# Patient Record
Sex: MALE | Race: WHITE | NOT HISPANIC OR LATINO | URBAN - METROPOLITAN AREA
[De-identification: names, ages, dates, MRNs, and addresses within clinical notes are randomized per-mention and may not be internally consistent; named-entity substitution may affect disease eponyms.]

---

## 2017-04-04 ENCOUNTER — OUTPATIENT (OUTPATIENT)
Dept: OUTPATIENT SERVICES | Facility: HOSPITAL | Age: 77
LOS: 1 days | Discharge: HOME | End: 2017-04-04

## 2017-06-27 DIAGNOSIS — J43.9 EMPHYSEMA, UNSPECIFIED: ICD-10-CM

## 2017-06-27 DIAGNOSIS — D64.9 ANEMIA, UNSPECIFIED: ICD-10-CM

## 2017-06-27 DIAGNOSIS — G47.33 OBSTRUCTIVE SLEEP APNEA (ADULT) (PEDIATRIC): ICD-10-CM

## 2017-06-27 DIAGNOSIS — E11.22 TYPE 2 DIABETES MELLITUS WITH DIABETIC CHRONIC KIDNEY DISEASE: ICD-10-CM

## 2017-07-05 ENCOUNTER — OUTPATIENT (OUTPATIENT)
Dept: OUTPATIENT SERVICES | Facility: HOSPITAL | Age: 77
LOS: 1 days | Discharge: HOME | End: 2017-07-05

## 2017-07-05 DIAGNOSIS — I10 ESSENTIAL (PRIMARY) HYPERTENSION: ICD-10-CM

## 2017-07-05 DIAGNOSIS — J43.9 EMPHYSEMA, UNSPECIFIED: ICD-10-CM

## 2017-07-05 DIAGNOSIS — G47.33 OBSTRUCTIVE SLEEP APNEA (ADULT) (PEDIATRIC): ICD-10-CM

## 2017-07-05 DIAGNOSIS — E11.22 TYPE 2 DIABETES MELLITUS WITH DIABETIC CHRONIC KIDNEY DISEASE: ICD-10-CM

## 2017-07-05 DIAGNOSIS — E11.9 TYPE 2 DIABETES MELLITUS WITHOUT COMPLICATIONS: ICD-10-CM

## 2017-07-05 DIAGNOSIS — D64.9 ANEMIA, UNSPECIFIED: ICD-10-CM

## 2017-07-05 DIAGNOSIS — J44.9 CHRONIC OBSTRUCTIVE PULMONARY DISEASE, UNSPECIFIED: ICD-10-CM

## 2017-07-05 DIAGNOSIS — E87.5 HYPERKALEMIA: ICD-10-CM

## 2017-07-05 DIAGNOSIS — E78.5 HYPERLIPIDEMIA, UNSPECIFIED: ICD-10-CM

## 2017-07-05 DIAGNOSIS — N40.0 BENIGN PROSTATIC HYPERPLASIA WITHOUT LOWER URINARY TRACT SYMPTOMS: ICD-10-CM

## 2017-07-05 DIAGNOSIS — A41.9 SEPSIS, UNSPECIFIED ORGANISM: ICD-10-CM

## 2017-08-08 ENCOUNTER — APPOINTMENT (OUTPATIENT)
Dept: UROLOGY | Facility: CLINIC | Age: 77
End: 2017-08-08

## 2017-09-25 ENCOUNTER — OUTPATIENT (OUTPATIENT)
Dept: OUTPATIENT SERVICES | Facility: HOSPITAL | Age: 77
LOS: 1 days | Discharge: HOME | End: 2017-09-25

## 2017-09-25 DIAGNOSIS — N40.0 BENIGN PROSTATIC HYPERPLASIA WITHOUT LOWER URINARY TRACT SYMPTOMS: ICD-10-CM

## 2017-09-25 DIAGNOSIS — E78.5 HYPERLIPIDEMIA, UNSPECIFIED: ICD-10-CM

## 2017-09-25 DIAGNOSIS — R91.8 OTHER NONSPECIFIC ABNORMAL FINDING OF LUNG FIELD: ICD-10-CM

## 2017-09-25 DIAGNOSIS — I10 ESSENTIAL (PRIMARY) HYPERTENSION: ICD-10-CM

## 2017-09-25 DIAGNOSIS — E11.9 TYPE 2 DIABETES MELLITUS WITHOUT COMPLICATIONS: ICD-10-CM

## 2017-09-25 DIAGNOSIS — J44.9 CHRONIC OBSTRUCTIVE PULMONARY DISEASE, UNSPECIFIED: ICD-10-CM

## 2017-09-25 DIAGNOSIS — E87.5 HYPERKALEMIA: ICD-10-CM

## 2017-09-25 DIAGNOSIS — A41.9 SEPSIS, UNSPECIFIED ORGANISM: ICD-10-CM

## 2017-10-03 ENCOUNTER — MESSAGE (OUTPATIENT)
Age: 77
End: 2017-10-03

## 2017-10-04 ENCOUNTER — MESSAGE (OUTPATIENT)
Age: 77
End: 2017-10-04

## 2017-10-04 DIAGNOSIS — R97.20 ELEVATED PROSTATE, SPECIFIC ANTIGEN [PSA]: ICD-10-CM

## 2017-11-09 ENCOUNTER — INPATIENT (INPATIENT)
Facility: HOSPITAL | Age: 77
LOS: 7 days | Discharge: HOME HEALTH | End: 2017-11-17
Attending: HOSPITALIST | Admitting: INTERNAL MEDICINE

## 2017-11-09 ENCOUNTER — OUTPATIENT (OUTPATIENT)
Dept: OUTPATIENT SERVICES | Facility: HOSPITAL | Age: 77
LOS: 1 days | Discharge: HOME | End: 2017-11-09

## 2017-11-09 DIAGNOSIS — J44.9 CHRONIC OBSTRUCTIVE PULMONARY DISEASE, UNSPECIFIED: ICD-10-CM

## 2017-11-09 DIAGNOSIS — N40.0 BENIGN PROSTATIC HYPERPLASIA WITHOUT LOWER URINARY TRACT SYMPTOMS: ICD-10-CM

## 2017-11-09 DIAGNOSIS — Z99.81 DEPENDENCE ON SUPPLEMENTAL OXYGEN: ICD-10-CM

## 2017-11-09 DIAGNOSIS — A41.9 SEPSIS, UNSPECIFIED ORGANISM: ICD-10-CM

## 2017-11-09 DIAGNOSIS — E78.5 HYPERLIPIDEMIA, UNSPECIFIED: ICD-10-CM

## 2017-11-09 DIAGNOSIS — E11.9 TYPE 2 DIABETES MELLITUS WITHOUT COMPLICATIONS: ICD-10-CM

## 2017-11-09 DIAGNOSIS — E87.5 HYPERKALEMIA: ICD-10-CM

## 2017-11-09 DIAGNOSIS — G47.33 OBSTRUCTIVE SLEEP APNEA (ADULT) (PEDIATRIC): ICD-10-CM

## 2017-11-09 DIAGNOSIS — J43.9 EMPHYSEMA, UNSPECIFIED: ICD-10-CM

## 2017-11-09 DIAGNOSIS — I10 ESSENTIAL (PRIMARY) HYPERTENSION: ICD-10-CM

## 2017-11-09 DIAGNOSIS — J44.1 CHRONIC OBSTRUCTIVE PULMONARY DISEASE WITH (ACUTE) EXACERBATION: ICD-10-CM

## 2017-11-09 DIAGNOSIS — E11.22 TYPE 2 DIABETES MELLITUS WITH DIABETIC CHRONIC KIDNEY DISEASE: ICD-10-CM

## 2017-11-09 DIAGNOSIS — D64.9 ANEMIA, UNSPECIFIED: ICD-10-CM

## 2017-11-10 DIAGNOSIS — Z02.9 ENCOUNTER FOR ADMINISTRATIVE EXAMINATIONS, UNSPECIFIED: ICD-10-CM

## 2017-11-22 ENCOUNTER — OUTPATIENT (OUTPATIENT)
Dept: OUTPATIENT SERVICES | Facility: HOSPITAL | Age: 77
LOS: 1 days | Discharge: HOME | End: 2017-11-22

## 2017-11-22 DIAGNOSIS — N40.0 BENIGN PROSTATIC HYPERPLASIA WITHOUT LOWER URINARY TRACT SYMPTOMS: ICD-10-CM

## 2017-11-22 DIAGNOSIS — Z79.4 LONG TERM (CURRENT) USE OF INSULIN: ICD-10-CM

## 2017-11-22 DIAGNOSIS — A41.9 SEPSIS, UNSPECIFIED ORGANISM: ICD-10-CM

## 2017-11-22 DIAGNOSIS — K59.00 CONSTIPATION, UNSPECIFIED: ICD-10-CM

## 2017-11-22 DIAGNOSIS — D72.829 ELEVATED WHITE BLOOD CELL COUNT, UNSPECIFIED: ICD-10-CM

## 2017-11-22 DIAGNOSIS — N28.1 CYST OF KIDNEY, ACQUIRED: ICD-10-CM

## 2017-11-22 DIAGNOSIS — E11.22 TYPE 2 DIABETES MELLITUS WITH DIABETIC CHRONIC KIDNEY DISEASE: ICD-10-CM

## 2017-11-22 DIAGNOSIS — R54 AGE-RELATED PHYSICAL DEBILITY: ICD-10-CM

## 2017-11-22 DIAGNOSIS — Z87.891 PERSONAL HISTORY OF NICOTINE DEPENDENCE: ICD-10-CM

## 2017-11-22 DIAGNOSIS — E11.9 TYPE 2 DIABETES MELLITUS WITHOUT COMPLICATIONS: ICD-10-CM

## 2017-11-22 DIAGNOSIS — J96.11 CHRONIC RESPIRATORY FAILURE WITH HYPOXIA: ICD-10-CM

## 2017-11-22 DIAGNOSIS — G47.30 SLEEP APNEA, UNSPECIFIED: ICD-10-CM

## 2017-11-22 DIAGNOSIS — K57.90 DIVERTICULOSIS OF INTESTINE, PART UNSPECIFIED, WITHOUT PERFORATION OR ABSCESS WITHOUT BLEEDING: ICD-10-CM

## 2017-11-22 DIAGNOSIS — K27.9 PEPTIC ULCER, SITE UNSPECIFIED, UNSPECIFIED AS ACUTE OR CHRONIC, WITHOUT HEMORRHAGE OR PERFORATION: ICD-10-CM

## 2017-11-22 DIAGNOSIS — I10 ESSENTIAL (PRIMARY) HYPERTENSION: ICD-10-CM

## 2017-11-22 DIAGNOSIS — I27.20 PULMONARY HYPERTENSION, UNSPECIFIED: ICD-10-CM

## 2017-11-22 DIAGNOSIS — N17.9 ACUTE KIDNEY FAILURE, UNSPECIFIED: ICD-10-CM

## 2017-11-22 DIAGNOSIS — E78.5 HYPERLIPIDEMIA, UNSPECIFIED: ICD-10-CM

## 2017-11-22 DIAGNOSIS — M25.511 PAIN IN RIGHT SHOULDER: ICD-10-CM

## 2017-11-22 DIAGNOSIS — E87.5 HYPERKALEMIA: ICD-10-CM

## 2017-11-22 DIAGNOSIS — J44.9 CHRONIC OBSTRUCTIVE PULMONARY DISEASE, UNSPECIFIED: ICD-10-CM

## 2017-11-22 DIAGNOSIS — J44.1 CHRONIC OBSTRUCTIVE PULMONARY DISEASE WITH (ACUTE) EXACERBATION: ICD-10-CM

## 2017-11-22 DIAGNOSIS — E11.65 TYPE 2 DIABETES MELLITUS WITH HYPERGLYCEMIA: ICD-10-CM

## 2017-11-22 DIAGNOSIS — I12.9 HYPERTENSIVE CHRONIC KIDNEY DISEASE WITH STAGE 1 THROUGH STAGE 4 CHRONIC KIDNEY DISEASE, OR UNSPECIFIED CHRONIC KIDNEY DISEASE: ICD-10-CM

## 2017-11-22 DIAGNOSIS — N20.0 CALCULUS OF KIDNEY: ICD-10-CM

## 2017-11-22 DIAGNOSIS — N18.3 CHRONIC KIDNEY DISEASE, STAGE 3 (MODERATE): ICD-10-CM

## 2017-11-22 DIAGNOSIS — Z99.81 DEPENDENCE ON SUPPLEMENTAL OXYGEN: ICD-10-CM

## 2017-11-28 ENCOUNTER — OUTPATIENT (OUTPATIENT)
Dept: OUTPATIENT SERVICES | Facility: HOSPITAL | Age: 77
LOS: 1 days | Discharge: HOME | End: 2017-11-28

## 2017-11-28 DIAGNOSIS — E78.5 HYPERLIPIDEMIA, UNSPECIFIED: ICD-10-CM

## 2017-11-28 DIAGNOSIS — J44.9 CHRONIC OBSTRUCTIVE PULMONARY DISEASE, UNSPECIFIED: ICD-10-CM

## 2017-11-28 DIAGNOSIS — E11.9 TYPE 2 DIABETES MELLITUS WITHOUT COMPLICATIONS: ICD-10-CM

## 2017-11-28 DIAGNOSIS — E87.5 HYPERKALEMIA: ICD-10-CM

## 2017-11-28 DIAGNOSIS — D64.9 ANEMIA, UNSPECIFIED: ICD-10-CM

## 2017-11-28 DIAGNOSIS — N18.3 CHRONIC KIDNEY DISEASE, STAGE 3 (MODERATE): ICD-10-CM

## 2017-11-28 DIAGNOSIS — N40.0 BENIGN PROSTATIC HYPERPLASIA WITHOUT LOWER URINARY TRACT SYMPTOMS: ICD-10-CM

## 2017-11-28 DIAGNOSIS — A41.9 SEPSIS, UNSPECIFIED ORGANISM: ICD-10-CM

## 2017-11-28 DIAGNOSIS — I10 ESSENTIAL (PRIMARY) HYPERTENSION: ICD-10-CM

## 2018-01-16 ENCOUNTER — OUTPATIENT (OUTPATIENT)
Dept: OUTPATIENT SERVICES | Facility: HOSPITAL | Age: 78
LOS: 1 days | Discharge: HOME | End: 2018-01-16

## 2018-01-16 DIAGNOSIS — N40.0 BENIGN PROSTATIC HYPERPLASIA WITHOUT LOWER URINARY TRACT SYMPTOMS: ICD-10-CM

## 2018-01-16 DIAGNOSIS — E87.5 HYPERKALEMIA: ICD-10-CM

## 2018-01-16 DIAGNOSIS — E11.9 TYPE 2 DIABETES MELLITUS WITHOUT COMPLICATIONS: ICD-10-CM

## 2018-01-16 DIAGNOSIS — E78.4 OTHER HYPERLIPIDEMIA: ICD-10-CM

## 2018-01-16 DIAGNOSIS — I10 ESSENTIAL (PRIMARY) HYPERTENSION: ICD-10-CM

## 2018-01-16 DIAGNOSIS — Z02.89 ENCOUNTER FOR OTHER ADMINISTRATIVE EXAMINATIONS: ICD-10-CM

## 2018-01-16 DIAGNOSIS — R73.09 OTHER ABNORMAL GLUCOSE: ICD-10-CM

## 2018-01-16 DIAGNOSIS — J44.9 CHRONIC OBSTRUCTIVE PULMONARY DISEASE, UNSPECIFIED: ICD-10-CM

## 2018-01-16 DIAGNOSIS — A41.9 SEPSIS, UNSPECIFIED ORGANISM: ICD-10-CM

## 2018-01-16 DIAGNOSIS — R74.8 ABNORMAL LEVELS OF OTHER SERUM ENZYMES: ICD-10-CM

## 2018-01-16 DIAGNOSIS — E55.9 VITAMIN D DEFICIENCY, UNSPECIFIED: ICD-10-CM

## 2018-01-16 DIAGNOSIS — E78.5 HYPERLIPIDEMIA, UNSPECIFIED: ICD-10-CM

## 2018-01-16 DIAGNOSIS — R79.82 ELEVATED C-REACTIVE PROTEIN (CRP): ICD-10-CM

## 2018-01-16 DIAGNOSIS — E11.22 TYPE 2 DIABETES MELLITUS WITH DIABETIC CHRONIC KIDNEY DISEASE: ICD-10-CM

## 2018-01-16 DIAGNOSIS — E78.1 PURE HYPERGLYCERIDEMIA: ICD-10-CM

## 2018-01-16 DIAGNOSIS — R68.89 OTHER GENERAL SYMPTOMS AND SIGNS: ICD-10-CM

## 2018-01-25 ENCOUNTER — OUTPATIENT (OUTPATIENT)
Dept: OUTPATIENT SERVICES | Facility: HOSPITAL | Age: 78
LOS: 1 days | Discharge: HOME | End: 2018-01-25

## 2018-01-25 DIAGNOSIS — E87.5 HYPERKALEMIA: ICD-10-CM

## 2018-02-04 DIAGNOSIS — E78.5 HYPERLIPIDEMIA, UNSPECIFIED: ICD-10-CM

## 2018-02-04 DIAGNOSIS — I10 ESSENTIAL (PRIMARY) HYPERTENSION: ICD-10-CM

## 2018-02-04 DIAGNOSIS — E11.9 TYPE 2 DIABETES MELLITUS WITHOUT COMPLICATIONS: ICD-10-CM

## 2018-02-04 DIAGNOSIS — A41.9 SEPSIS, UNSPECIFIED ORGANISM: ICD-10-CM

## 2018-02-04 DIAGNOSIS — E87.5 HYPERKALEMIA: ICD-10-CM

## 2018-02-04 DIAGNOSIS — J44.9 CHRONIC OBSTRUCTIVE PULMONARY DISEASE, UNSPECIFIED: ICD-10-CM

## 2018-02-04 DIAGNOSIS — N40.0 BENIGN PROSTATIC HYPERPLASIA WITHOUT LOWER URINARY TRACT SYMPTOMS: ICD-10-CM

## 2018-02-22 ENCOUNTER — OUTPATIENT (OUTPATIENT)
Dept: OUTPATIENT SERVICES | Facility: HOSPITAL | Age: 78
LOS: 1 days | Discharge: HOME | End: 2018-02-22

## 2018-02-22 DIAGNOSIS — E87.5 HYPERKALEMIA: ICD-10-CM

## 2018-02-22 DIAGNOSIS — E11.9 TYPE 2 DIABETES MELLITUS WITHOUT COMPLICATIONS: ICD-10-CM

## 2018-02-27 ENCOUNTER — APPOINTMENT (OUTPATIENT)
Dept: UROLOGY | Facility: CLINIC | Age: 78
End: 2018-02-27

## 2018-06-16 ENCOUNTER — EMERGENCY (EMERGENCY)
Facility: HOSPITAL | Age: 78
LOS: 0 days | Discharge: LEFT BEFORE DISCHARGE | End: 2018-06-16
Attending: STUDENT IN AN ORGANIZED HEALTH CARE EDUCATION/TRAINING PROGRAM | Admitting: STUDENT IN AN ORGANIZED HEALTH CARE EDUCATION/TRAINING PROGRAM

## 2018-06-16 VITALS
WEIGHT: 229.94 LBS | HEIGHT: 69 IN | DIASTOLIC BLOOD PRESSURE: 74 MMHG | TEMPERATURE: 98 F | SYSTOLIC BLOOD PRESSURE: 179 MMHG | HEART RATE: 80 BPM | OXYGEN SATURATION: 90 % | RESPIRATION RATE: 20 BRPM

## 2018-06-16 VITALS
RESPIRATION RATE: 19 BRPM | DIASTOLIC BLOOD PRESSURE: 64 MMHG | SYSTOLIC BLOOD PRESSURE: 124 MMHG | HEART RATE: 68 BPM | TEMPERATURE: 98 F | OXYGEN SATURATION: 93 %

## 2018-06-16 DIAGNOSIS — E11.9 TYPE 2 DIABETES MELLITUS WITHOUT COMPLICATIONS: ICD-10-CM

## 2018-06-16 DIAGNOSIS — I25.10 ATHEROSCLEROTIC HEART DISEASE OF NATIVE CORONARY ARTERY WITHOUT ANGINA PECTORIS: ICD-10-CM

## 2018-06-16 DIAGNOSIS — Z88.6 ALLERGY STATUS TO ANALGESIC AGENT: ICD-10-CM

## 2018-06-16 DIAGNOSIS — Z88.8 ALLERGY STATUS TO OTHER DRUGS, MEDICAMENTS AND BIOLOGICAL SUBSTANCES: ICD-10-CM

## 2018-06-16 DIAGNOSIS — I10 ESSENTIAL (PRIMARY) HYPERTENSION: ICD-10-CM

## 2018-06-16 DIAGNOSIS — Z91.013 ALLERGY TO SEAFOOD: ICD-10-CM

## 2018-06-16 DIAGNOSIS — R07.89 OTHER CHEST PAIN: ICD-10-CM

## 2018-06-16 DIAGNOSIS — K21.9 GASTRO-ESOPHAGEAL REFLUX DISEASE WITHOUT ESOPHAGITIS: ICD-10-CM

## 2018-06-16 DIAGNOSIS — J02.9 ACUTE PHARYNGITIS, UNSPECIFIED: ICD-10-CM

## 2018-06-16 LAB
ALBUMIN SERPL ELPH-MCNC: 4.4 G/DL — SIGNIFICANT CHANGE UP (ref 3.5–5.2)
ALP SERPL-CCNC: 70 U/L — SIGNIFICANT CHANGE UP (ref 30–115)
ALT FLD-CCNC: 9 U/L — SIGNIFICANT CHANGE UP (ref 0–41)
ANION GAP SERPL CALC-SCNC: 15 MMOL/L — HIGH (ref 7–14)
AST SERPL-CCNC: 12 U/L — SIGNIFICANT CHANGE UP (ref 0–41)
BASE EXCESS BLDV CALC-SCNC: 1.6 MMOL/L — SIGNIFICANT CHANGE UP (ref -2–2)
BASOPHILS # BLD AUTO: 0.1 K/UL — SIGNIFICANT CHANGE UP (ref 0–0.2)
BASOPHILS NFR BLD AUTO: 0.6 % — SIGNIFICANT CHANGE UP (ref 0–1)
BILIRUB DIRECT SERPL-MCNC: <0.2 MG/DL — SIGNIFICANT CHANGE UP (ref 0–0.2)
BILIRUB INDIRECT FLD-MCNC: >0.2 MG/DL — SIGNIFICANT CHANGE UP (ref 0.2–1.2)
BILIRUB SERPL-MCNC: 0.4 MG/DL — SIGNIFICANT CHANGE UP (ref 0.2–1.2)
BUN SERPL-MCNC: 44 MG/DL — HIGH (ref 10–20)
CA-I SERPL-SCNC: 1.26 MMOL/L — SIGNIFICANT CHANGE UP (ref 1.12–1.3)
CALCIUM SERPL-MCNC: 9.5 MG/DL — SIGNIFICANT CHANGE UP (ref 8.5–10.1)
CHLORIDE SERPL-SCNC: 100 MMOL/L — SIGNIFICANT CHANGE UP (ref 98–110)
CK MB CFR SERPL CALC: 1.8 NG/ML — SIGNIFICANT CHANGE UP (ref 0.6–6.3)
CK SERPL-CCNC: 34 U/L — SIGNIFICANT CHANGE UP (ref 0–225)
CO2 SERPL-SCNC: 25 MMOL/L — SIGNIFICANT CHANGE UP (ref 17–32)
CREAT SERPL-MCNC: 2 MG/DL — HIGH (ref 0.7–1.5)
EOSINOPHIL # BLD AUTO: 0.31 K/UL — SIGNIFICANT CHANGE UP (ref 0–0.7)
EOSINOPHIL NFR BLD AUTO: 1.9 % — SIGNIFICANT CHANGE UP (ref 0–8)
GAS PNL BLDV: 139 MMOL/L — SIGNIFICANT CHANGE UP (ref 136–145)
GAS PNL BLDV: SIGNIFICANT CHANGE UP
GLUCOSE SERPL-MCNC: 199 MG/DL — HIGH (ref 70–99)
HCO3 BLDV-SCNC: 29 MMOL/L — SIGNIFICANT CHANGE UP (ref 22–29)
HCT VFR BLD CALC: 44.6 % — SIGNIFICANT CHANGE UP (ref 42–52)
HCT VFR BLDA CALC: 46.2 % — HIGH (ref 34–44)
HGB BLD CALC-MCNC: 15.1 G/DL — SIGNIFICANT CHANGE UP (ref 14–18)
HGB BLD-MCNC: 13.9 G/DL — LOW (ref 14–18)
HOROWITZ INDEX BLDV+IHG-RTO: 21 — SIGNIFICANT CHANGE UP
IMM GRANULOCYTES NFR BLD AUTO: 1.1 % — HIGH (ref 0.1–0.3)
LACTATE BLDV-MCNC: 1.3 MMOL/L — SIGNIFICANT CHANGE UP (ref 0.5–1.6)
LYMPHOCYTES # BLD AUTO: 1.08 K/UL — LOW (ref 1.2–3.4)
LYMPHOCYTES # BLD AUTO: 6.8 % — LOW (ref 20.5–51.1)
MCHC RBC-ENTMCNC: 24.3 PG — LOW (ref 27–31)
MCHC RBC-ENTMCNC: 31.2 G/DL — LOW (ref 32–37)
MCV RBC AUTO: 78.1 FL — LOW (ref 80–94)
MONOCYTES # BLD AUTO: 1.25 K/UL — HIGH (ref 0.1–0.6)
MONOCYTES NFR BLD AUTO: 7.8 % — SIGNIFICANT CHANGE UP (ref 1.7–9.3)
NEUTROPHILS # BLD AUTO: 13.02 K/UL — HIGH (ref 1.4–6.5)
NEUTROPHILS NFR BLD AUTO: 81.8 % — HIGH (ref 42.2–75.2)
NRBC # BLD: 0 /100 WBCS — SIGNIFICANT CHANGE UP (ref 0–0)
PCO2 BLDV: 55 MMHG — HIGH (ref 41–51)
PH BLDV: 7.33 — SIGNIFICANT CHANGE UP (ref 7.26–7.43)
PLATELET # BLD AUTO: 550 K/UL — HIGH (ref 130–400)
PO2 BLDV: 29 MMHG — SIGNIFICANT CHANGE UP (ref 20–40)
POTASSIUM BLDV-SCNC: 4.5 MMOL/L — SIGNIFICANT CHANGE UP (ref 3.3–5.6)
POTASSIUM SERPL-MCNC: 4.9 MMOL/L — SIGNIFICANT CHANGE UP (ref 3.5–5)
POTASSIUM SERPL-SCNC: 4.9 MMOL/L — SIGNIFICANT CHANGE UP (ref 3.5–5)
PROT SERPL-MCNC: 7 G/DL — SIGNIFICANT CHANGE UP (ref 6–8)
RBC # BLD: 5.71 M/UL — SIGNIFICANT CHANGE UP (ref 4.7–6.1)
RBC # FLD: 17.7 % — HIGH (ref 11.5–14.5)
SAO2 % BLDV: 49 % — SIGNIFICANT CHANGE UP
SODIUM SERPL-SCNC: 140 MMOL/L — SIGNIFICANT CHANGE UP (ref 135–146)
TROPONIN T SERPL-MCNC: <0.01 NG/ML — SIGNIFICANT CHANGE UP
WBC # BLD: 15.93 K/UL — HIGH (ref 4.8–10.8)
WBC # FLD AUTO: 15.93 K/UL — HIGH (ref 4.8–10.8)

## 2018-06-16 RX ORDER — DIPHENHYDRAMINE HYDROCHLORIDE AND LIDOCAINE HYDROCHLORIDE AND ALUMINUM HYDROXIDE AND MAGNESIUM HYDRO
30 KIT ONCE
Qty: 0 | Refills: 0 | Status: COMPLETED | OUTPATIENT
Start: 2018-06-16 | End: 2018-06-16

## 2018-06-16 RX ORDER — FAMOTIDINE 10 MG/ML
20 INJECTION INTRAVENOUS ONCE
Qty: 0 | Refills: 0 | Status: COMPLETED | OUTPATIENT
Start: 2018-06-16 | End: 2018-06-16

## 2018-06-16 RX ORDER — OMEPRAZOLE 10 MG/1
1 CAPSULE, DELAYED RELEASE ORAL
Qty: 14 | Refills: 0 | OUTPATIENT
Start: 2018-06-16 | End: 2018-06-29

## 2018-06-16 RX ADMIN — FAMOTIDINE 20 MILLIGRAM(S): 10 INJECTION INTRAVENOUS at 08:53

## 2018-06-16 RX ADMIN — DIPHENHYDRAMINE HYDROCHLORIDE AND LIDOCAINE HYDROCHLORIDE AND ALUMINUM HYDROXIDE AND MAGNESIUM HYDRO 30 MILLILITER(S): KIT at 08:53

## 2018-06-16 NOTE — ED ADULT TRIAGE NOTE - CHIEF COMPLAINT QUOTE
pt states "I have acid reflux and since last night have been having pain from it in my throat and pressure in my right jaw."

## 2018-06-16 NOTE — ED PROVIDER NOTE - CARE PLAN
Report given to ROSENDO Carrington   Principal Discharge DX:	Sore throat  Secondary Diagnosis:	Ear pain, right  Secondary Diagnosis:	Chest discomfort

## 2018-06-16 NOTE — ED PROVIDER NOTE - PHYSICAL EXAMINATION
VITAL SIGNS: I have reviewed nursing notes and confirm.  CONSTITUTIONAL: Elderly male laying on stretcher in NAD.   SKIN: Skin exam is warm and dry, no acute rash.  HEAD: Normocephalic; atraumatic.  EYES: Conjunctiva and sclera clear.  ENT: No nasal discharge; airway clear. TMs clear.  NECK: Supple; non tender.  CARD: S1, S2 normal; no murmurs, gallops, or rubs. Regular rate and rhythm.  RESP: No wheezes, rales or rhonchi. Speaking in full sentences.   ABD: Normal bowel sounds; soft; non-distended; non-tender; No rebound or guarding.   EXT: Normal ROM. No clubbing, cyanosis or edema.  NEURO: Alert, oriented. Grossly unremarkable. No focal deficits.

## 2018-06-16 NOTE — ED PROVIDER NOTE - ATTENDING CONTRIBUTION TO CARE
78 y/o M pmh DM, HTN, emphysema, GERD, p/w sorethoat and R neck pain waking him from sleep this AM.  + vague chest discomfort.  No n/v, cough, fever, diaphoresis.  His regular GERD does not feel this way; no prior DVT/ PE/ MI.  c/o R ear fullness.  PE NAD: CTAB;RRR; OP clear; neck supple non ttp, no LAD; TM's NL b/l.  IMP: ACS vs GERD.  P: labs, ekg, cxr, asa, reassess, admit vs cdu

## 2018-06-16 NOTE — ED ADULT NURSE NOTE - EXPLANATION OF PATIENT'S REASON FOR LEAVING
PT verbalized back understanding of discharge instructions, follow up care, medications, and AMA. Pt a/o x 3, steady gait

## 2018-06-16 NOTE — ED PROVIDER NOTE - OBJECTIVE STATEMENT
78 yo M with PMHx of DM, HTN, emphysema, CAD and GERD presents to the ED c/o right sided neck pain and sore throat that woke him from sleep this morning. Pt admits to mild associated chest discomfort. Pt states he has had similar symptoms in the past 2/2 to his GERD but pain never went to the right side of his neck. Pt has been taking papaya extract with minimal improvement in symptoms. Pt also complaints of right ear "fullness." Pt denies fever, chills, nausea, vomiting, abdominal pain, diarrhea, headache, dizziness, weakness, SOB, back pain, LOC, trauma, urinary symptoms, cough, calf pain/swelling, recent travel, recent surgery.

## 2018-06-16 NOTE — ED PROVIDER NOTE - PROGRESS NOTE DETAILS
The patient wishes to leave against medical advice.  I have discussed the risks, benefits and alternatives (including the possibility of worsening of disease, pain, permanent disability, and/or death) with the patient.  The patient voices understanding of these risks, benefits, and alternatives and still wishes to sign out against medical advice.  The patient is awake, alert, oriented  x 3 and has demonstrated capacity to refuse/direct care.  I have advised the patient that they can and should return immediately should they develop any worse/different/additional symptoms, or if they change their mind and want to continue their care.

## 2018-06-18 ENCOUNTER — EMERGENCY (EMERGENCY)
Facility: HOSPITAL | Age: 78
LOS: 0 days | Discharge: HOME | End: 2018-06-18
Attending: EMERGENCY MEDICINE | Admitting: EMERGENCY MEDICINE

## 2018-06-18 VITALS
DIASTOLIC BLOOD PRESSURE: 65 MMHG | RESPIRATION RATE: 20 BRPM | OXYGEN SATURATION: 90 % | TEMPERATURE: 98 F | SYSTOLIC BLOOD PRESSURE: 146 MMHG

## 2018-06-18 VITALS
TEMPERATURE: 98 F | HEART RATE: 85 BPM | RESPIRATION RATE: 20 BRPM | OXYGEN SATURATION: 90 % | DIASTOLIC BLOOD PRESSURE: 83 MMHG | HEIGHT: 68 IN | WEIGHT: 253.09 LBS | SYSTOLIC BLOOD PRESSURE: 149 MMHG

## 2018-06-18 DIAGNOSIS — Z79.899 OTHER LONG TERM (CURRENT) DRUG THERAPY: ICD-10-CM

## 2018-06-18 DIAGNOSIS — I10 ESSENTIAL (PRIMARY) HYPERTENSION: ICD-10-CM

## 2018-06-18 DIAGNOSIS — K21.9 GASTRO-ESOPHAGEAL REFLUX DISEASE WITHOUT ESOPHAGITIS: ICD-10-CM

## 2018-06-18 DIAGNOSIS — Z88.8 ALLERGY STATUS TO OTHER DRUGS, MEDICAMENTS AND BIOLOGICAL SUBSTANCES: ICD-10-CM

## 2018-06-18 DIAGNOSIS — E11.9 TYPE 2 DIABETES MELLITUS WITHOUT COMPLICATIONS: ICD-10-CM

## 2018-06-18 DIAGNOSIS — J44.9 CHRONIC OBSTRUCTIVE PULMONARY DISEASE, UNSPECIFIED: ICD-10-CM

## 2018-06-18 DIAGNOSIS — J02.9 ACUTE PHARYNGITIS, UNSPECIFIED: ICD-10-CM

## 2018-06-18 DIAGNOSIS — H66.92 OTITIS MEDIA, UNSPECIFIED, LEFT EAR: ICD-10-CM

## 2018-06-18 DIAGNOSIS — H92.02 OTALGIA, LEFT EAR: ICD-10-CM

## 2018-06-18 RX ORDER — CEFUROXIME AXETIL 250 MG
1 TABLET ORAL
Qty: 14 | Refills: 0 | OUTPATIENT
Start: 2018-06-18 | End: 2018-06-24

## 2018-06-18 RX ORDER — KETOROLAC TROMETHAMINE 30 MG/ML
15 SYRINGE (ML) INJECTION ONCE
Qty: 0 | Refills: 0 | Status: DISCONTINUED | OUTPATIENT
Start: 2018-06-18 | End: 2018-06-18

## 2018-06-18 RX ORDER — AMOXICILLIN 250 MG/5ML
1000 SUSPENSION, RECONSTITUTED, ORAL (ML) ORAL ONCE
Qty: 0 | Refills: 0 | Status: COMPLETED | OUTPATIENT
Start: 2018-06-18 | End: 2018-06-18

## 2018-06-18 RX ORDER — AMOXICILLIN 250 MG/5ML
1 SUSPENSION, RECONSTITUTED, ORAL (ML) ORAL
Qty: 20 | Refills: 0 | OUTPATIENT
Start: 2018-06-18 | End: 2018-06-27

## 2018-06-18 RX ADMIN — Medication 1000 MILLIGRAM(S): at 03:27

## 2018-06-18 RX ADMIN — Medication 15 MILLIGRAM(S): at 03:27

## 2018-06-18 NOTE — ED PROVIDER NOTE - PHYSICAL EXAMINATION
CONSTITUTIONAL: Pt on nc, NAD  EYES: PERRL; EOM intact.   ENT: + erythematous left TM with loss of landmarks. No mastoid tenderness. No discharge. + mildly erythematous pharynx. No exudates, abscesses noted  NECK: Supple; non-tender; no cervical lymphadenopathy. No JVD.   CARDIOVASCULAR: Normal S1, S2; no murmurs, rubs, or gallops.   RESPIRATORY: Normal chest excursion with respiration; breath sounds clear and equal bilaterally; no wheezes, rhonchi, or rales. No tachypnea.   MS: No evidence of trauma or deformity. Non-tender to palpation. No scoliosis. No CVA tenderness. Normal ROM in all four extremities; non-tender to palpation; distal pulses are normal.   SKIN: Normal for age and race; warm; dry; good turgor; no apparent lesions or exudate.   NEURO/PSYCH: A & O x 4; grossly unremarkable. mood and manner are appropriate. Grooming and personal hygiene are appropriate. No apparent thoughts of harm to self or others.

## 2018-06-18 NOTE — ED PROVIDER NOTE - NS ED ROS FT
CONST: No fever, chills or bodyaches  EYES: No pain, redness, drainage or visual changes.  ENT: + sore throat, left ear pain. No nasal discharge or congestion.   CARD: No chest pain, palpitations  RESP: No SOB, cough, hemoptysis. + hx of COPD  GI: No abdominal pain, N/V/D  MS: No joint pain, back pain or extremity pain/injury  SKIN: No rashes  NEURO: No headache, dizziness, paresthesias or LOC

## 2018-06-18 NOTE — ED PROVIDER NOTE - PROGRESS NOTE DETAILS
I personally evaluated the patient. I reviewed the Physician Assistant Fellow's note and agree with the findings and plan.   left TM bugling/erythema./loss of landmark suggesting AOM. denies chest pain and sob. will discharge. Received called from pharmacy patient allergic to PCN prescribed cefuroxime. SORAIDA Marrero

## 2018-06-18 NOTE — ED PROVIDER NOTE - OBJECTIVE STATEMENT
77 year old male with pmhx of DM, HTN, GERD, COPD on 4L home o2 c/o left ear pain x 1 day. pt was seen two days ago 0n 06/16 for right ear/jaw and chest pain and pt had ama. Pt currently denies any chest pain or worsening sob. No fever, chills, cough, nasal congestion, sick contacts, decreased hearing, tinnitus.

## 2018-06-18 NOTE — ED ADULT NURSE NOTE - CHIEF COMPLAINT QUOTE
Clogged left ear and left jaw pain.  Pt states he was here yesterday for right ear symptoms and sore throat.  He signed out after a cardiac work-up.  Today, he feels like it may be his COPD.

## 2018-06-18 NOTE — ED PROVIDER NOTE - MEDICAL DECISION MAKING DETAILS
I personally evaluated the patient. I reviewed the Resident’s or Physician Assistant’s note (as assigned above), and agree with the findings and plan except as documented in my note.  Chart reviewed. H/O COPD, presents with left ear ache and sore throat. Denies chest pain or SOB. Exam shows alert patient in no distress, HEENT NCAT, injected left TM, throat no exudates, RR S1S2, abdomen soft NT +BS, no CCE. EKG normal. Will D/C on Augmentin to follow up with PCP.

## 2018-06-29 ENCOUNTER — OUTPATIENT (OUTPATIENT)
Dept: OUTPATIENT SERVICES | Facility: HOSPITAL | Age: 78
LOS: 1 days | Discharge: HOME | End: 2018-06-29

## 2018-06-29 DIAGNOSIS — R10.819 ABDOMINAL TENDERNESS, UNSPECIFIED SITE: ICD-10-CM

## 2018-07-03 ENCOUNTER — OUTPATIENT (OUTPATIENT)
Dept: OUTPATIENT SERVICES | Facility: HOSPITAL | Age: 78
LOS: 1 days | Discharge: HOME | End: 2018-07-03

## 2018-07-03 DIAGNOSIS — E11.22 TYPE 2 DIABETES MELLITUS WITH DIABETIC CHRONIC KIDNEY DISEASE: ICD-10-CM

## 2018-07-03 DIAGNOSIS — E11.9 TYPE 2 DIABETES MELLITUS WITHOUT COMPLICATIONS: ICD-10-CM

## 2018-07-03 DIAGNOSIS — L73.9 FOLLICULAR DISORDER, UNSPECIFIED: ICD-10-CM

## 2018-07-03 DIAGNOSIS — D64.9 ANEMIA, UNSPECIFIED: ICD-10-CM

## 2018-07-03 DIAGNOSIS — N18.3 CHRONIC KIDNEY DISEASE, STAGE 3 (MODERATE): ICD-10-CM

## 2018-07-03 DIAGNOSIS — I10 ESSENTIAL (PRIMARY) HYPERTENSION: ICD-10-CM

## 2018-07-20 ENCOUNTER — INPATIENT (INPATIENT)
Facility: HOSPITAL | Age: 78
LOS: 8 days | Discharge: HOME HEALTH | End: 2018-07-29
Attending: INTERNAL MEDICINE | Admitting: INTERNAL MEDICINE

## 2018-07-20 VITALS
RESPIRATION RATE: 18 BRPM | HEIGHT: 68 IN | SYSTOLIC BLOOD PRESSURE: 149 MMHG | OXYGEN SATURATION: 92 % | DIASTOLIC BLOOD PRESSURE: 70 MMHG | WEIGHT: 240.08 LBS | TEMPERATURE: 99 F | HEART RATE: 94 BPM

## 2018-07-20 LAB
ALBUMIN SERPL ELPH-MCNC: 3.9 G/DL — SIGNIFICANT CHANGE UP (ref 3.5–5.2)
ALP SERPL-CCNC: 75 U/L — SIGNIFICANT CHANGE UP (ref 30–115)
ALT FLD-CCNC: 10 U/L — SIGNIFICANT CHANGE UP (ref 0–41)
ANION GAP SERPL CALC-SCNC: 13 MMOL/L — SIGNIFICANT CHANGE UP (ref 7–14)
APTT BLD: 30.7 SEC — SIGNIFICANT CHANGE UP (ref 27–39.2)
AST SERPL-CCNC: 10 U/L — SIGNIFICANT CHANGE UP (ref 0–41)
BILIRUB SERPL-MCNC: 0.6 MG/DL — SIGNIFICANT CHANGE UP (ref 0.2–1.2)
BUN SERPL-MCNC: 43 MG/DL — HIGH (ref 10–20)
CALCIUM SERPL-MCNC: 9.2 MG/DL — SIGNIFICANT CHANGE UP (ref 8.5–10.1)
CHLORIDE SERPL-SCNC: 98 MMOL/L — SIGNIFICANT CHANGE UP (ref 98–110)
CK MB CFR SERPL CALC: 2.3 NG/ML — SIGNIFICANT CHANGE UP (ref 0.6–6.3)
CK SERPL-CCNC: 31 U/L — SIGNIFICANT CHANGE UP (ref 0–225)
CO2 SERPL-SCNC: 28 MMOL/L — SIGNIFICANT CHANGE UP (ref 17–32)
CREAT SERPL-MCNC: 1.6 MG/DL — HIGH (ref 0.7–1.5)
GLUCOSE SERPL-MCNC: 283 MG/DL — HIGH (ref 70–99)
HCT VFR BLD CALC: 43.4 % — SIGNIFICANT CHANGE UP (ref 42–52)
HGB BLD-MCNC: 13.1 G/DL — LOW (ref 14–18)
INR BLD: 1.19 RATIO — SIGNIFICANT CHANGE UP (ref 0.65–1.3)
MCHC RBC-ENTMCNC: 23.6 PG — LOW (ref 27–31)
MCHC RBC-ENTMCNC: 30.2 G/DL — LOW (ref 32–37)
MCV RBC AUTO: 78.2 FL — LOW (ref 80–94)
NRBC # BLD: 0 /100 WBCS — SIGNIFICANT CHANGE UP (ref 0–0)
PLATELET # BLD AUTO: 484 K/UL — HIGH (ref 130–400)
POTASSIUM SERPL-MCNC: 5.2 MMOL/L — HIGH (ref 3.5–5)
POTASSIUM SERPL-SCNC: 5.2 MMOL/L — HIGH (ref 3.5–5)
PROT SERPL-MCNC: 7 G/DL — SIGNIFICANT CHANGE UP (ref 6–8)
PROTHROM AB SERPL-ACNC: 12.9 SEC — HIGH (ref 9.95–12.87)
RBC # BLD: 5.55 M/UL — SIGNIFICANT CHANGE UP (ref 4.7–6.1)
RBC # FLD: 17.1 % — HIGH (ref 11.5–14.5)
SODIUM SERPL-SCNC: 139 MMOL/L — SIGNIFICANT CHANGE UP (ref 135–146)
TROPONIN T SERPL-MCNC: <0.01 NG/ML — SIGNIFICANT CHANGE UP
WBC # BLD: 14.24 K/UL — HIGH (ref 4.8–10.8)
WBC # FLD AUTO: 14.24 K/UL — HIGH (ref 4.8–10.8)

## 2018-07-20 RX ORDER — IPRATROPIUM/ALBUTEROL SULFATE 18-103MCG
3 AEROSOL WITH ADAPTER (GRAM) INHALATION ONCE
Qty: 0 | Refills: 0 | Status: COMPLETED | OUTPATIENT
Start: 2018-07-20 | End: 2018-07-20

## 2018-07-20 RX ORDER — SODIUM CHLORIDE 9 MG/ML
3 INJECTION INTRAMUSCULAR; INTRAVENOUS; SUBCUTANEOUS ONCE
Qty: 0 | Refills: 0 | Status: COMPLETED | OUTPATIENT
Start: 2018-07-20 | End: 2018-07-20

## 2018-07-20 RX ADMIN — Medication 3 MILLILITER(S): at 20:54

## 2018-07-20 RX ADMIN — Medication 125 MILLIGRAM(S): at 20:53

## 2018-07-20 RX ADMIN — SODIUM CHLORIDE 3 MILLILITER(S): 9 INJECTION INTRAMUSCULAR; INTRAVENOUS; SUBCUTANEOUS at 20:53

## 2018-07-20 RX ADMIN — Medication 3 MILLILITER(S): at 20:53

## 2018-07-20 NOTE — ED PROVIDER NOTE - NS ED ROS FT
Except as documented in HPI, all other ROS negative.   GENERAL: Denies fever/chills, loss of appetite/weight or fatigue.  SKIN: Denies rashes, abrasions, lacerations, ecchymosis, erythema, or edema.  HEAD: Denies headache, dizziness or trauma.  EYES: Denies blurry vision, diplopia, or photophobia.  ENT: Denies earaches, discharge or hearing loss. Denies nasal discharge or epistaxis. Denies sore throat.   CARDIAC: Denies chest pain, palpitations.   RESPIRATORY: + SOB.   GI: Denies abdominal pain, n/v/d.   : Denies hematuria, dysuria or frequency.   MSK: Denies myalgias, bony deformity or pain.   NEURO: Denies paresthesias, tingling, weakness.

## 2018-07-20 NOTE — H&P ADULT - PROBLEM SELECTOR PLAN 1
for now hold Foradil, stiolto-respimat and oral prednisone and use albuterol/atrovent neb treatments with iv steroids. CPAP at night and continue levaquin started in the ER - Pulmonary consult

## 2018-07-20 NOTE — ED PROVIDER NOTE - OBJECTIVE STATEMENT
Pt is a 76 y/o Male, PMHX of DM, COPD on CPAP at home, MICHAEL, htn, Pt is a 78 y/o Male, PMHX of DM, COPD on CPAP at home, MICHAEL, htn, presents to ED w/ COPD exacerbation. Pt reports he has felt SOB last few days, today it got worse, was monitoring his O2 sat at home and noted it to be in the 70's. Reports he has noted used in CPAP over last couple days and he usually has an exacerbation when he doesn't use CPAP. Pt called 911 and came in by EMS. Pt denies fever, chills, n/v/d, cough, hemoptysis, chest pain, LE edema. Pt follows with Dr. Schneider for pulmonology.

## 2018-07-20 NOTE — H&P ADULT - NSHPLABSRESULTS_GEN_ALL_CORE
13.1   14.24 )-----------( 484      ( 20 Jul 2018 20:55 )             43.4     07-20    139  |  98  |  43<H>  ----------------------------<  283<H>  5.2<H>   |  28  |  1.6<H>    Ca    9.2      20 Jul 2018 20:55    TPro  7.0  /  Alb  3.9  /  TBili  0.6  /  DBili  x   /  AST  10  /  ALT  10  /  AlkPhos  75  07-20            PT/INR - ( 20 Jul 2018 20:55 )   PT: 12.90 sec;   INR: 1.19 ratio         PTT - ( 20 Jul 2018 20:55 )  PTT:30.7 sec  Lactate Trend    CARDIAC MARKERS ( 20 Jul 2018 20:55 )  x     / <0.01 ng/mL / 31 U/L / x     / 2.3 ng/mL      CAPILLARY BLOOD GLUCOSE  302 (21 Jul 2018 00:51) 13.1   14.24 )-----------( 484      ( 20 Jul 2018 20:55 )             43.4     07-20    139  |  98  |  43<H>  ----------------------------<  283<H>  5.2<H>   |  28  |  1.6<H>    Ca    9.2      20 Jul 2018 20:55    TPro  7.0  /  Alb  3.9  /  TBili  0.6  /  DBili  x   /  AST  10  /  ALT  10  /  AlkPhos  75  07-20            PT/INR - ( 20 Jul 2018 20:55 )   PT: 12.90 sec;   INR: 1.19 ratio         PTT - ( 20 Jul 2018 20:55 )  PTT:30.7 sec  Lactate Trend    CARDIAC MARKERS ( 20 Jul 2018 20:55 )  x     / <0.01 ng/mL / 31 U/L / x     / 2.3 ng/mL      CAPILLARY BLOOD GLUCOSE  302 (21 Jul 2018 00:51)    CXR to me is unchanged from June, 2018 (Cardiomegaly bibasilar focal atelectasis/fibrosis)

## 2018-07-20 NOTE — ED PROVIDER NOTE - PHYSICAL EXAMINATION
VITAL SIGNS: I have reviewed the initial vital signs.   CONSTITUTIONAL: Awake, alert. Elderly; in no distress. Non-toxic appearing.   SKIN: No rash, vesicles/lesion, abrasions or lacerations. No ecchymosis or signs of trauma.   HEAD: Normocephalic; atraumatic.   EYES: Symmetrical, no discharge or signs of trauma. Conjunctiva and sclera clear. PERRL, EOM intact with no nystagmus or pain.   ENT: Airway patent. MMM.  NECK: Supple; non-tender.   CARD: No chest wall deformity or tenderness. S1, S2 normal; no murmurs, gallops, or rubs. Regular rate and rhythm.  RESP: Mildly tachypneic. Decreased breath sounds.   ABD: Soft; non-distended; non-tender.   EXT: No bony deformity or tenderness. Normal ROM x 4 extremities.  No LE edema.   NEURO: A&Ox3. GCS 15. Normal speech.   PSYCH: Cooperative, appropriate.

## 2018-07-20 NOTE — ED ADULT NURSE REASSESSMENT NOTE - NS ED NURSE REASSESS COMMENT FT1
Patient refused bipap/cpap at this time. Uses at night at home. PATRICIA Marcum aware. Patient o2 sat on 4L 88-93% at this time. Patient resting comfortably at this time. Will continue to monitor.

## 2018-07-20 NOTE — ED PROVIDER NOTE - PROGRESS NOTE DETAILS
Dr. Mayorga accepts admission. Pulmonary-Dr. Schneider. Will admit pt for COPD exacerbation. Pt given Levaquin. Pt on CPAP in ED - feeling better while on CPAP. Pt agreeable to admission.

## 2018-07-20 NOTE — H&P ADULT - HISTORY OF PRESENT ILLNESS
[age] yo male with extensive PMH which includes COPD, MICHAEL and diabetes presents to the ER due to shortness of breath worsening over a few days. Denies fevers, chills, or cough. Notes that he stopped using his home CPAP because that is what he does when he has a COPD exacerbation. He reports that he stopped tobacco use several years ago 78 yo male with extensive PMH which includes COPD, MICHAEL and diabetes presents to the ER due to shortness of breath worsening over a few days. Denies fevers, chills, or change in usual cough. Notes that he stopped using his home CPAP because that is what he does when he has a COPD exacerbation. He also reports that he stopped tobacco use several years ago

## 2018-07-20 NOTE — ED PROVIDER NOTE - MEDICAL DECISION MAKING DETAILS
I personally evaluated the patient. I reviewed the Resident’s or Physician Assistant’s note (as assigned above), and agree with the findings and plan except as documented in my note.  Chart revieed. H/O COPD, sleep apnea, on CPAP, presents with SOB and low pulse ox. Denies chest pain. Exam shows alert patient in no distress, HEENT NCAT, decreased breath sounds, RR S1S2, abdomen soft NT +BS, no CCE. Labs unremarkable. CXR and EKG negative. Given Combivent nebs, Solumedrol and Levaquin. Will admit.

## 2018-07-21 DIAGNOSIS — R09.02 HYPOXEMIA: ICD-10-CM

## 2018-07-21 DIAGNOSIS — E87.5 HYPERKALEMIA: ICD-10-CM

## 2018-07-21 DIAGNOSIS — E11.9 TYPE 2 DIABETES MELLITUS WITHOUT COMPLICATIONS: ICD-10-CM

## 2018-07-21 DIAGNOSIS — J98.4 OTHER DISORDERS OF LUNG: ICD-10-CM

## 2018-07-21 DIAGNOSIS — J44.1 CHRONIC OBSTRUCTIVE PULMONARY DISEASE WITH (ACUTE) EXACERBATION: ICD-10-CM

## 2018-07-21 DIAGNOSIS — I10 ESSENTIAL (PRIMARY) HYPERTENSION: ICD-10-CM

## 2018-07-21 LAB
ALBUMIN SERPL ELPH-MCNC: 4 G/DL — SIGNIFICANT CHANGE UP (ref 3.5–5.2)
ALP SERPL-CCNC: 77 U/L — SIGNIFICANT CHANGE UP (ref 30–115)
ALT FLD-CCNC: 11 U/L — SIGNIFICANT CHANGE UP (ref 0–41)
ANION GAP SERPL CALC-SCNC: 14 MMOL/L — SIGNIFICANT CHANGE UP (ref 7–14)
AST SERPL-CCNC: 10 U/L — SIGNIFICANT CHANGE UP (ref 0–41)
BILIRUB SERPL-MCNC: 0.5 MG/DL — SIGNIFICANT CHANGE UP (ref 0.2–1.2)
BUN SERPL-MCNC: 43 MG/DL — HIGH (ref 10–20)
CALCIUM SERPL-MCNC: 9.4 MG/DL — SIGNIFICANT CHANGE UP (ref 8.5–10.1)
CHLORIDE SERPL-SCNC: 97 MMOL/L — LOW (ref 98–110)
CO2 SERPL-SCNC: 25 MMOL/L — SIGNIFICANT CHANGE UP (ref 17–32)
CREAT SERPL-MCNC: 1.5 MG/DL — SIGNIFICANT CHANGE UP (ref 0.7–1.5)
ESTIMATED AVERAGE GLUCOSE: 203 MG/DL — HIGH (ref 68–114)
GLUCOSE SERPL-MCNC: 413 MG/DL — HIGH (ref 70–99)
HBA1C BLD-MCNC: 8.7 % — HIGH (ref 4–5.6)
HCT VFR BLD CALC: 43.2 % — SIGNIFICANT CHANGE UP (ref 42–52)
HGB BLD-MCNC: 13.2 G/DL — LOW (ref 14–18)
MCHC RBC-ENTMCNC: 23.7 PG — LOW (ref 27–31)
MCHC RBC-ENTMCNC: 30.6 G/DL — LOW (ref 32–37)
MCV RBC AUTO: 77.6 FL — LOW (ref 80–94)
NRBC # BLD: 0 /100 WBCS — SIGNIFICANT CHANGE UP (ref 0–0)
PLATELET # BLD AUTO: 534 K/UL — HIGH (ref 130–400)
POTASSIUM SERPL-MCNC: 5.6 MMOL/L — HIGH (ref 3.5–5)
POTASSIUM SERPL-SCNC: 5.6 MMOL/L — HIGH (ref 3.5–5)
PROT SERPL-MCNC: 7.1 G/DL — SIGNIFICANT CHANGE UP (ref 6–8)
RBC # BLD: 5.57 M/UL — SIGNIFICANT CHANGE UP (ref 4.7–6.1)
RBC # FLD: 17 % — HIGH (ref 11.5–14.5)
SODIUM SERPL-SCNC: 136 MMOL/L — SIGNIFICANT CHANGE UP (ref 135–146)
WBC # BLD: 12.99 K/UL — HIGH (ref 4.8–10.8)
WBC # FLD AUTO: 12.99 K/UL — HIGH (ref 4.8–10.8)

## 2018-07-21 RX ORDER — INSULIN LISPRO 100/ML
13 VIAL (ML) SUBCUTANEOUS
Qty: 0 | Refills: 0 | Status: DISCONTINUED | OUTPATIENT
Start: 2018-07-21 | End: 2018-07-29

## 2018-07-21 RX ORDER — SODIUM POLYSTYRENE SULFONATE 4.1 MEQ/G
15 POWDER, FOR SUSPENSION ORAL AT BEDTIME
Qty: 0 | Refills: 0 | Status: DISCONTINUED | OUTPATIENT
Start: 2018-07-21 | End: 2018-07-29

## 2018-07-21 RX ORDER — METOPROLOL TARTRATE 50 MG
25 TABLET ORAL DAILY
Qty: 0 | Refills: 0 | Status: DISCONTINUED | OUTPATIENT
Start: 2018-07-21 | End: 2018-07-29

## 2018-07-21 RX ORDER — SODIUM CHLORIDE 9 MG/ML
1000 INJECTION, SOLUTION INTRAVENOUS
Qty: 0 | Refills: 0 | Status: DISCONTINUED | OUTPATIENT
Start: 2018-07-21 | End: 2018-07-29

## 2018-07-21 RX ORDER — POLYETHYLENE GLYCOL 3350 17 G/17G
17 POWDER, FOR SOLUTION ORAL DAILY
Qty: 0 | Refills: 0 | Status: DISCONTINUED | OUTPATIENT
Start: 2018-07-21 | End: 2018-07-29

## 2018-07-21 RX ORDER — PANTOPRAZOLE SODIUM 20 MG/1
40 TABLET, DELAYED RELEASE ORAL
Qty: 0 | Refills: 0 | Status: DISCONTINUED | OUTPATIENT
Start: 2018-07-21 | End: 2018-07-29

## 2018-07-21 RX ORDER — TAMSULOSIN HYDROCHLORIDE 0.4 MG/1
0.4 CAPSULE ORAL AT BEDTIME
Qty: 0 | Refills: 0 | Status: DISCONTINUED | OUTPATIENT
Start: 2018-07-21 | End: 2018-07-29

## 2018-07-21 RX ORDER — ALBUTEROL 90 UG/1
2 AEROSOL, METERED ORAL EVERY 4 HOURS
Qty: 0 | Refills: 0 | Status: DISCONTINUED | OUTPATIENT
Start: 2018-07-21 | End: 2018-07-29

## 2018-07-21 RX ORDER — DEXTROSE 50 % IN WATER 50 %
25 SYRINGE (ML) INTRAVENOUS ONCE
Qty: 0 | Refills: 0 | Status: DISCONTINUED | OUTPATIENT
Start: 2018-07-21 | End: 2018-07-29

## 2018-07-21 RX ORDER — HEPARIN SODIUM 5000 [USP'U]/ML
5000 INJECTION INTRAVENOUS; SUBCUTANEOUS EVERY 12 HOURS
Qty: 0 | Refills: 0 | Status: DISCONTINUED | OUTPATIENT
Start: 2018-07-21 | End: 2018-07-29

## 2018-07-21 RX ORDER — IPRATROPIUM/ALBUTEROL SULFATE 18-103MCG
3 AEROSOL WITH ADAPTER (GRAM) INHALATION EVERY 4 HOURS
Qty: 0 | Refills: 0 | Status: DISCONTINUED | OUTPATIENT
Start: 2018-07-21 | End: 2018-07-29

## 2018-07-21 RX ORDER — PREGABALIN 225 MG/1
1000 CAPSULE ORAL DAILY
Qty: 0 | Refills: 0 | Status: DISCONTINUED | OUTPATIENT
Start: 2018-07-21 | End: 2018-07-29

## 2018-07-21 RX ORDER — CHOLECALCIFEROL (VITAMIN D3) 125 MCG
1000 CAPSULE ORAL DAILY
Qty: 0 | Refills: 0 | Status: DISCONTINUED | OUTPATIENT
Start: 2018-07-21 | End: 2018-07-29

## 2018-07-21 RX ORDER — MOMETASONE FUROATE 50 UG/1
2 SPRAY NASAL
Qty: 0 | Refills: 0 | COMMUNITY

## 2018-07-21 RX ORDER — DOCUSATE SODIUM 100 MG
100 CAPSULE ORAL DAILY
Qty: 0 | Refills: 0 | Status: DISCONTINUED | OUTPATIENT
Start: 2018-07-21 | End: 2018-07-24

## 2018-07-21 RX ORDER — FINASTERIDE 5 MG/1
5 TABLET, FILM COATED ORAL DAILY
Qty: 0 | Refills: 0 | Status: DISCONTINUED | OUTPATIENT
Start: 2018-07-21 | End: 2018-07-29

## 2018-07-21 RX ORDER — INSULIN LISPRO 100/ML
5 VIAL (ML) SUBCUTANEOUS ONCE
Qty: 0 | Refills: 0 | Status: COMPLETED | OUTPATIENT
Start: 2018-07-21 | End: 2018-07-21

## 2018-07-21 RX ORDER — DEXTROSE 50 % IN WATER 50 %
12.5 SYRINGE (ML) INTRAVENOUS ONCE
Qty: 0 | Refills: 0 | Status: DISCONTINUED | OUTPATIENT
Start: 2018-07-21 | End: 2018-07-29

## 2018-07-21 RX ORDER — BENZOCAINE AND MENTHOL 5; 1 G/100ML; G/100ML
1 LIQUID ORAL
Qty: 0 | Refills: 0 | Status: DISCONTINUED | OUTPATIENT
Start: 2018-07-21 | End: 2018-07-29

## 2018-07-21 RX ORDER — INSULIN GLARGINE 100 [IU]/ML
36 INJECTION, SOLUTION SUBCUTANEOUS AT BEDTIME
Qty: 0 | Refills: 0 | Status: DISCONTINUED | OUTPATIENT
Start: 2018-07-21 | End: 2018-07-21

## 2018-07-21 RX ORDER — ASCORBIC ACID 60 MG
1000 TABLET,CHEWABLE ORAL DAILY
Qty: 0 | Refills: 0 | Status: DISCONTINUED | OUTPATIENT
Start: 2018-07-21 | End: 2018-07-29

## 2018-07-21 RX ORDER — MULTIVIT-MIN/FERROUS GLUCONATE 9 MG/15 ML
1 LIQUID (ML) ORAL DAILY
Qty: 0 | Refills: 0 | Status: DISCONTINUED | OUTPATIENT
Start: 2018-07-21 | End: 2018-07-22

## 2018-07-21 RX ORDER — ATORVASTATIN CALCIUM 80 MG/1
20 TABLET, FILM COATED ORAL AT BEDTIME
Qty: 0 | Refills: 0 | Status: DISCONTINUED | OUTPATIENT
Start: 2018-07-21 | End: 2018-07-29

## 2018-07-21 RX ORDER — INSULIN LISPRO 100/ML
15 VIAL (ML) SUBCUTANEOUS
Qty: 0 | Refills: 0 | Status: DISCONTINUED | OUTPATIENT
Start: 2018-07-21 | End: 2018-07-29

## 2018-07-21 RX ORDER — INSULIN LISPRO 100/ML
16 VIAL (ML) SUBCUTANEOUS
Qty: 0 | Refills: 0 | Status: DISCONTINUED | OUTPATIENT
Start: 2018-07-21 | End: 2018-07-29

## 2018-07-21 RX ORDER — AMLODIPINE BESYLATE 2.5 MG/1
10 TABLET ORAL DAILY
Qty: 0 | Refills: 0 | Status: DISCONTINUED | OUTPATIENT
Start: 2018-07-21 | End: 2018-07-29

## 2018-07-21 RX ORDER — DEXTROSE 50 % IN WATER 50 %
15 SYRINGE (ML) INTRAVENOUS ONCE
Qty: 0 | Refills: 0 | Status: DISCONTINUED | OUTPATIENT
Start: 2018-07-21 | End: 2018-07-29

## 2018-07-21 RX ORDER — GLUCAGON INJECTION, SOLUTION 0.5 MG/.1ML
1 INJECTION, SOLUTION SUBCUTANEOUS ONCE
Qty: 0 | Refills: 0 | Status: DISCONTINUED | OUTPATIENT
Start: 2018-07-21 | End: 2018-07-29

## 2018-07-21 RX ORDER — INSULIN GLARGINE 100 [IU]/ML
36 INJECTION, SOLUTION SUBCUTANEOUS EVERY MORNING
Qty: 0 | Refills: 0 | Status: DISCONTINUED | OUTPATIENT
Start: 2018-07-21 | End: 2018-07-29

## 2018-07-21 RX ADMIN — Medication 25 MILLIGRAM(S): at 06:33

## 2018-07-21 RX ADMIN — Medication 3 MILLILITER(S): at 19:17

## 2018-07-21 RX ADMIN — Medication 100 MILLIGRAM(S): at 11:36

## 2018-07-21 RX ADMIN — Medication 40 MILLIGRAM(S): at 13:47

## 2018-07-21 RX ADMIN — Medication 1000 UNIT(S): at 11:34

## 2018-07-21 RX ADMIN — FINASTERIDE 5 MILLIGRAM(S): 5 TABLET, FILM COATED ORAL at 11:36

## 2018-07-21 RX ADMIN — Medication 3 MILLILITER(S): at 17:20

## 2018-07-21 RX ADMIN — ATORVASTATIN CALCIUM 20 MILLIGRAM(S): 80 TABLET, FILM COATED ORAL at 21:54

## 2018-07-21 RX ADMIN — Medication 3 MILLILITER(S): at 08:10

## 2018-07-21 RX ADMIN — Medication 3 MILLILITER(S): at 23:39

## 2018-07-21 RX ADMIN — Medication 40 MILLIGRAM(S): at 06:34

## 2018-07-21 RX ADMIN — AMLODIPINE BESYLATE 10 MILLIGRAM(S): 2.5 TABLET ORAL at 06:34

## 2018-07-21 RX ADMIN — Medication 15 UNIT(S): at 11:37

## 2018-07-21 RX ADMIN — TAMSULOSIN HYDROCHLORIDE 0.4 MILLIGRAM(S): 0.4 CAPSULE ORAL at 21:54

## 2018-07-21 RX ADMIN — Medication 40 MILLIGRAM(S): at 21:55

## 2018-07-21 RX ADMIN — HEPARIN SODIUM 5000 UNIT(S): 5000 INJECTION INTRAVENOUS; SUBCUTANEOUS at 06:34

## 2018-07-21 RX ADMIN — Medication 1 TABLET(S): at 11:36

## 2018-07-21 RX ADMIN — PREGABALIN 1000 MICROGRAM(S): 225 CAPSULE ORAL at 11:36

## 2018-07-21 RX ADMIN — SODIUM POLYSTYRENE SULFONATE 15 GRAM(S): 4.1 POWDER, FOR SUSPENSION ORAL at 02:08

## 2018-07-21 RX ADMIN — Medication 13 UNIT(S): at 16:52

## 2018-07-21 RX ADMIN — Medication 1000 MILLIGRAM(S): at 11:36

## 2018-07-21 RX ADMIN — BENZOCAINE AND MENTHOL 1 LOZENGE: 5; 1 LIQUID ORAL at 18:40

## 2018-07-21 RX ADMIN — SODIUM POLYSTYRENE SULFONATE 15 GRAM(S): 4.1 POWDER, FOR SUSPENSION ORAL at 21:54

## 2018-07-21 RX ADMIN — PANTOPRAZOLE SODIUM 40 MILLIGRAM(S): 20 TABLET, DELAYED RELEASE ORAL at 06:33

## 2018-07-21 RX ADMIN — Medication 16 UNIT(S): at 07:47

## 2018-07-21 RX ADMIN — Medication 5 UNIT(S): at 23:40

## 2018-07-21 RX ADMIN — INSULIN GLARGINE 36 UNIT(S): 100 INJECTION, SOLUTION SUBCUTANEOUS at 07:46

## 2018-07-21 RX ADMIN — HEPARIN SODIUM 5000 UNIT(S): 5000 INJECTION INTRAVENOUS; SUBCUTANEOUS at 18:39

## 2018-07-21 RX ADMIN — Medication 3 MILLILITER(S): at 11:08

## 2018-07-21 RX ADMIN — POLYETHYLENE GLYCOL 3350 17 GRAM(S): 17 POWDER, FOR SOLUTION ORAL at 11:34

## 2018-07-21 RX ADMIN — Medication 1 DROP(S): at 07:47

## 2018-07-21 NOTE — PROGRESS NOTE ADULT - SUBJECTIVE AND OBJECTIVE BOX
Pt seen and examined. Son at bedside. Denies wt loss. Denies productive cough. Admitts to chronic cough for several years. No travel outside the country within last 12 months. Has a new HHA that recently came over here from [place]    T(F): , Max: 99 (07-20-18 @ 20:21)  HR: 60 (07-21-18 @ 06:09) (60 - 102)  BP: 150/75 (07-21-18 @ 06:09)  RR: 16 (07-21-18 @ 06:09)  SpO2: 91% (07-21-18 @ 07:41)118.9  General: No apparent distress  Cardiovascular: S1, S2  Gastrointestinal: Soft, Non-tender, Non-distended, obese  Respiratory: Good air entry bilaterally  Musculoskeletal: Moves all extremities  Lymphatic: No edema  Neurologic: No gross motor deficit  Dermatologic: Skin dry  PT/INR - ( 20 Jul 2018 20:55 )   PT: 12.90 sec;   INR: 1.19 ratio         PTT - ( 20 Jul 2018 20:55 )  PTT:30.7 sec                        13.2   12.99 )-----------( 534      ( 21 Jul 2018 06:37 )             43.2     07-21    136  |  97<L>  |  43<H>  ----------------------------<  413<H>  5.6<H>   |  25  |  1.5    Ca    9.4      21 Jul 2018 06:37    TPro  7.1  /  Alb  4.0  /  TBili  0.5  /  DBili  x   /  AST  10  /  ALT  11  /  AlkPhos  77  07-21

## 2018-07-22 RX ORDER — INSULIN LISPRO 100/ML
5 VIAL (ML) SUBCUTANEOUS ONCE
Qty: 0 | Refills: 0 | Status: COMPLETED | OUTPATIENT
Start: 2018-07-22 | End: 2018-07-22

## 2018-07-22 RX ADMIN — Medication 40 MILLIGRAM(S): at 13:50

## 2018-07-22 RX ADMIN — Medication 3 MILLILITER(S): at 16:44

## 2018-07-22 RX ADMIN — INSULIN GLARGINE 36 UNIT(S): 100 INJECTION, SOLUTION SUBCUTANEOUS at 10:24

## 2018-07-22 RX ADMIN — Medication 100 MILLIGRAM(S): at 11:56

## 2018-07-22 RX ADMIN — ATORVASTATIN CALCIUM 20 MILLIGRAM(S): 80 TABLET, FILM COATED ORAL at 21:32

## 2018-07-22 RX ADMIN — HEPARIN SODIUM 5000 UNIT(S): 5000 INJECTION INTRAVENOUS; SUBCUTANEOUS at 05:47

## 2018-07-22 RX ADMIN — SODIUM POLYSTYRENE SULFONATE 15 GRAM(S): 4.1 POWDER, FOR SUSPENSION ORAL at 21:33

## 2018-07-22 RX ADMIN — Medication 5 UNIT(S): at 23:28

## 2018-07-22 RX ADMIN — Medication 1000 UNIT(S): at 11:57

## 2018-07-22 RX ADMIN — Medication 3 MILLILITER(S): at 20:31

## 2018-07-22 RX ADMIN — PREGABALIN 1000 MICROGRAM(S): 225 CAPSULE ORAL at 11:56

## 2018-07-22 RX ADMIN — AMLODIPINE BESYLATE 10 MILLIGRAM(S): 2.5 TABLET ORAL at 05:47

## 2018-07-22 RX ADMIN — Medication 16 UNIT(S): at 10:24

## 2018-07-22 RX ADMIN — Medication 1000 MILLIGRAM(S): at 11:56

## 2018-07-22 RX ADMIN — Medication 3 MILLILITER(S): at 12:32

## 2018-07-22 RX ADMIN — Medication 13 UNIT(S): at 16:52

## 2018-07-22 RX ADMIN — Medication 15 UNIT(S): at 11:54

## 2018-07-22 RX ADMIN — TAMSULOSIN HYDROCHLORIDE 0.4 MILLIGRAM(S): 0.4 CAPSULE ORAL at 21:32

## 2018-07-22 RX ADMIN — Medication 1 TABLET(S): at 11:56

## 2018-07-22 RX ADMIN — HEPARIN SODIUM 5000 UNIT(S): 5000 INJECTION INTRAVENOUS; SUBCUTANEOUS at 17:11

## 2018-07-22 RX ADMIN — Medication 3 MILLILITER(S): at 07:28

## 2018-07-22 RX ADMIN — BENZOCAINE AND MENTHOL 1 LOZENGE: 5; 1 LIQUID ORAL at 19:25

## 2018-07-22 RX ADMIN — Medication 40 MILLIGRAM(S): at 21:32

## 2018-07-22 RX ADMIN — FINASTERIDE 5 MILLIGRAM(S): 5 TABLET, FILM COATED ORAL at 11:56

## 2018-07-22 RX ADMIN — Medication 25 MILLIGRAM(S): at 05:47

## 2018-07-22 RX ADMIN — Medication 40 MILLIGRAM(S): at 05:47

## 2018-07-22 RX ADMIN — POLYETHYLENE GLYCOL 3350 17 GRAM(S): 17 POWDER, FOR SOLUTION ORAL at 10:24

## 2018-07-22 NOTE — PROGRESS NOTE ADULT - SUBJECTIVE AND OBJECTIVE BOX
Pt seen and examined. Pt feels better    T(F): , Max: 96.8 (07-21-18 @ 13:49)  HR: 95 (07-22-18 @ 05:44) (95 - 114)  BP: 168/80 (07-22-18 @ 05:44)  RR: 16 (07-22-18 @ 05:44)  SpO2: 86% (07-22-18 @ 09:23)  General: No apparent distress  Cardiovascular: S1, S2  Gastrointestinal: Soft, Non-tender, Non-distended  Respiratory: Good air entry bilaterally  Musculoskeletal: Moves all extremities  Lymphatic: No edema  Neurologic: No gross motor deficit  Dermatologic: Skin dry  PT/INR - ( 20 Jul 2018 20:55 )   PT: 12.90 sec;   INR: 1.19 ratio         PTT - ( 20 Jul 2018 20:55 )  PTT:30.7 sec                        13.2   12.99 )-----------( 534      ( 21 Jul 2018 06:37 )             43.2     07-21    136  |  97<L>  |  43<H>  ----------------------------<  413<H>  5.6<H>   |  25  |  1.5    Ca    9.4      21 Jul 2018 06:37    TPro  7.1  /  Alb  4.0  /  TBili  0.5  /  DBili  x   /  AST  10  /  ALT  11  /  AlkPhos  77  07-21

## 2018-07-22 NOTE — PROGRESS NOTE ADULT - PROBLEM SELECTOR PLAN 1
albuterol/atrovent neb treatments   iv steroid  CPAP at night    levaquin  pulsundeep pierre awaiting [place]

## 2018-07-22 NOTE — CONSULT NOTE ADULT - SUBJECTIVE AND OBJECTIVE BOX
JIAN MANCIA  77y  Male  Patient seen and examined. Chart reviewed and events noted.  HPI:  78 yo male with extensive PMH Severe COPD, non compliant MICHAEL and diabetes presents to the ER due to shortness of breath worsening over a few days. Denies fevers, chills, or change in usual dry cough. Notes that he stopped using his home CPAP because that is what he does when he has a COPD exacerbation. He also reports that he stopped tobacco use several years ago (20 Jul 2018 23:36) No Hx TB or TB exposure.     PAST MEDICAL & SURGICAL HISTORY:  Diabetes mellitus, type 2  Hypertension  COPD on home O2  MICHAEL non compliant   No significant past surgical history    Allergies   aspirin (Unknown)  iodine (Unknown)  NSAIDs (Unknown)  Originally Entered as [Unknown] reaction to [SHRIMP] (Unknown)  penicillin (Unknown)    FAMILY HISTORY: Non contributory     SOCIAL HISTORY  Smoking History: Ex smoker  Alcohol: denied  Drugs: denied  Occupation: retired  MEDICATIONS  (STANDING):  ALBUTerol/ipratropium for Nebulization 3 milliLiter(s) Nebulizer every 4 hours  amLODIPine   Tablet 10 milliGRAM(s) Oral daily  ascorbic acid 1000 milliGRAM(s) Oral daily  atorvastatin 20 milliGRAM(s) Oral at bedtime  cholecalciferol 1000 Unit(s) Oral daily  cyanocobalamin 1000 MICROGram(s) Oral daily  dextrose 5%. 1000 milliLiter(s) (50 mL/Hr) IV Continuous <Continuous>  dextrose 50% Injectable 12.5 Gram(s) IV Push once  dextrose 50% Injectable 25 Gram(s) IV Push once  dextrose 50% Injectable 25 Gram(s) IV Push once  docusate sodium 100 milliGRAM(s) Oral daily  finasteride 5 milliGRAM(s) Oral daily  heparin  Injectable 5000 Unit(s) SubCutaneous every 12 hours  insulin glargine Injectable (LANTUS) 36 Unit(s) SubCutaneous every morning  insulin lispro Injectable (HumaLOG) 16 Unit(s) SubCutaneous before breakfast  insulin lispro Injectable (HumaLOG) 15 Unit(s) SubCutaneous before lunch  insulin lispro Injectable (HumaLOG) 13 Unit(s) SubCutaneous before dinner  levoFLOXacin IVPB 750 milliGRAM(s) IV Intermittent every 24 hours  methylPREDNISolone sodium succinate Injectable 40 milliGRAM(s) IV Push every 8 hours  metoprolol tartrate 25 milliGRAM(s) Oral daily  pantoprazole    Tablet 40 milliGRAM(s) Oral before breakfast  polyethylene glycol 3350 17 Gram(s) Oral daily  sodium polystyrene sulfonate Suspension 15 Gram(s) Oral at bedtime  tamsulosin 0.4 milliGRAM(s) Oral at bedtime    MEDICATIONS  (PRN):  ALBUTerol    90 MICROgram(s) HFA Inhaler 2 Puff(s) Inhalation every 4 hours PRN rescue inhaler  artificial  tears Solution 1 Drop(s) Both EYES every 4 hours PRN Dry Eyes  benzocaine 15 mG/menthol 3.6 mG Lozenge 1 Lozenge Oral five times a day PRN Sore Throat  dextrose 40% Gel 15 Gram(s) Oral once PRN Blood Glucose LESS THAN 70 milliGRAM(s)/deciliter  glucagon  Injectable 1 milliGRAM(s) IntraMuscular once PRN Glucose LESS THAN 70 milligrams/deciliter    REVIEW OF SYSTEMS:  CONSTITUTIONAL: No fevers or chills. Chronic fatigue.  HEENT: No visual disturbance or sore throat  NECK: No pain or stiffness  RESPIRATORY: SOB/INFANTE and cough  CARDIOVASCULAR: No chest pain or palpitations  GASTROINTESTINAL:  No nausea, vomiting, or diarrhea. No abdominal pain.  GENITOURINARY: No dysuria, frequency or hematuria  NEUROLOGICAL: No numbness or headache  EXTREMITIES: No edema  No calf pain or tenderness  Vital Signs Last 24 Hrs  T(F): 96.7 (22 Jul 2018 14:27), Max: 96.8 (22 Jul 2018 05:44)  HR: 103 (22 Jul 2018 14:27) (95 - 114)  BP: 146/70 (22 Jul 2018 14:27) (122/62 - 168/80)  RR: 16 (22 Jul 2018 14:27) (16 - 16)  SpO2: 86% (22 Jul 2018 09:23) (86% - 86%) on O2  PHYSICAL EXAM:  Constitutional: A A O x 3 NAD Freely speaking. O2 in use. CPAP @ 10 bedside  HEAD: PERRLA, No JVD, Atraumatic, Normocephalic  Neck: No neck pain  Respiratory: Decreased BS bilaterally   Cardiovascular: Irregular rate and rhythm  Gastrointestinal: Soft NT +BS obese  Extremities: No E/C/C No calf pain or tenderness. Movement in all four extremities  Skin: No lesions    LABS:                        13.2   12.99 )-----------( 534      ( 21 Jul 2018 06:37 )             43.2     07-21    136  |  97<L>  |  43<H>  ----------------------------<  413<H>  5.6<H>   |  25  |  1.5    Ca    9.4      21 Jul 2018 06:37  TPro  7.1  /  Alb  4.0  /  TBili  0.5  /  DBili  x   /  AST  10  /  ALT  11  /  AlkPhos  77  07-21  PT/INR - ( 20 Jul 2018 20:55 )   PT: 12.90 sec;   INR: 1.19 ratio    PTT - ( 20 Jul 2018 20:55 )  PTT:30.7 sec    CARDIAC MARKERS ( 20 Jul 2018 20:55 )  x     / <0.01 ng/mL / 31 U/L / x     / 2.3 ng/mL    RADIOLOGY:  Chest X-Ray: Read by radiologist as new RML cavitary lesion. It appears to be artifact on this AP view and shaped is linear and elliptical.  Bilateral chronic changes unchanged    See CT Chest in 9-2017 with:  Emphysematous change, mild with areas of scattered pleural-based fibrosis and scarring, most apparent at   the lung bases. However, there are no suspicious-appearing pulmonary nodules.   Stable appearing 3 mm nodule in the right lung.  Scarring in the right middle lobe is seen.     ASSESSMENT:  Chronic SOB & hypoxia & cough  Fibrosis / scarring / nodule  Severe COPD Acute on chronic   H/O tobacco use   O2 dependant  MICHAEL on CPAP non-compliant   Not suggestive of pulmonary TB    PLAN:  PLEASE REPEAT CHEST X RAY WITH PA, LATERAL & LORDOTIC VIEWS	  O2 at 3 to 4 liters. Keep Sat at 90%  Bronchodilator Rx  CPAP HS plus O2   F/u electrolytes panel    GI / DVT prophylaxis   Out patient follow up  THANK YOU

## 2018-07-23 DIAGNOSIS — E66.9 OBESITY, UNSPECIFIED: ICD-10-CM

## 2018-07-23 DIAGNOSIS — Z99.81 DEPENDENCE ON SUPPLEMENTAL OXYGEN: ICD-10-CM

## 2018-07-23 DIAGNOSIS — R65.10 SYSTEMIC INFLAMMATORY RESPONSE SYNDROME (SIRS) OF NON-INFECTIOUS ORIGIN WITHOUT ACUTE ORGAN DYSFUNCTION: ICD-10-CM

## 2018-07-23 DIAGNOSIS — E11.9 TYPE 2 DIABETES MELLITUS WITHOUT COMPLICATIONS: ICD-10-CM

## 2018-07-23 LAB
ALBUMIN SERPL ELPH-MCNC: 3.6 G/DL — SIGNIFICANT CHANGE UP (ref 3.5–5.2)
ALP SERPL-CCNC: 63 U/L — SIGNIFICANT CHANGE UP (ref 30–115)
ALT FLD-CCNC: 13 U/L — SIGNIFICANT CHANGE UP (ref 0–41)
ANION GAP SERPL CALC-SCNC: 14 MMOL/L — SIGNIFICANT CHANGE UP (ref 7–14)
AST SERPL-CCNC: 11 U/L — SIGNIFICANT CHANGE UP (ref 0–41)
BILIRUB SERPL-MCNC: 0.3 MG/DL — SIGNIFICANT CHANGE UP (ref 0.2–1.2)
BUN SERPL-MCNC: 45 MG/DL — HIGH (ref 10–20)
CALCIUM SERPL-MCNC: 9.4 MG/DL — SIGNIFICANT CHANGE UP (ref 8.5–10.1)
CHLORIDE SERPL-SCNC: 97 MMOL/L — LOW (ref 98–110)
CO2 SERPL-SCNC: 27 MMOL/L — SIGNIFICANT CHANGE UP (ref 17–32)
CREAT SERPL-MCNC: 1.5 MG/DL — SIGNIFICANT CHANGE UP (ref 0.7–1.5)
GLUCOSE SERPL-MCNC: 364 MG/DL — HIGH (ref 70–99)
HCT VFR BLD CALC: 42.6 % — SIGNIFICANT CHANGE UP (ref 42–52)
HGB BLD-MCNC: 12.9 G/DL — LOW (ref 14–18)
MCHC RBC-ENTMCNC: 23.5 PG — LOW (ref 27–31)
MCHC RBC-ENTMCNC: 30.3 G/DL — LOW (ref 32–37)
MCV RBC AUTO: 77.5 FL — LOW (ref 80–94)
NIGHT BLUE STAIN TISS: SIGNIFICANT CHANGE UP
NRBC # BLD: 0 /100 WBCS — SIGNIFICANT CHANGE UP (ref 0–0)
PLATELET # BLD AUTO: 540 K/UL — HIGH (ref 130–400)
POTASSIUM SERPL-MCNC: 5.6 MMOL/L — HIGH (ref 3.5–5)
POTASSIUM SERPL-SCNC: 5.6 MMOL/L — HIGH (ref 3.5–5)
PROT SERPL-MCNC: 6.4 G/DL — SIGNIFICANT CHANGE UP (ref 6–8)
RBC # BLD: 5.5 M/UL — SIGNIFICANT CHANGE UP (ref 4.7–6.1)
RBC # FLD: 16.6 % — HIGH (ref 11.5–14.5)
SODIUM SERPL-SCNC: 138 MMOL/L — SIGNIFICANT CHANGE UP (ref 135–146)
SPECIMEN SOURCE: SIGNIFICANT CHANGE UP
WBC # BLD: 15.97 K/UL — HIGH (ref 4.8–10.8)
WBC # FLD AUTO: 15.97 K/UL — HIGH (ref 4.8–10.8)

## 2018-07-23 RX ORDER — CEFTRIAXONE 500 MG/1
1 INJECTION, POWDER, FOR SOLUTION INTRAMUSCULAR; INTRAVENOUS EVERY 24 HOURS
Qty: 0 | Refills: 0 | Status: DISCONTINUED | OUTPATIENT
Start: 2018-07-24 | End: 2018-07-29

## 2018-07-23 RX ORDER — CEFTRIAXONE 500 MG/1
1 INJECTION, POWDER, FOR SOLUTION INTRAMUSCULAR; INTRAVENOUS ONCE
Qty: 0 | Refills: 0 | Status: COMPLETED | OUTPATIENT
Start: 2018-07-23 | End: 2018-07-23

## 2018-07-23 RX ORDER — AZITHROMYCIN 500 MG/1
500 TABLET, FILM COATED ORAL ONCE
Qty: 0 | Refills: 0 | Status: COMPLETED | OUTPATIENT
Start: 2018-07-23 | End: 2018-07-23

## 2018-07-23 RX ORDER — TUBERCULIN PURIFIED PROTEIN DERIVATIVE 5 [IU]/.1ML
5 INJECTION, SOLUTION INTRADERMAL ONCE
Qty: 0 | Refills: 0 | Status: COMPLETED | OUTPATIENT
Start: 2018-07-23 | End: 2018-07-23

## 2018-07-23 RX ORDER — CEFTRIAXONE 500 MG/1
INJECTION, POWDER, FOR SOLUTION INTRAMUSCULAR; INTRAVENOUS
Qty: 0 | Refills: 0 | Status: DISCONTINUED | OUTPATIENT
Start: 2018-07-23 | End: 2018-07-29

## 2018-07-23 RX ADMIN — Medication 3 MILLILITER(S): at 11:40

## 2018-07-23 RX ADMIN — Medication 40 MILLIGRAM(S): at 21:15

## 2018-07-23 RX ADMIN — CEFTRIAXONE 100 GRAM(S): 500 INJECTION, POWDER, FOR SOLUTION INTRAMUSCULAR; INTRAVENOUS at 17:13

## 2018-07-23 RX ADMIN — Medication 100 MILLIGRAM(S): at 12:53

## 2018-07-23 RX ADMIN — PREGABALIN 1000 MICROGRAM(S): 225 CAPSULE ORAL at 12:53

## 2018-07-23 RX ADMIN — Medication 15 UNIT(S): at 12:10

## 2018-07-23 RX ADMIN — HEPARIN SODIUM 5000 UNIT(S): 5000 INJECTION INTRAVENOUS; SUBCUTANEOUS at 17:16

## 2018-07-23 RX ADMIN — TUBERCULIN PURIFIED PROTEIN DERIVATIVE 5 UNIT(S): 5 INJECTION, SOLUTION INTRADERMAL at 11:56

## 2018-07-23 RX ADMIN — POLYETHYLENE GLYCOL 3350 17 GRAM(S): 17 POWDER, FOR SOLUTION ORAL at 08:38

## 2018-07-23 RX ADMIN — TAMSULOSIN HYDROCHLORIDE 0.4 MILLIGRAM(S): 0.4 CAPSULE ORAL at 21:15

## 2018-07-23 RX ADMIN — Medication 40 MILLIGRAM(S): at 14:35

## 2018-07-23 RX ADMIN — SODIUM POLYSTYRENE SULFONATE 15 GRAM(S): 4.1 POWDER, FOR SUSPENSION ORAL at 22:51

## 2018-07-23 RX ADMIN — Medication 3 MILLILITER(S): at 07:32

## 2018-07-23 RX ADMIN — FINASTERIDE 5 MILLIGRAM(S): 5 TABLET, FILM COATED ORAL at 12:53

## 2018-07-23 RX ADMIN — ATORVASTATIN CALCIUM 20 MILLIGRAM(S): 80 TABLET, FILM COATED ORAL at 21:15

## 2018-07-23 RX ADMIN — Medication 40 MILLIGRAM(S): at 05:47

## 2018-07-23 RX ADMIN — Medication 25 MILLIGRAM(S): at 05:42

## 2018-07-23 RX ADMIN — Medication 3 MILLILITER(S): at 15:23

## 2018-07-23 RX ADMIN — HEPARIN SODIUM 5000 UNIT(S): 5000 INJECTION INTRAVENOUS; SUBCUTANEOUS at 05:43

## 2018-07-23 RX ADMIN — Medication 1000 UNIT(S): at 12:52

## 2018-07-23 RX ADMIN — Medication 16 UNIT(S): at 08:36

## 2018-07-23 RX ADMIN — INSULIN GLARGINE 36 UNIT(S): 100 INJECTION, SOLUTION SUBCUTANEOUS at 08:37

## 2018-07-23 RX ADMIN — AMLODIPINE BESYLATE 10 MILLIGRAM(S): 2.5 TABLET ORAL at 05:42

## 2018-07-23 RX ADMIN — Medication 3 MILLILITER(S): at 19:57

## 2018-07-23 RX ADMIN — Medication 13 UNIT(S): at 17:16

## 2018-07-23 RX ADMIN — AZITHROMYCIN 500 MILLIGRAM(S): 500 TABLET, FILM COATED ORAL at 17:15

## 2018-07-23 RX ADMIN — Medication 1000 MILLIGRAM(S): at 12:53

## 2018-07-23 NOTE — CHART NOTE - NSCHARTNOTEFT_GEN_A_CORE
7/23/  PPD PLACED LFA TO BE READ IN 48-72 HOURS  SCOUT THRASHER, RRT 7/23/  PPD PLACED LFA TO BE READ IN 48-72 HOURS  SCOUT THRASHER, RRT    7/25/  PPD RESULTS NEGATIVE - 0MM  SCOUT THRASHER, RRT

## 2018-07-23 NOTE — CONSULT NOTE ADULT - SUBJECTIVE AND OBJECTIVE BOX
JIAN MANCIA 77yMalePatient is a 77y old  Male who presents with a chief complaint of SOB (21 Jul 2018 00:19)      Patient has history of:  aspirin (Unknown)  iodine (Unknown)  NSAIDs (Unknown)  Originally Entered as [Unknown] reaction to [SHRIMP] (Unknown)  penicillin (Unknown)        COPD EXACERBATION;HYPOXIA  ^COPD EXACERBATION;HYPOXIA  Handoff  MEWS Score  Diabetes mellitus, type 2  Hypertension  Emphysema lung  COPD exacerbation  Cavitary lung disease  Hyperkalemia  Hypertension, unspecified type  Type 2 diabetes mellitus without complication, with long-term current use of insulin  Hypoxia  COPD exacerbation  No significant past surgical history  COPD EXACERBATION  32  Hypoxia        Patient treated with:  levoFLOXacin IVPB 750 milliGRAM(s) IV Intermittent every 24 hours        PHYSICAL EXAM  T(F): 96.8 (07-23-18 @ 05:56), Max: 96.8 (07-23-18 @ 05:56)  HR: 88 (07-23-18 @ 05:56) (88 - 106)  BP: 160/95 (07-23-18 @ 05:56) (146/70 - 160/95)  RR: 20 (07-23-18 @ 05:56) (16 - 22)  SpO2: 89% (07-23-18 @ 08:43) (89% - 89%)  Daily     Daily   HEENT: normal, no nuchal rigidity  Cor: RSR Nl S1 S2  Lungs: clear  Decreased breath sounds at bases    Abdomen: Nontender, Nl BS,     Ext: No clubbing,cyanosis or edema    LAB & RADIOLOGIC RESULTS:                        12.9   15.97 )-----------( 540      ( 23 Jul 2018 07:40 )             42.6         07-23    138  |  97<L>  |  45<H>  ----------------------------<  364<H>  5.6<H>   |  27  |  1.5      TPro  6.4  /  Alb  3.6  /  TBili  0.3  /  DBili  x   /  AST  11  /  ALT  13  /  AlkPhos  63  07-23         Creatinine, Serum: 1.5 mg/dL (07-23-18 @ 07:40)  eGFR if Non African American: 44 mL/min/1.73M2 (07-23-18 @ 07:40)  eGFR if : 51 mL/min/1.73M2 (07-23-18 @ 07:40)

## 2018-07-23 NOTE — PROGRESS NOTE ADULT - SUBJECTIVE AND OBJECTIVE BOX
JIAN MANCIA  77y  Male      Patient is a 77y old  Male who presents with a chief complaint of SOB (21 Jul 2018 00:19)      INTERVAL HPI/OVERNIGHT EVENTS:  patient admitted for copd exac.   Seen by Pulmonary and ID (recommendations noted; labs ordered and abx adjusted)    Vital Signs Last 24 Hrs  T(C): 36 (23 Jul 2018 05:56), Max: 36 (23 Jul 2018 05:56)  T(F): 96.8 (23 Jul 2018 05:56), Max: 96.8 (23 Jul 2018 05:56)  HR: 88 (23 Jul 2018 05:56) (88 - 106)  BP: 160/95 (23 Jul 2018 05:56) (146/70 - 160/95)  RR: 20 (23 Jul 2018 05:56) (16 - 22)  SpO2: 89% (23 Jul 2018 08:43) (89% - 89%)    PHYSICAL EXAM:  GENERAL: NAD, well-groomed, well-developed  HEAD:  Atraumatic, Normocephalic  EYES: EOMI, PERRLA, conjunctiva and sclera clear  NERVOUS SYSTEM:  Alert & Oriented X 4, Good concentration; Motor Strength 5/5 B/L upper and lower extremities; DTRs 2+ intact and symmetric  CHEST/LUNG: decreased breath sounds b/l  HEART: Regular rate and rhythm; No murmurs, rubs, or gallops  ABDOMEN: Soft, obese abdomen  EXTREMITIES:  2+ Peripheral Pulses, No clubbing, cyanosis, or edema  SKIN: No rashes or lesions    LAB:                        12.9   15.97 )-----------( 540      ( 23 Jul 2018 07:40 )             42.6     07-23    138  |  97<L>  |  45<H>  ----------------------------<  364<H>  5.6<H>   |  27  |  1.5    Ca    9.4      23 Jul 2018 07:40    TPro  6.4  /  Alb  3.6  /  TBili  0.3  /  DBili  x   /  AST  11  /  ALT  13  /  AlkPhos  63  07-23    LIVER FUNCTIONS - ( 23 Jul 2018 07:40 )  Alb: 3.6 g/dL / Pro: 6.4 g/dL / ALK PHOS: 63 U/L / ALT: 13 U/L / AST: 11 U/L / GGT: x             Culture - Acid Fast - Sputum w/Smear (collected 07-22-18 @ 15:05)  Source: .Sputum Sputum    RADIOLOGY:    Imaging Personally Reviewed:  [ Y ] YES  [ ] NO    HEALTH ISSUES - PROBLEM Dx:  Cavitary lung disease: Cavitary lung disease  Hyperkalemia: Hyperkalemia  Hypertension, unspecified type: Hypertension, unspecified type  Type 2 diabetes mellitus without complication, with long-term current use of insulin: Type 2 diabetes mellitus without complication, with long-term current use of insulin  Hypoxia: Hypoxia  COPD exacerbation: COPD exacerbation    MEDS:  ALBUTerol    90 MICROgram(s) HFA Inhaler 2 Puff(s) Inhalation every 4 hours PRN  ALBUTerol/ipratropium for Nebulization 3 milliLiter(s) Nebulizer every 4 hours  amLODIPine   Tablet 10 milliGRAM(s) Oral daily  artificial  tears Solution 1 Drop(s) Both EYES every 4 hours PRN  ascorbic acid 1000 milliGRAM(s) Oral daily  atorvastatin 20 milliGRAM(s) Oral at bedtime  azithromycin   Tablet 500 milliGRAM(s) Oral once  benzocaine 15 mG/menthol 3.6 mG Lozenge 1 Lozenge Oral five times a day PRN  cefTRIAXone   IVPB      cholecalciferol 1000 Unit(s) Oral daily  cyanocobalamin 1000 MICROGram(s) Oral daily  dextrose 40% Gel 15 Gram(s) Oral once PRN  dextrose 5%. 1000 milliLiter(s) IV Continuous <Continuous>  dextrose 50% Injectable 12.5 Gram(s) IV Push once  dextrose 50% Injectable 25 Gram(s) IV Push once  dextrose 50% Injectable 25 Gram(s) IV Push once  docusate sodium 100 milliGRAM(s) Oral daily  finasteride 5 milliGRAM(s) Oral daily  glucagon  Injectable 1 milliGRAM(s) IntraMuscular once PRN  heparin  Injectable 5000 Unit(s) SubCutaneous every 12 hours  insulin glargine Injectable (LANTUS) 36 Unit(s) SubCutaneous every morning  insulin lispro Injectable (HumaLOG) 16 Unit(s) SubCutaneous before breakfast  insulin lispro Injectable (HumaLOG) 15 Unit(s) SubCutaneous before lunch  insulin lispro Injectable (HumaLOG) 13 Unit(s) SubCutaneous before dinner  methylPREDNISolone sodium succinate Injectable 40 milliGRAM(s) IV Push every 8 hours  metoprolol tartrate 25 milliGRAM(s) Oral daily  pantoprazole    Tablet 40 milliGRAM(s) Oral before breakfast  polyethylene glycol 3350 17 Gram(s) Oral daily  sodium polystyrene sulfonate Suspension 15 Gram(s) Oral at bedtime  tamsulosin 0.4 milliGRAM(s) Oral at bedtime

## 2018-07-23 NOTE — CONSULT NOTE ADULT - ASSESSMENT
IMPRESSION  ?Cavitary lung disease of RML by CXray in pt with hx of DM, COPD.  [place] to be artifarct by pulmonologist    Pt with SIRS (pulse>90 beats/min, resp rate>20/min, wbc>12)    On levoFLOXacin 750 milliGRAM(s) IV Intermittent every 24 hours    Patient has history of:  penicillin (Unknown)      SUGGESTIONs  Repeat CXray  PPD  Quantiferon Gold  D/C Levofloxacin since it has antimycobacterial activity  Would treat with IV Rocephin & Zithromax for now  Urine legionella antigen  COntrol blood sugar  Repeat  white blood cell count  Consider repeat CT chest with contrast IMPRESSION  ?Cavitary lung disease of RML by CXray in pt with hx of DM, COPD.  [place] to be artifarct by pulmonologist    Pt with SIRS (pulse>90 beats/min, resp rate>20/min, wbc>12)    No hx pos PPD, no hx TB exposure, no fevers, no hemoptysis, no night sweats    On levoFLOXacin 750 milliGRAM(s) IV Intermittent every 24 hours    Patient has history of:  penicillin (Unknown)    Pt c/o hematuria    SUGGESTIONs  Repeat CXray  PPD  Quantiferon Gold  D/C Levofloxacin since it has antimycobacterial activity  Would treat with IV Rocephin & Zithromax for now  Urine legionella antigen  Control blood sugar  Repeat  white blood cell count  Consider repeat CT chest with contrast  Continue to obtain sputum AFBs and mycobacterial cultures for now

## 2018-07-23 NOTE — PROGRESS NOTE ADULT - PROBLEM SELECTOR PLAN 1
-acute on chronic Copd exacerbation  -continue with IV steroids  -abx adjusted as per ID  -Pulmonary following

## 2018-07-24 DIAGNOSIS — N17.9 ACUTE KIDNEY FAILURE, UNSPECIFIED: ICD-10-CM

## 2018-07-24 DIAGNOSIS — K59.00 CONSTIPATION, UNSPECIFIED: ICD-10-CM

## 2018-07-24 LAB
ANION GAP SERPL CALC-SCNC: 15 MMOL/L — HIGH (ref 7–14)
APPEARANCE UR: CLEAR — SIGNIFICANT CHANGE UP
BILIRUB UR-MCNC: NEGATIVE — SIGNIFICANT CHANGE UP
BUN SERPL-MCNC: 47 MG/DL — HIGH (ref 10–20)
CALCIUM SERPL-MCNC: 9.5 MG/DL — SIGNIFICANT CHANGE UP (ref 8.5–10.1)
CHLORIDE SERPL-SCNC: 95 MMOL/L — LOW (ref 98–110)
CO2 SERPL-SCNC: 28 MMOL/L — SIGNIFICANT CHANGE UP (ref 17–32)
COLOR SPEC: YELLOW — SIGNIFICANT CHANGE UP
CREAT SERPL-MCNC: 1.8 MG/DL — HIGH (ref 0.7–1.5)
DIFF PNL FLD: NEGATIVE — SIGNIFICANT CHANGE UP
EPI CELLS # UR: ABNORMAL /HPF
GLUCOSE SERPL-MCNC: 393 MG/DL — HIGH (ref 70–99)
GLUCOSE UR QL: 250 MG/DL
HCT VFR BLD CALC: 44.6 % — SIGNIFICANT CHANGE UP (ref 42–52)
HGB BLD-MCNC: 13.7 G/DL — LOW (ref 14–18)
KETONES UR-MCNC: NEGATIVE — SIGNIFICANT CHANGE UP
LEUKOCYTE ESTERASE UR-ACNC: NEGATIVE — SIGNIFICANT CHANGE UP
MCHC RBC-ENTMCNC: 23.9 PG — LOW (ref 27–31)
MCHC RBC-ENTMCNC: 30.7 G/DL — LOW (ref 32–37)
MCV RBC AUTO: 77.7 FL — LOW (ref 80–94)
NIGHT BLUE STAIN TISS: SIGNIFICANT CHANGE UP
NITRITE UR-MCNC: NEGATIVE — SIGNIFICANT CHANGE UP
NRBC # BLD: 0 /100 WBCS — SIGNIFICANT CHANGE UP (ref 0–0)
PH UR: 6 — SIGNIFICANT CHANGE UP (ref 5–8)
PLATELET # BLD AUTO: 575 K/UL — HIGH (ref 130–400)
POTASSIUM SERPL-MCNC: 5.4 MMOL/L — HIGH (ref 3.5–5)
POTASSIUM SERPL-SCNC: 5.4 MMOL/L — HIGH (ref 3.5–5)
PROT UR-MCNC: ABNORMAL MG/DL
RBC # BLD: 5.74 M/UL — SIGNIFICANT CHANGE UP (ref 4.7–6.1)
RBC # FLD: 17.2 % — HIGH (ref 11.5–14.5)
RBC CASTS # UR COMP ASSIST: NEGATIVE — SIGNIFICANT CHANGE UP
SODIUM SERPL-SCNC: 138 MMOL/L — SIGNIFICANT CHANGE UP (ref 135–146)
SP GR SPEC: 1.02 — SIGNIFICANT CHANGE UP (ref 1.01–1.03)
SPECIMEN SOURCE: SIGNIFICANT CHANGE UP
UROBILINOGEN FLD QL: 0.2 MG/DL — SIGNIFICANT CHANGE UP (ref 0.2–0.2)
WBC # BLD: 15.25 K/UL — HIGH (ref 4.8–10.8)
WBC # FLD AUTO: 15.25 K/UL — HIGH (ref 4.8–10.8)
WBC UR QL: SIGNIFICANT CHANGE UP /HPF

## 2018-07-24 RX ORDER — DOCUSATE SODIUM 100 MG
200 CAPSULE ORAL EVERY 12 HOURS
Qty: 0 | Refills: 0 | Status: DISCONTINUED | OUTPATIENT
Start: 2018-07-24 | End: 2018-07-29

## 2018-07-24 RX ORDER — DOCUSATE SODIUM 100 MG
100 CAPSULE ORAL ONCE
Qty: 0 | Refills: 0 | Status: COMPLETED | OUTPATIENT
Start: 2018-07-24 | End: 2018-07-24

## 2018-07-24 RX ADMIN — PREGABALIN 1000 MICROGRAM(S): 225 CAPSULE ORAL at 11:15

## 2018-07-24 RX ADMIN — Medication 100 MILLIGRAM(S): at 11:15

## 2018-07-24 RX ADMIN — Medication 40 MILLIGRAM(S): at 06:32

## 2018-07-24 RX ADMIN — Medication 1000 UNIT(S): at 11:14

## 2018-07-24 RX ADMIN — TAMSULOSIN HYDROCHLORIDE 0.4 MILLIGRAM(S): 0.4 CAPSULE ORAL at 21:25

## 2018-07-24 RX ADMIN — Medication 13 UNIT(S): at 17:23

## 2018-07-24 RX ADMIN — Medication 3 MILLILITER(S): at 00:16

## 2018-07-24 RX ADMIN — Medication 15 UNIT(S): at 12:38

## 2018-07-24 RX ADMIN — Medication 100 MILLIGRAM(S): at 12:38

## 2018-07-24 RX ADMIN — INSULIN GLARGINE 36 UNIT(S): 100 INJECTION, SOLUTION SUBCUTANEOUS at 08:04

## 2018-07-24 RX ADMIN — Medication 3 MILLILITER(S): at 20:11

## 2018-07-24 RX ADMIN — Medication 25 MILLIGRAM(S): at 06:32

## 2018-07-24 RX ADMIN — AMLODIPINE BESYLATE 10 MILLIGRAM(S): 2.5 TABLET ORAL at 06:32

## 2018-07-24 RX ADMIN — SODIUM POLYSTYRENE SULFONATE 15 GRAM(S): 4.1 POWDER, FOR SUSPENSION ORAL at 22:27

## 2018-07-24 RX ADMIN — Medication 3 MILLILITER(S): at 12:21

## 2018-07-24 RX ADMIN — Medication 40 MILLIGRAM(S): at 21:25

## 2018-07-24 RX ADMIN — Medication 1000 MILLIGRAM(S): at 11:14

## 2018-07-24 RX ADMIN — Medication 200 MILLIGRAM(S): at 17:23

## 2018-07-24 RX ADMIN — BENZOCAINE AND MENTHOL 1 LOZENGE: 5; 1 LIQUID ORAL at 06:37

## 2018-07-24 RX ADMIN — HEPARIN SODIUM 5000 UNIT(S): 5000 INJECTION INTRAVENOUS; SUBCUTANEOUS at 06:32

## 2018-07-24 RX ADMIN — HEPARIN SODIUM 5000 UNIT(S): 5000 INJECTION INTRAVENOUS; SUBCUTANEOUS at 17:24

## 2018-07-24 RX ADMIN — Medication 3 MILLILITER(S): at 07:36

## 2018-07-24 RX ADMIN — Medication 40 MILLIGRAM(S): at 14:06

## 2018-07-24 RX ADMIN — CEFTRIAXONE 100 GRAM(S): 500 INJECTION, POWDER, FOR SOLUTION INTRAMUSCULAR; INTRAVENOUS at 14:06

## 2018-07-24 RX ADMIN — FINASTERIDE 5 MILLIGRAM(S): 5 TABLET, FILM COATED ORAL at 11:14

## 2018-07-24 RX ADMIN — Medication 3 MILLILITER(S): at 15:33

## 2018-07-24 RX ADMIN — ATORVASTATIN CALCIUM 20 MILLIGRAM(S): 80 TABLET, FILM COATED ORAL at 21:25

## 2018-07-24 RX ADMIN — POLYETHYLENE GLYCOL 3350 17 GRAM(S): 17 POWDER, FOR SOLUTION ORAL at 08:03

## 2018-07-24 RX ADMIN — PANTOPRAZOLE SODIUM 40 MILLIGRAM(S): 20 TABLET, DELAYED RELEASE ORAL at 06:32

## 2018-07-24 RX ADMIN — Medication 3 MILLILITER(S): at 05:26

## 2018-07-24 RX ADMIN — Medication 16 UNIT(S): at 08:03

## 2018-07-24 NOTE — PROGRESS NOTE ADULT - SUBJECTIVE AND OBJECTIVE BOX
JIAN MANCIA  77y  Male      Patient is a 77y old  Male who presents with a chief complaint of SOB (21 Jul 2018 00:19)      INTERVAL HPI/OVERNIGHT EVENTS:  patient admitted for copd exac.   doing better this morning  sitting up in chair     Vital Signs Last 24 Hrs  T(C): 36.8 (24 Jul 2018 05:08), Max: 36.8 (24 Jul 2018 05:08)  T(F): 98.2 (24 Jul 2018 05:08), Max: 98.2 (24 Jul 2018 05:08)  HR: 88 (24 Jul 2018 05:08) (82 - 110)  BP: 174/64 (24 Jul 2018 05:08) (113/60 - 174/64)  RR: 16 (24 Jul 2018 05:08) (16 - 19)      PHYSICAL EXAM:  GENERAL: NAD, well-groomed, well-developed  HEAD:  Atraumatic, Normocephalic  EYES: EOMI, PERRLA, conjunctiva and sclera clear  NERVOUS SYSTEM:  Alert & Oriented X 4, Good concentration; Motor Strength 5/5 B/L upper and lower extremities; DTRs 2+ intact and symmetric  CHEST/LUNG: decreased breath sounds R>L  HEART: Regular rate and rhythm; No murmurs, rubs, or gallops  ABDOMEN: Soft, obese abdomen  EXTREMITIES:  2+ Peripheral Pulses, No clubbing, cyanosis, or edema  SKIN: No rashes or lesions    LAB:                            13.7   15.25 )-----------( 575      ( 24 Jul 2018 09:13 )             44.6   07-24    138  |  95<L>  |  47<H>  ----------------------------<  393<H>  5.4<H>   |  28  |  1.8<H>    Ca    9.5      24 Jul 2018 09:13    TPro  6.4  /  Alb  3.6  /  TBili  0.3  /  DBili  x   /  AST  11  /  ALT  13  /  AlkPhos  63  07-23                    12.9   15.97 )-----------( 540      ( 23 Jul 2018 07:40 )             42.6     07-23    138  |  97<L>  |  45<H>  ----------------------------<  364<H>  5.6<H>   |  27  |  1.5    Ca    9.4      23 Jul 2018 07:40    TPro  6.4  /  Alb  3.6  /  TBili  0.3  /  DBili  x   /  AST  11  /  ALT  13  /  AlkPhos  63  07-23    LIVER FUNCTIONS - ( 23 Jul 2018 07:40 )  Alb: 3.6 g/dL / Pro: 6.4 g/dL / ALK PHOS: 63 U/L / ALT: 13 U/L / AST: 11 U/L / GGT: x             Culture - Acid Fast - Sputum w/Smear (collected 07-22-18 @ 15:05)  Source: .Sputum Sputum    RADIOLOGY:    Imaging Personally Reviewed:  [ Y ] YES  [ ] NO    HEALTH ISSUES - PROBLEM Dx:  Cavitary lung disease: Cavitary lung disease  Hyperkalemia: Hyperkalemia  Hypertension, unspecified type: Hypertension, unspecified type  Type 2 diabetes mellitus without complication, with long-term current use of insulin: Type 2 diabetes mellitus without complication, with long-term current use of insulin  Hypoxia: Hypoxia  COPD exacerbation: COPD exacerbation    MEDICATIONS  (STANDING):  ALBUTerol/ipratropium for Nebulization 3 milliLiter(s) Nebulizer every 4 hours  amLODIPine   Tablet 10 milliGRAM(s) Oral daily  ascorbic acid 1000 milliGRAM(s) Oral daily  atorvastatin 20 milliGRAM(s) Oral at bedtime  cefTRIAXone   IVPB      cefTRIAXone   IVPB 1 Gram(s) IV Intermittent every 24 hours  cholecalciferol 1000 Unit(s) Oral daily  cyanocobalamin 1000 MICROGram(s) Oral daily  dextrose 5%. 1000 milliLiter(s) (50 mL/Hr) IV Continuous <Continuous>  dextrose 50% Injectable 12.5 Gram(s) IV Push once  dextrose 50% Injectable 25 Gram(s) IV Push once  dextrose 50% Injectable 25 Gram(s) IV Push once  docusate sodium 200 milliGRAM(s) Oral every 12 hours  finasteride 5 milliGRAM(s) Oral daily  heparin  Injectable 5000 Unit(s) SubCutaneous every 12 hours  insulin glargine Injectable (LANTUS) 36 Unit(s) SubCutaneous every morning  insulin lispro Injectable (HumaLOG) 16 Unit(s) SubCutaneous before breakfast  insulin lispro Injectable (HumaLOG) 15 Unit(s) SubCutaneous before lunch  insulin lispro Injectable (HumaLOG) 13 Unit(s) SubCutaneous before dinner  methylPREDNISolone sodium succinate Injectable 40 milliGRAM(s) IV Push every 8 hours  metoprolol tartrate 25 milliGRAM(s) Oral daily  pantoprazole    Tablet 40 milliGRAM(s) Oral before breakfast  polyethylene glycol 3350 17 Gram(s) Oral daily  sodium polystyrene sulfonate Suspension 15 Gram(s) Oral at bedtime  tamsulosin 0.4 milliGRAM(s) Oral at bedtime    MEDICATIONS  (PRN):  ALBUTerol    90 MICROgram(s) HFA Inhaler 2 Puff(s) Inhalation every 4 hours PRN rescue inhaler  artificial  tears Solution 1 Drop(s) Both EYES every 4 hours PRN Dry Eyes  benzocaine 15 mG/menthol 3.6 mG Lozenge 1 Lozenge Oral five times a day PRN Sore Throat  dextrose 40% Gel 15 Gram(s) Oral once PRN Blood Glucose LESS THAN 70 milliGRAM(s)/deciliter  glucagon  Injectable 1 milliGRAM(s) IntraMuscular once PRN Glucose LESS THAN 70 milligrams/deciliter

## 2018-07-24 NOTE — PROGRESS NOTE ADULT - PROBLEM SELECTOR PLAN 1
doubt TB  not in keeping clinically    DC planning - PO Omnicef 300 mg Q12 - 7 days  DC home if negative PPD / quantiferon  recall if needed

## 2018-07-24 NOTE — PROGRESS NOTE ADULT - PROBLEM SELECTOR PLAN 1
-acute on chronic Copd exacerbation  -continue with IV steroids  -abx adjusted as per ID  -Pulmonary following  -r/o TB (3rd sputum sample today; ppd placed yesterday and quantiferon Pending)

## 2018-07-24 NOTE — PROGRESS NOTE ADULT - SUBJECTIVE AND OBJECTIVE BOX
infectious diseases progress note:  JIAN MANCIA is a 77yMale patient    COPD EXACERBATION;HYPOXIA    SIRS (systemic inflammatory response syndrome)  Obesity (BMI 30-39.9)  Diabetes mellitus, type 2  Oxygen dependent  Cavitary lung disease  Hyperkalemia  Hypertension, unspecified type  Type 2 diabetes mellitus without complication, with long-term current use of insulin  Hypoxia  COPD exacerbation      ROS:  not relevant     Allergies    aspirin (Unknown)  iodine (Unknown)  NSAIDs (Unknown)  Originally Entered as [Unknown] reaction to [[place]] (Unknown)  penicillin (Unknown)    Intolerances        ANTIBIOTICS/RELEVANT:  antimicrobials  cefTRIAXone   IVPB      cefTRIAXone   IVPB 1 Gram(s) IV Intermittent every 24 hours    immunologic:    OTHER:  ALBUTerol    90 MICROgram(s) HFA Inhaler 2 Puff(s) Inhalation every 4 hours PRN  ALBUTerol/ipratropium for Nebulization 3 milliLiter(s) Nebulizer every 4 hours  amLODIPine   Tablet 10 milliGRAM(s) Oral daily  artificial  tears Solution 1 Drop(s) Both EYES every 4 hours PRN  ascorbic acid 1000 milliGRAM(s) Oral daily  atorvastatin 20 milliGRAM(s) Oral at bedtime  benzocaine 15 mG/menthol 3.6 mG Lozenge 1 Lozenge Oral five times a day PRN  cholecalciferol 1000 Unit(s) Oral daily  cyanocobalamin 1000 MICROGram(s) Oral daily  dextrose 40% Gel 15 Gram(s) Oral once PRN  dextrose 5%. 1000 milliLiter(s) IV Continuous <Continuous>  dextrose 50% Injectable 12.5 Gram(s) IV Push once  dextrose 50% Injectable 25 Gram(s) IV Push once  dextrose 50% Injectable 25 Gram(s) IV Push once  docusate sodium 100 milliGRAM(s) Oral daily  finasteride 5 milliGRAM(s) Oral daily  glucagon  Injectable 1 milliGRAM(s) IntraMuscular once PRN  heparin  Injectable 5000 Unit(s) SubCutaneous every 12 hours  insulin glargine Injectable (LANTUS) 36 Unit(s) SubCutaneous every morning  insulin lispro Injectable (HumaLOG) 16 Unit(s) SubCutaneous before breakfast  insulin lispro Injectable (HumaLOG) 15 Unit(s) SubCutaneous before lunch  insulin lispro Injectable (HumaLOG) 13 Unit(s) SubCutaneous before dinner  methylPREDNISolone sodium succinate Injectable 40 milliGRAM(s) IV Push every 8 hours  metoprolol tartrate 25 milliGRAM(s) Oral daily  pantoprazole    Tablet 40 milliGRAM(s) Oral before breakfast  polyethylene glycol 3350 17 Gram(s) Oral daily  sodium polystyrene sulfonate Suspension 15 Gram(s) Oral at bedtime  tamsulosin 0.4 milliGRAM(s) Oral at bedtime      Objective:  T(F): 98.2 (18 @ 05:08), Max: 98.2 (18 @ 05:08)  HR: 88 (18 @ 05:08) (82 - 110)  BP: 174/64 (18 @ 05:08) (113/60 - 174/64)  RR: 16 (18 @ 05:08) (16 - 19)  SpO2: --    PHYSICAL EXAM:  Constitutional:Well-developed, well nourished	  Neck:no JVD, no lymphadenopathy, supple  Respiratory: few rhonchi  camila  Cardiovascular: S1S2 RRR  Gastrointestinal:soft, (+) BS, no HSM  Extremities:no phlebitis         LABS:                        12.9   15.97 )-----------( 540      ( 2018 07:40 )             42.6     07    138  |  97<L>  |  45<H>  ----------------------------<  364<H>  5.6<H>   |  27  |  1.5    Ca    9.4      2018 07:40    TPro  6.4  /  Alb  3.6  /  TBili  0.3  /  DBili  x   /  AST  11  /  ALT  13  /  AlkPhos  63  07-23      Urinalysis Basic - ( 2018 07:47 )    Color: Yellow / Appearance: Clear / S.020 / pH: x  Gluc: x / Ketone: Negative  / Bili: Negative / Urobili: 0.2 mg/dL   Blood: x / Protein: Trace mg/dL / Nitrite: Negative   Leuk Esterase: Negative / RBC: x / WBC x   Sq Epi: x / Non Sq Epi: x / Bacteria: x          MICROBIOLOGY:    Culture - Acid Fast - Sputum w/Smear (collected 18 @ 15:05)  Source: .Sputum Sputum        Culture - Acid Fast - Sputum w/Smear (collected 2018 15:05)  Source: .Sputum Sputum            RADIOLOGY & ADDITIONAL STUDIES:

## 2018-07-25 LAB
ANION GAP SERPL CALC-SCNC: 14 MMOL/L — SIGNIFICANT CHANGE UP (ref 7–14)
BUN SERPL-MCNC: 46 MG/DL — HIGH (ref 10–20)
CALCIUM SERPL-MCNC: 9.1 MG/DL — SIGNIFICANT CHANGE UP (ref 8.5–10.1)
CHLORIDE SERPL-SCNC: 96 MMOL/L — LOW (ref 98–110)
CO2 SERPL-SCNC: 27 MMOL/L — SIGNIFICANT CHANGE UP (ref 17–32)
CREAT SERPL-MCNC: 1.5 MG/DL — SIGNIFICANT CHANGE UP (ref 0.7–1.5)
CULTURE RESULTS: SIGNIFICANT CHANGE UP
GLUCOSE SERPL-MCNC: 441 MG/DL — HIGH (ref 70–99)
HCT VFR BLD CALC: 44.8 % — SIGNIFICANT CHANGE UP (ref 42–52)
HGB BLD-MCNC: 13.6 G/DL — LOW (ref 14–18)
LEGIONELLA AG UR QL: NEGATIVE — SIGNIFICANT CHANGE UP
MCHC RBC-ENTMCNC: 23.8 PG — LOW (ref 27–31)
MCHC RBC-ENTMCNC: 30.4 G/DL — LOW (ref 32–37)
MCV RBC AUTO: 78.3 FL — LOW (ref 80–94)
NIGHT BLUE STAIN TISS: SIGNIFICANT CHANGE UP
NRBC # BLD: 0 /100 WBCS — SIGNIFICANT CHANGE UP (ref 0–0)
PLATELET # BLD AUTO: 543 K/UL — HIGH (ref 130–400)
POTASSIUM SERPL-MCNC: 5.6 MMOL/L — HIGH (ref 3.5–5)
POTASSIUM SERPL-SCNC: 5.6 MMOL/L — HIGH (ref 3.5–5)
RBC # BLD: 5.72 M/UL — SIGNIFICANT CHANGE UP (ref 4.7–6.1)
RBC # FLD: 16.9 % — HIGH (ref 11.5–14.5)
SODIUM SERPL-SCNC: 137 MMOL/L — SIGNIFICANT CHANGE UP (ref 135–146)
SPECIMEN SOURCE: SIGNIFICANT CHANGE UP
SPECIMEN SOURCE: SIGNIFICANT CHANGE UP
WBC # BLD: 14.53 K/UL — HIGH (ref 4.8–10.8)
WBC # FLD AUTO: 14.53 K/UL — HIGH (ref 4.8–10.8)

## 2018-07-25 RX ORDER — BUDESONIDE AND FORMOTEROL FUMARATE DIHYDRATE 160; 4.5 UG/1; UG/1
2 AEROSOL RESPIRATORY (INHALATION)
Qty: 0 | Refills: 0 | Status: DISCONTINUED | OUTPATIENT
Start: 2018-07-25 | End: 2018-07-29

## 2018-07-25 RX ADMIN — Medication 16 UNIT(S): at 09:10

## 2018-07-25 RX ADMIN — Medication 3 MILLILITER(S): at 01:40

## 2018-07-25 RX ADMIN — PANTOPRAZOLE SODIUM 40 MILLIGRAM(S): 20 TABLET, DELAYED RELEASE ORAL at 09:15

## 2018-07-25 RX ADMIN — Medication 3 MILLILITER(S): at 11:34

## 2018-07-25 RX ADMIN — Medication 25 MILLIGRAM(S): at 05:44

## 2018-07-25 RX ADMIN — TAMSULOSIN HYDROCHLORIDE 0.4 MILLIGRAM(S): 0.4 CAPSULE ORAL at 21:46

## 2018-07-25 RX ADMIN — AMLODIPINE BESYLATE 10 MILLIGRAM(S): 2.5 TABLET ORAL at 05:44

## 2018-07-25 RX ADMIN — Medication 3 MILLILITER(S): at 19:26

## 2018-07-25 RX ADMIN — POLYETHYLENE GLYCOL 3350 17 GRAM(S): 17 POWDER, FOR SOLUTION ORAL at 09:17

## 2018-07-25 RX ADMIN — Medication 13 UNIT(S): at 18:18

## 2018-07-25 RX ADMIN — Medication 15 UNIT(S): at 11:35

## 2018-07-25 RX ADMIN — Medication 40 MILLIGRAM(S): at 05:48

## 2018-07-25 RX ADMIN — Medication 200 MILLIGRAM(S): at 17:37

## 2018-07-25 RX ADMIN — ATORVASTATIN CALCIUM 20 MILLIGRAM(S): 80 TABLET, FILM COATED ORAL at 21:46

## 2018-07-25 RX ADMIN — SODIUM POLYSTYRENE SULFONATE 15 GRAM(S): 4.1 POWDER, FOR SUSPENSION ORAL at 22:46

## 2018-07-25 RX ADMIN — PREGABALIN 1000 MICROGRAM(S): 225 CAPSULE ORAL at 11:28

## 2018-07-25 RX ADMIN — Medication 1000 MILLIGRAM(S): at 11:28

## 2018-07-25 RX ADMIN — CEFTRIAXONE 100 GRAM(S): 500 INJECTION, POWDER, FOR SOLUTION INTRAMUSCULAR; INTRAVENOUS at 14:20

## 2018-07-25 RX ADMIN — Medication 40 MILLIGRAM(S): at 17:36

## 2018-07-25 RX ADMIN — FINASTERIDE 5 MILLIGRAM(S): 5 TABLET, FILM COATED ORAL at 11:28

## 2018-07-25 RX ADMIN — Medication 1000 UNIT(S): at 11:28

## 2018-07-25 RX ADMIN — Medication 3 MILLILITER(S): at 15:17

## 2018-07-25 RX ADMIN — INSULIN GLARGINE 36 UNIT(S): 100 INJECTION, SOLUTION SUBCUTANEOUS at 09:09

## 2018-07-25 RX ADMIN — Medication 200 MILLIGRAM(S): at 05:44

## 2018-07-25 RX ADMIN — HEPARIN SODIUM 5000 UNIT(S): 5000 INJECTION INTRAVENOUS; SUBCUTANEOUS at 17:36

## 2018-07-25 RX ADMIN — HEPARIN SODIUM 5000 UNIT(S): 5000 INJECTION INTRAVENOUS; SUBCUTANEOUS at 05:44

## 2018-07-25 RX ADMIN — Medication 3 MILLILITER(S): at 07:29

## 2018-07-25 NOTE — PROGRESS NOTE ADULT - SUBJECTIVE AND OBJECTIVE BOX
JIAN MANCIA  77y  Male      Patient is a 77y old  Male who presents with a chief complaint of SOB (21 Jul 2018 00:19)      INTERVAL HPI/OVERNIGHT EVENTS:  patient admitted for copd exac.   doing better daily  sitting up in chair   ppd negative  -still need sputum and Quantiferon to be followed up to discontinue airborne precautions    Vital Signs Last 24 Hrs  T(C): 35.7 (25 Jul 2018 06:12), Max: 36.2 (24 Jul 2018 14:36)  T(F): 96.2 (25 Jul 2018 06:12), Max: 97.2 (24 Jul 2018 14:36)  HR: 94 (25 Jul 2018 06:12) (94 - 101)  BP: 163/90 (25 Jul 2018 06:12) (132/62 - 167/79)  RR: 16 (25 Jul 2018 06:12) (16 - 16)    PHYSICAL EXAM:  GENERAL: NAD, well-groomed, well-developed  HEAD:  Atraumatic, Normocephalic  EYES: EOMI, PERRLA, conjunctiva and sclera clear  NERVOUS SYSTEM:  Alert & Oriented X 4, Good concentration; Motor Strength 5/5 B/L upper and lower extremities; DTRs 2+ intact and symmetric  CHEST/LUNG: better airway entry today, decreased BS Right  HEART: Regular rate and rhythm; No murmurs, rubs, or gallops  ABDOMEN: Soft, obese abdomen  EXTREMITIES:  2+ Peripheral Pulses, No clubbing, cyanosis, or edema  SKIN: No rashes or lesions    LAB:                            13.6   14.53 )-----------( 543      ( 25 Jul 2018 10:40 )             44.8                     07-24    138  |  95<L>  |  47<H>  ----------------------------<  393<H>  5.4<H>   |  28  |  1.8<H>    Ca    9.5      24 Jul 2018 09:13    Legionella Antigen, Urine: Negative (07.24.18 @ 07:46)    Acid Fast Bacilli Smear:   No acid fast bacilli seen by fluorochrome stain (07.23.18 @ 13:53)    Acid Fast Bacilli Smear:   No acid fast bacilli seen by fluorochrome stain (07.22.18 @ 15:05)    RADIOLOGY:    Imaging Personally Reviewed:  [ Y ] YES  [ ] NO    Xray Chest 1 View AP/PA (07.23.18 @ 11:23)   Impression:      No significant change since 3 days earlier.     Stable right midlung cavitary lesion and mild vascular congestion.      HEALTH ISSUES - PROBLEM Dx:  Cavitary lung disease: Cavitary lung disease  Hyperkalemia: Hyperkalemia  Hypertension, unspecified type: Hypertension, unspecified type  Type 2 diabetes mellitus without complication, with long-term current use of insulin: Type 2 diabetes mellitus without complication, with long-term current use of insulin  Hypoxia: Hypoxia  COPD exacerbation: COPD exacerbation    MEDICATIONS  (STANDING):  ALBUTerol/ipratropium for Nebulization 3 milliLiter(s) Nebulizer every 4 hours  amLODIPine   Tablet 10 milliGRAM(s) Oral daily  ascorbic acid 1000 milliGRAM(s) Oral daily  atorvastatin 20 milliGRAM(s) Oral at bedtime  buDESOnide 160 MICROgram(s)/formoterol 4.5 MICROgram(s) Inhaler 2 Puff(s) Inhalation two times a day  cefTRIAXone   IVPB 1 Gram(s) IV Intermittent every 24 hours  cefTRIAXone   IVPB      cholecalciferol 1000 Unit(s) Oral daily  cyanocobalamin 1000 MICROGram(s) Oral daily  dextrose 5%. 1000 milliLiter(s) (50 mL/Hr) IV Continuous <Continuous>  dextrose 50% Injectable 12.5 Gram(s) IV Push once  dextrose 50% Injectable 25 Gram(s) IV Push once  dextrose 50% Injectable 25 Gram(s) IV Push once  docusate sodium 200 milliGRAM(s) Oral every 12 hours  finasteride 5 milliGRAM(s) Oral daily  heparin  Injectable 5000 Unit(s) SubCutaneous every 12 hours  insulin glargine Injectable (LANTUS) 36 Unit(s) SubCutaneous every morning  insulin lispro Injectable (HumaLOG) 16 Unit(s) SubCutaneous before breakfast  insulin lispro Injectable (HumaLOG) 15 Unit(s) SubCutaneous before lunch  insulin lispro Injectable (HumaLOG) 13 Unit(s) SubCutaneous before dinner  methylPREDNISolone sodium succinate Injectable 40 milliGRAM(s) IV Push every 12 hours  metoprolol tartrate 25 milliGRAM(s) Oral daily  pantoprazole    Tablet 40 milliGRAM(s) Oral before breakfast  polyethylene glycol 3350 17 Gram(s) Oral daily  sodium polystyrene sulfonate Suspension 15 Gram(s) Oral at bedtime  tamsulosin 0.4 milliGRAM(s) Oral at bedtime    MEDICATIONS  (PRN):  ALBUTerol    90 MICROgram(s) HFA Inhaler 2 Puff(s) Inhalation every 4 hours PRN rescue inhaler  artificial  tears Solution 1 Drop(s) Both EYES every 4 hours PRN Dry Eyes  benzocaine 15 mG/menthol 3.6 mG Lozenge 1 Lozenge Oral five times a day PRN Sore Throat  dextrose 40% Gel 15 Gram(s) Oral once PRN Blood Glucose LESS THAN 70 milliGRAM(s)/deciliter  glucagon  Injectable 1 milliGRAM(s) IntraMuscular once PRN Glucose LESS THAN 70 milligrams/deciliter

## 2018-07-25 NOTE — PROGRESS NOTE ADULT - PROBLEM SELECTOR PLAN 1
-acute on chronic Copd exacerbation  -continue with IV steroids (tapering)  -abx adjusted as per ID  -Pulmonary following  -r/o TB: ppd negative, Quantiferon Pending, third sputum sample pending

## 2018-07-26 ENCOUNTER — TRANSCRIPTION ENCOUNTER (OUTPATIENT)
Age: 78
End: 2018-07-26

## 2018-07-26 LAB
GAMMA INTERFERON BACKGROUND BLD IA-ACNC: 0.01 IU/ML — SIGNIFICANT CHANGE UP
M TB IFN-G BLD-IMP: ABNORMAL
M TB IFN-G CD4+ BCKGRND COR BLD-ACNC: 0 IU/ML — SIGNIFICANT CHANGE UP
M TB IFN-G CD4+CD8+ BCKGRND COR BLD-ACNC: 0 IU/ML — SIGNIFICANT CHANGE UP
QUANT TB PLUS MITOGEN MINUS NIL: 0.09 IU/ML — SIGNIFICANT CHANGE UP

## 2018-07-26 RX ADMIN — Medication 3 MILLILITER(S): at 01:03

## 2018-07-26 RX ADMIN — Medication 3 MILLILITER(S): at 11:40

## 2018-07-26 RX ADMIN — Medication 15 UNIT(S): at 13:31

## 2018-07-26 RX ADMIN — Medication 200 MILLIGRAM(S): at 06:36

## 2018-07-26 RX ADMIN — Medication 13 UNIT(S): at 17:08

## 2018-07-26 RX ADMIN — Medication 40 MILLIGRAM(S): at 17:08

## 2018-07-26 RX ADMIN — SODIUM POLYSTYRENE SULFONATE 15 GRAM(S): 4.1 POWDER, FOR SUSPENSION ORAL at 22:06

## 2018-07-26 RX ADMIN — Medication 25 MILLIGRAM(S): at 06:36

## 2018-07-26 RX ADMIN — HEPARIN SODIUM 5000 UNIT(S): 5000 INJECTION INTRAVENOUS; SUBCUTANEOUS at 06:36

## 2018-07-26 RX ADMIN — FINASTERIDE 5 MILLIGRAM(S): 5 TABLET, FILM COATED ORAL at 11:12

## 2018-07-26 RX ADMIN — ATORVASTATIN CALCIUM 20 MILLIGRAM(S): 80 TABLET, FILM COATED ORAL at 22:06

## 2018-07-26 RX ADMIN — Medication 3 MILLILITER(S): at 15:42

## 2018-07-26 RX ADMIN — Medication 16 UNIT(S): at 09:03

## 2018-07-26 RX ADMIN — INSULIN GLARGINE 36 UNIT(S): 100 INJECTION, SOLUTION SUBCUTANEOUS at 09:04

## 2018-07-26 RX ADMIN — Medication 1000 MILLIGRAM(S): at 11:12

## 2018-07-26 RX ADMIN — PANTOPRAZOLE SODIUM 40 MILLIGRAM(S): 20 TABLET, DELAYED RELEASE ORAL at 06:36

## 2018-07-26 RX ADMIN — Medication 40 MILLIGRAM(S): at 06:35

## 2018-07-26 RX ADMIN — Medication 3 MILLILITER(S): at 23:59

## 2018-07-26 RX ADMIN — HEPARIN SODIUM 5000 UNIT(S): 5000 INJECTION INTRAVENOUS; SUBCUTANEOUS at 17:08

## 2018-07-26 RX ADMIN — BUDESONIDE AND FORMOTEROL FUMARATE DIHYDRATE 2 PUFF(S): 160; 4.5 AEROSOL RESPIRATORY (INHALATION) at 17:10

## 2018-07-26 RX ADMIN — Medication 3 MILLILITER(S): at 07:33

## 2018-07-26 RX ADMIN — TAMSULOSIN HYDROCHLORIDE 0.4 MILLIGRAM(S): 0.4 CAPSULE ORAL at 22:06

## 2018-07-26 RX ADMIN — Medication 1000 UNIT(S): at 11:14

## 2018-07-26 RX ADMIN — POLYETHYLENE GLYCOL 3350 17 GRAM(S): 17 POWDER, FOR SOLUTION ORAL at 09:04

## 2018-07-26 RX ADMIN — PREGABALIN 1000 MICROGRAM(S): 225 CAPSULE ORAL at 11:12

## 2018-07-26 RX ADMIN — Medication 200 MILLIGRAM(S): at 17:08

## 2018-07-26 RX ADMIN — CEFTRIAXONE 100 GRAM(S): 500 INJECTION, POWDER, FOR SOLUTION INTRAMUSCULAR; INTRAVENOUS at 13:32

## 2018-07-26 RX ADMIN — Medication 3 MILLILITER(S): at 19:25

## 2018-07-26 RX ADMIN — Medication 3 MILLILITER(S): at 05:33

## 2018-07-26 RX ADMIN — AMLODIPINE BESYLATE 10 MILLIGRAM(S): 2.5 TABLET ORAL at 06:36

## 2018-07-26 NOTE — DISCHARGE NOTE ADULT - MEDICATION SUMMARY - MEDICATIONS TO TAKE
I will START or STAY ON the medications listed below when I get home from the hospital:    finasteride 5 mg oral tablet  -- 1 tab(s) by mouth once a day  -- Indication: For Hypoxia    predniSONE 10 mg oral tablet  -- 1 tab(s) by mouth once a day  -- Indication: For COPD EXACERBATION;HYPOXIA    tamsulosin 0.4 mg oral capsule  -- 1 cap(s) by mouth once a day  -- Indication: For bph    NovoLOG FlexPen 100 units/mL injectable solution  -- 15 unit(s) injectable 3 times a day (before meals)   -- Indication: For Type 2 diabetes mellitus without complication, with long-term current use of insulin    atorvastatin 20 mg oral tablet  -- 1 tab(s) by mouth once a day  -- Indication: For Diabetes mellitus, type 2    Metoprolol Tartrate 25 mg oral tablet  -- orally once a day  -- Indication: For Hypertension, unspecified type    Foradil Aerolizer 12 mcg inhalation capsule  -- Indication: For COPD EXACERBATION;HYPOXIA    ProAir HFA  -- 2 inhaler(s) inhaled every 4 hours, As Needed  -- Indication: For COPD EXACERBATION;HYPOXIA    Stiolto Respimat 2.5 mcg-2.5 mcg/inh inhalation aerosol  -- 2 puff(s) inhaled once a day   -- Check with your doctor before becoming pregnant.  For inhalation only.  May cause drowsiness or dizziness.  Obtain medical advice before taking any non-prescription drugs as some may affect the action of this medication.  This drug may impair the ability to drive or operate machinery.  Use care until you become familiar with its effects.    -- Indication: For COPD EXACERBATION;HYPOXIA    ipratropium-albuterol 0.5 mg-2.5 mg/3 mLinhalation solution  -- 3 milliliter(s) inhaled every 4 hours  -- Indication: For COPD EXACERBATION;HYPOXIA    amLODIPine 10 mg oral tablet  -- 1 tab(s) by mouth once a day  -- Indication: For Hypertension, unspecified type    sodium polystyrene sulfonate 15 g/60 mL oral suspension  -- Indication: For Hypertension, unspecified type    Cinnamon  -- 1000 milligram(s) orally  -- Indication: For Hypertension, unspecified type    Flax Seed Oil oral capsule  -- 1000 milligram(s) orally  -- Indication: For Hypertension, unspecified type    polyethylene glycol 3350 oral powder for reconstitution  -- Indication: For Constipation    docusate sodium 100 mg oral tablet  -- Indication: For Constipation    mometasone 50 mcg/inh nasal spray  -- 1 spray(s) into nose 2 times a day  -- Indication: For COPD EXACERBATION;HYPOXIA    Artificial Tears  -- Indication: For COPD EXACERBATION;HYPOXIA    omeprazole 20 mg oral delayed release capsule  -- 1 cap(s) by mouth 2 times a day  -- Indication: For Obesity (BMI 30-39.9)    Centrum oral tablet  -- 1 tab(s) by mouth once a day  -- Indication: For COPD EXACERBATION;HYPOXIA    Vitamin B-12  -- 3000 milligram(s) orally  -- Indication: For Obesity (BMI 30-39.9)    Vitamin C 1000 mg oral tablet  -- Indication: For Obesity (BMI 30-39.9)    Vitamin D3 1000 intl units oral tablet  -- 1 tab(s) by mouth once a day  -- Indication: For Obesity (BMI 30-39.9) I will START or STAY ON the medications listed below when I get home from the hospital:    finasteride 5 mg oral tablet  -- 1 tab(s) by mouth once a day  -- Indication: For Hypoxia    Deltasone 20 mg oral tablet  -- 2 tab(s) by mouth once a day   -- Indication: For COPD EXACERBATION;HYPOXIA    tamsulosin 0.4 mg oral capsule  -- 1 cap(s) by mouth once a day  -- Indication: For bph    NovoLOG FlexPen 100 units/mL injectable solution  -- 15 unit(s) injectable 3 times a day (before meals)   -- Indication: For Type 2 diabetes mellitus without complication, with long-term current use of insulin    atorvastatin 20 mg oral tablet  -- 1 tab(s) by mouth once a day  -- Indication: For Diabetes mellitus, type 2    Metoprolol Tartrate 25 mg oral tablet  -- orally once a day  -- Indication: For Hypertension, unspecified type    Foradil Aerolizer 12 mcg inhalation capsule  -- Indication: For COPD EXACERBATION;HYPOXIA    ProAir HFA  -- 2 inhaler(s) inhaled every 4 hours, As Needed  -- Indication: For COPD EXACERBATION;HYPOXIA    Stiolto Respimat 2.5 mcg-2.5 mcg/inh inhalation aerosol  -- 2 puff(s) inhaled once a day   -- Check with your doctor before becoming pregnant.  For inhalation only.  May cause drowsiness or dizziness.  Obtain medical advice before taking any non-prescription drugs as some may affect the action of this medication.  This drug may impair the ability to drive or operate machinery.  Use care until you become familiar with its effects.    -- Indication: For COPD EXACERBATION;HYPOXIA    ipratropium-albuterol 0.5 mg-2.5 mg/3 mLinhalation solution  -- 3 milliliter(s) inhaled every 4 hours  -- Indication: For COPD EXACERBATION;HYPOXIA    amLODIPine 10 mg oral tablet  -- 1 tab(s) by mouth once a day  -- Indication: For Hypertension, unspecified type    sodium polystyrene sulfonate 15 g/60 mL oral suspension  -- Indication: For Hypertension, unspecified type    Cinnamon  -- 1000 milligram(s) orally  -- Indication: For Hypertension, unspecified type    Flax Seed Oil oral capsule  -- 1000 milligram(s) orally  -- Indication: For Hypertension, unspecified type    polyethylene glycol 3350 oral powder for reconstitution  -- Indication: For Constipation    docusate sodium 100 mg oral tablet  -- Indication: For Constipation    mometasone 50 mcg/inh nasal spray  -- 1 spray(s) into nose 2 times a day  -- Indication: For COPD EXACERBATION;HYPOXIA    Artificial Tears  -- Indication: For COPD EXACERBATION;HYPOXIA    omeprazole 20 mg oral delayed release capsule  -- 1 cap(s) by mouth 2 times a day  -- Indication: For Obesity (BMI 30-39.9)    Centrum oral tablet  -- 1 tab(s) by mouth once a day  -- Indication: For COPD EXACERBATION;HYPOXIA    Vitamin B-12  -- 3000 milligram(s) orally  -- Indication: For Obesity (BMI 30-39.9)    Vitamin C 1000 mg oral tablet  -- Indication: For Obesity (BMI 30-39.9)    Vitamin D3 1000 intl units oral tablet  -- 1 tab(s) by mouth once a day  -- Indication: For Obesity (BMI 30-39.9)

## 2018-07-26 NOTE — DISCHARGE NOTE ADULT - CARE PROVIDER_API CALL
Rai Schneider), Medicine  [address] [place] [address]  Phone: [phone]  Fax: [phone]    Mckay Reddy), Geriatric Medicine; Internal Medicine  [address] [place] [address]  Phone: [phone]  Fax: [phone]

## 2018-07-26 NOTE — DISCHARGE NOTE ADULT - HOSPITAL COURSE
***patient seen and examined at bedside. Spent over 40mins d/c planning, d/c papers and med rec ***  Vital Signs Last 24 Hrs  T(C): 36.3 (26 Jul 2018 06:02), Max: 36.8 (25 Jul 2018 21:30)  T(F): 97.3 (26 Jul 2018 06:02), Max: 98.3 (25 Jul 2018 21:30)  HR: 85 (26 Jul 2018 06:02) (85 - 110)  BP: 184/94 (26 Jul 2018 06:02) (144/76 - 184/94)  RR: 16 (26 Jul 2018 06:02) (16 - 18)    Assessment and Plan:    Problem/Plan - 1:  ·  Problem: COPD exacerbation.  Plan: -acute on chronic Copd exacerbation  -continue with IV steroids (tapering)  -switch to oral abx upon discharge (omnicef not available inpatient)  -Pulmonary follow up with Dr. Schneider on the outside  -r/o TB: ppd negative, Quantiferon Pending, third sputum sample -ve for AFB.     Problem/Plan - 2:  ·  Problem: Oxygen dependent.  Plan: -on Home O2.      Problem/Plan - 3:  ·  Problem: Hypertension, unspecified type.  Plan: -home meds.      Problem/Plan - 4:  ·  Problem: Diabetes mellitus, type 2.  Plan: -adjust insulin coverage.      Problem/Plan - 5:  ·  Problem: Obesity (BMI 30-39.9).  Plan: -weight loss and diet modification.      Problem/Plan - 6:  Problem: SIRS (systemic inflammatory response syndrome). Plan: -leukocytosis, afebrile  -improved clinically.     Problem/Plan - 7:  ·  Problem: Constipation.  Plan: -stool softeners.      Problem/Plan - 8:  ·  Problem: JAMES (acute kidney injury).  Plan: -pre-renal (improved)  -likely dehydration  -suspected CKD stage III; need outpatient nephro follow up and kidney monitoring.      Problem/Plan - 9:  ·  Problem: Hyperkalemia.  Plan: -recheck  -kayaxelate. ***patient seen and examined at bedside. Spent over 40mins d/c planning, d/c papers and med rec ***  Vital Signs Last 24 Hrs  T(C): 36.3 (26 Jul 2018 06:02), Max: 36.8 (25 Jul 2018 21:30)  T(F): 97.3 (26 Jul 2018 06:02), Max: 98.3 (25 Jul 2018 21:30)  HR: 85 (26 Jul 2018 06:02) (85 - 110)  BP: 184/94 (26 Jul 2018 06:02) (144/76 - 184/94)  RR: 16 (26 Jul 2018 06:02) (16 - 18)    Assessment and Plan:    Problem/Plan - 1:  ·  Problem: COPD exacerbation.  Plan: -acute on chronic Copd exacerbation  -continue with prednisone 10 mg daily  -finished antibiotic course as inpatient   -Pulmonary follow up with Dr. Schneider on the outside this week       Problem/Plan - 2:  ·  Problem: Oxygen dependent.  Plan: -on Home O2.      Problem/Plan - 3:  ·  Problem: Hypertension, unspecified type.  Plan: -home meds.      Problem/Plan - 4:  ·  Problem: Diabetes mellitus, type 2.  Plan: -adjust insulin coverage.      Problem/Plan - 5:  ·  Problem: Obesity (BMI 30-39.9).  Plan: -weight loss and diet modification.      Problem/Plan - 6:  Problem: SIRS (systemic inflammatory response syndrome). Plan: -leukocytosis, afebrile  -improved clinically.     Problem/Plan - 7:  ·  Problem: Constipation.  Plan: -stool softeners.      Problem/Plan - 8:  ·  Problem: JAMES (acute kidney injury).  Plan: -pre-renal (improved)  -likely dehydration  -suspected CKD stage III; need outpatient nephro follow up and kidney monitoring.

## 2018-07-26 NOTE — PROGRESS NOTE ADULT - PROBLEM SELECTOR PLAN 1
-acute on chronic Copd exacerbation  -continue with IV steroids (tapering)  -switch to oral abx  -Pulmonary follow up with Dr. Schneider on the outside  -r/o TB: ppd negative, Quantiferon Pending, third sputum sample -ve for AFB -acute on chronic Copd exacerbation  -continue with IV steroids (tapering)  -switch to oral abx upon discharge (omnicef not available inpatient)  -Pulmonary follow up with Dr. Schneider on the outside  -r/o TB: ppd negative, Quantiferon Pending, third sputum sample -ve for AFB

## 2018-07-26 NOTE — DISCHARGE NOTE ADULT - PATIENT PORTAL LINK FT
You can access the [facility] Patient Portal, offered by [facility], by registering with the following website: [email or web address]

## 2018-07-26 NOTE — DISCHARGE NOTE ADULT - CARE PROVIDERS DIRECT ADDRESSES
[email or web address]

## 2018-07-26 NOTE — PROGRESS NOTE ADULT - SUBJECTIVE AND OBJECTIVE BOX
JIAN MANCIA  77y  Male      Patient is a 77y old  Male who presents with a chief complaint of SOB (21 Jul 2018 00:19)      INTERVAL HPI/OVERNIGHT EVENTS:  patient admitted for copd exac.   doing better daily  sitting up in chair   ppd negative, sputum -ve for AFB, await quantiferon and d/c airborne precautions once resulted    Vital Signs Last 24 Hrs  T(C): 36.3 (26 Jul 2018 06:02), Max: 36.8 (25 Jul 2018 21:30)  T(F): 97.3 (26 Jul 2018 06:02), Max: 98.3 (25 Jul 2018 21:30)  HR: 85 (26 Jul 2018 06:02) (85 - 110)  BP: 184/94 (26 Jul 2018 06:02) (137/69 - 184/94)  RR: 16 (26 Jul 2018 06:02) (16 - 18)      PHYSICAL EXAM:  GENERAL: NAD, well-groomed, well-developed  HEAD:  Atraumatic, Normocephalic  EYES: EOMI, PERRLA, conjunctiva and sclera clear  NERVOUS SYSTEM:  Alert & Oriented X 4, Good concentration; Motor Strength 5/5 B/L upper and lower extremities; DTRs 2+ intact and symmetric  CHEST/LUNG: better airway entry today, decreased BS Right >L  HEART: Regular rate and rhythm; No murmurs, rubs, or gallops  ABDOMEN: Soft, obese abdomen  EXTREMITIES:  2+ Peripheral Pulses, No clubbing, cyanosis, or edema  SKIN: No rashes or lesions    LAB:               13.6   14.53 )-----------( 543      ( 25 Jul 2018 10:40 )             44.8                 07-25    137  |  96<L>  |  46<H>  ----------------------------<  441<H>  5.6<H>   |  27  |  1.5    Ca    9.1      25 Jul 2018 10:40      Legionella Antigen, Urine: Negative (07.24.18 @ 07:46)    Acid Fast Bacilli Smear:   No acid fast bacilli seen by fluorochrome stain (07.23.18 @ 13:53)    Acid Fast Bacilli Smear:   No acid fast bacilli seen by fluorochrome stain (07.22.18 @ 15:05)    RADIOLOGY:    Imaging Personally Reviewed:  [ Y ] YES  [ ] NO    Xray Chest 1 View AP/PA (07.23.18 @ 11:23)   Impression:      No significant change since 3 days earlier.     Stable right midlung cavitary lesion and mild vascular congestion.      HEALTH ISSUES - PROBLEM Dx:  Cavitary lung disease: Cavitary lung disease  Hyperkalemia: Hyperkalemia  Hypertension, unspecified type: Hypertension, unspecified type  Type 2 diabetes mellitus without complication, with long-term current use of insulin: Type 2 diabetes mellitus without complication, with long-term current use of insulin  Hypoxia: Hypoxia  COPD exacerbation: COPD exacerbation    MEDICATIONS  (STANDING):  ALBUTerol/ipratropium for Nebulization 3 milliLiter(s) Nebulizer every 4 hours  amLODIPine   Tablet 10 milliGRAM(s) Oral daily  ascorbic acid 1000 milliGRAM(s) Oral daily  atorvastatin 20 milliGRAM(s) Oral at bedtime  buDESOnide 160 MICROgram(s)/formoterol 4.5 MICROgram(s) Inhaler 2 Puff(s) Inhalation two times a day  cefTRIAXone   IVPB 1 Gram(s) IV Intermittent every 24 hours  cefTRIAXone   IVPB      cholecalciferol 1000 Unit(s) Oral daily  cyanocobalamin 1000 MICROGram(s) Oral daily  dextrose 5%. 1000 milliLiter(s) (50 mL/Hr) IV Continuous <Continuous>  dextrose 50% Injectable 12.5 Gram(s) IV Push once  dextrose 50% Injectable 25 Gram(s) IV Push once  dextrose 50% Injectable 25 Gram(s) IV Push once  docusate sodium 200 milliGRAM(s) Oral every 12 hours  finasteride 5 milliGRAM(s) Oral daily  heparin  Injectable 5000 Unit(s) SubCutaneous every 12 hours  insulin glargine Injectable (LANTUS) 36 Unit(s) SubCutaneous every morning  insulin lispro Injectable (HumaLOG) 16 Unit(s) SubCutaneous before breakfast  insulin lispro Injectable (HumaLOG) 15 Unit(s) SubCutaneous before lunch  insulin lispro Injectable (HumaLOG) 13 Unit(s) SubCutaneous before dinner  methylPREDNISolone sodium succinate Injectable 40 milliGRAM(s) IV Push every 12 hours  metoprolol tartrate 25 milliGRAM(s) Oral daily  pantoprazole    Tablet 40 milliGRAM(s) Oral before breakfast  polyethylene glycol 3350 17 Gram(s) Oral daily  sodium polystyrene sulfonate Suspension 15 Gram(s) Oral at bedtime  tamsulosin 0.4 milliGRAM(s) Oral at bedtime    MEDICATIONS  (PRN):  ALBUTerol    90 MICROgram(s) HFA Inhaler 2 Puff(s) Inhalation every 4 hours PRN rescue inhaler  artificial  tears Solution 1 Drop(s) Both EYES every 4 hours PRN Dry Eyes  benzocaine 15 mG/menthol 3.6 mG Lozenge 1 Lozenge Oral five times a day PRN Sore Throat  dextrose 40% Gel 15 Gram(s) Oral once PRN Blood Glucose LESS THAN 70 milliGRAM(s)/deciliter  glucagon  Injectable 1 milliGRAM(s) IntraMuscular once PRN Glucose LESS THAN 70 milligrams/deciliter

## 2018-07-27 LAB
ANION GAP SERPL CALC-SCNC: 13 MMOL/L — SIGNIFICANT CHANGE UP (ref 7–14)
BUN SERPL-MCNC: 44 MG/DL — HIGH (ref 10–20)
CALCIUM SERPL-MCNC: 9.1 MG/DL — SIGNIFICANT CHANGE UP (ref 8.5–10.1)
CHLORIDE SERPL-SCNC: 98 MMOL/L — SIGNIFICANT CHANGE UP (ref 98–110)
CO2 SERPL-SCNC: 30 MMOL/L — SIGNIFICANT CHANGE UP (ref 17–32)
CREAT SERPL-MCNC: 1.3 MG/DL — SIGNIFICANT CHANGE UP (ref 0.7–1.5)
GLUCOSE SERPL-MCNC: 166 MG/DL — HIGH (ref 70–99)
HCT VFR BLD CALC: 47.1 % — SIGNIFICANT CHANGE UP (ref 42–52)
HGB BLD-MCNC: 14.3 G/DL — SIGNIFICANT CHANGE UP (ref 14–18)
MCHC RBC-ENTMCNC: 23.8 PG — LOW (ref 27–31)
MCHC RBC-ENTMCNC: 30.4 G/DL — LOW (ref 32–37)
MCV RBC AUTO: 78.5 FL — LOW (ref 80–94)
NRBC # BLD: 0 /100 WBCS — SIGNIFICANT CHANGE UP (ref 0–0)
PLATELET # BLD AUTO: 540 K/UL — HIGH (ref 130–400)
POTASSIUM SERPL-MCNC: 4.9 MMOL/L — SIGNIFICANT CHANGE UP (ref 3.5–5)
POTASSIUM SERPL-SCNC: 4.9 MMOL/L — SIGNIFICANT CHANGE UP (ref 3.5–5)
RBC # BLD: 6 M/UL — SIGNIFICANT CHANGE UP (ref 4.7–6.1)
RBC # FLD: 18 % — HIGH (ref 11.5–14.5)
SODIUM SERPL-SCNC: 141 MMOL/L — SIGNIFICANT CHANGE UP (ref 135–146)
WBC # BLD: 16.73 K/UL — HIGH (ref 4.8–10.8)
WBC # FLD AUTO: 16.73 K/UL — HIGH (ref 4.8–10.8)

## 2018-07-27 RX ADMIN — Medication 60 MILLIGRAM(S): at 17:29

## 2018-07-27 RX ADMIN — BENZOCAINE AND MENTHOL 1 LOZENGE: 5; 1 LIQUID ORAL at 12:53

## 2018-07-27 RX ADMIN — Medication 13 UNIT(S): at 17:25

## 2018-07-27 RX ADMIN — Medication 3 MILLILITER(S): at 19:45

## 2018-07-27 RX ADMIN — Medication 200 MILLIGRAM(S): at 05:56

## 2018-07-27 RX ADMIN — Medication 200 MILLIGRAM(S): at 17:29

## 2018-07-27 RX ADMIN — TAMSULOSIN HYDROCHLORIDE 0.4 MILLIGRAM(S): 0.4 CAPSULE ORAL at 21:48

## 2018-07-27 RX ADMIN — POLYETHYLENE GLYCOL 3350 17 GRAM(S): 17 POWDER, FOR SOLUTION ORAL at 07:38

## 2018-07-27 RX ADMIN — Medication 3 MILLILITER(S): at 07:36

## 2018-07-27 RX ADMIN — BUDESONIDE AND FORMOTEROL FUMARATE DIHYDRATE 2 PUFF(S): 160; 4.5 AEROSOL RESPIRATORY (INHALATION) at 07:38

## 2018-07-27 RX ADMIN — PREGABALIN 1000 MICROGRAM(S): 225 CAPSULE ORAL at 12:53

## 2018-07-27 RX ADMIN — FINASTERIDE 5 MILLIGRAM(S): 5 TABLET, FILM COATED ORAL at 12:52

## 2018-07-27 RX ADMIN — HEPARIN SODIUM 5000 UNIT(S): 5000 INJECTION INTRAVENOUS; SUBCUTANEOUS at 05:56

## 2018-07-27 RX ADMIN — Medication 1000 MILLIGRAM(S): at 12:53

## 2018-07-27 RX ADMIN — Medication 1000 UNIT(S): at 12:53

## 2018-07-27 RX ADMIN — Medication 15 UNIT(S): at 12:10

## 2018-07-27 RX ADMIN — ATORVASTATIN CALCIUM 20 MILLIGRAM(S): 80 TABLET, FILM COATED ORAL at 21:48

## 2018-07-27 RX ADMIN — SODIUM POLYSTYRENE SULFONATE 15 GRAM(S): 4.1 POWDER, FOR SUSPENSION ORAL at 21:48

## 2018-07-27 RX ADMIN — PANTOPRAZOLE SODIUM 40 MILLIGRAM(S): 20 TABLET, DELAYED RELEASE ORAL at 05:58

## 2018-07-27 RX ADMIN — BUDESONIDE AND FORMOTEROL FUMARATE DIHYDRATE 2 PUFF(S): 160; 4.5 AEROSOL RESPIRATORY (INHALATION) at 20:14

## 2018-07-27 RX ADMIN — Medication 3 MILLILITER(S): at 15:38

## 2018-07-27 RX ADMIN — Medication 40 MILLIGRAM(S): at 05:57

## 2018-07-27 RX ADMIN — Medication 16 UNIT(S): at 07:38

## 2018-07-27 RX ADMIN — Medication 3 MILLILITER(S): at 12:38

## 2018-07-27 RX ADMIN — Medication 25 MILLIGRAM(S): at 05:56

## 2018-07-27 RX ADMIN — AMLODIPINE BESYLATE 10 MILLIGRAM(S): 2.5 TABLET ORAL at 05:56

## 2018-07-27 RX ADMIN — Medication 3 MILLILITER(S): at 23:27

## 2018-07-27 RX ADMIN — INSULIN GLARGINE 36 UNIT(S): 100 INJECTION, SOLUTION SUBCUTANEOUS at 07:37

## 2018-07-27 NOTE — PROGRESS NOTE ADULT - PROBLEM SELECTOR PLAN 6
New since xray 1 month ago. No recent travel. Low risk for TB. No weight loss. F/U Pulm and ID  More likely bullae.
-leukocytosis, afebrile  -improved clinically
-will follow labs
-will follow labs
New since xray 1 month ago. Low risk for TB. No weight loss.   F/U Pulm and ID  More likely bullae based on how acutely appearing on imaging  Non-productive cough

## 2018-07-27 NOTE — DIETITIAN INITIAL EVALUATION ADULT. - PERTINENT MEDS FT
heparin, prednisone, colace, amlodipine, vit C, atorvastatin, cholecalciferol, cyanocobalamin, insulin glargine, insulin lispro, metoprolol, protonix, miralax

## 2018-07-27 NOTE — PROGRESS NOTE ADULT - SUBJECTIVE AND OBJECTIVE BOX
Patient is a 77y old  Male who presents with a chief complaint of SOB (21 Jul 2018 00:19)      INTERVAL HPI/OVERNIGHT EVENTS:  patient admitted for copd exac.   doing better daily  sitting up in chair   ppd negative, sputum -ve for AFB,  quantiferon negative , off  airborne precautions ( as per ID)     Vital Signs Last 24 Hrs  T(C): 36.1 (27 Jul 2018 14:36), Max: 36.7 (27 Jul 2018 05:48)  T(F): 97 (27 Jul 2018 14:36), Max: 98 (27 Jul 2018 05:48)  HR: 90 (27 Jul 2018 14:36) (85 - 109)  BP: 111/66 (27 Jul 2018 14:36) (111/66 - 157/83)  RR: 16 (27 Jul 2018 14:36) (16 - 20)  SpO2: 94% (27 Jul 2018 06:59) (91% - 94%)      PHYSICAL EXAM:  GENERAL: NAD, well-groomed, well-developed  HEAD:  Atraumatic, Normocephalic  EYES: EOMI, PERRLA, conjunctiva and sclera clear  NERVOUS SYSTEM:  Alert & Oriented X 4, Good concentration; Motor Strength 5/5 B/L upper and lower extremities; DTRs 2+ intact and symmetric  CHEST/LUNG: better airway entry today, decreased BS Right >L  HEART: Regular rate and rhythm; No murmurs, rubs, or gallops  ABDOMEN: Soft, obese abdomen  EXTREMITIES:  2+ Peripheral Pulses, No clubbing, cyanosis, or edema  SKIN: No rashes or lesions    LAB:                          14.3   16.73 )-----------( 540      ( 27 Jul 2018 08:49 )             47.1   07-27    141  |  98  |  44<H>  ----------------------------<  166<H>  4.9   |  30  |  1.3    Ca    9.1      27 Jul 2018 08:49    Quantiferon Plus TB (07.24.18 @ 16:10)    Quantiferon TB Plus: INDETERMINATE The magnitude of the measured IFN gamma level cannot be correlated to  stage or degree of Infection, level of immune responsiveness, or  likelihood for progression to active disease.  Negative means no interferon-gamma response to M. tuberculosis antigens  was detected. Infection with M. tuberculosis is NOT likely.  Positive means interferon-gamma response to M. tuberculosis antigens was  detected suggesting infection with M. tuberculosis. False positive  results may occur in patients with prior infection with M. marinum, M.  szulgai or M. kansasil.  Indeterminate means likelihood for M. tuberculosis infection is uncertain.  The results of this test cannot be used alone to make a diagnosis of  active or latent tuberculosis infection. To make either diagnosis, the  patient’s entire medical presentation must be considered. For further  information on the interpretation of this test refer to  (http://www.cdc.gov/nchstp/tb/).    Quantiferon TB Plus Nil: 0.01 IU/mL    Quantiferon TB Plus TB1 minus Nil: 0.00 IU/mL    Quantiferon TB Plus TB2 minus Nil: 0.00 IU/mL    Quant TB Plus Mitogen minus Nil: 0.09 IU/mL          Legionella Antigen, Urine: Negative (07.24.18 @ 07:46)    Acid Fast Bacilli Smear:   No acid fast bacilli seen by fluorochrome stain (07.23.18 @ 13:53)    Acid Fast Bacilli Smear:   No acid fast bacilli seen by fluorochrome stain (07.22.18 @ 15:05)    RADIOLOGY:  Xray Chest 1 View AP/PA (07.23.18 @ 11:23)   Impression:      No significant change since 3 days earlier.     Stable right midlung cavitary lesion and mild vascular congestion.        HEALTH ISSUES - PROBLEM Dx:  Cavitary lung disease: Cavitary lung disease  Hyperkalemia: Hyperkalemia  Hypertension, unspecified type: Hypertension, unspecified type  Type 2 diabetes mellitus without complication, with long-term current use of insulin: Type 2 diabetes mellitus without complication, with long-term current use of insulin  Hypoxia: Hypoxia  COPD exacerbation: COPD exacerbation    MEDICATIONS  (STANDING):  ALBUTerol/ipratropium for Nebulization 3 milliLiter(s) Nebulizer every 4 hours  amLODIPine   Tablet 10 milliGRAM(s) Oral daily  ascorbic acid 1000 milliGRAM(s) Oral daily  atorvastatin 20 milliGRAM(s) Oral at bedtime  buDESOnide 160 MICROgram(s)/formoterol 4.5 MICROgram(s) Inhaler 2 Puff(s) Inhalation two times a day  cefTRIAXone   IVPB 1 Gram(s) IV Intermittent every 24 hours  cefTRIAXone   IVPB      cholecalciferol 1000 Unit(s) Oral daily  cyanocobalamin 1000 MICROGram(s) Oral daily  dextrose 5%. 1000 milliLiter(s) (50 mL/Hr) IV Continuous <Continuous>  dextrose 50% Injectable 12.5 Gram(s) IV Push once  dextrose 50% Injectable 25 Gram(s) IV Push once  dextrose 50% Injectable 25 Gram(s) IV Push once  docusate sodium 200 milliGRAM(s) Oral every 12 hours  finasteride 5 milliGRAM(s) Oral daily  heparin  Injectable 5000 Unit(s) SubCutaneous every 12 hours  insulin glargine Injectable (LANTUS) 36 Unit(s) SubCutaneous every morning  insulin lispro Injectable (HumaLOG) 16 Unit(s) SubCutaneous before breakfast  insulin lispro Injectable (HumaLOG) 15 Unit(s) SubCutaneous before lunch  insulin lispro Injectable (HumaLOG) 13 Unit(s) SubCutaneous before dinner  methylPREDNISolone sodium succinate Injectable 40 milliGRAM(s) IV Push every 12 hours  metoprolol tartrate 25 milliGRAM(s) Oral daily  pantoprazole    Tablet 40 milliGRAM(s) Oral before breakfast  polyethylene glycol 3350 17 Gram(s) Oral daily  sodium polystyrene sulfonate Suspension 15 Gram(s) Oral at bedtime  tamsulosin 0.4 milliGRAM(s) Oral at bedtime    MEDICATIONS  (PRN):  ALBUTerol    90 MICROgram(s) HFA Inhaler 2 Puff(s) Inhalation every 4 hours PRN rescue inhaler  artificial  tears Solution 1 Drop(s) Both EYES every 4 hours PRN Dry Eyes  benzocaine 15 mG/menthol 3.6 mG Lozenge 1 Lozenge Oral five times a day PRN Sore Throat  dextrose 40% Gel 15 Gram(s) Oral once PRN Blood Glucose LESS THAN 70 milliGRAM(s)/deciliter  glucagon  Injectable 1 milliGRAM(s) IntraMuscular once PRN Glucose LESS THAN 70 milligrams/deciliter

## 2018-07-27 NOTE — DIETITIAN INITIAL EVALUATION ADULT. - OTHER INFO
Reason for RD assessment: LOS assessment. Pertinent Medical Information: p/w SOB. Acute on chronic COPD exacerbation noted. JAMES noted - pre-renal (improved). Noted likely 2/2 dehydration. Constipation noted - on stool softeners.

## 2018-07-27 NOTE — PROGRESS NOTE ADULT - PROBLEM SELECTOR PROBLEM 6
Cavitary lung disease
SIRS (systemic inflammatory response syndrome)
SIRS (systemic inflammatory response syndrome)
Cavitary lung disease
SIRS (systemic inflammatory response syndrome)

## 2018-07-27 NOTE — PROGRESS NOTE ADULT - PROBLEM SELECTOR PLAN 5
daily kayexalate from home
-weight loss and diet modification
daily kayexalate from home

## 2018-07-27 NOTE — DIETITIAN INITIAL EVALUATION ADULT. - PHYSICAL APPEARANCE
BMI: 39.9. Alert & oriented. c/o constipation, no BM this admit reported. No chewing/swallowing difficulty reported. Skin: noted WDL

## 2018-07-27 NOTE — PROGRESS NOTE ADULT - PROBLEM SELECTOR PROBLEM 2
Oxygen dependent
Hypoxia
Oxygen dependent
Hypoxia
Oxygen dependent

## 2018-07-27 NOTE — PROGRESS NOTE ADULT - PROBLEM SELECTOR PROBLEM 4
Diabetes mellitus, type 2
Hypertension, unspecified type
Hypertension, unspecified type

## 2018-07-27 NOTE — PROGRESS NOTE ADULT - PROBLEM SELECTOR PROBLEM 3
Hypertension, unspecified type
Type 2 diabetes mellitus without complication, with long-term current use of insulin
Type 2 diabetes mellitus without complication, with long-term current use of insulin

## 2018-07-27 NOTE — PROGRESS NOTE ADULT - PROBLEM SELECTOR PLAN 4
-adjust insulin coverage
controlled
controlled

## 2018-07-27 NOTE — PROGRESS NOTE ADULT - PROBLEM SELECTOR PLAN 1
-acute on chronic Copd exacerbation  -start po  steroids   -switch to oral abx upon discharge (Omnicef not available inpatient)  -Pulmonary follow up with Dr. Schneider on the outside  - TB ruled out,  ppd negative, QuantiFeron negative, third sputum sample -ve for AFB

## 2018-07-27 NOTE — DIETITIAN INITIAL EVALUATION ADULT. - INDICATOR
Energy intake, glucose profile, renal profile, nutrition focused physical findings, body composition

## 2018-07-27 NOTE — DIETITIAN INITIAL EVALUATION ADULT. - SOURCE
Low sugar, low salt diet. Shrimp allergy. No food preferences. No known history of unintentional wt loss. No supplements. Good appetite PTP.

## 2018-07-27 NOTE — DIETITIAN INITIAL EVALUATION ADULT. - DIET TYPE
low fat/renal replacement pts:no protein restr,no conc K & phos, low sodium/Good appetite & po intake reported; pt consuming 100% of meals at this time

## 2018-07-27 NOTE — DIETITIAN INITIAL EVALUATION ADULT. - ENERGY NEEDS
Energy: 5997-6382 kcal/day (MSJx1-1.1 AF)    Protein: 70-84 g/day (1-1.2  g/kg IBW)    Fluids: 1 mL/kcal or per LIP

## 2018-07-27 NOTE — PROGRESS NOTE ADULT - ASSESSMENT
76 yo male with extensive PMH which includes COPD, MICHAEL and diabetes presents to the ER due to shortness of breath worsening over a few days. Denies fevers, chills, or change in usual cough. Notes that he stopped using his home CPAP because that is what he does when he has a COPD exacerbation. TB was ruled out , pt is off isolation now, clinically improving.   Will change steroids to po today and anticipate discharge in 24- 48 hours.

## 2018-07-27 NOTE — PROGRESS NOTE ADULT - PROBLEM SELECTOR PROBLEM 1
COPD exacerbation

## 2018-07-27 NOTE — PROGRESS NOTE ADULT - PROBLEM SELECTOR PLAN 8
-pre-renal  -likely dehydration  -suspected CKD stage III; need outpatient nephro follow up and kidney monitoring
-pre-renal (improved)  -likely dehydration  -suspected CKD stage III; need outpatient nephro follow up and kidney monitoring
-pre-renal (improved)  -likely dehydration  -suspected CKD stage III; need outpatient nephro follow up and kidney monitoring
-pre-renal  -likely dehydration

## 2018-07-27 NOTE — PROGRESS NOTE ADULT - PROBLEM SELECTOR PROBLEM 5
Obesity (BMI 30-39.9)
Obesity (BMI 30-39.9)
Hyperkalemia
Obesity (BMI 30-39.9)
Hyperkalemia

## 2018-07-27 NOTE — PROGRESS NOTE ADULT - PROBLEM SELECTOR PROBLEM 8
JAMES (acute kidney injury)

## 2018-07-28 LAB
ALBUMIN SERPL ELPH-MCNC: 3.5 G/DL — SIGNIFICANT CHANGE UP (ref 3.5–5.2)
ALP SERPL-CCNC: 56 U/L — SIGNIFICANT CHANGE UP (ref 30–115)
ALT FLD-CCNC: 15 U/L — SIGNIFICANT CHANGE UP (ref 0–41)
ANION GAP SERPL CALC-SCNC: 12 MMOL/L — SIGNIFICANT CHANGE UP (ref 7–14)
AST SERPL-CCNC: 8 U/L — SIGNIFICANT CHANGE UP (ref 0–41)
BILIRUB SERPL-MCNC: 0.3 MG/DL — SIGNIFICANT CHANGE UP (ref 0.2–1.2)
BUN SERPL-MCNC: 47 MG/DL — HIGH (ref 10–20)
CALCIUM SERPL-MCNC: 8.8 MG/DL — SIGNIFICANT CHANGE UP (ref 8.5–10.1)
CHLORIDE SERPL-SCNC: 97 MMOL/L — LOW (ref 98–110)
CO2 SERPL-SCNC: 28 MMOL/L — SIGNIFICANT CHANGE UP (ref 17–32)
CREAT SERPL-MCNC: 1.5 MG/DL — SIGNIFICANT CHANGE UP (ref 0.7–1.5)
GLUCOSE SERPL-MCNC: 410 MG/DL — HIGH (ref 70–99)
HCT VFR BLD CALC: 43.2 % — SIGNIFICANT CHANGE UP (ref 42–52)
HGB BLD-MCNC: 13.2 G/DL — LOW (ref 14–18)
MAGNESIUM SERPL-MCNC: 1.9 MG/DL — SIGNIFICANT CHANGE UP (ref 1.8–2.4)
MCHC RBC-ENTMCNC: 23.7 PG — LOW (ref 27–31)
MCHC RBC-ENTMCNC: 30.6 G/DL — LOW (ref 32–37)
MCV RBC AUTO: 77.7 FL — LOW (ref 80–94)
NRBC # BLD: 0 /100 WBCS — SIGNIFICANT CHANGE UP (ref 0–0)
PLATELET # BLD AUTO: 512 K/UL — HIGH (ref 130–400)
POTASSIUM SERPL-MCNC: 5 MMOL/L — SIGNIFICANT CHANGE UP (ref 3.5–5)
POTASSIUM SERPL-SCNC: 5 MMOL/L — SIGNIFICANT CHANGE UP (ref 3.5–5)
PROT SERPL-MCNC: 5.9 G/DL — LOW (ref 6–8)
RBC # BLD: 5.56 M/UL — SIGNIFICANT CHANGE UP (ref 4.7–6.1)
RBC # FLD: 17.3 % — HIGH (ref 11.5–14.5)
SODIUM SERPL-SCNC: 137 MMOL/L — SIGNIFICANT CHANGE UP (ref 135–146)
WBC # BLD: 15.55 K/UL — HIGH (ref 4.8–10.8)
WBC # FLD AUTO: 15.55 K/UL — HIGH (ref 4.8–10.8)

## 2018-07-28 RX ORDER — INSULIN LISPRO 100/ML
VIAL (ML) SUBCUTANEOUS
Qty: 0 | Refills: 0 | Status: DISCONTINUED | OUTPATIENT
Start: 2018-07-28 | End: 2018-07-29

## 2018-07-28 RX ADMIN — HEPARIN SODIUM 5000 UNIT(S): 5000 INJECTION INTRAVENOUS; SUBCUTANEOUS at 17:21

## 2018-07-28 RX ADMIN — Medication 200 MILLIGRAM(S): at 06:14

## 2018-07-28 RX ADMIN — ATORVASTATIN CALCIUM 20 MILLIGRAM(S): 80 TABLET, FILM COATED ORAL at 21:17

## 2018-07-28 RX ADMIN — BUDESONIDE AND FORMOTEROL FUMARATE DIHYDRATE 2 PUFF(S): 160; 4.5 AEROSOL RESPIRATORY (INHALATION) at 08:16

## 2018-07-28 RX ADMIN — AMLODIPINE BESYLATE 10 MILLIGRAM(S): 2.5 TABLET ORAL at 06:14

## 2018-07-28 RX ADMIN — PANTOPRAZOLE SODIUM 40 MILLIGRAM(S): 20 TABLET, DELAYED RELEASE ORAL at 06:15

## 2018-07-28 RX ADMIN — Medication 25 MILLIGRAM(S): at 06:15

## 2018-07-28 RX ADMIN — FINASTERIDE 5 MILLIGRAM(S): 5 TABLET, FILM COATED ORAL at 11:20

## 2018-07-28 RX ADMIN — Medication 3 MILLILITER(S): at 15:21

## 2018-07-28 RX ADMIN — INSULIN GLARGINE 36 UNIT(S): 100 INJECTION, SOLUTION SUBCUTANEOUS at 08:15

## 2018-07-28 RX ADMIN — Medication 16 UNIT(S): at 08:15

## 2018-07-28 RX ADMIN — Medication 3 MILLILITER(S): at 04:03

## 2018-07-28 RX ADMIN — POLYETHYLENE GLYCOL 3350 17 GRAM(S): 17 POWDER, FOR SOLUTION ORAL at 08:16

## 2018-07-28 RX ADMIN — SODIUM POLYSTYRENE SULFONATE 15 GRAM(S): 4.1 POWDER, FOR SUSPENSION ORAL at 21:17

## 2018-07-28 RX ADMIN — Medication 3 MILLILITER(S): at 19:22

## 2018-07-28 RX ADMIN — TAMSULOSIN HYDROCHLORIDE 0.4 MILLIGRAM(S): 0.4 CAPSULE ORAL at 21:17

## 2018-07-28 RX ADMIN — Medication 3 MILLILITER(S): at 07:20

## 2018-07-28 RX ADMIN — Medication 1000 UNIT(S): at 11:20

## 2018-07-28 RX ADMIN — HEPARIN SODIUM 5000 UNIT(S): 5000 INJECTION INTRAVENOUS; SUBCUTANEOUS at 06:15

## 2018-07-28 RX ADMIN — Medication 3 MILLILITER(S): at 12:41

## 2018-07-28 RX ADMIN — Medication 15 UNIT(S): at 11:54

## 2018-07-28 RX ADMIN — Medication 4: at 17:15

## 2018-07-28 RX ADMIN — PREGABALIN 1000 MICROGRAM(S): 225 CAPSULE ORAL at 11:20

## 2018-07-28 RX ADMIN — Medication 60 MILLIGRAM(S): at 06:28

## 2018-07-28 RX ADMIN — Medication 13 UNIT(S): at 17:15

## 2018-07-28 RX ADMIN — Medication 1000 MILLIGRAM(S): at 11:22

## 2018-07-28 RX ADMIN — Medication 100 MILLIGRAM(S): at 17:22

## 2018-07-28 RX ADMIN — CEFTRIAXONE 100 GRAM(S): 500 INJECTION, POWDER, FOR SOLUTION INTRAMUSCULAR; INTRAVENOUS at 14:46

## 2018-07-28 NOTE — PROGRESS NOTE ADULT - ASSESSMENT
78 yo male with extensive PMH which includes COPD, obesity, MICHAEL and diabetes, who presented to the ER due to shortness of breath worsening over a few days. Denies fevers, chills, or change in usual cough. Notes that he stopped using his home CPAP because that is what he does when he has a COPD exacerbation. sputum prelim negative for AFB x3, pt is off isolation now, clinically improving.     today he is hyperglycemic with fs > 500      # acute on chronic Copd exacerbation: slowly improved. back to his sual o2 needs 4L now  ---- cont nightime cpap, counseled adherence  - cont po  steroids   - cont ceftriaxone, change to cefdinir on DC  - outpatient f/u Pulmonary Dr. Schneider on the outside      # DM2: with hyperglycemia  - change to diabetic diet  - correction insulin scale added  - monitor fs tid ac hs 2am and adjust prn  - he is getting much less steroids today than yesterday, anticipate glucose will improve      # JAMES: prerenal on top of suspected CKD3.   - creatinine today is stable  - need outpatient nephro follow up and kidney monitoring.       # Hyperkalemia: stable  ---- ? qhs kayexalate      # Coordiantion of care:  - VTE ppx with HSQ  - plan of care reviewed with patient and RN 76 yo male with extensive PMH which includes COPD, obesity, MICHAEL and diabetes, who presented to the ER due to shortness of breath worsening over a few days. Denies fevers, chills, or change in usual cough. Notes that he stopped using his home CPAP because that is what he does when he has a COPD exacerbation. sputum prelim negative for AFB x3, pt is off isolation now, clinically improving.     today he is hyperglycemic with fs > 500      # acute on chronic Copd exacerbation: slowly improved. back to his sual o2 needs 4L now  - cont nightime cpap, counseled adherence, agrees  - cont po  steroids   - cont ceftriaxone, change to cefdinir on DC  - outpatient f/u Pulmonary Dr. Schneider on the outside  - need CM to arrange for new nebulizer at home      # DM2: with hyperglycemia  - change to diabetic diet  - correction insulin scale added  - monitor fs tid ac hs 2am and adjust prn  - he is getting much less steroids today than yesterday, anticipate glucose will improve      # JAMES: prerenal on top of suspected CKD3.   - creatinine today is stable  - need outpatient nephro follow up and kidney monitoring.       # Hyperkalemia: stable. cont his usual home meds of qhs kayxalate      # Coordiantion of care:  - VTE ppx with HSQ  - plan of care reviewed with patient and RN  - aptient would like to go home asa. today he is hyperglycemic. he also states would like to see DR Schneider before discharge and also needs a nebulizer newly set up at home. will refer to CM. advised patient neblizer needs setup [facility] cm and can hopefully occur over the weekend but may not be feasible

## 2018-07-28 NOTE — CHART NOTE - NSCHARTNOTEFT_GEN_A_CORE
Spoke with Dr Gallegos via phone - patient requested to see me    Consult was completed on July 22, 2018    Will be happy to do a follow up visit with patient    Thank you

## 2018-07-28 NOTE — PROGRESS NOTE ADULT - PROVIDER SPECIALTY LIST ADULT
Hospitalist
Infectious Disease
Hospitalist

## 2018-07-28 NOTE — PROGRESS NOTE ADULT - SUBJECTIVE AND OBJECTIVE BOX
CC: f/u COPD      ROS:        Vital Signs Last 24 Hrs  T(C): 36.9 (28 Jul 2018 11:40), Max: 36.9 (28 Jul 2018 11:40)  T(F): 98.5 (28 Jul 2018 11:40), Max: 98.5 (28 Jul 2018 11:40)  HR: 94 (28 Jul 2018 11:40) (94 - 107)  BP: 139/70 (28 Jul 2018 11:40) (137/76 - 171/95)  BP(mean): --  RR: 18 (28 Jul 2018 05:21) (18 - 20)  SpO2: 91% (27 Jul 2018 22:04) (91% - 91%)      PHYSICAL EXAM:  GENERAL: NAD, well-groomed, well-developed  HEAD = atraumatic, normoocehalic  EYES: conjunctiva and sclera clear  NECK: Supple, No JVD  NERVOUS SYSTEM:  Alert & Oriented X3, Good concentration  CHEST/LUNG: Clear to ausculatation bilaterally; No rales, rhonchi, wheezing, or rubs  HEART: Regular rate and rhythm; No murmurs, rubs, or gallops  ABDOMEN: Soft, Nontender, Nondistended; Bowel sounds present  EXTREMITIES:  BL No clubbing, cyanosis, or edema        DATA:      Culture - Acid Fast - Sputum w/Smear (collected 24 Jul 2018 14:45)  Source: .Sputum Sputum    Culture - Urine (collected 24 Jul 2018 07:48)  Source: .Urine Clean Catch (Midstream)  Final Report (25 Jul 2018 21:41):    50,000 - 99,000 CFU/mL Coag Negative Staphylococcus    Culture - Acid Fast - Sputum w/Smear (collected 23 Jul 2018 13:53)  Source: .Sputum Sputum    Culture - Acid Fast - Sputum w/Smear (collected 22 Jul 2018 15:05)  Source: .Sputum Sputum                            13.2   15.55 )-----------( 512      ( 28 Jul 2018 07:00 )             43.2     07-28    137  |  97<L>  |  47<H>  ----------------------------<  410<H>  5.0   |  28  |  1.5    Ca    8.8      28 Jul 2018 07:00  Mg     1.9     07-28    TPro  5.9<L>  /  Alb  3.5  /  TBili  0.3  /  DBili  x   /  AST  8   /  ALT  15  /  AlkPhos  56  07-28 CC: f/u COPD      ROS:  feels better  no pain and no sob  no nausea or fevers or chills      Vital Signs Last 24 Hrs  T(C): 36.9 (28 Jul 2018 11:40), Max: 36.9 (28 Jul 2018 11:40)  T(F): 98.5 (28 Jul 2018 11:40), Max: 98.5 (28 Jul 2018 11:40)  HR: 94 (28 Jul 2018 11:40) (94 - 107)  BP: 139/70 (28 Jul 2018 11:40) (137/76 - 171/95)  BP(mean): --  RR: 18 (28 Jul 2018 05:21) (18 - 20)  SpO2: 91% (27 Jul 2018 22:04) (91% - 91%)      PHYSICAL EXAM:  GENERAL: NAD, chrincially ill appaearing. + full snetences on nasal canula  HEAD = atraumatic, normoocehalic  EYES: conjunctiva and sclera clear  NECK: Supple, No JVD  NERVOUS SYSTEM:  Alert & Oriented X3, Good concentration  CHEST/LUNG: Clear to ausculatation bilaterally; Nowheezing  HEART: Regular rate and rhythm; No murmurs, rubs, or gallops  ABDOMEN: Soft, Nontender, Nondistended; Bowel sounds present  EXTREMITIES:  BL No clubbing, cyanosis, or edema        DATA:      Culture - Acid Fast - Sputum w/Smear (collected 24 Jul 2018 14:45)  Source: .Sputum Sputum    Culture - Urine (collected 24 Jul 2018 07:48)  Source: .Urine Clean Catch (Midstream)  Final Report (25 Jul 2018 21:41):    50,000 - 99,000 CFU/mL Coag Negative Staphylococcus    Culture - Acid Fast - Sputum w/Smear (collected 23 Jul 2018 13:53)  Source: .Sputum Sputum    Culture - Acid Fast - Sputum w/Smear (collected 22 Jul 2018 15:05)  Source: .Sputum Sputum                            13.2   15.55 )-----------( 512      ( 28 Jul 2018 07:00 )             43.2     07-28    137  |  97<L>  |  47<H>  ----------------------------<  410<H>  5.0   |  28  |  1.5    Ca    8.8      28 Jul 2018 07:00  Mg     1.9     07-28    TPro  5.9<L>  /  Alb  3.5  /  TBili  0.3  /  DBili  x   /  AST  8   /  ALT  15  /  AlkPhos  56  07-28

## 2018-07-29 VITALS
SYSTOLIC BLOOD PRESSURE: 155 MMHG | TEMPERATURE: 97 F | DIASTOLIC BLOOD PRESSURE: 86 MMHG | HEART RATE: 86 BPM | RESPIRATION RATE: 16 BRPM

## 2018-07-29 LAB
ANION GAP SERPL CALC-SCNC: 12 MMOL/L — SIGNIFICANT CHANGE UP (ref 7–14)
BUN SERPL-MCNC: 44 MG/DL — HIGH (ref 10–20)
CALCIUM SERPL-MCNC: 9.1 MG/DL — SIGNIFICANT CHANGE UP (ref 8.5–10.1)
CHLORIDE SERPL-SCNC: 99 MMOL/L — SIGNIFICANT CHANGE UP (ref 98–110)
CO2 SERPL-SCNC: 30 MMOL/L — SIGNIFICANT CHANGE UP (ref 17–32)
CREAT SERPL-MCNC: 1.3 MG/DL — SIGNIFICANT CHANGE UP (ref 0.7–1.5)
GLUCOSE SERPL-MCNC: 173 MG/DL — HIGH (ref 70–99)
HCT VFR BLD CALC: 44.2 % — SIGNIFICANT CHANGE UP (ref 42–52)
HGB BLD-MCNC: 13.7 G/DL — LOW (ref 14–18)
MAGNESIUM SERPL-MCNC: 1.9 MG/DL — SIGNIFICANT CHANGE UP (ref 1.8–2.4)
MCHC RBC-ENTMCNC: 24.2 PG — LOW (ref 27–31)
MCHC RBC-ENTMCNC: 31 G/DL — LOW (ref 32–37)
MCV RBC AUTO: 78.2 FL — LOW (ref 80–94)
NRBC # BLD: 0 /100 WBCS — SIGNIFICANT CHANGE UP (ref 0–0)
PLATELET # BLD AUTO: 532 K/UL — HIGH (ref 130–400)
POTASSIUM SERPL-MCNC: 4.3 MMOL/L — SIGNIFICANT CHANGE UP (ref 3.5–5)
POTASSIUM SERPL-SCNC: 4.3 MMOL/L — SIGNIFICANT CHANGE UP (ref 3.5–5)
RBC # BLD: 5.65 M/UL — SIGNIFICANT CHANGE UP (ref 4.7–6.1)
RBC # FLD: 17.7 % — HIGH (ref 11.5–14.5)
SODIUM SERPL-SCNC: 141 MMOL/L — SIGNIFICANT CHANGE UP (ref 135–146)
WBC # BLD: 17.48 K/UL — HIGH (ref 4.8–10.8)
WBC # FLD AUTO: 17.48 K/UL — HIGH (ref 4.8–10.8)

## 2018-07-29 RX ORDER — INSULIN DETEMIR 100/ML (3)
36 INSULIN PEN (ML) SUBCUTANEOUS
Qty: 0 | Refills: 0 | COMMUNITY

## 2018-07-29 RX ORDER — TIOTROPIUM BROMIDE AND OLODATEROL 3.124; 2.736 UG/1; UG/1
2 SPRAY, METERED RESPIRATORY (INHALATION)
Qty: 1 | Refills: 0 | OUTPATIENT
Start: 2018-07-29 | End: 2018-08-11

## 2018-07-29 RX ORDER — INSULIN ASPART 100 [IU]/ML
0 INJECTION, SOLUTION SUBCUTANEOUS
Qty: 0 | Refills: 0 | COMMUNITY

## 2018-07-29 RX ORDER — PANTOPRAZOLE SODIUM 20 MG/1
40 TABLET, DELAYED RELEASE ORAL ONCE
Qty: 0 | Refills: 0 | Status: COMPLETED | OUTPATIENT
Start: 2018-07-29 | End: 2018-07-29

## 2018-07-29 RX ORDER — TIOTROPIUM BROMIDE AND OLODATEROL 3.124; 2.736 UG/1; UG/1
0 SPRAY, METERED RESPIRATORY (INHALATION)
Qty: 0 | Refills: 0 | COMMUNITY

## 2018-07-29 RX ORDER — IPRATROPIUM/ALBUTEROL SULFATE 18-103MCG
3 AEROSOL WITH ADAPTER (GRAM) INHALATION
Qty: 30 | Refills: 0 | OUTPATIENT
Start: 2018-07-29 | End: 2018-08-11

## 2018-07-29 RX ORDER — INSULIN ASPART 100 [IU]/ML
15 INJECTION, SOLUTION SUBCUTANEOUS
Qty: 10 | Refills: 0 | OUTPATIENT
Start: 2018-07-29 | End: 2018-08-04

## 2018-07-29 RX ADMIN — AMLODIPINE BESYLATE 10 MILLIGRAM(S): 2.5 TABLET ORAL at 09:41

## 2018-07-29 RX ADMIN — Medication 1: at 09:41

## 2018-07-29 RX ADMIN — PANTOPRAZOLE SODIUM 40 MILLIGRAM(S): 20 TABLET, DELAYED RELEASE ORAL at 02:16

## 2018-07-29 RX ADMIN — Medication 25 MILLIGRAM(S): at 09:41

## 2018-07-29 RX ADMIN — Medication 60 MILLIGRAM(S): at 09:41

## 2018-07-29 RX ADMIN — Medication 3 MILLILITER(S): at 07:45

## 2018-07-29 RX ADMIN — POLYETHYLENE GLYCOL 3350 17 GRAM(S): 17 POWDER, FOR SOLUTION ORAL at 09:42

## 2018-07-29 RX ADMIN — HEPARIN SODIUM 5000 UNIT(S): 5000 INJECTION INTRAVENOUS; SUBCUTANEOUS at 05:21

## 2018-07-29 RX ADMIN — Medication 3 MILLILITER(S): at 00:35

## 2018-07-29 RX ADMIN — Medication 3 MILLILITER(S): at 12:14

## 2018-07-29 RX ADMIN — Medication 16 UNIT(S): at 09:41

## 2018-07-30 ENCOUNTER — INPATIENT (INPATIENT)
Facility: HOSPITAL | Age: 78
LOS: 1 days | Discharge: HOME HEALTH | End: 2018-08-01
Attending: INTERNAL MEDICINE | Admitting: INTERNAL MEDICINE

## 2018-07-30 VITALS
WEIGHT: 244.93 LBS | HEART RATE: 87 BPM | OXYGEN SATURATION: 80 % | DIASTOLIC BLOOD PRESSURE: 56 MMHG | TEMPERATURE: 97 F | HEIGHT: 68 IN | RESPIRATION RATE: 20 BRPM | SYSTOLIC BLOOD PRESSURE: 101 MMHG

## 2018-07-30 DIAGNOSIS — I10 ESSENTIAL (PRIMARY) HYPERTENSION: ICD-10-CM

## 2018-07-30 DIAGNOSIS — E11.9 TYPE 2 DIABETES MELLITUS WITHOUT COMPLICATIONS: ICD-10-CM

## 2018-07-30 DIAGNOSIS — R06.02 SHORTNESS OF BREATH: ICD-10-CM

## 2018-07-30 DIAGNOSIS — J43.9 EMPHYSEMA, UNSPECIFIED: ICD-10-CM

## 2018-07-30 LAB
ALBUMIN SERPL ELPH-MCNC: 3.7 G/DL — SIGNIFICANT CHANGE UP (ref 3.5–5.2)
ALP SERPL-CCNC: 58 U/L — SIGNIFICANT CHANGE UP (ref 30–115)
ALT FLD-CCNC: 18 U/L — SIGNIFICANT CHANGE UP (ref 0–41)
ANION GAP SERPL CALC-SCNC: 13 MMOL/L — SIGNIFICANT CHANGE UP (ref 7–14)
APTT BLD: 23.6 SEC — CRITICAL LOW (ref 27–39.2)
AST SERPL-CCNC: 15 U/L — SIGNIFICANT CHANGE UP (ref 0–41)
BASOPHILS # BLD AUTO: 0.04 K/UL — SIGNIFICANT CHANGE UP (ref 0–0.2)
BASOPHILS NFR BLD AUTO: 0.2 % — SIGNIFICANT CHANGE UP (ref 0–1)
BILIRUB SERPL-MCNC: 0.4 MG/DL — SIGNIFICANT CHANGE UP (ref 0.2–1.2)
BUN SERPL-MCNC: 52 MG/DL — HIGH (ref 10–20)
CALCIUM SERPL-MCNC: 9.1 MG/DL — SIGNIFICANT CHANGE UP (ref 8.5–10.1)
CHLORIDE SERPL-SCNC: 100 MMOL/L — SIGNIFICANT CHANGE UP (ref 98–110)
CK SERPL-CCNC: 32 U/L — SIGNIFICANT CHANGE UP (ref 0–225)
CO2 SERPL-SCNC: 30 MMOL/L — SIGNIFICANT CHANGE UP (ref 17–32)
CREAT SERPL-MCNC: 1.6 MG/DL — HIGH (ref 0.7–1.5)
D DIMER BLD IA.RAPID-MCNC: 322 NG/ML DDU — HIGH (ref 0–230)
EOSINOPHIL # BLD AUTO: 0.46 K/UL — SIGNIFICANT CHANGE UP (ref 0–0.7)
EOSINOPHIL NFR BLD AUTO: 2.5 % — SIGNIFICANT CHANGE UP (ref 0–8)
GAS PNL BLDV: SIGNIFICANT CHANGE UP
GLUCOSE SERPL-MCNC: 111 MG/DL — HIGH (ref 70–99)
HCT VFR BLD CALC: 45.4 % — SIGNIFICANT CHANGE UP (ref 42–52)
HGB BLD-MCNC: 14 G/DL — SIGNIFICANT CHANGE UP (ref 14–18)
IMM GRANULOCYTES NFR BLD AUTO: 3.6 % — HIGH (ref 0.1–0.3)
INR BLD: 1.05 RATIO — SIGNIFICANT CHANGE UP (ref 0.65–1.3)
LYMPHOCYTES # BLD AUTO: 12 % — LOW (ref 20.5–51.1)
LYMPHOCYTES # BLD AUTO: 2.19 K/UL — SIGNIFICANT CHANGE UP (ref 1.2–3.4)
MCHC RBC-ENTMCNC: 24.1 PG — LOW (ref 27–31)
MCHC RBC-ENTMCNC: 30.8 G/DL — LOW (ref 32–37)
MCV RBC AUTO: 78.3 FL — LOW (ref 80–94)
MONOCYTES # BLD AUTO: 1.72 K/UL — HIGH (ref 0.1–0.6)
MONOCYTES NFR BLD AUTO: 9.4 % — HIGH (ref 1.7–9.3)
NEUTROPHILS # BLD AUTO: 13.14 K/UL — HIGH (ref 1.4–6.5)
NEUTROPHILS NFR BLD AUTO: 72.3 % — SIGNIFICANT CHANGE UP (ref 42.2–75.2)
NT-PROBNP SERPL-SCNC: 433 PG/ML — HIGH (ref 0–300)
PLATELET # BLD AUTO: 569 K/UL — HIGH (ref 130–400)
POTASSIUM SERPL-MCNC: 4.3 MMOL/L — SIGNIFICANT CHANGE UP (ref 3.5–5)
POTASSIUM SERPL-SCNC: 4.3 MMOL/L — SIGNIFICANT CHANGE UP (ref 3.5–5)
PROT SERPL-MCNC: 6.1 G/DL — SIGNIFICANT CHANGE UP (ref 6–8)
PROTHROM AB SERPL-ACNC: 11.4 SEC — SIGNIFICANT CHANGE UP (ref 9.95–12.87)
RBC # BLD: 5.8 M/UL — SIGNIFICANT CHANGE UP (ref 4.7–6.1)
RBC # FLD: 18.3 % — HIGH (ref 11.5–14.5)
SODIUM SERPL-SCNC: 143 MMOL/L — SIGNIFICANT CHANGE UP (ref 135–146)
TROPONIN T SERPL-MCNC: <0.01 NG/ML — SIGNIFICANT CHANGE UP
WBC # BLD: 18.21 K/UL — HIGH (ref 4.8–10.8)
WBC # FLD AUTO: 18.21 K/UL — HIGH (ref 4.8–10.8)

## 2018-07-30 RX ORDER — IPRATROPIUM/ALBUTEROL SULFATE 18-103MCG
3 AEROSOL WITH ADAPTER (GRAM) INHALATION
Qty: 0 | Refills: 0 | Status: COMPLETED | OUTPATIENT
Start: 2018-07-30 | End: 2018-07-30

## 2018-07-30 RX ORDER — SODIUM CHLORIDE 9 MG/ML
1000 INJECTION INTRAMUSCULAR; INTRAVENOUS; SUBCUTANEOUS ONCE
Qty: 0 | Refills: 0 | Status: COMPLETED | OUTPATIENT
Start: 2018-07-30 | End: 2018-07-30

## 2018-07-30 RX ORDER — FLUTICASONE PROPIONATE 50 MCG
1 SPRAY, SUSPENSION NASAL DAILY
Qty: 0 | Refills: 0 | Status: DISCONTINUED | OUTPATIENT
Start: 2018-07-30 | End: 2018-08-01

## 2018-07-30 RX ORDER — POLYETHYLENE GLYCOL 3350 17 G/17G
17 POWDER, FOR SOLUTION ORAL DAILY
Qty: 0 | Refills: 0 | Status: DISCONTINUED | OUTPATIENT
Start: 2018-07-30 | End: 2018-08-01

## 2018-07-30 RX ORDER — FINASTERIDE 5 MG/1
5 TABLET, FILM COATED ORAL DAILY
Qty: 0 | Refills: 0 | Status: DISCONTINUED | OUTPATIENT
Start: 2018-07-30 | End: 2018-08-01

## 2018-07-30 RX ORDER — ALBUTEROL 90 UG/1
2.5 AEROSOL, METERED ORAL
Qty: 0 | Refills: 0 | Status: DISCONTINUED | OUTPATIENT
Start: 2018-07-30 | End: 2018-08-01

## 2018-07-30 RX ORDER — DOCUSATE SODIUM 100 MG
100 CAPSULE ORAL THREE TIMES A DAY
Qty: 0 | Refills: 0 | Status: DISCONTINUED | OUTPATIENT
Start: 2018-07-30 | End: 2018-08-01

## 2018-07-30 RX ORDER — METOPROLOL TARTRATE 50 MG
12.5 TABLET ORAL
Qty: 0 | Refills: 0 | Status: DISCONTINUED | OUTPATIENT
Start: 2018-07-30 | End: 2018-08-01

## 2018-07-30 RX ORDER — TAMSULOSIN HYDROCHLORIDE 0.4 MG/1
0.4 CAPSULE ORAL AT BEDTIME
Qty: 0 | Refills: 0 | Status: DISCONTINUED | OUTPATIENT
Start: 2018-07-30 | End: 2018-08-01

## 2018-07-30 RX ORDER — ALBUTEROL 90 UG/1
2 AEROSOL, METERED ORAL EVERY 6 HOURS
Qty: 0 | Refills: 0 | Status: DISCONTINUED | OUTPATIENT
Start: 2018-07-30 | End: 2018-08-01

## 2018-07-30 RX ORDER — INSULIN LISPRO 100/ML
5 VIAL (ML) SUBCUTANEOUS
Qty: 0 | Refills: 0 | Status: DISCONTINUED | OUTPATIENT
Start: 2018-07-30 | End: 2018-07-31

## 2018-07-30 RX ORDER — ATORVASTATIN CALCIUM 80 MG/1
20 TABLET, FILM COATED ORAL AT BEDTIME
Qty: 0 | Refills: 0 | Status: DISCONTINUED | OUTPATIENT
Start: 2018-07-30 | End: 2018-08-01

## 2018-07-30 RX ORDER — SODIUM POLYSTYRENE SULFONATE 4.1 MEQ/G
15 POWDER, FOR SUSPENSION ORAL AT BEDTIME
Qty: 0 | Refills: 0 | Status: DISCONTINUED | OUTPATIENT
Start: 2018-07-30 | End: 2018-08-01

## 2018-07-30 RX ADMIN — SODIUM CHLORIDE 1000 MILLILITER(S): 9 INJECTION INTRAMUSCULAR; INTRAVENOUS; SUBCUTANEOUS at 19:06

## 2018-07-30 RX ADMIN — Medication 3 MILLILITER(S): at 19:06

## 2018-07-30 RX ADMIN — Medication 1 DROP(S): at 23:46

## 2018-07-30 RX ADMIN — Medication 100 MILLIGRAM(S): at 23:45

## 2018-07-30 RX ADMIN — Medication 125 MILLIGRAM(S): at 18:37

## 2018-07-30 RX ADMIN — Medication 3 MILLILITER(S): at 18:38

## 2018-07-30 RX ADMIN — SODIUM POLYSTYRENE SULFONATE 15 GRAM(S): 4.1 POWDER, FOR SUSPENSION ORAL at 23:47

## 2018-07-30 RX ADMIN — TAMSULOSIN HYDROCHLORIDE 0.4 MILLIGRAM(S): 0.4 CAPSULE ORAL at 23:45

## 2018-07-30 RX ADMIN — ATORVASTATIN CALCIUM 20 MILLIGRAM(S): 80 TABLET, FILM COATED ORAL at 23:45

## 2018-07-30 RX ADMIN — Medication 3 MILLILITER(S): at 18:27

## 2018-07-30 NOTE — H&P ADULT - NSHPREVIEWOFSYSTEMS_GEN_ALL_CORE
REVIEW OF SYSTEMS:      CONSTITUTIONAL:         [id number]----B/A.      wT GAIN ++   EYES/ENT:       NECK:       RESPIRATORY:               h/O COPD  ++  CARDIOVASCULAR:         hTN ++  GASTROINTESTINAL:      dM   ++  GENITOURINARY:        NEUROLOGICAL:        SKIN:

## 2018-07-30 NOTE — ED PROVIDER NOTE - OBJECTIVE STATEMENT
76 y/o male with pmhx of DM, COPD, HTN presents with worsening SOB. Patient was recently discharged from hospital yesterday for COPD exacerbation, on home O2; returns to hospital today for complaints of weakness. Patient is accompanied by aid, who states patient was complaining of sore throat earlier today. Patient states he just doesn't feel good. Denies fevers/chills, chest pain, n/v/d/abdominal pain.

## 2018-07-30 NOTE — ED PROVIDER NOTE - PROGRESS NOTE DETAILS
Patient arrived to ER; was found to be hypotensive to 80/50's and hypoxic to 80%; fluids administered, bedside efast and echo was done - negative for free fluid, right heart strain, pericardial effusion, B-lines. Patient's blood pressure improved with fluid administration, placed patient on BIPAP; O2 Sat improved to 95% Patient comfortable, feeling better; stable on BIPAP. Blood pressure remains stable. Patient stable on NC 4L, saturating 93%, appears comfortable, no respiratory distress Family and patient aware of results and admission - patient appears comfortably on NC, speaking in full sentences saturating at 93%.

## 2018-07-30 NOTE — ED PROVIDER NOTE - NS ED ROS FT
Constitutional: See HPI.  Eyes: No visual changes, eye pain or discharge.  ENMT: No hearing changes, pain, discharge or infections. No neck pain or stiffness.  Cardiac: +SOB; No chest pain, or edema. No chest pain with exertion.  Respiratory: No cough or respiratory distress.   GI: No nausea, vomiting, diarrhea or abdominal pain.  : No dysuria, frequency or burning.  MS: No myalgia, muscle weakness, joint pain or back pain.  Neuro: No headache or weakness. No LOC.  Skin: No skin rash.

## 2018-07-30 NOTE — ED ADULT NURSE NOTE - CHIEF COMPLAINT QUOTE
As per EMS: Pt. was at home sitting and complained of lightheadedness, weakness and almost pass out as witnessed by family."

## 2018-07-30 NOTE — H&P ADULT - HISTORY OF PRESENT ILLNESS
fever, hemoptysis.   no v/d.---n/a                                      Pt   WAS   RECENTLY  BEEN DISCHRGED FROM [facility]   AFTER BEING TREATED FOR   COPD, AND  HCVD

## 2018-07-30 NOTE — ED PROVIDER NOTE - ATTENDING CONTRIBUTION TO CARE
77 year old male, pomhx of copd, + recent admission and discharge yesterday, comes in with complaint dizziness and weakness. patient found to be hypotensive and hypoxic upon arrival, BP improved with 500 CC of fluids, patient place don bipap and symptoms improved. Patient denies cp/sob, no n/v/d, no loc. No hemoptysis    I am unable to obtain a comprehensive history, review of systems, past medical history, and/or physical exam due to constraints imposed by the urgency of the patient's clinical condition and/or mental status.    CONSTITUTIONAL: + hypoxic, placed on bipap, improved BP stabilized  SKIN: skin exam is warm and dry, no acute rash.  HEAD: Normocephalic; atraumatic.  EYES: PERRL, 3 mm bilateral, no nystagmus, EOM intact; conjunctiva and sclera clear.  ENT: + mild pharyngeal erythema, no exudate, no edema, No nasal discharge; airway clear.  NECK: Supple; non tender. + full passive ROM in all directions. No JVD  CARD: S1, S2 normal; no murmurs, gallops, or rubs. Regular rate and rhythm. + Symmetric Strong Pulses  RESP: + mild bibasilar expiratory wheezes, no specific rales or rhonchi. Good air movement bilaterally  ABD: soft; non-distended; non-tender. No Rebound, No Guarding, No signs of peritonitis, No CVA tenderness. No pulsatile abdominal mass. + Strong and Symmetric Pulses  EXT: Normal ROM. No clubbing, cyanosis or edema. Dp and Pt Pulses intact. Cap refill less than 3 seconds  NEURO: CN 2-12 intact, normal finger to nose, no sensory or motor deficits, Alert, oriented, grossly unremarkable. No Focal deficits. GCS 15. NIH 0  PSYCH: Cooperative, appropriate.

## 2018-07-30 NOTE — ED PROVIDER NOTE - PHYSICAL EXAMINATION
CONSTITUTIONAL: Well-developed; well-nourished; in mild distress.   SKIN: warm, dry  HEAD: Normocephalic; atraumatic.  ENT: No nasal discharge; airway clear.  CARD: S1, S2 normal; no murmurs, gallops, or rubs. Regular rate and rhythm.   RESP: No wheezes, rales or rhonchi. +tachypneic, shallow breathing   ABD: soft ntnd  EXT: Normal ROM.  No clubbing, cyanosis or edema.   NEURO: Alert, oriented, grossly unremarkable  PSYCH: Cooperative, appropriate.

## 2018-07-31 LAB
ANION GAP SERPL CALC-SCNC: 12 MMOL/L — SIGNIFICANT CHANGE UP (ref 7–14)
BUN SERPL-MCNC: 49 MG/DL — HIGH (ref 10–20)
CALCIUM SERPL-MCNC: 8.8 MG/DL — SIGNIFICANT CHANGE UP (ref 8.5–10.1)
CHLORIDE SERPL-SCNC: 101 MMOL/L — SIGNIFICANT CHANGE UP (ref 98–110)
CO2 SERPL-SCNC: 29 MMOL/L — SIGNIFICANT CHANGE UP (ref 17–32)
CREAT SERPL-MCNC: 1.8 MG/DL — HIGH (ref 0.7–1.5)
GLUCOSE SERPL-MCNC: 159 MG/DL — HIGH (ref 70–99)
POTASSIUM SERPL-MCNC: 4.5 MMOL/L — SIGNIFICANT CHANGE UP (ref 3.5–5)
POTASSIUM SERPL-SCNC: 4.5 MMOL/L — SIGNIFICANT CHANGE UP (ref 3.5–5)
SODIUM SERPL-SCNC: 142 MMOL/L — SIGNIFICANT CHANGE UP (ref 135–146)

## 2018-07-31 RX ORDER — SODIUM CHLORIDE 9 MG/ML
1000 INJECTION, SOLUTION INTRAVENOUS
Qty: 0 | Refills: 0 | Status: DISCONTINUED | OUTPATIENT
Start: 2018-07-31 | End: 2018-08-01

## 2018-07-31 RX ORDER — INSULIN LISPRO 100/ML
16 VIAL (ML) SUBCUTANEOUS
Qty: 0 | Refills: 0 | Status: DISCONTINUED | OUTPATIENT
Start: 2018-07-31 | End: 2018-08-01

## 2018-07-31 RX ORDER — INSULIN LISPRO 100/ML
13 VIAL (ML) SUBCUTANEOUS
Qty: 0 | Refills: 0 | Status: DISCONTINUED | OUTPATIENT
Start: 2018-07-31 | End: 2018-08-01

## 2018-07-31 RX ORDER — AMLODIPINE BESYLATE 2.5 MG/1
10 TABLET ORAL DAILY
Qty: 0 | Refills: 0 | Status: DISCONTINUED | OUTPATIENT
Start: 2018-07-31 | End: 2018-08-01

## 2018-07-31 RX ORDER — INSULIN GLARGINE 100 [IU]/ML
36 INJECTION, SOLUTION SUBCUTANEOUS EVERY MORNING
Qty: 0 | Refills: 0 | Status: DISCONTINUED | OUTPATIENT
Start: 2018-07-31 | End: 2018-08-01

## 2018-07-31 RX ORDER — INSULIN LISPRO 100/ML
15 VIAL (ML) SUBCUTANEOUS
Qty: 0 | Refills: 0 | Status: DISCONTINUED | OUTPATIENT
Start: 2018-07-31 | End: 2018-08-01

## 2018-07-31 RX ORDER — GLUCAGON INJECTION, SOLUTION 0.5 MG/.1ML
1 INJECTION, SOLUTION SUBCUTANEOUS ONCE
Qty: 0 | Refills: 0 | Status: DISCONTINUED | OUTPATIENT
Start: 2018-07-31 | End: 2018-08-01

## 2018-07-31 RX ORDER — HEPARIN SODIUM 5000 [USP'U]/ML
5000 INJECTION INTRAVENOUS; SUBCUTANEOUS EVERY 8 HOURS
Qty: 0 | Refills: 0 | Status: DISCONTINUED | OUTPATIENT
Start: 2018-07-31 | End: 2018-08-01

## 2018-07-31 RX ORDER — BUDESONIDE AND FORMOTEROL FUMARATE DIHYDRATE 160; 4.5 UG/1; UG/1
2 AEROSOL RESPIRATORY (INHALATION)
Qty: 0 | Refills: 0 | Status: DISCONTINUED | OUTPATIENT
Start: 2018-07-31 | End: 2018-08-01

## 2018-07-31 RX ORDER — DEXTROSE 50 % IN WATER 50 %
25 SYRINGE (ML) INTRAVENOUS ONCE
Qty: 0 | Refills: 0 | Status: DISCONTINUED | OUTPATIENT
Start: 2018-07-31 | End: 2018-08-01

## 2018-07-31 RX ADMIN — Medication 30 MILLILITER(S): at 17:26

## 2018-07-31 RX ADMIN — Medication 15 UNIT(S): at 12:24

## 2018-07-31 RX ADMIN — Medication 13 UNIT(S): at 17:24

## 2018-07-31 RX ADMIN — HEPARIN SODIUM 5000 UNIT(S): 5000 INJECTION INTRAVENOUS; SUBCUTANEOUS at 21:28

## 2018-07-31 RX ADMIN — Medication 100 MILLIGRAM(S): at 21:28

## 2018-07-31 RX ADMIN — ALBUTEROL 2 PUFF(S): 90 AEROSOL, METERED ORAL at 05:21

## 2018-07-31 RX ADMIN — Medication 10 MILLIGRAM(S): at 14:45

## 2018-07-31 RX ADMIN — Medication 5 UNIT(S): at 07:55

## 2018-07-31 RX ADMIN — HEPARIN SODIUM 5000 UNIT(S): 5000 INJECTION INTRAVENOUS; SUBCUTANEOUS at 14:49

## 2018-07-31 RX ADMIN — Medication 1 DROP(S): at 17:25

## 2018-07-31 RX ADMIN — Medication 12.5 MILLIGRAM(S): at 17:26

## 2018-07-31 RX ADMIN — ALBUTEROL 2 PUFF(S): 90 AEROSOL, METERED ORAL at 19:57

## 2018-07-31 RX ADMIN — Medication 1 DROP(S): at 23:42

## 2018-07-31 RX ADMIN — ALBUTEROL 2 PUFF(S): 90 AEROSOL, METERED ORAL at 13:19

## 2018-07-31 RX ADMIN — Medication 100 MILLIGRAM(S): at 14:49

## 2018-07-31 RX ADMIN — AMLODIPINE BESYLATE 10 MILLIGRAM(S): 2.5 TABLET ORAL at 14:48

## 2018-07-31 RX ADMIN — TAMSULOSIN HYDROCHLORIDE 0.4 MILLIGRAM(S): 0.4 CAPSULE ORAL at 21:28

## 2018-07-31 RX ADMIN — ALBUTEROL 2.5 MILLIGRAM(S): 90 AEROSOL, METERED ORAL at 19:18

## 2018-07-31 RX ADMIN — POLYETHYLENE GLYCOL 3350 17 GRAM(S): 17 POWDER, FOR SOLUTION ORAL at 12:26

## 2018-07-31 RX ADMIN — Medication 12.5 MILLIGRAM(S): at 05:22

## 2018-07-31 RX ADMIN — SODIUM POLYSTYRENE SULFONATE 15 GRAM(S): 4.1 POWDER, FOR SUSPENSION ORAL at 21:32

## 2018-07-31 RX ADMIN — ATORVASTATIN CALCIUM 20 MILLIGRAM(S): 80 TABLET, FILM COATED ORAL at 21:28

## 2018-07-31 RX ADMIN — BUDESONIDE AND FORMOTEROL FUMARATE DIHYDRATE 2 PUFF(S): 160; 4.5 AEROSOL RESPIRATORY (INHALATION) at 19:51

## 2018-07-31 RX ADMIN — Medication 100 MILLIGRAM(S): at 05:21

## 2018-07-31 RX ADMIN — FINASTERIDE 5 MILLIGRAM(S): 5 TABLET, FILM COATED ORAL at 12:27

## 2018-07-31 NOTE — PROGRESS NOTE ADULT - SUBJECTIVE AND OBJECTIVE BOX
JIAN MANCIA  77y  Male      Patient is a 77y old  Male who presents with a chief complaint of SOB (30 Jul 2018 23:21)      INTERVAL HPI/OVERNIGHT EVENTS:  He feels better    Vital Signs Last 24 Hrs  T(C): 36.1 (31 Jul 2018 05:08), Max: 37.1 (30 Jul 2018 22:26)  T(F): 97 (31 Jul 2018 05:08), Max: 98.7 (30 Jul 2018 22:26)  HR: 100 (31 Jul 2018 05:08) (71 - 100)  BP: 152/70 (31 Jul 2018 05:08) (101/56 - 152/70)  BP(mean): --  RR: 18 (31 Jul 2018 05:08) (16 - 20)  SpO2: 99% (30 Jul 2018 19:55) (73% - 100%)      07-30-18 @ 07:01  -  07-31-18 @ 07:00  --------------------------------------------------------  IN: 120 mL / OUT: 0 mL / NET: 120 mL            Consultant(s) Notes Reviewed:  [x ] YES  [ ] NO          MEDICATIONS  (STANDING):  ALBUTerol    90 MICROgram(s) HFA Inhaler 2 Puff(s) Inhalation every 6 hours  ALBUTerol   0.5% 2.5 milliGRAM(s) Nebulizer four times a day  artificial  tears Solution 1 Drop(s) Both EYES four times a day  atorvastatin 20 milliGRAM(s) Oral at bedtime  docusate sodium 100 milliGRAM(s) Oral three times a day  finasteride 5 milliGRAM(s) Oral daily  fluticasone propionate 50 MICROgram(s)/spray Nasal Spray 1 Spray(s) Both Nostrils daily  insulin lispro Injectable (HumaLOG) 5 Unit(s) SubCutaneous three times a day with meals  levoFLOXacin IVPB 750 milliGRAM(s) IV Intermittent every 24 hours  metoprolol tartrate 12.5 milliGRAM(s) Oral two times a day  polyethylene glycol 3350 17 Gram(s) Oral daily  sodium polystyrene sulfonate Suspension 15 Gram(s) Oral at bedtime  tamsulosin 0.4 milliGRAM(s) Oral at bedtime    MEDICATIONS  (PRN):      LABS                          14.0   18.21 )-----------( 569      ( 30 Jul 2018 17:29 )             45.4     07-30    143  |  100  |  52<H>  ----------------------------<  111<H>  4.3   |  30  |  1.6<H>    Ca    9.1      30 Jul 2018 17:29    TPro  6.1  /  Alb  3.7  /  TBili  0.4  /  DBili  x   /  AST  15  /  ALT  18  /  AlkPhos  58  07-30        PT/INR - ( 30 Jul 2018 17:29 )   PT: 11.40 sec;   INR: 1.05 ratio         PTT - ( 30 Jul 2018 17:29 )  PTT:23.6 sec  Lactate Trend    CARDIAC MARKERS ( 30 Jul 2018 17:29 )  x     / <0.01 ng/mL / 32 U/L / x     / x          CAPILLARY BLOOD GLUCOSE  281 (31 Jul 2018 07:30)          Culture - Acid Fast - Sputum w/Smear (collected 07-24-18 @ 14:45)  Source: .Sputum Sputum        RADIOLOGY & ADDITIONAL TESTS:    Imaging Personally Reviewed:  [ ] YES  [ ] NO    HEALTH ISSUES - PROBLEM Dx:  Emphysema lung: Emphysema lung  Hypertension: Hypertension  Diabetes mellitus, type 2: Diabetes mellitus, type 2  SOB (shortness of breath): SOB (shortness of breath)    PHYSICAL EXAM:  GENERAL: NAD, well-developed  HEAD:  Atraumatic, Normocephalic  EYES: EOMI, PERRLA, conjunctiva and sclera clear  NECK: Supple, No JVD  CHEST/LUNG: mild expiratory wheezing.   HEART: Regular rate and rhythm; No murmurs, rubs, or gallops  ABDOMEN: Soft, Nontender, Nondistended; Bowel sounds present  EXTREMITIES:  2+ Peripheral Pulses, No clubbing, cyanosis, or edema  PSYCH: AAOx3  NEUROLOGY: non-focal  SKIN: No rashes or lesions JIAN MANCIA  77y  Male      Patient is a 77y old  Male who presents with a chief complaint of SOB (30 Jul 2018 23:21)      INTERVAL HPI/OVERNIGHT EVENTS:  He feels better, weakness improved.     Vital Signs Last 24 Hrs  T(C): 36.1 (31 Jul 2018 05:08), Max: 37.1 (30 Jul 2018 22:26)  T(F): 97 (31 Jul 2018 05:08), Max: 98.7 (30 Jul 2018 22:26)  HR: 100 (31 Jul 2018 05:08) (71 - 100)  BP: 152/70 (31 Jul 2018 05:08) (101/56 - 152/70)  BP(mean): --  RR: 18 (31 Jul 2018 05:08) (16 - 20)  SpO2: 99% (30 Jul 2018 19:55) (73% - 100%)      07-30-18 @ 07:01  -  07-31-18 @ 07:00  --------------------------------------------------------  IN: 120 mL / OUT: 0 mL / NET: 120 mL            Consultant(s) Notes Reviewed:  [x ] YES  [ ] NO          MEDICATIONS  (STANDING):  ALBUTerol    90 MICROgram(s) HFA Inhaler 2 Puff(s) Inhalation every 6 hours  ALBUTerol   0.5% 2.5 milliGRAM(s) Nebulizer four times a day  artificial  tears Solution 1 Drop(s) Both EYES four times a day  atorvastatin 20 milliGRAM(s) Oral at bedtime  docusate sodium 100 milliGRAM(s) Oral three times a day  finasteride 5 milliGRAM(s) Oral daily  fluticasone propionate 50 MICROgram(s)/spray Nasal Spray 1 Spray(s) Both Nostrils daily  insulin lispro Injectable (HumaLOG) 5 Unit(s) SubCutaneous three times a day with meals  levoFLOXacin IVPB 750 milliGRAM(s) IV Intermittent every 24 hours  metoprolol tartrate 12.5 milliGRAM(s) Oral two times a day  polyethylene glycol 3350 17 Gram(s) Oral daily  sodium polystyrene sulfonate Suspension 15 Gram(s) Oral at bedtime  tamsulosin 0.4 milliGRAM(s) Oral at bedtime    MEDICATIONS  (PRN):      LABS                          14.0   18.21 )-----------( 569      ( 30 Jul 2018 17:29 )             45.4     07-30    143  |  100  |  52<H>  ----------------------------<  111<H>  4.3   |  30  |  1.6<H>    Ca    9.1      30 Jul 2018 17:29    TPro  6.1  /  Alb  3.7  /  TBili  0.4  /  DBili  x   /  AST  15  /  ALT  18  /  AlkPhos  58  07-30        PT/INR - ( 30 Jul 2018 17:29 )   PT: 11.40 sec;   INR: 1.05 ratio         PTT - ( 30 Jul 2018 17:29 )  PTT:23.6 sec  Lactate Trend    CARDIAC MARKERS ( 30 Jul 2018 17:29 )  x     / <0.01 ng/mL / 32 U/L / x     / x          CAPILLARY BLOOD GLUCOSE  281 (31 Jul 2018 07:30)          Culture - Acid Fast - Sputum w/Smear (collected 07-24-18 @ 14:45)  Source: .Sputum Sputum        RADIOLOGY & ADDITIONAL TESTS:    Imaging Personally Reviewed:  [ ] YES  [ ] NO    HEALTH ISSUES - PROBLEM Dx:  Emphysema lung: Emphysema lung  Hypertension: Hypertension  Diabetes mellitus, type 2: Diabetes mellitus, type 2  SOB (shortness of breath): SOB (shortness of breath)    PHYSICAL EXAM:  GENERAL: NAD, Obese  HEAD:  Atraumatic, Normocephalic  EYES: EOMI, PERRLA, conjunctiva and sclera clear  NECK: Supple, No JVD  CHEST/LUNG: Poor air entry bilaterally.   HEART: Regular rate and rhythm; No murmurs, rubs, or gallops  ABDOMEN: Soft, Nontender, Nondistended; Bowel sounds present  EXTREMITIES:  2+ Peripheral Pulses, No clubbing, cyanosis, or edema  PSYCH: AAOx3  NEUROLOGY: non-focal  SKIN: No rashes or lesions

## 2018-08-01 ENCOUNTER — TRANSCRIPTION ENCOUNTER (OUTPATIENT)
Age: 78
End: 2018-08-01

## 2018-08-01 VITALS
TEMPERATURE: 96 F | RESPIRATION RATE: 16 BRPM | SYSTOLIC BLOOD PRESSURE: 142 MMHG | HEART RATE: 50 BPM | DIASTOLIC BLOOD PRESSURE: 63 MMHG

## 2018-08-01 LAB
ANION GAP SERPL CALC-SCNC: 9 MMOL/L — SIGNIFICANT CHANGE UP (ref 7–14)
BUN SERPL-MCNC: 39 MG/DL — HIGH (ref 10–20)
CALCIUM SERPL-MCNC: 8.5 MG/DL — SIGNIFICANT CHANGE UP (ref 8.5–10.1)
CHLORIDE SERPL-SCNC: 103 MMOL/L — SIGNIFICANT CHANGE UP (ref 98–110)
CO2 SERPL-SCNC: 28 MMOL/L — SIGNIFICANT CHANGE UP (ref 17–32)
CREAT SERPL-MCNC: 1.4 MG/DL — SIGNIFICANT CHANGE UP (ref 0.7–1.5)
GLUCOSE SERPL-MCNC: 191 MG/DL — HIGH (ref 70–99)
HCT VFR BLD CALC: 41 % — LOW (ref 42–52)
HGB BLD-MCNC: 12.6 G/DL — LOW (ref 14–18)
MCHC RBC-ENTMCNC: 23.9 PG — LOW (ref 27–31)
MCHC RBC-ENTMCNC: 30.7 G/DL — LOW (ref 32–37)
MCV RBC AUTO: 77.8 FL — LOW (ref 80–94)
NRBC # BLD: 0 /100 WBCS — SIGNIFICANT CHANGE UP (ref 0–0)
PLATELET # BLD AUTO: 491 K/UL — HIGH (ref 130–400)
POTASSIUM SERPL-MCNC: 4.6 MMOL/L — SIGNIFICANT CHANGE UP (ref 3.5–5)
POTASSIUM SERPL-SCNC: 4.6 MMOL/L — SIGNIFICANT CHANGE UP (ref 3.5–5)
RBC # BLD: 5.27 M/UL — SIGNIFICANT CHANGE UP (ref 4.7–6.1)
RBC # FLD: 18.3 % — HIGH (ref 11.5–14.5)
SODIUM SERPL-SCNC: 140 MMOL/L — SIGNIFICANT CHANGE UP (ref 135–146)
WBC # BLD: 14.39 K/UL — HIGH (ref 4.8–10.8)
WBC # FLD AUTO: 14.39 K/UL — HIGH (ref 4.8–10.8)

## 2018-08-01 RX ORDER — OMEPRAZOLE 10 MG/1
1 CAPSULE, DELAYED RELEASE ORAL
Qty: 0 | Refills: 0 | COMMUNITY

## 2018-08-01 RX ORDER — PHENOL/SODIUM PHENOLATE
1 AEROSOL, SPRAY (ML) MUCOUS MEMBRANE
Qty: 1 | Refills: 0 | OUTPATIENT
Start: 2018-08-01 | End: 2018-08-07

## 2018-08-01 RX ORDER — OMEPRAZOLE 10 MG/1
1 CAPSULE, DELAYED RELEASE ORAL
Qty: 30 | Refills: 0 | OUTPATIENT
Start: 2018-08-01 | End: 2018-08-30

## 2018-08-01 RX ORDER — INSULIN DETEMIR 100/ML (3)
36 INSULIN PEN (ML) SUBCUTANEOUS
Qty: 0 | Refills: 0 | COMMUNITY

## 2018-08-01 RX ORDER — INSULIN DETEMIR 100/ML (3)
36 INSULIN PEN (ML) SUBCUTANEOUS
Qty: 100 | Refills: 0 | OUTPATIENT
Start: 2018-08-01 | End: 2018-08-30

## 2018-08-01 RX ORDER — FLUTICASONE PROPIONATE 50 MCG
1 SPRAY, SUSPENSION NASAL
Qty: 0 | Refills: 0 | COMMUNITY
Start: 2018-08-01

## 2018-08-01 RX ORDER — TIOTROPIUM BROMIDE AND OLODATEROL 3.124; 2.736 UG/1; UG/1
2 SPRAY, METERED RESPIRATORY (INHALATION)
Qty: 1 | Refills: 0 | OUTPATIENT
Start: 2018-08-01 | End: 2018-08-30

## 2018-08-01 RX ADMIN — Medication 1 DROP(S): at 05:52

## 2018-08-01 RX ADMIN — AMLODIPINE BESYLATE 10 MILLIGRAM(S): 2.5 TABLET ORAL at 05:52

## 2018-08-01 RX ADMIN — FINASTERIDE 5 MILLIGRAM(S): 5 TABLET, FILM COATED ORAL at 12:00

## 2018-08-01 RX ADMIN — HEPARIN SODIUM 5000 UNIT(S): 5000 INJECTION INTRAVENOUS; SUBCUTANEOUS at 05:53

## 2018-08-01 RX ADMIN — Medication 100 MILLIGRAM(S): at 13:24

## 2018-08-01 RX ADMIN — Medication 12.5 MILLIGRAM(S): at 05:51

## 2018-08-01 RX ADMIN — Medication 10 MILLIGRAM(S): at 05:52

## 2018-08-01 RX ADMIN — ALBUTEROL 2 PUFF(S): 90 AEROSOL, METERED ORAL at 13:25

## 2018-08-01 RX ADMIN — Medication 1 SPRAY(S): at 12:00

## 2018-08-01 RX ADMIN — Medication 100 MILLIGRAM(S): at 05:52

## 2018-08-01 RX ADMIN — BUDESONIDE AND FORMOTEROL FUMARATE DIHYDRATE 2 PUFF(S): 160; 4.5 AEROSOL RESPIRATORY (INHALATION) at 08:25

## 2018-08-01 RX ADMIN — ALBUTEROL 2 PUFF(S): 90 AEROSOL, METERED ORAL at 07:27

## 2018-08-01 RX ADMIN — INSULIN GLARGINE 36 UNIT(S): 100 INJECTION, SOLUTION SUBCUTANEOUS at 08:24

## 2018-08-01 RX ADMIN — Medication 16 UNIT(S): at 08:24

## 2018-08-01 RX ADMIN — Medication 15 UNIT(S): at 12:00

## 2018-08-01 RX ADMIN — POLYETHYLENE GLYCOL 3350 17 GRAM(S): 17 POWDER, FOR SOLUTION ORAL at 12:00

## 2018-08-01 NOTE — DISCHARGE NOTE ADULT - OTHER SIGNIFICANT FINDINGS
LABS:                                  12.6   18 -->14.39 )-----------( 491      ( 01 Aug 2018 06:54 )                        41.0     08-01    140  |  103  |  39<H>  ----------------------------<  191<H>  4.6   |  28  |  1.4 <---1.8    Ca    8.5      01 Aug 2018 06:54    TPro  6.1  /  Alb  3.7  /  TBili  0.4  /  DBili  x   /  AST  15  /  ALT  18  /  AlkPhos  58  07-30      MICROBIOLOGY: None on this admission  Acid Fast Bacilli Smear:   No acid fast bacilli seen by fluorochrome stain (07.24.18 @ 14:45)      RADIOLOGY & ADDITIONAL TESTS:  X-ray Chest 1 View- PORTABLE-Routine (07.30.18 @ 17:25)   Support devices: None.    Cardiac/mediastinum/hilum: The heart is enlarged. The hilar prominent.    Lung parenchyma/Pleura: Low lung volumes. Chronically increased   interstitial markings. No focal consolidation. No pneumothorax..    Skeleton/soft tissues: Stable.    Impression:      No radiographic evidence of acute cardiopulmonary disease.      CT Chest w/ IV Cont (07.30.18 @ 19:55)   PULMONARY EMBOLUS: No central or segmental emboli.     LUNGS/PLEURA/AIRWAYS: Stable emphysematous changes. Bibasilar dependent   atelectasis. No pneumothorax or pleural effusions.     MEDIASTINUM/LYMPH NODES: Unremarkable.    HEART/GREAT VESSELS: Stable dilated main pulmonary artery measuring 4.0   cm in diameter, indicative of pulmonary arterial hypertension.   Atherosclerotic calcifications of the thoracic aorta and coronary   arteries. Normal heart size. No pericardial effusion.     BONES/SOFT TISSUES: Stable degenerative changes.    VISUALIZED UPPER ABDOMEN: Unremarkable.      IMPRESSION:    1.  No acute central or segmental pulmonary embolus.    2.  No CT evidence of an acute intrathoracic abnormality.

## 2018-08-01 NOTE — DISCHARGE NOTE ADULT - CARE PLAN
Principal Discharge DX:	Emphysema lung  Goal:	Prevent Exacerbations  Assessment and plan of treatment:	Continue Levaquin and Prednisone for 5 days. Then Resume 10 mg Prednisone daily there after.  Follow up with Dr. Schneider outpatient. Please follow up with Dr. Schneider to clarify COPD medications. You are taking Foradil, Mometasone, Fluticasone & Stiolto.  Continue Oxygen: maintain oxygen saturation between 88 and 92%  Secondary Diagnosis:	Diabetes mellitus, type 2  Goal:	Good Glycemic control.  Assessment and plan of treatment:	Monitor blood sugars closely to prevent Hyperglycemia/Hypoglycemia.

## 2018-08-01 NOTE — DISCHARGE NOTE ADULT - PLAN OF CARE
Prevent Exacerbations Continue Levaquin and Prednisone for 5 days. Then Resume 10 mg Prednisone daily there after.  Follow up with Dr. Schneider outpatient. Please follow up with Dr. Schneider to clarify COPD medications. You are taking Foradil, Mometasone, Fluticasone & Stiolto.  Continue Oxygen: maintain oxygen saturation between 88 and 92% Good Glycemic control. Monitor blood sugars closely to prevent Hyperglycemia/Hypoglycemia.

## 2018-08-01 NOTE — DISCHARGE NOTE ADULT - PATIENT PORTAL LINK FT
You can access the [facility], offered by [facility], by registering with the following website: [email or web address]

## 2018-08-01 NOTE — DISCHARGE NOTE ADULT - CARE PROVIDERS DIRECT ADDRESSES
[email or web address]

## 2018-08-01 NOTE — PROGRESS NOTE ADULT - SUBJECTIVE AND OBJECTIVE BOX
JIAN MANCIA  77y  Male    Patient is a 77y old  Male who presents with a chief complaint of SOB (30 Jul 2018 23:21)      INTERVAL HPI/OVERNIGHT EVENTS:  No interval complaints. Patient complaining of sore throat but no cough or dyspnea.  Ambulates without assistance with Oxygen saturation 89-90% on 4L. Per aide and patient this is his usual.     REVIEW OF SYSTEMS:  CONSTITUTIONAL: No fever, weight loss, or fatigue  EYES: No eye pain, visual disturbances, or discharge  ENMT:  No difficulty hearing, tinnitus, vertigo; No sinus or throat pain  NECK: No pain or stiffness  BREASTS: No pain, masses, or nipple discharge  RESPIRATORY: No cough, wheezing, chills or hemoptysis; No shortness of breath  CARDIOVASCULAR: No chest pain, palpitations, dizziness, or leg swelling  GASTROINTESTINAL: No abdominal or epigastric pain. No nausea, vomiting, or hematemesis; No diarrhea or constipation. No melena or hematochezia.  GENITOURINARY: No dysuria, frequency, hematuria, or incontinence  NEUROLOGICAL: No headaches, memory loss, loss of strength, numbness, or tremors  SKIN: No itching, burning, rashes, or lesions   LYMPH NODES: No enlarged glands  ENDOCRINE: No heat or cold intolerance; No hair loss  MUSCULOSKELETAL: No joint pain or swelling; No muscle, back, or extremity pain  PSYCHIATRIC: No depression, anxiety, mood swings, or difficulty sleeping  HEME/LYMPH: No easy bruising, or bleeding gums  ALLERGY AND IMMUNOLOGIC: No hives or eczema    VITALS:  T(F): 97.6 (08-01-18 @ 05:49), Max: 97.6 (08-01-18 @ 05:49)  HR: 94 (08-01-18 @ 05:49) (94 - 98)  BP: 161/67 (08-01-18 @ 05:49) (127/73 - 161/67)  RR: 16 (08-01-18 @ 05:49) (16 - 18)  SpO2: 93% (07-31-18 @ 19:46) (93% - 93%)      PHYSICAL EXAM:  GENERAL: NAD, well-groomed, obese  HEAD:  Atraumatic, Normocephalic  EYES: conjunctiva and sclera clear  ENMT: No tonsillar erythema or exudates, Moist mucous membranes  NECK: Supple, Normal thyroid  NERVOUS SYSTEM:  Alert & Oriented X3, Good concentration; Motor Strength 5/5 B/L upper and lower extremities  CHEST/LUNG: Clear to auscultation bilaterally; No rales, rhonchi, wheezing, or rubs  HEART: Regular rate and rhythm; No murmurs, rubs, or gallops  ABDOMEN: Soft, Nontender, obese; Bowel sounds present  EXTREMITIES:  2+ Peripheral Pulses, No clubbing, cyanosis, or edema  SKIN: No rashes or lesions    Consultant(s) Notes Reviewed:  [x ] YES  [ ] NO  Care Discussed with Consultants/Other Providers [ x] YES  [ ] NO    LABS:                                  12.6   18 -->14.39 )-----------( 491      ( 01 Aug 2018 06:54 )                        41.0     08-01    140  |  103  |  39<H>  ----------------------------<  191<H>  4.6   |  28  |  1.4 <---1.8    Ca    8.5      01 Aug 2018 06:54    TPro  6.1  /  Alb  3.7  /  TBili  0.4  /  DBili  x   /  AST  15  /  ALT  18  /  AlkPhos  58  07-30      MICROBIOLOGY: None on this admission      RADIOLOGY & ADDITIONAL TESTS:  X-ray Chest 1 View- PORTABLE-Routine (07.30.18 @ 17:25)   Support devices: None.    Cardiac/mediastinum/hilum: The heart is enlarged. The hilar prominent.    Lung parenchyma/Pleura: Low lung volumes. Chronically increased   interstitial markings. No focal consolidation. No pneumothorax..    Skeleton/soft tissues: Stable.    Impression:      No radiographic evidence of acute cardiopulmonary disease.      Imaging Personally Reviewed:  [x] YES  [ ] NO    MEDICATIONS  (STANDING):  ALBUTerol    90 MICROgram(s) HFA Inhaler 2 Puff(s) Inhalation every 6 hours  ALBUTerol   0.5% 2.5 milliGRAM(s) Nebulizer four times a day  amLODIPine   Tablet 10 milliGRAM(s) Oral daily  artificial  tears Solution 1 Drop(s) Both EYES four times a day  atorvastatin 20 milliGRAM(s) Oral at bedtime  buDESOnide 160 MICROgram(s)/formoterol 4.5 MICROgram(s) Inhaler 2 Puff(s) Inhalation two times a day  dextrose 5%. 1000 milliLiter(s) (50 mL/Hr) IV Continuous <Continuous>  dextrose 50% Injectable 25 Gram(s) IV Push once  docusate sodium 100 milliGRAM(s) Oral three times a day  finasteride 5 milliGRAM(s) Oral daily  fluticasone propionate 50 MICROgram(s)/spray Nasal Spray 1 Spray(s) Both Nostrils daily  heparin  Injectable 5000 Unit(s) SubCutaneous every 8 hours  insulin glargine Injectable (LANTUS) 36 Unit(s) SubCutaneous every morning  insulin lispro Injectable (HumaLOG) 16 Unit(s) SubCutaneous before breakfast  insulin lispro Injectable (HumaLOG) 15 Unit(s) SubCutaneous before lunch  insulin lispro Injectable (HumaLOG) 13 Unit(s) SubCutaneous before dinner  levoFLOXacin IVPB 750 milliGRAM(s) IV Intermittent every 24 hours  metoprolol tartrate 12.5 milliGRAM(s) Oral two times a day  polyethylene glycol 3350 17 Gram(s) Oral daily  predniSONE   Tablet 10 milliGRAM(s) Oral daily  sodium polystyrene sulfonate Suspension 15 Gram(s) Oral at bedtime  tamsulosin 0.4 milliGRAM(s) Oral at bedtime    MEDICATIONS  (PRN):  aluminum hydroxide/magnesium hydroxide/simethicone Suspension 30 milliLiter(s) Oral every 4 hours PRN Dyspepsia  glucagon  Injectable 1 milliGRAM(s) IntraMuscular once PRN Glucose LESS THAN 70 milligrams/deciliter      HEALTH ISSUES - PROBLEM Dx:  Emphysema lung  Hypertension  Diabetes mellitus, type 2  SOB (shortness of breath)  Oxygen Dependent JIAN MANCIA  77y  Male    Patient is a 77y old  Male who presents with a chief complaint of SOB (30 Jul 2018 23:21)      INTERVAL HPI/OVERNIGHT EVENTS:  No interval complaints. Patient complaining of sore throat but no cough or dyspnea.  Ambulates without assistance with Oxygen saturation 89-90% on 4L. Per aide and patient this is his usual.       REVIEW OF SYSTEMS:  CONSTITUTIONAL: No fever, weight loss, or fatigue  EYES: No eye pain, visual disturbances, or discharge  ENMT:  No difficulty hearing, tinnitus, vertigo; No sinus or throat pain  NECK: No pain or stiffness  BREASTS: No pain, masses, or nipple discharge  RESPIRATORY: No cough, wheezing, chills or hemoptysis; No shortness of breath  CARDIOVASCULAR: No chest pain, palpitations, dizziness, or leg swelling  GASTROINTESTINAL: No abdominal or epigastric pain. No nausea, vomiting, or hematemesis; No diarrhea or constipation. No melena or hematochezia.  GENITOURINARY: No dysuria, frequency, hematuria, or incontinence  NEUROLOGICAL: No headaches, memory loss, loss of strength, numbness, or tremors  SKIN: No itching, burning, rashes, or lesions   LYMPH NODES: No enlarged glands  ENDOCRINE: No heat or cold intolerance; No hair loss  MUSCULOSKELETAL: No joint pain or swelling; No muscle, back, or extremity pain  PSYCHIATRIC: No depression, anxiety, mood swings, or difficulty sleeping  HEME/LYMPH: No easy bruising, or bleeding gums  ALLERGY AND IMMUNOLOGIC: No hives or eczema    VITALS:  T(F): 97.6 (08-01-18 @ 05:49), Max: 97.6 (08-01-18 @ 05:49)  HR: 94 (08-01-18 @ 05:49) (94 - 98)  BP: 161/67 (08-01-18 @ 05:49) (127/73 - 161/67)  RR: 16 (08-01-18 @ 05:49) (16 - 18)  SpO2: 93% (07-31-18 @ 19:46) (93% - 93%)      CAPILLARY BLOOD GLUCOSE  177 (01 Aug 2018 07:30)  196 (31 Jul 2018 21:32)  156 (31 Jul 2018 16:32)        PHYSICAL EXAM:  GENERAL: NAD, well-groomed, obese  HEAD:  Atraumatic, Normocephalic  EYES: conjunctiva and sclera clear  ENMT: No tonsillar erythema or exudates, Moist mucous membranes  NECK: Supple, Normal thyroid  NERVOUS SYSTEM:  Alert & Oriented X3, Good concentration; Motor Strength 5/5 B/L upper and lower extremities  CHEST/LUNG: Clear to auscultation bilaterally; No rales, rhonchi, wheezing, or rubs  HEART: Regular rate and rhythm; No murmurs, rubs, or gallops  ABDOMEN: Soft, Nontender, obese; Bowel sounds present  EXTREMITIES:  2+ Peripheral Pulses, No clubbing, cyanosis, or edema  SKIN: No rashes or lesions    Consultant(s) Notes Reviewed:  [x ] YES  [ ] NO  Care Discussed with Consultants/Other Providers [ x] YES  [ ] NO    LABS:                                  12.6   18 -->14.39 )-----------( 491      ( 01 Aug 2018 06:54 )                        41.0     08-01    140  |  103  |  39<H>  ----------------------------<  191<H>  4.6   |  28  |  1.4 <---1.8    Ca    8.5      01 Aug 2018 06:54    TPro  6.1  /  Alb  3.7  /  TBili  0.4  /  DBili  x   /  AST  15  /  ALT  18  /  AlkPhos  58  07-30      MICROBIOLOGY: None on this admission  Acid Fast Bacilli Smear:   No acid fast bacilli seen by fluorochrome stain (07.24.18 @ 14:45)      RADIOLOGY & ADDITIONAL TESTS:  X-ray Chest 1 View- PORTABLE-Routine (07.30.18 @ 17:25)   Support devices: None.    Cardiac/mediastinum/hilum: The heart is enlarged. The hilar prominent.    Lung parenchyma/Pleura: Low lung volumes. Chronically increased   interstitial markings. No focal consolidation. No pneumothorax..    Skeleton/soft tissues: Stable.    Impression:      No radiographic evidence of acute cardiopulmonary disease.      CT Chest w/ IV Cont (07.30.18 @ 19:55)   PULMONARY EMBOLUS: No central or segmental emboli.     LUNGS/PLEURA/AIRWAYS: Stable emphysematous changes. Bibasilar dependent   atelectasis. No pneumothorax or pleural effusions.     MEDIASTINUM/LYMPH NODES: Unremarkable.    HEART/GREAT VESSELS: Stable dilated main pulmonary artery measuring 4.0   cm in diameter, indicative of pulmonary arterial hypertension.   Atherosclerotic calcifications of the thoracic aorta and coronary   arteries. Normal heart size. No pericardial effusion.     BONES/SOFT TISSUES: Stable degenerative changes.    VISUALIZED UPPER ABDOMEN: Unremarkable.      IMPRESSION:    1.  No acute central or segmental pulmonary embolus.    2.  No CT evidence of an acute intrathoracic abnormality.      Imaging Personally Reviewed:  [x] YES  [ ] NO    MEDICATIONS  (STANDING):  ALBUTerol    90 MICROgram(s) HFA Inhaler 2 Puff(s) Inhalation every 6 hours  ALBUTerol   0.5% 2.5 milliGRAM(s) Nebulizer four times a day  amLODIPine   Tablet 10 milliGRAM(s) Oral daily  artificial  tears Solution 1 Drop(s) Both EYES four times a day  atorvastatin 20 milliGRAM(s) Oral at bedtime  buDESOnide 160 MICROgram(s)/formoterol 4.5 MICROgram(s) Inhaler 2 Puff(s) Inhalation two times a day  dextrose 5%. 1000 milliLiter(s) (50 mL/Hr) IV Continuous <Continuous>  dextrose 50% Injectable 25 Gram(s) IV Push once  docusate sodium 100 milliGRAM(s) Oral three times a day  finasteride 5 milliGRAM(s) Oral daily  fluticasone propionate 50 MICROgram(s)/spray Nasal Spray 1 Spray(s) Both Nostrils daily  heparin  Injectable 5000 Unit(s) SubCutaneous every 8 hours  insulin glargine Injectable (LANTUS) 36 Unit(s) SubCutaneous every morning  insulin lispro Injectable (HumaLOG) 16 Unit(s) SubCutaneous before breakfast  insulin lispro Injectable (HumaLOG) 15 Unit(s) SubCutaneous before lunch  insulin lispro Injectable (HumaLOG) 13 Unit(s) SubCutaneous before dinner  levoFLOXacin IVPB 750 milliGRAM(s) IV Intermittent every 24 hours  metoprolol tartrate 12.5 milliGRAM(s) Oral two times a day  polyethylene glycol 3350 17 Gram(s) Oral daily  predniSONE   Tablet 10 milliGRAM(s) Oral daily  sodium polystyrene sulfonate Suspension 15 Gram(s) Oral at bedtime  tamsulosin 0.4 milliGRAM(s) Oral at bedtime    MEDICATIONS  (PRN):  aluminum hydroxide/magnesium hydroxide/simethicone Suspension 30 milliLiter(s) Oral every 4 hours PRN Dyspepsia  glucagon  Injectable 1 milliGRAM(s) IntraMuscular once PRN Glucose LESS THAN 70 milligrams/deciliter      HEALTH ISSUES - PROBLEM Dx:  Emphysema lung  Hypertension  Diabetes mellitus, type 2  SOB (shortness of breath)  Oxygen Dependent

## 2018-08-01 NOTE — DISCHARGE NOTE ADULT - CARE PROVIDER_API CALL
Mckay Reddy), Geriatric Medicine; Internal Medicine  [address] [place] [address]  Phone: [phone]  Fax: [phone]    Rai Schneider), Medicine  [address] [place] [address]  Phone: [phone]  Fax: [phone]

## 2018-08-01 NOTE — DISCHARGE NOTE ADULT - MEDICATION SUMMARY - MEDICATIONS TO TAKE
I will START or STAY ON the medications listed below when I get home from the hospital:    finasteride 5 mg oral tablet  -- 1 tab(s) by mouth once a day  -- Indication: For BPH    predniSONE 10 mg oral tablet  -- 1 tab(s) by mouth once a day. Take 4 tabs daily for 4 days then 1 tab daily after that.   -- Indication: For COPD    tamsulosin 0.4 mg oral capsule  -- 1 cap(s) by mouth once a day  -- Indication: For BPH    Levemir 100 units/mL subcutaneous solution  -- 36 unit(s) subcutaneous once a day  -- Indication: For Diabetes mellitus, type 2    NovoLOG FlexPen 100 units/mL injectable solution  -- 15 unit(s) injectable 3 times a day (before meals)   -- Indication: For Diabetes mellitus, type 2    atorvastatin 20 mg oral tablet  -- 1 tab(s) by mouth once a day  -- Indication: For Hyperlipidemia    Metoprolol Tartrate 25 mg oral tablet  -- orally once a day  -- Indication: For Hypertension    Foradil Aerolizer 12 mcg inhalation capsule  -- Indication: For COPD    ProAir HFA  -- 2 inhaler(s) inhaled every 4 hours, As Needed  -- Indication: For COPD    ipratropium-albuterol 0.5 mg-2.5 mg/3 mLinhalation solution  -- 3 milliliter(s) inhaled every 4 hours  -- Indication: For COPD    Stiolto Respimat 2.5 mcg-2.5 mcg/inh inhalation aerosol  -- 2 puff(s) inhaled once a day   -- Check with your doctor before becoming pregnant.  For inhalation only.  May cause drowsiness or dizziness.  Obtain medical advice before taking any non-prescription drugs as some may affect the action of this medication.  This drug may impair the ability to drive or operate machinery.  Use care until you become familiar with its effects.    -- Indication: For COPD    amLODIPine 10 mg oral tablet  -- 1 tab(s) by mouth once a day  -- Indication: For Hypertension    sodium polystyrene sulfonate 15 g/60 mL oral suspension  -- Indication: For Hyperkalemia    Chloraseptic Menthol 1.4% topical spray  -- 1 spray(s) by mouth every 2 hours, As Needed for sore throat  -- For external use only.    -- Indication: For SOere throat    Cinnamon  -- 1000 milligram(s) orally  -- Indication: For Supplement    Flax Seed Oil oral capsule  -- 1000 milligram(s) orally  -- Indication: For Supplement    docusate sodium 100 mg oral tablet  -- Indication: For Constipation    polyethylene glycol 3350 oral powder for reconstitution  -- Indication: For Constipation    mometasone 50 mcg/inh nasal spray  -- 1 spray(s) into nose 2 times a day  -- Indication: For COPD    fluticasone 50 mcg/inh nasal spray  -- 1 spray(s) into nose once a day  -- Indication: For COPD    Artificial Tears  -- Indication: For Eye care    omeprazole 20 mg oral delayed release capsule  -- 1 cap(s) by mouth once a day   -- Indication: For GERD    Levaquin 250 mg oral tablet  -- 1 tab(s) by mouth once a day   -- Avoid prolonged or excessive exposure to direct and/or artificial sunlight while taking this medication.  Do not take dairy products, antacids, or iron preparations within one hour of this medication.  Finish all this medication unless otherwise directed by prescriber.  May cause drowsiness or dizziness.  Medication should be taken with plenty of water.    -- Indication: For COPD    Centrum oral tablet  -- 1 tab(s) by mouth once a day  -- Indication: For Supplement    Vitamin B-12  -- 3000 milligram(s) orally  -- Indication: For Supplement    Vitamin C 1000 mg oral tablet  -- Indication: For Supplement    Vitamin D3 1000 intl units oral tablet  -- 1 tab(s) by mouth once a day  -- Indication: For Supplement

## 2018-08-01 NOTE — PROGRESS NOTE ADULT - ASSESSMENT
76 yo male with extensive PMH of COPD, HTN, obesity, MICHAEL on CPAP, DM type 2 admitted with complaints of dizziness and weakness. Patient was discharged from the hospital two days ago for COPD exacerbation but since his discharge he had been having sore throat and generalized weakness. This was also endorsed by the aid who was with him.  Patient also c/o dizziness, but he denied chest pain, cough, fever, abdominal pain or diarrhea. In the ED patient was hypotensive BP 80s/50s, and hypoxic O2sat 80% on 4L. He was started with IV fluids, bedside echo done was negative for pericardial effusion and B-lines. Labs showed Leukocytosis 18K (chronic), Cr 1.6. BP responded to Fluids and he was placed on BiPAP. Chest CT was negative for PE with no other acute changes. He was admitted for further management.      Assessment and Plan:    1. Hypotension:  Resolved with IV fluids. ? Possible adrenal crisis due to steroid withdrawal (patient on prednisone 10mg maintenance he was discharged on 40mg but he didn't take his steroid).   Chronic Leukocytosis (at baseline) but no signs of infection or sepsis.   Resumed Norvasc and Metoprolol.     2. Chronic COPD with acute exacerbation:   Continue Oral Prednisone( complete 4 days of 40 mg and resume 10 mg daily).  Duoneb and Steroid inhaler.   Chest CT is negative for PE, stable emphysematous changes and atelectasis.   Complete 5 days of Levaquin       3. DM 2: with hyperglycemia  Hemoglobin A1C, Whole Blood: 8.7 % (07.21.18 @ 06:37)  Continue Lantus and pre-meal insulin.   Diabetic diet.       4. CKD stage 3:   Likely diabetic nephropathy.   Creatinine at baseline.      5. BPH:   Continue Finasteride and Tamsulosin.       DVT PPX: Heparin SC.   Disposition: Discharge home tomorrow. 78 yo male with extensive PMH of COPD, HTN, obesity, MICHAEL on CPAP, DM type 2 admitted with complaints of dizziness and weakness. Patient was discharged from the hospital two days ago for COPD exacerbation but since his discharge he had been having sore throat and generalized weakness. This was also endorsed by the aid who was with him.  Patient also c/o dizziness, but he denied chest pain, cough, fever, abdominal pain or diarrhea. In the ED patient was hypotensive BP 80s/50s, and hypoxic O2sat 80% on 4L. He was started with IV fluids, bedside echo done was negative for pericardial effusion and B-lines. Labs showed Leukocytosis 18K (chronic), Cr 1.6. BP responded to Fluids and he was placed on BiPAP. Chest CT was negative for PE with no other acute changes. He was admitted for further management.      Assessment and Plan:    1. Hypotension:  Resolved with IV fluids. ? Possible adrenal crisis due to steroid withdrawal (patient on prednisone 10mg maintenance he was discharged on 40mg but he didn't take his steroid).   Chronic Leukocytosis Most likely Steroid induced (at baseline) but no signs of infection or sepsis.   Resumed Norvasc and Metoprolol.       2. Chronic COPD with acute exacerbation:   Continue Oral Prednisone( complete 4 days of 40 mg and resume 10 mg daily).  Duoneb and Steroid inhaler. Continue supplemental oxygen. Maintain saturation 88-92%.  Chest CT is negative for PE, stable emphysematous changes and atelectasis. Complete 5 days of Levaquin   Prior concern for Mycobacterial infection due to cavitary lesion seen on X-ray resolved.  AFB negative  x 3 and CT with Contrast showed no Such Cavitary lesion.  Follow up with Dr. Schneider outpatient.      3. DM 2: with hyperglycemia  Hemoglobin A1C, Whole Blood: 8.7 % (07.21.18 @ 06:37)  Continue Lantus and pre-meal insulin.   Diabetic diet.       4. CKD stage 3:   Likely diabetic nephropathy.   Creatinine at baseline.      5. BPH:   Continue Finasteride and Tamsulosin.       DVT PPX: Heparin SC.   Disposition: Discharge home tomorrow.

## 2018-08-01 NOTE — DISCHARGE NOTE ADULT - HOSPITAL COURSE
76 yo male with extensive PMH of COPD, HTN, obesity, MICHAEL on CPAP, DM type 2 admitted with complaints of dizziness and weakness. Patient was discharged from the hospital two days ago for COPD exacerbation but since his discharge he had been having sore throat and generalized weakness. This was also endorsed by the aid who was with him.  Patient also c/o dizziness, but he denied chest pain, cough, fever, abdominal pain or diarrhea. In the ED patient was hypotensive BP 80s/50s, and hypoxic O2sat 80% on 4L. He was started with IV fluids, bedside echo done was negative for pericardial effusion and B-lines. Labs showed Leukocytosis 18K (chronic), Cr 1.6. BP responded to Fluids and he was placed on BiPAP. Chest CT was negative for PE with no other acute changes. He was admitted for further management.      Assessment and Plan:    1. Hypotension:  Resolved with IV fluids. ? Possible adrenal crisis due to steroid withdrawal (patient on prednisone 10mg maintenance he was discharged on 40mg but he didn't take his steroid).   Chronic Leukocytosis Most likely Steroid induced (at baseline) but no signs of infection or sepsis.   Resumed Norvasc and Metoprolol.       2. Chronic COPD with acute exacerbation:   Continue Oral Prednisone( complete 4 days of 40 mg and resume 10 mg daily).  Duoneb and Steroid inhaler. Continue supplemental oxygen. Maintain saturation 88-92%.  Chest CT is negative for PE, stable emphysematous changes and atelectasis. Complete 5 days of Levaquin   Prior concern for Mycobacterial infection due to cavitary lesion seen on X-ray resolved.  AFB negative  x 3 and CT with Contrast showed no Such Cavitary lesion.  Follow up with Dr. Schneider outpatient.      3. DM 2: with hyperglycemia  Hemoglobin A1C, Whole Blood: 8.7 % (07.21.18 @ 06:37)  Continue Lantus and pre-meal insulin.   Diabetic diet.       4. CKD stage 3:   Likely diabetic nephropathy.   Creatinine at baseline.      5. BPH:   Continue Finasteride and Tamsulosin.

## 2018-08-07 DIAGNOSIS — N18.3 CHRONIC KIDNEY DISEASE, STAGE 3 (MODERATE): ICD-10-CM

## 2018-08-07 DIAGNOSIS — E11.21 TYPE 2 DIABETES MELLITUS WITH DIABETIC NEPHROPATHY: ICD-10-CM

## 2018-08-07 DIAGNOSIS — I12.9 HYPERTENSIVE CHRONIC KIDNEY DISEASE WITH STAGE 1 THROUGH STAGE 4 CHRONIC KIDNEY DISEASE, OR UNSPECIFIED CHRONIC KIDNEY DISEASE: ICD-10-CM

## 2018-08-07 DIAGNOSIS — J44.1 CHRONIC OBSTRUCTIVE PULMONARY DISEASE WITH (ACUTE) EXACERBATION: ICD-10-CM

## 2018-08-07 DIAGNOSIS — E78.5 HYPERLIPIDEMIA, UNSPECIFIED: ICD-10-CM

## 2018-08-07 DIAGNOSIS — N40.0 BENIGN PROSTATIC HYPERPLASIA WITHOUT LOWER URINARY TRACT SYMPTOMS: ICD-10-CM

## 2018-08-07 DIAGNOSIS — E87.5 HYPERKALEMIA: ICD-10-CM

## 2018-08-07 DIAGNOSIS — E27.2 ADDISONIAN CRISIS: ICD-10-CM

## 2018-08-07 DIAGNOSIS — R06.02 SHORTNESS OF BREATH: ICD-10-CM

## 2018-08-07 DIAGNOSIS — E11.65 TYPE 2 DIABETES MELLITUS WITH HYPERGLYCEMIA: ICD-10-CM

## 2018-08-07 DIAGNOSIS — Z91.14 PATIENT'S OTHER NONCOMPLIANCE WITH MEDICATION REGIMEN: ICD-10-CM

## 2018-08-07 DIAGNOSIS — R09.02 HYPOXEMIA: ICD-10-CM

## 2018-08-07 DIAGNOSIS — E66.9 OBESITY, UNSPECIFIED: ICD-10-CM

## 2018-08-07 DIAGNOSIS — Z99.81 DEPENDENCE ON SUPPLEMENTAL OXYGEN: ICD-10-CM

## 2018-08-08 DIAGNOSIS — Z99.81 DEPENDENCE ON SUPPLEMENTAL OXYGEN: ICD-10-CM

## 2018-08-08 DIAGNOSIS — Z88.0 ALLERGY STATUS TO PENICILLIN: ICD-10-CM

## 2018-08-08 DIAGNOSIS — E86.0 DEHYDRATION: ICD-10-CM

## 2018-08-08 DIAGNOSIS — N18.3 CHRONIC KIDNEY DISEASE, STAGE 3 (MODERATE): ICD-10-CM

## 2018-08-08 DIAGNOSIS — Z88.8 ALLERGY STATUS TO OTHER DRUGS, MEDICAMENTS AND BIOLOGICAL SUBSTANCES: ICD-10-CM

## 2018-08-08 DIAGNOSIS — E87.5 HYPERKALEMIA: ICD-10-CM

## 2018-08-08 DIAGNOSIS — Z91.19 PATIENT'S NONCOMPLIANCE WITH OTHER MEDICAL TREATMENT AND REGIMEN: ICD-10-CM

## 2018-08-08 DIAGNOSIS — E11.65 TYPE 2 DIABETES MELLITUS WITH HYPERGLYCEMIA: ICD-10-CM

## 2018-08-08 DIAGNOSIS — N17.9 ACUTE KIDNEY FAILURE, UNSPECIFIED: ICD-10-CM

## 2018-08-08 DIAGNOSIS — Z79.4 LONG TERM (CURRENT) USE OF INSULIN: ICD-10-CM

## 2018-08-08 DIAGNOSIS — I12.9 HYPERTENSIVE CHRONIC KIDNEY DISEASE WITH STAGE 1 THROUGH STAGE 4 CHRONIC KIDNEY DISEASE, OR UNSPECIFIED CHRONIC KIDNEY DISEASE: ICD-10-CM

## 2018-08-08 DIAGNOSIS — E11.22 TYPE 2 DIABETES MELLITUS WITH DIABETIC CHRONIC KIDNEY DISEASE: ICD-10-CM

## 2018-08-08 DIAGNOSIS — R65.10 SYSTEMIC INFLAMMATORY RESPONSE SYNDROME (SIRS) OF NON-INFECTIOUS ORIGIN WITHOUT ACUTE ORGAN DYSFUNCTION: ICD-10-CM

## 2018-08-08 DIAGNOSIS — J44.1 CHRONIC OBSTRUCTIVE PULMONARY DISEASE WITH (ACUTE) EXACERBATION: ICD-10-CM

## 2018-08-08 DIAGNOSIS — J98.4 OTHER DISORDERS OF LUNG: ICD-10-CM

## 2018-08-08 DIAGNOSIS — E66.9 OBESITY, UNSPECIFIED: ICD-10-CM

## 2018-08-08 DIAGNOSIS — Z87.891 PERSONAL HISTORY OF NICOTINE DEPENDENCE: ICD-10-CM

## 2018-08-08 DIAGNOSIS — R09.02 HYPOXEMIA: ICD-10-CM

## 2018-08-08 DIAGNOSIS — K59.00 CONSTIPATION, UNSPECIFIED: ICD-10-CM

## 2018-08-08 DIAGNOSIS — G47.33 OBSTRUCTIVE SLEEP APNEA (ADULT) (PEDIATRIC): ICD-10-CM

## 2018-08-09 NOTE — CHART NOTE - NSCHARTNOTEFT_GEN_A_CORE
received report in my e-mail yesterday that specimen sent on this patient has a "pre" report indicating "growth of afb in broth, identification to follow" I reviewed findings with Dr Luther (infectious disease) who advised me that we need final results to decide on further intervention

## 2018-08-15 ENCOUNTER — APPOINTMENT (OUTPATIENT)
Dept: UROLOGY | Facility: CLINIC | Age: 78
End: 2018-08-15

## 2018-09-10 NOTE — CHART NOTE - NSCHARTNOTEFT_GEN_A_CORE
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    discussed the results with ID Dr. Lubin who wanted the Pulmonologist to decide if patient needs to be treated for MAC.    Dr. Schneider aware of the findings and pt Mr. Vee was recently seen/examined by him. Pt is doing well and does not need to be treated as per his pulmonologist and if the decision changes, the pt will be contacted and treated by Dr. Schneider (for now, no treatment)

## 2018-09-11 LAB
CULTURE RESULTS: SIGNIFICANT CHANGE UP
SPECIMEN SOURCE: SIGNIFICANT CHANGE UP

## 2018-09-12 LAB
CULTURE RESULTS: SIGNIFICANT CHANGE UP
CULTURE RESULTS: SIGNIFICANT CHANGE UP
SPECIMEN SOURCE: SIGNIFICANT CHANGE UP
SPECIMEN SOURCE: SIGNIFICANT CHANGE UP

## 2018-10-02 ENCOUNTER — OUTPATIENT (OUTPATIENT)
Dept: OUTPATIENT SERVICES | Facility: HOSPITAL | Age: 78
LOS: 1 days | Discharge: HOME | End: 2018-10-02

## 2018-10-02 DIAGNOSIS — N18.3 CHRONIC KIDNEY DISEASE, STAGE 3 (MODERATE): ICD-10-CM

## 2018-10-02 DIAGNOSIS — I10 ESSENTIAL (PRIMARY) HYPERTENSION: ICD-10-CM

## 2018-10-02 DIAGNOSIS — E87.5 HYPERKALEMIA: ICD-10-CM

## 2018-10-02 DIAGNOSIS — J44.1 CHRONIC OBSTRUCTIVE PULMONARY DISEASE WITH (ACUTE) EXACERBATION: ICD-10-CM

## 2018-10-02 DIAGNOSIS — E11.9 TYPE 2 DIABETES MELLITUS WITHOUT COMPLICATIONS: ICD-10-CM

## 2018-10-06 ENCOUNTER — EMERGENCY (EMERGENCY)
Facility: HOSPITAL | Age: 78
LOS: 0 days | Discharge: HOME | End: 2018-10-06
Admitting: EMERGENCY MEDICINE

## 2018-10-06 DIAGNOSIS — J44.9 CHRONIC OBSTRUCTIVE PULMONARY DISEASE, UNSPECIFIED: ICD-10-CM
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
## 2018-10-06 DIAGNOSIS — Z23 ENCOUNTER FOR IMMUNIZATION: ICD-10-CM

## 2018-10-06 DIAGNOSIS — S41.112A LACERATION WITHOUT FOREIGN BODY OF LEFT UPPER ARM, INITIAL ENCOUNTER: ICD-10-CM
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
## 2018-10-06 DIAGNOSIS — S41.102A UNSPECIFIED OPEN WOUND OF LEFT UPPER ARM, INITIAL ENCOUNTER: ICD-10-CM

## 2018-10-06 DIAGNOSIS — E11.9 TYPE 2 DIABETES MELLITUS WITHOUT COMPLICATIONS: ICD-10-CM

## 2018-10-06 DIAGNOSIS — Z91.013 ALLERGY TO SEAFOOD: ICD-10-CM
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
## 2018-10-06 DIAGNOSIS — M25.512 PAIN IN LEFT SHOULDER: ICD-10-CM

## 2018-10-06 DIAGNOSIS — I10 ESSENTIAL (PRIMARY) HYPERTENSION: ICD-10-CM

## 2018-10-06 DIAGNOSIS — Z98.890 OTHER SPECIFIED POSTPROCEDURAL STATES: ICD-10-CM

## 2018-10-14 ENCOUNTER — EMERGENCY (EMERGENCY)
Facility: HOSPITAL | Age: 78
LOS: 0 days | Discharge: HOME | End: 2018-10-14
Attending: EMERGENCY MEDICINE | Admitting: EMERGENCY MEDICINE

## 2018-10-14 VITALS
TEMPERATURE: 97 F | OXYGEN SATURATION: 91 % | SYSTOLIC BLOOD PRESSURE: 104 MMHG | DIASTOLIC BLOOD PRESSURE: 56 MMHG | WEIGHT: 240.08 LBS | HEIGHT: 68 IN | HEART RATE: 78 BPM | RESPIRATION RATE: 20 BRPM

## 2018-10-14 VITALS
SYSTOLIC BLOOD PRESSURE: 118 MMHG | HEART RATE: 77 BPM | DIASTOLIC BLOOD PRESSURE: 60 MMHG | RESPIRATION RATE: 18 BRPM | OXYGEN SATURATION: 91 %

## 2018-10-14 DIAGNOSIS — Z79.4 LONG TERM (CURRENT) USE OF INSULIN: ICD-10-CM

## 2018-10-14 DIAGNOSIS — Z79.2 LONG TERM (CURRENT) USE OF ANTIBIOTICS: ICD-10-CM

## 2018-10-14 DIAGNOSIS — Z79.51 LONG TERM (CURRENT) USE OF INHALED STEROIDS: ICD-10-CM

## 2018-10-14 DIAGNOSIS — E11.9 TYPE 2 DIABETES MELLITUS WITHOUT COMPLICATIONS: ICD-10-CM

## 2018-10-14 DIAGNOSIS — Z79.52 LONG TERM (CURRENT) USE OF SYSTEMIC STEROIDS: ICD-10-CM

## 2018-10-14 DIAGNOSIS — Z88.8 ALLERGY STATUS TO OTHER DRUGS, MEDICAMENTS AND BIOLOGICAL SUBSTANCES STATUS: ICD-10-CM

## 2018-10-14 DIAGNOSIS — Z88.0 ALLERGY STATUS TO PENICILLIN: ICD-10-CM

## 2018-10-14 DIAGNOSIS — Z79.899 OTHER LONG TERM (CURRENT) DRUG THERAPY: ICD-10-CM

## 2018-10-14 DIAGNOSIS — M25.519 PAIN IN UNSPECIFIED SHOULDER: ICD-10-CM

## 2018-10-14 DIAGNOSIS — Z87.891 PERSONAL HISTORY OF NICOTINE DEPENDENCE: ICD-10-CM

## 2018-10-14 DIAGNOSIS — Z88.6 ALLERGY STATUS TO ANALGESIC AGENT: ICD-10-CM

## 2018-10-14 DIAGNOSIS — I10 ESSENTIAL (PRIMARY) HYPERTENSION: ICD-10-CM

## 2018-10-14 DIAGNOSIS — M25.512 PAIN IN LEFT SHOULDER: ICD-10-CM

## 2018-10-14 RX ORDER — OXYCODONE AND ACETAMINOPHEN 5; 325 MG/1; MG/1
1 TABLET ORAL EVERY 4 HOURS
Qty: 0 | Refills: 0 | Status: DISCONTINUED | OUTPATIENT
Start: 2018-10-14 | End: 2018-10-14

## 2018-10-14 RX ADMIN — OXYCODONE AND ACETAMINOPHEN 1 TABLET(S): 5; 325 TABLET ORAL at 15:13

## 2018-10-14 RX ADMIN — OXYCODONE AND ACETAMINOPHEN 1 TABLET(S): 5; 325 TABLET ORAL at 16:30

## 2018-10-14 NOTE — ED PROVIDER NOTE - OBJECTIVE STATEMENT
left shoulder pain, woke this am with pain with movement, fell 1 week ago and  injured shoulder. shoulder was feeling better until waking today, no chest pain, no SOB, no arm numbness, pain felt with ROM only,

## 2018-10-14 NOTE — ED ADULT NURSE NOTE - NSIMPLEMENTINTERV_GEN_ALL_ED
Implemented All Fall Risk Interventions:  [place] to call system. Call bell, personal items and telephone within reach. Instruct patient to call for assistance. Room bathroom lighting operational. Non-slip footwear when patient is off stretcher. Physically safe environment: no spills, clutter or unnecessary equipment. Stretcher in lowest position, wheels locked, appropriate side rails in place. Provide visual cue, wrist band, yellow gown, etc. Monitor gait and stability. Monitor for mental status changes and reorient to person, place, and time. Review medications for side effects contributing to fall risk. Reinforce activity limits and safety measures with patient and family.

## 2018-10-14 NOTE — ED PROVIDER NOTE - ATTENDING CONTRIBUTION TO CARE
77 year old male, pmhx of dm, htn, comes in with complaint of left shoulder pain, patient sustained an injury to the left shoulder approx 1 week ago, was seen and evaluated no acute fracture, come sin for pain control. Denies cp/sob, no n/v/d, no recurrent injury    CONSTITUTIONAL: Well-developed; well-nourished; in no acute distress. Sitting up and providing appropriate history and physical examination  SKIN: skin exam is warm and dry, no acute rash.  HEAD: Normocephalic; atraumatic.  EYES: EOM intact; conjunctiva and sclera clear.  ENT: No nasal discharge; airway clear.  NECK: Supple; non tender. + full passive ROM in all directions. No JVD  EXT: + left proximal humerus tenderness, + ;eft roptator cuff tenderness, limited rom ojn lefts secondary to pain, + strong left radial pulse. No clubbing, cyanosis or edema. Dp and Pt Pulses intact. Cap refill less than 3 seconds  NEURO: Alert, oriented, grossly unremarkable. No Focal deficits. GCS 15. NIH 0      Patient continues to deny cp/sob, will follow with OP pmd in the morning for mri referral

## 2018-11-04 ENCOUNTER — INPATIENT (INPATIENT)
Facility: HOSPITAL | Age: 78
LOS: 10 days | Discharge: HOME HEALTH | End: 2018-11-15
Attending: INTERNAL MEDICINE | Admitting: INTERNAL MEDICINE
Payer: MEDICARE

## 2018-11-04 VITALS — OXYGEN SATURATION: 88 % | TEMPERATURE: 99 F | HEIGHT: 68 IN | HEART RATE: 107 BPM | RESPIRATION RATE: 26 BRPM

## 2018-11-04 DIAGNOSIS — Z98.890 OTHER SPECIFIED POSTPROCEDURAL STATES: Chronic | ICD-10-CM

## 2018-11-04 DIAGNOSIS — E11.9 TYPE 2 DIABETES MELLITUS WITHOUT COMPLICATIONS: ICD-10-CM

## 2018-11-04 DIAGNOSIS — I10 ESSENTIAL (PRIMARY) HYPERTENSION: ICD-10-CM

## 2018-11-04 DIAGNOSIS — J86.9 PYOTHORAX WITHOUT FISTULA: Chronic | ICD-10-CM

## 2018-11-04 DIAGNOSIS — N40.0 BENIGN PROSTATIC HYPERPLASIA WITHOUT LOWER URINARY TRACT SYMPTOMS: ICD-10-CM

## 2018-11-04 DIAGNOSIS — K63.5 POLYP OF COLON: Chronic | ICD-10-CM

## 2018-11-04 DIAGNOSIS — J44.9 CHRONIC OBSTRUCTIVE PULMONARY DISEASE, UNSPECIFIED: ICD-10-CM

## 2018-11-04 DIAGNOSIS — N28.9 DISORDER OF KIDNEY AND URETER, UNSPECIFIED: ICD-10-CM

## 2018-11-04 LAB
ALBUMIN SERPL ELPH-MCNC: 4.5 G/DL — SIGNIFICANT CHANGE UP (ref 3.5–5.2)
ALP SERPL-CCNC: 78 U/L — SIGNIFICANT CHANGE UP (ref 30–115)
ALT FLD-CCNC: 11 U/L — SIGNIFICANT CHANGE UP (ref 0–41)
ANION GAP SERPL CALC-SCNC: 16 MMOL/L — HIGH (ref 7–14)
APTT BLD: 27.3 SEC — SIGNIFICANT CHANGE UP (ref 27–39.2)
AST SERPL-CCNC: 14 U/L — SIGNIFICANT CHANGE UP (ref 0–41)
BASE EXCESS BLDA CALC-SCNC: -3.3 MMOL/L — LOW (ref -2–2)
BASOPHILS # BLD AUTO: 0.08 K/UL — SIGNIFICANT CHANGE UP (ref 0–0.2)
BASOPHILS NFR BLD AUTO: 0.3 % — SIGNIFICANT CHANGE UP (ref 0–1)
BILIRUB SERPL-MCNC: 0.7 MG/DL — SIGNIFICANT CHANGE UP (ref 0.2–1.2)
BUN SERPL-MCNC: 36 MG/DL — HIGH (ref 10–20)
CA-I SERPL-SCNC: 1.22 MMOL/L — SIGNIFICANT CHANGE UP (ref 1.12–1.3)
CALCIUM SERPL-MCNC: 9.6 MG/DL — SIGNIFICANT CHANGE UP (ref 8.5–10.1)
CHLORIDE SERPL-SCNC: 101 MMOL/L — SIGNIFICANT CHANGE UP (ref 98–110)
CK MB CFR SERPL CALC: 2.8 NG/ML — SIGNIFICANT CHANGE UP (ref 0.6–6.3)
CK SERPL-CCNC: 40 U/L — SIGNIFICANT CHANGE UP (ref 0–225)
CO2 BLDA-SCNC: 47 MMOL/L — HIGH (ref 22–30)
CO2 SERPL-SCNC: 25 MMOL/L — SIGNIFICANT CHANGE UP (ref 17–32)
CREAT SERPL-MCNC: 1.7 MG/DL — HIGH (ref 0.7–1.5)
EOSINOPHIL # BLD AUTO: 0.54 K/UL — SIGNIFICANT CHANGE UP (ref 0–0.7)
EOSINOPHIL NFR BLD AUTO: 2.3 % — SIGNIFICANT CHANGE UP (ref 0–8)
GAS PNL BLDV: 141 MMOL/L — SIGNIFICANT CHANGE UP (ref 136–145)
GAS PNL BLDV: SIGNIFICANT CHANGE UP
GLUCOSE BLDC GLUCOMTR-MCNC: 352 MG/DL — HIGH (ref 70–99)
GLUCOSE BLDC GLUCOMTR-MCNC: 393 MG/DL — HIGH (ref 70–99)
GLUCOSE BLDC GLUCOMTR-MCNC: 406 MG/DL — HIGH (ref 70–99)
GLUCOSE BLDC GLUCOMTR-MCNC: 410 MG/DL — HIGH (ref 70–99)
GLUCOSE SERPL-MCNC: 186 MG/DL — HIGH (ref 70–99)
HCO3 BLDA-SCNC: 23 MMOL/L — SIGNIFICANT CHANGE UP (ref 23–27)
HCO3 BLDV-SCNC: 29 MMOL/L — SIGNIFICANT CHANGE UP (ref 22–29)
HCT VFR BLD CALC: 47.2 % — SIGNIFICANT CHANGE UP (ref 42–52)
HCT VFR BLDA CALC: 46.1 % — HIGH (ref 34–44)
HGB BLD CALC-MCNC: 15 G/DL — SIGNIFICANT CHANGE UP (ref 14–18)
HGB BLD-MCNC: 14.3 G/DL — SIGNIFICANT CHANGE UP (ref 14–18)
HOROWITZ INDEX BLDA+IHG-RTO: 55 — SIGNIFICANT CHANGE UP
HOROWITZ INDEX BLDV+IHG-RTO: 100 — SIGNIFICANT CHANGE UP
IMM GRANULOCYTES NFR BLD AUTO: 0.9 % — HIGH (ref 0.1–0.3)
INR BLD: 1.13 RATIO — SIGNIFICANT CHANGE UP (ref 0.65–1.3)
LACTATE BLDV-MCNC: 1.5 MMOL/L — SIGNIFICANT CHANGE UP (ref 0.5–1.6)
LYMPHOCYTES # BLD AUTO: 2.17 K/UL — SIGNIFICANT CHANGE UP (ref 1.2–3.4)
LYMPHOCYTES # BLD AUTO: 9.2 % — LOW (ref 20.5–51.1)
MAGNESIUM SERPL-MCNC: 1.9 MG/DL — SIGNIFICANT CHANGE UP (ref 1.8–2.4)
MCHC RBC-ENTMCNC: 24 PG — LOW (ref 27–31)
MCHC RBC-ENTMCNC: 30.3 G/DL — LOW (ref 32–37)
MCV RBC AUTO: 79.1 FL — LOW (ref 80–94)
MONOCYTES # BLD AUTO: 1.47 K/UL — HIGH (ref 0.1–0.6)
MONOCYTES NFR BLD AUTO: 6.2 % — SIGNIFICANT CHANGE UP (ref 1.7–9.3)
NEUTROPHILS # BLD AUTO: 19.06 K/UL — HIGH (ref 1.4–6.5)
NEUTROPHILS NFR BLD AUTO: 81.1 % — HIGH (ref 42.2–75.2)
NRBC # BLD: 0 /100 WBCS — SIGNIFICANT CHANGE UP (ref 0–0)
NT-PROBNP SERPL-SCNC: 185 PG/ML — SIGNIFICANT CHANGE UP (ref 0–300)
PCO2 BLDA: 47 MMHG — HIGH (ref 38–42)
PCO2 BLDV: 67 MMHG — HIGH (ref 41–51)
PH BLDA: 7.3 — LOW (ref 7.38–7.42)
PH BLDV: 7.25 — LOW (ref 7.26–7.43)
PLATELET # BLD AUTO: 624 K/UL — HIGH (ref 130–400)
PO2 BLDA: 60 MMHG — LOW (ref 78–95)
PO2 BLDV: 25 MMHG — SIGNIFICANT CHANGE UP (ref 20–40)
POTASSIUM BLDV-SCNC: 4.8 MMOL/L — SIGNIFICANT CHANGE UP (ref 3.3–5.6)
POTASSIUM SERPL-MCNC: 5.2 MMOL/L — HIGH (ref 3.5–5)
POTASSIUM SERPL-SCNC: 5.2 MMOL/L — HIGH (ref 3.5–5)
PROT SERPL-MCNC: 6.9 G/DL — SIGNIFICANT CHANGE UP (ref 6–8)
PROTHROM AB SERPL-ACNC: 12.2 SEC — SIGNIFICANT CHANGE UP (ref 9.95–12.87)
RBC # BLD: 5.97 M/UL — SIGNIFICANT CHANGE UP (ref 4.7–6.1)
RBC # FLD: 17.5 % — HIGH (ref 11.5–14.5)
SAO2 % BLDA: 88 % — LOW (ref 94–98)
SAO2 % BLDV: 38 % — SIGNIFICANT CHANGE UP
SODIUM SERPL-SCNC: 142 MMOL/L — SIGNIFICANT CHANGE UP (ref 135–146)
TROPONIN T SERPL-MCNC: <0.01 NG/ML — SIGNIFICANT CHANGE UP
WBC # BLD: 23.53 K/UL — HIGH (ref 4.8–10.8)
WBC # FLD AUTO: 23.53 K/UL — HIGH (ref 4.8–10.8)

## 2018-11-04 PROCEDURE — 93970 EXTREMITY STUDY: CPT | Mod: 26

## 2018-11-04 RX ORDER — BUDESONIDE AND FORMOTEROL FUMARATE DIHYDRATE 160; 4.5 UG/1; UG/1
2 AEROSOL RESPIRATORY (INHALATION)
Qty: 0 | Refills: 0 | Status: DISCONTINUED | OUTPATIENT
Start: 2018-11-04 | End: 2018-11-15

## 2018-11-04 RX ORDER — INSULIN LISPRO 100/ML
8 VIAL (ML) SUBCUTANEOUS ONCE
Qty: 0 | Refills: 0 | Status: COMPLETED | OUTPATIENT
Start: 2018-11-04 | End: 2018-11-04

## 2018-11-04 RX ORDER — INSULIN LISPRO 100/ML
VIAL (ML) SUBCUTANEOUS
Qty: 0 | Refills: 0 | Status: DISCONTINUED | OUTPATIENT
Start: 2018-11-04 | End: 2018-11-15

## 2018-11-04 RX ORDER — PREGABALIN 225 MG/1
1000 CAPSULE ORAL DAILY
Qty: 0 | Refills: 0 | Status: DISCONTINUED | OUTPATIENT
Start: 2018-11-04 | End: 2018-11-15

## 2018-11-04 RX ORDER — INSULIN LISPRO 100/ML
12 VIAL (ML) SUBCUTANEOUS
Qty: 0 | Refills: 0 | Status: DISCONTINUED | OUTPATIENT
Start: 2018-11-04 | End: 2018-11-15

## 2018-11-04 RX ORDER — DEXTROSE 50 % IN WATER 50 %
15 SYRINGE (ML) INTRAVENOUS ONCE
Qty: 0 | Refills: 0 | Status: DISCONTINUED | OUTPATIENT
Start: 2018-11-04 | End: 2018-11-15

## 2018-11-04 RX ORDER — IPRATROPIUM/ALBUTEROL SULFATE 18-103MCG
3 AEROSOL WITH ADAPTER (GRAM) INHALATION EVERY 6 HOURS
Qty: 0 | Refills: 0 | Status: DISCONTINUED | OUTPATIENT
Start: 2018-11-04 | End: 2018-11-15

## 2018-11-04 RX ORDER — ASCORBIC ACID 60 MG
1000 TABLET,CHEWABLE ORAL DAILY
Qty: 0 | Refills: 0 | Status: DISCONTINUED | OUTPATIENT
Start: 2018-11-04 | End: 2018-11-15

## 2018-11-04 RX ORDER — SODIUM POLYSTYRENE SULFONATE 4.1 MEQ/G
0 POWDER, FOR SUSPENSION ORAL
Qty: 0 | Refills: 0 | COMMUNITY

## 2018-11-04 RX ORDER — CHLORHEXIDINE GLUCONATE 213 G/1000ML
1 SOLUTION TOPICAL
Qty: 0 | Refills: 0 | Status: DISCONTINUED | OUTPATIENT
Start: 2018-11-04 | End: 2018-11-15

## 2018-11-04 RX ORDER — ASCORBIC ACID 60 MG
0 TABLET,CHEWABLE ORAL
Qty: 0 | Refills: 0 | COMMUNITY

## 2018-11-04 RX ORDER — DEXTROSE 50 % IN WATER 50 %
12.5 SYRINGE (ML) INTRAVENOUS ONCE
Qty: 0 | Refills: 0 | Status: DISCONTINUED | OUTPATIENT
Start: 2018-11-04 | End: 2018-11-15

## 2018-11-04 RX ORDER — METOPROLOL TARTRATE 50 MG
25 TABLET ORAL DAILY
Qty: 0 | Refills: 0 | Status: DISCONTINUED | OUTPATIENT
Start: 2018-11-04 | End: 2018-11-15

## 2018-11-04 RX ORDER — POLYETHYLENE GLYCOL 3350 17 G/17G
0 POWDER, FOR SOLUTION ORAL
Qty: 0 | Refills: 0 | COMMUNITY

## 2018-11-04 RX ORDER — IPRATROPIUM/ALBUTEROL SULFATE 18-103MCG
3 AEROSOL WITH ADAPTER (GRAM) INHALATION ONCE
Qty: 0 | Refills: 0 | Status: COMPLETED | OUTPATIENT
Start: 2018-11-04 | End: 2018-11-04

## 2018-11-04 RX ORDER — SODIUM CHLORIDE 9 MG/ML
1000 INJECTION, SOLUTION INTRAVENOUS
Qty: 0 | Refills: 0 | Status: DISCONTINUED | OUTPATIENT
Start: 2018-11-04 | End: 2018-11-15

## 2018-11-04 RX ORDER — FORMOTEROL FUMARATE 12 MCG
0 CAPSULE, WITH INHALATION DEVICE INHALATION
Qty: 0 | Refills: 0 | COMMUNITY

## 2018-11-04 RX ORDER — HEPARIN SODIUM 5000 [USP'U]/ML
5000 INJECTION INTRAVENOUS; SUBCUTANEOUS EVERY 8 HOURS
Qty: 0 | Refills: 0 | Status: DISCONTINUED | OUTPATIENT
Start: 2018-11-04 | End: 2018-11-15

## 2018-11-04 RX ORDER — MOMETASONE FUROATE 50 UG/1
1 SPRAY NASAL
Qty: 0 | Refills: 0 | COMMUNITY

## 2018-11-04 RX ORDER — DEXTROSE 50 % IN WATER 50 %
25 SYRINGE (ML) INTRAVENOUS ONCE
Qty: 0 | Refills: 0 | Status: DISCONTINUED | OUTPATIENT
Start: 2018-11-04 | End: 2018-11-15

## 2018-11-04 RX ORDER — ATORVASTATIN CALCIUM 80 MG/1
1 TABLET, FILM COATED ORAL
Qty: 0 | Refills: 0 | COMMUNITY

## 2018-11-04 RX ORDER — INSULIN GLARGINE 100 [IU]/ML
36 INJECTION, SOLUTION SUBCUTANEOUS EVERY MORNING
Qty: 0 | Refills: 0 | Status: DISCONTINUED | OUTPATIENT
Start: 2018-11-04 | End: 2018-11-08

## 2018-11-04 RX ORDER — GLUCAGON INJECTION, SOLUTION 0.5 MG/.1ML
1 INJECTION, SOLUTION SUBCUTANEOUS ONCE
Qty: 0 | Refills: 0 | Status: DISCONTINUED | OUTPATIENT
Start: 2018-11-04 | End: 2018-11-15

## 2018-11-04 RX ORDER — AMLODIPINE BESYLATE 2.5 MG/1
10 TABLET ORAL DAILY
Qty: 0 | Refills: 0 | Status: DISCONTINUED | OUTPATIENT
Start: 2018-11-04 | End: 2018-11-15

## 2018-11-04 RX ORDER — POLYETHYLENE GLYCOL 3350 17 G/17G
17 POWDER, FOR SOLUTION ORAL DAILY
Qty: 0 | Refills: 0 | Status: DISCONTINUED | OUTPATIENT
Start: 2018-11-04 | End: 2018-11-11

## 2018-11-04 RX ORDER — FINASTERIDE 5 MG/1
5 TABLET, FILM COATED ORAL DAILY
Qty: 0 | Refills: 0 | Status: DISCONTINUED | OUTPATIENT
Start: 2018-11-04 | End: 2018-11-05

## 2018-11-04 RX ORDER — FINASTERIDE 5 MG/1
5 TABLET, FILM COATED ORAL DAILY
Qty: 0 | Refills: 0 | Status: DISCONTINUED | OUTPATIENT
Start: 2018-11-04 | End: 2018-11-15

## 2018-11-04 RX ORDER — PREGABALIN 225 MG/1
3000 CAPSULE ORAL DAILY
Qty: 0 | Refills: 0 | Status: DISCONTINUED | OUTPATIENT
Start: 2018-11-04 | End: 2018-11-04

## 2018-11-04 RX ORDER — SODIUM POLYSTYRENE SULFONATE 4.1 MEQ/G
15 POWDER, FOR SUSPENSION ORAL AT BEDTIME
Qty: 0 | Refills: 0 | Status: COMPLETED | OUTPATIENT
Start: 2018-11-04 | End: 2018-11-04

## 2018-11-04 RX ORDER — CHOLECALCIFEROL (VITAMIN D3) 125 MCG
1000 CAPSULE ORAL DAILY
Qty: 0 | Refills: 0 | Status: DISCONTINUED | OUTPATIENT
Start: 2018-11-04 | End: 2018-11-15

## 2018-11-04 RX ORDER — DOCUSATE SODIUM 100 MG
100 CAPSULE ORAL DAILY
Qty: 0 | Refills: 0 | Status: DISCONTINUED | OUTPATIENT
Start: 2018-11-04 | End: 2018-11-07

## 2018-11-04 RX ORDER — TAMSULOSIN HYDROCHLORIDE 0.4 MG/1
0.4 CAPSULE ORAL AT BEDTIME
Qty: 0 | Refills: 0 | Status: DISCONTINUED | OUTPATIENT
Start: 2018-11-04 | End: 2018-11-15

## 2018-11-04 RX ORDER — ATORVASTATIN CALCIUM 80 MG/1
20 TABLET, FILM COATED ORAL AT BEDTIME
Qty: 0 | Refills: 0 | Status: DISCONTINUED | OUTPATIENT
Start: 2018-11-04 | End: 2018-11-15

## 2018-11-04 RX ORDER — DOCUSATE SODIUM 100 MG
0 CAPSULE ORAL
Qty: 0 | Refills: 0 | COMMUNITY

## 2018-11-04 RX ORDER — METOPROLOL TARTRATE 50 MG
0 TABLET ORAL
Qty: 0 | Refills: 0 | COMMUNITY

## 2018-11-04 RX ADMIN — Medication 3 MILLILITER(S): at 14:19

## 2018-11-04 RX ADMIN — Medication 3 MILLILITER(S): at 10:31

## 2018-11-04 RX ADMIN — Medication 12 UNIT(S): at 16:32

## 2018-11-04 RX ADMIN — Medication 60 MILLIGRAM(S): at 15:01

## 2018-11-04 RX ADMIN — AMLODIPINE BESYLATE 10 MILLIGRAM(S): 2.5 TABLET ORAL at 17:22

## 2018-11-04 RX ADMIN — Medication 3 MILLILITER(S): at 08:31

## 2018-11-04 RX ADMIN — SODIUM POLYSTYRENE SULFONATE 15 GRAM(S): 4.1 POWDER, FOR SUSPENSION ORAL at 21:15

## 2018-11-04 RX ADMIN — HEPARIN SODIUM 5000 UNIT(S): 5000 INJECTION INTRAVENOUS; SUBCUTANEOUS at 15:02

## 2018-11-04 RX ADMIN — PREGABALIN 1000 MICROGRAM(S): 225 CAPSULE ORAL at 17:20

## 2018-11-04 RX ADMIN — Medication 1000 MILLIGRAM(S): at 17:21

## 2018-11-04 RX ADMIN — BUDESONIDE AND FORMOTEROL FUMARATE DIHYDRATE 2 PUFF(S): 160; 4.5 AEROSOL RESPIRATORY (INHALATION) at 21:38

## 2018-11-04 RX ADMIN — TAMSULOSIN HYDROCHLORIDE 0.4 MILLIGRAM(S): 0.4 CAPSULE ORAL at 21:16

## 2018-11-04 RX ADMIN — Medication 3 MILLILITER(S): at 20:01

## 2018-11-04 RX ADMIN — Medication 8 UNIT(S): at 20:29

## 2018-11-04 RX ADMIN — FINASTERIDE 5 MILLIGRAM(S): 5 TABLET, FILM COATED ORAL at 17:20

## 2018-11-04 RX ADMIN — Medication 60 MILLIGRAM(S): at 21:18

## 2018-11-04 RX ADMIN — Medication 6: at 16:32

## 2018-11-04 RX ADMIN — POLYETHYLENE GLYCOL 3350 17 GRAM(S): 17 POWDER, FOR SOLUTION ORAL at 17:20

## 2018-11-04 RX ADMIN — ATORVASTATIN CALCIUM 20 MILLIGRAM(S): 80 TABLET, FILM COATED ORAL at 21:16

## 2018-11-04 RX ADMIN — Medication 100 MILLIGRAM(S): at 17:21

## 2018-11-04 NOTE — ED PROVIDER NOTE - PMH
COPD (chronic obstructive pulmonary disease)    Diabetes mellitus, type 2    Emphysema lung    Enlarged prostate    GERD (gastroesophageal reflux disease)    High cholesterol    Hypertension    Oxygen dependent

## 2018-11-04 NOTE — ED PROVIDER NOTE - NS ED ROS FT
Review of Systems    Constitutional: (-) fever  Cardiovascular: (-) chest pain, (-) syncope  Respiratory: (+) cough, (+) shortness of breath  Gastrointestinal: (-) vomiting, (-) diarrhea  Musculoskeletal: (-) neck pain, (-) back pain  Integumentary: (-) rash, (-) edema

## 2018-11-04 NOTE — ED ADULT TRIAGE NOTE - CHIEF COMPLAINT QUOTE
pt with c/o diff breathing and low back pain pt with c/o diff breathing and low back pain, (as per EMS solumedrol given and respiratory treatments)

## 2018-11-04 NOTE — ED ADULT NURSE NOTE - INTERVENTIONS DEFINITIONS
Monitor gait and stability/Stretcher in lowest position, wheels locked, appropriate side rails in place/[place] to call system

## 2018-11-04 NOTE — H&P ADULT - HISTORY OF PRESENT ILLNESS
76 y/o Hx : COPD c/o " I couln't breath", started last night.  + Cough/non-productive, no fever reported.  - never intubated  -Pulmonologist Dr Mcelroy

## 2018-11-04 NOTE — H&P ADULT - PROBLEM SELECTOR PLAN 1
Solumedrol, antibiotic's, nebulizer Rx, uses CPAP or BIPAP at night: patient unsure concerning which one he uses

## 2018-11-04 NOTE — H&P ADULT - NSHPLABSRESULTS_GEN_ALL_CORE
14.3   23.53 )-----------( 624      ( 04 Nov 2018 08:00 )             47.2       11-04    142  |  101  |  36<H>  ----------------------------<  186<H>  5.2<H>   |  25  |  1.7<H>    Ca    9.6      04 Nov 2018 08:00  Mg     1.9     11-04    TPro  6.9  /  Alb  4.5  /  TBili  0.7  /  DBili  x   /  AST  14  /  ALT  11  /  AlkPhos  78  11-04        PT/INR - ( 04 Nov 2018 08:00 )   PT: 12.20 sec;   INR: 1.13 ratio         PTT - ( 04 Nov 2018 08:00 )  PTT:27.3 sec    CARDIAC MARKERS ( 04 Nov 2018 08:00 )  x     / <0.01 ng/mL / 40 U/L / x     / 2.8 ng/mL 14.3   23.53 )-----------( 624      ( 04 Nov 2018 08:00 )             47.2       11-04    142  |  101  |  36<H>  ----------------------------<  186<H>  5.2<H>   |  25  |  1.7<H>    Ca    9.6      04 Nov 2018 08:00  Mg     1.9     11-04    TPro  6.9  /  Alb  4.5  /  TBili  0.7  /  DBili  x   /  AST  14  /  ALT  11  /  AlkPhos  78  11-04        PT/INR - ( 04 Nov 2018 08:00 )   PT: 12.20 sec;   INR: 1.13 ratio         PTT - ( 04 Nov 2018 08:00 )  PTT:27.3 sec    CARDIAC MARKERS ( 04 Nov 2018 08:00 )  x     / <0.01 ng/mL / 40 U/L / x     / 2.8 ng/mL      EKG:  Diagnosis Line Sinus rhythm with occasional Premature ventricular complexes  Poor R wave progression

## 2018-11-04 NOTE — ED PROVIDER NOTE - PHYSICAL EXAMINATION
PHYSICAL EXAM:    GENERAL: Alert, appears stated age, well appearing, non-toxic  SKIN: Warm, pink and dry. MMM.   EYE: Normal lids/conjunctiva  ENT: Normal hearing, patent oropharynx   NECK: +supple. No meningismus, or JVD, +Trachea midline.   Pulm: Bilateral BS, normal resp effort, +wheezes +retractions. placed on bipap at bedside.   CV: RRR, no M/R/G . bedside sono without b-lines and normal lung sliding.   Abd: soft, non-tender, non-distended  Mskel: no erythema, cyanosis, edema. no calf tenderness.   Neuro: AAOx3, 5/5 strength throughout

## 2018-11-04 NOTE — ED PROVIDER NOTE - OBJECTIVE STATEMENT
76 y/o M with PMH DM, HTN, HLD, DM , COPD on 5LPM and on bipap at night, on prednisone followed with pulm dr. aguilera, presetns with SOB, cough x yesterday. Denies CP, palpitations, back pain, abdominal pain, n/v/d, black or bloody stools, fevers, trauma, fall, recent travel, recent illness, sick contacts, leg pain/swelling, urinary symptoms, rash.

## 2018-11-04 NOTE — H&P ADULT - PMH
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

## 2018-11-04 NOTE — ED PROVIDER NOTE - CARE PLAN
Principal Discharge DX:	COPD (chronic obstructive pulmonary disease)  Secondary Diagnosis:	Shortness of breath  Secondary Diagnosis:	Hypoxia

## 2018-11-04 NOTE — H&P ADULT - NSHPPHYSICALEXAM_GEN_ALL_CORE
PHYSICAL EXAM:    Vital Signs Last 24 Hrs    T(F): 98.6 (11-04-18 @ 07:39), Max: 98.6 (11-04-18 @ 07:39)  HR: 95 (11-04-18 @ 11:06) (95 - 111)  BP: 116/57 (11-04-18 @ 11:06)  RR: 20 (11-04-18 @ 11:06) (19 - 26)  SpO2: 99% (11-04-18 @ 11:06) (88% - 99%)    BIPAP 16/8  100%    Connstitutional: NAD, A&O x3    Eyes: PERRLA    Respiratory: +air entry, no rales, no rhonchi, no wheezes    Cardiovascular: +S1 and S2, regular rate and rhythm    Gastrointestinal: Obese, +BS, soft, non-tender, not distended    Extremities:  mild pedal , edema, no calf tenderness    Vascular: +dorsal pedis and radial pulses, no extremity cyanosis    Neurological: sensation intact, ROM equal B/L, CN II-XII intact    Skin: no rashes, normal turgor

## 2018-11-04 NOTE — H&P ADULT - NSHPSOCIALHISTORY_GEN_ALL_CORE
Tobacco use: Smoked 45 yrs, 1-2 PPD,  stopped 8-10 yrs ago   EtOH use: No  Illicit drug use: No  Marital Status:

## 2018-11-04 NOTE — H&P ADULT - PROBLEM SELECTOR PLAN 4
will check prior lab work to see if acute vs chronic will check prior lab work to see if acute vs chronic. Addendum Aug 1/2018: BUN/Creat: 39/1.4

## 2018-11-04 NOTE — ED ADULT NURSE NOTE - PMH
Diabetes mellitus, type 2    Emphysema lung    Hypertension COPD (chronic obstructive pulmonary disease)    Diabetes mellitus, type 2    Emphysema lung    Enlarged prostate    GERD (gastroesophageal reflux disease)    High cholesterol    Hypertension    Oxygen dependent

## 2018-11-04 NOTE — ED PROVIDER NOTE - ATTENDING CONTRIBUTION TO CARE
77m h/o emphysema on 5L nc @ home w/nl SaO2 87-91% & cpap @ night followed by Pulm Dr. Kay, dm, htn, ckd p/w worsening cough & sob x 2d. Did not use his cpap overnight and woke up w/worsened sob. Notable incr WOB with SaO2 low 80s on facemask. Denies f/c, cp, palpitations, nvd, abd pain, edema. Given nebs by ems en route to ED. PMD Dr. Reddy. PE: obese elderly m appears sob, ncat, perrl/eomi, neck supple no jvd, tachy 110s reg rhythm nl s1s2, speaking in short sentences, +access muscle use, +decr air entry bl, +diffuse wheezing bl, no rales/rhonchi, abd obese soft ntnd no palpable masses no rgr, ext no cce dpi. a/p: see mdm 77m h/o emphysema on 5L nc @ home w/nl SaO2 87-91% & cpap @ night followed by Pulm Dr. Kay, dm, htn, ckd p/w worsening cough & sob x 2d. Did not use his cpap overnight and woke up w/worsened sob. Notable incr WOB with SaO2 low 80s on facemask. Denies f/c, cp, palpitations, nvd, abd pain, edema. Given nebs & solumedrol 125 mg IV by ems en route to ED. PMD Dr. Reddy. PE: obese elderly m appears sob, ncat, perrl/eomi, neck supple no jvd, tachy 110s reg rhythm nl s1s2, speaking in short sentences, +access muscle use, +decr air entry bl, +diffuse wheezing bl, no rales/rhonchi, abd obese soft ntnd no palpable masses no rgr, ext no cce dpi. a/p: see mdm

## 2018-11-04 NOTE — ED PROVIDER NOTE - PROGRESS NOTE DETAILS
d/w intensivist Dr. Silva, does not require ICU, rec floor admission disucssed with Dr. Gallegos, accepts admission. he will follow outstanding labs. pt refuses rectal temp

## 2018-11-05 DIAGNOSIS — E66.9 OBESITY, UNSPECIFIED: ICD-10-CM

## 2018-11-05 LAB
ANION GAP SERPL CALC-SCNC: 18 MMOL/L — HIGH (ref 7–14)
BASOPHILS # BLD AUTO: 0 K/UL — SIGNIFICANT CHANGE UP (ref 0–0.2)
BASOPHILS NFR BLD AUTO: 0 % — SIGNIFICANT CHANGE UP (ref 0–1)
BUN SERPL-MCNC: 46 MG/DL — HIGH (ref 10–20)
CALCIUM SERPL-MCNC: 9.2 MG/DL — SIGNIFICANT CHANGE UP (ref 8.5–10.1)
CHLORIDE SERPL-SCNC: 100 MMOL/L — SIGNIFICANT CHANGE UP (ref 98–110)
CO2 SERPL-SCNC: 22 MMOL/L — SIGNIFICANT CHANGE UP (ref 17–32)
CREAT SERPL-MCNC: 1.8 MG/DL — HIGH (ref 0.7–1.5)
EOSINOPHIL NFR BLD AUTO: 0 % — SIGNIFICANT CHANGE UP (ref 0–8)
ESTIMATED AVERAGE GLUCOSE: 203 MG/DL — HIGH (ref 68–114)
GLUCOSE BLDC GLUCOMTR-MCNC: 238 MG/DL — HIGH (ref 70–99)
GLUCOSE BLDC GLUCOMTR-MCNC: 274 MG/DL — HIGH (ref 70–99)
GLUCOSE BLDC GLUCOMTR-MCNC: 275 MG/DL — HIGH (ref 70–99)
GLUCOSE BLDC GLUCOMTR-MCNC: 427 MG/DL — HIGH (ref 70–99)
GLUCOSE SERPL-MCNC: 279 MG/DL — HIGH (ref 70–99)
HBA1C BLD-MCNC: 8.7 % — HIGH (ref 4–5.6)
HCT VFR BLD CALC: 40.9 % — LOW (ref 42–52)
HGB BLD-MCNC: 12.4 G/DL — LOW (ref 14–18)
LYMPHOCYTES # BLD AUTO: 1.03 K/UL — LOW (ref 1.2–3.4)
LYMPHOCYTES # BLD AUTO: 4 % — LOW (ref 20.5–51.1)
MAGNESIUM SERPL-MCNC: 2.1 MG/DL — SIGNIFICANT CHANGE UP (ref 1.8–2.4)
MCHC RBC-ENTMCNC: 23.6 PG — LOW (ref 27–31)
MCHC RBC-ENTMCNC: 30.3 G/DL — LOW (ref 32–37)
MCV RBC AUTO: 77.9 FL — LOW (ref 80–94)
MONOCYTES # BLD AUTO: 0.77 K/UL — HIGH (ref 0.1–0.6)
MONOCYTES NFR BLD AUTO: 3 % — SIGNIFICANT CHANGE UP (ref 1.7–9.3)
NEUTROPHILS # BLD AUTO: 24.01 K/UL — HIGH (ref 1.4–6.5)
NEUTROPHILS NFR BLD AUTO: 93 % — HIGH (ref 42.2–75.2)
PLATELET # BLD AUTO: 523 K/UL — HIGH (ref 130–400)
POTASSIUM SERPL-MCNC: 5.8 MMOL/L — HIGH (ref 3.5–5)
POTASSIUM SERPL-SCNC: 5.8 MMOL/L — HIGH (ref 3.5–5)
RBC # BLD: 5.25 M/UL — SIGNIFICANT CHANGE UP (ref 4.7–6.1)
RBC # FLD: 17.2 % — HIGH (ref 11.5–14.5)
SODIUM SERPL-SCNC: 140 MMOL/L — SIGNIFICANT CHANGE UP (ref 135–146)
WBC # BLD: 25.82 K/UL — HIGH (ref 4.8–10.8)
WBC # FLD AUTO: 25.82 K/UL — HIGH (ref 4.8–10.8)

## 2018-11-05 RX ORDER — INSULIN HUMAN 100 [IU]/ML
15 INJECTION, SOLUTION SUBCUTANEOUS ONCE
Qty: 0 | Refills: 0 | Status: COMPLETED | OUTPATIENT
Start: 2018-11-05 | End: 2018-11-05

## 2018-11-05 RX ORDER — AZTREONAM 2 G
1000 VIAL (EA) INJECTION EVERY 12 HOURS
Qty: 0 | Refills: 0 | Status: DISCONTINUED | OUTPATIENT
Start: 2018-11-06 | End: 2018-11-06

## 2018-11-05 RX ORDER — AZTREONAM 2 G
VIAL (EA) INJECTION
Qty: 0 | Refills: 0 | Status: DISCONTINUED | OUTPATIENT
Start: 2018-11-05 | End: 2018-11-06

## 2018-11-05 RX ORDER — AZTREONAM 2 G
1000 VIAL (EA) INJECTION ONCE
Qty: 0 | Refills: 0 | Status: COMPLETED | OUTPATIENT
Start: 2018-11-05 | End: 2018-11-05

## 2018-11-05 RX ADMIN — Medication 3 MILLILITER(S): at 08:17

## 2018-11-05 RX ADMIN — HEPARIN SODIUM 5000 UNIT(S): 5000 INJECTION INTRAVENOUS; SUBCUTANEOUS at 13:13

## 2018-11-05 RX ADMIN — INSULIN GLARGINE 36 UNIT(S): 100 INJECTION, SOLUTION SUBCUTANEOUS at 08:13

## 2018-11-05 RX ADMIN — Medication 1000 UNIT(S): at 11:31

## 2018-11-05 RX ADMIN — Medication 1000 MILLIGRAM(S): at 11:30

## 2018-11-05 RX ADMIN — HEPARIN SODIUM 5000 UNIT(S): 5000 INJECTION INTRAVENOUS; SUBCUTANEOUS at 21:10

## 2018-11-05 RX ADMIN — Medication 40 MILLIGRAM(S): at 21:10

## 2018-11-05 RX ADMIN — ATORVASTATIN CALCIUM 20 MILLIGRAM(S): 80 TABLET, FILM COATED ORAL at 21:10

## 2018-11-05 RX ADMIN — Medication 40 MILLIGRAM(S): at 15:02

## 2018-11-05 RX ADMIN — Medication 60 MILLIGRAM(S): at 06:14

## 2018-11-05 RX ADMIN — FINASTERIDE 5 MILLIGRAM(S): 5 TABLET, FILM COATED ORAL at 11:31

## 2018-11-05 RX ADMIN — Medication 50 MILLIGRAM(S): at 16:05

## 2018-11-05 RX ADMIN — POLYETHYLENE GLYCOL 3350 17 GRAM(S): 17 POWDER, FOR SOLUTION ORAL at 11:29

## 2018-11-05 RX ADMIN — HEPARIN SODIUM 5000 UNIT(S): 5000 INJECTION INTRAVENOUS; SUBCUTANEOUS at 06:14

## 2018-11-05 RX ADMIN — Medication 12 UNIT(S): at 16:47

## 2018-11-05 RX ADMIN — TAMSULOSIN HYDROCHLORIDE 0.4 MILLIGRAM(S): 0.4 CAPSULE ORAL at 21:10

## 2018-11-05 RX ADMIN — Medication 3 MILLILITER(S): at 20:08

## 2018-11-05 RX ADMIN — Medication 3: at 08:14

## 2018-11-05 RX ADMIN — Medication 3 MILLILITER(S): at 01:25

## 2018-11-05 RX ADMIN — INSULIN HUMAN 15 UNIT(S): 100 INJECTION, SOLUTION SUBCUTANEOUS at 01:06

## 2018-11-05 RX ADMIN — PREGABALIN 1000 MICROGRAM(S): 225 CAPSULE ORAL at 13:13

## 2018-11-05 RX ADMIN — Medication 6: at 11:22

## 2018-11-05 RX ADMIN — Medication 3 MILLILITER(S): at 13:37

## 2018-11-05 RX ADMIN — Medication 100 MILLIGRAM(S): at 11:30

## 2018-11-05 RX ADMIN — Medication 12 UNIT(S): at 08:14

## 2018-11-05 RX ADMIN — CHLORHEXIDINE GLUCONATE 1 APPLICATION(S): 213 SOLUTION TOPICAL at 06:09

## 2018-11-05 RX ADMIN — Medication 12 UNIT(S): at 11:22

## 2018-11-05 RX ADMIN — Medication 3: at 16:47

## 2018-11-05 NOTE — CONSULT NOTE ADULT - ASSESSMENT
IMPRESSION:    Severe community acquired pneumonia - multiple risk factors for drug resistant strep  Acute on chronic hypercapnic failure, chronic hypoxemic respiratory failure  MICHAEL/OHS  Morbid obesity    PLAN:    Start Levaquin 750mg IV for now  Taper down steroids to Solumedrol 40mg Q8h  Nebs as needed  Bipap as needed and at night  Oxygen to aim for saturation more than 88%, avoid over-oxygenation  Check influenza pcr, RSV  Blood cultures, sputum cultures, check urine strep and legionella  DVT prophylaxis  Will follow IMPRESSION:    Severe community acquired pneumonia - multiple risk factors for drug resistant strep  Acute on chronic hypercapnic failure, chronic hypoxemic respiratory failure  MICHAEL/OHS  Morbid obesity    PLAN:    Start Levaquin 750mg IV for now and rocephine   Taper down steroids to Solumedrol 40mg Q8h  Nebs as needed  Bipap as needed and at night  Oxygen to aim for saturation more than 88%, avoid over-oxygenation  Check influenza pcr, RSV  Blood cultures, sputum cultures, check urine strep and legionella  DVT prophylaxis  Will follow

## 2018-11-05 NOTE — CONSULT NOTE ADULT - SUBJECTIVE AND OBJECTIVE BOX
Patient is a 77y old  Male who presents with a chief complaint of Dyspnea.     HPI:    77 year old man with COPD, MICHAEL/OHS, on home oxygen and on CPAP at night, presenting with shortness of breath, accompanied by chills, and non productive cough, found to have elevated white cell count on CBC.       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  High cholesterol  GERD (gastroesophageal reflux disease)  Enlarged prostate  Oxygen dependent  COPD (chronic obstructive pulmonary disease)  Diabetes mellitus, type 2  Hypertension  Emphysema lung  Empyema lung  History of hemorrhoidectomy  Dysplastic colon polyp: removed      SOCIAL HX:   Smoking ex smoker                        ETOH  neg                          Other  denies    FAMILY HISTORY:  No pertinent family history in first degree relatives  :  No known cardiovacular family hisotry     ROS:  See HPI     Allergies    iodine (Hives)  penicillin (Unknown)    Intolerances    aspirin (Other)        PHYSICAL EXAM    ICU Vital Signs Last 24 Hrs  T(C): 35.6 (05 Nov 2018 06:26), Max: 35.8 (04 Nov 2018 14:30)  T(F): 96.1 (05 Nov 2018 06:26), Max: 96.4 (04 Nov 2018 14:30)  HR: 77 (05 Nov 2018 06:26) (58 - 104)  BP: 121/61 (05 Nov 2018 06:26) (110/68 - 123/62)  BP(mean): --  ABP: --  ABP(mean): --  RR: 18 (05 Nov 2018 06:26) (18 - 20)  SpO2: 98% (04 Nov 2018 13:00) (97% - 99%)      General: In NAD, on high flow nasal canula  HEENT:  MIS              Lymphatic system: No cervical LN   Lungs: Bilateral BS, decreased bilaterally, no wheezing  Cardiovascular: Regular  Gastrointestinal: Soft, Positive BS  Musculoskeletal: No clubbing.  Moves all extremities.  Full range of motion. No lower extremity edema. no cyanosis  Skin: Warm.  Intact  Neurological: No motor or sensory deficit         LABS:                          12.4   25.82 )-----------( 523      ( 05 Nov 2018 07:00 )             40.9                                               11-04    142  |  101  |  36<H>  ----------------------------<  186<H>  5.2<H>   |  25  |  1.7<H>    Ca    9.6      04 Nov 2018 08:00  Mg     1.9     11-04    TPro  6.9  /  Alb  4.5  /  TBili  0.7  /  DBili  x   /  AST  14  /  ALT  11  /  AlkPhos  78  11-04      PT/INR - ( 04 Nov 2018 08:00 )   PT: 12.20 sec;   INR: 1.13 ratio         PTT - ( 04 Nov 2018 08:00 )  PTT:27.3 sec                                           CARDIAC MARKERS ( 04 Nov 2018 08:00 )  x     / <0.01 ng/mL / 40 U/L / x     / 2.8 ng/mL                                            LIVER FUNCTIONS - ( 04 Nov 2018 08:00 )  Alb: 4.5 g/dL / Pro: 6.9 g/dL / ALK PHOS: 78 U/L / ALT: 11 U/L / AST: 14 U/L / GGT: x                                                                                                                                   ABG - ( 04 Nov 2018 13:09 )  pH, Arterial: 7.30  pH, Blood: x     /  pCO2: 47    /  pO2: 60    / HCO3: 23    / Base Excess: -3.3  /  SaO2: 88                  X-Rays Left lower lobe, right lowe lobe infiltrates                                                                                      MEDICATIONS  (STANDING):  ALBUTerol/ipratropium for Nebulization 3 milliLiter(s) Nebulizer every 6 hours  amLODIPine   Tablet 10 milliGRAM(s) Oral daily  ascorbic acid 1000 milliGRAM(s) Oral daily  atorvastatin 20 milliGRAM(s) Oral at bedtime  buDESOnide 160 MICROgram(s)/formoterol 4.5 MICROgram(s) Inhaler 2 Puff(s) Inhalation two times a day  chlorhexidine 4% Liquid 1 Application(s) Topical <User Schedule>  cholecalciferol 1000 Unit(s) Oral daily  cyanocobalamin 1000 MICROGram(s) Oral daily  dextrose 5%. 1000 milliLiter(s) (50 mL/Hr) IV Continuous <Continuous>  dextrose 50% Injectable 12.5 Gram(s) IV Push once  dextrose 50% Injectable 25 Gram(s) IV Push once  dextrose 50% Injectable 25 Gram(s) IV Push once  docusate sodium 100 milliGRAM(s) Oral daily  finasteride 5 milliGRAM(s) Oral daily  finasteride 5 milliGRAM(s) Oral daily  heparin  Injectable 5000 Unit(s) SubCutaneous every 8 hours  insulin glargine Injectable (LANTUS) 36 Unit(s) SubCutaneous every morning  insulin lispro (HumaLOG) corrective regimen sliding scale   SubCutaneous three times a day before meals  insulin lispro Injectable (HumaLOG) 12 Unit(s) SubCutaneous three times a day before meals  levoFLOXacin IVPB 500 milliGRAM(s) IV Intermittent every 24 hours  methylPREDNISolone sodium succinate Injectable 60 milliGRAM(s) IV Push every 8 hours  metoprolol succinate ER 25 milliGRAM(s) Oral daily  polyethylene glycol 3350 17 Gram(s) Oral daily  tamsulosin 0.4 milliGRAM(s) Oral at bedtime    MEDICATIONS  (PRN):  dextrose 40% Gel 15 Gram(s) Oral once PRN Blood Glucose LESS THAN 70 milliGRAM(s)/deciliter  glucagon  Injectable 1 milliGRAM(s) IntraMuscular once PRN Glucose LESS THAN 70 milligrams/deciliter

## 2018-11-05 NOTE — PROGRESS NOTE ADULT - PROBLEM SELECTOR PLAN 1
-Acute on chronic resp failure/copd exacerbation/CAP  -continue with levaquin (age/kidney based) and add Iv aztreonam (pt with PNC allergy)  -on High flow O2  -IV steroids, nebs, abx and pulmonary follow ups  -added lab work (urine legionella and strep)

## 2018-11-05 NOTE — PROGRESS NOTE ADULT - SUBJECTIVE AND OBJECTIVE BOX
JIAN MANCIA  77y  Male      Patient is a 77y old  Male who presents with a chief complaint of Dyspnea (05 Nov 2018 09:50)      INTERVAL HPI/OVERNIGHT EVENTS:    REVIEW OF SYSTEMS:  CONSTITUTIONAL: No fever, weight loss, or fatigue  EYES: No eye pain, visual disturbances, or discharge  NECK: No pain or stiffness  RESPIRATORY: No cough, wheezing, chills or hemoptysis; No shortness of breath  CARDIOVASCULAR: No chest pain, palpitations, dizziness, or leg swelling  GASTROINTESTINAL: No abdominal or epigastric pain. No nausea, vomiting, or hematemesis; No diarrhea or constipation. No melena or hematochezia.  GENITOURINARY: No dysuria, frequency, hematuria, or incontinence  NEUROLOGICAL: No headaches, memory loss, loss of strength, numbness, or tremors  MUSCULOSKELETAL: No joint pain or swelling; No muscle, back, or extremity pain    T(C): 36.2 (11-05-18 @ 14:02), Max: 36.2 (11-05-18 @ 14:02)  HR: 97 (11-05-18 @ 14:02) (77 - 100)  BP: 121/69 (11-05-18 @ 14:02) (121/61 - 123/62)  RR: 16 (11-05-18 @ 14:02) (16 - 18)  SpO2: --  Wt(kg): --Vital Signs Last 24 Hrs  T(C): 36.2 (05 Nov 2018 14:02), Max: 36.2 (05 Nov 2018 14:02)  T(F): 97.1 (05 Nov 2018 14:02), Max: 97.1 (05 Nov 2018 14:02)  HR: 97 (05 Nov 2018 14:02) (77 - 100)  BP: 121/69 (05 Nov 2018 14:02) (121/61 - 123/62)  BP(mean): --  RR: 16 (05 Nov 2018 14:02) (16 - 18)  SpO2: --    PHYSICAL EXAM:  GENERAL: NAD, well-groomed, well-developed  HEAD:  Atraumatic, Normocephalic  EYES: EOMI, PERRLA, conjunctiva and sclera clear  NERVOUS SYSTEM:  Alert & Oriented X 4, Good concentration; Motor Strength 5/5 B/L upper and lower extremities; DTRs 2+ intact and symmetric  CHEST/LUNG: Clear to percussion bilaterally; No rales, rhonchi, wheezing, or rubs  HEART: Regular rate and rhythm; No murmurs, rubs, or gallops  ABDOMEN: Soft, Nontender, Nondistended; Bowel sounds present  EXTREMITIES:  2+ Peripheral Pulses, No clubbing, cyanosis, or edema  SKIN: No rashes or lesions    LAB:                        12.4   25.82 )-----------( 523      ( 05 Nov 2018 07:00 )             40.9     11-05    140  |  100  |  46<H>  ----------------------------<  279<H>  5.8<H>   |  22  |  1.8<H>    Ca    9.2      05 Nov 2018 07:00  Mg     2.1     11-05    TPro  6.9  /  Alb  4.5  /  TBili  0.7  /  DBili  x   /  AST  14  /  ALT  11  /  AlkPhos  78  11-04    CARDIAC MARKERS ( 04 Nov 2018 08:00 )  x     / <0.01 ng/mL / 40 U/L / x     / 2.8 ng/mL      Daily     Daily   CAPILLARY BLOOD GLUCOSE      POCT Blood Glucose.: 427 mg/dL (05 Nov 2018 11:16)  POCT Blood Glucose.: 274 mg/dL (05 Nov 2018 07:39)  POCT Blood Glucose.: 406 mg/dL (04 Nov 2018 22:16)  POCT Blood Glucose.: 410 mg/dL (04 Nov 2018 16:29)      LIVER FUNCTIONS - ( 04 Nov 2018 08:00 )  Alb: 4.5 g/dL / Pro: 6.9 g/dL / ALK PHOS: 78 U/L / ALT: 11 U/L / AST: 14 U/L / GGT: x               RADIOLOGY:    Imaging Personally Reviewed:  [ ] YES  [ ] NO    HEALTH ISSUES - PROBLEM Dx:  Enlarged prostate: Enlarged prostate  Renal insufficiency: Renal insufficiency  Diabetes mellitus, type 2: Diabetes mellitus, type 2  Hypertension: Hypertension  COPD (chronic obstructive pulmonary disease): COPD (chronic obstructive pulmonary disease)          ALBUTerol/ipratropium for Nebulization 3 milliLiter(s) Nebulizer every 6 hours  amLODIPine   Tablet 10 milliGRAM(s) Oral daily  ascorbic acid 1000 milliGRAM(s) Oral daily  atorvastatin 20 milliGRAM(s) Oral at bedtime  aztreonam  IVPB      buDESOnide 160 MICROgram(s)/formoterol 4.5 MICROgram(s) Inhaler 2 Puff(s) Inhalation two times a day  chlorhexidine 4% Liquid 1 Application(s) Topical <User Schedule>  cholecalciferol 1000 Unit(s) Oral daily  cyanocobalamin 1000 MICROGram(s) Oral daily  dextrose 40% Gel 15 Gram(s) Oral once PRN  dextrose 5%. 1000 milliLiter(s) IV Continuous <Continuous>  dextrose 50% Injectable 12.5 Gram(s) IV Push once  dextrose 50% Injectable 25 Gram(s) IV Push once  dextrose 50% Injectable 25 Gram(s) IV Push once  docusate sodium 100 milliGRAM(s) Oral daily  finasteride 5 milliGRAM(s) Oral daily  glucagon  Injectable 1 milliGRAM(s) IntraMuscular once PRN  heparin  Injectable 5000 Unit(s) SubCutaneous every 8 hours  insulin glargine Injectable (LANTUS) 36 Unit(s) SubCutaneous every morning  insulin lispro (HumaLOG) corrective regimen sliding scale   SubCutaneous three times a day before meals  insulin lispro Injectable (HumaLOG) 12 Unit(s) SubCutaneous three times a day before meals  levoFLOXacin IVPB 500 milliGRAM(s) IV Intermittent every 24 hours  methylPREDNISolone sodium succinate Injectable 40 milliGRAM(s) IV Push every 8 hours  metoprolol succinate ER 25 milliGRAM(s) Oral daily  polyethylene glycol 3350 17 Gram(s) Oral daily  tamsulosin 0.4 milliGRAM(s) Oral at bedtime

## 2018-11-06 DIAGNOSIS — K59.00 CONSTIPATION, UNSPECIFIED: ICD-10-CM

## 2018-11-06 DIAGNOSIS — E87.5 HYPERKALEMIA: ICD-10-CM

## 2018-11-06 DIAGNOSIS — J13 PNEUMONIA DUE TO STREPTOCOCCUS PNEUMONIAE: ICD-10-CM

## 2018-11-06 DIAGNOSIS — E53.8 DEFICIENCY OF OTHER SPECIFIED B GROUP VITAMINS: ICD-10-CM

## 2018-11-06 DIAGNOSIS — E55.9 VITAMIN D DEFICIENCY, UNSPECIFIED: ICD-10-CM

## 2018-11-06 LAB
ANION GAP SERPL CALC-SCNC: 14 MMOL/L — SIGNIFICANT CHANGE UP (ref 7–14)
ANION GAP SERPL CALC-SCNC: 16 MMOL/L — HIGH (ref 7–14)
BUN SERPL-MCNC: 66 MG/DL — CRITICAL HIGH (ref 10–20)
BUN SERPL-MCNC: 67 MG/DL — CRITICAL HIGH (ref 10–20)
CALCIUM SERPL-MCNC: 9.2 MG/DL — SIGNIFICANT CHANGE UP (ref 8.5–10.1)
CALCIUM SERPL-MCNC: 9.4 MG/DL — SIGNIFICANT CHANGE UP (ref 8.5–10.1)
CHLORIDE SERPL-SCNC: 97 MMOL/L — LOW (ref 98–110)
CHLORIDE SERPL-SCNC: 98 MMOL/L — SIGNIFICANT CHANGE UP (ref 98–110)
CO2 SERPL-SCNC: 23 MMOL/L — SIGNIFICANT CHANGE UP (ref 17–32)
CO2 SERPL-SCNC: 27 MMOL/L — SIGNIFICANT CHANGE UP (ref 17–32)
CREAT SERPL-MCNC: 2.4 MG/DL — HIGH (ref 0.7–1.5)
CREAT SERPL-MCNC: 2.4 MG/DL — HIGH (ref 0.7–1.5)
FLU A RESULT: NEGATIVE — SIGNIFICANT CHANGE UP
FLU A RESULT: NEGATIVE — SIGNIFICANT CHANGE UP
FLUAV AG NPH QL: NEGATIVE — SIGNIFICANT CHANGE UP
FLUBV AG NPH QL: NEGATIVE — SIGNIFICANT CHANGE UP
GLUCOSE BLDC GLUCOMTR-MCNC: 249 MG/DL — HIGH (ref 70–99)
GLUCOSE BLDC GLUCOMTR-MCNC: 262 MG/DL — HIGH (ref 70–99)
GLUCOSE BLDC GLUCOMTR-MCNC: 331 MG/DL — HIGH (ref 70–99)
GLUCOSE BLDC GLUCOMTR-MCNC: 350 MG/DL — HIGH (ref 70–99)
GLUCOSE SERPL-MCNC: 283 MG/DL — HIGH (ref 70–99)
GLUCOSE SERPL-MCNC: 342 MG/DL — HIGH (ref 70–99)
HCT VFR BLD CALC: 39.7 % — LOW (ref 42–52)
HGB BLD-MCNC: 12.1 G/DL — LOW (ref 14–18)
MCHC RBC-ENTMCNC: 23.5 PG — LOW (ref 27–31)
MCHC RBC-ENTMCNC: 30.5 G/DL — LOW (ref 32–37)
MCV RBC AUTO: 77.2 FL — LOW (ref 80–94)
NRBC # BLD: 0 /100 WBCS — SIGNIFICANT CHANGE UP (ref 0–0)
PLATELET # BLD AUTO: 555 K/UL — HIGH (ref 130–400)
POTASSIUM SERPL-MCNC: 5.9 MMOL/L — HIGH (ref 3.5–5)
POTASSIUM SERPL-MCNC: 6.5 MMOL/L — CRITICAL HIGH (ref 3.5–5)
POTASSIUM SERPL-SCNC: 5.9 MMOL/L — HIGH (ref 3.5–5)
POTASSIUM SERPL-SCNC: 6.5 MMOL/L — CRITICAL HIGH (ref 3.5–5)
RBC # BLD: 5.14 M/UL — SIGNIFICANT CHANGE UP (ref 4.7–6.1)
RBC # FLD: 17.2 % — HIGH (ref 11.5–14.5)
RSV RESULT: NEGATIVE — SIGNIFICANT CHANGE UP
RSV RNA RESP QL NAA+PROBE: NEGATIVE — SIGNIFICANT CHANGE UP
SODIUM SERPL-SCNC: 137 MMOL/L — SIGNIFICANT CHANGE UP (ref 135–146)
SODIUM SERPL-SCNC: 138 MMOL/L — SIGNIFICANT CHANGE UP (ref 135–146)
WBC # BLD: 22.37 K/UL — HIGH (ref 4.8–10.8)
WBC # FLD AUTO: 22.37 K/UL — HIGH (ref 4.8–10.8)

## 2018-11-06 RX ORDER — SODIUM POLYSTYRENE SULFONATE 4.1 MEQ/G
30 POWDER, FOR SUSPENSION ORAL ONCE
Qty: 0 | Refills: 0 | Status: COMPLETED | OUTPATIENT
Start: 2018-11-06 | End: 2018-11-06

## 2018-11-06 RX ORDER — CEFTRIAXONE 500 MG/1
1 INJECTION, POWDER, FOR SOLUTION INTRAMUSCULAR; INTRAVENOUS EVERY 24 HOURS
Qty: 0 | Refills: 0 | Status: DISCONTINUED | OUTPATIENT
Start: 2018-11-06 | End: 2018-11-08

## 2018-11-06 RX ORDER — INSULIN HUMAN 100 [IU]/ML
5 INJECTION, SOLUTION SUBCUTANEOUS ONCE
Qty: 0 | Refills: 0 | Status: COMPLETED | OUTPATIENT
Start: 2018-11-06 | End: 2018-11-06

## 2018-11-06 RX ADMIN — HEPARIN SODIUM 5000 UNIT(S): 5000 INJECTION INTRAVENOUS; SUBCUTANEOUS at 10:15

## 2018-11-06 RX ADMIN — Medication 100 MILLIGRAM(S): at 15:55

## 2018-11-06 RX ADMIN — Medication 3 MILLILITER(S): at 13:11

## 2018-11-06 RX ADMIN — AMLODIPINE BESYLATE 10 MILLIGRAM(S): 2.5 TABLET ORAL at 08:57

## 2018-11-06 RX ADMIN — SODIUM POLYSTYRENE SULFONATE 30 GRAM(S): 4.1 POWDER, FOR SUSPENSION ORAL at 22:25

## 2018-11-06 RX ADMIN — Medication 12 UNIT(S): at 17:16

## 2018-11-06 RX ADMIN — Medication 110 MILLIGRAM(S): at 18:46

## 2018-11-06 RX ADMIN — SODIUM POLYSTYRENE SULFONATE 30 GRAM(S): 4.1 POWDER, FOR SUSPENSION ORAL at 12:52

## 2018-11-06 RX ADMIN — Medication 3 MILLILITER(S): at 20:05

## 2018-11-06 RX ADMIN — HEPARIN SODIUM 5000 UNIT(S): 5000 INJECTION INTRAVENOUS; SUBCUTANEOUS at 17:18

## 2018-11-06 RX ADMIN — TAMSULOSIN HYDROCHLORIDE 0.4 MILLIGRAM(S): 0.4 CAPSULE ORAL at 22:02

## 2018-11-06 RX ADMIN — Medication 3 MILLILITER(S): at 03:14

## 2018-11-06 RX ADMIN — Medication 2: at 17:15

## 2018-11-06 RX ADMIN — Medication 3: at 13:03

## 2018-11-06 RX ADMIN — FINASTERIDE 5 MILLIGRAM(S): 5 TABLET, FILM COATED ORAL at 13:11

## 2018-11-06 RX ADMIN — Medication 4: at 08:47

## 2018-11-06 RX ADMIN — Medication 3 MILLILITER(S): at 07:52

## 2018-11-06 RX ADMIN — PREGABALIN 1000 MICROGRAM(S): 225 CAPSULE ORAL at 13:12

## 2018-11-06 RX ADMIN — INSULIN GLARGINE 36 UNIT(S): 100 INJECTION, SOLUTION SUBCUTANEOUS at 10:16

## 2018-11-06 RX ADMIN — Medication 12 UNIT(S): at 13:04

## 2018-11-06 RX ADMIN — Medication 40 MILLIGRAM(S): at 15:56

## 2018-11-06 RX ADMIN — INSULIN HUMAN 5 UNIT(S): 100 INJECTION, SOLUTION SUBCUTANEOUS at 22:11

## 2018-11-06 RX ADMIN — BUDESONIDE AND FORMOTEROL FUMARATE DIHYDRATE 2 PUFF(S): 160; 4.5 AEROSOL RESPIRATORY (INHALATION) at 08:57

## 2018-11-06 RX ADMIN — Medication 1000 MILLIGRAM(S): at 13:13

## 2018-11-06 RX ADMIN — Medication 40 MILLIGRAM(S): at 10:20

## 2018-11-06 RX ADMIN — ATORVASTATIN CALCIUM 20 MILLIGRAM(S): 80 TABLET, FILM COATED ORAL at 22:02

## 2018-11-06 RX ADMIN — Medication 40 MILLIGRAM(S): at 22:03

## 2018-11-06 RX ADMIN — Medication 50 MILLIGRAM(S): at 06:10

## 2018-11-06 RX ADMIN — CEFTRIAXONE 100 GRAM(S): 500 INJECTION, POWDER, FOR SOLUTION INTRAMUSCULAR; INTRAVENOUS at 13:09

## 2018-11-06 RX ADMIN — Medication 12 UNIT(S): at 08:49

## 2018-11-06 RX ADMIN — Medication 1000 UNIT(S): at 15:55

## 2018-11-06 RX ADMIN — Medication 25 MILLIGRAM(S): at 08:58

## 2018-11-06 NOTE — PROGRESS NOTE ADULT - SUBJECTIVE AND OBJECTIVE BOX
JIAN MANCIA  77y  Male      Patient is a 77y old  Male who presents with a chief complaint of Dyspnea (05 Nov 2018 09:50)      INTERVAL HPI/OVERNIGHT EVENTS:  patient admitted for dyspnea and acute on chronic resp failure  -ID consult noted  -Spoke to Infection control and since the Viral panel is pending, patient has to be on Droplet and Contact isolation   -patient aware of the isolation precautions  -K+ hemolyzed and elevated, would still give dose of kayxalate and repeat BMP 3 pm.  -constipated; on laxatives (bottles at bedside, advised that medications to be provided by us so we could monitor)  -oob to chair; rehab/pt as tolerated  -patient is non-compliant with his medical care  -still on high flow O2  -Pulmonary and ID following       Vital Signs Last 24 Hrs  T(C): 36.1 (06 Nov 2018 05:43), Max: 36.3 (05 Nov 2018 22:07)  T(F): 97 (06 Nov 2018 05:43), Max: 97.3 (05 Nov 2018 22:07)  HR: 77 (06 Nov 2018 08:44) (76 - 97)  BP: 144/67 (06 Nov 2018 08:44) (121/69 - 146/67)  RR: 20 (06 Nov 2018 08:44) (16 - 20)  SpO2: 95% (06 Nov 2018 08:44) (95% - 95%)    PHYSICAL EXAM:  GENERAL: NAD, speaking in full sentences   HEAD:  Atraumatic, Normocephalic  EYES: EOMI, PERRLA, conjunctiva and sclera clear  NERVOUS SYSTEM:  Alert & Oriented X 3  CHEST/LUNG: decreased BS b/l  CV/HEART: Regular rate and rhythm; No murmurs, rubs, or gallops  GI/ABDOMEN: obese abdomen   EXTREMITIES:  2+ Peripheral Pulses, No clubbing, cyanosis, or edema  SKIN: No rashes or lesions    LAB:                              12.1   22.37 )-----------( 555      ( 06 Nov 2018 09:33 )             39.7   11-06    137  |  98  |  67<HH>  ----------------------------<  342<H>  6.5<HH>   |  23  |  2.4<H>    Ca    9.2      06 Nov 2018 09:33  Mg     2.1     11-05    CARDIAC MARKERS ( 04 Nov 2018 08:00 )  x     / <0.01 ng/mL / 40 U/L / x     / 2.8 ng/mL      POCT Blood Glucose.: 427 mg/dL (05 Nov 2018 11:16)  POCT Blood Glucose.: 274 mg/dL (05 Nov 2018 07:39)  POCT Blood Glucose.: 406 mg/dL (04 Nov 2018 22:16)  POCT Blood Glucose.: 410 mg/dL (04 Nov 2018 16:29)      LIVER FUNCTIONS - ( 04 Nov 2018 08:00 )  Alb: 4.5 g/dL / Pro: 6.9 g/dL / ALK PHOS: 78 U/L / ALT: 11 U/L / AST: 14 U/L / GGT: x               RADIOLOGY:    Imaging Personally Reviewed:  [Y ] YES  [ ] NO    HEALTH ISSUES - PROBLEM Dx:  Enlarged prostate: Enlarged prostate  Renal insufficiency: Renal insufficiency  Diabetes mellitus, type 2: Diabetes mellitus, type 2  Hypertension: Hypertension  COPD (chronic obstructive pulmonary disease): COPD (chronic obstructive pulmonary disease)      MEDICATIONS  (STANDING):  ALBUTerol/ipratropium for Nebulization 3 milliLiter(s) Nebulizer every 6 hours  amLODIPine   Tablet 10 milliGRAM(s) Oral daily  ascorbic acid 1000 milliGRAM(s) Oral daily  atorvastatin 20 milliGRAM(s) Oral at bedtime  buDESOnide 160 MICROgram(s)/formoterol 4.5 MICROgram(s) Inhaler 2 Puff(s) Inhalation two times a day  cefTRIAXone   IVPB 1 Gram(s) IV Intermittent every 24 hours  chlorhexidine 4% Liquid 1 Application(s) Topical <User Schedule>  cholecalciferol 1000 Unit(s) Oral daily  cyanocobalamin 1000 MICROGram(s) Oral daily  dextrose 5%. 1000 milliLiter(s) (50 mL/Hr) IV Continuous <Continuous>  dextrose 50% Injectable 12.5 Gram(s) IV Push once  dextrose 50% Injectable 25 Gram(s) IV Push once  dextrose 50% Injectable 25 Gram(s) IV Push once  docusate sodium 100 milliGRAM(s) Oral daily  doxycycline IVPB 100 milliGRAM(s) IV Intermittent every 12 hours  finasteride 5 milliGRAM(s) Oral daily  heparin  Injectable 5000 Unit(s) SubCutaneous every 8 hours  insulin glargine Injectable (LANTUS) 36 Unit(s) SubCutaneous every morning  insulin lispro (HumaLOG) corrective regimen sliding scale   SubCutaneous three times a day before meals  insulin lispro Injectable (HumaLOG) 12 Unit(s) SubCutaneous three times a day before meals  methylPREDNISolone sodium succinate Injectable 40 milliGRAM(s) IV Push every 8 hours  metoprolol succinate ER 25 milliGRAM(s) Oral daily  polyethylene glycol 3350 17 Gram(s) Oral daily  tamsulosin 0.4 milliGRAM(s) Oral at bedtime    MEDICATIONS  (PRN):  dextrose 40% Gel 15 Gram(s) Oral once PRN Blood Glucose LESS THAN 70 milliGRAM(s)/deciliter  glucagon  Injectable 1 milliGRAM(s) IntraMuscular once PRN Glucose LESS THAN 70 milligrams/deciliter

## 2018-11-06 NOTE — CONSULT NOTE ADULT - SUBJECTIVE AND OBJECTIVE BOX
JIAN MANCIA 77yMalePatient is a 77y old  Male who presents with a chief complaint of Dyspnea (05 Nov 2018 16:24)      Patient has history of:  aspirin (Other)  iodine (Hives)  penicillin (Unknown)    PMH - COPD     FSH - not relevant    ROS - NO myalgias    Patient treated with:  cefTRIAXone   IVPB 1 Gram(s) IV Intermittent every 24 hours  doxycycline IVPB 100 milliGRAM(s) IV Intermittent every 12 hours        PHYSICAL EXAM  T(F): 97 (11-06-18 @ 05:43), Max: 97.3 (11-05-18 @ 22:07)  HR: 77 (11-06-18 @ 08:44) (76 - 97)  BP: 144/67 (11-06-18 @ 08:44) (121/69 - 146/67)  RR: 20 (11-06-18 @ 08:44) (16 - 20)  SpO2: 95% (11-06-18 @ 08:44) (95% - 95%)  Daily     Daily     awake and alert   HEENT: normal, no nuchal rigidity  Cor: RSR Nl S1 S2  Lungs: Decreased breath sounds at bases  Abdomen: Nontender, Nl BS, obese  Ext: No phlebitis     LAB & RADIOLOGIC RESULTS:                        12.4   25.82 )-----------( 523      ( 05 Nov 2018 07:00 )             40.9         11-05    140  |  100  |  46<H>  ----------------------------<  279<H>  5.8<H>   |  22  |  1.8<H>    Ca    9.2      05 Nov 2018 07:00  Mg     2.1     11-05      Progress Note Adult-Hospitalist Attending [WOLF Solitario] (11-05-18 @ 16:24)  Consult Note Adult-Pulmonology Fellow/Attending [CORAZON Loco] (11-05-18 @ 09:50)  Patient Profile Adult [LAILA Conn] (11-04-18 @ 12:39)  H&P Adult [KRISTIAN Molina] (11-04-18 @ 12:21)  Outpatient Clinical Summary - Medical  Group [O. Provider] (11-04-18 @ 10:24)  ED ADULT Nurse Reassessment Note [ARTI Salazar] (11-04-18 @ 10:02)  ED Provider Note [HANG Brink] (11-04-18 @ 07:46)  ED ADULT Nurse Note [ANT Abernathy] (11-04-18 @ 07:44)  ED ADULT Triage Note [ANT Abernathy] (11-04-18 @ 07:39)  ED Clerical [HANG Segovia] (10-14-18 @ 16:44)  ED Provider Note [HANG Clayton] (10-14-18 @ 14:48)  ED ADULT Nurse Note [KRISTIAN Cornejo] (10-14-18 @ 13:25)  ED ADULT Triage Note [KRISTIAN Wesley] (10-14-18 @ 13:22)  Outpatient Clinical Summary - Medical  Group [O. Provider] (10-08-18 @ 20:58)  Chart Note-Event Note Attending [WOLF Solitario] (09-10-18 @ 15:29)  Progress Notes - Care Coordination [C. Provider] (08-02-18 @ 10:11)  Progress Notes - Care Coordination [C. Provider] (08-02-18 @ 10:10)  Discharge Note Adult [HANG Guy] (08-01-18 @ 14:27)  Care Coordination Assessment [C. Provider] (08-01-18 @ 11:55)  Progress Notes - Care Coordination [C. Provider] (08-01-18 @ 11:38)  Progress Note Adult-Hospitalist Attending [HANG Bolden] (08-01-18 @ 11:24)  Progress Notes - Care Coordination [C. Provider] (07-31-18 @ 09:29)  Progress Note Adult-Hospitalist Attending [JENIFFER Romero] (07-31-18 @ 08:45)  Patient Profile Adult [ARTI Oliveros] (07-30-18 @ 23:21)  H&P Adult [JAH Dobbins] (07-30-18 @ 21:15)  Outpatient Clinical Summary - Medical  Group [O. Provider] (07-30-18 @ 20:50)  ED Provider Note [KRISTIAN Clayton N. Andharia] (07-30-18 @ 18:12)  ED Clerical [KRISTIAN Zamudio] (07-30-18 @ 17:02)  ED ADULT Triage Note [ILA Villa] (07-30-18 @ 16:46)  ED ADULT Nurse Note [WOLF Aguilar] (07-30-18 @ 16:36)  Outpatient Clinical Summary - Medical  Group [O. Provider] (07-30-18 @ 16:36)  Progress Notes - Care Coordination [C. Provider] (07-29-18 @ 15:28)  Chart Note-Chart Note Pulmonary Attending [KRISTIAN Schneider] (07-28-18 @ 17:20)  Progress Note Adult-Hospitalist Attending [FABRICIO Gallegos] (07-28-18 @ 15:38)  Provider Contact Note (Other) [KRISTIAN Zhang] (07-28-18 @ 11:43)  Dietitian Initial Evaluation Adult [LAILA Abraham] (07-27-18 @ 17:04)  Progress Notes - Care Coordination [C. Provider] (07-27-18 @ 17:04)  Progress Note Adult-Hospitalist Attending [WOLF Phan] (07-27-18 @ 16:55)  Discharge Note Adult [WOLF Ramírez, WOLF Stafford] (07-26-18 @ 14:29)  Progress Note Adult-Hospitalist Attending [WOLF Solitario] (07-26-18 @ 12:51)  Progress Notes - Care Coordination [C. Provider] (07-26-18 @ 12:09)  Progress Note Adult-Hospitalist Attending [WOLF Solitario] (07-25-18 @ 11:11)  Progress Note Adult-Hospitalist Attending [WOLF Solitario] (07-24-18 @ 12:51)  Progress Note Adult-Infectious Disease Attending [LAILA High] (07-24-18 @ 08:50)  Care Coordination Assessment [C. Provider] (07-23-18 @ 16:06)  Progress Notes - Care Coordination [C. Provider] (07-23-18 @ 15:56)  Progress Note Adult-Hospitalist Attending [WOLF Solitario] (07-23-18 @ 14:03)  Chart Note-Event Note PPD RT [KRISTIAN Carlin] (07-23-18 @ 11:57)  Consult Note Adult-Infectious Disease Attending [KRISTIAN Hogan] (07-23-18 @ 10:52)  Consult Note Adult-Pulmonology Attending [KRISTIAN Schneider] (07-22-18 @ 17:10)  Progress Note Adult-Hospitalist Attending [BABS. Gut] (07-22-18 @ 11:36)  Progress Note Adult-Hospitalist Attending [T. Gut] (07-21-18 @ 11:44)  Patient Profile Adult [JENIFFER Egan] (07-21-18 @ 00:19)  ED ADULT Nurse Reassessment Note [CORAZON Luz] (07-20-18 @ 23:49)  Outpatient Clinical Summary - Medical  Group [O. Provider] (07-20-18 @ 23:40)  H&P Adult [CORAZON Mayorga] (07-20-18 @ 23:36)  ED Provider Note [WOLF Ohara] (07-20-18 @ 22:23)  Outpatient Clinical Summary - Medical  Group [O. Provider] (07-20-18 @ 22:23)  ED ADULT Nurse Note [F. Amico, R. Dolan] (07-20-18 @ 20:23)  ED ADULT Triage Note [F. Amico] (07-20-18 @ 20:21)  ED Provider Note [THERON Ohara S. Lekperic, S. Sommers] (06-18-18 @ 02:40)  ED ADULT Nurse Note [LAILA Lee] (06-18-18 @ 02:05)  ED ADULT Triage Note [LAILA Lee] (06-18-18 @ 01:59)  ED Provider Note [CORAZON Ibarra] (06-16-18 @ 14:07)  ED ADULT Nurse Note [CORAZON Abernathy] (06-16-18 @ 11:11)  ED ADULT Triage Note [ILA Parry] (06-16-18 @ 07:32)    Cxray:   infiltrate

## 2018-11-06 NOTE — PROGRESS NOTE ADULT - SUBJECTIVE AND OBJECTIVE BOX
Patient is a 77y old  Male who presents with a chief complaint of Dyspnea (06 Nov 2018 09:07)      INTERVAL HISTORY/overnight events feel better still require high flow         Vital Signs Last 24 Hrs  T(C): 36.1 (06 Nov 2018 05:43), Max: 36.3 (05 Nov 2018 22:07)  T(F): 97 (06 Nov 2018 05:43), Max: 97.3 (05 Nov 2018 22:07)  HR: 77 (06 Nov 2018 08:44) (76 - 97)  BP: 144/67 (06 Nov 2018 08:44) (121/69 - 146/67)  BP(mean): --  RR: 20 (06 Nov 2018 08:44) (16 - 20)  SpO2: 95% (06 Nov 2018 08:44) (95% - 95%)  Daily     Daily   I&O's Summary      Physical Examination:    General: No acute distress.  Alert, oriented, interactive, nonfocal    HEENT: Pupils equal, reactive to light.  Symmetric.    PULM: bibasilar crackles     CVS: Regular rate and rhythm, no murmurs, rubs, or gallops    ABD: Soft, nondistended, nontender, normoactive bowel sounds, no masses    EXT: 1  edema, nontender    SKIN: Warm and well perfused, no rashes noted.    Neurology: no focal deficit     Musculoskeletal : Move all extremety     ABG - ( 04 Nov 2018 13:09 )  pH, Arterial: 7.30  pH, Blood: x     /  pCO2: 47    /  pO2: 60    / HCO3: 23    / Base Excess: -3.3  /  SaO2: 88                  Lab Results:                        12.1   22.37 )-----------( 555      ( 06 Nov 2018 09:33 )             39.7     05 Nov 2018 07:00    140    |  100    |  46     ----------------------------<  279    5.8     |  22     |  1.8      Ca    9.2        05 Nov 2018 07:00  Mg     2.1       05 Nov 2018 07:00                Microbiology      Medication:  MEDICATIONS  (STANDING):  ALBUTerol/ipratropium for Nebulization 3 milliLiter(s) Nebulizer every 6 hours  amLODIPine   Tablet 10 milliGRAM(s) Oral daily  ascorbic acid 1000 milliGRAM(s) Oral daily  atorvastatin 20 milliGRAM(s) Oral at bedtime  buDESOnide 160 MICROgram(s)/formoterol 4.5 MICROgram(s) Inhaler 2 Puff(s) Inhalation two times a day  cefTRIAXone   IVPB 1 Gram(s) IV Intermittent every 24 hours  chlorhexidine 4% Liquid 1 Application(s) Topical <User Schedule>  cholecalciferol 1000 Unit(s) Oral daily  cyanocobalamin 1000 MICROGram(s) Oral daily  dextrose 5%. 1000 milliLiter(s) (50 mL/Hr) IV Continuous <Continuous>  dextrose 50% Injectable 12.5 Gram(s) IV Push once  dextrose 50% Injectable 25 Gram(s) IV Push once  dextrose 50% Injectable 25 Gram(s) IV Push once  docusate sodium 100 milliGRAM(s) Oral daily  doxycycline IVPB 100 milliGRAM(s) IV Intermittent every 12 hours  finasteride 5 milliGRAM(s) Oral daily  heparin  Injectable 5000 Unit(s) SubCutaneous every 8 hours  insulin glargine Injectable (LANTUS) 36 Unit(s) SubCutaneous every morning  insulin lispro (HumaLOG) corrective regimen sliding scale   SubCutaneous three times a day before meals  insulin lispro Injectable (HumaLOG) 12 Unit(s) SubCutaneous three times a day before meals  methylPREDNISolone sodium succinate Injectable 40 milliGRAM(s) IV Push every 8 hours  metoprolol succinate ER 25 milliGRAM(s) Oral daily  polyethylene glycol 3350 17 Gram(s) Oral daily  tamsulosin 0.4 milliGRAM(s) Oral at bedtime    MEDICATIONS  (PRN):  dextrose 40% Gel 15 Gram(s) Oral once PRN Blood Glucose LESS THAN 70 milliGRAM(s)/deciliter  glucagon  Injectable 1 milliGRAM(s) IntraMuscular once PRN Glucose LESS THAN 70 milligrams/deciliter        IMAGING STUDIES:    Impression:    Plan:

## 2018-11-06 NOTE — PROGRESS NOTE ADULT - ASSESSMENT
IMPRESSION:    Severe community acquired pneumonia - multiple risk factors for drug resistant strep  Acute on chronic hypercapnic failure, chronic hypoxemic respiratory failure  MICHAEL/OHS  Morbid obesity    PLAN:     Levaquin 750mg IV for now and rocephine   cxr vikki    steroids to Solumedrol 40mg Q8h  Nebs as needed  Bipap as needed and at night   monitor bun cr   Oxygen to aim for saturation more than 88%, avoid over-oxygenation  Check influenza pcr, RSV  Blood cultures, sputum cultures, check urine strep and legionella  DVT prophylaxis  Will follow

## 2018-11-07 LAB
ANION GAP SERPL CALC-SCNC: 11 MMOL/L — SIGNIFICANT CHANGE UP (ref 7–14)
BUN SERPL-MCNC: 65 MG/DL — CRITICAL HIGH (ref 10–20)
CALCIUM SERPL-MCNC: 9.1 MG/DL — SIGNIFICANT CHANGE UP (ref 8.5–10.1)
CHLORIDE SERPL-SCNC: 103 MMOL/L — SIGNIFICANT CHANGE UP (ref 98–110)
CO2 SERPL-SCNC: 25 MMOL/L — SIGNIFICANT CHANGE UP (ref 17–32)
CREAT SERPL-MCNC: 1.9 MG/DL — HIGH (ref 0.7–1.5)
GLUCOSE BLDC GLUCOMTR-MCNC: 220 MG/DL — HIGH (ref 70–99)
GLUCOSE BLDC GLUCOMTR-MCNC: 253 MG/DL — HIGH (ref 70–99)
GLUCOSE BLDC GLUCOMTR-MCNC: 259 MG/DL — HIGH (ref 70–99)
GLUCOSE BLDC GLUCOMTR-MCNC: 326 MG/DL — HIGH (ref 70–99)
GLUCOSE BLDC GLUCOMTR-MCNC: 432 MG/DL — HIGH (ref 70–99)
GLUCOSE SERPL-MCNC: 265 MG/DL — HIGH (ref 70–99)
HCT VFR BLD CALC: 41.5 % — LOW (ref 42–52)
HGB BLD-MCNC: 12.7 G/DL — LOW (ref 14–18)
LEGIONELLA AG UR QL: NEGATIVE — SIGNIFICANT CHANGE UP
MCHC RBC-ENTMCNC: 23.6 PG — LOW (ref 27–31)
MCHC RBC-ENTMCNC: 30.6 G/DL — LOW (ref 32–37)
MCV RBC AUTO: 77 FL — LOW (ref 80–94)
NRBC # BLD: 0 /100 WBCS — SIGNIFICANT CHANGE UP (ref 0–0)
PLATELET # BLD AUTO: 554 K/UL — HIGH (ref 130–400)
POTASSIUM SERPL-MCNC: 6 MMOL/L — CRITICAL HIGH (ref 3.5–5)
POTASSIUM SERPL-SCNC: 6 MMOL/L — CRITICAL HIGH (ref 3.5–5)
RAPID RVP RESULT: SIGNIFICANT CHANGE UP
RBC # BLD: 5.39 M/UL — SIGNIFICANT CHANGE UP (ref 4.7–6.1)
RBC # FLD: 17 % — HIGH (ref 11.5–14.5)
SODIUM SERPL-SCNC: 139 MMOL/L — SIGNIFICANT CHANGE UP (ref 135–146)
WBC # BLD: 17.47 K/UL — HIGH (ref 4.8–10.8)
WBC # FLD AUTO: 17.47 K/UL — HIGH (ref 4.8–10.8)

## 2018-11-07 RX ORDER — DOCUSATE SODIUM 100 MG
100 CAPSULE ORAL THREE TIMES A DAY
Qty: 0 | Refills: 0 | Status: DISCONTINUED | OUTPATIENT
Start: 2018-11-07 | End: 2018-11-15

## 2018-11-07 RX ORDER — SODIUM POLYSTYRENE SULFONATE 4.1 MEQ/G
30 POWDER, FOR SUSPENSION ORAL ONCE
Qty: 0 | Refills: 0 | Status: COMPLETED | OUTPATIENT
Start: 2018-11-07 | End: 2018-11-07

## 2018-11-07 RX ADMIN — BUDESONIDE AND FORMOTEROL FUMARATE DIHYDRATE 2 PUFF(S): 160; 4.5 AEROSOL RESPIRATORY (INHALATION) at 10:38

## 2018-11-07 RX ADMIN — SODIUM POLYSTYRENE SULFONATE 30 GRAM(S): 4.1 POWDER, FOR SUSPENSION ORAL at 10:35

## 2018-11-07 RX ADMIN — HEPARIN SODIUM 5000 UNIT(S): 5000 INJECTION INTRAVENOUS; SUBCUTANEOUS at 21:21

## 2018-11-07 RX ADMIN — TAMSULOSIN HYDROCHLORIDE 0.4 MILLIGRAM(S): 0.4 CAPSULE ORAL at 21:21

## 2018-11-07 RX ADMIN — Medication 1000 UNIT(S): at 11:45

## 2018-11-07 RX ADMIN — POLYETHYLENE GLYCOL 3350 17 GRAM(S): 17 POWDER, FOR SOLUTION ORAL at 08:51

## 2018-11-07 RX ADMIN — CEFTRIAXONE 100 GRAM(S): 500 INJECTION, POWDER, FOR SOLUTION INTRAMUSCULAR; INTRAVENOUS at 13:50

## 2018-11-07 RX ADMIN — INSULIN GLARGINE 36 UNIT(S): 100 INJECTION, SOLUTION SUBCUTANEOUS at 10:03

## 2018-11-07 RX ADMIN — Medication 3 MILLILITER(S): at 19:52

## 2018-11-07 RX ADMIN — FINASTERIDE 5 MILLIGRAM(S): 5 TABLET, FILM COATED ORAL at 11:47

## 2018-11-07 RX ADMIN — Medication 100 MILLIGRAM(S): at 13:59

## 2018-11-07 RX ADMIN — HEPARIN SODIUM 5000 UNIT(S): 5000 INJECTION INTRAVENOUS; SUBCUTANEOUS at 06:35

## 2018-11-07 RX ADMIN — HEPARIN SODIUM 5000 UNIT(S): 5000 INJECTION INTRAVENOUS; SUBCUTANEOUS at 13:56

## 2018-11-07 RX ADMIN — Medication 12 UNIT(S): at 12:47

## 2018-11-07 RX ADMIN — Medication 100 MILLIGRAM(S): at 21:21

## 2018-11-07 RX ADMIN — Medication 40 MILLIGRAM(S): at 21:21

## 2018-11-07 RX ADMIN — Medication 40 MILLIGRAM(S): at 06:35

## 2018-11-07 RX ADMIN — AMLODIPINE BESYLATE 10 MILLIGRAM(S): 2.5 TABLET ORAL at 06:35

## 2018-11-07 RX ADMIN — Medication 12 UNIT(S): at 08:45

## 2018-11-07 RX ADMIN — Medication 25 MILLIGRAM(S): at 06:35

## 2018-11-07 RX ADMIN — ATORVASTATIN CALCIUM 20 MILLIGRAM(S): 80 TABLET, FILM COATED ORAL at 21:22

## 2018-11-07 RX ADMIN — Medication 4: at 12:45

## 2018-11-07 RX ADMIN — Medication 110 MILLIGRAM(S): at 18:56

## 2018-11-07 RX ADMIN — Medication 3: at 08:43

## 2018-11-07 RX ADMIN — PREGABALIN 1000 MICROGRAM(S): 225 CAPSULE ORAL at 11:45

## 2018-11-07 RX ADMIN — Medication 3: at 16:58

## 2018-11-07 RX ADMIN — Medication 12 UNIT(S): at 16:59

## 2018-11-07 RX ADMIN — Medication 100 MILLIGRAM(S): at 11:46

## 2018-11-07 RX ADMIN — Medication 110 MILLIGRAM(S): at 06:36

## 2018-11-07 RX ADMIN — Medication 3 MILLILITER(S): at 08:14

## 2018-11-07 RX ADMIN — Medication 40 MILLIGRAM(S): at 14:58

## 2018-11-07 RX ADMIN — Medication 3 MILLILITER(S): at 13:26

## 2018-11-07 RX ADMIN — Medication 1000 MILLIGRAM(S): at 11:47

## 2018-11-07 NOTE — PROGRESS NOTE ADULT - PROBLEM SELECTOR PLAN 1
-Acute on chronic resp failure/copd exacerbation/CAP  -continue with current abx regimen   -on High flow O2  -IV steroids, nebs, abx and pulmonary follow ups

## 2018-11-07 NOTE — PROGRESS NOTE ADULT - SUBJECTIVE AND OBJECTIVE BOX
Patient is a 77y old  Male who presents with a chief complaint of Dyspnea (07 Nov 2018 11:01)      INTERVAL HPI/OVERNIGHT EVENTS:    MEDICATIONS  (STANDING):  ALBUTerol/ipratropium for Nebulization 3 milliLiter(s) Nebulizer every 6 hours  amLODIPine   Tablet 10 milliGRAM(s) Oral daily  ascorbic acid 1000 milliGRAM(s) Oral daily  atorvastatin 20 milliGRAM(s) Oral at bedtime  buDESOnide 160 MICROgram(s)/formoterol 4.5 MICROgram(s) Inhaler 2 Puff(s) Inhalation two times a day  cefTRIAXone   IVPB 1 Gram(s) IV Intermittent every 24 hours  chlorhexidine 4% Liquid 1 Application(s) Topical <User Schedule>  cholecalciferol 1000 Unit(s) Oral daily  cyanocobalamin 1000 MICROGram(s) Oral daily  dextrose 5%. 1000 milliLiter(s) (50 mL/Hr) IV Continuous <Continuous>  dextrose 50% Injectable 12.5 Gram(s) IV Push once  dextrose 50% Injectable 25 Gram(s) IV Push once  dextrose 50% Injectable 25 Gram(s) IV Push once  docusate sodium 100 milliGRAM(s) Oral three times a day  doxycycline IVPB 100 milliGRAM(s) IV Intermittent every 12 hours  finasteride 5 milliGRAM(s) Oral daily  heparin  Injectable 5000 Unit(s) SubCutaneous every 8 hours  insulin glargine Injectable (LANTUS) 36 Unit(s) SubCutaneous every morning  insulin lispro (HumaLOG) corrective regimen sliding scale   SubCutaneous three times a day before meals  insulin lispro Injectable (HumaLOG) 12 Unit(s) SubCutaneous three times a day before meals  methylPREDNISolone sodium succinate Injectable 40 milliGRAM(s) IV Push every 8 hours  metoprolol succinate ER 25 milliGRAM(s) Oral daily  polyethylene glycol 3350 17 Gram(s) Oral daily  tamsulosin 0.4 milliGRAM(s) Oral at bedtime    MEDICATIONS  (PRN):  dextrose 40% Gel 15 Gram(s) Oral once PRN Blood Glucose LESS THAN 70 milliGRAM(s)/deciliter  glucagon  Injectable 1 milliGRAM(s) IntraMuscular once PRN Glucose LESS THAN 70 milligrams/deciliter      Allergies    iodine (Hives)  penicillin (Unknown)    Intolerances    aspirin (Other)      REVIEW OF SYSTEMS:  CONSTITUTIONAL: No fever, weight loss, or fatigue  EYES: No eye pain, visual disturbances, or discharge  ENMT:  No difficulty hearing, tinnitus, vertigo; No sinus or throat pain  NECK: No pain or stiffness  BREASTS: No pain, masses, or nipple discharge  RESPIRATORY: No cough, wheezing, chills or hemoptysis; No shortness of breath  CARDIOVASCULAR: No chest pain, palpitations, dizziness, or leg swelling  GASTROINTESTINAL: No abdominal or epigastric pain. No nausea, vomiting, or hematemesis; No diarrhea or constipation. No melena or hematochezia.  GENITOURINARY: No dysuria, frequency, hematuria, or incontinence  NEUROLOGICAL: No headaches, memory loss, loss of strength, numbness, or tremors  SKIN: No itching, burning, rashes, or lesions   LYMPH NODES: No enlarged glands  ENDOCRINE: No heat or cold intolerance; No hair loss  MUSCULOSKELETAL: No joint pain or swelling; No muscle, back, or extremity pain  PSYCHIATRIC: No depression, anxiety, mood swings, or difficulty sleeping  HEME/LYMPH: No easy bruising, or bleeding gums  ALLERGY AND IMMUNOLOGIC: No hives or eczema    Vital Signs Last 24 Hrs  T(C): 37.1 (07 Nov 2018 21:41), Max: 37.1 (07 Nov 2018 21:41)  T(F): 98.7 (07 Nov 2018 21:41), Max: 98.7 (07 Nov 2018 21:41)  HR: 103 (07 Nov 2018 21:41) (71 - 103)  BP: 166/86 (07 Nov 2018 21:41) (144/65 - 166/86)  BP(mean): --  RR: 18 (07 Nov 2018 21:41) (18 - 18)  SpO2: 93% (07 Nov 2018 11:42) (92% - 93%)    PHYSICAL EXAM:  GENERAL: NAD, well-groomed, well-developed  HEAD:  Atraumatic, Normocephalic  EYES: EOMI, PERRLA, conjunctiva and sclera clear  ENMT: No tonsillar erythema, exudates, or enlargement; Moist mucous membranes, Good dentition, No lesions  NECK: Supple, No JVD, Normal thyroid  NERVOUS SYSTEM:  Alert & Oriented X3, Good concentration; Motor Strength 5/5 B/L upper and lower extremities; DTRs 2+ intact and symmetric  CHEST/LUNG: Clear to percussion bilaterally; No rales, rhonchi, wheezing, or rubs  HEART: Regular rate and rhythm; No murmurs, rubs, or gallops  ABDOMEN: Soft, Nontender, Nondistended; Bowel sounds present  EXTREMITIES:  2+ Peripheral Pulses, No clubbing, cyanosis, or edema  LYMPH: No lymphadenopathy noted  SKIN: No rashes or lesions    LABS:                        12.7   17.47 )-----------( 554      ( 07 Nov 2018 06:37 )             41.5     11-07    139  |  103  |  65<HH>  ----------------------------<  265<H>  6.0<HH>   |  25  |  1.9<H>    Ca    9.1      07 Nov 2018 06:37          CAPILLARY BLOOD GLUCOSE      POCT Blood Glucose.: 220 mg/dL (07 Nov 2018 20:48)  POCT Blood Glucose.: 253 mg/dL (07 Nov 2018 16:48)  POCT Blood Glucose.: 326 mg/dL (07 Nov 2018 11:42)  POCT Blood Glucose.: 259 mg/dL (07 Nov 2018 07:32)      RADIOLOGY & ADDITIONAL TESTS:    Imaging Personally Reviewed:  [ ] YES  [ ] NO    Consultant(s) Notes Reviewed:  [ ] YES  [ ] NO    Care Discussed with Consultants/Other Providers [ ] YES  [ ] NO Patient is seen and examined at the bed side, is afebrile.      REVIEW OF SYSTEMS: Unable to obtain due to mental status      Vital Signs Last 24 Hrs  T(C): 37.1 (07 Nov 2018 21:41), Max: 37.1 (07 Nov 2018 21:41)  T(F): 98.7 (07 Nov 2018 21:41), Max: 98.7 (07 Nov 2018 21:41)  HR: 103 (07 Nov 2018 21:41) (71 - 103)  BP: 166/86 (07 Nov 2018 21:41) (144/65 - 166/86)  BP(mean): --  RR: 18 (07 Nov 2018 21:41) (18 - 18)  SpO2: 93% (07 Nov 2018 11:42) (92% - 93%)        PHYSICAL EXAM:  GENERAL: Not in distress, on BIPAP  CHEST/LUNG: Air entry bilaterally  HEART: s1 and s2 present  ABDOMEN:  Nontender and Nondistended  EXTREMITIES:  No pedal edema  CNS: Lethargic          MEDICATIONS  (STANDING):  ALBUTerol/ipratropium for Nebulization 3 milliLiter(s) Nebulizer every 6 hours  amLODIPine   Tablet 10 milliGRAM(s) Oral daily  ascorbic acid 1000 milliGRAM(s) Oral daily  atorvastatin 20 milliGRAM(s) Oral at bedtime  buDESOnide 160 MICROgram(s)/formoterol 4.5 MICROgram(s) Inhaler 2 Puff(s) Inhalation two times a day  cefTRIAXone   IVPB 1 Gram(s) IV Intermittent every 24 hours  chlorhexidine 4% Liquid 1 Application(s) Topical <User Schedule>  cholecalciferol 1000 Unit(s) Oral daily  cyanocobalamin 1000 MICROGram(s) Oral daily  docusate sodium 100 milliGRAM(s) Oral three times a day  doxycycline IVPB 100 milliGRAM(s) IV Intermittent every 12 hours  finasteride 5 milliGRAM(s) Oral daily  heparin  Injectable 5000 Unit(s) SubCutaneous every 8 hours  insulin glargine Injectable (LANTUS) 36 Unit(s) SubCutaneous every morning  insulin lispro (HumaLOG) corrective regimen sliding scale   SubCutaneous three times a day before meals  insulin lispro Injectable (HumaLOG) 12 Unit(s) SubCutaneous three times a day before meals  methylPREDNISolone sodium succinate Injectable 40 milliGRAM(s) IV Push every 8 hours  metoprolol succinate ER 25 milliGRAM(s) Oral daily  polyethylene glycol 3350 17 Gram(s) Oral daily  tamsulosin 0.4 milliGRAM(s) Oral at bedtime    MEDICATIONS  (PRN):  dextrose 40% Gel 15 Gram(s) Oral once PRN Blood Glucose LESS THAN 70 milliGRAM(s)/deciliter  glucagon  Injectable 1 milliGRAM(s) IntraMuscular once PRN Glucose LESS THAN 70 milligrams/deciliter      Allergies    iodine (Hives)  penicillin (Unknown)    Intolerances    aspirin (Other)          LABS:                        12.7   17.47 )-----------( 554      ( 07 Nov 2018 06:37 )             41.5         11-07    139  |  103  |  65<HH>  ----------------------------<  265<H>  6.0<HH>   |  25  |  1.9<H>    Ca    9.1      07 Nov 2018 06:37          CAPILLARY BLOOD GLUCOSE      POCT Blood Glucose.: 220 mg/dL (07 Nov 2018 20:48)  POCT Blood Glucose.: 253 mg/dL (07 Nov 2018 16:48)  POCT Blood Glucose.: 326 mg/dL (07 Nov 2018 11:42)  POCT Blood Glucose.: 259 mg/dL (07 Nov 2018 07:32)      RADIOLOGY & ADDITIONAL TESTS:    < from: Xray Chest 1 View- PORTABLE-Routine (11.07.18 @ 11:40) >  Left basilar opacity, stable.    < end of copied text >      MICROBIOLOGY DATA:        Rapid Respiratory Viral Panel (11.06.18 @ 10:10)    Rapid RVP Result: NotDete: This Respiratory Panel uses polymerase chain reaction (PCR) to detect for  adenovirus; coronavirus (HKU1, NL63, 229E, OC43); human metapneumovirus  (hMPV); human enterovirus/rhinovirus (Entero/RV); influenza A; influenza  A/H1; influenza A/H3; influenza A/H1-2009; influenza B; parainfluenza  viruses 1, 2, 3, 4; respiratory syncytial virus; Mycoplasma pneumoniae;  and Chlamydophila pneumoniae.      Legionella pneumophila Antigen, Urine (11.06.18 @ 07:50)    Legionella Antigen, Urine: Negative      Culture - Blood (11.05.18 @ 17:14)    Specimen Source: .Blood Blood    Culture Results:   No growth to date.

## 2018-11-07 NOTE — PROGRESS NOTE ADULT - SUBJECTIVE AND OBJECTIVE BOX
JIAN MANCIA  77y  Male      Patient is a 77y old  Male who presents with a chief complaint of Dyspnea (05 Nov 2018 09:50)      INTERVAL HPI/OVERNIGHT EVENTS:  patient admitted for dyspnea and acute on chronic resp failure  -RVP negative; d/c isolation   -constipation; on laxatives   -will continue current abx regimen  -oob to chair encouraged  -patient denies any complaints     Vital Signs Last 24 Hrs  T(C): 36.8 (06 Nov 2018 21:47), Max: 36.8 (06 Nov 2018 21:47)  T(F): 98.2 (06 Nov 2018 21:47), Max: 98.2 (06 Nov 2018 21:47)  HR: 71 (07 Nov 2018 05:25) (71 - 91)  BP: 163/79 (07 Nov 2018 05:25) (136/73 - 166/70)  RR: 18 (07 Nov 2018 05:25) (18 - 18)  SpO2: 92% (07 Nov 2018 06:56) (92% - 92%)    PHYSICAL EXAM:  GENERAL: NAD, speaking in full sentences   HEAD:  Atraumatic, Normocephalic  EYES: EOMI, PERRLA, conjunctiva and sclera clear  NERVOUS SYSTEM:  Alert & Oriented X 3  CHEST/LUNG: decreased BS b/l  CV/HEART: Regular rate and rhythm; No murmurs, rubs, or gallops  GI/ABDOMEN: obese abdomen   EXTREMITIES:  2+ Peripheral Pulses, No clubbing, cyanosis, or edema  SKIN: No rashes or lesions    LAB:                             12.7   17.47 )-----------( 554      ( 07 Nov 2018 06:37 )             41.5   11-07    139  |  103  |  65<HH>  ----------------------------<  265<H>  6.0<HH>   |  25  |  1.9<H>    Ca    9.1      07 Nov 2018 06:37    LIVER FUNCTIONS - ( 04 Nov 2018 08:00 )  Alb: 4.5 g/dL / Pro: 6.9 g/dL / ALK PHOS: 78 U/L / ALT: 11 U/L / AST: 14 U/L / GGT: x           RADIOLOGY:    Imaging Personally Reviewed:  [Y ] YES  [ ] NO    HEALTH ISSUES - PROBLEM Dx:  Enlarged prostate: Enlarged prostate  Renal insufficiency: Renal insufficiency  Diabetes mellitus, type 2: Diabetes mellitus, type 2  Hypertension: Hypertension  COPD (chronic obstructive pulmonary disease): COPD (chronic obstructive pulmonary disease)      MEDICATIONS  (STANDING):  ALBUTerol/ipratropium for Nebulization 3 milliLiter(s) Nebulizer every 6 hours  amLODIPine   Tablet 10 milliGRAM(s) Oral daily  ascorbic acid 1000 milliGRAM(s) Oral daily  atorvastatin 20 milliGRAM(s) Oral at bedtime  buDESOnide 160 MICROgram(s)/formoterol 4.5 MICROgram(s) Inhaler 2 Puff(s) Inhalation two times a day  cefTRIAXone   IVPB 1 Gram(s) IV Intermittent every 24 hours  chlorhexidine 4% Liquid 1 Application(s) Topical <User Schedule>  cholecalciferol 1000 Unit(s) Oral daily  cyanocobalamin 1000 MICROGram(s) Oral daily  dextrose 5%. 1000 milliLiter(s) (50 mL/Hr) IV Continuous <Continuous>  dextrose 50% Injectable 12.5 Gram(s) IV Push once  dextrose 50% Injectable 25 Gram(s) IV Push once  dextrose 50% Injectable 25 Gram(s) IV Push once  docusate sodium 100 milliGRAM(s) Oral daily  doxycycline IVPB 100 milliGRAM(s) IV Intermittent every 12 hours  finasteride 5 milliGRAM(s) Oral daily  heparin  Injectable 5000 Unit(s) SubCutaneous every 8 hours  insulin glargine Injectable (LANTUS) 36 Unit(s) SubCutaneous every morning  insulin lispro (HumaLOG) corrective regimen sliding scale   SubCutaneous three times a day before meals  insulin lispro Injectable (HumaLOG) 12 Unit(s) SubCutaneous three times a day before meals  methylPREDNISolone sodium succinate Injectable 40 milliGRAM(s) IV Push every 8 hours  metoprolol succinate ER 25 milliGRAM(s) Oral daily  polyethylene glycol 3350 17 Gram(s) Oral daily  sodium polystyrene sulfonate Suspension 30 Gram(s) Oral once  tamsulosin 0.4 milliGRAM(s) Oral at bedtime    MEDICATIONS  (PRN):  dextrose 40% Gel 15 Gram(s) Oral once PRN Blood Glucose LESS THAN 70 milliGRAM(s)/deciliter  glucagon  Injectable 1 milliGRAM(s) IntraMuscular once PRN Glucose LESS THAN 70 milligrams/deciliter

## 2018-11-07 NOTE — PROGRESS NOTE ADULT - ASSESSMENT
Problem: Pneumonia due to Streptococcus pneumoniae, unspecified laterality, unspecified part of lung. Recommendation: IV abx  ambulate  dc isolation     DC planning - ? PO Vantin 200 mg Q 12   at least 10 days of abx. # Pneumonia  # RVP  and Legionella is negative    Would recommend:    1. Obtain Procalcitonin level  and MRSA PCR  2. Monitor WBC count, its elevated  3. Change Ceftriaxone to Cefepime  4. Management of BIPAP as per Primary /Pulmonary    d/w Family at the bed side

## 2018-11-07 NOTE — PROGRESS NOTE ADULT - SUBJECTIVE AND OBJECTIVE BOX
Patient is a 77y old  Male who presents with a chief complaint of Dyspnea (2018 10:42)      INTERVAL HISTORY/overnight events feel much better   still require high flow   RVP negative         Vital Signs Last 24 Hrs  T(C): 36.8 (2018 21:47), Max: 36.8 (2018 21:47)  T(F): 98.2 (2018 21:47), Max: 98.2 (2018 21:47)  HR: 71 (2018 05:25) (71 - 91)  BP: 163/79 (2018 05:25) (136/73 - 166/70)  BP(mean): --  RR: 18 (2018 05:25) (18 - 18)  SpO2: 92% (2018 06:56) (92% - 92%)  Daily     Daily Weight in k.7 (2018 05:25)  I&O's Summary      Physical Examination:    General: No acute distress.  Alert, oriented, interactive, nonfocal    HEENT: Pupils equal, reactive to light.  Symmetric.    PULM: b/l crackles     CVS: Regular rate and rhythm, no murmurs, rubs, or gallops    ABD: Soft, nondistended, nontender, normoactive bowel sounds, no masses    EXT: 1 edema, nontender    SKIN: Warm and well perfused, no rashes noted.    Neurology: no focal deficit     Musculoskeletal : Move all extremety         Lab Results:                        12.7   17.47 )-----------( 554      ( 2018 06:37 )             41.5     2018 06:37    139    |  103    |  65     ----------------------------<  265    6.0     |  25     |  1.9      Ca    9.1        2018 06:37                Microbiology    Culture - Blood (collected 2018 17:14)  Source: .Blood Blood  Preliminary Report (2018 01:01):    No growth to date.        Medication:  MEDICATIONS  (STANDING):  ALBUTerol/ipratropium for Nebulization 3 milliLiter(s) Nebulizer every 6 hours  amLODIPine   Tablet 10 milliGRAM(s) Oral daily  ascorbic acid 1000 milliGRAM(s) Oral daily  atorvastatin 20 milliGRAM(s) Oral at bedtime  buDESOnide 160 MICROgram(s)/formoterol 4.5 MICROgram(s) Inhaler 2 Puff(s) Inhalation two times a day  cefTRIAXone   IVPB 1 Gram(s) IV Intermittent every 24 hours  chlorhexidine 4% Liquid 1 Application(s) Topical <User Schedule>  cholecalciferol 1000 Unit(s) Oral daily  cyanocobalamin 1000 MICROGram(s) Oral daily  dextrose 5%. 1000 milliLiter(s) (50 mL/Hr) IV Continuous <Continuous>  dextrose 50% Injectable 12.5 Gram(s) IV Push once  dextrose 50% Injectable 25 Gram(s) IV Push once  dextrose 50% Injectable 25 Gram(s) IV Push once  docusate sodium 100 milliGRAM(s) Oral daily  docusate sodium 100 milliGRAM(s) Oral three times a day  doxycycline IVPB 100 milliGRAM(s) IV Intermittent every 12 hours  finasteride 5 milliGRAM(s) Oral daily  heparin  Injectable 5000 Unit(s) SubCutaneous every 8 hours  insulin glargine Injectable (LANTUS) 36 Unit(s) SubCutaneous every morning  insulin lispro (HumaLOG) corrective regimen sliding scale   SubCutaneous three times a day before meals  insulin lispro Injectable (HumaLOG) 12 Unit(s) SubCutaneous three times a day before meals  methylPREDNISolone sodium succinate Injectable 40 milliGRAM(s) IV Push every 8 hours  metoprolol succinate ER 25 milliGRAM(s) Oral daily  polyethylene glycol 3350 17 Gram(s) Oral daily  tamsulosin 0.4 milliGRAM(s) Oral at bedtime    MEDICATIONS  (PRN):  dextrose 40% Gel 15 Gram(s) Oral once PRN Blood Glucose LESS THAN 70 milliGRAM(s)/deciliter  glucagon  Injectable 1 milliGRAM(s) IntraMuscular once PRN Glucose LESS THAN 70 milligrams/deciliter        IMAGING STUDIES:

## 2018-11-07 NOTE — PROGRESS NOTE ADULT - ASSESSMENT
IMPRESSION:    Severe community acquired pneumonia - multiple risk factors for drug resistant strep  Acute on chronic hypercapnic failure, chronic hypoxemic respiratory failure  MICHAEL/OHS  Morbid obesity    PLAN:     Levaquin 750mg IV for now and rocephine   cxr    switch to Po prednisone 60  d/c solumderol   Nebs as needed  Bipap as needed and at night   monitor bun cr   Oxygen to aim for saturation more than 88%, avoid over-oxygenation  Blood cultures, sputum cultures, check urine strep and legionella  DVT prophylaxis  Will follow

## 2018-11-08 ENCOUNTER — TRANSCRIPTION ENCOUNTER (OUTPATIENT)
Age: 78
End: 2018-11-08

## 2018-11-08 LAB
ANION GAP SERPL CALC-SCNC: 13 MMOL/L — SIGNIFICANT CHANGE UP (ref 7–14)
BUN SERPL-MCNC: 56 MG/DL — HIGH (ref 10–20)
CALCIUM SERPL-MCNC: 8.9 MG/DL — SIGNIFICANT CHANGE UP (ref 8.5–10.1)
CHLORIDE SERPL-SCNC: 101 MMOL/L — SIGNIFICANT CHANGE UP (ref 98–110)
CO2 SERPL-SCNC: 27 MMOL/L — SIGNIFICANT CHANGE UP (ref 17–32)
CREAT SERPL-MCNC: 1.5 MG/DL — SIGNIFICANT CHANGE UP (ref 0.7–1.5)
GLUCOSE BLDC GLUCOMTR-MCNC: 122 MG/DL — HIGH (ref 70–99)
GLUCOSE BLDC GLUCOMTR-MCNC: 232 MG/DL — HIGH (ref 70–99)
GLUCOSE BLDC GLUCOMTR-MCNC: 298 MG/DL — HIGH (ref 70–99)
GLUCOSE BLDC GLUCOMTR-MCNC: 331 MG/DL — HIGH (ref 70–99)
GLUCOSE SERPL-MCNC: 290 MG/DL — HIGH (ref 70–99)
HCT VFR BLD CALC: 43.5 % — SIGNIFICANT CHANGE UP (ref 42–52)
HGB BLD-MCNC: 13.2 G/DL — LOW (ref 14–18)
MCHC RBC-ENTMCNC: 23.6 PG — LOW (ref 27–31)
MCHC RBC-ENTMCNC: 30.3 G/DL — LOW (ref 32–37)
MCV RBC AUTO: 77.8 FL — LOW (ref 80–94)
NRBC # BLD: 0 /100 WBCS — SIGNIFICANT CHANGE UP (ref 0–0)
PLATELET # BLD AUTO: 534 K/UL — HIGH (ref 130–400)
POTASSIUM SERPL-MCNC: 5.3 MMOL/L — HIGH (ref 3.5–5)
POTASSIUM SERPL-SCNC: 5.3 MMOL/L — HIGH (ref 3.5–5)
RBC # BLD: 5.59 M/UL — SIGNIFICANT CHANGE UP (ref 4.7–6.1)
RBC # FLD: 17 % — HIGH (ref 11.5–14.5)
SODIUM SERPL-SCNC: 141 MMOL/L — SIGNIFICANT CHANGE UP (ref 135–146)
WBC # BLD: 14.64 K/UL — HIGH (ref 4.8–10.8)
WBC # FLD AUTO: 14.64 K/UL — HIGH (ref 4.8–10.8)

## 2018-11-08 RX ORDER — INSULIN GLARGINE 100 [IU]/ML
15 INJECTION, SOLUTION SUBCUTANEOUS EVERY MORNING
Qty: 0 | Refills: 0 | Status: DISCONTINUED | OUTPATIENT
Start: 2018-11-08 | End: 2018-11-15

## 2018-11-08 RX ORDER — SODIUM POLYSTYRENE SULFONATE 4.1 MEQ/G
15 POWDER, FOR SUSPENSION ORAL ONCE
Qty: 0 | Refills: 0 | Status: COMPLETED | OUTPATIENT
Start: 2018-11-08 | End: 2018-11-08

## 2018-11-08 RX ORDER — FAMOTIDINE 10 MG/ML
20 INJECTION INTRAVENOUS ONCE
Qty: 0 | Refills: 0 | Status: COMPLETED | OUTPATIENT
Start: 2018-11-08 | End: 2018-11-08

## 2018-11-08 RX ORDER — CEFPODOXIME PROXETIL 100 MG
200 TABLET ORAL EVERY 12 HOURS
Qty: 0 | Refills: 0 | Status: DISCONTINUED | OUTPATIENT
Start: 2018-11-08 | End: 2018-11-15

## 2018-11-08 RX ADMIN — Medication 200 MILLIGRAM(S): at 17:09

## 2018-11-08 RX ADMIN — AMLODIPINE BESYLATE 10 MILLIGRAM(S): 2.5 TABLET ORAL at 08:17

## 2018-11-08 RX ADMIN — ATORVASTATIN CALCIUM 20 MILLIGRAM(S): 80 TABLET, FILM COATED ORAL at 21:21

## 2018-11-08 RX ADMIN — Medication 2: at 09:01

## 2018-11-08 RX ADMIN — Medication 12 UNIT(S): at 16:48

## 2018-11-08 RX ADMIN — TAMSULOSIN HYDROCHLORIDE 0.4 MILLIGRAM(S): 0.4 CAPSULE ORAL at 21:21

## 2018-11-08 RX ADMIN — HEPARIN SODIUM 5000 UNIT(S): 5000 INJECTION INTRAVENOUS; SUBCUTANEOUS at 08:19

## 2018-11-08 RX ADMIN — Medication 3 MILLILITER(S): at 20:23

## 2018-11-08 RX ADMIN — Medication 3 MILLILITER(S): at 07:54

## 2018-11-08 RX ADMIN — Medication 40 MILLIGRAM(S): at 08:18

## 2018-11-08 RX ADMIN — FINASTERIDE 5 MILLIGRAM(S): 5 TABLET, FILM COATED ORAL at 12:20

## 2018-11-08 RX ADMIN — Medication 40 MILLIGRAM(S): at 17:10

## 2018-11-08 RX ADMIN — Medication 1000 UNIT(S): at 12:20

## 2018-11-08 RX ADMIN — HEPARIN SODIUM 5000 UNIT(S): 5000 INJECTION INTRAVENOUS; SUBCUTANEOUS at 15:12

## 2018-11-08 RX ADMIN — BUDESONIDE AND FORMOTEROL FUMARATE DIHYDRATE 2 PUFF(S): 160; 4.5 AEROSOL RESPIRATORY (INHALATION) at 08:18

## 2018-11-08 RX ADMIN — Medication 12 UNIT(S): at 08:54

## 2018-11-08 RX ADMIN — Medication 3 MILLILITER(S): at 13:44

## 2018-11-08 RX ADMIN — Medication 4: at 16:48

## 2018-11-08 RX ADMIN — Medication 3 MILLILITER(S): at 00:38

## 2018-11-08 RX ADMIN — INSULIN GLARGINE 15 UNIT(S): 100 INJECTION, SOLUTION SUBCUTANEOUS at 22:18

## 2018-11-08 RX ADMIN — SODIUM POLYSTYRENE SULFONATE 15 GRAM(S): 4.1 POWDER, FOR SUSPENSION ORAL at 22:14

## 2018-11-08 RX ADMIN — Medication 1 DROP(S): at 22:14

## 2018-11-08 RX ADMIN — Medication 1000 MILLIGRAM(S): at 12:20

## 2018-11-08 RX ADMIN — FAMOTIDINE 20 MILLIGRAM(S): 10 INJECTION INTRAVENOUS at 22:13

## 2018-11-08 RX ADMIN — Medication 25 MILLIGRAM(S): at 08:18

## 2018-11-08 RX ADMIN — Medication 100 MILLIGRAM(S): at 21:21

## 2018-11-08 RX ADMIN — INSULIN GLARGINE 36 UNIT(S): 100 INJECTION, SOLUTION SUBCUTANEOUS at 09:00

## 2018-11-08 RX ADMIN — Medication 100 MILLIGRAM(S): at 15:12

## 2018-11-08 RX ADMIN — Medication 100 MILLIGRAM(S): at 08:20

## 2018-11-08 RX ADMIN — HEPARIN SODIUM 5000 UNIT(S): 5000 INJECTION INTRAVENOUS; SUBCUTANEOUS at 22:13

## 2018-11-08 RX ADMIN — PREGABALIN 1000 MICROGRAM(S): 225 CAPSULE ORAL at 12:20

## 2018-11-08 RX ADMIN — BUDESONIDE AND FORMOTEROL FUMARATE DIHYDRATE 2 PUFF(S): 160; 4.5 AEROSOL RESPIRATORY (INHALATION) at 22:11

## 2018-11-08 NOTE — PHYSICAL THERAPY INITIAL EVALUATION ADULT - GENERAL OBSERVATIONS, REHAB EVAL
10:25 - 10:52.  Chart reviewed. Patient available to be seen for physical therapy, confirmed with nurse. Patient encountered already seated at edge of bed, +6L/minute O2 via nasal cannula. Patient denies pain at rest, would like to walk with PT now.

## 2018-11-08 NOTE — DISCHARGE NOTE ADULT - PLAN OF CARE
to be symptom free -use home O2  -take your medications as prescribed  -outpatient follow up with pulmonary

## 2018-11-08 NOTE — PROGRESS NOTE ADULT - SUBJECTIVE AND OBJECTIVE BOX
infectious diseases progress note:  JIAN MANCIA is a 77yMale patient    COPD SHORTNESS OF BREATH HYPOXIA                              .......  COPD SHORTNESS OF BREATH YPOXIA    Vitamin D deficiency  B12 deficiency  Constipation  Hyperkalemia  Pneumonia due to Streptococcus pneumoniae, unspecified laterality, unspecified part of lung  Obesity  Enlarged prostate  Renal insufficiency  Diabetes mellitus, type 2  Hypertension  COPD (chronic obstructive pulmonary disease)      ROS:  not relevant     Allergies    iodine (Hives)  penicillin (Unknown)    Intolerances    aspirin (Other)      ANTIBIOTICS/RELEVANT:  antimicrobials  cefpodoxime 200 milliGRAM(s) Oral every 12 hours    immunologic:    OTHER:  ALBUTerol/ipratropium for Nebulization 3 milliLiter(s) Nebulizer every 6 hours  amLODIPine   Tablet 10 milliGRAM(s) Oral daily  ascorbic acid 1000 milliGRAM(s) Oral daily  atorvastatin 20 milliGRAM(s) Oral at bedtime  buDESOnide 160 MICROgram(s)/formoterol 4.5 MICROgram(s) Inhaler 2 Puff(s) Inhalation two times a day  chlorhexidine 4% Liquid 1 Application(s) Topical <User Schedule>  cholecalciferol 1000 Unit(s) Oral daily  cyanocobalamin 1000 MICROGram(s) Oral daily  dextrose 40% Gel 15 Gram(s) Oral once PRN  dextrose 5%. 1000 milliLiter(s) IV Continuous <Continuous>  dextrose 50% Injectable 12.5 Gram(s) IV Push once  dextrose 50% Injectable 25 Gram(s) IV Push once  dextrose 50% Injectable 25 Gram(s) IV Push once  docusate sodium 100 milliGRAM(s) Oral three times a day  finasteride 5 milliGRAM(s) Oral daily  glucagon  Injectable 1 milliGRAM(s) IntraMuscular once PRN  heparin  Injectable 5000 Unit(s) SubCutaneous every 8 hours  insulin glargine Injectable (LANTUS) 36 Unit(s) SubCutaneous every morning  insulin lispro (HumaLOG) corrective regimen sliding scale   SubCutaneous three times a day before meals  insulin lispro Injectable (HumaLOG) 12 Unit(s) SubCutaneous three times a day before meals  methylPREDNISolone sodium succinate Injectable 40 milliGRAM(s) IV Push every 8 hours  metoprolol succinate ER 25 milliGRAM(s) Oral daily  polyethylene glycol 3350 17 Gram(s) Oral daily  tamsulosin 0.4 milliGRAM(s) Oral at bedtime      Objective:  T(F): 98.7 (11-07-18 @ 21:41), Max: 98.7 (11-07-18 @ 21:41)  HR: 103 (11-07-18 @ 21:41) (82 - 103)  BP: 166/86 (11-07-18 @ 21:41) (144/65 - 166/86)  RR: 18 (11-07-18 @ 21:41) (18 - 18)  SpO2: 93% (11-07-18 @ 11:42) (93% - 93%)    PHYSICAL EXAM:  awake and alert   Constitutional:Well-developed, well nourished  Respiratory: few rhonchi  camila  Cardiovascular: S1S2 RRR  Gastrointestinal:soft, (+) BS, no HSM  Extremities:no phlebitis       LABS:                        12.7   17.47 )-----------( 554      ( 07 Nov 2018 06:37 )             41.5     11-07    139  |  103  |  65<HH>  ----------------------------<  265<H>  6.0<HH>   |  25  |  1.9<H>    Ca    9.1      07 Nov 2018 06:37              MICROBIOLOGY:    Culture - Blood (collected 11-05-18 @ 17:14)  Source: .Blood Blood  Preliminary Report (11-07-18 @ 01:01):    No growth to date.        Culture - Blood (collected 05 Nov 2018 17:14)  Source: .Blood Blood  Preliminary Report (07 Nov 2018 01:01):    No growth to date.      Culture Results:   No growth to date. (11-05 @ 17:14)        RADIOLOGY & ADDITIONAL STUDIES:

## 2018-11-08 NOTE — PROGRESS NOTE ADULT - SUBJECTIVE AND OBJECTIVE BOX
Patient making the following requests, Zantac (will order pepcid), Artificial tears (ordered prn), and Kayexalate (last K level is 5.3). Will order as requested

## 2018-11-08 NOTE — PROGRESS NOTE ADULT - PROBLEM SELECTOR PLAN 1
-Acute on chronic resp failure/copd exacerbation/suspected gram negative PNA/on Home O2  -abx changed to oral as per ID   -on High flow O2 vs. Nasal cannula O2  -Taper IV steroids, nebs, abx and pulmonary follow ups

## 2018-11-08 NOTE — PROGRESS NOTE ADULT - SUBJECTIVE AND OBJECTIVE BOX
JIAN MANCIA  77y  Male      Patient is a 77y old  Male who presents with a chief complaint of Dyspnea (05 Nov 2018 09:50)      INTERVAL HPI/OVERNIGHT EVENTS:    patient admitted for dyspnea and acute on chronic resp failure/home O2  -ID follow up noted (on oral antibiotics)  -will continue with IV steroids (taper as tolerated); on nasal cannula O2 (pulse ox around 88, high flow O2 prn   -encourage oob to chair   -rehab/pt  -discharge planning early next week once resp status improves    Vital Signs Last 24 Hrs  T(C): 37.1 (07 Nov 2018 21:41), Max: 37.1 (07 Nov 2018 21:41)  T(F): 98.7 (07 Nov 2018 21:41), Max: 98.7 (07 Nov 2018 21:41)  HR: 103 (07 Nov 2018 21:41) (103 - 103)  BP: 166/86 (07 Nov 2018 21:41) (166/86 - 166/86)  RR: 18 (07 Nov 2018 21:41) (18 - 18)  SpO2: 77% (08 Nov 2018 10:52) (77% - 91%)    PHYSICAL EXAM:  GENERAL: NAD, speaking in full sentences   HEAD:  Atraumatic, Normocephalic  EYES: EOMI, PERRLA, conjunctiva and sclera clear  NERVOUS SYSTEM:  Alert & Oriented X 3  CHEST/LUNG: decreased BS b/l  CV/HEART: Regular rate and rhythm; No murmurs, rubs, or gallops  GI/ABDOMEN: obese abdomen   EXTREMITIES:  2+ Peripheral Pulses, No clubbing, cyanosis, or edema  SKIN: No rashes or lesions    LAB:                            13.2   14.64 )-----------( 534      ( 08 Nov 2018 08:39 )             43.5                          12.7   17.47 )-----------( 554      ( 07 Nov 2018 06:37 )             41.5     11-08    141  |  101  |  56<H>  ----------------------------<  290<H>  5.3<H>   |  27  |  1.5    Ca    8.9      08 Nov 2018 08:36    11-07    139  |  103  |  65<HH>  ----------------------------<  265<H>  6.0<HH>   |  25  |  1.9<H>    Ca    9.1      07 Nov 2018 06:37    LIVER FUNCTIONS - ( 04 Nov 2018 08:00 )  Alb: 4.5 g/dL / Pro: 6.9 g/dL / ALK PHOS: 78 U/L / ALT: 11 U/L / AST: 14 U/L / GGT: x           RADIOLOGY:    Imaging Personally Reviewed:  [Y ] YES  [ ] NO    HEALTH ISSUES - PROBLEM Dx:  Enlarged prostate: Enlarged prostate  Renal insufficiency: Renal insufficiency  Diabetes mellitus, type 2: Diabetes mellitus, type 2  Hypertension: Hypertension  COPD (chronic obstructive pulmonary disease): COPD (chronic obstructive pulmonary disease)      MEDICATIONS  (STANDING):  ALBUTerol/ipratropium for Nebulization 3 milliLiter(s) Nebulizer every 6 hours  amLODIPine   Tablet 10 milliGRAM(s) Oral daily  ascorbic acid 1000 milliGRAM(s) Oral daily  atorvastatin 20 milliGRAM(s) Oral at bedtime  buDESOnide 160 MICROgram(s)/formoterol 4.5 MICROgram(s) Inhaler 2 Puff(s) Inhalation two times a day  cefpodoxime 200 milliGRAM(s) Oral every 12 hours  chlorhexidine 4% Liquid 1 Application(s) Topical <User Schedule>  cholecalciferol 1000 Unit(s) Oral daily  cyanocobalamin 1000 MICROGram(s) Oral daily  dextrose 5%. 1000 milliLiter(s) (50 mL/Hr) IV Continuous <Continuous>  dextrose 50% Injectable 12.5 Gram(s) IV Push once  dextrose 50% Injectable 25 Gram(s) IV Push once  dextrose 50% Injectable 25 Gram(s) IV Push once  docusate sodium 100 milliGRAM(s) Oral three times a day  finasteride 5 milliGRAM(s) Oral daily  heparin  Injectable 5000 Unit(s) SubCutaneous every 8 hours  insulin glargine Injectable (LANTUS) 36 Unit(s) SubCutaneous every morning  insulin lispro (HumaLOG) corrective regimen sliding scale   SubCutaneous three times a day before meals  insulin lispro Injectable (HumaLOG) 12 Unit(s) SubCutaneous three times a day before meals  methylPREDNISolone sodium succinate Injectable 40 milliGRAM(s) IV Push every 8 hours  metoprolol succinate ER 25 milliGRAM(s) Oral daily  polyethylene glycol 3350 17 Gram(s) Oral daily  tamsulosin 0.4 milliGRAM(s) Oral at bedtime    MEDICATIONS  (PRN):  dextrose 40% Gel 15 Gram(s) Oral once PRN Blood Glucose LESS THAN 70 milliGRAM(s)/deciliter  glucagon  Injectable 1 milliGRAM(s) IntraMuscular once PRN Glucose LESS THAN 70 milligrams/deciliter

## 2018-11-08 NOTE — DISCHARGE NOTE ADULT - CARE PROVIDER_API CALL
Raul Castrejon), Internal Medicine; Pulmonary Disease  [address] [place] [address]  Phone: [phone]  Fax: [phone]

## 2018-11-08 NOTE — DISCHARGE NOTE ADULT - MEDICATION SUMMARY - MEDICATIONS TO TAKE
I will START or STAY ON the medications listed below when I get home from the hospital:    finasteride 5 mg oral tablet  -- 1 tab(s) by mouth once a day  -- Indication: For Bph    predniSONE 10 mg oral tablet  -- 4 tab(s) by mouth once a day 4 days, then 20mg tabs once daily 4 days, then 10mg once daily until seen by pulmonary  -- It is very important that you take or use this exactly as directed.  Do not skip doses or discontinue unless directed by your doctor.  Obtain medical advice before taking any non-prescription drugs as some may affect the action of this medication.  Take with food or milk.    -- Indication: For COPD (chronic obstructive pulmonary disease)    tamsulosin 0.4 mg oral capsule  -- 1 cap(s) by mouth once a day  -- Indication: For BPH    NovoLOG FlexPen 100 units/mL injectable solution  -- 15 unit(s) injectable 3 times a day (before meals)   -- Indication: For Diabetes mellitus, type 2    Levemir 100 units/mL subcutaneous solution  -- 36 unit(s) subcutaneous once a day  -- Indication: For Diabetes mellitus, type 2    atorvastatin 20 mg oral tablet  -- 1 tab(s) by mouth once a day (at bedtime)  -- Indication: For Dyslipidemia    Toprol-XL 25 mg oral tablet, extended release  -- 1 tab(s) by mouth once a day  -- Indication: For Hypertension    Trelegy Ellipta inhalation powder  -- 1 puff(s) inhaled once a day  -- Indication: For COPD (chronic obstructive pulmonary disease)    budesonide-formoterol 160 mcg-4.5 mcg/inh inhalation aerosol  -- 2 puff(s) inhaled 2 times a day   -- Indication: For COPD (chronic obstructive pulmonary disease)    ProAir HFA  -- 2 inhaler(s) inhaled every 4 hours, As Needed  -- Indication: For COPD (chronic obstructive pulmonary disease)    amLODIPine 10 mg oral tablet  -- 1 tab(s) by mouth once a day  -- Indication: For Hypertension    cefpodoxime 200 mg oral tablet  -- 1 tab(s) by mouth every 12 hours for 7 days. tolerated Vantin in the hospital  -- Indication: For CAP    Flax Seed Oil oral capsule  -- 1000 milligram(s) orally  -- Indication: For Home med    polyethylene glycol 3350 oral powder for reconstitution  -- 1 dose(s) by mouth once a day  -- Indication: For Constipation    docusate sodium 100 mg oral tablet  -- 1  by mouth once a day  -- Indication: For Constipation    omeprazole 20 mg oral delayed release capsule  -- 1 cap(s) by mouth once a day   -- Indication: For while on steroids    Centrum oral tablet  -- 1 tab(s) by mouth once a day  -- Indication: For supplements    Vitamin B-12  -- 3000 milligram(s) orally  -- Indication: For supplements    Vitamin D3 1000 intl units oral tablet  -- 1 tab(s) by mouth once a day  -- Indication: For supplements    Vitamin C 1000 mg oral tablet  -- 1  by mouth once a day  -- Indication: For supplements

## 2018-11-08 NOTE — DISCHARGE NOTE ADULT - HOSPITAL COURSE
Assessment and Plan:   · Assessment	            78 y/o male admitted for COPD excerbation.     Problem/Plan - 1:  ·  Problem: COPD (chronic obstructive pulmonary disease).  Plan: -Acute on chronic resp failure/copd exacerbation/suspected gram negative PNA/on Home O2  -abx changed to oral as per ID   -on High flow O2 vs. Nasal cannula O2  -Taper IV steroids, nebs, abx and pulmonary follow ups.      Problem/Plan - 2:  ·  Problem: Hypertension.  Plan: -home meds.      Problem/Plan - 3:  ·  Problem: Diabetes mellitus, type 2.  Plan: -sq insulin coverage  -uncontrolled; increase the coverage.      Problem/Plan - 4:  ·  Problem: Renal insufficiency.  Plan: -CKD stage III  -outpatient nephro follow up and kidney monitoring.      Problem/Plan - 5:  ·  Problem: Enlarged prostate.  Plan: -BPH; on flomax and finasteride.      Problem/Plan - 6:  Problem: Obesity. Plan: BMI > 39;   -weight loss and diet modification.     Problem/Plan - 7:  ·  Problem: Hyperkalemia.  Plan: -kayxalate dose to be given (improved as pt with bowel movement)  -on laxatives.      Problem/Plan - 8:  ·  Problem: Constipation.  Plan: -on laxatives + stool softeners.      Problem/Plan - 9:  ·  Problem: B12 deficiency.  Plan: -suspected B12 Def; continue with home supplementation.      Problem/Plan - 10:  Problem: Vitamin D deficiency. Plan; -suspected Vitamin D def; continue with oral supplementations. ***patient seen and examined at bedside. Spent over 60mins d/c planning, d/c papers and med rec ***    Vital Signs Last 24 Hrs  T(C): 36.4 (14 Nov 2018 14:36), Max: 36.7 (13 Nov 2018 14:46)  T(F): 97.5 (14 Nov 2018 14:36), Max: 98 (13 Nov 2018 14:46)  HR: 100 (14 Nov 2018 14:36) (84 - 107)  BP: 144/79 (14 Nov 2018 14:36) (125/69 - 156/74)  RR: 16 (14 Nov 2018 14:36) (16 - 16)  SpO2: 93% (14 Nov 2018 07:22) (93% - 93%)    Assessment and Plan:   · Assessment	  76 y/o male admitted for COPD exacerbation  Patient admitted for dyspnea and acute on chronic resp failure/home O2/MICHAEL on CPAP - many readmission in the past for the same     Problem/Plan - 1:  ·  Problem: COPD (chronic obstructive pulmonary disease).  Plan: -Acute on chronic resp failure/copd exacerbation/suspected gram negative PNA/on Home O2  -abx changed to oral as per ID   -on Nasal cannula O2  -Taper PO steroids    nebs, abx and pulmonary follow ups    #MICHAEL  -cpap at night - has machine at home; AVAP arrangements as per Pulmonary.      Problem/Plan - 2:  ·  Problem: Hypertension.  Plan: Stable on home meds.      Problem/Plan - 3:  ·  Problem: Diabetes mellitus, type 2.  Plan: -sq insulin coverage  -check finger sticks.      Problem/Plan - 4:  ·  Problem: Renal insufficiency.  Plan: -CKD stage III  -outpatient nephro follow up and kidney monitoring.      Problem/Plan - 5:  ·  Problem: Enlarged prostate.  Plan: -BPH; on flomax and finasteride.      Problem/Plan - 6:  Problem: Obesity. Plan: BMI > 39;   -weight loss and diet modification.     Problem/Plan - 7:  ·  Problem: Hyperkalemia.  Plan: -continue with laxatives.      Problem/Plan - 8:  ·  Problem: Constipation.  Plan: -on laxatives + stool softeners.      Problem/Plan - 9:  ·  Problem: B12 deficiency.  Plan: -suspected B12 Def; continue with home supplementation.      Problem/Plan - 10:  Problem: Vitamin D deficiency. Plan; -suspected Vitamin D def; continue with oral supplementations.    patient can be discharged once AVAP is arranged ***patient seen and examined at bedside. Spent over 60mins d/c planning, d/c papers and med rec ***    Vital Signs Last 24 Hrs  T(C): 36.2 (15 Nov 2018 05:23), Max: 36.4 (14 Nov 2018 14:36)  T(F): 97.1 (15 Nov 2018 05:23), Max: 97.5 (14 Nov 2018 14:36)  HR: 75 (15 Nov 2018 06:31) (75 - 100)  BP: 145/73 (15 Nov 2018 06:31) (144/79 - 162/77)  RR: 16 (15 Nov 2018 05:23) (16 - 16)    Assessment and Plan:   · Assessment	  76 y/o male admitted for COPD exacerbation  Patient admitted for dyspnea and acute on chronic resp failure/home O2/MICHAEL on CPAP - many readmission in the past for the same     Problem/Plan - 1:  ·  Problem: COPD (chronic obstructive pulmonary disease).  Plan: -Acute on chronic resp failure/copd exacerbation/suspected gram negative PNA/on Home O2  -abx changed to oral as per ID   -on Nasal cannula O2  -Taper PO steroids    nebs, abx and pulmonary follow ups    #MICHAEL  -cpap at night - has machine at home; AVAP arrangements as per Pulmonary.      Problem/Plan - 2:  ·  Problem: Hypertension.  Plan: Stable on home meds.      Problem/Plan - 3:  ·  Problem: Diabetes mellitus, type 2.  Plan: -sq insulin coverage  -check finger sticks.      Problem/Plan - 4:  ·  Problem: Renal insufficiency.  Plan: -CKD stage III  -outpatient nephro follow up and kidney monitoring.      Problem/Plan - 5:  ·  Problem: Enlarged prostate.  Plan: -BPH; on flomax and finasteride.      Problem/Plan - 6:  Problem: Obesity. Plan: BMI > 39;   -weight loss and diet modification.     Problem/Plan - 7:  ·  Problem: Hyperkalemia.  Plan: -continue with laxatives.      Problem/Plan - 8:  ·  Problem: Constipation.  Plan: -on laxatives + stool softeners.      Problem/Plan - 9:  ·  Problem: B12 deficiency.  Plan: -suspected B12 Def; continue with home supplementation.      Problem/Plan - 10:  Problem: Vitamin D deficiency. Plan; -suspected Vitamin D def; continue with oral supplementations.    patient can be discharged once AVAP is arranged and home care in place

## 2018-11-08 NOTE — PHYSICAL THERAPY INITIAL EVALUATION ADULT - GAIT DEVIATIONS NOTED, PT EVAL
decreased weight-shifting ability/decreased step length/decreased matthew/increased time in double stance

## 2018-11-08 NOTE — DISCHARGE NOTE ADULT - CARE PLAN
Principal Discharge DX:	COPD (chronic obstructive pulmonary disease)  Goal:	to be symptom free  Assessment and plan of treatment:	-use home O2  -take your medications as prescribed  -outpatient follow up with pulmonary

## 2018-11-08 NOTE — PHYSICAL THERAPY INITIAL EVALUATION ADULT - IMPAIRMENTS FOUND, PT EVAL
muscle strength/gait, locomotion, and balance/posture/ergonomics and body mechanics/aerobic capacity/endurance

## 2018-11-09 LAB
ANION GAP SERPL CALC-SCNC: 10 MMOL/L — SIGNIFICANT CHANGE UP (ref 7–14)
BUN SERPL-MCNC: 52 MG/DL — HIGH (ref 10–20)
CALCIUM SERPL-MCNC: 8.9 MG/DL — SIGNIFICANT CHANGE UP (ref 8.5–10.1)
CHLORIDE SERPL-SCNC: 101 MMOL/L — SIGNIFICANT CHANGE UP (ref 98–110)
CO2 SERPL-SCNC: 28 MMOL/L — SIGNIFICANT CHANGE UP (ref 17–32)
CREAT SERPL-MCNC: 1.4 MG/DL — SIGNIFICANT CHANGE UP (ref 0.7–1.5)
GLUCOSE BLDC GLUCOMTR-MCNC: 260 MG/DL — HIGH (ref 70–99)
GLUCOSE BLDC GLUCOMTR-MCNC: 272 MG/DL — HIGH (ref 70–99)
GLUCOSE BLDC GLUCOMTR-MCNC: 339 MG/DL — HIGH (ref 70–99)
GLUCOSE BLDC GLUCOMTR-MCNC: 353 MG/DL — HIGH (ref 70–99)
GLUCOSE SERPL-MCNC: 291 MG/DL — HIGH (ref 70–99)
HCT VFR BLD CALC: 43 % — SIGNIFICANT CHANGE UP (ref 42–52)
HGB BLD-MCNC: 12.9 G/DL — LOW (ref 14–18)
MCHC RBC-ENTMCNC: 23.2 PG — LOW (ref 27–31)
MCHC RBC-ENTMCNC: 30 G/DL — LOW (ref 32–37)
MCV RBC AUTO: 77.5 FL — LOW (ref 80–94)
NRBC # BLD: 0 /100 WBCS — SIGNIFICANT CHANGE UP (ref 0–0)
PLATELET # BLD AUTO: 524 K/UL — HIGH (ref 130–400)
POTASSIUM SERPL-MCNC: 4.9 MMOL/L — SIGNIFICANT CHANGE UP (ref 3.5–5)
POTASSIUM SERPL-SCNC: 4.9 MMOL/L — SIGNIFICANT CHANGE UP (ref 3.5–5)
RBC # BLD: 5.55 M/UL — SIGNIFICANT CHANGE UP (ref 4.7–6.1)
RBC # FLD: 17.2 % — HIGH (ref 11.5–14.5)
SODIUM SERPL-SCNC: 139 MMOL/L — SIGNIFICANT CHANGE UP (ref 135–146)
WBC # BLD: 14.62 K/UL — HIGH (ref 4.8–10.8)
WBC # FLD AUTO: 14.62 K/UL — HIGH (ref 4.8–10.8)

## 2018-11-09 RX ORDER — SODIUM POLYSTYRENE SULFONATE 4.1 MEQ/G
15 POWDER, FOR SUSPENSION ORAL ONCE
Qty: 0 | Refills: 0 | Status: COMPLETED | OUTPATIENT
Start: 2018-11-09 | End: 2018-11-09

## 2018-11-09 RX ORDER — BACITRACIN ZINC 500 UNIT/G
1 OINTMENT IN PACKET (EA) TOPICAL
Qty: 0 | Refills: 0 | Status: DISCONTINUED | OUTPATIENT
Start: 2018-11-09 | End: 2018-11-15

## 2018-11-09 RX ADMIN — Medication 100 MILLIGRAM(S): at 21:36

## 2018-11-09 RX ADMIN — HEPARIN SODIUM 5000 UNIT(S): 5000 INJECTION INTRAVENOUS; SUBCUTANEOUS at 21:38

## 2018-11-09 RX ADMIN — Medication 40 MILLIGRAM(S): at 17:02

## 2018-11-09 RX ADMIN — TAMSULOSIN HYDROCHLORIDE 0.4 MILLIGRAM(S): 0.4 CAPSULE ORAL at 21:36

## 2018-11-09 RX ADMIN — Medication 12 UNIT(S): at 08:13

## 2018-11-09 RX ADMIN — FINASTERIDE 5 MILLIGRAM(S): 5 TABLET, FILM COATED ORAL at 11:30

## 2018-11-09 RX ADMIN — Medication 3 MILLILITER(S): at 01:37

## 2018-11-09 RX ADMIN — Medication 3: at 08:13

## 2018-11-09 RX ADMIN — Medication 200 MILLIGRAM(S): at 06:28

## 2018-11-09 RX ADMIN — ATORVASTATIN CALCIUM 20 MILLIGRAM(S): 80 TABLET, FILM COATED ORAL at 21:36

## 2018-11-09 RX ADMIN — Medication 200 MILLIGRAM(S): at 17:02

## 2018-11-09 RX ADMIN — SODIUM POLYSTYRENE SULFONATE 15 GRAM(S): 4.1 POWDER, FOR SUSPENSION ORAL at 14:47

## 2018-11-09 RX ADMIN — Medication 1 APPLICATION(S): at 17:00

## 2018-11-09 RX ADMIN — Medication 3 MILLILITER(S): at 14:13

## 2018-11-09 RX ADMIN — PREGABALIN 1000 MICROGRAM(S): 225 CAPSULE ORAL at 11:29

## 2018-11-09 RX ADMIN — HEPARIN SODIUM 5000 UNIT(S): 5000 INJECTION INTRAVENOUS; SUBCUTANEOUS at 06:29

## 2018-11-09 RX ADMIN — BUDESONIDE AND FORMOTEROL FUMARATE DIHYDRATE 2 PUFF(S): 160; 4.5 AEROSOL RESPIRATORY (INHALATION) at 21:37

## 2018-11-09 RX ADMIN — Medication 100 MILLIGRAM(S): at 13:16

## 2018-11-09 RX ADMIN — Medication 1 DROP(S): at 21:37

## 2018-11-09 RX ADMIN — Medication 12 UNIT(S): at 11:48

## 2018-11-09 RX ADMIN — BUDESONIDE AND FORMOTEROL FUMARATE DIHYDRATE 2 PUFF(S): 160; 4.5 AEROSOL RESPIRATORY (INHALATION) at 08:14

## 2018-11-09 RX ADMIN — CHLORHEXIDINE GLUCONATE 1 APPLICATION(S): 213 SOLUTION TOPICAL at 06:35

## 2018-11-09 RX ADMIN — Medication 12 UNIT(S): at 17:00

## 2018-11-09 RX ADMIN — AMLODIPINE BESYLATE 10 MILLIGRAM(S): 2.5 TABLET ORAL at 06:28

## 2018-11-09 RX ADMIN — Medication 4: at 17:01

## 2018-11-09 RX ADMIN — Medication 3: at 11:48

## 2018-11-09 RX ADMIN — Medication 3 MILLILITER(S): at 08:34

## 2018-11-09 RX ADMIN — Medication 3 MILLILITER(S): at 19:38

## 2018-11-09 RX ADMIN — HEPARIN SODIUM 5000 UNIT(S): 5000 INJECTION INTRAVENOUS; SUBCUTANEOUS at 13:16

## 2018-11-09 RX ADMIN — Medication 1000 UNIT(S): at 11:29

## 2018-11-09 RX ADMIN — Medication 100 MILLIGRAM(S): at 06:28

## 2018-11-09 RX ADMIN — Medication 1000 MILLIGRAM(S): at 11:29

## 2018-11-09 RX ADMIN — Medication 25 MILLIGRAM(S): at 06:28

## 2018-11-09 RX ADMIN — Medication 40 MILLIGRAM(S): at 06:31

## 2018-11-09 NOTE — PROGRESS NOTE ADULT - ASSESSMENT
78 y/o male admitted for COPD exacerbation  Patient admitted for dyspnea and acute on chronic resp failure/home O2/MICHAEL on CPAP - many readmission in the past for the same  -ID follow up noted (on oral antibiotics)  -will continue with IV steroids (taper as tolerated); on nasal cannula O2 (pulse ox around 88, high flow O2 prn   -encourage oob to chair   -rehab/pt  -discharge planning early next week once resp status improves

## 2018-11-09 NOTE — PROGRESS NOTE ADULT - PROBLEM SELECTOR PLAN 1
-Acute on chronic resp failure/copd exacerbation/suspected gram negative PNA/on Home O2  -abx changed to oral as per ID   -on High flow O2 vs. Nasal cannula O2  -Taper IV steroids - unable to taper today  , nebs, abx and pulmonary follow ups    #MICHAEL  -cpap at night

## 2018-11-09 NOTE — PROGRESS NOTE ADULT - SUBJECTIVE AND OBJECTIVE BOX
JIAN MANCIA  77y  Male      Patient is a 77y old  Male who presents with a chief complaint of Dyspnea (05 Nov 2018 09:50)      INTERVAL HPI/OVERNIGHT EVENTS:    patient admitted for dyspnea and acute on chronic resp failure/home O2/MICHAEL on CPAP - many readmission in the past for the same  -ID follow up noted (on oral antibiotics)  -will continue with IV steroids (taper as tolerated); on nasal cannula O2 (pulse ox around 88, high flow O2 prn   -encourage oob to chair   -rehab/pt  -discharge planning early next week once resp status improves    Vital Signs Last 24 Hrs  T(C): 36.2 (08 Nov 2018 22:00), Max: 36.2 (08 Nov 2018 22:00)  T(F): 97.2 (08 Nov 2018 22:00), Max: 97.2 (08 Nov 2018 22:00)  HR: 69 (09 Nov 2018 05:57) (69 - 104)  BP: 150/67 (09 Nov 2018 05:57) (127/63 - 159/77)  BP(mean): --  RR: 19 (09 Nov 2018 05:57) (16 - 19)  SpO2: 95% (09 Nov 2018 10:33) (95% - 95%) on 5L (pt usually on 4-6 liters at home)    PHYSICAL EXAM:  GENERAL: NAD, speaking in full sentences   HEAD:  Atraumatic, Normocephalic  EYES: EOMI, PERRLA, conjunctiva and sclera clear  NERVOUS SYSTEM:  Alert & Oriented X 3  CHEST/LUNG: decreased BS b/l  CV/HEART: Regular rate and rhythm; No murmurs, rubs, or gallops  GI/ABDOMEN: obese abdomen   EXTREMITIES:  2+ Peripheral Pulses, No clubbing, cyanosis, or edema  SKIN: No rashes or lesions    LAB:                                         12.9   14.62 )-----------( 524      ( 09 Nov 2018 07:14 )             43.0     11-09    139  |  101  |  52<H>  ----------------------------<  291<H>  4.9   |  28  |  1.4    Ca    8.9      09 Nov 2018 07:14      RADIOLOGY:    Imaging Personally Reviewed:  [Y ] YES  [ ] NO    HEALTH ISSUES - PROBLEM Dx:  Enlarged prostate: Enlarged prostate  Renal insufficiency: Renal insufficiency  Diabetes mellitus, type 2: Diabetes mellitus, type 2  Hypertension: Hypertension  COPD (chronic obstructive pulmonary disease): COPD (chronic obstructive pulmonary disease)      MEDICATIONS  (STANDING):  ALBUTerol/ipratropium for Nebulization 3 milliLiter(s) Nebulizer every 6 hours  amLODIPine   Tablet 10 milliGRAM(s) Oral daily  ascorbic acid 1000 milliGRAM(s) Oral daily  atorvastatin 20 milliGRAM(s) Oral at bedtime  BACItracin   Ointment 1 Application(s) Topical two times a day  buDESOnide 160 MICROgram(s)/formoterol 4.5 MICROgram(s) Inhaler 2 Puff(s) Inhalation two times a day  cefpodoxime 200 milliGRAM(s) Oral every 12 hours  chlorhexidine 4% Liquid 1 Application(s) Topical <User Schedule>  cholecalciferol 1000 Unit(s) Oral daily  cyanocobalamin 1000 MICROGram(s) Oral daily  dextrose 5%. 1000 milliLiter(s) (50 mL/Hr) IV Continuous <Continuous>  dextrose 50% Injectable 12.5 Gram(s) IV Push once  dextrose 50% Injectable 25 Gram(s) IV Push once  dextrose 50% Injectable 25 Gram(s) IV Push once  docusate sodium 100 milliGRAM(s) Oral three times a day  finasteride 5 milliGRAM(s) Oral daily  heparin  Injectable 5000 Unit(s) SubCutaneous every 8 hours  insulin glargine Injectable (LANTUS) 15 Unit(s) SubCutaneous every morning  insulin lispro (HumaLOG) corrective regimen sliding scale   SubCutaneous three times a day before meals  insulin lispro Injectable (HumaLOG) 12 Unit(s) SubCutaneous three times a day before meals  methylPREDNISolone sodium succinate Injectable 40 milliGRAM(s) IV Push every 12 hours  metoprolol succinate ER 25 milliGRAM(s) Oral daily  polyethylene glycol 3350 17 Gram(s) Oral daily  sodium polystyrene sulfonate Suspension 15 Gram(s) Oral once  tamsulosin 0.4 milliGRAM(s) Oral at bedtime    MEDICATIONS  (PRN):  artificial  tears Solution 1 Drop(s) Both EYES every 2 hours PRN Dry Eyes  dextrose 40% Gel 15 Gram(s) Oral once PRN Blood Glucose LESS THAN 70 milliGRAM(s)/deciliter  glucagon  Injectable 1 milliGRAM(s) IntraMuscular once PRN Glucose LESS THAN 70 milligrams/deciliter

## 2018-11-10 LAB
ALBUMIN SERPL ELPH-MCNC: 3.6 G/DL — SIGNIFICANT CHANGE UP (ref 3.5–5.2)
ALP SERPL-CCNC: 57 U/L — SIGNIFICANT CHANGE UP (ref 30–115)
ALT FLD-CCNC: 14 U/L — SIGNIFICANT CHANGE UP (ref 0–41)
ANION GAP SERPL CALC-SCNC: 9 MMOL/L — SIGNIFICANT CHANGE UP (ref 7–14)
AST SERPL-CCNC: 9 U/L — SIGNIFICANT CHANGE UP (ref 0–41)
BILIRUB SERPL-MCNC: 0.3 MG/DL — SIGNIFICANT CHANGE UP (ref 0.2–1.2)
BUN SERPL-MCNC: 45 MG/DL — HIGH (ref 10–20)
CALCIUM SERPL-MCNC: 9.4 MG/DL — SIGNIFICANT CHANGE UP (ref 8.5–10.1)
CHLORIDE SERPL-SCNC: 103 MMOL/L — SIGNIFICANT CHANGE UP (ref 98–110)
CO2 SERPL-SCNC: 33 MMOL/L — HIGH (ref 17–32)
CREAT SERPL-MCNC: 1.5 MG/DL — SIGNIFICANT CHANGE UP (ref 0.7–1.5)
GLUCOSE BLDC GLUCOMTR-MCNC: 175 MG/DL — HIGH (ref 70–99)
GLUCOSE BLDC GLUCOMTR-MCNC: 276 MG/DL — HIGH (ref 70–99)
GLUCOSE BLDC GLUCOMTR-MCNC: 282 MG/DL — HIGH (ref 70–99)
GLUCOSE BLDC GLUCOMTR-MCNC: 386 MG/DL — HIGH (ref 70–99)
GLUCOSE SERPL-MCNC: 212 MG/DL — HIGH (ref 70–99)
HCT VFR BLD CALC: 45.8 % — SIGNIFICANT CHANGE UP (ref 42–52)
HGB BLD-MCNC: 13.9 G/DL — LOW (ref 14–18)
MCHC RBC-ENTMCNC: 23.6 PG — LOW (ref 27–31)
MCHC RBC-ENTMCNC: 30.3 G/DL — LOW (ref 32–37)
MCV RBC AUTO: 77.8 FL — LOW (ref 80–94)
NRBC # BLD: 0 /100 WBCS — SIGNIFICANT CHANGE UP (ref 0–0)
PLATELET # BLD AUTO: 499 K/UL — HIGH (ref 130–400)
POTASSIUM SERPL-MCNC: 4.6 MMOL/L — SIGNIFICANT CHANGE UP (ref 3.5–5)
POTASSIUM SERPL-SCNC: 4.6 MMOL/L — SIGNIFICANT CHANGE UP (ref 3.5–5)
PROT SERPL-MCNC: 6 G/DL — SIGNIFICANT CHANGE UP (ref 6–8)
RBC # BLD: 5.89 M/UL — SIGNIFICANT CHANGE UP (ref 4.7–6.1)
RBC # FLD: 17.8 % — HIGH (ref 11.5–14.5)
SODIUM SERPL-SCNC: 145 MMOL/L — SIGNIFICANT CHANGE UP (ref 135–146)
WBC # BLD: 17.59 K/UL — HIGH (ref 4.8–10.8)
WBC # FLD AUTO: 17.59 K/UL — HIGH (ref 4.8–10.8)

## 2018-11-10 RX ADMIN — Medication 100 MILLIGRAM(S): at 21:26

## 2018-11-10 RX ADMIN — INSULIN GLARGINE 15 UNIT(S): 100 INJECTION, SOLUTION SUBCUTANEOUS at 08:01

## 2018-11-10 RX ADMIN — Medication 1000 MILLIGRAM(S): at 11:52

## 2018-11-10 RX ADMIN — Medication 25 MILLIGRAM(S): at 05:29

## 2018-11-10 RX ADMIN — PREGABALIN 1000 MICROGRAM(S): 225 CAPSULE ORAL at 11:52

## 2018-11-10 RX ADMIN — Medication 200 MILLIGRAM(S): at 05:29

## 2018-11-10 RX ADMIN — Medication 200 MILLIGRAM(S): at 17:16

## 2018-11-10 RX ADMIN — Medication 5: at 11:51

## 2018-11-10 RX ADMIN — Medication 3 MILLILITER(S): at 14:18

## 2018-11-10 RX ADMIN — BUDESONIDE AND FORMOTEROL FUMARATE DIHYDRATE 2 PUFF(S): 160; 4.5 AEROSOL RESPIRATORY (INHALATION) at 08:00

## 2018-11-10 RX ADMIN — Medication 1000 UNIT(S): at 11:52

## 2018-11-10 RX ADMIN — HEPARIN SODIUM 5000 UNIT(S): 5000 INJECTION INTRAVENOUS; SUBCUTANEOUS at 21:26

## 2018-11-10 RX ADMIN — HEPARIN SODIUM 5000 UNIT(S): 5000 INJECTION INTRAVENOUS; SUBCUTANEOUS at 05:32

## 2018-11-10 RX ADMIN — Medication 1 APPLICATION(S): at 05:33

## 2018-11-10 RX ADMIN — Medication 12 UNIT(S): at 07:59

## 2018-11-10 RX ADMIN — Medication 3 MILLILITER(S): at 07:28

## 2018-11-10 RX ADMIN — Medication 12 UNIT(S): at 11:51

## 2018-11-10 RX ADMIN — Medication 3: at 16:42

## 2018-11-10 RX ADMIN — Medication 40 MILLIGRAM(S): at 05:30

## 2018-11-10 RX ADMIN — Medication 12 UNIT(S): at 16:42

## 2018-11-10 RX ADMIN — FINASTERIDE 5 MILLIGRAM(S): 5 TABLET, FILM COATED ORAL at 11:53

## 2018-11-10 RX ADMIN — ATORVASTATIN CALCIUM 20 MILLIGRAM(S): 80 TABLET, FILM COATED ORAL at 21:26

## 2018-11-10 RX ADMIN — Medication 100 MILLIGRAM(S): at 05:29

## 2018-11-10 RX ADMIN — Medication 100 MILLIGRAM(S): at 13:03

## 2018-11-10 RX ADMIN — TAMSULOSIN HYDROCHLORIDE 0.4 MILLIGRAM(S): 0.4 CAPSULE ORAL at 21:26

## 2018-11-10 RX ADMIN — AMLODIPINE BESYLATE 10 MILLIGRAM(S): 2.5 TABLET ORAL at 05:29

## 2018-11-10 RX ADMIN — Medication 3: at 08:00

## 2018-11-10 RX ADMIN — BUDESONIDE AND FORMOTEROL FUMARATE DIHYDRATE 2 PUFF(S): 160; 4.5 AEROSOL RESPIRATORY (INHALATION) at 19:34

## 2018-11-10 RX ADMIN — Medication 1 APPLICATION(S): at 17:16

## 2018-11-10 NOTE — PROGRESS NOTE ADULT - SUBJECTIVE AND OBJECTIVE BOX
JIAN MANCIA  77y  Male      Patient is a 77y old  Male who presents with a chief complaint of Dyspnea (05 Nov 2018 09:50)      INTERVAL HPI/OVERNIGHT EVENTS:    patient admitted for dyspnea and acute on chronic resp failure/home O2/MICHAEL on CPAP - many readmission in the past for the same  -ID follow up noted (on oral antibiotics)  -taper steroids - start oral today; on nasal cannula O2 (pulse ox around 88, off high flow)  Pt is on 4-6L of oxygen at home   -encourage oob to chair   -rehab/pt daily - pt asking to ambulate with rehab today.  I will call rehab and make sure he is seen.   -discharge planning for monday with homecare      Vital Signs Last 24 Hrs  T(C): 35.7 (10 Nov 2018 05:59), Max: 36.9 (09 Nov 2018 21:10)  T(F): 96.2 (10 Nov 2018 05:59), Max: 98.5 (09 Nov 2018 21:10)  HR: 96 (10 Nov 2018 05:59) (91 - 103)  BP: 170/84 (10 Nov 2018 05:59) (144/73 - 171/78)  BP(mean): --  RR: 16 (10 Nov 2018 05:59) (16 - 18)  SpO2: 94% (10 Nov 2018 02:41) (94% - 95%) on 5L (pt usually on 4-6 liters at home)    PHYSICAL EXAM:  GENERAL: NAD, speaking in full sentences   HEAD:  Atraumatic, Normocephalic  EYES: EOMI, PERRLA, conjunctiva and sclera clear  NERVOUS SYSTEM:  Alert & Oriented X 3  CHEST/LUNG: decreased BS b/l  CV/HEART: Regular rate and rhythm; No murmurs, rubs, or gallops  GI/ABDOMEN: obese abdomen   EXTREMITIES:  2+ Peripheral Pulses, No clubbing, cyanosis, or edema  SKIN: No rashes or lesions    LAB:                           12.9   14.62 )-----------( 524      ( 09 Nov 2018 07:14 )             43.0     11-09    139  |  101  |  52<H>  ----------------------------<  291<H>  4.9   |  28  |  1.4    Ca    8.9      09 Nov 2018 07:14      RADIOLOGY:    Imaging Personally Reviewed:  [Y ] YES  [ ] NO    HEALTH ISSUES - PROBLEM Dx:  Enlarged prostate: Enlarged prostate  Renal insufficiency: Renal insufficiency  Diabetes mellitus, type 2: Diabetes mellitus, type 2  Hypertension: Hypertension  COPD (chronic obstructive pulmonary disease): COPD (chronic obstructive pulmonary disease)      MEDICATIONS  (STANDING):  ALBUTerol/ipratropium for Nebulization 3 milliLiter(s) Nebulizer every 6 hours  amLODIPine   Tablet 10 milliGRAM(s) Oral daily  ascorbic acid 1000 milliGRAM(s) Oral daily  atorvastatin 20 milliGRAM(s) Oral at bedtime  BACItracin   Ointment 1 Application(s) Topical two times a day  buDESOnide 160 MICROgram(s)/formoterol 4.5 MICROgram(s) Inhaler 2 Puff(s) Inhalation two times a day  cefpodoxime 200 milliGRAM(s) Oral every 12 hours  chlorhexidine 4% Liquid 1 Application(s) Topical <User Schedule>  cholecalciferol 1000 Unit(s) Oral daily  cyanocobalamin 1000 MICROGram(s) Oral daily  dextrose 5%. 1000 milliLiter(s) (50 mL/Hr) IV Continuous <Continuous>  dextrose 50% Injectable 12.5 Gram(s) IV Push once  dextrose 50% Injectable 25 Gram(s) IV Push once  dextrose 50% Injectable 25 Gram(s) IV Push once  docusate sodium 100 milliGRAM(s) Oral three times a day  finasteride 5 milliGRAM(s) Oral daily  heparin  Injectable 5000 Unit(s) SubCutaneous every 8 hours  insulin glargine Injectable (LANTUS) 15 Unit(s) SubCutaneous every morning  insulin lispro (HumaLOG) corrective regimen sliding scale   SubCutaneous three times a day before meals  insulin lispro Injectable (HumaLOG) 12 Unit(s) SubCutaneous three times a day before meals  metoprolol succinate ER 25 milliGRAM(s) Oral daily  polyethylene glycol 3350 17 Gram(s) Oral daily  predniSONE   Tablet 60 milliGRAM(s) Oral daily  tamsulosin 0.4 milliGRAM(s) Oral at bedtime    MEDICATIONS  (PRN):  artificial  tears Solution 1 Drop(s) Both EYES every 2 hours PRN Dry Eyes  dextrose 40% Gel 15 Gram(s) Oral once PRN Blood Glucose LESS THAN 70 milliGRAM(s)/deciliter  glucagon  Injectable 1 milliGRAM(s) IntraMuscular once PRN Glucose LESS THAN 70 milligrams/deciliter

## 2018-11-10 NOTE — PROGRESS NOTE ADULT - ASSESSMENT
78 y/o male admitted for COPD exacerbation  Patient admitted for dyspnea and acute on chronic resp failure/home O2/MICHAEL on CPAP - many readmission in the past for the same  patient admitted for dyspnea and acute on chronic resp failure/home O2/MICHAEL on CPAP - many readmission in the past for the same  -ID follow up noted (on oral antibiotics)  -taper steroids - start oral today; on nasal cannula O2 (pulse ox around 88, off high flow)  Pt is on 4-6L of oxygen at home   -encourage oob to chair   -rehab/pt daily - pt asking to ambulate with rehab today.  I will call rehab and make sure he is seen.   -discharge planning for Monday with homecare

## 2018-11-10 NOTE — PROGRESS NOTE ADULT - PROBLEM SELECTOR PLAN 1
-Acute on chronic resp failure/copd exacerbation/suspected gram negative PNA/on Home O2  -abx changed to oral as per ID   -on Nasal cannula O2  -Taper IV steroids   , nebs, abx and pulmonary follow ups    #MICHAEL  -cpap at night

## 2018-11-11 LAB
CULTURE RESULTS: SIGNIFICANT CHANGE UP
GLUCOSE BLDC GLUCOMTR-MCNC: 264 MG/DL — HIGH (ref 70–99)
GLUCOSE BLDC GLUCOMTR-MCNC: 319 MG/DL — HIGH (ref 70–99)
GLUCOSE BLDC GLUCOMTR-MCNC: 368 MG/DL — HIGH (ref 70–99)
GLUCOSE BLDC GLUCOMTR-MCNC: 430 MG/DL — HIGH (ref 70–99)
SPECIMEN SOURCE: SIGNIFICANT CHANGE UP

## 2018-11-11 RX ORDER — SIMETHICONE 80 MG/1
80 TABLET, CHEWABLE ORAL
Qty: 0 | Refills: 0 | Status: DISCONTINUED | OUTPATIENT
Start: 2018-11-11 | End: 2018-11-15

## 2018-11-11 RX ADMIN — PREGABALIN 1000 MICROGRAM(S): 225 CAPSULE ORAL at 11:21

## 2018-11-11 RX ADMIN — HEPARIN SODIUM 5000 UNIT(S): 5000 INJECTION INTRAVENOUS; SUBCUTANEOUS at 21:56

## 2018-11-11 RX ADMIN — SIMETHICONE 80 MILLIGRAM(S): 80 TABLET, CHEWABLE ORAL at 12:12

## 2018-11-11 RX ADMIN — ATORVASTATIN CALCIUM 20 MILLIGRAM(S): 80 TABLET, FILM COATED ORAL at 21:56

## 2018-11-11 RX ADMIN — Medication 60 MILLIGRAM(S): at 05:53

## 2018-11-11 RX ADMIN — Medication 12 UNIT(S): at 17:11

## 2018-11-11 RX ADMIN — AMLODIPINE BESYLATE 10 MILLIGRAM(S): 2.5 TABLET ORAL at 05:54

## 2018-11-11 RX ADMIN — Medication 1000 UNIT(S): at 11:21

## 2018-11-11 RX ADMIN — Medication 100 MILLIGRAM(S): at 17:11

## 2018-11-11 RX ADMIN — FINASTERIDE 5 MILLIGRAM(S): 5 TABLET, FILM COATED ORAL at 11:20

## 2018-11-11 RX ADMIN — Medication 1 APPLICATION(S): at 17:11

## 2018-11-11 RX ADMIN — HEPARIN SODIUM 5000 UNIT(S): 5000 INJECTION INTRAVENOUS; SUBCUTANEOUS at 05:55

## 2018-11-11 RX ADMIN — Medication 3 MILLILITER(S): at 07:40

## 2018-11-11 RX ADMIN — TAMSULOSIN HYDROCHLORIDE 0.4 MILLIGRAM(S): 0.4 CAPSULE ORAL at 21:56

## 2018-11-11 RX ADMIN — Medication 1 DROP(S): at 06:02

## 2018-11-11 RX ADMIN — Medication 5: at 11:48

## 2018-11-11 RX ADMIN — Medication 6: at 17:11

## 2018-11-11 RX ADMIN — Medication 1000 MILLIGRAM(S): at 11:21

## 2018-11-11 RX ADMIN — Medication 100 MILLIGRAM(S): at 21:56

## 2018-11-11 RX ADMIN — BUDESONIDE AND FORMOTEROL FUMARATE DIHYDRATE 2 PUFF(S): 160; 4.5 AEROSOL RESPIRATORY (INHALATION) at 20:14

## 2018-11-11 RX ADMIN — Medication 3 MILLILITER(S): at 14:08

## 2018-11-11 RX ADMIN — Medication 25 MILLIGRAM(S): at 05:55

## 2018-11-11 RX ADMIN — Medication 3 MILLILITER(S): at 20:02

## 2018-11-11 RX ADMIN — BUDESONIDE AND FORMOTEROL FUMARATE DIHYDRATE 2 PUFF(S): 160; 4.5 AEROSOL RESPIRATORY (INHALATION) at 07:41

## 2018-11-11 RX ADMIN — Medication 200 MILLIGRAM(S): at 17:11

## 2018-11-11 RX ADMIN — Medication 1 APPLICATION(S): at 05:54

## 2018-11-11 RX ADMIN — Medication 100 MILLIGRAM(S): at 05:54

## 2018-11-11 RX ADMIN — Medication 12 UNIT(S): at 11:47

## 2018-11-11 RX ADMIN — Medication 200 MILLIGRAM(S): at 05:54

## 2018-11-11 RX ADMIN — Medication 12 UNIT(S): at 08:23

## 2018-11-11 RX ADMIN — INSULIN GLARGINE 15 UNIT(S): 100 INJECTION, SOLUTION SUBCUTANEOUS at 08:24

## 2018-11-11 NOTE — DIETITIAN INITIAL EVALUATION ADULT. - PERTINENT MEDS FT
prednisone, lantus, docusate sodium, ascorbic acid, atorvastatin, chalciferol, cyanocobalamin, humalog, miralax

## 2018-11-11 NOTE — DIETITIAN INITIAL EVALUATION ADULT. - NS AS NUTRI INTERV MEALS SNACK
Place Pt on a carb con w/ snack, low fat diet. Monitor K+ and PO4 for the need of a renal diet. Add fish and shellfish allergy. Place Pt on a carb con w/ snack, DASH/TLC diet. Monitor K+ and PO4 for the need of a renal diet. Add fish and shellfish allergy.

## 2018-11-11 NOTE — DIETITIAN INITIAL EVALUATION ADULT. - OTHER INFO
Reason for assessment: LOS. PMH: colon polyp, emphysema lung, GERD, HLD, HTN. Pt present to ED w/ dyspnea. Pt is admitted for COPD exacerbation. Renal insufficiency (CKD lll), hyperkalemia (now WNL), constipation, vit B12 and vit D deficiency noted. Pt reports that over one year ago he weighed 272#. He then became ill and his weight dropped into the 240s. Now he is slowly gaining the wt back w/ a current wt of 256#. Pt reports that he is trying not to gain the wt back. Pt reports that when he eats a dry sandwich the bread gets stuck in his chest. Pt does not have issues swallowing any other foods. Pt denies N/V/D. Last BM was 2 days ago. Pt reports constipation. Pt is allergic to fish and shellfish.

## 2018-11-11 NOTE — DIETITIAN INITIAL EVALUATION ADULT. - DIET TYPE
DASH/TLC (sodium and cholesterol restricted diet)/consistent carbohydrate (no snacks)/renal replacement pts:no protein restr,no conc K & phos, low sodium

## 2018-11-11 NOTE — PROGRESS NOTE ADULT - PROBLEM SELECTOR PLAN 1
-Acute on chronic resp failure/copd exacerbation/suspected gram negative PNA/on Home O2  -abx changed to oral as per ID   -on Nasal cannula O2  -Taper PO steroids    nebs, abx and pulmonary follow ups    #MICHAEL  -cpap at night - has machine at home

## 2018-11-11 NOTE — PROGRESS NOTE ADULT - SUBJECTIVE AND OBJECTIVE BOX
JIAN MANCIA  77y  Male      Patient is a 77y old  Male who presents with a chief complaint of Dyspnea (05 Nov 2018 09:50)      INTERVAL HPI/OVERNIGHT EVENTS:    patient admitted for dyspnea and acute on chronic resp failure/home O2/MICHAEL on CPAP - many readmission in the past for the same  -ID follow up noted (on oral antibiotics)  -taper steroids - started oral steroids yesterday; on nasal cannula O2 (pulse ox around 88, off high flow)  Pt is on 4-6L of oxygen at home   -encourage oob to chair   -rehab/pt daily - pt asking to ambulate with rehab today.  I discussed with rehab and made sure he is seen.   -discharge planning for monday with homecare - discussed with homecare today      Vital Signs Last 24 Hrs  T(C): 36.3 (11 Nov 2018 05:42), Max: 36.5 (10 Nov 2018 22:31)  T(F): 97.4 (11 Nov 2018 05:42), Max: 97.7 (10 Nov 2018 22:31)  HR: 82 (11 Nov 2018 07:42) (73 - 94)  BP: 136/65 (11 Nov 2018 05:42) (136/65 - 157/76)  BP(mean): --  RR: 24 (11 Nov 2018 07:42) (18 - 24)  SpO2: 90% (11 Nov 2018 07:42) (90% - 94%)  on 4L (pt usually on 4-6 liters at home)    PHYSICAL EXAM:  GENERAL: NAD, speaking in full sentences   HEAD:  Atraumatic, Normocephalic  EYES: EOMI, PERRLA, conjunctiva and sclera clear  NERVOUS SYSTEM:  Alert & Oriented X 3  CHEST/LUNG: decreased BS b/l  CV/HEART: Regular rate and rhythm; No murmurs, rubs, or gallops  GI/ABDOMEN: obese abdomen   EXTREMITIES:  2+ Peripheral Pulses, No clubbing, cyanosis, or edema  SKIN: No rashes or lesions    LAB:                                           13.9   17.59 )-----------( 499      ( 10 Nov 2018 22:06 )             45.8     11-10    145  |  103  |  45<H>  ----------------------------<  212<H>  4.6   |  33<H>  |  1.5    Ca    9.4      10 Nov 2018 22:06    TPro  6.0  /  Alb  3.6  /  TBili  0.3  /  DBili  x   /  AST  9   /  ALT  14  /  AlkPhos  57  11-10    RADIOLOGY:    Imaging Personally Reviewed:  [Y ] YES  [ ] NO    HEALTH ISSUES - PROBLEM Dx:  Enlarged prostate: Enlarged prostate  Renal insufficiency: Renal insufficiency  Diabetes mellitus, type 2: Diabetes mellitus, type 2  Hypertension: Hypertension  COPD (chronic obstructive pulmonary disease): COPD (chronic obstructive pulmonary disease)      MEDICATIONS  (STANDING):  ALBUTerol/ipratropium for Nebulization 3 milliLiter(s) Nebulizer every 6 hours  amLODIPine   Tablet 10 milliGRAM(s) Oral daily  ascorbic acid 1000 milliGRAM(s) Oral daily  atorvastatin 20 milliGRAM(s) Oral at bedtime  BACItracin   Ointment 1 Application(s) Topical two times a day  buDESOnide 160 MICROgram(s)/formoterol 4.5 MICROgram(s) Inhaler 2 Puff(s) Inhalation two times a day  cefpodoxime 200 milliGRAM(s) Oral every 12 hours  chlorhexidine 4% Liquid 1 Application(s) Topical <User Schedule>  cholecalciferol 1000 Unit(s) Oral daily  cyanocobalamin 1000 MICROGram(s) Oral daily  dextrose 5%. 1000 milliLiter(s) (50 mL/Hr) IV Continuous <Continuous>  dextrose 50% Injectable 12.5 Gram(s) IV Push once  dextrose 50% Injectable 25 Gram(s) IV Push once  dextrose 50% Injectable 25 Gram(s) IV Push once  docusate sodium 100 milliGRAM(s) Oral three times a day  finasteride 5 milliGRAM(s) Oral daily  heparin  Injectable 5000 Unit(s) SubCutaneous every 8 hours  insulin glargine Injectable (LANTUS) 15 Unit(s) SubCutaneous every morning  insulin lispro (HumaLOG) corrective regimen sliding scale   SubCutaneous three times a day before meals  insulin lispro Injectable (HumaLOG) 12 Unit(s) SubCutaneous three times a day before meals  metoprolol succinate ER 25 milliGRAM(s) Oral daily  polyethylene glycol 3350 17 Gram(s) Oral daily  predniSONE   Tablet 60 milliGRAM(s) Oral daily  tamsulosin 0.4 milliGRAM(s) Oral at bedtime    MEDICATIONS  (PRN):  artificial  tears Solution 1 Drop(s) Both EYES every 2 hours PRN Dry Eyes  dextrose 40% Gel 15 Gram(s) Oral once PRN Blood Glucose LESS THAN 70 milliGRAM(s)/deciliter  glucagon  Injectable 1 milliGRAM(s) IntraMuscular once PRN Glucose LESS THAN 70 milligrams/deciliter

## 2018-11-11 NOTE — DIETITIAN INITIAL EVALUATION ADULT. - ENERGY NEEDS
Calories: 2034-2225kcals/day (MSJ A.F 1.1-1.2)-obesity  Protein: 70-84g/day (1-1.2g/IBW) vs. 70-93g/day (0.6-0.8g/kg)-CKD lll  Fluid: 1:1ml/kcal

## 2018-11-11 NOTE — PROGRESS NOTE ADULT - ASSESSMENT
78 y/o male admitted for COPD exacerbation  Patient admitted for dyspnea and acute on chronic resp failure/home O2/MICHAEL on CPAP - many readmission in the past for the same  patient admitted for dyspnea and acute on chronic resp failure/home O2/MICHAEL on CPAP - many readmission in the past for the same  -ID follow up noted (on oral antibiotics)  -taper steroids - started oral yesterday; on nasal cannula O2 (pulse ox around 88, off high flow)  Pt is on 4-6L of oxygen at home   -encourage oob to chair   -rehab/pt daily - pt asking to ambulate with rehab today. Discussed with rehab  -discharge planning for Monday with homecare.  Discussed with homecare

## 2018-11-12 LAB
ALBUMIN SERPL ELPH-MCNC: 3.3 G/DL — LOW (ref 3.5–5.2)
ALP SERPL-CCNC: 53 U/L — SIGNIFICANT CHANGE UP (ref 30–115)
ALT FLD-CCNC: 11 U/L — SIGNIFICANT CHANGE UP (ref 0–41)
ANION GAP SERPL CALC-SCNC: 11 MMOL/L — SIGNIFICANT CHANGE UP (ref 7–14)
AST SERPL-CCNC: 7 U/L — SIGNIFICANT CHANGE UP (ref 0–41)
BILIRUB SERPL-MCNC: 0.4 MG/DL — SIGNIFICANT CHANGE UP (ref 0.2–1.2)
BUN SERPL-MCNC: 42 MG/DL — HIGH (ref 10–20)
CALCIUM SERPL-MCNC: 8.9 MG/DL — SIGNIFICANT CHANGE UP (ref 8.5–10.1)
CHLORIDE SERPL-SCNC: 99 MMOL/L — SIGNIFICANT CHANGE UP (ref 98–110)
CO2 SERPL-SCNC: 28 MMOL/L — SIGNIFICANT CHANGE UP (ref 17–32)
CREAT SERPL-MCNC: 1.5 MG/DL — SIGNIFICANT CHANGE UP (ref 0.7–1.5)
GLUCOSE BLDC GLUCOMTR-MCNC: 247 MG/DL — HIGH (ref 70–99)
GLUCOSE BLDC GLUCOMTR-MCNC: 271 MG/DL — HIGH (ref 70–99)
GLUCOSE BLDC GLUCOMTR-MCNC: 341 MG/DL — HIGH (ref 70–99)
GLUCOSE BLDC GLUCOMTR-MCNC: 362 MG/DL — HIGH (ref 70–99)
GLUCOSE SERPL-MCNC: 276 MG/DL — HIGH (ref 70–99)
HCT VFR BLD CALC: 41.2 % — LOW (ref 42–52)
HGB BLD-MCNC: 12.4 G/DL — LOW (ref 14–18)
MCHC RBC-ENTMCNC: 23.4 PG — LOW (ref 27–31)
MCHC RBC-ENTMCNC: 30.1 G/DL — LOW (ref 32–37)
MCV RBC AUTO: 77.7 FL — LOW (ref 80–94)
NRBC # BLD: 0 /100 WBCS — SIGNIFICANT CHANGE UP (ref 0–0)
PLATELET # BLD AUTO: 428 K/UL — HIGH (ref 130–400)
POTASSIUM SERPL-MCNC: 4.5 MMOL/L — SIGNIFICANT CHANGE UP (ref 3.5–5)
POTASSIUM SERPL-SCNC: 4.5 MMOL/L — SIGNIFICANT CHANGE UP (ref 3.5–5)
PROT SERPL-MCNC: 5.4 G/DL — LOW (ref 6–8)
RBC # BLD: 5.3 M/UL — SIGNIFICANT CHANGE UP (ref 4.7–6.1)
RBC # FLD: 17.6 % — HIGH (ref 11.5–14.5)
SODIUM SERPL-SCNC: 138 MMOL/L — SIGNIFICANT CHANGE UP (ref 135–146)
WBC # BLD: 15.75 K/UL — HIGH (ref 4.8–10.8)
WBC # FLD AUTO: 15.75 K/UL — HIGH (ref 4.8–10.8)

## 2018-11-12 RX ORDER — SENNA PLUS 8.6 MG/1
1 TABLET ORAL ONCE
Qty: 0 | Refills: 0 | Status: COMPLETED | OUTPATIENT
Start: 2018-11-12 | End: 2018-11-12

## 2018-11-12 RX ADMIN — Medication 100 MILLIGRAM(S): at 21:19

## 2018-11-12 RX ADMIN — Medication 3 MILLILITER(S): at 14:25

## 2018-11-12 RX ADMIN — Medication 3: at 08:53

## 2018-11-12 RX ADMIN — Medication 3 MILLILITER(S): at 07:57

## 2018-11-12 RX ADMIN — Medication 12 UNIT(S): at 17:41

## 2018-11-12 RX ADMIN — Medication 200 MILLIGRAM(S): at 17:40

## 2018-11-12 RX ADMIN — AMLODIPINE BESYLATE 10 MILLIGRAM(S): 2.5 TABLET ORAL at 05:55

## 2018-11-12 RX ADMIN — Medication 3 MILLILITER(S): at 19:43

## 2018-11-12 RX ADMIN — TAMSULOSIN HYDROCHLORIDE 0.4 MILLIGRAM(S): 0.4 CAPSULE ORAL at 21:19

## 2018-11-12 RX ADMIN — FINASTERIDE 5 MILLIGRAM(S): 5 TABLET, FILM COATED ORAL at 11:07

## 2018-11-12 RX ADMIN — Medication 1 APPLICATION(S): at 17:40

## 2018-11-12 RX ADMIN — Medication 1 APPLICATION(S): at 05:53

## 2018-11-12 RX ADMIN — BUDESONIDE AND FORMOTEROL FUMARATE DIHYDRATE 2 PUFF(S): 160; 4.5 AEROSOL RESPIRATORY (INHALATION) at 23:24

## 2018-11-12 RX ADMIN — Medication 1000 MILLIGRAM(S): at 11:08

## 2018-11-12 RX ADMIN — Medication 25 MILLIGRAM(S): at 05:55

## 2018-11-12 RX ADMIN — ATORVASTATIN CALCIUM 20 MILLIGRAM(S): 80 TABLET, FILM COATED ORAL at 21:20

## 2018-11-12 RX ADMIN — HEPARIN SODIUM 5000 UNIT(S): 5000 INJECTION INTRAVENOUS; SUBCUTANEOUS at 05:54

## 2018-11-12 RX ADMIN — Medication 100 MILLIGRAM(S): at 13:04

## 2018-11-12 RX ADMIN — Medication 1000 UNIT(S): at 11:07

## 2018-11-12 RX ADMIN — Medication 60 MILLIGRAM(S): at 05:53

## 2018-11-12 RX ADMIN — Medication 12 UNIT(S): at 13:05

## 2018-11-12 RX ADMIN — Medication 100 MILLIGRAM(S): at 05:53

## 2018-11-12 RX ADMIN — Medication 200 MILLIGRAM(S): at 05:53

## 2018-11-12 RX ADMIN — SENNA PLUS 1 TABLET(S): 8.6 TABLET ORAL at 21:31

## 2018-11-12 RX ADMIN — BUDESONIDE AND FORMOTEROL FUMARATE DIHYDRATE 2 PUFF(S): 160; 4.5 AEROSOL RESPIRATORY (INHALATION) at 07:57

## 2018-11-12 RX ADMIN — PREGABALIN 1000 MICROGRAM(S): 225 CAPSULE ORAL at 11:07

## 2018-11-12 RX ADMIN — Medication 12 UNIT(S): at 08:53

## 2018-11-12 RX ADMIN — Medication 5: at 13:05

## 2018-11-12 RX ADMIN — SIMETHICONE 80 MILLIGRAM(S): 80 TABLET, CHEWABLE ORAL at 05:58

## 2018-11-12 RX ADMIN — INSULIN GLARGINE 15 UNIT(S): 100 INJECTION, SOLUTION SUBCUTANEOUS at 08:55

## 2018-11-12 RX ADMIN — Medication 4: at 17:41

## 2018-11-12 NOTE — PROGRESS NOTE ADULT - SUBJECTIVE AND OBJECTIVE BOX
JIAN MANCIA  77y  Male      Patient is a 77y old  Male who presents with a chief complaint of Dyspnea (05 Nov 2018 09:50)      INTERVAL HPI/OVERNIGHT EVENTS:    patient admitted for dyspnea and acute on chronic resp failure/home O2/MICHAEL on CPAP - many readmission in the past for the same  -ID follow up noted (on oral antibiotics)  -patient is desaturating to low 80s and 70s while ambulating; wants to be discharged with the current hypoxia.  -pt seen by home care and HHA will be re-instated for Wednesday  -patient will not be discharged if hypoxic to low 80s (made him aware of the plan or else he would have to leave AMA)  -oob to chair as tolerated  -needs close outpatient pulmonary follow up     Vital Signs Last 24 Hrs  T(C): 36.7 (12 Nov 2018 05:25), Max: 36.7 (12 Nov 2018 05:25)  T(F): 98.1 (12 Nov 2018 05:25), Max: 98.1 (12 Nov 2018 05:25)  HR: 90 (12 Nov 2018 07:57) (86 - 95)  BP: 141/71 (12 Nov 2018 05:25) (137/86 - 141/71)  RR: 16 (12 Nov 2018 07:57) (16 - 24)  SpO2: 95% (12 Nov 2018 08:01) (90% - 98%)    PHYSICAL EXAM:  GENERAL: NAD, speaking in full sentences   HEAD:  Atraumatic, Normocephalic  EYES: EOMI, PERRLA, conjunctiva and sclera clear  NERVOUS SYSTEM:  Alert & Oriented X 3  CHEST/LUNG: decreased BS b/l  CV/HEART: Regular rate and rhythm; No murmurs, rubs, or gallops  GI/ABDOMEN: obese abdomen   EXTREMITIES:  2+ Peripheral Pulses, No clubbing, cyanosis, or edema  SKIN: No rashes or lesions    LAB:                                 12.4   15.75 )-----------( 428      ( 12 Nov 2018 07:26 )             41.2   11-12    138  |  99  |  42<H>  ----------------------------<  276<H>  4.5   |  28  |  1.5    Ca    8.9      12 Nov 2018 07:26    TPro  5.4<L>  /  Alb  3.3<L>  /  TBili  0.4  /  DBili  x   /  AST  7   /  ALT  11  /  AlkPhos  53  11-12    RADIOLOGY:    Imaging Personally Reviewed:  [Y ] YES  [ ] NO    HEALTH ISSUES - PROBLEM Dx:  Enlarged prostate: Enlarged prostate  Renal insufficiency: Renal insufficiency  Diabetes mellitus, type 2: Diabetes mellitus, type 2  Hypertension: Hypertension  COPD (chronic obstructive pulmonary disease): COPD (chronic obstructive pulmonary disease)      MEDICATIONS  (STANDING):  ALBUTerol/ipratropium for Nebulization 3 milliLiter(s) Nebulizer every 6 hours  amLODIPine   Tablet 10 milliGRAM(s) Oral daily  ascorbic acid 1000 milliGRAM(s) Oral daily  atorvastatin 20 milliGRAM(s) Oral at bedtime  BACItracin   Ointment 1 Application(s) Topical two times a day  buDESOnide 160 MICROgram(s)/formoterol 4.5 MICROgram(s) Inhaler 2 Puff(s) Inhalation two times a day  cefpodoxime 200 milliGRAM(s) Oral every 12 hours  chlorhexidine 4% Liquid 1 Application(s) Topical <User Schedule>  cholecalciferol 1000 Unit(s) Oral daily  cyanocobalamin 1000 MICROGram(s) Oral daily  dextrose 5%. 1000 milliLiter(s) (50 mL/Hr) IV Continuous <Continuous>  dextrose 50% Injectable 12.5 Gram(s) IV Push once  dextrose 50% Injectable 25 Gram(s) IV Push once  dextrose 50% Injectable 25 Gram(s) IV Push once  docusate sodium 100 milliGRAM(s) Oral three times a day  finasteride 5 milliGRAM(s) Oral daily  heparin  Injectable 5000 Unit(s) SubCutaneous every 8 hours  insulin glargine Injectable (LANTUS) 15 Unit(s) SubCutaneous every morning  insulin lispro (HumaLOG) corrective regimen sliding scale   SubCutaneous three times a day before meals  insulin lispro Injectable (HumaLOG) 12 Unit(s) SubCutaneous three times a day before meals  metoprolol succinate ER 25 milliGRAM(s) Oral daily  predniSONE   Tablet 60 milliGRAM(s) Oral daily  tamsulosin 0.4 milliGRAM(s) Oral at bedtime    MEDICATIONS  (PRN):  artificial  tears Solution 1 Drop(s) Both EYES every 2 hours PRN Dry Eyes  dextrose 40% Gel 15 Gram(s) Oral once PRN Blood Glucose LESS THAN 70 milliGRAM(s)/deciliter  glucagon  Injectable 1 milliGRAM(s) IntraMuscular once PRN Glucose LESS THAN 70 milligrams/deciliter  simethicone 80 milliGRAM(s) Chew two times a day PRN Gas

## 2018-11-12 NOTE — PROGRESS NOTE ADULT - ASSESSMENT
78 y/o male admitted for COPD exacerbation  Patient admitted for dyspnea and acute on chronic resp failure/home O2/MICHAEL on CPAP - many readmission in the past for the same

## 2018-11-13 LAB
GLUCOSE BLDC GLUCOMTR-MCNC: 239 MG/DL — HIGH (ref 70–99)
GLUCOSE BLDC GLUCOMTR-MCNC: 337 MG/DL — HIGH (ref 70–99)
GLUCOSE BLDC GLUCOMTR-MCNC: 351 MG/DL — HIGH (ref 70–99)
GLUCOSE BLDC GLUCOMTR-MCNC: 363 MG/DL — HIGH (ref 70–99)

## 2018-11-13 RX ORDER — FAMOTIDINE 10 MG/ML
20 INJECTION INTRAVENOUS
Qty: 0 | Refills: 0 | Status: DISCONTINUED | OUTPATIENT
Start: 2018-11-13 | End: 2018-11-13

## 2018-11-13 RX ORDER — FAMOTIDINE 10 MG/ML
20 INJECTION INTRAVENOUS
Qty: 0 | Refills: 0 | Status: DISCONTINUED | OUTPATIENT
Start: 2018-11-13 | End: 2018-11-15

## 2018-11-13 RX ORDER — FAMOTIDINE 10 MG/ML
20 INJECTION INTRAVENOUS ONCE
Qty: 0 | Refills: 0 | Status: COMPLETED | OUTPATIENT
Start: 2018-11-13 | End: 2018-11-13

## 2018-11-13 RX ORDER — INSULIN LISPRO 100/ML
4 VIAL (ML) SUBCUTANEOUS ONCE
Qty: 0 | Refills: 0 | Status: COMPLETED | OUTPATIENT
Start: 2018-11-13 | End: 2018-11-13

## 2018-11-13 RX ADMIN — FAMOTIDINE 20 MILLIGRAM(S): 10 INJECTION INTRAVENOUS at 22:24

## 2018-11-13 RX ADMIN — PREGABALIN 1000 MICROGRAM(S): 225 CAPSULE ORAL at 11:38

## 2018-11-13 RX ADMIN — ATORVASTATIN CALCIUM 20 MILLIGRAM(S): 80 TABLET, FILM COATED ORAL at 21:23

## 2018-11-13 RX ADMIN — Medication 60 MILLIGRAM(S): at 06:45

## 2018-11-13 RX ADMIN — Medication 100 MILLIGRAM(S): at 21:23

## 2018-11-13 RX ADMIN — Medication 100 MILLIGRAM(S): at 06:45

## 2018-11-13 RX ADMIN — CHLORHEXIDINE GLUCONATE 1 APPLICATION(S): 213 SOLUTION TOPICAL at 06:46

## 2018-11-13 RX ADMIN — BUDESONIDE AND FORMOTEROL FUMARATE DIHYDRATE 2 PUFF(S): 160; 4.5 AEROSOL RESPIRATORY (INHALATION) at 21:25

## 2018-11-13 RX ADMIN — Medication 100 MILLIGRAM(S): at 13:21

## 2018-11-13 RX ADMIN — Medication 4 UNIT(S): at 22:24

## 2018-11-13 RX ADMIN — Medication 1 APPLICATION(S): at 06:46

## 2018-11-13 RX ADMIN — INSULIN GLARGINE 15 UNIT(S): 100 INJECTION, SOLUTION SUBCUTANEOUS at 08:07

## 2018-11-13 RX ADMIN — BUDESONIDE AND FORMOTEROL FUMARATE DIHYDRATE 2 PUFF(S): 160; 4.5 AEROSOL RESPIRATORY (INHALATION) at 07:48

## 2018-11-13 RX ADMIN — Medication 2: at 08:06

## 2018-11-13 RX ADMIN — Medication 200 MILLIGRAM(S): at 17:07

## 2018-11-13 RX ADMIN — Medication 12 UNIT(S): at 17:06

## 2018-11-13 RX ADMIN — HEPARIN SODIUM 5000 UNIT(S): 5000 INJECTION INTRAVENOUS; SUBCUTANEOUS at 06:46

## 2018-11-13 RX ADMIN — Medication 12 UNIT(S): at 11:37

## 2018-11-13 RX ADMIN — Medication 5: at 11:37

## 2018-11-13 RX ADMIN — TAMSULOSIN HYDROCHLORIDE 0.4 MILLIGRAM(S): 0.4 CAPSULE ORAL at 21:23

## 2018-11-13 RX ADMIN — FINASTERIDE 5 MILLIGRAM(S): 5 TABLET, FILM COATED ORAL at 11:37

## 2018-11-13 RX ADMIN — Medication 5: at 17:06

## 2018-11-13 RX ADMIN — Medication 200 MILLIGRAM(S): at 06:46

## 2018-11-13 RX ADMIN — Medication 1000 UNIT(S): at 11:38

## 2018-11-13 RX ADMIN — HEPARIN SODIUM 5000 UNIT(S): 5000 INJECTION INTRAVENOUS; SUBCUTANEOUS at 13:21

## 2018-11-13 RX ADMIN — Medication 3 MILLILITER(S): at 13:20

## 2018-11-13 RX ADMIN — Medication 3 MILLILITER(S): at 19:28

## 2018-11-13 RX ADMIN — Medication 3 MILLILITER(S): at 07:48

## 2018-11-13 RX ADMIN — AMLODIPINE BESYLATE 10 MILLIGRAM(S): 2.5 TABLET ORAL at 06:46

## 2018-11-13 RX ADMIN — Medication 25 MILLIGRAM(S): at 06:45

## 2018-11-13 RX ADMIN — Medication 12 UNIT(S): at 08:07

## 2018-11-13 RX ADMIN — FAMOTIDINE 20 MILLIGRAM(S): 10 INJECTION INTRAVENOUS at 08:05

## 2018-11-13 RX ADMIN — Medication 1000 MILLIGRAM(S): at 11:38

## 2018-11-13 RX ADMIN — Medication 1 APPLICATION(S): at 17:07

## 2018-11-13 NOTE — PROGRESS NOTE ADULT - SUBJECTIVE AND OBJECTIVE BOX
JIAN MANCIA  77y  Male      Patient is a 77y old  Male who presents with a chief complaint of Dyspnea (05 Nov 2018 09:50)      INTERVAL HPI/OVERNIGHT EVENTS:    patient admitted for dyspnea and acute on chronic resp failure/home O2/MICHAEL on CPAP - many readmission in the past for the same  -ID follow up noted (on oral antibiotics)  -patient is desaturating to low 80s and 70s while ambulating; wants to be discharged with the current hypoxia.  -pulmonary follow up for AVAP arrangements for safe home d/c planning  -spoke to patient in length that he will not be discharged with a pulse ox in 70s and low 80s.   -home care nurse spoke to him and he agrees with AVAP arrangement and inpatient monitoring     Vital Signs Last 24 Hrs  T(C): 36.2 (13 Nov 2018 06:30), Max: 36.2 (12 Nov 2018 22:00)  T(F): 97.1 (13 Nov 2018 06:30), Max: 97.2 (12 Nov 2018 22:00)  HR: 86 (13 Nov 2018 06:30) (86 - 92)  BP: 146/70 (13 Nov 2018 06:30) (145/66 - 146/70)  RR: 16 (13 Nov 2018 06:30) (16 - 16)  SpO2: 90% (13 Nov 2018 12:21) (77% - 98%)    PHYSICAL EXAM:  GENERAL: NAD, speaking in full sentences   HEAD:  Atraumatic, Normocephalic  EYES: EOMI, PERRLA, conjunctiva and sclera clear  NERVOUS SYSTEM:  Alert & Oriented X 3  CHEST/LUNG: decreased BS b/l  CV/HEART: Regular rate and rhythm; No murmurs, rubs, or gallops  GI/ABDOMEN: obese abdomen   EXTREMITIES:  2+ Peripheral Pulses, No clubbing, cyanosis, or edema  SKIN: No rashes or lesions    LAB:                            12.4   15.75 )-----------( 428      ( 12 Nov 2018 07:26 )             41.2   11-12    138  |  99  |  42<H>  ----------------------------<  276<H>  4.5   |  28  |  1.5    Ca    8.9      12 Nov 2018 07:26    TPro  5.4<L>  /  Alb  3.3<L>  /  TBili  0.4  /  DBili  x   /  AST  7   /  ALT  11  /  AlkPhos  53  11-12    RADIOLOGY:    Imaging Personally Reviewed:  [Y ] YES  [ ] NO    HEALTH ISSUES - PROBLEM Dx:  Enlarged prostate: Enlarged prostate  Renal insufficiency: Renal insufficiency  Diabetes mellitus, type 2: Diabetes mellitus, type 2  Hypertension: Hypertension  COPD (chronic obstructive pulmonary disease): COPD (chronic obstructive pulmonary disease)      MEDICATIONS  (STANDING):  ALBUTerol/ipratropium for Nebulization 3 milliLiter(s) Nebulizer every 6 hours  amLODIPine   Tablet 10 milliGRAM(s) Oral daily  ascorbic acid 1000 milliGRAM(s) Oral daily  atorvastatin 20 milliGRAM(s) Oral at bedtime  BACItracin   Ointment 1 Application(s) Topical two times a day  buDESOnide 160 MICROgram(s)/formoterol 4.5 MICROgram(s) Inhaler 2 Puff(s) Inhalation two times a day  cefpodoxime 200 milliGRAM(s) Oral every 12 hours  chlorhexidine 4% Liquid 1 Application(s) Topical <User Schedule>  cholecalciferol 1000 Unit(s) Oral daily  cyanocobalamin 1000 MICROGram(s) Oral daily  dextrose 5%. 1000 milliLiter(s) (50 mL/Hr) IV Continuous <Continuous>  dextrose 50% Injectable 12.5 Gram(s) IV Push once  dextrose 50% Injectable 25 Gram(s) IV Push once  dextrose 50% Injectable 25 Gram(s) IV Push once  docusate sodium 100 milliGRAM(s) Oral three times a day  finasteride 5 milliGRAM(s) Oral daily  heparin  Injectable 5000 Unit(s) SubCutaneous every 8 hours  insulin glargine Injectable (LANTUS) 15 Unit(s) SubCutaneous every morning  insulin lispro (HumaLOG) corrective regimen sliding scale   SubCutaneous three times a day before meals  insulin lispro Injectable (HumaLOG) 12 Unit(s) SubCutaneous three times a day before meals  metoprolol succinate ER 25 milliGRAM(s) Oral daily  predniSONE   Tablet 60 milliGRAM(s) Oral daily  tamsulosin 0.4 milliGRAM(s) Oral at bedtime    MEDICATIONS  (PRN):  artificial  tears Solution 1 Drop(s) Both EYES every 2 hours PRN Dry Eyes  dextrose 40% Gel 15 Gram(s) Oral once PRN Blood Glucose LESS THAN 70 milliGRAM(s)/deciliter  glucagon  Injectable 1 milliGRAM(s) IntraMuscular once PRN Glucose LESS THAN 70 milligrams/deciliter  simethicone 80 milliGRAM(s) Chew two times a day PRN Gas

## 2018-11-13 NOTE — PROGRESS NOTE ADULT - PROBLEM SELECTOR PLAN 1
-Acute on chronic resp failure/copd exacerbation/suspected gram negative PNA/on Home O2  -abx changed to oral as per ID   -on Nasal cannula O2  -Taper PO steroids    nebs, abx and pulmonary follow ups    #MICHAEL  -cpap at night - has machine at home; AVAP arrangements as per Pulmonary

## 2018-11-14 PROBLEM — K63.5 POLYP OF COLON: Chronic | Status: ACTIVE | Noted: 2018-11-04

## 2018-11-14 PROBLEM — E78.00 PURE HYPERCHOLESTEROLEMIA, UNSPECIFIED: Chronic | Status: ACTIVE | Noted: 2018-11-04

## 2018-11-14 PROBLEM — K21.9 GASTRO-ESOPHAGEAL REFLUX DISEASE WITHOUT ESOPHAGITIS: Chronic | Status: ACTIVE | Noted: 2018-11-04

## 2018-11-14 PROBLEM — N40.0 BENIGN PROSTATIC HYPERPLASIA WITHOUT LOWER URINARY TRACT SYMPTOMS: Chronic | Status: ACTIVE | Noted: 2018-11-04

## 2018-11-14 LAB
ANION GAP SERPL CALC-SCNC: 14 MMOL/L — SIGNIFICANT CHANGE UP (ref 7–14)
BUN SERPL-MCNC: 46 MG/DL — HIGH (ref 10–20)
CALCIUM SERPL-MCNC: 9.1 MG/DL — SIGNIFICANT CHANGE UP (ref 8.5–10.1)
CHLORIDE SERPL-SCNC: 100 MMOL/L — SIGNIFICANT CHANGE UP (ref 98–110)
CO2 SERPL-SCNC: 24 MMOL/L — SIGNIFICANT CHANGE UP (ref 17–32)
CREAT SERPL-MCNC: 1.5 MG/DL — SIGNIFICANT CHANGE UP (ref 0.7–1.5)
GLUCOSE BLDC GLUCOMTR-MCNC: 204 MG/DL — HIGH (ref 70–99)
GLUCOSE BLDC GLUCOMTR-MCNC: 235 MG/DL — HIGH (ref 70–99)
GLUCOSE BLDC GLUCOMTR-MCNC: 297 MG/DL — HIGH (ref 70–99)
GLUCOSE BLDC GLUCOMTR-MCNC: 413 MG/DL — HIGH (ref 70–99)
GLUCOSE SERPL-MCNC: 236 MG/DL — HIGH (ref 70–99)
HCT VFR BLD CALC: 42.2 % — SIGNIFICANT CHANGE UP (ref 42–52)
HGB BLD-MCNC: 12.9 G/DL — LOW (ref 14–18)
MCHC RBC-ENTMCNC: 23.8 PG — LOW (ref 27–31)
MCHC RBC-ENTMCNC: 30.6 G/DL — LOW (ref 32–37)
MCV RBC AUTO: 77.7 FL — LOW (ref 80–94)
NRBC # BLD: 0 /100 WBCS — SIGNIFICANT CHANGE UP (ref 0–0)
PLATELET # BLD AUTO: 446 K/UL — HIGH (ref 130–400)
POTASSIUM SERPL-MCNC: 5.2 MMOL/L — HIGH (ref 3.5–5)
POTASSIUM SERPL-SCNC: 5.2 MMOL/L — HIGH (ref 3.5–5)
RBC # BLD: 5.43 M/UL — SIGNIFICANT CHANGE UP (ref 4.7–6.1)
RBC # FLD: 17.7 % — HIGH (ref 11.5–14.5)
SODIUM SERPL-SCNC: 138 MMOL/L — SIGNIFICANT CHANGE UP (ref 135–146)
WBC # BLD: 15.61 K/UL — HIGH (ref 4.8–10.8)
WBC # FLD AUTO: 15.61 K/UL — HIGH (ref 4.8–10.8)

## 2018-11-14 RX ORDER — CEFPODOXIME PROXETIL 100 MG
1 TABLET ORAL
Qty: 14 | Refills: 0 | OUTPATIENT
Start: 2018-11-14 | End: 2018-11-20

## 2018-11-14 RX ORDER — BUDESONIDE AND FORMOTEROL FUMARATE DIHYDRATE 160; 4.5 UG/1; UG/1
2 AEROSOL RESPIRATORY (INHALATION)
Qty: 1 | Refills: 0 | OUTPATIENT
Start: 2018-11-14 | End: 2018-12-13

## 2018-11-14 RX ORDER — SODIUM POLYSTYRENE SULFONATE 4.1 MEQ/G
30 POWDER, FOR SUSPENSION ORAL ONCE
Qty: 0 | Refills: 0 | Status: COMPLETED | OUTPATIENT
Start: 2018-11-14 | End: 2018-11-14

## 2018-11-14 RX ORDER — CINNAMON BARK 500 MG
1000 CAPSULE ORAL
Qty: 0 | Refills: 0 | COMMUNITY

## 2018-11-14 RX ADMIN — Medication 2: at 08:14

## 2018-11-14 RX ADMIN — FAMOTIDINE 20 MILLIGRAM(S): 10 INJECTION INTRAVENOUS at 17:22

## 2018-11-14 RX ADMIN — Medication 1 APPLICATION(S): at 06:18

## 2018-11-14 RX ADMIN — Medication 1000 MILLIGRAM(S): at 12:01

## 2018-11-14 RX ADMIN — Medication 60 MILLIGRAM(S): at 06:17

## 2018-11-14 RX ADMIN — Medication 3: at 17:20

## 2018-11-14 RX ADMIN — Medication 1 APPLICATION(S): at 17:23

## 2018-11-14 RX ADMIN — Medication 25 MILLIGRAM(S): at 06:17

## 2018-11-14 RX ADMIN — FAMOTIDINE 20 MILLIGRAM(S): 10 INJECTION INTRAVENOUS at 06:18

## 2018-11-14 RX ADMIN — Medication 3 MILLILITER(S): at 19:33

## 2018-11-14 RX ADMIN — Medication 3 MILLILITER(S): at 14:25

## 2018-11-14 RX ADMIN — BUDESONIDE AND FORMOTEROL FUMARATE DIHYDRATE 2 PUFF(S): 160; 4.5 AEROSOL RESPIRATORY (INHALATION) at 20:22

## 2018-11-14 RX ADMIN — BUDESONIDE AND FORMOTEROL FUMARATE DIHYDRATE 2 PUFF(S): 160; 4.5 AEROSOL RESPIRATORY (INHALATION) at 08:15

## 2018-11-14 RX ADMIN — ATORVASTATIN CALCIUM 20 MILLIGRAM(S): 80 TABLET, FILM COATED ORAL at 21:23

## 2018-11-14 RX ADMIN — Medication 1000 UNIT(S): at 12:01

## 2018-11-14 RX ADMIN — Medication 100 MILLIGRAM(S): at 21:23

## 2018-11-14 RX ADMIN — FINASTERIDE 5 MILLIGRAM(S): 5 TABLET, FILM COATED ORAL at 12:00

## 2018-11-14 RX ADMIN — HEPARIN SODIUM 5000 UNIT(S): 5000 INJECTION INTRAVENOUS; SUBCUTANEOUS at 21:23

## 2018-11-14 RX ADMIN — Medication 12 UNIT(S): at 17:20

## 2018-11-14 RX ADMIN — Medication 6: at 11:59

## 2018-11-14 RX ADMIN — HEPARIN SODIUM 5000 UNIT(S): 5000 INJECTION INTRAVENOUS; SUBCUTANEOUS at 17:23

## 2018-11-14 RX ADMIN — AMLODIPINE BESYLATE 10 MILLIGRAM(S): 2.5 TABLET ORAL at 06:17

## 2018-11-14 RX ADMIN — Medication 100 MILLIGRAM(S): at 17:22

## 2018-11-14 RX ADMIN — Medication 12 UNIT(S): at 11:59

## 2018-11-14 RX ADMIN — INSULIN GLARGINE 15 UNIT(S): 100 INJECTION, SOLUTION SUBCUTANEOUS at 08:15

## 2018-11-14 RX ADMIN — Medication 200 MILLIGRAM(S): at 17:22

## 2018-11-14 RX ADMIN — Medication 100 MILLIGRAM(S): at 06:17

## 2018-11-14 RX ADMIN — Medication 3 MILLILITER(S): at 07:51

## 2018-11-14 RX ADMIN — Medication 12 UNIT(S): at 08:14

## 2018-11-14 RX ADMIN — SODIUM POLYSTYRENE SULFONATE 30 GRAM(S): 4.1 POWDER, FOR SUSPENSION ORAL at 17:38

## 2018-11-14 RX ADMIN — Medication 200 MILLIGRAM(S): at 06:17

## 2018-11-14 RX ADMIN — PREGABALIN 1000 MICROGRAM(S): 225 CAPSULE ORAL at 12:00

## 2018-11-14 RX ADMIN — CHLORHEXIDINE GLUCONATE 1 APPLICATION(S): 213 SOLUTION TOPICAL at 06:17

## 2018-11-14 RX ADMIN — TAMSULOSIN HYDROCHLORIDE 0.4 MILLIGRAM(S): 0.4 CAPSULE ORAL at 21:23

## 2018-11-14 NOTE — PROGRESS NOTE ADULT - ASSESSMENT
76 y/o male admitted for COPD exacerbation  Patient admitted for dyspnea and acute on chronic resp failure/home O2/MICHAEL on CPAP - many readmission in the past for the same

## 2018-11-14 NOTE — CHART NOTE - NSCHARTNOTEFT_GEN_A_CORE
77 yr old male readmitted with Acute on  Chronic Hypercapnic Respiratory Failure, Copd. He was retaining high CO2 levels of at  67. Although patient placed on BIPAP, He continued to retain high CO2 levels. this patient has had multiple admissions for this diagnosis.    NIV is being ordered to decrease the work of breathing. NIV is for use at bedtime, naptime and periods of shortness of breath.  BIPAP ST has been ruled out due to the required volumes that cannot be implemented via BIPAP ST. Without volume augmented ventilation patient will continue to clinically decline and cause readmissions.

## 2018-11-14 NOTE — PROGRESS NOTE ADULT - SUBJECTIVE AND OBJECTIVE BOX
JIAN MANCIA  77y  Male      Patient is a 77y old  Male who presents with a chief complaint of Dyspnea (05 Nov 2018 09:50)      INTERVAL HPI/OVERNIGHT EVENTS:    patient admitted for dyspnea and acute on chronic resp failure/home O2/MICHAEL on CPAP - many readmission in the past for the same    -pulmonary followup noted  -home care agency arranging AVAP machine for the patient; and once that's in place and patient is not hypoxic, d/c planning home with home care   -oob to chair encouraged     Vital Signs Last 24 Hrs  T(C): 36.2 (14 Nov 2018 06:58), Max: 36.7 (13 Nov 2018 14:46)  T(F): 97.1 (14 Nov 2018 06:58), Max: 98 (13 Nov 2018 14:46)  HR: 107 (14 Nov 2018 06:58) (84 - 107)  BP: 156/74 (14 Nov 2018 06:58) (125/69 - 156/74)  RR: 16 (14 Nov 2018 06:58) (16 - 16)  SpO2: 93% (14 Nov 2018 07:22) (93% - 93%)    PHYSICAL EXAM:  GENERAL: NAD, speaking in full sentences   HEAD:  Atraumatic, Normocephalic  EYES: EOMI, PERRLA, conjunctiva and sclera clear  NERVOUS SYSTEM:  Alert & Oriented X 3  CHEST/LUNG: decreased BS b/l  CV/HEART: Regular rate and rhythm; No murmurs, rubs, or gallops  GI/ABDOMEN: obese abdomen   EXTREMITIES:  2+ Peripheral Pulses, No clubbing, cyanosis, or edema  SKIN: No rashes or lesions    LAB:                            12.9   15.61 )-----------( 446      ( 14 Nov 2018 06:53 )             42.2   11-14    138  |  100  |  46<H>  ----------------------------<  236<H>  5.2<H>   |  24  |  1.5    Ca    9.1      14 Nov 2018 06:53    RADIOLOGY:    Imaging Personally Reviewed:  [Y ] YES  [ ] NO    HEALTH ISSUES - PROBLEM Dx:  Enlarged prostate: Enlarged prostate  Renal insufficiency: Renal insufficiency  Diabetes mellitus, type 2: Diabetes mellitus, type 2  Hypertension: Hypertension  COPD (chronic obstructive pulmonary disease): COPD (chronic obstructive pulmonary disease)    MEDICATIONS  (STANDING):  ALBUTerol/ipratropium for Nebulization 3 milliLiter(s) Nebulizer every 6 hours  amLODIPine   Tablet 10 milliGRAM(s) Oral daily  ascorbic acid 1000 milliGRAM(s) Oral daily  atorvastatin 20 milliGRAM(s) Oral at bedtime  BACItracin   Ointment 1 Application(s) Topical two times a day  buDESOnide 160 MICROgram(s)/formoterol 4.5 MICROgram(s) Inhaler 2 Puff(s) Inhalation two times a day  cefpodoxime 200 milliGRAM(s) Oral every 12 hours  chlorhexidine 4% Liquid 1 Application(s) Topical <User Schedule>  cholecalciferol 1000 Unit(s) Oral daily  cyanocobalamin 1000 MICROGram(s) Oral daily  dextrose 5%. 1000 milliLiter(s) (50 mL/Hr) IV Continuous <Continuous>  dextrose 50% Injectable 12.5 Gram(s) IV Push once  dextrose 50% Injectable 25 Gram(s) IV Push once  dextrose 50% Injectable 25 Gram(s) IV Push once  docusate sodium 100 milliGRAM(s) Oral three times a day  famotidine    Tablet 20 milliGRAM(s) Oral two times a day  finasteride 5 milliGRAM(s) Oral daily  heparin  Injectable 5000 Unit(s) SubCutaneous every 8 hours  insulin glargine Injectable (LANTUS) 15 Unit(s) SubCutaneous every morning  insulin lispro (HumaLOG) corrective regimen sliding scale   SubCutaneous three times a day before meals  insulin lispro Injectable (HumaLOG) 12 Unit(s) SubCutaneous three times a day before meals  metoprolol succinate ER 25 milliGRAM(s) Oral daily  predniSONE   Tablet 60 milliGRAM(s) Oral daily  tamsulosin 0.4 milliGRAM(s) Oral at bedtime    MEDICATIONS  (PRN):  artificial  tears Solution 1 Drop(s) Both EYES every 2 hours PRN Dry Eyes  dextrose 40% Gel 15 Gram(s) Oral once PRN Blood Glucose LESS THAN 70 milliGRAM(s)/deciliter  glucagon  Injectable 1 milliGRAM(s) IntraMuscular once PRN Glucose LESS THAN 70 milligrams/deciliter  simethicone 80 milliGRAM(s) Chew two times a day PRN Gas

## 2018-11-14 NOTE — PROGRESS NOTE ADULT - SUBJECTIVE AND OBJECTIVE BOX
Patient is a 77y old  Male who presents with a chief complaint of Dyspnea (13 Nov 2018 14:30)      INTERVAL HISTORY/overnight events feel good want to go home         Vital Signs Last 24 Hrs  T(C): 36.2 (14 Nov 2018 06:58), Max: 36.7 (13 Nov 2018 14:46)  T(F): 97.1 (14 Nov 2018 06:58), Max: 98 (13 Nov 2018 14:46)  HR: 107 (14 Nov 2018 06:58) (84 - 107)  BP: 156/74 (14 Nov 2018 06:58) (125/69 - 156/74)  BP(mean): --  RR: 16 (14 Nov 2018 06:58) (16 - 16)  SpO2: 93% (14 Nov 2018 07:22) (83% - 93%)  Daily     Daily   I&O's Summary    13 Nov 2018 07:01  -  14 Nov 2018 07:00  --------------------------------------------------------  IN: 1100 mL / OUT: 600 mL / NET: 500 mL    14 Nov 2018 07:01  -  14 Nov 2018 09:07  --------------------------------------------------------  IN: 0 mL / OUT: 500 mL / NET: -500 mL        Physical Examination:    General: No acute distress.  Alert, oriented, interactive, nonfocal    HEENT: Pupils equal, reactive to light.  Symmetric.    PULM: Clear to auscultation bilaterally, no significant sputum production    CVS: Regular rate and rhythm, no murmurs, rubs, or gallops    ABD: Soft, nondistended, nontender, normoactive bowel sounds, no masses    EXT: 1edema, nontender    SKIN: Warm and well perfused, no rashes noted.    Neurology: no focal deficit     Musculoskeletal : Move all extremety         Lab Results:                        12.9   15.61 )-----------( 446      ( 14 Nov 2018 06:53 )             42.2                     Microbiology      Medication:  MEDICATIONS  (STANDING):  ALBUTerol/ipratropium for Nebulization 3 milliLiter(s) Nebulizer every 6 hours  amLODIPine   Tablet 10 milliGRAM(s) Oral daily  ascorbic acid 1000 milliGRAM(s) Oral daily  atorvastatin 20 milliGRAM(s) Oral at bedtime  BACItracin   Ointment 1 Application(s) Topical two times a day  buDESOnide 160 MICROgram(s)/formoterol 4.5 MICROgram(s) Inhaler 2 Puff(s) Inhalation two times a day  cefpodoxime 200 milliGRAM(s) Oral every 12 hours  chlorhexidine 4% Liquid 1 Application(s) Topical <User Schedule>  cholecalciferol 1000 Unit(s) Oral daily  cyanocobalamin 1000 MICROGram(s) Oral daily  dextrose 5%. 1000 milliLiter(s) (50 mL/Hr) IV Continuous <Continuous>  dextrose 50% Injectable 12.5 Gram(s) IV Push once  dextrose 50% Injectable 25 Gram(s) IV Push once  dextrose 50% Injectable 25 Gram(s) IV Push once  docusate sodium 100 milliGRAM(s) Oral three times a day  famotidine    Tablet 20 milliGRAM(s) Oral two times a day  finasteride 5 milliGRAM(s) Oral daily  heparin  Injectable 5000 Unit(s) SubCutaneous every 8 hours  insulin glargine Injectable (LANTUS) 15 Unit(s) SubCutaneous every morning  insulin lispro (HumaLOG) corrective regimen sliding scale   SubCutaneous three times a day before meals  insulin lispro Injectable (HumaLOG) 12 Unit(s) SubCutaneous three times a day before meals  metoprolol succinate ER 25 milliGRAM(s) Oral daily  predniSONE   Tablet 60 milliGRAM(s) Oral daily  tamsulosin 0.4 milliGRAM(s) Oral at bedtime    MEDICATIONS  (PRN):  artificial  tears Solution 1 Drop(s) Both EYES every 2 hours PRN Dry Eyes  dextrose 40% Gel 15 Gram(s) Oral once PRN Blood Glucose LESS THAN 70 milliGRAM(s)/deciliter  glucagon  Injectable 1 milliGRAM(s) IntraMuscular once PRN Glucose LESS THAN 70 milligrams/deciliter  simethicone 80 milliGRAM(s) Chew two times a day PRN Gas        IMAGING STUDIES:

## 2018-11-14 NOTE — PROGRESS NOTE ADULT - ASSESSMENT
IMPRESSION:    Severe community acquired pneumonia - multiple risk factors for drug resistant strep  Acute on chronic hypercapnic failure, chronic hypoxemic respiratory failure  MICHAEL/OHS  Morbid obesity    PLAN:  will be discharge home on Po ABX   prednisone taper   in process to receive the portable vent life99  pulmonary rehab   bipap at night   DVT prophylaxis  Will follow IMPRESSION:    Severe community acquired pneumonia - multiple risk factors for drug resistant strep  Acute on chronic hypercapnic failure, chronic hypoxemic respiratory failure patient on home oxygen 4   copd   most likely Pulmonary Htn   MICHAEL/OHS  Morbid obesity    PLAN:  will be discharge home on Po ABX   prednisone taper   in process to receive the portable vent   pulmonary rehab   bipap at night   DVT prophylaxis  Will follow

## 2018-11-14 NOTE — PROGRESS NOTE ADULT - PROBLEM SELECTOR PLAN 1
-Acute on chronic resp failure/copd exacerbation/suspected gram negative PNA/on Home O2  -abx changed to oral as per ID   -on Nasal cannula O2  -Taper PO steroids    nebs, abx and pulmonary follow ups    #MICHAEL  -cpap at night - has machine at home; AVAP arrangements as per Pulmonary -Acute on chronic resp failure/copd exacerbation/suspected gram negative PNA/complex PNA/on Home O2  -abx changed to oral as per ID   -on Nasal cannula O2  -Taper PO steroids    nebs, abx and pulmonary follow ups    #MICHAEL  -cpap at night - has machine at home; AVAP arrangements as per Pulmonary

## 2018-11-15 VITALS — DIASTOLIC BLOOD PRESSURE: 73 MMHG | HEART RATE: 75 BPM | SYSTOLIC BLOOD PRESSURE: 145 MMHG

## 2018-11-15 LAB
ANION GAP SERPL CALC-SCNC: 8 MMOL/L — SIGNIFICANT CHANGE UP (ref 7–14)
BUN SERPL-MCNC: 42 MG/DL — HIGH (ref 10–20)
CALCIUM SERPL-MCNC: 8.9 MG/DL — SIGNIFICANT CHANGE UP (ref 8.5–10.1)
CHLORIDE SERPL-SCNC: 102 MMOL/L — SIGNIFICANT CHANGE UP (ref 98–110)
CO2 SERPL-SCNC: 28 MMOL/L — SIGNIFICANT CHANGE UP (ref 17–32)
CREAT SERPL-MCNC: 1.5 MG/DL — SIGNIFICANT CHANGE UP (ref 0.7–1.5)
GLUCOSE BLDC GLUCOMTR-MCNC: 235 MG/DL — HIGH (ref 70–99)
GLUCOSE BLDC GLUCOMTR-MCNC: 350 MG/DL — HIGH (ref 70–99)
GLUCOSE SERPL-MCNC: 236 MG/DL — HIGH (ref 70–99)
HCT VFR BLD CALC: 41.9 % — LOW (ref 42–52)
HGB BLD-MCNC: 12.7 G/DL — LOW (ref 14–18)
MCHC RBC-ENTMCNC: 23.6 PG — LOW (ref 27–31)
MCHC RBC-ENTMCNC: 30.3 G/DL — LOW (ref 32–37)
MCV RBC AUTO: 77.9 FL — LOW (ref 80–94)
NRBC # BLD: 0 /100 WBCS — SIGNIFICANT CHANGE UP (ref 0–0)
PLATELET # BLD AUTO: 438 K/UL — HIGH (ref 130–400)
POTASSIUM SERPL-MCNC: 5.4 MMOL/L — HIGH (ref 3.5–5)
POTASSIUM SERPL-SCNC: 5.4 MMOL/L — HIGH (ref 3.5–5)
RBC # BLD: 5.38 M/UL — SIGNIFICANT CHANGE UP (ref 4.7–6.1)
RBC # FLD: 18.2 % — HIGH (ref 11.5–14.5)
SODIUM SERPL-SCNC: 138 MMOL/L — SIGNIFICANT CHANGE UP (ref 135–146)
WBC # BLD: 15.57 K/UL — HIGH (ref 4.8–10.8)
WBC # FLD AUTO: 15.57 K/UL — HIGH (ref 4.8–10.8)

## 2018-11-15 RX ORDER — SODIUM POLYSTYRENE SULFONATE 4.1 MEQ/G
30 POWDER, FOR SUSPENSION ORAL ONCE
Qty: 0 | Refills: 0 | Status: COMPLETED | OUTPATIENT
Start: 2018-11-15 | End: 2018-11-15

## 2018-11-15 RX ADMIN — Medication 200 MILLIGRAM(S): at 05:28

## 2018-11-15 RX ADMIN — Medication 100 MILLIGRAM(S): at 05:28

## 2018-11-15 RX ADMIN — Medication 60 MILLIGRAM(S): at 05:28

## 2018-11-15 RX ADMIN — FAMOTIDINE 20 MILLIGRAM(S): 10 INJECTION INTRAVENOUS at 05:29

## 2018-11-15 RX ADMIN — AMLODIPINE BESYLATE 10 MILLIGRAM(S): 2.5 TABLET ORAL at 05:28

## 2018-11-15 RX ADMIN — Medication 4: at 11:40

## 2018-11-15 RX ADMIN — Medication 12 UNIT(S): at 11:40

## 2018-11-15 RX ADMIN — Medication 1 APPLICATION(S): at 05:28

## 2018-11-15 RX ADMIN — PREGABALIN 1000 MICROGRAM(S): 225 CAPSULE ORAL at 11:03

## 2018-11-15 RX ADMIN — Medication 25 MILLIGRAM(S): at 05:28

## 2018-11-15 RX ADMIN — Medication 2: at 07:51

## 2018-11-15 RX ADMIN — Medication 3 MILLILITER(S): at 05:32

## 2018-11-15 RX ADMIN — INSULIN GLARGINE 15 UNIT(S): 100 INJECTION, SOLUTION SUBCUTANEOUS at 07:51

## 2018-11-15 RX ADMIN — HEPARIN SODIUM 5000 UNIT(S): 5000 INJECTION INTRAVENOUS; SUBCUTANEOUS at 05:29

## 2018-11-15 RX ADMIN — Medication 3 MILLILITER(S): at 08:04

## 2018-11-15 RX ADMIN — Medication 3 MILLILITER(S): at 13:44

## 2018-11-15 RX ADMIN — Medication 12 UNIT(S): at 07:51

## 2018-11-15 RX ADMIN — FINASTERIDE 5 MILLIGRAM(S): 5 TABLET, FILM COATED ORAL at 11:03

## 2018-11-15 RX ADMIN — SODIUM POLYSTYRENE SULFONATE 30 GRAM(S): 4.1 POWDER, FOR SUSPENSION ORAL at 11:01

## 2018-11-15 RX ADMIN — BUDESONIDE AND FORMOTEROL FUMARATE DIHYDRATE 2 PUFF(S): 160; 4.5 AEROSOL RESPIRATORY (INHALATION) at 07:52

## 2018-11-15 RX ADMIN — Medication 1000 MILLIGRAM(S): at 11:03

## 2018-11-15 RX ADMIN — Medication 1000 UNIT(S): at 11:03

## 2018-11-15 NOTE — PROGRESS NOTE ADULT - PROBLEM SELECTOR PROBLEM 9
B12 deficiency

## 2018-11-15 NOTE — PROGRESS NOTE ADULT - PROBLEM SELECTOR PROBLEM 7
Hyperkalemia

## 2018-11-15 NOTE — PROGRESS NOTE ADULT - PROBLEM SELECTOR PLAN 6
BMI > 35; currently not documented for this admission; nursing staff has not charted the weight from this admission   -weight loss and diet modification
BMI > 39;   -weight loss and diet modification
BMI > 35; currently not documented for this admission; nursing staff has not charted the weight from this admission   -weight loss and diet modification
BMI > 35; currently not documented for this admission; nursing staff to chart   -weight loss and diet modification
BMI > 39;   -weight loss and diet modification

## 2018-11-15 NOTE — PROGRESS NOTE ADULT - PROBLEM SELECTOR PLAN 9
-suspected B12 Def; continue with home supplementation

## 2018-11-15 NOTE — PROGRESS NOTE ADULT - PROBLEM SELECTOR PROBLEM 5
Enlarged prostate

## 2018-11-15 NOTE — PROGRESS NOTE ADULT - PROBLEM SELECTOR PROBLEM 4
Renal insufficiency

## 2018-11-15 NOTE — PROGRESS NOTE ADULT - REASON FOR ADMISSION
Dyspnea

## 2018-11-15 NOTE — PROGRESS NOTE ADULT - PROVIDER SPECIALTY LIST ADULT
Hospitalist
Infectious Disease
Infectious Disease
Pulmonology
Hospitalist
Hospitalist

## 2018-11-15 NOTE — PROGRESS NOTE ADULT - PROBLEM SELECTOR PLAN 3
-sq insulin coverage  -uncontrolled; increase the coverage
-sq insulin coverage  -check finger sticks
-sq insulin coverage
-sq insulin coverage  -check finger sticks
-sq insulin coverage  -uncontrolled; increase the coverage

## 2018-11-15 NOTE — PROGRESS NOTE ADULT - PROBLEM SELECTOR PLAN 7
-kayxalate dose  -BMP 3 pm
-continue with laxatives
-continue with laxatives
-kayxalate dose to be given (improved as pt with bowel movement)  -on laxatives
-kayxalate dose to be given stat  -on laxatives
-kayxalate dose to be given (improved as pt with bowel movement)  -on laxatives

## 2018-11-15 NOTE — PROGRESS NOTE ADULT - PROBLEM SELECTOR PLAN 2
-home meds
Stable on home meds
-home meds
-home meds
Stable on home meds
-home meds

## 2018-11-15 NOTE — PROGRESS NOTE ADULT - PROBLEM SELECTOR PLAN 5
-BPH; on flomax and finasteride

## 2018-11-15 NOTE — PROGRESS NOTE ADULT - PROBLEM SELECTOR PLAN 1
-Acute on chronic resp failure/copd exacerbation/suspected gram negative PNA/complex PNA/on Home O2  -abx changed to oral as per ID   -on Nasal cannula O2  -on Tapering PO steroids    nebs, abx and pulmonary follow ups    #MICHAEL  -cpap at night - has machine at home; AVAP arrangements for home in place

## 2018-11-15 NOTE — PROGRESS NOTE ADULT - PROBLEM SELECTOR PROBLEM 1
COPD (chronic obstructive pulmonary disease)
Pneumonia due to Streptococcus pneumoniae, unspecified laterality, unspecified part of lung
COPD (chronic obstructive pulmonary disease)

## 2018-11-15 NOTE — PROGRESS NOTE ADULT - SUBJECTIVE AND OBJECTIVE BOX
JIAN MANCIA  77y  Male      Patient is a 77y old  Male who presents with a chief complaint of Dyspnea (05 Nov 2018 09:50)      INTERVAL HPI/OVERNIGHT EVENTS:    patient admitted for dyspnea and acute on chronic resp failure/home O2/MICHAEL on CPAP - many readmission in the past for the same    -pulmonary followup noted  -home care agency arranging AVAP machine for the patient; and once that's in place and patient is not hypoxic, d/c planning home with home care   -oob to chair encouraged   -patient is doing well and await home care for discharge today     Vital Signs Last 24 Hrs  T(C): 36.2 (15 Nov 2018 05:23), Max: 36.4 (14 Nov 2018 14:36)  T(F): 97.1 (15 Nov 2018 05:23), Max: 97.5 (14 Nov 2018 14:36)  HR: 75 (15 Nov 2018 06:31) (75 - 100)  BP: 145/73 (15 Nov 2018 06:31) (144/79 - 162/77)  RR: 16 (15 Nov 2018 05:23) (16 - 16)    PHYSICAL EXAM:  GENERAL: NAD, speaking in full sentences   HEAD:  Atraumatic, Normocephalic  EYES: EOMI, PERRLA, conjunctiva and sclera clear  NERVOUS SYSTEM:  Alert & Oriented X 3  CHEST/LUNG: decreased BS b/l  CV/HEART: Regular rate and rhythm; No murmurs, rubs, or gallops  GI/ABDOMEN: obese abdomen   EXTREMITIES:  2+ Peripheral Pulses, No clubbing, cyanosis, or edema  SKIN: No rashes or lesions    LAB:                            12.7   15.57 )-----------( 438      ( 15 Nov 2018 07:11 )             41.9   11-15    138  |  102  |  42<H>  ----------------------------<  236<H>  5.4<H>   |  28  |  1.5    Ca    8.9      15 Nov 2018 07:11      RADIOLOGY:    Imaging Personally Reviewed:  [Y ] YES  [ ] NO    HEALTH ISSUES - PROBLEM Dx:  Enlarged prostate: Enlarged prostate  Renal insufficiency: Renal insufficiency  Diabetes mellitus, type 2: Diabetes mellitus, type 2  Hypertension: Hypertension  COPD (chronic obstructive pulmonary disease): COPD (chronic obstructive pulmonary disease)    MEDICATIONS  (STANDING):  ALBUTerol/ipratropium for Nebulization 3 milliLiter(s) Nebulizer every 6 hours  amLODIPine   Tablet 10 milliGRAM(s) Oral daily  ascorbic acid 1000 milliGRAM(s) Oral daily  atorvastatin 20 milliGRAM(s) Oral at bedtime  BACItracin   Ointment 1 Application(s) Topical two times a day  buDESOnide 160 MICROgram(s)/formoterol 4.5 MICROgram(s) Inhaler 2 Puff(s) Inhalation two times a day  cefpodoxime 200 milliGRAM(s) Oral every 12 hours  chlorhexidine 4% Liquid 1 Application(s) Topical <User Schedule>  cholecalciferol 1000 Unit(s) Oral daily  cyanocobalamin 1000 MICROGram(s) Oral daily  dextrose 5%. 1000 milliLiter(s) (50 mL/Hr) IV Continuous <Continuous>  dextrose 50% Injectable 12.5 Gram(s) IV Push once  dextrose 50% Injectable 25 Gram(s) IV Push once  dextrose 50% Injectable 25 Gram(s) IV Push once  docusate sodium 100 milliGRAM(s) Oral three times a day  famotidine    Tablet 20 milliGRAM(s) Oral two times a day  finasteride 5 milliGRAM(s) Oral daily  heparin  Injectable 5000 Unit(s) SubCutaneous every 8 hours  insulin glargine Injectable (LANTUS) 15 Unit(s) SubCutaneous every morning  insulin lispro (HumaLOG) corrective regimen sliding scale   SubCutaneous three times a day before meals  insulin lispro Injectable (HumaLOG) 12 Unit(s) SubCutaneous three times a day before meals  metoprolol succinate ER 25 milliGRAM(s) Oral daily  predniSONE   Tablet 60 milliGRAM(s) Oral daily  tamsulosin 0.4 milliGRAM(s) Oral at bedtime    MEDICATIONS  (PRN):  artificial  tears Solution 1 Drop(s) Both EYES every 2 hours PRN Dry Eyes  dextrose 40% Gel 15 Gram(s) Oral once PRN Blood Glucose LESS THAN 70 milliGRAM(s)/deciliter  glucagon  Injectable 1 milliGRAM(s) IntraMuscular once PRN Glucose LESS THAN 70 milligrams/deciliter  simethicone 80 milliGRAM(s) Chew two times a day PRN Gas

## 2018-11-15 NOTE — PROGRESS NOTE ADULT - PROBLEM SELECTOR PLAN 4
-CKD stage III  -outpatient nephro follow up and kidney monitoring
-CKD stage III  -outpatient nephro follow up and kidney monitoring    # Hyperkalemia: kayxalate to be given
-CKD stage III  -outpatient nephro follow up and kidney monitoring

## 2018-11-15 NOTE — PROGRESS NOTE ADULT - PROBLEM SELECTOR PROBLEM 3
Diabetes mellitus, type 2

## 2018-11-15 NOTE — PROGRESS NOTE ADULT - PROBLEM SELECTOR PROBLEM 10
Vitamin D deficiency

## 2018-11-20 DIAGNOSIS — J96.11 CHRONIC RESPIRATORY FAILURE WITH HYPOXIA: ICD-10-CM

## 2018-11-20 DIAGNOSIS — E11.65 TYPE 2 DIABETES MELLITUS WITH HYPERGLYCEMIA: ICD-10-CM

## 2018-11-20 DIAGNOSIS — K21.9 GASTRO-ESOPHAGEAL REFLUX DISEASE WITHOUT ESOPHAGITIS: ICD-10-CM

## 2018-11-20 DIAGNOSIS — E78.00 PURE HYPERCHOLESTEROLEMIA, UNSPECIFIED: ICD-10-CM

## 2018-11-20 DIAGNOSIS — I12.9 HYPERTENSIVE CHRONIC KIDNEY DISEASE WITH STAGE 1 THROUGH STAGE 4 CHRONIC KIDNEY DISEASE, OR UNSPECIFIED CHRONIC KIDNEY DISEASE: ICD-10-CM

## 2018-11-20 DIAGNOSIS — Z91.19 PATIENT'S NONCOMPLIANCE WITH OTHER MEDICAL TREATMENT AND REGIMEN: ICD-10-CM

## 2018-11-20 DIAGNOSIS — J44.0 CHRONIC OBSTRUCTIVE PULMONARY DISEASE WITH (ACUTE) LOWER RESPIRATORY INFECTION: ICD-10-CM

## 2018-11-20 DIAGNOSIS — Z79.52 LONG TERM (CURRENT) USE OF SYSTEMIC STEROIDS: ICD-10-CM

## 2018-11-20 DIAGNOSIS — E87.5 HYPERKALEMIA: ICD-10-CM

## 2018-11-20 DIAGNOSIS — E55.9 VITAMIN D DEFICIENCY, UNSPECIFIED: ICD-10-CM

## 2018-11-20 DIAGNOSIS — Z79.4 LONG TERM (CURRENT) USE OF INSULIN: ICD-10-CM

## 2018-11-20 DIAGNOSIS — Z87.891 PERSONAL HISTORY OF NICOTINE DEPENDENCE: ICD-10-CM

## 2018-11-20 DIAGNOSIS — J96.22 ACUTE AND CHRONIC RESPIRATORY FAILURE WITH HYPERCAPNIA: ICD-10-CM

## 2018-11-20 DIAGNOSIS — E66.2 MORBID (SEVERE) OBESITY WITH ALVEOLAR HYPOVENTILATION: ICD-10-CM

## 2018-11-20 DIAGNOSIS — E11.22 TYPE 2 DIABETES MELLITUS WITH DIABETIC CHRONIC KIDNEY DISEASE: ICD-10-CM

## 2018-11-20 DIAGNOSIS — J15.6 PNEUMONIA DUE TO OTHER GRAM-NEGATIVE BACTERIA: ICD-10-CM

## 2018-11-20 DIAGNOSIS — E53.8 DEFICIENCY OF OTHER SPECIFIED B GROUP VITAMINS: ICD-10-CM

## 2018-11-20 DIAGNOSIS — N18.3 CHRONIC KIDNEY DISEASE, STAGE 3 (MODERATE): ICD-10-CM

## 2018-11-20 DIAGNOSIS — R06.02 SHORTNESS OF BREATH: ICD-10-CM

## 2018-11-20 DIAGNOSIS — N40.0 BENIGN PROSTATIC HYPERPLASIA WITHOUT LOWER URINARY TRACT SYMPTOMS: ICD-10-CM

## 2018-11-20 DIAGNOSIS — Z99.81 DEPENDENCE ON SUPPLEMENTAL OXYGEN: ICD-10-CM

## 2018-11-20 DIAGNOSIS — K59.00 CONSTIPATION, UNSPECIFIED: ICD-10-CM

## 2018-11-20 DIAGNOSIS — Z88.0 ALLERGY STATUS TO PENICILLIN: ICD-10-CM

## 2018-11-20 DIAGNOSIS — J44.1 CHRONIC OBSTRUCTIVE PULMONARY DISEASE WITH (ACUTE) EXACERBATION: ICD-10-CM

## 2018-12-08 ENCOUNTER — INPATIENT (INPATIENT)
Facility: HOSPITAL | Age: 78
LOS: 25 days | Discharge: NURSING, REHAB OR LONG-TERM CARE | End: 2019-01-03
Attending: INTERNAL MEDICINE | Admitting: INTERNAL MEDICINE

## 2018-12-08 VITALS — RESPIRATION RATE: 23 BRPM | WEIGHT: 244.93 LBS | TEMPERATURE: 98 F | HEIGHT: 68 IN

## 2018-12-08 DIAGNOSIS — K63.5 POLYP OF COLON: Chronic | ICD-10-CM

## 2018-12-08 DIAGNOSIS — J86.9 PYOTHORAX WITHOUT FISTULA: Chronic | ICD-10-CM

## 2018-12-08 DIAGNOSIS — Z98.890 OTHER SPECIFIED POSTPROCEDURAL STATES: Chronic | ICD-10-CM

## 2018-12-08 LAB
ALBUMIN SERPL ELPH-MCNC: 3.9 G/DL — SIGNIFICANT CHANGE UP (ref 3.5–5.2)
ALP SERPL-CCNC: 70 U/L — SIGNIFICANT CHANGE UP (ref 30–115)
ALT FLD-CCNC: 11 U/L — SIGNIFICANT CHANGE UP (ref 0–41)
ANION GAP SERPL CALC-SCNC: 16 MMOL/L — HIGH (ref 7–14)
AST SERPL-CCNC: 10 U/L — SIGNIFICANT CHANGE UP (ref 0–41)
BILIRUB SERPL-MCNC: 0.4 MG/DL — SIGNIFICANT CHANGE UP (ref 0.2–1.2)
BUN SERPL-MCNC: 50 MG/DL — HIGH (ref 10–20)
CALCIUM SERPL-MCNC: 9.7 MG/DL — SIGNIFICANT CHANGE UP (ref 8.5–10.1)
CHLORIDE SERPL-SCNC: 104 MMOL/L — SIGNIFICANT CHANGE UP (ref 98–110)
CO2 SERPL-SCNC: 23 MMOL/L — SIGNIFICANT CHANGE UP (ref 17–32)
CREAT SERPL-MCNC: 1.8 MG/DL — HIGH (ref 0.7–1.5)
GAS PNL BLDA: SIGNIFICANT CHANGE UP
GLUCOSE BLDC GLUCOMTR-MCNC: 352 MG/DL — HIGH (ref 70–99)
GLUCOSE BLDC GLUCOMTR-MCNC: 378 MG/DL — HIGH (ref 70–99)
GLUCOSE BLDC GLUCOMTR-MCNC: 384 MG/DL — HIGH (ref 70–99)
GLUCOSE SERPL-MCNC: 192 MG/DL — HIGH (ref 70–99)
HCT VFR BLD CALC: 44.1 % — SIGNIFICANT CHANGE UP (ref 42–52)
HGB BLD-MCNC: 13.5 G/DL — LOW (ref 14–18)
MAGNESIUM SERPL-MCNC: 2 MG/DL — SIGNIFICANT CHANGE UP (ref 1.8–2.4)
MCHC RBC-ENTMCNC: 23.7 PG — LOW (ref 27–31)
MCHC RBC-ENTMCNC: 30.6 G/DL — LOW (ref 32–37)
MCV RBC AUTO: 77.5 FL — LOW (ref 80–94)
NRBC # BLD: 0 /100 WBCS — SIGNIFICANT CHANGE UP (ref 0–0)
NT-PROBNP SERPL-SCNC: 296 PG/ML — SIGNIFICANT CHANGE UP (ref 0–300)
PLATELET # BLD AUTO: 505 K/UL — HIGH (ref 130–400)
POTASSIUM SERPL-MCNC: 5.3 MMOL/L — HIGH (ref 3.5–5)
POTASSIUM SERPL-SCNC: 5.3 MMOL/L — HIGH (ref 3.5–5)
PROT SERPL-MCNC: 6.3 G/DL — SIGNIFICANT CHANGE UP (ref 6–8)
RBC # BLD: 5.69 M/UL — SIGNIFICANT CHANGE UP (ref 4.7–6.1)
RBC # FLD: 19 % — HIGH (ref 11.5–14.5)
SODIUM SERPL-SCNC: 143 MMOL/L — SIGNIFICANT CHANGE UP (ref 135–146)
TROPONIN T SERPL-MCNC: 0.01 NG/ML — SIGNIFICANT CHANGE UP
WBC # BLD: 12.55 K/UL — HIGH (ref 4.8–10.8)
WBC # FLD AUTO: 12.55 K/UL — HIGH (ref 4.8–10.8)

## 2018-12-08 RX ORDER — INSULIN LISPRO 100/ML
VIAL (ML) SUBCUTANEOUS AT BEDTIME
Qty: 0 | Refills: 0 | Status: DISCONTINUED | OUTPATIENT
Start: 2018-12-08 | End: 2019-01-03

## 2018-12-08 RX ORDER — INSULIN LISPRO 100/ML
10 VIAL (ML) SUBCUTANEOUS
Qty: 0 | Refills: 0 | Status: DISCONTINUED | OUTPATIENT
Start: 2018-12-08 | End: 2018-12-09

## 2018-12-08 RX ORDER — FINASTERIDE 5 MG/1
5 TABLET, FILM COATED ORAL DAILY
Qty: 0 | Refills: 0 | Status: DISCONTINUED | OUTPATIENT
Start: 2018-12-08 | End: 2019-01-03

## 2018-12-08 RX ORDER — DEXTROSE 50 % IN WATER 50 %
25 SYRINGE (ML) INTRAVENOUS ONCE
Qty: 0 | Refills: 0 | Status: DISCONTINUED | OUTPATIENT
Start: 2018-12-08 | End: 2019-01-03

## 2018-12-08 RX ORDER — TIOTROPIUM BROMIDE 18 UG/1
1 CAPSULE ORAL; RESPIRATORY (INHALATION) DAILY
Qty: 0 | Refills: 0 | Status: DISCONTINUED | OUTPATIENT
Start: 2018-12-08 | End: 2019-01-03

## 2018-12-08 RX ORDER — DOCUSATE SODIUM 100 MG
100 CAPSULE ORAL DAILY
Qty: 0 | Refills: 0 | Status: DISCONTINUED | OUTPATIENT
Start: 2018-12-08 | End: 2019-01-03

## 2018-12-08 RX ORDER — HEPARIN SODIUM 5000 [USP'U]/ML
5000 INJECTION INTRAVENOUS; SUBCUTANEOUS EVERY 12 HOURS
Qty: 0 | Refills: 0 | Status: DISCONTINUED | OUTPATIENT
Start: 2018-12-08 | End: 2019-01-03

## 2018-12-08 RX ORDER — CHOLECALCIFEROL (VITAMIN D3) 125 MCG
1000 CAPSULE ORAL DAILY
Qty: 0 | Refills: 0 | Status: DISCONTINUED | OUTPATIENT
Start: 2018-12-08 | End: 2019-01-03

## 2018-12-08 RX ORDER — MULTIVIT-MIN/FERROUS GLUCONATE 9 MG/15 ML
1 LIQUID (ML) ORAL DAILY
Qty: 0 | Refills: 0 | Status: DISCONTINUED | OUTPATIENT
Start: 2018-12-08 | End: 2019-01-03

## 2018-12-08 RX ORDER — METOPROLOL TARTRATE 50 MG
25 TABLET ORAL DAILY
Qty: 0 | Refills: 0 | Status: DISCONTINUED | OUTPATIENT
Start: 2018-12-08 | End: 2018-12-25

## 2018-12-08 RX ORDER — AMLODIPINE BESYLATE 2.5 MG/1
10 TABLET ORAL DAILY
Qty: 0 | Refills: 0 | Status: DISCONTINUED | OUTPATIENT
Start: 2018-12-08 | End: 2018-12-25

## 2018-12-08 RX ORDER — TAMSULOSIN HYDROCHLORIDE 0.4 MG/1
0.4 CAPSULE ORAL AT BEDTIME
Qty: 0 | Refills: 0 | Status: DISCONTINUED | OUTPATIENT
Start: 2018-12-08 | End: 2019-01-03

## 2018-12-08 RX ORDER — ATORVASTATIN CALCIUM 80 MG/1
20 TABLET, FILM COATED ORAL AT BEDTIME
Qty: 0 | Refills: 0 | Status: DISCONTINUED | OUTPATIENT
Start: 2018-12-08 | End: 2019-01-03

## 2018-12-08 RX ORDER — DEXTROSE 50 % IN WATER 50 %
15 SYRINGE (ML) INTRAVENOUS ONCE
Qty: 0 | Refills: 0 | Status: DISCONTINUED | OUTPATIENT
Start: 2018-12-08 | End: 2019-01-03

## 2018-12-08 RX ORDER — BUDESONIDE AND FORMOTEROL FUMARATE DIHYDRATE 160; 4.5 UG/1; UG/1
2 AEROSOL RESPIRATORY (INHALATION)
Qty: 0 | Refills: 0 | Status: DISCONTINUED | OUTPATIENT
Start: 2018-12-08 | End: 2019-01-03

## 2018-12-08 RX ORDER — PREGABALIN 225 MG/1
1000 CAPSULE ORAL DAILY
Qty: 0 | Refills: 0 | Status: DISCONTINUED | OUTPATIENT
Start: 2018-12-08 | End: 2019-01-03

## 2018-12-08 RX ORDER — SODIUM CHLORIDE 9 MG/ML
1000 INJECTION, SOLUTION INTRAVENOUS
Qty: 0 | Refills: 0 | Status: DISCONTINUED | OUTPATIENT
Start: 2018-12-08 | End: 2019-01-03

## 2018-12-08 RX ORDER — INSULIN LISPRO 100/ML
VIAL (ML) SUBCUTANEOUS
Qty: 0 | Refills: 0 | Status: DISCONTINUED | OUTPATIENT
Start: 2018-12-08 | End: 2019-01-03

## 2018-12-08 RX ORDER — PANTOPRAZOLE SODIUM 20 MG/1
40 TABLET, DELAYED RELEASE ORAL
Qty: 0 | Refills: 0 | Status: DISCONTINUED | OUTPATIENT
Start: 2018-12-08 | End: 2019-01-03

## 2018-12-08 RX ORDER — DEXTROSE 50 % IN WATER 50 %
12.5 SYRINGE (ML) INTRAVENOUS ONCE
Qty: 0 | Refills: 0 | Status: DISCONTINUED | OUTPATIENT
Start: 2018-12-08 | End: 2019-01-03

## 2018-12-08 RX ORDER — POLYETHYLENE GLYCOL 3350 17 G/17G
17 POWDER, FOR SOLUTION ORAL DAILY
Qty: 0 | Refills: 0 | Status: DISCONTINUED | OUTPATIENT
Start: 2018-12-08 | End: 2018-12-12

## 2018-12-08 RX ORDER — GLUCAGON INJECTION, SOLUTION 0.5 MG/.1ML
1 INJECTION, SOLUTION SUBCUTANEOUS ONCE
Qty: 0 | Refills: 0 | Status: DISCONTINUED | OUTPATIENT
Start: 2018-12-08 | End: 2019-01-03

## 2018-12-08 RX ORDER — IPRATROPIUM/ALBUTEROL SULFATE 18-103MCG
3 AEROSOL WITH ADAPTER (GRAM) INHALATION EVERY 4 HOURS
Qty: 0 | Refills: 0 | Status: DISCONTINUED | OUTPATIENT
Start: 2018-12-08 | End: 2019-01-03

## 2018-12-08 RX ORDER — INSULIN GLARGINE 100 [IU]/ML
35 INJECTION, SOLUTION SUBCUTANEOUS AT BEDTIME
Qty: 0 | Refills: 0 | Status: DISCONTINUED | OUTPATIENT
Start: 2018-12-08 | End: 2018-12-09

## 2018-12-08 RX ADMIN — AMLODIPINE BESYLATE 10 MILLIGRAM(S): 2.5 TABLET ORAL at 15:56

## 2018-12-08 RX ADMIN — Medication 100 MILLIGRAM(S): at 15:56

## 2018-12-08 RX ADMIN — BUDESONIDE AND FORMOTEROL FUMARATE DIHYDRATE 2 PUFF(S): 160; 4.5 AEROSOL RESPIRATORY (INHALATION) at 23:56

## 2018-12-08 RX ADMIN — FINASTERIDE 5 MILLIGRAM(S): 5 TABLET, FILM COATED ORAL at 15:57

## 2018-12-08 RX ADMIN — Medication 1000 UNIT(S): at 15:56

## 2018-12-08 RX ADMIN — TAMSULOSIN HYDROCHLORIDE 0.4 MILLIGRAM(S): 0.4 CAPSULE ORAL at 22:04

## 2018-12-08 RX ADMIN — Medication 3 MILLILITER(S): at 19:16

## 2018-12-08 RX ADMIN — HEPARIN SODIUM 5000 UNIT(S): 5000 INJECTION INTRAVENOUS; SUBCUTANEOUS at 18:01

## 2018-12-08 RX ADMIN — Medication 60 MILLIGRAM(S): at 18:00

## 2018-12-08 RX ADMIN — Medication 3 MILLILITER(S): at 16:51

## 2018-12-08 RX ADMIN — Medication 10 UNIT(S): at 17:55

## 2018-12-08 RX ADMIN — TIOTROPIUM BROMIDE 1 CAPSULE(S): 18 CAPSULE ORAL; RESPIRATORY (INHALATION) at 17:58

## 2018-12-08 RX ADMIN — Medication 1 TABLET(S): at 16:02

## 2018-12-08 RX ADMIN — PANTOPRAZOLE SODIUM 40 MILLIGRAM(S): 20 TABLET, DELAYED RELEASE ORAL at 16:01

## 2018-12-08 RX ADMIN — PREGABALIN 1000 MICROGRAM(S): 225 CAPSULE ORAL at 15:56

## 2018-12-08 RX ADMIN — POLYETHYLENE GLYCOL 3350 17 GRAM(S): 17 POWDER, FOR SOLUTION ORAL at 16:02

## 2018-12-08 RX ADMIN — Medication 25 MILLIGRAM(S): at 15:57

## 2018-12-08 RX ADMIN — Medication 6: at 22:14

## 2018-12-08 RX ADMIN — Medication 3 MILLILITER(S): at 13:31

## 2018-12-08 RX ADMIN — INSULIN GLARGINE 35 UNIT(S): 100 INJECTION, SOLUTION SUBCUTANEOUS at 22:04

## 2018-12-08 RX ADMIN — Medication 10: at 15:50

## 2018-12-08 RX ADMIN — ATORVASTATIN CALCIUM 20 MILLIGRAM(S): 80 TABLET, FILM COATED ORAL at 22:04

## 2018-12-08 RX ADMIN — Medication 125 MILLIGRAM(S): at 08:40

## 2018-12-08 NOTE — H&P ADULT - NSHPLABSRESULTS_GEN_ALL_CORE
13.5   12.55 )-----------( 505      ( 08 Dec 2018 08:39 )             44.1     12-08    143  |  104  |  50<H>  ----------------------------<  192<H>  5.3<H>   |  23  |  1.8<H>    Ca    9.7      08 Dec 2018 08:39  Mg     2.0     12-08    TPro  6.3  /  Alb  3.9  /  TBili  0.4  /  DBili  x   /  AST  10  /  ALT  11  /  AlkPhos  70  12-08        CARDIAC MARKERS ( 08 Dec 2018 08:39 )  x     / 0.01 ng/mL / x     / x     / x          Blood Gas Profile - Arterial (12.08.18 @ 08:30)    pH, Arterial: 7.34    pCO2, Arterial: 44 mmHg    pO2, Arterial: 53 mmHg    HCO3, Arterial: 24 mmoL/L    Base Excess, Arterial: -2.3 mmoL/L    Oxygen Saturation, Arterial: 86 %    FIO2, Arterial: 21      X-ray Chest 1 View-PORTABLE IMMEDIATE (12.08.18 @ 08:35)     Support devices: None.    Cardiac/mediastinum/hilum: Stable    Lung parenchyma/Pleura: Opacity overlies both lower lung fields. No   pneumothorax seen.    Skeleton/soft tissues: Stable    Impression:      Opacity overlies both lower lung fields.

## 2018-12-08 NOTE — ED PROVIDER NOTE - PHYSICAL EXAMINATION
CONST: Pt gray, gasping for air  EYES: PERRL, EOMI, Sclera and conjunctiva clear.   NECK: Non-tender, no meningeal signs. normal ROM. supple   CARD: Normal S1 S2; Normal rate and rhythm  RESP:O2 sat 73% on 5L nasal canula, wheezes bilaterally  GI: Soft, non-tender, non-distended. no cva tenderness. normal BS  MS: Normal ROM in all extremities. No midline spinal tenderness. pulses 2 +. no calf tenderness or swelling  SKIN: Warm, dry, no acute rashes. Good turgor  NEURO: A&Ox3, No focal deficits. Strength 5/5 with no sensory deficits. CONST: Pt gray, gasping for air  EYES: PERRL, EOMI, Sclera and conjunctiva clear.   NECK: Non-tender, no meningeal signs. normal ROM. supple   CARD: Normal S1 S2; Tachy rate and rhythm  RESP:O2 sat 73% on 5L nasal canula, wheezes bilaterally with labored breathing and decrease breath sounds  GI: Soft, non-tender, non-distended. no cva tenderness. normal BS  MS: Normal ROM in all extremities. No midline spinal tenderness. pulses 2 +. no calf tenderness or swelling  SKIN: Warm, dry, no acute rashes. Good turgor  NEURO: A&Ox3, No focal deficits. Strength 5/5 with no sensory deficits.

## 2018-12-08 NOTE — ED PROVIDER NOTE - PROGRESS NOTE DETAILS
Pt states he is feeling better. Continues on bipap. I was directly involved in the care of this patient. PA Fellow Es note/plan reviewed and agreed.

## 2018-12-08 NOTE — ED PROVIDER NOTE - NS ED ROS FT
Constitutional: (-) fever  Eyes/ENT: (-) blurry vision, (-) epistaxis  Cardiovascular: (-) chest pain, (-) syncope  Respiratory: (-) cough, (+) shortness of breath  Gastrointestinal: (-) vomiting, (-) diarrhea  Musculoskeletal: (-) neck pain, (-) back pain, (-) joint pain  Integumentary: (-) rash, (-) edema  Neurological: (-) headache, (-) altered mental status  Psychiatric: (-) hallucinations  Allergic/Immunologic: (-) pruritus

## 2018-12-08 NOTE — ED ADULT NURSE NOTE - NSIMPLEMENTINTERV_GEN_ALL_ED
Implemented All Universal Safety Interventions:  [place] to call system. Call bell, personal items and telephone within reach. Instruct patient to call for assistance. Room bathroom lighting operational. Non-slip footwear when patient is off stretcher. Physically safe environment: no spills, clutter or unnecessary equipment. Stretcher in lowest position, wheels locked, appropriate side rails in place.

## 2018-12-08 NOTE — H&P ADULT - ASSESSMENT
Assessment and Plan:   · Assessment	  76 y/o male admitted for COPD exacerbation  Patient admitted for dyspnea and acute on chronic resp failure/home O2/MICHAEL on CPAP - many readmission in the past for the same  Arrived to the floor awake and alert. on FIo2 100%, stating that he is hubgry and wants the bipap off asap. Care reviewed with PATRICIA Nunez and above note reviewed and edited prn    on ros now: feels well no fevers or chills. minimal cough. breathijng muh better on bipap. no chest pain    on exam now: joking around speakig full sentences. wants to come off bupap. lungs are clear BL. no tachcyardia. abd sont NT ND +BS with no gaurding or rebound. LE with no edema BLE and no calf vein tenderness of the BLE      # dyspnea suspected due to COPD exacerbation with acute on chronic hyeprcapneic respiratory failure. CXR with questionable PNA and an ER bbeedside uptrasound did not show B lines. EKG nonsichemnmic and no fever. troigger may have been that he did not use his cpap last night  - cont abx: levofloxacin  - cont nebs / steroids  - taper down rescure bipap as tolerated, check ABG on NC  - once titrated down on the rescue bipapa, then continue bipap at night time and prn during the daytime  - puilm cs: patient informs me he transferred his copd care to Dr Castrejon  - need to find out his usual device home settings  - check LE duplex  - add symbicort      # elevated creatinine, consistent with stable CKD III.   - renal restrict diet  - tells me he takes something nightly to lower potassium, he will confirm name of med with son., by review of prir record it wass kayexalate and will order for tionight pending confirmation      # DM2: with hyperglycemia  - diabetic diet  - cont home insulin dosing basal / bolus and monitor fs      # Coordiantion of care:  - VTE ppx with HSQ  - plan of care reviewed with patient and PA and respiratory therapist .          Problem/Plan - 1:  ·  Problem: COPD (chronic obstructive pulmonary disease).  Plan: -Acute on chronic resp failure/copd exacerbation/suspected gram negative PNA/on Home O2  -abx changed to oral as per ID   -on Nasal cannula O2  -Taper PO steroids    nebs, abx and pulmonary follow ups    #MICHAEL  -cpap at night - has machine at home; AVAP arrangements as per Pulmonary.      Problem/Plan - 2:  ·  Problem: Hypertension.  Plan: Stable on home meds.      Problem/Plan - 3:  ·  Problem: Diabetes mellitus, type 2.  Plan: -sq insulin coverage  -check finger sticks.      Problem/Plan - 4:  ·  Problem: Renal insufficiency.  Plan: -CKD stage III  -outpatient nephro follow up and kidney monitoring.      Problem/Plan - 5:  ·  Problem: Enlarged prostate.  Plan: -BPH; on flomax and finasteride.      Problem/Plan - 6:  Problem: Obesity. Plan: BMI > 39;   -weight loss and diet modification.     Problem/Plan - 7:  ·  Problem: Hyperkalemia.  Plan: -continue with laxatives.      Problem/Plan - 8:  ·  Problem: Constipation.  Plan: -on laxatives + stool softeners.      Problem/Plan - 9:  ·  Problem: B12 deficiency.  Plan: -suspected B12 Def; continue with home supplementation.      Problem/Plan - 10:  Problem: Vitamin D deficiency. Plan; -suspected Vitamin D def; continue with oral supplementations. Patient is an 78y Male with h/o COPD on home oxygen 5L, DM, MICHAEL, obesity, CKD 3, recently discharged on 11/15 after being admitted for acute on chronic hypoxic & Hypercapnic respiratory failure due to COPD exacerbation and gram negative pneumonia. He presented from home with complaints of Dyspnea. He explained that for the past 1 week his breathing has been getting worse gradually with pulse ox readings in the 80s whilst on oxygen with mildly productive cough, no fevers or chills. He was scheduled to see Pulmonologist Dr. Castrejon on Monday but due to difficulty breathing he was BIBA. Per ED physician, patient was tachypneic and cyanotic on arrival with oxygen saturation in the 70s with NC 5L. He was started on BIPAP with significant improvement.  He was admitted for further management.     Assessment and Plan:    1. Acute on Chronic Hypoxic respiratory failure:  Due to COPD exacerbation. IV steroid, Bronchodilators. Pulm consult.  Chest X-ray Bibasilar opacities. ? Pneumonia. Continue IV Levaquin.  Continue supplemental oxygen & BiPAP prn and QHS.      2. MICHAEL:  BiPAP at night.      3. Hypertension:  Stable on home medications.      4. Diabetes mellitus, type 2:  Hemoglobin A1C, Whole Blood: 8.7 % (11.05.18 @ 07:00)  Monitor FS with Insulin coverage.      5. JAMES on CKD stage III:  Avoid Nephrotoxins. Check Post void residual.  Monitor BMP and Electrolytes.      6. BPH:  Continue Flomax and Finasteride.       7. Obesity:  Weight loss counselling. Dietary and lifestyle modification.      8. h/o Hyperkalemia:  Potassium stable. Monitor Potassium level.      9. B12 deficiency & Vitamin D deficiency:  Continue with home supplementation.       DVT prophylaxis: Heparin

## 2018-12-08 NOTE — H&P ADULT - HISTORY OF PRESENT ILLNESS
Patient is an 78y Male with h/o COPD on home oxygen presents with SOB. Pt states he woke up with SOB from a supine position without his BiPap fitted properly. EMS gave him 3 treatments with minimal improvement. Pt states he has had COPD exacerbations that are relieved with BiPAP.78 yo male with PMH that includes Obesity, DM, COPD / MICHAEL on home o2 during ther day 4-6L and a night time device (?CPAP / bipap) and chronic daily po steroids, former smoker, CKD, who presented with dyspnea and back pain starting last night. he was treated with nebs and solumedrol by EMS and brought to the ER, where he was started on bipap for increased work of breathing. He had a negative CE and a negative BNP. screened out of ICU by intensivist and now admitted to the wards for ongoing managment Patient is an 78y Male with h/o COPD on home oxygen 5L, DM, MICHAEL, obesity, CKD 3, presented from home with complaints of Dyspnea. He explained that for the past 1 week his breathing has been getting worse gradually with pulse ox readings in the 80s whilst on oxygen. He was scheduled to see Pulmonologist Dr. Castrejon on Monday but due to difficulty breathing he was BIBA. Per ED physician, patient was tachypneic and cyanotic on arrival with oxygen saturation in the 70s with NC 5L. He was started on BIPAP with significant improvement.  He was admitted for further management.

## 2018-12-08 NOTE — ED PROVIDER NOTE - OBJECTIVE STATEMENT
78y M with PMH of COPD presents with SOB. Pt states he woke up with SOB from a supine position without his BiPap fitted properly. EMS gave him 3 treatments with minimal improvement. Pt states he has had COPD exacerbations that are relieved with BiPAP.

## 2018-12-08 NOTE — H&P ADULT - NSHPPHYSICALEXAM_GEN_ALL_CORE
VITALS:  T(F): 98 (12-08-18 @ 08:01), Max: 98 (12-08-18 @ 08:01)  HR: 123 (12-08-18 @ 09:26) (123 - 124)  BP: 119/56 (12-08-18 @ 09:26) (112/57 - 119/56)  RR: 20 (12-08-18 @ 09:26) (20 - 23)  SpO2: 98% (12-08-18 @ 09:26) (78% - 98%)    PHYSICAL EXAM:  GENERAL: NAD, well-groomed, well-developed  HEAD:  Atraumatic, Normocephalic  EYES: EOMI, PERRLA, conjunctiva and sclera clear  ENMT: No tonsillar erythema, exudates, or enlargement; Moist mucous membranes, Good dentition, No lesions  NECK: Supple, No JVD, Normal thyroid  NERVOUS SYSTEM:  Alert & Oriented X3, Good concentration; Motor Strength 5/5 B/L upper and lower extremities; DTRs 2+ intact and symmetric  CHEST/LUNG: Clear to percussion bilaterally; No rales, rhonchi, wheezing, or rubs  HEART: Regular rate and rhythm; No murmurs, rubs, or gallops  ABDOMEN: Soft, Nontender, Nondistended; Bowel sounds present  EXTREMITIES:  2+ Peripheral Pulses, No clubbing, cyanosis, or edema  LYMPH: No lymphadenopathy noted  SKIN: No rashes or lesions VITALS:  T(F): 98 (12-08-18 @ 08:01), Max: 98 (12-08-18 @ 08:01)  HR: 123 (12-08-18 @ 09:26) (123 - 124)  BP: 119/56 (12-08-18 @ 09:26) (112/57 - 119/56)  RR: 20 (12-08-18 @ 09:26) (20 - 23)  SpO2: 98% (12-08-18 @ 09:26) (78% - 98%)    PHYSICAL EXAM:  GENERAL: NAD  HEAD:  Atraumatic, Normocephalic  EYES: conjunctiva and sclera clear  ENMT: Moist mucous membranes  NECK: Supple, short obese neck, No JVD, Normal thyroid  NERVOUS SYSTEM:  Alert & Oriented X 3, Good concentration; Motor Strength 5/5 B/L upper and lower extremities  CHEST/LUNG: Decreased AE bilaterally at the bases; No rales, rhonchi, wheezing, or rubs  HEART: Regular rate and rhythm; No murmurs, rubs, or gallops  ABDOMEN: Soft, Nontender, ++ distended; Bowel sounds present  EXTREMITIES:  2+ Peripheral Pulses, No clubbing, cyanosis, or edema  LYMPH: No lymphadenopathy noted  SKIN: No rashes or lesions

## 2018-12-08 NOTE — ED PROVIDER NOTE - ATTENDING CONTRIBUTION TO CARE
Pt presents with shortness of breath, Hx of copd. Pt was recently here for same. hx of hypoxia and using home o2 and home bipap. On exam no distress, s1s2 rrr, lungs clear, some what decrease breath sound bilaterally. Obese abdomen.

## 2018-12-09 LAB
ALBUMIN SERPL ELPH-MCNC: 3.7 G/DL — SIGNIFICANT CHANGE UP (ref 3.5–5.2)
ALP SERPL-CCNC: 72 U/L — SIGNIFICANT CHANGE UP (ref 30–115)
ALT FLD-CCNC: 11 U/L — SIGNIFICANT CHANGE UP (ref 0–41)
ANION GAP SERPL CALC-SCNC: 15 MMOL/L — HIGH (ref 7–14)
ANION GAP SERPL CALC-SCNC: 15 MMOL/L — HIGH (ref 7–14)
AST SERPL-CCNC: 10 U/L — SIGNIFICANT CHANGE UP (ref 0–41)
BASOPHILS # BLD AUTO: 0.02 K/UL — SIGNIFICANT CHANGE UP (ref 0–0.2)
BASOPHILS NFR BLD AUTO: 0.2 % — SIGNIFICANT CHANGE UP (ref 0–1)
BILIRUB SERPL-MCNC: 0.4 MG/DL — SIGNIFICANT CHANGE UP (ref 0.2–1.2)
BUN SERPL-MCNC: 56 MG/DL — HIGH (ref 10–20)
BUN SERPL-MCNC: 60 MG/DL — HIGH (ref 10–20)
CALCIUM SERPL-MCNC: 9.5 MG/DL — SIGNIFICANT CHANGE UP (ref 8.5–10.1)
CALCIUM SERPL-MCNC: 9.8 MG/DL — SIGNIFICANT CHANGE UP (ref 8.5–10.1)
CHLORIDE SERPL-SCNC: 100 MMOL/L — SIGNIFICANT CHANGE UP (ref 98–110)
CHLORIDE SERPL-SCNC: 100 MMOL/L — SIGNIFICANT CHANGE UP (ref 98–110)
CO2 SERPL-SCNC: 22 MMOL/L — SIGNIFICANT CHANGE UP (ref 17–32)
CO2 SERPL-SCNC: 22 MMOL/L — SIGNIFICANT CHANGE UP (ref 17–32)
CREAT SERPL-MCNC: 1.9 MG/DL — HIGH (ref 0.7–1.5)
CREAT SERPL-MCNC: 2 MG/DL — HIGH (ref 0.7–1.5)
EOSINOPHIL # BLD AUTO: 0 K/UL — SIGNIFICANT CHANGE UP (ref 0–0.7)
EOSINOPHIL NFR BLD AUTO: 0 % — SIGNIFICANT CHANGE UP (ref 0–8)
GLUCOSE BLDC GLUCOMTR-MCNC: 298 MG/DL — HIGH (ref 70–99)
GLUCOSE BLDC GLUCOMTR-MCNC: 326 MG/DL — HIGH (ref 70–99)
GLUCOSE BLDC GLUCOMTR-MCNC: 364 MG/DL — HIGH (ref 70–99)
GLUCOSE BLDC GLUCOMTR-MCNC: 370 MG/DL — HIGH (ref 70–99)
GLUCOSE BLDC GLUCOMTR-MCNC: 416 MG/DL — HIGH (ref 70–99)
GLUCOSE BLDC GLUCOMTR-MCNC: 471 MG/DL — CRITICAL HIGH (ref 70–99)
GLUCOSE SERPL-MCNC: 293 MG/DL — HIGH (ref 70–99)
GLUCOSE SERPL-MCNC: 436 MG/DL — HIGH (ref 70–99)
HCT VFR BLD CALC: 41.3 % — LOW (ref 42–52)
HGB BLD-MCNC: 12.7 G/DL — LOW (ref 14–18)
IMM GRANULOCYTES NFR BLD AUTO: 1 % — HIGH (ref 0.1–0.3)
LYMPHOCYTES # BLD AUTO: 0.46 K/UL — LOW (ref 1.2–3.4)
LYMPHOCYTES # BLD AUTO: 3.6 % — LOW (ref 20.5–51.1)
MAGNESIUM SERPL-MCNC: 2.1 MG/DL — SIGNIFICANT CHANGE UP (ref 1.8–2.4)
MCHC RBC-ENTMCNC: 23.7 PG — LOW (ref 27–31)
MCHC RBC-ENTMCNC: 30.8 G/DL — LOW (ref 32–37)
MCV RBC AUTO: 77.1 FL — LOW (ref 80–94)
MONOCYTES # BLD AUTO: 0.74 K/UL — HIGH (ref 0.1–0.6)
MONOCYTES NFR BLD AUTO: 5.8 % — SIGNIFICANT CHANGE UP (ref 1.7–9.3)
NEUTROPHILS # BLD AUTO: 11.47 K/UL — HIGH (ref 1.4–6.5)
NEUTROPHILS NFR BLD AUTO: 89.4 % — HIGH (ref 42.2–75.2)
NRBC # BLD: 0 /100 WBCS — SIGNIFICANT CHANGE UP (ref 0–0)
PHOSPHATE SERPL-MCNC: 4.4 MG/DL — SIGNIFICANT CHANGE UP (ref 2.1–4.9)
PLATELET # BLD AUTO: 570 K/UL — HIGH (ref 130–400)
POTASSIUM SERPL-MCNC: 6.2 MMOL/L — CRITICAL HIGH (ref 3.5–5)
POTASSIUM SERPL-MCNC: 6.5 MMOL/L — CRITICAL HIGH (ref 3.5–5)
POTASSIUM SERPL-SCNC: 6.2 MMOL/L — CRITICAL HIGH (ref 3.5–5)
POTASSIUM SERPL-SCNC: 6.5 MMOL/L — CRITICAL HIGH (ref 3.5–5)
PROT SERPL-MCNC: 6.2 G/DL — SIGNIFICANT CHANGE UP (ref 6–8)
RBC # BLD: 5.36 M/UL — SIGNIFICANT CHANGE UP (ref 4.7–6.1)
RBC # FLD: 18.3 % — HIGH (ref 11.5–14.5)
SODIUM SERPL-SCNC: 137 MMOL/L — SIGNIFICANT CHANGE UP (ref 135–146)
SODIUM SERPL-SCNC: 137 MMOL/L — SIGNIFICANT CHANGE UP (ref 135–146)
WBC # BLD: 12.82 K/UL — HIGH (ref 4.8–10.8)
WBC # FLD AUTO: 12.82 K/UL — HIGH (ref 4.8–10.8)

## 2018-12-09 RX ORDER — INSULIN GLARGINE 100 [IU]/ML
40 INJECTION, SOLUTION SUBCUTANEOUS AT BEDTIME
Qty: 0 | Refills: 0 | Status: DISCONTINUED | OUTPATIENT
Start: 2018-12-09 | End: 2018-12-09

## 2018-12-09 RX ORDER — DIPHENHYDRAMINE HCL 50 MG
50 CAPSULE ORAL ONCE
Qty: 0 | Refills: 0 | Status: COMPLETED | OUTPATIENT
Start: 2018-12-09 | End: 2018-12-09

## 2018-12-09 RX ORDER — DEXTROSE 50 % IN WATER 50 %
25 SYRINGE (ML) INTRAVENOUS ONCE
Qty: 0 | Refills: 0 | Status: COMPLETED | OUTPATIENT
Start: 2018-12-09 | End: 2018-12-09

## 2018-12-09 RX ORDER — SENNA PLUS 8.6 MG/1
2 TABLET ORAL AT BEDTIME
Qty: 0 | Refills: 0 | Status: DISCONTINUED | OUTPATIENT
Start: 2018-12-09 | End: 2019-01-03

## 2018-12-09 RX ORDER — SODIUM CHLORIDE 9 MG/ML
1000 INJECTION INTRAMUSCULAR; INTRAVENOUS; SUBCUTANEOUS
Qty: 0 | Refills: 0 | Status: DISCONTINUED | OUTPATIENT
Start: 2018-12-09 | End: 2018-12-10

## 2018-12-09 RX ORDER — IOHEXOL 300 MG/ML
30 INJECTION, SOLUTION INTRAVENOUS ONCE
Qty: 0 | Refills: 0 | Status: COMPLETED | OUTPATIENT
Start: 2018-12-09 | End: 2018-12-09

## 2018-12-09 RX ORDER — SODIUM POLYSTYRENE SULFONATE 4.1 MEQ/G
30 POWDER, FOR SUSPENSION ORAL ONCE
Qty: 0 | Refills: 0 | Status: COMPLETED | OUTPATIENT
Start: 2018-12-09 | End: 2018-12-09

## 2018-12-09 RX ORDER — DIPHENHYDRAMINE HCL 50 MG
50 CAPSULE ORAL ONCE
Qty: 0 | Refills: 0 | Status: DISCONTINUED | OUTPATIENT
Start: 2018-12-09 | End: 2018-12-09

## 2018-12-09 RX ORDER — SODIUM POLYSTYRENE SULFONATE 4.1 MEQ/G
15 POWDER, FOR SUSPENSION ORAL ONCE
Qty: 0 | Refills: 0 | Status: COMPLETED | OUTPATIENT
Start: 2018-12-09 | End: 2018-12-09

## 2018-12-09 RX ORDER — INSULIN GLARGINE 100 [IU]/ML
45 INJECTION, SOLUTION SUBCUTANEOUS AT BEDTIME
Qty: 0 | Refills: 0 | Status: DISCONTINUED | OUTPATIENT
Start: 2018-12-09 | End: 2018-12-11

## 2018-12-09 RX ORDER — INSULIN HUMAN 100 [IU]/ML
10 INJECTION, SOLUTION SUBCUTANEOUS ONCE
Qty: 0 | Refills: 0 | Status: COMPLETED | OUTPATIENT
Start: 2018-12-09 | End: 2018-12-09

## 2018-12-09 RX ORDER — INSULIN LISPRO 100/ML
15 VIAL (ML) SUBCUTANEOUS
Qty: 0 | Refills: 0 | Status: DISCONTINUED | OUTPATIENT
Start: 2018-12-09 | End: 2018-12-14

## 2018-12-09 RX ORDER — LACTULOSE 10 G/15ML
30 SOLUTION ORAL THREE TIMES A DAY
Qty: 0 | Refills: 0 | Status: DISCONTINUED | OUTPATIENT
Start: 2018-12-09 | End: 2018-12-25

## 2018-12-09 RX ORDER — POLYETHYLENE GLYCOL 3350 17 G/17G
17 POWDER, FOR SOLUTION ORAL
Qty: 0 | Refills: 0 | Status: DISCONTINUED | OUTPATIENT
Start: 2018-12-09 | End: 2019-01-03

## 2018-12-09 RX ADMIN — BUDESONIDE AND FORMOTEROL FUMARATE DIHYDRATE 2 PUFF(S): 160; 4.5 AEROSOL RESPIRATORY (INHALATION) at 21:10

## 2018-12-09 RX ADMIN — BUDESONIDE AND FORMOTEROL FUMARATE DIHYDRATE 2 PUFF(S): 160; 4.5 AEROSOL RESPIRATORY (INHALATION) at 08:15

## 2018-12-09 RX ADMIN — ATORVASTATIN CALCIUM 20 MILLIGRAM(S): 80 TABLET, FILM COATED ORAL at 21:11

## 2018-12-09 RX ADMIN — Medication 3 MILLILITER(S): at 12:12

## 2018-12-09 RX ADMIN — Medication 60 MILLIGRAM(S): at 05:56

## 2018-12-09 RX ADMIN — Medication 25 MILLIGRAM(S): at 05:57

## 2018-12-09 RX ADMIN — Medication 8: at 20:44

## 2018-12-09 RX ADMIN — SENNA PLUS 2 TABLET(S): 8.6 TABLET ORAL at 21:11

## 2018-12-09 RX ADMIN — POLYETHYLENE GLYCOL 3350 17 GRAM(S): 17 POWDER, FOR SOLUTION ORAL at 12:13

## 2018-12-09 RX ADMIN — Medication 1 TABLET(S): at 12:12

## 2018-12-09 RX ADMIN — AMLODIPINE BESYLATE 10 MILLIGRAM(S): 2.5 TABLET ORAL at 05:57

## 2018-12-09 RX ADMIN — SODIUM POLYSTYRENE SULFONATE 30 GRAM(S): 4.1 POWDER, FOR SUSPENSION ORAL at 15:48

## 2018-12-09 RX ADMIN — FINASTERIDE 5 MILLIGRAM(S): 5 TABLET, FILM COATED ORAL at 12:12

## 2018-12-09 RX ADMIN — PANTOPRAZOLE SODIUM 40 MILLIGRAM(S): 20 TABLET, DELAYED RELEASE ORAL at 05:57

## 2018-12-09 RX ADMIN — Medication 10 UNIT(S): at 17:06

## 2018-12-09 RX ADMIN — Medication 3 MILLILITER(S): at 19:33

## 2018-12-09 RX ADMIN — Medication 3 MILLILITER(S): at 15:55

## 2018-12-09 RX ADMIN — IOHEXOL 30 MILLILITER(S): 300 INJECTION, SOLUTION INTRAVENOUS at 09:38

## 2018-12-09 RX ADMIN — Medication 6: at 08:13

## 2018-12-09 RX ADMIN — Medication 1000 UNIT(S): at 12:12

## 2018-12-09 RX ADMIN — Medication 50 MILLIGRAM(S): at 09:56

## 2018-12-09 RX ADMIN — TAMSULOSIN HYDROCHLORIDE 0.4 MILLIGRAM(S): 0.4 CAPSULE ORAL at 21:10

## 2018-12-09 RX ADMIN — INSULIN HUMAN 10 UNIT(S): 100 INJECTION, SOLUTION SUBCUTANEOUS at 13:21

## 2018-12-09 RX ADMIN — Medication 60 MILLIGRAM(S): at 17:07

## 2018-12-09 RX ADMIN — Medication 3 MILLILITER(S): at 07:31

## 2018-12-09 RX ADMIN — Medication 60 MILLIGRAM(S): at 09:32

## 2018-12-09 RX ADMIN — SODIUM POLYSTYRENE SULFONATE 15 GRAM(S): 4.1 POWDER, FOR SUSPENSION ORAL at 21:06

## 2018-12-09 RX ADMIN — TIOTROPIUM BROMIDE 1 CAPSULE(S): 18 CAPSULE ORAL; RESPIRATORY (INHALATION) at 08:16

## 2018-12-09 RX ADMIN — Medication 10 UNIT(S): at 08:12

## 2018-12-09 RX ADMIN — HEPARIN SODIUM 5000 UNIT(S): 5000 INJECTION INTRAVENOUS; SUBCUTANEOUS at 17:07

## 2018-12-09 RX ADMIN — HEPARIN SODIUM 5000 UNIT(S): 5000 INJECTION INTRAVENOUS; SUBCUTANEOUS at 05:57

## 2018-12-09 RX ADMIN — POLYETHYLENE GLYCOL 3350 17 GRAM(S): 17 POWDER, FOR SOLUTION ORAL at 17:08

## 2018-12-09 RX ADMIN — PREGABALIN 1000 MICROGRAM(S): 225 CAPSULE ORAL at 12:12

## 2018-12-09 RX ADMIN — INSULIN GLARGINE 35 UNIT(S): 100 INJECTION, SOLUTION SUBCUTANEOUS at 20:39

## 2018-12-09 RX ADMIN — Medication 100 MILLIGRAM(S): at 12:12

## 2018-12-09 RX ADMIN — Medication 25 MILLILITER(S): at 13:22

## 2018-12-09 RX ADMIN — Medication 8: at 17:06

## 2018-12-09 NOTE — PROGRESS NOTE ADULT - ASSESSMENT
Patient is an 78y Male with h/o COPD on home oxygen 5L, DM, MICHAEL, obesity, CKD 3, recently discharged on 11/15 after being admitted for acute on chronic hypoxic & Hypercapnic respiratory failure due to COPD exacerbation and gram negative pneumonia. He presented from home with complaints of Dyspnea. He explained that for the past 1 week his breathing has been getting worse gradually with pulse ox readings in the 80s whilst on oxygen with mildly productive cough, no fevers or chills. He was scheduled to see Pulmonologist Dr. Castrejon on Monday but due to difficulty breathing he was BIBA. Per ED physician, patient was tachypneic and cyanotic on arrival with oxygen saturation in the 70s with NC 5L. He was started on BIPAP with significant improvement.  He was admitted for further management.     Assessment and Plan:    1. Acute on Chronic Hypoxic respiratory failure:  Due to COPD exacerbation. IV steroids, Bronchodilators.   Pulm consulted: appreciate input. Recommendations noted.  Chest X-ray Bibasilar opacities. ? Pneumonia. Continue IV Levaquin.  Continue supplemental oxygen & BiPAP prn and QHS. Maintain sats > 90%      2. Hyperkalemia:  s/p Insulin and Dextrose. s/p Kayexalate 30gm x 2.   Continue Laxatives for bowel movements.      3. Hypertension:  Stable on home medications.      4. Diabetes mellitus, type 2:  Hemoglobin A1C, Whole Blood: 8.7 % (11.05.18 @ 07:00)  Monitor FS with Insulin coverage.      5. JAMES on CKD stage III:  Avoid Nephrotoxins. Check Post void residual.  Monitor BMP and Electrolytes.  CT abdomen: No hydronephrosis bilaterally or obstructing calculus.  Gentle IV Fluids for 12 hours. Nephrology consult.       6. BPH:  Continue Flomax and Finasteride.       7. Obesity:  Weight loss counselling. Dietary and lifestyle modification.      8. B12 deficiency & Vitamin D deficiency:  Continue with home supplementation.       9. MICHAEL:  BiPAP at night.      10. Abdominal distention:  Obstruction ruled out.      DVT prophylaxis: Heparin

## 2018-12-10 LAB
ALBUMIN SERPL ELPH-MCNC: 3.8 G/DL — SIGNIFICANT CHANGE UP (ref 3.5–5.2)
ALP SERPL-CCNC: 73 U/L — SIGNIFICANT CHANGE UP (ref 30–115)
ALT FLD-CCNC: 14 U/L — SIGNIFICANT CHANGE UP (ref 0–41)
ANION GAP SERPL CALC-SCNC: 16 MMOL/L — HIGH (ref 7–14)
AST SERPL-CCNC: 11 U/L — SIGNIFICANT CHANGE UP (ref 0–41)
BASOPHILS # BLD AUTO: 0.02 K/UL — SIGNIFICANT CHANGE UP (ref 0–0.2)
BASOPHILS NFR BLD AUTO: 0.1 % — SIGNIFICANT CHANGE UP (ref 0–1)
BILIRUB SERPL-MCNC: 0.3 MG/DL — SIGNIFICANT CHANGE UP (ref 0.2–1.2)
BUN SERPL-MCNC: 63 MG/DL — CRITICAL HIGH (ref 10–20)
CALCIUM SERPL-MCNC: 9.6 MG/DL — SIGNIFICANT CHANGE UP (ref 8.5–10.1)
CHLORIDE SERPL-SCNC: 99 MMOL/L — SIGNIFICANT CHANGE UP (ref 98–110)
CO2 SERPL-SCNC: 24 MMOL/L — SIGNIFICANT CHANGE UP (ref 17–32)
CREAT SERPL-MCNC: 2.1 MG/DL — HIGH (ref 0.7–1.5)
EOSINOPHIL # BLD AUTO: 0 K/UL — SIGNIFICANT CHANGE UP (ref 0–0.7)
EOSINOPHIL NFR BLD AUTO: 0 % — SIGNIFICANT CHANGE UP (ref 0–8)
GLUCOSE BLDC GLUCOMTR-MCNC: 222 MG/DL — HIGH (ref 70–99)
GLUCOSE BLDC GLUCOMTR-MCNC: 238 MG/DL — HIGH (ref 70–99)
GLUCOSE BLDC GLUCOMTR-MCNC: 271 MG/DL — HIGH (ref 70–99)
GLUCOSE BLDC GLUCOMTR-MCNC: 309 MG/DL — HIGH (ref 70–99)
GLUCOSE BLDC GLUCOMTR-MCNC: 395 MG/DL — HIGH (ref 70–99)
GLUCOSE BLDC GLUCOMTR-MCNC: 403 MG/DL — HIGH (ref 70–99)
GLUCOSE SERPL-MCNC: 358 MG/DL — HIGH (ref 70–99)
HCT VFR BLD CALC: 42.1 % — SIGNIFICANT CHANGE UP (ref 42–52)
HGB BLD-MCNC: 12.9 G/DL — LOW (ref 14–18)
IMM GRANULOCYTES NFR BLD AUTO: 1.1 % — HIGH (ref 0.1–0.3)
LYMPHOCYTES # BLD AUTO: 0.55 K/UL — LOW (ref 1.2–3.4)
LYMPHOCYTES # BLD AUTO: 3 % — LOW (ref 20.5–51.1)
MAGNESIUM SERPL-MCNC: 2.2 MG/DL — SIGNIFICANT CHANGE UP (ref 1.8–2.4)
MCHC RBC-ENTMCNC: 23.7 PG — LOW (ref 27–31)
MCHC RBC-ENTMCNC: 30.6 G/DL — LOW (ref 32–37)
MCV RBC AUTO: 77.2 FL — LOW (ref 80–94)
MONOCYTES # BLD AUTO: 1.15 K/UL — HIGH (ref 0.1–0.6)
MONOCYTES NFR BLD AUTO: 6.2 % — SIGNIFICANT CHANGE UP (ref 1.7–9.3)
NEUTROPHILS # BLD AUTO: 16.57 K/UL — HIGH (ref 1.4–6.5)
NEUTROPHILS NFR BLD AUTO: 89.6 % — HIGH (ref 42.2–75.2)
NRBC # BLD: 0 /100 WBCS — SIGNIFICANT CHANGE UP (ref 0–0)
PHOSPHATE SERPL-MCNC: 5.6 MG/DL — HIGH (ref 2.1–4.9)
PLATELET # BLD AUTO: 606 K/UL — HIGH (ref 130–400)
POTASSIUM SERPL-MCNC: 6.1 MMOL/L — CRITICAL HIGH (ref 3.5–5)
POTASSIUM SERPL-SCNC: 6.1 MMOL/L — CRITICAL HIGH (ref 3.5–5)
PROT SERPL-MCNC: 6.4 G/DL — SIGNIFICANT CHANGE UP (ref 6–8)
RBC # BLD: 5.45 M/UL — SIGNIFICANT CHANGE UP (ref 4.7–6.1)
RBC # FLD: 18.4 % — HIGH (ref 11.5–14.5)
SODIUM SERPL-SCNC: 139 MMOL/L — SIGNIFICANT CHANGE UP (ref 135–146)
URATE SERPL-MCNC: 9.8 MG/DL — HIGH (ref 3.4–8.8)
WBC # BLD: 18.5 K/UL — HIGH (ref 4.8–10.8)
WBC # FLD AUTO: 18.5 K/UL — HIGH (ref 4.8–10.8)

## 2018-12-10 RX ORDER — INSULIN LISPRO 100/ML
5 VIAL (ML) SUBCUTANEOUS ONCE
Qty: 0 | Refills: 0 | Status: COMPLETED | OUTPATIENT
Start: 2018-12-10 | End: 2018-12-10

## 2018-12-10 RX ORDER — SODIUM POLYSTYRENE SULFONATE 4.1 MEQ/G
30 POWDER, FOR SUSPENSION ORAL ONCE
Qty: 0 | Refills: 0 | Status: COMPLETED | OUTPATIENT
Start: 2018-12-10 | End: 2018-12-10

## 2018-12-10 RX ADMIN — Medication 3 MILLILITER(S): at 12:58

## 2018-12-10 RX ADMIN — LACTULOSE 30 GRAM(S): 10 SOLUTION ORAL at 06:45

## 2018-12-10 RX ADMIN — TIOTROPIUM BROMIDE 1 CAPSULE(S): 18 CAPSULE ORAL; RESPIRATORY (INHALATION) at 08:26

## 2018-12-10 RX ADMIN — HEPARIN SODIUM 5000 UNIT(S): 5000 INJECTION INTRAVENOUS; SUBCUTANEOUS at 06:43

## 2018-12-10 RX ADMIN — Medication 8: at 08:25

## 2018-12-10 RX ADMIN — TAMSULOSIN HYDROCHLORIDE 0.4 MILLIGRAM(S): 0.4 CAPSULE ORAL at 21:26

## 2018-12-10 RX ADMIN — Medication 60 MILLIGRAM(S): at 17:17

## 2018-12-10 RX ADMIN — Medication 4: at 11:52

## 2018-12-10 RX ADMIN — LACTULOSE 30 GRAM(S): 10 SOLUTION ORAL at 00:23

## 2018-12-10 RX ADMIN — Medication 3 MILLILITER(S): at 05:39

## 2018-12-10 RX ADMIN — POLYETHYLENE GLYCOL 3350 17 GRAM(S): 17 POWDER, FOR SOLUTION ORAL at 06:44

## 2018-12-10 RX ADMIN — AMLODIPINE BESYLATE 10 MILLIGRAM(S): 2.5 TABLET ORAL at 06:44

## 2018-12-10 RX ADMIN — POLYETHYLENE GLYCOL 3350 17 GRAM(S): 17 POWDER, FOR SOLUTION ORAL at 11:15

## 2018-12-10 RX ADMIN — LACTULOSE 30 GRAM(S): 10 SOLUTION ORAL at 13:47

## 2018-12-10 RX ADMIN — Medication 3 MILLILITER(S): at 07:43

## 2018-12-10 RX ADMIN — Medication 1 TABLET(S): at 11:16

## 2018-12-10 RX ADMIN — SENNA PLUS 2 TABLET(S): 8.6 TABLET ORAL at 21:26

## 2018-12-10 RX ADMIN — POLYETHYLENE GLYCOL 3350 17 GRAM(S): 17 POWDER, FOR SOLUTION ORAL at 17:15

## 2018-12-10 RX ADMIN — Medication 1000 UNIT(S): at 11:16

## 2018-12-10 RX ADMIN — INSULIN GLARGINE 45 UNIT(S): 100 INJECTION, SOLUTION SUBCUTANEOUS at 21:25

## 2018-12-10 RX ADMIN — Medication 3 MILLILITER(S): at 00:13

## 2018-12-10 RX ADMIN — Medication 15 UNIT(S): at 11:52

## 2018-12-10 RX ADMIN — FINASTERIDE 5 MILLIGRAM(S): 5 TABLET, FILM COATED ORAL at 11:15

## 2018-12-10 RX ADMIN — BUDESONIDE AND FORMOTEROL FUMARATE DIHYDRATE 2 PUFF(S): 160; 4.5 AEROSOL RESPIRATORY (INHALATION) at 21:27

## 2018-12-10 RX ADMIN — SODIUM POLYSTYRENE SULFONATE 30 GRAM(S): 4.1 POWDER, FOR SUSPENSION ORAL at 13:46

## 2018-12-10 RX ADMIN — Medication 3 MILLILITER(S): at 20:44

## 2018-12-10 RX ADMIN — BUDESONIDE AND FORMOTEROL FUMARATE DIHYDRATE 2 PUFF(S): 160; 4.5 AEROSOL RESPIRATORY (INHALATION) at 08:26

## 2018-12-10 RX ADMIN — Medication 6: at 17:14

## 2018-12-10 RX ADMIN — Medication 5 UNIT(S): at 06:46

## 2018-12-10 RX ADMIN — Medication 5 UNIT(S): at 05:08

## 2018-12-10 RX ADMIN — Medication 3 MILLILITER(S): at 16:16

## 2018-12-10 RX ADMIN — Medication 60 MILLIGRAM(S): at 06:45

## 2018-12-10 RX ADMIN — Medication 15 UNIT(S): at 08:24

## 2018-12-10 RX ADMIN — Medication 100 MILLIGRAM(S): at 11:15

## 2018-12-10 RX ADMIN — Medication 15 UNIT(S): at 17:13

## 2018-12-10 RX ADMIN — PREGABALIN 1000 MICROGRAM(S): 225 CAPSULE ORAL at 11:15

## 2018-12-10 RX ADMIN — Medication 25 MILLIGRAM(S): at 06:44

## 2018-12-10 RX ADMIN — PANTOPRAZOLE SODIUM 40 MILLIGRAM(S): 20 TABLET, DELAYED RELEASE ORAL at 06:44

## 2018-12-10 RX ADMIN — ATORVASTATIN CALCIUM 20 MILLIGRAM(S): 80 TABLET, FILM COATED ORAL at 21:26

## 2018-12-10 RX ADMIN — HEPARIN SODIUM 5000 UNIT(S): 5000 INJECTION INTRAVENOUS; SUBCUTANEOUS at 17:17

## 2018-12-10 NOTE — PROGRESS NOTE ADULT - ASSESSMENT
Patient is an 78y Male with h/o COPD on home oxygen 5L, DM, MICHAEL, obesity, CKD 3, recently discharged on 11/15 after being admitted for acute on chronic hypoxic & Hypercapnic respiratory failure due to COPD exacerbation and gram negative pneumonia. He presented from home with complaints of Dyspnea. He explained that for the past 1 week his breathing has been getting worse gradually with pulse ox readings in the 80s whilst on oxygen with mildly productive cough, no fevers or chills. He was scheduled to see Pulmonologist Dr. Castrejon on Monday but due to difficulty breathing he was BIBA. Per ED physician, patient was tachypneic and cyanotic on arrival with oxygen saturation in the 70s with NC 5L. He was started on BIPAP with significant improvement.  He was admitted for further management.     Assessment and Plan:    1. Acute on Chronic Hypoxic respiratory failure:  Due to COPD exacerbation. IV steroids, Bronchodilators.   Pulm consulted: appreciate input. Recommendations noted.  Chest X-ray Bibasilar opacities. ? Pneumonia. Continue IV Levaquin.   Continue supplemental oxygen & BiPAP prn and QHS. Maintain sats > 90%  Transition off HFNC as tolerated.      2. Hyperkalemia:  s/p Insulin and Dextrose. s/p Kayexalate 30gm x 2.   Continue Laxatives for bowel movements.  Nephrology consulted: due to JAMES, RTA type 4?, high K foods?  Obtain Urine sodium, potassium, creatinine for FE of potassium. Low K diet  If persistent, patient will probably need Kayexalate 30 grams twice a week      3. Hypertension:  Stable on home medications.      4. Diabetes mellitus, type 2:  Hemoglobin A1C, Whole Blood: 8.7 % (11.05.18 @ 07:00)  Monitor FS with Insulin coverage.      5. JAMES on CKD stage III:  Avoid Nephrotoxins. Check Post void residual.  Monitor BMP and Electrolytes.  CT abdomen: No hydronephrosis bilaterally or obstructing calculus.  Multiple calculi on the left - obtain phos, iPTH, uric acid level      6. BPH:  Continue Flomax and Finasteride.       7. Obesity:  Weight loss counselling. Dietary and lifestyle modification.      8. B12 deficiency & Vitamin D deficiency:  Continue with home supplementation.       9. MICHAEL:  BiPAP at night.      10. Abdominal distention:  Obstruction ruled out.  Continue bowel regimen.        DVT prophylaxis: Heparin

## 2018-12-11 LAB
ANION GAP SERPL CALC-SCNC: 7 MMOL/L — SIGNIFICANT CHANGE UP (ref 7–14)
BASOPHILS # BLD AUTO: 0.02 K/UL — SIGNIFICANT CHANGE UP (ref 0–0.2)
BASOPHILS NFR BLD AUTO: 0.1 % — SIGNIFICANT CHANGE UP (ref 0–1)
BUN SERPL-MCNC: 55 MG/DL — HIGH (ref 10–20)
CALCIUM SERPL-MCNC: 9.2 MG/DL — SIGNIFICANT CHANGE UP (ref 8.5–10.1)
CHLORIDE SERPL-SCNC: 102 MMOL/L — SIGNIFICANT CHANGE UP (ref 98–110)
CO2 SERPL-SCNC: 33 MMOL/L — HIGH (ref 17–32)
CREAT ?TM UR-MCNC: 102 MG/DL — SIGNIFICANT CHANGE UP
CREAT SERPL-MCNC: 1.8 MG/DL — HIGH (ref 0.7–1.5)
EOSINOPHIL # BLD AUTO: 0 K/UL — SIGNIFICANT CHANGE UP (ref 0–0.7)
EOSINOPHIL NFR BLD AUTO: 0 % — SIGNIFICANT CHANGE UP (ref 0–8)
GLUCOSE BLDC GLUCOMTR-MCNC: 164 MG/DL — HIGH (ref 70–99)
GLUCOSE BLDC GLUCOMTR-MCNC: 167 MG/DL — HIGH (ref 70–99)
GLUCOSE BLDC GLUCOMTR-MCNC: 226 MG/DL — HIGH (ref 70–99)
GLUCOSE BLDC GLUCOMTR-MCNC: 367 MG/DL — HIGH (ref 70–99)
GLUCOSE BLDC GLUCOMTR-MCNC: 399 MG/DL — HIGH (ref 70–99)
GLUCOSE SERPL-MCNC: 157 MG/DL — HIGH (ref 70–99)
HCT VFR BLD CALC: 43 % — SIGNIFICANT CHANGE UP (ref 42–52)
HGB BLD-MCNC: 12.8 G/DL — LOW (ref 14–18)
IMM GRANULOCYTES NFR BLD AUTO: 2.1 % — HIGH (ref 0.1–0.3)
LYMPHOCYTES # BLD AUTO: 0.64 K/UL — LOW (ref 1.2–3.4)
LYMPHOCYTES # BLD AUTO: 3.9 % — LOW (ref 20.5–51.1)
MCHC RBC-ENTMCNC: 23.5 PG — LOW (ref 27–31)
MCHC RBC-ENTMCNC: 29.8 G/DL — LOW (ref 32–37)
MCV RBC AUTO: 79 FL — LOW (ref 80–94)
MONOCYTES # BLD AUTO: 1.13 K/UL — HIGH (ref 0.1–0.6)
MONOCYTES NFR BLD AUTO: 6.8 % — SIGNIFICANT CHANGE UP (ref 1.7–9.3)
NEUTROPHILS # BLD AUTO: 14.43 K/UL — HIGH (ref 1.4–6.5)
NEUTROPHILS NFR BLD AUTO: 87.1 % — HIGH (ref 42.2–75.2)
NRBC # BLD: 0 /100 WBCS — SIGNIFICANT CHANGE UP (ref 0–0)
PHOSPHATE SERPL-MCNC: 4.8 MG/DL — SIGNIFICANT CHANGE UP (ref 2.1–4.9)
PLATELET # BLD AUTO: 587 K/UL — HIGH (ref 130–400)
POTASSIUM SERPL-MCNC: 5.9 MMOL/L — HIGH (ref 3.5–5)
POTASSIUM SERPL-SCNC: 5.9 MMOL/L — HIGH (ref 3.5–5)
POTASSIUM UR-SCNC: 28 MMOL/L — SIGNIFICANT CHANGE UP
PTH-INTACT FLD-MCNC: 56 PG/ML — SIGNIFICANT CHANGE UP (ref 15–65)
RBC # BLD: 5.44 M/UL — SIGNIFICANT CHANGE UP (ref 4.7–6.1)
RBC # FLD: 18.4 % — HIGH (ref 11.5–14.5)
SODIUM SERPL-SCNC: 142 MMOL/L — SIGNIFICANT CHANGE UP (ref 135–146)
SODIUM UR-SCNC: 67 MMOL/L — SIGNIFICANT CHANGE UP
WBC # BLD: 16.57 K/UL — HIGH (ref 4.8–10.8)
WBC # FLD AUTO: 16.57 K/UL — HIGH (ref 4.8–10.8)

## 2018-12-11 RX ORDER — SODIUM POLYSTYRENE SULFONATE 4.1 MEQ/G
30 POWDER, FOR SUSPENSION ORAL
Qty: 0 | Refills: 0 | Status: DISCONTINUED | OUTPATIENT
Start: 2018-12-13 | End: 2019-01-03

## 2018-12-11 RX ORDER — SODIUM POLYSTYRENE SULFONATE 4.1 MEQ/G
30 POWDER, FOR SUSPENSION ORAL ONCE
Qty: 0 | Refills: 0 | Status: COMPLETED | OUTPATIENT
Start: 2018-12-11 | End: 2018-12-11

## 2018-12-11 RX ORDER — INSULIN GLARGINE 100 [IU]/ML
50 INJECTION, SOLUTION SUBCUTANEOUS AT BEDTIME
Qty: 0 | Refills: 0 | Status: DISCONTINUED | OUTPATIENT
Start: 2018-12-11 | End: 2018-12-28

## 2018-12-11 RX ADMIN — Medication 15 UNIT(S): at 12:38

## 2018-12-11 RX ADMIN — ATORVASTATIN CALCIUM 20 MILLIGRAM(S): 80 TABLET, FILM COATED ORAL at 22:00

## 2018-12-11 RX ADMIN — Medication 3 MILLILITER(S): at 13:41

## 2018-12-11 RX ADMIN — Medication 60 MILLIGRAM(S): at 06:17

## 2018-12-11 RX ADMIN — Medication 60 MILLIGRAM(S): at 17:05

## 2018-12-11 RX ADMIN — HEPARIN SODIUM 5000 UNIT(S): 5000 INJECTION INTRAVENOUS; SUBCUTANEOUS at 06:18

## 2018-12-11 RX ADMIN — Medication 2: at 08:40

## 2018-12-11 RX ADMIN — SODIUM POLYSTYRENE SULFONATE 30 GRAM(S): 4.1 POWDER, FOR SUSPENSION ORAL at 12:39

## 2018-12-11 RX ADMIN — LACTULOSE 30 GRAM(S): 10 SOLUTION ORAL at 22:01

## 2018-12-11 RX ADMIN — Medication 3 MILLILITER(S): at 19:27

## 2018-12-11 RX ADMIN — POLYETHYLENE GLYCOL 3350 17 GRAM(S): 17 POWDER, FOR SOLUTION ORAL at 17:04

## 2018-12-11 RX ADMIN — TIOTROPIUM BROMIDE 1 CAPSULE(S): 18 CAPSULE ORAL; RESPIRATORY (INHALATION) at 08:22

## 2018-12-11 RX ADMIN — Medication 15 UNIT(S): at 08:40

## 2018-12-11 RX ADMIN — TAMSULOSIN HYDROCHLORIDE 0.4 MILLIGRAM(S): 0.4 CAPSULE ORAL at 22:00

## 2018-12-11 RX ADMIN — Medication 15 UNIT(S): at 17:03

## 2018-12-11 RX ADMIN — Medication 3 MILLILITER(S): at 00:47

## 2018-12-11 RX ADMIN — SENNA PLUS 2 TABLET(S): 8.6 TABLET ORAL at 22:00

## 2018-12-11 RX ADMIN — Medication 25 MILLIGRAM(S): at 06:18

## 2018-12-11 RX ADMIN — BUDESONIDE AND FORMOTEROL FUMARATE DIHYDRATE 2 PUFF(S): 160; 4.5 AEROSOL RESPIRATORY (INHALATION) at 08:21

## 2018-12-11 RX ADMIN — AMLODIPINE BESYLATE 10 MILLIGRAM(S): 2.5 TABLET ORAL at 06:18

## 2018-12-11 RX ADMIN — INSULIN GLARGINE 50 UNIT(S): 100 INJECTION, SOLUTION SUBCUTANEOUS at 22:00

## 2018-12-11 RX ADMIN — PREGABALIN 1000 MICROGRAM(S): 225 CAPSULE ORAL at 11:03

## 2018-12-11 RX ADMIN — FINASTERIDE 5 MILLIGRAM(S): 5 TABLET, FILM COATED ORAL at 11:03

## 2018-12-11 RX ADMIN — Medication 4: at 12:38

## 2018-12-11 RX ADMIN — POLYETHYLENE GLYCOL 3350 17 GRAM(S): 17 POWDER, FOR SOLUTION ORAL at 11:02

## 2018-12-11 RX ADMIN — Medication 2: at 17:03

## 2018-12-11 RX ADMIN — Medication 3 MILLILITER(S): at 08:21

## 2018-12-11 RX ADMIN — PANTOPRAZOLE SODIUM 40 MILLIGRAM(S): 20 TABLET, DELAYED RELEASE ORAL at 06:18

## 2018-12-11 RX ADMIN — POLYETHYLENE GLYCOL 3350 17 GRAM(S): 17 POWDER, FOR SOLUTION ORAL at 06:17

## 2018-12-11 RX ADMIN — HEPARIN SODIUM 5000 UNIT(S): 5000 INJECTION INTRAVENOUS; SUBCUTANEOUS at 17:04

## 2018-12-11 RX ADMIN — Medication 3 MILLILITER(S): at 23:42

## 2018-12-11 RX ADMIN — Medication 100 MILLIGRAM(S): at 11:03

## 2018-12-11 RX ADMIN — Medication 1000 UNIT(S): at 11:03

## 2018-12-11 RX ADMIN — LACTULOSE 30 GRAM(S): 10 SOLUTION ORAL at 13:14

## 2018-12-11 RX ADMIN — BUDESONIDE AND FORMOTEROL FUMARATE DIHYDRATE 2 PUFF(S): 160; 4.5 AEROSOL RESPIRATORY (INHALATION) at 22:04

## 2018-12-11 RX ADMIN — Medication 1 TABLET(S): at 11:02

## 2018-12-11 NOTE — PHYSICAL THERAPY INITIAL EVALUATION ADULT - GAIT DEVIATIONS NOTED, PT EVAL
decreased step length/increased time in double stance/decreased matthew/decreased weight-shifting ability

## 2018-12-11 NOTE — PROGRESS NOTE ADULT - ASSESSMENT
Patient is an 78y Male with h/o COPD on home oxygen 5L, DM, MICHAEL, obesity, CKD 3, chronic hyperkalemia.    1. Acute on Chronic Hypoxic respiratory failure:  Due to COPD exacerbation. IV steroids, Bronchodilators.   Chest X-ray Bibasilar opacities. ? Pneumonia. Continue IV Levaquin.  Continue supplemental oxygen & BiPAP prn and QHS. Maintain sats > 90%    2. Hyperkalemia: Seen by nutrition - diet is still not with Potassium restriction  - please change diet to low K carb consistent  - kayaxalate 30 gr po TTS (3x week)   s/p Insulin and Dextrose. s/p Kayexalate 30gm x 2.   Please obtain nutrition consult to educate on low potassium diet  - obtain Urine sodium, potassium ,creat for FE of potassium    3. Hypertension:  Stable on home medications.      4. Diabetes mellitus, type 2:  Hemoglobin A1C, Whole Blood: 8.7 % (11.05.18 @ 07:00)  Monitor FS with Insulin coverage.  Needs tight glucose control    5. JAMES on CKD stage III: creat is trending down  Check Post void residual.  Obtain UA, urine protein/creat ratio likely underlying diabetic nephropathy  CT abdomen: No hydronephrosis bilaterally or obstructing calculus.  Multiple calculi on the left - obtain phos, iPTH, uric acid level    6. BPH:  Continue Flomax and Finasteride.     7. Obesity/SA  Weight loss counselling. Dietary and lifestyle modification.  on BiPAP at night

## 2018-12-11 NOTE — PHYSICAL THERAPY INITIAL EVALUATION ADULT - IMPAIRMENTS FOUND, PT EVAL
gait, locomotion, and balance/muscle strength/posture/aerobic capacity/endurance/ergonomics and body mechanics

## 2018-12-11 NOTE — PROGRESS NOTE ADULT - ASSESSMENT
Patient is an 78y Male with h/o COPD on home oxygen 5L, DM, MICHAEL, obesity, CKD 3, recently discharged on 11/15 after being admitted for acute on chronic hypoxic & Hypercapnic respiratory failure due to COPD exacerbation and gram negative pneumonia. He presented from home with complaints of Dyspnea. He explained that for the past 1 week his breathing has been getting worse gradually with pulse ox readings in the 80s whilst on oxygen with mildly productive cough, no fevers or chills. He was scheduled to see Pulmonologist Dr. Castrejon on Monday but due to difficulty breathing he was BIBA. Per ED physician, patient was tachypneic and cyanotic on arrival with oxygen saturation in the 70s with NC 5L. He was started on BIPAP with significant improvement.  He was admitted for further management.     Assessment and Plan:    1. Acute on Chronic Hypoxic respiratory failure:  Due to COPD exacerbation. IV steroids, Bronchodilators.   Pulm consulted: appreciate input. Recommendations noted. Follow up needed.  Chest X-ray Bibasilar opacities. ? Pneumonia. Continue IV Levaquin.   Continue supplemental oxygen & BiPAP prn and QHS. Maintain sats > 90%  Transition off HFNC as tolerated.      2. Hyperkalemia:  s/p Insulin and Dextrose. s/p Kayexalate 30gm x 3.   Continue Laxatives for bowel movements.  Nephrology consulted: due to JAMES, RTA type 4?, high K foods?  Urine sodium, potassium, creatinine for FE of potassium still pending. Low K diet  Kayaxalate 30 gr po TTS (3x week)       3. Hypertension:  Stable on home medications.      4. Diabetes mellitus, type 2:  Hemoglobin A1C, Whole Blood: 8.7 % (11.05.18 @ 07:00)  Monitor FS with Insulin coverage.      5. JAMES on CKD stage III:  Avoid Nephrotoxins. Check Post void residual.  Monitor BMP and Electrolytes.  CT abdomen: No hydronephrosis bilaterally or obstructing calculus.  Multiple calculi on the left - obtain phos, iPTH, uric acid level      6. BPH:  Continue Flomax and Finasteride.       7. Obesity:  Weight loss counselling. Dietary and lifestyle modification.      8. B12 deficiency & Vitamin D deficiency:  Continue with home supplementation.       9. MICHAEL:  BiPAP at night.      10. Abdominal distention:  Obstruction ruled out.  Continue bowel regimen.      11. Suspected Vitamin B12 and D deficiency:  Continue supplementation.      DVT prophylaxis: Heparin

## 2018-12-11 NOTE — PHYSICAL THERAPY INITIAL EVALUATION ADULT - GENERAL OBSERVATIONS, REHAB EVAL
15:20 - 15:40.  Chart reviewed. Patient available to be seen for physical therapy, confirmed with nurse. Patient encountered already out of bed in chair, +high flow O2.  Patient denies pain at rest, would like to walk with PT now.

## 2018-12-12 LAB
ALBUMIN SERPL ELPH-MCNC: 3.7 G/DL — SIGNIFICANT CHANGE UP (ref 3.5–5.2)
ALP SERPL-CCNC: 66 U/L — SIGNIFICANT CHANGE UP (ref 30–115)
ALT FLD-CCNC: 21 U/L — SIGNIFICANT CHANGE UP (ref 0–41)
ANION GAP SERPL CALC-SCNC: 11 MMOL/L — SIGNIFICANT CHANGE UP (ref 7–14)
AST SERPL-CCNC: 12 U/L — SIGNIFICANT CHANGE UP (ref 0–41)
BASOPHILS # BLD AUTO: 0.02 K/UL — SIGNIFICANT CHANGE UP (ref 0–0.2)
BASOPHILS NFR BLD AUTO: 0.1 % — SIGNIFICANT CHANGE UP (ref 0–1)
BILIRUB SERPL-MCNC: 0.4 MG/DL — SIGNIFICANT CHANGE UP (ref 0.2–1.2)
BUN SERPL-MCNC: 45 MG/DL — HIGH (ref 10–20)
CALCIUM SERPL-MCNC: 9.5 MG/DL — SIGNIFICANT CHANGE UP (ref 8.5–10.1)
CHLORIDE SERPL-SCNC: 101 MMOL/L — SIGNIFICANT CHANGE UP (ref 98–110)
CO2 SERPL-SCNC: 31 MMOL/L — SIGNIFICANT CHANGE UP (ref 17–32)
CREAT SERPL-MCNC: 1.9 MG/DL — HIGH (ref 0.7–1.5)
EOSINOPHIL # BLD AUTO: 0.01 K/UL — SIGNIFICANT CHANGE UP (ref 0–0.7)
EOSINOPHIL NFR BLD AUTO: 0.1 % — SIGNIFICANT CHANGE UP (ref 0–8)
GLUCOSE BLDC GLUCOMTR-MCNC: 145 MG/DL — HIGH (ref 70–99)
GLUCOSE BLDC GLUCOMTR-MCNC: 188 MG/DL — HIGH (ref 70–99)
GLUCOSE BLDC GLUCOMTR-MCNC: 209 MG/DL — HIGH (ref 70–99)
GLUCOSE BLDC GLUCOMTR-MCNC: 269 MG/DL — HIGH (ref 70–99)
GLUCOSE SERPL-MCNC: 203 MG/DL — HIGH (ref 70–99)
HCT VFR BLD CALC: 46.9 % — SIGNIFICANT CHANGE UP (ref 42–52)
HGB BLD-MCNC: 13.9 G/DL — LOW (ref 14–18)
IMM GRANULOCYTES NFR BLD AUTO: 2 % — HIGH (ref 0.1–0.3)
LYMPHOCYTES # BLD AUTO: 0.39 K/UL — LOW (ref 1.2–3.4)
LYMPHOCYTES # BLD AUTO: 2.7 % — LOW (ref 20.5–51.1)
MCHC RBC-ENTMCNC: 23.4 PG — LOW (ref 27–31)
MCHC RBC-ENTMCNC: 29.6 G/DL — LOW (ref 32–37)
MCV RBC AUTO: 79 FL — LOW (ref 80–94)
MONOCYTES # BLD AUTO: 0.54 K/UL — SIGNIFICANT CHANGE UP (ref 0.1–0.6)
MONOCYTES NFR BLD AUTO: 3.8 % — SIGNIFICANT CHANGE UP (ref 1.7–9.3)
NEUTROPHILS # BLD AUTO: 13.11 K/UL — HIGH (ref 1.4–6.5)
NEUTROPHILS NFR BLD AUTO: 91.3 % — HIGH (ref 42.2–75.2)
NRBC # BLD: 0 /100 WBCS — SIGNIFICANT CHANGE UP (ref 0–0)
PLATELET # BLD AUTO: 618 K/UL — HIGH (ref 130–400)
POTASSIUM SERPL-MCNC: 5.3 MMOL/L — HIGH (ref 3.5–5)
POTASSIUM SERPL-SCNC: 5.3 MMOL/L — HIGH (ref 3.5–5)
PROT SERPL-MCNC: 6.4 G/DL — SIGNIFICANT CHANGE UP (ref 6–8)
RBC # BLD: 5.94 M/UL — SIGNIFICANT CHANGE UP (ref 4.7–6.1)
RBC # FLD: 18.7 % — HIGH (ref 11.5–14.5)
SODIUM SERPL-SCNC: 143 MMOL/L — SIGNIFICANT CHANGE UP (ref 135–146)
WBC # BLD: 14.36 K/UL — HIGH (ref 4.8–10.8)
WBC # FLD AUTO: 14.36 K/UL — HIGH (ref 4.8–10.8)

## 2018-12-12 RX ADMIN — FINASTERIDE 5 MILLIGRAM(S): 5 TABLET, FILM COATED ORAL at 11:37

## 2018-12-12 RX ADMIN — LACTULOSE 30 GRAM(S): 10 SOLUTION ORAL at 05:32

## 2018-12-12 RX ADMIN — HEPARIN SODIUM 5000 UNIT(S): 5000 INJECTION INTRAVENOUS; SUBCUTANEOUS at 17:09

## 2018-12-12 RX ADMIN — BUDESONIDE AND FORMOTEROL FUMARATE DIHYDRATE 2 PUFF(S): 160; 4.5 AEROSOL RESPIRATORY (INHALATION) at 21:44

## 2018-12-12 RX ADMIN — BUDESONIDE AND FORMOTEROL FUMARATE DIHYDRATE 2 PUFF(S): 160; 4.5 AEROSOL RESPIRATORY (INHALATION) at 08:13

## 2018-12-12 RX ADMIN — LACTULOSE 30 GRAM(S): 10 SOLUTION ORAL at 13:44

## 2018-12-12 RX ADMIN — HEPARIN SODIUM 5000 UNIT(S): 5000 INJECTION INTRAVENOUS; SUBCUTANEOUS at 05:31

## 2018-12-12 RX ADMIN — POLYETHYLENE GLYCOL 3350 17 GRAM(S): 17 POWDER, FOR SOLUTION ORAL at 05:30

## 2018-12-12 RX ADMIN — Medication 3 MILLILITER(S): at 09:22

## 2018-12-12 RX ADMIN — SENNA PLUS 2 TABLET(S): 8.6 TABLET ORAL at 21:48

## 2018-12-12 RX ADMIN — Medication 4: at 08:13

## 2018-12-12 RX ADMIN — INSULIN GLARGINE 50 UNIT(S): 100 INJECTION, SOLUTION SUBCUTANEOUS at 21:46

## 2018-12-12 RX ADMIN — PANTOPRAZOLE SODIUM 40 MILLIGRAM(S): 20 TABLET, DELAYED RELEASE ORAL at 05:35

## 2018-12-12 RX ADMIN — Medication 1 TABLET(S): at 11:37

## 2018-12-12 RX ADMIN — Medication 2: at 17:08

## 2018-12-12 RX ADMIN — Medication 1 DROP(S): at 21:47

## 2018-12-12 RX ADMIN — Medication 15 UNIT(S): at 17:08

## 2018-12-12 RX ADMIN — Medication 3 MILLILITER(S): at 16:42

## 2018-12-12 RX ADMIN — LACTULOSE 30 GRAM(S): 10 SOLUTION ORAL at 21:49

## 2018-12-12 RX ADMIN — PREGABALIN 1000 MICROGRAM(S): 225 CAPSULE ORAL at 11:37

## 2018-12-12 RX ADMIN — Medication 1 DROP(S): at 13:44

## 2018-12-12 RX ADMIN — AMLODIPINE BESYLATE 10 MILLIGRAM(S): 2.5 TABLET ORAL at 05:30

## 2018-12-12 RX ADMIN — TAMSULOSIN HYDROCHLORIDE 0.4 MILLIGRAM(S): 0.4 CAPSULE ORAL at 21:45

## 2018-12-12 RX ADMIN — Medication 100 MILLIGRAM(S): at 11:37

## 2018-12-12 RX ADMIN — Medication 25 MILLIGRAM(S): at 05:30

## 2018-12-12 RX ADMIN — Medication 60 MILLIGRAM(S): at 17:10

## 2018-12-12 RX ADMIN — POLYETHYLENE GLYCOL 3350 17 GRAM(S): 17 POWDER, FOR SOLUTION ORAL at 17:09

## 2018-12-12 RX ADMIN — Medication 60 MILLIGRAM(S): at 05:30

## 2018-12-12 RX ADMIN — Medication 1000 UNIT(S): at 11:37

## 2018-12-12 RX ADMIN — TIOTROPIUM BROMIDE 1 CAPSULE(S): 18 CAPSULE ORAL; RESPIRATORY (INHALATION) at 08:14

## 2018-12-12 RX ADMIN — Medication 3 MILLILITER(S): at 13:16

## 2018-12-12 RX ADMIN — ATORVASTATIN CALCIUM 20 MILLIGRAM(S): 80 TABLET, FILM COATED ORAL at 21:45

## 2018-12-12 RX ADMIN — Medication 3 MILLILITER(S): at 19:22

## 2018-12-12 RX ADMIN — Medication 15 UNIT(S): at 08:13

## 2018-12-12 RX ADMIN — Medication 15 UNIT(S): at 12:15

## 2018-12-12 RX ADMIN — Medication 6: at 12:15

## 2018-12-12 NOTE — PROGRESS NOTE ADULT - ASSESSMENT
Patient is an 78y Male with h/o COPD on home oxygen 5L, DM, MICHAEL, obesity, CKD 3, chronic hyperkalemia.    1. Acute on Chronic Hypoxic respiratory failure:  Due to COPD exacerbation. IV steroids  Chest X-ray Bibasilar opacities. ? Pneumonia. Continue IV Levaquin.  Continue supplemental oxygen & BiPAP prn and QHS. Maintain sats > 90%    2. Hyperkalemia: Seen by nutrition - diet is still not with Potassium restriction  - please change diet to low K carb consistent  - kayaxalate 30 gr po TTS (3x week)   s/p Insulin and Dextrose. s/p Kayexalate 30gm x 2.   Please obtain nutrition consult to educate on low potassium diet  -  Urine sodium, potassium - noted, K is not high c/w low K intake    3. Hypertension:  Stable on home medications.      4. Diabetes mellitus, type 2:  Hemoglobin A1C, Whole Blood: 8.7 % (11.05.18 @ 07:00)  Monitor FS with Insulin coverage.  Needs tight glucose control    5. JAMES on CKD stage III: creat is trending down  Check Post void residual.  Obtain UA, urine protein/creat ratio likely underlying diabetic nephropathy  CT abdomen: No hydronephrosis bilaterally or obstructing calculus.  Multiple calculi on the left - obtain phos, iPTH, uric acid level

## 2018-12-12 NOTE — PROGRESS NOTE ADULT - ASSESSMENT
Impression:  Acute chronic COPD with exacerbation  No Impending respiratory failure      Check O2 sat on high flow  try to wean off high flow and  maintain sats > 90%  Continue IV solumedrol  bronchodilator treatments ATC and PRN  NIPPV as needed  OOB to chair  GI and DVT prophylaxis  pulmonary toilet  Monitor clinically, convert to oral prednisone as clinical improvement allows    overall prognosis is very poor

## 2018-12-12 NOTE — PROGRESS NOTE ADULT - ASSESSMENT
Patient is an 78y Male with h/o COPD on home oxygen 5L, DM, MICHAEL, obesity, CKD 3, recently discharged on 11/15 after being admitted for acute on chronic hypoxic & Hypercapnic respiratory failure due to COPD exacerbation and gram negative pneumonia. He presented from home with complaints of Dyspnea. He explained that for the past 1 week his breathing has been getting worse gradually with pulse ox readings in the 80s whilst on oxygen with mildly productive cough, no fevers or chills. He was scheduled to see Pulmonologist Dr. Castrejon on Monday but due to difficulty breathing he was BIBA. Per ED physician, patient was tachypneic and cyanotic on arrival with oxygen saturation in the 70s with NC 5L. He was started on BIPAP with significant improvement.  He was admitted for further management.     Assessment and Plan:    1. Acute on Chronic Hypoxic respiratory failure due to COPD exacerbation.  Continue IV steroids, will start taper  Bronchodilators.   Pulm consulted: appreciate input. Recommendations noted.   Chest X-ray Bibasilar opacities. Continue IV Levaquin.   Continue supplemental oxygen & BiPAP prn and QHS. Maintain sats > 90%  Transition off HFNC as tolerated.      2. Hyperkalemia:  s/p Insulin and Dextrose.  Kayexalate 30gm x 3/week   Continue Laxatives for bowel movements.  Nephrology consulted: due to JAMES, RTA type 4?, high K foods?  Low K diet  Dietary consult      3. Hypertension:  Stable on home medications.      4. Diabetes mellitus, type 2:  Hemoglobin A1C, Whole Blood: 8.7 % (11.05.18 @ 07:00)  Monitor FS with Insulin coverage.      5. JAMES on CKD stage III:  Avoid Nephrotoxins. Check Post void residual.  Monitor BMP and Electrolytes.  CT abdomen: No hydronephrosis bilaterally or obstructing calculus.  Multiple calculi on the left - obtain phos, iPTH, uric acid level      6. BPH:  Continue Flomax and Finasteride.       7. Obesity:  Dietary and lifestyle modification.      8. B12 deficiency & Vitamin D deficiency:  Continue with home supplementation.       9. MICHAEL:  BiPAP at night.      DVT prophylaxis: Heparin

## 2018-12-13 DIAGNOSIS — E66.9 OBESITY, UNSPECIFIED: ICD-10-CM

## 2018-12-13 LAB
ANION GAP SERPL CALC-SCNC: 9 MMOL/L — SIGNIFICANT CHANGE UP (ref 7–14)
BUN SERPL-MCNC: 46 MG/DL — HIGH (ref 10–20)
CALCIUM SERPL-MCNC: 8.9 MG/DL — SIGNIFICANT CHANGE UP (ref 8.5–10.1)
CHLORIDE SERPL-SCNC: 101 MMOL/L — SIGNIFICANT CHANGE UP (ref 98–110)
CO2 SERPL-SCNC: 32 MMOL/L — SIGNIFICANT CHANGE UP (ref 17–32)
CREAT SERPL-MCNC: 1.5 MG/DL — SIGNIFICANT CHANGE UP (ref 0.7–1.5)
GLUCOSE BLDC GLUCOMTR-MCNC: 200 MG/DL — HIGH (ref 70–99)
GLUCOSE BLDC GLUCOMTR-MCNC: 224 MG/DL — HIGH (ref 70–99)
GLUCOSE BLDC GLUCOMTR-MCNC: 311 MG/DL — HIGH (ref 70–99)
GLUCOSE BLDC GLUCOMTR-MCNC: 381 MG/DL — HIGH (ref 70–99)
GLUCOSE SERPL-MCNC: 196 MG/DL — HIGH (ref 70–99)
HCT VFR BLD CALC: 43.5 % — SIGNIFICANT CHANGE UP (ref 42–52)
HGB BLD-MCNC: 12.9 G/DL — LOW (ref 14–18)
MCHC RBC-ENTMCNC: 23.5 PG — LOW (ref 27–31)
MCHC RBC-ENTMCNC: 29.7 G/DL — LOW (ref 32–37)
MCV RBC AUTO: 79.4 FL — LOW (ref 80–94)
NRBC # BLD: 0 /100 WBCS — SIGNIFICANT CHANGE UP (ref 0–0)
PLATELET # BLD AUTO: 542 K/UL — HIGH (ref 130–400)
POTASSIUM SERPL-MCNC: 5.5 MMOL/L — HIGH (ref 3.5–5)
POTASSIUM SERPL-SCNC: 5.5 MMOL/L — HIGH (ref 3.5–5)
RBC # BLD: 5.48 M/UL — SIGNIFICANT CHANGE UP (ref 4.7–6.1)
RBC # FLD: 18.1 % — HIGH (ref 11.5–14.5)
SODIUM SERPL-SCNC: 142 MMOL/L — SIGNIFICANT CHANGE UP (ref 135–146)
WBC # BLD: 13.77 K/UL — HIGH (ref 4.8–10.8)
WBC # FLD AUTO: 13.77 K/UL — HIGH (ref 4.8–10.8)

## 2018-12-13 RX ORDER — ALLOPURINOL 300 MG
100 TABLET ORAL DAILY
Qty: 0 | Refills: 0 | Status: DISCONTINUED | OUTPATIENT
Start: 2018-12-13 | End: 2018-12-23

## 2018-12-13 RX ADMIN — PANTOPRAZOLE SODIUM 40 MILLIGRAM(S): 20 TABLET, DELAYED RELEASE ORAL at 06:44

## 2018-12-13 RX ADMIN — Medication 3 MILLILITER(S): at 00:45

## 2018-12-13 RX ADMIN — PREGABALIN 1000 MICROGRAM(S): 225 CAPSULE ORAL at 11:04

## 2018-12-13 RX ADMIN — Medication 60 MILLIGRAM(S): at 17:10

## 2018-12-13 RX ADMIN — HEPARIN SODIUM 5000 UNIT(S): 5000 INJECTION INTRAVENOUS; SUBCUTANEOUS at 17:10

## 2018-12-13 RX ADMIN — Medication 3 MILLILITER(S): at 19:35

## 2018-12-13 RX ADMIN — LACTULOSE 30 GRAM(S): 10 SOLUTION ORAL at 14:11

## 2018-12-13 RX ADMIN — Medication 8: at 17:09

## 2018-12-13 RX ADMIN — BUDESONIDE AND FORMOTEROL FUMARATE DIHYDRATE 2 PUFF(S): 160; 4.5 AEROSOL RESPIRATORY (INHALATION) at 07:56

## 2018-12-13 RX ADMIN — BUDESONIDE AND FORMOTEROL FUMARATE DIHYDRATE 2 PUFF(S): 160; 4.5 AEROSOL RESPIRATORY (INHALATION) at 20:16

## 2018-12-13 RX ADMIN — Medication 1 DROP(S): at 21:51

## 2018-12-13 RX ADMIN — Medication 25 MILLIGRAM(S): at 06:44

## 2018-12-13 RX ADMIN — SODIUM POLYSTYRENE SULFONATE 30 GRAM(S): 4.1 POWDER, FOR SUSPENSION ORAL at 11:03

## 2018-12-13 RX ADMIN — POLYETHYLENE GLYCOL 3350 17 GRAM(S): 17 POWDER, FOR SOLUTION ORAL at 17:11

## 2018-12-13 RX ADMIN — TIOTROPIUM BROMIDE 1 CAPSULE(S): 18 CAPSULE ORAL; RESPIRATORY (INHALATION) at 07:56

## 2018-12-13 RX ADMIN — Medication 1000 UNIT(S): at 11:04

## 2018-12-13 RX ADMIN — Medication 3 MILLILITER(S): at 15:58

## 2018-12-13 RX ADMIN — Medication 100 MILLIGRAM(S): at 11:03

## 2018-12-13 RX ADMIN — HEPARIN SODIUM 5000 UNIT(S): 5000 INJECTION INTRAVENOUS; SUBCUTANEOUS at 06:43

## 2018-12-13 RX ADMIN — POLYETHYLENE GLYCOL 3350 17 GRAM(S): 17 POWDER, FOR SOLUTION ORAL at 06:43

## 2018-12-13 RX ADMIN — Medication 3 MILLILITER(S): at 08:11

## 2018-12-13 RX ADMIN — Medication 1 TABLET(S): at 11:04

## 2018-12-13 RX ADMIN — Medication 3 MILLILITER(S): at 13:10

## 2018-12-13 RX ADMIN — Medication 10: at 12:02

## 2018-12-13 RX ADMIN — FINASTERIDE 5 MILLIGRAM(S): 5 TABLET, FILM COATED ORAL at 11:03

## 2018-12-13 RX ADMIN — Medication 15 UNIT(S): at 12:02

## 2018-12-13 RX ADMIN — SENNA PLUS 2 TABLET(S): 8.6 TABLET ORAL at 21:50

## 2018-12-13 RX ADMIN — AMLODIPINE BESYLATE 10 MILLIGRAM(S): 2.5 TABLET ORAL at 06:43

## 2018-12-13 RX ADMIN — ATORVASTATIN CALCIUM 20 MILLIGRAM(S): 80 TABLET, FILM COATED ORAL at 21:51

## 2018-12-13 RX ADMIN — TAMSULOSIN HYDROCHLORIDE 0.4 MILLIGRAM(S): 0.4 CAPSULE ORAL at 21:50

## 2018-12-13 RX ADMIN — Medication 1 DROP(S): at 06:44

## 2018-12-13 RX ADMIN — Medication 2: at 07:56

## 2018-12-13 RX ADMIN — Medication 60 MILLIGRAM(S): at 06:41

## 2018-12-13 RX ADMIN — INSULIN GLARGINE 50 UNIT(S): 100 INJECTION, SOLUTION SUBCUTANEOUS at 21:51

## 2018-12-13 RX ADMIN — LACTULOSE 30 GRAM(S): 10 SOLUTION ORAL at 21:52

## 2018-12-13 RX ADMIN — Medication 15 UNIT(S): at 17:09

## 2018-12-13 RX ADMIN — Medication 1 DROP(S): at 14:11

## 2018-12-13 RX ADMIN — LACTULOSE 30 GRAM(S): 10 SOLUTION ORAL at 06:42

## 2018-12-13 RX ADMIN — Medication 15 UNIT(S): at 07:56

## 2018-12-13 NOTE — DIETITIAN INITIAL EVALUATION ADULT. - NS AS NUTRI INTERV ED CONTENT
Reviewed a carb con, heart healthy, renal diet and why he was placed on the diet. A list of low, med, and high K+ foods was provided.

## 2018-12-13 NOTE — PROGRESS NOTE ADULT - ASSESSMENT
Patient is an 78y Male with h/o COPD on home oxygen 5L, DM, MICHAEL, obesity, CKD 3, recently discharged on 11/15 after being admitted for acute on chronic hypoxic & Hypercapnic respiratory failure due to COPD exacerbation and gram negative pneumonia. He presented from home with complaints of Dyspnea. He explained that for the past 1 week his breathing has been getting worse gradually with pulse ox readings in the 80s whilst on oxygen with mildly productive cough, no fevers or chills. He was scheduled to see Pulmonologist Dr. Castrejon on Monday but due to difficulty breathing he was BIBA. Per ED physician, patient was tachypneic and cyanotic on arrival with oxygen saturation in the 70s with NC 5L. He was started on BIPAP with significant improvement.  He was admitted for further management.     Assessment and Plan:    1. Acute on Chronic Hypoxic respiratory failure due to COPD exacerbation.  Continue IV steroids, will switch to PO tomorrow  Bronchodilators.   Pulm consulted: appreciate input. Recommendations noted.   Chest X-ray Bibasilar opacities. Continue IV Levaquin.   Continue supplemental oxygen & BiPAP prn and QHS. Maintain sats > 90%  Transition off HFNC as tolerated.  Ambulate as tolerated.    2. Hyperkalemia:  improving  s/p Insulin and Dextrose.  Kayexalate 30gm x 3/week   Continue Laxatives for bowel movements.  Nephrology consulted: due to JAMES, RTA type 4?, high K foods?  Low K diet  Dietary consult appreciated      3. Hypertension:  Stable on home medications.      4. Diabetes mellitus, type 2:  Hemoglobin A1C, Whole Blood: 8.7 % (11.05.18 @ 07:00)  Monitor FS with Insulin coverage.      5. JAMES on CKD stage III:  Avoid Nephrotoxins. Check Post void residual.  Monitor BMP and Electrolytes.  CT abdomen: No hydronephrosis bilaterally or obstructing calculus.  Multiple calculi on the left - obtain phos, iPTH, uric acid level      6. BPH:  Continue Flomax and Finasteride.       7. Obesity with comorbidities:  Dietary and lifestyle modification.      8. B12 deficiency & Vitamin D deficiency:  Continue with home supplementation.       9. MICHAEL:  BiPAP at night.    DVT prophylaxis: Heparin    Pt will need STR, ideally pulmonary rehab.

## 2018-12-13 NOTE — DIETITIAN INITIAL EVALUATION ADULT. - OTHER INFO
Reason for assessment: Consult for low K+ education. PMH: colon polyp (claims it was malignant), COPD, DM, emphysema lung, enlarged prostate, GERD, high cholesterol, HTN, oxygen dependent, MICHAEL, CKD lll. Pt presented to ED for dyspnea. Pt is admitted for acute on chronic hypoxic resp failure due to COPD exacerbation. Hyperkalemia (5.5), DM, CKD lll (creat is stable), B12 and vit D deficiency, MICHAEL on bipap noted. Pt denies N/V/D. Pt reports he has a problem w/ constipation but not now. Last BM was today 12/13. Pt reports that when he eats a dry piece of Italian bread it gets stuck in his chest. Other then that he reports tolerating a regular diet consistency. Pt is allergic to fish and shellfish.

## 2018-12-13 NOTE — DIETITIAN INITIAL EVALUATION ADULT. - PERTINENT MEDS FT
lantus, humalog, lactulose syrup, miralax, senna, atorvastatin, cholecalciferol, cyanocobalamin, docusate sodium, MVI, pantoprazole

## 2018-12-13 NOTE — PROGRESS NOTE ADULT - ASSESSMENT
Patient is an 78y Male with h/o COPD on home oxygen 5L, DM, MICHAEL, obesity, CKD 3, chronic hyperkalemia.    1. Acute on Chronic Hypoxic respiratory failure:  Due to COPD exacerbation. IV steroids  Chest X-ray Bibasilar opacities. ? Pneumonia. Continue IV Levaquin.  Continue supplemental oxygen & BiPAP prn and QHS. Maintain sats > 90%    2. Hyperkalemia:     - kayaxalate 30 gr po TTS (3x week)   - K is trending down  - pt needs the low K diet for home and continuation of Kayaxalate 3x week  - needs renal f/u as outpt    3. Hypertension:  Stable on home medications.    4. Diabetes mellitus, type 2:  Hemoglobin A1C, Whole Blood: 8.7 % (11.05.18 @ 07:00)  Needs tight glucose control    5. JAMES on CKD stage III: creat is stable  Check Post void residual.  Obtain UA, urine protein/creat ratio likely underlying diabetic nephropathy  CT abdomen: No hydronephrosis bilaterally or obstructing calculus.  Multiple calculi on the left -  iPTH -56,     6. Uric acid level 9.8 - start Allopurinol 100 mg po qd    Will follow

## 2018-12-13 NOTE — DIETITIAN INITIAL EVALUATION ADULT. - ENERGY NEEDS
Calories: 2002-2184kcals/day (MSJ A.F 1.1-1.2) lower A.F due to obesity   Protein: 70-84g/day (1-1.2g/IBW) Monitor renal function   Fluid: 1:1ml/kcal or fluid as per LIP

## 2018-12-14 LAB
ANION GAP SERPL CALC-SCNC: 11 MMOL/L — SIGNIFICANT CHANGE UP (ref 7–14)
BUN SERPL-MCNC: 46 MG/DL — HIGH (ref 10–20)
CALCIUM SERPL-MCNC: 9.1 MG/DL — SIGNIFICANT CHANGE UP (ref 8.5–10.1)
CALCIUM SERPL-MCNC: 9.6 MG/DL — SIGNIFICANT CHANGE UP (ref 8.4–10.5)
CHLORIDE SERPL-SCNC: 99 MMOL/L — SIGNIFICANT CHANGE UP (ref 98–110)
CO2 SERPL-SCNC: 30 MMOL/L — SIGNIFICANT CHANGE UP (ref 17–32)
CREAT SERPL-MCNC: 1.7 MG/DL — HIGH (ref 0.7–1.5)
GLUCOSE BLDC GLUCOMTR-MCNC: 169 MG/DL — HIGH (ref 70–99)
GLUCOSE BLDC GLUCOMTR-MCNC: 216 MG/DL — HIGH (ref 70–99)
GLUCOSE BLDC GLUCOMTR-MCNC: 284 MG/DL — HIGH (ref 70–99)
GLUCOSE BLDC GLUCOMTR-MCNC: 287 MG/DL — HIGH (ref 70–99)
GLUCOSE BLDC GLUCOMTR-MCNC: 371 MG/DL — HIGH (ref 70–99)
GLUCOSE SERPL-MCNC: 250 MG/DL — HIGH (ref 70–99)
HCT VFR BLD CALC: 43.9 % — SIGNIFICANT CHANGE UP (ref 42–52)
HGB BLD-MCNC: 13 G/DL — LOW (ref 14–18)
MCHC RBC-ENTMCNC: 23.1 PG — LOW (ref 27–31)
MCHC RBC-ENTMCNC: 29.6 G/DL — LOW (ref 32–37)
MCV RBC AUTO: 78 FL — LOW (ref 80–94)
NRBC # BLD: 0 /100 WBCS — SIGNIFICANT CHANGE UP (ref 0–0)
PLATELET # BLD AUTO: 514 K/UL — HIGH (ref 130–400)
POTASSIUM SERPL-MCNC: 5.2 MMOL/L — HIGH (ref 3.5–5)
POTASSIUM SERPL-SCNC: 5.2 MMOL/L — HIGH (ref 3.5–5)
RBC # BLD: 5.63 M/UL — SIGNIFICANT CHANGE UP (ref 4.7–6.1)
RBC # FLD: 18.6 % — HIGH (ref 11.5–14.5)
SODIUM SERPL-SCNC: 140 MMOL/L — SIGNIFICANT CHANGE UP (ref 135–146)
WBC # BLD: 15.32 K/UL — HIGH (ref 4.8–10.8)
WBC # FLD AUTO: 15.32 K/UL — HIGH (ref 4.8–10.8)

## 2018-12-14 RX ORDER — INSULIN LISPRO 100/ML
17 VIAL (ML) SUBCUTANEOUS
Qty: 0 | Refills: 0 | Status: DISCONTINUED | OUTPATIENT
Start: 2018-12-14 | End: 2019-01-03

## 2018-12-14 RX ADMIN — Medication 6: at 14:34

## 2018-12-14 RX ADMIN — Medication 10: at 16:59

## 2018-12-14 RX ADMIN — BUDESONIDE AND FORMOTEROL FUMARATE DIHYDRATE 2 PUFF(S): 160; 4.5 AEROSOL RESPIRATORY (INHALATION) at 09:01

## 2018-12-14 RX ADMIN — Medication 1 TABLET(S): at 11:27

## 2018-12-14 RX ADMIN — HEPARIN SODIUM 5000 UNIT(S): 5000 INJECTION INTRAVENOUS; SUBCUTANEOUS at 05:46

## 2018-12-14 RX ADMIN — Medication 1000 UNIT(S): at 11:27

## 2018-12-14 RX ADMIN — LACTULOSE 30 GRAM(S): 10 SOLUTION ORAL at 14:36

## 2018-12-14 RX ADMIN — HEPARIN SODIUM 5000 UNIT(S): 5000 INJECTION INTRAVENOUS; SUBCUTANEOUS at 17:05

## 2018-12-14 RX ADMIN — Medication 2: at 22:39

## 2018-12-14 RX ADMIN — Medication 1 DROP(S): at 22:35

## 2018-12-14 RX ADMIN — Medication 100 MILLIGRAM(S): at 11:27

## 2018-12-14 RX ADMIN — TAMSULOSIN HYDROCHLORIDE 0.4 MILLIGRAM(S): 0.4 CAPSULE ORAL at 22:36

## 2018-12-14 RX ADMIN — Medication 3 MILLILITER(S): at 15:58

## 2018-12-14 RX ADMIN — Medication 3 MILLILITER(S): at 13:55

## 2018-12-14 RX ADMIN — POLYETHYLENE GLYCOL 3350 17 GRAM(S): 17 POWDER, FOR SOLUTION ORAL at 17:05

## 2018-12-14 RX ADMIN — Medication 3 MILLILITER(S): at 19:30

## 2018-12-14 RX ADMIN — LACTULOSE 30 GRAM(S): 10 SOLUTION ORAL at 22:56

## 2018-12-14 RX ADMIN — ATORVASTATIN CALCIUM 20 MILLIGRAM(S): 80 TABLET, FILM COATED ORAL at 22:36

## 2018-12-14 RX ADMIN — Medication 15 UNIT(S): at 14:33

## 2018-12-14 RX ADMIN — FINASTERIDE 5 MILLIGRAM(S): 5 TABLET, FILM COATED ORAL at 11:27

## 2018-12-14 RX ADMIN — INSULIN GLARGINE 50 UNIT(S): 100 INJECTION, SOLUTION SUBCUTANEOUS at 22:35

## 2018-12-14 RX ADMIN — PANTOPRAZOLE SODIUM 40 MILLIGRAM(S): 20 TABLET, DELAYED RELEASE ORAL at 05:46

## 2018-12-14 RX ADMIN — Medication 17 UNIT(S): at 16:59

## 2018-12-14 RX ADMIN — Medication 25 MILLIGRAM(S): at 05:45

## 2018-12-14 RX ADMIN — Medication 60 MILLIGRAM(S): at 05:48

## 2018-12-14 RX ADMIN — LACTULOSE 30 GRAM(S): 10 SOLUTION ORAL at 05:47

## 2018-12-14 RX ADMIN — Medication 1 DROP(S): at 14:36

## 2018-12-14 RX ADMIN — Medication 4: at 08:28

## 2018-12-14 RX ADMIN — Medication 15 UNIT(S): at 08:27

## 2018-12-14 RX ADMIN — SENNA PLUS 2 TABLET(S): 8.6 TABLET ORAL at 22:36

## 2018-12-14 RX ADMIN — POLYETHYLENE GLYCOL 3350 17 GRAM(S): 17 POWDER, FOR SOLUTION ORAL at 05:48

## 2018-12-14 RX ADMIN — Medication 3 MILLILITER(S): at 09:02

## 2018-12-14 RX ADMIN — PREGABALIN 1000 MICROGRAM(S): 225 CAPSULE ORAL at 11:27

## 2018-12-14 RX ADMIN — AMLODIPINE BESYLATE 10 MILLIGRAM(S): 2.5 TABLET ORAL at 05:46

## 2018-12-14 RX ADMIN — Medication 1 DROP(S): at 05:46

## 2018-12-14 RX ADMIN — BUDESONIDE AND FORMOTEROL FUMARATE DIHYDRATE 2 PUFF(S): 160; 4.5 AEROSOL RESPIRATORY (INHALATION) at 22:57

## 2018-12-14 NOTE — PROGRESS NOTE ADULT - ASSESSMENT
Patient is an 78y Male with h/o COPD on home oxygen 5L, DM, MICHAEL, obesity, CKD 3, recently discharged on 11/15 after being admitted for acute on chronic hypoxic & Hypercapnic respiratory failure due to COPD exacerbation and gram negative pneumonia. He presented from home with complaints of Dyspnea. He explained that for the past 1 week his breathing has been getting worse gradually with pulse ox readings in the 80s whilst on oxygen with mildly productive cough, no fevers or chills. He was scheduled to see Pulmonologist Dr. Castrejon on Monday but due to difficulty breathing he was BIBA. Per ED physician, patient was tachypneic and cyanotic on arrival with oxygen saturation in the 70s with NC 5L. He was started on BIPAP with significant improvement.  He was admitted for further management.     Assessment and Plan:    1. Acute on Chronic Hypoxic respiratory failure due to COPD exacerbation.  Started on PO sterids, continue antoine  Bronchodilators.   Pulm consulted: appreciate input. Recommendations noted.   Chest X-ray Bibasilar opacities. Continue IV Levaquin.   Continue supplemental oxygen & BiPAP prn and QHS. Maintain sats > 90%  Transitioned off HFNC Now on Venti mask  Ambulate as tolerated.    2. Hyperkalemia:  improving  s/p Insulin and Dextrose.  Kayexalate 30gm x 3/week   Continue Laxatives for bowel movements.  Nephrology consulted: due to JAMES, RTA type 4?, high K foods?  Low K diet  Dietary consult appreciated      3. Hypertension:  Stable on home medications.      4. Diabetes mellitus, type 2:  Hemoglobin A1C, Whole Blood: 8.7 % (11.05.18 @ 07:00)  Monitor FS with Insulin coverage.      5. JAMES on CKD stage III:  Avoid Nephrotoxins. Check Post void residual. Stable for now  Monitor BMP and Electrolytes.  CT abdomen: No hydronephrosis bilaterally or obstructing calculus.  Multiple calculi on the left - obtain phos, iPTH, uric acid level      6. BPH:  Continue Flomax and Finasteride.       7. Obesity with comorbidities:  Dietary and lifestyle modification.      8. B12 deficiency & Vitamin D deficiency:  Continue with home supplementation.       9. MICHAEL:  BiPAP at night.    DVT prophylaxis: Heparin    Pt will need STR, ideally pulmonary rehab. Patient is an 78y Male with h/o COPD on home oxygen 5L, DM, MICHAEL, obesity, CKD 3, recently discharged on 11/15 after being admitted for acute on chronic hypoxic & Hypercapnic respiratory failure due to COPD exacerbation and gram negative pneumonia. He presented from home with complaints of Dyspnea. He explained that for the past 1 week his breathing has been getting worse gradually with pulse ox readings in the 80s whilst on oxygen with mildly productive cough, no fevers or chills. He was scheduled to see Pulmonologist Dr. Castrejon on Monday but due to difficulty breathing he was BIBA. Per ED physician, patient was tachypneic and cyanotic on arrival with oxygen saturation in the 70s with NC 5L. He was started on BIPAP with significant improvement.  He was admitted for further management.     Assessment and Plan:    1. Acute on Chronic Hypoxic respiratory failure due to COPD exacerbation.  Started on PO sterids, continue antoine  Bronchodilators.   Pulm consulted: appreciate input. Recommendations noted.   Chest X-ray Bibasilar opacities. Continue IV Levaquin.   Continue supplemental oxygen & BiPAP prn and QHS. Maintain sats > 90%  Transitioned off HFNC Now on Venti mask  Ambulate as tolerated.    2. Hyperkalemia:  improving  s/p Insulin and Dextrose.  Kayexalate 30gm x 3/week   Continue Laxatives for bowel movements.  Nephrology consulted: due to JAMES, RTA type 4?, high K foods?  Low K diet  Dietary consult appreciated      3. Hypertension:  Stable on home medications.      4. Diabetes mellitus, type 2:  Hemoglobin A1C, Whole Blood: 8.7 % (11.05.18 @ 07:00)  Monitor FS with Insulin coverage. Insulin covergae adjusted to premeal 15-->17      5. JAMES on CKD stage III:  Avoid Nephrotoxins. Check Post void residual. Stable for now  Monitor BMP and Electrolytes.  CT abdomen: No hydronephrosis bilaterally or obstructing calculus.  Multiple calculi on the left - obtain phos, iPTH, uric acid level      6. BPH:  Continue Flomax and Finasteride.       7. Obesity with comorbidities:  Dietary and lifestyle modification.      8. B12 deficiency & Vitamin D deficiency:  Continue with home supplementation.       9. MICHAEL:  BiPAP at night.    DVT prophylaxis: Heparin    Pt will need STR, ideally pulmonary rehab. Patient is an 78y Male with h/o COPD on home oxygen 5L, DM, MICHAEL, obesity, CKD 3, recently discharged on 11/15 after being admitted for acute on chronic hypoxic & Hypercapnic respiratory failure due to COPD exacerbation and gram negative pneumonia. He presented from home with complaints of Dyspnea. He explained that for the past 1 week his breathing has been getting worse gradually with pulse ox readings in the 80s whilst on oxygen with mildly productive cough, no fevers or chills. He was scheduled to see Pulmonologist Dr. Castrejon on Monday but due to difficulty breathing he was BIBA. Per ED physician, patient was tachypneic and cyanotic on arrival with oxygen saturation in the 70s with NC 5L. He was started on BIPAP with significant improvement.  He was admitted for further management.     Assessment and Plan:    1. Acute on Chronic Hypoxic respiratory failure due to COPD exacerbation.  Started on PO sterids, continue antoine  Bronchodilators.   Pulm consulted: appreciate input. Recommendations noted.   Chest X-ray Bibasilar opacities. Continue IV Levaquin.   Continue supplemental oxygen & BiPAP prn and QHS. Maintain sats > 90%  Transitioned off HFNC Now on Venti mask  Ambulate as tolerated.    2. Hyperkalemia:  improving  s/p Insulin and Dextrose.  Kayexalate 30gm x 3/week   Continue Laxatives for bowel movements.  Nephrology consulted: due to JAMES, RTA type 4?, high K foods?  Low K diet  Dietary consult appreciated      3. Hypertension:  Stable on home medications.      4. Diabetes mellitus, type 2:  Hemoglobin A1C, Whole Blood: 8.7 % (11.05.18 @ 07:00)  Monitor FS with Insulin coverage. Insulin covergae adjusted to premeal 15-->17  Hyperglycemia 2/2 steroids      5. JAMES on CKD stage III:  Avoid Nephrotoxins. Check Post void residual. Stable for now  Monitor BMP and Electrolytes.  CT abdomen: No hydronephrosis bilaterally or obstructing calculus.  Multiple calculi on the left - obtain phos, iPTH, uric acid level      6. BPH:  Continue Flomax and Finasteride.       7. Obesity with comorbidities:  Dietary and lifestyle modification.      8. B12 deficiency & Vitamin D deficiency:  Continue with home supplementation.       9. MICHAEL:  BiPAP at night.    DVT prophylaxis: Heparin    Pt will need STR, ideally pulmonary rehab. Patient is an 78y Male with h/o COPD on home oxygen 5L, DM, MICHAEL, obesity, CKD 3, recently discharged on 11/15 after being admitted for acute on chronic hypoxic & Hypercapnic respiratory failure due to COPD exacerbation and gram negative pneumonia. He presented from home with complaints of Dyspnea. He explained that for the past 1 week his breathing has been getting worse gradually with pulse ox readings in the 80s whilst on oxygen with mildly productive cough, no fevers or chills. He was scheduled to see Pulmonologist Dr. Castrejon on Monday but due to difficulty breathing he was BIBA. Per ED physician, patient was tachypneic and cyanotic on arrival with oxygen saturation in the 70s with NC 5L. He was started on BIPAP with significant improvement.  He was admitted for further management.     Assessment and Plan:    1. Acute on Chronic Hypoxic respiratory failure due to COPD exacerbation.  Started on PO sterids, continue antoine  Bronchodilators.   Pulm consulted: appreciate input. Recommendations noted.   Chest X-ray Bibasilar opacities. Continue IV Levaquin.   Continue supplemental oxygen & BiPAP prn and QHS. Maintain sats > 90%  Transitioned off HFNC Now on Venti mask  Ambulate as tolerated.    2. Hyperkalemia:  improving  s/p Insulin and Dextrose.  Kayexalate 30gm x 3/week   Continue Laxatives for bowel movements.  Nephrology consulted: due to JAMES, RTA type 4?, high K foods?  Low K diet  Dietary consult appreciated      3. Hypertension:  Stable on home medications.      4. Diabetes mellitus, type 2:  Hemoglobin A1C, Whole Blood: 8.7 % (11.05.18 @ 07:00)  Monitor FS with Insulin coverage. Insulin covergae adjusted to premeal 15-->17  Hyperglycemia 2/2 steroids      5. JAMES on CKD stage III:  Avoid Nephrotoxins. Check Post void residual. Stable for now  Monitor BMP and Electrolytes.  CT abdomen: No hydronephrosis bilaterally or obstructing calculus.  Multiple calculi on the left - obtain phos, iPTH, uric acid level      6. BPH:  Continue Flomax and Finasteride.       7. Morbid Obesity BMI 38 + HTN +DM+COPD:  Dietary and lifestyle modification.      8. B12 deficiency & Vitamin D deficiency:  Continue with home supplementation.       9. MICHAEL:  BiPAP at night.    DVT prophylaxis: Heparin    Pt will need STR, ideally pulmonary rehab.

## 2018-12-15 LAB
ANION GAP SERPL CALC-SCNC: 11 MMOL/L — SIGNIFICANT CHANGE UP (ref 7–14)
BUN SERPL-MCNC: 47 MG/DL — HIGH (ref 10–20)
CALCIUM SERPL-MCNC: 9.1 MG/DL — SIGNIFICANT CHANGE UP (ref 8.5–10.1)
CHLORIDE SERPL-SCNC: 103 MMOL/L — SIGNIFICANT CHANGE UP (ref 98–110)
CO2 SERPL-SCNC: 32 MMOL/L — SIGNIFICANT CHANGE UP (ref 17–32)
CREAT SERPL-MCNC: 2 MG/DL — HIGH (ref 0.7–1.5)
GLUCOSE BLDC GLUCOMTR-MCNC: 110 MG/DL — HIGH (ref 70–99)
GLUCOSE BLDC GLUCOMTR-MCNC: 247 MG/DL — HIGH (ref 70–99)
GLUCOSE BLDC GLUCOMTR-MCNC: 252 MG/DL — HIGH (ref 70–99)
GLUCOSE BLDC GLUCOMTR-MCNC: 294 MG/DL — HIGH (ref 70–99)
GLUCOSE SERPL-MCNC: 103 MG/DL — HIGH (ref 70–99)
HCT VFR BLD CALC: 43.2 % — SIGNIFICANT CHANGE UP (ref 42–52)
HGB BLD-MCNC: 13 G/DL — LOW (ref 14–18)
MAGNESIUM SERPL-MCNC: 2.1 MG/DL — SIGNIFICANT CHANGE UP (ref 1.8–2.4)
MCHC RBC-ENTMCNC: 23.8 PG — LOW (ref 27–31)
MCHC RBC-ENTMCNC: 30.1 G/DL — LOW (ref 32–37)
MCV RBC AUTO: 79.1 FL — LOW (ref 80–94)
NRBC # BLD: 0 /100 WBCS — SIGNIFICANT CHANGE UP (ref 0–0)
PLATELET # BLD AUTO: 497 K/UL — HIGH (ref 130–400)
POTASSIUM SERPL-MCNC: 5 MMOL/L — SIGNIFICANT CHANGE UP (ref 3.5–5)
POTASSIUM SERPL-SCNC: 5 MMOL/L — SIGNIFICANT CHANGE UP (ref 3.5–5)
RBC # BLD: 5.46 M/UL — SIGNIFICANT CHANGE UP (ref 4.7–6.1)
RBC # FLD: 18.7 % — HIGH (ref 11.5–14.5)
SODIUM SERPL-SCNC: 146 MMOL/L — SIGNIFICANT CHANGE UP (ref 135–146)
WBC # BLD: 14.53 K/UL — HIGH (ref 4.8–10.8)
WBC # FLD AUTO: 14.53 K/UL — HIGH (ref 4.8–10.8)

## 2018-12-15 RX ORDER — VANCOMYCIN HCL 1 G
1000 VIAL (EA) INTRAVENOUS ONCE
Qty: 0 | Refills: 0 | Status: COMPLETED | OUTPATIENT
Start: 2018-12-15 | End: 2018-12-15

## 2018-12-15 RX ORDER — ACETAMINOPHEN 500 MG
650 TABLET ORAL EVERY 6 HOURS
Qty: 0 | Refills: 0 | Status: DISCONTINUED | OUTPATIENT
Start: 2018-12-15 | End: 2019-01-03

## 2018-12-15 RX ORDER — VANCOMYCIN HCL 1 G
1000 VIAL (EA) INTRAVENOUS EVERY 12 HOURS
Qty: 0 | Refills: 0 | Status: DISCONTINUED | OUTPATIENT
Start: 2018-12-15 | End: 2018-12-21

## 2018-12-15 RX ADMIN — PANTOPRAZOLE SODIUM 40 MILLIGRAM(S): 20 TABLET, DELAYED RELEASE ORAL at 06:44

## 2018-12-15 RX ADMIN — BUDESONIDE AND FORMOTEROL FUMARATE DIHYDRATE 2 PUFF(S): 160; 4.5 AEROSOL RESPIRATORY (INHALATION) at 21:49

## 2018-12-15 RX ADMIN — Medication 1 DROP(S): at 21:47

## 2018-12-15 RX ADMIN — LACTULOSE 30 GRAM(S): 10 SOLUTION ORAL at 13:02

## 2018-12-15 RX ADMIN — LACTULOSE 30 GRAM(S): 10 SOLUTION ORAL at 06:47

## 2018-12-15 RX ADMIN — Medication 4: at 17:18

## 2018-12-15 RX ADMIN — Medication 1 TABLET(S): at 11:29

## 2018-12-15 RX ADMIN — Medication 100 MILLIGRAM(S): at 11:29

## 2018-12-15 RX ADMIN — INSULIN GLARGINE 50 UNIT(S): 100 INJECTION, SOLUTION SUBCUTANEOUS at 22:07

## 2018-12-15 RX ADMIN — AMLODIPINE BESYLATE 10 MILLIGRAM(S): 2.5 TABLET ORAL at 06:45

## 2018-12-15 RX ADMIN — Medication 3 MILLILITER(S): at 12:58

## 2018-12-15 RX ADMIN — POLYETHYLENE GLYCOL 3350 17 GRAM(S): 17 POWDER, FOR SOLUTION ORAL at 17:18

## 2018-12-15 RX ADMIN — Medication 17 UNIT(S): at 12:19

## 2018-12-15 RX ADMIN — Medication 3 MILLILITER(S): at 16:02

## 2018-12-15 RX ADMIN — Medication 250 MILLIGRAM(S): at 23:30

## 2018-12-15 RX ADMIN — Medication 3 MILLILITER(S): at 08:10

## 2018-12-15 RX ADMIN — PREGABALIN 1000 MICROGRAM(S): 225 CAPSULE ORAL at 11:30

## 2018-12-15 RX ADMIN — Medication 25 MILLIGRAM(S): at 06:44

## 2018-12-15 RX ADMIN — LACTULOSE 30 GRAM(S): 10 SOLUTION ORAL at 21:49

## 2018-12-15 RX ADMIN — HEPARIN SODIUM 5000 UNIT(S): 5000 INJECTION INTRAVENOUS; SUBCUTANEOUS at 06:47

## 2018-12-15 RX ADMIN — FINASTERIDE 5 MILLIGRAM(S): 5 TABLET, FILM COATED ORAL at 11:29

## 2018-12-15 RX ADMIN — Medication 3 MILLILITER(S): at 00:04

## 2018-12-15 RX ADMIN — POLYETHYLENE GLYCOL 3350 17 GRAM(S): 17 POWDER, FOR SOLUTION ORAL at 06:45

## 2018-12-15 RX ADMIN — Medication 3 MILLILITER(S): at 19:19

## 2018-12-15 RX ADMIN — HEPARIN SODIUM 5000 UNIT(S): 5000 INJECTION INTRAVENOUS; SUBCUTANEOUS at 17:18

## 2018-12-15 RX ADMIN — Medication 60 MILLIGRAM(S): at 06:44

## 2018-12-15 RX ADMIN — BUDESONIDE AND FORMOTEROL FUMARATE DIHYDRATE 2 PUFF(S): 160; 4.5 AEROSOL RESPIRATORY (INHALATION) at 08:21

## 2018-12-15 RX ADMIN — ATORVASTATIN CALCIUM 20 MILLIGRAM(S): 80 TABLET, FILM COATED ORAL at 21:46

## 2018-12-15 RX ADMIN — Medication 17 UNIT(S): at 08:17

## 2018-12-15 RX ADMIN — Medication 3 MILLILITER(S): at 04:15

## 2018-12-15 RX ADMIN — TIOTROPIUM BROMIDE 1 CAPSULE(S): 18 CAPSULE ORAL; RESPIRATORY (INHALATION) at 08:09

## 2018-12-15 RX ADMIN — Medication 6: at 12:19

## 2018-12-15 RX ADMIN — Medication 1000 UNIT(S): at 11:30

## 2018-12-15 RX ADMIN — Medication 17 UNIT(S): at 17:18

## 2018-12-15 RX ADMIN — SENNA PLUS 2 TABLET(S): 8.6 TABLET ORAL at 21:46

## 2018-12-15 RX ADMIN — Medication 1 DROP(S): at 13:02

## 2018-12-15 RX ADMIN — Medication 1 DROP(S): at 06:44

## 2018-12-15 RX ADMIN — TAMSULOSIN HYDROCHLORIDE 0.4 MILLIGRAM(S): 0.4 CAPSULE ORAL at 21:47

## 2018-12-15 RX ADMIN — TIOTROPIUM BROMIDE 1 CAPSULE(S): 18 CAPSULE ORAL; RESPIRATORY (INHALATION) at 11:29

## 2018-12-15 NOTE — PROGRESS NOTE ADULT - ASSESSMENT
78y Male with h/o COPD on home oxygen 5L, DM, MICHAEL, obesity, CKD 3, recently discharged on 11/15 after being admitted for acute on chronic hypoxic & Hypercapnic respiratory failure due to COPD exacerbation and gram negative pneumonia. He presented from home with complaints of Dyspnea.   Assessment and Plan:    1. Acute on Chronic Hypoxic respiratory failure due to COPD exacerbation.  Started on PO sterids- Continue IV Levaquin.   Continue supplemental oxygen     2. Hyperkalemia:  SP/ Kayexalate, corrected    3. Hypertension:  Stable on home medications.    4. Diabetes mellitus, type 2:  Uncontrolled, ISS, Monitor FS with meals    5. JAMES on CKD stage III:  Avoid Nephrotoxins. Check Post void residual. Stable for now  Monitor BMP and Electrolytes.    6. BPH:  Continue Flomax and Finasteride.     7. Morbid Obesity BMI 38 + HTN +DM+COPD:  Dietary and lifestyle modification.    8. B12 deficiency & Vitamin D deficiency:  Continue with home supplementation.     9. MICHAEL:  BiPAP at night.    DVT , GI PPX  Disposition: May benefit Short-term rehab  -PT-rehab evaluation

## 2018-12-16 DIAGNOSIS — L03.011 CELLULITIS OF RIGHT FINGER: ICD-10-CM

## 2018-12-16 LAB
GLUCOSE BLDC GLUCOMTR-MCNC: 153 MG/DL — HIGH (ref 70–99)
GLUCOSE BLDC GLUCOMTR-MCNC: 161 MG/DL — HIGH (ref 70–99)
GLUCOSE BLDC GLUCOMTR-MCNC: 182 MG/DL — HIGH (ref 70–99)
GLUCOSE BLDC GLUCOMTR-MCNC: 194 MG/DL — HIGH (ref 70–99)

## 2018-12-16 RX ADMIN — Medication 1000 UNIT(S): at 11:15

## 2018-12-16 RX ADMIN — Medication 60 MILLIGRAM(S): at 08:10

## 2018-12-16 RX ADMIN — PANTOPRAZOLE SODIUM 40 MILLIGRAM(S): 20 TABLET, DELAYED RELEASE ORAL at 08:09

## 2018-12-16 RX ADMIN — Medication 3 MILLILITER(S): at 15:46

## 2018-12-16 RX ADMIN — SODIUM POLYSTYRENE SULFONATE 30 GRAM(S): 4.1 POWDER, FOR SUSPENSION ORAL at 13:45

## 2018-12-16 RX ADMIN — Medication 17 UNIT(S): at 08:14

## 2018-12-16 RX ADMIN — ATORVASTATIN CALCIUM 20 MILLIGRAM(S): 80 TABLET, FILM COATED ORAL at 21:52

## 2018-12-16 RX ADMIN — Medication 2: at 17:11

## 2018-12-16 RX ADMIN — BUDESONIDE AND FORMOTEROL FUMARATE DIHYDRATE 2 PUFF(S): 160; 4.5 AEROSOL RESPIRATORY (INHALATION) at 21:53

## 2018-12-16 RX ADMIN — Medication 25 MILLIGRAM(S): at 08:14

## 2018-12-16 RX ADMIN — LACTULOSE 30 GRAM(S): 10 SOLUTION ORAL at 21:52

## 2018-12-16 RX ADMIN — Medication 3 MILLILITER(S): at 00:16

## 2018-12-16 RX ADMIN — HEPARIN SODIUM 5000 UNIT(S): 5000 INJECTION INTRAVENOUS; SUBCUTANEOUS at 17:11

## 2018-12-16 RX ADMIN — Medication 250 MILLIGRAM(S): at 17:13

## 2018-12-16 RX ADMIN — Medication 17 UNIT(S): at 17:11

## 2018-12-16 RX ADMIN — INSULIN GLARGINE 50 UNIT(S): 100 INJECTION, SOLUTION SUBCUTANEOUS at 21:52

## 2018-12-16 RX ADMIN — Medication 2: at 12:20

## 2018-12-16 RX ADMIN — Medication 1 DROP(S): at 21:52

## 2018-12-16 RX ADMIN — Medication 3 MILLILITER(S): at 13:06

## 2018-12-16 RX ADMIN — Medication 3 MILLILITER(S): at 20:26

## 2018-12-16 RX ADMIN — Medication 100 MILLIGRAM(S): at 11:13

## 2018-12-16 RX ADMIN — Medication 1 DROP(S): at 13:42

## 2018-12-16 RX ADMIN — LACTULOSE 30 GRAM(S): 10 SOLUTION ORAL at 08:13

## 2018-12-16 RX ADMIN — Medication 2: at 08:15

## 2018-12-16 RX ADMIN — Medication 1 DROP(S): at 08:13

## 2018-12-16 RX ADMIN — BUDESONIDE AND FORMOTEROL FUMARATE DIHYDRATE 2 PUFF(S): 160; 4.5 AEROSOL RESPIRATORY (INHALATION) at 08:03

## 2018-12-16 RX ADMIN — PREGABALIN 1000 MICROGRAM(S): 225 CAPSULE ORAL at 11:15

## 2018-12-16 RX ADMIN — Medication 1 TABLET(S): at 11:14

## 2018-12-16 RX ADMIN — AMLODIPINE BESYLATE 10 MILLIGRAM(S): 2.5 TABLET ORAL at 08:10

## 2018-12-16 RX ADMIN — HEPARIN SODIUM 5000 UNIT(S): 5000 INJECTION INTRAVENOUS; SUBCUTANEOUS at 08:11

## 2018-12-16 RX ADMIN — Medication 250 MILLIGRAM(S): at 05:05

## 2018-12-16 RX ADMIN — TIOTROPIUM BROMIDE 1 CAPSULE(S): 18 CAPSULE ORAL; RESPIRATORY (INHALATION) at 08:03

## 2018-12-16 RX ADMIN — LACTULOSE 30 GRAM(S): 10 SOLUTION ORAL at 13:42

## 2018-12-16 RX ADMIN — SENNA PLUS 2 TABLET(S): 8.6 TABLET ORAL at 21:52

## 2018-12-16 RX ADMIN — Medication 3 MILLILITER(S): at 08:03

## 2018-12-16 RX ADMIN — POLYETHYLENE GLYCOL 3350 17 GRAM(S): 17 POWDER, FOR SOLUTION ORAL at 17:11

## 2018-12-16 RX ADMIN — Medication 17 UNIT(S): at 12:20

## 2018-12-16 RX ADMIN — Medication 100 MILLIGRAM(S): at 11:14

## 2018-12-16 RX ADMIN — POLYETHYLENE GLYCOL 3350 17 GRAM(S): 17 POWDER, FOR SOLUTION ORAL at 08:10

## 2018-12-16 RX ADMIN — FINASTERIDE 5 MILLIGRAM(S): 5 TABLET, FILM COATED ORAL at 11:14

## 2018-12-16 RX ADMIN — TAMSULOSIN HYDROCHLORIDE 0.4 MILLIGRAM(S): 0.4 CAPSULE ORAL at 21:52

## 2018-12-16 NOTE — CONSULT NOTE ADULT - ATTENDING COMMENTS
Attending Statement: I have personally performed a face to face diagnostic evaluation on this patient. I have written the above note pertaining to the history, exam and plan of care.
above noted abdomen soft abcess of finger

## 2018-12-16 NOTE — PROGRESS NOTE ADULT - ASSESSMENT
78y Male with h/o COPD on home oxygen 5L, DM, MICHAEL, obesity, CKD 3, recently discharged on 11/15 after being admitted for acute on chronic hypoxic & Hypercapnic respiratory failure due to COPD exacerbation and gram negative pneumonia. He presented from home with complaints of Dyspnea.   Assessment and Plan:    1. Acute on Chronic Hypoxic respiratory failure due to COPD exacerbation.  Started on PO sterids- Continue IV Levaquin.   Continue supplemental oxygen     2. Hyperkalemia:  SP/ Kayexalate, corrected    3. Hypertension:  Stable on home medications.    4. Diabetes mellitus, type 2:  Uncontrolled, ISS, Monitor FS with meals    5. JAMES on CKD stage III:  Avoid Nephrotoxins. Check Post void residual. Stable for now  Monitor BMP and Electrolytes.    6. BPH:  Continue Flomax and Finasteride.     7. Morbid Obesity BMI 38 + HTN +DM+COPD:  Dietary and lifestyle modification.    8. B12 deficiency & Vitamin D deficiency:  Continue with home supplementation.     9. MICHAEL:  BiPAP at night.    DVT , GI PPX  Disposition: May benefit Short-term rehab  -PT-rehab evaluation - Patient is complaining of right index finger swollen with pus pointing. He is on IV ABX  However it looks as an abscess which needs to be incised and drained. will call surgery

## 2018-12-16 NOTE — CONSULT NOTE ADULT - ASSESSMENT
IMPRESSION: Rehab of 79 y/o m rehab  for gd  debility  copd      PRECAUTIONS: [x  ] Cardiac  [x  ] Respiratory  [  ] Seizures [  ] Contact Isolation  [  ] Droplet Isolation  [ fall ] Other    Weight Bearing Status:     RECOMMENDATION:    Out of Bed to Chair     DVT/Decubiti Prophylaxis    REHAB PLAN:     [  x ] Bedside P/T 3-5 times a week   [   ]   Bedside O/T  2-3 times a week             [   ] No Rehab Therapy Indicated                   [   ]  Speech Therapy   Conditioning/ROM                                    ADL  Bed Mobility                                               Conditioning/ROM  Transfers                                                     Bed Mobility  Sitting /Standing Balance                         Transfers                                        Gait Training                                               Sitting/Standing Balance  Stair Training [   ]Applicable                    Home equipment Eval                                                                        Splinting  [   ] Only      GOALS:   ADL   [  x ]   Independent                    Transfers  [ x  ] Independent                          Ambulation  [   x] Independent     [ x   ] With device                            [ x  ]  CG                                                         [   ]  CG                                                                  [   ] CG                            [    ] Min A                                                   [   ] Min A                                                              [   ] Min  A          DISCHARGE PLAN:   [   ]  Good candidate for Intensive Rehabilitation/Hospital based-4A [facility]                                             Will tolerate 3hrs Intensive Rehab Daily                                       [  xx  ]  Short Term Rehab in Skilled Nursing Facility                                       [    ]  Home with Outpatient or VN services                                         [    ]  Possible Candidate for Intensive Hospital based Rehab
Impression:  77 y/o male with Right distal index finger cellulitis, ? small abscess.
Patient is an 78y Male with h/o COPD on home oxygen 5L, DM, MICHAEL, obesity, CKD 3, recently discharged on 11/15 after being admitted for acute on chronic hypoxic & Hypercapnic respiratory failure due to COPD exacerbation and gram negative pneumonia. He presented from home with complaints of Dyspnea. On arrival with oxygen saturation in the 70s with NC 5L. He was started on BIPAP with significant improvement.      1. Acute on Chronic Hypoxic respiratory failure:  Due to COPD exacerbation. IV steroids, Bronchodilators.   Chest X-ray Bibasilar opacities. ? Pneumonia. Continue IV Levaquin.  Continue supplemental oxygen & BiPAP prn and QHS. Maintain sats > 90%    2. Hyperkalemia:  due to JAMES, RTA type 4?, high K foods?  s/p Insulin and Dextrose. s/p Kayexalate 30gm x 2.   Please obtain nutrition consult to educate on low potassium diet  - obtain Urine sodium, potassium ,creat for FE of potassium  - low K diet  - if persistent, pt will probably need Kayexalate 30 grams twice a week    3. Hypertension:  Stable on home medications.      4. Diabetes mellitus, type 2:  Hemoglobin A1C, Whole Blood: 8.7 % (11.05.18 @ 07:00)  Monitor FS with Insulin coverage.  Needs tight glucose control    5. JAMES on CKD stage III:  Check Post void residual.  Obtain UA, urine protein/creat ratio likely underlying diabetic nephropathy  CT abdomen: No hydronephrosis bilaterally or obstructing calculus.  Multiple calculi on the left - obtain phos, iPTH, uric acid level  No need for IVF-po intake is very good, creat 2.0  <- 1.5 baseline, pt will self correct, probably volume related        6. BPH:  Continue Flomax and Finasteride.       7. Obesity/SA  Weight loss counselling. Dietary and lifestyle modification.  on BiPAP at night    Thank you.
Impression:  Acute chronic COPD with exacerbation  No  Impending respiratory failure      Check O2 sat on NC, record, continue O2 as necessary to maintain sats > 90%  Continue IV solumedrol   bronchodilator treatments ATC and PRN  NIPPV as needed  OOB to chair  GI and DVT prophylaxis  pulmonary toilet  Monitor clinically, convert to oral prednisone as clinical improvement allows    overall prognosis is very poor

## 2018-12-16 NOTE — CONSULT NOTE ADULT - PROBLEM SELECTOR RECOMMENDATION 9
- Recommend warm soaks at least 3 times a day to right index finger.  - Elevate Rt hand through out the day.  - If starts draining, apply small dry dressing to area.  - No fingersticks on right hand if possible.  - Continue Ivabx. (Patient currently on Vanco & Levoquin).  - Case d/w Dr. Veloz and he agrees with above plan and will evaluate in am.  He recommends placing on clears for breakfast tomorrow in case he feels patient needs an I&D of right index finger.    - Discussed with Dr. Flores and with patient.

## 2018-12-16 NOTE — CONSULT NOTE ADULT - SUBJECTIVE AND OBJECTIVE BOX
HPI:  Patient is an 78y Male with h/o COPD on home oxygen 5L, DM, MICHAEL, obesity, CKD 3, presented from home with complaints of Dyspnea. He explained that for the past 1 week his breathing has been getting worse gradually with pulse ox readings in the 80s whilst on oxygen. He was scheduled to see Pulmonologist Dr. Castrejon on Monday but due to difficulty breathing he was BIBA. Per ED physician, patient was tachypneic and cyanotic on arrival with oxygen saturation in the 70s with NC 5L. He was started on BIPAP with significant improvement.  He was admitted for further management.    PTN  REFERRED TO ACUTE  REHAB  FOR  EVAL AND  TX   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  High cholesterol  GERD (gastroesophageal reflux disease)  Enlarged prostate  Oxygen dependent  COPD (chronic obstructive pulmonary disease)  Diabetes mellitus, type 2  Hypertension  Emphysema lung  History of hemorrhoidectomy  Dysplastic colon polyp: removed  morbid  obesity      Hospital Course:    TODAY'S SUBJECTIVE & REVIEW OF SYMPTOMS:     Constitutional WNL   Cardio WNL   Resp WNL   GI WNL  Heme WNL  Endo WNL  Skin WNL  MSK WNL  Neuro WNL  Cognitive WNL  Psych WNL      MEDICATIONS  (STANDING):  ALBUTerol/ipratropium for Nebulization 3 milliLiter(s) Nebulizer every 4 hours  amLODIPine   Tablet 10 milliGRAM(s) Oral daily  atorvastatin 20 milliGRAM(s) Oral at bedtime  buDESOnide 160 MICROgram(s)/formoterol 4.5 MICROgram(s) Inhaler 2 Puff(s) Inhalation two times a day  cholecalciferol 1000 Unit(s) Oral daily  cyanocobalamin 1000 MICROGram(s) Oral daily  dextrose 5%. 1000 milliLiter(s) (50 mL/Hr) IV Continuous <Continuous>  dextrose 50% Injectable 12.5 Gram(s) IV Push once  dextrose 50% Injectable 25 Gram(s) IV Push once  dextrose 50% Injectable 25 Gram(s) IV Push once  docusate sodium 100 milliGRAM(s) Oral daily  finasteride 5 milliGRAM(s) Oral daily  heparin  Injectable 5000 Unit(s) SubCutaneous every 12 hours  insulin glargine Injectable (LANTUS) 50 Unit(s) SubCutaneous at bedtime  insulin lispro (HumaLOG) corrective regimen sliding scale   SubCutaneous three times a day before meals  insulin lispro (HumaLOG) corrective regimen sliding scale   SubCutaneous at bedtime  insulin lispro Injectable (HumaLOG) 15 Unit(s) SubCutaneous three times a day with meals  lactulose Syrup 30 Gram(s) Oral three times a day  levoFLOXacin IVPB      levoFLOXacin IVPB 750 milliGRAM(s) IV Intermittent every 24 hours  methylPREDNISolone sodium succinate Injectable 60 milliGRAM(s) IV Push every 12 hours  metoprolol succinate ER 25 milliGRAM(s) Oral daily  multivitamin/minerals 1 Tablet(s) Oral daily  pantoprazole    Tablet 40 milliGRAM(s) Oral before breakfast  polyethylene glycol 3350 17 Gram(s) Oral daily  polyethylene glycol 3350 17 Gram(s) Oral two times a day  senna 2 Tablet(s) Oral at bedtime  tamsulosin 0.4 milliGRAM(s) Oral at bedtime  tiotropium 18 MICROgram(s) Capsule 1 Capsule(s) Inhalation daily    MEDICATIONS  (PRN):  dextrose 40% Gel 15 Gram(s) Oral once PRN Blood Glucose LESS THAN 70 milliGRAM(s)/deciliter  glucagon  Injectable 1 milliGRAM(s) IntraMuscular once PRN Glucose LESS THAN 70 milligrams/deciliter      FAMILY HISTORY:  No pertinent family history in first degree relatives      Allergies    iodine (Hives)  penicillin (Unknown)    Intolerances    aspirin (Other)      SOCIAL HISTORY:    [  ] Etoh  [  ] Smoking  [  ] Substance abuse     Home Environment:  [ x ] Home Alone  [  ] Lives with Family  [ x ] Home Health Aid 6  hrs  home  aid      Dwelling:  [ x ] Apartment  [x  ] Private House  [  ] Adult Home  [  ] Skilled Nursing Facility      [  ] Short Term  [  ] Long Term  [  x] Stairs       Elevator [  ]    FUNCTIONAL STATUS PTA: (Check all that apply)  Ambulation: [   x]Independent    [  ] Dependent     [  ] Non-Ambulatory  Assistive Device: [  ] SA Cane  [  ]  Q Cane  [x  ] Walker  [  ]  Wheelchair  ADL : [ x ] Independent  [  ]  Dependent       Vital Signs Last 24 Hrs  T(C): 36 (11 Dec 2018 14:00), Max: 36.7 (11 Dec 2018 05:52)  T(F): 96.8 (11 Dec 2018 14:00), Max: 98 (11 Dec 2018 05:52)  HR: 72 (11 Dec 2018 14:00) (72 - 92)  BP: 134/70 (11 Dec 2018 14:00) (121/60 - 134/70)  BP(mean): --  RR: 18 (11 Dec 2018 14:00) (18 - 18)  SpO2: --      PHYSICAL EXAM: Alert & Oriented X3  GENERAL: NAD, well-groomed, well-developed  HEAD:  Atraumatic, Normocephalic  EYES: EOMI, PERRLA, conjunctiva and sclera clear  NECK: Supple, No JVD, Normal thyroid  CHEST/LUNG: Clear to percussion bilaterally; No rales, rhonchi, wheezing, or rubs  HEART: Regular rate and rhythm; No murmurs, rubs, or gallops  ABDOMEN: Soft, Nontender, Nondistended; Bowel sounds present  EXTREMITIES:  2+ Peripheral Pulses, No clubbing, cyanosis, or edema    NERVOUS SYSTEM:  Cranial Nerves 2-12 intact [  x] Abnormal  [  ]  ROM: WFL all extremities [ x ]  Abnormal [  ]  Motor Strength: WFL all extremities  [  x]  Abnormal [  ]  Sensation: intact to light touch [  ] Abnormal [ x ]  Reflexes: Symmetric [x  ]  Abnormal [  ]    FUNCTIONAL STATUS:  Bed Mobility: Independent [  ]  Supervision [  ]  Needs Assistance [x  ]  N/A [  ]  Transfers: Independent [  ]  Supervision [  ]  Needs Assistance [x  ]  N/A [  ]   Ambulation: Independent [  ]  Supervision [  ]  Needs Assistance [ x ]  N/A [  ]  ADL: Independent [  ] Requires Assistance [x  ] N/A [  ]      LABS:                        12.8   16.57 )-----------( 587      ( 11 Dec 2018 06:45 )             43.0     12-11    142  |  102  |  55<H>  ----------------------------<  157<H>  5.9<H>   |  33<H>  |  1.8<H>    Ca    9.2      11 Dec 2018 06:45  Phos  4.8     12-11  Mg     2.2     12-10    TPro  6.4  /  Alb  3.8  /  TBili  0.3  /  DBili  x   /  AST  11  /  ALT  14  /  AlkPhos  73  12-10          RADIOLOGY & ADDITIONAL STUDIES:    Assesment:
Patient is a 78 year old male who was admitted through the ER for Acute Hypoxic Respiratory Failure on December 8, 2018 which has improved since admission.  Surgery called to evaluate a right index finger cellulitis with ? abscess.  Patient reports he noticed the infection about 2 days ago.  Patient reports the finger was much more swollen, firm, warm and red up until yesterday but has improved and softened today.    Patient denies any trauma to the finger but reports he is a diabetic and has frequent blood sugar fingersticks and feels it started after a fingerstick was taken.  Patient denies any drainage, fever, chills, tremors, paresthesias or numbness to the finger.                Home Medications:   * Patient Currently Takes Medications as of 15-Nov-2018 09:25 documented in Structured Notes  · 	cefpodoxime 200 mg oral tablet: 1 tab(s) orally every 12 hours for 7 days. tolerated Vantin in the hospital  · 	predniSONE 10 mg oral tablet: 4 tab(s) orally once a day 4 days, then 20mg tabs once daily 4 days, then 10mg once daily until seen by pulmonary  · 	budesonide-formoterol 160 mcg-4.5 mcg/inh inhalation aerosol: 2 puff(s) inhaled 2 times a day   · 	Levemir 100 units/mL subcutaneous solution: 36 unit(s) subcutaneous once a day  · 	omeprazole 20 mg oral delayed release capsule: 1 cap(s) orally once a day   · 	NovoLOG FlexPen 100 units/mL injectable solution: 15 unit(s) injectable 3 times a day (before meals)   · 	atorvastatin 20 mg oral tablet: 1 tab(s) orally once a day (at bedtime)  · 	docusate sodium 100 mg oral tablet: 1  orally once a day  · 	Toprol-XL 25 mg oral tablet, extended release: 1 tab(s) orally once a day  · 	polyethylene glycol 3350 oral powder for reconstitution: 1 dose(s) orally once a day  · 	Vitamin C 1000 mg oral tablet: 1  orally once a day  · 	Trelegy Ellipta inhalation powder: 1 puff(s) inhaled once a day  · 	tamsulosin 0.4 mg oral capsule: 1 cap(s) orally once a day  · 	finasteride 5 mg oral tablet: 1 tab(s) orally once a day  · 	amLODIPine 10 mg oral tablet: 1 tab(s) orally once a day  · 	ProAir HFA: 2 inhaler(s) inhaled every 4 hours, As Needed  · 	Vitamin B-12: 3000 milligram(s) orally  · 	Vitamin D3 1000 intl units oral tablet: 1 tab(s) orally once a day  · 	Centrum oral tablet: 1 tab(s) orally once a day  · 	Flax Seed Oil oral capsule: 1000 milligram(s) orally        Allergies:    Iodine: Drug, Hives    Penicillin: Drug, Unknown, can't recall what the allergy is         Intolerances:  	aspirin: Drug, Other, Bleeding           Past Medical History:  Colon polyp  claims it was malignant  COPD (chronic obstructive pulmonary disease)    Diabetes mellitus, type 2    CKD Stage 3  Emphysema lung    Enlarged prostate    GERD (gastroesophageal reflux disease)    High cholesterol    Hypertension    Oxygen dependent.         Past Surgical History:  Dysplastic colon polyp  removed  History of hemorrhoidectomy.         Family History:  No pertinent family history in first degree relatives.         Social History:  Social History (marital status, living situation, occupation, tobacco use, alcohol and drug use, and sexual history): Lives at home with Son.  Denies Alcohol or illicit drug use. Former smoker.	          Physical exam:    General: WD, morbidly obese male on BiPAP, awake alert, conversant in NAD.    Right upper extremity:  Rt distal index finger (Distal phalange) with some edema, but soft to palpation, erythematous, indurated, small callous on tip of finger.  No active bleeding or drainage.  Erythema and edema mainly localized to distal phalange and no apparent tendon involvement.  Mild tenderness with palpation,  good sensation to sharp & light touch, good capillary refill < 2 seconds.  2+/2+ radial pulse, free active ROM with index finger and all fingers, Can make a tight fist, FROM with wrist.        LABS:                                            13.0   (15.3) --->14.53 )-----------( 497      ( 15 Dec 2018 07:24 )                               43.2        146  |  103  |  47<H>  ----------------------------<  103<H>  5.0   |  32  |  2.0<H>    Ca    9.1      15 Dec 2018 07:24  Mg     2.1     12-15
NEPHROLOGY CONSULTATION NOTE    Patient is a 78y Male whom presented to the hospital with SOB, found to have acute on chronic respiratory falire, COPD exacerbation, found to have hyperkalemia of 6.2 and JAMES.  Pt denies taking high potassium foods, had this problem before, never saw a nephrologist. Pt also aware of CKD.     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  High cholesterol  GERD (gastroesophageal reflux disease)  Enlarged prostate  Oxygen dependent  COPD (chronic obstructive pulmonary disease)  Diabetes mellitus, type 2  Hypertension  Emphysema lung  History of hemorrhoidectomy  Dysplastic colon polyp: removed    Allergies:  aspirin (Other)  iodine (Hives)  penicillin (Unknown)    Home Medications Reviewed  Hospital Medications:   MEDICATIONS  (STANDING):  ALBUTerol/ipratropium for Nebulization 3 milliLiter(s) Nebulizer every 4 hours  amLODIPine   Tablet 10 milliGRAM(s) Oral daily  atorvastatin 20 milliGRAM(s) Oral at bedtime  buDESOnide 160 MICROgram(s)/formoterol 4.5 MICROgram(s) Inhaler 2 Puff(s) Inhalation two times a day  cholecalciferol 1000 Unit(s) Oral daily  cyanocobalamin 1000 MICROGram(s) Oral daily  dextrose 5%. 1000 milliLiter(s) (50 mL/Hr) IV Continuous <Continuous>  dextrose 50% Injectable 12.5 Gram(s) IV Push once  dextrose 50% Injectable 25 Gram(s) IV Push once  dextrose 50% Injectable 25 Gram(s) IV Push once  docusate sodium 100 milliGRAM(s) Oral daily  finasteride 5 milliGRAM(s) Oral daily  heparin  Injectable 5000 Unit(s) SubCutaneous every 12 hours  insulin glargine Injectable (LANTUS) 45 Unit(s) SubCutaneous at bedtime  insulin lispro (HumaLOG) corrective regimen sliding scale   SubCutaneous three times a day before meals  insulin lispro (HumaLOG) corrective regimen sliding scale   SubCutaneous at bedtime  insulin lispro Injectable (HumaLOG) 15 Unit(s) SubCutaneous three times a day with meals  lactulose Syrup 30 Gram(s) Oral three times a day  levoFLOXacin IVPB      levoFLOXacin IVPB 750 milliGRAM(s) IV Intermittent every 24 hours  methylPREDNISolone sodium succinate Injectable 60 milliGRAM(s) IV Push every 12 hours  metoprolol succinate ER 25 milliGRAM(s) Oral daily  multivitamin/minerals 1 Tablet(s) Oral daily  pantoprazole    Tablet 40 milliGRAM(s) Oral before breakfast  polyethylene glycol 3350 17 Gram(s) Oral daily  polyethylene glycol 3350 17 Gram(s) Oral two times a day  senna 2 Tablet(s) Oral at bedtime  sodium chloride 0.9%. 1000 milliLiter(s) (50 mL/Hr) IV Continuous <Continuous>  tamsulosin 0.4 milliGRAM(s) Oral at bedtime  tiotropium 18 MICROgram(s) Capsule 1 Capsule(s) Inhalation daily      SOCIAL HISTORY:  Denies ETOH,Smoking,   FAMILY HISTORY:  No pertinent family history in first degree relatives        REVIEW OF SYSTEMS:  CONSTITUTIONAL: No weakness, fevers or chills  RESPIRATORY: No cough, wheezing, hemoptysis; Has shortness of breath  CARDIOVASCULAR: No chest pain or palpitations.  GASTROINTESTINAL: No abdominal or epigastric pain. No nausea, vomiting  GENITOURINARY: No dysuria, frequency, foamy urine, urinary urgency, incontinence or hematuria  NEUROLOGICAL: No numbness or weakness  SKIN: No itching, burning, rashes, or lesions   VASCULAR: No bilateral lower extremity edema.   All other review of systems is negative unless indicated above.    VITALS:  T(F): 95.8 (12-10-18 @ 05:21), Max: 97.6 (12-09-18 @ 13:03)  HR: 89 (12-10-18 @ 05:21)  BP: 141/74 (12-10-18 @ 05:21)  RR: 16 (12-10-18 @ 05:21)  SpO2: 90% (12-10-18 @ 05:21)        I&O's Detail        PHYSICAL EXAM:  Constitutional: in mild respiratory distress, on AVAp, morbidly obese  HEENT: anicteric sclera, oropharynx clear, MMM  Respiratory: decreased BS b/l  Cardiovascular: S1, S2, RRR  Gastrointestinal: BS+, soft, NT, obese  Extremities: Traceperipheral edema  Neurological: A/O x 3  Psychiatric: Normal mood, normal affect  : No CVA tenderness. No chapman.   Skin: No rashes  Vascular Access:    LABS:  12-09    137  |  100  |  60<H>  ----------------------------<  436<H>  6.2<HH>   |  22  |  2.0<H>    Ca    9.5      09 Dec 2018 18:58  Phos  4.4     12-09  Mg     2.1     12-09    TPro  6.2  /  Alb  3.7  /  TBili  0.4  /  DBili      /  AST  10  /  ALT  11  /  AlkPhos  72  12-09    Creatinine Trend: 2.0 <--, 1.9 <--, 1.8 <--, 1.5 <--, 1.5 <--, 1.5 <--, 1.5 <--                        12.7   12.82 )-----------( 570      ( 09 Dec 2018 08:47 )             41.3     Urine Studies:              RADIOLOGY & ADDITIONAL STUDIES:      < from: CT Abdomen and Pelvis w/ Oral Cont (12.09.18 @ 12:12) >  KIDNEYS: No hydronephrosis bilaterally or obstructing calculus. Multiple   bilateral nonobstructing renal calculi measuring up to 8 mm in the left   interpolar region. Stable bilateral renal cysts measuring up to 9.5 cm at   the right lower pole. Stable 4 cm indeterminate exophytic left interpolar   renal lesion.    < end of copied text >  < from: Xray Chest 1 View- PORTABLE-Routine (12.09.18 @ 07:14) >  Opacity overlies both lower lung fields, unchanged.    < end of copied text >
MATRJIAN SMITH        HPI:  Patient is an 78y Male with h/o COPD on home oxygen 5L, DM, MICHAEL, obesity, CKD 3, presented from home with complaints of Dyspnea. He explained that for the past 1 week his breathing has been getting worse gradually with pulse ox readings in the 80s whilst on oxygen. He was scheduled to see Pulmonologist Dr. Castrejon on Monday but due to difficulty breathing he was BIBA. Per ED physician, patient was tachypneic and cyanotic on arrival with oxygen saturation in the 70s with NC 5L. He was started on BIPAP with significant improvement.  He was admitted for further management. (08 Dec 2018 10:50)      Allergies    iodine (Hives)  penicillin (Unknown)    Intolerances    aspirin (Other)      Daily Height in cm: 172.72 (08 Dec 2018 12:00)    Daily Weight in k.1 (09 Dec 2018 05:02)    I&O's Summary      FAMILY HISTORY:  No pertinent family history in first degree relatives        Marital Status:  (x   )    (   ) Single    (   )    (  )   Occupation:   Lives with: (  ) alone  (  ) children   ( x ) spouse   (  ) parents  (  ) other  Recent Travel:     Substance Use (street drugs): (  ) never used  (  ) other:  Tobacco Usage:  (   ) never smoked   (  x ) former smoker   (   ) current smoker  (     ) pack year  Alcohol Usage: social        Vital Signs Last 24 Hrs  T(C): 35.7 (09 Dec 2018 05:02), Max: 36.8 (08 Dec 2018 10:58)  T(F): 96.3 (09 Dec 2018 05:02), Max: 98.2 (08 Dec 2018 10:58)  HR: 91 (09 Dec 2018 05:02) (84 - 114)  BP: 121/55 (09 Dec 2018 05:02) (121/55 - 143/64)  BP(mean): --  RR: 16 (09 Dec 2018 05:02) (16 - 20)  SpO2: 90% (08 Dec 2018 12:19) (90% - 99%)                                12.7   12.82 )-----------( 570      ( 09 Dec 2018 08:47 )             41.3       12-08    143  |  104  |  50<H>  ----------------------------<  192<H>  5.3<H>   |  23  |  1.8<H>    Ca    9.7      08 Dec 2018 08:39  Mg     2.0     12-08    TPro  6.3  /  Alb  3.9  /  TBili  0.4  /  DBili  x   /  AST  10  /  ALT  11  /  AlkPhos  70  12-08      CARDIAC MARKERS ( 08 Dec 2018 08:39 )  x     / 0.01 ng/mL / x     / x     / x            LIVER FUNCTIONS - ( 08 Dec 2018 08:39 )  Alb: 3.9 g/dL / Pro: 6.3 g/dL / ALK PHOS: 70 U/L / ALT: 11 U/L / AST: 10 U/L / GGT: x                 CAPILLARY BLOOD GLUCOSE      POCT Blood Glucose.: 298 mg/dL (09 Dec 2018 07:42)          ABG - ( 08 Dec 2018 08:30 )  pH, Arterial: 7.34  pH, Blood: x     /  pCO2: 44    /  pO2: 53    / HCO3: 24    / Base Excess: -2.3  /  SaO2: 86            Radiology: Radiology personally reviewed.    MEDICATIONS  (STANDING):  ALBUTerol/ipratropium for Nebulization 3 milliLiter(s) Nebulizer every 4 hours  amLODIPine   Tablet 10 milliGRAM(s) Oral daily  atorvastatin 20 milliGRAM(s) Oral at bedtime  buDESOnide 160 MICROgram(s)/formoterol 4.5 MICROgram(s) Inhaler 2 Puff(s) Inhalation two times a day  cholecalciferol 1000 Unit(s) Oral daily  cyanocobalamin 1000 MICROGram(s) Oral daily  dextrose 5%. 1000 milliLiter(s) (50 mL/Hr) IV Continuous <Continuous>  dextrose 50% Injectable 12.5 Gram(s) IV Push once  dextrose 50% Injectable 25 Gram(s) IV Push once  dextrose 50% Injectable 25 Gram(s) IV Push once  diphenhydrAMINE 50 milliGRAM(s) Oral once  docusate sodium 100 milliGRAM(s) Oral daily  finasteride 5 milliGRAM(s) Oral daily  heparin  Injectable 5000 Unit(s) SubCutaneous every 12 hours  insulin glargine Injectable (LANTUS) 35 Unit(s) SubCutaneous at bedtime  insulin lispro (HumaLOG) corrective regimen sliding scale   SubCutaneous three times a day before meals  insulin lispro (HumaLOG) corrective regimen sliding scale   SubCutaneous at bedtime  insulin lispro Injectable (HumaLOG) 10 Unit(s) SubCutaneous three times a day with meals  levoFLOXacin IVPB      levoFLOXacin IVPB 750 milliGRAM(s) IV Intermittent every 24 hours  methylPREDNISolone sodium succinate Injectable 60 milliGRAM(s) IV Push every 12 hours  metoprolol succinate ER 25 milliGRAM(s) Oral daily  multivitamin/minerals 1 Tablet(s) Oral daily  pantoprazole    Tablet 40 milliGRAM(s) Oral before breakfast  polyethylene glycol 3350 17 Gram(s) Oral daily  tamsulosin 0.4 milliGRAM(s) Oral at bedtime  tiotropium 18 MICROgram(s) Capsule 1 Capsule(s) Inhalation daily    MEDICATIONS  (PRN):  dextrose 40% Gel 15 Gram(s) Oral once PRN Blood Glucose LESS THAN 70 milliGRAM(s)/deciliter  glucagon  Injectable 1 milliGRAM(s) IntraMuscular once PRN Glucose LESS THAN 70 milligrams/deciliter      Prescriptions:  budesonide-formoterol 160 mcg-4.5 mcg/inh inhalation aerosol: 2 puff(s) inhaled 2 times a day  (08 Dec 2018 12:15)  Levemir 100 units/mL subcutaneous solution: 36 unit(s) subcutaneous once a day (08 Dec 2018 12:15)  NovoLOG FlexPen 100 units/mL injectable solution: 15 unit(s) injectable 3 times a day (before meals)  (08 Dec 2018 12:15)  omeprazole 20 mg oral delayed release capsule: 1 cap(s) orally once a day  (08 Dec 2018 12:15)  predniSONE 10 mg oral tablet: 4 tab(s) orally once a day 4 days, then 20mg tabs once daily 4 days, then 10mg once daily until seen by pulmonary (08 Dec 2018 12:15)        REVIEW OF SYSTEMS:    Review of Systems:  · CONSTITUTIONAL: no fever and no chills.  · EYES: no discharge, no irritation, no pain, no redness, and no visual changes.  · ENMT: Ears: no ear pain and no hearing problems.Nose: no nasal congestion and no nasal drainage.Mouth/Throat: no dysphagia, no hoarseness and no throat pain. Neck: no lumps, no pain, no stiffness and no swollen glands.  · CARDIOVASCULAR: no chest pain  · RESPIRATORY: - - -   · Respiratory [+]: + sob  · GASTROINTESTINAL: no abdominal pain, no bloating, no constipation, no diarrhea, no nausea and no vomiting.  · GENITOURINARY: no dysuria, no frequency, and no hematuria.  · MUSCULOSKELETAL: no back pain, no gout, no musculoskeletal pain, no neck pain, and no weakness.  · SKIN: no abrasions, no jaundice, no lesions, no pruritis, and no rashes.  · NEURO: no loss of consciousness, no gait abnormality, no headache, no sensory deficits, and no weakness.  · PSYCHIATRIC: no known mental health issues.    PHYSICAL EXAM:   · CONSTITUTIONAL: Well appearing, well nourished, NAD  · ENMT: Airway patent, Nasal mucosa clear. Mouth with normal mucosa. Throat has no vesicles, no oropharyngeal exudates and uvula is midline.  · EYES: Clear bilaterally, pupils equal, round and reactive to light.  · CARDIAC: Normal rate, regular rhythm.  Heart sounds S1, S2.  No murmurs, rubs or gallops.  · RESPIRATORY: b/l wheezing, decreased breath sounds  · GASTROINTESTINAL: Abdomen soft, non-tender, no guarding.  · MUSCULOSKELETAL: Spine appears normal, range of motion is not limited, no muscle or joint tenderness  · NEUROLOGICAL: Alert and oriented, no focal deficits, no motor or sensory deficits.  · SKIN: Skin normal color for race, warm, dry and intact. No evidence of rash.  · PSYCHIATRIC: Alert and oriented to person, place, time/situation. normal mood and affect. no apparent risk to self or others.  · HEME LYMPH: No adenopathy or splenomegaly. No cervical or inguinal lymphadenopathy.

## 2018-12-16 NOTE — CONSULT NOTE ADULT - REASON FOR ADMISSION
Acute Hypoxic Respiratory Failure

## 2018-12-17 LAB
GLUCOSE BLDC GLUCOMTR-MCNC: 170 MG/DL — HIGH (ref 70–99)
GLUCOSE BLDC GLUCOMTR-MCNC: 193 MG/DL — HIGH (ref 70–99)
GLUCOSE BLDC GLUCOMTR-MCNC: 204 MG/DL — HIGH (ref 70–99)
GLUCOSE BLDC GLUCOMTR-MCNC: 353 MG/DL — HIGH (ref 70–99)
GLUCOSE BLDC GLUCOMTR-MCNC: 88 MG/DL — SIGNIFICANT CHANGE UP (ref 70–99)

## 2018-12-17 RX ADMIN — Medication 100 MILLIGRAM(S): at 11:20

## 2018-12-17 RX ADMIN — LACTULOSE 30 GRAM(S): 10 SOLUTION ORAL at 08:35

## 2018-12-17 RX ADMIN — Medication 3 MILLILITER(S): at 00:13

## 2018-12-17 RX ADMIN — Medication 3 MILLILITER(S): at 20:01

## 2018-12-17 RX ADMIN — Medication 60 MILLIGRAM(S): at 08:34

## 2018-12-17 RX ADMIN — Medication 3 MILLILITER(S): at 07:34

## 2018-12-17 RX ADMIN — HEPARIN SODIUM 5000 UNIT(S): 5000 INJECTION INTRAVENOUS; SUBCUTANEOUS at 17:46

## 2018-12-17 RX ADMIN — BUDESONIDE AND FORMOTEROL FUMARATE DIHYDRATE 2 PUFF(S): 160; 4.5 AEROSOL RESPIRATORY (INHALATION) at 08:44

## 2018-12-17 RX ADMIN — Medication 1 DROP(S): at 14:29

## 2018-12-17 RX ADMIN — Medication 3 MILLILITER(S): at 13:17

## 2018-12-17 RX ADMIN — FINASTERIDE 5 MILLIGRAM(S): 5 TABLET, FILM COATED ORAL at 11:20

## 2018-12-17 RX ADMIN — Medication 1 DROP(S): at 08:35

## 2018-12-17 RX ADMIN — Medication 17 UNIT(S): at 12:19

## 2018-12-17 RX ADMIN — Medication 25 MILLIGRAM(S): at 08:33

## 2018-12-17 RX ADMIN — PREGABALIN 1000 MICROGRAM(S): 225 CAPSULE ORAL at 11:21

## 2018-12-17 RX ADMIN — TAMSULOSIN HYDROCHLORIDE 0.4 MILLIGRAM(S): 0.4 CAPSULE ORAL at 22:13

## 2018-12-17 RX ADMIN — Medication 1 TABLET(S): at 11:21

## 2018-12-17 RX ADMIN — Medication 17 UNIT(S): at 17:44

## 2018-12-17 RX ADMIN — Medication 6: at 22:16

## 2018-12-17 RX ADMIN — LACTULOSE 30 GRAM(S): 10 SOLUTION ORAL at 14:28

## 2018-12-17 RX ADMIN — TIOTROPIUM BROMIDE 1 CAPSULE(S): 18 CAPSULE ORAL; RESPIRATORY (INHALATION) at 08:34

## 2018-12-17 RX ADMIN — POLYETHYLENE GLYCOL 3350 17 GRAM(S): 17 POWDER, FOR SOLUTION ORAL at 17:46

## 2018-12-17 RX ADMIN — Medication 2: at 12:19

## 2018-12-17 RX ADMIN — ATORVASTATIN CALCIUM 20 MILLIGRAM(S): 80 TABLET, FILM COATED ORAL at 22:13

## 2018-12-17 RX ADMIN — HEPARIN SODIUM 5000 UNIT(S): 5000 INJECTION INTRAVENOUS; SUBCUTANEOUS at 08:34

## 2018-12-17 RX ADMIN — LACTULOSE 30 GRAM(S): 10 SOLUTION ORAL at 22:17

## 2018-12-17 RX ADMIN — AMLODIPINE BESYLATE 10 MILLIGRAM(S): 2.5 TABLET ORAL at 08:33

## 2018-12-17 RX ADMIN — Medication 250 MILLIGRAM(S): at 05:30

## 2018-12-17 RX ADMIN — Medication 2: at 17:44

## 2018-12-17 RX ADMIN — SENNA PLUS 2 TABLET(S): 8.6 TABLET ORAL at 22:13

## 2018-12-17 RX ADMIN — Medication 250 MILLIGRAM(S): at 17:48

## 2018-12-17 RX ADMIN — INSULIN GLARGINE 50 UNIT(S): 100 INJECTION, SOLUTION SUBCUTANEOUS at 22:13

## 2018-12-17 RX ADMIN — Medication 1000 UNIT(S): at 11:21

## 2018-12-17 RX ADMIN — POLYETHYLENE GLYCOL 3350 17 GRAM(S): 17 POWDER, FOR SOLUTION ORAL at 08:35

## 2018-12-17 RX ADMIN — PANTOPRAZOLE SODIUM 40 MILLIGRAM(S): 20 TABLET, DELAYED RELEASE ORAL at 08:33

## 2018-12-17 RX ADMIN — Medication 3 MILLILITER(S): at 16:12

## 2018-12-17 NOTE — PROGRESS NOTE ADULT - ASSESSMENT
1. Acute on Chronic Hypoxic respiratory failure due to COPD exacerbation.  On PO steroids/  IV Levaquin.   Continue hiflow oxygen     2. Hyperkalemia:  resolved    3. Hypertension:  controlled    4. Diabetes mellitus, type 2:  controlled    5. JAMES on CKD stage III:  Worsening slighlty? Recheck    6. BPH:  Continue Flomax and Finasteride.     7. Morbid Obesity  diet encouraged    8. B12 deficiency & Vitamin D deficiency:  Outpt f/u    9. MICHAEL:  BiPAP at night.    10. R index finger cellulitis  already on levaquin, and vanco  surg seen

## 2018-12-18 LAB
ALBUMIN SERPL ELPH-MCNC: 3.1 G/DL — LOW (ref 3.5–5.2)
ALP SERPL-CCNC: 52 U/L — SIGNIFICANT CHANGE UP (ref 30–115)
ALT FLD-CCNC: 14 U/L — SIGNIFICANT CHANGE UP (ref 0–41)
ANION GAP SERPL CALC-SCNC: 10 MMOL/L — SIGNIFICANT CHANGE UP (ref 7–14)
AST SERPL-CCNC: 9 U/L — SIGNIFICANT CHANGE UP (ref 0–41)
BILIRUB SERPL-MCNC: 0.4 MG/DL — SIGNIFICANT CHANGE UP (ref 0.2–1.2)
BUN SERPL-MCNC: 42 MG/DL — HIGH (ref 10–20)
CALCIUM SERPL-MCNC: 8.6 MG/DL — SIGNIFICANT CHANGE UP (ref 8.5–10.1)
CHLORIDE SERPL-SCNC: 104 MMOL/L — SIGNIFICANT CHANGE UP (ref 98–110)
CO2 SERPL-SCNC: 29 MMOL/L — SIGNIFICANT CHANGE UP (ref 17–32)
CREAT SERPL-MCNC: 1.5 MG/DL — SIGNIFICANT CHANGE UP (ref 0.7–1.5)
GLUCOSE BLDC GLUCOMTR-MCNC: 131 MG/DL — HIGH (ref 70–99)
GLUCOSE BLDC GLUCOMTR-MCNC: 193 MG/DL — HIGH (ref 70–99)
GLUCOSE BLDC GLUCOMTR-MCNC: 228 MG/DL — HIGH (ref 70–99)
GLUCOSE BLDC GLUCOMTR-MCNC: 232 MG/DL — HIGH (ref 70–99)
GLUCOSE SERPL-MCNC: 188 MG/DL — HIGH (ref 70–99)
HCT VFR BLD CALC: 41.3 % — LOW (ref 42–52)
HGB BLD-MCNC: 12.5 G/DL — LOW (ref 14–18)
MCHC RBC-ENTMCNC: 23.8 PG — LOW (ref 27–31)
MCHC RBC-ENTMCNC: 30.3 G/DL — LOW (ref 32–37)
MCV RBC AUTO: 78.7 FL — LOW (ref 80–94)
NRBC # BLD: 0 /100 WBCS — SIGNIFICANT CHANGE UP (ref 0–0)
PLATELET # BLD AUTO: 489 K/UL — HIGH (ref 130–400)
POTASSIUM SERPL-MCNC: 5.2 MMOL/L — HIGH (ref 3.5–5)
POTASSIUM SERPL-SCNC: 5.2 MMOL/L — HIGH (ref 3.5–5)
PROT SERPL-MCNC: 5.3 G/DL — LOW (ref 6–8)
RBC # BLD: 5.25 M/UL — SIGNIFICANT CHANGE UP (ref 4.7–6.1)
RBC # FLD: 19 % — HIGH (ref 11.5–14.5)
SODIUM SERPL-SCNC: 143 MMOL/L — SIGNIFICANT CHANGE UP (ref 135–146)
WBC # BLD: 13.57 K/UL — HIGH (ref 4.8–10.8)
WBC # FLD AUTO: 13.57 K/UL — HIGH (ref 4.8–10.8)

## 2018-12-18 RX ADMIN — Medication 1 TABLET(S): at 12:14

## 2018-12-18 RX ADMIN — Medication 17 UNIT(S): at 08:20

## 2018-12-18 RX ADMIN — Medication 3 MILLILITER(S): at 17:01

## 2018-12-18 RX ADMIN — LACTULOSE 30 GRAM(S): 10 SOLUTION ORAL at 13:58

## 2018-12-18 RX ADMIN — POLYETHYLENE GLYCOL 3350 17 GRAM(S): 17 POWDER, FOR SOLUTION ORAL at 17:36

## 2018-12-18 RX ADMIN — Medication 1000 UNIT(S): at 12:13

## 2018-12-18 RX ADMIN — FINASTERIDE 5 MILLIGRAM(S): 5 TABLET, FILM COATED ORAL at 12:14

## 2018-12-18 RX ADMIN — Medication 100 MILLIGRAM(S): at 12:13

## 2018-12-18 RX ADMIN — PREGABALIN 1000 MICROGRAM(S): 225 CAPSULE ORAL at 12:13

## 2018-12-18 RX ADMIN — AMLODIPINE BESYLATE 10 MILLIGRAM(S): 2.5 TABLET ORAL at 08:42

## 2018-12-18 RX ADMIN — Medication 17 UNIT(S): at 12:14

## 2018-12-18 RX ADMIN — Medication 4: at 17:32

## 2018-12-18 RX ADMIN — Medication 3 MILLILITER(S): at 00:28

## 2018-12-18 RX ADMIN — LACTULOSE 30 GRAM(S): 10 SOLUTION ORAL at 22:06

## 2018-12-18 RX ADMIN — Medication 60 MILLIGRAM(S): at 08:43

## 2018-12-18 RX ADMIN — Medication 2: at 08:19

## 2018-12-18 RX ADMIN — ATORVASTATIN CALCIUM 20 MILLIGRAM(S): 80 TABLET, FILM COATED ORAL at 22:04

## 2018-12-18 RX ADMIN — Medication 250 MILLIGRAM(S): at 06:25

## 2018-12-18 RX ADMIN — POLYETHYLENE GLYCOL 3350 17 GRAM(S): 17 POWDER, FOR SOLUTION ORAL at 08:22

## 2018-12-18 RX ADMIN — Medication 17 UNIT(S): at 17:32

## 2018-12-18 RX ADMIN — BUDESONIDE AND FORMOTEROL FUMARATE DIHYDRATE 2 PUFF(S): 160; 4.5 AEROSOL RESPIRATORY (INHALATION) at 08:04

## 2018-12-18 RX ADMIN — SENNA PLUS 2 TABLET(S): 8.6 TABLET ORAL at 22:04

## 2018-12-18 RX ADMIN — Medication 3 MILLILITER(S): at 13:12

## 2018-12-18 RX ADMIN — Medication 100 MILLIGRAM(S): at 12:14

## 2018-12-18 RX ADMIN — Medication 1 DROP(S): at 13:57

## 2018-12-18 RX ADMIN — Medication 3 MILLILITER(S): at 07:28

## 2018-12-18 RX ADMIN — Medication 3 MILLILITER(S): at 19:57

## 2018-12-18 RX ADMIN — Medication 250 MILLIGRAM(S): at 17:41

## 2018-12-18 RX ADMIN — Medication 1 DROP(S): at 22:04

## 2018-12-18 RX ADMIN — Medication 25 MILLIGRAM(S): at 08:43

## 2018-12-18 RX ADMIN — TIOTROPIUM BROMIDE 1 CAPSULE(S): 18 CAPSULE ORAL; RESPIRATORY (INHALATION) at 08:04

## 2018-12-18 RX ADMIN — TAMSULOSIN HYDROCHLORIDE 0.4 MILLIGRAM(S): 0.4 CAPSULE ORAL at 22:04

## 2018-12-18 RX ADMIN — INSULIN GLARGINE 50 UNIT(S): 100 INJECTION, SOLUTION SUBCUTANEOUS at 22:05

## 2018-12-18 NOTE — PROCEDURE NOTE - NSEQUIPUSED_SKIN_A_CORE
cotton-tipped applications/povidone-iodine pads/gloves/adhesive tape/forceps/antiseptic cleaning solution/gauze pads/normal saline solution/suture scissors

## 2018-12-18 NOTE — PROGRESS NOTE ADULT - ASSESSMENT
1. Acute on Chronic Hypoxic respiratory failure due to COPD exacerbation.  On PO steroids/  IV Levaquin.   Continue hiflow oxygen, transition to portable AVAP    2. Hyperkalemia:  recheck in AM    3. Hypertension:  controlled    4. Diabetes mellitus, type 2:  controlled    5. JAMES on CKD stage III:  Stabilized    6. BPH:  Continue Flomax and Finasteride.     7. Morbid Obesity  diet encouraged    8. B12 deficiency & Vitamin D deficiency:  Outpt f/u    9. MICHAEL:  BiPAP at night.    10. R index finger cellulitis  already on levaquin, and vanco  surg seen, possible I&D?

## 2018-12-18 NOTE — PROCEDURE NOTE - NSPOSTCAREGUIDE_GEN_A_CORE
Instructed patient/caregiver to follow-up with primary care physician/Keep the cast/splint/dressing clean and dry/Verbal/written post procedure instructions were given to patient/caregiver/Instructed patient/caregiver regarding signs and symptoms of infection

## 2018-12-18 NOTE — PROGRESS NOTE ADULT - ASSESSMENT
Impression:  Acute chronic COPD with exacerbation  No Impending respiratory failure      let patient get his life 2000 from home to use it instead of high flow if tolerate   rehab   try to wean off high flow and  maintain sats > 90%  prednisone taper   bronchodilator treatments ATC and PRN  NIPPV as needed  symbicort and resume spiriva   OOB to chair  GI and DVT prophylaxis  pulmonary toilet  overall prognosis is very poor  clarify why ana Glez consider to d/c

## 2018-12-19 LAB
GLUCOSE BLDC GLUCOMTR-MCNC: 180 MG/DL — HIGH (ref 70–99)
GLUCOSE BLDC GLUCOMTR-MCNC: 190 MG/DL — HIGH (ref 70–99)
GLUCOSE BLDC GLUCOMTR-MCNC: 234 MG/DL — HIGH (ref 70–99)
GLUCOSE BLDC GLUCOMTR-MCNC: 249 MG/DL — HIGH (ref 70–99)

## 2018-12-19 RX ADMIN — Medication 3 MILLILITER(S): at 13:57

## 2018-12-19 RX ADMIN — Medication 17 UNIT(S): at 12:25

## 2018-12-19 RX ADMIN — Medication 1 DROP(S): at 08:05

## 2018-12-19 RX ADMIN — Medication 250 MILLIGRAM(S): at 18:10

## 2018-12-19 RX ADMIN — Medication 1000 UNIT(S): at 11:14

## 2018-12-19 RX ADMIN — SENNA PLUS 2 TABLET(S): 8.6 TABLET ORAL at 21:37

## 2018-12-19 RX ADMIN — PANTOPRAZOLE SODIUM 40 MILLIGRAM(S): 20 TABLET, DELAYED RELEASE ORAL at 08:07

## 2018-12-19 RX ADMIN — Medication 250 MILLIGRAM(S): at 05:58

## 2018-12-19 RX ADMIN — SODIUM POLYSTYRENE SULFONATE 30 GRAM(S): 4.1 POWDER, FOR SUSPENSION ORAL at 15:16

## 2018-12-19 RX ADMIN — FINASTERIDE 5 MILLIGRAM(S): 5 TABLET, FILM COATED ORAL at 11:12

## 2018-12-19 RX ADMIN — TIOTROPIUM BROMIDE 1 CAPSULE(S): 18 CAPSULE ORAL; RESPIRATORY (INHALATION) at 18:11

## 2018-12-19 RX ADMIN — LACTULOSE 30 GRAM(S): 10 SOLUTION ORAL at 15:18

## 2018-12-19 RX ADMIN — PREGABALIN 1000 MICROGRAM(S): 225 CAPSULE ORAL at 11:12

## 2018-12-19 RX ADMIN — LACTULOSE 30 GRAM(S): 10 SOLUTION ORAL at 08:11

## 2018-12-19 RX ADMIN — Medication 100 MILLIGRAM(S): at 11:12

## 2018-12-19 RX ADMIN — Medication 3 MILLILITER(S): at 07:41

## 2018-12-19 RX ADMIN — Medication 17 UNIT(S): at 18:01

## 2018-12-19 RX ADMIN — AMLODIPINE BESYLATE 10 MILLIGRAM(S): 2.5 TABLET ORAL at 08:04

## 2018-12-19 RX ADMIN — Medication 17 UNIT(S): at 08:03

## 2018-12-19 RX ADMIN — Medication 4: at 18:01

## 2018-12-19 RX ADMIN — Medication 4: at 12:25

## 2018-12-19 RX ADMIN — BUDESONIDE AND FORMOTEROL FUMARATE DIHYDRATE 2 PUFF(S): 160; 4.5 AEROSOL RESPIRATORY (INHALATION) at 21:41

## 2018-12-19 RX ADMIN — Medication 3 MILLILITER(S): at 15:48

## 2018-12-19 RX ADMIN — INSULIN GLARGINE 50 UNIT(S): 100 INJECTION, SOLUTION SUBCUTANEOUS at 21:36

## 2018-12-19 RX ADMIN — Medication 60 MILLIGRAM(S): at 08:07

## 2018-12-19 RX ADMIN — Medication 2: at 08:02

## 2018-12-19 RX ADMIN — Medication 25 MILLIGRAM(S): at 08:07

## 2018-12-19 RX ADMIN — LACTULOSE 30 GRAM(S): 10 SOLUTION ORAL at 21:38

## 2018-12-19 RX ADMIN — Medication 1 DROP(S): at 15:18

## 2018-12-19 RX ADMIN — Medication 1 DROP(S): at 21:39

## 2018-12-19 RX ADMIN — ATORVASTATIN CALCIUM 20 MILLIGRAM(S): 80 TABLET, FILM COATED ORAL at 21:37

## 2018-12-19 RX ADMIN — Medication 3 MILLILITER(S): at 00:05

## 2018-12-19 RX ADMIN — BUDESONIDE AND FORMOTEROL FUMARATE DIHYDRATE 2 PUFF(S): 160; 4.5 AEROSOL RESPIRATORY (INHALATION) at 18:10

## 2018-12-19 RX ADMIN — Medication 1 TABLET(S): at 11:11

## 2018-12-19 RX ADMIN — Medication 100 MILLIGRAM(S): at 11:11

## 2018-12-19 RX ADMIN — POLYETHYLENE GLYCOL 3350 17 GRAM(S): 17 POWDER, FOR SOLUTION ORAL at 08:10

## 2018-12-19 RX ADMIN — TAMSULOSIN HYDROCHLORIDE 0.4 MILLIGRAM(S): 0.4 CAPSULE ORAL at 21:37

## 2018-12-19 NOTE — PROGRESS NOTE ADULT - ASSESSMENT
77yo M with cellulitis and abscess to right index finger s/p bedside I&D on 12/18.    -c/w wound care  -f/u wound cx  -c/w abx

## 2018-12-19 NOTE — PROGRESS NOTE ADULT - ASSESSMENT
1. Acute on Chronic Hypoxic respiratory failure due to COPD exacerbation.  On PO steroids/  IV Levaquin.   Portable AVAP at bedside, awaiting pulm F/U    2. Hyperkalemia:  mild again, recheck in AM    3. Hypertension:  controlled    4. Diabetes mellitus, type 2:  controlled    5. JAMES on CKD stage III:  Stabilized    6. BPH:  Continue Flomax and Finasteride.     7. Morbid Obesity  diet encouraged    8. B12 deficiency & Vitamin D deficiency:  Outpt f/u    9. MICHAEL:  BiPAP at night.    10. R index finger cellulitis  surg following

## 2018-12-20 LAB
-  AMPICILLIN/SULBACTAM: SIGNIFICANT CHANGE UP
-  CEFAZOLIN: SIGNIFICANT CHANGE UP
-  CLINDAMYCIN: SIGNIFICANT CHANGE UP
-  DAPTOMYCIN: SIGNIFICANT CHANGE UP
-  ERYTHROMYCIN: SIGNIFICANT CHANGE UP
-  GENTAMICIN: SIGNIFICANT CHANGE UP
-  LINEZOLID: SIGNIFICANT CHANGE UP
-  OXACILLIN: SIGNIFICANT CHANGE UP
-  PENICILLIN: SIGNIFICANT CHANGE UP
-  RIFAMPIN: SIGNIFICANT CHANGE UP
-  TETRACYCLINE: SIGNIFICANT CHANGE UP
-  TRIMETHOPRIM/SULFAMETHOXAZOLE: SIGNIFICANT CHANGE UP
-  VANCOMYCIN: SIGNIFICANT CHANGE UP
GLUCOSE BLDC GLUCOMTR-MCNC: 144 MG/DL — HIGH (ref 70–99)
GLUCOSE BLDC GLUCOMTR-MCNC: 155 MG/DL — HIGH (ref 70–99)
GLUCOSE BLDC GLUCOMTR-MCNC: 165 MG/DL — HIGH (ref 70–99)
GLUCOSE BLDC GLUCOMTR-MCNC: 94 MG/DL — SIGNIFICANT CHANGE UP (ref 70–99)
METHOD TYPE: SIGNIFICANT CHANGE UP

## 2018-12-20 RX ADMIN — Medication 1 DROP(S): at 17:35

## 2018-12-20 RX ADMIN — Medication 60 MILLIGRAM(S): at 09:18

## 2018-12-20 RX ADMIN — PREGABALIN 1000 MICROGRAM(S): 225 CAPSULE ORAL at 12:08

## 2018-12-20 RX ADMIN — BUDESONIDE AND FORMOTEROL FUMARATE DIHYDRATE 2 PUFF(S): 160; 4.5 AEROSOL RESPIRATORY (INHALATION) at 09:17

## 2018-12-20 RX ADMIN — Medication 2: at 17:34

## 2018-12-20 RX ADMIN — Medication 17 UNIT(S): at 12:08

## 2018-12-20 RX ADMIN — SENNA PLUS 2 TABLET(S): 8.6 TABLET ORAL at 21:25

## 2018-12-20 RX ADMIN — Medication 25 MILLIGRAM(S): at 09:20

## 2018-12-20 RX ADMIN — FINASTERIDE 5 MILLIGRAM(S): 5 TABLET, FILM COATED ORAL at 12:08

## 2018-12-20 RX ADMIN — Medication 100 MILLIGRAM(S): at 12:08

## 2018-12-20 RX ADMIN — Medication 1 DROP(S): at 09:20

## 2018-12-20 RX ADMIN — ATORVASTATIN CALCIUM 20 MILLIGRAM(S): 80 TABLET, FILM COATED ORAL at 21:25

## 2018-12-20 RX ADMIN — AMLODIPINE BESYLATE 10 MILLIGRAM(S): 2.5 TABLET ORAL at 09:19

## 2018-12-20 RX ADMIN — Medication 250 MILLIGRAM(S): at 05:27

## 2018-12-20 RX ADMIN — Medication 17 UNIT(S): at 17:34

## 2018-12-20 RX ADMIN — Medication 17 UNIT(S): at 09:17

## 2018-12-20 RX ADMIN — LACTULOSE 30 GRAM(S): 10 SOLUTION ORAL at 21:28

## 2018-12-20 RX ADMIN — LACTULOSE 30 GRAM(S): 10 SOLUTION ORAL at 17:35

## 2018-12-20 RX ADMIN — Medication 3 MILLILITER(S): at 16:15

## 2018-12-20 RX ADMIN — TAMSULOSIN HYDROCHLORIDE 0.4 MILLIGRAM(S): 0.4 CAPSULE ORAL at 21:25

## 2018-12-20 RX ADMIN — Medication 3 MILLILITER(S): at 07:44

## 2018-12-20 RX ADMIN — Medication 1000 UNIT(S): at 12:08

## 2018-12-20 RX ADMIN — BUDESONIDE AND FORMOTEROL FUMARATE DIHYDRATE 2 PUFF(S): 160; 4.5 AEROSOL RESPIRATORY (INHALATION) at 20:08

## 2018-12-20 RX ADMIN — TIOTROPIUM BROMIDE 1 CAPSULE(S): 18 CAPSULE ORAL; RESPIRATORY (INHALATION) at 09:18

## 2018-12-20 RX ADMIN — LACTULOSE 30 GRAM(S): 10 SOLUTION ORAL at 09:21

## 2018-12-20 RX ADMIN — INSULIN GLARGINE 50 UNIT(S): 100 INJECTION, SOLUTION SUBCUTANEOUS at 22:07

## 2018-12-20 RX ADMIN — Medication 250 MILLIGRAM(S): at 17:33

## 2018-12-20 RX ADMIN — Medication 1 TABLET(S): at 12:08

## 2018-12-20 RX ADMIN — Medication 3 MILLILITER(S): at 20:11

## 2018-12-20 RX ADMIN — PANTOPRAZOLE SODIUM 40 MILLIGRAM(S): 20 TABLET, DELAYED RELEASE ORAL at 09:18

## 2018-12-20 RX ADMIN — Medication 3 MILLILITER(S): at 12:26

## 2018-12-20 NOTE — PROGRESS NOTE ADULT - ASSESSMENT
1. Acute on Chronic Hypoxic respiratory failure due to COPD exacerbation.  On PO steroids/Levaquin.   awaiting home hiflow    2. Hyperkalemia:  recheck in AM    3. Hypertension:  controlled    4. Diabetes mellitus, type 2:  controlled    5. JAMES on CKD stage III:  Stabilized    6. BPH:  Continue Flomax and Finasteride.     7. Morbid Obesity  diet encouraged    8. B12 deficiency & Vitamin D deficiency:  Outpt f/u    9. MICHAEL:  BiPAP at night.    10. R index finger cellulitis  surg following    Despite IV steroids and bronchodilators patient desaturates to:    - Room air pulse ox. at rest:  87%    Patient will require home O2 for discharge.  Patient is aware and agreeable to home O2.  Patient is in a chronic stable state of COPD.

## 2018-12-21 LAB
GLUCOSE BLDC GLUCOMTR-MCNC: 140 MG/DL — HIGH (ref 70–99)
GLUCOSE BLDC GLUCOMTR-MCNC: 200 MG/DL — HIGH (ref 70–99)
GLUCOSE BLDC GLUCOMTR-MCNC: 236 MG/DL — HIGH (ref 70–99)
GLUCOSE BLDC GLUCOMTR-MCNC: 283 MG/DL — HIGH (ref 70–99)

## 2018-12-21 RX ADMIN — Medication 100 MILLIGRAM(S): at 11:37

## 2018-12-21 RX ADMIN — Medication 250 MILLIGRAM(S): at 09:29

## 2018-12-21 RX ADMIN — Medication 3 MILLILITER(S): at 19:36

## 2018-12-21 RX ADMIN — PANTOPRAZOLE SODIUM 40 MILLIGRAM(S): 20 TABLET, DELAYED RELEASE ORAL at 08:25

## 2018-12-21 RX ADMIN — Medication 1 TABLET(S): at 11:37

## 2018-12-21 RX ADMIN — ATORVASTATIN CALCIUM 20 MILLIGRAM(S): 80 TABLET, FILM COATED ORAL at 21:39

## 2018-12-21 RX ADMIN — TAMSULOSIN HYDROCHLORIDE 0.4 MILLIGRAM(S): 0.4 CAPSULE ORAL at 21:38

## 2018-12-21 RX ADMIN — PREGABALIN 1000 MICROGRAM(S): 225 CAPSULE ORAL at 11:37

## 2018-12-21 RX ADMIN — Medication 3 MILLILITER(S): at 01:02

## 2018-12-21 RX ADMIN — Medication 3 MILLILITER(S): at 12:34

## 2018-12-21 RX ADMIN — Medication 17 UNIT(S): at 08:24

## 2018-12-21 RX ADMIN — Medication 17 UNIT(S): at 16:40

## 2018-12-21 RX ADMIN — POLYETHYLENE GLYCOL 3350 17 GRAM(S): 17 POWDER, FOR SOLUTION ORAL at 08:26

## 2018-12-21 RX ADMIN — Medication 3 MILLILITER(S): at 07:40

## 2018-12-21 RX ADMIN — BUDESONIDE AND FORMOTEROL FUMARATE DIHYDRATE 2 PUFF(S): 160; 4.5 AEROSOL RESPIRATORY (INHALATION) at 08:28

## 2018-12-21 RX ADMIN — Medication 2: at 11:36

## 2018-12-21 RX ADMIN — LACTULOSE 30 GRAM(S): 10 SOLUTION ORAL at 21:42

## 2018-12-21 RX ADMIN — Medication 25 MILLIGRAM(S): at 08:25

## 2018-12-21 RX ADMIN — TIOTROPIUM BROMIDE 1 CAPSULE(S): 18 CAPSULE ORAL; RESPIRATORY (INHALATION) at 08:27

## 2018-12-21 RX ADMIN — FINASTERIDE 5 MILLIGRAM(S): 5 TABLET, FILM COATED ORAL at 11:37

## 2018-12-21 RX ADMIN — Medication 17 UNIT(S): at 11:36

## 2018-12-21 RX ADMIN — Medication 1 DROP(S): at 21:38

## 2018-12-21 RX ADMIN — INSULIN GLARGINE 50 UNIT(S): 100 INJECTION, SOLUTION SUBCUTANEOUS at 21:42

## 2018-12-21 RX ADMIN — Medication 4: at 16:40

## 2018-12-21 RX ADMIN — SENNA PLUS 2 TABLET(S): 8.6 TABLET ORAL at 21:39

## 2018-12-21 RX ADMIN — Medication 60 MILLIGRAM(S): at 08:27

## 2018-12-21 RX ADMIN — Medication 1000 UNIT(S): at 11:37

## 2018-12-21 RX ADMIN — AMLODIPINE BESYLATE 10 MILLIGRAM(S): 2.5 TABLET ORAL at 08:26

## 2018-12-21 NOTE — PROVIDER CONTACT NOTE (MEDICATION) - SITUATION
Pt refusing vitals; IV access and medication at this time. client states that he is too tired and needs to rest

## 2018-12-21 NOTE — PROGRESS NOTE ADULT - ASSESSMENT
Impression:  Acute chronic COPD with exacerbation  No Impending respiratory failure      from pulmonary can be discharge home or rehab   continue home inhalers on Trelegy   prednisone taper   on home oxygen has life 2000  and bipap at night   follow with pulmonary rehab    OOB to chair  GI and DVT prophylaxis  pulmonary toilet  overall prognosis is very poor

## 2018-12-21 NOTE — CHART NOTE - NSCHARTNOTEFT_GEN_A_CORE
Patient is refusing all of his oral meds and an IV restart (on iv vancomycin) at this time but told RN that he would reconsider later.

## 2018-12-21 NOTE — PROGRESS NOTE ADULT - ASSESSMENT
1. Acute on Chronic Hypoxic respiratory failure due to COPD exacerbation.  On PO steroids/Levaquin.   awaiting home hiflow    2. Hyperkalemia:  recheck, mild    3. Hypertension:  controlled    4. Diabetes mellitus, type 2:  controlled    5. JAMES on CKD stage III:  Stabilized    6. BPH:  Continue Flomax and Finasteride.     7. Morbid Obesity  diet encouraged    8. B12 deficiency & Vitamin D deficiency:  Outpt f/u    9. MICHAEL:  BiPAP at night.    10. R index finger cellulitis  MRSA abscess

## 2018-12-22 LAB
ALBUMIN SERPL ELPH-MCNC: 3.5 G/DL — SIGNIFICANT CHANGE UP (ref 3.5–5.2)
ALP SERPL-CCNC: 54 U/L — SIGNIFICANT CHANGE UP (ref 30–115)
ALT FLD-CCNC: 14 U/L — SIGNIFICANT CHANGE UP (ref 0–41)
ANION GAP SERPL CALC-SCNC: 10 MMOL/L — SIGNIFICANT CHANGE UP (ref 7–14)
AST SERPL-CCNC: 9 U/L — SIGNIFICANT CHANGE UP (ref 0–41)
BILIRUB SERPL-MCNC: 0.3 MG/DL — SIGNIFICANT CHANGE UP (ref 0.2–1.2)
BUN SERPL-MCNC: 46 MG/DL — HIGH (ref 10–20)
CALCIUM SERPL-MCNC: 8.8 MG/DL — SIGNIFICANT CHANGE UP (ref 8.5–10.1)
CHLORIDE SERPL-SCNC: 105 MMOL/L — SIGNIFICANT CHANGE UP (ref 98–110)
CO2 SERPL-SCNC: 29 MMOL/L — SIGNIFICANT CHANGE UP (ref 17–32)
CREAT SERPL-MCNC: 2 MG/DL — HIGH (ref 0.7–1.5)
GLUCOSE BLDC GLUCOMTR-MCNC: 163 MG/DL — HIGH (ref 70–99)
GLUCOSE BLDC GLUCOMTR-MCNC: 180 MG/DL — HIGH (ref 70–99)
GLUCOSE BLDC GLUCOMTR-MCNC: 193 MG/DL — HIGH (ref 70–99)
GLUCOSE BLDC GLUCOMTR-MCNC: 351 MG/DL — HIGH (ref 70–99)
GLUCOSE SERPL-MCNC: 191 MG/DL — HIGH (ref 70–99)
HCT VFR BLD CALC: 44.3 % — SIGNIFICANT CHANGE UP (ref 42–52)
HGB BLD-MCNC: 13 G/DL — LOW (ref 14–18)
MCHC RBC-ENTMCNC: 23.5 PG — LOW (ref 27–31)
MCHC RBC-ENTMCNC: 29.3 G/DL — LOW (ref 32–37)
MCV RBC AUTO: 80.1 FL — SIGNIFICANT CHANGE UP (ref 80–94)
NRBC # BLD: 0 /100 WBCS — SIGNIFICANT CHANGE UP (ref 0–0)
PLATELET # BLD AUTO: 434 K/UL — HIGH (ref 130–400)
POTASSIUM SERPL-MCNC: 5.3 MMOL/L — HIGH (ref 3.5–5)
POTASSIUM SERPL-SCNC: 5.3 MMOL/L — HIGH (ref 3.5–5)
PROT SERPL-MCNC: 5.6 G/DL — LOW (ref 6–8)
RBC # BLD: 5.53 M/UL — SIGNIFICANT CHANGE UP (ref 4.7–6.1)
RBC # FLD: 20.3 % — HIGH (ref 11.5–14.5)
SODIUM SERPL-SCNC: 144 MMOL/L — SIGNIFICANT CHANGE UP (ref 135–146)
WBC # BLD: 17.14 K/UL — HIGH (ref 4.8–10.8)
WBC # FLD AUTO: 17.14 K/UL — HIGH (ref 4.8–10.8)

## 2018-12-22 RX ORDER — SODIUM POLYSTYRENE SULFONATE 4.1 MEQ/G
30 POWDER, FOR SUSPENSION ORAL ONCE
Qty: 0 | Refills: 0 | Status: COMPLETED | OUTPATIENT
Start: 2018-12-22 | End: 2018-12-22

## 2018-12-22 RX ADMIN — Medication 1 DROP(S): at 13:17

## 2018-12-22 RX ADMIN — SENNA PLUS 2 TABLET(S): 8.6 TABLET ORAL at 21:39

## 2018-12-22 RX ADMIN — ATORVASTATIN CALCIUM 20 MILLIGRAM(S): 80 TABLET, FILM COATED ORAL at 21:39

## 2018-12-22 RX ADMIN — Medication 6: at 21:49

## 2018-12-22 RX ADMIN — PREGABALIN 1000 MICROGRAM(S): 225 CAPSULE ORAL at 11:58

## 2018-12-22 RX ADMIN — PANTOPRAZOLE SODIUM 40 MILLIGRAM(S): 20 TABLET, DELAYED RELEASE ORAL at 08:15

## 2018-12-22 RX ADMIN — AMLODIPINE BESYLATE 10 MILLIGRAM(S): 2.5 TABLET ORAL at 08:10

## 2018-12-22 RX ADMIN — BUDESONIDE AND FORMOTEROL FUMARATE DIHYDRATE 2 PUFF(S): 160; 4.5 AEROSOL RESPIRATORY (INHALATION) at 21:38

## 2018-12-22 RX ADMIN — Medication 25 MILLIGRAM(S): at 08:16

## 2018-12-22 RX ADMIN — Medication 2: at 08:08

## 2018-12-22 RX ADMIN — HEPARIN SODIUM 5000 UNIT(S): 5000 INJECTION INTRAVENOUS; SUBCUTANEOUS at 08:12

## 2018-12-22 RX ADMIN — LACTULOSE 30 GRAM(S): 10 SOLUTION ORAL at 16:02

## 2018-12-22 RX ADMIN — Medication 60 MILLIGRAM(S): at 08:11

## 2018-12-22 RX ADMIN — Medication 1000 UNIT(S): at 11:58

## 2018-12-22 RX ADMIN — Medication 3 MILLILITER(S): at 20:27

## 2018-12-22 RX ADMIN — TIOTROPIUM BROMIDE 1 CAPSULE(S): 18 CAPSULE ORAL; RESPIRATORY (INHALATION) at 08:14

## 2018-12-22 RX ADMIN — Medication 17 UNIT(S): at 11:52

## 2018-12-22 RX ADMIN — Medication 3 MILLILITER(S): at 13:05

## 2018-12-22 RX ADMIN — SODIUM POLYSTYRENE SULFONATE 30 GRAM(S): 4.1 POWDER, FOR SUSPENSION ORAL at 13:16

## 2018-12-22 RX ADMIN — FINASTERIDE 5 MILLIGRAM(S): 5 TABLET, FILM COATED ORAL at 11:58

## 2018-12-22 RX ADMIN — Medication 1 DROP(S): at 08:10

## 2018-12-22 RX ADMIN — Medication 17 UNIT(S): at 17:14

## 2018-12-22 RX ADMIN — BUDESONIDE AND FORMOTEROL FUMARATE DIHYDRATE 2 PUFF(S): 160; 4.5 AEROSOL RESPIRATORY (INHALATION) at 08:17

## 2018-12-22 RX ADMIN — Medication 1 TABLET(S): at 11:58

## 2018-12-22 RX ADMIN — Medication 100 MILLIGRAM(S): at 11:58

## 2018-12-22 RX ADMIN — POLYETHYLENE GLYCOL 3350 17 GRAM(S): 17 POWDER, FOR SOLUTION ORAL at 08:12

## 2018-12-22 RX ADMIN — TAMSULOSIN HYDROCHLORIDE 0.4 MILLIGRAM(S): 0.4 CAPSULE ORAL at 21:39

## 2018-12-22 RX ADMIN — Medication 100 MILLIGRAM(S): at 11:59

## 2018-12-22 RX ADMIN — LACTULOSE 30 GRAM(S): 10 SOLUTION ORAL at 21:40

## 2018-12-22 RX ADMIN — POLYETHYLENE GLYCOL 3350 17 GRAM(S): 17 POWDER, FOR SOLUTION ORAL at 17:16

## 2018-12-22 RX ADMIN — Medication 2: at 17:14

## 2018-12-22 RX ADMIN — Medication 2: at 11:52

## 2018-12-22 RX ADMIN — Medication 17 UNIT(S): at 08:08

## 2018-12-22 RX ADMIN — HEPARIN SODIUM 5000 UNIT(S): 5000 INJECTION INTRAVENOUS; SUBCUTANEOUS at 17:18

## 2018-12-22 RX ADMIN — Medication 3 MILLILITER(S): at 16:40

## 2018-12-22 RX ADMIN — INSULIN GLARGINE 50 UNIT(S): 100 INJECTION, SOLUTION SUBCUTANEOUS at 21:42

## 2018-12-22 NOTE — PROGRESS NOTE ADULT - ASSESSMENT
1. Acute on Chronic Hypoxic respiratory failure due to COPD exacerbation.  On PO steroids/Levaquin.   awaiting home hiflow    2. Hyperkalemia:  recheck after kayexalate    3. Hypertension:  controlled    4. Diabetes mellitus, type 2:  controlled    5. JAMES on CKD stage III:  Stabilized    6. BPH:  Continue Flomax and Finasteride.     7. Morbid Obesity  diet encouraged    8. B12 deficiency & Vitamin D deficiency:  Outpt f/u    9. MICHAEL:  hiflow at night.    10. R index finger cellulitis  MRSA abscess

## 2018-12-22 NOTE — PROGRESS NOTE ADULT - ASSESSMENT
Patient is an 78y Male with h/o COPD on home oxygen 5L, DM, MICHAEL, obesity, CKD 3, chronic hyperkalemia.  No recent labs    From 12/18/18 - K 5.2  - Cont KAyxalate 30 gr 3-4xweek depending on K level  - check BMP 2x week     Acute on Chronic Hypoxic respiratory failure:  Due to COPD exacerbation. IV steroids  Chest X-ray Bibasilar opacities. ? Pneumonia. Continue IV Levaquin.  Continue supplemental oxygen & BiPAP prn and QHS. Maintain sats > 90%      Will sign off  Pt needs renal follow up as OP

## 2018-12-23 LAB
ALBUMIN SERPL ELPH-MCNC: 3.5 G/DL — SIGNIFICANT CHANGE UP (ref 3.5–5.2)
ALP SERPL-CCNC: 55 U/L — SIGNIFICANT CHANGE UP (ref 30–115)
ALT FLD-CCNC: 14 U/L — SIGNIFICANT CHANGE UP (ref 0–41)
ANION GAP SERPL CALC-SCNC: 8 MMOL/L — SIGNIFICANT CHANGE UP (ref 7–14)
AST SERPL-CCNC: 9 U/L — SIGNIFICANT CHANGE UP (ref 0–41)
BILIRUB SERPL-MCNC: 0.4 MG/DL — SIGNIFICANT CHANGE UP (ref 0.2–1.2)
BUN SERPL-MCNC: 48 MG/DL — HIGH (ref 10–20)
CALCIUM SERPL-MCNC: 8.7 MG/DL — SIGNIFICANT CHANGE UP (ref 8.5–10.1)
CHLORIDE SERPL-SCNC: 103 MMOL/L — SIGNIFICANT CHANGE UP (ref 98–110)
CO2 SERPL-SCNC: 32 MMOL/L — SIGNIFICANT CHANGE UP (ref 17–32)
CREAT SERPL-MCNC: 1.9 MG/DL — HIGH (ref 0.7–1.5)
CULTURE RESULTS: SIGNIFICANT CHANGE UP
GLUCOSE BLDC GLUCOMTR-MCNC: 191 MG/DL — HIGH (ref 70–99)
GLUCOSE BLDC GLUCOMTR-MCNC: 203 MG/DL — HIGH (ref 70–99)
GLUCOSE BLDC GLUCOMTR-MCNC: 230 MG/DL — HIGH (ref 70–99)
GLUCOSE BLDC GLUCOMTR-MCNC: 303 MG/DL — HIGH (ref 70–99)
GLUCOSE SERPL-MCNC: 208 MG/DL — HIGH (ref 70–99)
HCT VFR BLD CALC: 43.3 % — SIGNIFICANT CHANGE UP (ref 42–52)
HGB BLD-MCNC: 12.8 G/DL — LOW (ref 14–18)
MCHC RBC-ENTMCNC: 23.4 PG — LOW (ref 27–31)
MCHC RBC-ENTMCNC: 29.6 G/DL — LOW (ref 32–37)
MCV RBC AUTO: 79.3 FL — LOW (ref 80–94)
NRBC # BLD: 0 /100 WBCS — SIGNIFICANT CHANGE UP (ref 0–0)
ORGANISM # SPEC MICROSCOPIC CNT: SIGNIFICANT CHANGE UP
ORGANISM # SPEC MICROSCOPIC CNT: SIGNIFICANT CHANGE UP
PLATELET # BLD AUTO: 409 K/UL — HIGH (ref 130–400)
POTASSIUM SERPL-MCNC: 5 MMOL/L — SIGNIFICANT CHANGE UP (ref 3.5–5)
POTASSIUM SERPL-SCNC: 5 MMOL/L — SIGNIFICANT CHANGE UP (ref 3.5–5)
PROT SERPL-MCNC: 5.6 G/DL — LOW (ref 6–8)
RBC # BLD: 5.46 M/UL — SIGNIFICANT CHANGE UP (ref 4.7–6.1)
RBC # FLD: 20.6 % — HIGH (ref 11.5–14.5)
SODIUM SERPL-SCNC: 143 MMOL/L — SIGNIFICANT CHANGE UP (ref 135–146)
SPECIMEN SOURCE: SIGNIFICANT CHANGE UP
WBC # BLD: 16.11 K/UL — HIGH (ref 4.8–10.8)
WBC # FLD AUTO: 16.11 K/UL — HIGH (ref 4.8–10.8)

## 2018-12-23 RX ADMIN — BUDESONIDE AND FORMOTEROL FUMARATE DIHYDRATE 2 PUFF(S): 160; 4.5 AEROSOL RESPIRATORY (INHALATION) at 08:07

## 2018-12-23 RX ADMIN — LACTULOSE 30 GRAM(S): 10 SOLUTION ORAL at 21:12

## 2018-12-23 RX ADMIN — PANTOPRAZOLE SODIUM 40 MILLIGRAM(S): 20 TABLET, DELAYED RELEASE ORAL at 07:31

## 2018-12-23 RX ADMIN — POLYETHYLENE GLYCOL 3350 17 GRAM(S): 17 POWDER, FOR SOLUTION ORAL at 21:12

## 2018-12-23 RX ADMIN — PREGABALIN 1000 MICROGRAM(S): 225 CAPSULE ORAL at 11:37

## 2018-12-23 RX ADMIN — LACTULOSE 30 GRAM(S): 10 SOLUTION ORAL at 13:53

## 2018-12-23 RX ADMIN — Medication 4: at 22:34

## 2018-12-23 RX ADMIN — Medication 1000 UNIT(S): at 11:37

## 2018-12-23 RX ADMIN — HEPARIN SODIUM 5000 UNIT(S): 5000 INJECTION INTRAVENOUS; SUBCUTANEOUS at 07:31

## 2018-12-23 RX ADMIN — FINASTERIDE 5 MILLIGRAM(S): 5 TABLET, FILM COATED ORAL at 11:38

## 2018-12-23 RX ADMIN — TAMSULOSIN HYDROCHLORIDE 0.4 MILLIGRAM(S): 0.4 CAPSULE ORAL at 21:11

## 2018-12-23 RX ADMIN — Medication 2: at 08:09

## 2018-12-23 RX ADMIN — Medication 17 UNIT(S): at 12:13

## 2018-12-23 RX ADMIN — Medication 1 DROP(S): at 21:11

## 2018-12-23 RX ADMIN — AMLODIPINE BESYLATE 10 MILLIGRAM(S): 2.5 TABLET ORAL at 07:31

## 2018-12-23 RX ADMIN — Medication 3 MILLILITER(S): at 13:58

## 2018-12-23 RX ADMIN — Medication 17 UNIT(S): at 08:09

## 2018-12-23 RX ADMIN — INSULIN GLARGINE 50 UNIT(S): 100 INJECTION, SOLUTION SUBCUTANEOUS at 22:32

## 2018-12-23 RX ADMIN — Medication 3 MILLILITER(S): at 07:37

## 2018-12-23 RX ADMIN — Medication 1 TABLET(S): at 11:37

## 2018-12-23 RX ADMIN — Medication 25 MILLIGRAM(S): at 07:31

## 2018-12-23 RX ADMIN — Medication 4: at 16:40

## 2018-12-23 RX ADMIN — LACTULOSE 30 GRAM(S): 10 SOLUTION ORAL at 07:33

## 2018-12-23 RX ADMIN — TIOTROPIUM BROMIDE 1 CAPSULE(S): 18 CAPSULE ORAL; RESPIRATORY (INHALATION) at 08:08

## 2018-12-23 RX ADMIN — ATORVASTATIN CALCIUM 20 MILLIGRAM(S): 80 TABLET, FILM COATED ORAL at 21:10

## 2018-12-23 RX ADMIN — POLYETHYLENE GLYCOL 3350 17 GRAM(S): 17 POWDER, FOR SOLUTION ORAL at 07:34

## 2018-12-23 RX ADMIN — Medication 1 DROP(S): at 07:30

## 2018-12-23 RX ADMIN — Medication 100 MILLIGRAM(S): at 11:37

## 2018-12-23 RX ADMIN — SENNA PLUS 2 TABLET(S): 8.6 TABLET ORAL at 21:10

## 2018-12-23 RX ADMIN — HEPARIN SODIUM 5000 UNIT(S): 5000 INJECTION INTRAVENOUS; SUBCUTANEOUS at 21:04

## 2018-12-23 RX ADMIN — Medication 4: at 12:12

## 2018-12-23 RX ADMIN — Medication 3 MILLILITER(S): at 17:10

## 2018-12-23 RX ADMIN — Medication 17 UNIT(S): at 16:39

## 2018-12-23 RX ADMIN — Medication 1 DROP(S): at 13:53

## 2018-12-23 RX ADMIN — Medication 60 MILLIGRAM(S): at 07:32

## 2018-12-23 NOTE — CHART NOTE - NSCHARTNOTEFT_GEN_A_CORE
Nutrition follow-up    Pt will continue on a carb con, DASH/TLC, renal diet. Pt reports a good appetite. EMR reports % intake. Pt denies N/V/D. Last BM was today 12/23. Herberth score 20. Skin intact. Edema 1+ dependent. Meds: humalog, prednisone, lantus, lactulose, miralax, senna, atorvastatin, cholecalciferol, cyanocobalamin, docusate sodium, humalog, MVI, pantoprazole. Labs: (12/23) H/H 12.8/43.3 (12/22) K+ 5.3, BUN 46, creat 2, glucose 191. Pt had questions on a renal diet which were answered. Pt reports he lost his potassium sheet. Another was provided. Will monitor intake, nutrition focused physical findings, and labs. Not at risk. Follow-up in 7 days.

## 2018-12-23 NOTE — PROGRESS NOTE ADULT - ASSESSMENT
1. Acute on Chronic Hypoxic respiratory failure due to COPD exacerbation.  On PO steroids, tapered  awaiting home hiflow    2. Hyperkalemia:  improved    3. Hypertension:  controlled    4. Diabetes mellitus, type 2:  controlled    5. JAMES on CKD stage III:  Stabilized    6. BPH:  Continue Flomax and Finasteride.     7. Morbid Obesity  diet encouraged    8. B12 deficiency & Vitamin D deficiency:  Outpt f/u    9. MICHAEL:  hiflow at night.    10. R index finger cellulitis  MRSA abscess

## 2018-12-24 LAB
GLUCOSE BLDC GLUCOMTR-MCNC: 124 MG/DL — HIGH (ref 70–99)
GLUCOSE BLDC GLUCOMTR-MCNC: 151 MG/DL — HIGH (ref 70–99)
GLUCOSE BLDC GLUCOMTR-MCNC: 186 MG/DL — HIGH (ref 70–99)
GLUCOSE BLDC GLUCOMTR-MCNC: 89 MG/DL — SIGNIFICANT CHANGE UP (ref 70–99)

## 2018-12-24 RX ADMIN — Medication 3 MILLILITER(S): at 17:03

## 2018-12-24 RX ADMIN — BUDESONIDE AND FORMOTEROL FUMARATE DIHYDRATE 2 PUFF(S): 160; 4.5 AEROSOL RESPIRATORY (INHALATION) at 22:51

## 2018-12-24 RX ADMIN — LACTULOSE 30 GRAM(S): 10 SOLUTION ORAL at 22:50

## 2018-12-24 RX ADMIN — PREGABALIN 1000 MICROGRAM(S): 225 CAPSULE ORAL at 11:14

## 2018-12-24 RX ADMIN — LACTULOSE 30 GRAM(S): 10 SOLUTION ORAL at 14:00

## 2018-12-24 RX ADMIN — TIOTROPIUM BROMIDE 1 CAPSULE(S): 18 CAPSULE ORAL; RESPIRATORY (INHALATION) at 07:32

## 2018-12-24 RX ADMIN — Medication 1 DROP(S): at 13:59

## 2018-12-24 RX ADMIN — POLYETHYLENE GLYCOL 3350 17 GRAM(S): 17 POWDER, FOR SOLUTION ORAL at 17:46

## 2018-12-24 RX ADMIN — Medication 3 MILLILITER(S): at 20:17

## 2018-12-24 RX ADMIN — Medication 17 UNIT(S): at 17:47

## 2018-12-24 RX ADMIN — Medication 100 MILLIGRAM(S): at 11:14

## 2018-12-24 RX ADMIN — Medication 1 TABLET(S): at 11:14

## 2018-12-24 RX ADMIN — Medication 1000 UNIT(S): at 11:14

## 2018-12-24 RX ADMIN — FINASTERIDE 5 MILLIGRAM(S): 5 TABLET, FILM COATED ORAL at 11:14

## 2018-12-24 RX ADMIN — Medication 2: at 08:20

## 2018-12-24 RX ADMIN — BUDESONIDE AND FORMOTEROL FUMARATE DIHYDRATE 2 PUFF(S): 160; 4.5 AEROSOL RESPIRATORY (INHALATION) at 07:30

## 2018-12-24 RX ADMIN — SENNA PLUS 2 TABLET(S): 8.6 TABLET ORAL at 22:48

## 2018-12-24 RX ADMIN — HEPARIN SODIUM 5000 UNIT(S): 5000 INJECTION INTRAVENOUS; SUBCUTANEOUS at 17:47

## 2018-12-24 RX ADMIN — Medication 3 MILLILITER(S): at 14:16

## 2018-12-24 RX ADMIN — Medication 2: at 17:49

## 2018-12-24 RX ADMIN — TAMSULOSIN HYDROCHLORIDE 0.4 MILLIGRAM(S): 0.4 CAPSULE ORAL at 22:48

## 2018-12-24 RX ADMIN — ATORVASTATIN CALCIUM 20 MILLIGRAM(S): 80 TABLET, FILM COATED ORAL at 22:48

## 2018-12-24 RX ADMIN — Medication 3 MILLILITER(S): at 07:29

## 2018-12-24 RX ADMIN — Medication 17 UNIT(S): at 08:23

## 2018-12-24 RX ADMIN — Medication 650 MILLIGRAM(S): at 22:50

## 2018-12-24 RX ADMIN — Medication 3 MILLILITER(S): at 02:24

## 2018-12-24 NOTE — CHART NOTE - NSCHARTNOTEFT_GEN_A_CORE
Patient interested in discussing DNR status - would review this information with close family when present

## 2018-12-24 NOTE — PROGRESS NOTE ADULT - ASSESSMENT
1. Acute on Chronic Hypoxic respiratory failure due to COPD exacerbation.  On PO steroids, tapered  awaiting home hiflow    2. Hyperkalemia:  resolved    3. Hypertension:  controlled    4. Diabetes mellitus, type 2:  controlled    5. JAMES on CKD stage III:  Stabilized    6. BPH:  Continue Flomax and Finasteride.     7. Morbid Obesity  diet encouraged    8. B12 deficiency & Vitamin D deficiency:  Outpt f/u    9. MICHAEL:  hiflow at night.    10. R index finger cellulitis  MRSA abscess

## 2018-12-25 LAB
GLUCOSE BLDC GLUCOMTR-MCNC: 143 MG/DL — HIGH (ref 70–99)
GLUCOSE BLDC GLUCOMTR-MCNC: 213 MG/DL — HIGH (ref 70–99)
GLUCOSE BLDC GLUCOMTR-MCNC: 322 MG/DL — HIGH (ref 70–99)
GLUCOSE BLDC GLUCOMTR-MCNC: 354 MG/DL — HIGH (ref 70–99)

## 2018-12-25 RX ORDER — METOPROLOL TARTRATE 50 MG
25 TABLET ORAL
Qty: 0 | Refills: 0 | Status: DISCONTINUED | OUTPATIENT
Start: 2018-12-25 | End: 2019-01-03

## 2018-12-25 RX ORDER — FUROSEMIDE 40 MG
40 TABLET ORAL
Qty: 0 | Refills: 0 | Status: DISCONTINUED | OUTPATIENT
Start: 2018-12-25 | End: 2018-12-25

## 2018-12-25 RX ORDER — FUROSEMIDE 40 MG
20 TABLET ORAL
Qty: 0 | Refills: 0 | Status: DISCONTINUED | OUTPATIENT
Start: 2018-12-25 | End: 2019-01-02

## 2018-12-25 RX ADMIN — Medication 1 DROP(S): at 14:52

## 2018-12-25 RX ADMIN — TAMSULOSIN HYDROCHLORIDE 0.4 MILLIGRAM(S): 0.4 CAPSULE ORAL at 22:21

## 2018-12-25 RX ADMIN — Medication 100 MILLIGRAM(S): at 12:34

## 2018-12-25 RX ADMIN — PREGABALIN 1000 MICROGRAM(S): 225 CAPSULE ORAL at 12:34

## 2018-12-25 RX ADMIN — Medication 17 UNIT(S): at 08:22

## 2018-12-25 RX ADMIN — Medication 3 MILLILITER(S): at 19:22

## 2018-12-25 RX ADMIN — Medication 17 UNIT(S): at 17:31

## 2018-12-25 RX ADMIN — Medication 3 MILLILITER(S): at 12:22

## 2018-12-25 RX ADMIN — Medication 1 TABLET(S): at 12:33

## 2018-12-25 RX ADMIN — Medication 3 MILLILITER(S): at 07:55

## 2018-12-25 RX ADMIN — POLYETHYLENE GLYCOL 3350 17 GRAM(S): 17 POWDER, FOR SOLUTION ORAL at 17:33

## 2018-12-25 RX ADMIN — Medication 25 MILLIGRAM(S): at 05:55

## 2018-12-25 RX ADMIN — ATORVASTATIN CALCIUM 20 MILLIGRAM(S): 80 TABLET, FILM COATED ORAL at 22:21

## 2018-12-25 RX ADMIN — Medication 17 UNIT(S): at 12:31

## 2018-12-25 RX ADMIN — SENNA PLUS 2 TABLET(S): 8.6 TABLET ORAL at 22:21

## 2018-12-25 RX ADMIN — PANTOPRAZOLE SODIUM 40 MILLIGRAM(S): 20 TABLET, DELAYED RELEASE ORAL at 05:54

## 2018-12-25 RX ADMIN — Medication 20 MILLIGRAM(S): at 10:04

## 2018-12-25 RX ADMIN — Medication 650 MILLIGRAM(S): at 00:28

## 2018-12-25 RX ADMIN — BUDESONIDE AND FORMOTEROL FUMARATE DIHYDRATE 2 PUFF(S): 160; 4.5 AEROSOL RESPIRATORY (INHALATION) at 22:21

## 2018-12-25 RX ADMIN — SODIUM POLYSTYRENE SULFONATE 30 GRAM(S): 4.1 POWDER, FOR SUSPENSION ORAL at 10:05

## 2018-12-25 RX ADMIN — HEPARIN SODIUM 5000 UNIT(S): 5000 INJECTION INTRAVENOUS; SUBCUTANEOUS at 05:55

## 2018-12-25 RX ADMIN — Medication 40 MILLIGRAM(S): at 05:55

## 2018-12-25 RX ADMIN — Medication 3 MILLILITER(S): at 00:46

## 2018-12-25 RX ADMIN — FINASTERIDE 5 MILLIGRAM(S): 5 TABLET, FILM COATED ORAL at 12:33

## 2018-12-25 RX ADMIN — Medication 8: at 17:31

## 2018-12-25 RX ADMIN — LACTULOSE 30 GRAM(S): 10 SOLUTION ORAL at 05:54

## 2018-12-25 RX ADMIN — Medication 1000 UNIT(S): at 12:34

## 2018-12-25 RX ADMIN — Medication 10: at 12:31

## 2018-12-25 RX ADMIN — HEPARIN SODIUM 5000 UNIT(S): 5000 INJECTION INTRAVENOUS; SUBCUTANEOUS at 17:35

## 2018-12-25 RX ADMIN — Medication 20 MILLIGRAM(S): at 17:34

## 2018-12-25 RX ADMIN — Medication 3 MILLILITER(S): at 15:47

## 2018-12-25 RX ADMIN — Medication 1 DROP(S): at 05:55

## 2018-12-25 RX ADMIN — TIOTROPIUM BROMIDE 1 CAPSULE(S): 18 CAPSULE ORAL; RESPIRATORY (INHALATION) at 07:55

## 2018-12-25 RX ADMIN — LACTULOSE 30 GRAM(S): 10 SOLUTION ORAL at 14:50

## 2018-12-25 NOTE — PROGRESS NOTE ADULT - ASSESSMENT
Patient is an 78y Male with h/o COPD on home oxygen 5L, DM, MICHAEL, obesity, CKD 3, recently discharged on 11/15 after being admitted for acute on chronic hypoxic & Hypercapnic respiratory failure due to COPD exacerbation and gram negative pneumonia. He presented from home with complaints of Dyspnea. He explained that for the past 1 week his breathing has been getting worse gradually with pulse ox readings in the 80s whilst on oxygen with mildly productive cough, no fevers or chills. He was scheduled to see Pulmonologist Dr. Castrejon on Monday but due to difficulty breathing he was BIBA. Per ED physician, patient was tachypneic and cyanotic on arrival with oxygen saturation in the 70s with NC 5L. He was started on BIPAP with significant improvement and transitioned to HFNC off which he cannot be weaned. Hospital course has been complicated by Rt finger abscess requiring I&D. He is still waiting for discharge home vs. SNF. .     Assessment and Plan:    1. Acute on Chronic Hypoxic respiratory failure: Due to COPD exacerbation.  Continue Prednisone Taper &, Bronchodilators. Completed course of Levaquin.  Pulm following: appreciate input. Recommendations noted.   Patient hypoxic on 50L/min: Flow rate increased. Follow up Chest X-ray shows stable Bibasilar opacities.   Continue supplemental oxygen & BiPAP prn and QHS. Maintain sats > 90%  Awaiting discharge home on HFNC.      2. Hyperkalemia: Resolved.  Nephrology consulted: due to JAMES, RTA type 4?, high K foods?  Recommended Low K diet & Kayaxalate 30 gr po TTS (3x week)       3. Hypertension/Mild Tachycardia:  Tachycardia due to Hypoxia vs. Deconditioning. Oxygen flow rate increased to maintain sats >90%  Metoprolol dose adjusted. Monitor BP.  Hold Amlodipine for now.      4. Diabetes mellitus, type 2: Uncontrolled.  Hemoglobin A1C, Whole Blood: 8.7 % (11.05.18 @ 07:00)  Monitor FS with Insulin coverage.      5. CKD stage III:  Avoid Nephrotoxins. Monitor BMP and Electrolytes with Diuresis.  CT abdomen: No hydronephrosis bilaterally or obstructing calculus.        6. BPH:  Continue Flomax and Finasteride.       7. Obesity:  Weight loss counselling. Dietary modification.      8. B12 deficiency & Vitamin D deficiency:  Continue with home supplementation.       9. MICHAEL:  BiPAP at night.      10. Rt Index Finger cellulitis:  MRSA positive Well healed. Off Antibiotics.  No need for Isolation.      11. Anasarca:  ? due to HF.  ECHO 11/2018: Normal LVEF, no RWMA, Trace TR, Moderate pulmonary hypertension.  Gentle diuresis. Follow up repeat ECHO.      DVT prophylaxis: Heparin  Disposition: Home with HFNC if oxygen can be delivered.  CODE status: DNR/DNI  Very poor Prognosis.

## 2018-12-26 LAB
ALBUMIN SERPL ELPH-MCNC: 3.2 G/DL — LOW (ref 3.5–5.2)
ALP SERPL-CCNC: 54 U/L — SIGNIFICANT CHANGE UP (ref 30–115)
ALT FLD-CCNC: 11 U/L — SIGNIFICANT CHANGE UP (ref 0–41)
ANION GAP SERPL CALC-SCNC: 8 MMOL/L — SIGNIFICANT CHANGE UP (ref 7–14)
AST SERPL-CCNC: 8 U/L — SIGNIFICANT CHANGE UP (ref 0–41)
BASOPHILS # BLD AUTO: 0.03 K/UL — SIGNIFICANT CHANGE UP (ref 0–0.2)
BASOPHILS NFR BLD AUTO: 0.3 % — SIGNIFICANT CHANGE UP (ref 0–1)
BILIRUB SERPL-MCNC: 0.5 MG/DL — SIGNIFICANT CHANGE UP (ref 0.2–1.2)
BUN SERPL-MCNC: 52 MG/DL — HIGH (ref 10–20)
CALCIUM SERPL-MCNC: 8.7 MG/DL — SIGNIFICANT CHANGE UP (ref 8.5–10.1)
CHLORIDE SERPL-SCNC: 99 MMOL/L — SIGNIFICANT CHANGE UP (ref 98–110)
CO2 SERPL-SCNC: 35 MMOL/L — HIGH (ref 17–32)
CREAT SERPL-MCNC: 1.8 MG/DL — HIGH (ref 0.7–1.5)
EOSINOPHIL # BLD AUTO: 0.32 K/UL — SIGNIFICANT CHANGE UP (ref 0–0.7)
EOSINOPHIL NFR BLD AUTO: 2.7 % — SIGNIFICANT CHANGE UP (ref 0–8)
GLUCOSE BLDC GLUCOMTR-MCNC: 221 MG/DL — HIGH (ref 70–99)
GLUCOSE BLDC GLUCOMTR-MCNC: 242 MG/DL — HIGH (ref 70–99)
GLUCOSE BLDC GLUCOMTR-MCNC: 248 MG/DL — HIGH (ref 70–99)
GLUCOSE BLDC GLUCOMTR-MCNC: 318 MG/DL — HIGH (ref 70–99)
GLUCOSE SERPL-MCNC: 258 MG/DL — HIGH (ref 70–99)
HCT VFR BLD CALC: 40 % — LOW (ref 42–52)
HGB BLD-MCNC: 11.7 G/DL — LOW (ref 14–18)
IMM GRANULOCYTES NFR BLD AUTO: 1.4 % — HIGH (ref 0.1–0.3)
LYMPHOCYTES # BLD AUTO: 0.83 K/UL — LOW (ref 1.2–3.4)
LYMPHOCYTES # BLD AUTO: 7.1 % — LOW (ref 20.5–51.1)
MAGNESIUM SERPL-MCNC: 1.7 MG/DL — LOW (ref 1.8–2.4)
MCHC RBC-ENTMCNC: 23.2 PG — LOW (ref 27–31)
MCHC RBC-ENTMCNC: 29.3 G/DL — LOW (ref 32–37)
MCV RBC AUTO: 79.4 FL — LOW (ref 80–94)
MONOCYTES # BLD AUTO: 0.87 K/UL — HIGH (ref 0.1–0.6)
MONOCYTES NFR BLD AUTO: 7.4 % — SIGNIFICANT CHANGE UP (ref 1.7–9.3)
NEUTROPHILS # BLD AUTO: 9.55 K/UL — HIGH (ref 1.4–6.5)
NEUTROPHILS NFR BLD AUTO: 81.1 % — HIGH (ref 42.2–75.2)
NRBC # BLD: 0 /100 WBCS — SIGNIFICANT CHANGE UP (ref 0–0)
PHOSPHATE SERPL-MCNC: 4.7 MG/DL — SIGNIFICANT CHANGE UP (ref 2.1–4.9)
PLATELET # BLD AUTO: 296 K/UL — SIGNIFICANT CHANGE UP (ref 130–400)
POTASSIUM SERPL-MCNC: 4.9 MMOL/L — SIGNIFICANT CHANGE UP (ref 3.5–5)
POTASSIUM SERPL-SCNC: 4.9 MMOL/L — SIGNIFICANT CHANGE UP (ref 3.5–5)
PROT SERPL-MCNC: 5 G/DL — LOW (ref 6–8)
RBC # BLD: 5.04 M/UL — SIGNIFICANT CHANGE UP (ref 4.7–6.1)
RBC # FLD: 20.3 % — HIGH (ref 11.5–14.5)
SODIUM SERPL-SCNC: 142 MMOL/L — SIGNIFICANT CHANGE UP (ref 135–146)
WBC # BLD: 11.76 K/UL — HIGH (ref 4.8–10.8)
WBC # FLD AUTO: 11.76 K/UL — HIGH (ref 4.8–10.8)

## 2018-12-26 RX ORDER — MAGNESIUM SULFATE 500 MG/ML
2 VIAL (ML) INJECTION ONCE
Qty: 0 | Refills: 0 | Status: COMPLETED | OUTPATIENT
Start: 2018-12-26 | End: 2018-12-26

## 2018-12-26 RX ADMIN — BUDESONIDE AND FORMOTEROL FUMARATE DIHYDRATE 2 PUFF(S): 160; 4.5 AEROSOL RESPIRATORY (INHALATION) at 08:19

## 2018-12-26 RX ADMIN — TAMSULOSIN HYDROCHLORIDE 0.4 MILLIGRAM(S): 0.4 CAPSULE ORAL at 21:11

## 2018-12-26 RX ADMIN — ATORVASTATIN CALCIUM 20 MILLIGRAM(S): 80 TABLET, FILM COATED ORAL at 21:12

## 2018-12-26 RX ADMIN — Medication 3 MILLILITER(S): at 13:52

## 2018-12-26 RX ADMIN — Medication 4: at 08:17

## 2018-12-26 RX ADMIN — Medication 4: at 12:21

## 2018-12-26 RX ADMIN — Medication 17 UNIT(S): at 08:17

## 2018-12-26 RX ADMIN — FINASTERIDE 5 MILLIGRAM(S): 5 TABLET, FILM COATED ORAL at 12:22

## 2018-12-26 RX ADMIN — Medication 17 UNIT(S): at 12:21

## 2018-12-26 RX ADMIN — Medication 1 TABLET(S): at 12:22

## 2018-12-26 RX ADMIN — Medication 20 MILLIGRAM(S): at 06:56

## 2018-12-26 RX ADMIN — BUDESONIDE AND FORMOTEROL FUMARATE DIHYDRATE 2 PUFF(S): 160; 4.5 AEROSOL RESPIRATORY (INHALATION) at 20:32

## 2018-12-26 RX ADMIN — Medication 100 MILLIGRAM(S): at 12:22

## 2018-12-26 RX ADMIN — SENNA PLUS 2 TABLET(S): 8.6 TABLET ORAL at 21:11

## 2018-12-26 RX ADMIN — HEPARIN SODIUM 5000 UNIT(S): 5000 INJECTION INTRAVENOUS; SUBCUTANEOUS at 06:56

## 2018-12-26 RX ADMIN — TIOTROPIUM BROMIDE 1 CAPSULE(S): 18 CAPSULE ORAL; RESPIRATORY (INHALATION) at 08:18

## 2018-12-26 RX ADMIN — Medication 3 MILLILITER(S): at 16:33

## 2018-12-26 RX ADMIN — Medication 3 MILLILITER(S): at 07:50

## 2018-12-26 RX ADMIN — POLYETHYLENE GLYCOL 3350 17 GRAM(S): 17 POWDER, FOR SOLUTION ORAL at 17:15

## 2018-12-26 RX ADMIN — Medication 17 UNIT(S): at 17:13

## 2018-12-26 RX ADMIN — Medication 40 MILLIGRAM(S): at 06:56

## 2018-12-26 RX ADMIN — PREGABALIN 1000 MICROGRAM(S): 225 CAPSULE ORAL at 13:43

## 2018-12-26 RX ADMIN — PANTOPRAZOLE SODIUM 40 MILLIGRAM(S): 20 TABLET, DELAYED RELEASE ORAL at 06:56

## 2018-12-26 RX ADMIN — Medication 8: at 17:13

## 2018-12-26 RX ADMIN — Medication 1 DROP(S): at 21:11

## 2018-12-26 RX ADMIN — Medication 1000 UNIT(S): at 12:22

## 2018-12-26 RX ADMIN — INSULIN GLARGINE 50 UNIT(S): 100 INJECTION, SOLUTION SUBCUTANEOUS at 21:12

## 2018-12-26 RX ADMIN — Medication 20 MILLIGRAM(S): at 17:14

## 2018-12-26 RX ADMIN — Medication 50 GRAM(S): at 15:27

## 2018-12-26 RX ADMIN — Medication 3 MILLILITER(S): at 20:31

## 2018-12-26 RX ADMIN — Medication 1 DROP(S): at 06:56

## 2018-12-26 RX ADMIN — Medication 25 MILLIGRAM(S): at 17:14

## 2018-12-26 RX ADMIN — HEPARIN SODIUM 5000 UNIT(S): 5000 INJECTION INTRAVENOUS; SUBCUTANEOUS at 17:15

## 2018-12-26 RX ADMIN — Medication 1 DROP(S): at 13:43

## 2018-12-26 RX ADMIN — Medication 25 MILLIGRAM(S): at 06:56

## 2018-12-26 NOTE — PROGRESS NOTE ADULT - ASSESSMENT
Patient is an 78y Male with h/o COPD on home oxygen 5L, DM, MICHAEL, obesity, CKD 3, recently discharged on 11/15 after being admitted for acute on chronic hypoxic & Hypercapnic respiratory failure due to COPD exacerbation and gram negative pneumonia. He presented from home with complaints of Dyspnea. He explained that for the past 1 week his breathing has been getting worse gradually with pulse ox readings in the 80s whilst on oxygen with mildly productive cough, no fevers or chills. He was scheduled to see Pulmonologist Dr. Castrejon on Monday but due to difficulty breathing he was BIBA. Per ED physician, patient was tachypneic and cyanotic on arrival with oxygen saturation in the 70s with NC 5L. He was started on BIPAP with significant improvement and transitioned to HFNC off which he cannot be weaned. Hospital course has been complicated by Rt finger abscess requiring I&D. He is still waiting for discharge home vs. SNF. .     Assessment and Plan:    # Acute on Chronic Hypoxic respiratory failure: Due to COPD exacerbation.  Not better.  ECHO repeat today prelim - severe Right side heart dilation, RSVP 100 (was 40 in 10/2018)  Continue Prednisone Taper (Prednisone 20 starting 12/28) &, Bronchodilators. Completed course of Levaquin.  will recall Pulm for f/u.   Repeat CXR shows stable Bibasilar opacities.   Persistently hypoxic  Continue supplemental oxygen & BiPAP prn and QHS. Maintain sats > 90%  Will call hospice      # Hyperkalemia: Resolved.  Nephrology consulted: due to JAMES, RTA type 4?, high K foods?  Recommended Low K diet & Kayaxalate 30 gr po TTS (3x week)       # Hypertension/Mild Tachycardia:  Tachycardia due to Hypoxia vs. Deconditioning. Oxygen flow rate increased to maintain sats >90%  Metoprolol dose adjusted. Monitor BP.  Hold Amlodipine for now.      # Diabetes mellitus, type 2: Uncontrolled, likely due to steroids  Hemoglobin A1C, Whole Blood: 8.7 % (11.05.18 @ 07:00)  Monitor FS with Insulin coverage.    # CKD stage III:  Avoid Nephrotoxins. Monitor BMP and Electrolytes with Diuresis.  CT abdomen: No hydronephrosis bilaterally or obstructing calculus. Stable bilateral renal cysts measuring up to 9.5 cm at   the right lower pole. Stable 4 cm indeterminate exophytic left interpolar   renal lesion.  OP follow up      # BPH:  Continue Flomax and Finasteride.       # Obesity:  Weight loss counselling. Dietary modification.      # B12 deficiency & Vitamin D deficiency:  Continue with home supplementation.       # MICHAEL:  BiPAP at night.      # Rt Index Finger cellulitis:  MRSA positive Well healed. Off Antibiotics.  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      DVT prophylaxis: Heparin  Disposition: pending  CODE status: DNR/DNI  Very poor Prognosis.

## 2018-12-27 LAB
ANION GAP SERPL CALC-SCNC: 10 MMOL/L — SIGNIFICANT CHANGE UP (ref 7–14)
BUN SERPL-MCNC: 58 MG/DL — HIGH (ref 10–20)
CALCIUM SERPL-MCNC: 8.8 MG/DL — SIGNIFICANT CHANGE UP (ref 8.5–10.1)
CHLORIDE SERPL-SCNC: 99 MMOL/L — SIGNIFICANT CHANGE UP (ref 98–110)
CO2 SERPL-SCNC: 35 MMOL/L — HIGH (ref 17–32)
CREAT SERPL-MCNC: 1.8 MG/DL — HIGH (ref 0.7–1.5)
GLUCOSE BLDC GLUCOMTR-MCNC: 176 MG/DL — HIGH (ref 70–99)
GLUCOSE BLDC GLUCOMTR-MCNC: 292 MG/DL — HIGH (ref 70–99)
GLUCOSE BLDC GLUCOMTR-MCNC: 308 MG/DL — HIGH (ref 70–99)
GLUCOSE BLDC GLUCOMTR-MCNC: 334 MG/DL — HIGH (ref 70–99)
GLUCOSE SERPL-MCNC: 189 MG/DL — HIGH (ref 70–99)
HCT VFR BLD CALC: 40.6 % — LOW (ref 42–52)
HGB BLD-MCNC: 12 G/DL — LOW (ref 14–18)
MCHC RBC-ENTMCNC: 23.3 PG — LOW (ref 27–31)
MCHC RBC-ENTMCNC: 29.6 G/DL — LOW (ref 32–37)
MCV RBC AUTO: 79 FL — LOW (ref 80–94)
NRBC # BLD: 0 /100 WBCS — SIGNIFICANT CHANGE UP (ref 0–0)
PLATELET # BLD AUTO: 299 K/UL — SIGNIFICANT CHANGE UP (ref 130–400)
POTASSIUM SERPL-MCNC: 4.7 MMOL/L — SIGNIFICANT CHANGE UP (ref 3.5–5)
POTASSIUM SERPL-SCNC: 4.7 MMOL/L — SIGNIFICANT CHANGE UP (ref 3.5–5)
RBC # BLD: 5.14 M/UL — SIGNIFICANT CHANGE UP (ref 4.7–6.1)
RBC # FLD: 20.1 % — HIGH (ref 11.5–14.5)
SODIUM SERPL-SCNC: 144 MMOL/L — SIGNIFICANT CHANGE UP (ref 135–146)
WBC # BLD: 10.95 K/UL — HIGH (ref 4.8–10.8)
WBC # FLD AUTO: 10.95 K/UL — HIGH (ref 4.8–10.8)

## 2018-12-27 RX ORDER — ZINC OXIDE 200 MG/G
1 OINTMENT TOPICAL
Qty: 0 | Refills: 0 | Status: DISCONTINUED | OUTPATIENT
Start: 2018-12-27 | End: 2019-01-03

## 2018-12-27 RX ADMIN — HEPARIN SODIUM 5000 UNIT(S): 5000 INJECTION INTRAVENOUS; SUBCUTANEOUS at 17:24

## 2018-12-27 RX ADMIN — Medication 25 MILLIGRAM(S): at 06:56

## 2018-12-27 RX ADMIN — Medication 17 UNIT(S): at 12:20

## 2018-12-27 RX ADMIN — Medication 2: at 09:43

## 2018-12-27 RX ADMIN — Medication 6: at 17:17

## 2018-12-27 RX ADMIN — BUDESONIDE AND FORMOTEROL FUMARATE DIHYDRATE 2 PUFF(S): 160; 4.5 AEROSOL RESPIRATORY (INHALATION) at 08:52

## 2018-12-27 RX ADMIN — FINASTERIDE 5 MILLIGRAM(S): 5 TABLET, FILM COATED ORAL at 15:15

## 2018-12-27 RX ADMIN — ATORVASTATIN CALCIUM 20 MILLIGRAM(S): 80 TABLET, FILM COATED ORAL at 21:06

## 2018-12-27 RX ADMIN — Medication 1000 UNIT(S): at 15:15

## 2018-12-27 RX ADMIN — SENNA PLUS 2 TABLET(S): 8.6 TABLET ORAL at 21:06

## 2018-12-27 RX ADMIN — INSULIN GLARGINE 50 UNIT(S): 100 INJECTION, SOLUTION SUBCUTANEOUS at 21:03

## 2018-12-27 RX ADMIN — BUDESONIDE AND FORMOTEROL FUMARATE DIHYDRATE 2 PUFF(S): 160; 4.5 AEROSOL RESPIRATORY (INHALATION) at 21:06

## 2018-12-27 RX ADMIN — Medication 100 MILLIGRAM(S): at 15:15

## 2018-12-27 RX ADMIN — POLYETHYLENE GLYCOL 3350 17 GRAM(S): 17 POWDER, FOR SOLUTION ORAL at 07:01

## 2018-12-27 RX ADMIN — PREGABALIN 1000 MICROGRAM(S): 225 CAPSULE ORAL at 15:15

## 2018-12-27 RX ADMIN — Medication 40 MILLIGRAM(S): at 06:56

## 2018-12-27 RX ADMIN — POLYETHYLENE GLYCOL 3350 17 GRAM(S): 17 POWDER, FOR SOLUTION ORAL at 17:16

## 2018-12-27 RX ADMIN — HEPARIN SODIUM 5000 UNIT(S): 5000 INJECTION INTRAVENOUS; SUBCUTANEOUS at 06:58

## 2018-12-27 RX ADMIN — Medication 20 MILLIGRAM(S): at 17:24

## 2018-12-27 RX ADMIN — Medication 1 DROP(S): at 21:04

## 2018-12-27 RX ADMIN — ZINC OXIDE 1 APPLICATION(S): 200 OINTMENT TOPICAL at 06:57

## 2018-12-27 RX ADMIN — Medication 1 DROP(S): at 06:58

## 2018-12-27 RX ADMIN — Medication 3 MILLILITER(S): at 12:53

## 2018-12-27 RX ADMIN — Medication 17 UNIT(S): at 09:41

## 2018-12-27 RX ADMIN — PANTOPRAZOLE SODIUM 40 MILLIGRAM(S): 20 TABLET, DELAYED RELEASE ORAL at 06:56

## 2018-12-27 RX ADMIN — Medication 4: at 21:26

## 2018-12-27 RX ADMIN — Medication 8: at 12:20

## 2018-12-27 RX ADMIN — Medication 3 MILLILITER(S): at 15:46

## 2018-12-27 RX ADMIN — TAMSULOSIN HYDROCHLORIDE 0.4 MILLIGRAM(S): 0.4 CAPSULE ORAL at 21:06

## 2018-12-27 RX ADMIN — Medication 20 MILLIGRAM(S): at 06:56

## 2018-12-27 RX ADMIN — Medication 25 MILLIGRAM(S): at 17:16

## 2018-12-27 RX ADMIN — Medication 3 MILLILITER(S): at 07:32

## 2018-12-27 RX ADMIN — Medication 1 TABLET(S): at 15:35

## 2018-12-27 RX ADMIN — Medication 3 MILLILITER(S): at 00:24

## 2018-12-27 RX ADMIN — Medication 20 MILLIGRAM(S): at 21:03

## 2018-12-27 RX ADMIN — Medication 17 UNIT(S): at 17:16

## 2018-12-27 NOTE — PROGRESS NOTE ADULT - ASSESSMENT
Impression:  Acute chronic COPD with exacerbation  No Impending respiratory failure      Check O2 sat on NC, record, continue O2 as necessary  taper O2 as low as possible to maintain sats > 90%  taper oral prednisone   bronchodilator treatments ATC and PRN  NIPPV HS  OOB to chair  GI and DVT prophylaxis  pulmonary toilet  Monitor clinically, convert to oral prednisone as clinical improvement allows    overall prognosis is very poor he has end stage COPD, obesity hypoventilation

## 2018-12-27 NOTE — PROGRESS NOTE ADULT - ASSESSMENT
Patient is an 78y Male with h/o COPD on home oxygen 5L, DM, MICHAEL, obesity, CKD 3, recently discharged on 11/15 after being admitted for acute on chronic hypoxic & Hypercapnic respiratory failure due to COPD exacerbation and gram negative pneumonia. He presented from home with complaints of Dyspnea. He explained that for the past 1 week his breathing has been getting worse gradually with pulse ox readings in the 80s whilst on oxygen with mildly productive cough, no fevers or chills. He was scheduled to see Pulmonologist Dr. Castrejon on Monday but due to difficulty breathing he was BIBA. Per ED physician, patient was tachypneic and cyanotic on arrival with oxygen saturation in the 70s with NC 5L. He was started on BIPAP with significant improvement and transitioned to HFNC off which he cannot be weaned. Hospital course has been complicated by Rt finger abscess requiring I&D. He is still waiting for discharge home vs. SNF. .     Assessment and Plan:    # Acute on Chronic Hypoxic respiratory failure: Due to COPD exacerbation.  Not better.  repeat ECHO 12/26 - severe Right side heart dilation, RSVP 100 (was 40 in 10/2018)  Continue Prednisone Taper (Prednisone 20 starting 12/28) &, Bronchodilators. Completed course of Levaquin.  Persistently hypoxic  Continue supplemental oxygen & BiPAP prn and QHS. Maintain sats > 88%  Pt is not interested in hospice      # Hyperkalemia: Resolved.    # Hypertension/Mild Tachycardia:  Tachycardia due to Hypoxia vs. Deconditioning. Oxygen flow rate increased to maintain sats >90%  Metoprolol dose adjusted. Monitor BP.  Hold Amlodipine for now.      # Diabetes mellitus, type 2: Uncontrolled, likely due to steroids  Hemoglobin A1C, Whole Blood: 8.7 % (11.05.18 @ 07:00)  Monitor FS with Insulin coverage.    # CKD stage III:  Avoid Nephrotoxins. Monitor BMP and Electrolytes with Diuresis.  CT abdomen: No hydronephrosis bilaterally or obstructing calculus. Stable bilateral renal cysts measuring up to 9.5 cm at   the right lower pole. Stable 4 cm indeterminate exophytic left interpolar renal lesion.  OP follow up    # BPH:  Continue Flomax and Finasteride.     # Obesity:  Weight loss counselling. Dietary modification.      # B12 deficiency & Vitamin D deficiency:  Continue with home supplementation.       # MICHAEL:  BiPAP at night.      # Rt Index Finger cellulitis:  MRSA positive Well healed. Off Antibiotics.  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

## 2018-12-27 NOTE — CHART NOTE - NSCHARTNOTEFT_GEN_A_CORE
Pt received on 50L/50% FI02 HFNC, Saturation 98%, no respiratory distress noted. Pt received on 50L/40% FI02 HFNC, Saturation 98%, no respiratory distress noted.

## 2018-12-27 NOTE — CHART NOTE - NSCHARTNOTEFT_GEN_A_CORE
Pt was placed on 5 LPM NC as per Dr Trevino. Saturation 89%, no respiratory distress noted. Pt was placed on 5 LPM NC @ 10:00 am as per Dr Trevino. Saturation 89%, no respiratory distress noted.

## 2018-12-28 ENCOUNTER — TRANSCRIPTION ENCOUNTER (OUTPATIENT)
Age: 78
End: 2018-12-28

## 2018-12-28 LAB
GLUCOSE BLDC GLUCOMTR-MCNC: 192 MG/DL — HIGH (ref 70–99)
GLUCOSE BLDC GLUCOMTR-MCNC: 208 MG/DL — HIGH (ref 70–99)
GLUCOSE BLDC GLUCOMTR-MCNC: 225 MG/DL — HIGH (ref 70–99)
GLUCOSE BLDC GLUCOMTR-MCNC: 318 MG/DL — HIGH (ref 70–99)
GLUCOSE BLDC GLUCOMTR-MCNC: 435 MG/DL — HIGH (ref 70–99)

## 2018-12-28 RX ORDER — INSULIN GLARGINE 100 [IU]/ML
30 INJECTION, SOLUTION SUBCUTANEOUS AT BEDTIME
Qty: 0 | Refills: 0 | Status: DISCONTINUED | OUTPATIENT
Start: 2018-12-29 | End: 2019-01-03

## 2018-12-28 RX ORDER — METOPROLOL TARTRATE 50 MG
1 TABLET ORAL
Qty: 0 | Refills: 0 | COMMUNITY
Start: 2018-12-28

## 2018-12-28 RX ORDER — TIOTROPIUM BROMIDE 18 UG/1
1 CAPSULE ORAL; RESPIRATORY (INHALATION)
Qty: 0 | Refills: 0 | COMMUNITY
Start: 2018-12-28

## 2018-12-28 RX ORDER — METOPROLOL TARTRATE 50 MG
1 TABLET ORAL
Qty: 0 | Refills: 0 | COMMUNITY

## 2018-12-28 RX ORDER — AMLODIPINE BESYLATE 2.5 MG/1
1 TABLET ORAL
Qty: 0 | Refills: 0 | COMMUNITY

## 2018-12-28 RX ORDER — SODIUM POLYSTYRENE SULFONATE 4.1 MEQ/G
30 POWDER, FOR SUSPENSION ORAL
Qty: 0 | Refills: 0 | DISCHARGE
Start: 2018-12-28

## 2018-12-28 RX ORDER — SENNA PLUS 8.6 MG/1
2 TABLET ORAL
Qty: 0 | Refills: 0 | COMMUNITY
Start: 2018-12-28

## 2018-12-28 RX ORDER — ZINC OXIDE 200 MG/G
1 OINTMENT TOPICAL
Qty: 0 | Refills: 0 | COMMUNITY
Start: 2018-12-28

## 2018-12-28 RX ORDER — IPRATROPIUM/ALBUTEROL SULFATE 18-103MCG
3 AEROSOL WITH ADAPTER (GRAM) INHALATION
Qty: 0 | Refills: 0 | COMMUNITY
Start: 2018-12-28

## 2018-12-28 RX ORDER — INSULIN GLARGINE 100 [IU]/ML
30 INJECTION, SOLUTION SUBCUTANEOUS EVERY MORNING
Qty: 0 | Refills: 0 | Status: DISCONTINUED | OUTPATIENT
Start: 2018-12-29 | End: 2019-01-03

## 2018-12-28 RX ORDER — FUROSEMIDE 40 MG
1 TABLET ORAL
Qty: 0 | Refills: 0 | COMMUNITY
Start: 2018-12-28

## 2018-12-28 RX ORDER — POLYETHYLENE GLYCOL 3350 17 G/17G
1 POWDER, FOR SOLUTION ORAL
Qty: 0 | Refills: 0 | COMMUNITY

## 2018-12-28 RX ORDER — INSULIN GLARGINE 100 [IU]/ML
50 INJECTION, SOLUTION SUBCUTANEOUS
Qty: 0 | Refills: 0 | COMMUNITY
Start: 2018-12-28

## 2018-12-28 RX ORDER — INSULIN GLARGINE 100 [IU]/ML
50 INJECTION, SOLUTION SUBCUTANEOUS AT BEDTIME
Qty: 0 | Refills: 0 | Status: COMPLETED | OUTPATIENT
Start: 2018-12-28 | End: 2018-12-28

## 2018-12-28 RX ADMIN — TAMSULOSIN HYDROCHLORIDE 0.4 MILLIGRAM(S): 0.4 CAPSULE ORAL at 21:18

## 2018-12-28 RX ADMIN — BUDESONIDE AND FORMOTEROL FUMARATE DIHYDRATE 2 PUFF(S): 160; 4.5 AEROSOL RESPIRATORY (INHALATION) at 20:07

## 2018-12-28 RX ADMIN — Medication 3 MILLILITER(S): at 07:31

## 2018-12-28 RX ADMIN — SENNA PLUS 2 TABLET(S): 8.6 TABLET ORAL at 21:17

## 2018-12-28 RX ADMIN — ZINC OXIDE 1 APPLICATION(S): 200 OINTMENT TOPICAL at 07:04

## 2018-12-28 RX ADMIN — Medication 3 MILLILITER(S): at 06:52

## 2018-12-28 RX ADMIN — Medication 1 DROP(S): at 07:44

## 2018-12-28 RX ADMIN — Medication 3 MILLILITER(S): at 00:45

## 2018-12-28 RX ADMIN — FINASTERIDE 5 MILLIGRAM(S): 5 TABLET, FILM COATED ORAL at 12:15

## 2018-12-28 RX ADMIN — Medication 20 MILLIGRAM(S): at 06:59

## 2018-12-28 RX ADMIN — HEPARIN SODIUM 5000 UNIT(S): 5000 INJECTION INTRAVENOUS; SUBCUTANEOUS at 18:35

## 2018-12-28 RX ADMIN — Medication 1000 UNIT(S): at 12:14

## 2018-12-28 RX ADMIN — Medication 17 UNIT(S): at 12:09

## 2018-12-28 RX ADMIN — Medication 3 MILLILITER(S): at 20:04

## 2018-12-28 RX ADMIN — BUDESONIDE AND FORMOTEROL FUMARATE DIHYDRATE 2 PUFF(S): 160; 4.5 AEROSOL RESPIRATORY (INHALATION) at 08:04

## 2018-12-28 RX ADMIN — INSULIN GLARGINE 50 UNIT(S): 100 INJECTION, SOLUTION SUBCUTANEOUS at 21:20

## 2018-12-28 RX ADMIN — TIOTROPIUM BROMIDE 1 CAPSULE(S): 18 CAPSULE ORAL; RESPIRATORY (INHALATION) at 08:04

## 2018-12-28 RX ADMIN — Medication 3 MILLILITER(S): at 16:04

## 2018-12-28 RX ADMIN — ATORVASTATIN CALCIUM 20 MILLIGRAM(S): 80 TABLET, FILM COATED ORAL at 21:17

## 2018-12-28 RX ADMIN — Medication 20 MILLIGRAM(S): at 18:35

## 2018-12-28 RX ADMIN — Medication 1 DROP(S): at 21:17

## 2018-12-28 RX ADMIN — PANTOPRAZOLE SODIUM 40 MILLIGRAM(S): 20 TABLET, DELAYED RELEASE ORAL at 07:44

## 2018-12-28 RX ADMIN — Medication 100 MILLIGRAM(S): at 12:15

## 2018-12-28 RX ADMIN — SODIUM POLYSTYRENE SULFONATE 30 GRAM(S): 4.1 POWDER, FOR SUSPENSION ORAL at 09:13

## 2018-12-28 RX ADMIN — Medication 17 UNIT(S): at 17:16

## 2018-12-28 RX ADMIN — Medication 1 TABLET(S): at 12:15

## 2018-12-28 RX ADMIN — Medication 17 UNIT(S): at 08:05

## 2018-12-28 RX ADMIN — POLYETHYLENE GLYCOL 3350 17 GRAM(S): 17 POWDER, FOR SOLUTION ORAL at 07:03

## 2018-12-28 RX ADMIN — Medication 12: at 12:09

## 2018-12-28 RX ADMIN — Medication 4: at 17:16

## 2018-12-28 RX ADMIN — HEPARIN SODIUM 5000 UNIT(S): 5000 INJECTION INTRAVENOUS; SUBCUTANEOUS at 07:03

## 2018-12-28 RX ADMIN — Medication 25 MILLIGRAM(S): at 18:35

## 2018-12-28 RX ADMIN — Medication 3 MILLILITER(S): at 13:21

## 2018-12-28 RX ADMIN — PREGABALIN 1000 MICROGRAM(S): 225 CAPSULE ORAL at 12:14

## 2018-12-28 RX ADMIN — Medication 20 MILLIGRAM(S): at 07:00

## 2018-12-28 RX ADMIN — Medication 6: at 08:06

## 2018-12-28 RX ADMIN — Medication 25 MILLIGRAM(S): at 07:43

## 2018-12-28 NOTE — PROGRESS NOTE ADULT - ATTENDING COMMENTS
Attending Statement: I have personally performed a face to face diagnostic evaluation on this patient. I have written the above note pertaining to the history, exam and plan of care.
D/C planning for AM if home hiflow arranged
above noted finger infection better

## 2018-12-28 NOTE — DISCHARGE NOTE ADULT - MEDICATION SUMMARY - MEDICATIONS TO TAKE
I will START or STAY ON the medications listed below when I get home from the hospital:    finasteride 5 mg oral tablet  -- 1 tab(s) by mouth once a day  -- Indication: For BPH    predniSONE 10 mg oral tablet  -- 2 tab(s) by mouth once a day x 3 days  1 tab(s) by mouth once a day x 3 days  -- Indication: For COPD exacerbation    tamsulosin 0.4 mg oral capsule  -- 1 cap(s) by mouth once a day  -- Indication: For BPH    NovoLOG FlexPen 100 units/mL injectable solution  -- 15 unit(s) injectable 3 times a day (before meals)   -- Indication: For DM    insulin glargine  -- 50 unit(s) subcutaneous once a day  -- Indication: For DM    atorvastatin 20 mg oral tablet  -- 1 tab(s) by mouth once a day (at bedtime)  -- Indication: For CAD prophylaxis    metoprolol tartrate 25 mg oral tablet  -- 1 tab(s) by mouth 2 times a day  -- Indication: For HTN    Trelegy Ellipta inhalation powder  -- 1 puff(s) inhaled once a day  -- Indication: For COPD     ipratropium-albuterol 0.5 mg-2.5 mg/3 mLinhalation solution  -- 3 milliliter(s) inhaled every 4 hours, As Needed  -- Indication: For COPD     tiotropium 18 mcg inhalation capsule  -- 1 cap(s) inhaled once a day  -- Indication: For COPD     ProAir HFA  -- 2 inhaler(s) inhaled every 4 hours, As Needed  -- Indication: For COPD     sodium polystyrene sulfonate  -- 30 milligram(s) by mouth 3 times a week  -- Indication: For Hyperkalemia    zinc oxide 40% topical ointment  -- 1 application on skin 2 times a day  -- Indication: For skin care    furosemide 20 mg oral tablet  -- 1 tab(s) by mouth 2 times a day  -- Indication: For HTN    Flax Seed Oil oral capsule  -- 1000 milligram(s) orally  -- Indication: For Supplement    docusate sodium 100 mg oral tablet  -- 1  by mouth once a day  -- Indication: For Constipation    senna oral tablet  -- 2 tab(s) by mouth once a day (at bedtime)  -- Indication: For Constipation    polyethylene glycol 3350 oral powder for reconstitution  -- 1 dose(s) by mouth once a day, As Needed  -- Indication: For Constipation    ocular lubricant ophthalmic solution  -- 1 drop(s) to each affected eye 3 times a day  -- Indication: For eye drop    omeprazole 20 mg oral delayed release capsule  -- 1 cap(s) by mouth once a day   -- Indication: For GERD    Centrum oral tablet  -- 1 tab(s) by mouth once a day  -- Indication: For Vitamin supplement    Vitamin B-12  -- 3000 milligram(s) orally  -- Indication: For Vitamin supplement    Vitamin D3 1000 intl units oral tablet  -- 1 tab(s) by mouth once a day  -- Indication: For Vitamin supplement    Vitamin C 1000 mg oral tablet  -- 1  by mouth once a day  -- Indication: For Vitamin supplement I will START or STAY ON the medications listed below when I get home from the hospital:    finasteride 5 mg oral tablet  -- 1 tab(s) by mouth once a day  -- Indication: For BPH    predniSONE 10 mg oral tablet  -- 1 tab(s) by mouth once a day  -- Indication: For COPD exacerbation    tamsulosin 0.4 mg oral capsule  -- 1 cap(s) by mouth once a day  -- Indication: For BPH    insulin glargine  -- 30 unit(s) subcutaneously every 12 hours  before breakfast and at bedtime.  -- Indication: For Diabetes mellitus    NovoLOG FlexPen 100 units/mL injectable solution  -- 17 unit(s) injectable 3 times a day (before meals)   -- Indication: For Diabetes mellitus    atorvastatin 20 mg oral tablet  -- 1 tab(s) by mouth once a day (at bedtime)  -- Indication: For CAD prophylaxis    metoprolol tartrate 25 mg oral tablet  -- 1 tab(s) by mouth 2 times a day  -- Indication: For HTN    Trelegy Ellipta inhalation powder  -- 1 puff(s) inhaled once a day  -- Indication: For COPD     ipratropium-albuterol 0.5 mg-2.5 mg/3 mLinhalation solution  -- 3 milliliter(s) inhaled every 4 hours, As Needed  -- Indication: For COPD     tiotropium 18 mcg inhalation capsule  -- 1 cap(s) inhaled once a day  -- Indication: For COPD     ProAir HFA  -- 2 inhaler(s) inhaled every 4 hours, As Needed  -- Indication: For COPD     sodium polystyrene sulfonate  -- 30 milligram(s) by mouth 3 times a week  -- Indication: For Hyperkalemia    zinc oxide 40% topical ointment  -- 1 application on skin 2 times a day  -- Indication: For skin care    furosemide 20 mg oral tablet  -- 1 tab(s) by mouth once a day  -- Indication: For Right sided heart failure    Flax Seed Oil oral capsule  -- 1000 milligram(s) orally  -- Indication: For Supplement    senna oral tablet  -- 2 tab(s) by mouth once a day (at bedtime)  -- Indication: For Constipation    polyethylene glycol 3350 oral powder for reconstitution  -- 1 dose(s) by mouth once a day, As Needed  -- Indication: For Constipation    docusate sodium 100 mg oral tablet  -- 1  by mouth once a day  -- Indication: For Constipation    ocular lubricant ophthalmic solution  -- 1 drop(s) to each affected eye 3 times a day  -- Indication: For eye drop    omeprazole 20 mg oral delayed release capsule  -- 1 cap(s) by mouth once a day   -- Indication: For GERD    Centrum oral tablet  -- 1 tab(s) by mouth once a day  -- Indication: For Vitamin supplement    Vitamin B-12  -- 3000 milligram(s) orally  -- Indication: For Vitamin supplement    Vitamin D3 1000 intl units oral tablet  -- 1 tab(s) by mouth once a day  -- Indication: For Vitamin supplement    Vitamin C 1000 mg oral tablet  -- 1  by mouth once a day  -- Indication: For Vitamin supplement

## 2018-12-28 NOTE — DISCHARGE NOTE ADULT - PROVIDER TOKENS
BRETT:'[id number]',[id number] TOKEN:'[id number]'

## 2018-12-28 NOTE — DISCHARGE NOTE ADULT - HOSPITAL COURSE
78y Male with h/o COPD on home oxygen 5L, DM, MICHAEL, obesity, CKD 3, recently discharged on 11/15 after being admitted for acute on chronic hypoxic & Hypercapnic respiratory failure due to COPD exacerbation and gram negative pneumonia. He presented from home with complaints of Dyspnea. He explained that for the past 1 week his breathing has been getting worse gradually with pulse ox readings in the 80s whilst on oxygen with mildly productive cough, no fevers or chills. He required BIPAP initially  with significant improvement and transitioned to HFNC off which he cannot be weaned. Pt was given Steroids, still tapering off, he will continue Prednisone 20 mg QD until 12/30 and then 10 mg until 1/3. He completed the course of Levaquin. Hospital course has been complicated by Rt finger abscess requiring I&D and healed after drainage.    # Acute on Chronic Hypoxic respiratory failure: Due to COPD exacerbation.  stable  repeat ECHO 12/26 - severe Right side heart dilation, RSVP 100 (was 40 in 10/2018)  Continue Prednisone Taper &, Bronchodilators.   Continue supplemental oxygen & BiPAP prn and QHS. Maintain sats > 90%  Pt was not interested in hospice    # Hyperkalemia: Resolved.  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      # Diabetes mellitus, type 2: Uncontrolled, likely due to steroids  Hemoglobin A1C, Whole Blood: 8.7 % (11.05.18 @ 07:00)  Lantus increased to 50 units  Monitor FS with Insulin coverage.    # CKD stage III:  Avoid Nephrotoxins. Monitor BMP and Electrolytes with Diuresis.  CT abdomen: No hydronephrosis bilaterally or obstructing calculus. Stable bilateral renal cysts measuring up to 9.5 cm at   the right lower pole. Stable 4 cm indeterminate exophytic left interpolar   renal lesion.  OP follow up      # BPH:  Continue Flomax and Finasteride.       # Obesity:  Weight loss counselling. Dietary modification.      # B12 deficiency & Vitamin D deficiency:  Continue with home supplementation.       # MICHAEL:  BiPAP at night.      # Rt Index Finger cellulitis:  MRSA positive Well healed. Off Antibiotics.  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    Pt is stable for discharge to NH with oxygen supplement. 78y Male with h/o COPD on home oxygen 5L, DM, MICHAEL, obesity, CKD 3, recently discharged on 11/15 after being admitted for acute on chronic hypoxic & Hypercapnic respiratory failure due to COPD exacerbation and gram negative pneumonia. He presented from home with complaints of Dyspnea. He explained that for the past 1 week his breathing has been getting worse gradually with pulse ox readings in the 80s whilst on oxygen with mildly productive cough, no fevers or chills. He required BIPAP initially  with significant improvement and transitioned to HFNC and NC 5 L with AVAP PRN and at night. Pt was given Steroids, still tapering off, he will continue Prednisone 20 mg QD until 12/30 and then 10 mg until 1/3. He completed the course of Levaquin. Hospital course has been complicated by Rt finger abscess requiring I&D and healed after drainage.    # Acute on Chronic Hypoxic respiratory failure: Due to COPD exacerbation.  stable  repeat ECHO 12/26 - severe Right side heart dilation, RSVP 100 (was 40 in 10/2018)  Continue Prednisone Taper &, Bronchodilators.   Continue supplemental oxygen & AVAP prn and QHS. Maintain sats > 90%  Pt was not interested in hospice    # Hyperkalemia: Resolved.  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      # Diabetes mellitus, type 2: Uncontrolled, likely due to steroids  Hemoglobin A1C, Whole Blood: 8.7 % (11.05.18 @ 07:00)  Lantus increased to 50 units  Monitor FS with Insulin coverage.    # CKD stage III:  Avoid Nephrotoxins. Monitor BMP and Electrolytes with Diuresis.  CT abdomen: No hydronephrosis bilaterally or obstructing calculus. Stable bilateral renal cysts measuring up to 9.5 cm at   the right lower pole. Stable 4 cm indeterminate exophytic left interpolar   renal lesion.  OP follow up      # BPH:  Continue Flomax and Finasteride.       # Obesity:  Weight loss counselling. Dietary modification.      # B12 deficiency & Vitamin D deficiency:  Continue with home supplementation.       # MICHAEL:  BiPAP at night.      # Rt Index Finger cellulitis:  MRSA positive Well healed. Off Antibiotics.  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    Pt is stable for discharge to NH with oxygen supplement. 78y Male with h/o COPD on home oxygen 5L, DM, MICHAEL, obesity, CKD 3, recently discharged on 11/15 after being admitted for acute on chronic hypoxic & Hypercapnic respiratory failure due to COPD exacerbation and gram negative pneumonia. He presented from home with complaints of Dyspnea. He explained that for the past 1 week his breathing has been getting worse gradually with pulse ox readings in the 80s whilst on oxygen with mildly productive cough, no fevers or chills. He required BIPAP initially  with significant improvement and transitioned to HFNC and NC 5 L with AVAP PRN and at night. Pt was given Steroids, still tapering off, he will continue Prednisone 20 mg QD until 12/30 and then 10 mg until 1/3. He completed the course of Levaquin. Hospital course has been complicated by Rt finger abscess requiring I&D and healed after drainage.    # Acute on Chronic Hypoxic respiratory failure: Due to COPD exacerbation.  stable  repeat ECHO 12/26 - severe Right side heart dilation, RSVP 100 (was 40 in 10/2018)  Continue Prednisone Taper &, Bronchodilators.   Continue supplemental oxygen & AVAP prn and QHS. Maintain sats > 90%  Pt was not interested in hospice    # Hyperkalemia: Resolved.  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      # Diabetes mellitus, type 2: Uncontrolled, likely due to steroids  Hemoglobin A1C, Whole Blood: 8.7 % (11.05.18 @ 07:00)  Lantus increased to 50 units  Monitor FS with Insulin coverage.    # CKD stage III:  Avoid Nephrotoxins. Monitor BMP and Electrolytes with Diuresis.  CT abdomen: No hydronephrosis bilaterally or obstructing calculus. Stable bilateral renal cysts measuring up to 9.5 cm at   the right lower pole. Stable 4 cm indeterminate exophytic left interpolar   renal lesion.  OP follow up      # BPH:  Continue Flomax and Finasteride.       # Obesity:  Weight loss counselling. Dietary modification.      # B12 deficiency & Vitamin D deficiency:  Continue with home supplementation.       # MICHAEL:  BiPAP at night.      # Rt Index Finger cellulitis:  MRSA positive Well healed. Off Antibiotics.  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    CODE status: DNR/DNI  Very poor Prognosis.    Pt is stable for discharge to NH with oxygen supplement.

## 2018-12-28 NOTE — DISCHARGE NOTE ADULT - MEDICATION SUMMARY - MEDICATIONS TO STOP TAKING
I will STOP taking the medications listed below when I get home from the hospital:    amLODIPine 10 mg oral tablet  -- 1 tab(s) by mouth once a day    budesonide-formoterol 160 mcg-4.5 mcg/inh inhalation aerosol  -- 2 puff(s) inhaled 2 times a day

## 2018-12-28 NOTE — DISCHARGE NOTE ADULT - PLAN OF CARE
control of symptoms Continue Prednisone taper Prednisone 20 mg once a day until 12/30, then Prednisone 10 mg once a day staring 12/31 until 1/2. Continue inhalers. Continue oxygen supplement to keep O2 >90%. Continue Prednisone 10 mg daily and Inhalers.  Supplemental oxygen. Supportive care Due to Endstage COPD with Obesity Hypoventilation syndrome. Good glycemic control Monitor Blood glucose level with Insulin coverage. Continue Kayexalate 30 gm 2 time a week to prevent hyperkalemia.

## 2018-12-28 NOTE — DISCHARGE NOTE ADULT - SECONDARY DIAGNOSIS.
Pulmonary hypertension Chronic respiratory failure with hypoxia Oxygen dependent Diabetes mellitus, type 2 CKD (chronic kidney disease) stage 4, GFR 15-29 ml/min

## 2018-12-28 NOTE — DISCHARGE NOTE ADULT - CARE PLAN
Principal Discharge DX:	COPD exacerbation  Goal:	control of symptoms  Assessment and plan of treatment:	Continue Prednisone taper Prednisone 20 mg once a day until 12/30, then Prednisone 10 mg once a day staring 12/31 until 1/2. Continue inhalers.  Secondary Diagnosis:	Pulmonary hypertension  Assessment and plan of treatment:	Continue oxygen supplement to keep O2 >90%.  Secondary Diagnosis:	Chronic respiratory failure with hypoxia  Secondary Diagnosis:	Oxygen dependent Principal Discharge DX:	COPD exacerbation  Goal:	control of symptoms  Assessment and plan of treatment:	Continue Prednisone 10 mg daily and Inhalers.  Supplemental oxygen.  Secondary Diagnosis:	Pulmonary hypertension  Goal:	Supportive care  Assessment and plan of treatment:	Continue oxygen supplement to keep O2 >90%.  Secondary Diagnosis:	Chronic respiratory failure with hypoxia  Goal:	Supportive care  Assessment and plan of treatment:	Due to Endstage COPD with Obesity Hypoventilation syndrome.  Secondary Diagnosis:	Diabetes mellitus, type 2  Goal:	Good glycemic control  Assessment and plan of treatment:	Monitor Blood glucose level with Insulin coverage.  Secondary Diagnosis:	CKD (chronic kidney disease) stage 4, GFR 15-29 ml/min  Goal:	Supportive care  Assessment and plan of treatment:	Continue Kayexalate 30 gm 2 time a week to prevent hyperkalemia.

## 2018-12-28 NOTE — DISCHARGE NOTE ADULT - CARE PROVIDERS DIRECT ADDRESSES
,DirectAddress_Unknown,[email or web address] ,DirectAddress_Unknown,[email or web address],DirectAddress_Unknown

## 2018-12-28 NOTE — DISCHARGE NOTE ADULT - CARE PROVIDER_API CALL
Raul Castrejon), Internal Medicine; Pulmonary Disease  [address] [place] [address] [address]  [place] [address]  Phone: [phone]  Fax: [phone]    Mckay Reddy), Geriatric Medicine; Internal Medicine  [address] [place] [address]  Phone: [phone]  Fax: [phone] Raul Castrejon), Internal Medicine; Pulmonary Disease  [address] [facility]  [address]  [place] [address]  Phone: [phone]  Fax: [phone]    Mckay Reddy), Geriatric Medicine; Internal Medicine  [address] [place] [address]  Phone: [phone]  Fax: [phone]    Anika Ortiz), Nephrology  [address] [place] [address]  Phone: [phone]  Fax: [phone]

## 2018-12-28 NOTE — PROGRESS NOTE ADULT - ASSESSMENT
Impression:  Acute chronic COPD with exacerbation  No Impending respiratory failure  OHVS      Check O2 sat on NC, record, continue O2 as necessary  taper O2 as low as possible to maintain sats > 90%  taper oral prednisone   bronchodilator treatments ATC and PRN  NIPPV HS  OOB to chair  GI and DVT prophylaxis  pulmonary toilet  Monitor clinically, convert to oral prednisone as clinical improvement allows    overall prognosis is very poor he has end stage COPD, obesity hypoventilation    The patient can go to [facility] if SaO2 can be maintained with  FiO2 <= 40%

## 2018-12-28 NOTE — DISCHARGE NOTE ADULT - PATIENT PORTAL LINK FT
You can access the [facility] Patient Portal, offered by [facility], by registering with the following website: [email or web address]

## 2018-12-28 NOTE — DISCHARGE NOTE ADULT - MEDICATION SUMMARY - MEDICATIONS TO CHANGE
I will SWITCH the dose or number of times a day I take the medications listed below when I get home from the hospital:    Levemir 100 units/mL subcutaneous solution  -- 36 unit(s) subcutaneous once a day    Toprol-XL 25 mg oral tablet, extended release  -- 1 tab(s) by mouth once a day    predniSONE 10 mg oral tablet  -- 4 tab(s) by mouth once a day 4 days, then 20mg tabs once daily 4 days, then 10mg once daily until seen by pulmonary  -- It is very important that you take or use this exactly as directed.  Do not skip doses or discontinue unless directed by your doctor.  Obtain medical advice before taking any non-prescription drugs as some may affect the action of this medication.  Take with food or milk.

## 2018-12-28 NOTE — PROGRESS NOTE ADULT - ASSESSMENT
78y Male with h/o COPD on home oxygen 5L, DM, MICHAEL, obesity, CKD 3, recently discharged on 11/15 after being admitted for acute on chronic hypoxic & Hypercapnic respiratory failure due to COPD exacerbation and gram negative pneumonia. He presented from home with complaints of Dyspnea. He explained that for the past 1 week his breathing has been getting worse gradually with pulse ox readings in the 80s whilst on oxygen with mildly productive cough, no fevers or chills. He was scheduled to see Pulmonologist Dr. Castrejon on Monday but due to difficulty breathing he was BIBA. Per ED physician, patient was tachypneic and cyanotic on arrival with oxygen saturation in the 70s with NC 5L. He was started on BIPAP with significant improvement and transitioned to HFNC and NC 5 L. Hospital course has been complicated by Rt finger abscess requiring I&D. He is still waiting for discharge, pt was keep changing his mind home vs. SNF, at this point decided to go to SNF.    Assessment and Plan:    # Acute on Chronic Hypoxic respiratory failure: Due to COPD exacerbation.  Stable.   repeat ECHO 12/26 - severe Right side heart dilation, RSVP 100 (was 40 in 10/2018)  Continue Prednisone Taper (Prednisone 20 starting 12/28) &, Bronchodilators. Completed course of Levaquin.  Continue supplemental oxygen & BiPAP prn and QHS. Maintain sats > 88%  Pt is not interested in hospice      # Hyperkalemia: Resolved.    # Hypertension/Mild Tachycardia:  Tachycardia due to Hypoxia vs. Deconditioning. Oxygen flow rate increased to maintain sats >90%  Metoprolol dose adjusted. Monitor BP.  Hold Amlodipine for now.      # Diabetes mellitus, type 2: Uncontrolled, likely due to steroids  Hemoglobin A1C, Whole Blood: 8.7 % (11.05.18 @ 07:00)  CBG not controlled, will change Lantus to 30 in AM and 30 in PM.    Monitor FS with Insulin coverage.    # CKD stage III:  Avoid Nephrotoxins. Monitor BMP and Electrolytes with Diuresis.  CT abdomen: No hydronephrosis bilaterally or obstructing calculus. Stable bilateral renal cysts measuring up to 9.5 cm at   the right lower pole. Stable 4 cm indeterminate exophytic left interpolar renal lesion.  OP follow up    # BPH:  Continue Flomax and Finasteride.     # Obesity:  Weight loss counselling. Dietary modification.      # B12 deficiency & Vitamin D deficiency:  Continue with home supplementation.       # MICHAEL:  AVAP at night.      # Rt Index Finger cellulitis:  MRSA positive Well healed. Off Antibiotics.  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

## 2018-12-29 LAB
GLUCOSE BLDC GLUCOMTR-MCNC: 116 MG/DL — HIGH (ref 70–99)
GLUCOSE BLDC GLUCOMTR-MCNC: 148 MG/DL — HIGH (ref 70–99)
GLUCOSE BLDC GLUCOMTR-MCNC: 162 MG/DL — HIGH (ref 70–99)
GLUCOSE BLDC GLUCOMTR-MCNC: 183 MG/DL — HIGH (ref 70–99)

## 2018-12-29 RX ADMIN — Medication 3 MILLILITER(S): at 07:38

## 2018-12-29 RX ADMIN — Medication 3 MILLILITER(S): at 20:38

## 2018-12-29 RX ADMIN — BUDESONIDE AND FORMOTEROL FUMARATE DIHYDRATE 2 PUFF(S): 160; 4.5 AEROSOL RESPIRATORY (INHALATION) at 21:57

## 2018-12-29 RX ADMIN — ATORVASTATIN CALCIUM 20 MILLIGRAM(S): 80 TABLET, FILM COATED ORAL at 21:10

## 2018-12-29 RX ADMIN — POLYETHYLENE GLYCOL 3350 17 GRAM(S): 17 POWDER, FOR SOLUTION ORAL at 17:12

## 2018-12-29 RX ADMIN — Medication 17 UNIT(S): at 07:56

## 2018-12-29 RX ADMIN — HEPARIN SODIUM 5000 UNIT(S): 5000 INJECTION INTRAVENOUS; SUBCUTANEOUS at 06:33

## 2018-12-29 RX ADMIN — Medication 1 TABLET(S): at 11:23

## 2018-12-29 RX ADMIN — Medication 20 MILLIGRAM(S): at 06:56

## 2018-12-29 RX ADMIN — Medication 2: at 16:17

## 2018-12-29 RX ADMIN — Medication 1 DROP(S): at 21:10

## 2018-12-29 RX ADMIN — Medication 1000 UNIT(S): at 11:23

## 2018-12-29 RX ADMIN — Medication 100 MILLIGRAM(S): at 17:10

## 2018-12-29 RX ADMIN — Medication 20 MILLIGRAM(S): at 17:10

## 2018-12-29 RX ADMIN — Medication 17 UNIT(S): at 11:30

## 2018-12-29 RX ADMIN — Medication 17 UNIT(S): at 16:17

## 2018-12-29 RX ADMIN — TIOTROPIUM BROMIDE 1 CAPSULE(S): 18 CAPSULE ORAL; RESPIRATORY (INHALATION) at 07:56

## 2018-12-29 RX ADMIN — Medication 25 MILLIGRAM(S): at 17:11

## 2018-12-29 RX ADMIN — Medication 2: at 07:55

## 2018-12-29 RX ADMIN — SENNA PLUS 2 TABLET(S): 8.6 TABLET ORAL at 21:10

## 2018-12-29 RX ADMIN — PANTOPRAZOLE SODIUM 40 MILLIGRAM(S): 20 TABLET, DELAYED RELEASE ORAL at 06:56

## 2018-12-29 RX ADMIN — ZINC OXIDE 1 APPLICATION(S): 200 OINTMENT TOPICAL at 06:33

## 2018-12-29 RX ADMIN — BUDESONIDE AND FORMOTEROL FUMARATE DIHYDRATE 2 PUFF(S): 160; 4.5 AEROSOL RESPIRATORY (INHALATION) at 11:25

## 2018-12-29 RX ADMIN — Medication 25 MILLIGRAM(S): at 06:56

## 2018-12-29 RX ADMIN — POLYETHYLENE GLYCOL 3350 17 GRAM(S): 17 POWDER, FOR SOLUTION ORAL at 07:59

## 2018-12-29 RX ADMIN — FINASTERIDE 5 MILLIGRAM(S): 5 TABLET, FILM COATED ORAL at 11:23

## 2018-12-29 RX ADMIN — Medication 3 MILLILITER(S): at 16:30

## 2018-12-29 RX ADMIN — INSULIN GLARGINE 30 UNIT(S): 100 INJECTION, SOLUTION SUBCUTANEOUS at 07:55

## 2018-12-29 RX ADMIN — PREGABALIN 1000 MICROGRAM(S): 225 CAPSULE ORAL at 11:24

## 2018-12-29 RX ADMIN — TAMSULOSIN HYDROCHLORIDE 0.4 MILLIGRAM(S): 0.4 CAPSULE ORAL at 21:10

## 2018-12-29 RX ADMIN — INSULIN GLARGINE 30 UNIT(S): 100 INJECTION, SOLUTION SUBCUTANEOUS at 21:51

## 2018-12-29 RX ADMIN — HEPARIN SODIUM 5000 UNIT(S): 5000 INJECTION INTRAVENOUS; SUBCUTANEOUS at 17:12

## 2018-12-29 NOTE — PROGRESS NOTE ADULT - ASSESSMENT
78y Male with h/o COPD on home oxygen 5L, DM, MICHAEL, obesity, CKD 3, recently discharged on 11/15 after being admitted for acute on chronic hypoxic & Hypercapnic respiratory failure due to COPD exacerbation and gram negative pneumonia. He presented from home with complaints of Dyspnea. He explained that for the past 1 week his breathing has been getting worse gradually with pulse ox readings in the 80s whilst on oxygen with mildly productive cough, no fevers or chills. He was scheduled to see Pulmonologist Dr. Castrejon on Monday but due to difficulty breathing he was BIBA. Per ED physician, patient was tachypneic and cyanotic on arrival with oxygen saturation in the 70s with NC 5L. He was started on BIPAP with significant improvement and transitioned to HFNC and NC 5 L. Hospital course has been complicated by Rt finger abscess requiring I&D. He is still waiting for discharge, pt was keep changing his mind home vs. SNF, at this point decided to go to SNF.    Assessment and Plan:    # Acute on Chronic Hypoxic respiratory failure: Due to COPD exacerbation.  Stable.   repeat ECHO 12/26 - severe Right side heart dilation, RSVP 100 (was 40 in 10/2018)  Continue Prednisone Taper (Prednisone 20 starting 12/28) &, Bronchodilators. Completed course of Levaquin.  Continue supplemental oxygen (pt is fine on 5L NC) & BiPAP prn and QHS. Maintain sats > 88%  Pt is not interested in hospice    # Hyperkalemia: Resolved.    # Hypertension/Mild Tachycardia:  Tachycardia due to Hypoxia vs. Deconditioning. Oxygen flow rate increased to maintain sats >90%  Metoprolol dose adjusted. Monitor BP.  Hold Amlodipine for now.      # Diabetes mellitus, type 2: Uncontrolled, likely due to steroids  Hemoglobin A1C, Whole Blood: 8.7 % (11.05.18 @ 07:00)  CBG better controlled, continue Lantus to 30 in AM and 30 in PM.    Monitor FS with Insulin coverage.    # CKD stage III:  Avoid Nephrotoxins. Monitor BMP and Electrolytes with Diuresis.  CT abdomen: No hydronephrosis bilaterally or obstructing calculus. Stable bilateral renal cysts measuring up to 9.5 cm at   the right lower pole. Stable 4 cm indeterminate exophytic left interpolar renal lesion.  OP follow up    # BPH:  Continue Flomax and Finasteride.     # Obesity:  Weight loss counselling. Dietary modification.      # B12 deficiency & Vitamin D deficiency:  Continue with home supplementation.       # MICHAEL:  AVAP at night.      # Rt Index Finger cellulitis:  MRSA positive Well healed. Off Antibiotics.  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

## 2018-12-30 LAB
ANION GAP SERPL CALC-SCNC: 7 MMOL/L — SIGNIFICANT CHANGE UP (ref 7–14)
BUN SERPL-MCNC: 54 MG/DL — HIGH (ref 10–20)
CALCIUM SERPL-MCNC: 8.9 MG/DL — SIGNIFICANT CHANGE UP (ref 8.5–10.1)
CHLORIDE SERPL-SCNC: 99 MMOL/L — SIGNIFICANT CHANGE UP (ref 98–110)
CO2 SERPL-SCNC: 40 MMOL/L — HIGH (ref 17–32)
CREAT SERPL-MCNC: 2.2 MG/DL — HIGH (ref 0.7–1.5)
GLUCOSE BLDC GLUCOMTR-MCNC: 177 MG/DL — HIGH (ref 70–99)
GLUCOSE BLDC GLUCOMTR-MCNC: 216 MG/DL — HIGH (ref 70–99)
GLUCOSE BLDC GLUCOMTR-MCNC: 300 MG/DL — HIGH (ref 70–99)
GLUCOSE BLDC GLUCOMTR-MCNC: 378 MG/DL — HIGH (ref 70–99)
GLUCOSE BLDC GLUCOMTR-MCNC: 82 MG/DL — SIGNIFICANT CHANGE UP (ref 70–99)
GLUCOSE SERPL-MCNC: 95 MG/DL — SIGNIFICANT CHANGE UP (ref 70–99)
HCT VFR BLD CALC: 42.3 % — SIGNIFICANT CHANGE UP (ref 42–52)
HGB BLD-MCNC: 12.6 G/DL — LOW (ref 14–18)
MCHC RBC-ENTMCNC: 23.7 PG — LOW (ref 27–31)
MCHC RBC-ENTMCNC: 29.8 G/DL — LOW (ref 32–37)
MCV RBC AUTO: 79.5 FL — LOW (ref 80–94)
NRBC # BLD: 0 /100 WBCS — SIGNIFICANT CHANGE UP (ref 0–0)
PLATELET # BLD AUTO: 329 K/UL — SIGNIFICANT CHANGE UP (ref 130–400)
POTASSIUM SERPL-MCNC: 4.4 MMOL/L — SIGNIFICANT CHANGE UP (ref 3.5–5)
POTASSIUM SERPL-SCNC: 4.4 MMOL/L — SIGNIFICANT CHANGE UP (ref 3.5–5)
RBC # BLD: 5.32 M/UL — SIGNIFICANT CHANGE UP (ref 4.7–6.1)
RBC # FLD: 20.7 % — HIGH (ref 11.5–14.5)
SODIUM SERPL-SCNC: 146 MMOL/L — SIGNIFICANT CHANGE UP (ref 135–146)
WBC # BLD: 9.65 K/UL — SIGNIFICANT CHANGE UP (ref 4.8–10.8)
WBC # FLD AUTO: 9.65 K/UL — SIGNIFICANT CHANGE UP (ref 4.8–10.8)

## 2018-12-30 RX ADMIN — TIOTROPIUM BROMIDE 1 CAPSULE(S): 18 CAPSULE ORAL; RESPIRATORY (INHALATION) at 08:05

## 2018-12-30 RX ADMIN — TAMSULOSIN HYDROCHLORIDE 0.4 MILLIGRAM(S): 0.4 CAPSULE ORAL at 21:07

## 2018-12-30 RX ADMIN — HEPARIN SODIUM 5000 UNIT(S): 5000 INJECTION INTRAVENOUS; SUBCUTANEOUS at 06:56

## 2018-12-30 RX ADMIN — INSULIN GLARGINE 30 UNIT(S): 100 INJECTION, SOLUTION SUBCUTANEOUS at 09:48

## 2018-12-30 RX ADMIN — Medication 1 TABLET(S): at 11:13

## 2018-12-30 RX ADMIN — Medication 3 MILLILITER(S): at 19:59

## 2018-12-30 RX ADMIN — ATORVASTATIN CALCIUM 20 MILLIGRAM(S): 80 TABLET, FILM COATED ORAL at 21:07

## 2018-12-30 RX ADMIN — ZINC OXIDE 1 APPLICATION(S): 200 OINTMENT TOPICAL at 17:16

## 2018-12-30 RX ADMIN — POLYETHYLENE GLYCOL 3350 17 GRAM(S): 17 POWDER, FOR SOLUTION ORAL at 17:16

## 2018-12-30 RX ADMIN — FINASTERIDE 5 MILLIGRAM(S): 5 TABLET, FILM COATED ORAL at 11:13

## 2018-12-30 RX ADMIN — Medication 20 MILLIGRAM(S): at 06:55

## 2018-12-30 RX ADMIN — Medication 17 UNIT(S): at 11:32

## 2018-12-30 RX ADMIN — BUDESONIDE AND FORMOTEROL FUMARATE DIHYDRATE 2 PUFF(S): 160; 4.5 AEROSOL RESPIRATORY (INHALATION) at 21:08

## 2018-12-30 RX ADMIN — Medication 17 UNIT(S): at 17:14

## 2018-12-30 RX ADMIN — PREGABALIN 1000 MICROGRAM(S): 225 CAPSULE ORAL at 11:13

## 2018-12-30 RX ADMIN — Medication 1 DROP(S): at 21:07

## 2018-12-30 RX ADMIN — BUDESONIDE AND FORMOTEROL FUMARATE DIHYDRATE 2 PUFF(S): 160; 4.5 AEROSOL RESPIRATORY (INHALATION) at 08:04

## 2018-12-30 RX ADMIN — Medication 20 MILLIGRAM(S): at 17:16

## 2018-12-30 RX ADMIN — Medication 10: at 17:14

## 2018-12-30 RX ADMIN — PANTOPRAZOLE SODIUM 40 MILLIGRAM(S): 20 TABLET, DELAYED RELEASE ORAL at 06:55

## 2018-12-30 RX ADMIN — Medication 25 MILLIGRAM(S): at 06:55

## 2018-12-30 RX ADMIN — INSULIN GLARGINE 30 UNIT(S): 100 INJECTION, SOLUTION SUBCUTANEOUS at 22:15

## 2018-12-30 RX ADMIN — Medication 1 DROP(S): at 13:38

## 2018-12-30 RX ADMIN — Medication 3 MILLILITER(S): at 16:52

## 2018-12-30 RX ADMIN — Medication 100 MILLIGRAM(S): at 11:15

## 2018-12-30 RX ADMIN — HEPARIN SODIUM 5000 UNIT(S): 5000 INJECTION INTRAVENOUS; SUBCUTANEOUS at 17:15

## 2018-12-30 RX ADMIN — SENNA PLUS 2 TABLET(S): 8.6 TABLET ORAL at 21:07

## 2018-12-30 RX ADMIN — Medication 25 MILLIGRAM(S): at 17:15

## 2018-12-30 RX ADMIN — Medication 1000 UNIT(S): at 11:13

## 2018-12-30 RX ADMIN — Medication 6: at 11:32

## 2018-12-30 RX ADMIN — Medication 3 MILLILITER(S): at 08:04

## 2018-12-30 NOTE — PROGRESS NOTE ADULT - ASSESSMENT
78y Male with h/o COPD on home oxygen 5L, DM, MICHAEL, obesity, CKD 3, recently discharged on 11/15 after being admitted for acute on chronic hypoxic & Hypercapnic respiratory failure due to COPD exacerbation and gram negative pneumonia. He presented from home with complaints of Dyspnea. He explained that for the past 1 week his breathing has been getting worse gradually with pulse ox readings in the 80s whilst on oxygen with mildly productive cough, no fevers or chills. Per ED physician, patient was tachypneic and cyanotic on arrival with oxygen saturation in the 70s with NC 5L. He was started on BIPAP with significant improvement and transitioned to HFNC and NC 5 L. Hospital course has been complicated by Rt finger abscess requiring I&D. He is still waiting for discharge, pt was keep changing his mind home vs. SNF, at this point decided to go to SNF.    Assessment and Plan:    # Acute on Chronic Hypoxic respiratory failure: Due to COPD exacerbation.  Stable.   repeat ECHO 12/26 - severe Right side heart dilation, RSVP 100 (was 40 in 10/2018)  Continue Prednisone Taper (Prednisone 20 starting 12/28) &, Bronchodilators. Completed course of Levaquin.  Continue supplemental oxygen (pt is fine on 5L NC) & BiPAP prn and QHS. Maintain sats > 88%  Pt is not interested in hospice    # Hyperkalemia: Resolved.    # Hypertension/Mild Tachycardia:  Tachycardia due to Hypoxia vs. Deconditioning.   Metoprolol dose adjusted. Monitor BP.  Hold Amlodipine for now.      # Diabetes mellitus, type 2: Uncontrolled, likely due to steroids  Hemoglobin A1C, Whole Blood: 8.7 % (11.05.18 @ 07:00)  CBG better controlled, continue Lantus to 30 in AM and 30 in PM.    Monitor FS with Insulin coverage.    # CKD stage III:  Avoid Nephrotoxins. Monitor BMP and Electrolytes with Diuresis.  CT abdomen: No hydronephrosis bilaterally or obstructing calculus. Stable bilateral renal cysts measuring up to 9.5 cm at   the right lower pole. Stable 4 cm indeterminate exophytic left interpolar renal lesion.  OP follow up    # BPH:  Continue Flomax and Finasteride.     # Obesity:  Weight loss counselling. Dietary modification.      # B12 deficiency & Vitamin D deficiency:  Continue with home supplementation.       # MICHAEL:  AVAP at night.      # Rt Index Finger cellulitis:  resolved  MRSA positive Well healed. Off Antibiotics.  Isolation dc'ed    # Severe PH       DVT prophylaxis: Heparin  Disposition: pending SNF placement  CODE status: DNR/DNI  Very poor Prognosis.

## 2018-12-30 NOTE — CHART NOTE - NSCHARTNOTEFT_GEN_A_CORE
Registered Dietitian Follow-Up- Limited Note    Reason for visit: not at nutritional risk follow up. Patient continues to tolerate a Consistent CHO (no snacks), DASH/TLC, Renal Restrictions diet with intakes consistently %.     Reviewed nutrition-related meds/labs. No recent BMs documented but pt is ordered a bowel regimen.     Pt is awaiting placement. Remains not at nutritional risk. Will continue to monitor diet order & intakes, wt trends, and nutrition-focused physical findings. Follow up in 7 days or sooner if medical/nutritional status becomes compromised.

## 2018-12-31 LAB
GLUCOSE BLDC GLUCOMTR-MCNC: 131 MG/DL — HIGH (ref 70–99)
GLUCOSE BLDC GLUCOMTR-MCNC: 214 MG/DL — HIGH (ref 70–99)
GLUCOSE BLDC GLUCOMTR-MCNC: 227 MG/DL — HIGH (ref 70–99)
GLUCOSE BLDC GLUCOMTR-MCNC: 254 MG/DL — HIGH (ref 70–99)
GLUCOSE BLDC GLUCOMTR-MCNC: 255 MG/DL — HIGH (ref 70–99)
GLUCOSE BLDC GLUCOMTR-MCNC: 255 MG/DL — HIGH (ref 70–99)
GLUCOSE BLDC GLUCOMTR-MCNC: 419 MG/DL — HIGH (ref 70–99)
GLUCOSE BLDC GLUCOMTR-MCNC: 587 MG/DL — CRITICAL HIGH (ref 70–99)

## 2018-12-31 RX ADMIN — ZINC OXIDE 1 APPLICATION(S): 200 OINTMENT TOPICAL at 06:25

## 2018-12-31 RX ADMIN — BUDESONIDE AND FORMOTEROL FUMARATE DIHYDRATE 2 PUFF(S): 160; 4.5 AEROSOL RESPIRATORY (INHALATION) at 08:11

## 2018-12-31 RX ADMIN — Medication 17 UNIT(S): at 08:10

## 2018-12-31 RX ADMIN — POLYETHYLENE GLYCOL 3350 17 GRAM(S): 17 POWDER, FOR SOLUTION ORAL at 17:33

## 2018-12-31 RX ADMIN — Medication 100 MILLIGRAM(S): at 11:46

## 2018-12-31 RX ADMIN — Medication 1 TABLET(S): at 11:45

## 2018-12-31 RX ADMIN — SODIUM POLYSTYRENE SULFONATE 30 GRAM(S): 4.1 POWDER, FOR SUSPENSION ORAL at 09:34

## 2018-12-31 RX ADMIN — TIOTROPIUM BROMIDE 1 CAPSULE(S): 18 CAPSULE ORAL; RESPIRATORY (INHALATION) at 08:11

## 2018-12-31 RX ADMIN — FINASTERIDE 5 MILLIGRAM(S): 5 TABLET, FILM COATED ORAL at 11:41

## 2018-12-31 RX ADMIN — Medication 1 DROP(S): at 13:48

## 2018-12-31 RX ADMIN — Medication 20 MILLIGRAM(S): at 17:32

## 2018-12-31 RX ADMIN — Medication 1000 UNIT(S): at 11:45

## 2018-12-31 RX ADMIN — HEPARIN SODIUM 5000 UNIT(S): 5000 INJECTION INTRAVENOUS; SUBCUTANEOUS at 17:33

## 2018-12-31 RX ADMIN — PREGABALIN 1000 MICROGRAM(S): 225 CAPSULE ORAL at 11:45

## 2018-12-31 RX ADMIN — Medication 3 MILLILITER(S): at 23:57

## 2018-12-31 RX ADMIN — Medication 3 MILLILITER(S): at 16:15

## 2018-12-31 RX ADMIN — Medication 17 UNIT(S): at 11:43

## 2018-12-31 RX ADMIN — ATORVASTATIN CALCIUM 20 MILLIGRAM(S): 80 TABLET, FILM COATED ORAL at 21:20

## 2018-12-31 RX ADMIN — INSULIN GLARGINE 30 UNIT(S): 100 INJECTION, SOLUTION SUBCUTANEOUS at 09:33

## 2018-12-31 RX ADMIN — Medication 20 MILLIGRAM(S): at 06:24

## 2018-12-31 RX ADMIN — Medication 3 MILLILITER(S): at 13:30

## 2018-12-31 RX ADMIN — Medication 25 MILLIGRAM(S): at 06:24

## 2018-12-31 RX ADMIN — Medication 1 DROP(S): at 21:20

## 2018-12-31 RX ADMIN — Medication 3 MILLILITER(S): at 19:35

## 2018-12-31 RX ADMIN — ZINC OXIDE 1 APPLICATION(S): 200 OINTMENT TOPICAL at 17:32

## 2018-12-31 RX ADMIN — Medication 17 UNIT(S): at 17:31

## 2018-12-31 RX ADMIN — Medication 6: at 11:44

## 2018-12-31 RX ADMIN — Medication 3 MILLILITER(S): at 07:50

## 2018-12-31 RX ADMIN — PANTOPRAZOLE SODIUM 40 MILLIGRAM(S): 20 TABLET, DELAYED RELEASE ORAL at 06:24

## 2018-12-31 RX ADMIN — Medication 25 MILLIGRAM(S): at 17:32

## 2018-12-31 RX ADMIN — TAMSULOSIN HYDROCHLORIDE 0.4 MILLIGRAM(S): 0.4 CAPSULE ORAL at 21:20

## 2018-12-31 RX ADMIN — Medication 6: at 17:31

## 2018-12-31 RX ADMIN — Medication 1 DROP(S): at 06:24

## 2018-12-31 RX ADMIN — BUDESONIDE AND FORMOTEROL FUMARATE DIHYDRATE 2 PUFF(S): 160; 4.5 AEROSOL RESPIRATORY (INHALATION) at 21:20

## 2018-12-31 RX ADMIN — HEPARIN SODIUM 5000 UNIT(S): 5000 INJECTION INTRAVENOUS; SUBCUTANEOUS at 06:25

## 2018-12-31 RX ADMIN — SENNA PLUS 2 TABLET(S): 8.6 TABLET ORAL at 21:20

## 2018-12-31 RX ADMIN — INSULIN GLARGINE 30 UNIT(S): 100 INJECTION, SOLUTION SUBCUTANEOUS at 22:41

## 2019-01-01 LAB
ANION GAP SERPL CALC-SCNC: 12 MMOL/L — SIGNIFICANT CHANGE UP (ref 7–14)
BUN SERPL-MCNC: 56 MG/DL — HIGH (ref 10–20)
CALCIUM SERPL-MCNC: 9.1 MG/DL — SIGNIFICANT CHANGE UP (ref 8.5–10.1)
CHLORIDE SERPL-SCNC: 97 MMOL/L — LOW (ref 98–110)
CO2 SERPL-SCNC: 37 MMOL/L — HIGH (ref 17–32)
CREAT SERPL-MCNC: 2 MG/DL — HIGH (ref 0.7–1.5)
GLUCOSE BLDC GLUCOMTR-MCNC: 145 MG/DL — HIGH (ref 70–99)
GLUCOSE BLDC GLUCOMTR-MCNC: 211 MG/DL — HIGH (ref 70–99)
GLUCOSE BLDC GLUCOMTR-MCNC: 243 MG/DL — HIGH (ref 70–99)
GLUCOSE BLDC GLUCOMTR-MCNC: 84 MG/DL — SIGNIFICANT CHANGE UP (ref 70–99)
GLUCOSE SERPL-MCNC: 142 MG/DL — HIGH (ref 70–99)
HCT VFR BLD CALC: 43 % — SIGNIFICANT CHANGE UP (ref 42–52)
HGB BLD-MCNC: 12.7 G/DL — LOW (ref 14–18)
MCHC RBC-ENTMCNC: 23.6 PG — LOW (ref 27–31)
MCHC RBC-ENTMCNC: 29.5 G/DL — LOW (ref 32–37)
MCV RBC AUTO: 79.8 FL — LOW (ref 80–94)
NRBC # BLD: 0 /100 WBCS — SIGNIFICANT CHANGE UP (ref 0–0)
PLATELET # BLD AUTO: 349 K/UL — SIGNIFICANT CHANGE UP (ref 130–400)
POTASSIUM SERPL-MCNC: 4.7 MMOL/L — SIGNIFICANT CHANGE UP (ref 3.5–5)
POTASSIUM SERPL-SCNC: 4.7 MMOL/L — SIGNIFICANT CHANGE UP (ref 3.5–5)
RBC # BLD: 5.39 M/UL — SIGNIFICANT CHANGE UP (ref 4.7–6.1)
RBC # FLD: 20.6 % — HIGH (ref 11.5–14.5)
SODIUM SERPL-SCNC: 146 MMOL/L — SIGNIFICANT CHANGE UP (ref 135–146)
WBC # BLD: 13.32 K/UL — HIGH (ref 4.8–10.8)
WBC # FLD AUTO: 13.32 K/UL — HIGH (ref 4.8–10.8)

## 2019-01-01 RX ADMIN — FINASTERIDE 5 MILLIGRAM(S): 5 TABLET, FILM COATED ORAL at 11:58

## 2019-01-01 RX ADMIN — SENNA PLUS 2 TABLET(S): 8.6 TABLET ORAL at 21:54

## 2019-01-01 RX ADMIN — TIOTROPIUM BROMIDE 1 CAPSULE(S): 18 CAPSULE ORAL; RESPIRATORY (INHALATION) at 08:42

## 2019-01-01 RX ADMIN — Medication 4: at 17:00

## 2019-01-01 RX ADMIN — Medication 20 MILLIGRAM(S): at 06:43

## 2019-01-01 RX ADMIN — ZINC OXIDE 1 APPLICATION(S): 200 OINTMENT TOPICAL at 19:17

## 2019-01-01 RX ADMIN — Medication 3 MILLILITER(S): at 19:48

## 2019-01-01 RX ADMIN — Medication 3 MILLILITER(S): at 08:43

## 2019-01-01 RX ADMIN — Medication 17 UNIT(S): at 11:57

## 2019-01-01 RX ADMIN — Medication 100 MILLIGRAM(S): at 19:13

## 2019-01-01 RX ADMIN — BUDESONIDE AND FORMOTEROL FUMARATE DIHYDRATE 2 PUFF(S): 160; 4.5 AEROSOL RESPIRATORY (INHALATION) at 09:04

## 2019-01-01 RX ADMIN — Medication 1000 UNIT(S): at 11:59

## 2019-01-01 RX ADMIN — Medication 3 MILLILITER(S): at 12:25

## 2019-01-01 RX ADMIN — ZINC OXIDE 1 APPLICATION(S): 200 OINTMENT TOPICAL at 06:44

## 2019-01-01 RX ADMIN — Medication 20 MILLIGRAM(S): at 19:09

## 2019-01-01 RX ADMIN — PREGABALIN 1000 MICROGRAM(S): 225 CAPSULE ORAL at 11:59

## 2019-01-01 RX ADMIN — Medication 17 UNIT(S): at 16:59

## 2019-01-01 RX ADMIN — Medication 25 MILLIGRAM(S): at 19:09

## 2019-01-01 RX ADMIN — POLYETHYLENE GLYCOL 3350 17 GRAM(S): 17 POWDER, FOR SOLUTION ORAL at 19:12

## 2019-01-01 RX ADMIN — Medication 3 MILLILITER(S): at 17:19

## 2019-01-01 RX ADMIN — PANTOPRAZOLE SODIUM 40 MILLIGRAM(S): 20 TABLET, DELAYED RELEASE ORAL at 06:43

## 2019-01-01 RX ADMIN — BUDESONIDE AND FORMOTEROL FUMARATE DIHYDRATE 2 PUFF(S): 160; 4.5 AEROSOL RESPIRATORY (INHALATION) at 21:54

## 2019-01-01 RX ADMIN — Medication 3 MILLILITER(S): at 04:34

## 2019-01-01 RX ADMIN — TAMSULOSIN HYDROCHLORIDE 0.4 MILLIGRAM(S): 0.4 CAPSULE ORAL at 21:54

## 2019-01-01 RX ADMIN — Medication 17 UNIT(S): at 09:01

## 2019-01-01 RX ADMIN — Medication 1 DROP(S): at 21:53

## 2019-01-01 RX ADMIN — Medication 1 TABLET(S): at 11:59

## 2019-01-01 RX ADMIN — INSULIN GLARGINE 30 UNIT(S): 100 INJECTION, SOLUTION SUBCUTANEOUS at 09:02

## 2019-01-01 RX ADMIN — Medication 1 DROP(S): at 06:42

## 2019-01-01 RX ADMIN — POLYETHYLENE GLYCOL 3350 17 GRAM(S): 17 POWDER, FOR SOLUTION ORAL at 06:43

## 2019-01-01 RX ADMIN — Medication 25 MILLIGRAM(S): at 06:43

## 2019-01-01 RX ADMIN — ATORVASTATIN CALCIUM 20 MILLIGRAM(S): 80 TABLET, FILM COATED ORAL at 21:53

## 2019-01-01 RX ADMIN — HEPARIN SODIUM 5000 UNIT(S): 5000 INJECTION INTRAVENOUS; SUBCUTANEOUS at 19:09

## 2019-01-01 RX ADMIN — Medication 4: at 11:57

## 2019-01-01 NOTE — PROGRESS NOTE ADULT - NSHPATTENDINGPLANDISCUSS_GEN_ALL_CORE
extensively with pt
medical staff
nursing, pt

## 2019-01-01 NOTE — PROGRESS NOTE ADULT - ASSESSMENT
78y Male with h/o COPD on home oxygen 5L, DM, MICHAEL, obesity, CKD 3, recently discharged on 11/15 after being admitted for acute on chronic hypoxic & Hypercapnic respiratory failure due to COPD exacerbation and gram negative pneumonia. He presented from home with complaints of Dyspnea. He explained that for the past 1 week his breathing has been getting worse gradually with pulse ox readings in the 80s whilst on oxygen with mildly productive cough, no fevers or chills. Per ED physician, patient was tachypneic and cyanotic on arrival with oxygen saturation in the 70s with NC 5L. He was started on BIPAP with significant improvement and transitioned to HFNC and NC 5 L. Hospital course has been complicated by Rt finger abscess requiring I&D. He is still waiting for discharge, pt was keep changing his mind home vs. SNF, at this point decided to go to SNF.    Assessment and Plan:    # Acute on Chronic Hypoxic respiratory failure: Due to COPD exacerbation.  Stable.   repeat ECHO 12/26 - severe Right side heart dilation, RSVP 100 (was 40 in 10/2018)  Continue Prednisone Taper (Prednisone 20 starting 12/28) &, Bronchodilators. Completed course of Levaquin.  Continue supplemental oxygen (pt is fine on 5L NC) & BiPAP prn and QHS. Maintain sats > 88%  Pt is not interested in hospice  Asked RT to check pt's own AVAP machine and do trial tonight, pt wants to see if he can tolerate that machine.    # Hyperkalemia: Resolved.    # Hypertension/Mild Tachycardia:  Tachycardia due to Hypoxia vs. Deconditioning.   Metoprolol dose adjusted. Monitor BP.  Hold Amlodipine for now.      # Diabetes mellitus, type 2: Uncontrolled, likely due to steroids  Hemoglobin A1C, Whole Blood: 8.7 % (11.05.18 @ 07:00)  CBG better controlled, continue Lantus to 30 in AM and 30 in PM.    Monitor FS with Insulin coverage.    # CKD stage III:  Avoid Nephrotoxins. Monitor BMP and Electrolytes with Diuresis.  CT abdomen: No hydronephrosis bilaterally or obstructing calculus. Stable bilateral renal cysts measuring up to 9.5 cm at   the right lower pole. Stable 4 cm indeterminate exophytic left interpolar renal lesion.  OP follow up    # BPH:  Continue Flomax and Finasteride.     # Obesity:  Weight loss counselling. Dietary modification.      # B12 deficiency & Vitamin D deficiency:  Continue with home supplementation.       # MICHAEL:  AVAP at night.      # Rt Index Finger cellulitis:  resolved  MRSA positive Well healed. Off Antibiotics.  Isolation dc'ed    # Severe PH       DVT prophylaxis: Heparin  Disposition: pending SNF placement  CODE status: DNR/DNI  Very poor Prognosis.

## 2019-01-02 LAB
ANION GAP SERPL CALC-SCNC: 9 MMOL/L — SIGNIFICANT CHANGE UP (ref 7–14)
BUN SERPL-MCNC: 62 MG/DL — CRITICAL HIGH (ref 10–20)
CALCIUM SERPL-MCNC: 8.9 MG/DL — SIGNIFICANT CHANGE UP (ref 8.5–10.1)
CHLORIDE SERPL-SCNC: 99 MMOL/L — SIGNIFICANT CHANGE UP (ref 98–110)
CO2 SERPL-SCNC: 38 MMOL/L — HIGH (ref 17–32)
CREAT SERPL-MCNC: 2.2 MG/DL — HIGH (ref 0.7–1.5)
GLUCOSE BLDC GLUCOMTR-MCNC: 122 MG/DL — HIGH (ref 70–99)
GLUCOSE BLDC GLUCOMTR-MCNC: 137 MG/DL — HIGH (ref 70–99)
GLUCOSE BLDC GLUCOMTR-MCNC: 152 MG/DL — HIGH (ref 70–99)
GLUCOSE BLDC GLUCOMTR-MCNC: 176 MG/DL — HIGH (ref 70–99)
GLUCOSE SERPL-MCNC: 136 MG/DL — HIGH (ref 70–99)
HCT VFR BLD CALC: 42.2 % — SIGNIFICANT CHANGE UP (ref 42–52)
HGB BLD-MCNC: 12.5 G/DL — LOW (ref 14–18)
MCHC RBC-ENTMCNC: 23.8 PG — LOW (ref 27–31)
MCHC RBC-ENTMCNC: 29.6 G/DL — LOW (ref 32–37)
MCV RBC AUTO: 80.4 FL — SIGNIFICANT CHANGE UP (ref 80–94)
NRBC # BLD: 0 /100 WBCS — SIGNIFICANT CHANGE UP (ref 0–0)
PLATELET # BLD AUTO: 299 K/UL — SIGNIFICANT CHANGE UP (ref 130–400)
POTASSIUM SERPL-MCNC: 4.5 MMOL/L — SIGNIFICANT CHANGE UP (ref 3.5–5)
POTASSIUM SERPL-SCNC: 4.5 MMOL/L — SIGNIFICANT CHANGE UP (ref 3.5–5)
RBC # BLD: 5.25 M/UL — SIGNIFICANT CHANGE UP (ref 4.7–6.1)
RBC # FLD: 20.6 % — HIGH (ref 11.5–14.5)
SODIUM SERPL-SCNC: 146 MMOL/L — SIGNIFICANT CHANGE UP (ref 135–146)
WBC # BLD: 8.6 K/UL — SIGNIFICANT CHANGE UP (ref 4.8–10.8)
WBC # FLD AUTO: 8.6 K/UL — SIGNIFICANT CHANGE UP (ref 4.8–10.8)

## 2019-01-02 RX ORDER — FUROSEMIDE 40 MG
1 TABLET ORAL
Qty: 0 | Refills: 0 | COMMUNITY
Start: 2019-01-02

## 2019-01-02 RX ORDER — INSULIN ASPART 100 [IU]/ML
17 INJECTION, SOLUTION SUBCUTANEOUS
Qty: 100 | Refills: 0 | OUTPATIENT
Start: 2019-01-02 | End: 2019-01-08

## 2019-01-02 RX ORDER — INSULIN GLARGINE 100 [IU]/ML
30 INJECTION, SOLUTION SUBCUTANEOUS
Qty: 0 | Refills: 0 | COMMUNITY
Start: 2019-01-02

## 2019-01-02 RX ORDER — FUROSEMIDE 40 MG
20 TABLET ORAL DAILY
Qty: 0 | Refills: 0 | Status: DISCONTINUED | OUTPATIENT
Start: 2019-01-02 | End: 2019-01-03

## 2019-01-02 RX ADMIN — TAMSULOSIN HYDROCHLORIDE 0.4 MILLIGRAM(S): 0.4 CAPSULE ORAL at 21:20

## 2019-01-02 RX ADMIN — Medication 17 UNIT(S): at 12:23

## 2019-01-02 RX ADMIN — TIOTROPIUM BROMIDE 1 CAPSULE(S): 18 CAPSULE ORAL; RESPIRATORY (INHALATION) at 10:48

## 2019-01-02 RX ADMIN — PREGABALIN 1000 MICROGRAM(S): 225 CAPSULE ORAL at 17:11

## 2019-01-02 RX ADMIN — Medication 25 MILLIGRAM(S): at 17:11

## 2019-01-02 RX ADMIN — Medication 20 MILLIGRAM(S): at 06:01

## 2019-01-02 RX ADMIN — HEPARIN SODIUM 5000 UNIT(S): 5000 INJECTION INTRAVENOUS; SUBCUTANEOUS at 06:00

## 2019-01-02 RX ADMIN — Medication 17 UNIT(S): at 08:37

## 2019-01-02 RX ADMIN — ZINC OXIDE 1 APPLICATION(S): 200 OINTMENT TOPICAL at 17:15

## 2019-01-02 RX ADMIN — ZINC OXIDE 1 APPLICATION(S): 200 OINTMENT TOPICAL at 06:01

## 2019-01-02 RX ADMIN — INSULIN GLARGINE 30 UNIT(S): 100 INJECTION, SOLUTION SUBCUTANEOUS at 10:50

## 2019-01-02 RX ADMIN — Medication 25 MILLIGRAM(S): at 05:58

## 2019-01-02 RX ADMIN — BUDESONIDE AND FORMOTEROL FUMARATE DIHYDRATE 2 PUFF(S): 160; 4.5 AEROSOL RESPIRATORY (INHALATION) at 08:39

## 2019-01-02 RX ADMIN — Medication 20 MILLIGRAM(S): at 17:11

## 2019-01-02 RX ADMIN — POLYETHYLENE GLYCOL 3350 17 GRAM(S): 17 POWDER, FOR SOLUTION ORAL at 05:59

## 2019-01-02 RX ADMIN — Medication 2: at 17:09

## 2019-01-02 RX ADMIN — Medication 3 MILLILITER(S): at 09:30

## 2019-01-02 RX ADMIN — FINASTERIDE 5 MILLIGRAM(S): 5 TABLET, FILM COATED ORAL at 10:53

## 2019-01-02 RX ADMIN — Medication 1000 UNIT(S): at 10:53

## 2019-01-02 RX ADMIN — Medication 3 MILLILITER(S): at 16:29

## 2019-01-02 RX ADMIN — HEPARIN SODIUM 5000 UNIT(S): 5000 INJECTION INTRAVENOUS; SUBCUTANEOUS at 17:12

## 2019-01-02 RX ADMIN — Medication 100 MILLIGRAM(S): at 10:53

## 2019-01-02 RX ADMIN — Medication 10 MILLIGRAM(S): at 05:58

## 2019-01-02 RX ADMIN — Medication 1 DROP(S): at 05:57

## 2019-01-02 RX ADMIN — PANTOPRAZOLE SODIUM 40 MILLIGRAM(S): 20 TABLET, DELAYED RELEASE ORAL at 05:58

## 2019-01-02 RX ADMIN — Medication 1 TABLET(S): at 10:53

## 2019-01-02 RX ADMIN — Medication 1 DROP(S): at 21:20

## 2019-01-02 RX ADMIN — SENNA PLUS 2 TABLET(S): 8.6 TABLET ORAL at 21:20

## 2019-01-02 RX ADMIN — ATORVASTATIN CALCIUM 20 MILLIGRAM(S): 80 TABLET, FILM COATED ORAL at 21:20

## 2019-01-02 RX ADMIN — Medication 3 MILLILITER(S): at 13:54

## 2019-01-02 RX ADMIN — Medication 3 MILLILITER(S): at 19:50

## 2019-01-02 RX ADMIN — Medication 17 UNIT(S): at 17:09

## 2019-01-02 RX ADMIN — POLYETHYLENE GLYCOL 3350 17 GRAM(S): 17 POWDER, FOR SOLUTION ORAL at 17:12

## 2019-01-02 NOTE — PROVIDER CONTACT NOTE (OTHER) - RECOMMENDATIONS
Respiratory therapist to evaluate O2 flow.
pt already on Vanco q 12. As per Dr. erazo no further intervention necessary as Vanco covers staph aureus.

## 2019-01-02 NOTE — PROGRESS NOTE ADULT - ASSESSMENT
Patient is an 78y Male with h/o COPD on home oxygen 5L, DM, MICHAEL, obesity, CKD 3, recently discharged on 11/15 after being admitted for acute on chronic hypoxic & Hypercapnic respiratory failure due to COPD exacerbation and gram negative pneumonia. He presented from home with complaints of Dyspnea. He explained that for the past 1 week his breathing has been getting worse gradually with pulse ox readings in the 80s whilst on oxygen with mildly productive cough, no fevers or chills. He was scheduled to see Pulmonologist Dr. Castrejon on Monday but due to difficulty breathing he was BIBA. Per ED physician, patient was tachypneic and cyanotic on arrival with oxygen saturation in the 70s with NC 5L. He was started on BIPAP with significant improvement and transitioned to HFNC off which he cannot be weaned. Hospital course has been complicated by Rt finger abscess requiring I&D. He is still waiting for discharge home vs. SNF. .     Assessment and Plan:    1. Acute on Chronic Hypoxic respiratory failure: Due to end stage COPD exacerbation.  Stable. Unable to wean off HFNC 50L/min.  Prednisone 10 mg daily. (Home dose). Continue Bronchodilators ATC and prn. Completed course of Levaquin.  Continue supplemental oxygen & BiPAP prn and QHS. Maintain sats > 90%  Patient is not interested in hospice.      2. Hyperkalemia: Resolved.  Nephrology consulted: due to JAMES, RTA type 4?, high K foods?  Recommended Low K diet & Kayaxalate 30 gm po TTS (3x week)       3. Hypertension:  Controlled on Metoprolol & Lasix. Monitor BP.  Amlodipine on hold.       4. Diabetes mellitus, type 2: Uncontrolled.  Hemoglobin A1C, Whole Blood: 8.7 % (11.05.18 @ 07:00)  Monitor FS with Insulin coverage.      5. CKD stage IV:  Creatine elevated but stable at 2.2. Avoid Nephrotoxins. Monitor BMP and Electrolytes with Diuresis.  CT abdomen: No hydronephrosis bilaterally or obstructing calculus. Stable bilateral renal cysts measuring up to 9.5 cm at   the right lower pole. Stable 4 cm indeterminate exophytic left interpolar renal lesion.      6. BPH:  Continue Flomax and Finasteride.       7. Obesity:  Weight loss counselling. Dietary modification.      8. B12 deficiency & Vitamin D deficiency:  Continue with home supplementation.       9. MICHAEL/OHVS:  BiPAP at night.      10. Rt Index Finger cellulitis:  MRSA positive Well healed. Off Antibiotics.  No need for Isolation.      11. Severe Hypertension:  Supportive care  ECHO 11/2018: Normal LVEF, no RWMA, Trace TR, Moderate pulmonary hypertension.  ECHO 12/25/18: severe Right side heart dilation, RSVP 100 (was 40 in 10/2018)  Gentle diuresis.       DVT prophylaxis: Heparin  Disposition: Home with HFNC if oxygen can be delivered.  CODE status: DNR/DNI  Very poor Prognosis.

## 2019-01-02 NOTE — PROVIDER CONTACT NOTE (OTHER) - SITUATION
Pt in chair, c/o chest tightness non radiating, pt upset about being told he was going to [facility] earlier.
Lab notification pt tested positive for Staph Aureus; abscess

## 2019-01-03 VITALS
HEART RATE: 89 BPM | TEMPERATURE: 98 F | DIASTOLIC BLOOD PRESSURE: 68 MMHG | SYSTOLIC BLOOD PRESSURE: 124 MMHG | RESPIRATION RATE: 18 BRPM

## 2019-01-03 LAB
GLUCOSE BLDC GLUCOMTR-MCNC: 128 MG/DL — HIGH (ref 70–99)
GLUCOSE BLDC GLUCOMTR-MCNC: 260 MG/DL — HIGH (ref 70–99)

## 2019-01-03 RX ADMIN — Medication 1 DROP(S): at 06:42

## 2019-01-03 RX ADMIN — Medication 100 MILLIGRAM(S): at 11:44

## 2019-01-03 RX ADMIN — Medication 6: at 11:56

## 2019-01-03 RX ADMIN — FINASTERIDE 5 MILLIGRAM(S): 5 TABLET, FILM COATED ORAL at 11:43

## 2019-01-03 RX ADMIN — BUDESONIDE AND FORMOTEROL FUMARATE DIHYDRATE 2 PUFF(S): 160; 4.5 AEROSOL RESPIRATORY (INHALATION) at 08:09

## 2019-01-03 RX ADMIN — POLYETHYLENE GLYCOL 3350 17 GRAM(S): 17 POWDER, FOR SOLUTION ORAL at 06:42

## 2019-01-03 RX ADMIN — ZINC OXIDE 1 APPLICATION(S): 200 OINTMENT TOPICAL at 06:47

## 2019-01-03 RX ADMIN — HEPARIN SODIUM 5000 UNIT(S): 5000 INJECTION INTRAVENOUS; SUBCUTANEOUS at 06:42

## 2019-01-03 RX ADMIN — PANTOPRAZOLE SODIUM 40 MILLIGRAM(S): 20 TABLET, DELAYED RELEASE ORAL at 06:42

## 2019-01-03 RX ADMIN — Medication 3 MILLILITER(S): at 07:47

## 2019-01-03 RX ADMIN — Medication 25 MILLIGRAM(S): at 06:42

## 2019-01-03 RX ADMIN — Medication 3 MILLILITER(S): at 11:34

## 2019-01-03 RX ADMIN — Medication 17 UNIT(S): at 08:08

## 2019-01-03 RX ADMIN — Medication 1000 UNIT(S): at 11:43

## 2019-01-03 RX ADMIN — TIOTROPIUM BROMIDE 1 CAPSULE(S): 18 CAPSULE ORAL; RESPIRATORY (INHALATION) at 08:09

## 2019-01-03 RX ADMIN — Medication 20 MILLIGRAM(S): at 06:42

## 2019-01-03 RX ADMIN — INSULIN GLARGINE 30 UNIT(S): 100 INJECTION, SOLUTION SUBCUTANEOUS at 08:08

## 2019-01-03 RX ADMIN — PREGABALIN 1000 MICROGRAM(S): 225 CAPSULE ORAL at 11:43

## 2019-01-03 RX ADMIN — Medication 10 MILLIGRAM(S): at 06:42

## 2019-01-03 RX ADMIN — Medication 17 UNIT(S): at 11:56

## 2019-01-03 RX ADMIN — Medication 1 TABLET(S): at 11:43

## 2019-01-03 NOTE — PROGRESS NOTE ADULT - PROVIDER SPECIALTY LIST ADULT
Hospitalist
Internal Medicine
Internal Medicine
Nephrology
Pulmonology
Surgery
Hospitalist
Nephrology
Surgery
Hospitalist
Nephrology

## 2019-01-03 NOTE — PROGRESS NOTE ADULT - ASSESSMENT
Patient is an 78y Male with h/o COPD on home oxygen 5L, DM, MICHAEL, obesity, CKD 3, recently discharged on 11/15 after being admitted for acute on chronic hypoxic & Hypercapnic respiratory failure due to COPD exacerbation and gram negative pneumonia. He presented from home with complaints of Dyspnea. He explained that for the past 1 week his breathing has been getting worse gradually with pulse ox readings in the 80s whilst on oxygen with mildly productive cough, no fevers or chills. He was scheduled to see Pulmonologist Dr. Castrejon on Monday but due to difficulty breathing he was BIBA. Per ED physician, patient was tachypneic and cyanotic on arrival with oxygen saturation in the 70s with NC 5L. He was started on BIPAP with significant improvement and transitioned to HFNC off which he cannot be weaned. Hospital course has been complicated by Rt finger abscess requiring I&D. He is still waiting for discharge home vs. SNF. .     Assessment and Plan:    1. Acute on Chronic Hypoxic respiratory failure: Due to end stage COPD exacerbation.  Stable. Unable to wean off HFNC 50L/min.  Prednisone 10 mg daily. (Home dose). Continue Bronchodilators ATC and prn. Completed course of Levaquin.  Continue supplemental oxygen & CPAP prn and QHS. Maintain sats > 90%  Patient is not interested in hospice at this time.  Follow up with Dr. Castrejon out patient.      2. Hyperkalemia: Resolved.  Nephrology consulted: due to JAMES, RTA type 4?, high K foods?  Recommended Low K diet & Kayaxalate 30 gm po TTS (3x week)       3. Hypertension:  Controlled on Metoprolol & Lasix. Monitor BP.  Amlodipine on hold.       4. Diabetes mellitus, type 2: Uncontrolled.  Hemoglobin A1C, Whole Blood: 8.7 % (11.05.18 @ 07:00)  Monitor FS with Insulin coverage.      5. CKD stage IV:  Creatine elevated but stable at 2.2. Avoid Nephrotoxins. Monitor BMP and Electrolytes with Diuresis.  CT abdomen: No hydronephrosis bilaterally or obstructing calculus. Stable bilateral renal cysts measuring up to 9.5 cm at   the right lower pole. Stable 4 cm indeterminate exophytic left interpolar renal lesion.  Follow up with Nephrologist out patient.      6. BPH:  Continue Flomax and Finasteride.       7. Obesity:  Weight loss counselling. Dietary modification.      8. B12 deficiency & Vitamin D deficiency:  Continue with home supplementation.       9. MICHAEL/OHVS:  BiPAP at night.      10. Rt Index Finger cellulitis:  MRSA positive Well healed. Off Antibiotics.  No need for Isolation.      11. Severe Hypertension:  Supportive care  ECHO 11/2018: Normal LVEF, no RWMA, Trace TR, Moderate pulmonary hypertension.  ECHO 12/25/18: severe Right side heart dilation, RSVP 100 (was 40 in 10/2018)  Gentle diuresis.       DVT prophylaxis: Heparin  Disposition: STR.  CODE status: DNR/DNI  Very poor Prognosis.

## 2019-01-03 NOTE — PROGRESS NOTE ADULT - SUBJECTIVE AND OBJECTIVE BOX
DIAGNOSIS:   HOSPITAL DAY #:    STATUS POST:    POST OPERATIVE DAY #:     Vital Signs Last 24 Hrs  T(C): 36.5 (22 Dec 2018 21:41), Max: 36.5 (22 Dec 2018 21:41)  T(F): 97.7 (22 Dec 2018 21:41), Max: 97.7 (22 Dec 2018 21:41)  HR: 114 (22 Dec 2018 21:41) (86 - 114)  BP: 171/74 (22 Dec 2018 21:41) (118/57 - 171/74)  BP(mean): --  RR: 18 (22 Dec 2018 21:41) (16 - 18)  SpO2: 89% (22 Dec 2018 10:00) (89% - 89%)    SUBJECTIVE: Pt seen    Pain: YES  [ ]   NO [ ]   Nausea: [ ] YES [ ] NO           Vomiting: [ ] YES [ ] NO  Flatus: [ ] YES [ ] NO             Bowel Movement: [ ] YES [ ] NO     Void: [ ]YES [ ]No      KATIUSKA DRAINAGE: SIGNIFICANT [ ]   NOT SIGNIFICANT [ ]   NOT APPLICABLE [ ]  YES [ ] NO    General Appearance: Appears well, NAD  Neck: Supple  Chest: Equal expansion bilaterally, equal breath sounds  CV: Pulse regular presently  Abdomen: Soft [x ] YES [ ]NO  DISTENDED [ ] YES [x ] NO TENDERNESS [ ]YES [ ]NO  INCISIONS: HEALING WELL [ ] YES  [ ] NO ERYTHEMA [ ] YES [ ] NO DRAINAGE [ ] YES  [ ] NO  Extremities: Grossly symmetric, CALF TENDERNESS [ ] YES  [ ] NO  finger infection better    LABS:                        13.0   17.14 )-----------( 434      ( 22 Dec 2018 08:56 )             44.3     12-22    144  |  105  |  46<H>  ----------------------------<  191<H>  5.3<H>   |  29  |  2.0<H>    Ca    8.8      22 Dec 2018 08:56    TPro  5.6<L>  /  Alb  3.5  /  TBili  0.3  /  DBili  x   /  AST  9   /  ALT  14  /  AlkPhos  54  12-22            ASSESSMENT:     GOOD POST OP COURSE [ ]  YES  [ ] NO  CONDITION IMPROVING  []  YES  [ ]  NO          PLAN:    CONTINUE PRESENT MANAGEMENT  [ ] YES  [ ] NO
JIAN MANCIA    Patient is a 78y old  Male who presents with a chief complaint of Acute Hypoxic Respiratory Failure (29 Dec 2018 11:07)    HPI:  Patient is an 78y Male with h/o COPD on home oxygen 5L, DM, MICHAEL, obesity, CKD 3, presented from home with complaints of Dyspnea. He explained that for the past 1 week his breathing has been getting worse gradually with pulse ox readings in the 80s whilst on oxygen. He was scheduled to see Pulmonologist Dr. Castrejon on Monday but due to difficulty breathing he was BIBA. Per ED physician, patient was tachypneic and cyanotic on arrival with oxygen saturation in the 70s with NC 5L. He was started on BIPAP with significant improvement.  He was admitted for further management. (08 Dec 2018 10:50)    INTERVAL HPI/OVERNIGHT EVENTS: no events    ROS: All ROS negative except    PHYSICAL EXAM:  T(C): 37, Max: 37.2 (12-29-18 @ 22:00)  HR: 82 (82 - 94)  BP: 117/69 (117/66 - 117/69)  RR: 21 (21 - 21)  SpO2: 98% (98% - 98%)    GENERAL: NAD, obese  NECK: Supple, No JVD  PULMONARY/CHEST: decreased air entry, mild wheezes, no rales  CARDIOVASC: Regular rate and rhythm; No murmurs  GI/ABDOMEN: obese, Soft, Nontender, Nondistended; Bowel sounds present  EXTREMITIES:  2+ Peripheral Pulses, No clubbing, cyanosis, +2 edema, no deformity. No calf tenderness b/l.  SKIN: No rashes or lesions  NERVOUS SYSTEM:  Alert & Oriented X3, no focal deficit     LABS:                        12.6   9.65  )-----------( 329      ( 30 Dec 2018 06:25 )             42.3     12-30    146  |  99  |  54<H>  ----------------------------<  95  4.4   |  40<H>  |  2.2<H>    Ca    8.9      30 Dec 2018 06:25      CAPILLARY BLOOD GLUCOSE  POCT Blood Glucose.: 300 mg/dL (30 Dec 2018 11:24)  POCT Blood Glucose.: 216 mg/dL (30 Dec 2018 09:43)  POCT Blood Glucose.: 82 mg/dL (30 Dec 2018 07:19)  POCT Blood Glucose.: 148 mg/dL (29 Dec 2018 21:41)  POCT Blood Glucose.: 183 mg/dL (29 Dec 2018 16:12)      MEDICATIONS  (STANDING):  ALBUTerol/ipratropium for Nebulization 3 milliLiter(s) Nebulizer every 4 hours  artificial  tears Solution 1 Drop(s) Both EYES three times a day  atorvastatin 20 milliGRAM(s) Oral at bedtime  buDESOnide 160 MICROgram(s)/formoterol 4.5 MICROgram(s) Inhaler 2 Puff(s) Inhalation two times a day  cholecalciferol 1000 Unit(s) Oral daily  cyanocobalamin 1000 MICROGram(s) Oral daily  dextrose 5%. 1000 milliLiter(s) (50 mL/Hr) IV Continuous <Continuous>  dextrose 50% Injectable 12.5 Gram(s) IV Push once  dextrose 50% Injectable 25 Gram(s) IV Push once  dextrose 50% Injectable 25 Gram(s) IV Push once  docusate sodium 100 milliGRAM(s) Oral daily  finasteride 5 milliGRAM(s) Oral daily  furosemide    Tablet 20 milliGRAM(s) Oral two times a day  heparin  Injectable 5000 Unit(s) SubCutaneous every 12 hours  insulin glargine Injectable (LANTUS) 30 Unit(s) SubCutaneous every morning  insulin glargine Injectable (LANTUS) 30 Unit(s) SubCutaneous at bedtime  insulin lispro (HumaLOG) corrective regimen sliding scale   SubCutaneous three times a day before meals  insulin lispro (HumaLOG) corrective regimen sliding scale   SubCutaneous at bedtime  insulin lispro Injectable (HumaLOG) 17 Unit(s) SubCutaneous three times a day with meals  metoprolol tartrate 25 milliGRAM(s) Oral two times a day  multivitamin/minerals 1 Tablet(s) Oral daily  pantoprazole    Tablet 40 milliGRAM(s) Oral before breakfast  polyethylene glycol 3350 17 Gram(s) Oral two times a day  predniSONE   Tablet 20 milliGRAM(s) Oral daily  senna 2 Tablet(s) Oral at bedtime  sodium polystyrene sulfonate Suspension 30 Gram(s) Oral <User Schedule>  tamsulosin 0.4 milliGRAM(s) Oral at bedtime  tiotropium 18 MICROgram(s) Capsule 1 Capsule(s) Inhalation daily  zinc oxide 40% Ointment 1 Application(s) Topical two times a day    MEDICATIONS  (PRN):  acetaminophen   Tablet .. 650 milliGRAM(s) Oral every 6 hours PRN Temp greater or equal to 38C (100.4F), Moderate Pain (4 - 6)  dextrose 40% Gel 15 Gram(s) Oral once PRN Blood Glucose LESS THAN 70 milliGRAM(s)/deciliter  glucagon  Injectable 1 milliGRAM(s) IntraMuscular once PRN Glucose LESS THAN 70 milligrams/deciliter
JIAN MANCIA  78y  Male    Patient is a 78y old  Male who presents with a chief complaint of Dyspnea and low oxygen saturation (09 Dec 2018 09:44)      INTERVAL HPI/OVERNIGHT EVENTS:  Patient noted to be Tachycardic and hypoxic in the low 80s on HFNC.  Patient complaining of dyspnea, worsening pedal edema, and fluid oozing from abdominal wall.   Dyspnea improved but unable to wean him off HFNC.  Blood sugars still uncontrolled.      REVIEW OF SYSTEMS:  CONSTITUTIONAL: No fever, weight loss, + fatigue  EYES: No eye pain, visual disturbances, or discharge  ENMT:  No difficulty hearing, tinnitus, vertigo; No sinus or throat pain  NECK: No pain or stiffness  BREASTS: No pain, masses, or nipple discharge  RESPIRATORY: + cough, No wheezing, chills or hemoptysis; + shortness of breath  CARDIOVASCULAR: No chest pain, palpitations, dizziness, + leg swelling  GASTROINTESTINAL: No abdominal or epigastric pain. No nausea, vomiting, or hematemesis; + constipation. No melena or hematochezia.  GENITOURINARY: No dysuria, frequency, hematuria, or incontinence  NEUROLOGICAL: No headaches, memory loss, loss of strength, numbness, or tremors  SKIN: fluid dripping form anterior abdominal wall. No itching, burning, rashes, or lesions   LYMPH NODES: No enlarged glands  ENDOCRINE: No heat or cold intolerance; No hair loss  MUSCULOSKELETAL: No joint pain or swelling; No muscle, back, or extremity pain  PSYCHIATRIC: No depression, anxiety, mood swings, or difficulty sleeping  HEME/LYMPH: No easy bruising, or bleeding gums  ALLERGY AND IMMUNOLOGIC: No hives or eczema      VITALS:  T(C): 36.2 (25 Dec 2018 22:07), Max: 36.8 (25 Dec 2018 06:03)  T(F): 97.2 (25 Dec 2018 22:07), Max: 98.2 (25 Dec 2018 06:03)  HR: 108 (25 Dec 2018 22:07) (104 - 126)  BP: 101/64 (25 Dec 2018 22:07) (101/64 - 147/76)  BP(mean): --  RR: 18 (25 Dec 2018 22:07) (16 - 18)  SpO2: 93% (25 Dec 2018 14:55) (86% - 93%)      CAPILLARY BLOOD GLUCOSE  POCT Blood Glucose.: 213 mg/dL (25 Dec 2018 20:49)  POCT Blood Glucose.: 322 mg/dL (25 Dec 2018 16:44)  POCT Blood Glucose.: 354 mg/dL (25 Dec 2018 11:55)  POCT Blood Glucose.: 143 mg/dL (25 Dec 2018 07:40)        PHYSICAL EXAM:  GENERAL: NAD, obese  HEAD:  Atraumatic, Normocephalic  EYES: conjunctiva and sclera clear  ENMT: Moist mucous membranes  NECK: Supple, Normal thyroid  NERVOUS SYSTEM:  Alert & Oriented X 3, Good concentration; Motor Strength 5/5 B/L upper and lower extremities.  CHEST/LUNG: Decreased AE bilaterally; No rales, No rhonchi, + wheezing. On high flow nasal cannula.  HEART: Regular rate and rhythm; No murmurs, rubs, or gallops  ABDOMEN: Soft, Nontender, +++ distended with edema and oozing; Bowel sounds present  EXTREMITIES:  2+ Peripheral Pulses, No clubbing, cyanosis, bipedal edema +++  LYMPH: No lymphadenopathy noted  SKIN: No rashes or lesions    Consultant(s) Notes Reviewed:  [x ] YES  [ ] NO  Care Discussed with Consultants/Other Providers [ x] YES  [ ] NO    LABS: No new labs since 12/23/18.                       MICROBIOLOGY: None  Culture - Abscess with Gram Stain (12.18.18 @ 16:55)    -  Gentamicin: S <=1 Should not be used as monotherapy    -  Linezolid: S 2    -  Oxacillin: R >2    -  Penicillin: R >8    -  RIF- Rifampin: S <=1 Should not be used as monotherapy    -  Tetra/Doxy: S 2    -  Trimethoprim/Sulfamethoxazole: S <=0.5/9.5    -  Vancomycin: S 2    -  Ampicillin/Sulbactam: R <=8/4    -  Cefazolin: R 16    -  Clindamycin: R >4    -  Daptomycin: S 0.5    -  Erythromycin: R >4    Specimen Source: .Abscess Arm - Right    Culture Results:   Moderate Methicillin resistant Staphylococcus aureus    Organism Identification: Methicillin resistant Staphylococcus aureus    Organism: Methicillin resistant Staphylococcus aureus    Method Type: ANSON        RADIOLOGY & ADDITIONAL TESTS  Xray Chest 1 View- PORTABLE-Urgent (12.25.18 @ 09:25)   Support devices: None.    Cardiac/mediastinum/hilum: Unremarkable.    Lung parenchyma/Pleura: Stable bibasilar opacities.    Skeleton/soft tissues: Unremarkable.    Impression:      Bibasilar opacity/atelectasis.        CT Abdomen and Pelvis w/ Oral Cont (12.09.18 @ 12:12)   No evidence of acute intra-abdominal or pelvic pathology.    Multiple bilateral non-obstructing renal calculi measuring up to 8 mm in   the left interpolar region.    Stable 4 cm indeterminate exophytic left interpolar renal lesion.   Recommend nonemergent MRI of the abdomen with renal mass protocol for   further evaluation.        Imaging Personally Reviewed:  [x] YES  [ ] NO    MEDICATIONS  (STANDING):  ALBUTerol/ipratropium for Nebulization 3 milliLiter(s) Nebulizer every 4 hours  artificial  tears Solution 1 Drop(s) Both EYES three times a day  atorvastatin 20 milliGRAM(s) Oral at bedtime  buDESOnide 160 MICROgram(s)/formoterol 4.5 MICROgram(s) Inhaler 2 Puff(s) Inhalation two times a day  cholecalciferol 1000 Unit(s) Oral daily  cyanocobalamin 1000 MICROGram(s) Oral daily  dextrose 5%. 1000 milliLiter(s) (50 mL/Hr) IV Continuous <Continuous>  dextrose 50% Injectable 12.5 Gram(s) IV Push once  dextrose 50% Injectable 25 Gram(s) IV Push once  dextrose 50% Injectable 25 Gram(s) IV Push once  docusate sodium 100 milliGRAM(s) Oral daily  finasteride 5 milliGRAM(s) Oral daily  furosemide    Tablet 20 milliGRAM(s) Oral two times a day  heparin  Injectable 5000 Unit(s) SubCutaneous every 12 hours  insulin glargine Injectable (LANTUS) 50 Unit(s) SubCutaneous at bedtime  insulin lispro (HumaLOG) corrective regimen sliding scale   SubCutaneous three times a day before meals  insulin lispro (HumaLOG) corrective regimen sliding scale   SubCutaneous at bedtime  insulin lispro Injectable (HumaLOG) 17 Unit(s) SubCutaneous three times a day with meals  metoprolol tartrate 25 milliGRAM(s) Oral two times a day  multivitamin/minerals 1 Tablet(s) Oral daily  pantoprazole    Tablet 40 milliGRAM(s) Oral before breakfast  polyethylene glycol 3350 17 Gram(s) Oral two times a day  predniSONE   Tablet 40 milliGRAM(s) Oral daily  senna 2 Tablet(s) Oral at bedtime  sodium polystyrene sulfonate Suspension 30 Gram(s) Oral <User Schedule>  tamsulosin 0.4 milliGRAM(s) Oral at bedtime  tiotropium 18 MICROgram(s) Capsule 1 Capsule(s) Inhalation daily    MEDICATIONS  (PRN):  acetaminophen   Tablet .. 650 milliGRAM(s) Oral every 6 hours PRN Temp greater or equal to 38C (100.4F), Moderate Pain (4 - 6)  dextrose 40% Gel 15 Gram(s) Oral once PRN Blood Glucose LESS THAN 70 milliGRAM(s)/deciliter  glucagon  Injectable 1 milliGRAM(s) IntraMuscular once PRN Glucose LESS THAN 70 milligrams/deciliter        HEALTH ISSUES - PROBLEM Dx:  COPD (chronic obstructive pulmonary disease) with exacerbation  Hyperkalemia  High cholesterol  GERD (gastroesophageal reflux disease)  Enlarged prostate  Oxygen dependent  Diabetes mellitus, type 2  Hypertension  Emphysema lung
Nephrology progress note    Patient is seen and examined, events over the last 24 h noted .    Allergies:  aspirin (Other)  iodine (Hives)  penicillin (Unknown)    Hospital Medications:   MEDICATIONS  (STANDING):  ALBUTerol/ipratropium for Nebulization 3 milliLiter(s) Nebulizer every 4 hours  amLODIPine   Tablet 10 milliGRAM(s) Oral daily  atorvastatin 20 milliGRAM(s) Oral at bedtime  buDESOnide 160 MICROgram(s)/formoterol 4.5 MICROgram(s) Inhaler 2 Puff(s) Inhalation two times a day  cholecalciferol 1000 Unit(s) Oral daily  cyanocobalamin 1000 MICROGram(s) Oral daily  dextrose 5%. 1000 milliLiter(s) (50 mL/Hr) IV Continuous <Continuous>  dextrose 50% Injectable 12.5 Gram(s) IV Push once  dextrose 50% Injectable 25 Gram(s) IV Push once  dextrose 50% Injectable 25 Gram(s) IV Push once  docusate sodium 100 milliGRAM(s) Oral daily  finasteride 5 milliGRAM(s) Oral daily  heparin  Injectable 5000 Unit(s) SubCutaneous every 12 hours  insulin glargine Injectable (LANTUS) 50 Unit(s) SubCutaneous at bedtime  insulin lispro (HumaLOG) corrective regimen sliding scale   SubCutaneous three times a day before meals  insulin lispro (HumaLOG) corrective regimen sliding scale   SubCutaneous at bedtime  insulin lispro Injectable (HumaLOG) 15 Unit(s) SubCutaneous three times a day with meals  lactulose Syrup 30 Gram(s) Oral three times a day  levoFLOXacin IVPB      levoFLOXacin IVPB 750 milliGRAM(s) IV Intermittent every 24 hours  methylPREDNISolone sodium succinate Injectable 60 milliGRAM(s) IV Push every 12 hours  metoprolol succinate ER 25 milliGRAM(s) Oral daily  multivitamin/minerals 1 Tablet(s) Oral daily  pantoprazole    Tablet 40 milliGRAM(s) Oral before breakfast  polyethylene glycol 3350 17 Gram(s) Oral daily  polyethylene glycol 3350 17 Gram(s) Oral two times a day  senna 2 Tablet(s) Oral at bedtime  tamsulosin 0.4 milliGRAM(s) Oral at bedtime  tiotropium 18 MICROgram(s) Capsule 1 Capsule(s) Inhalation daily        VITALS:  T(F): 97.1 (12-12-18 @ 06:04), Max: 97.6 (12-11-18 @ 21:31)  HR: 79 (12-12-18 @ 06:04)  BP: 165/86 (12-12-18 @ 06:04)  RR: 16 (12-12-18 @ 06:04)  SpO2: --  Wt(kg): --    12-10 @ 07:01  -  12-11 @ 07:00  --------------------------------------------------------  IN: 0 mL / OUT: 400 mL / NET: -400 mL    12-11 @ 07:01  -  12-12 @ 07:00  --------------------------------------------------------  IN: 0 mL / OUT: 100 mL / NET: -100 mL          PHYSICAL EXAM:  Constitutional: On AVAP  HEENT: anicteric sclera, MMM  Obese  Respiratory: decreased BS b/l  Cardiovascular: S1, S2, RRR  Gastrointestinal: BS+, soft, NT/ND  Extremities:  mild peripheral edema  Neurological: A/O x 3, no focal deficits  : No CVA tenderness. No chapman.   Skin: No rashes  Vascular Access:    LABS:  12-11    142  |  102  |  55<H>  ----------------------------<  157<H>  5.9<H>   |  33<H>  |  1.8<H>    Ca    9.2      11 Dec 2018 06:45  Phos  4.8     12-11                            12.8   16.57 )-----------( 587      ( 11 Dec 2018 06:45 )             43.0       Urine Studies:    Potassium, Random Urine: 28 mmol/L (12-11 @ 22:35)  Creatinine, Random Urine: 102 mg/dL (12-11 @ 22:35)  Sodium, Random Urine: 67.0 mmoL/L (12-11 @ 22:35)    RADIOLOGY & ADDITIONAL STUDIES:
Chief Complaint:  Patient is a 78y old  Male who presents with a chief complaint of Acute Hypoxic Respiratory Failure (14 Dec 2018 18:07)      Interval Events:     Allergies:  aspirin (Other)  fish (Other)  iodine (Hives)  penicillin (Unknown)  shellfish (Other)      Home Medications:    Hospital Medications:  ALBUTerol/ipratropium for Nebulization 3 milliLiter(s) Nebulizer every 4 hours  allopurinol 100 milliGRAM(s) Oral daily  amLODIPine   Tablet 10 milliGRAM(s) Oral daily  artificial  tears Solution 1 Drop(s) Both EYES three times a day  atorvastatin 20 milliGRAM(s) Oral at bedtime  buDESOnide 160 MICROgram(s)/formoterol 4.5 MICROgram(s) Inhaler 2 Puff(s) Inhalation two times a day  cholecalciferol 1000 Unit(s) Oral daily  cyanocobalamin 1000 MICROGram(s) Oral daily  dextrose 40% Gel 15 Gram(s) Oral once PRN  dextrose 5%. 1000 milliLiter(s) IV Continuous <Continuous>  dextrose 50% Injectable 12.5 Gram(s) IV Push once  dextrose 50% Injectable 25 Gram(s) IV Push once  dextrose 50% Injectable 25 Gram(s) IV Push once  docusate sodium 100 milliGRAM(s) Oral daily  finasteride 5 milliGRAM(s) Oral daily  glucagon  Injectable 1 milliGRAM(s) IntraMuscular once PRN  heparin  Injectable 5000 Unit(s) SubCutaneous every 12 hours  insulin glargine Injectable (LANTUS) 50 Unit(s) SubCutaneous at bedtime  insulin lispro (HumaLOG) corrective regimen sliding scale   SubCutaneous three times a day before meals  insulin lispro (HumaLOG) corrective regimen sliding scale   SubCutaneous at bedtime  insulin lispro Injectable (HumaLOG) 17 Unit(s) SubCutaneous three times a day with meals  lactulose Syrup 30 Gram(s) Oral three times a day  levoFLOXacin IVPB      levoFLOXacin IVPB 750 milliGRAM(s) IV Intermittent every 24 hours  metoprolol succinate ER 25 milliGRAM(s) Oral daily  multivitamin/minerals 1 Tablet(s) Oral daily  pantoprazole    Tablet 40 milliGRAM(s) Oral before breakfast  polyethylene glycol 3350 17 Gram(s) Oral two times a day  predniSONE   Tablet 60 milliGRAM(s) Oral daily  senna 2 Tablet(s) Oral at bedtime  sodium polystyrene sulfonate Suspension 30 Gram(s) Oral <User Schedule>  tamsulosin 0.4 milliGRAM(s) Oral at bedtime  tiotropium 18 MICROgram(s) Capsule 1 Capsule(s) Inhalation daily      PMHX/PSHX:  Colon polyp  High cholesterol  GERD (gastroesophageal reflux disease)  Enlarged prostate  Oxygen dependent  COPD (chronic obstructive pulmonary disease)  Diabetes mellitus, type 2  Hypertension  Emphysema lung  Empyema lung  History of hemorrhoidectomy  Dysplastic colon polyp  No significant past surgical history      Family history:  No pertinent family history in first degree relatives      ROS:   As mentioned below      PHYSICAL EXAM:   Vital Signs:  Vital Signs Last 24 Hrs  T(C): 36.1 (15 Dec 2018 05:34), Max: 36.3 (14 Dec 2018 22:09)  T(F): 97 (15 Dec 2018 05:34), Max: 97.3 (14 Dec 2018 22:09)  HR: 85 (15 Dec 2018 05:34) (85 - 889)  BP: 113/64 (15 Dec 2018 05:34) (113/64 - 123/63)  BP(mean): --  RR: 16 (15 Dec 2018 05:34) (16 - 16)  SpO2: 98% (15 Dec 2018 01:20) (98% - 98%)  Daily     Daily     GENERAL:  NAD/ Bipap machine  HEENT:  NC/AT,  No Thyromegaly  CHEST:  CTA B/L  HEART:  S1, S2- No M, R, G  ABDOMEN:  Soft, NT, ND  EXTEREMITIES:  no cyanosis,clubbing or edema  SKIN:  NAD  NEURO:  Grossly Nr.    LABS:                        13.0   14.53 )-----------( 497      ( 15 Dec 2018 07:24 )             43.2     12-15    146  |  103  |  47<H>  ----------------------------<  103<H>  5.0   |  32  |  2.0<H>    Ca    9.1      15 Dec 2018 07:24  Mg     2.1     12-15                Imaging:
Chief Complaint:  Patient is a 78y old  Male who presents with a chief complaint of Acute Hypoxic Respiratory Failure (15 Dec 2018 11:53)      Interval Events:     Allergies:  aspirin (Other)  fish (Other)  iodine (Hives)  penicillin (Unknown)  shellfish (Other)      Home Medications:    Hospital Medications:  acetaminophen   Tablet .. 650 milliGRAM(s) Oral every 6 hours PRN  ALBUTerol/ipratropium for Nebulization 3 milliLiter(s) Nebulizer every 4 hours  allopurinol 100 milliGRAM(s) Oral daily  amLODIPine   Tablet 10 milliGRAM(s) Oral daily  artificial  tears Solution 1 Drop(s) Both EYES three times a day  atorvastatin 20 milliGRAM(s) Oral at bedtime  buDESOnide 160 MICROgram(s)/formoterol 4.5 MICROgram(s) Inhaler 2 Puff(s) Inhalation two times a day  cholecalciferol 1000 Unit(s) Oral daily  cyanocobalamin 1000 MICROGram(s) Oral daily  dextrose 40% Gel 15 Gram(s) Oral once PRN  dextrose 5%. 1000 milliLiter(s) IV Continuous <Continuous>  dextrose 50% Injectable 12.5 Gram(s) IV Push once  dextrose 50% Injectable 25 Gram(s) IV Push once  dextrose 50% Injectable 25 Gram(s) IV Push once  docusate sodium 100 milliGRAM(s) Oral daily  finasteride 5 milliGRAM(s) Oral daily  glucagon  Injectable 1 milliGRAM(s) IntraMuscular once PRN  heparin  Injectable 5000 Unit(s) SubCutaneous every 12 hours  insulin glargine Injectable (LANTUS) 50 Unit(s) SubCutaneous at bedtime  insulin lispro (HumaLOG) corrective regimen sliding scale   SubCutaneous three times a day before meals  insulin lispro (HumaLOG) corrective regimen sliding scale   SubCutaneous at bedtime  insulin lispro Injectable (HumaLOG) 17 Unit(s) SubCutaneous three times a day with meals  lactulose Syrup 30 Gram(s) Oral three times a day  levoFLOXacin IVPB      levoFLOXacin IVPB 750 milliGRAM(s) IV Intermittent every 24 hours  metoprolol succinate ER 25 milliGRAM(s) Oral daily  multivitamin/minerals 1 Tablet(s) Oral daily  pantoprazole    Tablet 40 milliGRAM(s) Oral before breakfast  polyethylene glycol 3350 17 Gram(s) Oral two times a day  predniSONE   Tablet 60 milliGRAM(s) Oral daily  senna 2 Tablet(s) Oral at bedtime  sodium polystyrene sulfonate Suspension 30 Gram(s) Oral <User Schedule>  tamsulosin 0.4 milliGRAM(s) Oral at bedtime  tiotropium 18 MICROgram(s) Capsule 1 Capsule(s) Inhalation daily  vancomycin  IVPB 1000 milliGRAM(s) IV Intermittent every 12 hours      PMHX/PSHX:  Colon polyp  High cholesterol  GERD (gastroesophageal reflux disease)  Enlarged prostate  Oxygen dependent  COPD (chronic obstructive pulmonary disease)  Diabetes mellitus, type 2  Hypertension  Emphysema lung  Empyema lung  History of hemorrhoidectomy  Dysplastic colon polyp  No significant past surgical history      Family history:  No pertinent family history in first degree relatives      ROS:   As mentioned below      PHYSICAL EXAM:   Vital Signs:  Vital Signs Last 24 Hrs  T(C): 36.7 (16 Dec 2018 06:05), Max: 37.3 (15 Dec 2018 14:42)  T(F): 98 (16 Dec 2018 06:05), Max: 99.2 (15 Dec 2018 14:42)  HR: 98 (16 Dec 2018 08:13) (97 - 105)  BP: 140/77 (16 Dec 2018 06:05) (131/74 - 140/77)  BP(mean): --  RR: 20 (16 Dec 2018 08:13) (18 - 20)  SpO2: 95% (16 Dec 2018 08:13) (95% - 95%)  Daily     Daily     GENERAL:  NAD / Sitting on recliner, nasla O2/ Bipap  HEENT:  NC/AT,  No Thyromegaly  CHEST:  CTA B/L  HEART:  S1, S2- No M, R, G  ABDOMEN:  Soft, NT, ND  EXTEREMITIES:  no cyanosis,clubbing , Bilateral LE edema, Right index finger swollen?abscess  SKIN:  NAD  NEURO:  Grossly Nr.    LABS:                        13.0   14.53 )-----------( 497      ( 15 Dec 2018 07:24 )             43.2     12-15    146  |  103  |  47<H>  ----------------------------<  103<H>  5.0   |  32  |  2.0<H>    Ca    9.1      15 Dec 2018 07:24  Mg     2.1     12-15                Imaging:
DIAGNOSIS:   HOSPITAL DAY #:    STATUS POST:    POST OPERATIVE DAY #:     Vital Signs Last 24 Hrs  T(C): 36.4 (18 Dec 2018 22:03), Max: 36.8 (17 Dec 2018 22:19)  T(F): 97.5 (18 Dec 2018 22:03), Max: 98.2 (17 Dec 2018 22:19)  HR: 89 (18 Dec 2018 22:03) (87 - 92)  BP: 125/71 (18 Dec 2018 22:03) (100/50 - 132/64)  BP(mean): --  RR: 17 (18 Dec 2018 22:03) (16 - 18)  SpO2: 94% (18 Dec 2018 08:06) (94% - 94%)    SUBJECTIVE: Pt seen    Pain: YES  [ ]   NO [ ]   Nausea: [ ] YES [ ] NO           Vomiting: [ ] YES [ ] NO  Flatus: [ ] YES [ ] NO             Bowel Movement: [ ] YES [ ] NO     Void: [ ]YES [ ]No      KATIUSKA DRAINAGE: SIGNIFICANT [ ]   NOT SIGNIFICANT [ ]   NOT APPLICABLE [ ]  YES [ ] NO    General Appearance: Appears well, NAD  Neck: Supple  Chest: Equal expansion bilaterally, equal breath sounds  CV: Pulse regular presently  Abdomen: Soft [x ] YES [ ]NO  DISTENDED [ ] YES [x ] NO TENDERNESS [ ]YES [ ]NO  INCISIONS: HEALING WELL [ ] YES  [ ] NO ERYTHEMA [ ] YES [ ] NO DRAINAGE [ ] YES  [ ] NO  Extremities: Grossly symmetric, CALF TENDERNESS [ ] YES  [ ] NO  finger abscess for InD      LABS:                        12.5   13.57 )-----------( 489      ( 18 Dec 2018 09:30 )             41.3     12-18    143  |  104  |  42<H>  ----------------------------<  188<H>  5.2<H>   |  29  |  1.5    Ca    8.6      18 Dec 2018 09:30    TPro  5.3<L>  /  Alb  3.1<L>  /  TBili  0.4  /  DBili  x   /  AST  9   /  ALT  14  /  AlkPhos  52  12-18            ASSESSMENT:     GOOD POST OP COURSE [ ]  YES  [ ] NO  CONDITION IMPROVING  []  YES  [ ]  NO          PLAN:    CONTINUE PRESENT MANAGEMENT  [x ] YES  [ ] NO
DIAGNOSIS:   HOSPITAL DAY #:    STATUS POST:    POST OPERATIVE DAY #:     Vital Signs Last 24 Hrs  T(C): 36.8 (17 Dec 2018 22:19), Max: 36.8 (17 Dec 2018 22:19)  T(F): 98.2 (17 Dec 2018 22:19), Max: 98.2 (17 Dec 2018 22:19)  HR: 92 (17 Dec 2018 22:19) (82 - 92)  BP: 121/58 (17 Dec 2018 22:19) (108/51 - 131/63)  BP(mean): --  RR: 18 (17 Dec 2018 22:19) (18 - 18)  SpO2: --    SUBJECTIVE: Pt seen    Pain: YES  [ ]   NO [ ]   Nausea: [ ] YES [ ] NO           Vomiting: [ ] YES [ ] NO  Flatus: [ ] YES [ ] NO             Bowel Movement: [ ] YES [ ] NO     Void: [ ]YES [ ]No      KATIUSKA DRAINAGE: SIGNIFICANT [ ]   NOT SIGNIFICANT [ ]   NOT APPLICABLE [ ]  YES [ ] NO    General Appearance: Appears well, NAD  Neck: Supple  Chest: Equal expansion bilaterally, equal breath sounds  CV: Pulse regular presently  Abdomen: Soft x[ ] YES [ ]NO  DISTENDED [ ] YES [x ] NO TENDERNESS [ ]YES [ ]NO  INCISIONS: HEALING WELL [ ] YES  [ ] NO ERYTHEMA [ ] YES [ ] NO DRAINAGE [ ] YES  [ ] NO  Extremities: Grossly symmetric, CALF TENDERNESS [ ] YES  [ ] NO  superficial abcess for I/D    LABS:                  ASSESSMENT:     GOOD POST OP COURSE [ ]  YES  [ ] NO  CONDITION IMPROVING  []  YES  [ ]  NO          PLAN:    CONTINUE PRESENT MANAGEMENT  [x ] YES  [ ] NO
HPI:  Patient reports improvement to hand and denies any pain to hand     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  High cholesterol  GERD (gastroesophageal reflux disease)  Enlarged prostate  Oxygen dependent  COPD (chronic obstructive pulmonary disease)  Diabetes mellitus, type 2  Hypertension  Emphysema lung  History of hemorrhoidectomy  Dysplastic colon polyp: removed      Allergies    fish (Other)  iodine (Hives)  penicillin (Unknown)  shellfish (Other)    Intolerances    aspirin (Other)      MEDICATIONS  (STANDING):  ALBUTerol/ipratropium for Nebulization 3 milliLiter(s) Nebulizer every 4 hours  allopurinol 100 milliGRAM(s) Oral daily  amLODIPine   Tablet 10 milliGRAM(s) Oral daily  artificial  tears Solution 1 Drop(s) Both EYES three times a day  atorvastatin 20 milliGRAM(s) Oral at bedtime  buDESOnide 160 MICROgram(s)/formoterol 4.5 MICROgram(s) Inhaler 2 Puff(s) Inhalation two times a day  cholecalciferol 1000 Unit(s) Oral daily  cyanocobalamin 1000 MICROGram(s) Oral daily  dextrose 5%. 1000 milliLiter(s) (50 mL/Hr) IV Continuous <Continuous>  dextrose 50% Injectable 12.5 Gram(s) IV Push once  dextrose 50% Injectable 25 Gram(s) IV Push once  dextrose 50% Injectable 25 Gram(s) IV Push once  docusate sodium 100 milliGRAM(s) Oral daily  finasteride 5 milliGRAM(s) Oral daily  heparin  Injectable 5000 Unit(s) SubCutaneous every 12 hours  insulin glargine Injectable (LANTUS) 50 Unit(s) SubCutaneous at bedtime  insulin lispro (HumaLOG) corrective regimen sliding scale   SubCutaneous three times a day before meals  insulin lispro (HumaLOG) corrective regimen sliding scale   SubCutaneous at bedtime  insulin lispro Injectable (HumaLOG) 17 Unit(s) SubCutaneous three times a day with meals  lactulose Syrup 30 Gram(s) Oral three times a day  levoFLOXacin IVPB      levoFLOXacin IVPB 750 milliGRAM(s) IV Intermittent every 24 hours  metoprolol succinate ER 25 milliGRAM(s) Oral daily  multivitamin/minerals 1 Tablet(s) Oral daily  pantoprazole    Tablet 40 milliGRAM(s) Oral before breakfast  polyethylene glycol 3350 17 Gram(s) Oral two times a day  predniSONE   Tablet 60 milliGRAM(s) Oral daily  senna 2 Tablet(s) Oral at bedtime  sodium polystyrene sulfonate Suspension 30 Gram(s) Oral <User Schedule>  tamsulosin 0.4 milliGRAM(s) Oral at bedtime  tiotropium 18 MICROgram(s) Capsule 1 Capsule(s) Inhalation daily  vancomycin  IVPB 1000 milliGRAM(s) IV Intermittent every 12 hours    MEDICATIONS  (PRN):  acetaminophen   Tablet .. 650 milliGRAM(s) Oral every 6 hours PRN Temp greater or equal to 38C (100.4F), Moderate Pain (4 - 6)  dextrose 40% Gel 15 Gram(s) Oral once PRN Blood Glucose LESS THAN 70 milliGRAM(s)/deciliter  glucagon  Injectable 1 milliGRAM(s) IntraMuscular once PRN Glucose LESS THAN 70 milligrams/deciliter      General:	no fever, weight loss,  chills  Skin: roseanna above          Vital Signs Last 24 Hrs  T(C): 36.4 (18 Dec 2018 22:03), Max: 36.4 (18 Dec 2018 14:08)  T(F): 97.5 (18 Dec 2018 22:03), Max: 97.6 (18 Dec 2018 14:08)  HR: 88 (19 Dec 2018 00:25) (87 - 89)  BP: 125/71 (18 Dec 2018 22:03) (100/50 - 125/71)  BP(mean): --  RR: 17 (18 Dec 2018 22:03) (16 - 17)  SpO2: 100% (19 Dec 2018 00:25) (100% - 100%)    PHYSICAL EXAM:      Constitutional: A&Ox4  Extremities: normal rom, no edema, calf tenderness  Neurological: no focal deficits  Skin: dressing to right hand C/d/I                            12.5   13.57 )-----------( 489      ( 18 Dec 2018 09:30 )             41.3       12-18    143  |  104  |  42<H>  ----------------------------<  188<H>  5.2<H>   |  29  |  1.5    Ca    8.6      18 Dec 2018 09:30    TPro  5.3<L>  /  Alb  3.1<L>  /  TBili  0.4  /  DBili  x   /  AST  9   /  ALT  14  /  AlkPhos  52  12-18
IJAN MANCIA    Patient is a 78y old  Male who presents with a chief complaint of Acute Hypoxic Respiratory Failure (29 Dec 2018 11:07)    HPI:  Patient is an 78y Male with h/o COPD on home oxygen 5L, DM, MICHAEL, obesity, CKD 3, presented from home with complaints of Dyspnea. He explained that for the past 1 week his breathing has been getting worse gradually with pulse ox readings in the 80s whilst on oxygen. He was scheduled to see Pulmonologist Dr. Castrejon on Monday but due to difficulty breathing he was BIBA. Per ED physician, patient was tachypneic and cyanotic on arrival with oxygen saturation in the 70s with NC 5L. He was started on BIPAP with significant improvement.  He was admitted for further management. (08 Dec 2018 10:50)    INTERVAL HPI/OVERNIGHT EVENTS: no events    ROS: All ROS negative except chronic SOB    PHYSICAL EXAM:  T(C): 35.8, Max: 37.1 (12-30-18 @ 22:14)  HR: 102 (94 - 102)  BP: 95/54 (95/54 - 133/73)  RR: 18 (18 - 20)  SpO2: 95% (95% - 95%)    GENERAL: NAD, obese  NECK: Supple, No JVD  PULMONARY/CHEST: decreased air entry, mild wheezes, no rales  CARDIOVASC: Regular rate and rhythm; No murmurs  GI/ABDOMEN: obese, Soft, Nontender, Nondistended; Bowel sounds present  EXTREMITIES:  2+ Peripheral Pulses, No clubbing, cyanosis, +2 edema, no deformity. No calf tenderness b/l.  SKIN: No rashes or lesions  NERVOUS SYSTEM:  Alert & Oriented X3, no focal deficit       LABS:                        12.6   9.65  )-----------( 329      ( 30 Dec 2018 06:25 )             42.3     12-30    146  |  99  |  54<H>  ----------------------------<  95  4.4   |  40<H>  |  2.2<H>    Ca    8.9      30 Dec 2018 06:25      CAPILLARY BLOOD GLUCOSE  POCT Blood Glucose.: 255 mg/dL (31 Dec 2018 11:22)  POCT Blood Glucose.: 214 mg/dL (31 Dec 2018 09:27)  POCT Blood Glucose.: 131 mg/dL (31 Dec 2018 07:20)  POCT Blood Glucose.: 177 mg/dL (30 Dec 2018 21:50)      MEDICATIONS  (STANDING):  ALBUTerol/ipratropium for Nebulization 3 milliLiter(s) Nebulizer every 4 hours  artificial  tears Solution 1 Drop(s) Both EYES three times a day  atorvastatin 20 milliGRAM(s) Oral at bedtime  buDESOnide 160 MICROgram(s)/formoterol 4.5 MICROgram(s) Inhaler 2 Puff(s) Inhalation two times a day  cholecalciferol 1000 Unit(s) Oral daily  cyanocobalamin 1000 MICROGram(s) Oral daily  dextrose 5%. 1000 milliLiter(s) (50 mL/Hr) IV Continuous <Continuous>  dextrose 50% Injectable 12.5 Gram(s) IV Push once  dextrose 50% Injectable 25 Gram(s) IV Push once  dextrose 50% Injectable 25 Gram(s) IV Push once  docusate sodium 100 milliGRAM(s) Oral daily  finasteride 5 milliGRAM(s) Oral daily  furosemide    Tablet 20 milliGRAM(s) Oral two times a day  heparin  Injectable 5000 Unit(s) SubCutaneous every 12 hours  insulin glargine Injectable (LANTUS) 30 Unit(s) SubCutaneous every morning  insulin glargine Injectable (LANTUS) 30 Unit(s) SubCutaneous at bedtime  insulin lispro (HumaLOG) corrective regimen sliding scale   SubCutaneous three times a day before meals  insulin lispro (HumaLOG) corrective regimen sliding scale   SubCutaneous at bedtime  insulin lispro Injectable (HumaLOG) 17 Unit(s) SubCutaneous three times a day with meals  metoprolol tartrate 25 milliGRAM(s) Oral two times a day  multivitamin/minerals 1 Tablet(s) Oral daily  pantoprazole    Tablet 40 milliGRAM(s) Oral before breakfast  polyethylene glycol 3350 17 Gram(s) Oral two times a day  predniSONE   Tablet 20 milliGRAM(s) Oral daily  senna 2 Tablet(s) Oral at bedtime  sodium polystyrene sulfonate Suspension 30 Gram(s) Oral <User Schedule>  tamsulosin 0.4 milliGRAM(s) Oral at bedtime  tiotropium 18 MICROgram(s) Capsule 1 Capsule(s) Inhalation daily  zinc oxide 40% Ointment 1 Application(s) Topical two times a day    MEDICATIONS  (PRN):  acetaminophen   Tablet .. 650 milliGRAM(s) Oral every 6 hours PRN Temp greater or equal to 38C (100.4F), Moderate Pain (4 - 6)  dextrose 40% Gel 15 Gram(s) Oral once PRN Blood Glucose LESS THAN 70 milliGRAM(s)/deciliter  glucagon  Injectable 1 milliGRAM(s) IntraMuscular once PRN Glucose LESS THAN 70 milligrams/deciliter
JIAN MANCIA    Patient is a 78y old  Male who presents with a chief complaint of Acute Hypoxic Respiratory Failure (12 Dec 2018 13:01)    HPI:  Patient is an 78y Male with h/o COPD on home oxygen 5L, DM, MICHAEL, obesity, CKD 3, presented from home with complaints of Dyspnea. He explained that for the past 1 week his breathing has been getting worse gradually with pulse ox readings in the 80s whilst on oxygen. He was scheduled to see Pulmonologist Dr. Castrejon on Monday but due to difficulty breathing he was BIBA. Per ED physician, patient was tachypneic and cyanotic on arrival with oxygen saturation in the 70s with NC 5L. He was started on BIPAP with significant improvement.  He was admitted for further management. (08 Dec 2018 10:50)    INTERVAL HPI/OVERNIGHT EVENTS: pt still SOB even walking to the bathroom, was unable to wean off high flow O2 earlier today.    ROS: All ROS negative except as documented above    PHYSICAL EXAM:  T(C): 36.2, Max: 36.4 (12-11-18 @ 21:31)  HR: 69 (69 - 99)  BP: 140/78 (140/78 - 165/86)  RR: 16 (16 - 20)  SpO2: 92% (92% - 92%)    GENERAL: NAD, obese  NECK: Supple, No JVD  PULMONARY/CHEST: No rales, rhonchi, wheezing, decreased breath sounds  CARDIOVASC: Regular rate and rhythm; No murmurs  GI/ABDOMEN: Soft, Nontender, Nondistended; Bowel sounds present  EXTREMITIES:  2+ Peripheral Pulses, No clubbing, cyanosis, or edema, no deformity. No calf tenderness b/l.  SKIN: No rashes or lesions  NERVOUS SYSTEM:  Alert & Oriented X3, no focal deficit     Consultant(s) Notes Reviewed by me.       LABS:                        13.9   14.36 )-----------( 618      ( 12 Dec 2018 08:32 )             46.9     12-12    143  |  101  |  45<H>  ----------------------------<  203<H>  5.3<H>   |  31  |  1.9<H>    Ca    9.5      12 Dec 2018 08:32  Phos  4.8     12-11    TPro  6.4  /  Alb  3.7  /  TBili  0.4  /  DBili  x   /  AST  12  /  ALT  21  /  AlkPhos  66  12-12        CAPILLARY BLOOD GLUCOSE  169 (11 Dec 2018 22:06)      POCT Blood Glucose.: 188 mg/dL (12 Dec 2018 16:24)  POCT Blood Glucose.: 269 mg/dL (12 Dec 2018 12:06)  POCT Blood Glucose.: 209 mg/dL (12 Dec 2018 07:39)      MEDICATIONS  (STANDING):  ALBUTerol/ipratropium for Nebulization 3 milliLiter(s) Nebulizer every 4 hours  amLODIPine   Tablet 10 milliGRAM(s) Oral daily  artificial  tears Solution 1 Drop(s) Both EYES three times a day  atorvastatin 20 milliGRAM(s) Oral at bedtime  buDESOnide 160 MICROgram(s)/formoterol 4.5 MICROgram(s) Inhaler 2 Puff(s) Inhalation two times a day  cholecalciferol 1000 Unit(s) Oral daily  cyanocobalamin 1000 MICROGram(s) Oral daily  dextrose 5%. 1000 milliLiter(s) (50 mL/Hr) IV Continuous <Continuous>  dextrose 50% Injectable 12.5 Gram(s) IV Push once  dextrose 50% Injectable 25 Gram(s) IV Push once  dextrose 50% Injectable 25 Gram(s) IV Push once  docusate sodium 100 milliGRAM(s) Oral daily  finasteride 5 milliGRAM(s) Oral daily  heparin  Injectable 5000 Unit(s) SubCutaneous every 12 hours  insulin glargine Injectable (LANTUS) 50 Unit(s) SubCutaneous at bedtime  insulin lispro (HumaLOG) corrective regimen sliding scale   SubCutaneous three times a day before meals  insulin lispro (HumaLOG) corrective regimen sliding scale   SubCutaneous at bedtime  insulin lispro Injectable (HumaLOG) 15 Unit(s) SubCutaneous three times a day with meals  lactulose Syrup 30 Gram(s) Oral three times a day  levoFLOXacin IVPB      levoFLOXacin IVPB 750 milliGRAM(s) IV Intermittent every 24 hours  methylPREDNISolone sodium succinate Injectable 60 milliGRAM(s) IV Push every 12 hours  metoprolol succinate ER 25 milliGRAM(s) Oral daily  multivitamin/minerals 1 Tablet(s) Oral daily  pantoprazole    Tablet 40 milliGRAM(s) Oral before breakfast  polyethylene glycol 3350 17 Gram(s) Oral two times a day  senna 2 Tablet(s) Oral at bedtime  tamsulosin 0.4 milliGRAM(s) Oral at bedtime  tiotropium 18 MICROgram(s) Capsule 1 Capsule(s) Inhalation daily    MEDICATIONS  (PRN):  dextrose 40% Gel 15 Gram(s) Oral once PRN Blood Glucose LESS THAN 70 milliGRAM(s)/deciliter  glucagon  Injectable 1 milliGRAM(s) IntraMuscular once PRN Glucose LESS THAN 70 milligrams/deciliter
JIAN MANCIA    Patient is a 78y old  Male who presents with a chief complaint of Acute Hypoxic Respiratory Failure (25 Dec 2018 23:31)    HPI:  Patient is an 78y Male with h/o COPD on home oxygen 5L, DM, MICHAEL, obesity, CKD 3, presented from home with complaints of Dyspnea. He explained that for the past 1 week his breathing has been getting worse gradually with pulse ox readings in the 80s whilst on oxygen. He was scheduled to see Pulmonologist Dr. Castrejon on Monday but due to difficulty breathing he was BIBA. Per ED physician, patient was tachypneic and cyanotic on arrival with oxygen saturation in the 70s with NC 5L. He was started on BIPAP with significant improvement.  He was admitted for further management. (08 Dec 2018 10:50)    INTERVAL HPI/OVERNIGHT EVENTS: pt with persistent hypoxia even on high flow oxygen, this morning required 50%. Pt barely ambulates to the bathroom.    ROS: All ROS negative except SOB, LE edema, fatigue, cough    PHYSICAL EXAM:  T(C): 36.6, Max: 36.6 (12-26-18 @ 14:40)  HR: 104 (97 - 108)  BP: 126/65 (101/64 - 126/65)  RR: 16 (16 - 18)  SpO2: --    GENERAL: NAD, obese  NECK: Supple, No JVD  PULMONARY/CHEST: decreased air entry, mild wheezes, no rales  CARDIOVASC: Regular rate and rhythm; No murmurs  GI/ABDOMEN: obese, Soft, Nontender, Nondistended; Bowel sounds present  EXTREMITIES:  2+ Peripheral Pulses, No clubbing, cyanosis, +2 edema, no deformity. No calf tenderness b/l.  SKIN: No rashes or lesions  NERVOUS SYSTEM:  Alert & Oriented X3, no focal deficit     LABS:                        11.7   11.76 )-----------( 296      ( 26 Dec 2018 09:12 )             40.0     12-26    142  |  99  |  52<H>  ----------------------------<  258<H>  4.9   |  35<H>  |  1.8<H>    Ca    8.7      26 Dec 2018 09:12  Phos  4.7     12-26  Mg     1.7     12-26    TPro  5.0<L>  /  Alb  3.2<L>  /  TBili  0.5  /  DBili  x   /  AST  8   /  ALT  11  /  AlkPhos  54  12-26        CAPILLARY BLOOD GLUCOSE  POCT Blood Glucose.: 318 mg/dL (26 Dec 2018 16:30)  POCT Blood Glucose.: 242 mg/dL (26 Dec 2018 11:27)  POCT Blood Glucose.: 248 mg/dL (26 Dec 2018 07:34)  POCT Blood Glucose.: 213 mg/dL (25 Dec 2018 20:49)      MEDICATIONS  (STANDING):  ALBUTerol/ipratropium for Nebulization 3 milliLiter(s) Nebulizer every 4 hours  artificial  tears Solution 1 Drop(s) Both EYES three times a day  atorvastatin 20 milliGRAM(s) Oral at bedtime  buDESOnide 160 MICROgram(s)/formoterol 4.5 MICROgram(s) Inhaler 2 Puff(s) Inhalation two times a day  cholecalciferol 1000 Unit(s) Oral daily  cyanocobalamin 1000 MICROGram(s) Oral daily  dextrose 5%. 1000 milliLiter(s) (50 mL/Hr) IV Continuous <Continuous>  dextrose 50% Injectable 12.5 Gram(s) IV Push once  dextrose 50% Injectable 25 Gram(s) IV Push once  dextrose 50% Injectable 25 Gram(s) IV Push once  docusate sodium 100 milliGRAM(s) Oral daily  finasteride 5 milliGRAM(s) Oral daily  furosemide    Tablet 20 milliGRAM(s) Oral two times a day  heparin  Injectable 5000 Unit(s) SubCutaneous every 12 hours  insulin glargine Injectable (LANTUS) 50 Unit(s) SubCutaneous at bedtime  insulin lispro (HumaLOG) corrective regimen sliding scale   SubCutaneous three times a day before meals  insulin lispro (HumaLOG) corrective regimen sliding scale   SubCutaneous at bedtime  insulin lispro Injectable (HumaLOG) 17 Unit(s) SubCutaneous three times a day with meals  metoprolol tartrate 25 milliGRAM(s) Oral two times a day  multivitamin/minerals 1 Tablet(s) Oral daily  pantoprazole    Tablet 40 milliGRAM(s) Oral before breakfast  polyethylene glycol 3350 17 Gram(s) Oral two times a day  predniSONE   Tablet 40 milliGRAM(s) Oral daily  senna 2 Tablet(s) Oral at bedtime  sodium polystyrene sulfonate Suspension 30 Gram(s) Oral <User Schedule>  tamsulosin 0.4 milliGRAM(s) Oral at bedtime  tiotropium 18 MICROgram(s) Capsule 1 Capsule(s) Inhalation daily    MEDICATIONS  (PRN):  acetaminophen   Tablet .. 650 milliGRAM(s) Oral every 6 hours PRN Temp greater or equal to 38C (100.4F), Moderate Pain (4 - 6)  dextrose 40% Gel 15 Gram(s) Oral once PRN Blood Glucose LESS THAN 70 milliGRAM(s)/deciliter  glucagon  Injectable 1 milliGRAM(s) IntraMuscular once PRN Glucose LESS THAN 70 milligrams/deciliter
JIAN MANCIA    Patient is a 78y old  Male who presents with a chief complaint of Acute Hypoxic Respiratory Failure (27 Dec 2018 10:59)    HPI:  Patient is an 78y Male with h/o COPD on home oxygen 5L, DM, MICHAEL, obesity, CKD 3, presented from home with complaints of Dyspnea. He explained that for the past 1 week his breathing has been getting worse gradually with pulse ox readings in the 80s whilst on oxygen. He was scheduled to see Pulmonologist Dr. Castrejon on Monday but due to difficulty breathing he was BIBA. Per ED physician, patient was tachypneic and cyanotic on arrival with oxygen saturation in the 70s with NC 5L. He was started on BIPAP with significant improvement.  He was admitted for further management. (08 Dec 2018 10:50)    INTERVAL HPI/OVERNIGHT EVENTS: no events, pt is back to HFNC 40%, looks comfortable. Reportedly Pt is not compliant with O2 supplementation, not compliant with AVAP at night as well.    ROS: All ROS negative except SOB on ambulation    PHYSICAL EXAM:  T(C): 36.2, Max: 36.4 (12-26-18 @ 21:31)  HR: 101 (85 - 103)  BP: 114/62 (113/64 - 116/72)  RR: 16 (16 - 18)  SpO2: 89% (89% - 93%)    GENERAL: NAD, obese  NECK: Supple, No JVD  PULMONARY/CHEST: decreased air entry, mild wheezes, no rales  CARDIOVASC: Regular rate and rhythm; No murmurs  GI/ABDOMEN: obese, Soft, Nontender, Nondistended; Bowel sounds present  EXTREMITIES:  2+ Peripheral Pulses, No clubbing, cyanosis, +2 edema, no deformity. No calf tenderness b/l.  SKIN: No rashes or lesions  NERVOUS SYSTEM:  Alert & Oriented X3, no focal deficit     Care Discussed with Consultants/Other Providers     LABS:                        12.0   10.95 )-----------( 299      ( 27 Dec 2018 09:19 )             40.6     12-27    144  |  99  |  58<H>  ----------------------------<  189<H>  4.7   |  35<H>  |  1.8<H>    Ca    8.8      27 Dec 2018 09:19  Phos  4.7     12-26  Mg     1.7     12-26    TPro  5.0<L>  /  Alb  3.2<L>  /  TBili  0.5  /  DBili  x   /  AST  8   /  ALT  11  /  AlkPhos  54  12-26      CAPILLARY BLOOD GLUCOSE  POCT Blood Glucose.: 292 mg/dL (27 Dec 2018 16:25)  POCT Blood Glucose.: 308 mg/dL (27 Dec 2018 10:56)  POCT Blood Glucose.: 176 mg/dL (27 Dec 2018 07:29)  POCT Blood Glucose.: 221 mg/dL (26 Dec 2018 20:51)      MEDICATIONS  (STANDING):  ALBUTerol/ipratropium for Nebulization 3 milliLiter(s) Nebulizer every 4 hours  artificial  tears Solution 1 Drop(s) Both EYES three times a day  atorvastatin 20 milliGRAM(s) Oral at bedtime  buDESOnide 160 MICROgram(s)/formoterol 4.5 MICROgram(s) Inhaler 2 Puff(s) Inhalation two times a day  cholecalciferol 1000 Unit(s) Oral daily  cyanocobalamin 1000 MICROGram(s) Oral daily  dextrose 5%. 1000 milliLiter(s) (50 mL/Hr) IV Continuous <Continuous>  dextrose 50% Injectable 12.5 Gram(s) IV Push once  dextrose 50% Injectable 25 Gram(s) IV Push once  dextrose 50% Injectable 25 Gram(s) IV Push once  docusate sodium 100 milliGRAM(s) Oral daily  finasteride 5 milliGRAM(s) Oral daily  furosemide    Tablet 20 milliGRAM(s) Oral two times a day  heparin  Injectable 5000 Unit(s) SubCutaneous every 12 hours  insulin glargine Injectable (LANTUS) 50 Unit(s) SubCutaneous at bedtime  insulin lispro (HumaLOG) corrective regimen sliding scale   SubCutaneous three times a day before meals  insulin lispro (HumaLOG) corrective regimen sliding scale   SubCutaneous at bedtime  insulin lispro Injectable (HumaLOG) 17 Unit(s) SubCutaneous three times a day with meals  metoprolol tartrate 25 milliGRAM(s) Oral two times a day  multivitamin/minerals 1 Tablet(s) Oral daily  pantoprazole    Tablet 40 milliGRAM(s) Oral before breakfast  polyethylene glycol 3350 17 Gram(s) Oral two times a day  predniSONE   Tablet 40 milliGRAM(s) Oral daily  senna 2 Tablet(s) Oral at bedtime  sodium polystyrene sulfonate Suspension 30 Gram(s) Oral <User Schedule>  tamsulosin 0.4 milliGRAM(s) Oral at bedtime  tiotropium 18 MICROgram(s) Capsule 1 Capsule(s) Inhalation daily  zinc oxide 40% Ointment 1 Application(s) Topical two times a day    MEDICATIONS  (PRN):  acetaminophen   Tablet .. 650 milliGRAM(s) Oral every 6 hours PRN Temp greater or equal to 38C (100.4F), Moderate Pain (4 - 6)  dextrose 40% Gel 15 Gram(s) Oral once PRN Blood Glucose LESS THAN 70 milliGRAM(s)/deciliter  glucagon  Injectable 1 milliGRAM(s) IntraMuscular once PRN Glucose LESS THAN 70 milligrams/deciliter
JIAN MANCIA    Patient is a 78y old  Male who presents with a chief complaint of Acute Hypoxic Respiratory Failure (28 Dec 2018 11:19)    HPI:  Patient is an 78y Male with h/o COPD on home oxygen 5L, DM, MICHAEL, obesity, CKD 3, presented from home with complaints of Dyspnea. He explained that for the past 1 week his breathing has been getting worse gradually with pulse ox readings in the 80s whilst on oxygen. He was scheduled to see Pulmonologist Dr. Castrejon on Monday but due to difficulty breathing he was BIBA. Per ED physician, patient was tachypneic and cyanotic on arrival with oxygen saturation in the 70s with NC 5L. He was started on BIPAP with significant improvement.  He was admitted for further management. (08 Dec 2018 10:50)    INTERVAL HPI/OVERNIGHT EVENTS: no new  complaints      PHYSICAL EXAM:  T(C): 36.9, Max: 36.9 (12-28-18 @ 14:34)  HR: 104 (97 - 104)  BP: 175/76 (122/61 - 175/76)  RR: 18 (16 - 18)  SpO2: 90% (90% - 99%)    GENERAL: NAD, obese  NECK: Supple, No JVD  PULMONARY/CHEST: decreased air entry, mild wheezes, no rales  CARDIOVASC: Regular rate and rhythm; No murmurs  GI/ABDOMEN: obese, Soft, Nontender, Nondistended; Bowel sounds present  EXTREMITIES:  2+ Peripheral Pulses, No clubbing, cyanosis, +2 edema, no deformity. No calf tenderness b/l.  SKIN: No rashes or lesions  NERVOUS SYSTEM:  Alert & Oriented X3, no focal deficit     Consultant(s) Notes Reviewed by me.   Care Discussed with Consultants/Other Providers     LABS:                        12.0   10.95 )-----------( 299      ( 27 Dec 2018 09:19 )             40.6     12-27    144  |  99  |  58<H>  ----------------------------<  189<H>  4.7   |  35<H>  |  1.8<H>    Ca    8.8      27 Dec 2018 09:19        CAPILLARY BLOOD GLUCOSE  254 (28 Dec 2018 07:57)  POCT Blood Glucose.: 318 mg/dL (28 Dec 2018 14:14)  POCT Blood Glucose.: 435 mg/dL (28 Dec 2018 11:24)  POCT Blood Glucose.: 334 mg/dL (27 Dec 2018 21:03)  POCT Blood Glucose.: 292 mg/dL (27 Dec 2018 16:25)      MEDICATIONS  (STANDING):  ALBUTerol/ipratropium for Nebulization 3 milliLiter(s) Nebulizer every 4 hours  artificial  tears Solution 1 Drop(s) Both EYES three times a day  atorvastatin 20 milliGRAM(s) Oral at bedtime  buDESOnide 160 MICROgram(s)/formoterol 4.5 MICROgram(s) Inhaler 2 Puff(s) Inhalation two times a day  cholecalciferol 1000 Unit(s) Oral daily  cyanocobalamin 1000 MICROGram(s) Oral daily  dextrose 5%. 1000 milliLiter(s) (50 mL/Hr) IV Continuous <Continuous>  dextrose 50% Injectable 12.5 Gram(s) IV Push once  dextrose 50% Injectable 25 Gram(s) IV Push once  dextrose 50% Injectable 25 Gram(s) IV Push once  docusate sodium 100 milliGRAM(s) Oral daily  finasteride 5 milliGRAM(s) Oral daily  furosemide    Tablet 20 milliGRAM(s) Oral two times a day  heparin  Injectable 5000 Unit(s) SubCutaneous every 12 hours  insulin glargine Injectable (LANTUS) 50 Unit(s) SubCutaneous at bedtime  insulin lispro (HumaLOG) corrective regimen sliding scale   SubCutaneous three times a day before meals  insulin lispro (HumaLOG) corrective regimen sliding scale   SubCutaneous at bedtime  insulin lispro Injectable (HumaLOG) 17 Unit(s) SubCutaneous three times a day with meals  metoprolol tartrate 25 milliGRAM(s) Oral two times a day  multivitamin/minerals 1 Tablet(s) Oral daily  pantoprazole    Tablet 40 milliGRAM(s) Oral before breakfast  polyethylene glycol 3350 17 Gram(s) Oral two times a day  predniSONE   Tablet 20 milliGRAM(s) Oral daily  senna 2 Tablet(s) Oral at bedtime  sodium polystyrene sulfonate Suspension 30 Gram(s) Oral <User Schedule>  tamsulosin 0.4 milliGRAM(s) Oral at bedtime  tiotropium 18 MICROgram(s) Capsule 1 Capsule(s) Inhalation daily  zinc oxide 40% Ointment 1 Application(s) Topical two times a day    MEDICATIONS  (PRN):  acetaminophen   Tablet .. 650 milliGRAM(s) Oral every 6 hours PRN Temp greater or equal to 38C (100.4F), Moderate Pain (4 - 6)  dextrose 40% Gel 15 Gram(s) Oral once PRN Blood Glucose LESS THAN 70 milliGRAM(s)/deciliter  glucagon  Injectable 1 milliGRAM(s) IntraMuscular once PRN Glucose LESS THAN 70 milligrams/deciliter
JIAN MANCIA    Patient is a 78y old  Male who presents with a chief complaint of Acute Hypoxic Respiratory Failure (28 Dec 2018 15:41)    HPI:  Patient is an 78y Male with h/o COPD on home oxygen 5L, DM, MICHAEL, obesity, CKD 3, presented from home with complaints of Dyspnea. He explained that for the past 1 week his breathing has been getting worse gradually with pulse ox readings in the 80s whilst on oxygen. He was scheduled to see Pulmonologist Dr. Castrejon on Monday but due to difficulty breathing he was BIBA. Per ED physician, patient was tachypneic and cyanotic on arrival with oxygen saturation in the 70s with NC 5L. He was started on BIPAP with significant improvement.  He was admitted for further management. (08 Dec 2018 10:50)    INTERVAL HPI/OVERNIGHT EVENTS: no new events, no new complaints    PHYSICAL EXAM:  T(C): 36.5, Max: 36.9 (12-28-18 @ 14:34)  HR: 91 (55 - 104)  BP: 129/61 (120/56 - 175/76)  RR: 18 (18 - 18)  SpO2: 90% (89% - 92%)    GENERAL: NAD, obese  NECK: Supple, No JVD  PULMONARY/CHEST: decreased air entry, mild wheezes, no rales  CARDIOVASC: Regular rate and rhythm; No murmurs  GI/ABDOMEN: obese, Soft, Nontender, Nondistended; Bowel sounds present  EXTREMITIES:  2+ Peripheral Pulses, No clubbing, cyanosis, +2 edema, no deformity. No calf tenderness b/l.  SKIN: No rashes or lesions  NERVOUS SYSTEM:  Alert & Oriented X3, no focal deficit     LABS: no new labs today      CAPILLARY BLOOD GLUCOSE  POCT Blood Glucose.: 162 mg/dL (29 Dec 2018 07:25)  POCT Blood Glucose.: 192 mg/dL (28 Dec 2018 20:58)  POCT Blood Glucose.: 208 mg/dL (28 Dec 2018 16:44)  POCT Blood Glucose.: 318 mg/dL (28 Dec 2018 14:14)  POCT Blood Glucose.: 435 mg/dL (28 Dec 2018 11:24)      MEDICATIONS  (STANDING):  ALBUTerol/ipratropium for Nebulization 3 milliLiter(s) Nebulizer every 4 hours  artificial  tears Solution 1 Drop(s) Both EYES three times a day  atorvastatin 20 milliGRAM(s) Oral at bedtime  buDESOnide 160 MICROgram(s)/formoterol 4.5 MICROgram(s) Inhaler 2 Puff(s) Inhalation two times a day  cholecalciferol 1000 Unit(s) Oral daily  cyanocobalamin 1000 MICROGram(s) Oral daily  dextrose 5%. 1000 milliLiter(s) (50 mL/Hr) IV Continuous <Continuous>  dextrose 50% Injectable 12.5 Gram(s) IV Push once  dextrose 50% Injectable 25 Gram(s) IV Push once  dextrose 50% Injectable 25 Gram(s) IV Push once  docusate sodium 100 milliGRAM(s) Oral daily  finasteride 5 milliGRAM(s) Oral daily  furosemide    Tablet 20 milliGRAM(s) Oral two times a day  heparin  Injectable 5000 Unit(s) SubCutaneous every 12 hours  insulin glargine Injectable (LANTUS) 30 Unit(s) SubCutaneous every morning  insulin glargine Injectable (LANTUS) 30 Unit(s) SubCutaneous at bedtime  insulin lispro (HumaLOG) corrective regimen sliding scale   SubCutaneous three times a day before meals  insulin lispro (HumaLOG) corrective regimen sliding scale   SubCutaneous at bedtime  insulin lispro Injectable (HumaLOG) 17 Unit(s) SubCutaneous three times a day with meals  metoprolol tartrate 25 milliGRAM(s) Oral two times a day  multivitamin/minerals 1 Tablet(s) Oral daily  pantoprazole    Tablet 40 milliGRAM(s) Oral before breakfast  polyethylene glycol 3350 17 Gram(s) Oral two times a day  predniSONE   Tablet 20 milliGRAM(s) Oral daily  senna 2 Tablet(s) Oral at bedtime  sodium polystyrene sulfonate Suspension 30 Gram(s) Oral <User Schedule>  tamsulosin 0.4 milliGRAM(s) Oral at bedtime  tiotropium 18 MICROgram(s) Capsule 1 Capsule(s) Inhalation daily  zinc oxide 40% Ointment 1 Application(s) Topical two times a day    MEDICATIONS  (PRN):  acetaminophen   Tablet .. 650 milliGRAM(s) Oral every 6 hours PRN Temp greater or equal to 38C (100.4F), Moderate Pain (4 - 6)  dextrose 40% Gel 15 Gram(s) Oral once PRN Blood Glucose LESS THAN 70 milliGRAM(s)/deciliter  glucagon  Injectable 1 milliGRAM(s) IntraMuscular once PRN Glucose LESS THAN 70 milligrams/deciliter
JIAN MANCIA    Patient is a 78y old  Male who presents with a chief complaint of Acute Hypoxic Respiratory Failure (31 Dec 2018 16:24)    HPI:  Patient is an 78y Male with h/o COPD on home oxygen 5L, DM, MICHAEL, obesity, CKD 3, presented from home with complaints of Dyspnea. He explained that for the past 1 week his breathing has been getting worse gradually with pulse ox readings in the 80s whilst on oxygen. He was scheduled to see Pulmonologist Dr. Castrejon on Monday but due to difficulty breathing he was BIBA. Per ED physician, patient was tachypneic and cyanotic on arrival with oxygen saturation in the 70s with NC 5L. He was started on BIPAP with significant improvement.  He was admitted for further management. (08 Dec 2018 10:50)    INTERVAL HPI/OVERNIGHT EVENTS: no events    ROS: All ROS negative except chronic SOB    PHYSICAL EXAM:  T(C): 36.3, Max: 36.3 (01-01-19 @ 06:14)  HR: 97 (89 - 104)  BP: 116/55 (107/58 - 116/55)  RR: 16 (16 - 18)  SpO2: 90% (90% - 94%)    GENERAL: NAD, obese  NECK: Supple, No JVD  PULMONARY/CHEST: decreased air entry, mild wheezes, no rales  CARDIOVASC: Regular rate and rhythm; No murmurs  GI/ABDOMEN: obese, Soft, Nontender, Nondistended; Bowel sounds present  EXTREMITIES:  2+ Peripheral Pulses, No clubbing, cyanosis, +2 edema, no deformity. No calf tenderness b/l.  SKIN: No rashes or lesions  NERVOUS SYSTEM:  Alert & Oriented X3, no focal deficit     LABS:                        12.7   13.32 )-----------( 349      ( 01 Jan 2019 08:54 )             43.0     01-01    146  |  97<L>  |  56<H>  ----------------------------<  142<H>  4.7   |  37<H>  |  2.0<H>    Ca    9.1      01 Jan 2019 08:54          CAPILLARY BLOOD GLUCOSE      POCT Blood Glucose.: 211 mg/dL (01 Jan 2019 11:39)  POCT Blood Glucose.: 84 mg/dL (01 Jan 2019 07:17)  POCT Blood Glucose.: 227 mg/dL (31 Dec 2018 22:24)  POCT Blood Glucose.: 255 mg/dL (31 Dec 2018 17:29)            RADIOLOGY & ADDITIONAL TESTS:        MEDICATIONS  (STANDING):  ALBUTerol/ipratropium for Nebulization 3 milliLiter(s) Nebulizer every 4 hours  artificial  tears Solution 1 Drop(s) Both EYES three times a day  atorvastatin 20 milliGRAM(s) Oral at bedtime  buDESOnide 160 MICROgram(s)/formoterol 4.5 MICROgram(s) Inhaler 2 Puff(s) Inhalation two times a day  cholecalciferol 1000 Unit(s) Oral daily  cyanocobalamin 1000 MICROGram(s) Oral daily  dextrose 5%. 1000 milliLiter(s) (50 mL/Hr) IV Continuous <Continuous>  dextrose 50% Injectable 12.5 Gram(s) IV Push once  dextrose 50% Injectable 25 Gram(s) IV Push once  dextrose 50% Injectable 25 Gram(s) IV Push once  docusate sodium 100 milliGRAM(s) Oral daily  finasteride 5 milliGRAM(s) Oral daily  furosemide    Tablet 20 milliGRAM(s) Oral two times a day  heparin  Injectable 5000 Unit(s) SubCutaneous every 12 hours  insulin glargine Injectable (LANTUS) 30 Unit(s) SubCutaneous every morning  insulin glargine Injectable (LANTUS) 30 Unit(s) SubCutaneous at bedtime  insulin lispro (HumaLOG) corrective regimen sliding scale   SubCutaneous three times a day before meals  insulin lispro (HumaLOG) corrective regimen sliding scale   SubCutaneous at bedtime  insulin lispro Injectable (HumaLOG) 17 Unit(s) SubCutaneous three times a day with meals  metoprolol tartrate 25 milliGRAM(s) Oral two times a day  multivitamin/minerals 1 Tablet(s) Oral daily  pantoprazole    Tablet 40 milliGRAM(s) Oral before breakfast  polyethylene glycol 3350 17 Gram(s) Oral two times a day  predniSONE   Tablet 10 milliGRAM(s) Oral daily  senna 2 Tablet(s) Oral at bedtime  sodium polystyrene sulfonate Suspension 30 Gram(s) Oral <User Schedule>  tamsulosin 0.4 milliGRAM(s) Oral at bedtime  tiotropium 18 MICROgram(s) Capsule 1 Capsule(s) Inhalation daily  zinc oxide 40% Ointment 1 Application(s) Topical two times a day    MEDICATIONS  (PRN):  acetaminophen   Tablet .. 650 milliGRAM(s) Oral every 6 hours PRN Temp greater or equal to 38C (100.4F), Moderate Pain (4 - 6)  dextrose 40% Gel 15 Gram(s) Oral once PRN Blood Glucose LESS THAN 70 milliGRAM(s)/deciliter  glucagon  Injectable 1 milliGRAM(s) IntraMuscular once PRN Glucose LESS THAN 70 milligrams/deciliter
JIAN MANCIA    Pt seen in the morning around 9:30 AM    Patient is a 78y old  Male who presents with a chief complaint of Acute Hypoxic Respiratory Failure (13 Dec 2018 08:32)    HPI:  Patient is an 78y Male with h/o COPD on home oxygen 5L, DM, MICHAEL, obesity, CKD 3, presented from home with complaints of Dyspnea. He explained that for the past 1 week his breathing has been getting worse gradually with pulse ox readings in the 80s whilst on oxygen. He was scheduled to see Pulmonologist Dr. Castrejon on Monday but due to difficulty breathing he was BIBA. Per ED physician, patient was tachypneic and cyanotic on arrival with oxygen saturation in the 70s with NC 5L. He was started on BIPAP with significant improvement.  He was admitted for further management. (08 Dec 2018 10:50)    INTERVAL HPI/OVERNIGHT EVENTS: no overnight events.  Pt still on 50% high flow O2.      PHYSICAL EXAM:  T(C): 36.1, Max: 36.3 (12-12-18 @ 22:00)  HR: 94 (64 - 118)  BP: 108/55 (108/55 - 141/85)  RR: 16 (16 - 20)  SpO2: 95% (84% - 95%)    GENERAL: NAD, obese  NECK: Supple, No JVD  PULMONARY/CHEST: No rales, rhonchi, wheezing, decreased breath sounds  CARDIOVASC: Regular rate and rhythm; No murmurs  GI/ABDOMEN: Soft, Nontender, Nondistended; Bowel sounds present  EXTREMITIES:  2+ Peripheral Pulses, No clubbing, cyanosis, or edema, no deformity. No calf tenderness b/l.  SKIN: No rashes or lesions  NERVOUS SYSTEM:  Alert & Oriented X3, no focal deficit     Consultant(s) Notes Reviewed by me.   Care Discussed with Consultants/Other Providers     LABS:                        12.9   13.77 )-----------( 542      ( 13 Dec 2018 07:00 )             43.5     12-13    142  |  101  |  46<H>  ----------------------------<  196<H>  5.5<H>   |  32  |  1.5    Ca    8.9      13 Dec 2018 07:00    TPro  6.4  /  Alb  3.7  /  TBili  0.4  /  DBili  x   /  AST  12  /  ALT  21  /  AlkPhos  66  12-12        CAPILLARY BLOOD GLUCOSE      POCT Blood Glucose.: 311 mg/dL (13 Dec 2018 16:30)  POCT Blood Glucose.: 381 mg/dL (13 Dec 2018 11:44)  POCT Blood Glucose.: 200 mg/dL (13 Dec 2018 07:29)  POCT Blood Glucose.: 145 mg/dL (12 Dec 2018 21:39)            RADIOLOGY & ADDITIONAL TESTS:        MEDICATIONS  (STANDING):  ALBUTerol/ipratropium for Nebulization 3 milliLiter(s) Nebulizer every 4 hours  allopurinol 100 milliGRAM(s) Oral daily  amLODIPine   Tablet 10 milliGRAM(s) Oral daily  artificial  tears Solution 1 Drop(s) Both EYES three times a day  atorvastatin 20 milliGRAM(s) Oral at bedtime  buDESOnide 160 MICROgram(s)/formoterol 4.5 MICROgram(s) Inhaler 2 Puff(s) Inhalation two times a day  cholecalciferol 1000 Unit(s) Oral daily  cyanocobalamin 1000 MICROGram(s) Oral daily  dextrose 5%. 1000 milliLiter(s) (50 mL/Hr) IV Continuous <Continuous>  dextrose 50% Injectable 12.5 Gram(s) IV Push once  dextrose 50% Injectable 25 Gram(s) IV Push once  dextrose 50% Injectable 25 Gram(s) IV Push once  docusate sodium 100 milliGRAM(s) Oral daily  finasteride 5 milliGRAM(s) Oral daily  heparin  Injectable 5000 Unit(s) SubCutaneous every 12 hours  insulin glargine Injectable (LANTUS) 50 Unit(s) SubCutaneous at bedtime  insulin lispro (HumaLOG) corrective regimen sliding scale   SubCutaneous three times a day before meals  insulin lispro (HumaLOG) corrective regimen sliding scale   SubCutaneous at bedtime  insulin lispro Injectable (HumaLOG) 15 Unit(s) SubCutaneous three times a day with meals  lactulose Syrup 30 Gram(s) Oral three times a day  levoFLOXacin IVPB      levoFLOXacin IVPB 750 milliGRAM(s) IV Intermittent every 24 hours  methylPREDNISolone sodium succinate Injectable 60 milliGRAM(s) IV Push every 12 hours  metoprolol succinate ER 25 milliGRAM(s) Oral daily  multivitamin/minerals 1 Tablet(s) Oral daily  pantoprazole    Tablet 40 milliGRAM(s) Oral before breakfast  polyethylene glycol 3350 17 Gram(s) Oral two times a day  senna 2 Tablet(s) Oral at bedtime  sodium polystyrene sulfonate Suspension 30 Gram(s) Oral <User Schedule>  tamsulosin 0.4 milliGRAM(s) Oral at bedtime  tiotropium 18 MICROgram(s) Capsule 1 Capsule(s) Inhalation daily    MEDICATIONS  (PRN):  dextrose 40% Gel 15 Gram(s) Oral once PRN Blood Glucose LESS THAN 70 milliGRAM(s)/deciliter  glucagon  Injectable 1 milliGRAM(s) IntraMuscular once PRN Glucose LESS THAN 70 milligrams/deciliter
JIAN MANCIA  78y  Male    Patient is a 78y old  Male who presents with a chief complaint of Dyspnea and low oxygen saturation (09 Dec 2018 09:44)      INTERVAL HPI/OVERNIGHT EVENTS:  No interval events. Patient has no new complaints.  Dyspnea improved but unable to wean him off HFNC.      REVIEW OF SYSTEMS:  CONSTITUTIONAL: No fever, weight loss, fatigue  EYES: No eye pain, visual disturbances, or discharge  ENMT:  No difficulty hearing, tinnitus, vertigo; No sinus or throat pain  NECK: No pain or stiffness  BREASTS: No pain, masses, or nipple discharge  RESPIRATORY: + cough, No wheezing, chills or hemoptysis; + shortness of breath  CARDIOVASCULAR: No chest pain, palpitations, dizziness, or leg swelling  GASTROINTESTINAL: No abdominal or epigastric pain. No nausea, vomiting, or hematemesis; + constipation. No melena or hematochezia.  GENITOURINARY: No dysuria, frequency, hematuria, or incontinence  NEUROLOGICAL: No headaches, memory loss, loss of strength, numbness, or tremors  SKIN: No itching, burning, rashes, or lesions   LYMPH NODES: No enlarged glands  ENDOCRINE: No heat or cold intolerance; No hair loss  MUSCULOSKELETAL: No joint pain or swelling; No muscle, back, or extremity pain  PSYCHIATRIC: No depression, anxiety, mood swings, or difficulty sleeping  HEME/LYMPH: No easy bruising, or bleeding gums  ALLERGY AND IMMUNOLOGIC: No hives or eczema      VITALS:  T(C): 36.7 (11 Dec 2018 05:52), Max: 36.7 (11 Dec 2018 05:52)  T(F): 98 (11 Dec 2018 05:52), Max: 98 (11 Dec 2018 05:52)  HR: 81 (11 Dec 2018 05:52) (81 - 92)  BP: 121/60 (11 Dec 2018 05:52) (121/60 - 131/65)  BP(mean): --  RR: 18 (11 Dec 2018 05:52) (18 - 18)  SpO2: --      CAPILLARY BLOOD GLUCOSE  POCT Blood Glucose.: 226 mg/dL (11 Dec 2018 11:37)  POCT Blood Glucose.: 167 mg/dL (11 Dec 2018 07:46)  POCT Blood Glucose.: 222 mg/dL (10 Dec 2018 22:22)  POCT Blood Glucose.: 271 mg/dL (10 Dec 2018 16:25)      PHYSICAL EXAM:  GENERAL: NAD  HEAD:  Atraumatic, Normocephalic  EYES: conjunctiva and sclera clear  ENMT: Moist mucous membranes  NECK: Supple, Normal thyroid  NERVOUS SYSTEM:  Alert & Oriented X 3, Good concentration; Motor Strength 5/5 B/L upper and lower extremities.  CHEST/LUNG: Decreased AE bilaterally; No rales, No rhonchi, + wheezing. On high flow nasal cannula.  HEART: Regular rate and rhythm; No murmurs, rubs, or gallops  ABDOMEN: Soft, Nontender, +++ distended; Bowel sounds present  EXTREMITIES:  2+ Peripheral Pulses, No clubbing, cyanosis, or edema  LYMPH: No lymphadenopathy noted  SKIN: No rashes or lesions    Consultant(s) Notes Reviewed:  [x ] YES  [ ] NO  Care Discussed with Consultants/Other Providers [ x] YES  [ ] NO    LABS:                                   12.8   16.57 )-----------( 587      ( 11 Dec 2018 06:45 )             43.0     12-11    142  |  102  |  55<H>  ----------------------------<  157<H>  5.9<H>   |  33<H>  |  1.8<H>    Ca    9.2      11 Dec 2018 06:45  Phos  4.8     12-11  Mg     2.2     12-10    TPro  6.4  /  Alb  3.8  /  TBili  0.3  /  DBili  x   /  AST  11  /  ALT  14  /  AlkPhos  73  12-10        MICROBIOLOGY: None      RADIOLOGY & ADDITIONAL TESTS  CT Abdomen and Pelvis w/ Oral Cont (12.09.18 @ 12:12)   No evidence of acute intra-abdominal or pelvic pathology.    Multiple bilateral non-obstructing renal calculi measuring up to 8 mm in   the left interpolar region.    Stable 4 cm indeterminate exophytic left interpolar renal lesion.   Recommend nonemergent MRI of the abdomen with renal mass protocol for   further evaluation.      X-ray Chest 1 View- PORTABLE-Routine (12.09.18 @ 07:14)   Support devices: None.    Cardiac/mediastinum/hilum: Stable    Lung parenchyma/Pleura: Opacity overlies both lower lung fields. No   pneumothorax is seen.    Skeleton/soft tissues: Stable    Impression:    Opacity overlies both lower lung fields, unchanged.        Imaging Personally Reviewed:  [x] YES  [ ] NO    MEDICATIONS  (STANDING):  ALBUTerol/ipratropium for Nebulization 3 milliLiter(s) Nebulizer every 4 hours  amLODIPine   Tablet 10 milliGRAM(s) Oral daily  atorvastatin 20 milliGRAM(s) Oral at bedtime  buDESOnide 160 MICROgram(s)/formoterol 4.5 MICROgram(s) Inhaler 2 Puff(s) Inhalation two times a day  cholecalciferol 1000 Unit(s) Oral daily  cyanocobalamin 1000 MICROGram(s) Oral daily  dextrose 5%. 1000 milliLiter(s) (50 mL/Hr) IV Continuous <Continuous>  dextrose 50% Injectable 12.5 Gram(s) IV Push once  dextrose 50% Injectable 25 Gram(s) IV Push once  dextrose 50% Injectable 25 Gram(s) IV Push once  docusate sodium 100 milliGRAM(s) Oral daily  finasteride 5 milliGRAM(s) Oral daily  heparin  Injectable 5000 Unit(s) SubCutaneous every 12 hours  insulin glargine Injectable (LANTUS) 45 Unit(s) SubCutaneous at bedtime  insulin lispro (HumaLOG) corrective regimen sliding scale   SubCutaneous three times a day before meals  insulin lispro (HumaLOG) corrective regimen sliding scale   SubCutaneous at bedtime  insulin lispro Injectable (HumaLOG) 15 Unit(s) SubCutaneous three times a day with meals  lactulose Syrup 30 Gram(s) Oral three times a day  levoFLOXacin IVPB 750 milliGRAM(s) IV Intermittent every 24 hours  methylPREDNISolone sodium succinate Injectable 60 milliGRAM(s) IV Push every 12 hours  metoprolol succinate ER 25 milliGRAM(s) Oral daily  multivitamin/minerals 1 Tablet(s) Oral daily  pantoprazole    Tablet 40 milliGRAM(s) Oral before breakfast  polyethylene glycol 3350 17 Gram(s) Oral daily  polyethylene glycol 3350 17 Gram(s) Oral two times a day  senna 2 Tablet(s) Oral at bedtime  tamsulosin 0.4 milliGRAM(s) Oral at bedtime  tiotropium 18 MICROgram(s) Capsule 1 Capsule(s) Inhalation daily    MEDICATIONS  (PRN):  dextrose 40% Gel 15 Gram(s) Oral once PRN Blood Glucose LESS THAN 70 milliGRAM(s)/deciliter  glucagon  Injectable 1 milliGRAM(s) IntraMuscular once PRN Glucose LESS THAN 70 milligrams/deciliter        HEALTH ISSUES - PROBLEM Dx:  COPD (chronic obstructive pulmonary disease) with exacerbation  Hyperkalemia  High cholesterol  GERD (gastroesophageal reflux disease)  Enlarged prostate  Oxygen dependent  Diabetes mellitus, type 2  Hypertension  Emphysema lung
JIAN MANCIA  78y  Male    Patient is a 78y old  Male who presents with a chief complaint of Dyspnea and low oxygen saturation (09 Dec 2018 09:44)      INTERVAL HPI/OVERNIGHT EVENTS:  No interval events. Patient has no new complaints.  Dyspnea improved.      REVIEW OF SYSTEMS:  CONSTITUTIONAL: No fever, weight loss, + fatigue  EYES: No eye pain, visual disturbances, or discharge  ENMT:  No difficulty hearing, tinnitus, vertigo; No sinus or throat pain  NECK: No pain or stiffness  BREASTS: No pain, masses, or nipple discharge  RESPIRATORY: + cough, No wheezing, chills or hemoptysis; + shortness of breath  CARDIOVASCULAR: No chest pain, palpitations, dizziness, or leg swelling  GASTROINTESTINAL: No abdominal or epigastric pain. No nausea, vomiting, or hematemesis; + constipation. No melena or hematochezia.  GENITOURINARY: No dysuria, frequency, hematuria, or incontinence  NEUROLOGICAL: No headaches, memory loss, loss of strength, numbness, or tremors  SKIN: No itching, burning, rashes, or lesions   LYMPH NODES: No enlarged glands  ENDOCRINE: No heat or cold intolerance; No hair loss  MUSCULOSKELETAL: No joint pain or swelling; No muscle, back, or extremity pain  PSYCHIATRIC: No depression, anxiety, mood swings, or difficulty sleeping  HEME/LYMPH: No easy bruising, or bleeding gums  ALLERGY AND IMMUNOLOGIC: No hives or eczema    VITALS:  T(C): 35.4 (10 Dec 2018 05:21), Max: 36.4 (09 Dec 2018 13:03)  T(F): 95.8 (10 Dec 2018 05:21), Max: 97.6 (09 Dec 2018 13:03)  HR: 89 (10 Dec 2018 05:21) (70 - 95)  BP: 141/74 (10 Dec 2018 05:21) (104/59 - 141/74)  BP(mean): --  RR: 18 (10 Dec 2018 10:36) (16 - 18)  SpO2: 92% (10 Dec 2018 10:36) (90% - 92%)      PHYSICAL EXAM:  GENERAL: NAD  HEAD:  Atraumatic, Normocephalic  EYES: conjunctiva and sclera clear  ENMT: Moist mucous membranes  NECK: Supple, Normal thyroid  NERVOUS SYSTEM:  Alert & Oriented X 3, Good concentration; Motor Strength 5/5 B/L upper and lower extremities.  CHEST/LUNG: Decreased AE bilaterally; + rales, No rhonchi, + wheezing. On high flow nasal cannula.  HEART: Regular rate and rhythm; No murmurs, rubs, or gallops  ABDOMEN: Soft, Nontender, +++ distended; Bowel sounds present  EXTREMITIES:  2+ Peripheral Pulses, No clubbing, cyanosis, or edema  LYMPH: No lymphadenopathy noted  SKIN: No rashes or lesions    Consultant(s) Notes Reviewed:  [x ] YES  [ ] NO  Care Discussed with Consultants/Other Providers [ x] YES  [ ] NO    LABS:                        12.9   18.50 )-----------( 606      ( 10 Dec 2018 07:03 )             42.1     12-10    139  |  99  |  63<HH>  ----------------------------<  358<H>  6.1<HH>   |  24  |  2.1<H>    Ca    9.6      10 Dec 2018 07:03  Phos  5.6     12-10  Mg     2.2     12-10    TPro  6.4  /  Alb  3.8  /  TBili  0.3  /  DBili  x   /  AST  11  /  ALT  14  /  AlkPhos  73  12-10      MICROBIOLOGY: None      RADIOLOGY & ADDITIONAL TESTS  CT Abdomen and Pelvis w/ Oral Cont (12.09.18 @ 12:12)   No evidence of acute intra-abdominal or pelvic pathology.    Multiple bilateral non-obstructing renal calculi measuring up to 8 mm in   the left interpolar region.    Stable 4 cm indeterminate exophytic left interpolar renal lesion.   Recommend nonemergent MRI of the abdomen with renal mass protocol for   further evaluation.      X-ray Chest 1 View- PORTABLE-Routine (12.09.18 @ 07:14)   Support devices: None.    Cardiac/mediastinum/hilum: Stable    Lung parenchyma/Pleura: Opacity overlies both lower lung fields. No   pneumothorax is seen.    Skeleton/soft tissues: Stable    Impression:    Opacity overlies both lower lung fields, unchanged.        Imaging Personally Reviewed:  [x] YES  [ ] NO    MEDICATIONS  (STANDING):  ALBUTerol/ipratropium for Nebulization 3 milliLiter(s) Nebulizer every 4 hours  amLODIPine   Tablet 10 milliGRAM(s) Oral daily  atorvastatin 20 milliGRAM(s) Oral at bedtime  buDESOnide 160 MICROgram(s)/formoterol 4.5 MICROgram(s) Inhaler 2 Puff(s) Inhalation two times a day  cholecalciferol 1000 Unit(s) Oral daily  cyanocobalamin 1000 MICROGram(s) Oral daily  dextrose 5%. 1000 milliLiter(s) (50 mL/Hr) IV Continuous <Continuous>  dextrose 50% Injectable 12.5 Gram(s) IV Push once  dextrose 50% Injectable 25 Gram(s) IV Push once  dextrose 50% Injectable 25 Gram(s) IV Push once  docusate sodium 100 milliGRAM(s) Oral daily  finasteride 5 milliGRAM(s) Oral daily  heparin  Injectable 5000 Unit(s) SubCutaneous every 12 hours  insulin glargine Injectable (LANTUS) 45 Unit(s) SubCutaneous at bedtime  insulin lispro (HumaLOG) corrective regimen sliding scale   SubCutaneous three times a day before meals  insulin lispro (HumaLOG) corrective regimen sliding scale   SubCutaneous at bedtime  insulin lispro Injectable (HumaLOG) 15 Unit(s) SubCutaneous three times a day with meals  lactulose Syrup 30 Gram(s) Oral three times a day  levoFLOXacin IVPB      levoFLOXacin IVPB 750 milliGRAM(s) IV Intermittent every 24 hours  methylPREDNISolone sodium succinate Injectable 60 milliGRAM(s) IV Push every 12 hours  metoprolol succinate ER 25 milliGRAM(s) Oral daily  multivitamin/minerals 1 Tablet(s) Oral daily  pantoprazole    Tablet 40 milliGRAM(s) Oral before breakfast  polyethylene glycol 3350 17 Gram(s) Oral daily  polyethylene glycol 3350 17 Gram(s) Oral two times a day  senna 2 Tablet(s) Oral at bedtime  sodium chloride 0.9%. 1000 milliLiter(s) (50 mL/Hr) IV Continuous <Continuous>  sodium polystyrene sulfonate Suspension 30 Gram(s) Oral once  tamsulosin 0.4 milliGRAM(s) Oral at bedtime  tiotropium 18 MICROgram(s) Capsule 1 Capsule(s) Inhalation daily    MEDICATIONS  (PRN):  dextrose 40% Gel 15 Gram(s) Oral once PRN Blood Glucose LESS THAN 70 milliGRAM(s)/deciliter  glucagon  Injectable 1 milliGRAM(s) IntraMuscular once PRN Glucose LESS THAN 70 milligrams/deciliter          HEALTH ISSUES - PROBLEM Dx:  COPD (chronic obstructive pulmonary disease) with exacerbation  Hyperkalemia  High cholesterol  GERD (gastroesophageal reflux disease)  Enlarged prostate  Oxygen dependent  Diabetes mellitus, type 2  Hypertension  Emphysema lung
JIAN MANCIA  78y  Male    Patient is a 78y old  Male who presents with a chief complaint of Dyspnea and low oxygen saturation (09 Dec 2018 09:44)      INTERVAL HPI/OVERNIGHT EVENTS:  No interval events. Patient has no new complaints.  Dyspnea improved.      REVIEW OF SYSTEMS:  CONSTITUTIONAL: No fever, weight loss, + fatigue  EYES: No eye pain, visual disturbances, or discharge  ENMT:  No difficulty hearing, tinnitus, vertigo; No sinus or throat pain  NECK: No pain or stiffness  BREASTS: No pain, masses, or nipple discharge  RESPIRATORY: + cough, No wheezing, chills or hemoptysis; + shortness of breath  CARDIOVASCULAR: No chest pain, palpitations, dizziness, or leg swelling  GASTROINTESTINAL: No abdominal or epigastric pain. No nausea, vomiting, or hematemesis; + constipation. No melena or hematochezia.  GENITOURINARY: No dysuria, frequency, hematuria, or incontinence  NEUROLOGICAL: No headaches, memory loss, loss of strength, numbness, or tremors  SKIN: No itching, burning, rashes, or lesions   LYMPH NODES: No enlarged glands  ENDOCRINE: No heat or cold intolerance; No hair loss  MUSCULOSKELETAL: No joint pain or swelling; No muscle, back, or extremity pain  PSYCHIATRIC: No depression, anxiety, mood swings, or difficulty sleeping  HEME/LYMPH: No easy bruising, or bleeding gums  ALLERGY AND IMMUNOLOGIC: No hives or eczema    VITALS:  T(F): 97.6 (12-09-18 @ 13:03), Max: 97.7 (12-08-18 @ 22:12)  HR: 70 (12-09-18 @ 13:03) (70 - 91)  BP: 104/59 (12-09-18 @ 13:03) (104/59 - 121/59)  RR: 16 (12-09-18 @ 13:03) (16 - 18)  SpO2: --      PHYSICAL EXAM:  GENERAL: NAD  HEAD:  Atraumatic, Normocephalic  EYES: conjunctiva and sclera clear  ENMT: Moist mucous membranes  NECK: Supple, Normal thyroid  NERVOUS SYSTEM:  Alert & Oriented X 3, Good concentration; Motor Strength 5/5 B/L upper and lower extremities.  CHEST/LUNG: Decreased AE bilaterally; + rales, No rhonchi, + wheezing. On high flow nasal cannula.  HEART: Regular rate and rhythm; No murmurs, rubs, or gallops  ABDOMEN: Soft, Nontender, +++ distended; Bowel sounds present  EXTREMITIES:  2+ Peripheral Pulses, No clubbing, cyanosis, or edema  LYMPH: No lymphadenopathy noted  SKIN: No rashes or lesions    Consultant(s) Notes Reviewed:  [x ] YES  [ ] NO  Care Discussed with Consultants/Other Providers [ x] YES  [ ] NO    LABS:                        12.7   12.82 )-----------( 570      ( 09 Dec 2018 08:47 )             41.3     12-09    137  |  100  |  60<H>  ----------------------------<  436<H>  6.2<HH>   |  22  |  2.0<H>    Ca    9.5      09 Dec 2018 18:58  Phos  4.4     12-09  Mg     2.1     12-09    TPro  6.2  /  Alb  3.7  /  TBili  0.4  /  DBili  x   /  AST  10  /  ALT  11  /  AlkPhos  72  12-09      MICROBIOLOGY: None      RADIOLOGY & ADDITIONAL TESTS  CT Abdomen and Pelvis w/ Oral Cont (12.09.18 @ 12:12)   No evidence of acute intra-abdominal or pelvic pathology.    Multiple bilateral non-obstructing renal calculi measuring up to 8 mm in   the left interpolar region.    Stable 4 cm indeterminate exophytic left interpolar renal lesion.   Recommend nonemergent MRI of the abdomen with renal mass protocol for   further evaluation.      X-ray Chest 1 View- PORTABLE-Routine (12.09.18 @ 07:14)   Support devices: None.    Cardiac/mediastinum/hilum: Stable    Lung parenchyma/Pleura: Opacity overlies both lower lung fields. No   pneumothorax is seen.    Skeleton/soft tissues: Stable    Impression:    Opacity overlies both lower lung fields, unchanged.        Imaging Personally Reviewed:  [x] YES  [ ] NO    MEDICATIONS  (STANDING):  ALBUTerol/ipratropium for Nebulization 3 milliLiter(s) Nebulizer every 4 hours  amLODIPine   Tablet 10 milliGRAM(s) Oral daily  atorvastatin 20 milliGRAM(s) Oral at bedtime  buDESOnide 160 MICROgram(s)/formoterol 4.5 MICROgram(s) Inhaler 2 Puff(s) Inhalation two times a day  cholecalciferol 1000 Unit(s) Oral daily  cyanocobalamin 1000 MICROGram(s) Oral daily  dextrose 5%. 1000 milliLiter(s) (50 mL/Hr) IV Continuous <Continuous>  dextrose 50% Injectable 12.5 Gram(s) IV Push once  dextrose 50% Injectable 25 Gram(s) IV Push once  dextrose 50% Injectable 25 Gram(s) IV Push once  docusate sodium 100 milliGRAM(s) Oral daily  finasteride 5 milliGRAM(s) Oral daily  heparin  Injectable 5000 Unit(s) SubCutaneous every 12 hours  insulin glargine Injectable (LANTUS) 35 Unit(s) SubCutaneous at bedtime  insulin lispro (HumaLOG) corrective regimen sliding scale   SubCutaneous three times a day before meals  insulin lispro (HumaLOG) corrective regimen sliding scale   SubCutaneous at bedtime  insulin lispro Injectable (HumaLOG) 10 Unit(s) SubCutaneous three times a day with meals  levoFLOXacin IVPB 750 milliGRAM(s) IV Intermittent every 24 hours  methylPREDNISolone sodium succinate Injectable 60 milliGRAM(s) IV Push every 12 hours  metoprolol succinate ER 25 milliGRAM(s) Oral daily  multivitamin/minerals 1 Tablet(s) Oral daily  pantoprazole    Tablet 40 milliGRAM(s) Oral before breakfast  polyethylene glycol 3350 17 Gram(s) Oral daily  polyethylene glycol 3350 17 Gram(s) Oral two times a day  senna 2 Tablet(s) Oral at bedtime  tamsulosin 0.4 milliGRAM(s) Oral at bedtime  tiotropium 18 MICROgram(s) Capsule 1 Capsule(s) Inhalation daily    MEDICATIONS  (PRN):  dextrose 40% Gel 15 Gram(s) Oral once PRN Blood Glucose LESS THAN 70 milliGRAM(s)/deciliter  glucagon  Injectable 1 milliGRAM(s) IntraMuscular once PRN Glucose LESS THAN 70 milligrams/deciliter        HEALTH ISSUES - PROBLEM Dx:  COPD (chronic obstructive pulmonary disease) with exacerbation  Hyperkalemia  High cholesterol  GERD (gastroesophageal reflux disease)  Enlarged prostate  Oxygen dependent  Diabetes mellitus, type 2  Hypertension  Emphysema lung
JIAN MANCIA  78y  Male    Patient is a 78y old  Male who presents with a chief complaint of Dyspnea and low oxygen saturation (09 Dec 2018 09:44)      INTERVAL HPI/OVERNIGHT EVENTS:  No interval events. Patient waiting to be discharged to [facility].  Patient has no new complaints. Dyspnea & Bipedal edema stable.    Still on HFNC 50L/min.        REVIEW OF SYSTEMS:  CONSTITUTIONAL: No fever, weight loss, + fatigue  EYES: No eye pain, visual disturbances, or discharge  ENMT:  No difficulty hearing, tinnitus, vertigo; No sinus or throat pain  NECK: No pain or stiffness  BREASTS: No pain, masses, or nipple discharge  RESPIRATORY: No cough, No wheezing, chills or hemoptysis; + shortness of breath  CARDIOVASCULAR: No chest pain, palpitations, dizziness, + leg swelling  GASTROINTESTINAL: No abdominal or epigastric pain. No nausea, vomiting, or hematemesis; + constipation. No melena or hematochezia.  GENITOURINARY: No dysuria, frequency, hematuria, or incontinence  NEUROLOGICAL: No headaches, memory loss, loss of strength, numbness, or tremors  SKIN: fluid dripping form anterior abdominal wall. No itching, burning, rashes, or lesions   LYMPH NODES: No enlarged glands  ENDOCRINE: No heat or cold intolerance; No hair loss  MUSCULOSKELETAL: No joint pain or swelling; No muscle, back, or extremity pain  PSYCHIATRIC: No depression, anxiety, mood swings, or difficulty sleeping  HEME/LYMPH: No easy bruising, or bleeding gums  ALLERGY AND IMMUNOLOGIC: No hives or eczema      VITALS:  T(C): 36.8 (02 Jan 2019 05:41), Max: 36.8 (02 Jan 2019 05:41)  T(F): 98.2 (02 Jan 2019 05:41), Max: 98.2 (02 Jan 2019 05:41)  HR: 88 (02 Jan 2019 05:41) (88 - 97)  BP: 128/69 (02 Jan 2019 05:41) (128/69 - 133/69)  BP(mean): --  RR: 16 (02 Jan 2019 05:41) (16 - 16)  SpO2: --      CAPILLARY BLOOD GLUCOSE  POCT Blood Glucose.: 176 mg/dL (02 Jan 2019 12:21)  POCT Blood Glucose.: 137 mg/dL (02 Jan 2019 07:27)  POCT Blood Glucose.: 145 mg/dL (01 Jan 2019 22:13)  POCT Blood Glucose.: 243 mg/dL (01 Jan 2019 16:36)        PHYSICAL EXAM:  GENERAL: NAD, obese  HEAD:  Atraumatic, Normocephalic  EYES: conjunctiva and sclera clear  ENMT: Moist mucous membranes  NECK: Supple, Normal thyroid  NERVOUS SYSTEM:  Alert & Oriented X 3, Good concentration; Motor Strength 5/5 B/L upper and lower extremities.  CHEST/LUNG: Decreased AE bilaterally; No rales, No rhonchi, + wheezing. On high flow nasal cannula.  HEART: Regular rate and rhythm; No murmurs, rubs, or gallops  ABDOMEN: Soft, Nontender, ++ distended; Bowel sounds present  EXTREMITIES:  2+ Peripheral Pulses, No clubbing, cyanosis, bipedal edema ++  LYMPH: No lymphadenopathy noted  SKIN: No rashes or lesions    Consultant(s) Notes Reviewed:  [x ] YES  [ ] NO  Care Discussed with Consultants/Other Providers [ x] YES  [ ] NO    LABS:                               12.5   8.60  )-----------( 299      ( 02 Jan 2019 07:10 )             42.2                01-02    146  |  99  |  62<HH>  ----------------------------<  136<H>  4.5   |  38<H>  |  2.2<H>    Ca    8.9      02 Jan 2019 07:10        MICROBIOLOGY: None  Culture - Abscess with Gram Stain (12.18.18 @ 16:55)    -  Gentamicin: S <=1 Should not be used as monotherapy    -  Linezolid: S 2    -  Oxacillin: R >2    -  Penicillin: R >8    -  RIF- Rifampin: S <=1 Should not be used as monotherapy    -  Tetra/Doxy: S 2    -  Trimethoprim/Sulfamethoxazole: S <=0.5/9.5    -  Vancomycin: S 2    -  Ampicillin/Sulbactam: R <=8/4    -  Cefazolin: R 16    -  Clindamycin: R >4    -  Daptomycin: S 0.5    -  Erythromycin: R >4    Specimen Source: .Abscess Arm - Right    Culture Results:   Moderate Methicillin resistant Staphylococcus aureus    Organism Identification: Methicillin resistant Staphylococcus aureus    Organism: Methicillin resistant Staphylococcus aureus    Method Type: ANSON        RADIOLOGY & ADDITIONAL TESTS  X-ray Chest 1 View- PORTABLE-Urgent (12.25.18 @ 09:25)   Support devices: None.    Cardiac/mediastinum/hilum: Unremarkable.    Lung parenchyma/Pleura: Stable bibasilar opacities.    Skeleton/soft tissues: Unremarkable.    Impression:      Bibasilar opacity/atelectasis.        CT Abdomen and Pelvis w/ Oral Cont (12.09.18 @ 12:12)   No evidence of acute intra-abdominal or pelvic pathology.    Multiple bilateral non-obstructing renal calculi measuring up to 8 mm in   the left interpolar region.    Stable 4 cm indeterminate exophytic left interpolar renal lesion.   Recommend nonemergent MRI of the abdomen with renal mass protocol for   further evaluation.        Imaging Personally Reviewed:  [x] YES  [ ] NO    MEDICATIONS  (STANDING):  ALBUTerol/ipratropium for Nebulization 3 milliLiter(s) Nebulizer every 4 hours  artificial  tears Solution 1 Drop(s) Both EYES three times a day  atorvastatin 20 milliGRAM(s) Oral at bedtime  buDESOnide 160 MICROgram(s)/formoterol 4.5 MICROgram(s) Inhaler 2 Puff(s) Inhalation two times a day  cholecalciferol 1000 Unit(s) Oral daily  cyanocobalamin 1000 MICROGram(s) Oral daily  dextrose 5%. 1000 milliLiter(s) (50 mL/Hr) IV Continuous <Continuous>  dextrose 50% Injectable 12.5 Gram(s) IV Push once  dextrose 50% Injectable 25 Gram(s) IV Push once  dextrose 50% Injectable 25 Gram(s) IV Push once  docusate sodium 100 milliGRAM(s) Oral daily  finasteride 5 milliGRAM(s) Oral daily  furosemide    Tablet 20 milliGRAM(s) Oral two times a day  heparin  Injectable 5000 Unit(s) SubCutaneous every 12 hours  insulin glargine Injectable (LANTUS) 30 Unit(s) SubCutaneous every morning  insulin glargine Injectable (LANTUS) 30 Unit(s) SubCutaneous at bedtime  insulin lispro (HumaLOG) corrective regimen sliding scale   SubCutaneous three times a day before meals  insulin lispro (HumaLOG) corrective regimen sliding scale   SubCutaneous at bedtime  insulin lispro Injectable (HumaLOG) 17 Unit(s) SubCutaneous three times a day with meals  metoprolol tartrate 25 milliGRAM(s) Oral two times a day  multivitamin/minerals 1 Tablet(s) Oral daily  pantoprazole    Tablet 40 milliGRAM(s) Oral before breakfast  polyethylene glycol 3350 17 Gram(s) Oral two times a day  predniSONE   Tablet 10 milliGRAM(s) Oral daily  senna 2 Tablet(s) Oral at bedtime  sodium polystyrene sulfonate Suspension 30 Gram(s) Oral <User Schedule>  tamsulosin 0.4 milliGRAM(s) Oral at bedtime  tiotropium 18 MICROgram(s) Capsule 1 Capsule(s) Inhalation daily  zinc oxide 40% Ointment 1 Application(s) Topical two times a day    MEDICATIONS  (PRN):  acetaminophen   Tablet .. 650 milliGRAM(s) Oral every 6 hours PRN Temp greater or equal to 38C (100.4F), Moderate Pain (4 - 6)  dextrose 40% Gel 15 Gram(s) Oral once PRN Blood Glucose LESS THAN 70 milliGRAM(s)/deciliter  glucagon  Injectable 1 milliGRAM(s) IntraMuscular once PRN Glucose LESS THAN 70 milligrams/deciliter          HEALTH ISSUES - PROBLEM Dx:  COPD (chronic obstructive pulmonary disease) with exacerbation  Hyperkalemia  High cholesterol  GERD (gastroesophageal reflux disease)  Enlarged prostate  Oxygen dependent  Diabetes mellitus, type 2  Hypertension  Emphysema lung
JIAN MANCIA  78y  Male    Patient is a 78y old  Male who presents with a chief complaint of Dyspnea and low oxygen saturation (09 Dec 2018 09:44)      INTERVAL HPI/OVERNIGHT EVENTS:  No interval events. Patient waiting to be discharged to [facility].  Patient has no new complaints. Dyspnea & Bipedal edema stable.    Still on HFNC 50L/min.      REVIEW OF SYSTEMS:  CONSTITUTIONAL: No fever, weight loss, + fatigue  EYES: No eye pain, visual disturbances, or discharge  ENMT:  No difficulty hearing, tinnitus, vertigo; No sinus or throat pain  NECK: No pain or stiffness  BREASTS: No pain, masses, or nipple discharge  RESPIRATORY: No cough, No wheezing, chills or hemoptysis; + shortness of breath  CARDIOVASCULAR: No chest pain, palpitations, dizziness, + leg swelling  GASTROINTESTINAL: No abdominal or epigastric pain. No nausea, vomiting, or hematemesis; + constipation. No melena or hematochezia.  GENITOURINARY: No dysuria, frequency, hematuria, or incontinence  NEUROLOGICAL: No headaches, memory loss, loss of strength, numbness, or tremors  SKIN: fluid dripping form anterior abdominal wall. No itching, burning, rashes, or lesions   LYMPH NODES: No enlarged glands  ENDOCRINE: No heat or cold intolerance; No hair loss  MUSCULOSKELETAL: No joint pain or swelling; No muscle, back, or extremity pain  PSYCHIATRIC: No depression, anxiety, mood swings, or difficulty sleeping  HEME/LYMPH: No easy bruising, or bleeding gums  ALLERGY AND IMMUNOLOGIC: No hives or eczema      VITALS:  T(C): 36.7 (03 Jan 2019 06:24), Max: 37 (02 Jan 2019 21:19)  T(F): 98.1 (03 Jan 2019 06:24), Max: 98.6 (02 Jan 2019 21:19)  HR: 89 (03 Jan 2019 06:24) (89 - 100)  BP: 124/68 (03 Jan 2019 06:24) (102/56 - 141/79)  BP(mean): --  RR: 18 (03 Jan 2019 06:24) (18 - 20)  SpO2: 90% (02 Jan 2019 21:19) (85% - 93%)      CAPILLARY BLOOD GLUCOSE  POCT Blood Glucose.: 128 mg/dL (03 Jan 2019 07:29)  POCT Blood Glucose.: 122 mg/dL (02 Jan 2019 21:15)  POCT Blood Glucose.: 152 mg/dL (02 Jan 2019 16:17)  POCT Blood Glucose.: 176 mg/dL (02 Jan 2019 12:21)      PHYSICAL EXAM:  GENERAL: NAD, obese  HEAD:  Atraumatic, Normocephalic  EYES: conjunctiva and sclera clear  ENMT: Moist mucous membranes  NECK: Supple, Normal thyroid  NERVOUS SYSTEM:  Alert & Oriented X 3, Good concentration; Motor Strength 5/5 B/L upper and lower extremities.  CHEST/LUNG: Decreased AE bilaterally; No rales, No rhonchi, + wheezing. On high flow nasal cannula.  HEART: Regular rate and rhythm; No murmurs, rubs, or gallops  ABDOMEN: Soft, Nontender, ++ distended; Bowel sounds present  EXTREMITIES:  2+ Peripheral Pulses, No clubbing, cyanosis, bipedal edema ++  LYMPH: No lymphadenopathy noted  SKIN: No rashes or lesions    Consultant(s) Notes Reviewed:  [x ] YES  [ ] NO  Care Discussed with Consultants/Other Providers [ x] YES  [ ] NO    LABS:                               12.5   8.60  )-----------( 299      ( 02 Jan 2019 07:10 )             42.2                01-02    146  |  99  |  62<HH>  ----------------------------<  136<H>  4.5   |  38<H>  |  2.2<H>    Ca    8.9      02 Jan 2019 07:10      MICROBIOLOGY: None  Culture - Abscess with Gram Stain (12.18.18 @ 16:55)    -  Gentamicin: S <=1 Should not be used as monotherapy    -  Linezolid: S 2    -  Oxacillin: R >2    -  Penicillin: R >8    -  RIF- Rifampin: S <=1 Should not be used as monotherapy    -  Tetra/Doxy: S 2    -  Trimethoprim/Sulfamethoxazole: S <=0.5/9.5    -  Vancomycin: S 2    -  Ampicillin/Sulbactam: R <=8/4    -  Cefazolin: R 16    -  Clindamycin: R >4    -  Daptomycin: S 0.5    -  Erythromycin: R >4    Specimen Source: .Abscess Arm - Right    Culture Results:   Moderate Methicillin resistant Staphylococcus aureus    Organism Identification: Methicillin resistant Staphylococcus aureus    Organism: Methicillin resistant Staphylococcus aureus    Method Type: ANSON        RADIOLOGY & ADDITIONAL TESTS  X-ray Chest 1 View- PORTABLE-Urgent (12.25.18 @ 09:25)   Support devices: None.    Cardiac/mediastinum/hilum: Unremarkable.    Lung parenchyma/Pleura: Stable bibasilar opacities.    Skeleton/soft tissues: Unremarkable.    Impression:      Bibasilar opacity/atelectasis.        CT Abdomen and Pelvis w/ Oral Cont (12.09.18 @ 12:12)   No evidence of acute intra-abdominal or pelvic pathology.    Multiple bilateral non-obstructing renal calculi measuring up to 8 mm in   the left interpolar region.    Stable 4 cm indeterminate exophytic left interpolar renal lesion.   Recommend nonemergent MRI of the abdomen with renal mass protocol for   further evaluation.        Imaging Personally Reviewed:  [x] YES  [ ] NO    MEDICATIONS  (STANDING):  ALBUTerol/ipratropium for Nebulization 3 milliLiter(s) Nebulizer every 4 hours  artificial  tears Solution 1 Drop(s) Both EYES three times a day  atorvastatin 20 milliGRAM(s) Oral at bedtime  buDESOnide 160 MICROgram(s)/formoterol 4.5 MICROgram(s) Inhaler 2 Puff(s) Inhalation two times a day  cholecalciferol 1000 Unit(s) Oral daily  cyanocobalamin 1000 MICROGram(s) Oral daily  dextrose 5%. 1000 milliLiter(s) (50 mL/Hr) IV Continuous <Continuous>  dextrose 50% Injectable 12.5 Gram(s) IV Push once  dextrose 50% Injectable 25 Gram(s) IV Push once  dextrose 50% Injectable 25 Gram(s) IV Push once  docusate sodium 100 milliGRAM(s) Oral daily  finasteride 5 milliGRAM(s) Oral daily  furosemide    Tablet 20 milliGRAM(s) Oral daily  heparin  Injectable 5000 Unit(s) SubCutaneous every 12 hours  insulin glargine Injectable (LANTUS) 30 Unit(s) SubCutaneous every morning  insulin glargine Injectable (LANTUS) 30 Unit(s) SubCutaneous at bedtime  insulin lispro (HumaLOG) corrective regimen sliding scale   SubCutaneous three times a day before meals  insulin lispro (HumaLOG) corrective regimen sliding scale   SubCutaneous at bedtime  insulin lispro Injectable (HumaLOG) 17 Unit(s) SubCutaneous three times a day with meals  metoprolol tartrate 25 milliGRAM(s) Oral two times a day  multivitamin/minerals 1 Tablet(s) Oral daily  pantoprazole    Tablet 40 milliGRAM(s) Oral before breakfast  polyethylene glycol 3350 17 Gram(s) Oral two times a day  senna 2 Tablet(s) Oral at bedtime  sodium polystyrene sulfonate Suspension 30 Gram(s) Oral <User Schedule>  tamsulosin 0.4 milliGRAM(s) Oral at bedtime  tiotropium 18 MICROgram(s) Capsule 1 Capsule(s) Inhalation daily  zinc oxide 40% Ointment 1 Application(s) Topical two times a day    MEDICATIONS  (PRN):  acetaminophen   Tablet .. 650 milliGRAM(s) Oral every 6 hours PRN Temp greater or equal to 38C (100.4F), Moderate Pain (4 - 6)  dextrose 40% Gel 15 Gram(s) Oral once PRN Blood Glucose LESS THAN 70 milliGRAM(s)/deciliter  glucagon  Injectable 1 milliGRAM(s) IntraMuscular once PRN Glucose LESS THAN 70 milligrams/deciliter            HEALTH ISSUES - PROBLEM Dx:  COPD (chronic obstructive pulmonary disease) with exacerbation  Hyperkalemia  High cholesterol  GERD (gastroesophageal reflux disease)  Enlarged prostate  Oxygen dependent  Diabetes mellitus, type 2  Hypertension  Emphysema lung
Nephrology progress note    Patient is seen and examined, events over the last 24 h noted .  Seen for JAMES hyperkalemia, K still high  Eatign large amount of food. Not on  a renal diet  Allergies:  aspirin (Other)  iodine (Hives)  penicillin (Unknown)    Hospital Medications:   MEDICATIONS  (STANDING):  ALBUTerol/ipratropium for Nebulization 3 milliLiter(s) Nebulizer every 4 hours  amLODIPine   Tablet 10 milliGRAM(s) Oral daily  atorvastatin 20 milliGRAM(s) Oral at bedtime  buDESOnide 160 MICROgram(s)/formoterol 4.5 MICROgram(s) Inhaler 2 Puff(s) Inhalation two times a day  cholecalciferol 1000 Unit(s) Oral daily  cyanocobalamin 1000 MICROGram(s) Oral daily  dextrose 5%. 1000 milliLiter(s) (50 mL/Hr) IV Continuous <Continuous>  dextrose 50% Injectable 12.5 Gram(s) IV Push once  dextrose 50% Injectable 25 Gram(s) IV Push once  dextrose 50% Injectable 25 Gram(s) IV Push once  docusate sodium 100 milliGRAM(s) Oral daily  finasteride 5 milliGRAM(s) Oral daily  heparin  Injectable 5000 Unit(s) SubCutaneous every 12 hours  insulin glargine Injectable (LANTUS) 45 Unit(s) SubCutaneous at bedtime  insulin lispro (HumaLOG) corrective regimen sliding scale   SubCutaneous three times a day before meals  insulin lispro (HumaLOG) corrective regimen sliding scale   SubCutaneous at bedtime  insulin lispro Injectable (HumaLOG) 15 Unit(s) SubCutaneous three times a day with meals  lactulose Syrup 30 Gram(s) Oral three times a day  levoFLOXacin IVPB      levoFLOXacin IVPB 750 milliGRAM(s) IV Intermittent every 24 hours  methylPREDNISolone sodium succinate Injectable 60 milliGRAM(s) IV Push every 12 hours  metoprolol succinate ER 25 milliGRAM(s) Oral daily  multivitamin/minerals 1 Tablet(s) Oral daily  pantoprazole    Tablet 40 milliGRAM(s) Oral before breakfast  polyethylene glycol 3350 17 Gram(s) Oral daily  polyethylene glycol 3350 17 Gram(s) Oral two times a day  senna 2 Tablet(s) Oral at bedtime  tamsulosin 0.4 milliGRAM(s) Oral at bedtime  tiotropium 18 MICROgram(s) Capsule 1 Capsule(s) Inhalation daily        VITALS:  T(F): 98 (12-11-18 @ 05:52), Max: 98 (12-11-18 @ 05:52)  HR: 81 (12-11-18 @ 05:52)  BP: 121/60 (12-11-18 @ 05:52)  RR: 18 (12-11-18 @ 05:52)  SpO2: 92% (12-10-18 @ 10:36)  Wt(kg): --    12-10 @ 07:01  -  12-11 @ 07:00  --------------------------------------------------------  IN: 0 mL / OUT: 400 mL / NET: -400 mL          PHYSICAL EXAM:  Constitutional: NAD, on AVAp  HEENT: anicteric sclera, , MMM  Neck: No JVD  Respiratory: CTAB, no wheezes, rales or rhonchi  Cardiovascular: S1, S2, RRR  Gastrointestinal: BS+, soft, NT/ND  Extremities:  No peripheral edema  Neurological: A/O x 3  : No CVA tenderness. No chapman.   Skin: No rashes  Vascular Access:    LABS:  12-11    142  |  102  |  55<H>  ----------------------------<  157<H>  5.9<H>   |  33<H>  |  1.8<H>    Ca    9.2      11 Dec 2018 06:45  Phos  4.8     12-11  Mg     2.2     12-10    TPro  6.4  /  Alb  3.8  /  TBili  0.3  /  DBili      /  AST  11  /  ALT  14  /  AlkPhos  73  12-10                          12.8   16.57 )-----------( 587      ( 11 Dec 2018 06:45 )             43.0       Urine Studies:      RADIOLOGY & ADDITIONAL STUDIES:  < from: CT Abdomen and Pelvis w/ Oral Cont (12.09.18 @ 12:12) >  KIDNEYS: No hydronephrosis bilaterally or obstructing calculus. Multiple   bilateral nonobstructing renal calculi measuring up to 8 mm in the left   interpolar region. Stable bilateral renal cysts measuring up to 9.5 cm at   the right lower pole. Stable 4 cm indeterminate exophytic left interpolar   renal lesion.    < end of copied text >
Nephrology progress note    Patient is seen and examined, events over the last 24 h noted .  Still on AVAP  Allergies:  aspirin (Other)  fish (Other)  iodine (Hives)  penicillin (Unknown)  shellfish (Other)    Hospital Medications:   MEDICATIONS  (STANDING):  ALBUTerol/ipratropium for Nebulization 3 milliLiter(s) Nebulizer every 4 hours  allopurinol 100 milliGRAM(s) Oral daily  amLODIPine   Tablet 10 milliGRAM(s) Oral daily  artificial  tears Solution 1 Drop(s) Both EYES three times a day  atorvastatin 20 milliGRAM(s) Oral at bedtime  buDESOnide 160 MICROgram(s)/formoterol 4.5 MICROgram(s) Inhaler 2 Puff(s) Inhalation two times a day  cholecalciferol 1000 Unit(s) Oral daily  cyanocobalamin 1000 MICROGram(s) Oral daily  dextrose 5%. 1000 milliLiter(s) (50 mL/Hr) IV Continuous <Continuous>  dextrose 50% Injectable 12.5 Gram(s) IV Push once  dextrose 50% Injectable 25 Gram(s) IV Push once  dextrose 50% Injectable 25 Gram(s) IV Push once  docusate sodium 100 milliGRAM(s) Oral daily  finasteride 5 milliGRAM(s) Oral daily  heparin  Injectable 5000 Unit(s) SubCutaneous every 12 hours  insulin glargine Injectable (LANTUS) 50 Unit(s) SubCutaneous at bedtime  insulin lispro (HumaLOG) corrective regimen sliding scale   SubCutaneous three times a day before meals  insulin lispro (HumaLOG) corrective regimen sliding scale   SubCutaneous at bedtime  insulin lispro Injectable (HumaLOG) 17 Unit(s) SubCutaneous three times a day with meals  lactulose Syrup 30 Gram(s) Oral three times a day  metoprolol succinate ER 25 milliGRAM(s) Oral daily  multivitamin/minerals 1 Tablet(s) Oral daily  pantoprazole    Tablet 40 milliGRAM(s) Oral before breakfast  polyethylene glycol 3350 17 Gram(s) Oral two times a day  predniSONE   Tablet 60 milliGRAM(s) Oral daily  senna 2 Tablet(s) Oral at bedtime  sodium polystyrene sulfonate Suspension 30 Gram(s) Oral <User Schedule>  tamsulosin 0.4 milliGRAM(s) Oral at bedtime  tiotropium 18 MICROgram(s) Capsule 1 Capsule(s) Inhalation daily        VITALS:  T(F): 96.6 (12-22-18 @ 06:48), Max: 97 (12-21-18 @ 08:22)  HR: 86 (12-22-18 @ 06:48)  BP: 162/87 (12-22-18 @ 06:48)  RR: 16 (12-22-18 @ 06:48)  SpO2: --  Wt(kg): --    12-20 @ 07:01  -  12-21 @ 07:00  --------------------------------------------------------  IN: 0 mL / OUT: 0 mL / NET: 0 mL    12-21 @ 07:01  -  12-22 @ 06:58  --------------------------------------------------------  IN: 0 mL / OUT: 375 mL / NET: -375 mL          PHYSICAL EXAM:  Constitutional: NAD, obese, on AVAp  HEENT: anicteric sclera, oropharynx clear, MMM  Neck: No JVD  Respiratory: CTAB, no wheezes, rales or rhonchi  Cardiovascular: S1, S2, RRR  Gastrointestinal: BS+, soft, NT/ND  Extremities: mild peripheral edema  Neurological: comfortable in bed  : No CVA tenderness. No chapman.   Skin: No rashes  Vascular Access:    LABS:            Urine Studies:      RADIOLOGY & ADDITIONAL STUDIES:
Nephrology progress note    Patient is seen and examined, events over the last 24 h noted .  Still on high O2.   Allergies:  aspirin (Other)  fish (Other)  iodine (Hives)  penicillin (Unknown)  shellfish (Other)    Hospital Medications:   MEDICATIONS  (STANDING):  ALBUTerol/ipratropium for Nebulization 3 milliLiter(s) Nebulizer every 4 hours  allopurinol 100 milliGRAM(s) Oral daily  amLODIPine   Tablet 10 milliGRAM(s) Oral daily  artificial  tears Solution 1 Drop(s) Both EYES three times a day  atorvastatin 20 milliGRAM(s) Oral at bedtime  buDESOnide 160 MICROgram(s)/formoterol 4.5 MICROgram(s) Inhaler 2 Puff(s) Inhalation two times a day  cholecalciferol 1000 Unit(s) Oral daily  cyanocobalamin 1000 MICROGram(s) Oral daily  dextrose 5%. 1000 milliLiter(s) (50 mL/Hr) IV Continuous <Continuous>  dextrose 50% Injectable 12.5 Gram(s) IV Push once  dextrose 50% Injectable 25 Gram(s) IV Push once  dextrose 50% Injectable 25 Gram(s) IV Push once  docusate sodium 100 milliGRAM(s) Oral daily  finasteride 5 milliGRAM(s) Oral daily  heparin  Injectable 5000 Unit(s) SubCutaneous every 12 hours  insulin glargine Injectable (LANTUS) 50 Unit(s) SubCutaneous at bedtime  insulin lispro (HumaLOG) corrective regimen sliding scale   SubCutaneous three times a day before meals  insulin lispro (HumaLOG) corrective regimen sliding scale   SubCutaneous at bedtime  insulin lispro Injectable (HumaLOG) 17 Unit(s) SubCutaneous three times a day with meals  lactulose Syrup 30 Gram(s) Oral three times a day  levoFLOXacin IVPB      levoFLOXacin IVPB 750 milliGRAM(s) IV Intermittent every 24 hours  metoprolol succinate ER 25 milliGRAM(s) Oral daily  multivitamin/minerals 1 Tablet(s) Oral daily  pantoprazole    Tablet 40 milliGRAM(s) Oral before breakfast  polyethylene glycol 3350 17 Gram(s) Oral two times a day  predniSONE   Tablet 60 milliGRAM(s) Oral daily  senna 2 Tablet(s) Oral at bedtime  sodium polystyrene sulfonate Suspension 30 Gram(s) Oral <User Schedule>  tamsulosin 0.4 milliGRAM(s) Oral at bedtime  tiotropium 18 MICROgram(s) Capsule 1 Capsule(s) Inhalation daily        VITALS:  T(F): 95.4 (12-14-18 @ 05:39), Max: 97.8 (12-13-18 @ 22:00)  HR: 106 (12-14-18 @ 14:38)  BP: 123/63 (12-14-18 @ 14:38)  RR: 16 (12-14-18 @ 14:38)  SpO2: 84% (12-14-18 @ 10:51)  Wt(kg): --    12-13 @ 07:01  -  12-14 @ 07:00  --------------------------------------------------------  IN: 0 mL / OUT: 310 mL / NET: -310 mL          PHYSICAL EXAM:  Constitutional: NAD, On AVAP  HEENT: anicteric sclera, oropharynx clear, MMM  Neck: No JVD  Respiratory: CTAB, no wheezes, rales or rhonchi  Cardiovascular: S1, S2, RRR  Gastrointestinal: BS+, soft, NT/ND  Extremities: mild peripheral edema  Neurological: A/O x 3  : No CVA tenderness. No chapman.   Skin: No rashes  Vascular Access:    LABS:  12-14    140  |  99  |  46<H>  ----------------------------<  250<H>  5.2<H>   |  30  |  1.7<H>    Ca    9.1      14 Dec 2018 06:39                            13.0   15.32 )-----------( 514      ( 14 Dec 2018 06:39 )             43.9       Urine Studies:    Potassium, Random Urine: 28 mmol/L (12-11 @ 22:35)  Creatinine, Random Urine: 102 mg/dL (12-11 @ 22:35)  Sodium, Random Urine: 67.0 mmoL/L (12-11 @ 22:35)    RADIOLOGY & ADDITIONAL STUDIES:
Patient is a 78y old  Male who presents with a chief complaint of Acute Hypoxic Respiratory Failure (12 Dec 2018 08:39)        Interval Events: No overnight events. Many general complaints    REVIEW OF SYSTEMS:  Constitutional: No fevers or chills. No weight loss. No fatigue or generalized malaise.  Eyes: No itching or discharge from the eyes  ENT: No ear pain. No ear discharge. No nasal congestion. No post nasal drip. No epistaxis. No throat pain. No sore throat. No difficulty swallowing.   CV: No chest pain. No palpitations. No lightheadedness or dizziness.   Resp: No dyspnea at rest. +dyspnea on exertion. No orthopnea. +wheezing. No cough. No stridor. No sputum production. No chest pain with respiration.  GI: No nausea. No vomiting. No diarrhea.  MSK: No joint pain or pain in any extremities  Integumentary: No skin lesions. No pedal edema.  Neurological: No gross motor weakness. No sensory changes.      OBJECTIVE:  ICU Vital Signs Last 24 Hrs  T(C): 36.2 (12 Dec 2018 06:04), Max: 36.4 (11 Dec 2018 21:31)  T(F): 97.1 (12 Dec 2018 06:04), Max: 97.6 (11 Dec 2018 21:31)  HR: 79 (12 Dec 2018 06:04) (72 - 99)  BP: 165/86 (12 Dec 2018 06:04) (134/70 - 165/86)    RR: 16 (12 Dec 2018 09:23) (16 - 20)  SpO2: 92% (12 Dec 2018 09:23) (92% - 92%)        12-11 @ 07:01  -  12-12 @ 07:00  --------------------------------------------------------  IN: 0 mL / OUT: 100 mL / NET: -100 mL      CAPILLARY BLOOD GLUCOSE  169 (11 Dec 2018 22:06)      POCT Blood Glucose.: 269 mg/dL (12 Dec 2018 12:06)      PHYSICAL EXAM:  General: Awake, alert, oriented X 3.   HEENT: Atraumatic, normocephalic.                 Mallampatti Grade 4                No nasal congestion.                No tonsillar or pharyngeal exudates.  Lymph Nodes: No palpable lymphadenopathy neck, supraclavicular region  Neck: No JVD. No carotid bruit, no thyromegaly  Respiratory: Normal chest expansion                         Normal percussion                         decreaseed air entry                         +wheeze, no rhonchi or rales.  Cardiovascular: S1 S2 normal. No murmurs, rubs or gallops.   Abdomen: Soft, non-tender, non-distended. No organomegaly.  Extremities: Warm to touch. Peripheral pulse palpable. No pedal edema.   Skin: No rashes or skin lesions, warm dry  Neurological: Motor and sensory examination equal and normal in all four extremities.  Psychiatry: Appropriate mood and affect.    HOSPITAL MEDICATIONS:  MEDICATIONS  (STANDING):  ALBUTerol/ipratropium for Nebulization 3 milliLiter(s) Nebulizer every 4 hours  amLODIPine   Tablet 10 milliGRAM(s) Oral daily  artificial  tears Solution 1 Drop(s) Both EYES three times a day  atorvastatin 20 milliGRAM(s) Oral at bedtime  buDESOnide 160 MICROgram(s)/formoterol 4.5 MICROgram(s) Inhaler 2 Puff(s) Inhalation two times a day  cholecalciferol 1000 Unit(s) Oral daily  cyanocobalamin 1000 MICROGram(s) Oral daily  dextrose 5%. 1000 milliLiter(s) (50 mL/Hr) IV Continuous <Continuous>  dextrose 50% Injectable 12.5 Gram(s) IV Push once  dextrose 50% Injectable 25 Gram(s) IV Push once  dextrose 50% Injectable 25 Gram(s) IV Push once  docusate sodium 100 milliGRAM(s) Oral daily  finasteride 5 milliGRAM(s) Oral daily  heparin  Injectable 5000 Unit(s) SubCutaneous every 12 hours  insulin glargine Injectable (LANTUS) 50 Unit(s) SubCutaneous at bedtime  insulin lispro (HumaLOG) corrective regimen sliding scale   SubCutaneous three times a day before meals  insulin lispro (HumaLOG) corrective regimen sliding scale   SubCutaneous at bedtime  insulin lispro Injectable (HumaLOG) 15 Unit(s) SubCutaneous three times a day with meals  lactulose Syrup 30 Gram(s) Oral three times a day  levoFLOXacin IVPB      levoFLOXacin IVPB 750 milliGRAM(s) IV Intermittent every 24 hours  methylPREDNISolone sodium succinate Injectable 60 milliGRAM(s) IV Push every 12 hours  metoprolol succinate ER 25 milliGRAM(s) Oral daily  multivitamin/minerals 1 Tablet(s) Oral daily  pantoprazole    Tablet 40 milliGRAM(s) Oral before breakfast  polyethylene glycol 3350 17 Gram(s) Oral daily  polyethylene glycol 3350 17 Gram(s) Oral two times a day  senna 2 Tablet(s) Oral at bedtime  tamsulosin 0.4 milliGRAM(s) Oral at bedtime  tiotropium 18 MICROgram(s) Capsule 1 Capsule(s) Inhalation daily    MEDICATIONS  (PRN):  dextrose 40% Gel 15 Gram(s) Oral once PRN Blood Glucose LESS THAN 70 milliGRAM(s)/deciliter  glucagon  Injectable 1 milliGRAM(s) IntraMuscular once PRN Glucose LESS THAN 70 milligrams/deciliter      LABS:                        13.9   14.36 )-----------( 618      ( 12 Dec 2018 08:32 )             46.9     12-12    143  |  101  |  45<H>  ----------------------------<  203<H>  5.3<H>   |  31  |  1.9<H>    Ca    9.5      12 Dec 2018 08:32  Phos  4.8     12-11    TPro  6.4  /  Alb  3.7  /  TBili  0.4  /  DBili  x   /  AST  12  /  ALT  21  /  AlkPhos  66  12-12                      RADIOLOGY:Radiology personally reviewed.
Patient is a 78y old  Male who presents with a chief complaint of Acute Hypoxic Respiratory Failure (13 Dec 2018 18:54)        Interval Events: No overnight events.    REVIEW OF SYSTEMS:  Constitutional: No fevers or chills. No weight loss. No fatigue or generalized malaise.  Eyes: No itching or discharge from the eyes  ENT: No ear pain. No ear discharge. No nasal congestion. No post nasal drip. No epistaxis. No throat pain. No sore throat. No difficulty swallowing.   CV: No chest pain. No palpitations. No lightheadedness or dizziness.   Resp: No dyspnea at rest. +dyspnea on exertion. No orthopnea. No wheezing. No cough. No stridor. No sputum production. No chest pain with respiration.  GI: No nausea. No vomiting. No diarrhea.  MSK: No joint pain or pain in any extremities  Integumentary: No skin lesions. No pedal edema.  Neurological: No gross motor weakness. No sensory changes.      OBJECTIVE:  ICU Vital Signs Last 24 Hrs  T(C): 35.2 (14 Dec 2018 05:39), Max: 36.6 (13 Dec 2018 22:00)  T(F): 95.4 (14 Dec 2018 05:39), Max: 97.8 (13 Dec 2018 22:00)  HR: 84 (14 Dec 2018 05:39) (60 - 94)  BP: 158/77 (14 Dec 2018 05:39) (108/55 - 158/77)    RR: 14 (14 Dec 2018 05:39) (14 - 16)  SpO2: 93% (14 Dec 2018 05:39) (84% - 95%)        12-13 @ 07:01  -  12-14 @ 07:00  --------------------------------------------------------  IN: 0 mL / OUT: 310 mL / NET: -310 mL      CAPILLARY BLOOD GLUCOSE      POCT Blood Glucose.: 216 mg/dL (14 Dec 2018 07:32)      PHYSICAL EXAM:  General: Awake, alert, oriented X 3.   HEENT: Atraumatic, normocephalic.                 Mallampatti Grade                 No nasal congestion.                No tonsillar or pharyngeal exudates.  Lymph Nodes: No palpable lymphadenopathy neck, supraclavicular region  Neck: No JVD. No carotid bruit, no thyromegally  Respiratory: Normal chest expansion                         Normal percussion                         decreased  air entry                         No wheeze, rhonchi or rales.  Cardiovascular: S1 S2 normal. No murmurs, rubs or gallops.   Abdomen: Soft, non-tender, non-distended. No organomegaly.  Extremities: Warm to touch. Peripheral pulse palpable. No pedal edema.   Skin: No rashes or skin lesions, warm dry  Neurological: Motor and sensory examination equal and normal in all four extremities.  Psychiatry: Appropriate mood and affect.    HOSPITAL MEDICATIONS:  MEDICATIONS  (STANDING):  ALBUTerol/ipratropium for Nebulization 3 milliLiter(s) Nebulizer every 4 hours  allopurinol 100 milliGRAM(s) Oral daily  amLODIPine   Tablet 10 milliGRAM(s) Oral daily  artificial  tears Solution 1 Drop(s) Both EYES three times a day  atorvastatin 20 milliGRAM(s) Oral at bedtime  buDESOnide 160 MICROgram(s)/formoterol 4.5 MICROgram(s) Inhaler 2 Puff(s) Inhalation two times a day  cholecalciferol 1000 Unit(s) Oral daily  cyanocobalamin 1000 MICROGram(s) Oral daily  dextrose 5%. 1000 milliLiter(s) (50 mL/Hr) IV Continuous <Continuous>  dextrose 50% Injectable 12.5 Gram(s) IV Push once  dextrose 50% Injectable 25 Gram(s) IV Push once  dextrose 50% Injectable 25 Gram(s) IV Push once  docusate sodium 100 milliGRAM(s) Oral daily  finasteride 5 milliGRAM(s) Oral daily  heparin  Injectable 5000 Unit(s) SubCutaneous every 12 hours  insulin glargine Injectable (LANTUS) 50 Unit(s) SubCutaneous at bedtime  insulin lispro (HumaLOG) corrective regimen sliding scale   SubCutaneous three times a day before meals  insulin lispro (HumaLOG) corrective regimen sliding scale   SubCutaneous at bedtime  insulin lispro Injectable (HumaLOG) 15 Unit(s) SubCutaneous three times a day with meals  lactulose Syrup 30 Gram(s) Oral three times a day  levoFLOXacin IVPB      levoFLOXacin IVPB 750 milliGRAM(s) IV Intermittent every 24 hours  methylPREDNISolone sodium succinate Injectable 60 milliGRAM(s) IV Push daily  metoprolol succinate ER 25 milliGRAM(s) Oral daily  multivitamin/minerals 1 Tablet(s) Oral daily  pantoprazole    Tablet 40 milliGRAM(s) Oral before breakfast  polyethylene glycol 3350 17 Gram(s) Oral two times a day  senna 2 Tablet(s) Oral at bedtime  sodium polystyrene sulfonate Suspension 30 Gram(s) Oral <User Schedule>  tamsulosin 0.4 milliGRAM(s) Oral at bedtime  tiotropium 18 MICROgram(s) Capsule 1 Capsule(s) Inhalation daily    MEDICATIONS  (PRN):  dextrose 40% Gel 15 Gram(s) Oral once PRN Blood Glucose LESS THAN 70 milliGRAM(s)/deciliter  glucagon  Injectable 1 milliGRAM(s) IntraMuscular once PRN Glucose LESS THAN 70 milligrams/deciliter      LABS:                        13.0   15.32 )-----------( 514      ( 14 Dec 2018 06:39 )             43.9     12-14    140  |  99  |  46<H>  ----------------------------<  250<H>  5.2<H>   |  30  |  1.7<H>    Ca    9.1      14 Dec 2018 06:39          RADIOLOGY:Radiology personally reviewed.
Patient is a 78y old  Male who presents with a chief complaint of Acute Hypoxic Respiratory Failure (17 Dec 2018 23:51)      Over Night Events:  Patient seen and examined feel better not on distress still on high flow       ROS:  See HPI    PHYSICAL EXAM    ICU Vital Signs Last 24 Hrs  T(C): 36.8 (17 Dec 2018 22:19), Max: 36.8 (17 Dec 2018 22:19)  T(F): 98.2 (17 Dec 2018 22:19), Max: 98.2 (17 Dec 2018 22:19)  HR: 91 (18 Dec 2018 08:44) (91 - 92)  BP: 132/64 (18 Dec 2018 08:44) (108/51 - 132/64)  BP(mean): --  ABP: --  ABP(mean): --  RR: 18 (18 Dec 2018 08:06) (18 - 18)  SpO2: 94% (18 Dec 2018 08:06) (94% - 94%)      General: Aox3  HEENT:     des           Lymph Nodes: NO cervical LN   Lungs: Bilateral BS  Cardiovascular: Regular   Abdomen: Soft, Positive BS  Extremities:1 edema  No clubbing   Skin: warm   Neurological: no focal deficit   Musculoskeletal: move all ext     I&O's Detail    17 Dec 2018 07:01  -  18 Dec 2018 07:00  --------------------------------------------------------  IN:  Total IN: 0 mL    OUT:    Voided: 2 mL  Total OUT: 2 mL    Total NET: -2 mL          LABS:                          12.5   13.57 )-----------( 489      ( 18 Dec 2018 09:30 )             41.3         18 Dec 2018 09:30    143    |  104    |  42     ----------------------------<  188    5.2     |  29     |  1.5      Ca    8.6        18 Dec 2018 09:30    TPro  5.3    /  Alb  3.1    /  TBili  0.4    /  DBili  x      /  AST  9      /  ALT  14     /  AlkPhos  52     18 Dec 2018 09:30  Amylase x     lipase x                                                                                                                                                                                                                                         MEDICATIONS  (STANDING):  ALBUTerol/ipratropium for Nebulization 3 milliLiter(s) Nebulizer every 4 hours  allopurinol 100 milliGRAM(s) Oral daily  amLODIPine   Tablet 10 milliGRAM(s) Oral daily  artificial  tears Solution 1 Drop(s) Both EYES three times a day  atorvastatin 20 milliGRAM(s) Oral at bedtime  buDESOnide 160 MICROgram(s)/formoterol 4.5 MICROgram(s) Inhaler 2 Puff(s) Inhalation two times a day  cholecalciferol 1000 Unit(s) Oral daily  cyanocobalamin 1000 MICROGram(s) Oral daily  dextrose 5%. 1000 milliLiter(s) (50 mL/Hr) IV Continuous <Continuous>  dextrose 50% Injectable 12.5 Gram(s) IV Push once  dextrose 50% Injectable 25 Gram(s) IV Push once  dextrose 50% Injectable 25 Gram(s) IV Push once  docusate sodium 100 milliGRAM(s) Oral daily  finasteride 5 milliGRAM(s) Oral daily  heparin  Injectable 5000 Unit(s) SubCutaneous every 12 hours  insulin glargine Injectable (LANTUS) 50 Unit(s) SubCutaneous at bedtime  insulin lispro (HumaLOG) corrective regimen sliding scale   SubCutaneous three times a day before meals  insulin lispro (HumaLOG) corrective regimen sliding scale   SubCutaneous at bedtime  insulin lispro Injectable (HumaLOG) 17 Unit(s) SubCutaneous three times a day with meals  lactulose Syrup 30 Gram(s) Oral three times a day  levoFLOXacin IVPB      levoFLOXacin IVPB 750 milliGRAM(s) IV Intermittent every 24 hours  metoprolol succinate ER 25 milliGRAM(s) Oral daily  multivitamin/minerals 1 Tablet(s) Oral daily  pantoprazole    Tablet 40 milliGRAM(s) Oral before breakfast  polyethylene glycol 3350 17 Gram(s) Oral two times a day  predniSONE   Tablet 60 milliGRAM(s) Oral daily  senna 2 Tablet(s) Oral at bedtime  sodium polystyrene sulfonate Suspension 30 Gram(s) Oral <User Schedule>  tamsulosin 0.4 milliGRAM(s) Oral at bedtime  tiotropium 18 MICROgram(s) Capsule 1 Capsule(s) Inhalation daily  vancomycin  IVPB 1000 milliGRAM(s) IV Intermittent every 12 hours    MEDICATIONS  (PRN):  acetaminophen   Tablet .. 650 milliGRAM(s) Oral every 6 hours PRN Temp greater or equal to 38C (100.4F), Moderate Pain (4 - 6)  dextrose 40% Gel 15 Gram(s) Oral once PRN Blood Glucose LESS THAN 70 milliGRAM(s)/deciliter  glucagon  Injectable 1 milliGRAM(s) IntraMuscular once PRN Glucose LESS THAN 70 milligrams/deciliter          Xrays:  TLC:  OG:  ET tube:                                                                                       ECHO:
Patient is a 78y old  Male who presents with a chief complaint of Acute Hypoxic Respiratory Failure (21 Dec 2018 12:04)      INTERVAL HISTORY/overnight events  over feel better back to his baseline on 3-4 liter NC       Vital Signs Last 24 Hrs  T(C): 36.1 (21 Dec 2018 14:50), Max: 36.6 (20 Dec 2018 22:00)  T(F): 97 (21 Dec 2018 14:50), Max: 97.9 (20 Dec 2018 22:00)  HR: 120 (21 Dec 2018 14:50) (96 - 120)  BP: 152/80 (21 Dec 2018 14:50) (121/63 - 152/80)  BP(mean): --  RR: 18 (21 Dec 2018 14:50) (18 - 18)  SpO2: --  Daily     Daily   I&O's Summary    20 Dec 2018 07:01  -  21 Dec 2018 07:00  --------------------------------------------------------  IN: 0 mL / OUT: 0 mL / NET: 0 mL        Physical Examination:    General: No acute distress.  Alert, oriented, interactive, nonfocal    HEENT: Pupils equal, reactive to light.  Symmetric.    PULM: Clear to auscultation bilaterally, no significant sputum production    CVS: Regular rate and rhythm, no murmurs, rubs, or gallops    ABD: Soft, nondistended, nontender, normoactive bowel sounds, no masses    EXT:1 edema, nontender    SKIN: Warm and well perfused, no rashes noted.    Neurology: no focal deficit     Musculoskeletal : Move all extremety         Lab Results:                    Microbiology      Medication:  MEDICATIONS  (STANDING):  ALBUTerol/ipratropium for Nebulization 3 milliLiter(s) Nebulizer every 4 hours  allopurinol 100 milliGRAM(s) Oral daily  amLODIPine   Tablet 10 milliGRAM(s) Oral daily  artificial  tears Solution 1 Drop(s) Both EYES three times a day  atorvastatin 20 milliGRAM(s) Oral at bedtime  buDESOnide 160 MICROgram(s)/formoterol 4.5 MICROgram(s) Inhaler 2 Puff(s) Inhalation two times a day  cholecalciferol 1000 Unit(s) Oral daily  cyanocobalamin 1000 MICROGram(s) Oral daily  dextrose 5%. 1000 milliLiter(s) (50 mL/Hr) IV Continuous <Continuous>  dextrose 50% Injectable 12.5 Gram(s) IV Push once  dextrose 50% Injectable 25 Gram(s) IV Push once  dextrose 50% Injectable 25 Gram(s) IV Push once  docusate sodium 100 milliGRAM(s) Oral daily  finasteride 5 milliGRAM(s) Oral daily  heparin  Injectable 5000 Unit(s) SubCutaneous every 12 hours  insulin glargine Injectable (LANTUS) 50 Unit(s) SubCutaneous at bedtime  insulin lispro (HumaLOG) corrective regimen sliding scale   SubCutaneous three times a day before meals  insulin lispro (HumaLOG) corrective regimen sliding scale   SubCutaneous at bedtime  insulin lispro Injectable (HumaLOG) 17 Unit(s) SubCutaneous three times a day with meals  lactulose Syrup 30 Gram(s) Oral three times a day  metoprolol succinate ER 25 milliGRAM(s) Oral daily  multivitamin/minerals 1 Tablet(s) Oral daily  pantoprazole    Tablet 40 milliGRAM(s) Oral before breakfast  polyethylene glycol 3350 17 Gram(s) Oral two times a day  predniSONE   Tablet 60 milliGRAM(s) Oral daily  senna 2 Tablet(s) Oral at bedtime  sodium polystyrene sulfonate Suspension 30 Gram(s) Oral <User Schedule>  tamsulosin 0.4 milliGRAM(s) Oral at bedtime  tiotropium 18 MICROgram(s) Capsule 1 Capsule(s) Inhalation daily    MEDICATIONS  (PRN):  acetaminophen   Tablet .. 650 milliGRAM(s) Oral every 6 hours PRN Temp greater or equal to 38C (100.4F), Moderate Pain (4 - 6)  dextrose 40% Gel 15 Gram(s) Oral once PRN Blood Glucose LESS THAN 70 milliGRAM(s)/deciliter  glucagon  Injectable 1 milliGRAM(s) IntraMuscular once PRN Glucose LESS THAN 70 milligrams/deciliter        IMAGING STUDIES:
Patient is a 78y old  Male who presents with a chief complaint of Acute Hypoxic Respiratory Failure (26 Dec 2018 17:09)        Interval Events: No overnight events. On high flow. Angry states wants a high flow for home.    REVIEW OF SYSTEMS:  Constitutional: No fevers or chills. No weight loss. No fatigue or generalized malaise.  Eyes: No itching or discharge from the eyes  ENT: No ear pain. No ear discharge. No nasal congestion. No post nasal drip. No epistaxis. No throat pain. No sore throat. No difficulty swallowing.   CV: No chest pain. No palpitations. No lightheadedness or dizziness.   Resp: +dyspnea at rest. +dyspnea on exertion. No orthopnea. No wheezing. No cough. No stridor. No sputum production. No chest pain with respiration.  GI: No nausea. No vomiting. No diarrhea.  MSK: No joint pain or pain in any extremities  Integumentary: No skin lesions. No pedal edema.  Neurological: No gross motor weakness. No sensory changes.      OBJECTIVE:  ICU Vital Signs Last 24 Hrs  T(C): 35.9 (27 Dec 2018 06:00), Max: 36.6 (26 Dec 2018 14:40)  T(F): 96.7 (27 Dec 2018 06:00), Max: 97.9 (26 Dec 2018 14:40)  HR: 85 (27 Dec 2018 06:00) (85 - 104)  BP: 116/72 (27 Dec 2018 06:00) (113/64 - 126/65)  RR: 18 (27 Dec 2018 06:00) (16 - 18)  SpO2: 89% (27 Dec 2018 10:06) (89% - 93%)        12-26 @ 07:01  -  12-27 @ 07:00  --------------------------------------------------------  IN: 0 mL / OUT: 300 mL / NET: -300 mL      CAPILLARY BLOOD GLUCOSE      POCT Blood Glucose.: 308 mg/dL (27 Dec 2018 10:56)      PHYSICAL EXAM:  General: Awake, alert, oriented X 3. Angry  HEENT: Atraumatic, normocephalic.                 Mallampatti Grade                 No nasal congestion.                No tonsillar or pharyngeal exudates.  Lymph Nodes: No palpable lymphadenopathy neck, supraclavicular region  Neck: No JVD. No carotid bruit, no thyromegally  Respiratory: Normal chest expansion                         Normal percussion                         decreased equal air entry                         No wheeze, rhonchi or rales.  Cardiovascular: S1 S2 normal. No murmurs, rubs or gallops.   Abdomen: Soft, non-tender, non-distended. No organomegaly. Obese  Extremities: Warm to touch. Peripheral pulse palpable. No pedal edema.   Skin: No rashes or skin lesions, warm dry  Neurological: Motor and sensory examination equal and normal in all four extremities.  Psychiatry: Appropriate mood and affect.    HOSPITAL MEDICATIONS:  MEDICATIONS  (STANDING):  ALBUTerol/ipratropium for Nebulization 3 milliLiter(s) Nebulizer every 4 hours  artificial  tears Solution 1 Drop(s) Both EYES three times a day  atorvastatin 20 milliGRAM(s) Oral at bedtime  buDESOnide 160 MICROgram(s)/formoterol 4.5 MICROgram(s) Inhaler 2 Puff(s) Inhalation two times a day  cholecalciferol 1000 Unit(s) Oral daily  cyanocobalamin 1000 MICROGram(s) Oral daily  dextrose 5%. 1000 milliLiter(s) (50 mL/Hr) IV Continuous <Continuous>  dextrose 50% Injectable 12.5 Gram(s) IV Push once  dextrose 50% Injectable 25 Gram(s) IV Push once  dextrose 50% Injectable 25 Gram(s) IV Push once  docusate sodium 100 milliGRAM(s) Oral daily  finasteride 5 milliGRAM(s) Oral daily  furosemide    Tablet 20 milliGRAM(s) Oral two times a day  heparin  Injectable 5000 Unit(s) SubCutaneous every 12 hours  insulin glargine Injectable (LANTUS) 50 Unit(s) SubCutaneous at bedtime  insulin lispro (HumaLOG) corrective regimen sliding scale   SubCutaneous three times a day before meals  insulin lispro (HumaLOG) corrective regimen sliding scale   SubCutaneous at bedtime  insulin lispro Injectable (HumaLOG) 17 Unit(s) SubCutaneous three times a day with meals  metoprolol tartrate 25 milliGRAM(s) Oral two times a day  multivitamin/minerals 1 Tablet(s) Oral daily  pantoprazole    Tablet 40 milliGRAM(s) Oral before breakfast  polyethylene glycol 3350 17 Gram(s) Oral two times a day  predniSONE   Tablet 40 milliGRAM(s) Oral daily  senna 2 Tablet(s) Oral at bedtime  sodium polystyrene sulfonate Suspension 30 Gram(s) Oral <User Schedule>  tamsulosin 0.4 milliGRAM(s) Oral at bedtime  tiotropium 18 MICROgram(s) Capsule 1 Capsule(s) Inhalation daily  zinc oxide 40% Ointment 1 Application(s) Topical two times a day    MEDICATIONS  (PRN):  acetaminophen   Tablet .. 650 milliGRAM(s) Oral every 6 hours PRN Temp greater or equal to 38C (100.4F), Moderate Pain (4 - 6)  dextrose 40% Gel 15 Gram(s) Oral once PRN Blood Glucose LESS THAN 70 milliGRAM(s)/deciliter  glucagon  Injectable 1 milliGRAM(s) IntraMuscular once PRN Glucose LESS THAN 70 milligrams/deciliter      LABS:                        12.0   10.95 )-----------( 299      ( 27 Dec 2018 09:19 )             40.6     12-27    144  |  99  |  58<H>  ----------------------------<  189<H>  4.7   |  35<H>  |  1.8<H>    Ca    8.8      27 Dec 2018 09:19  Phos  4.7     12-26  Mg     1.7     12-26    TPro  5.0<L>  /  Alb  3.2<L>  /  TBili  0.5  /  DBili  x   /  AST  8   /  ALT  11  /  AlkPhos  54  12-26                      RADIOLOGY:Radiology personally reviewed.
Patient is a 78y old  Male who presents with a chief complaint of Acute Hypoxic Respiratory Failure (28 Dec 2018 07:39)        Interval Events: No overnight events. Very angry demanding.    REVIEW OF SYSTEMS:  Constitutional: No fevers or chills. No weight loss. No fatigue or generalized malaise.  Eyes: No itching or discharge from the eyes  ENT: No ear pain. No ear discharge. No nasal congestion. No post nasal drip. No epistaxis. No throat pain. No sore throat. No difficulty swallowing.   CV: No chest pain. No palpitations. No lightheadedness or dizziness.   Resp: No dyspnea at rest. +dyspnea on exertion. +orthopnea. No wheezing. No cough. No stridor. No sputum production. No chest pain with respiration.  GI: No nausea. No vomiting. No diarrhea.  MSK: No joint pain or pain in any extremities  Integumentary: No skin lesions. No pedal edema.  Neurological: No gross motor weakness. No sensory changes.      OBJECTIVE:  ICU Vital Signs Last 24 Hrs  T(C): 36.8 (27 Dec 2018 21:35), Max: 36.8 (27 Dec 2018 21:35)  T(F): 98.2 (27 Dec 2018 21:35), Max: 98.2 (27 Dec 2018 21:35)  HR: 97 (27 Dec 2018 21:35) (97 - 101)  BP: 122/61 (27 Dec 2018 21:35) (114/62 - 122/61)    RR: 16 (27 Dec 2018 14:20) (16 - 16)  SpO2: 91% (27 Dec 2018 14:20) (91% - 91%)        CAPILLARY BLOOD GLUCOSE  254 (28 Dec 2018 07:57)      POCT Blood Glucose.: 334 mg/dL (27 Dec 2018 21:03)      PHYSICAL EXAM:  General: Awake, alert, oriented X 3.   HEENT: Atraumatic, normocephalic.                 Mallampatti Grade                 No nasal congestion.                No tonsillar or pharyngeal exudates.  Lymph Nodes: No palpable lymphadenopathy neck, supraclavicular region  Neck: No JVD. No carotid bruit, no thyromegally  Respiratory: Normal chest expansion                         Normal percussion                         decreased air entry                         scattered wheeze,  Cardiovascular: S1 S2 normal. No murmurs, rubs or gallops.   Abdomen: Soft, non-tender, non-distended. No organomegaly.  Extremities: Warm to touch. Peripheral pulse palpable. No pedal edema.   Skin: No rashes or skin lesions, warm dry  Neurological: Motor and sensory examination equal and normal in all four extremities.  Psychiatry: Appropriate mood and affect.    HOSPITAL MEDICATIONS:  MEDICATIONS  (STANDING):  ALBUTerol/ipratropium for Nebulization 3 milliLiter(s) Nebulizer every 4 hours  artificial  tears Solution 1 Drop(s) Both EYES three times a day  atorvastatin 20 milliGRAM(s) Oral at bedtime  buDESOnide 160 MICROgram(s)/formoterol 4.5 MICROgram(s) Inhaler 2 Puff(s) Inhalation two times a day  cholecalciferol 1000 Unit(s) Oral daily  cyanocobalamin 1000 MICROGram(s) Oral daily  dextrose 5%. 1000 milliLiter(s) (50 mL/Hr) IV Continuous <Continuous>  dextrose 50% Injectable 12.5 Gram(s) IV Push once  dextrose 50% Injectable 25 Gram(s) IV Push once  dextrose 50% Injectable 25 Gram(s) IV Push once  docusate sodium 100 milliGRAM(s) Oral daily  finasteride 5 milliGRAM(s) Oral daily  furosemide    Tablet 20 milliGRAM(s) Oral two times a day  heparin  Injectable 5000 Unit(s) SubCutaneous every 12 hours  insulin glargine Injectable (LANTUS) 50 Unit(s) SubCutaneous at bedtime  insulin lispro (HumaLOG) corrective regimen sliding scale   SubCutaneous three times a day before meals  insulin lispro (HumaLOG) corrective regimen sliding scale   SubCutaneous at bedtime  insulin lispro Injectable (HumaLOG) 17 Unit(s) SubCutaneous three times a day with meals  metoprolol tartrate 25 milliGRAM(s) Oral two times a day  multivitamin/minerals 1 Tablet(s) Oral daily  pantoprazole    Tablet 40 milliGRAM(s) Oral before breakfast  polyethylene glycol 3350 17 Gram(s) Oral two times a day  predniSONE   Tablet 20 milliGRAM(s) Oral daily  senna 2 Tablet(s) Oral at bedtime  sodium polystyrene sulfonate Suspension 30 Gram(s) Oral <User Schedule>  tamsulosin 0.4 milliGRAM(s) Oral at bedtime  tiotropium 18 MICROgram(s) Capsule 1 Capsule(s) Inhalation daily  zinc oxide 40% Ointment 1 Application(s) Topical two times a day    MEDICATIONS  (PRN):  acetaminophen   Tablet .. 650 milliGRAM(s) Oral every 6 hours PRN Temp greater or equal to 38C (100.4F), Moderate Pain (4 - 6)  dextrose 40% Gel 15 Gram(s) Oral once PRN Blood Glucose LESS THAN 70 milliGRAM(s)/deciliter  glucagon  Injectable 1 milliGRAM(s) IntraMuscular once PRN Glucose LESS THAN 70 milligrams/deciliter      LABS:                        12.0   10.95 )-----------( 299      ( 27 Dec 2018 09:19 )             40.6     12-27    144  |  99  |  58<H>  ----------------------------<  189<H>  4.7   |  35<H>  |  1.8<H>    Ca    8.8      27 Dec 2018 09:19                        RADIOLOGY:Radiology personally reviewed.
Pt seen and examined at bedside. No CP or SOB. No events over night. Weaned off HF now on Venti mask    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  High cholesterol  GERD (gastroesophageal reflux disease)  Enlarged prostate  Oxygen dependent  COPD (chronic obstructive pulmonary disease)  Diabetes mellitus, type 2  Hypertension  Emphysema lung  History of hemorrhoidectomy  Dysplastic colon polyp: removed      VITAL SIGNS (Last 24 hrs):  T(C): 35.2 (12-14-18 @ 05:39), Max: 36.6 (12-13-18 @ 22:00)  HR: 106 (12-14-18 @ 14:38) (60 - 106)  BP: 123/63 (12-14-18 @ 14:38) (123/63 - 158/77)  RR: 16 (12-14-18 @ 14:38) (14 - 16)  SpO2: 84% (12-14-18 @ 10:51) (84% - 94%)  Wt(kg): --  Daily     Daily     I&O's Summary    13 Dec 2018 07:01  -  14 Dec 2018 07:00  --------------------------------------------------------  IN: 0 mL / OUT: 310 mL / NET: -310 mL        PHYSICAL EXAM:  GENERAL: NAD, well-developed, on Venti   HEAD:  Atraumatic, Normocephalic  EYES: EOMI, PERRLA, conjunctiva and sclera clear  NECK: Supple, No JVD  CHEST/LUNG: Clear to auscultation bilaterally; No wheeze  HEART: Regular rate and rhythm; No murmurs, rubs, or gallops  ABDOMEN: Soft, Nontender, Nondistended; Bowel sounds present  EXTREMITIES:  2+ Peripheral Pulses, No clubbing, cyanosis, or edema  PSYCH: AAOx3  NEUROLOGY: non-focal  SKIN: No rashes or lesions    Labs Reviewed  Spoke to patient in regards to abnormal labs.    CBC Full  -  ( 14 Dec 2018 06:39 )  WBC Count : 15.32 K/uL  Hemoglobin : 13.0 g/dL  Hematocrit : 43.9 %  Platelet Count - Automated : 514 K/uL  Mean Cell Volume : 78.0 fL  Mean Cell Hemoglobin : 23.1 pg  Mean Cell Hemoglobin Concentration : 29.6 g/dL  Auto Neutrophil # : x  Auto Lymphocyte # : x  Auto Monocyte # : x  Auto Eosinophil # : x  Auto Basophil # : x  Auto Neutrophil % : x  Auto Lymphocyte % : x  Auto Monocyte % : x  Auto Eosinophil % : x  Auto Basophil % : x    BMP:    12-14 @ 06:39    Blood Urea Nitrogen - 46  Calcium - 9.1  Carbond Dioxide - 30  Chloride - 99  Creatinine - 1.7  Glucose - 250  Potassium - 5.2  Sodium - 140      Hemoglobin A1c -     Urine Culture:        Imaging reviewed      MEDICATIONS  (STANDING):  ALBUTerol/ipratropium for Nebulization 3 milliLiter(s) Nebulizer every 4 hours  allopurinol 100 milliGRAM(s) Oral daily  amLODIPine   Tablet 10 milliGRAM(s) Oral daily  artificial  tears Solution 1 Drop(s) Both EYES three times a day  atorvastatin 20 milliGRAM(s) Oral at bedtime  buDESOnide 160 MICROgram(s)/formoterol 4.5 MICROgram(s) Inhaler 2 Puff(s) Inhalation two times a day  cholecalciferol 1000 Unit(s) Oral daily  cyanocobalamin 1000 MICROGram(s) Oral daily  dextrose 5%. 1000 milliLiter(s) (50 mL/Hr) IV Continuous <Continuous>  dextrose 50% Injectable 12.5 Gram(s) IV Push once  dextrose 50% Injectable 25 Gram(s) IV Push once  dextrose 50% Injectable 25 Gram(s) IV Push once  docusate sodium 100 milliGRAM(s) Oral daily  finasteride 5 milliGRAM(s) Oral daily  heparin  Injectable 5000 Unit(s) SubCutaneous every 12 hours  insulin glargine Injectable (LANTUS) 50 Unit(s) SubCutaneous at bedtime  insulin lispro (HumaLOG) corrective regimen sliding scale   SubCutaneous three times a day before meals  insulin lispro (HumaLOG) corrective regimen sliding scale   SubCutaneous at bedtime  insulin lispro Injectable (HumaLOG) 17 Unit(s) SubCutaneous three times a day with meals  lactulose Syrup 30 Gram(s) Oral three times a day  levoFLOXacin IVPB      levoFLOXacin IVPB 750 milliGRAM(s) IV Intermittent every 24 hours  methylPREDNISolone sodium succinate Injectable 60 milliGRAM(s) IV Push daily  metoprolol succinate ER 25 milliGRAM(s) Oral daily  multivitamin/minerals 1 Tablet(s) Oral daily  pantoprazole    Tablet 40 milliGRAM(s) Oral before breakfast  polyethylene glycol 3350 17 Gram(s) Oral two times a day  senna 2 Tablet(s) Oral at bedtime  sodium polystyrene sulfonate Suspension 30 Gram(s) Oral <User Schedule>  tamsulosin 0.4 milliGRAM(s) Oral at bedtime  tiotropium 18 MICROgram(s) Capsule 1 Capsule(s) Inhalation daily    MEDICATIONS  (PRN):  dextrose 40% Gel 15 Gram(s) Oral once PRN Blood Glucose LESS THAN 70 milliGRAM(s)/deciliter  glucagon  Injectable 1 milliGRAM(s) IntraMuscular once PRN Glucose LESS THAN 70 milligrams/deciliter
Pt seen and examined. Denies SOB on NC, slept with hiflow    T(F): , Max: 97 (12-21-18 @ 14:50)  HR: 86 (12-22-18 @ 06:48) (86 - 120)  BP: 162/87 (12-22-18 @ 06:48)  RR: 16 (12-22-18 @ 06:48)  SpO2: 89% (12-22-18 @ 10:00)  IN: 0 mL / OUT: 375 mL / NET: -375 mL      General: No apparent distress  Cardiovascular: S1, S2  Gastrointestinal: Soft, Non-tender, Non-distended, obese  Respiratory: Distant breathe sounds, barrell chest  Musculoskeletal: Moves all extremities  Lymphatic: No edema  Neurologic: No gross motor deficit  Dermatologic: Skin dry                          13.0   17.14 )-----------( 434      ( 22 Dec 2018 08:56 )             44.3     12-22    144  |  105  |  46<H>  ----------------------------<  191<H>  5.3<H>   |  29  |  2.0<H>    Ca    8.8      22 Dec 2018 08:56    TPro  5.6<L>  /  Alb  3.5  /  TBili  0.3  /  DBili  x   /  AST  9   /  ALT  14  /  AlkPhos  54  12-22
Pt seen and examined. Dr Castrejon at bedside. Pt denies SOB. Pt will have son bring his portable AVAP from home    T(F): , Max: 98.2 (12-17-18 @ 22:19)  HR: 91 (12-18-18 @ 08:44) (91 - 92)  BP: 132/64 (12-18-18 @ 08:44)  RR: 18 (12-18-18 @ 08:06)  SpO2: 94% (12-18-18 @ 08:06)  IN: 0 mL / OUT: 2 mL / NET: -2 mL      General: No apparent distress  Cardiovascular: S1, S2  Gastrointestinal: Soft, Non-tender, Non-distended. obese  Respiratory: distant breath sounds  Musculoskeletal: Moves all extremities  Lymphatic: No edema  Neurologic: No gross motor deficit  Dermatologic: Skin dry                          12.5   13.57 )-----------( 489      ( 18 Dec 2018 09:30 )             41.3     12-18    143  |  104  |  42<H>  ----------------------------<  188<H>  5.2<H>   |  29  |  1.5    Ca    8.6      18 Dec 2018 09:30    TPro  5.3<L>  /  Alb  3.1<L>  /  TBili  0.4  /  DBili  x   /  AST  9   /  ALT  14  /  AlkPhos  52  12-18
Pt seen and examined. Pt denies SOB. Slept on hiflow overnight but on 5L NC now     T(F): , Max: 98.4 (12-19-18 @ 22:00)  HR: 98 (12-20-18 @ 09:04) (98 - 102)  BP: 138/75 (12-20-18 @ 09:04)  RR: 18 (12-20-18 @ 09:04)  SpO2: --  General: No apparent distress  Cardiovascular: S1, S2  Gastrointestinal: Soft, Non-tender, Non-distended  Respiratory: Distant breathe sounds B/L  Musculoskeletal: Moves all extremities  Lymphatic: No edema  Neurologic: No gross motor deficit  Dermatologic: Skin dry              Culture - Abscess with Gram Stain (collected 18 Dec 2018 16:55)  Source: .Abscess Arm - Right  Preliminary Report (19 Dec 2018 16:24):    Moderate Staphylococcus aureus
Pt seen and examined. Pt feels well. Denies SOB at test.     T(F): , Max: 97.9 (12-20-18 @ 14:00)  HR: 112 (12-21-18 @ 08:22) (96 - 117)  BP: 146/79 (12-21-18 @ 08:22)  RR: 18 (12-21-18 @ 08:22)  SpO2: --  IN: 0 mL / OUT: 0 mL / NET: 0 mL      General: No apparent distress  Cardiovascular: S1, S2  Gastrointestinal: Soft, Non-tender, Non-distended, obese  Respiratory: Good air entry bilaterally, distant breath sounds B/L   Musculoskeletal: Moves all extremities  Lymphatic: No edema  Neurologic: No gross motor deficit  Dermatologic: Skin dry              Culture - Abscess with Gram Stain (collected 18 Dec 2018 16:55)  Source: .Abscess Arm - Right  Preliminary Report (20 Dec 2018 17:10):    Moderate Methicillin resistant Staphylococcus aureus  Organism: Methicillin resistant Staphylococcus aureus (20 Dec 2018 17:07)  Organism: Methicillin resistant Staphylococcus aureus (20 Dec 2018 17:07)
Pt seen and examined. Pt slept well on hiflow    T(F): , Max: 97.7 (12-22-18 @ 21:41)  HR: 101 (12-23-18 @ 06:27) (101 - 114)  BP: 144/75 (12-23-18 @ 06:27)  RR: 18 (12-23-18 @ 06:27)  SpO2: 87% (12-23-18 @ 01:59)  General: No apparent distress  Cardiovascular: S1, S2  Gastrointestinal: Soft, Non-tender, Non-distended  Respiratory: Good air entry bilaterally  Musculoskeletal: Moves all extremities  Lymphatic: No edema  Neurologic: No gross motor deficit  Dermatologic: Skin dry                          12.8   16.11 )-----------( 409      ( 23 Dec 2018 08:40 )             43.3     12-23    143  |  103  |  48<H>  ----------------------------<  208<H>  5.0   |  32  |  1.9<H>    Ca    8.7      23 Dec 2018 08:40    TPro  5.6<L>  /  Alb  3.5  /  TBili  0.4  /  DBili  x   /  AST  9   /  ALT  14  /  AlkPhos  55  12-23
Pt seen and examined. Pt slept well on hiflow.    T(F): , Max: 97.4 (12-23-18 @ 14:33)  HR: 99 (12-23-18 @ 21:41) (77 - 99)  BP: 152/80 (12-23-18 @ 21:41)  RR: 18 (12-23-18 @ 21:41)  SpO2: --  General: No apparent distress  Cardiovascular: S1, S2  Gastrointestinal: Soft, Non-tender, Non-distended, obese  Respiratory: Good air entry bilaterally  Musculoskeletal: Moves all extremities  Lymphatic: No edema  Neurologic: No gross motor deficit  Dermatologic: Skin dry                          12.8   16.11 )-----------( 409      ( 23 Dec 2018 08:40 )             43.3     12-23    143  |  103  |  48<H>  ----------------------------<  208<H>  5.0   |  32  |  1.9<H>    Ca    8.7      23 Dec 2018 08:40    TPro  5.6<L>  /  Alb  3.5  /  TBili  0.4  /  DBili  x   /  AST  9   /  ALT  14  /  AlkPhos  55  12-23
Pt seen and examined. Pt states he did not use bipap ovenright, preferred to stay on hiflow    T(F): , Max: 97.6 (12-18-18 @ 14:08)  HR: 88 (12-19-18 @ 00:25) (87 - 89)  BP: 125/71 (12-18-18 @ 22:03)  RR: 17 (12-18-18 @ 22:03)  SpO2: 100% (12-19-18 @ 00:25)  General: No apparent distress  Cardiovascular: S1, S2  Gastrointestinal: Soft, Non-tender, Non-distended, obese  Respiratory: distant breath sounds B/L  Musculoskeletal: Moves all extremities  Lymphatic: No edema  Neurologic: No gross motor deficit  Dermatologic: Skin dry                          12.5   13.57 )-----------( 489      ( 18 Dec 2018 09:30 )             41.3     12-18    143  |  104  |  42<H>  ----------------------------<  188<H>  5.2<H>   |  29  |  1.5    Ca    8.6      18 Dec 2018 09:30    TPro  5.3<L>  /  Alb  3.1<L>  /  TBili  0.4  /  DBili  x   /  AST  9   /  ALT  14  /  AlkPhos  52  12-18
Pt seen and examined. Pt still gets INFANTE. Comfortable while resting    T(F): , Max: 96.8 (12-16-18 @ 22:12)  HR: 82 (12-17-18 @ 05:40) (82 - 88)  BP: 131/63 (12-17-18 @ 05:40)  RR: 18 (12-17-18 @ 05:40)  SpO2: --  IN: 0 mL / OUT: 2 mL / NET: -2 mL      General: No apparent distress  Cardiovascular: S1, S2  Gastrointestinal: Soft, Non-tender, Non-distended, obese  Respiratory: Distant breath sounds  Musculoskeletal: Moves all extremities  Lymphatic: trace LE edema, pitting  Neurologic: No gross motor deficit  Dermatologic: Skin dry
Nephrology progress note    Patient is seen and examined, events over the last 24 h noted .  Seen for hyperkalemia, still on AVAP  Allergies:  aspirin (Other)  iodine (Hives)  penicillin (Unknown)    Hospital Medications:   MEDICATIONS  (STANDING):  ALBUTerol/ipratropium for Nebulization 3 milliLiter(s) Nebulizer every 4 hours  amLODIPine   Tablet 10 milliGRAM(s) Oral daily  artificial  tears Solution 1 Drop(s) Both EYES three times a day  atorvastatin 20 milliGRAM(s) Oral at bedtime  buDESOnide 160 MICROgram(s)/formoterol 4.5 MICROgram(s) Inhaler 2 Puff(s) Inhalation two times a day  cholecalciferol 1000 Unit(s) Oral daily  cyanocobalamin 1000 MICROGram(s) Oral daily  dextrose 5%. 1000 milliLiter(s) (50 mL/Hr) IV Continuous <Continuous>  dextrose 50% Injectable 12.5 Gram(s) IV Push once  dextrose 50% Injectable 25 Gram(s) IV Push once  dextrose 50% Injectable 25 Gram(s) IV Push once  docusate sodium 100 milliGRAM(s) Oral daily  finasteride 5 milliGRAM(s) Oral daily  heparin  Injectable 5000 Unit(s) SubCutaneous every 12 hours  insulin glargine Injectable (LANTUS) 50 Unit(s) SubCutaneous at bedtime  insulin lispro (HumaLOG) corrective regimen sliding scale   SubCutaneous three times a day before meals  insulin lispro (HumaLOG) corrective regimen sliding scale   SubCutaneous at bedtime  insulin lispro Injectable (HumaLOG) 15 Unit(s) SubCutaneous three times a day with meals  lactulose Syrup 30 Gram(s) Oral three times a day  levoFLOXacin IVPB      levoFLOXacin IVPB 750 milliGRAM(s) IV Intermittent every 24 hours  methylPREDNISolone sodium succinate Injectable 60 milliGRAM(s) IV Push every 12 hours  metoprolol succinate ER 25 milliGRAM(s) Oral daily  multivitamin/minerals 1 Tablet(s) Oral daily  pantoprazole    Tablet 40 milliGRAM(s) Oral before breakfast  polyethylene glycol 3350 17 Gram(s) Oral two times a day  senna 2 Tablet(s) Oral at bedtime  sodium polystyrene sulfonate Suspension 30 Gram(s) Oral <User Schedule>  tamsulosin 0.4 milliGRAM(s) Oral at bedtime  tiotropium 18 MICROgram(s) Capsule 1 Capsule(s) Inhalation daily        VITALS:  T(F): 97 (12-13-18 @ 06:00), Max: 97.3 (12-12-18 @ 22:00)  HR: 64 (12-13-18 @ 06:00)  BP: 139/76 (12-13-18 @ 06:00)  RR: 20 (12-13-18 @ 06:00)  SpO2: 95% (12-13-18 @ 08:12)  Wt(kg): --    12-11 @ 07:01  -  12-12 @ 07:00  --------------------------------------------------------  IN: 0 mL / OUT: 100 mL / NET: -100 mL          PHYSICAL EXAM:  Constitutional: NAD, on AVAP  HEENT: anicteric sclera, oropharynx clear, MMM  Respiratory: CTAB, no wheezes, rales or rhonchi  Cardiovascular: S1, S2, RRR  Gastrointestinal: BS+, soft, NT/ND  Extremities: No peripheral edema  Neurological: Awake alert  : No CVA tenderness. No chapman.   Skin: No rashes  Vascular Access:    LABS:  12-12    143  |  101  |  45<H>  ----------------------------<  203<H>  5.3<H>   |  31  |  1.9<H>    Creatinine Trend: 1.5<--, 1.9<--, 1.8<--, 2.1<--, 2.0<--, 1.9<--    Ca    9.5      12 Dec 2018 08:32    TPro  6.4  /  Alb  3.7  /  TBili  0.4  /  DBili      /  AST  12  /  ALT  21  /  AlkPhos  66  12-12                          12.9   13.77 )-----------( 542      ( 13 Dec 2018 07:00 )             43.5       Urine Studies:    Potassium, Random Urine: 28 mmol/L (12-11 @ 22:35)  Creatinine, Random Urine: 102 mg/dL (12-11 @ 22:35)  Sodium, Random Urine: 67.0 mmoL/L (12-11 @ 22:35)    RADIOLOGY & ADDITIONAL STUDIES:

## 2019-01-03 NOTE — PROGRESS NOTE ADULT - REASON FOR ADMISSION
Acute Hypoxic Respiratory Failure
Acute on Chronic Hypoxic Respiratory Failure
Acute Hypoxic Respiratory Failure

## 2019-01-04 ENCOUNTER — OUTPATIENT (OUTPATIENT)
Dept: OUTPATIENT SERVICES | Facility: HOSPITAL | Age: 79
LOS: 1 days | Discharge: HOME | End: 2019-01-04

## 2019-01-04 DIAGNOSIS — Z98.890 OTHER SPECIFIED POSTPROCEDURAL STATES: Chronic | ICD-10-CM

## 2019-01-04 DIAGNOSIS — I10 ESSENTIAL (PRIMARY) HYPERTENSION: ICD-10-CM

## 2019-01-04 DIAGNOSIS — I50.9 HEART FAILURE, UNSPECIFIED: ICD-10-CM

## 2019-01-04 DIAGNOSIS — K63.5 POLYP OF COLON: Chronic | ICD-10-CM

## 2019-01-05 ENCOUNTER — INPATIENT (INPATIENT)
Facility: HOSPITAL | Age: 79
LOS: 19 days | Discharge: HOME HEALTH | End: 2019-01-25
Attending: HOSPITALIST | Admitting: HOSPITALIST

## 2019-01-05 DIAGNOSIS — K63.5 POLYP OF COLON: Chronic | ICD-10-CM

## 2019-01-05 DIAGNOSIS — Z98.890 OTHER SPECIFIED POSTPROCEDURAL STATES: Chronic | ICD-10-CM

## 2019-01-06 VITALS
OXYGEN SATURATION: 99 % | HEIGHT: 68 IN | SYSTOLIC BLOOD PRESSURE: 96 MMHG | WEIGHT: 250 LBS | DIASTOLIC BLOOD PRESSURE: 54 MMHG | RESPIRATION RATE: 22 BRPM | HEART RATE: 126 BPM | TEMPERATURE: 98 F

## 2019-01-06 LAB
ALBUMIN SERPL ELPH-MCNC: 3.7 G/DL — SIGNIFICANT CHANGE UP (ref 3.5–5.2)
ALP SERPL-CCNC: 64 U/L — SIGNIFICANT CHANGE UP (ref 30–115)
ALT FLD-CCNC: 20 U/L — SIGNIFICANT CHANGE UP (ref 0–41)
ANION GAP SERPL CALC-SCNC: 10 MMOL/L — SIGNIFICANT CHANGE UP (ref 7–14)
ANION GAP SERPL CALC-SCNC: 9 MMOL/L — SIGNIFICANT CHANGE UP (ref 7–14)
AST SERPL-CCNC: 15 U/L — SIGNIFICANT CHANGE UP (ref 0–41)
BASOPHILS # BLD AUTO: 0.06 K/UL — SIGNIFICANT CHANGE UP (ref 0–0.2)
BASOPHILS NFR BLD AUTO: 0.6 % — SIGNIFICANT CHANGE UP (ref 0–1)
BILIRUB SERPL-MCNC: 0.5 MG/DL — SIGNIFICANT CHANGE UP (ref 0.2–1.2)
BUN SERPL-MCNC: 48 MG/DL — HIGH (ref 10–20)
BUN SERPL-MCNC: 51 MG/DL — HIGH (ref 10–20)
CALCIUM SERPL-MCNC: 8.7 MG/DL — SIGNIFICANT CHANGE UP (ref 8.5–10.1)
CALCIUM SERPL-MCNC: 9.1 MG/DL — SIGNIFICANT CHANGE UP (ref 8.5–10.1)
CHLORIDE SERPL-SCNC: 102 MMOL/L — SIGNIFICANT CHANGE UP (ref 98–110)
CHLORIDE SERPL-SCNC: 104 MMOL/L — SIGNIFICANT CHANGE UP (ref 98–110)
CK MB CFR SERPL CALC: 1.7 NG/ML — SIGNIFICANT CHANGE UP (ref 0.6–6.3)
CK SERPL-CCNC: 22 U/L — SIGNIFICANT CHANGE UP (ref 0–225)
CO2 SERPL-SCNC: 32 MMOL/L — SIGNIFICANT CHANGE UP (ref 17–32)
CO2 SERPL-SCNC: 35 MMOL/L — HIGH (ref 17–32)
CREAT SERPL-MCNC: 1.8 MG/DL — HIGH (ref 0.7–1.5)
CREAT SERPL-MCNC: 2.3 MG/DL — HIGH (ref 0.7–1.5)
EOSINOPHIL # BLD AUTO: 0.38 K/UL — SIGNIFICANT CHANGE UP (ref 0–0.7)
EOSINOPHIL NFR BLD AUTO: 3.9 % — SIGNIFICANT CHANGE UP (ref 0–8)
GLUCOSE BLDC GLUCOMTR-MCNC: 101 MG/DL — HIGH (ref 70–99)
GLUCOSE BLDC GLUCOMTR-MCNC: 118 MG/DL — HIGH (ref 70–99)
GLUCOSE BLDC GLUCOMTR-MCNC: 78 MG/DL — SIGNIFICANT CHANGE UP (ref 70–99)
GLUCOSE BLDC GLUCOMTR-MCNC: 90 MG/DL — SIGNIFICANT CHANGE UP (ref 70–99)
GLUCOSE BLDC GLUCOMTR-MCNC: 91 MG/DL — SIGNIFICANT CHANGE UP (ref 70–99)
GLUCOSE SERPL-MCNC: 100 MG/DL — HIGH (ref 70–99)
GLUCOSE SERPL-MCNC: 114 MG/DL — HIGH (ref 70–99)
HCT VFR BLD CALC: 42.8 % — SIGNIFICANT CHANGE UP (ref 42–52)
HGB BLD-MCNC: 12.6 G/DL — LOW (ref 14–18)
IMM GRANULOCYTES NFR BLD AUTO: 1 % — HIGH (ref 0.1–0.3)
LACTATE SERPL-SCNC: 1 MMOL/L — SIGNIFICANT CHANGE UP (ref 0.5–2.2)
LYMPHOCYTES # BLD AUTO: 1.02 K/UL — LOW (ref 1.2–3.4)
LYMPHOCYTES # BLD AUTO: 10.4 % — LOW (ref 20.5–51.1)
MAGNESIUM SERPL-MCNC: 2.2 MG/DL — SIGNIFICANT CHANGE UP (ref 1.8–2.4)
MCHC RBC-ENTMCNC: 23.7 PG — LOW (ref 27–31)
MCHC RBC-ENTMCNC: 29.4 G/DL — LOW (ref 32–37)
MCV RBC AUTO: 80.6 FL — SIGNIFICANT CHANGE UP (ref 80–94)
MONOCYTES # BLD AUTO: 0.77 K/UL — HIGH (ref 0.1–0.6)
MONOCYTES NFR BLD AUTO: 7.9 % — SIGNIFICANT CHANGE UP (ref 1.7–9.3)
NEUTROPHILS # BLD AUTO: 7.46 K/UL — HIGH (ref 1.4–6.5)
NEUTROPHILS NFR BLD AUTO: 76.2 % — HIGH (ref 42.2–75.2)
NRBC # BLD: 0 /100 WBCS — SIGNIFICANT CHANGE UP (ref 0–0)
NT-PROBNP SERPL-SCNC: 708 PG/ML — HIGH (ref 0–300)
PLATELET # BLD AUTO: 377 K/UL — SIGNIFICANT CHANGE UP (ref 130–400)
POTASSIUM SERPL-MCNC: 4.4 MMOL/L — SIGNIFICANT CHANGE UP (ref 3.5–5)
POTASSIUM SERPL-MCNC: 4.8 MMOL/L — SIGNIFICANT CHANGE UP (ref 3.5–5)
POTASSIUM SERPL-SCNC: 4.4 MMOL/L — SIGNIFICANT CHANGE UP (ref 3.5–5)
POTASSIUM SERPL-SCNC: 4.8 MMOL/L — SIGNIFICANT CHANGE UP (ref 3.5–5)
PROT SERPL-MCNC: 5.7 G/DL — LOW (ref 6–8)
RBC # BLD: 5.31 M/UL — SIGNIFICANT CHANGE UP (ref 4.7–6.1)
RBC # FLD: 21.2 % — HIGH (ref 11.5–14.5)
SODIUM SERPL-SCNC: 145 MMOL/L — SIGNIFICANT CHANGE UP (ref 135–146)
SODIUM SERPL-SCNC: 147 MMOL/L — HIGH (ref 135–146)
TROPONIN T SERPL-MCNC: 0.03 NG/ML — CRITICAL HIGH
TROPONIN T SERPL-MCNC: 0.04 NG/ML — CRITICAL HIGH
WBC # BLD: 9.79 K/UL — SIGNIFICANT CHANGE UP (ref 4.8–10.8)
WBC # FLD AUTO: 9.79 K/UL — SIGNIFICANT CHANGE UP (ref 4.8–10.8)

## 2019-01-06 RX ORDER — IPRATROPIUM/ALBUTEROL SULFATE 18-103MCG
3 AEROSOL WITH ADAPTER (GRAM) INHALATION EVERY 4 HOURS
Qty: 0 | Refills: 0 | Status: DISCONTINUED | OUTPATIENT
Start: 2019-01-06 | End: 2019-01-25

## 2019-01-06 RX ORDER — FINASTERIDE 5 MG/1
5 TABLET, FILM COATED ORAL DAILY
Qty: 0 | Refills: 0 | Status: DISCONTINUED | OUTPATIENT
Start: 2019-01-06 | End: 2019-01-25

## 2019-01-06 RX ORDER — IPRATROPIUM/ALBUTEROL SULFATE 18-103MCG
3 AEROSOL WITH ADAPTER (GRAM) INHALATION EVERY 6 HOURS
Qty: 0 | Refills: 0 | Status: DISCONTINUED | OUTPATIENT
Start: 2019-01-06 | End: 2019-01-08

## 2019-01-06 RX ORDER — INSULIN LISPRO 100/ML
17 VIAL (ML) SUBCUTANEOUS
Qty: 0 | Refills: 0 | Status: DISCONTINUED | OUTPATIENT
Start: 2019-01-06 | End: 2019-01-19

## 2019-01-06 RX ORDER — BUDESONIDE AND FORMOTEROL FUMARATE DIHYDRATE 160; 4.5 UG/1; UG/1
2 AEROSOL RESPIRATORY (INHALATION)
Qty: 0 | Refills: 0 | Status: DISCONTINUED | OUTPATIENT
Start: 2019-01-06 | End: 2019-01-25

## 2019-01-06 RX ORDER — FUROSEMIDE 40 MG
20 TABLET ORAL DAILY
Qty: 0 | Refills: 0 | Status: DISCONTINUED | OUTPATIENT
Start: 2019-01-06 | End: 2019-01-06

## 2019-01-06 RX ORDER — SODIUM CHLORIDE 9 MG/ML
500 INJECTION INTRAMUSCULAR; INTRAVENOUS; SUBCUTANEOUS ONCE
Qty: 0 | Refills: 0 | Status: COMPLETED | OUTPATIENT
Start: 2019-01-06 | End: 2019-01-06

## 2019-01-06 RX ORDER — SODIUM CHLORIDE 9 MG/ML
1000 INJECTION, SOLUTION INTRAVENOUS
Qty: 0 | Refills: 0 | Status: DISCONTINUED | OUTPATIENT
Start: 2019-01-06 | End: 2019-01-25

## 2019-01-06 RX ORDER — DEXTROSE 50 % IN WATER 50 %
25 SYRINGE (ML) INTRAVENOUS ONCE
Qty: 0 | Refills: 0 | Status: DISCONTINUED | OUTPATIENT
Start: 2019-01-06 | End: 2019-01-25

## 2019-01-06 RX ORDER — METOPROLOL TARTRATE 50 MG
25 TABLET ORAL
Qty: 0 | Refills: 0 | Status: DISCONTINUED | OUTPATIENT
Start: 2019-01-06 | End: 2019-01-25

## 2019-01-06 RX ORDER — SENNA PLUS 8.6 MG/1
2 TABLET ORAL AT BEDTIME
Qty: 0 | Refills: 0 | Status: DISCONTINUED | OUTPATIENT
Start: 2019-01-06 | End: 2019-01-25

## 2019-01-06 RX ORDER — TAMSULOSIN HYDROCHLORIDE 0.4 MG/1
0.4 CAPSULE ORAL AT BEDTIME
Qty: 0 | Refills: 0 | Status: DISCONTINUED | OUTPATIENT
Start: 2019-01-06 | End: 2019-01-25

## 2019-01-06 RX ORDER — INSULIN LISPRO 100/ML
VIAL (ML) SUBCUTANEOUS
Qty: 0 | Refills: 0 | Status: DISCONTINUED | OUTPATIENT
Start: 2019-01-06 | End: 2019-01-25

## 2019-01-06 RX ORDER — INSULIN GLARGINE 100 [IU]/ML
30 INJECTION, SOLUTION SUBCUTANEOUS
Qty: 0 | Refills: 0 | Status: DISCONTINUED | OUTPATIENT
Start: 2019-01-06 | End: 2019-01-09

## 2019-01-06 RX ORDER — DEXTROSE 50 % IN WATER 50 %
25 SYRINGE (ML) INTRAVENOUS ONCE
Qty: 0 | Refills: 0 | Status: COMPLETED | OUTPATIENT
Start: 2019-01-06 | End: 2019-01-06

## 2019-01-06 RX ORDER — CHLORHEXIDINE GLUCONATE 213 G/1000ML
1 SOLUTION TOPICAL
Qty: 0 | Refills: 0 | Status: DISCONTINUED | OUTPATIENT
Start: 2019-01-06 | End: 2019-01-25

## 2019-01-06 RX ORDER — POLYETHYLENE GLYCOL 3350 17 G/17G
17 POWDER, FOR SOLUTION ORAL DAILY
Qty: 0 | Refills: 0 | Status: DISCONTINUED | OUTPATIENT
Start: 2019-01-06 | End: 2019-01-25

## 2019-01-06 RX ORDER — GLUCAGON INJECTION, SOLUTION 0.5 MG/.1ML
1 INJECTION, SOLUTION SUBCUTANEOUS ONCE
Qty: 0 | Refills: 0 | Status: DISCONTINUED | OUTPATIENT
Start: 2019-01-06 | End: 2019-01-25

## 2019-01-06 RX ORDER — HEPARIN SODIUM 5000 [USP'U]/ML
5000 INJECTION INTRAVENOUS; SUBCUTANEOUS EVERY 8 HOURS
Qty: 0 | Refills: 0 | Status: DISCONTINUED | OUTPATIENT
Start: 2019-01-06 | End: 2019-01-25

## 2019-01-06 RX ORDER — ASCORBIC ACID 60 MG
500 TABLET,CHEWABLE ORAL DAILY
Qty: 0 | Refills: 0 | Status: DISCONTINUED | OUTPATIENT
Start: 2019-01-06 | End: 2019-01-25

## 2019-01-06 RX ORDER — DEXTROSE 50 % IN WATER 50 %
15 SYRINGE (ML) INTRAVENOUS ONCE
Qty: 0 | Refills: 0 | Status: DISCONTINUED | OUTPATIENT
Start: 2019-01-06 | End: 2019-01-25

## 2019-01-06 RX ORDER — METOPROLOL TARTRATE 50 MG
25 TABLET ORAL
Qty: 0 | Refills: 0 | Status: DISCONTINUED | OUTPATIENT
Start: 2019-01-06 | End: 2019-01-06

## 2019-01-06 RX ORDER — SODIUM CHLORIDE 9 MG/ML
500 INJECTION INTRAMUSCULAR; INTRAVENOUS; SUBCUTANEOUS ONCE
Qty: 0 | Refills: 0 | Status: DISCONTINUED | OUTPATIENT
Start: 2019-01-06 | End: 2019-01-18

## 2019-01-06 RX ORDER — DEXTROSE 50 % IN WATER 50 %
25 SYRINGE (ML) INTRAVENOUS EVERY 6 HOURS
Qty: 0 | Refills: 0 | Status: DISCONTINUED | OUTPATIENT
Start: 2019-01-06 | End: 2019-01-25

## 2019-01-06 RX ORDER — CHOLECALCIFEROL (VITAMIN D3) 125 MCG
1000 CAPSULE ORAL DAILY
Qty: 0 | Refills: 0 | Status: DISCONTINUED | OUTPATIENT
Start: 2019-01-06 | End: 2019-01-25

## 2019-01-06 RX ORDER — DEXTROSE 50 % IN WATER 50 %
12.5 SYRINGE (ML) INTRAVENOUS ONCE
Qty: 0 | Refills: 0 | Status: DISCONTINUED | OUTPATIENT
Start: 2019-01-06 | End: 2019-01-25

## 2019-01-06 RX ORDER — ZINC OXIDE 200 MG/G
1 OINTMENT TOPICAL
Qty: 0 | Refills: 0 | Status: DISCONTINUED | OUTPATIENT
Start: 2019-01-06 | End: 2019-01-25

## 2019-01-06 RX ORDER — ATORVASTATIN CALCIUM 80 MG/1
20 TABLET, FILM COATED ORAL AT BEDTIME
Qty: 0 | Refills: 0 | Status: DISCONTINUED | OUTPATIENT
Start: 2019-01-06 | End: 2019-01-25

## 2019-01-06 RX ORDER — TIOTROPIUM BROMIDE 18 UG/1
1 CAPSULE ORAL; RESPIRATORY (INHALATION) AT BEDTIME
Qty: 0 | Refills: 0 | Status: DISCONTINUED | OUTPATIENT
Start: 2019-01-06 | End: 2019-01-25

## 2019-01-06 RX ADMIN — SENNA PLUS 2 TABLET(S): 8.6 TABLET ORAL at 21:15

## 2019-01-06 RX ADMIN — Medication 1 DROP(S): at 06:19

## 2019-01-06 RX ADMIN — Medication 25 MILLILITER(S): at 10:02

## 2019-01-06 RX ADMIN — Medication 25 MILLIGRAM(S): at 05:06

## 2019-01-06 RX ADMIN — HEPARIN SODIUM 5000 UNIT(S): 5000 INJECTION INTRAVENOUS; SUBCUTANEOUS at 15:02

## 2019-01-06 RX ADMIN — TAMSULOSIN HYDROCHLORIDE 0.4 MILLIGRAM(S): 0.4 CAPSULE ORAL at 21:15

## 2019-01-06 RX ADMIN — HEPARIN SODIUM 5000 UNIT(S): 5000 INJECTION INTRAVENOUS; SUBCUTANEOUS at 21:15

## 2019-01-06 RX ADMIN — Medication 25 MILLILITER(S): at 16:13

## 2019-01-06 RX ADMIN — ZINC OXIDE 1 APPLICATION(S): 200 OINTMENT TOPICAL at 18:25

## 2019-01-06 RX ADMIN — ATORVASTATIN CALCIUM 20 MILLIGRAM(S): 80 TABLET, FILM COATED ORAL at 21:15

## 2019-01-06 RX ADMIN — HEPARIN SODIUM 5000 UNIT(S): 5000 INJECTION INTRAVENOUS; SUBCUTANEOUS at 06:15

## 2019-01-06 RX ADMIN — Medication 3 MILLILITER(S): at 20:18

## 2019-01-06 RX ADMIN — Medication 25 MILLIGRAM(S): at 18:24

## 2019-01-06 RX ADMIN — SODIUM CHLORIDE 1000 MILLILITER(S): 9 INJECTION INTRAMUSCULAR; INTRAVENOUS; SUBCUTANEOUS at 00:55

## 2019-01-06 NOTE — ED PROVIDER NOTE - NS ED ROS FT
Constitutional: no fever, chills, no recent weight loss, change in appetite or malaise  Cardiac: No chest pain  Respiratory: + cough. No respiratory distress  GI: No nausea, vomiting, diarrhea or abdominal pain.  : No dysuria, frequency, urgency or hematuria  MS: no pain to back or extremities, no loss of ROM, no weakness  Neuro: No headache or weakness. No LOC.  Skin: No skin rash.  Endocrine: + hx of DM  Except as documented in the HPI, all other systems are negative.

## 2019-01-06 NOTE — H&P ADULT - NSHPPHYSICALEXAM_GEN_ALL_CORE
ICU Vital Signs Last 24 Hrs  T(C): 36.9 (06 Jan 2019 00:32), Max: 36.9 (06 Jan 2019 00:32)  T(F): 98.4 (06 Jan 2019 00:32), Max: 98.4 (06 Jan 2019 00:32)  HR: 122 (06 Jan 2019 02:01) (122 - 126)  BP: 98/55 (06 Jan 2019 02:01) (96/54 - 98/55)    PHYSICAL EXAM:  GENERAL: NAD, speaks in full sentences, mild SOC  HEAD:  Atraumatic, Normocephalic  EYES: conjunctiva and sclera clear  NECK: Supple, No JVD  CHEST/LUNG: Poor inspiratory effort, mild expiratory wheezing  HEART: Tachycardic  ABDOMEN: Soft, Nontender, Nondistended; Bowel sounds present; No guarding  EXTREMITIES:   No cyanosis or edema  PSYCH: AAOx3  NEUROLOGY: non-focal  SKIN: No rashes or lesions

## 2019-01-06 NOTE — H&P ADULT - NSHPLABSRESULTS_GEN_ALL_CORE
12.6   9.79  )-----------( 377      ( 06 Jan 2019 00:20 )             42.8       147<H>  |  102  |  51<H>  ----------------------------<  114<H>  4.4   |  35<H>  |  2.3<H>    Ca    9.1      06 Jan 2019 00:20    TPro  5.7<L>  /  Alb  3.7  /  TBili  0.5  /  DBili  x   /  AST  15  /  ALT  20  /  AlkPhos  64  01-06 01-06 @ 00:20 Lactate:1.0     CARDIAC MARKERS ( 06 Jan 2019 00:20 )  x     / 0.03 ng/mL / x     / x     / x

## 2019-01-06 NOTE — H&P ADULT - ASSESSMENT
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 He returns from nursing home with worsening SOB as well as being unhappy at [facility] with the staff.     Chronic hypoxic respiratory failure/End stage COPD  increase prednisone to 40mg daily, recently finished a course of Levaquin  continue with supplemental o2 to keep saturation >90  duonebs Q4H PRN  outpatient follow up with Dr Castrejon    CKD4/History of Hyperkalemia  stable, baseline around 2.2  k= 4.4    Hypertention  c/w home medications    DM  c/w Lantus 30 Q12H/Lispro 17  monitor FS    BPH  c/w Flomax and Finasteride     B12 deficiency & Vitamin D deficiency:  Continue with home supplementation.     MICHAEL/OHVS  BiPAP at night.    DVT prophylaxis: Heparin  Disposition: Patient is unhappy with care provided at [facility], requests to go to [facility], f/u CM  CODE status: DNR/DNI  Very poor Prognosis. 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 He returns from nursing home with tachycardia as well as being unhappy at [facility] with the staff.     Sinus Tachycardia, with rebound HoTN  per ED, IVC collapsible to US  s/p IVF, HR slightly improved  receiving another bolus 500cc  SBp now 135  missed dose of metoprolol  c/w metoprolol, gentle hydration    Chronic hypoxic respiratory failure/End stage COPD  increase prednisone to 40mg daily, recently finished a course of Levaquin  continue with supplemental o2 to keep saturation >90  duonebs Q4H PRN  patient is now interested in hospice - will consult    CKD4/History of Hyperkalemia  stable, baseline around 2.2  k= 4.4    Hypertention  c/w metoprolol, hold furosemide for now    DM  c/w Lantus 30 Q12H/Lispro 17  monitor FS    BPH  c/w Flomax and Finasteride     B12 deficiency & Vitamin D deficiency:  Continue with home supplementation.     MICHAEL/OHVS  CPAP at night    DVT prophylaxis: Heparin  Disposition: pending  CODE status: DNR/DNI  Very poor Prognosis. 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 He returns from nursing home with tachycardia, SBP 100s as well as being unhappy at [facility] with the staff.     Sinus Tachycardia, with rebound HoTN  per ED, IVC collapsible on US  s/p IVF, HR slightly improved  receiving another bolus 500cc  SBp now 135  missed dose of metoprolol  c/w metoprolol, gentle hydration  f/u 2nd set CE    Chronic hypoxic respiratory failure/End stage COPD  c/w prednisone 10mg daily, recently finished a course of Levaquin  continue with supplemental o2 to keep saturation >90  duonebs Q4H PRN  patient is now interested in hospice - will consult    CKD4/History of Hyperkalemia  stable, baseline around 2.2  k= 4.4    Hypertention  c/w metoprolol, hold furosemide for now    DM  c/w Lantus 30 Q12H/Lispro 17  monitor FS    BPH  c/w Flomax and Finasteride     B12 deficiency & Vitamin D deficiency:  Continue with home supplementation.     MICHAEL/OHVS  CPAP at night    DVT prophylaxis: Heparin  Disposition: pending  CODE status: DNR/DNI  Very poor Prognosis. 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 He returns from nursing home with tachycardia, SBP 100s as well as being unhappy at [facility] with the staff.     Sinus Tachycardia, with rebound HoTN - likely multifactorial  per ED, IVC collapsible on US  s/p IVF, HR slightly improved  receiving another bolus 500cc  prior records reviewed, patient is tachycardic at baseline, up to 120s  SBp now 135  missed dose of metoprolol  c/w metoprolol, gentle hydration  f/u 2nd set CE  will ask for cardiology input if remains tachycardic    Chronic hypoxic respiratory failure/End stage COPD  c/w prednisone 10mg daily, recently finished a course of Levaquin  continue with supplemental o2 to keep saturation >90  duonebs Q4H PRN  patient is now interested in hospice - will consult    CKD4/History of Hyperkalemia  stable, baseline around 2.2  k= 4.4    Hypertention  c/w metoprolol, hold furosemide for now    DM  c/w Lantus 30 Q12H/Lispro 17  monitor FS    BPH  c/w Flomax and Finasteride     B12 deficiency & Vitamin D deficiency:  Continue with home supplementation.     MICHAEL/OHVS  CPAP at night    DVT prophylaxis: Heparin  Disposition: pending  CODE status: DNR/DNI  Very poor Prognosis.

## 2019-01-06 NOTE — CHART NOTE - NSCHARTNOTEFT_GEN_A_CORE
patient seen and re-examined. Sleeping comfortably in the chair. HR improved to 115-118, received Metoprolol 20 minutes prior. O2 saturation fluctuating from 90 to 80s on 5L NC. Will place patient on BIPAP and c/w monitoring

## 2019-01-06 NOTE — ED PROVIDER NOTE - PHYSICAL EXAMINATION
CONSTITUTIONAL: Obese male, in no apparent distress.   NECK: Supple; non-tender; no cervical lymphadenopathy. No JVD.   CARDIOVASCULAR: Normal S1, S2; no murmurs, rubs, or gallops.   RESPIRATORY: No signs of respiratory distress, pt speaking full sentences. Normal chest excursion with respiration; breath sounds clear and equal bilaterally; no wheezes, rhonchi, or rales.  GI/: Normal bowel sounds; non-distended; non-tender; no palpable organomegaly.   MS: No evidence of trauma or deformity. Normal ROM in all four extremities; non-tender to palpation; distal pulses are normal.   Extrem: + b/l LE peripheral edema (chronic)  SKIN: Normal for age and race; warm; dry; good turgor; no apparent lesions or exudate.   NEURO/PSYCH: A & O x 4; grossly unremarkable. mood and manner are appropriate. Grooming and personal hygiene are appropriate.

## 2019-01-06 NOTE — ED PROVIDER NOTE - ATTENDING CONTRIBUTION TO CARE
Pt is a 77yo male with COPD and recent hospitalization and discharge 2d ago here for tachycardia and labored breathing.  Notes phelgm production.  No vomiting or diarrhea.  No dysuria.    Exam: tachycardia, speaking in nearly full sentences, labored breathing, b/l LE edema, no calf tenderness, no JVD, laughing and joking, 2+ radial pulses, pink conjunctivae  Imp: ?CHF vs chronic pulmonary failure; r/o sepsis  Plan: labs, xr, ekg

## 2019-01-06 NOTE — H&P ADULT - HISTORY OF PRESENT ILLNESS
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 He returns from nursing home with worsening SOB as well as being unhappy at [facility] with the staff. 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 He returns from nursing home with elevated heart rate, SBP in 100s per patient. He denies any CP, worsening SOB, palpitations, change on cough, dizziness, lightheadedness, abdominal pain, change in bowel or urinary habits. Patient is also unhappy with the care he received at [facility] and does not wish to return there

## 2019-01-06 NOTE — ED ADULT NURSE NOTE - NSIMPLEMENTINTERV_GEN_ALL_ED
Implemented All Fall Risk Interventions:  [place] to call system. Call bell, personal items and telephone within reach. Instruct patient to call for assistance. Room bathroom lighting operational. Non-slip footwear when patient is off stretcher. Physically safe environment: no spills, clutter or unnecessary equipment. Stretcher in lowest position, wheels locked, appropriate side rails in place. Provide visual cue, wrist band, yellow gown, etc. Monitor gait and stability. Monitor for mental status changes and reorient to person, place, and time. Review medications for side effects contributing to fall risk. Reinforce activity limits and safety measures with patient and family.

## 2019-01-07 DIAGNOSIS — Z99.81 DEPENDENCE ON SUPPLEMENTAL OXYGEN: ICD-10-CM

## 2019-01-07 DIAGNOSIS — I27.20 PULMONARY HYPERTENSION, UNSPECIFIED: ICD-10-CM

## 2019-01-07 DIAGNOSIS — E66.01 MORBID (SEVERE) OBESITY DUE TO EXCESS CALORIES: ICD-10-CM

## 2019-01-07 DIAGNOSIS — R06.02 SHORTNESS OF BREATH: ICD-10-CM

## 2019-01-07 DIAGNOSIS — I12.9 HYPERTENSIVE CHRONIC KIDNEY DISEASE WITH STAGE 1 THROUGH STAGE 4 CHRONIC KIDNEY DISEASE, OR UNSPECIFIED CHRONIC KIDNEY DISEASE: ICD-10-CM

## 2019-01-07 DIAGNOSIS — N40.0 BENIGN PROSTATIC HYPERPLASIA WITHOUT LOWER URINARY TRACT SYMPTOMS: ICD-10-CM

## 2019-01-07 DIAGNOSIS — R14.0 ABDOMINAL DISTENSION (GASEOUS): ICD-10-CM

## 2019-01-07 DIAGNOSIS — N18.4 CHRONIC KIDNEY DISEASE, STAGE 4 (SEVERE): ICD-10-CM

## 2019-01-07 DIAGNOSIS — N17.9 ACUTE KIDNEY FAILURE, UNSPECIFIED: ICD-10-CM

## 2019-01-07 DIAGNOSIS — J44.1 CHRONIC OBSTRUCTIVE PULMONARY DISEASE WITH (ACUTE) EXACERBATION: ICD-10-CM

## 2019-01-07 DIAGNOSIS — E53.8 DEFICIENCY OF OTHER SPECIFIED B GROUP VITAMINS: ICD-10-CM

## 2019-01-07 DIAGNOSIS — E87.5 HYPERKALEMIA: ICD-10-CM

## 2019-01-07 DIAGNOSIS — J96.21 ACUTE AND CHRONIC RESPIRATORY FAILURE WITH HYPOXIA: ICD-10-CM

## 2019-01-07 DIAGNOSIS — Z79.4 LONG TERM (CURRENT) USE OF INSULIN: ICD-10-CM

## 2019-01-07 DIAGNOSIS — E66.2 MORBID (SEVERE) OBESITY WITH ALVEOLAR HYPOVENTILATION: ICD-10-CM

## 2019-01-07 DIAGNOSIS — E11.65 TYPE 2 DIABETES MELLITUS WITH HYPERGLYCEMIA: ICD-10-CM

## 2019-01-07 DIAGNOSIS — R60.1 GENERALIZED EDEMA: ICD-10-CM

## 2019-01-07 DIAGNOSIS — Z85.038 PERSONAL HISTORY OF OTHER MALIGNANT NEOPLASM OF LARGE INTESTINE: ICD-10-CM

## 2019-01-07 DIAGNOSIS — Z66 DO NOT RESUSCITATE: ICD-10-CM

## 2019-01-07 DIAGNOSIS — R00.0 TACHYCARDIA, UNSPECIFIED: ICD-10-CM

## 2019-01-07 DIAGNOSIS — Z87.01 PERSONAL HISTORY OF PNEUMONIA (RECURRENT): ICD-10-CM

## 2019-01-07 DIAGNOSIS — B95.62 METHICILLIN RESISTANT STAPHYLOCOCCUS AUREUS INFECTION AS THE CAUSE OF DISEASES CLASSIFIED ELSEWHERE: ICD-10-CM

## 2019-01-07 DIAGNOSIS — E55.9 VITAMIN D DEFICIENCY, UNSPECIFIED: ICD-10-CM

## 2019-01-07 DIAGNOSIS — K21.9 GASTRO-ESOPHAGEAL REFLUX DISEASE WITHOUT ESOPHAGITIS: ICD-10-CM

## 2019-01-07 DIAGNOSIS — L03.011 CELLULITIS OF RIGHT FINGER: ICD-10-CM

## 2019-01-07 LAB
GLUCOSE BLDC GLUCOMTR-MCNC: 107 MG/DL — HIGH (ref 70–99)
GLUCOSE BLDC GLUCOMTR-MCNC: 114 MG/DL — HIGH (ref 70–99)
GLUCOSE BLDC GLUCOMTR-MCNC: 118 MG/DL — HIGH (ref 70–99)
GLUCOSE BLDC GLUCOMTR-MCNC: 124 MG/DL — HIGH (ref 70–99)
GLUCOSE BLDC GLUCOMTR-MCNC: 244 MG/DL — HIGH (ref 70–99)
GLUCOSE BLDC GLUCOMTR-MCNC: 296 MG/DL — HIGH (ref 70–99)

## 2019-01-07 RX ADMIN — Medication 3: at 18:00

## 2019-01-07 RX ADMIN — Medication 25 MILLIGRAM(S): at 07:08

## 2019-01-07 RX ADMIN — TIOTROPIUM BROMIDE 1 CAPSULE(S): 18 CAPSULE ORAL; RESPIRATORY (INHALATION) at 22:19

## 2019-01-07 RX ADMIN — Medication 25 MILLIGRAM(S): at 18:00

## 2019-01-07 RX ADMIN — Medication 500 MILLIGRAM(S): at 11:51

## 2019-01-07 RX ADMIN — Medication 1000 UNIT(S): at 11:51

## 2019-01-07 RX ADMIN — Medication 17 UNIT(S): at 12:33

## 2019-01-07 RX ADMIN — BUDESONIDE AND FORMOTEROL FUMARATE DIHYDRATE 2 PUFF(S): 160; 4.5 AEROSOL RESPIRATORY (INHALATION) at 11:57

## 2019-01-07 RX ADMIN — TAMSULOSIN HYDROCHLORIDE 0.4 MILLIGRAM(S): 0.4 CAPSULE ORAL at 21:36

## 2019-01-07 RX ADMIN — BUDESONIDE AND FORMOTEROL FUMARATE DIHYDRATE 2 PUFF(S): 160; 4.5 AEROSOL RESPIRATORY (INHALATION) at 20:18

## 2019-01-07 RX ADMIN — HEPARIN SODIUM 5000 UNIT(S): 5000 INJECTION INTRAVENOUS; SUBCUTANEOUS at 06:06

## 2019-01-07 RX ADMIN — HEPARIN SODIUM 5000 UNIT(S): 5000 INJECTION INTRAVENOUS; SUBCUTANEOUS at 21:36

## 2019-01-07 RX ADMIN — ZINC OXIDE 1 APPLICATION(S): 200 OINTMENT TOPICAL at 18:00

## 2019-01-07 RX ADMIN — Medication 17 UNIT(S): at 18:00

## 2019-01-07 RX ADMIN — FINASTERIDE 5 MILLIGRAM(S): 5 TABLET, FILM COATED ORAL at 11:50

## 2019-01-07 RX ADMIN — SENNA PLUS 2 TABLET(S): 8.6 TABLET ORAL at 21:36

## 2019-01-07 RX ADMIN — Medication 3 MILLILITER(S): at 20:30

## 2019-01-07 RX ADMIN — ATORVASTATIN CALCIUM 20 MILLIGRAM(S): 80 TABLET, FILM COATED ORAL at 21:36

## 2019-01-07 RX ADMIN — Medication 2: at 12:33

## 2019-01-07 RX ADMIN — Medication 10 MILLIGRAM(S): at 07:08

## 2019-01-07 NOTE — PROGRESS NOTE ADULT - ASSESSMENT
Patient is an 78y Male with h/o COPD on home oxygen 5L, DM, MICHAEL, obesity, CKD 3, recently discharged on 11/15 after being admitted for acute on chronic hypoxic & Hypercapnic respiratory failure due to COPD exacerbation and gram negative pneumonia. He presented from home with complaints of Dyspnea. He explained that for the past 1 week his breathing has been getting worse gradually with pulse ox readings in the 80s whilst on oxygen with mildly productive cough, no fevers or chills. He was scheduled to see Pulmonologist Dr. Castrejon on Monday but due to difficulty breathing he was BIBA. Per ED physician, patient was tachypneic and cyanotic on arrival with oxygen saturation in the 70s with NC 5L. He was started on BIPAP with significant improvement and transitioned to HFNC off which he cannot be weaned. Hospital course has been complicated by Rt finger abscess requiring I&D. He is still waiting for discharge home vs. SNF. .   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 He returns from nursing home with elevated heart rate, SBP in 100s per patient. He denies any CP, worsening SOB, palpitations, change on cough, dizziness, lightheadedness, abdominal pain, change in bowel or urinary habits. Patient is also unhappy with the care he received at [facility] and does not wish to return there  Assessment and Plan:    1. Acute on Chronic Hypoxic respiratory failure: Due to end stage COPD exacerbation.  Patient still requiring HFNC 50L/min and BiPAP prn.  Continue Bronchodilators ATC and prn and Prednisone 10 mg daily. (Home dose).   Completed Levaquin and Prednisone on his most recent admission.   Maintain sats > 90%  DNR/DNI forms signed. Patient is now interested in hospice. Consult pending.       2. h/o Hyperkalemia:   Continue Low K diet & Kayaxalate 30 gm po TTS (3x week)       3. h/o Hypertension:  BP borderline low. Monitor BP.  Continue Metoprolol.   Lasix & Amlodipine on hold.       4. Diabetes mellitus, type 2: Uncontrolled.  Hemoglobin A1C, Whole Blood: 8.7 % (11.05.18 @ 07:00)  Monitor FS with Insulin coverage.      5. CKD stage IV:  Stable creatinine.  Avoid Nephrotoxins. Monitor BMP and Electrolytes.  CT abdomen: No hydronephrosis bilaterally or obstructing calculus. Stable bilateral renal cysts measuring up to 9.5 cm at   the right lower pole. Stable 4 cm indeterminate exophytic left interpolar renal lesion.      6. BPH:  Continue Flomax and Finasteride.       7. Obesity:  Dietary modification.      8. B12 deficiency & Vitamin D deficiency:  Continue with home supplementation.       9. MICHAEL/OHVS:  BiPAP at night.      10. Severe Pulmonary Hypertension:  Supportive care  ECHO 11/2018: Normal LVEF, no RWMA, Trace TR, Moderate pulmonary hypertension.  ECHO 12/25/18: severe Right side heart dilation, RSVP 100 (was 40 in 10/2018)        DVT prophylaxis: Heparin  Disposition: pending hospice evaluation.  CODE status: DNR/DNI  Very poor Prognosis.

## 2019-01-07 NOTE — PROVIDER CONTACT NOTE (CHANGE IN STATUS NOTIFICATION) - ACTION/TREATMENT ORDERED:
O2 increased to 100%, ABG done.  Pt became responsive, P.O, up to 90's.  Pt remained in location.  VSS.  Will continue to monitor.

## 2019-01-07 NOTE — PROVIDER CONTACT NOTE (CHANGE IN STATUS NOTIFICATION) - SITUATION
Pt requested to use urinal, PCA was assisting pt.  Pt became unresponsive, assisted back down to chair, P.O. 60's.  Rapid response called.

## 2019-01-08 LAB
ALBUMIN SERPL ELPH-MCNC: 3.3 G/DL — LOW (ref 3.5–5.2)
ALP SERPL-CCNC: 66 U/L — SIGNIFICANT CHANGE UP (ref 30–115)
ALT FLD-CCNC: 15 U/L — SIGNIFICANT CHANGE UP (ref 0–41)
ANION GAP SERPL CALC-SCNC: 9 MMOL/L — SIGNIFICANT CHANGE UP (ref 7–14)
AST SERPL-CCNC: 10 U/L — SIGNIFICANT CHANGE UP (ref 0–41)
BILIRUB SERPL-MCNC: 0.6 MG/DL — SIGNIFICANT CHANGE UP (ref 0.2–1.2)
BUN SERPL-MCNC: 56 MG/DL — HIGH (ref 10–20)
CALCIUM SERPL-MCNC: 9.2 MG/DL — SIGNIFICANT CHANGE UP (ref 8.5–10.1)
CHLORIDE SERPL-SCNC: 103 MMOL/L — SIGNIFICANT CHANGE UP (ref 98–110)
CO2 SERPL-SCNC: 30 MMOL/L — SIGNIFICANT CHANGE UP (ref 17–32)
CREAT SERPL-MCNC: 2 MG/DL — HIGH (ref 0.7–1.5)
GLUCOSE BLDC GLUCOMTR-MCNC: 118 MG/DL — HIGH (ref 70–99)
GLUCOSE BLDC GLUCOMTR-MCNC: 217 MG/DL — HIGH (ref 70–99)
GLUCOSE BLDC GLUCOMTR-MCNC: 220 MG/DL — HIGH (ref 70–99)
GLUCOSE BLDC GLUCOMTR-MCNC: 233 MG/DL — HIGH (ref 70–99)
GLUCOSE SERPL-MCNC: 222 MG/DL — HIGH (ref 70–99)
HCT VFR BLD CALC: 39.9 % — LOW (ref 42–52)
HGB BLD-MCNC: 11.9 G/DL — LOW (ref 14–18)
MAGNESIUM SERPL-MCNC: 2.2 MG/DL — SIGNIFICANT CHANGE UP (ref 1.8–2.4)
MCHC RBC-ENTMCNC: 24.1 PG — LOW (ref 27–31)
MCHC RBC-ENTMCNC: 29.8 G/DL — LOW (ref 32–37)
MCV RBC AUTO: 80.8 FL — SIGNIFICANT CHANGE UP (ref 80–94)
NRBC # BLD: 0 /100 WBCS — SIGNIFICANT CHANGE UP (ref 0–0)
PHOSPHATE SERPL-MCNC: 4.4 MG/DL — SIGNIFICANT CHANGE UP (ref 2.1–4.9)
PLATELET # BLD AUTO: 350 K/UL — SIGNIFICANT CHANGE UP (ref 130–400)
POTASSIUM SERPL-MCNC: 5.6 MMOL/L — HIGH (ref 3.5–5)
POTASSIUM SERPL-SCNC: 5.6 MMOL/L — HIGH (ref 3.5–5)
PROT SERPL-MCNC: 5.5 G/DL — LOW (ref 6–8)
RBC # BLD: 4.94 M/UL — SIGNIFICANT CHANGE UP (ref 4.7–6.1)
RBC # FLD: 20.3 % — HIGH (ref 11.5–14.5)
SODIUM SERPL-SCNC: 142 MMOL/L — SIGNIFICANT CHANGE UP (ref 135–146)
WBC # BLD: 8.88 K/UL — SIGNIFICANT CHANGE UP (ref 4.8–10.8)
WBC # FLD AUTO: 8.88 K/UL — SIGNIFICANT CHANGE UP (ref 4.8–10.8)

## 2019-01-08 RX ORDER — ATORVASTATIN CALCIUM 80 MG/1
1 TABLET, FILM COATED ORAL
Qty: 0 | Refills: 0 | COMMUNITY

## 2019-01-08 RX ORDER — PREGABALIN 225 MG/1
3000 CAPSULE ORAL
Qty: 0 | Refills: 0 | COMMUNITY

## 2019-01-08 RX ORDER — TAMSULOSIN HYDROCHLORIDE 0.4 MG/1
1 CAPSULE ORAL
Qty: 0 | Refills: 0 | COMMUNITY

## 2019-01-08 RX ORDER — MULTIVIT-MIN/FERROUS GLUCONATE 9 MG/15 ML
1 LIQUID (ML) ORAL
Qty: 0 | Refills: 0 | COMMUNITY

## 2019-01-08 RX ORDER — DOCUSATE SODIUM 100 MG
1 CAPSULE ORAL
Qty: 0 | Refills: 0 | COMMUNITY

## 2019-01-08 RX ORDER — POLYETHYLENE GLYCOL 3350 17 G/17G
1 POWDER, FOR SOLUTION ORAL
Qty: 0 | Refills: 0 | COMMUNITY

## 2019-01-08 RX ORDER — SODIUM POLYSTYRENE SULFONATE 4.1 MEQ/G
15 POWDER, FOR SUSPENSION ORAL
Qty: 0 | Refills: 0 | Status: DISCONTINUED | OUTPATIENT
Start: 2019-01-08 | End: 2019-01-08

## 2019-01-08 RX ORDER — CHOLECALCIFEROL (VITAMIN D3) 125 MCG
1 CAPSULE ORAL
Qty: 0 | Refills: 0 | COMMUNITY

## 2019-01-08 RX ORDER — FINASTERIDE 5 MG/1
1 TABLET, FILM COATED ORAL
Qty: 0 | Refills: 0 | COMMUNITY

## 2019-01-08 RX ORDER — SODIUM POLYSTYRENE SULFONATE 4.1 MEQ/G
30 POWDER, FOR SUSPENSION ORAL
Qty: 0 | Refills: 0 | Status: DISCONTINUED | OUTPATIENT
Start: 2019-01-08 | End: 2019-01-25

## 2019-01-08 RX ORDER — ALBUTEROL 90 UG/1
2 AEROSOL, METERED ORAL
Qty: 0 | Refills: 0 | COMMUNITY

## 2019-01-08 RX ORDER — IPRATROPIUM/ALBUTEROL SULFATE 18-103MCG
3 AEROSOL WITH ADAPTER (GRAM) INHALATION EVERY 6 HOURS
Qty: 0 | Refills: 0 | Status: DISCONTINUED | OUTPATIENT
Start: 2019-01-08 | End: 2019-01-25

## 2019-01-08 RX ORDER — FLUTICASONE FUROATE, UMECLIDINIUM BROMIDE AND VILANTEROL TRIFENATATE 200; 62.5; 25 UG/1; UG/1; UG/1
1 POWDER RESPIRATORY (INHALATION)
Qty: 0 | Refills: 0 | COMMUNITY

## 2019-01-08 RX ORDER — ASCORBIC ACID 60 MG
1 TABLET,CHEWABLE ORAL
Qty: 0 | Refills: 0 | COMMUNITY

## 2019-01-08 RX ORDER — NYSTATIN CREAM 100000 [USP'U]/G
1 CREAM TOPICAL
Qty: 0 | Refills: 0 | Status: DISCONTINUED | OUTPATIENT
Start: 2019-01-08 | End: 2019-01-25

## 2019-01-08 RX ADMIN — Medication 10 MILLIGRAM(S): at 05:09

## 2019-01-08 RX ADMIN — FINASTERIDE 5 MILLIGRAM(S): 5 TABLET, FILM COATED ORAL at 12:08

## 2019-01-08 RX ADMIN — HEPARIN SODIUM 5000 UNIT(S): 5000 INJECTION INTRAVENOUS; SUBCUTANEOUS at 21:11

## 2019-01-08 RX ADMIN — Medication 3 MILLILITER(S): at 14:12

## 2019-01-08 RX ADMIN — Medication 17 UNIT(S): at 17:02

## 2019-01-08 RX ADMIN — SODIUM POLYSTYRENE SULFONATE 30 GRAM(S): 4.1 POWDER, FOR SUSPENSION ORAL at 17:02

## 2019-01-08 RX ADMIN — ZINC OXIDE 1 APPLICATION(S): 200 OINTMENT TOPICAL at 05:16

## 2019-01-08 RX ADMIN — Medication 2: at 12:07

## 2019-01-08 RX ADMIN — HEPARIN SODIUM 5000 UNIT(S): 5000 INJECTION INTRAVENOUS; SUBCUTANEOUS at 05:09

## 2019-01-08 RX ADMIN — NYSTATIN CREAM 1 APPLICATION(S): 100000 CREAM TOPICAL at 17:03

## 2019-01-08 RX ADMIN — INSULIN GLARGINE 30 UNIT(S): 100 INJECTION, SOLUTION SUBCUTANEOUS at 22:09

## 2019-01-08 RX ADMIN — Medication 1 DROP(S): at 21:08

## 2019-01-08 RX ADMIN — TAMSULOSIN HYDROCHLORIDE 0.4 MILLIGRAM(S): 0.4 CAPSULE ORAL at 21:09

## 2019-01-08 RX ADMIN — SENNA PLUS 2 TABLET(S): 8.6 TABLET ORAL at 21:09

## 2019-01-08 RX ADMIN — BUDESONIDE AND FORMOTEROL FUMARATE DIHYDRATE 2 PUFF(S): 160; 4.5 AEROSOL RESPIRATORY (INHALATION) at 08:13

## 2019-01-08 RX ADMIN — Medication 2: at 08:10

## 2019-01-08 RX ADMIN — ZINC OXIDE 1 APPLICATION(S): 200 OINTMENT TOPICAL at 17:03

## 2019-01-08 RX ADMIN — Medication 1 DROP(S): at 15:08

## 2019-01-08 RX ADMIN — Medication 3 MILLILITER(S): at 05:07

## 2019-01-08 RX ADMIN — Medication 3 MILLILITER(S): at 19:54

## 2019-01-08 RX ADMIN — Medication 25 MILLIGRAM(S): at 05:09

## 2019-01-08 RX ADMIN — TIOTROPIUM BROMIDE 1 CAPSULE(S): 18 CAPSULE ORAL; RESPIRATORY (INHALATION) at 22:47

## 2019-01-08 RX ADMIN — Medication 17 UNIT(S): at 08:10

## 2019-01-08 RX ADMIN — INSULIN GLARGINE 30 UNIT(S): 100 INJECTION, SOLUTION SUBCUTANEOUS at 08:11

## 2019-01-08 RX ADMIN — Medication 500 MILLIGRAM(S): at 12:08

## 2019-01-08 RX ADMIN — ATORVASTATIN CALCIUM 20 MILLIGRAM(S): 80 TABLET, FILM COATED ORAL at 21:09

## 2019-01-08 RX ADMIN — Medication 25 MILLIGRAM(S): at 17:03

## 2019-01-08 RX ADMIN — BUDESONIDE AND FORMOTEROL FUMARATE DIHYDRATE 2 PUFF(S): 160; 4.5 AEROSOL RESPIRATORY (INHALATION) at 21:08

## 2019-01-08 RX ADMIN — Medication 1000 UNIT(S): at 12:08

## 2019-01-08 RX ADMIN — Medication 17 UNIT(S): at 12:07

## 2019-01-08 RX ADMIN — HEPARIN SODIUM 5000 UNIT(S): 5000 INJECTION INTRAVENOUS; SUBCUTANEOUS at 14:56

## 2019-01-08 NOTE — PROGRESS NOTE ADULT - ASSESSMENT
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 He presented from [facility] with complaints of Tachycardia and hypotension SBP in the 100s. He denied any CP, worsening SOB, palpitations, change on cough, dizziness, lightheadedness, abdominal pain, change in bowel or urinary habits. Patient is also unhappy with the care he received at [facility] and does not wish to return there and also expressed an interest in hospice. He was admitted for further management.     Assessment and Plan:    1. Acute on Chronic Hypoxic respiratory failure: Due to end stage COPD exacerbation.  Patient still requiring HFNC 50L/min and BiPAP prn.  Continue Bronchodilators ATC and prn and Prednisone 10 mg daily. (Home dose).   Completed Levaquin and Prednisone on his most recent admission.   Maintain sats > 90%  Remains  DNR & DNI. Hospice following. There is a meeting scheduled for 1/9/19. He is only interested in home hospice or Inpatient hospice.        2. h/o Hyperkalemia:   Continue Low K diet & Kayaxalate 30 gm po TTS (3x week)       3. h/o Hypertension:  BP borderline low. Monitor BP.  Continue Metoprolol.   Lasix & Amlodipine on hold.       4. Diabetes mellitus, type 2: Uncontrolled.  Hemoglobin A1C, Whole Blood: 8.7 % (11.05.18 @ 07:00)  Monitor FS with Insulin coverage.      5. CKD stage IV:  Stable creatinine.  Avoid Nephrotoxins. Monitor BMP and Electrolytes.  CT abdomen: No hydronephrosis bilaterally or obstructing calculus. Stable bilateral renal cysts measuring up to 9.5 cm at   the right lower pole. Stable 4 cm indeterminate exophytic left interpolar renal lesion.      6. BPH:  Continue Flomax and Finasteride.       7. Obesity:  Dietary modification.      8. B12 deficiency & Vitamin D deficiency:  Continue with home supplementation.       9. MICHAEL/OHVS:  BiPAP at night.      10. Severe Pulmonary Hypertension:  Supportive care  ECHO 11/2018: Normal LVEF, no RWMA, Trace TR, Moderate pulmonary hypertension.  ECHO 12/25/18: severe Right side heart dilation, RSVP 100 (was 40 in 10/2018)        DVT prophylaxis: Heparin  Disposition: pending hospice evaluation.  CODE status: DNR/DNI  Very poor Prognosis.

## 2019-01-09 LAB
GLUCOSE BLDC GLUCOMTR-MCNC: 115 MG/DL — HIGH (ref 70–99)
GLUCOSE BLDC GLUCOMTR-MCNC: 155 MG/DL — HIGH (ref 70–99)
GLUCOSE BLDC GLUCOMTR-MCNC: 171 MG/DL — HIGH (ref 70–99)
GLUCOSE BLDC GLUCOMTR-MCNC: 217 MG/DL — HIGH (ref 70–99)
GLUCOSE BLDC GLUCOMTR-MCNC: 410 MG/DL — HIGH (ref 70–99)

## 2019-01-09 RX ORDER — INSULIN GLARGINE 100 [IU]/ML
32 INJECTION, SOLUTION SUBCUTANEOUS
Qty: 0 | Refills: 0 | Status: DISCONTINUED | OUTPATIENT
Start: 2019-01-09 | End: 2019-01-22

## 2019-01-09 RX ADMIN — TIOTROPIUM BROMIDE 1 CAPSULE(S): 18 CAPSULE ORAL; RESPIRATORY (INHALATION) at 23:13

## 2019-01-09 RX ADMIN — Medication 25 MILLIGRAM(S): at 17:58

## 2019-01-09 RX ADMIN — TAMSULOSIN HYDROCHLORIDE 0.4 MILLIGRAM(S): 0.4 CAPSULE ORAL at 21:12

## 2019-01-09 RX ADMIN — Medication 17 UNIT(S): at 12:14

## 2019-01-09 RX ADMIN — INSULIN GLARGINE 32 UNIT(S): 100 INJECTION, SOLUTION SUBCUTANEOUS at 23:09

## 2019-01-09 RX ADMIN — Medication 3 MILLILITER(S): at 03:14

## 2019-01-09 RX ADMIN — Medication 3 MILLILITER(S): at 19:08

## 2019-01-09 RX ADMIN — FINASTERIDE 5 MILLIGRAM(S): 5 TABLET, FILM COATED ORAL at 12:15

## 2019-01-09 RX ADMIN — Medication 1 DROP(S): at 06:05

## 2019-01-09 RX ADMIN — NYSTATIN CREAM 1 APPLICATION(S): 100000 CREAM TOPICAL at 06:05

## 2019-01-09 RX ADMIN — ATORVASTATIN CALCIUM 20 MILLIGRAM(S): 80 TABLET, FILM COATED ORAL at 21:12

## 2019-01-09 RX ADMIN — BUDESONIDE AND FORMOTEROL FUMARATE DIHYDRATE 2 PUFF(S): 160; 4.5 AEROSOL RESPIRATORY (INHALATION) at 07:46

## 2019-01-09 RX ADMIN — BUDESONIDE AND FORMOTEROL FUMARATE DIHYDRATE 2 PUFF(S): 160; 4.5 AEROSOL RESPIRATORY (INHALATION) at 23:14

## 2019-01-09 RX ADMIN — HEPARIN SODIUM 5000 UNIT(S): 5000 INJECTION INTRAVENOUS; SUBCUTANEOUS at 15:35

## 2019-01-09 RX ADMIN — INSULIN GLARGINE 30 UNIT(S): 100 INJECTION, SOLUTION SUBCUTANEOUS at 08:47

## 2019-01-09 RX ADMIN — Medication 25 MILLIGRAM(S): at 06:05

## 2019-01-09 RX ADMIN — HEPARIN SODIUM 5000 UNIT(S): 5000 INJECTION INTRAVENOUS; SUBCUTANEOUS at 21:13

## 2019-01-09 RX ADMIN — Medication 3 MILLILITER(S): at 07:46

## 2019-01-09 RX ADMIN — ZINC OXIDE 1 APPLICATION(S): 200 OINTMENT TOPICAL at 06:06

## 2019-01-09 RX ADMIN — Medication 17 UNIT(S): at 08:45

## 2019-01-09 RX ADMIN — Medication 1 DROP(S): at 21:13

## 2019-01-09 RX ADMIN — Medication 1: at 08:46

## 2019-01-09 RX ADMIN — NYSTATIN CREAM 1 APPLICATION(S): 100000 CREAM TOPICAL at 17:57

## 2019-01-09 RX ADMIN — HEPARIN SODIUM 5000 UNIT(S): 5000 INJECTION INTRAVENOUS; SUBCUTANEOUS at 06:05

## 2019-01-09 RX ADMIN — Medication 2: at 12:14

## 2019-01-09 RX ADMIN — Medication 10 MILLIGRAM(S): at 06:05

## 2019-01-09 RX ADMIN — Medication 1000 UNIT(S): at 12:15

## 2019-01-09 RX ADMIN — ZINC OXIDE 1 APPLICATION(S): 200 OINTMENT TOPICAL at 17:59

## 2019-01-09 RX ADMIN — SENNA PLUS 2 TABLET(S): 8.6 TABLET ORAL at 21:12

## 2019-01-09 RX ADMIN — Medication 3 MILLILITER(S): at 13:54

## 2019-01-09 RX ADMIN — Medication 500 MILLIGRAM(S): at 12:15

## 2019-01-09 NOTE — PROGRESS NOTE ADULT - ASSESSMENT
77 yo M with PMHx of End stage COPD (on supplemental oxygen), severe pulmonary hypertension, DM, MICHAEL, obesity, CKD 3, recently discharged on 1/3 after being admitted for acute on chronic hypoxic & hypercapnic respiratory failure due to COPD with acute exacerbation. He presented from [facility] with complaints of tachycardia and hypotension SBP in the 100s.    Acute on Chronic hypoxic respiratory failure: Due to end stage COPD exacerbation  - completed Levaquin and Prednisone on his most recent admission  - still requiring HFNC 50L/min and BiPAP prn, will attempt to wean off HFNC prn  - c/w albuterol prn, Symbicort and prednisone 10 mg daily (home dose)  - today pt stated that he is considering hospice care, would like some more time to decide     H/o Hyperkalemia  - c/w Low K diet & Kayaxalate 30 gm po TTS (3x week)     HTN  - BP borderline low, improving  - c/w Metoprolol  - if continues to improved will start lasix in am  - currently Lasix & Amlodipine on hold    Diabetes mellitus, type 2: Uncontrolled  - Hemoglobin A1C, Whole Blood: 8.7 % (11.05.18 @ 07:00)  - increase lantus from 30 to 32 units BID and c/w lispro 17 before breakfast and dinner  - monitor FS and adjust prn     CKD stage IV  - stable  - Avoid Nephrotoxins. Monitor BMP and Electrolytes.  - CT abdomen: No hydronephrosis bilaterally or obstructing calculus. Stable bilateral renal cysts measuring up to 9.5 cm at   the right lower pole. Stable 4 cm indeterminate exophytic left interpolar renal lesion.    BPH:  - c/w Flomax and Finasteride.     Obesity  - Dietary modification, DASH diet    B12 deficiency & Vitamin D deficiency  - c/w home supplementation    MICHAEL/OHVS  - BiPAP at night    Severe Pulmonary Hypertension  - Supportive care  - ECHO 11/2018: Normal LVEF, no RWMA, Trace TR, Moderate pulmonary hypertension  - ECHO 12/25/18: severe Right side heart dilation, RSVP 100 (was 40 in 10/2018)      DVT prophylaxis:   - HSQ    Disposition: pt considering hospice care  CODE status: DNR/DNI  Very poor Prognosis

## 2019-01-10 LAB
ANION GAP SERPL CALC-SCNC: 6 MMOL/L — LOW (ref 7–14)
APPEARANCE UR: CLEAR — SIGNIFICANT CHANGE UP
BILIRUB UR-MCNC: NEGATIVE — SIGNIFICANT CHANGE UP
BUN SERPL-MCNC: 49 MG/DL — HIGH (ref 10–20)
CALCIUM SERPL-MCNC: 8.1 MG/DL — LOW (ref 8.5–10.1)
CHLORIDE SERPL-SCNC: 104 MMOL/L — SIGNIFICANT CHANGE UP (ref 98–110)
CO2 SERPL-SCNC: 31 MMOL/L — SIGNIFICANT CHANGE UP (ref 17–32)
COLOR SPEC: YELLOW — SIGNIFICANT CHANGE UP
CREAT SERPL-MCNC: 1.9 MG/DL — HIGH (ref 0.7–1.5)
DIFF PNL FLD: ABNORMAL
GLUCOSE BLDC GLUCOMTR-MCNC: 112 MG/DL — HIGH (ref 70–99)
GLUCOSE BLDC GLUCOMTR-MCNC: 126 MG/DL — HIGH (ref 70–99)
GLUCOSE BLDC GLUCOMTR-MCNC: 154 MG/DL — HIGH (ref 70–99)
GLUCOSE BLDC GLUCOMTR-MCNC: 228 MG/DL — HIGH (ref 70–99)
GLUCOSE SERPL-MCNC: 140 MG/DL — HIGH (ref 70–99)
GLUCOSE UR QL: NEGATIVE MG/DL — SIGNIFICANT CHANGE UP
HCT VFR BLD CALC: 38.7 % — LOW (ref 42–52)
HGB BLD-MCNC: 11.5 G/DL — LOW (ref 14–18)
KETONES UR-MCNC: NEGATIVE — SIGNIFICANT CHANGE UP
LEUKOCYTE ESTERASE UR-ACNC: SIGNIFICANT CHANGE UP
MCHC RBC-ENTMCNC: 23.9 PG — LOW (ref 27–31)
MCHC RBC-ENTMCNC: 29.7 G/DL — LOW (ref 32–37)
MCV RBC AUTO: 80.3 FL — SIGNIFICANT CHANGE UP (ref 80–94)
NITRITE UR-MCNC: NEGATIVE — SIGNIFICANT CHANGE UP
NRBC # BLD: 0 /100 WBCS — SIGNIFICANT CHANGE UP (ref 0–0)
PH UR: 5.5 — SIGNIFICANT CHANGE UP (ref 5–8)
PLATELET # BLD AUTO: 341 K/UL — SIGNIFICANT CHANGE UP (ref 130–400)
POTASSIUM SERPL-MCNC: 4.9 MMOL/L — SIGNIFICANT CHANGE UP (ref 3.5–5)
POTASSIUM SERPL-SCNC: 4.9 MMOL/L — SIGNIFICANT CHANGE UP (ref 3.5–5)
PROT UR-MCNC: NEGATIVE MG/DL — SIGNIFICANT CHANGE UP
RBC # BLD: 4.82 M/UL — SIGNIFICANT CHANGE UP (ref 4.7–6.1)
RBC # FLD: 20.3 % — HIGH (ref 11.5–14.5)
RBC CASTS # UR COMP ASSIST: ABNORMAL /HPF
SODIUM SERPL-SCNC: 141 MMOL/L — SIGNIFICANT CHANGE UP (ref 135–146)
SP GR SPEC: 1.02 — SIGNIFICANT CHANGE UP (ref 1.01–1.03)
UROBILINOGEN FLD QL: 0.2 MG/DL — SIGNIFICANT CHANGE UP (ref 0.2–0.2)
WBC # BLD: 7.94 K/UL — SIGNIFICANT CHANGE UP (ref 4.8–10.8)
WBC # FLD AUTO: 7.94 K/UL — SIGNIFICANT CHANGE UP (ref 4.8–10.8)
WBC UR QL: >50 /HPF

## 2019-01-10 RX ORDER — FUROSEMIDE 40 MG
40 TABLET ORAL DAILY
Qty: 0 | Refills: 0 | Status: DISCONTINUED | OUTPATIENT
Start: 2019-01-10 | End: 2019-01-14

## 2019-01-10 RX ADMIN — ZINC OXIDE 1 APPLICATION(S): 200 OINTMENT TOPICAL at 17:39

## 2019-01-10 RX ADMIN — TAMSULOSIN HYDROCHLORIDE 0.4 MILLIGRAM(S): 0.4 CAPSULE ORAL at 21:41

## 2019-01-10 RX ADMIN — Medication 1: at 07:46

## 2019-01-10 RX ADMIN — Medication 3 MILLILITER(S): at 07:38

## 2019-01-10 RX ADMIN — NYSTATIN CREAM 1 APPLICATION(S): 100000 CREAM TOPICAL at 17:38

## 2019-01-10 RX ADMIN — Medication 1 DROP(S): at 21:41

## 2019-01-10 RX ADMIN — Medication 17 UNIT(S): at 11:34

## 2019-01-10 RX ADMIN — Medication 40 MILLIGRAM(S): at 17:38

## 2019-01-10 RX ADMIN — Medication 1 DROP(S): at 13:19

## 2019-01-10 RX ADMIN — ATORVASTATIN CALCIUM 20 MILLIGRAM(S): 80 TABLET, FILM COATED ORAL at 21:41

## 2019-01-10 RX ADMIN — Medication 3 MILLILITER(S): at 13:42

## 2019-01-10 RX ADMIN — Medication 2: at 11:34

## 2019-01-10 RX ADMIN — BUDESONIDE AND FORMOTEROL FUMARATE DIHYDRATE 2 PUFF(S): 160; 4.5 AEROSOL RESPIRATORY (INHALATION) at 21:40

## 2019-01-10 RX ADMIN — Medication 1000 UNIT(S): at 11:09

## 2019-01-10 RX ADMIN — Medication 3 MILLILITER(S): at 02:20

## 2019-01-10 RX ADMIN — SENNA PLUS 2 TABLET(S): 8.6 TABLET ORAL at 21:41

## 2019-01-10 RX ADMIN — HEPARIN SODIUM 5000 UNIT(S): 5000 INJECTION INTRAVENOUS; SUBCUTANEOUS at 21:42

## 2019-01-10 RX ADMIN — HEPARIN SODIUM 5000 UNIT(S): 5000 INJECTION INTRAVENOUS; SUBCUTANEOUS at 13:20

## 2019-01-10 RX ADMIN — INSULIN GLARGINE 32 UNIT(S): 100 INJECTION, SOLUTION SUBCUTANEOUS at 08:29

## 2019-01-10 RX ADMIN — Medication 500 MILLIGRAM(S): at 11:09

## 2019-01-10 RX ADMIN — Medication 3 MILLILITER(S): at 19:39

## 2019-01-10 RX ADMIN — FINASTERIDE 5 MILLIGRAM(S): 5 TABLET, FILM COATED ORAL at 11:09

## 2019-01-10 RX ADMIN — Medication 25 MILLIGRAM(S): at 17:38

## 2019-01-10 NOTE — PROGRESS NOTE ADULT - ASSESSMENT
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 He presented from [facility] with complaints of tachycardia and hypotension SBP in the 100s.    Acute on Chronic hypoxic respiratory failure: Due to end stage COPD exacerbation  - completed Levaquin and Prednisone on his most recent admission  - still requiring HFNC 40L/min and BiPAP prn, will attempt to wean off HFNC prn  - c/w albuterol prn, Symbicort and prednisone 10 mg daily (home dose)  - pt stated that he is considering hospice care    H/o Hyperkalemia  - c/w Low K diet & Kayaxalate 30 gm po TTS (3x week)     HTN  - BP borderline low, improving  - c/w Metoprolol  - if continues to improved will start lasix in am  - start Lasix 40mg daily  - Amlodipine on hold    Diabetes mellitus, type 2: Uncontrolled  - Hemoglobin A1C, Whole Blood: 8.7 % (11.05.18 @ 07:00)  - c/w lantus from 32 units BID and c/w lispro 17 before breakfast and dinner  - monitor FS and adjust prn     CKD stage IV  - stable  - Avoid Nephrotoxins. Monitor BMP and Electrolytes.  - CT abdomen: No hydronephrosis bilaterally or obstructing calculus. Stable bilateral renal cysts measuring up to 9.5 cm at   the right lower pole. Stable 4 cm indeterminate exophytic left interpolar renal lesion.    BPH:  - c/w Flomax and Finasteride.     Obesity  - Dietary modification, DASH diet    B12 deficiency & Vitamin D deficiency  - c/w home supplementation    MICHAEL/OHS  - BiPAP at night    Severe Pulmonary Hypertension  - Supportive care  - ECHO 11/2018: Normal LVEF, no RWMA, Trace TR, Moderate pulmonary hypertension  - ECHO 12/25/18: severe Right side heart dilation, RSVP 100 (was 40 in 10/2018)    DVT prophylaxis:   - HSQ    Disposition: pt considering hospice care  CODE status: DNR/DNI  Very poor Prognosis

## 2019-01-11 LAB
ANION GAP SERPL CALC-SCNC: 10 MMOL/L — SIGNIFICANT CHANGE UP (ref 7–14)
BUN SERPL-MCNC: 45 MG/DL — HIGH (ref 10–20)
CALCIUM SERPL-MCNC: 8.6 MG/DL — SIGNIFICANT CHANGE UP (ref 8.5–10.1)
CHLORIDE SERPL-SCNC: 101 MMOL/L — SIGNIFICANT CHANGE UP (ref 98–110)
CO2 SERPL-SCNC: 33 MMOL/L — HIGH (ref 17–32)
CREAT SERPL-MCNC: 1.9 MG/DL — HIGH (ref 0.7–1.5)
CULTURE RESULTS: SIGNIFICANT CHANGE UP
CULTURE RESULTS: SIGNIFICANT CHANGE UP
GLUCOSE BLDC GLUCOMTR-MCNC: 119 MG/DL — HIGH (ref 70–99)
GLUCOSE BLDC GLUCOMTR-MCNC: 131 MG/DL — HIGH (ref 70–99)
GLUCOSE BLDC GLUCOMTR-MCNC: 153 MG/DL — HIGH (ref 70–99)
GLUCOSE BLDC GLUCOMTR-MCNC: 197 MG/DL — HIGH (ref 70–99)
GLUCOSE SERPL-MCNC: 151 MG/DL — HIGH (ref 70–99)
HCT VFR BLD CALC: 39.3 % — LOW (ref 42–52)
HGB BLD-MCNC: 11.6 G/DL — LOW (ref 14–18)
MCHC RBC-ENTMCNC: 23.8 PG — LOW (ref 27–31)
MCHC RBC-ENTMCNC: 29.5 G/DL — LOW (ref 32–37)
MCV RBC AUTO: 80.7 FL — SIGNIFICANT CHANGE UP (ref 80–94)
NRBC # BLD: 0 /100 WBCS — SIGNIFICANT CHANGE UP (ref 0–0)
PLATELET # BLD AUTO: 356 K/UL — SIGNIFICANT CHANGE UP (ref 130–400)
POTASSIUM SERPL-MCNC: 5.1 MMOL/L — HIGH (ref 3.5–5)
POTASSIUM SERPL-SCNC: 5.1 MMOL/L — HIGH (ref 3.5–5)
RBC # BLD: 4.87 M/UL — SIGNIFICANT CHANGE UP (ref 4.7–6.1)
RBC # FLD: 20.3 % — HIGH (ref 11.5–14.5)
SODIUM SERPL-SCNC: 144 MMOL/L — SIGNIFICANT CHANGE UP (ref 135–146)
SPECIMEN SOURCE: SIGNIFICANT CHANGE UP
SPECIMEN SOURCE: SIGNIFICANT CHANGE UP
WBC # BLD: 7 K/UL — SIGNIFICANT CHANGE UP (ref 4.8–10.8)
WBC # FLD AUTO: 7 K/UL — SIGNIFICANT CHANGE UP (ref 4.8–10.8)

## 2019-01-11 RX ORDER — FUROSEMIDE 40 MG
40 TABLET ORAL ONCE
Qty: 0 | Refills: 0 | Status: COMPLETED | OUTPATIENT
Start: 2019-01-11 | End: 2019-01-11

## 2019-01-11 RX ADMIN — HEPARIN SODIUM 5000 UNIT(S): 5000 INJECTION INTRAVENOUS; SUBCUTANEOUS at 15:36

## 2019-01-11 RX ADMIN — SODIUM POLYSTYRENE SULFONATE 30 GRAM(S): 4.1 POWDER, FOR SUSPENSION ORAL at 13:02

## 2019-01-11 RX ADMIN — NYSTATIN CREAM 1 APPLICATION(S): 100000 CREAM TOPICAL at 07:04

## 2019-01-11 RX ADMIN — Medication 17 UNIT(S): at 17:50

## 2019-01-11 RX ADMIN — Medication 25 MILLIGRAM(S): at 17:52

## 2019-01-11 RX ADMIN — Medication 1 DROP(S): at 07:04

## 2019-01-11 RX ADMIN — Medication 500 MILLIGRAM(S): at 13:03

## 2019-01-11 RX ADMIN — Medication 17 UNIT(S): at 13:02

## 2019-01-11 RX ADMIN — ZINC OXIDE 1 APPLICATION(S): 200 OINTMENT TOPICAL at 19:12

## 2019-01-11 RX ADMIN — INSULIN GLARGINE 32 UNIT(S): 100 INJECTION, SOLUTION SUBCUTANEOUS at 22:13

## 2019-01-11 RX ADMIN — Medication 17 UNIT(S): at 08:05

## 2019-01-11 RX ADMIN — CHLORHEXIDINE GLUCONATE 1 APPLICATION(S): 213 SOLUTION TOPICAL at 13:01

## 2019-01-11 RX ADMIN — Medication 10 MILLIGRAM(S): at 07:04

## 2019-01-11 RX ADMIN — Medication 3 MILLILITER(S): at 08:12

## 2019-01-11 RX ADMIN — FINASTERIDE 5 MILLIGRAM(S): 5 TABLET, FILM COATED ORAL at 13:03

## 2019-01-11 RX ADMIN — Medication 3 MILLILITER(S): at 21:12

## 2019-01-11 RX ADMIN — Medication 1000 UNIT(S): at 13:03

## 2019-01-11 RX ADMIN — INSULIN GLARGINE 32 UNIT(S): 100 INJECTION, SOLUTION SUBCUTANEOUS at 08:06

## 2019-01-11 RX ADMIN — NYSTATIN CREAM 1 APPLICATION(S): 100000 CREAM TOPICAL at 17:52

## 2019-01-11 RX ADMIN — HEPARIN SODIUM 5000 UNIT(S): 5000 INJECTION INTRAVENOUS; SUBCUTANEOUS at 07:05

## 2019-01-11 RX ADMIN — BUDESONIDE AND FORMOTEROL FUMARATE DIHYDRATE 2 PUFF(S): 160; 4.5 AEROSOL RESPIRATORY (INHALATION) at 08:08

## 2019-01-11 RX ADMIN — Medication 3 MILLILITER(S): at 03:07

## 2019-01-11 RX ADMIN — Medication 1: at 08:06

## 2019-01-11 RX ADMIN — ZINC OXIDE 1 APPLICATION(S): 200 OINTMENT TOPICAL at 07:05

## 2019-01-11 RX ADMIN — Medication 1: at 17:50

## 2019-01-11 RX ADMIN — Medication 3 MILLILITER(S): at 13:56

## 2019-01-11 NOTE — PROGRESS NOTE ADULT - ASSESSMENT
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 He presented from [facility] with complaints of tachycardia and hypotension SBP in the 100s.    Acute on Chronic hypoxic respiratory failure: Due to end stage COPD exacerbation  - completed Levaquin and Prednisone on his most recent admission  - still requiring HFNC 40L/min and BiPAP prn, will attempt to wean off HFNC prn to nasal O2  - c/w albuterol prn, Symbicort and prednisone 10 mg daily (home dose)  - pt stated that he is considering hospice care    H/o Hyperkalemia  - c/w Low K diet & Kayaxalate 30 gm po TTS (3x week)     HTN  - BP was borderline low, now improving  - c/w Metoprolol  - c/w Lasix 40mg daily  - Amlodipine on hold, will restart if BP elevated    Diabetes mellitus, type 2: Uncontrolled  - Hemoglobin A1C, Whole Blood: 8.7 % (11.05.18 @ 07:00)  - c/w lantus from 32 units BID and c/w lispro 17 before breakfast and dinner  - monitor FS and adjust prn     CKD stage IV  - stable  - Avoid Nephrotoxins. Monitor BMP and Electrolytes.  - CT abdomen: No hydronephrosis bilaterally or obstructing calculus. Stable bilateral renal cysts measuring up to 9.5 cm at   the right lower pole. Stable 4 cm indeterminate exophytic left interpolar renal lesion.    BPH:  - c/w Flomax and Finasteride.     Obesity  - Dietary modification, DASH diet    B12 deficiency & Vitamin D deficiency  - c/w home supplementation    MICHAEL/OHS  - BiPAP at night    Severe Pulmonary Hypertension  - Supportive care  - ECHO 11/2018: Normal LVEF, no RWMA, Trace TR, Moderate pulmonary hypertension  - ECHO 12/25/18: severe Right side heart dilation, RSVP 100 (was 40 in 10/2018)    DVT prophylaxis:   - HSQ    Disposition: pt considering hospice care  CODE status: DNR/DNI  Very poor Prognosis

## 2019-01-12 LAB
-  AMIKACIN: SIGNIFICANT CHANGE UP
-  AMOXICILLIN/CLAVULANIC ACID: SIGNIFICANT CHANGE UP
-  AMPICILLIN/SULBACTAM: SIGNIFICANT CHANGE UP
-  AMPICILLIN: SIGNIFICANT CHANGE UP
-  AZTREONAM: SIGNIFICANT CHANGE UP
-  CEFAZOLIN: SIGNIFICANT CHANGE UP
-  CEFEPIME: SIGNIFICANT CHANGE UP
-  CEFOXITIN: SIGNIFICANT CHANGE UP
-  CEFTRIAXONE: SIGNIFICANT CHANGE UP
-  CIPROFLOXACIN: SIGNIFICANT CHANGE UP
-  ERTAPENEM: SIGNIFICANT CHANGE UP
-  GENTAMICIN: SIGNIFICANT CHANGE UP
-  IMIPENEM: SIGNIFICANT CHANGE UP
-  LEVOFLOXACIN: SIGNIFICANT CHANGE UP
-  MEROPENEM: SIGNIFICANT CHANGE UP
-  NITROFURANTOIN: SIGNIFICANT CHANGE UP
-  PIPERACILLIN/TAZOBACTAM: SIGNIFICANT CHANGE UP
-  TIGECYCLINE: SIGNIFICANT CHANGE UP
-  TOBRAMYCIN: SIGNIFICANT CHANGE UP
-  TRIMETHOPRIM/SULFAMETHOXAZOLE: SIGNIFICANT CHANGE UP
ANION GAP SERPL CALC-SCNC: 8 MMOL/L — SIGNIFICANT CHANGE UP (ref 7–14)
BUN SERPL-MCNC: 41 MG/DL — HIGH (ref 10–20)
CALCIUM SERPL-MCNC: 8.4 MG/DL — LOW (ref 8.5–10.1)
CHLORIDE SERPL-SCNC: 103 MMOL/L — SIGNIFICANT CHANGE UP (ref 98–110)
CO2 SERPL-SCNC: 33 MMOL/L — HIGH (ref 17–32)
CREAT SERPL-MCNC: 1.7 MG/DL — HIGH (ref 0.7–1.5)
CULTURE RESULTS: SIGNIFICANT CHANGE UP
GLUCOSE BLDC GLUCOMTR-MCNC: 105 MG/DL — HIGH (ref 70–99)
GLUCOSE BLDC GLUCOMTR-MCNC: 115 MG/DL — HIGH (ref 70–99)
GLUCOSE BLDC GLUCOMTR-MCNC: 188 MG/DL — HIGH (ref 70–99)
GLUCOSE BLDC GLUCOMTR-MCNC: 205 MG/DL — HIGH (ref 70–99)
GLUCOSE SERPL-MCNC: 105 MG/DL — HIGH (ref 70–99)
HCT VFR BLD CALC: 37.3 % — LOW (ref 42–52)
HGB BLD-MCNC: 11.2 G/DL — LOW (ref 14–18)
MCHC RBC-ENTMCNC: 24 PG — LOW (ref 27–31)
MCHC RBC-ENTMCNC: 30 G/DL — LOW (ref 32–37)
MCV RBC AUTO: 80 FL — SIGNIFICANT CHANGE UP (ref 80–94)
METHOD TYPE: SIGNIFICANT CHANGE UP
NRBC # BLD: 0 /100 WBCS — SIGNIFICANT CHANGE UP (ref 0–0)
ORGANISM # SPEC MICROSCOPIC CNT: SIGNIFICANT CHANGE UP
ORGANISM # SPEC MICROSCOPIC CNT: SIGNIFICANT CHANGE UP
PLATELET # BLD AUTO: 342 K/UL — SIGNIFICANT CHANGE UP (ref 130–400)
POTASSIUM SERPL-MCNC: 4.6 MMOL/L — SIGNIFICANT CHANGE UP (ref 3.5–5)
POTASSIUM SERPL-SCNC: 4.6 MMOL/L — SIGNIFICANT CHANGE UP (ref 3.5–5)
RBC # BLD: 4.66 M/UL — LOW (ref 4.7–6.1)
RBC # FLD: 20.3 % — HIGH (ref 11.5–14.5)
SODIUM SERPL-SCNC: 144 MMOL/L — SIGNIFICANT CHANGE UP (ref 135–146)
SPECIMEN SOURCE: SIGNIFICANT CHANGE UP
WBC # BLD: 5.94 K/UL — SIGNIFICANT CHANGE UP (ref 4.8–10.8)
WBC # FLD AUTO: 5.94 K/UL — SIGNIFICANT CHANGE UP (ref 4.8–10.8)

## 2019-01-12 RX ADMIN — HEPARIN SODIUM 5000 UNIT(S): 5000 INJECTION INTRAVENOUS; SUBCUTANEOUS at 06:30

## 2019-01-12 RX ADMIN — BUDESONIDE AND FORMOTEROL FUMARATE DIHYDRATE 2 PUFF(S): 160; 4.5 AEROSOL RESPIRATORY (INHALATION) at 21:56

## 2019-01-12 RX ADMIN — Medication 1000 UNIT(S): at 12:33

## 2019-01-12 RX ADMIN — BUDESONIDE AND FORMOTEROL FUMARATE DIHYDRATE 2 PUFF(S): 160; 4.5 AEROSOL RESPIRATORY (INHALATION) at 08:51

## 2019-01-12 RX ADMIN — NYSTATIN CREAM 1 APPLICATION(S): 100000 CREAM TOPICAL at 17:52

## 2019-01-12 RX ADMIN — NYSTATIN CREAM 1 APPLICATION(S): 100000 CREAM TOPICAL at 06:28

## 2019-01-12 RX ADMIN — Medication 17 UNIT(S): at 16:43

## 2019-01-12 RX ADMIN — TAMSULOSIN HYDROCHLORIDE 0.4 MILLIGRAM(S): 0.4 CAPSULE ORAL at 21:41

## 2019-01-12 RX ADMIN — INSULIN GLARGINE 32 UNIT(S): 100 INJECTION, SOLUTION SUBCUTANEOUS at 08:41

## 2019-01-12 RX ADMIN — ZINC OXIDE 1 APPLICATION(S): 200 OINTMENT TOPICAL at 06:29

## 2019-01-12 RX ADMIN — Medication 3 MILLILITER(S): at 08:59

## 2019-01-12 RX ADMIN — ATORVASTATIN CALCIUM 20 MILLIGRAM(S): 80 TABLET, FILM COATED ORAL at 21:41

## 2019-01-12 RX ADMIN — Medication 3 MILLILITER(S): at 18:58

## 2019-01-12 RX ADMIN — Medication 1 DROP(S): at 13:46

## 2019-01-12 RX ADMIN — FINASTERIDE 5 MILLIGRAM(S): 5 TABLET, FILM COATED ORAL at 12:34

## 2019-01-12 RX ADMIN — HEPARIN SODIUM 5000 UNIT(S): 5000 INJECTION INTRAVENOUS; SUBCUTANEOUS at 13:47

## 2019-01-12 RX ADMIN — SENNA PLUS 2 TABLET(S): 8.6 TABLET ORAL at 21:41

## 2019-01-12 RX ADMIN — CHLORHEXIDINE GLUCONATE 1 APPLICATION(S): 213 SOLUTION TOPICAL at 06:27

## 2019-01-12 RX ADMIN — Medication 2: at 16:43

## 2019-01-12 RX ADMIN — Medication 3 MILLILITER(S): at 00:28

## 2019-01-12 RX ADMIN — Medication 500 MILLIGRAM(S): at 12:33

## 2019-01-12 RX ADMIN — Medication 3 MILLILITER(S): at 13:50

## 2019-01-12 RX ADMIN — HEPARIN SODIUM 5000 UNIT(S): 5000 INJECTION INTRAVENOUS; SUBCUTANEOUS at 21:41

## 2019-01-12 RX ADMIN — TIOTROPIUM BROMIDE 1 CAPSULE(S): 18 CAPSULE ORAL; RESPIRATORY (INHALATION) at 21:42

## 2019-01-12 RX ADMIN — Medication 3 MILLILITER(S): at 03:26

## 2019-01-12 RX ADMIN — Medication 3 MILLILITER(S): at 18:56

## 2019-01-12 NOTE — PROGRESS NOTE ADULT - ASSESSMENT
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 He presented from [facility] with complaints of tachycardia and hypotension SBP in the 100s.    Acute on Chronic hypoxic respiratory failure: Due to end stage COPD exacerbation  - completed Levaquin and Prednisone on his most recent admission  - still requiring HFNC, tolerated BiPAP overnight and now on HFNC 45L/min   - will attempt to wean off HFNC prn to nasal O2  - c/w albuterol prn, Symbicort and prednisone 10 mg daily (home dose)  - pt stated that he is considering hospice care    H/o Hyperkalemia  - c/w Low K diet & Kayaxalate 30 gm po TTS (3x week)     HTN  - BP was borderline low  - c/w Metoprolol  - started on Lasix 40mg daily however doses held due to low BP  - Amlodipine on hold, will restart if BP elevated    Diabetes mellitus, type 2: Uncontrolled  - Hemoglobin A1C, Whole Blood: 8.7 % (11.05.18 @ 07:00)  - c/w lantus from 32 units BID and c/w lispro 17 before breakfast and dinner  - monitor FS and adjust prn     CKD stage IV  - stable  - Avoid Nephrotoxins. Monitor BMP and Electrolytes.  - CT abdomen: No hydronephrosis bilaterally or obstructing calculus. Stable bilateral renal cysts measuring up to 9.5 cm at   the right lower pole. Stable 4 cm indeterminate exophytic left interpolar renal lesion.    BPH:  - c/w Flomax and Finasteride.     Obesity  - Dietary modification, DASH diet    B12 deficiency & Vitamin D deficiency  - c/w home supplementation    MICHAEL/OHS  - BiPAP at night    Severe Pulmonary Hypertension  - Supportive care  - ECHO 11/2018: Normal LVEF, no RWMA, Trace TR, Moderate pulmonary hypertension  - ECHO 12/25/18: severe Right side heart dilation, RSVP 100 (was 40 in 10/2018)    DVT prophylaxis:   - HSQ    Disposition: pt considering hospice care  CODE status: DNR/DNI  Very poor Prognosis

## 2019-01-12 NOTE — PROVIDER CONTACT NOTE (OTHER) - SITUATION
pt did not get his 40 mg lasix this morning b/c he was on bipap, current bp is 88/49 pulse 106. pulse ox is 95 on highflow 45 percent.

## 2019-01-13 LAB
ANION GAP SERPL CALC-SCNC: 9 MMOL/L — SIGNIFICANT CHANGE UP (ref 7–14)
BUN SERPL-MCNC: 42 MG/DL — HIGH (ref 10–20)
CALCIUM SERPL-MCNC: 9 MG/DL — SIGNIFICANT CHANGE UP (ref 8.5–10.1)
CHLORIDE SERPL-SCNC: 103 MMOL/L — SIGNIFICANT CHANGE UP (ref 98–110)
CO2 SERPL-SCNC: 33 MMOL/L — HIGH (ref 17–32)
CREAT SERPL-MCNC: 1.8 MG/DL — HIGH (ref 0.7–1.5)
GLUCOSE BLDC GLUCOMTR-MCNC: 137 MG/DL — HIGH (ref 70–99)
GLUCOSE BLDC GLUCOMTR-MCNC: 187 MG/DL — HIGH (ref 70–99)
GLUCOSE BLDC GLUCOMTR-MCNC: 195 MG/DL — HIGH (ref 70–99)
GLUCOSE BLDC GLUCOMTR-MCNC: 208 MG/DL — HIGH (ref 70–99)
GLUCOSE SERPL-MCNC: 100 MG/DL — HIGH (ref 70–99)
HCT VFR BLD CALC: 39.3 % — LOW (ref 42–52)
HGB BLD-MCNC: 11.5 G/DL — LOW (ref 14–18)
MCHC RBC-ENTMCNC: 23.6 PG — LOW (ref 27–31)
MCHC RBC-ENTMCNC: 29.3 G/DL — LOW (ref 32–37)
MCV RBC AUTO: 80.7 FL — SIGNIFICANT CHANGE UP (ref 80–94)
NRBC # BLD: 0 /100 WBCS — SIGNIFICANT CHANGE UP (ref 0–0)
PLATELET # BLD AUTO: 357 K/UL — SIGNIFICANT CHANGE UP (ref 130–400)
POTASSIUM SERPL-MCNC: 5 MMOL/L — SIGNIFICANT CHANGE UP (ref 3.5–5)
POTASSIUM SERPL-SCNC: 5 MMOL/L — SIGNIFICANT CHANGE UP (ref 3.5–5)
RBC # BLD: 4.87 M/UL — SIGNIFICANT CHANGE UP (ref 4.7–6.1)
RBC # FLD: 20.5 % — HIGH (ref 11.5–14.5)
SODIUM SERPL-SCNC: 145 MMOL/L — SIGNIFICANT CHANGE UP (ref 135–146)
WBC # BLD: 6.1 K/UL — SIGNIFICANT CHANGE UP (ref 4.8–10.8)
WBC # FLD AUTO: 6.1 K/UL — SIGNIFICANT CHANGE UP (ref 4.8–10.8)

## 2019-01-13 RX ADMIN — Medication 3 MILLILITER(S): at 09:53

## 2019-01-13 RX ADMIN — Medication 10 MILLIGRAM(S): at 05:57

## 2019-01-13 RX ADMIN — Medication 17 UNIT(S): at 12:45

## 2019-01-13 RX ADMIN — BUDESONIDE AND FORMOTEROL FUMARATE DIHYDRATE 2 PUFF(S): 160; 4.5 AEROSOL RESPIRATORY (INHALATION) at 21:23

## 2019-01-13 RX ADMIN — Medication 1: at 12:45

## 2019-01-13 RX ADMIN — Medication 3 MILLILITER(S): at 19:21

## 2019-01-13 RX ADMIN — BUDESONIDE AND FORMOTEROL FUMARATE DIHYDRATE 2 PUFF(S): 160; 4.5 AEROSOL RESPIRATORY (INHALATION) at 09:54

## 2019-01-13 RX ADMIN — Medication 3 MILLILITER(S): at 13:12

## 2019-01-13 RX ADMIN — Medication 40 MILLIGRAM(S): at 08:14

## 2019-01-13 RX ADMIN — Medication 500 MILLIGRAM(S): at 11:08

## 2019-01-13 RX ADMIN — HEPARIN SODIUM 5000 UNIT(S): 5000 INJECTION INTRAVENOUS; SUBCUTANEOUS at 21:15

## 2019-01-13 RX ADMIN — Medication 1 DROP(S): at 05:57

## 2019-01-13 RX ADMIN — FINASTERIDE 5 MILLIGRAM(S): 5 TABLET, FILM COATED ORAL at 12:46

## 2019-01-13 RX ADMIN — ZINC OXIDE 1 APPLICATION(S): 200 OINTMENT TOPICAL at 00:40

## 2019-01-13 RX ADMIN — ZINC OXIDE 1 APPLICATION(S): 200 OINTMENT TOPICAL at 06:30

## 2019-01-13 RX ADMIN — NYSTATIN CREAM 1 APPLICATION(S): 100000 CREAM TOPICAL at 05:56

## 2019-01-13 RX ADMIN — Medication 2: at 17:04

## 2019-01-13 RX ADMIN — Medication 3 MILLILITER(S): at 01:32

## 2019-01-13 RX ADMIN — TAMSULOSIN HYDROCHLORIDE 0.4 MILLIGRAM(S): 0.4 CAPSULE ORAL at 21:15

## 2019-01-13 RX ADMIN — Medication 1 DROP(S): at 13:33

## 2019-01-13 RX ADMIN — INSULIN GLARGINE 32 UNIT(S): 100 INJECTION, SOLUTION SUBCUTANEOUS at 08:09

## 2019-01-13 RX ADMIN — TIOTROPIUM BROMIDE 1 CAPSULE(S): 18 CAPSULE ORAL; RESPIRATORY (INHALATION) at 21:14

## 2019-01-13 RX ADMIN — Medication 3 MILLILITER(S): at 23:44

## 2019-01-13 RX ADMIN — HEPARIN SODIUM 5000 UNIT(S): 5000 INJECTION INTRAVENOUS; SUBCUTANEOUS at 05:57

## 2019-01-13 RX ADMIN — ATORVASTATIN CALCIUM 20 MILLIGRAM(S): 80 TABLET, FILM COATED ORAL at 21:15

## 2019-01-13 RX ADMIN — NYSTATIN CREAM 1 APPLICATION(S): 100000 CREAM TOPICAL at 17:05

## 2019-01-13 RX ADMIN — Medication 17 UNIT(S): at 08:08

## 2019-01-13 RX ADMIN — SENNA PLUS 2 TABLET(S): 8.6 TABLET ORAL at 21:15

## 2019-01-13 RX ADMIN — Medication 1 DROP(S): at 21:18

## 2019-01-13 RX ADMIN — Medication 1000 UNIT(S): at 11:07

## 2019-01-13 RX ADMIN — Medication 17 UNIT(S): at 17:04

## 2019-01-13 RX ADMIN — Medication 25 MILLIGRAM(S): at 05:57

## 2019-01-13 RX ADMIN — INSULIN GLARGINE 32 UNIT(S): 100 INJECTION, SOLUTION SUBCUTANEOUS at 22:01

## 2019-01-13 RX ADMIN — HEPARIN SODIUM 5000 UNIT(S): 5000 INJECTION INTRAVENOUS; SUBCUTANEOUS at 13:33

## 2019-01-13 NOTE — DIETITIAN INITIAL EVALUATION ADULT. - OTHER INFO
Reason for visit: LOS. Pt admitted w/ elevated heart rate; h/o End stage COPD on home oxygen 5L, DM, MICHAEL, obesity, CKD 4. Patient reports 100% intakes.

## 2019-01-13 NOTE — DIETITIAN INITIAL EVALUATION ADULT. - FEEDING SKILL
Patient has been eating food from home in addition to the hospital's food; observed snacks and drinks at the bedside/independent

## 2019-01-13 NOTE — DIETITIAN INITIAL EVALUATION ADULT. - PERTINENT LABORATORY DATA
1/13- h/h 11.5/39.3L, BUN 42H, Cr 1.8H, Gluc 100H, GFR 35L; blood sugars over past 24hrs- 105-205 mg/dL; most recent A1C: 8.7

## 2019-01-13 NOTE — CHART NOTE - NSCHARTNOTEFT_GEN_A_CORE
Upon Nutritional Assessment by the Registered Dietitian your patient was determined to meet criteria / has evidence of the following diagnosis/diagnoses:          [ ]  Mild Protein Calorie Malnutrition        [ ]  Moderate Protein Calorie Malnutrition        [ ] Severe Protein Calorie Malnutrition        [ ] Unspecified Protein Calorie Malnutrition        [ ] Underweight / BMI <19        [x] Morbid Obesity / BMI > 40      Findings as based on: BMI 42.7  •  Comprehensive nutrition assessment and consultation  •  Calorie counts (nutrient intake analysis)  •  Food acceptance and intake status from observations by staff  •  Follow up  •  Patient education  •  Intervention secondary to interdisciplinary rounds  •   concerns      Treatment:  meals, snacks, nutrition education  The following diet has been recommended: Consistent CHO, Renal with no snacks, DASH/TLC      PROVIDER Section:     By signing this assessment you are acknowledging and agree with the diagnosis/diagnoses assigned by the Registered Dietitian    Comments:

## 2019-01-13 NOTE — PROGRESS NOTE ADULT - ASSESSMENT
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 He presented from [facility] with complaints of tachycardia and hypotension SBP in the 100s.    Acute on Chronic hypoxic respiratory failure: Due to end stage COPD exacerbation  - completed Levaquin and Prednisone on his most recent admission  - still requiring HFNC, tolerated BiPAP overnight and now on HFNC 40-45L/min   - will attempt to wean off HFNC prn to nasal O2  - c/w albuterol prn, Symbicort and prednisone 10 mg daily (home dose)  - pt stated that he is considering hospice care    H/o Hyperkalemia  - c/w Low K diet & Kayaxalate 30 gm po TTS (3x week)     HTN  - BP borderline low  - c/w Metoprolol  - Lasix 40mg daily however doses have been held due to low BP  - Amlodipine on hold, will restart if BP elevated    Diabetes mellitus, type 2: Uncontrolled  - Hemoglobin A1C, Whole Blood: 8.7 % (11.05.18 @ 07:00)  - c/w lantus from 32 units BID and c/w lispro 17 before breakfast and dinner  - monitor FS and adjust prn     CKD stage IV  - stable  - Avoid Nephrotoxins. Monitor BMP and Electrolytes.  - CT abdomen: No hydronephrosis bilaterally or obstructing calculus. Stable bilateral renal cysts measuring up to 9.5 cm at   the right lower pole. Stable 4 cm indeterminate exophytic left interpolar renal lesion.    BPH:  - c/w Flomax and Finasteride.     Obesity  - Dietary modification, DASH diet    B12 deficiency & Vitamin D deficiency  - c/w home supplementation    MICHAEL/OHS  - BiPAP at night    Severe Pulmonary Hypertension  - Supportive care  - ECHO 11/2018: Normal LVEF, no RWMA, Trace TR, Moderate pulmonary hypertension  - ECHO 12/25/18: severe Right side heart dilation, RSVP 100 (was 40 in 10/2018)    DVT prophylaxis:   - HSQ    Disposition: pt considering hospice care  CODE status: DNR/DNI  Very poor Prognosis 77 yo M with PMHx of End stage COPD (on supplemental oxygen), severe pulmonary hypertension, DM, MICHAEL, obesity, CKD 3, recently discharged on 1/3 after being admitted for acute on chronic hypoxic & hypercapnic respiratory failure due to COPD with acute exacerbation. He presented from [facility] with complaints of tachycardia and hypotension SBP in the 100s.    Acute on Chronic hypoxic respiratory failure: Due to end stage COPD exacerbation  - completed Levaquin and Prednisone on his most recent admission  - still requiring HFNC, tolerated BiPAP overnight and now on HFNC 40-45L/min   - will attempt to wean off HFNC prn to nasal O2  - c/w albuterol prn, Symbicort and prednisone 10 mg daily (home dose)  - pt stated that he is considering hospice care    H/o Hyperkalemia  - c/w Low K diet & Kayaxalate 30 gm po TTS (3x week)     HTN  - BP borderline low  - c/w Metoprolol  - Lasix 40mg daily however doses have been held due to low BP  - Amlodipine on hold, will restart if BP elevated    Diabetes mellitus, type 2: Uncontrolled  - Hemoglobin A1C, Whole Blood: 8.7 % (11.05.18 @ 07:00)  - c/w lantus from 32 units BID and c/w lispro 17 before breakfast and dinner  - monitor FS and adjust prn     CKD stage IV  - stable  - Avoid Nephrotoxins. Monitor BMP and Electrolytes.  - CT abdomen: No hydronephrosis bilaterally or obstructing calculus. Stable bilateral renal cysts measuring up to 9.5 cm at   the right lower pole. Stable 4 cm indeterminate exophytic left interpolar renal lesion.    BPH:  - c/w Flomax and Finasteride.     Morbid obesity   - Dietary modification, DASH diet    B12 deficiency & Vitamin D deficiency  - c/w home supplementation    MICHAEL/OHS  - BiPAP at night    Severe Pulmonary Hypertension  - Supportive care  - ECHO 11/2018: Normal LVEF, no RWMA, Trace TR, Moderate pulmonary hypertension  - ECHO 12/25/18: severe Right side heart dilation, RSVP 100 (was 40 in 10/2018)    DVT prophylaxis:   - HSQ    Disposition: pt considering hospice care  CODE status: DNR/DNI  Very poor Prognosis

## 2019-01-14 LAB
ANION GAP SERPL CALC-SCNC: 8 MMOL/L — SIGNIFICANT CHANGE UP (ref 7–14)
BUN SERPL-MCNC: 43 MG/DL — HIGH (ref 10–20)
CALCIUM SERPL-MCNC: 8.8 MG/DL — SIGNIFICANT CHANGE UP (ref 8.5–10.1)
CHLORIDE SERPL-SCNC: 103 MMOL/L — SIGNIFICANT CHANGE UP (ref 98–110)
CO2 SERPL-SCNC: 34 MMOL/L — HIGH (ref 17–32)
CREAT SERPL-MCNC: 1.9 MG/DL — HIGH (ref 0.7–1.5)
GLUCOSE BLDC GLUCOMTR-MCNC: 118 MG/DL — HIGH (ref 70–99)
GLUCOSE BLDC GLUCOMTR-MCNC: 157 MG/DL — HIGH (ref 70–99)
GLUCOSE BLDC GLUCOMTR-MCNC: 162 MG/DL — HIGH (ref 70–99)
GLUCOSE BLDC GLUCOMTR-MCNC: 182 MG/DL — HIGH (ref 70–99)
GLUCOSE SERPL-MCNC: 172 MG/DL — HIGH (ref 70–99)
HCT VFR BLD CALC: 38 % — LOW (ref 42–52)
HGB BLD-MCNC: 11.2 G/DL — LOW (ref 14–18)
MCHC RBC-ENTMCNC: 23.9 PG — LOW (ref 27–31)
MCHC RBC-ENTMCNC: 29.5 G/DL — LOW (ref 32–37)
MCV RBC AUTO: 81 FL — SIGNIFICANT CHANGE UP (ref 80–94)
NRBC # BLD: 0 /100 WBCS — SIGNIFICANT CHANGE UP (ref 0–0)
PLATELET # BLD AUTO: 389 K/UL — SIGNIFICANT CHANGE UP (ref 130–400)
POTASSIUM SERPL-MCNC: 4.9 MMOL/L — SIGNIFICANT CHANGE UP (ref 3.5–5)
POTASSIUM SERPL-SCNC: 4.9 MMOL/L — SIGNIFICANT CHANGE UP (ref 3.5–5)
RBC # BLD: 4.69 M/UL — LOW (ref 4.7–6.1)
RBC # FLD: 20.4 % — HIGH (ref 11.5–14.5)
SODIUM SERPL-SCNC: 145 MMOL/L — SIGNIFICANT CHANGE UP (ref 135–146)
WBC # BLD: 6.91 K/UL — SIGNIFICANT CHANGE UP (ref 4.8–10.8)
WBC # FLD AUTO: 6.91 K/UL — SIGNIFICANT CHANGE UP (ref 4.8–10.8)

## 2019-01-14 RX ORDER — FUROSEMIDE 40 MG
40 TABLET ORAL EVERY 12 HOURS
Qty: 0 | Refills: 0 | Status: DISCONTINUED | OUTPATIENT
Start: 2019-01-14 | End: 2019-01-18

## 2019-01-14 RX ORDER — MEROPENEM 1 G/30ML
500 INJECTION INTRAVENOUS ONCE
Qty: 0 | Refills: 0 | Status: DISCONTINUED | OUTPATIENT
Start: 2019-01-14 | End: 2019-01-14

## 2019-01-14 RX ADMIN — Medication 17 UNIT(S): at 08:00

## 2019-01-14 RX ADMIN — HEPARIN SODIUM 5000 UNIT(S): 5000 INJECTION INTRAVENOUS; SUBCUTANEOUS at 21:24

## 2019-01-14 RX ADMIN — HEPARIN SODIUM 5000 UNIT(S): 5000 INJECTION INTRAVENOUS; SUBCUTANEOUS at 13:21

## 2019-01-14 RX ADMIN — Medication 3 MILLILITER(S): at 19:11

## 2019-01-14 RX ADMIN — Medication 10 MILLIGRAM(S): at 06:20

## 2019-01-14 RX ADMIN — Medication 3 MILLILITER(S): at 13:10

## 2019-01-14 RX ADMIN — HEPARIN SODIUM 5000 UNIT(S): 5000 INJECTION INTRAVENOUS; SUBCUTANEOUS at 06:20

## 2019-01-14 RX ADMIN — Medication 500 MILLIGRAM(S): at 12:21

## 2019-01-14 RX ADMIN — ATORVASTATIN CALCIUM 20 MILLIGRAM(S): 80 TABLET, FILM COATED ORAL at 21:23

## 2019-01-14 RX ADMIN — Medication 1: at 12:20

## 2019-01-14 RX ADMIN — BUDESONIDE AND FORMOTEROL FUMARATE DIHYDRATE 2 PUFF(S): 160; 4.5 AEROSOL RESPIRATORY (INHALATION) at 21:29

## 2019-01-14 RX ADMIN — Medication 3 MILLILITER(S): at 07:46

## 2019-01-14 RX ADMIN — Medication 25 MILLIGRAM(S): at 18:50

## 2019-01-14 RX ADMIN — TIOTROPIUM BROMIDE 1 CAPSULE(S): 18 CAPSULE ORAL; RESPIRATORY (INHALATION) at 21:26

## 2019-01-14 RX ADMIN — Medication 17 UNIT(S): at 12:19

## 2019-01-14 RX ADMIN — Medication 25 MILLIGRAM(S): at 06:20

## 2019-01-14 RX ADMIN — INSULIN GLARGINE 32 UNIT(S): 100 INJECTION, SOLUTION SUBCUTANEOUS at 21:26

## 2019-01-14 RX ADMIN — Medication 40 MILLIGRAM(S): at 18:50

## 2019-01-14 RX ADMIN — Medication 1000 UNIT(S): at 12:20

## 2019-01-14 RX ADMIN — Medication 1: at 08:00

## 2019-01-14 RX ADMIN — SODIUM POLYSTYRENE SULFONATE 30 GRAM(S): 4.1 POWDER, FOR SUSPENSION ORAL at 08:45

## 2019-01-14 RX ADMIN — FINASTERIDE 5 MILLIGRAM(S): 5 TABLET, FILM COATED ORAL at 12:21

## 2019-01-14 RX ADMIN — ZINC OXIDE 1 APPLICATION(S): 200 OINTMENT TOPICAL at 06:29

## 2019-01-14 RX ADMIN — BUDESONIDE AND FORMOTEROL FUMARATE DIHYDRATE 2 PUFF(S): 160; 4.5 AEROSOL RESPIRATORY (INHALATION) at 07:47

## 2019-01-14 RX ADMIN — INSULIN GLARGINE 32 UNIT(S): 100 INJECTION, SOLUTION SUBCUTANEOUS at 08:28

## 2019-01-14 RX ADMIN — TAMSULOSIN HYDROCHLORIDE 0.4 MILLIGRAM(S): 0.4 CAPSULE ORAL at 21:23

## 2019-01-14 RX ADMIN — Medication 1 DROP(S): at 21:25

## 2019-01-14 RX ADMIN — Medication 1 DROP(S): at 13:19

## 2019-01-14 RX ADMIN — Medication 40 MILLIGRAM(S): at 06:20

## 2019-01-14 RX ADMIN — SENNA PLUS 2 TABLET(S): 8.6 TABLET ORAL at 21:23

## 2019-01-14 RX ADMIN — NYSTATIN CREAM 1 APPLICATION(S): 100000 CREAM TOPICAL at 06:21

## 2019-01-14 NOTE — PROGRESS NOTE ADULT - ASSESSMENT
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 He presented from [facility] with complaints of tachycardia and hypotension SBP in the 100s.    Acute on Chronic hypoxic respiratory failure: Due to end stage COPD exacerbation  - completed Levaquin and Prednisone on his most recent admission  - still requiring HFNC, tolerated BiPAP overnight and briefly placed on 5L NC this am, now on HFNC 40-45L/min   - will attempt to wean off HFNC prn to nasal O2  - c/w albuterol prn, Symbicort and prednisone 10 mg daily (home dose)  - pt stated that he is considering hospice care  - repeat CXR today, if signs of fluid overload and BP tolerate, will increase lasix    H/o Hyperkalemia  - c/w Low K diet & Kayaxalate 30 gm po TTS (3x week)     HTN  - BP borderline low, now improving  - c/w Metoprolol  - Lasix 40mg daily however doses have been held due to low BP  - Amlodipine on hold, will restart if BP elevated    Diabetes mellitus, type 2: Uncontrolled  - Hemoglobin A1C, Whole Blood: 8.7 % (11.05.18 @ 07:00)  - c/w lantus from 32 units BID and c/w lispro 17 before breakfast and dinner  - monitor FS and adjust prn     CKD stage IV  - stable  - Avoid Nephrotoxins. Monitor BMP and Electrolytes.  - CT abdomen: No hydronephrosis bilaterally or obstructing calculus. Stable bilateral renal cysts measuring up to 9.5 cm at   the right lower pole. Stable 4 cm indeterminate exophytic left interpolar renal lesion.    BPH:  - c/w Flomax and Finasteride.     Obesity  - Dietary modification, DASH, low K+ diet    B12 deficiency & Vitamin D deficiency  - c/w home supplementation    MICHAEL/OHS  - BiPAP at night    Severe Pulmonary Hypertension  - Supportive care  - ECHO 11/2018: Normal LVEF, no RWMA, Trace TR, Moderate pulmonary hypertension  - ECHO 12/25/18: severe Right side heart dilation, RSVP 100 (was 40 in 10/2018)    DVT prophylaxis:   - HSQ    Disposition: pt considering hospice care  CODE status: DNR/DNI  Very poor Prognosis

## 2019-01-15 DIAGNOSIS — Z22.322 CARRIER OR SUSPECTED CARRIER OF METHICILLIN RESISTANT STAPHYLOCOCCUS AUREUS: ICD-10-CM

## 2019-01-15 LAB
ANION GAP SERPL CALC-SCNC: 11 MMOL/L — SIGNIFICANT CHANGE UP (ref 7–14)
BUN SERPL-MCNC: 42 MG/DL — HIGH (ref 10–20)
CALCIUM SERPL-MCNC: 8.9 MG/DL — SIGNIFICANT CHANGE UP (ref 8.5–10.1)
CHLORIDE SERPL-SCNC: 102 MMOL/L — SIGNIFICANT CHANGE UP (ref 98–110)
CO2 SERPL-SCNC: 31 MMOL/L — SIGNIFICANT CHANGE UP (ref 17–32)
CREAT SERPL-MCNC: 1.8 MG/DL — HIGH (ref 0.7–1.5)
GLUCOSE BLDC GLUCOMTR-MCNC: 109 MG/DL — HIGH (ref 70–99)
GLUCOSE BLDC GLUCOMTR-MCNC: 111 MG/DL — HIGH (ref 70–99)
GLUCOSE BLDC GLUCOMTR-MCNC: 120 MG/DL — HIGH (ref 70–99)
GLUCOSE BLDC GLUCOMTR-MCNC: 163 MG/DL — HIGH (ref 70–99)
GLUCOSE SERPL-MCNC: 109 MG/DL — HIGH (ref 70–99)
HCT VFR BLD CALC: 40.5 % — LOW (ref 42–52)
HGB BLD-MCNC: 12 G/DL — LOW (ref 14–18)
MCHC RBC-ENTMCNC: 24 PG — LOW (ref 27–31)
MCHC RBC-ENTMCNC: 29.6 G/DL — LOW (ref 32–37)
MCV RBC AUTO: 80.8 FL — SIGNIFICANT CHANGE UP (ref 80–94)
NRBC # BLD: 0 /100 WBCS — SIGNIFICANT CHANGE UP (ref 0–0)
PLATELET # BLD AUTO: 390 K/UL — SIGNIFICANT CHANGE UP (ref 130–400)
POTASSIUM SERPL-MCNC: 5.1 MMOL/L — HIGH (ref 3.5–5)
POTASSIUM SERPL-SCNC: 5.1 MMOL/L — HIGH (ref 3.5–5)
RBC # BLD: 5.01 M/UL — SIGNIFICANT CHANGE UP (ref 4.7–6.1)
RBC # FLD: 20.5 % — HIGH (ref 11.5–14.5)
SODIUM SERPL-SCNC: 144 MMOL/L — SIGNIFICANT CHANGE UP (ref 135–146)
WBC # BLD: 7.93 K/UL — SIGNIFICANT CHANGE UP (ref 4.8–10.8)
WBC # FLD AUTO: 7.93 K/UL — SIGNIFICANT CHANGE UP (ref 4.8–10.8)

## 2019-01-15 RX ADMIN — SENNA PLUS 2 TABLET(S): 8.6 TABLET ORAL at 21:50

## 2019-01-15 RX ADMIN — Medication 17 UNIT(S): at 08:17

## 2019-01-15 RX ADMIN — NYSTATIN CREAM 1 APPLICATION(S): 100000 CREAM TOPICAL at 17:24

## 2019-01-15 RX ADMIN — HEPARIN SODIUM 5000 UNIT(S): 5000 INJECTION INTRAVENOUS; SUBCUTANEOUS at 21:50

## 2019-01-15 RX ADMIN — Medication 3 MILLILITER(S): at 23:05

## 2019-01-15 RX ADMIN — Medication 1000 UNIT(S): at 11:17

## 2019-01-15 RX ADMIN — Medication 40 MILLIGRAM(S): at 06:40

## 2019-01-15 RX ADMIN — ATORVASTATIN CALCIUM 20 MILLIGRAM(S): 80 TABLET, FILM COATED ORAL at 21:50

## 2019-01-15 RX ADMIN — ZINC OXIDE 1 APPLICATION(S): 200 OINTMENT TOPICAL at 06:32

## 2019-01-15 RX ADMIN — TAMSULOSIN HYDROCHLORIDE 0.4 MILLIGRAM(S): 0.4 CAPSULE ORAL at 21:50

## 2019-01-15 RX ADMIN — BUDESONIDE AND FORMOTEROL FUMARATE DIHYDRATE 2 PUFF(S): 160; 4.5 AEROSOL RESPIRATORY (INHALATION) at 21:49

## 2019-01-15 RX ADMIN — Medication 1: at 17:25

## 2019-01-15 RX ADMIN — Medication 1 DROP(S): at 06:40

## 2019-01-15 RX ADMIN — CHLORHEXIDINE GLUCONATE 1 APPLICATION(S): 213 SOLUTION TOPICAL at 08:15

## 2019-01-15 RX ADMIN — HEPARIN SODIUM 5000 UNIT(S): 5000 INJECTION INTRAVENOUS; SUBCUTANEOUS at 13:56

## 2019-01-15 RX ADMIN — FINASTERIDE 5 MILLIGRAM(S): 5 TABLET, FILM COATED ORAL at 11:17

## 2019-01-15 RX ADMIN — Medication 1 DROP(S): at 13:57

## 2019-01-15 RX ADMIN — TIOTROPIUM BROMIDE 1 CAPSULE(S): 18 CAPSULE ORAL; RESPIRATORY (INHALATION) at 21:50

## 2019-01-15 RX ADMIN — BUDESONIDE AND FORMOTEROL FUMARATE DIHYDRATE 2 PUFF(S): 160; 4.5 AEROSOL RESPIRATORY (INHALATION) at 08:15

## 2019-01-15 RX ADMIN — Medication 3 MILLILITER(S): at 08:16

## 2019-01-15 RX ADMIN — Medication 25 MILLIGRAM(S): at 17:23

## 2019-01-15 RX ADMIN — HEPARIN SODIUM 5000 UNIT(S): 5000 INJECTION INTRAVENOUS; SUBCUTANEOUS at 06:40

## 2019-01-15 RX ADMIN — Medication 10 MILLIGRAM(S): at 06:40

## 2019-01-15 RX ADMIN — Medication 17 UNIT(S): at 17:25

## 2019-01-15 RX ADMIN — Medication 500 MILLIGRAM(S): at 11:17

## 2019-01-15 RX ADMIN — Medication 3 MILLILITER(S): at 19:49

## 2019-01-15 RX ADMIN — INSULIN GLARGINE 32 UNIT(S): 100 INJECTION, SOLUTION SUBCUTANEOUS at 08:17

## 2019-01-15 RX ADMIN — Medication 3 MILLILITER(S): at 13:06

## 2019-01-15 RX ADMIN — Medication 40 MILLIGRAM(S): at 17:23

## 2019-01-15 RX ADMIN — NYSTATIN CREAM 1 APPLICATION(S): 100000 CREAM TOPICAL at 06:41

## 2019-01-15 RX ADMIN — Medication 25 MILLIGRAM(S): at 06:40

## 2019-01-15 RX ADMIN — Medication 3 MILLILITER(S): at 01:10

## 2019-01-15 RX ADMIN — ZINC OXIDE 1 APPLICATION(S): 200 OINTMENT TOPICAL at 17:30

## 2019-01-15 RX ADMIN — Medication 1 DROP(S): at 21:49

## 2019-01-15 RX ADMIN — Medication 17 UNIT(S): at 12:07

## 2019-01-15 NOTE — PROGRESS NOTE ADULT - ASSESSMENT
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 He presented from [facility] with complaints of tachycardia and hypotension SBP in the 100s.    Acute on Chronic hypoxic respiratory failure: Due to end stage COPD exacerbation, possible HFpEF and severe pulm HTN  - completed Levaquin and Prednisone on his most recent admission  - still requiring HFNC, tolerated BiPAP overnight and briefly placed on 5L NC this am, now on HFNC 40-45L/min   - will attempt to wean off HFNC prn to nasal O2  - c/w albuterol prn, Symbicort and prednisone 10 mg daily (home dose)  - pt stated that he is considering hospice care  - CXR yesterday showed unchanged pulm vascular congestion   - increased lasix to 40mg BID yesterday as pt BP improved, reported improvement in breathing    H/o Hyperkalemia  - c/w Low K diet & Kayaxalate 30 gm po TTS (3x week)     HTN  - BP borderline low, now improving  - c/w Metoprolol  - Amlodipine on hold, will restart if BP elevated    Colonization with MDR bacteria of UA  - per ID, clinically no UTI  - no dysuria or fevers or incr wbc  - NO abx per ID recs    Diabetes mellitus, type 2  - Hemoglobin A1C, Whole Blood: 8.7 % (11.05.18 @ 07:00)  - c/w lantus from 32 units BID and c/w lispro 17 before breakfast and dinner  - monitor FS and adjust prn     CKD stage IV  - stable  - Avoid Nephrotoxins. Monitor BMP and Electrolytes.  - CT abdomen: No hydronephrosis bilaterally or obstructing calculus. Stable bilateral renal cysts measuring up to 9.5 cm at   the right lower pole. Stable 4 cm indeterminate exophytic left interpolar renal lesion.    BPH:  - c/w Flomax and Finasteride.     Obesity  - Dietary modification, DASH, low K+ diet    B12 deficiency & Vitamin D deficiency  - c/w home supplementation    MICHAEL/OHS  - BiPAP at night    Severe Pulmonary Hypertension  - Supportive care  - ECHO 11/2018: Normal LVEF, no RWMA, Trace TR, Moderate pulmonary hypertension  - ECHO 12/25/18: severe Right side heart dilation, RSVP 100 (was 40 in 10/2018)    DVT prophylaxis:   - HSQ    Disposition: Discussed with hospice team today as pt now on 5L NC. They will re-eval the patient in am  CODE status: DNR/DNI  Very poor Prognosis

## 2019-01-15 NOTE — CONSULT NOTE ADULT - PROBLEM SELECTOR RECOMMENDATION 9
Clinically NO UTI   no dysuria or fevers or incr wbc    NO abx    If symptomatic - IV Ertapenem - 7 days    Recall if needed

## 2019-01-15 NOTE — CONSULT NOTE ADULT - SUBJECTIVE AND OBJECTIVE BOX
JIAN MANCIA 78yMalePatient is a 78y old  Male who presents with a chief complaint of Acute Hypoxic Respiratory Failure (08 Jan 2019 11:15)      Patient has history of:  aspirin (Other)  fish (Other)  iodine (Hives)  penicillin (Unknown)  shellfish (Other)    PMH - BPH    FSH - ex smoker    ROS - NO dysuria   No fevers      PHYSICAL EXAM  T(F): 97.4 (01-15-19 @ 06:09), Max: 97.8 (01-14-19 @ 14:30)  HR: 90 (01-15-19 @ 06:09) (90 - 109)  BP: 133/65 (01-15-19 @ 06:09) (118/63 - 143/81)  RR: 18 (01-15-19 @ 06:09) (18 - 18)  SpO2: 93% (01-14-19 @ 09:12) (93% - 93%)  Daily     Daily     awake and alert   HEENT: normal, no nuchal rigidity  Cor: RSR Nl S1 S2  Lungs: few rhonchi   Abdomen: Nontender, Nl BS,   Ext: No phlebitis     LAB & RADIOLOGIC RESULTS:                        11.2   6.91  )-----------( 389      ( 14 Jan 2019 06:36 )             38.0         01-14    145  |  103  |  43<H>  ----------------------------<  172<H>  4.9   |  34<H>  |  1.9<H>    Ca    8.8      14 Jan 2019 06:36        Progress Notes - Care Coordination [C. Provider] (01-14-19 @ 17:17)  Progress Note Adult-Hospitalist Attending [LAILA Pina] (01-14-19 @ 13:35)  Chart Note-Event Note RT [JENIFFER Enrique] (01-14-19 @ 09:05)  Progress Notes - Care Coordination [C. Provider] (01-14-19 @ 08:52)  Chart Note-Event Note RD [LAILA Pina, HANG Gallego] (01-13-19 @ 12:15)  Dietitian Initial Evaluation Adult [HANG Nunezin] (01-13-19 @ 11:55)  Progress Note Adult-Hospitalist Attending [LAILA Pina] (01-13-19 @ 10:41)  DownTime Interruption Note [JENIFFER Freeman] (01-13-19 @ 04:14)  Provider Contact Note (Other) [KRISTIAN Phillips] (01-12-19 @ 09:36)  Progress Note Adult-Hospitalist Attending [LAILA Pina] (01-12-19 @ 09:33)  Progress Note Adult-Hospitalist Attending [LAILA Pina] (01-11-19 @ 13:23)  Progress Notes - Care Coordination [C. Provider] (01-11-19 @ 09:50)  Progress Note Adult-Hospitalist Attending [LAILA Pina] (01-10-19 @ 15:38)  Progress Notes - Care Coordination [C. Provider] (01-10-19 @ 11:47)  Care Coordination Assessment [C. Provider] (01-10-19 @ 10:03)  Progress Note Adult-Hospitalist Attending [LAILA Pina] (01-09-19 @ 15:43)  Provider Contact Note (Other) [ANT Darden] (01-09-19 @ 12:34)  Progress Notes - Care Coordination [C. Provider] (01-09-19 @ 12:22)  Progress Notes - Care Coordination [C. Provider] (01-08-19 @ 12:33)  Progress Note Adult-Hospitalist Attending [HANG Bolden] (01-08-19 @ 11:15)  Progress Note Adult-Hospitalist Attending [HANG Bolden] (01-07-19 @ 13:43)  Change in Status Notification [ANT Ramos] (01-07-19 @ 04:39)  ED Clerical [LAILA Cai] (01-06-19 @ 05:50)  Chart Note-Event Note Attending [JENNIFER Aguilera] (01-06-19 @ 05:43)  ED ADULT Nurse Note [WOLF Gallardo] (01-06-19 @ 04:23)  Patient Profile Adult [N. Caserta] (01-06-19 @ 03:56)  Outpatient Clinical Summary - Medical  Group [O. Provider] (01-06-19 @ 03:33)  H&P Adult [JENNIFER Aguilera] (01-06-19 @ 03:08)  ED Provider Note [WOLF Flanagan] (01-06-19 @ 00:41)  ED ADULT Triage Note [WOLF Gallardo] (01-06-19 @ 00:32)  Outpatient Clinical Summary - Medical  Group [O. Provider] (01-05-19 @ 23:56)  Progress Notes - Care Coordination [C. Provider] (01-03-19 @ 13:17)  Progress Notes - Care Coordination [C. Provider] (01-03-19 @ 10:25)  Progress Note Adult-Hospitalist Attending [HANG Bolden] (01-03-19 @ 09:33)  Provider Contact Note (Other) [ANT Darden] (01-02-19 @ 19:51)  Progress Notes - Care Coordination [C. Provider] (01-02-19 @ 17:24)  Progress Notes - Care Coordination [C. Provider] (01-02-19 @ 15:56)  Progress Note Adult-Hospitalist Attending [HANG Bolden] (01-02-19 @ 14:29)  Progress Notes - Care Coordination [C. Provider] (01-02-19 @ 11:25)  Progress Note Adult-Hospitalist Attending [LAILA Omalley] (01-01-19 @ 15:52)  Progress Note Adult-Hospitalist Attending [LAILA Omalley] (12-31-18 @ 16:24)  Chart Note-Event Note RT [JENIFFER Natan] (12-31-18 @ 08:49)  Chart Note-Event Note RD [HANG Lashonda] (12-30-18 @ 15:11)  Progress Note Adult-Hospitalist Attending [LAILA Omalley] (12-30-18 @ 14:09)  Progress Notes - Care Coordination [C. Provider] (12-29-18 @ 12:26)  Progress Note Adult-Hospitalist Attending [LAILA Omalley] (12-29-18 @ 11:07)  Progress Notes - Care Coordination [C. Provider] (12-28-18 @ 15:50)  Progress Note Adult-Hospitalist Attending [LAILA Omalley] (12-28-18 @ 15:41)  Progress Notes - Care Coordination [C. Provider] (12-28-18 @ 14:56)  Progress Notes - Care Coordination [C. Provider] (12-28-18 @ 14:32)  Progress Note Adult-Pulmonology Attending [ANT Trevino] (12-28-18 @ 11:19)  Progress Notes - Care Coordination [C. Provider] (12-28-18 @ 10:07)  Discharge Note Adult [HANG Fagan, ANT Le] (12-28-18 @ 07:39)  Progress Note Adult-Hospitalist Attending [LAILA Omalley] (12-27-18 @ 18:00)  Progress Notes - Care Coordination [C. Provider] (12-27-18 @ 11:15)  Progress Note Adult-Pulmonology Attending [ANT Trevino] (12-27-18 @ 10:59)  Chart Note-Event Note RT [E. Bhadmus] (12-27-18 @ 10:07)  Chart Note-Event Note RT [E. Bhadmus] (12-27-18 @ 07:34)  Progress Note Adult-Hospitalist Attending [LAILA Omalley] (12-26-18 @ 17:09)  Progress Notes - Care Coordination [C. Provider] (12-26-18 @ 16:44)  Chart Note-Event Note RT [E. Bhadmus] (12-26-18 @ 07:53)  Progress Note Adult-Hospitalist Attending [HANG Bolden] (12-25-18 @ 23:31)  Progress Notes - Care Coordination [C. Provider] (12-24-18 @ 15:32)  Progress Note Adult-Hospitalist Attending [ANT Kraus] (12-24-18 @ 14:14)  Progress Notes - Care Coordination [C. Provider] (12-24-18 @ 12:01)  Progress Notes - Care Coordination [C. Provider] (12-24-18 @ 10:24)  Progress Note Adult-Hospitalist Attending [T. Gut] (12-23-18 @ 11:45)  Chart Note-Event Note RD [ARTI Hoffmann] (12-23-18 @ 11:39)  Progress Note Adult-Surgery Attending [FABRICIO Veloz] (12-22-18 @ 22:27)  Progress Note Adult-Hospitalist Attending [T. Gut] (12-22-18 @ 12:01)  Progress Notes - Care Coordination [C. Provider] (12-22-18 @ 11:46)  Progress Notes - Care Coordination [C. Provider] (12-22-18 @ 11:44)  Progress Notes - Care Coordination [C. Provider] (12-22-18 @ 08:11)  Progress Note Adult-Nephrology Attending [HANG Ortiz] (12-22-18 @ 06:57)  Progress Note Adult-Pulmonology Attending [CORAZON Castrejon] (12-21-18 @ 18:27)  Progress Notes - Care Coordination [C. Provider] (12-21-18 @ 16:38)  Progress Notes - Care Coordination [C. Provider] (12-21-18 @ 16:00)  Progress Notes - Care Coordination [C. Provider] (12-21-18 @ 13:58)  Progress Notes - Care Coordination [C. Provider] (12-21-18 @ 12:38)  Progress Note Adult-Hospitalist Attending [T. Gut] (12-21-18 @ 12:04)  Progress Notes - Care Coordination [C. Provider] (12-21-18 @ 10:29)  Provider Contact Note (Medication) [HANG Sullivanand] (12-21-18 @ 06:01)  Provider Contact Note (Other) [HANG Jensen] (12-20-18 @ 22:15)  Progress Note Adult-Hospitalist Attending [T. Gut] (12-20-18 @ 14:01)  Chart Note-Event Note RT [E. Bhadmus] (12-20-18 @ 09:28)  Progress Notes - Care Coordination [C. Provider] (12-20-18 @ 08:07)  Chart Note-Event Note RT [E. Bhadmus] (12-19-18 @ 14:46)  Progress Note Adult-Surgery PA/Attending [FABRICIO Marcano] (12-19-18 @ 13:55)  Progress Note Adult-Hospitalist Attending [T. Gut] (12-19-18 @ 13:20)  Progress Note Adult-Surgery Attending [FABRICIO Veloz] (12-18-18 @ 22:15)  Procedure Note-Incision and Drainage [ARTI Puri] (12-18-18 @ 16:35)  Progress Note Adult-Hospitalist Attending [T. Gut] (12-18-18 @ 13:39)  Progress Note Adult-Pulmonology Attending [CORAZON Castrejon] (12-18-18 @ 11:07)  Progress Notes - Care Coordination [C. Provider] (12-18-18 @ 08:48)  Progress Note Adult-Surgery Attending [FABRICIO Veloz] (12-17-18 @ 23:51)  Progress Note Adult-Hospitalist Attending [ANT Kraus] (12-17-18 @ 14:28)  Consult Note Adult-Surgery PA/Attending [KRISTIAN Puri] (12-16-18 @ 11:40)  Progress Note Adult-Internal Medicine Attending [CORAZON Flores] (12-16-18 @ 10:57)  Progress Note Adult-Internal Medicine Attending [CORAZON Flores] (12-15-18 @ 11:53)  Progress Note Adult-Nephrology Attending [HANG Ortiz] (12-14-18 @ 18:07)  Progress Note Adult-Hospitalist Attending [FABRICIO Ramos] (12-14-18 @ 15:09)  Progress Note Adult-Pulmonology Attending [ANT Trevino] (12-14-18 @ 10:14)  Progress Note Adult-Hospitalist Attending [LAILA Omalley] (12-13-18 @ 18:54)  Progress Notes - Care Coordination [C. Provider] (12-13-18 @ 16:17)  Dietitian Initial Evaluation Adult [ARTI Hoffmann] (12-13-18 @ 16:17)  Progress Notes - Care Coordination [C. Provider] (12-13-18 @ 09:15)  Progress Note Adult-Nephrology Attending [HANG Ortiz] (12-13-18 @ 08:32)  Progress Note Adult-Hospitalist Attending [LAILA Omalley] (12-12-18 @ 18:57)  Progress Note Adult-Pulmonology Attending [ANT Trevino] (12-12-18 @ 13:01)  Progress Note Adult-Nephrology Attending [HANG Ortiz] (12-12-18 @ 08:39)  Consult Note Adult-Physiatry Attending [HANG Rhodes] (12-11-18 @ 17:29)  Physical Therapy Initial Evaluation Adult [D. Mitabesi] (12-11-18 @ 15:40)  Progress Note Adult-Hospitalist Attending [HANG Bolden] (12-11-18 @ 12:15)  Progress Note Adult-Nephrology Attending [HANG Ortiz] (12-11-18 @ 09:07)  Progress Note Adult-Hospitalist Attending [HANG Bolden] (12-10-18 @ 12:47)  Consult Note Adult-Nephrology Attending [HANG Ortiz] (12-10-18 @ 08:55)  Progress Note Adult-Hospitalist Attending [HANG Bolden] (12-09-18 @ 21:41)  Consult Note Adult-Pulmonology Attending [ANT Trevino] (12-09-18 @ 09:44)  Care Coordination Assessment [C. Provider] (12-08-18 @ 16:41)  Progress Notes - Care Coordination [C. Provider] (12-08-18 @ 16:30)  Patient Profile Adult [HANG Santiago] (12-08-18 @ 11:51)  H&P Adult [HANG Bolden] (12-08-18 @ 10:50)  Outpatient Clinical Summary - Medical  Group [O. Provider] (12-08-18 @ 10:48)  ED Clerical [HANG Segovia] (12-08-18 @ 10:15)  ED Provider Note [JAH Cohen R. Marchese] (12-08-18 @ 08:28)  ED ADULT Nurse Note [KRISTIAN Cedillo] (12-08-18 @ 08:05)  ED ADULT Triage Note [KRISTIAN Wesley] (12-08-18 @ 08:01)  Outpatient Clinical Summary - Medical  Group [O. Provider] (12-08-18 @ 07:58)  Progress Notes - Care Coordination [C. Provider] (11-16-18 @ 09:28)  Progress Notes - Care Coordination [C. Provider] (11-15-18 @ 15:37)  Progress Notes - Care Coordination [C. Provider] (11-15-18 @ 10:53)  Progress Note Adult-Hospitalist Attending [WOLF Solitario] (11-15-18 @ 09:18)  Progress Notes - Care Coordination [C. Provider] (11-14-18 @ 17:14)  Progress Note Adult-Hospitalist Attending [WOLF Solitario] (11-14-18 @ 14:29)  Chart Note-NIV Attending [WOLF Solitario] (11-14-18 @ 11:39)  Progress Note Adult-Pulmonology Attending [CORAZON Castrejon] (11-14-18 @ 09:07)  Progress Notes - Care Coordination [C. Provider] (11-13-18 @ 15:34)  Progress Note Adult-Hospitalist Attending [WOLF Solitario] (11-13-18 @ 14:30)  Progress Notes - Care Coordination [C. Provider] (11-12-18 @ 15:55)  Progress Note Adult-Hospitalist Attending [WOLF Solitario] (11-12-18 @ 14:22)  Progress Notes - Care Coordination [C. Provider] (11-11-18 @ 15:54)  Progress Notes - Care Coordination [C. Provider] (11-11-18 @ 13:41)  Dietitian Initial Evaluation Adult [ARTI Hoffmann] (11-11-18 @ 12:35)  Progress Note Adult-Hospitalist Attending [JENIFFER Malik] (11-11-18 @ 10:12)  Progress Note Adult-Hospitalist Attending [JENIFFER Malik] (11-10-18 @ 09:54)  Progress Note Adult-Hospitalist Attending [JENIFFER Malik] (11-09-18 @ 14:06)  Discharge Note Adult [JAH WOLF Vences] (11-08-18 @ 16:26)  Progress Note Adult-Hospitalist Attending [WOLF Solitario] (11-08-18 @ 14:35)  Progress Notes - Care Coordination [C. Provider] (11-08-18 @ 13:50)  Physical Therapy Initial Evaluation Adult [ADELINA Posadas] (11-08-18 @ 10:52)  Progress Note Adult-Infectious Disease Attending [LAILA High] (11-08-18 @ 07:16)  Progress Note Adult-Infectious Disease Attending [HANG Lubin] (11-07-18 @ 23:56)  Pharmacy Intervention Note [CORAZON Ramirez] (11-07-18 @ 11:14)  Progress Note Adult-Pulmonology Attending [CORAZON Castrejon] (11-07-18 @ 11:01)  Progress Note Adult-Hospitalist Attending [WOLF Solitario] (11-07-18 @ 10:42)  Care Coordination Assessment [C. Provider] (11-06-18 @ 13:35)  Progress Note Adult-Hospitalist Attending [WOLF Solitario] (11-06-18 @ 13:15)  Progress Notes - Care Coordination [C. Provider] (11-06-18 @ 13:11)  Progress Note Adult-Pulmonology Attending [CORAZON Castrejon] (11-06-18 @ 11:10)  Pharmacy Intervention Note [LAILA Krishnan] (11-06-18 @ 09:21)  Consult Note Adult-Infectious Disease Attending [LAILA High] (11-06-18 @ 09:07)  Progress Note Adult-Hospitalist Attending [WOLF Solitario] (11-05-18 @ 16:24)  Consult Note Adult-Pulmonology Fellow/Attending [CORAZON Loco] (11-05-18 @ 09:50)  Patient Profile Adult [LAILA Conn] (11-04-18 @ 12:39)  H&P Adult [FABRICIO KRISTIAN Gallegos] (11-04-18 @ 12:21)  Outpatient Clinical Summary - Medical  Group [O. Provider] (11-04-18 @ 10:24)  ED ADULT Nurse Reassessment Note [ARTI Salazar] (11-04-18 @ 10:02)  ED Provider Note [HANG Brink] (11-04-18 @ 07:46)  ED ADULT Nurse Note [ANT Abernathy] (11-04-18 @ 07:44)  ED ADULT Triage Note [ANT Abernathy] (11-04-18 @ 07:39)  ED Clerical [HANG Segovia] (10-14-18 @ 16:44)  ED Provider Note [HANG Clayton] (10-14-18 @ 14:48)  ED ADULT Nurse Note [KRITSIAN Cornejo] (10-14-18 @ 13:25)  ED ADULT Triage Note [KRISTIAN Wesley] (10-14-18 @ 13:22)  Outpatient Clinical Summary - Medical  Group [O. Provider] (10-08-18 @ 20:58)  Chart Note-Event Note Attending [WOLF Solitario] (09-10-18 @ 15:29)  Progress Notes - Care Coordination [C. Provider] (08-02-18 @ 10:11)  Progress Notes - Care Coordination [C. Provider] (08-02-18 @ 10:10)  Discharge Note Adult [HEATHER HANG Amezquita] (08-01-18 @ 14:27)  Care Coordination Assessment [C. Provider] (08-01-18 @ 11:55)  Progress Notes - Care Coordination [C. Provider] (08-01-18 @ 11:38)  Progress Note Adult-Hospitalist Attending [HANG Bolden] (08-01-18 @ 11:24)  Progress Notes - Care Coordination [C. Provider] (07-31-18 @ 09:29)  Progress Note Adult-Hospitalist Attending [JENIFFER Romero] (07-31-18 @ 08:45)  Patient Profile Adult [ARTI Oliveros] (07-30-18 @ 23:21)  H&P Adult [JAH Dobbins] (07-30-18 @ 21:15)  Outpatient Clinical Summary - Medical  Group [O. Provider] (07-30-18 @ 20:50)  ED Provider Note [KRISTIAN Clayton N. Andharia] (07-30-18 @ 18:12)  ED Clerical [KRISTIAN Zamudio] (07-30-18 @ 17:02)  ED ADULT Triage Note [ILA Villa] (07-30-18 @ 16:46)  ED ADULT Nurse Note [WOLF Aguilar] (07-30-18 @ 16:36)  Outpatient Clinical Summary - Medical  Group [O. Provider] (07-30-18 @ 16:36)  Progress Notes - Care Coordination [C. Provider] (07-29-18 @ 15:28)  Chart Note-Chart Note Pulmonary Attending [KRISTIAN Schneider] (07-28-18 @ 17:20)  Progress Note Adult-Hospitalist Attending [FABRICIO Gallegos] (07-28-18 @ 15:38)  Provider Contact Note (Other) [KRISTIAN Zhang] (07-28-18 @ 11:43)  Dietitian Initial Evaluation Adult [LAILA Rosario] (07-27-18 @ 17:04)  Progress Notes - Care Coordination [C. Provider] (07-27-18 @ 17:04)  Progress Note Adult-Hospitalist Attending [WOLF Phan] (07-27-18 @ 16:55)  Discharge Note Adult [WOLF Ramírez SDeclan Stafford] (07-26-18 @ 14:29)  Progress Note Adult-Hospitalist Attending [WOLF Solitario] (07-26-18 @ 12:51)  Progress Notes - Care Coordination [C. Provider] (07-26-18 @ 12:09)  Progress Note Adult-Hospitalist Attending [WOLF Solitario] (07-25-18 @ 11:11)  Progress Note Adult-Hospitalist Attending [WOLF Solitario] (07-24-18 @ 12:51)  Progress Note Adult-Infectious Disease Attending [LAILA High] (07-24-18 @ 08:50)  Care Coordination Assessment [C. Provider] (07-23-18 @ 16:06)  Progress Notes - Care Coordination [C. Provider] (07-23-18 @ 15:56)  Progress Note Adult-Hospitalist Attending [WOLF Solitario] (07-23-18 @ 14:03)  Chart Note-Event Note PPD RT [KRISTIAN Carlin] (07-23-18 @ 11:57)  Consult Note Adult-Infectious Disease Attending [KRISTIAN Hogan] (07-23-18 @ 10:52)  Consult Note Adult-Pulmonology Attending [KRISTIAN Schneider] (07-22-18 @ 17:10)  Progress Note Adult-Hospitalist Attending [ANT Kraus] (07-22-18 @ 11:36)  Progress Note Adult-Hospitalist Attending [TDeclan Gut] (07-21-18 @ 11:44)  Patient Profile Adult [JENIFFER Egan] (07-21-18 @ 00:19)  ED ADULT Nurse Reassessment Note [CORAZON Luz] (07-20-18 @ 23:49)  Outpatient Clinical Summary - Medical  Group [O. Provider] (07-20-18 @ 23:40)  H&P Adult [CORAZON Mayorga] (07-20-18 @ 23:36)  ED Provider Note [WOLF Ohara] (07-20-18 @ 22:23)  Outpatient Clinical Summary - Medical  Group [O. Provider] (07-20-18 @ 22:23)  ED ADULT Nurse Note [F. Amico, R. Dolan] (07-20-18 @ 20:23)  ED ADULT Triage Note [F. Amico] (07-20-18 @ 20:21)  ED Provider Note [THERON Ohara S. Lekperic, S. Sommers] (06-18-18 @ 02:40)  ED ADULT Nurse Note [LAILA Lee] (06-18-18 @ 02:05)  ED ADULT Triage Note [LAILA Lee] (06-18-18 @ 01:59)  ED Provider Note [CORAZON Ibarra] (06-16-18 @ 14:07)  ED ADULT Nurse Note [CORAZON Abernathy] (06-16-18 @ 11:11)  ED ADULT Triage Note [ILA Parry] (06-16-18 @ 07:32)    UA - positive    Urine Cx - noted

## 2019-01-16 LAB
ANION GAP SERPL CALC-SCNC: 10 MMOL/L — SIGNIFICANT CHANGE UP (ref 7–14)
BUN SERPL-MCNC: 45 MG/DL — HIGH (ref 10–20)
CALCIUM SERPL-MCNC: 9.1 MG/DL — SIGNIFICANT CHANGE UP (ref 8.5–10.1)
CHLORIDE SERPL-SCNC: 98 MMOL/L — SIGNIFICANT CHANGE UP (ref 98–110)
CO2 SERPL-SCNC: 37 MMOL/L — HIGH (ref 17–32)
CREAT SERPL-MCNC: 2.1 MG/DL — HIGH (ref 0.7–1.5)
GLUCOSE BLDC GLUCOMTR-MCNC: 115 MG/DL — HIGH (ref 70–99)
GLUCOSE BLDC GLUCOMTR-MCNC: 153 MG/DL — HIGH (ref 70–99)
GLUCOSE BLDC GLUCOMTR-MCNC: 286 MG/DL — HIGH (ref 70–99)
GLUCOSE BLDC GLUCOMTR-MCNC: 305 MG/DL — HIGH (ref 70–99)
GLUCOSE SERPL-MCNC: 159 MG/DL — HIGH (ref 70–99)
HCT VFR BLD CALC: 40.3 % — LOW (ref 42–52)
HGB BLD-MCNC: 11.9 G/DL — LOW (ref 14–18)
MCHC RBC-ENTMCNC: 23.8 PG — LOW (ref 27–31)
MCHC RBC-ENTMCNC: 29.5 G/DL — LOW (ref 32–37)
MCV RBC AUTO: 80.6 FL — SIGNIFICANT CHANGE UP (ref 80–94)
NRBC # BLD: 0 /100 WBCS — SIGNIFICANT CHANGE UP (ref 0–0)
PLATELET # BLD AUTO: 507 K/UL — HIGH (ref 130–400)
POTASSIUM SERPL-MCNC: 4.7 MMOL/L — SIGNIFICANT CHANGE UP (ref 3.5–5)
POTASSIUM SERPL-SCNC: 4.7 MMOL/L — SIGNIFICANT CHANGE UP (ref 3.5–5)
RBC # BLD: 5 M/UL — SIGNIFICANT CHANGE UP (ref 4.7–6.1)
RBC # FLD: 20.5 % — HIGH (ref 11.5–14.5)
SODIUM SERPL-SCNC: 145 MMOL/L — SIGNIFICANT CHANGE UP (ref 135–146)
WBC # BLD: 8.2 K/UL — SIGNIFICANT CHANGE UP (ref 4.8–10.8)
WBC # FLD AUTO: 8.2 K/UL — SIGNIFICANT CHANGE UP (ref 4.8–10.8)

## 2019-01-16 RX ADMIN — Medication 3 MILLILITER(S): at 13:47

## 2019-01-16 RX ADMIN — Medication 1 DROP(S): at 21:27

## 2019-01-16 RX ADMIN — Medication 17 UNIT(S): at 13:39

## 2019-01-16 RX ADMIN — Medication 3 MILLILITER(S): at 02:57

## 2019-01-16 RX ADMIN — Medication 1 DROP(S): at 13:40

## 2019-01-16 RX ADMIN — Medication 25 MILLIGRAM(S): at 06:07

## 2019-01-16 RX ADMIN — TAMSULOSIN HYDROCHLORIDE 0.4 MILLIGRAM(S): 0.4 CAPSULE ORAL at 21:25

## 2019-01-16 RX ADMIN — HEPARIN SODIUM 5000 UNIT(S): 5000 INJECTION INTRAVENOUS; SUBCUTANEOUS at 13:40

## 2019-01-16 RX ADMIN — Medication 25 MILLIGRAM(S): at 17:22

## 2019-01-16 RX ADMIN — Medication 500 MILLIGRAM(S): at 13:39

## 2019-01-16 RX ADMIN — Medication 40 MILLIGRAM(S): at 06:07

## 2019-01-16 RX ADMIN — Medication 17 UNIT(S): at 17:21

## 2019-01-16 RX ADMIN — HEPARIN SODIUM 5000 UNIT(S): 5000 INJECTION INTRAVENOUS; SUBCUTANEOUS at 21:25

## 2019-01-16 RX ADMIN — BUDESONIDE AND FORMOTEROL FUMARATE DIHYDRATE 2 PUFF(S): 160; 4.5 AEROSOL RESPIRATORY (INHALATION) at 13:45

## 2019-01-16 RX ADMIN — Medication 3 MILLILITER(S): at 09:03

## 2019-01-16 RX ADMIN — Medication 4: at 13:38

## 2019-01-16 RX ADMIN — ZINC OXIDE 1 APPLICATION(S): 200 OINTMENT TOPICAL at 17:23

## 2019-01-16 RX ADMIN — SENNA PLUS 2 TABLET(S): 8.6 TABLET ORAL at 21:25

## 2019-01-16 RX ADMIN — NYSTATIN CREAM 1 APPLICATION(S): 100000 CREAM TOPICAL at 06:07

## 2019-01-16 RX ADMIN — FINASTERIDE 5 MILLIGRAM(S): 5 TABLET, FILM COATED ORAL at 13:39

## 2019-01-16 RX ADMIN — ATORVASTATIN CALCIUM 20 MILLIGRAM(S): 80 TABLET, FILM COATED ORAL at 21:25

## 2019-01-16 RX ADMIN — Medication 40 MILLIGRAM(S): at 17:22

## 2019-01-16 RX ADMIN — Medication 3 MILLILITER(S): at 18:36

## 2019-01-16 RX ADMIN — ZINC OXIDE 1 APPLICATION(S): 200 OINTMENT TOPICAL at 06:08

## 2019-01-16 RX ADMIN — Medication 3: at 17:21

## 2019-01-16 RX ADMIN — HEPARIN SODIUM 5000 UNIT(S): 5000 INJECTION INTRAVENOUS; SUBCUTANEOUS at 06:08

## 2019-01-16 RX ADMIN — Medication 10 MILLIGRAM(S): at 06:07

## 2019-01-16 RX ADMIN — Medication 1 DROP(S): at 06:07

## 2019-01-16 RX ADMIN — Medication 1000 UNIT(S): at 13:39

## 2019-01-16 RX ADMIN — NYSTATIN CREAM 1 APPLICATION(S): 100000 CREAM TOPICAL at 17:23

## 2019-01-16 RX ADMIN — INSULIN GLARGINE 32 UNIT(S): 100 INJECTION, SOLUTION SUBCUTANEOUS at 09:54

## 2019-01-16 NOTE — CHART NOTE - NSCHARTNOTEFT_GEN_A_CORE
Registered Dietitian Follow-Up     Patient Profile Reviewed                           Yes [x]   No []     Nutrition History Previously Obtained        Yes [x]  No []      Pertinent Medical Interventions: Acute on Chronic Hypoxic respiratory failure: Due to end stage COPD exacerbation vs. HFpEF with acute decompensation. Patient still requiring NC 5L and BiPAP qhs. CKD IV. h/o Hyperkalemia - ongoing renal diet. B12 deficiency & Vitamin D deficiency on supplement. Severe Pulmonary Hypertension. Uncontrolled DM. Pending hospice re-eval.     Diet order: Consistent Carbohydrate Renal (no snacks) + DASH/TLC diet. Good appetite & po intake; consuming % of meals.     Anthropometrics:  - Ht. 170.18 cm.  - Wt. 123.7 kg (1/7 wt) - no new wt at this time  - BMI 42.7  - IBW 67.3 kg.     Pertinent Lab Data: 1/16: H/H-11.9/40.3, BUN-45, creat-2.1, gluc-159; 1/8: PO4-4.4 (WDL); per previous admit records: 11/5/18 [id number]-7.7%     Pertinent Meds: heparin, 0.9% NaCl, lasix, insulin glargine, Vit C, atorvastatin, cholecalciferol, insulin lispro, metoprolol, miralax, prednisone, senna     Physical Findings:  - Appearance: Alert. 1+ edema (B/L leg).  - GI function: Last BM 1/13.  - Tubes: no feeding tubes  - Oral/Mouth cavity: no chewing/swallowing difficulty  - Skin: bruised/ecchymotic     Nutrition Requirements  Weight Used: 67.3 kg     Estimated Energy Needs    Continue []  Adjust [x]  2201-7904 kcal/day (25-30 kcal/kg IBW)        Estimated Protein Needs    Continue []  Adjust [x]  Adjusted Protein Recommendations:   gm/kya60-28 g/day (1-1.2 g/kg IBW)        Estimated Fluid Needs        Continue []  Adjust [x]  1 mL/kcal     Nutrient Intake: Meeting estimated nutrient needs.        [x] Previous Nutrition Diagnosis: Inappropriate intake of Na, K, Phos, and Protein            [] Ongoing          [x] Resolved    [x] No active nutrition diagnosis identified at this time     Nutrition Intervention: Meals & Snacks     Goal/Expected Outcome: Pt to maintain tolerance & adherence to current diet order with at least 75% po intake over next 7 days.     Indicator/Monitoring: Energy intake, glucose profile, renal profile, nutrition focused physical findings, body composition.    Recommendation: Continue current diet order as tolerated.

## 2019-01-17 LAB
APPEARANCE UR: CLEAR — SIGNIFICANT CHANGE UP
BILIRUB UR-MCNC: NEGATIVE — SIGNIFICANT CHANGE UP
COLOR SPEC: YELLOW — SIGNIFICANT CHANGE UP
DIFF PNL FLD: ABNORMAL
GLUCOSE BLDC GLUCOMTR-MCNC: 135 MG/DL — HIGH (ref 70–99)
GLUCOSE BLDC GLUCOMTR-MCNC: 187 MG/DL — HIGH (ref 70–99)
GLUCOSE BLDC GLUCOMTR-MCNC: 230 MG/DL — HIGH (ref 70–99)
GLUCOSE BLDC GLUCOMTR-MCNC: 97 MG/DL — SIGNIFICANT CHANGE UP (ref 70–99)
GLUCOSE UR QL: NEGATIVE — SIGNIFICANT CHANGE UP
KETONES UR-MCNC: NEGATIVE — SIGNIFICANT CHANGE UP
LEUKOCYTE ESTERASE UR-ACNC: NEGATIVE — SIGNIFICANT CHANGE UP
NITRITE UR-MCNC: NEGATIVE — SIGNIFICANT CHANGE UP
PH UR: 6 — SIGNIFICANT CHANGE UP (ref 5–8)
PROT UR-MCNC: NEGATIVE — SIGNIFICANT CHANGE UP
RBC CASTS # UR COMP ASSIST: ABNORMAL /HPF
SP GR SPEC: 1.01 — SIGNIFICANT CHANGE UP (ref 1.01–1.03)
UROBILINOGEN FLD QL: 0.2 — SIGNIFICANT CHANGE UP (ref 0.2–0.2)
WBC UR QL: SIGNIFICANT CHANGE UP /HPF

## 2019-01-17 RX ORDER — MORPHINE SULFATE 50 MG/1
5 CAPSULE, EXTENDED RELEASE ORAL EVERY 4 HOURS
Qty: 0 | Refills: 0 | Status: DISCONTINUED | OUTPATIENT
Start: 2019-01-17 | End: 2019-01-18

## 2019-01-17 RX ORDER — MORPHINE SULFATE 50 MG/1
2 CAPSULE, EXTENDED RELEASE ORAL ONCE
Qty: 0 | Refills: 0 | Status: DISCONTINUED | OUTPATIENT
Start: 2019-01-17 | End: 2019-01-17

## 2019-01-17 RX ADMIN — ZINC OXIDE 1 APPLICATION(S): 200 OINTMENT TOPICAL at 06:01

## 2019-01-17 RX ADMIN — Medication 17 UNIT(S): at 11:43

## 2019-01-17 RX ADMIN — Medication 1 DROP(S): at 15:11

## 2019-01-17 RX ADMIN — Medication 10 MILLIGRAM(S): at 05:59

## 2019-01-17 RX ADMIN — INSULIN GLARGINE 32 UNIT(S): 100 INJECTION, SOLUTION SUBCUTANEOUS at 09:04

## 2019-01-17 RX ADMIN — NYSTATIN CREAM 1 APPLICATION(S): 100000 CREAM TOPICAL at 05:59

## 2019-01-17 RX ADMIN — Medication 17 UNIT(S): at 09:04

## 2019-01-17 RX ADMIN — Medication 3 MILLILITER(S): at 19:56

## 2019-01-17 RX ADMIN — Medication 500 MILLIGRAM(S): at 11:43

## 2019-01-17 RX ADMIN — SENNA PLUS 2 TABLET(S): 8.6 TABLET ORAL at 21:21

## 2019-01-17 RX ADMIN — SODIUM POLYSTYRENE SULFONATE 30 GRAM(S): 4.1 POWDER, FOR SUSPENSION ORAL at 10:08

## 2019-01-17 RX ADMIN — Medication 40 MILLIGRAM(S): at 17:00

## 2019-01-17 RX ADMIN — FINASTERIDE 5 MILLIGRAM(S): 5 TABLET, FILM COATED ORAL at 11:43

## 2019-01-17 RX ADMIN — Medication 1000 UNIT(S): at 11:43

## 2019-01-17 RX ADMIN — Medication 3 MILLILITER(S): at 06:59

## 2019-01-17 RX ADMIN — Medication 2: at 11:43

## 2019-01-17 RX ADMIN — HEPARIN SODIUM 5000 UNIT(S): 5000 INJECTION INTRAVENOUS; SUBCUTANEOUS at 15:12

## 2019-01-17 RX ADMIN — HEPARIN SODIUM 5000 UNIT(S): 5000 INJECTION INTRAVENOUS; SUBCUTANEOUS at 21:27

## 2019-01-17 RX ADMIN — NYSTATIN CREAM 1 APPLICATION(S): 100000 CREAM TOPICAL at 17:01

## 2019-01-17 RX ADMIN — ATORVASTATIN CALCIUM 20 MILLIGRAM(S): 80 TABLET, FILM COATED ORAL at 21:21

## 2019-01-17 RX ADMIN — ZINC OXIDE 1 APPLICATION(S): 200 OINTMENT TOPICAL at 17:01

## 2019-01-17 RX ADMIN — TIOTROPIUM BROMIDE 1 CAPSULE(S): 18 CAPSULE ORAL; RESPIRATORY (INHALATION) at 21:28

## 2019-01-17 RX ADMIN — TAMSULOSIN HYDROCHLORIDE 0.4 MILLIGRAM(S): 0.4 CAPSULE ORAL at 21:21

## 2019-01-17 RX ADMIN — Medication 1: at 16:20

## 2019-01-17 RX ADMIN — Medication 3 MILLILITER(S): at 13:24

## 2019-01-17 RX ADMIN — BUDESONIDE AND FORMOTEROL FUMARATE DIHYDRATE 2 PUFF(S): 160; 4.5 AEROSOL RESPIRATORY (INHALATION) at 08:10

## 2019-01-17 RX ADMIN — HEPARIN SODIUM 5000 UNIT(S): 5000 INJECTION INTRAVENOUS; SUBCUTANEOUS at 05:59

## 2019-01-17 RX ADMIN — Medication 40 MILLIGRAM(S): at 05:58

## 2019-01-17 RX ADMIN — BUDESONIDE AND FORMOTEROL FUMARATE DIHYDRATE 2 PUFF(S): 160; 4.5 AEROSOL RESPIRATORY (INHALATION) at 21:28

## 2019-01-17 RX ADMIN — Medication 1 DROP(S): at 21:21

## 2019-01-17 RX ADMIN — Medication 17 UNIT(S): at 16:20

## 2019-01-17 RX ADMIN — Medication 25 MILLIGRAM(S): at 05:58

## 2019-01-17 RX ADMIN — Medication 25 MILLIGRAM(S): at 17:00

## 2019-01-17 RX ADMIN — Medication 1 DROP(S): at 06:00

## 2019-01-17 NOTE — PROGRESS NOTE ADULT - ASSESSMENT
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 He presented from [facility] with complaints of Tachycardia and hypotension SBP in the 100s. He denied any CP, worsening SOB, palpitations, change on cough, dizziness, lightheadedness, abdominal pain, change in bowel or urinary habits. Patient is also unhappy with the care he received at [facility] and does not wish to return there and also expressed an interest in hospice. He was admitted for further management.     Assessment and Plan:    1. Acute on Chronic Hypoxic respiratory failure: Due to end stage COPD exacerbation vs. HFpEF with acute decompensation.  Patient stable on NC 5L and BiPAP qhs.  Continue Bronchodilators ATC and prn and Prednisone 10 mg daily. (Home dose).   Completed Levaquin and Prednisone on his most recent admission.   Maintain sats > 90%. Continue Lasix. Remains DNR & DNI.   Hospice reevaluation done: patient now requesting hospice at a SNF. They recommended Roxanol 5 mg q4h ATC.        2. h/o Hyperkalemia:   Continue Low K diet & Kayaxalate 30 gm po TTS (3x week)         3. h/o Hypertension:  BP borderline low. Monitor BP.  Continue Metoprolol.   Lasix & Amlodipine on hold.       4. Diabetes mellitus, type 2: Uncontrolled.  Hemoglobin A1C, Whole Blood: 8.7 % (11.05.18 @ 07:00)  Monitor FS with Insulin coverage.      5. CKD stage IV:  Stable creatinine.  Avoid Nephrotoxins. Monitor BMP and Electrolytes.  CT abdomen: No hydronephrosis bilaterally or obstructing calculus. Stable bilateral renal cysts measuring up to 9.5 cm at   the right lower pole. Stable 4 cm indeterminate exophytic left interpolar renal lesion.      6. BPH:  Continue Flomax and Finasteride.       7. Obesity:  Dietary modification.      8. B12 deficiency & Vitamin D deficiency:  Continue with home supplementation.       9. MICHAEL/OHVS:  BiPAP at night.      10. Severe Pulmonary Hypertension:  Supportive care  ECHO 11/2018: Normal LVEF, no RWMA, Trace TR, Moderate pulmonary hypertension.  ECHO 12/25/18: severe Right side heart dilation, RSVP 100 (was 40 in 10/2018)      11. Colonization with MDR bacteria of UA:  per ID, clinically no UTI: no dysuria or fevers or increasing wbc.  No antibiotics unless symptomatic.        DVT prophylaxis: Heparin  Disposition: hospice at a SNF.   CODE status: DNR/DNI  Very poor Prognosis. 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 He presented from [facility] with complaints of Tachycardia and hypotension SBP in the 100s. He denied any CP, worsening SOB, palpitations, change on cough, dizziness, lightheadedness, abdominal pain, change in bowel or urinary habits. Patient was unhappy with the care he received at [facility] and does not wish to return there and also expressed an interest in hospice. He was admitted for further management.     Assessment and Plan:    1. Acute on Chronic Hypoxic respiratory failure: Due to end stage COPD exacerbation vs. HFpEF with acute decompensation.  Patient stable on NC 5L and BiPAP qhs.  Continue Bronchodilators ATC and prn and Prednisone 10 mg daily. (Home dose).   Completed Levaquin and Prednisone on his most recent admission.   Maintain sats > 90%. Continue Lasix. Remains DNR & DNI.   Hospice reevaluation done: patient now requesting hospice at a SNF. They recommended Roxanol 5 mg q4h ATC.        2. h/o Hyperkalemia:   Continue Low K diet & Kayaxalate 30 gm po TTS (3x week)         3. h/o Hypertension:  BP borderline low. Monitor BP.  Continue Metoprolol.   Lasix & Amlodipine on hold.       4. Diabetes mellitus, type 2: Uncontrolled.  Hemoglobin A1C, Whole Blood: 8.7 % (11.05.18 @ 07:00)  Monitor FS with Insulin coverage.      5. CKD stage IV:  Stable creatinine.  Avoid Nephrotoxins. Monitor BMP and Electrolytes.  CT abdomen: No hydronephrosis bilaterally or obstructing calculus. Stable bilateral renal cysts measuring up to 9.5 cm at   the right lower pole. Stable 4 cm indeterminate exophytic left interpolar renal lesion.      6. BPH:  Continue Flomax and Finasteride.       7. Obesity:  Dietary modification.      8. B12 deficiency & Vitamin D deficiency:  Continue with home supplementation.       9. MICHAEL/OHVS:  BiPAP at night.      10. Severe Pulmonary Hypertension:  Supportive care  ECHO 11/2018: Normal LVEF, no RWMA, Trace TR, Moderate pulmonary hypertension.  ECHO 12/25/18: severe Right side heart dilation, RSVP 100 (was 40 in 10/2018)      11. Colonization with MDR bacteria of UA:  per ID, clinically no UTI: no dysuria or fevers or increasing wbc.  No antibiotics unless symptomatic.        DVT prophylaxis: Heparin  Disposition: hospice at a SNF.   CODE status: DNR/DNI  Very poor Prognosis.

## 2019-01-18 LAB
GLUCOSE BLDC GLUCOMTR-MCNC: 118 MG/DL — HIGH (ref 70–99)
GLUCOSE BLDC GLUCOMTR-MCNC: 146 MG/DL — HIGH (ref 70–99)
GLUCOSE BLDC GLUCOMTR-MCNC: 189 MG/DL — HIGH (ref 70–99)
GLUCOSE BLDC GLUCOMTR-MCNC: 214 MG/DL — HIGH (ref 70–99)

## 2019-01-18 RX ORDER — MORPHINE SULFATE 50 MG/1
5 CAPSULE, EXTENDED RELEASE ORAL EVERY 4 HOURS
Qty: 0 | Refills: 0 | Status: DISCONTINUED | OUTPATIENT
Start: 2019-01-18 | End: 2019-01-23

## 2019-01-18 RX ORDER — FUROSEMIDE 40 MG
20 TABLET ORAL
Qty: 0 | Refills: 0 | Status: DISCONTINUED | OUTPATIENT
Start: 2019-01-18 | End: 2019-01-23

## 2019-01-18 RX ADMIN — HEPARIN SODIUM 5000 UNIT(S): 5000 INJECTION INTRAVENOUS; SUBCUTANEOUS at 14:43

## 2019-01-18 RX ADMIN — Medication 25 MILLIGRAM(S): at 05:34

## 2019-01-18 RX ADMIN — Medication 3 MILLILITER(S): at 08:30

## 2019-01-18 RX ADMIN — TAMSULOSIN HYDROCHLORIDE 0.4 MILLIGRAM(S): 0.4 CAPSULE ORAL at 21:51

## 2019-01-18 RX ADMIN — SENNA PLUS 2 TABLET(S): 8.6 TABLET ORAL at 21:51

## 2019-01-18 RX ADMIN — Medication 20 MILLIGRAM(S): at 17:21

## 2019-01-18 RX ADMIN — BUDESONIDE AND FORMOTEROL FUMARATE DIHYDRATE 2 PUFF(S): 160; 4.5 AEROSOL RESPIRATORY (INHALATION) at 08:17

## 2019-01-18 RX ADMIN — Medication 25 MILLIGRAM(S): at 17:21

## 2019-01-18 RX ADMIN — Medication 40 MILLIGRAM(S): at 05:34

## 2019-01-18 RX ADMIN — Medication 17 UNIT(S): at 17:19

## 2019-01-18 RX ADMIN — INSULIN GLARGINE 32 UNIT(S): 100 INJECTION, SOLUTION SUBCUTANEOUS at 08:09

## 2019-01-18 RX ADMIN — Medication 1 DROP(S): at 21:52

## 2019-01-18 RX ADMIN — ATORVASTATIN CALCIUM 20 MILLIGRAM(S): 80 TABLET, FILM COATED ORAL at 21:51

## 2019-01-18 RX ADMIN — Medication 1000 UNIT(S): at 11:56

## 2019-01-18 RX ADMIN — BUDESONIDE AND FORMOTEROL FUMARATE DIHYDRATE 2 PUFF(S): 160; 4.5 AEROSOL RESPIRATORY (INHALATION) at 21:58

## 2019-01-18 RX ADMIN — ZINC OXIDE 1 APPLICATION(S): 200 OINTMENT TOPICAL at 05:35

## 2019-01-18 RX ADMIN — Medication 3 MILLILITER(S): at 16:41

## 2019-01-18 RX ADMIN — INSULIN GLARGINE 32 UNIT(S): 100 INJECTION, SOLUTION SUBCUTANEOUS at 21:51

## 2019-01-18 RX ADMIN — NYSTATIN CREAM 1 APPLICATION(S): 100000 CREAM TOPICAL at 05:35

## 2019-01-18 RX ADMIN — ZINC OXIDE 1 APPLICATION(S): 200 OINTMENT TOPICAL at 17:24

## 2019-01-18 RX ADMIN — HEPARIN SODIUM 5000 UNIT(S): 5000 INJECTION INTRAVENOUS; SUBCUTANEOUS at 05:34

## 2019-01-18 RX ADMIN — Medication 1 DROP(S): at 14:45

## 2019-01-18 RX ADMIN — NYSTATIN CREAM 1 APPLICATION(S): 100000 CREAM TOPICAL at 17:23

## 2019-01-18 RX ADMIN — Medication 10 MILLIGRAM(S): at 05:34

## 2019-01-18 RX ADMIN — Medication 1 DROP(S): at 05:34

## 2019-01-18 RX ADMIN — FINASTERIDE 5 MILLIGRAM(S): 5 TABLET, FILM COATED ORAL at 11:56

## 2019-01-18 RX ADMIN — Medication 3 MILLILITER(S): at 20:53

## 2019-01-18 RX ADMIN — Medication 17 UNIT(S): at 11:56

## 2019-01-18 RX ADMIN — Medication 17 UNIT(S): at 08:09

## 2019-01-18 RX ADMIN — Medication 500 MILLIGRAM(S): at 11:56

## 2019-01-18 RX ADMIN — HEPARIN SODIUM 5000 UNIT(S): 5000 INJECTION INTRAVENOUS; SUBCUTANEOUS at 21:51

## 2019-01-18 RX ADMIN — Medication 2: at 11:55

## 2019-01-18 NOTE — PHYSICAL THERAPY INITIAL EVALUATION ADULT - IMPAIRMENTS FOUND, PT EVAL
aerobic capacity/endurance/muscle strength/ergonomics and body mechanics/gait, locomotion, and balance/posture

## 2019-01-18 NOTE — CONSULT NOTE ADULT - SUBJECTIVE AND OBJECTIVE BOX
HPI:  Patient is an 78y Male with h/o End stageCOPD on home oxygen 5L, DM, MICHAEL, obesity, CKD 4, recently discharged on 13 after being admitted for acute on chronic hypoxic & Hypercapnic respiratory failure due to COPD exacerbation. He returns from nursing home with elevated heart rate, SBP in 100s per patient. He denies any CP, worsening SOB, palpitations, change on cough, dizziness, lightheadedness, abdominal pain, change in bowel or urinary habits. Patient is also unhappy with the care he received at [facility] and does not wish to return there .    PTN  REFERRED TO ACUTE  REHAB  FOR  EVAL AND  TX   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  High cholesterol  GERD (gastroesophageal reflux disease)  Enlarged prostate  Oxygen dependent  COPD (chronic obstructive pulmonary disease)  Diabetes mellitus, type 2  Hypertension  Emphysema lung  History of hemorrhoidectomy  Dysplastic colon polyp: removed      Hospital Course:    TODAY'S SUBJECTIVE & REVIEW OF SYMPTOMS:     Constitutional WNL   Cardio WNL   Resp WNL   GI WNL  Heme WNL  Endo WNL  Skin WNL  MSK WNL  Neuro WNL  Cognitive WNL  Psych WNL      MEDICATIONS  (STANDING):  ALBUTerol/ipratropium for Nebulization 3 milliLiter(s) Nebulizer every 6 hours  artificial  tears Solution 1 Drop(s) Both EYES three times a day  ascorbic acid 500 milliGRAM(s) Oral daily  atorvastatin 20 milliGRAM(s) Oral at bedtime  buDESOnide 160 MICROgram(s)/formoterol 4.5 MICROgram(s) Inhaler - Peds 2 Puff(s) Inhalation two times a day  chlorhexidine 4% Liquid 1 Application(s) Topical <User Schedule>  cholecalciferol 1000 Unit(s) Oral daily  dextrose 5%. 1000 milliLiter(s) (50 mL/Hr) IV Continuous <Continuous>  dextrose 50% Injectable 12.5 Gram(s) IV Push once  dextrose 50% Injectable 25 Gram(s) IV Push once  dextrose 50% Injectable 25 Gram(s) IV Push once  finasteride 5 milliGRAM(s) Oral daily  furosemide    Tablet 20 milliGRAM(s) Oral two times a day  heparin  Injectable 5000 Unit(s) SubCutaneous every 8 hours  insulin glargine Injectable (LANTUS) 32 Unit(s) SubCutaneous two times a day  insulin lispro (HumaLOG) corrective regimen sliding scale   SubCutaneous three times a day before meals  insulin lispro Injectable (HumaLOG) 17 Unit(s) SubCutaneous before breakfast  insulin lispro Injectable (HumaLOG) 17 Unit(s) SubCutaneous before lunch  insulin lispro Injectable (HumaLOG) 17 Unit(s) SubCutaneous before dinner  metoprolol tartrate 25 milliGRAM(s) Oral two times a day  nystatin Powder 1 Application(s) Topical two times a day  predniSONE   Tablet 10 milliGRAM(s) Oral daily  senna 2 Tablet(s) Oral at bedtime  sodium polystyrene sulfonate Suspension 30 Gram(s) Oral <User Schedule>  tamsulosin 0.4 milliGRAM(s) Oral at bedtime  tiotropium 18 MICROgram(s) Capsule 1 Capsule(s) Inhalation at bedtime  zinc oxide 40% Ointment 1 Application(s) Topical two times a day    MEDICATIONS  (PRN):  ALBUTerol/ipratropium for Nebulization 3 milliLiter(s) Nebulizer every 4 hours PRN Bronchospasm  dextrose 40% Gel 15 Gram(s) Oral once PRN Blood Glucose LESS THAN 70 milliGRAM(s)/deciliter  dextrose 50% Injectable 25 milliLiter(s) IV Push every 6 hours PRN BG <100  glucagon  Injectable 1 milliGRAM(s) IntraMuscular once PRN Glucose LESS THAN 70 milligrams/deciliter  morphine Concentrate 5 milliGRAM(s) Oral every 4 hours PRN Dyspnea  polyethylene glycol 3350 17 Gram(s) Oral daily PRN Constipation      FAMILY HISTORY:  No pertinent family history in first degree relatives      Allergies    fish (Other)  iodine (Hives)  penicillin (Unknown)  shellfish (Other)    Intolerances    aspirin (Other)      SOCIAL HISTORY:    [  ] Etoh  [  ] Smoking  [  ] Substance abuse     Home Environment:  [xx  ] Home Alone  [  ] Lives with Family  [  ] Home Health Aid    Dwelling:  [  ] Apartment  [x  ] Private House  [  ] Adult Home  [  ] Skilled Nursing Facility      [  ] Short Term  [  ] Long Term  [ x Stairs       Elevator [ x] chair lefting    FUNCTIONAL STATUS PTA: (Check all that apply)  Ambulation: [ x  ]Independent    [  ] Dependent     [  ] Non-Ambulatory  Assistive Device: [  ] SA Cane  [  ]  Q Cane  [ x ] Walker  [  ]  Wheelchair  ADL : [ x ] Independent  [  ]  Dependent       Vital Signs Last 24 Hrs  T(C): 36.2 (2019 14:10), Max: 36.2 (2019 14:10)  T(F): 97.2 (2019 14:10), Max: 97.2 (2019 14:10)  HR: 96 (2019 14:10) (96 - 118)  BP: 137/66 (2019 14:10) (122/66 - 137/66)  BP(mean): --  RR: 18 (2019 14:10) (18 - 18)  SpO2: 83% (2019 13:50) (83% - 93%)      PHYSICAL EXAM: Alert & Oriented X3  GENERAL: NAD, well-groomed, well-developed  HEAD:  Atraumatic, Normocephalic  EYES: EOMI, PERRLA, conjunctiva and sclera clear  NECK: Supple, No JVD, Normal thyroid  CHEST/LUNG: Clear to percussion bilaterally; No rales, rhonchi, wheezing, or rubs  HEART: Regular rate and rhythm; No murmurs, rubs, or gallops  ABDOMEN: Soft, Nontender, Nondistended; Bowel sounds present  EXTREMITIES:  2+ Peripheral Pulses, No clubbing, cyanosis, or edema    NERVOUS SYSTEM:  Cranial Nerves 2-12 intact [  ] Abnormal  [  ]  ROM: WFL all extremities [  ]  Abnormal [  ]  Motor Strength: WFL all extremities  [  ]  Abnormal [  ]  Sensation: intact to light touch [  ] Abnormal [  ]  Reflexes: Symmetric [  ]  Abnormal [  ]    FUNCTIONAL STATUS:  Bed Mobility: Independent [  ]  Supervision [  ]  Needs Assistance [ x ]  N/A [  ]  Transfers: Independent [  ]  Supervision [  ]  Needs Assistance [x  ]  N/A [  ]   Ambulation: Independent [  ]  Supervision [  ]  Needs Assistance [ x ]  N/A [  ]  ADL: Independent [  x] Requires Assistance [  ] N/A [  ]  SEE PT/OT IE NOTES    LABS:            Urinalysis Basic - ( 2019 11:26 )    Color: Yellow / Appearance: Clear / S.015 / pH: x  Gluc: x / Ketone: Negative  / Bili: Negative / Urobili: 0.2   Blood: x / Protein: Negative / Nitrite: Negative   Leuk Esterase: Negative / RBC: 6-10 /HPF / WBC 1-2 /HPF   Sq Epi: x / Non Sq Epi: x / Bacteria: x        RADIOLOGY & ADDITIONAL STUDIES:    Assesment:

## 2019-01-18 NOTE — CONSULT NOTE ADULT - ASSESSMENT
IMPRESSION: Rehab of 77 y/o  m ptn rehab  for  debility  copd      PRECAUTIONS: [x  ] Cardiac  [x ] Respiratory  [  ] Seizures [  ] Contact Isolation  [  ] Droplet Isolation  [ FALL ] Other    Weight Bearing Status:     RECOMMENDATION:    Out of Bed to Chair     DVT/Decubiti Prophylaxis    REHAB PLAN:     [ xx ] Bedside P/T 3-5 times a week   [   ]   Bedside O/T  2-3 times a week             [   ] No Rehab Therapy Indicated                   [   ]  Speech Therapy   Conditioning/ROM                                    ADL  Bed Mobility                                               Conditioning/ROM  Transfers                                                     Bed Mobility  Sitting /Standing Balance                         Transfers                                        Gait Training                                               Sitting/Standing Balance  Stair Training [   ]Applicable                    Home equipment Eval                                                                        Splinting  [   ] Only      GOALS:   ADL   [  x ]   Independent                    Transfers  [  x ] Independent                          Ambulation  [ x  ] Independent     [    ] With device                            [ x  ]  CG                                                         [ x  ]  CG                                                                  [x   ] CG                            [    ] Min A                                                   [   ] Min A                                                              [   ] Min  A          DISCHARGE PLAN:   [   ]  Good candidate for Intensive Rehabilitation/Hospital based-[facility]A [facility]                                             Will tolerate 3hrs Intensive Rehab Daily                                       [ xx   ]  Short Term Rehab in Skilled Nursing Facility                                       [    ]  Home with Outpatient or VN services                                         [    ]  Possible Candidate for Intensive Hospital based Rehab

## 2019-01-18 NOTE — PHYSICAL THERAPY INITIAL EVALUATION ADULT - GENERAL OBSERVATIONS, REHAB EVAL
13:25 - 13:50. Chart reviewed. Patient available to be seen for physical therapy. Patient encountered already out of bed in chair, +5L/minute O2 via nasal cannula.  Patient would like to walk with PT now.

## 2019-01-18 NOTE — PHYSICAL THERAPY INITIAL EVALUATION ADULT - GAIT DEVIATIONS NOTED, PT EVAL
decreased matthew/decreased weight-shifting ability/increased time in double stance/decreased step length

## 2019-01-18 NOTE — PROGRESS NOTE ADULT - ASSESSMENT
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 He presented from [facility] with complaints of Tachycardia and hypotension SBP in the 100s. He denied any CP, worsening SOB, palpitations, change on cough, dizziness, lightheadedness, abdominal pain, change in bowel or urinary habits. Patient was unhappy with the care he received at [facility] and does not wish to return there and also expressed an interest in hospice. He was admitted for further management.     Assessment and Plan:    1. Acute on Chronic Hypoxic respiratory failure: Due to end stage COPD exacerbation vs. HFpEF with acute decompensation.  Patient stable on NC 5L and BiPAP qhs.  Continue Bronchodilators ATC and prn and Prednisone 10 mg daily. (Home dose).   Completed Levaquin and Prednisone on his most recent admission.   Maintain sats > 90%. Continue Lasix. Remains DNR & DNI.   Hospice reevaluation done: patient now requesting hospice at a SNF. They recommended Roxanol 5 mg q4h ATC but Patient refuses. he is being evaluated for SNF.        2. h/o Hyperkalemia:   Continue Low K diet & Kayaxalate 30 gm po TTS (3x week)         3. h/o Hypertension:  BP stable. Continue Lasix & Metoprolol.         4. Diabetes mellitus, type 2: Uncontrolled.  Hemoglobin A1C, Whole Blood: 8.7 % (11.05.18 @ 07:00)  Monitor FS with Insulin coverage.      5. CKD stage IV:  Stable creatinine.  Avoid Nephrotoxins. Monitor BMP and Electrolytes.  CT abdomen: No hydronephrosis bilaterally or obstructing calculus. Stable bilateral renal cysts measuring up to 9.5 cm at   the right lower pole. Stable 4 cm indeterminate exophytic left interpolar renal lesion.      6. BPH:  Continue Flomax and Finasteride.       7. Obesity:  Dietary modification.      8. B12 deficiency & Vitamin D deficiency:  Continue with home supplementation.       9. MICHAEL/OHVS:  BiPAP at night.      10. Severe Pulmonary Hypertension:  Supportive care  ECHO 11/2018: Normal LVEF, no RWMA, Trace TR, Moderate pulmonary hypertension.  ECHO 12/25/18: severe Right side heart dilation, RSVP 100 (was 40 in 10/2018)      11. Colonization with MDR bacteria of UA:  per ID, clinically no UTI: no dysuria or fevers or increasing wbc.  No antibiotics unless symptomatic.        DVT prophylaxis: Heparin  Disposition: hospice at a SNF.   CODE status: DNR/DNI  Very poor Prognosis.

## 2019-01-19 LAB
-  AMIKACIN: SIGNIFICANT CHANGE UP
-  AMOXICILLIN/CLAVULANIC ACID: SIGNIFICANT CHANGE UP
-  AMPICILLIN/SULBACTAM: SIGNIFICANT CHANGE UP
-  AMPICILLIN: SIGNIFICANT CHANGE UP
-  AZTREONAM: SIGNIFICANT CHANGE UP
-  CEFAZOLIN: SIGNIFICANT CHANGE UP
-  CEFEPIME: SIGNIFICANT CHANGE UP
-  CEFOXITIN: SIGNIFICANT CHANGE UP
-  CEFTRIAXONE: SIGNIFICANT CHANGE UP
-  CIPROFLOXACIN: SIGNIFICANT CHANGE UP
-  ERTAPENEM: SIGNIFICANT CHANGE UP
-  GENTAMICIN: SIGNIFICANT CHANGE UP
-  IMIPENEM: SIGNIFICANT CHANGE UP
-  LEVOFLOXACIN: SIGNIFICANT CHANGE UP
-  MEROPENEM: SIGNIFICANT CHANGE UP
-  NITROFURANTOIN: SIGNIFICANT CHANGE UP
-  PIPERACILLIN/TAZOBACTAM: SIGNIFICANT CHANGE UP
-  TIGECYCLINE: SIGNIFICANT CHANGE UP
-  TOBRAMYCIN: SIGNIFICANT CHANGE UP
-  TRIMETHOPRIM/SULFAMETHOXAZOLE: SIGNIFICANT CHANGE UP
ALBUMIN SERPL ELPH-MCNC: 3.5 G/DL — SIGNIFICANT CHANGE UP (ref 3.5–5.2)
ALP SERPL-CCNC: 61 U/L — SIGNIFICANT CHANGE UP (ref 30–115)
ALT FLD-CCNC: 28 U/L — SIGNIFICANT CHANGE UP (ref 0–41)
ANION GAP SERPL CALC-SCNC: 8 MMOL/L — SIGNIFICANT CHANGE UP (ref 7–14)
AST SERPL-CCNC: 21 U/L — SIGNIFICANT CHANGE UP (ref 0–41)
BASOPHILS # BLD AUTO: 0.11 K/UL — SIGNIFICANT CHANGE UP (ref 0–0.2)
BASOPHILS NFR BLD AUTO: 1.2 % — HIGH (ref 0–1)
BILIRUB SERPL-MCNC: 0.5 MG/DL — SIGNIFICANT CHANGE UP (ref 0.2–1.2)
BUN SERPL-MCNC: 51 MG/DL — HIGH (ref 10–20)
CALCIUM SERPL-MCNC: 9.1 MG/DL — SIGNIFICANT CHANGE UP (ref 8.5–10.1)
CHLORIDE SERPL-SCNC: 98 MMOL/L — SIGNIFICANT CHANGE UP (ref 98–110)
CO2 SERPL-SCNC: 40 MMOL/L — HIGH (ref 17–32)
CREAT SERPL-MCNC: 2.1 MG/DL — HIGH (ref 0.7–1.5)
CULTURE RESULTS: SIGNIFICANT CHANGE UP
EOSINOPHIL # BLD AUTO: 0.39 K/UL — SIGNIFICANT CHANGE UP (ref 0–0.7)
EOSINOPHIL NFR BLD AUTO: 4.3 % — SIGNIFICANT CHANGE UP (ref 0–8)
GLUCOSE BLDC GLUCOMTR-MCNC: 110 MG/DL — HIGH (ref 70–99)
GLUCOSE BLDC GLUCOMTR-MCNC: 188 MG/DL — HIGH (ref 70–99)
GLUCOSE BLDC GLUCOMTR-MCNC: 195 MG/DL — HIGH (ref 70–99)
GLUCOSE BLDC GLUCOMTR-MCNC: 312 MG/DL — HIGH (ref 70–99)
GLUCOSE SERPL-MCNC: 120 MG/DL — HIGH (ref 70–99)
HCT VFR BLD CALC: 40 % — LOW (ref 42–52)
HGB BLD-MCNC: 11.9 G/DL — LOW (ref 14–18)
IMM GRANULOCYTES NFR BLD AUTO: 1.4 % — HIGH (ref 0.1–0.3)
LYMPHOCYTES # BLD AUTO: 1.61 K/UL — SIGNIFICANT CHANGE UP (ref 1.2–3.4)
LYMPHOCYTES # BLD AUTO: 17.7 % — LOW (ref 20.5–51.1)
MAGNESIUM SERPL-MCNC: 1.6 MG/DL — LOW (ref 1.8–2.4)
MCHC RBC-ENTMCNC: 24 PG — LOW (ref 27–31)
MCHC RBC-ENTMCNC: 29.8 G/DL — LOW (ref 32–37)
MCV RBC AUTO: 80.6 FL — SIGNIFICANT CHANGE UP (ref 80–94)
METHOD TYPE: SIGNIFICANT CHANGE UP
MONOCYTES # BLD AUTO: 0.99 K/UL — HIGH (ref 0.1–0.6)
MONOCYTES NFR BLD AUTO: 10.9 % — HIGH (ref 1.7–9.3)
NEUTROPHILS # BLD AUTO: 5.87 K/UL — SIGNIFICANT CHANGE UP (ref 1.4–6.5)
NEUTROPHILS NFR BLD AUTO: 64.5 % — SIGNIFICANT CHANGE UP (ref 42.2–75.2)
NRBC # BLD: 0 /100 WBCS — SIGNIFICANT CHANGE UP (ref 0–0)
ORGANISM # SPEC MICROSCOPIC CNT: SIGNIFICANT CHANGE UP
ORGANISM # SPEC MICROSCOPIC CNT: SIGNIFICANT CHANGE UP
PHOSPHATE SERPL-MCNC: 5.1 MG/DL — HIGH (ref 2.1–4.9)
PLATELET # BLD AUTO: 517 K/UL — HIGH (ref 130–400)
POTASSIUM SERPL-MCNC: 4.8 MMOL/L — SIGNIFICANT CHANGE UP (ref 3.5–5)
POTASSIUM SERPL-SCNC: 4.8 MMOL/L — SIGNIFICANT CHANGE UP (ref 3.5–5)
PROT SERPL-MCNC: 5.9 G/DL — LOW (ref 6–8)
RBC # BLD: 4.96 M/UL — SIGNIFICANT CHANGE UP (ref 4.7–6.1)
RBC # FLD: 20 % — HIGH (ref 11.5–14.5)
SODIUM SERPL-SCNC: 146 MMOL/L — SIGNIFICANT CHANGE UP (ref 135–146)
SPECIMEN SOURCE: SIGNIFICANT CHANGE UP
WBC # BLD: 9.1 K/UL — SIGNIFICANT CHANGE UP (ref 4.8–10.8)
WBC # FLD AUTO: 9.1 K/UL — SIGNIFICANT CHANGE UP (ref 4.8–10.8)

## 2019-01-19 RX ORDER — INSULIN LISPRO 100/ML
19 VIAL (ML) SUBCUTANEOUS
Qty: 0 | Refills: 0 | Status: DISCONTINUED | OUTPATIENT
Start: 2019-01-19 | End: 2019-01-22

## 2019-01-19 RX ADMIN — Medication 1 DROP(S): at 23:13

## 2019-01-19 RX ADMIN — Medication 25 MILLIGRAM(S): at 17:41

## 2019-01-19 RX ADMIN — ZINC OXIDE 1 APPLICATION(S): 200 OINTMENT TOPICAL at 06:50

## 2019-01-19 RX ADMIN — Medication 1 DROP(S): at 06:38

## 2019-01-19 RX ADMIN — ZINC OXIDE 1 APPLICATION(S): 200 OINTMENT TOPICAL at 17:43

## 2019-01-19 RX ADMIN — Medication 10 MILLIGRAM(S): at 06:43

## 2019-01-19 RX ADMIN — Medication 500 MILLIGRAM(S): at 13:17

## 2019-01-19 RX ADMIN — Medication 4: at 13:16

## 2019-01-19 RX ADMIN — BUDESONIDE AND FORMOTEROL FUMARATE DIHYDRATE 2 PUFF(S): 160; 4.5 AEROSOL RESPIRATORY (INHALATION) at 08:55

## 2019-01-19 RX ADMIN — NYSTATIN CREAM 1 APPLICATION(S): 100000 CREAM TOPICAL at 17:42

## 2019-01-19 RX ADMIN — FINASTERIDE 5 MILLIGRAM(S): 5 TABLET, FILM COATED ORAL at 13:17

## 2019-01-19 RX ADMIN — HEPARIN SODIUM 5000 UNIT(S): 5000 INJECTION INTRAVENOUS; SUBCUTANEOUS at 23:13

## 2019-01-19 RX ADMIN — Medication 1000 UNIT(S): at 13:16

## 2019-01-19 RX ADMIN — Medication 3 MILLILITER(S): at 08:20

## 2019-01-19 RX ADMIN — Medication 3 MILLILITER(S): at 20:44

## 2019-01-19 RX ADMIN — Medication 17 UNIT(S): at 08:53

## 2019-01-19 RX ADMIN — SENNA PLUS 2 TABLET(S): 8.6 TABLET ORAL at 23:12

## 2019-01-19 RX ADMIN — HEPARIN SODIUM 5000 UNIT(S): 5000 INJECTION INTRAVENOUS; SUBCUTANEOUS at 15:29

## 2019-01-19 RX ADMIN — TAMSULOSIN HYDROCHLORIDE 0.4 MILLIGRAM(S): 0.4 CAPSULE ORAL at 23:11

## 2019-01-19 RX ADMIN — INSULIN GLARGINE 32 UNIT(S): 100 INJECTION, SOLUTION SUBCUTANEOUS at 08:54

## 2019-01-19 RX ADMIN — Medication 19 UNIT(S): at 17:40

## 2019-01-19 RX ADMIN — Medication 1: at 17:40

## 2019-01-19 RX ADMIN — NYSTATIN CREAM 1 APPLICATION(S): 100000 CREAM TOPICAL at 06:49

## 2019-01-19 RX ADMIN — Medication 20 MILLIGRAM(S): at 17:41

## 2019-01-19 RX ADMIN — HEPARIN SODIUM 5000 UNIT(S): 5000 INJECTION INTRAVENOUS; SUBCUTANEOUS at 06:43

## 2019-01-19 RX ADMIN — Medication 3 MILLILITER(S): at 13:24

## 2019-01-19 RX ADMIN — Medication 19 UNIT(S): at 13:16

## 2019-01-19 RX ADMIN — ATORVASTATIN CALCIUM 20 MILLIGRAM(S): 80 TABLET, FILM COATED ORAL at 23:12

## 2019-01-19 NOTE — PROGRESS NOTE ADULT - ASSESSMENT
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 He presented from [facility] with complaints of Tachycardia and hypotension SBP in the 100s. He denied any CP, worsening SOB, palpitations, change on cough, dizziness, lightheadedness, abdominal pain, change in bowel or urinary habits. Patient was unhappy with the care he received at [facility] and does not wish to return there and also expressed an interest in hospice. He was admitted for further management.     Assessment and Plan:    1. Acute on Chronic Hypoxic respiratory failure: Due to end stage COPD exacerbation vs. HFpEF with acute decompensation.  Patient stable on NC 5L and BiPAP qhs.  Continue Bronchodilators ATC and prn and Prednisone 10 mg daily. (Home dose).   Completed Levaquin and Prednisone on his most recent admission.   Maintain sats > 90%. Continue Lasix (dose decreased due to Alkalosis. Remains DNR & DNI.   Hospice reevaluation done: patient now requesting hospice at a SNF. They recommended Roxanol 5 mg q4h ATC but Patient refuses. he is being evaluated for SNF.        2. h/o Hyperkalemia:   Continue Low K diet & Kayaxalate 30 gm po TTS (3x week)         3. h/o Hypertension:  BP stable. Continue Lasix & Metoprolol.         4. Diabetes mellitus, type 2: Uncontrolled.  Hemoglobin A1C, Whole Blood: 8.7 % (11.05.18 @ 07:00)  Monitor FS with Insulin coverage.      5. CKD stage IV:  Stable creatinine.  Avoid Nephrotoxins. Monitor BMP and Electrolytes.  CT abdomen: No hydronephrosis bilaterally or obstructing calculus. Stable bilateral renal cysts measuring up to 9.5 cm at   the right lower pole. Stable 4 cm indeterminate exophytic left interpolar renal lesion.      6. BPH:  Continue Flomax and Finasteride.       7. Obesity:  Dietary modification.      8. B12 deficiency & Vitamin D deficiency:  Continue with home supplementation.       9. MICHAEL/OHVS:  BiPAP at night.      10. Severe Pulmonary Hypertension:  Supportive care  ECHO 11/2018: Normal LVEF, no RWMA, Trace TR, Moderate pulmonary hypertension.  ECHO 12/25/18: severe Right side heart dilation, RSVP 100 (was 40 in 10/2018)      11. Colonization with MDR bacteria of UA:  per ID, clinically no UTI: no dysuria or fevers or increasing wbc.  No antibiotics unless symptomatic.        DVT prophylaxis: Heparin  Disposition: hospice at a SNF.   CODE status: DNR/DNI  Very poor Prognosis.

## 2019-01-20 LAB
GLUCOSE BLDC GLUCOMTR-MCNC: 109 MG/DL — HIGH (ref 70–99)
GLUCOSE BLDC GLUCOMTR-MCNC: 110 MG/DL — HIGH (ref 70–99)
GLUCOSE BLDC GLUCOMTR-MCNC: 212 MG/DL — HIGH (ref 70–99)
GLUCOSE BLDC GLUCOMTR-MCNC: 224 MG/DL — HIGH (ref 70–99)
GLUCOSE BLDC GLUCOMTR-MCNC: 309 MG/DL — HIGH (ref 70–99)

## 2019-01-20 RX ORDER — MAGNESIUM OXIDE 400 MG ORAL TABLET 241.3 MG
400 TABLET ORAL
Qty: 0 | Refills: 0 | Status: DISCONTINUED | OUTPATIENT
Start: 2019-01-20 | End: 2019-01-25

## 2019-01-20 RX ORDER — MAGNESIUM OXIDE 400 MG ORAL TABLET 241.3 MG
400 TABLET ORAL ONCE
Qty: 0 | Refills: 0 | Status: COMPLETED | OUTPATIENT
Start: 2019-01-20 | End: 2019-01-20

## 2019-01-20 RX ADMIN — FINASTERIDE 5 MILLIGRAM(S): 5 TABLET, FILM COATED ORAL at 11:19

## 2019-01-20 RX ADMIN — Medication 1 DROP(S): at 06:02

## 2019-01-20 RX ADMIN — Medication 25 MILLIGRAM(S): at 06:03

## 2019-01-20 RX ADMIN — HEPARIN SODIUM 5000 UNIT(S): 5000 INJECTION INTRAVENOUS; SUBCUTANEOUS at 13:50

## 2019-01-20 RX ADMIN — INSULIN GLARGINE 32 UNIT(S): 100 INJECTION, SOLUTION SUBCUTANEOUS at 21:46

## 2019-01-20 RX ADMIN — Medication 4: at 11:46

## 2019-01-20 RX ADMIN — INSULIN GLARGINE 32 UNIT(S): 100 INJECTION, SOLUTION SUBCUTANEOUS at 08:08

## 2019-01-20 RX ADMIN — HEPARIN SODIUM 5000 UNIT(S): 5000 INJECTION INTRAVENOUS; SUBCUTANEOUS at 21:46

## 2019-01-20 RX ADMIN — Medication 3 MILLILITER(S): at 08:16

## 2019-01-20 RX ADMIN — MAGNESIUM OXIDE 400 MG ORAL TABLET 400 MILLIGRAM(S): 241.3 TABLET ORAL at 16:50

## 2019-01-20 RX ADMIN — NYSTATIN CREAM 1 APPLICATION(S): 100000 CREAM TOPICAL at 17:01

## 2019-01-20 RX ADMIN — Medication 19 UNIT(S): at 11:46

## 2019-01-20 RX ADMIN — Medication 3 MILLILITER(S): at 13:32

## 2019-01-20 RX ADMIN — Medication 500 MILLIGRAM(S): at 11:19

## 2019-01-20 RX ADMIN — Medication 20 MILLIGRAM(S): at 06:03

## 2019-01-20 RX ADMIN — Medication 1 DROP(S): at 21:45

## 2019-01-20 RX ADMIN — Medication 20 MILLIGRAM(S): at 17:00

## 2019-01-20 RX ADMIN — MAGNESIUM OXIDE 400 MG ORAL TABLET 400 MILLIGRAM(S): 241.3 TABLET ORAL at 04:01

## 2019-01-20 RX ADMIN — ATORVASTATIN CALCIUM 20 MILLIGRAM(S): 80 TABLET, FILM COATED ORAL at 21:44

## 2019-01-20 RX ADMIN — SODIUM POLYSTYRENE SULFONATE 30 GRAM(S): 4.1 POWDER, FOR SUSPENSION ORAL at 11:18

## 2019-01-20 RX ADMIN — BUDESONIDE AND FORMOTEROL FUMARATE DIHYDRATE 2 PUFF(S): 160; 4.5 AEROSOL RESPIRATORY (INHALATION) at 08:07

## 2019-01-20 RX ADMIN — Medication 10 MILLIGRAM(S): at 06:03

## 2019-01-20 RX ADMIN — BUDESONIDE AND FORMOTEROL FUMARATE DIHYDRATE 2 PUFF(S): 160; 4.5 AEROSOL RESPIRATORY (INHALATION) at 21:46

## 2019-01-20 RX ADMIN — Medication 2: at 16:49

## 2019-01-20 RX ADMIN — Medication 3 MILLILITER(S): at 04:40

## 2019-01-20 RX ADMIN — HEPARIN SODIUM 5000 UNIT(S): 5000 INJECTION INTRAVENOUS; SUBCUTANEOUS at 06:03

## 2019-01-20 RX ADMIN — ZINC OXIDE 1 APPLICATION(S): 200 OINTMENT TOPICAL at 17:01

## 2019-01-20 RX ADMIN — Medication 19 UNIT(S): at 16:49

## 2019-01-20 RX ADMIN — Medication 25 MILLIGRAM(S): at 17:00

## 2019-01-20 RX ADMIN — ZINC OXIDE 1 APPLICATION(S): 200 OINTMENT TOPICAL at 06:11

## 2019-01-20 RX ADMIN — TAMSULOSIN HYDROCHLORIDE 0.4 MILLIGRAM(S): 0.4 CAPSULE ORAL at 21:44

## 2019-01-20 RX ADMIN — Medication 1000 UNIT(S): at 11:19

## 2019-01-20 RX ADMIN — TIOTROPIUM BROMIDE 1 CAPSULE(S): 18 CAPSULE ORAL; RESPIRATORY (INHALATION) at 21:45

## 2019-01-20 RX ADMIN — SENNA PLUS 2 TABLET(S): 8.6 TABLET ORAL at 21:44

## 2019-01-20 RX ADMIN — Medication 1 TABLET(S): at 17:00

## 2019-01-20 RX ADMIN — NYSTATIN CREAM 1 APPLICATION(S): 100000 CREAM TOPICAL at 06:05

## 2019-01-20 RX ADMIN — Medication 1 DROP(S): at 13:49

## 2019-01-20 RX ADMIN — Medication 3 MILLILITER(S): at 20:09

## 2019-01-20 RX ADMIN — INSULIN GLARGINE 32 UNIT(S): 100 INJECTION, SOLUTION SUBCUTANEOUS at 00:39

## 2019-01-20 NOTE — PROGRESS NOTE ADULT - ASSESSMENT
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 He presented from [facility] with complaints of Tachycardia and hypotension SBP in the 100s. He denied any CP, worsening SOB, palpitations, change on cough, dizziness, lightheadedness, abdominal pain, change in bowel or urinary habits. Patient was unhappy with the care he received at [facility] and does not wish to return there and also expressed an interest in hospice. He was admitted for further management.     Assessment and Plan:    1. Acute on Chronic Hypoxic respiratory failure: Due to end stage COPD exacerbation vs. HFpEF with acute decompensation.  Patient stable on NC 5L and BiPAP qhs.  Continue Bronchodilators ATC and prn and Prednisone 10 mg daily. (Home dose).   Completed Levaquin and Prednisone taper on his most recent admission.   Maintain sats > 90%. Continue Lasix (dose decreased due to Alkalosis. Remains DNR & DNI.   Hospice reevaluation done: patient now requesting hospice at a SNF. They recommended Roxanol 5 mg q4h ATC but Patient refuses. He is being evaluated for SNF.        2. h/o Hypomagnesemia & Hyperkalemia:   Continue Low K diet & Kayexalate 30 gm po TTS (3x week).  Magnesium levels replaced. Monitor level.        3. h/o Hypertension:  BP stable. Continue Lasix & Metoprolol.         4. Diabetes mellitus, type 2: Uncontrolled.  Hemoglobin A1C, Whole Blood: 8.7 % (11.05.18 @ 07:00)  Monitor FS with Insulin coverage.      5. CKD stage IV:  Stable creatinine.  Avoid Nephrotoxins. Monitor BMP and Electrolytes.  CT abdomen: No hydronephrosis bilaterally or obstructing calculus. Stable bilateral renal cysts measuring up to 9.5 cm at   the right lower pole. Stable 4 cm indeterminate exophytic left interpolar renal lesion.      6. BPH:  Continue Flomax and Finasteride.       7. Obesity:  Dietary modification.      8. B12 deficiency & Vitamin D deficiency:  Continue with home supplementation.       9. MICHAEL/OHVS:  BiPAP at night.      10. Severe Pulmonary Hypertension:  Supportive care  ECHO 11/2018: Normal LVEF, no RWMA, Trace TR, Moderate pulmonary hypertension.  ECHO 12/25/18: severe Right side heart dilation, RSVP 100 (was 40 in 10/2018)      11. ESBL UTI:  per ID, clinically no UTI: no dysuria or fevers or increasing wbc.  No antibiotics unless symptomatic but Patient insists on being treated.  Sensitive to Bactrim: complete 5 day course.        DVT prophylaxis: Heparin  Disposition: hospice at a SNF.   CODE status: DNR/DNI  Very poor Prognosis.

## 2019-01-21 LAB
ANION GAP SERPL CALC-SCNC: 11 MMOL/L — SIGNIFICANT CHANGE UP (ref 7–14)
BUN SERPL-MCNC: 41 MG/DL — HIGH (ref 10–20)
CALCIUM SERPL-MCNC: 9.2 MG/DL — SIGNIFICANT CHANGE UP (ref 8.5–10.1)
CHLORIDE SERPL-SCNC: 99 MMOL/L — SIGNIFICANT CHANGE UP (ref 98–110)
CO2 SERPL-SCNC: 38 MMOL/L — HIGH (ref 17–32)
CREAT SERPL-MCNC: 2.2 MG/DL — HIGH (ref 0.7–1.5)
GLUCOSE BLDC GLUCOMTR-MCNC: 193 MG/DL — HIGH (ref 70–99)
GLUCOSE BLDC GLUCOMTR-MCNC: 196 MG/DL — HIGH (ref 70–99)
GLUCOSE BLDC GLUCOMTR-MCNC: 265 MG/DL — HIGH (ref 70–99)
GLUCOSE BLDC GLUCOMTR-MCNC: 84 MG/DL — SIGNIFICANT CHANGE UP (ref 70–99)
GLUCOSE SERPL-MCNC: 80 MG/DL — SIGNIFICANT CHANGE UP (ref 70–99)
HCT VFR BLD CALC: 42.6 % — SIGNIFICANT CHANGE UP (ref 42–52)
HGB BLD-MCNC: 12.4 G/DL — LOW (ref 14–18)
MAGNESIUM SERPL-MCNC: 1.8 MG/DL — SIGNIFICANT CHANGE UP (ref 1.8–2.4)
MCHC RBC-ENTMCNC: 23.8 PG — LOW (ref 27–31)
MCHC RBC-ENTMCNC: 29.1 G/DL — LOW (ref 32–37)
MCV RBC AUTO: 81.6 FL — SIGNIFICANT CHANGE UP (ref 80–94)
NRBC # BLD: 0 /100 WBCS — SIGNIFICANT CHANGE UP (ref 0–0)
PHOSPHATE SERPL-MCNC: 4 MG/DL — SIGNIFICANT CHANGE UP (ref 2.1–4.9)
PLATELET # BLD AUTO: 560 K/UL — HIGH (ref 130–400)
POTASSIUM SERPL-MCNC: 5 MMOL/L — SIGNIFICANT CHANGE UP (ref 3.5–5)
POTASSIUM SERPL-SCNC: 5 MMOL/L — SIGNIFICANT CHANGE UP (ref 3.5–5)
RBC # BLD: 5.22 M/UL — SIGNIFICANT CHANGE UP (ref 4.7–6.1)
RBC # FLD: 20 % — HIGH (ref 11.5–14.5)
SODIUM SERPL-SCNC: 148 MMOL/L — HIGH (ref 135–146)
WBC # BLD: 10.14 K/UL — SIGNIFICANT CHANGE UP (ref 4.8–10.8)
WBC # FLD AUTO: 10.14 K/UL — SIGNIFICANT CHANGE UP (ref 4.8–10.8)

## 2019-01-21 RX ADMIN — Medication 500 MILLIGRAM(S): at 11:40

## 2019-01-21 RX ADMIN — HEPARIN SODIUM 5000 UNIT(S): 5000 INJECTION INTRAVENOUS; SUBCUTANEOUS at 06:34

## 2019-01-21 RX ADMIN — Medication 20 MILLIGRAM(S): at 17:12

## 2019-01-21 RX ADMIN — ZINC OXIDE 1 APPLICATION(S): 200 OINTMENT TOPICAL at 17:13

## 2019-01-21 RX ADMIN — ATORVASTATIN CALCIUM 20 MILLIGRAM(S): 80 TABLET, FILM COATED ORAL at 22:30

## 2019-01-21 RX ADMIN — Medication 1 DROP(S): at 15:17

## 2019-01-21 RX ADMIN — MAGNESIUM OXIDE 400 MG ORAL TABLET 400 MILLIGRAM(S): 241.3 TABLET ORAL at 09:33

## 2019-01-21 RX ADMIN — TAMSULOSIN HYDROCHLORIDE 0.4 MILLIGRAM(S): 0.4 CAPSULE ORAL at 22:30

## 2019-01-21 RX ADMIN — Medication 3: at 11:43

## 2019-01-21 RX ADMIN — NYSTATIN CREAM 1 APPLICATION(S): 100000 CREAM TOPICAL at 06:31

## 2019-01-21 RX ADMIN — HEPARIN SODIUM 5000 UNIT(S): 5000 INJECTION INTRAVENOUS; SUBCUTANEOUS at 15:17

## 2019-01-21 RX ADMIN — Medication 25 MILLIGRAM(S): at 17:12

## 2019-01-21 RX ADMIN — NYSTATIN CREAM 1 APPLICATION(S): 100000 CREAM TOPICAL at 17:13

## 2019-01-21 RX ADMIN — Medication 1: at 17:12

## 2019-01-21 RX ADMIN — FINASTERIDE 5 MILLIGRAM(S): 5 TABLET, FILM COATED ORAL at 11:40

## 2019-01-21 RX ADMIN — SENNA PLUS 2 TABLET(S): 8.6 TABLET ORAL at 22:31

## 2019-01-21 RX ADMIN — Medication 3 MILLILITER(S): at 13:14

## 2019-01-21 RX ADMIN — Medication 19 UNIT(S): at 11:43

## 2019-01-21 RX ADMIN — Medication 1 TABLET(S): at 06:34

## 2019-01-21 RX ADMIN — INSULIN GLARGINE 32 UNIT(S): 100 INJECTION, SOLUTION SUBCUTANEOUS at 09:32

## 2019-01-21 RX ADMIN — Medication 20 MILLIGRAM(S): at 06:34

## 2019-01-21 RX ADMIN — Medication 3 MILLILITER(S): at 08:40

## 2019-01-21 RX ADMIN — Medication 3 MILLILITER(S): at 19:51

## 2019-01-21 RX ADMIN — Medication 1 TABLET(S): at 17:11

## 2019-01-21 RX ADMIN — Medication 1 DROP(S): at 22:31

## 2019-01-21 RX ADMIN — MAGNESIUM OXIDE 400 MG ORAL TABLET 400 MILLIGRAM(S): 241.3 TABLET ORAL at 17:12

## 2019-01-21 RX ADMIN — HEPARIN SODIUM 5000 UNIT(S): 5000 INJECTION INTRAVENOUS; SUBCUTANEOUS at 22:37

## 2019-01-21 RX ADMIN — ZINC OXIDE 1 APPLICATION(S): 200 OINTMENT TOPICAL at 06:36

## 2019-01-21 RX ADMIN — BUDESONIDE AND FORMOTEROL FUMARATE DIHYDRATE 2 PUFF(S): 160; 4.5 AEROSOL RESPIRATORY (INHALATION) at 08:30

## 2019-01-21 RX ADMIN — TIOTROPIUM BROMIDE 1 CAPSULE(S): 18 CAPSULE ORAL; RESPIRATORY (INHALATION) at 22:34

## 2019-01-21 RX ADMIN — Medication 1000 UNIT(S): at 11:40

## 2019-01-21 RX ADMIN — Medication 25 MILLIGRAM(S): at 06:34

## 2019-01-21 RX ADMIN — Medication 10 MILLIGRAM(S): at 06:34

## 2019-01-21 RX ADMIN — Medication 1 DROP(S): at 06:32

## 2019-01-21 RX ADMIN — MAGNESIUM OXIDE 400 MG ORAL TABLET 400 MILLIGRAM(S): 241.3 TABLET ORAL at 11:40

## 2019-01-21 RX ADMIN — BUDESONIDE AND FORMOTEROL FUMARATE DIHYDRATE 2 PUFF(S): 160; 4.5 AEROSOL RESPIRATORY (INHALATION) at 22:34

## 2019-01-21 RX ADMIN — INSULIN GLARGINE 32 UNIT(S): 100 INJECTION, SOLUTION SUBCUTANEOUS at 22:35

## 2019-01-21 RX ADMIN — Medication 19 UNIT(S): at 17:12

## 2019-01-21 NOTE — PROGRESS NOTE ADULT - ASSESSMENT
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 He presented from [facility] with complaints of Tachycardia and hypotension. Patient was unhappy with the care he received at [facility]   1. Acute on Chronic Hypoxic respiratory failure: Due to end stage COPD exacerbation vs. HFpEF with acute decompensation.  Continue Bronchodilators ATC and prn and Prednisone 10 mg daily. (Home dose).   Completed Levaquin and Prednisone taper on his most recent admission.   2. h/o Hypertension:  BP stable. Continue Lasix & Metoprolol.   3. Diabetes mellitus, type 2: Uncontrolled.  Hemoglobin A1C: 8.7 %   Monitor FS with Insulin coverage.  4. CKD stage IV:  Stable creatinine.  CT abdomen: No hydronephrosis bilaterally or obstructing calculus. Stable 4 cm indeterminate exophytic left interpolar renal lesion.  5. BPH:  Continue Flomax and Finasteride.   6. MICHAEL/OHVS:  BiPAP at night.  7. Severe Pulmonary Hypertension:  Supportive care  DVT, GI prophylaxis  Disposition Planning : DNR, DNI -Hospice  Pager No.  [phone]

## 2019-01-22 LAB
GLUCOSE BLDC GLUCOMTR-MCNC: 120 MG/DL — HIGH (ref 70–99)
GLUCOSE BLDC GLUCOMTR-MCNC: 120 MG/DL — HIGH (ref 70–99)
GLUCOSE BLDC GLUCOMTR-MCNC: 161 MG/DL — HIGH (ref 70–99)
GLUCOSE BLDC GLUCOMTR-MCNC: 297 MG/DL — HIGH (ref 70–99)

## 2019-01-22 RX ORDER — INSULIN GLARGINE 100 [IU]/ML
35 INJECTION, SOLUTION SUBCUTANEOUS
Qty: 0 | Refills: 0 | Status: DISCONTINUED | OUTPATIENT
Start: 2019-01-22 | End: 2019-01-23

## 2019-01-22 RX ORDER — INSULIN LISPRO 100/ML
20 VIAL (ML) SUBCUTANEOUS
Qty: 0 | Refills: 0 | Status: DISCONTINUED | OUTPATIENT
Start: 2019-01-22 | End: 2019-01-25

## 2019-01-22 RX ADMIN — Medication 1: at 18:25

## 2019-01-22 RX ADMIN — HEPARIN SODIUM 5000 UNIT(S): 5000 INJECTION INTRAVENOUS; SUBCUTANEOUS at 21:13

## 2019-01-22 RX ADMIN — ATORVASTATIN CALCIUM 20 MILLIGRAM(S): 80 TABLET, FILM COATED ORAL at 21:09

## 2019-01-22 RX ADMIN — Medication 19 UNIT(S): at 07:52

## 2019-01-22 RX ADMIN — Medication 3: at 11:39

## 2019-01-22 RX ADMIN — SENNA PLUS 2 TABLET(S): 8.6 TABLET ORAL at 21:09

## 2019-01-22 RX ADMIN — BUDESONIDE AND FORMOTEROL FUMARATE DIHYDRATE 2 PUFF(S): 160; 4.5 AEROSOL RESPIRATORY (INHALATION) at 07:56

## 2019-01-22 RX ADMIN — ZINC OXIDE 1 APPLICATION(S): 200 OINTMENT TOPICAL at 06:14

## 2019-01-22 RX ADMIN — MAGNESIUM OXIDE 400 MG ORAL TABLET 400 MILLIGRAM(S): 241.3 TABLET ORAL at 07:55

## 2019-01-22 RX ADMIN — Medication 3 MILLILITER(S): at 08:05

## 2019-01-22 RX ADMIN — TIOTROPIUM BROMIDE 1 CAPSULE(S): 18 CAPSULE ORAL; RESPIRATORY (INHALATION) at 21:12

## 2019-01-22 RX ADMIN — Medication 3 MILLILITER(S): at 20:24

## 2019-01-22 RX ADMIN — BUDESONIDE AND FORMOTEROL FUMARATE DIHYDRATE 2 PUFF(S): 160; 4.5 AEROSOL RESPIRATORY (INHALATION) at 20:51

## 2019-01-22 RX ADMIN — Medication 25 MILLIGRAM(S): at 06:13

## 2019-01-22 RX ADMIN — MAGNESIUM OXIDE 400 MG ORAL TABLET 400 MILLIGRAM(S): 241.3 TABLET ORAL at 11:40

## 2019-01-22 RX ADMIN — MAGNESIUM OXIDE 400 MG ORAL TABLET 400 MILLIGRAM(S): 241.3 TABLET ORAL at 18:26

## 2019-01-22 RX ADMIN — FINASTERIDE 5 MILLIGRAM(S): 5 TABLET, FILM COATED ORAL at 11:40

## 2019-01-22 RX ADMIN — Medication 25 MILLIGRAM(S): at 18:26

## 2019-01-22 RX ADMIN — Medication 500 MILLIGRAM(S): at 11:40

## 2019-01-22 RX ADMIN — HEPARIN SODIUM 5000 UNIT(S): 5000 INJECTION INTRAVENOUS; SUBCUTANEOUS at 06:13

## 2019-01-22 RX ADMIN — Medication 20 UNIT(S): at 18:24

## 2019-01-22 RX ADMIN — NYSTATIN CREAM 1 APPLICATION(S): 100000 CREAM TOPICAL at 18:25

## 2019-01-22 RX ADMIN — Medication 1000 UNIT(S): at 11:41

## 2019-01-22 RX ADMIN — Medication 20 MILLIGRAM(S): at 18:26

## 2019-01-22 RX ADMIN — Medication 19 UNIT(S): at 11:39

## 2019-01-22 RX ADMIN — NYSTATIN CREAM 1 APPLICATION(S): 100000 CREAM TOPICAL at 06:13

## 2019-01-22 RX ADMIN — Medication 1 TABLET(S): at 06:13

## 2019-01-22 RX ADMIN — INSULIN GLARGINE 32 UNIT(S): 100 INJECTION, SOLUTION SUBCUTANEOUS at 07:55

## 2019-01-22 RX ADMIN — TAMSULOSIN HYDROCHLORIDE 0.4 MILLIGRAM(S): 0.4 CAPSULE ORAL at 21:10

## 2019-01-22 RX ADMIN — HEPARIN SODIUM 5000 UNIT(S): 5000 INJECTION INTRAVENOUS; SUBCUTANEOUS at 17:50

## 2019-01-22 RX ADMIN — Medication 1 DROP(S): at 06:12

## 2019-01-22 RX ADMIN — Medication 1 DROP(S): at 21:12

## 2019-01-22 RX ADMIN — ZINC OXIDE 1 APPLICATION(S): 200 OINTMENT TOPICAL at 18:26

## 2019-01-22 RX ADMIN — Medication 10 MILLIGRAM(S): at 06:13

## 2019-01-22 RX ADMIN — Medication 20 MILLIGRAM(S): at 06:13

## 2019-01-22 RX ADMIN — Medication 3 MILLILITER(S): at 13:23

## 2019-01-22 RX ADMIN — Medication 1 TABLET(S): at 18:26

## 2019-01-22 NOTE — PROGRESS NOTE ADULT - ASSESSMENT
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 He presented from [facility] with complaints of Tachycardia and hypotension SBP in the 100s. He denied any CP, worsening SOB, palpitations, change on cough, dizziness, lightheadedness, abdominal pain, change in bowel or urinary habits. Patient was unhappy with the care he received at [facility] and does not wish to return there and also expressed an interest in hospice. He was admitted for further management.     Assessment and Plan:    1. Acute on Chronic Hypoxic respiratory failure: Due to end stage COPD exacerbation vs. HFpEF with acute decompensation.  Patient stable on NC 5L and BiPAP qhs.  Continue Bronchodilators ATC and prn and Prednisone 10 mg daily. (Home dose).   Completed Levaquin and Prednisone taper on his most recent admission.   Maintain sats > 90%. Continue Lasix (dose decreased due to worsening Alkalosis).   Remains DNR & DNI.       2. Hypomagnesemia & Hyperkalemia:   Continue Low K diet & Kayexalate 30 gm po TTS (3x week).  Magnesium levels replaced. Monitor level.        3. h/o Hypertension:  BP stable. Continue Lasix & Metoprolol.         4. Diabetes mellitus, type 2: Uncontrolled.  Hemoglobin A1C, Whole Blood: 8.7 % (11.05.18 @ 07:00)  Monitor FS with Insulin coverage.      5. CKD stage IV:  Stable creatinine.  Avoid Nephrotoxins. Monitor BMP and Electrolytes.  CT abdomen: No hydronephrosis bilaterally or obstructing calculus. Stable bilateral renal cysts measuring up to 9.5 cm at   the right lower pole. Stable 4 cm indeterminate exophytic left interpolar renal lesion.      6. BPH:  Continue Flomax and Finasteride.       7. Obesity:  Dietary modification.      8. B12 deficiency & Vitamin D deficiency:  Continue with supplementation.       9. MICHAEL/OHVS:  BiPAP at night.      10. Severe Pulmonary Hypertension:  Supportive care  ECHO 11/2018: Normal LVEF, no RWMA, Trace TR, Moderate pulmonary hypertension.  ECHO 12/25/18: severe Right side heart dilation, RSVP 100 (was 40 in 10/2018)      11. ESBL UTI:  per ID, clinically no UTI: no dysuria or fevers or increasing wbc.  No antibiotics unless symptomatic but Patient insists on being treated.  Sensitive to Bactrim: complete 5 day course.        DVT prophylaxis: Heparin  Disposition: Patient refuses Hospice unless home with 24/7 care. he now wants STR at SNF.  CODE status: DNR/DNI  Poor Prognosis. 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 He presented from [facility] with complaints of Tachycardia and hypotension SBP in the 100s. He denied any CP, worsening SOB, palpitations, change on cough, dizziness, lightheadedness, abdominal pain, change in bowel or urinary habits. Patient was unhappy with the care he received at [facility] and does not wish to return there and also expressed an interest in hospice. He was admitted for further management.     Assessment and Plan:    1. Acute on Chronic Hypoxic respiratory failure: Due to end stage COPD exacerbation vs. HFpEF with acute decompensation.  Patient stable on NC 5L and BiPAP qhs.  Continue Bronchodilators ATC and prn and Prednisone 10 mg daily. (Home dose).   Completed Levaquin and Prednisone taper on his most recent admission.   Maintain sats > 90%. Continue Lasix (dose decreased due to worsening Alkalosis).   Remains DNR & DNI.       2. Hypernatremia, Hypomagnesemia & Hyperkalemia:   Continue Low K diet & Kayexalate 30 gm po TTS (3x week).  Magnesium levels replaced. Monitor level.  Free water intake.         3. h/o Hypertension:  BP stable. Continue Lasix & Metoprolol.         4. Diabetes mellitus, type 2: Uncontrolled.  Hemoglobin A1C, Whole Blood: 8.7 % (11.05.18 @ 07:00)  Monitor FS with Insulin coverage.      5. CKD stage IV:  Stable creatinine.  Avoid Nephrotoxins. Monitor BMP and Electrolytes.  CT abdomen: No hydronephrosis bilaterally or obstructing calculus. Stable bilateral renal cysts measuring up to 9.5 cm at   the right lower pole. Stable 4 cm indeterminate exophytic left interpolar renal lesion.      6. BPH:  Continue Flomax and Finasteride.       7. Obesity:  Dietary modification.      8. B12 deficiency & Vitamin D deficiency:  Continue with supplementation.       9. MICHAEL/OHVS:  BiPAP at night.      10. Severe Pulmonary Hypertension:  Supportive care  ECHO 11/2018: Normal LVEF, no RWMA, Trace TR, Moderate pulmonary hypertension.  ECHO 12/25/18: severe Right side heart dilation, RSVP 100 (was 40 in 10/2018)      11. ESBL UTI:  per ID, clinically no UTI: no dysuria or fevers or increasing wbc.  No antibiotics unless symptomatic but Patient insists on being treated.  Sensitive to Bactrim: complete 5 day course.        DVT prophylaxis: Heparin  Disposition: Patient refuses Hospice unless home with 24/7 care. he now wants STR at SNF.  CODE status: DNR/DNI  Poor Prognosis.

## 2019-01-22 NOTE — PROGRESS NOTE ADULT - REASON FOR ADMISSION
Acute Hypoxic Respiratory Failure

## 2019-01-23 LAB
ANION GAP SERPL CALC-SCNC: 7 MMOL/L — SIGNIFICANT CHANGE UP (ref 7–14)
BUN SERPL-MCNC: 42 MG/DL — HIGH (ref 10–20)
CALCIUM SERPL-MCNC: 9.1 MG/DL — SIGNIFICANT CHANGE UP (ref 8.5–10.1)
CHLORIDE SERPL-SCNC: 97 MMOL/L — LOW (ref 98–110)
CO2 SERPL-SCNC: 39 MMOL/L — HIGH (ref 17–32)
CREAT SERPL-MCNC: 2.5 MG/DL — HIGH (ref 0.7–1.5)
GLUCOSE BLDC GLUCOMTR-MCNC: 158 MG/DL — HIGH (ref 70–99)
GLUCOSE BLDC GLUCOMTR-MCNC: 251 MG/DL — HIGH (ref 70–99)
GLUCOSE BLDC GLUCOMTR-MCNC: 307 MG/DL — HIGH (ref 70–99)
GLUCOSE BLDC GLUCOMTR-MCNC: 96 MG/DL — SIGNIFICANT CHANGE UP (ref 70–99)
GLUCOSE SERPL-MCNC: 82 MG/DL — SIGNIFICANT CHANGE UP (ref 70–99)
POTASSIUM SERPL-MCNC: 5.8 MMOL/L — HIGH (ref 3.5–5)
POTASSIUM SERPL-SCNC: 5.8 MMOL/L — HIGH (ref 3.5–5)
SODIUM SERPL-SCNC: 143 MMOL/L — SIGNIFICANT CHANGE UP (ref 135–146)

## 2019-01-23 RX ORDER — INSULIN GLARGINE 100 [IU]/ML
30 INJECTION, SOLUTION SUBCUTANEOUS
Qty: 0 | Refills: 0 | Status: DISCONTINUED | OUTPATIENT
Start: 2019-01-23 | End: 2019-01-25

## 2019-01-23 RX ADMIN — Medication 1 DROP(S): at 22:38

## 2019-01-23 RX ADMIN — ATORVASTATIN CALCIUM 20 MILLIGRAM(S): 80 TABLET, FILM COATED ORAL at 22:33

## 2019-01-23 RX ADMIN — BUDESONIDE AND FORMOTEROL FUMARATE DIHYDRATE 2 PUFF(S): 160; 4.5 AEROSOL RESPIRATORY (INHALATION) at 22:32

## 2019-01-23 RX ADMIN — Medication 1 DROP(S): at 06:29

## 2019-01-23 RX ADMIN — Medication 500 MILLIGRAM(S): at 11:55

## 2019-01-23 RX ADMIN — MAGNESIUM OXIDE 400 MG ORAL TABLET 400 MILLIGRAM(S): 241.3 TABLET ORAL at 11:55

## 2019-01-23 RX ADMIN — FINASTERIDE 5 MILLIGRAM(S): 5 TABLET, FILM COATED ORAL at 11:55

## 2019-01-23 RX ADMIN — NYSTATIN CREAM 1 APPLICATION(S): 100000 CREAM TOPICAL at 06:29

## 2019-01-23 RX ADMIN — Medication 10 MILLIGRAM(S): at 06:27

## 2019-01-23 RX ADMIN — SODIUM POLYSTYRENE SULFONATE 30 GRAM(S): 4.1 POWDER, FOR SUSPENSION ORAL at 13:57

## 2019-01-23 RX ADMIN — CHLORHEXIDINE GLUCONATE 1 APPLICATION(S): 213 SOLUTION TOPICAL at 08:17

## 2019-01-23 RX ADMIN — Medication 3 MILLILITER(S): at 13:36

## 2019-01-23 RX ADMIN — Medication 3 MILLILITER(S): at 20:05

## 2019-01-23 RX ADMIN — BUDESONIDE AND FORMOTEROL FUMARATE DIHYDRATE 2 PUFF(S): 160; 4.5 AEROSOL RESPIRATORY (INHALATION) at 08:17

## 2019-01-23 RX ADMIN — HEPARIN SODIUM 5000 UNIT(S): 5000 INJECTION INTRAVENOUS; SUBCUTANEOUS at 06:29

## 2019-01-23 RX ADMIN — TAMSULOSIN HYDROCHLORIDE 0.4 MILLIGRAM(S): 0.4 CAPSULE ORAL at 22:33

## 2019-01-23 RX ADMIN — Medication 1 TABLET(S): at 06:26

## 2019-01-23 RX ADMIN — HEPARIN SODIUM 5000 UNIT(S): 5000 INJECTION INTRAVENOUS; SUBCUTANEOUS at 13:52

## 2019-01-23 RX ADMIN — Medication 1 DROP(S): at 13:52

## 2019-01-23 RX ADMIN — SENNA PLUS 2 TABLET(S): 8.6 TABLET ORAL at 22:33

## 2019-01-23 RX ADMIN — Medication 3: at 16:53

## 2019-01-23 RX ADMIN — ZINC OXIDE 1 APPLICATION(S): 200 OINTMENT TOPICAL at 06:32

## 2019-01-23 RX ADMIN — Medication 3 MILLILITER(S): at 07:57

## 2019-01-23 RX ADMIN — MAGNESIUM OXIDE 400 MG ORAL TABLET 400 MILLIGRAM(S): 241.3 TABLET ORAL at 16:54

## 2019-01-23 RX ADMIN — HEPARIN SODIUM 5000 UNIT(S): 5000 INJECTION INTRAVENOUS; SUBCUTANEOUS at 22:33

## 2019-01-23 RX ADMIN — Medication 20 MILLIGRAM(S): at 06:27

## 2019-01-23 RX ADMIN — Medication 20 UNIT(S): at 12:03

## 2019-01-23 RX ADMIN — Medication 25 MILLIGRAM(S): at 06:27

## 2019-01-23 RX ADMIN — Medication 1000 UNIT(S): at 11:55

## 2019-01-23 RX ADMIN — INSULIN GLARGINE 35 UNIT(S): 100 INJECTION, SOLUTION SUBCUTANEOUS at 11:56

## 2019-01-23 RX ADMIN — MAGNESIUM OXIDE 400 MG ORAL TABLET 400 MILLIGRAM(S): 241.3 TABLET ORAL at 08:17

## 2019-01-23 RX ADMIN — Medication 4: at 12:02

## 2019-01-23 RX ADMIN — Medication 20 UNIT(S): at 16:53

## 2019-01-23 RX ADMIN — TIOTROPIUM BROMIDE 1 CAPSULE(S): 18 CAPSULE ORAL; RESPIRATORY (INHALATION) at 22:32

## 2019-01-23 NOTE — PROGRESS NOTE ADULT - ASSESSMENT
77 yo M with PMHx of End stage COPD (on supplemental oxygen), severe pulmonary hypertension, DM, MICHAEL, obesity, CKD 3, recently discharged on 1/3 after being admitted for acute on chronic hypoxic & hypercapnic respiratory failure due to COPD with acute exacerbation. He presented from [facility] with complaints of tachycardia and hypotension SBP in the 100s. Patient was unhappy with the care he received at [facility] and does not wish to return there and also expressed an interest in hospice. He was admitted for further management. During hospital stay pt was treated for acute on chronic hypoxic respiratory failure due to COPD exacerbation vs HFpEF exacerbation. He initially required HFNC and was eventually transitioned to NC. Patient expressed interest in hospice however would now like to return home w/ home care.     Acute on Chronic Hypoxic respiratory failure: Due to end stage COPD exacerbation vs. HFpEF with acute decompensation  - Patient stable on NC 5L and BiPAP qhs  - c/w Bronchodilators ATC and prn and Prednisone 10 mg daily. (Home dose)  - Completed Levaquin and Prednisone taper on his most recent admission.   - lasix held today due to JAMES    Hypernatremia (resolved), Hypomagnesemia (resolved) & Hyperkalemia  - c/w  Low K diet & Kayexalate 30 gm po (3x week)  - K+ 5.8 today, given Kayexalate  - f/u BMP in am     JAMES on CKD stage IV likely pre-renal  - CT abdomen: No hydronephrosis bilaterally or obstructing calculus. Stable bilateral renal cysts measuring up to 9.5 cm at   the right lower pole. Stable 4 cm indeterminate exophytic left interpolar renal lesion  - baseline Cr ~ 2.0, has been slowly increasing, Cr 2.5 today  - hold lasix  - Avoid Nephrotoxins. Monitor BMP and Electrolytes    Hypertension (controlled)  - c/w metoprolol  - hold lasix    Diabetes mellitus, type 2: Uncontrolled  - Hemoglobin A1C, Whole Blood: 8.7 % (11.05.18 @ 07:00)  - FS 96 this am  - decrease lantus from 35 to 30 units BID and c/w lispro 20 units TID (will likely need further adjustment in am)  - monitor FS and adjust prn    BPH  - c/w Flomax and Finasteride    Obesity  - Dietary modification.    B12 deficiency & Vitamin D deficiency  - c/w supplementation    MICHAEL/OHVS  - BiPAP at night    Severe Pulmonary Hypertension  - Supportive care  - ECHO 11/2018: Normal LVEF, no RWMA, Trace TR, Moderate pulmonary hypertension.  - ECHO 12/25/18: severe Right side heart dilation, RSVP 100 (was 40 in 10/2018)    ESBL UTI  - per ID, clinically no UTI: no dysuria or fevers or increasing wbc.  - No antibiotics unless symptomatic but patient insists on being treated  - Sensitive to Bactrim: complete 5 day course (day 4)    DVT ppx  - HSQ    Disposition: Patient was considering hospice would now like to be d/c home with home care  CODE status: DNR/DNI  Poor Prognosis

## 2019-01-24 ENCOUNTER — TRANSCRIPTION ENCOUNTER (OUTPATIENT)
Age: 79
End: 2019-01-24

## 2019-01-24 LAB
ANION GAP SERPL CALC-SCNC: 6 MMOL/L — LOW (ref 7–14)
BUN SERPL-MCNC: 43 MG/DL — HIGH (ref 10–20)
CALCIUM SERPL-MCNC: 8.9 MG/DL — SIGNIFICANT CHANGE UP (ref 8.5–10.1)
CHLORIDE SERPL-SCNC: 97 MMOL/L — LOW (ref 98–110)
CO2 SERPL-SCNC: 39 MMOL/L — HIGH (ref 17–32)
CREAT SERPL-MCNC: 2.8 MG/DL — HIGH (ref 0.7–1.5)
GLUCOSE BLDC GLUCOMTR-MCNC: 104 MG/DL — HIGH (ref 70–99)
GLUCOSE BLDC GLUCOMTR-MCNC: 112 MG/DL — HIGH (ref 70–99)
GLUCOSE BLDC GLUCOMTR-MCNC: 146 MG/DL — HIGH (ref 70–99)
GLUCOSE BLDC GLUCOMTR-MCNC: 215 MG/DL — HIGH (ref 70–99)
GLUCOSE SERPL-MCNC: 115 MG/DL — HIGH (ref 70–99)
POTASSIUM SERPL-MCNC: 5.6 MMOL/L — HIGH (ref 3.5–5)
POTASSIUM SERPL-SCNC: 5.6 MMOL/L — HIGH (ref 3.5–5)
SODIUM SERPL-SCNC: 142 MMOL/L — SIGNIFICANT CHANGE UP (ref 135–146)

## 2019-01-24 RX ORDER — ALBUTEROL 90 UG/1
2 AEROSOL, METERED ORAL
Qty: 1 | Refills: 0
Start: 2019-01-24

## 2019-01-24 RX ORDER — MORPHINE SULFATE 50 MG/1
5 CAPSULE, EXTENDED RELEASE ORAL ONCE
Qty: 0 | Refills: 0 | Status: DISCONTINUED | OUTPATIENT
Start: 2019-01-24 | End: 2019-01-25

## 2019-01-24 RX ORDER — TAMSULOSIN HYDROCHLORIDE 0.4 MG/1
1 CAPSULE ORAL
Qty: 0 | Refills: 0 | DISCHARGE
Start: 2019-01-24

## 2019-01-24 RX ORDER — BENZOCAINE AND MENTHOL 5; 1 G/100ML; G/100ML
1 LIQUID ORAL
Qty: 0 | Refills: 0 | Status: DISCONTINUED | OUTPATIENT
Start: 2019-01-24 | End: 2019-01-25

## 2019-01-24 RX ORDER — HYDROCORTISONE 1 %
1 OINTMENT (GRAM) TOPICAL THREE TIMES A DAY
Qty: 0 | Refills: 0 | Status: DISCONTINUED | OUTPATIENT
Start: 2019-01-24 | End: 2019-01-25

## 2019-01-24 RX ORDER — METOPROLOL TARTRATE 50 MG
1 TABLET ORAL
Qty: 0 | Refills: 0 | DISCHARGE
Start: 2019-01-24

## 2019-01-24 RX ORDER — BUDESONIDE AND FORMOTEROL FUMARATE DIHYDRATE 160; 4.5 UG/1; UG/1
1 AEROSOL RESPIRATORY (INHALATION)
Qty: 1 | Refills: 0 | OUTPATIENT
Start: 2019-01-24 | End: 2019-02-06

## 2019-01-24 RX ORDER — TIOTROPIUM BROMIDE 18 UG/1
1 CAPSULE ORAL; RESPIRATORY (INHALATION)
Qty: 1 | Refills: 0 | OUTPATIENT
Start: 2019-01-24 | End: 2019-02-06

## 2019-01-24 RX ORDER — CHOLECALCIFEROL (VITAMIN D3) 125 MCG
1000 CAPSULE ORAL
Qty: 0 | Refills: 0 | DISCHARGE
Start: 2019-01-24

## 2019-01-24 RX ORDER — INSULIN LISPRO 100/ML
20 VIAL (ML) SUBCUTANEOUS
Qty: 15 | Refills: 0 | OUTPATIENT
Start: 2019-01-24 | End: 2019-02-06

## 2019-01-24 RX ORDER — ATORVASTATIN CALCIUM 80 MG/1
1 TABLET, FILM COATED ORAL
Qty: 0 | Refills: 0 | DISCHARGE
Start: 2019-01-24

## 2019-01-24 RX ORDER — FINASTERIDE 5 MG/1
1 TABLET, FILM COATED ORAL
Qty: 0 | Refills: 0 | DISCHARGE
Start: 2019-01-24

## 2019-01-24 RX ORDER — MAGNESIUM OXIDE 400 MG ORAL TABLET 241.3 MG
1 TABLET ORAL
Qty: 0 | Refills: 0 | DISCHARGE
Start: 2019-01-24

## 2019-01-24 RX ORDER — INSULIN GLARGINE 100 [IU]/ML
30 INJECTION, SOLUTION SUBCUTANEOUS
Qty: 15 | Refills: 0 | OUTPATIENT
Start: 2019-01-24 | End: 2019-02-06

## 2019-01-24 RX ORDER — ASCORBIC ACID 60 MG
1 TABLET,CHEWABLE ORAL
Qty: 0 | Refills: 0 | DISCHARGE
Start: 2019-01-24

## 2019-01-24 RX ADMIN — Medication 2: at 16:54

## 2019-01-24 RX ADMIN — NYSTATIN CREAM 1 APPLICATION(S): 100000 CREAM TOPICAL at 17:07

## 2019-01-24 RX ADMIN — Medication 25 MILLIGRAM(S): at 17:07

## 2019-01-24 RX ADMIN — HEPARIN SODIUM 5000 UNIT(S): 5000 INJECTION INTRAVENOUS; SUBCUTANEOUS at 16:55

## 2019-01-24 RX ADMIN — HEPARIN SODIUM 5000 UNIT(S): 5000 INJECTION INTRAVENOUS; SUBCUTANEOUS at 06:41

## 2019-01-24 RX ADMIN — Medication 20 UNIT(S): at 08:12

## 2019-01-24 RX ADMIN — INSULIN GLARGINE 30 UNIT(S): 100 INJECTION, SOLUTION SUBCUTANEOUS at 08:13

## 2019-01-24 RX ADMIN — Medication 1 APPLICATION(S): at 06:43

## 2019-01-24 RX ADMIN — BUDESONIDE AND FORMOTEROL FUMARATE DIHYDRATE 2 PUFF(S): 160; 4.5 AEROSOL RESPIRATORY (INHALATION) at 08:14

## 2019-01-24 RX ADMIN — Medication 1 DROP(S): at 06:39

## 2019-01-24 RX ADMIN — MAGNESIUM OXIDE 400 MG ORAL TABLET 400 MILLIGRAM(S): 241.3 TABLET ORAL at 08:12

## 2019-01-24 RX ADMIN — Medication 1 TABLET(S): at 06:41

## 2019-01-24 RX ADMIN — Medication 10 MILLIGRAM(S): at 06:41

## 2019-01-24 RX ADMIN — MAGNESIUM OXIDE 400 MG ORAL TABLET 400 MILLIGRAM(S): 241.3 TABLET ORAL at 16:55

## 2019-01-24 RX ADMIN — ZINC OXIDE 1 APPLICATION(S): 200 OINTMENT TOPICAL at 17:08

## 2019-01-24 RX ADMIN — TAMSULOSIN HYDROCHLORIDE 0.4 MILLIGRAM(S): 0.4 CAPSULE ORAL at 21:25

## 2019-01-24 RX ADMIN — MAGNESIUM OXIDE 400 MG ORAL TABLET 400 MILLIGRAM(S): 241.3 TABLET ORAL at 11:33

## 2019-01-24 RX ADMIN — Medication 3 MILLILITER(S): at 13:36

## 2019-01-24 RX ADMIN — Medication 3 MILLILITER(S): at 20:02

## 2019-01-24 RX ADMIN — ATORVASTATIN CALCIUM 20 MILLIGRAM(S): 80 TABLET, FILM COATED ORAL at 21:24

## 2019-01-24 RX ADMIN — Medication 1000 UNIT(S): at 11:33

## 2019-01-24 RX ADMIN — Medication 3 MILLILITER(S): at 08:44

## 2019-01-24 RX ADMIN — HEPARIN SODIUM 5000 UNIT(S): 5000 INJECTION INTRAVENOUS; SUBCUTANEOUS at 21:23

## 2019-01-24 RX ADMIN — Medication 1 TABLET(S): at 16:54

## 2019-01-24 RX ADMIN — Medication 1 DROP(S): at 16:55

## 2019-01-24 RX ADMIN — FINASTERIDE 5 MILLIGRAM(S): 5 TABLET, FILM COATED ORAL at 11:33

## 2019-01-24 RX ADMIN — SENNA PLUS 2 TABLET(S): 8.6 TABLET ORAL at 21:24

## 2019-01-24 RX ADMIN — BENZOCAINE AND MENTHOL 1 LOZENGE: 5; 1 LIQUID ORAL at 21:26

## 2019-01-24 RX ADMIN — Medication 20 UNIT(S): at 16:54

## 2019-01-24 RX ADMIN — Medication 500 MILLIGRAM(S): at 11:33

## 2019-01-24 RX ADMIN — Medication 1 DROP(S): at 21:25

## 2019-01-24 RX ADMIN — Medication 20 UNIT(S): at 11:50

## 2019-01-24 NOTE — DISCHARGE NOTE ADULT - HOSPITAL COURSE
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 He presented from [facility] with complaints of tachycardia and hypotension SBP in the 100s. Patient was unhappy with the care he received at [facility] and does not wish to return there and also expressed an interest in hospice. He was admitted for further management. During hospital stay pt was treated for acute on chronic hypoxic respiratory failure due to COPD exacerbation vs HFpEF exacerbation. He initially required HFNC and was eventually transitioned to NC. Patient expressed interest in hospice however would now like to return home w/ home care. Has history of ckd iv, baseline SCr around 2.0, slight elevation in SCr to 2.8. Discharged on 1/24 with home visit program set up, will need repeat bmp in 3 days. 78M PMHx COPD on home o2, severe PHTN, DM2, MICHAEL, obesity, CKD III here with acute hypoxic and hypercapnic resp failure due to COPD exacerbation. He was treated with steroids and abx which responded well. He initially required high flow nasal cannula but eventually was able to be weaned to his home dose of oxygen. He expressed interest in hospice but ultimately decided to be d/c home with visiting services. Has history of ckd iv, baseline SCr around 2.0, slight elevation in SCr to 2.8. Discharged on 1/24 with home visit program set up, will need repeat bmp in 3 days.    45 minutes spent on discharge planning.

## 2019-01-24 NOTE — DISCHARGE NOTE ADULT - PATIENT PORTAL LINK FT
You can access the [facility] Patient Portal, offered by [facility], by registering with the following website: [email or web address]

## 2019-01-24 NOTE — CHART NOTE - NSCHARTNOTEFT_GEN_A_CORE
Registered Dietitian Follow-Up     Patient Profile Reviewed                           Yes [X]   No []     Nutrition History Previously Obtained        Yes [X]  No []       Pertinent  Information:  pt presents with sinus tachycardia, end stage COPD, d/c to STR/ SNF          Diet order: DASH/RENAl/CHO     Anthropometrics:  - Ht.   - Wt. 12.7 kgs   - %wt change  - BMI   - IBW      Pertinent Lab Data: 1/21 hgb 12.4L, na 148H, bun 41H, creat 2.2H      Pertinent Meds: roxanol, insulin, kayexalate, lopressor, miralax      Physical Findings:  - Appearance: alert, orientated   - GI function: + BS  - Tubes: n/a   - Oral/Mouth cavity:  - Skin: intact     Nutrition Requirements  Weight Used: 123.7 kgs     Estimated Energy Needs    Continue [X]  Adjust []  Adjusted Energy Recommendations:   kcal/day        Estimated Protein Needs    Continue [X]  Adjust []  Adjusted Protein Recommendations:   gm/day        Estimated Fluid Needs        Continue [X]  Adjust []  Adjusted Fluid Recommendations:   mL/day     Nutrient Intake: tolerating po diet well > 75% of meal sconsumed         [] Previous Nutrition Diagnosis:            [] Ongoing          [] Resolved    [X] No active nutrition diagnosis identified at this time     Nutrition Diagnostic #1  Problem:   Etiology:   Statement:      Nutrition Diagnostic #2  Problem:  Etiology:  Statement:     Nutrition Intervention: maintain on above      Goal/Expected Outcome: pt to continue to tolerate po diet and consume > 75% of meals      Indicator/Monitoring: po intake, labs/meds, nutrition focused physical findings

## 2019-01-24 NOTE — DISCHARGE NOTE ADULT - CARE PLAN
Principal Discharge DX:	COPD (chronic obstructive pulmonary disease)  Goal:	Please follow up with the home visit program  Assessment and plan of treatment:	You were admitted for copd exacerbation, treated with abx and steroids. Please take the medications as listed above and follow up with the home visit program.  Secondary Diagnosis:	CKD (chronic kidney disease)  Goal:	Please follow up with the home visit program  Assessment and plan of treatment:	You will be scheduled for a blood draw to monitor  your kidney levels in three days with the home assistance program. Principal Discharge DX:	COPD (chronic obstructive pulmonary disease)  Goal:	Please follow up with the home visit program  Assessment and plan of treatment:	You were admitted for copd exacerbation, treated with abx and steroids. Please take the medications as listed above and follow up with the home visit program.  Secondary Diagnosis:	CKD (chronic kidney disease)  Goal:	Please follow up with the home visit program  Assessment and plan of treatment:	You will be scheduled for a blood draw to monitor  your kidney levels in three days with the home assistance program.  Secondary Diagnosis:	Hyperkalemia  Goal:	Please take medication as prescribed above  Assessment and plan of treatment:	You were found to have high potassium, please take the medications listed above and follow up with your primary care doctor for high potassium workup.

## 2019-01-24 NOTE — DISCHARGE NOTE ADULT - MEDICATION SUMMARY - MEDICATIONS TO STOP TAKING
I will STOP taking the medications listed below when I get home from the hospital:    Vitamin B-12  -- 3000 milligram(s) orally    Vitamin D3 1000 intl units oral tablet  -- 1 tab(s) by mouth once a day    Centrum oral tablet  -- 1 tab(s) by mouth once a day    Flax Seed Oil oral capsule  -- 1000 milligram(s) orally    omeprazole 20 mg oral delayed release capsule  -- 1 cap(s) by mouth once a day    Vitamin C 1000 mg oral tablet  -- 1  by mouth once a day    Trelegy Ellipta inhalation powder  -- 1 puff(s) inhaled once a day    ipratropium-albuterol 0.5 mg-2.5 mg/3 mLinhalation solution  -- 3 milliliter(s) inhaled every 4 hours, As Needed    zinc oxide 40% topical ointment  -- 1 application on skin 2 times a day    senna oral tablet  -- 2 tab(s) by mouth once a day (at bedtime)    polyethylene glycol 3350 oral powder for reconstitution  -- 1 dose(s) by mouth once a day, As Needed    insulin glargine  -- 30 unit(s) subcutaneously every 12 hours  before breakfast and at bedtime.    NovoLOG FlexPen 100 units/mL injectable solution  -- 17 unit(s) injectable 3 times a day (before meals)    furosemide 20 mg oral tablet  -- 1 tab(s) by mouth once a day

## 2019-01-24 NOTE — DISCHARGE NOTE ADULT - MEDICATION SUMMARY - MEDICATIONS TO TAKE
I will START or STAY ON the medications listed below when I get home from the hospital:    finasteride 5 mg oral tablet  -- 1 tab(s) by mouth once a day  -- Indication: For bph    predniSONE 10 mg oral tablet  -- 1 tab(s) by mouth once a day  -- Indication: For Copd    tamsulosin 0.4 mg oral capsule  -- 1 cap(s) by mouth once a day (at bedtime)  -- Indication: For bph    atorvastatin 20 mg oral tablet  -- 1 tab(s) by mouth once a day (at bedtime)  -- Indication: For hyperlipidemia    metoprolol tartrate 25 mg oral tablet  -- 1 tab(s) by mouth 2 times a day  -- Indication: For hypertension    Proventil HFA 90 mcg/inh inhalation aerosol  -- 2 puff(s) inhaled every 6 hours, As Needed   -- For inhalation only.  It is very important that you take or use this exactly as directed.  Do not skip doses or discontinue unless directed by your doctor.  Obtain medical advice before taking any non-prescription drugs as some may affect the action of this medication.  Shake well before use.    -- Indication: For Copd    tiotropium 18 mcg inhalation capsule  -- 1 cap(s) inhaled once a day (at bedtime)  -- Indication: For Copd    Symbicort 160 mcg-4.5 mcg/inh inhalation aerosol  -- 1 puff(s) inhaled 2 times a day   -- Check with your doctor before becoming pregnant.  For inhalation only.  Rinse mouth thoroughly after use.    -- Indication: For Copd    sodium polystyrene sulfonate  -- 30 milligram(s) by mouth 3 times a week  -- Indication: For hyperkalemia    magnesium oxide 400 mg (241.3 mg elemental magnesium) oral tablet  -- 1 tab(s) by mouth 3 times a day (with meals)  -- Indication: For low magnesium    ocular lubricant ophthalmic solution  -- 1 drop(s) to each affected eye 3 times a day  -- Indication: For dry eyes    cholecalciferol oral tablet  -- 1000 unit(s) by mouth once a day  -- Indication: For vitamin supplementation    ascorbic acid 500 mg oral tablet  -- 1 tab(s) by mouth once a day  -- Indication: For vitamin supplementation I will START or STAY ON the medications listed below when I get home from the hospital:    finasteride 5 mg oral tablet  -- 1 tab(s) by mouth once a day  -- Indication: For bph    predniSONE 10 mg oral tablet  -- 1 tab(s) by mouth once a day  -- Indication: For Copd    tamsulosin 0.4 mg oral capsule  -- 1 cap(s) by mouth once a day (at bedtime)  -- Indication: For bph    Lantus 100 units/mL subcutaneous solution  -- 30 unit(s) subcutaneous 2 times a day   -- Do not drink alcoholic beverages when taking this medication.  It is very important that you take or use this exactly as directed.  Do not skip doses or discontinue unless directed by your doctor.  Keep in refrigerator.  Do not freeze.    -- Indication: For diabetes    HumaLOG 100 units/mL injectable solution  -- 20 unit(s) injectable 3 times a day   -- Indication: For diabetes    atorvastatin 20 mg oral tablet  -- 1 tab(s) by mouth once a day (at bedtime)  -- Indication: For hyperlipidemia    metoprolol tartrate 25 mg oral tablet  -- 1 tab(s) by mouth 2 times a day  -- Indication: For hypertension    Proventil HFA 90 mcg/inh inhalation aerosol  -- 2 puff(s) inhaled every 6 hours, As Needed   -- For inhalation only.  It is very important that you take or use this exactly as directed.  Do not skip doses or discontinue unless directed by your doctor.  Obtain medical advice before taking any non-prescription drugs as some may affect the action of this medication.  Shake well before use.    -- Indication: For Copd    tiotropium 18 mcg inhalation capsule  -- 1 cap(s) inhaled once a day (at bedtime)  -- Indication: For Copd    Symbicort 160 mcg-4.5 mcg/inh inhalation aerosol  -- 1 puff(s) inhaled 2 times a day   -- Check with your doctor before becoming pregnant.  For inhalation only.  Rinse mouth thoroughly after use.    -- Indication: For Copd    sodium polystyrene sulfonate  -- 30 milligram(s) by mouth 3 times a week  -- Indication: For hyperkalemia    magnesium oxide 400 mg (241.3 mg elemental magnesium) oral tablet  -- 1 tab(s) by mouth 3 times a day (with meals)  -- Indication: For low magnesium    ocular lubricant ophthalmic solution  -- 1 drop(s) to each affected eye 3 times a day  -- Indication: For dry eyes    cholecalciferol oral tablet  -- 1000 unit(s) by mouth once a day  -- Indication: For vitamin supplementation    ascorbic acid 500 mg oral tablet  -- 1 tab(s) by mouth once a day  -- Indication: For vitamin supplementation I will START or STAY ON the medications listed below when I get home from the hospital:    finasteride 5 mg oral tablet  -- 1 tab(s) by mouth once a day  -- Indication: For bph    predniSONE 10 mg oral tablet  -- 1 tab(s) by mouth once a day  -- Indication: For Copd    tamsulosin 0.4 mg oral capsule  -- 1 cap(s) by mouth once a day (at bedtime)  -- Indication: For bph    Basaglar KwikPen 100 units/mL subcutaneous solution  -- 30 unit(s) subcutaneous 2 times a day   -- Do not drink alcoholic beverages when taking this medication.  It is very important that you take or use this exactly as directed.  Do not skip doses or discontinue unless directed by your doctor.  Keep in refrigerator.  Do not freeze.    -- Indication: For diabetes    NovoLOG 100 units/mL injectable solution  -- 20 unit(s) injectable 3 times a day   -- Check with your doctor before becoming pregnant.  Do not drink alcoholic beverages when taking this medication.  Keep in refrigerator.  Do not freeze.  Obtain medical advice before taking any non-prescription drugs as some may affect the action of this medication.    -- Indication: For diabetes    atorvastatin 20 mg oral tablet  -- 1 tab(s) by mouth once a day (at bedtime)  -- Indication: For hyperlipidemia    metoprolol tartrate 25 mg oral tablet  -- 1 tab(s) by mouth 2 times a day  -- Indication: For hypertension    Proventil HFA 90 mcg/inh inhalation aerosol  -- 2 puff(s) inhaled every 6 hours, As Needed   -- For inhalation only.  It is very important that you take or use this exactly as directed.  Do not skip doses or discontinue unless directed by your doctor.  Obtain medical advice before taking any non-prescription drugs as some may affect the action of this medication.  Shake well before use.    -- Indication: For Copd    tiotropium 18 mcg inhalation capsule  -- 1 cap(s) inhaled once a day (at bedtime)  -- Indication: For Copd    Symbicort 160 mcg-4.5 mcg/inh inhalation aerosol  -- 1 puff(s) inhaled 2 times a day   -- Check with your doctor before becoming pregnant.  For inhalation only.  Rinse mouth thoroughly after use.    -- Indication: For Copd    Kalexate oral and rectal powder  -- 15 gram(s) by mouth once a day   -- Not to be used with sorbitol  Shake well before use.    -- Indication: For potassium    sodium polystyrene sulfonate  -- 30 milligram(s) by mouth 3 times a week  -- Indication: For hyperkalemia    magnesium oxide 400 mg (241.3 mg elemental magnesium) oral tablet  -- 1 tab(s) by mouth 3 times a day (with meals)  -- Indication: For low magnesium    ocular lubricant ophthalmic solution  -- 1 drop(s) to each affected eye 3 times a day  -- Indication: For dry eyes    cholecalciferol oral tablet  -- 1000 unit(s) by mouth once a day  -- Indication: For vitamin supplementation    ascorbic acid 500 mg oral tablet  -- 1 tab(s) by mouth once a day  -- Indication: For vitamin supplementation

## 2019-01-24 NOTE — DISCHARGE NOTE ADULT - PLAN OF CARE
Please follow up with the home visit program You were admitted for copd exacerbation, treated with abx and steroids. Please take the medications as listed above and follow up with the home visit program. You will be scheduled for a blood draw to monitor  your kidney levels in three days with the home assistance program. Please take medication as prescribed above You were found to have high potassium, please take the medications listed above and follow up with your primary care doctor for high potassium workup.

## 2019-01-24 NOTE — PROGRESS NOTE ADULT - ASSESSMENT
77 yo M with PMHx of End stage COPD (on supplemental oxygen), severe pulmonary hypertension, DM, MICHAEL, obesity, CKD 3, recently discharged on 1/3 after being admitted for acute on chronic hypoxic & hypercapnic respiratory failure due to COPD with acute exacerbation. He presented from [facility] with complaints of tachycardia and hypotension SBP in the 100s. Patient was unhappy with the care he received at [facility] and does not wish to return there and also expressed an interest in hospice. He was admitted for further management. During hospital stay pt was treated for acute on chronic hypoxic respiratory failure due to COPD exacerbation vs HFpEF exacerbation. He initially required HFNC and was eventually transitioned to NC. Patient expressed interest in hospice however would now like to return home w/ home care.     Acute on Chronic Hypoxic respiratory failure: Due to end stage COPD exacerbation vs. HFpEF with acute decompensation  - Patient stable on NC 5L and BiPAP qhs  - c/w Bronchodilators ATC and prn and Prednisone 10 mg daily. (Home dose)  - Completed Levaquin and Prednisone taper on his most recent admission.   d/c home today  needs f/u with home visit program      CKD stage IV  repeat bmp via home visit program in 2-3 days    Hypertension (controlled)  - c/w metoprolol  - hold lasix    Diabetes mellitus, type 2: Uncontrolled  - Hemoglobin A1C, Whole Blood: 8.7 % (11.05.18 @ 07:00)  - FS 96 this am  - decrease lantus from 35 to 30 units BID and c/w lispro 20 units TID (will likely need further adjustment in am)  - monitor FS and adjust prn    BPH  - c/w Flomax and Finasteride    Obesity  - Dietary modification.    B12 deficiency & Vitamin D deficiency  - c/w supplementation    MICHAEL/OHVS  - BiPAP at night    Severe Pulmonary Hypertension  - Supportive care  - ECHO 11/2018: Normal LVEF, no RWMA, Trace TR, Moderate pulmonary hypertension.  - ECHO 12/25/18: severe Right side heart dilation, RSVP 100 (was 40 in 10/2018)    ESBL UTI  - per ID, clinically no UTI: no dysuria or fevers or increasing wbc.  - No antibiotics unless symptomatic but patient insists on being treated  - Sensitive to Bactrim: complete 5 day course (day 4)    DVT ppx  - HSQ 78M PMHx COPD on home o2, severe PHTN, DM2, MICHAEL, obesity, CKD III here with acute hypoxic and hypercapnic resp failure due to COPD exacerbation.     1. COPD exacerbation  s/p abx, course of steroids  cont prednisone 10  NC 5L and bipap qhs  d/c today  f/u home visit program  albuterol q6 prn  symbicort  spiriva  2. CKD stage IV  sl elevation in SCr  repeat bmp via home visit program in 2-3 days  3. HTN  4. DM2  5. BPH  6. pHTN  7. DVT ppx 78M PMHx COPD on home o2, severe PHTN, DM2, MICHAEL, obesity, CKD III here with acute hypoxic and hypercapnic resp failure due to COPD exacerbation.     1. COPD exacerbation  s/p abx, course of steroids  cont prednisone 10  NC 5L and bipap qhs  d/c today  f/u home visit program  albuterol q6 prn  symbicort  spiriva  2. CKD stage IV  sl elevation in SCr  repeat bmp via home visit program in 2-3 days  3. HTN  resume home bp meds  4. DM2  lantus 30 bid  humalog 20 tid  5. BPH  finasteride 5  tamsulosin 0.4  6. pHTN  outpt f/u  7. DVT ppx  subq hep 78M PMHx COPD on home o2, severe PHTN, DM2, MICHAEL, obesity, CKD III here with acute hypoxic and hypercapnic resp failure due to COPD exacerbation.     1. COPD exacerbation  s/p abx, course of steroids  cont prednisone 10  NC 5L and bipap qhs  d/c today  f/u home visit program  albuterol q6 prn  symbicort  spiriva  2. CKD stage IV  sl elevation in SCr  repeat bmp via home visit program in 2-3 days  3. HTN  resume home bp meds  4. DM2  lantus 30 bid  humalog 20 tid  5. BPH  finasteride 5  tamsulosin 0.4  6. pHTN  outpt f/u  7. DVT ppx  subq hep    pt requires hospital bed for discharge home due to diagnosis of chronic stage copd and needs to have HOB elevated more than 30 degrees.  pt requires hospital bed for skin breakdown prevention, pillows and wedges have been tried and failed.  pt requires bedside commode due to high risk of falls. 78M PMHx COPD on home o2, severe PHTN, DM2, MICHAEL, obesity, CKD III here with acute hypoxic and hypercapnic resp failure due to COPD exacerbation.     1. COPD exacerbation  s/p abx, course of steroids  cont prednisone 10  NC 5L and bipap qhs  d/c today  f/u home visit program  albuterol q6 prn  symbicort  spiriva  2. CKD stage IV  sl elevation in SCr  repeat bmp via home visit program in 2-3 days  3. HTN  resume home bp meds  4. DM2  lantus 30 bid  humalog 20 tid  5. BPH  finasteride 5  tamsulosin 0.4  6. pHTN  outpt f/u  7. DVT ppx  subq hep    pt requires hospital bed for discharge home due to diagnosis of chronic stage copd and needs to have HOB elevated more than 30 degrees.  pt requires hospital bed for skin breakdown prevention, pillows and wedges have been tried and failed.  pt requires bedside commode due to high risk of falls.    pt has a mobility limitation that significantly impairs pt ability to participate in one or more mradl such as toileting, eating, dressing, and bathing in customary locations in the home. pt home provides adequate access between rooms for the use of the manual wheelchair. wheelchair will significantly improve pt ability to participate in mradl and will be u sed on a regular basis in the home  pt mobility limitation cannot be resolved by the use of a walker or cane  pt does not have sufficient upper extremity function to safely propel the manual wheelchair int  home during a typical day  pt has a caregiver that is willing and able to propel the manual wheelchair at anytime during the day  pt is willing to leidy the wheelchair in the home on a daily basis

## 2019-01-25 VITALS — OXYGEN SATURATION: 94 %

## 2019-01-25 LAB
ANION GAP SERPL CALC-SCNC: 5 MMOL/L — LOW (ref 7–14)
BUN SERPL-MCNC: 45 MG/DL — HIGH (ref 10–20)
CALCIUM SERPL-MCNC: 9.2 MG/DL — SIGNIFICANT CHANGE UP (ref 8.5–10.1)
CHLORIDE SERPL-SCNC: 100 MMOL/L — SIGNIFICANT CHANGE UP (ref 98–110)
CO2 SERPL-SCNC: 36 MMOL/L — HIGH (ref 17–32)
CREAT SERPL-MCNC: 2.6 MG/DL — HIGH (ref 0.7–1.5)
GLUCOSE BLDC GLUCOMTR-MCNC: 120 MG/DL — HIGH (ref 70–99)
GLUCOSE BLDC GLUCOMTR-MCNC: 130 MG/DL — HIGH (ref 70–99)
GLUCOSE BLDC GLUCOMTR-MCNC: 186 MG/DL — HIGH (ref 70–99)
GLUCOSE SERPL-MCNC: 158 MG/DL — HIGH (ref 70–99)
HCT VFR BLD CALC: 42 % — SIGNIFICANT CHANGE UP (ref 42–52)
HGB BLD-MCNC: 12.5 G/DL — LOW (ref 14–18)
MCHC RBC-ENTMCNC: 24.1 PG — LOW (ref 27–31)
MCHC RBC-ENTMCNC: 29.8 G/DL — LOW (ref 32–37)
MCV RBC AUTO: 81.1 FL — SIGNIFICANT CHANGE UP (ref 80–94)
NRBC # BLD: 0 /100 WBCS — SIGNIFICANT CHANGE UP (ref 0–0)
PLATELET # BLD AUTO: 507 K/UL — HIGH (ref 130–400)
POTASSIUM SERPL-MCNC: 5.8 MMOL/L — HIGH (ref 3.5–5)
POTASSIUM SERPL-SCNC: 5.8 MMOL/L — HIGH (ref 3.5–5)
RBC # BLD: 5.18 M/UL — SIGNIFICANT CHANGE UP (ref 4.7–6.1)
RBC # FLD: 20 % — HIGH (ref 11.5–14.5)
SODIUM SERPL-SCNC: 141 MMOL/L — SIGNIFICANT CHANGE UP (ref 135–146)
WBC # BLD: 12.43 K/UL — HIGH (ref 4.8–10.8)
WBC # FLD AUTO: 12.43 K/UL — HIGH (ref 4.8–10.8)

## 2019-01-25 RX ORDER — INSULIN GLARGINE 100 [IU]/ML
30 INJECTION, SOLUTION SUBCUTANEOUS
Qty: 15 | Refills: 0
Start: 2019-01-25 | End: 2019-02-07

## 2019-01-25 RX ORDER — INSULIN ASPART 100 [IU]/ML
20 INJECTION, SOLUTION SUBCUTANEOUS
Qty: 15 | Refills: 0
Start: 2019-01-25 | End: 2019-02-07

## 2019-01-25 RX ORDER — SODIUM POLYSTYRENE SULFONATE 4.1 MEQ/G
15 POWDER, FOR SUSPENSION ORAL
Qty: 15 | Refills: 0 | OUTPATIENT
Start: 2019-01-25 | End: 2019-01-25

## 2019-01-25 RX ORDER — SODIUM POLYSTYRENE SULFONATE 4.1 MEQ/G
30 POWDER, FOR SUSPENSION ORAL ONCE
Qty: 0 | Refills: 0 | Status: DISCONTINUED | OUTPATIENT
Start: 2019-01-25 | End: 2019-01-25

## 2019-01-25 RX ADMIN — Medication 3 MILLILITER(S): at 01:09

## 2019-01-25 RX ADMIN — INSULIN GLARGINE 30 UNIT(S): 100 INJECTION, SOLUTION SUBCUTANEOUS at 08:08

## 2019-01-25 RX ADMIN — Medication 20 UNIT(S): at 08:09

## 2019-01-25 RX ADMIN — MAGNESIUM OXIDE 400 MG ORAL TABLET 400 MILLIGRAM(S): 241.3 TABLET ORAL at 08:08

## 2019-01-25 RX ADMIN — MAGNESIUM OXIDE 400 MG ORAL TABLET 400 MILLIGRAM(S): 241.3 TABLET ORAL at 11:44

## 2019-01-25 RX ADMIN — Medication 500 MILLIGRAM(S): at 11:43

## 2019-01-25 RX ADMIN — Medication 3 MILLILITER(S): at 13:06

## 2019-01-25 RX ADMIN — Medication 1 DROP(S): at 06:17

## 2019-01-25 RX ADMIN — NYSTATIN CREAM 1 APPLICATION(S): 100000 CREAM TOPICAL at 06:17

## 2019-01-25 RX ADMIN — Medication 1 TABLET(S): at 06:16

## 2019-01-25 RX ADMIN — Medication 3 MILLILITER(S): at 07:21

## 2019-01-25 RX ADMIN — Medication 1000 UNIT(S): at 11:43

## 2019-01-25 RX ADMIN — FINASTERIDE 5 MILLIGRAM(S): 5 TABLET, FILM COATED ORAL at 11:43

## 2019-01-25 RX ADMIN — HEPARIN SODIUM 5000 UNIT(S): 5000 INJECTION INTRAVENOUS; SUBCUTANEOUS at 06:16

## 2019-01-25 RX ADMIN — Medication 1: at 12:01

## 2019-01-25 RX ADMIN — Medication 10 MILLIGRAM(S): at 06:17

## 2019-01-25 RX ADMIN — Medication 20 UNIT(S): at 12:00

## 2019-01-25 RX ADMIN — BUDESONIDE AND FORMOTEROL FUMARATE DIHYDRATE 2 PUFF(S): 160; 4.5 AEROSOL RESPIRATORY (INHALATION) at 08:07

## 2019-01-25 RX ADMIN — ZINC OXIDE 1 APPLICATION(S): 200 OINTMENT TOPICAL at 06:18

## 2019-01-25 NOTE — PROGRESS NOTE ADULT - PROVIDER SPECIALTY LIST ADULT
Hospitalist
Internal Medicine
Surgery
Hospitalist
Hospitalist

## 2019-01-25 NOTE — PROGRESS NOTE ADULT - SUBJECTIVE AND OBJECTIVE BOX
JIAN MANCIA  78y  Male    Patient is a 78y old  Male who presents with a chief complaint of Dyspnea and low oxygen saturation (09 Dec 2018 09:44)      INTERVAL HPI/OVERNIGHT EVENTS:  Patient complaining of chest pain and dyspnea overnight.  Patient has no new complaints this AM. Dyspnea stable.     Required BiPAP overnight but had been stable on 5L NC.      REVIEW OF SYSTEMS:  CONSTITUTIONAL: No fever, weight loss, + fatigue  EYES: No eye pain, visual disturbances, or discharge  ENMT:  No difficulty hearing, tinnitus, vertigo; No sinus or throat pain  NECK: No pain or stiffness  RESPIRATORY: No cough, No wheezing, chills or hemoptysis; + shortness of breath  CARDIOVASCULAR: No chest pain, palpitations, dizziness, + leg swelling  GASTROINTESTINAL: No abdominal or epigastric pain. No nausea, vomiting, or hematemesis; + constipation. No melena or hematochezia.  GENITOURINARY: No dysuria, frequency, hematuria, or incontinence  NEUROLOGICAL: No headaches, memory loss, loss of strength, numbness, or tremors  SKIN: fluid dripping form anterior abdominal wall. No itching, burning, rashes, or lesions   LYMPH NODES: No enlarged glands  ENDOCRINE: No heat or cold intolerance; No hair loss  MUSCULOSKELETAL: No joint pain or swelling; No muscle, back, or extremity pain  PSYCHIATRIC: No depression, anxiety, mood swings, or difficulty sleeping  HEME/LYMPH: No easy bruising, or bleeding gums  ALLERGY AND IMMUNOLOGIC: No hives or eczema      VITALS:  T(C): 36.3 (17 Jan 2019 05:28), Max: 36.9 (16 Jan 2019 14:34)  T(F): 97.4 (17 Jan 2019 05:28), Max: 98.5 (16 Jan 2019 14:34)  HR: 86 (17 Jan 2019 05:28) (86 - 129)  BP: 120/73 (17 Jan 2019 05:28) (116/67 - 120/73)  BP(mean): --  RR: 18 (17 Jan 2019 05:28) (18 - 18)  SpO2: 95% (17 Jan 2019 08:09) (95% - 95%)      CAPILLARY BLOOD GLUCOSE  POCT Blood Glucose.: 230 mg/dL (17 Jan 2019 11:32)  POCT Blood Glucose.: 135 mg/dL (17 Jan 2019 07:41)  POCT Blood Glucose.: 115 mg/dL (16 Jan 2019 21:19)  POCT Blood Glucose.: 286 mg/dL (16 Jan 2019 16:10)        PHYSICAL EXAM:  GENERAL: NAD, obese  HEAD:  Atraumatic, Normocephalic  EYES: conjunctiva and sclera clear  ENMT: Moist mucous membranes  NECK: Supple, Normal thyroid  NERVOUS SYSTEM:  Alert & Oriented X 3, Good concentration; Motor Strength 5/5 B/L upper and lower extremities.  CHEST/LUNG: Decreased AE bilaterally; No rales, No rhonchi, No wheezing. On high flow nasal cannula.  HEART: Regular rate and rhythm; No murmurs, rubs, or gallops  ABDOMEN: Soft, Nontender, + distended; Bowel sounds present  EXTREMITIES:  2+ Peripheral Pulses, No clubbing, cyanosis, bipedal edema ++  LYMPH: No lymphadenopathy noted  SKIN: No rashes or lesions    Consultant(s) Notes Reviewed:  [x ] YES  [ ] NO  Care Discussed with Consultants/Other Providers [ x] YES  [ ] NO    LABS:                                       11.9   8.20  )-----------( 507      ( 16 Jan 2019 06:51 )             40.3     01-16    145  |  98  |  45<H>  ----------------------------<  159<H>  4.7   |  37<H>  |  2.1<H>    Ca    9.1      16 Jan 2019 06:51      MICROBIOLOGY:   Culture - Urine (01.10.19 @ 11:40)    -  Gentamicin: S <=1    -  Meropenem: S <=1    -  Nitrofurantoin: S <=32 Should not be used to treat pyelonephritis    -  Imipenem: S <=1    -  Levofloxacin: R >4    -  Piperacillin/Tazobactam: R <=8    -  Tigecycline: S <=1    -  Tobramycin: S <=2    -  Trimethoprim/Sulfamethoxazole: R >2/38    -  Amikacin: S <=8    -  Amoxicillin/Clavulanic Acid: I 16/8    -  Ampicillin: R >16 These ampicillin results predict results for amoxicillin    -  Ampicillin/Sulbactam: R >16/8    -  Aztreonam: R >16    -  Cefazolin: R >16 For uncomplicated UTI with K. pneumoniae, E. coli, or P. mirablis: ANSON <=16 is sensitive and ANSON >=32 is resistant. This also predicts results for oral agents cefaclor, cefdinir, cefpodoxime, cefprozil, cefuroxime axetil, cephalexin and locarbef for uncomplicated UTI. Note that some isolates may be susceptible to these agents while testing resistant to cefazolin.    -  Cefepime: R >16    -  Cefoxitin: S 8    -  Ceftriaxone: R >32 Enterobacter, Citrobacter, and Serratia may develop resistance during prolonged therapy    -  Ciprofloxacin: R >2    -  Ertapenem: S <=0.5    Specimen Source: .Urine Clean Catch (Midstream)    Culture Results:   50,000 - 99,000 CFU/mL Escherichia coli ESBL    Organism Identification: Escherichia coli ESBL    Organism: Escherichia coli ESBL    Method Type: ANSON        RADIOLOGY & ADDITIONAL TESTS  X-ray Chest 1 View- PORTABLE-Routine (01.14.19 @ 14:42)   Support devices: None.    Cardiac/mediastinum/hilum: Cardiomegaly.    Lung parenchyma/Pleura: Unchanged pulmonary vascular congestion. No   pneumothorax.    Skeleton/soft tissues: Stable.    Impression:      Unchanged pulmonary vascular congestion.      Imaging Personally Reviewed:  [x] YES  [ ] NO        MEDICATIONS  (STANDING):  ALBUTerol/ipratropium for Nebulization 3 milliLiter(s) Nebulizer every 6 hours  artificial  tears Solution 1 Drop(s) Both EYES three times a day  ascorbic acid 500 milliGRAM(s) Oral daily  atorvastatin 20 milliGRAM(s) Oral at bedtime  buDESOnide 160 MICROgram(s)/formoterol 4.5 MICROgram(s) Inhaler - Peds 2 Puff(s) Inhalation two times a day  chlorhexidine 4% Liquid 1 Application(s) Topical <User Schedule>  cholecalciferol 1000 Unit(s) Oral daily  dextrose 5%. 1000 milliLiter(s) (50 mL/Hr) IV Continuous <Continuous>  dextrose 50% Injectable 12.5 Gram(s) IV Push once  dextrose 50% Injectable 25 Gram(s) IV Push once  dextrose 50% Injectable 25 Gram(s) IV Push once  finasteride 5 milliGRAM(s) Oral daily  furosemide    Tablet 40 milliGRAM(s) Oral every 12 hours  heparin  Injectable 5000 Unit(s) SubCutaneous every 8 hours  insulin glargine Injectable (LANTUS) 32 Unit(s) SubCutaneous two times a day  insulin lispro (HumaLOG) corrective regimen sliding scale   SubCutaneous three times a day before meals  insulin lispro Injectable (HumaLOG) 17 Unit(s) SubCutaneous before breakfast  insulin lispro Injectable (HumaLOG) 17 Unit(s) SubCutaneous before lunch  insulin lispro Injectable (HumaLOG) 17 Unit(s) SubCutaneous before dinner  metoprolol tartrate 25 milliGRAM(s) Oral two times a day  nystatin Powder 1 Application(s) Topical two times a day  predniSONE   Tablet 10 milliGRAM(s) Oral daily  senna 2 Tablet(s) Oral at bedtime  sodium chloride 0.9% Bolus 500 milliLiter(s) IV Bolus once  sodium polystyrene sulfonate Suspension 30 Gram(s) Oral <User Schedule>  tamsulosin 0.4 milliGRAM(s) Oral at bedtime  tiotropium 18 MICROgram(s) Capsule 1 Capsule(s) Inhalation at bedtime  zinc oxide 40% Ointment 1 Application(s) Topical two times a day    MEDICATIONS  (PRN):  ALBUTerol/ipratropium for Nebulization 3 milliLiter(s) Nebulizer every 4 hours PRN Bronchospasm  dextrose 40% Gel 15 Gram(s) Oral once PRN Blood Glucose LESS THAN 70 milliGRAM(s)/deciliter  dextrose 50% Injectable 25 milliLiter(s) IV Push every 6 hours PRN BG <100  glucagon  Injectable 1 milliGRAM(s) IntraMuscular once PRN Glucose LESS THAN 70 milligrams/deciliter  polyethylene glycol 3350 17 Gram(s) Oral daily PRN Constipation          HEALTH ISSUES - PROBLEM Dx:  COPD (chronic obstructive pulmonary disease) with exacerbation  Hyperkalemia  High cholesterol  GERD (gastroesophageal reflux disease)  Enlarged prostate  Oxygen dependent  Diabetes mellitus, type 2  Hypertension  Emphysema lung
JIAN MANCIA  78y, Male  Allergy: aspirin (Other)  fish (Other)  iodine (Hives)  penicillin (Unknown)  shellfish (Other)    Hospital Day: 7d    Patient seen and examined earlier today. Patient continues to remain on HFNC, attempting to down tritiate however pt becomes anxious. Discussed need for nasal cannula. Lasix started however pt was unable to receive med due to borderline low BP. This am BP improved and was given dose. Pt reports worsening b/l LE edema.    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  High cholesterol  GERD (gastroesophageal reflux disease)  Enlarged prostate  Oxygen dependent  COPD (chronic obstructive pulmonary disease)  Diabetes mellitus, type 2  Hypertension  Emphysema lung  History of hemorrhoidectomy  Dysplastic colon polyp: removed        VITALS:  T(F): 97.1 (01-13-19 @ 06:30), Max: 97.1 (01-13-19 @ 06:30)  HR: 78 (01-13-19 @ 07:57)  BP: 109/68 (01-13-19 @ 07:57) (88/55 - 112/74)  RR: 24 (01-13-19 @ 07:55)  SpO2: 94% (01-13-19 @ 07:57)    TESTS & MEASUREMENTS:  Weight (Kg):       01-12-19 @ 07:01  -  01-13-19 @ 07:00  --------------------------------------------------------  IN: 1500 mL / OUT: 1000 mL / NET: 500 mL                            11.5   6.10  )-----------( 357      ( 13 Jan 2019 06:16 )             39.3       01-13    145  |  103  |  42<H>  ----------------------------<  100<H>  5.0   |  33<H>  |  1.8<H>    Ca    9.0      13 Jan 2019 06:16              Culture - Urine (collected 01-10-19 @ 11:40)  Source: .Urine Clean Catch (Midstream)  Final Report (01-12-19 @ 17:19):    50,000 - 99,000 CFU/mL Escherichia coli ESBL  Organism: Escherichia coli ESBL (01-12-19 @ 17:19)  Organism: Escherichia coli ESBL (01-12-19 @ 17:19)      -  Amikacin: S <=8      -  Amoxicillin/Clavulanic Acid: I 16/8      -  Ampicillin: R >16 These ampicillin results predict results for amoxicillin      -  Ampicillin/Sulbactam: R >16/8      -  Aztreonam: R >16      -  Cefazolin: R >16 For uncomplicated UTI with K. pneumoniae, E. coli, or P. mirablis: ANSON <=16 is sensitive and ANSON >=32 is resistant. This also predicts results for oral agents cefaclor, cefdinir, cefpodoxime, cefprozil, cefuroxime axetil, cephalexin and locarbef for uncomplicated UTI. Note that some isolates may be susceptible to these agents while testing resistant to cefazolin.      -  Cefepime: R >16      -  Cefoxitin: S 8      -  Ceftriaxone: R >32 Enterobacter, Citrobacter, and Serratia may develop resistance during prolonged therapy      -  Ciprofloxacin: R >2      -  Ertapenem: S <=0.5      -  Gentamicin: S <=1      -  Imipenem: S <=1      -  Levofloxacin: R >4      -  Meropenem: S <=1      -  Nitrofurantoin: S <=32 Should not be used to treat pyelonephritis      -  Piperacillin/Tazobactam: R <=8      -  Tigecycline: S <=1      -  Tobramycin: S <=2      -  Trimethoprim/Sulfamethoxazole: R >2/38      Method Type: ANSON      RECENT DIAGNOSTIC ORDERS:  Diet, Consistent Carbohydrate Renal/No Snacks:   DASH/TLC Sodium & Cholesterol Restricted  Low Sodium (01-13-19 @ 09:03)      MEDICATIONS:  MEDICATIONS  (STANDING):  ALBUTerol/ipratropium for Nebulization 3 milliLiter(s) Nebulizer every 6 hours  artificial  tears Solution 1 Drop(s) Both EYES three times a day  ascorbic acid 500 milliGRAM(s) Oral daily  atorvastatin 20 milliGRAM(s) Oral at bedtime  buDESOnide 160 MICROgram(s)/formoterol 4.5 MICROgram(s) Inhaler - Peds 2 Puff(s) Inhalation two times a day  chlorhexidine 4% Liquid 1 Application(s) Topical <User Schedule>  cholecalciferol 1000 Unit(s) Oral daily  dextrose 5%. 1000 milliLiter(s) (50 mL/Hr) IV Continuous <Continuous>  dextrose 50% Injectable 12.5 Gram(s) IV Push once  dextrose 50% Injectable 25 Gram(s) IV Push once  dextrose 50% Injectable 25 Gram(s) IV Push once  finasteride 5 milliGRAM(s) Oral daily  furosemide    Tablet 40 milliGRAM(s) Oral daily  heparin  Injectable 5000 Unit(s) SubCutaneous every 8 hours  insulin glargine Injectable (LANTUS) 32 Unit(s) SubCutaneous two times a day  insulin lispro (HumaLOG) corrective regimen sliding scale   SubCutaneous three times a day before meals  insulin lispro Injectable (HumaLOG) 17 Unit(s) SubCutaneous before breakfast  insulin lispro Injectable (HumaLOG) 17 Unit(s) SubCutaneous before lunch  insulin lispro Injectable (HumaLOG) 17 Unit(s) SubCutaneous before dinner  metoprolol tartrate 25 milliGRAM(s) Oral two times a day  nystatin Powder 1 Application(s) Topical two times a day  predniSONE   Tablet 10 milliGRAM(s) Oral daily  senna 2 Tablet(s) Oral at bedtime  sodium chloride 0.9% Bolus 500 milliLiter(s) IV Bolus once  sodium polystyrene sulfonate Suspension 30 Gram(s) Oral <User Schedule>  tamsulosin 0.4 milliGRAM(s) Oral at bedtime  tiotropium 18 MICROgram(s) Capsule 1 Capsule(s) Inhalation at bedtime  zinc oxide 40% Ointment 1 Application(s) Topical two times a day    MEDICATIONS  (PRN):  ALBUTerol/ipratropium for Nebulization 3 milliLiter(s) Nebulizer every 4 hours PRN Bronchospasm  dextrose 40% Gel 15 Gram(s) Oral once PRN Blood Glucose LESS THAN 70 milliGRAM(s)/deciliter  dextrose 50% Injectable 25 milliLiter(s) IV Push every 6 hours PRN BG <100  glucagon  Injectable 1 milliGRAM(s) IntraMuscular once PRN Glucose LESS THAN 70 milligrams/deciliter  polyethylene glycol 3350 17 Gram(s) Oral daily PRN Constipation      HOME MEDICATIONS:  sodium polystyrene sulfonate (12-28)      PHYSICAL EXAM:  GENERAL: elderly male, sitting in chair, in NAD, breathing with pursed lips  HEAD:  Atraumatic, Normocephalic  CHEST/LUNG: b/l diminished breath sounds diffusely mao in bases, no wheezing or crackles  HEART: Regular rate and rhythm  ABDOMEN: Soft, Nontender; Bowel sounds present  EXTREMITIES:  2+ Peripheral Pulses, +2 b/l LE edema  PSYCH: AAOx3
Chief Complaint:  Patient is a 78y old  Male who presents with a chief complaint of Acute Hypoxic Respiratory Failure (20 Jan 2019 10:53)      Interval Events:     Allergies:  aspirin (Other)  fish (Other)  iodine (Hives)  penicillin (Unknown)  shellfish (Other)      Home Medications:    Hospital Medications:  ALBUTerol/ipratropium for Nebulization 3 milliLiter(s) Nebulizer every 6 hours  ALBUTerol/ipratropium for Nebulization 3 milliLiter(s) Nebulizer every 4 hours PRN  artificial  tears Solution 1 Drop(s) Both EYES three times a day  ascorbic acid 500 milliGRAM(s) Oral daily  atorvastatin 20 milliGRAM(s) Oral at bedtime  buDESOnide 160 MICROgram(s)/formoterol 4.5 MICROgram(s) Inhaler - Peds 2 Puff(s) Inhalation two times a day  chlorhexidine 4% Liquid 1 Application(s) Topical <User Schedule>  cholecalciferol 1000 Unit(s) Oral daily  dextrose 40% Gel 15 Gram(s) Oral once PRN  dextrose 5%. 1000 milliLiter(s) IV Continuous <Continuous>  dextrose 50% Injectable 12.5 Gram(s) IV Push once  dextrose 50% Injectable 25 Gram(s) IV Push once  dextrose 50% Injectable 25 Gram(s) IV Push once  dextrose 50% Injectable 25 milliLiter(s) IV Push every 6 hours PRN  finasteride 5 milliGRAM(s) Oral daily  furosemide    Tablet 20 milliGRAM(s) Oral two times a day  glucagon  Injectable 1 milliGRAM(s) IntraMuscular once PRN  heparin  Injectable 5000 Unit(s) SubCutaneous every 8 hours  insulin glargine Injectable (LANTUS) 32 Unit(s) SubCutaneous two times a day  insulin lispro (HumaLOG) corrective regimen sliding scale   SubCutaneous three times a day before meals  insulin lispro Injectable (HumaLOG) 19 Unit(s) SubCutaneous three times a day with meals  magnesium oxide 400 milliGRAM(s) Oral three times a day with meals  metoprolol tartrate 25 milliGRAM(s) Oral two times a day  morphine Concentrate 5 milliGRAM(s) Oral every 4 hours PRN  nystatin Powder 1 Application(s) Topical two times a day  polyethylene glycol 3350 17 Gram(s) Oral daily PRN  predniSONE   Tablet 10 milliGRAM(s) Oral daily  senna 2 Tablet(s) Oral at bedtime  sodium polystyrene sulfonate Suspension 30 Gram(s) Oral <User Schedule>  tamsulosin 0.4 milliGRAM(s) Oral at bedtime  tiotropium 18 MICROgram(s) Capsule 1 Capsule(s) Inhalation at bedtime  trimethoprim  160 mG/sulfamethoxazole 800 mG 1 Tablet(s) Oral every 12 hours  zinc oxide 40% Ointment 1 Application(s) Topical two times a day      PMHX/PSHX:  Colon polyp  High cholesterol  GERD (gastroesophageal reflux disease)  Enlarged prostate  Oxygen dependent  COPD (chronic obstructive pulmonary disease)  Diabetes mellitus, type 2  Hypertension  Emphysema lung  Empyema lung  History of hemorrhoidectomy  Dysplastic colon polyp  No significant past surgical history      Family history:  No pertinent family history in first degree relatives      ROS:   As mentioned below      PHYSICAL EXAM:   Vital Signs:  Vital Signs Last 24 Hrs  T(C): 36.3 (21 Jan 2019 14:31), Max: 36.3 (21 Jan 2019 05:39)  T(F): 97.3 (21 Jan 2019 14:31), Max: 97.3 (21 Jan 2019 05:39)  HR: 112 (21 Jan 2019 14:31) (83 - 112)  BP: 121/58 (21 Jan 2019 14:31) (117/56 - 121/58)  BP(mean): --  RR: 16 (21 Jan 2019 14:31) (16 - 18)  SpO2: --  Daily     Daily     GENERAL:  NAD  HEENT:  NC/AT,  No Thyromegaly  CHEST:  CTA B/L  HEART:  S1, S2- No M, R, G  ABDOMEN:  Soft, NT, ND  EXTEREMITIES:  no cyanosis,clubbing or edema  SKIN:  NAD  NEURO:  Grossly Nr.    LABS:                        12.4   10.14 )-----------( 560      ( 21 Jan 2019 08:16 )             42.6     01-21    148<H>  |  99  |  41<H>  ----------------------------<  80  5.0   |  38<H>  |  2.2<H>    Ca    9.2      21 Jan 2019 08:16  Phos  4.0     01-21  Mg     1.8     01-21                Imaging:
DIAGNOSIS:   HOSPITAL DAY #:    STATUS POST:    POST OPERATIVE DAY #:     Vital Signs Last 24 Hrs  T(C): 36.6 (21 Jan 2019 22:36), Max: 36.6 (21 Jan 2019 22:36)  T(F): 97.9 (21 Jan 2019 22:36), Max: 97.9 (21 Jan 2019 22:36)  HR: 85 (21 Jan 2019 22:36) (83 - 112)  BP: 117/56 (21 Jan 2019 22:36) (117/56 - 121/58)  BP(mean): --  RR: 16 (21 Jan 2019 22:36) (16 - 18)  SpO2: --    SUBJECTIVE: Pt seen    Pain: YES  [ ]   NO [ ]   Nausea: [ ] YES [ ] NO           Vomiting: [ ] YES [ ] NO  Flatus: [ ] YES [ ] NO             Bowel Movement: [ ] YES [ ] NO     Void: [ ]YES [ ]No      KATIUSKA DRAINAGE: SIGNIFICANT [ ]   NOT SIGNIFICANT [ ]   NOT APPLICABLE [ ]  YES [ ] NO    General Appearance: Appears well, NAD  Neck: Supple  Chest: Equal expansion bilaterally, equal breath sounds  CV: Pulse regular presently  Abdomen: Soft [x ] YES [ ]NO  DISTENDED [ ] YES [x ] NO TENDERNESS [ ]YES [ ]NO  INCISIONS: HEALING WELL [ ] YES  [ ] NO ERYTHEMA [ ] YES [ ] NO DRAINAGE [ ] YES  [ ] NO  Extremities: Grossly symmetric, CALF TENDERNESS [ ] YES  [ ] NO  hand infection healed well    LABS:                        12.4   10.14 )-----------( 560      ( 21 Jan 2019 08:16 )             42.6     01-21    148<H>  |  99  |  41<H>  ----------------------------<  80  5.0   |  38<H>  |  2.2<H>    Ca    9.2      21 Jan 2019 08:16  Phos  4.0     01-21  Mg     1.8     01-21              ASSESSMENT:     GOOD POST OP COURSE [ ]  YES  [ ] NO  CONDITION IMPROVING  []  YES  [ ]  NO          PLAN:    CONTINUE PRESENT MANAGEMENT  [x ] YES  [ ] NO
JIAN MANCIA  78y  Male    Patient is a 78y old  Male who presents with a chief complaint of Dyspnea and low oxygen saturation (09 Dec 2018 09:44)      INTERVAL HPI/OVERNIGHT EVENTS:  No interval events.  Patient has no new complaints this AM.   Dyspnea and oxygen saturation stable at rest but desaturates on ambulation to 70s on 5L NC.      Continue NC and BiPAP qhs.       REVIEW OF SYSTEMS:  CONSTITUTIONAL: No fever, weight loss, + fatigue  EYES: No eye pain, visual disturbances, or discharge  ENMT:  No difficulty hearing, tinnitus, vertigo; No sinus or throat pain  NECK: No pain or stiffness  RESPIRATORY: No cough, No wheezing, chills or hemoptysis; + shortness of breath  CARDIOVASCULAR: No chest pain, palpitations, dizziness, + leg swelling  GASTROINTESTINAL: No abdominal or epigastric pain. No nausea, vomiting, or hematemesis; No diarrhea or constipation. No melena or hematochezia.  GENITOURINARY: No dysuria, frequency, hematuria, or incontinence  NEUROLOGICAL: No headaches, memory loss, loss of strength, numbness, or tremors  SKIN: No itching, burning, rashes, or lesions   ENDOCRINE: No heat or cold intolerance; No hair loss  MUSCULOSKELETAL: No joint pain or swelling; No muscle, back, or extremity pain  PSYCHIATRIC: No depression, anxiety, mood swings, or difficulty sleeping        VITALS:  T(C): 35.7 (22 Jan 2019 06:15), Max: 36.6 (21 Jan 2019 22:36)  T(F): 96.2 (22 Jan 2019 06:15), Max: 97.9 (21 Jan 2019 22:36)  HR: 77 (22 Jan 2019 06:15) (77 - 112)  BP: 126/71 (22 Jan 2019 06:15) (117/56 - 126/71)  BP(mean): --  RR: 16 (22 Jan 2019 06:15) (16 - 16)  SpO2: 95% (22 Jan 2019 06:34) (84% - 95%)        CAPILLARY BLOOD GLUCOSE  POCT Blood Glucose.: 120 mg/dL (22 Jan 2019 07:43)  POCT Blood Glucose.: 196 mg/dL (21 Jan 2019 21:33)  POCT Blood Glucose.: 193 mg/dL (21 Jan 2019 16:22)  POCT Blood Glucose.: 265 mg/dL (21 Jan 2019 11:27)        PHYSICAL EXAM:  GENERAL: NAD, obese  HEAD:  Atraumatic, Normocephalic  EYES: conjunctiva and sclera clear  ENMT: Moist mucous membranes  NECK: Supple, Normal thyroid  NERVOUS SYSTEM:  Alert & Oriented X 3, Good concentration; Motor Strength 5/5 B/L upper and lower extremities.  CHEST/LUNG: Decreased AE at the bases bilaterally; + rales, No rhonchi, No wheezing.   HEART: Regular rate and rhythm; No murmurs, rubs, or gallops  ABDOMEN: Soft, Nontender, + distended; Bowel sounds present  EXTREMITIES:  2+ Peripheral Pulses, No clubbing, cyanosis, bipedal edema ++  LYMPH: No lymphadenopathy noted  SKIN: No rashes or lesions    Consultant(s) Notes Reviewed:  [x ] YES  [ ] NO  Care Discussed with Consultants/Other Providers [ x] YES  [ ] NO    LABS:                                                  12.4   10.14 )-----------( 560      ( 21 Jan 2019 08:16 )             42.6     01-21    148<H>  |  99  |  41<H>  ----------------------------<  80  5.0   |  38<H>  |  2.2<H>    Ca    9.2      21 Jan 2019 08:16  Phos  4.0     01-21  Mg     1.8     01-21      MICROBIOLOGY:   Culture - Urine (01.17.19 @ 09:46)    -  Ciprofloxacin: R >2    -  Ceftriaxone: R >32 Enterobacter, Citrobacter, and Serratia may develop resistance during prolonged therapy    -  Cefoxitin: I 16    -  Cefepime: R >16    -  Ertapenem: S <=0.5    -  Gentamicin: R >8    -  Imipenem: S <=1    -  Levofloxacin: R >4    -  Meropenem: S <=1    -  Piperacillin/Tazobactam: R 32    -  Nitrofurantoin: S <=32 Should not be used to treat pyelonephritis    -  Tigecycline: S <=1    -  Tobramycin: R >8    -  Trimethoprim/Sulfamethoxazole: S <=0.5/9.5    -  Amikacin: S 16    -  Ampicillin: R >16 These ampicillin results predict results for amoxicillin    -  Ampicillin/Sulbactam: R >16/8    -  Amoxicillin/Clavulanic Acid: I 16/8    -  Aztreonam: R >16    -  Cefazolin: R >16 For uncomplicated UTI with K. pneumoniae, E. coli, or P. mirablis: ANSON <=16 is sensitive and ANSON >=32 is resistant. This also predicts results for oral agents cefaclor, cefdinir, cefpodoxime, cefprozil, cefuroxime axetil, cephalexin and locarbef for uncomplicated UTI. Note that some isolates may be susceptible to these agents while testing resistant to cefazolin.    Specimen Source: .Urine Clean Catch (Midstream)    Culture Results:   >100,000 CFU/ml Escherichia coli ESBL    Organism Identification: Escherichia coli ESBL    Organism: Escherichia coli ESBL    Method Type: ANSON      RADIOLOGY & ADDITIONAL TESTS  X-ray Chest 1 View- PORTABLE-Routine (01.14.19 @ 14:42)   Support devices: None.    Cardiac/mediastinum/hilum: Cardiomegaly.    Lung parenchyma/Pleura: Unchanged pulmonary vascular congestion. No   pneumothorax.    Skeleton/soft tissues: Stable.    Impression:      Unchanged pulmonary vascular congestion.      Imaging Personally Reviewed:  [x] YES  [ ] NO        MEDICATIONS  (STANDING):  ALBUTerol/ipratropium for Nebulization 3 milliLiter(s) Nebulizer every 6 hours  artificial  tears Solution 1 Drop(s) Both EYES three times a day  ascorbic acid 500 milliGRAM(s) Oral daily  atorvastatin 20 milliGRAM(s) Oral at bedtime  buDESOnide 160 MICROgram(s)/formoterol 4.5 MICROgram(s) Inhaler - Peds 2 Puff(s) Inhalation two times a day  chlorhexidine 4% Liquid 1 Application(s) Topical <User Schedule>  cholecalciferol 1000 Unit(s) Oral daily  dextrose 5%. 1000 milliLiter(s) (50 mL/Hr) IV Continuous <Continuous>  dextrose 50% Injectable 12.5 Gram(s) IV Push once  dextrose 50% Injectable 25 Gram(s) IV Push once  dextrose 50% Injectable 25 Gram(s) IV Push once  finasteride 5 milliGRAM(s) Oral daily  furosemide    Tablet 20 milliGRAM(s) Oral two times a day  heparin  Injectable 5000 Unit(s) SubCutaneous every 8 hours  insulin glargine Injectable (LANTUS) 32 Unit(s) SubCutaneous two times a day  insulin lispro (HumaLOG) corrective regimen sliding scale   SubCutaneous three times a day before meals  insulin lispro Injectable (HumaLOG) 19 Unit(s) SubCutaneous three times a day with meals  magnesium oxide 400 milliGRAM(s) Oral three times a day with meals  metoprolol tartrate 25 milliGRAM(s) Oral two times a day  nystatin Powder 1 Application(s) Topical two times a day  predniSONE   Tablet 10 milliGRAM(s) Oral daily  senna 2 Tablet(s) Oral at bedtime  sodium polystyrene sulfonate Suspension 30 Gram(s) Oral <User Schedule>  tamsulosin 0.4 milliGRAM(s) Oral at bedtime  tiotropium 18 MICROgram(s) Capsule 1 Capsule(s) Inhalation at bedtime  trimethoprim  160 mG/sulfamethoxazole 800 mG 1 Tablet(s) Oral every 12 hours  zinc oxide 40% Ointment 1 Application(s) Topical two times a day    MEDICATIONS  (PRN):  ALBUTerol/ipratropium for Nebulization 3 milliLiter(s) Nebulizer every 4 hours PRN Bronchospasm  dextrose 40% Gel 15 Gram(s) Oral once PRN Blood Glucose LESS THAN 70 milliGRAM(s)/deciliter  dextrose 50% Injectable 25 milliLiter(s) IV Push every 6 hours PRN BG <100  glucagon  Injectable 1 milliGRAM(s) IntraMuscular once PRN Glucose LESS THAN 70 milligrams/deciliter  morphine Concentrate 5 milliGRAM(s) Oral every 4 hours PRN Dyspnea  polyethylene glycol 3350 17 Gram(s) Oral daily PRN Constipation        HEALTH ISSUES - PROBLEM Dx:  COPD (chronic obstructive pulmonary disease) with exacerbation  Hyperkalemia  High cholesterol  GERD (gastroesophageal reflux disease)  Enlarged prostate  Oxygen dependent  Diabetes mellitus, type 2  Hypertension  Emphysema lung
JIAN MANCIA  78y  Male    Patient is a 78y old  Male who presents with a chief complaint of Dyspnea and low oxygen saturation (09 Dec 2018 09:44)      INTERVAL HPI/OVERNIGHT EVENTS:  No interval events.  Patient has no new complaints this AM. Dyspnea and oxygen saturation stable.     Stable on 5L NC with BiPAP qhs.      REVIEW OF SYSTEMS:  CONSTITUTIONAL: No fever, weight loss, No fatigue  EYES: No eye pain, visual disturbances, or discharge  ENMT:  No difficulty hearing, tinnitus, vertigo; No sinus or throat pain  NECK: No pain or stiffness  RESPIRATORY: No cough, No wheezing, chills or hemoptysis; + shortness of breath  CARDIOVASCULAR: No chest pain, palpitations, dizziness, + leg swelling  GASTROINTESTINAL: No abdominal or epigastric pain. No nausea, vomiting, or hematemesis; + constipation. No melena or hematochezia.  GENITOURINARY: No dysuria, frequency, hematuria, or incontinence  NEUROLOGICAL: No headaches, memory loss, loss of strength, numbness, or tremors  SKIN: fluid dripping form anterior abdominal wall. No itching, burning, rashes, or lesions   LYMPH NODES: No enlarged glands  ENDOCRINE: No heat or cold intolerance; No hair loss  MUSCULOSKELETAL: No joint pain or swelling; No muscle, back, or extremity pain  PSYCHIATRIC: No depression, anxiety, mood swings, or difficulty sleeping  HEME/LYMPH: No easy bruising, or bleeding gums  ALLERGY AND IMMUNOLOGIC: No hives or eczema      VITALS:  T(C): 36.7 (20 Jan 2019 05:43), Max: 36.9 (19 Jan 2019 21:55)  T(F): 98 (20 Jan 2019 05:43), Max: 98.4 (19 Jan 2019 21:55)  HR: 87 (20 Jan 2019 05:43) (87 - 123)  BP: 117/60 (20 Jan 2019 05:43) (117/55 - 119/53)  BP(mean): --  RR: 18 (20 Jan 2019 05:43) (18 - 20)  SpO2: 92% (20 Jan 2019 00:10) (92% - 92%)        CAPILLARY BLOOD GLUCOSE  POCT Blood Glucose.: 109 mg/dL (20 Jan 2019 07:38)  POCT Blood Glucose.: 212 mg/dL (20 Jan 2019 00:10)  POCT Blood Glucose.: 188 mg/dL (19 Jan 2019 21:22)  POCT Blood Glucose.: 195 mg/dL (19 Jan 2019 16:34)  POCT Blood Glucose.: 312 mg/dL (19 Jan 2019 11:16)          PHYSICAL EXAM:  GENERAL: NAD, obese  HEAD:  Atraumatic, Normocephalic  EYES: conjunctiva and sclera clear  ENMT: Moist mucous membranes  NECK: Supple, Normal thyroid  NERVOUS SYSTEM:  Alert & Oriented X 3, Good concentration; Motor Strength 5/5 B/L upper and lower extremities.  CHEST/LUNG: Decreased AE bilaterally; + rales, No rhonchi, No wheezing.   HEART: Regular rate and rhythm; No murmurs, rubs, or gallops  ABDOMEN: Soft, Nontender, + distended; Bowel sounds present  EXTREMITIES:  2+ Peripheral Pulses, No clubbing, cyanosis, bipedal edema ++  LYMPH: No lymphadenopathy noted  SKIN: No rashes or lesions    Consultant(s) Notes Reviewed:  [x ] YES  [ ] NO  Care Discussed with Consultants/Other Providers [ x] YES  [ ] NO    LABS:                                                  11.9   9.10  )-----------( 517      ( 19 Jan 2019 08:48 )             40.0     01-19    146  |  98  |  51<H>  ----------------------------<  120<H>  4.8   |  40<H>  |  2.1<H>    Ca    9.1      19 Jan 2019 08:48  Phos  5.1     01-19  Mg     1.6     01-19    TPro  5.9<L>  /  Alb  3.5  /  TBili  0.5  /  DBili  x   /  AST  21  /  ALT  28  /  AlkPhos  61  01-19      MICROBIOLOGY:   Culture - Urine (01.17.19 @ 09:46)    -  Ciprofloxacin: R >2    -  Ceftriaxone: R >32 Enterobacter, Citrobacter, and Serratia may develop resistance during prolonged therapy    -  Cefoxitin: I 16    -  Cefepime: R >16    -  Ertapenem: S <=0.5    -  Gentamicin: R >8    -  Imipenem: S <=1    -  Levofloxacin: R >4    -  Meropenem: S <=1    -  Piperacillin/Tazobactam: R 32    -  Nitrofurantoin: S <=32 Should not be used to treat pyelonephritis    -  Tigecycline: S <=1    -  Tobramycin: R >8    -  Trimethoprim/Sulfamethoxazole: S <=0.5/9.5    -  Amikacin: S 16    -  Ampicillin: R >16 These ampicillin results predict results for amoxicillin    -  Ampicillin/Sulbactam: R >16/8    -  Amoxicillin/Clavulanic Acid: I 16/8    -  Aztreonam: R >16    -  Cefazolin: R >16 For uncomplicated UTI with K. pneumoniae, E. coli, or P. mirablis: ANSON <=16 is sensitive and ANSON >=32 is resistant. This also predicts results for oral agents cefaclor, cefdinir, cefpodoxime, cefprozil, cefuroxime axetil, cephalexin and locarbef for uncomplicated UTI. Note that some isolates may be susceptible to these agents while testing resistant to cefazolin.    Specimen Source: .Urine Clean Catch (Midstream)    Culture Results:   >100,000 CFU/ml Escherichia coli ESBL    Organism Identification: Escherichia coli ESBL    Organism: Escherichia coli ESBL    Method Type: ANSON      RADIOLOGY & ADDITIONAL TESTS  X-ray Chest 1 View- PORTABLE-Routine (01.14.19 @ 14:42)   Support devices: None.    Cardiac/mediastinum/hilum: Cardiomegaly.    Lung parenchyma/Pleura: Unchanged pulmonary vascular congestion. No   pneumothorax.    Skeleton/soft tissues: Stable.    Impression:      Unchanged pulmonary vascular congestion.      Imaging Personally Reviewed:  [x] YES  [ ] NO        MEDICATIONS  (STANDING):  ALBUTerol/ipratropium for Nebulization 3 milliLiter(s) Nebulizer every 6 hours  artificial  tears Solution 1 Drop(s) Both EYES three times a day  ascorbic acid 500 milliGRAM(s) Oral daily  atorvastatin 20 milliGRAM(s) Oral at bedtime  buDESOnide 160 MICROgram(s)/formoterol 4.5 MICROgram(s) Inhaler - Peds 2 Puff(s) Inhalation two times a day  chlorhexidine 4% Liquid 1 Application(s) Topical <User Schedule>  cholecalciferol 1000 Unit(s) Oral daily  dextrose 5%. 1000 milliLiter(s) (50 mL/Hr) IV Continuous <Continuous>  dextrose 50% Injectable 12.5 Gram(s) IV Push once  dextrose 50% Injectable 25 Gram(s) IV Push once  dextrose 50% Injectable 25 Gram(s) IV Push once  finasteride 5 milliGRAM(s) Oral daily  furosemide    Tablet 20 milliGRAM(s) Oral two times a day  heparin  Injectable 5000 Unit(s) SubCutaneous every 8 hours  insulin glargine Injectable (LANTUS) 32 Unit(s) SubCutaneous two times a day  insulin lispro (HumaLOG) corrective regimen sliding scale   SubCutaneous three times a day before meals  insulin lispro Injectable (HumaLOG) 19 Unit(s) SubCutaneous three times a day with meals  metoprolol tartrate 25 milliGRAM(s) Oral two times a day  nystatin Powder 1 Application(s) Topical two times a day  predniSONE   Tablet 10 milliGRAM(s) Oral daily  senna 2 Tablet(s) Oral at bedtime  sodium polystyrene sulfonate Suspension 30 Gram(s) Oral <User Schedule>  tamsulosin 0.4 milliGRAM(s) Oral at bedtime  tiotropium 18 MICROgram(s) Capsule 1 Capsule(s) Inhalation at bedtime  trimethoprim  160 mG/sulfamethoxazole 800 mG 1 Tablet(s) Oral every 12 hours  zinc oxide 40% Ointment 1 Application(s) Topical two times a day    MEDICATIONS  (PRN):  ALBUTerol/ipratropium for Nebulization 3 milliLiter(s) Nebulizer every 4 hours PRN Bronchospasm  dextrose 40% Gel 15 Gram(s) Oral once PRN Blood Glucose LESS THAN 70 milliGRAM(s)/deciliter  dextrose 50% Injectable 25 milliLiter(s) IV Push every 6 hours PRN BG <100  glucagon  Injectable 1 milliGRAM(s) IntraMuscular once PRN Glucose LESS THAN 70 milligrams/deciliter  morphine Concentrate 5 milliGRAM(s) Oral every 4 hours PRN Dyspnea  polyethylene glycol 3350 17 Gram(s) Oral daily PRN Constipation        HEALTH ISSUES - PROBLEM Dx:  COPD (chronic obstructive pulmonary disease) with exacerbation  Hyperkalemia  High cholesterol  GERD (gastroesophageal reflux disease)  Enlarged prostate  Oxygen dependent  Diabetes mellitus, type 2  Hypertension  Emphysema lung
JIAN MANCIA  78y  Male    Patient is a 78y old  Male who presents with a chief complaint of Dyspnea and low oxygen saturation (09 Dec 2018 09:44)      INTERVAL HPI/OVERNIGHT EVENTS:  No interval events.  Patient has no new complaints this AM. Dyspnea stable.     Stable on 5L NC with BiPAP qhs.      REVIEW OF SYSTEMS:  CONSTITUTIONAL: No fever, weight loss, + fatigue  EYES: No eye pain, visual disturbances, or discharge  ENMT:  No difficulty hearing, tinnitus, vertigo; No sinus or throat pain  NECK: No pain or stiffness  RESPIRATORY: No cough, No wheezing, chills or hemoptysis; + shortness of breath  CARDIOVASCULAR: No chest pain, palpitations, dizziness, + leg swelling  GASTROINTESTINAL: No abdominal or epigastric pain. No nausea, vomiting, or hematemesis; + constipation. No melena or hematochezia.  GENITOURINARY: No dysuria, frequency, hematuria, or incontinence  NEUROLOGICAL: No headaches, memory loss, loss of strength, numbness, or tremors  SKIN: fluid dripping form anterior abdominal wall. No itching, burning, rashes, or lesions   LYMPH NODES: No enlarged glands  ENDOCRINE: No heat or cold intolerance; No hair loss  MUSCULOSKELETAL: No joint pain or swelling; No muscle, back, or extremity pain  PSYCHIATRIC: No depression, anxiety, mood swings, or difficulty sleeping  HEME/LYMPH: No easy bruising, or bleeding gums  ALLERGY AND IMMUNOLOGIC: No hives or eczema      VITALS:  T(C): 36 (18 Jan 2019 05:15), Max: 36.6 (17 Jan 2019 14:00)  T(F): 96.8 (18 Jan 2019 05:15), Max: 97.8 (17 Jan 2019 14:00)  HR: 118 (18 Jan 2019 05:15) (97 - 118)  BP: 133/82 (18 Jan 2019 05:15) (105/57 - 133/82)  BP(mean): --  RR: 18 (18 Jan 2019 05:15) (18 - 18)  SpO2: 91% (18 Jan 2019 08:00) (91% - 93%)      CAPILLARY BLOOD GLUCOSE  POCT Blood Glucose.: 214 mg/dL (18 Jan 2019 11:20)  POCT Blood Glucose.: 146 mg/dL (18 Jan 2019 07:49)  POCT Blood Glucose.: 97 mg/dL (17 Jan 2019 21:12)  POCT Blood Glucose.: 187 mg/dL (17 Jan 2019 16:06)      PHYSICAL EXAM:  GENERAL: NAD, obese  HEAD:  Atraumatic, Normocephalic  EYES: conjunctiva and sclera clear  ENMT: Moist mucous membranes  NECK: Supple, Normal thyroid  NERVOUS SYSTEM:  Alert & Oriented X 3, Good concentration; Motor Strength 5/5 B/L upper and lower extremities.  CHEST/LUNG: Decreased AE bilaterally; No rales, No rhonchi, No wheezing. On high flow nasal cannula.  HEART: Regular rate and rhythm; No murmurs, rubs, or gallops  ABDOMEN: Soft, Nontender, + distended; Bowel sounds present  EXTREMITIES:  2+ Peripheral Pulses, No clubbing, cyanosis, bipedal edema ++  LYMPH: No lymphadenopathy noted  SKIN: No rashes or lesions    Consultant(s) Notes Reviewed:  [x ] YES  [ ] NO  Care Discussed with Consultants/Other Providers [ x] YES  [ ] NO    LABS:                                       11.9   8.20  )-----------( 507      ( 16 Jan 2019 06:51 )             40.3     01-16    145  |  98  |  45<H>  ----------------------------<  159<H>  4.7   |  37<H>  |  2.1<H>    Ca    9.1      16 Jan 2019 06:51      MICROBIOLOGY:   Culture - Urine (01.10.19 @ 11:40)    -  Gentamicin: S <=1    -  Meropenem: S <=1    -  Nitrofurantoin: S <=32 Should not be used to treat pyelonephritis    -  Imipenem: S <=1    -  Levofloxacin: R >4    -  Piperacillin/Tazobactam: R <=8    -  Tigecycline: S <=1    -  Tobramycin: S <=2    -  Trimethoprim/Sulfamethoxazole: R >2/38    -  Amikacin: S <=8    -  Amoxicillin/Clavulanic Acid: I 16/8    -  Ampicillin: R >16 These ampicillin results predict results for amoxicillin    -  Ampicillin/Sulbactam: R >16/8    -  Aztreonam: R >16    -  Cefazolin: R >16 For uncomplicated UTI with K. pneumoniae, E. coli, or P. mirablis: ANSON <=16 is sensitive and ANSON >=32 is resistant. This also predicts results for oral agents cefaclor, cefdinir, cefpodoxime, cefprozil, cefuroxime axetil, cephalexin and locarbef for uncomplicated UTI. Note that some isolates may be susceptible to these agents while testing resistant to cefazolin.    -  Cefepime: R >16    -  Cefoxitin: S 8    -  Ceftriaxone: R >32 Enterobacter, Citrobacter, and Serratia may develop resistance during prolonged therapy    -  Ciprofloxacin: R >2    -  Ertapenem: S <=0.5    Specimen Source: .Urine Clean Catch (Midstream)    Culture Results:   50,000 - 99,000 CFU/mL Escherichia coli ESBL    Organism Identification: Escherichia coli ESBL    Organism: Escherichia coli ESBL    Method Type: ANSON        RADIOLOGY & ADDITIONAL TESTS  X-ray Chest 1 View- PORTABLE-Routine (01.14.19 @ 14:42)   Support devices: None.    Cardiac/mediastinum/hilum: Cardiomegaly.    Lung parenchyma/Pleura: Unchanged pulmonary vascular congestion. No   pneumothorax.    Skeleton/soft tissues: Stable.    Impression:      Unchanged pulmonary vascular congestion.      Imaging Personally Reviewed:  [x] YES  [ ] NO        MEDICATIONS  (STANDING):  ALBUTerol/ipratropium for Nebulization 3 milliLiter(s) Nebulizer every 6 hours  artificial  tears Solution 1 Drop(s) Both EYES three times a day  ascorbic acid 500 milliGRAM(s) Oral daily  atorvastatin 20 milliGRAM(s) Oral at bedtime  buDESOnide 160 MICROgram(s)/formoterol 4.5 MICROgram(s) Inhaler - Peds 2 Puff(s) Inhalation two times a day  chlorhexidine 4% Liquid 1 Application(s) Topical <User Schedule>  cholecalciferol 1000 Unit(s) Oral daily  dextrose 5%. 1000 milliLiter(s) (50 mL/Hr) IV Continuous <Continuous>  dextrose 50% Injectable 12.5 Gram(s) IV Push once  dextrose 50% Injectable 25 Gram(s) IV Push once  dextrose 50% Injectable 25 Gram(s) IV Push once  finasteride 5 milliGRAM(s) Oral daily  furosemide    Tablet 40 milliGRAM(s) Oral every 12 hours  heparin  Injectable 5000 Unit(s) SubCutaneous every 8 hours  insulin glargine Injectable (LANTUS) 32 Unit(s) SubCutaneous two times a day  insulin lispro (HumaLOG) corrective regimen sliding scale   SubCutaneous three times a day before meals  insulin lispro Injectable (HumaLOG) 17 Unit(s) SubCutaneous before breakfast  insulin lispro Injectable (HumaLOG) 17 Unit(s) SubCutaneous before lunch  insulin lispro Injectable (HumaLOG) 17 Unit(s) SubCutaneous before dinner  metoprolol tartrate 25 milliGRAM(s) Oral two times a day  nystatin Powder 1 Application(s) Topical two times a day  predniSONE   Tablet 10 milliGRAM(s) Oral daily  senna 2 Tablet(s) Oral at bedtime  sodium chloride 0.9% Bolus 500 milliLiter(s) IV Bolus once  sodium polystyrene sulfonate Suspension 30 Gram(s) Oral <User Schedule>  tamsulosin 0.4 milliGRAM(s) Oral at bedtime  tiotropium 18 MICROgram(s) Capsule 1 Capsule(s) Inhalation at bedtime  zinc oxide 40% Ointment 1 Application(s) Topical two times a day    MEDICATIONS  (PRN):  ALBUTerol/ipratropium for Nebulization 3 milliLiter(s) Nebulizer every 4 hours PRN Bronchospasm  dextrose 40% Gel 15 Gram(s) Oral once PRN Blood Glucose LESS THAN 70 milliGRAM(s)/deciliter  dextrose 50% Injectable 25 milliLiter(s) IV Push every 6 hours PRN BG <100  glucagon  Injectable 1 milliGRAM(s) IntraMuscular once PRN Glucose LESS THAN 70 milligrams/deciliter  polyethylene glycol 3350 17 Gram(s) Oral daily PRN Constipation          HEALTH ISSUES - PROBLEM Dx:  COPD (chronic obstructive pulmonary disease) with exacerbation  Hyperkalemia  High cholesterol  GERD (gastroesophageal reflux disease)  Enlarged prostate  Oxygen dependent  Diabetes mellitus, type 2  Hypertension  Emphysema lung
JIAN MANCIA  78y  Male    Patient is a 78y old  Male who presents with a chief complaint of Dyspnea and low oxygen saturation (09 Dec 2018 09:44)      INTERVAL HPI/OVERNIGHT EVENTS:  No interval events.  Patient has no new complaints this AM. Dyspnea stable.     Stable on 5L NC with BiPAP qhs.      REVIEW OF SYSTEMS:  CONSTITUTIONAL: No fever, weight loss, No fatigue  EYES: No eye pain, visual disturbances, or discharge  ENMT:  No difficulty hearing, tinnitus, vertigo; No sinus or throat pain  NECK: No pain or stiffness  RESPIRATORY: No cough, No wheezing, chills or hemoptysis; + shortness of breath  CARDIOVASCULAR: No chest pain, palpitations, dizziness, + leg swelling  GASTROINTESTINAL: No abdominal or epigastric pain. No nausea, vomiting, or hematemesis; + constipation. No melena or hematochezia.  GENITOURINARY: No dysuria, frequency, hematuria, or incontinence  NEUROLOGICAL: No headaches, memory loss, loss of strength, numbness, or tremors  SKIN: fluid dripping form anterior abdominal wall. No itching, burning, rashes, or lesions   LYMPH NODES: No enlarged glands  ENDOCRINE: No heat or cold intolerance; No hair loss  MUSCULOSKELETAL: No joint pain or swelling; No muscle, back, or extremity pain  PSYCHIATRIC: No depression, anxiety, mood swings, or difficulty sleeping  HEME/LYMPH: No easy bruising, or bleeding gums  ALLERGY AND IMMUNOLOGIC: No hives or eczema      VITALS:  T(C): 36.7 (19 Jan 2019 05:35), Max: 36.7 (18 Jan 2019 21:58)  T(F): 98 (19 Jan 2019 05:35), Max: 98.1 (18 Jan 2019 21:58)  HR: 87 (18 Jan 2019 21:58) (87 - 96)  BP: 108/52 (18 Jan 2019 21:58) (108/52 - 137/66)  BP(mean): --  RR: 18 (19 Jan 2019 05:35) (18 - 18)  SpO2: 92% (18 Jan 2019 21:50) (83% - 92%)      CAPILLARY BLOOD GLUCOSE  POCT Blood Glucose.: 312 mg/dL (19 Jan 2019 11:16)  POCT Blood Glucose.: 110 mg/dL (19 Jan 2019 07:46)  POCT Blood Glucose.: 189 mg/dL (18 Jan 2019 21:16)  POCT Blood Glucose.: 118 mg/dL (18 Jan 2019 16:11)        PHYSICAL EXAM:  GENERAL: NAD, obese  HEAD:  Atraumatic, Normocephalic  EYES: conjunctiva and sclera clear  ENMT: Moist mucous membranes  NECK: Supple, Normal thyroid  NERVOUS SYSTEM:  Alert & Oriented X 3, Good concentration; Motor Strength 5/5 B/L upper and lower extremities.  CHEST/LUNG: Decreased AE bilaterally; No rales, No rhonchi, No wheezing. On high flow nasal cannula.  HEART: Regular rate and rhythm; No murmurs, rubs, or gallops  ABDOMEN: Soft, Nontender, + distended; Bowel sounds present  EXTREMITIES:  2+ Peripheral Pulses, No clubbing, cyanosis, bipedal edema ++  LYMPH: No lymphadenopathy noted  SKIN: No rashes or lesions    Consultant(s) Notes Reviewed:  [x ] YES  [ ] NO  Care Discussed with Consultants/Other Providers [ x] YES  [ ] NO    LABS:                                                  11.9   9.10  )-----------( 517      ( 19 Jan 2019 08:48 )             40.0     01-19    146  |  98  |  51<H>  ----------------------------<  120<H>  4.8   |  40<H>  |  2.1<H>    Ca    9.1      19 Jan 2019 08:48  Phos  5.1     01-19  Mg     1.6     01-19    TPro  5.9<L>  /  Alb  3.5  /  TBili  0.5  /  DBili  x   /  AST  21  /  ALT  28  /  AlkPhos  61  01-19      MICROBIOLOGY:   Culture - Urine (01.10.19 @ 11:40)    -  Gentamicin: S <=1    -  Meropenem: S <=1    -  Nitrofurantoin: S <=32 Should not be used to treat pyelonephritis    -  Imipenem: S <=1    -  Levofloxacin: R >4    -  Piperacillin/Tazobactam: R <=8    -  Tigecycline: S <=1    -  Tobramycin: S <=2    -  Trimethoprim/Sulfamethoxazole: R >2/38    -  Amikacin: S <=8    -  Amoxicillin/Clavulanic Acid: I 16/8    -  Ampicillin: R >16 These ampicillin results predict results for amoxicillin    -  Ampicillin/Sulbactam: R >16/8    -  Aztreonam: R >16    -  Cefazolin: R >16 For uncomplicated UTI with K. pneumoniae, E. coli, or P. mirablis: ANSON <=16 is sensitive and ANSON >=32 is resistant. This also predicts results for oral agents cefaclor, cefdinir, cefpodoxime, cefprozil, cefuroxime axetil, cephalexin and locarbef for uncomplicated UTI. Note that some isolates may be susceptible to these agents while testing resistant to cefazolin.    -  Cefepime: R >16    -  Cefoxitin: S 8    -  Ceftriaxone: R >32 Enterobacter, Citrobacter, and Serratia may develop resistance during prolonged therapy    -  Ciprofloxacin: R >2    -  Ertapenem: S <=0.5    Specimen Source: .Urine Clean Catch (Midstream)    Culture Results:   50,000 - 99,000 CFU/mL Escherichia coli ESBL    Organism Identification: Escherichia coli ESBL    Organism: Escherichia coli ESBL    Method Type: ANSON        RADIOLOGY & ADDITIONAL TESTS  X-ray Chest 1 View- PORTABLE-Routine (01.14.19 @ 14:42)   Support devices: None.    Cardiac/mediastinum/hilum: Cardiomegaly.    Lung parenchyma/Pleura: Unchanged pulmonary vascular congestion. No   pneumothorax.    Skeleton/soft tissues: Stable.    Impression:      Unchanged pulmonary vascular congestion.      Imaging Personally Reviewed:  [x] YES  [ ] NO        MEDICATIONS  (STANDING):  ALBUTerol/ipratropium for Nebulization 3 milliLiter(s) Nebulizer every 6 hours  artificial  tears Solution 1 Drop(s) Both EYES three times a day  ascorbic acid 500 milliGRAM(s) Oral daily  atorvastatin 20 milliGRAM(s) Oral at bedtime  buDESOnide 160 MICROgram(s)/formoterol 4.5 MICROgram(s) Inhaler - Peds 2 Puff(s) Inhalation two times a day  chlorhexidine 4% Liquid 1 Application(s) Topical <User Schedule>  cholecalciferol 1000 Unit(s) Oral daily  dextrose 5%. 1000 milliLiter(s) (50 mL/Hr) IV Continuous <Continuous>  dextrose 50% Injectable 12.5 Gram(s) IV Push once  dextrose 50% Injectable 25 Gram(s) IV Push once  dextrose 50% Injectable 25 Gram(s) IV Push once  finasteride 5 milliGRAM(s) Oral daily  furosemide    Tablet 40 milliGRAM(s) Oral every 12 hours  heparin  Injectable 5000 Unit(s) SubCutaneous every 8 hours  insulin glargine Injectable (LANTUS) 32 Unit(s) SubCutaneous two times a day  insulin lispro (HumaLOG) corrective regimen sliding scale   SubCutaneous three times a day before meals  insulin lispro Injectable (HumaLOG) 17 Unit(s) SubCutaneous before breakfast  insulin lispro Injectable (HumaLOG) 17 Unit(s) SubCutaneous before lunch  insulin lispro Injectable (HumaLOG) 17 Unit(s) SubCutaneous before dinner  metoprolol tartrate 25 milliGRAM(s) Oral two times a day  nystatin Powder 1 Application(s) Topical two times a day  predniSONE   Tablet 10 milliGRAM(s) Oral daily  senna 2 Tablet(s) Oral at bedtime  sodium chloride 0.9% Bolus 500 milliLiter(s) IV Bolus once  sodium polystyrene sulfonate Suspension 30 Gram(s) Oral <User Schedule>  tamsulosin 0.4 milliGRAM(s) Oral at bedtime  tiotropium 18 MICROgram(s) Capsule 1 Capsule(s) Inhalation at bedtime  zinc oxide 40% Ointment 1 Application(s) Topical two times a day    MEDICATIONS  (PRN):  ALBUTerol/ipratropium for Nebulization 3 milliLiter(s) Nebulizer every 4 hours PRN Bronchospasm  dextrose 40% Gel 15 Gram(s) Oral once PRN Blood Glucose LESS THAN 70 milliGRAM(s)/deciliter  dextrose 50% Injectable 25 milliLiter(s) IV Push every 6 hours PRN BG <100  glucagon  Injectable 1 milliGRAM(s) IntraMuscular once PRN Glucose LESS THAN 70 milligrams/deciliter  polyethylene glycol 3350 17 Gram(s) Oral daily PRN Constipation          HEALTH ISSUES - PROBLEM Dx:  COPD (chronic obstructive pulmonary disease) with exacerbation  Hyperkalemia  High cholesterol  GERD (gastroesophageal reflux disease)  Enlarged prostate  Oxygen dependent  Diabetes mellitus, type 2  Hypertension  Emphysema lung
JIAN MANCIA  78y  Male    Patient is a 78y old  Male who presents with a chief complaint of Dyspnea and low oxygen saturation (09 Dec 2018 09:44)      INTERVAL HPI/OVERNIGHT EVENTS:  No interval events.  Patient has no new complaints. Dyspnea stable.     Required BiPAP overnight but had been stable on 5L NC.      REVIEW OF SYSTEMS:  CONSTITUTIONAL: No fever, weight loss, + fatigue  EYES: No eye pain, visual disturbances, or discharge  ENMT:  No difficulty hearing, tinnitus, vertigo; No sinus or throat pain  NECK: No pain or stiffness  RESPIRATORY: No cough, No wheezing, chills or hemoptysis; + shortness of breath  CARDIOVASCULAR: No chest pain, palpitations, dizziness, + leg swelling  GASTROINTESTINAL: No abdominal or epigastric pain. No nausea, vomiting, or hematemesis; + constipation. No melena or hematochezia.  GENITOURINARY: No dysuria, frequency, hematuria, or incontinence  NEUROLOGICAL: No headaches, memory loss, loss of strength, numbness, or tremors  SKIN: fluid dripping form anterior abdominal wall. No itching, burning, rashes, or lesions   LYMPH NODES: No enlarged glands  ENDOCRINE: No heat or cold intolerance; No hair loss  MUSCULOSKELETAL: No joint pain or swelling; No muscle, back, or extremity pain  PSYCHIATRIC: No depression, anxiety, mood swings, or difficulty sleeping  HEME/LYMPH: No easy bruising, or bleeding gums  ALLERGY AND IMMUNOLOGIC: No hives or eczema      VITALS:  T(C): 36.9 (16 Jan 2019 14:34), Max: 36.9 (16 Jan 2019 14:34)  T(F): 98.5 (16 Jan 2019 14:34), Max: 98.5 (16 Jan 2019 14:34)  HR: 129 (16 Jan 2019 14:34) (86 - 129)  BP: 116/67 (16 Jan 2019 14:34) (116/67 - 132/72)  BP(mean): --  RR: 18 (16 Jan 2019 05:44) (18 - 18)  SpO2: 89% (16 Jan 2019 00:22) (89% - 89%)      CAPILLARY BLOOD GLUCOSE  POCT Blood Glucose.: 305 mg/dL (16 Jan 2019 12:14)  POCT Blood Glucose.: 153 mg/dL (16 Jan 2019 07:32)  POCT Blood Glucose.: 111 mg/dL (15 Jos 2019 21:06)  POCT Blood Glucose.: 163 mg/dL (15 Jos 2019 16:28)        PHYSICAL EXAM:  GENERAL: NAD, obese  HEAD:  Atraumatic, Normocephalic  EYES: conjunctiva and sclera clear  ENMT: Moist mucous membranes  NECK: Supple, Normal thyroid  NERVOUS SYSTEM:  Alert & Oriented X 3, Good concentration; Motor Strength 5/5 B/L upper and lower extremities.  CHEST/LUNG: Decreased AE bilaterally; No rales, No rhonchi, No wheezing. On high flow nasal cannula.  HEART: Regular rate and rhythm; No murmurs, rubs, or gallops  ABDOMEN: Soft, Nontender, + distended; Bowel sounds present  EXTREMITIES:  2+ Peripheral Pulses, No clubbing, cyanosis, bipedal edema ++  LYMPH: No lymphadenopathy noted  SKIN: No rashes or lesions    Consultant(s) Notes Reviewed:  [x ] YES  [ ] NO  Care Discussed with Consultants/Other Providers [ x] YES  [ ] NO    LABS:                                                  11.9   8.20  )-----------( 507      ( 16 Jan 2019 06:51 )             40.3     	    01-16    145  |  98  |  45<H>  ----------------------------<  159<H>  4.7   |  37<H>  |  2.1<H>    Ca    9.1      16 Jan 2019 06:51        MICROBIOLOGY:   Culture - Urine (01.10.19 @ 11:40)    -  Gentamicin: S <=1    -  Meropenem: S <=1    -  Nitrofurantoin: S <=32 Should not be used to treat pyelonephritis    -  Imipenem: S <=1    -  Levofloxacin: R >4    -  Piperacillin/Tazobactam: R <=8    -  Tigecycline: S <=1    -  Tobramycin: S <=2    -  Trimethoprim/Sulfamethoxazole: R >2/38    -  Amikacin: S <=8    -  Amoxicillin/Clavulanic Acid: I 16/8    -  Ampicillin: R >16 These ampicillin results predict results for amoxicillin    -  Ampicillin/Sulbactam: R >16/8    -  Aztreonam: R >16    -  Cefazolin: R >16 For uncomplicated UTI with K. pneumoniae, E. coli, or P. mirablis: ANSON <=16 is sensitive and ANSON >=32 is resistant. This also predicts results for oral agents cefaclor, cefdinir, cefpodoxime, cefprozil, cefuroxime axetil, cephalexin and locarbef for uncomplicated UTI. Note that some isolates may be susceptible to these agents while testing resistant to cefazolin.    -  Cefepime: R >16    -  Cefoxitin: S 8    -  Ceftriaxone: R >32 Enterobacter, Citrobacter, and Serratia may develop resistance during prolonged therapy    -  Ciprofloxacin: R >2    -  Ertapenem: S <=0.5    Specimen Source: .Urine Clean Catch (Midstream)    Culture Results:   50,000 - 99,000 CFU/mL Escherichia coli ESBL    Organism Identification: Escherichia coli ESBL    Organism: Escherichia coli ESBL    Method Type: ANSON        RADIOLOGY & ADDITIONAL TESTS  Xray Chest 1 View- PORTABLE-Routine (01.14.19 @ 14:42)   Support devices: None.    Cardiac/mediastinum/hilum: Cardiomegaly.    Lung parenchyma/Pleura: Unchanged pulmonary vascular congestion. No   pneumothorax.    Skeleton/soft tissues: Stable.    Impression:      Unchanged pulmonary vascular congestion.      Imaging Personally Reviewed:  [x] YES  [ ] NO        MEDICATIONS  (STANDING):  ALBUTerol/ipratropium for Nebulization 3 milliLiter(s) Nebulizer every 6 hours  artificial  tears Solution 1 Drop(s) Both EYES three times a day  ascorbic acid 500 milliGRAM(s) Oral daily  atorvastatin 20 milliGRAM(s) Oral at bedtime  buDESOnide 160 MICROgram(s)/formoterol 4.5 MICROgram(s) Inhaler - Peds 2 Puff(s) Inhalation two times a day  chlorhexidine 4% Liquid 1 Application(s) Topical <User Schedule>  cholecalciferol 1000 Unit(s) Oral daily  dextrose 5%. 1000 milliLiter(s) (50 mL/Hr) IV Continuous <Continuous>  dextrose 50% Injectable 12.5 Gram(s) IV Push once  dextrose 50% Injectable 25 Gram(s) IV Push once  dextrose 50% Injectable 25 Gram(s) IV Push once  finasteride 5 milliGRAM(s) Oral daily  furosemide    Tablet 40 milliGRAM(s) Oral every 12 hours  heparin  Injectable 5000 Unit(s) SubCutaneous every 8 hours  insulin glargine Injectable (LANTUS) 32 Unit(s) SubCutaneous two times a day  insulin lispro (HumaLOG) corrective regimen sliding scale   SubCutaneous three times a day before meals  insulin lispro Injectable (HumaLOG) 17 Unit(s) SubCutaneous before breakfast  insulin lispro Injectable (HumaLOG) 17 Unit(s) SubCutaneous before lunch  insulin lispro Injectable (HumaLOG) 17 Unit(s) SubCutaneous before dinner  metoprolol tartrate 25 milliGRAM(s) Oral two times a day  nystatin Powder 1 Application(s) Topical two times a day  predniSONE   Tablet 10 milliGRAM(s) Oral daily  senna 2 Tablet(s) Oral at bedtime  sodium chloride 0.9% Bolus 500 milliLiter(s) IV Bolus once  sodium polystyrene sulfonate Suspension 30 Gram(s) Oral <User Schedule>  tamsulosin 0.4 milliGRAM(s) Oral at bedtime  tiotropium 18 MICROgram(s) Capsule 1 Capsule(s) Inhalation at bedtime  zinc oxide 40% Ointment 1 Application(s) Topical two times a day    MEDICATIONS  (PRN):  ALBUTerol/ipratropium for Nebulization 3 milliLiter(s) Nebulizer every 4 hours PRN Bronchospasm  dextrose 40% Gel 15 Gram(s) Oral once PRN Blood Glucose LESS THAN 70 milliGRAM(s)/deciliter  dextrose 50% Injectable 25 milliLiter(s) IV Push every 6 hours PRN BG <100  glucagon  Injectable 1 milliGRAM(s) IntraMuscular once PRN Glucose LESS THAN 70 milligrams/deciliter  polyethylene glycol 3350 17 Gram(s) Oral daily PRN Constipation      HEALTH ISSUES - PROBLEM Dx:  COPD (chronic obstructive pulmonary disease) with exacerbation  Hyperkalemia  High cholesterol  GERD (gastroesophageal reflux disease)  Enlarged prostate  Oxygen dependent  Diabetes mellitus, type 2  Hypertension  Emphysema lung
JIAN MANCIA  78y  Male    Patient is a 78y old  Male who presents with a chief complaint of Dyspnea and low oxygen saturation (09 Dec 2018 09:44)      INTERVAL HPI/OVERNIGHT EVENTS:  No interval events.  Patient requesting to be made only DNI but discussed with him the implications of that. He is still DNR/DNI.   Patient has no new complaints. Dyspnea stable.     Now off BiPAP. On HFNC 50L/min.      REVIEW OF SYSTEMS:  CONSTITUTIONAL: No fever, weight loss, + fatigue  EYES: No eye pain, visual disturbances, or discharge  ENMT:  No difficulty hearing, tinnitus, vertigo; No sinus or throat pain  NECK: No pain or stiffness  BREASTS: No pain, masses, or nipple discharge  RESPIRATORY: No cough, No wheezing, chills or hemoptysis; + shortness of breath  CARDIOVASCULAR: No chest pain, palpitations, dizziness, + leg swelling  GASTROINTESTINAL: No abdominal or epigastric pain. No nausea, vomiting, or hematemesis; + constipation. No melena or hematochezia.  GENITOURINARY: No dysuria, frequency, hematuria, or incontinence  NEUROLOGICAL: No headaches, memory loss, loss of strength, numbness, or tremors  SKIN: fluid dripping form anterior abdominal wall. No itching, burning, rashes, or lesions   LYMPH NODES: No enlarged glands  ENDOCRINE: No heat or cold intolerance; No hair loss  MUSCULOSKELETAL: No joint pain or swelling; No muscle, back, or extremity pain  PSYCHIATRIC: No depression, anxiety, mood swings, or difficulty sleeping  HEME/LYMPH: No easy bruising, or bleeding gums  ALLERGY AND IMMUNOLOGIC: No hives or eczema      VITALS:  T(C): 36.6 (08 Jan 2019 05:15), Max: 37 (07 Jan 2019 17:37)  T(F): 97.8 (08 Jan 2019 05:15), Max: 98.6 (07 Jan 2019 17:37)  HR: 108 (08 Jan 2019 05:15) (95 - 115)  BP: 102/53 (08 Jan 2019 05:15) (102/53 - 108/62)  BP(mean): --  RR: 20 (08 Jan 2019 05:15) (18 - 20)  SpO2: 94% (08 Jan 2019 00:51) (94% - 97%)      CAPILLARY BLOOD GLUCOSE  POCT Blood Glucose.: 233 mg/dL (08 Jan 2019 07:42)  POCT Blood Glucose.: 118 mg/dL (07 Jan 2019 21:07)  POCT Blood Glucose.: 296 mg/dL (07 Jan 2019 17:21)  POCT Blood Glucose.: 244 mg/dL (07 Jan 2019 11:21)      PHYSICAL EXAM:  GENERAL: NAD, obese  HEAD:  Atraumatic, Normocephalic  EYES: conjunctiva and sclera clear  ENMT: Moist mucous membranes  NECK: Supple, Normal thyroid  NERVOUS SYSTEM:  Alert & Oriented X 3, Good concentration; Motor Strength 5/5 B/L upper and lower extremities.  CHEST/LUNG: Decreased AE bilaterally; No rales, No rhonchi, No wheezing. On high flow nasal cannula.  HEART: Regular rate and rhythm; No murmurs, rubs, or gallops  ABDOMEN: Soft, Nontender, ++ distended; Bowel sounds present  EXTREMITIES:  2+ Peripheral Pulses, No clubbing, cyanosis, bipedal edema ++  LYMPH: No lymphadenopathy noted  SKIN: No rashes or lesions    Consultant(s) Notes Reviewed:  [x ] YES  [ ] NO  Care Discussed with Consultants/Other Providers [ x] YES  [ ] NO    LABS:                                          11.9   8.88  )-----------( 350      ( 08 Jan 2019 10:01 )             39.9             MICROBIOLOGY: None  Culture - Abscess with Gram Stain (12.18.18 @ 16:55)    -  Gentamicin: S <=1 Should not be used as monotherapy    -  Linezolid: S 2    -  Oxacillin: R >2    -  Penicillin: R >8    -  RIF- Rifampin: S <=1 Should not be used as monotherapy    -  Tetra/Doxy: S 2    -  Trimethoprim/Sulfamethoxazole: S <=0.5/9.5    -  Vancomycin: S 2    -  Ampicillin/Sulbactam: R <=8/4    -  Cefazolin: R 16    -  Clindamycin: R >4    -  Daptomycin: S 0.5    -  Erythromycin: R >4    Specimen Source: .Abscess Arm - Right    Culture Results:   Moderate Methicillin resistant Staphylococcus aureus    Organism Identification: Methicillin resistant Staphylococcus aureus    Organism: Methicillin resistant Staphylococcus aureus    Method Type: ANSON        RADIOLOGY & ADDITIONAL TESTS  X-ray Chest 1 View-PORTABLE IMMEDIATE (01.06.19 @ 00:44)   Support devices: None.    Cardiac/mediastinum/hilum: Stable    Lung parenchyma/Pleura: Bibasilar opacities, unchanged. No pneumothorax   is seen.    Skeleton/soft tissues: Stable    Impression:      Bibasilar opacities, unchanged.      CT Abdomen and Pelvis w/ Oral Cont (12.09.18 @ 12:12)   No evidence of acute intra-abdominal or pelvic pathology.    Multiple bilateral non-obstructing renal calculi measuring up to 8 mm in   the left interpolar region.    Stable 4 cm indeterminate exophytic left interpolar renal lesion.   Recommend nonemergent MRI of the abdomen with renal mass protocol for   further evaluation.        Imaging Personally Reviewed:  [x] YES  [ ] NO        MEDICATIONS  (STANDING):  artificial  tears Solution 1 Drop(s) Both EYES three times a day  ascorbic acid 500 milliGRAM(s) Oral daily  atorvastatin 20 milliGRAM(s) Oral at bedtime  buDESOnide 160 MICROgram(s)/formoterol 4.5 MICROgram(s) Inhaler - Peds 2 Puff(s) Inhalation two times a day  chlorhexidine 4% Liquid 1 Application(s) Topical <User Schedule>  cholecalciferol 1000 Unit(s) Oral daily  dextrose 5%. 1000 milliLiter(s) (50 mL/Hr) IV Continuous <Continuous>  dextrose 50% Injectable 12.5 Gram(s) IV Push once  dextrose 50% Injectable 25 Gram(s) IV Push once  dextrose 50% Injectable 25 Gram(s) IV Push once  finasteride 5 milliGRAM(s) Oral daily  heparin  Injectable 5000 Unit(s) SubCutaneous every 8 hours  insulin glargine Injectable (LANTUS) 30 Unit(s) SubCutaneous two times a day  insulin lispro (HumaLOG) corrective regimen sliding scale   SubCutaneous three times a day before meals  insulin lispro Injectable (HumaLOG) 17 Unit(s) SubCutaneous before breakfast  insulin lispro Injectable (HumaLOG) 17 Unit(s) SubCutaneous before lunch  insulin lispro Injectable (HumaLOG) 17 Unit(s) SubCutaneous before dinner  metoprolol tartrate 25 milliGRAM(s) Oral two times a day  predniSONE   Tablet 10 milliGRAM(s) Oral daily  senna 2 Tablet(s) Oral at bedtime  sodium chloride 0.9% Bolus 500 milliLiter(s) IV Bolus once  tamsulosin 0.4 milliGRAM(s) Oral at bedtime  tiotropium 18 MICROgram(s) Capsule 1 Capsule(s) Inhalation at bedtime  zinc oxide 40% Ointment 1 Application(s) Topical two times a day    MEDICATIONS  (PRN):  ALBUTerol/ipratropium for Nebulization 3 milliLiter(s) Nebulizer every 6 hours PRN Bronchospasm  ALBUTerol/ipratropium for Nebulization 3 milliLiter(s) Nebulizer every 4 hours PRN Bronchospasm  dextrose 40% Gel 15 Gram(s) Oral once PRN Blood Glucose LESS THAN 70 milliGRAM(s)/deciliter  dextrose 50% Injectable 25 milliLiter(s) IV Push every 6 hours PRN BG <100  glucagon  Injectable 1 milliGRAM(s) IntraMuscular once PRN Glucose LESS THAN 70 milligrams/deciliter  polyethylene glycol 3350 17 Gram(s) Oral daily PRN Constipation        HEALTH ISSUES - PROBLEM Dx:  COPD (chronic obstructive pulmonary disease) with exacerbation  Hyperkalemia  High cholesterol  GERD (gastroesophageal reflux disease)  Enlarged prostate  Oxygen dependent  Diabetes mellitus, type 2  Hypertension  Emphysema lung
JIAN MANCIA  78y  Male    Patient is a 78y old  Male who presents with a chief complaint of Dyspnea and low oxygen saturation (09 Dec 2018 09:44)      INTERVAL HPI/OVERNIGHT EVENTS:  Patient mentions having a syncopal event yesterday after he stood up from.  There was a brief LOC. Patient requesting to be made only DNI.   Patient has no new complaints. Dyspnea stable.     Now off BiPAP. On HFNC 50L/min.      REVIEW OF SYSTEMS:  CONSTITUTIONAL: No fever, weight loss, + fatigue  EYES: No eye pain, visual disturbances, or discharge  ENMT:  No difficulty hearing, tinnitus, vertigo; No sinus or throat pain  NECK: No pain or stiffness  BREASTS: No pain, masses, or nipple discharge  RESPIRATORY: No cough, No wheezing, chills or hemoptysis; + shortness of breath  CARDIOVASCULAR: No chest pain, palpitations, dizziness, + leg swelling  GASTROINTESTINAL: No abdominal or epigastric pain. No nausea, vomiting, or hematemesis; + constipation. No melena or hematochezia.  GENITOURINARY: No dysuria, frequency, hematuria, or incontinence  NEUROLOGICAL: No headaches, memory loss, loss of strength, numbness, or tremors  SKIN: fluid dripping form anterior abdominal wall. No itching, burning, rashes, or lesions   LYMPH NODES: No enlarged glands  ENDOCRINE: No heat or cold intolerance; No hair loss  MUSCULOSKELETAL: No joint pain or swelling; No muscle, back, or extremity pain  PSYCHIATRIC: No depression, anxiety, mood swings, or difficulty sleeping  HEME/LYMPH: No easy bruising, or bleeding gums  ALLERGY AND IMMUNOLOGIC: No hives or eczema      VITALS:  T(C): 36.2 (07 Jan 2019 06:03), Max: 37.4 (06 Jan 2019 21:30)  T(F): 97.2 (07 Jan 2019 06:03), Max: 99.4 (06 Jan 2019 21:30)  HR: 75 (07 Jan 2019 06:03) (75 - 105)  BP: 105/64 (07 Jan 2019 06:03) (92/53 - 140/66)  BP(mean): --  RR: 22 (07 Jan 2019 03:00) (18 - 22)  SpO2: 91% (07 Jan 2019 06:03) (90% - 99%)      CAPILLARY BLOOD GLUCOSE  POCT Blood Glucose.: 244 mg/dL (07 Jan 2019 11:21)  POCT Blood Glucose.: 107 mg/dL (07 Jan 2019 07:40)  POCT Blood Glucose.: 124 mg/dL (07 Jan 2019 05:42)  POCT Blood Glucose.: 114 mg/dL (07 Jan 2019 02:36)  POCT Blood Glucose.: 118 mg/dL (06 Jan 2019 21:14)  POCT Blood Glucose.: 91 mg/dL (06 Jan 2019 17:50)  POCT Blood Glucose.: 78 mg/dL (06 Jan 2019 16:00)        PHYSICAL EXAM:  GENERAL: NAD, obese  HEAD:  Atraumatic, Normocephalic  EYES: conjunctiva and sclera clear  ENMT: Moist mucous membranes  NECK: Supple, Normal thyroid  NERVOUS SYSTEM:  Alert & Oriented X 3, Good concentration; Motor Strength 5/5 B/L upper and lower extremities.  CHEST/LUNG: Decreased AE bilaterally; No rales, No rhonchi, + wheezing. On high flow nasal cannula.  HEART: Regular rate and rhythm; No murmurs, rubs, or gallops  ABDOMEN: Soft, Nontender, ++ distended; Bowel sounds present  EXTREMITIES:  2+ Peripheral Pulses, No clubbing, cyanosis, bipedal edema ++  LYMPH: No lymphadenopathy noted  SKIN: No rashes or lesions    Consultant(s) Notes Reviewed:  [x ] YES  [ ] NO  Care Discussed with Consultants/Other Providers [ x] YES  [ ] NO    LABS:                                          12.6   9.79  )-----------( 377      ( 06 Jan 2019 00:20 )             42.8     01-06    145  |  104  |  48<H>  ----------------------------<  100<H>  4.8   |  32  |  1.8<H>    Ca    8.7      06 Jan 2019 11:59  Mg     2.2     01-06    TPro  5.7<L>  /  Alb  3.7  /  TBili  0.5  /  DBili  x   /  AST  15  /  ALT  20  /  AlkPhos  64  01-06        MICROBIOLOGY: None  Culture - Abscess with Gram Stain (12.18.18 @ 16:55)    -  Gentamicin: S <=1 Should not be used as monotherapy    -  Linezolid: S 2    -  Oxacillin: R >2    -  Penicillin: R >8    -  RIF- Rifampin: S <=1 Should not be used as monotherapy    -  Tetra/Doxy: S 2    -  Trimethoprim/Sulfamethoxazole: S <=0.5/9.5    -  Vancomycin: S 2    -  Ampicillin/Sulbactam: R <=8/4    -  Cefazolin: R 16    -  Clindamycin: R >4    -  Daptomycin: S 0.5    -  Erythromycin: R >4    Specimen Source: .Abscess Arm - Right    Culture Results:   Moderate Methicillin resistant Staphylococcus aureus    Organism Identification: Methicillin resistant Staphylococcus aureus    Organism: Methicillin resistant Staphylococcus aureus    Method Type: ANSON        RADIOLOGY & ADDITIONAL TESTS  Xray Chest 1 View-PORTABLE IMMEDIATE (01.06.19 @ 00:44)   Support devices: None.    Cardiac/mediastinum/hilum: Stable    Lung parenchyma/Pleura: Bibasilar opacities, unchanged. No pneumothorax   is seen.    Skeleton/soft tissues: Stable    Impression:      Bibasilar opacities, unchanged.      CT Abdomen and Pelvis w/ Oral Cont (12.09.18 @ 12:12)   No evidence of acute intra-abdominal or pelvic pathology.    Multiple bilateral non-obstructing renal calculi measuring up to 8 mm in   the left interpolar region.    Stable 4 cm indeterminate exophytic left interpolar renal lesion.   Recommend nonemergent MRI of the abdomen with renal mass protocol for   further evaluation.        Imaging Personally Reviewed:  [x] YES  [ ] NO        MEDICATIONS  (STANDING):  artificial  tears Solution 1 Drop(s) Both EYES three times a day  ascorbic acid 500 milliGRAM(s) Oral daily  atorvastatin 20 milliGRAM(s) Oral at bedtime  buDESOnide 160 MICROgram(s)/formoterol 4.5 MICROgram(s) Inhaler - Peds 2 Puff(s) Inhalation two times a day  chlorhexidine 4% Liquid 1 Application(s) Topical <User Schedule>  cholecalciferol 1000 Unit(s) Oral daily  dextrose 5%. 1000 milliLiter(s) (50 mL/Hr) IV Continuous <Continuous>  dextrose 50% Injectable 12.5 Gram(s) IV Push once  dextrose 50% Injectable 25 Gram(s) IV Push once  dextrose 50% Injectable 25 Gram(s) IV Push once  finasteride 5 milliGRAM(s) Oral daily  heparin  Injectable 5000 Unit(s) SubCutaneous every 8 hours  insulin glargine Injectable (LANTUS) 30 Unit(s) SubCutaneous two times a day  insulin lispro (HumaLOG) corrective regimen sliding scale   SubCutaneous three times a day before meals  insulin lispro Injectable (HumaLOG) 17 Unit(s) SubCutaneous before breakfast  insulin lispro Injectable (HumaLOG) 17 Unit(s) SubCutaneous before lunch  insulin lispro Injectable (HumaLOG) 17 Unit(s) SubCutaneous before dinner  metoprolol tartrate 25 milliGRAM(s) Oral two times a day  predniSONE   Tablet 10 milliGRAM(s) Oral daily  senna 2 Tablet(s) Oral at bedtime  sodium chloride 0.9% Bolus 500 milliLiter(s) IV Bolus once  tamsulosin 0.4 milliGRAM(s) Oral at bedtime  tiotropium 18 MICROgram(s) Capsule 1 Capsule(s) Inhalation at bedtime  zinc oxide 40% Ointment 1 Application(s) Topical two times a day    MEDICATIONS  (PRN):  ALBUTerol/ipratropium for Nebulization 3 milliLiter(s) Nebulizer every 6 hours PRN Bronchospasm  ALBUTerol/ipratropium for Nebulization 3 milliLiter(s) Nebulizer every 4 hours PRN Bronchospasm  dextrose 40% Gel 15 Gram(s) Oral once PRN Blood Glucose LESS THAN 70 milliGRAM(s)/deciliter  dextrose 50% Injectable 25 milliLiter(s) IV Push every 6 hours PRN BG <100  glucagon  Injectable 1 milliGRAM(s) IntraMuscular once PRN Glucose LESS THAN 70 milligrams/deciliter  polyethylene glycol 3350 17 Gram(s) Oral daily PRN Constipation        HEALTH ISSUES - PROBLEM Dx:  COPD (chronic obstructive pulmonary disease) with exacerbation  Hyperkalemia  High cholesterol  GERD (gastroesophageal reflux disease)  Enlarged prostate  Oxygen dependent  Diabetes mellitus, type 2  Hypertension  Emphysema lung
JIAN MANCIA  78y, Male  Allergy: aspirin (Other)  fish (Other)  iodine (Hives)  penicillin (Unknown)  shellfish (Other)    Hospital Day: 17d    Patient seen and examined earlier today. Patient denies dyspnea, cough, chest pain or palpitations. Pt noted to have elevated K+ and Cr today. Lasix d/c and K+ to be given today.     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  High cholesterol  GERD (gastroesophageal reflux disease)  Enlarged prostate  Oxygen dependent  COPD (chronic obstructive pulmonary disease)  Diabetes mellitus, type 2  Hypertension  Emphysema lung  History of hemorrhoidectomy  Dysplastic colon polyp: removed        VITALS:  T(F): 96.9 (01-23-19 @ 14:14), Max: 96.9 (01-23-19 @ 14:14)  HR: 86 (01-23-19 @ 14:14)  BP: 101/49 (01-23-19 @ 14:14) (101/49 - 138/79)  RR: 18 (01-23-19 @ 14:14)  SpO2: 94% (01-23-19 @ 06:53)    TESTS & MEASUREMENTS:  Weight (Kg):       01-21-19 @ 07:01  -  01-22-19 @ 07:00  --------------------------------------------------------  IN: 0 mL / OUT: 700 mL / NET: -700 mL    01-22-19 @ 07:01  -  01-23-19 @ 07:00  --------------------------------------------------------  IN: 0 mL / OUT: 175 mL / NET: -175 mL    01-23-19 @ 07:01  -  01-23-19 @ 14:41  --------------------------------------------------------  IN: 400 mL / OUT: 500 mL / NET: -100 mL          01-23    143  |  97<L>  |  42<H>  ----------------------------<  82  5.8<H>   |  39<H>  |  2.5<H>    Ca    9.1      23 Jan 2019 07:49              Culture - Urine (collected 01-17-19 @ 09:46)  Source: .Urine Clean Catch (Midstream)  Final Report (01-19-19 @ 16:09):    >100,000 CFU/ml Escherichia coli ESBL  Organism: Escherichia coli ESBL (01-19-19 @ 16:09)  Organism: Escherichia coli ESBL (01-19-19 @ 16:09)      -  Amikacin: S 16      -  Amoxicillin/Clavulanic Acid: I 16/8      -  Ampicillin: R >16 These ampicillin results predict results for amoxicillin      -  Ampicillin/Sulbactam: R >16/8      -  Aztreonam: R >16      -  Cefazolin: R >16 For uncomplicated UTI with K. pneumoniae, E. coli, or P. mirablis: ANSON <=16 is sensitive and ANSON >=32 is resistant. This also predicts results for oral agents cefaclor, cefdinir, cefpodoxime, cefprozil, cefuroxime axetil, cephalexin and locarbef for uncomplicated UTI. Note that some isolates may be susceptible to these agents while testing resistant to cefazolin.      -  Cefepime: R >16      -  Cefoxitin: I 16      -  Ceftriaxone: R >32 Enterobacter, Citrobacter, and Serratia may develop resistance during prolonged therapy      -  Ciprofloxacin: R >2      -  Ertapenem: S <=0.5      -  Gentamicin: R >8      -  Imipenem: S <=1      -  Levofloxacin: R >4      -  Meropenem: S <=1      -  Nitrofurantoin: S <=32 Should not be used to treat pyelonephritis      -  Piperacillin/Tazobactam: R 32      -  Tigecycline: S <=1      -  Tobramycin: R >8      -  Trimethoprim/Sulfamethoxazole: S <=0.5/9.5      Method Type: ANSON      MEDICATIONS:  MEDICATIONS  (STANDING):  ALBUTerol/ipratropium for Nebulization 3 milliLiter(s) Nebulizer every 6 hours  artificial  tears Solution 1 Drop(s) Both EYES three times a day  ascorbic acid 500 milliGRAM(s) Oral daily  atorvastatin 20 milliGRAM(s) Oral at bedtime  buDESOnide 160 MICROgram(s)/formoterol 4.5 MICROgram(s) Inhaler - Peds 2 Puff(s) Inhalation two times a day  chlorhexidine 4% Liquid 1 Application(s) Topical <User Schedule>  cholecalciferol 1000 Unit(s) Oral daily  dextrose 5%. 1000 milliLiter(s) (50 mL/Hr) IV Continuous <Continuous>  dextrose 50% Injectable 12.5 Gram(s) IV Push once  dextrose 50% Injectable 25 Gram(s) IV Push once  dextrose 50% Injectable 25 Gram(s) IV Push once  finasteride 5 milliGRAM(s) Oral daily  heparin  Injectable 5000 Unit(s) SubCutaneous every 8 hours  insulin glargine Injectable (LANTUS) 30 Unit(s) SubCutaneous two times a day  insulin lispro (HumaLOG) corrective regimen sliding scale   SubCutaneous three times a day before meals  insulin lispro Injectable (HumaLOG) 20 Unit(s) SubCutaneous three times a day with meals  magnesium oxide 400 milliGRAM(s) Oral three times a day with meals  metoprolol tartrate 25 milliGRAM(s) Oral two times a day  nystatin Powder 1 Application(s) Topical two times a day  predniSONE   Tablet 10 milliGRAM(s) Oral daily  senna 2 Tablet(s) Oral at bedtime  sodium polystyrene sulfonate Suspension 30 Gram(s) Oral <User Schedule>  tamsulosin 0.4 milliGRAM(s) Oral at bedtime  tiotropium 18 MICROgram(s) Capsule 1 Capsule(s) Inhalation at bedtime  trimethoprim  160 mG/sulfamethoxazole 800 mG 1 Tablet(s) Oral every 12 hours  zinc oxide 40% Ointment 1 Application(s) Topical two times a day    MEDICATIONS  (PRN):  ALBUTerol/ipratropium for Nebulization 3 milliLiter(s) Nebulizer every 4 hours PRN Bronchospasm  dextrose 40% Gel 15 Gram(s) Oral once PRN Blood Glucose LESS THAN 70 milliGRAM(s)/deciliter  dextrose 50% Injectable 25 milliLiter(s) IV Push every 6 hours PRN BG <100  glucagon  Injectable 1 milliGRAM(s) IntraMuscular once PRN Glucose LESS THAN 70 milligrams/deciliter  polyethylene glycol 3350 17 Gram(s) Oral daily PRN Constipation      HOME MEDICATIONS:  sodium polystyrene sulfonate (12-28)      PHYSICAL EXAM:  GENERAL: elderly male sitting in bed, in NAD, well-developed  HEAD:  Atraumatic, Normocephalic  CHEST/LUNG: Clear to auscultation bilaterally; No wheeze  HEART: Regular rate and rhythm; No murmurs, rubs, or gallops  ABDOMEN: Soft, Nontender, Nondistended; Bowel sounds present  EXTREMITIES:  2+ Peripheral Pulses, trace b/l LE edema  PSYCH: AAOx3
JIAN MANCIA  78y, Male  Allergy: aspirin (Other)  fish (Other)  iodine (Hives)  penicillin (Unknown)  shellfish (Other)    Hospital Day: 3d    Patient seen and examined earlier today. Patient reports that he is considering hospice care however would like to think more about it. Still requiring HFNC. Denies chest pain, fevers, chills, N/V.    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  High cholesterol  GERD (gastroesophageal reflux disease)  Enlarged prostate  Oxygen dependent  COPD (chronic obstructive pulmonary disease)  Diabetes mellitus, type 2  Hypertension  Emphysema lung  History of hemorrhoidectomy  Dysplastic colon polyp: removed        VITALS:  T(F): 98 (01-09-19 @ 14:18), Max: 98.3 (01-09-19 @ 05:28)  HR: 121 (01-09-19 @ 14:18)  BP: 115/62 (01-09-19 @ 14:18) (102/51 - 117/67)  RR: 22 (01-09-19 @ 14:18)  SpO2: 88% (01-09-19 @ 13:54)    TESTS & MEASUREMENTS:  Weight (Kg): 123.7 (01-07-19 @ 17:37)  BMI (kg/m2): 42.7 (01-07)    01-07-19 @ 07:01  -  01-08-19 @ 07:00  --------------------------------------------------------  IN: 120 mL / OUT: 300 mL / NET: -180 mL                            11.9   8.88  )-----------( 350      ( 08 Jan 2019 10:01 )             39.9       01-08    142  |  103  |  56<H>  ----------------------------<  222<H>  5.6<H>   |  30  |  2.0<H>    Ca    9.2      08 Jan 2019 10:01  Phos  4.4     01-08  Mg     2.2     01-08    TPro  5.5<L>  /  Alb  3.3<L>  /  TBili  0.6  /  DBili  x   /  AST  10  /  ALT  15  /  AlkPhos  66  01-08    LIVER FUNCTIONS - ( 08 Jan 2019 10:01 )  Alb: 3.3 g/dL / Pro: 5.5 g/dL / ALK PHOS: 66 U/L / ALT: 15 U/L / AST: 10 U/L / GGT: x                 Culture - Blood (collected 01-06-19 @ 00:20)  Source: .Blood Blood  Preliminary Report (01-07-19 @ 21:01):    No growth to date.    Culture - Blood (collected 01-06-19 @ 00:20)  Source: .Blood Blood  Preliminary Report (01-07-19 @ 21:01):    No growth to date.        MEDICATIONS:  MEDICATIONS  (STANDING):  ALBUTerol/ipratropium for Nebulization 3 milliLiter(s) Nebulizer every 6 hours  artificial  tears Solution 1 Drop(s) Both EYES three times a day  ascorbic acid 500 milliGRAM(s) Oral daily  atorvastatin 20 milliGRAM(s) Oral at bedtime  buDESOnide 160 MICROgram(s)/formoterol 4.5 MICROgram(s) Inhaler - Peds 2 Puff(s) Inhalation two times a day  chlorhexidine 4% Liquid 1 Application(s) Topical <User Schedule>  cholecalciferol 1000 Unit(s) Oral daily  dextrose 5%. 1000 milliLiter(s) (50 mL/Hr) IV Continuous <Continuous>  dextrose 50% Injectable 12.5 Gram(s) IV Push once  dextrose 50% Injectable 25 Gram(s) IV Push once  dextrose 50% Injectable 25 Gram(s) IV Push once  finasteride 5 milliGRAM(s) Oral daily  heparin  Injectable 5000 Unit(s) SubCutaneous every 8 hours  insulin glargine Injectable (LANTUS) 30 Unit(s) SubCutaneous two times a day  insulin lispro (HumaLOG) corrective regimen sliding scale   SubCutaneous three times a day before meals  insulin lispro Injectable (HumaLOG) 17 Unit(s) SubCutaneous before breakfast  insulin lispro Injectable (HumaLOG) 17 Unit(s) SubCutaneous before lunch  insulin lispro Injectable (HumaLOG) 17 Unit(s) SubCutaneous before dinner  metoprolol tartrate 25 milliGRAM(s) Oral two times a day  nystatin Powder 1 Application(s) Topical two times a day  predniSONE   Tablet 10 milliGRAM(s) Oral daily  senna 2 Tablet(s) Oral at bedtime  sodium chloride 0.9% Bolus 500 milliLiter(s) IV Bolus once  sodium polystyrene sulfonate Suspension 30 Gram(s) Oral <User Schedule>  tamsulosin 0.4 milliGRAM(s) Oral at bedtime  tiotropium 18 MICROgram(s) Capsule 1 Capsule(s) Inhalation at bedtime  zinc oxide 40% Ointment 1 Application(s) Topical two times a day    MEDICATIONS  (PRN):  ALBUTerol/ipratropium for Nebulization 3 milliLiter(s) Nebulizer every 4 hours PRN Bronchospasm  dextrose 40% Gel 15 Gram(s) Oral once PRN Blood Glucose LESS THAN 70 milliGRAM(s)/deciliter  dextrose 50% Injectable 25 milliLiter(s) IV Push every 6 hours PRN BG <100  glucagon  Injectable 1 milliGRAM(s) IntraMuscular once PRN Glucose LESS THAN 70 milligrams/deciliter  polyethylene glycol 3350 17 Gram(s) Oral daily PRN Constipation      HOME MEDICATIONS:  sodium polystyrene sulfonate (12-28)      PHYSICAL EXAM:  GENERAL: elderly male, resting in bed, in NAD, well-developed, on HFNC  HEAD:  Atraumatic, Normocephalic  CHEST/LUNG: diffuse diminished breath sounds bilaterally; No wheeze  HEART: Regular rate and rhythm  ABDOMEN: Soft, Nontender, obese, Nondistended; Bowel sounds present  EXTREMITIES:  +1 b/l LE pitting edema  PSYCH: AAOx3
JIAN MANCIA  78y, Male  Allergy: aspirin (Other)  fish (Other)  iodine (Hives)  penicillin (Unknown)  shellfish (Other)    Hospital Day: 4d    Patient seen and examined earlier today. Reports that dyspnea is unchanged. HFNC decreased to 40%. Denies fevers, chills, chest pain or palpitations    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  High cholesterol  GERD (gastroesophageal reflux disease)  Enlarged prostate  Oxygen dependent  COPD (chronic obstructive pulmonary disease)  Diabetes mellitus, type 2  Hypertension  Emphysema lung  History of hemorrhoidectomy  Dysplastic colon polyp: removed        VITALS:  T(F): 98.7 (01-10-19 @ 13:43), Max: 100.4 (19 @ 22:15)  HR: 109 (01-10-19 @ 13:43)  BP: 110/65 (01-10-19 @ 13:43) (107/62 - 110/65)  RR: 18 (01-10-19 @ 13:43)  SpO2: 96% (01-10-19 @ 08:47)    TESTS & MEASUREMENTS:  Weight (Kg):   BMI (kg/m2): 42.7 ()                          11.5   7.94  )-----------( 341      ( 10 Jos 2019 08:01 )             38.7       01-10    141  |  104  |  49<H>  ----------------------------<  140<H>  4.9   |  31  |  1.9<H>    Ca    8.1<L>      10 Jos 2019 08:01              Culture - Blood (collected 19 @ 00:20)  Source: .Blood Blood  Preliminary Report (19 @ 21:01):    No growth to date.    Culture - Blood (collected 19 @ 00:20)  Source: .Blood Blood  Preliminary Report (19 @ 21:01):    No growth to date.      Urinalysis Basic - ( 10 Jos 2019 11:40 )    Color: Yellow / Appearance: Clear / S.020 / pH: x  Gluc: x / Ketone: Negative  / Bili: Negative / Urobili: 0.2 mg/dL   Blood: x / Protein: Negative mg/dL / Nitrite: Negative   Leuk Esterase: Large / RBC: 3-5 /HPF / WBC >50 /HPF   Sq Epi: x / Non Sq Epi: x / Bacteria: x      MEDICATIONS:  MEDICATIONS  (STANDING):  ALBUTerol/ipratropium for Nebulization 3 milliLiter(s) Nebulizer every 6 hours  artificial  tears Solution 1 Drop(s) Both EYES three times a day  ascorbic acid 500 milliGRAM(s) Oral daily  atorvastatin 20 milliGRAM(s) Oral at bedtime  buDESOnide 160 MICROgram(s)/formoterol 4.5 MICROgram(s) Inhaler - Peds 2 Puff(s) Inhalation two times a day  chlorhexidine 4% Liquid 1 Application(s) Topical <User Schedule>  cholecalciferol 1000 Unit(s) Oral daily  dextrose 5%. 1000 milliLiter(s) (50 mL/Hr) IV Continuous <Continuous>  dextrose 50% Injectable 12.5 Gram(s) IV Push once  dextrose 50% Injectable 25 Gram(s) IV Push once  dextrose 50% Injectable 25 Gram(s) IV Push once  finasteride 5 milliGRAM(s) Oral daily  heparin  Injectable 5000 Unit(s) SubCutaneous every 8 hours  insulin glargine Injectable (LANTUS) 32 Unit(s) SubCutaneous two times a day  insulin lispro (HumaLOG) corrective regimen sliding scale   SubCutaneous three times a day before meals  insulin lispro Injectable (HumaLOG) 17 Unit(s) SubCutaneous before breakfast  insulin lispro Injectable (HumaLOG) 17 Unit(s) SubCutaneous before lunch  insulin lispro Injectable (HumaLOG) 17 Unit(s) SubCutaneous before dinner  metoprolol tartrate 25 milliGRAM(s) Oral two times a day  nystatin Powder 1 Application(s) Topical two times a day  predniSONE   Tablet 10 milliGRAM(s) Oral daily  senna 2 Tablet(s) Oral at bedtime  sodium chloride 0.9% Bolus 500 milliLiter(s) IV Bolus once  sodium polystyrene sulfonate Suspension 30 Gram(s) Oral <User Schedule>  tamsulosin 0.4 milliGRAM(s) Oral at bedtime  tiotropium 18 MICROgram(s) Capsule 1 Capsule(s) Inhalation at bedtime  zinc oxide 40% Ointment 1 Application(s) Topical two times a day    MEDICATIONS  (PRN):  ALBUTerol/ipratropium for Nebulization 3 milliLiter(s) Nebulizer every 4 hours PRN Bronchospasm  dextrose 40% Gel 15 Gram(s) Oral once PRN Blood Glucose LESS THAN 70 milliGRAM(s)/deciliter  dextrose 50% Injectable 25 milliLiter(s) IV Push every 6 hours PRN BG <100  glucagon  Injectable 1 milliGRAM(s) IntraMuscular once PRN Glucose LESS THAN 70 milligrams/deciliter  polyethylene glycol 3350 17 Gram(s) Oral daily PRN Constipation      HOME MEDICATIONS:  sodium polystyrene sulfonate (12-28)      PHYSICAL EXAM:  GENERAL: obese male, on HFNC, NAD, well-developed  HEAD:  Atraumatic, Normocephalic  CHEST/LUNG: diminished breath sounds b/l  HEART: Regular rate and rhythm  ABDOMEN: Soft, Nontender, Nondistended; Bowel sounds present  EXTREMITIES:  2+ Peripheral Pulses, +1 b/l LE edema  PSYCH: AAOx3
JIAN MANCIA  78y, Male  Allergy: aspirin (Other)  fish (Other)  iodine (Hives)  penicillin (Unknown)  shellfish (Other)    Hospital Day: 5d    Patient seen and examined earlier today. Reports that his dyspnea is unchanged. Feels slightly better when sitting up in chair. Denies chest pain, palpitations, fevers or chills.    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  High cholesterol  GERD (gastroesophageal reflux disease)  Enlarged prostate  Oxygen dependent  COPD (chronic obstructive pulmonary disease)  Diabetes mellitus, type 2  Hypertension  Emphysema lung  History of hemorrhoidectomy  Dysplastic colon polyp: removed        VITALS:  T(F): 97.9 (19 @ 06:00), Max: 98.7 (01-10-19 @ 13:43)  HR: 88 (19 @ 10:41)  BP: 110/53 (19 @ 06:00) (103/55 - 110/65)  RR: 20 (19 @ 06:00)  SpO2: 92% (19 @ 10:41)    TESTS & MEASUREMENTS:  Weight (Kg):   BMI (kg/m2): 42.7 ()                          11.6   7.00  )-----------( 356      ( 2019 09:17 )             39.3           144  |  101  |  45<H>  ----------------------------<  151<H>  5.1<H>   |  33<H>  |  1.9<H>    Ca    8.6      2019 09:17              Culture - Blood (collected 19 @ 00:20)  Source: .Blood Blood  Preliminary Report (19 @ 21:01):    No growth to date.    Culture - Blood (collected 19 @ 00:20)  Source: .Blood Blood  Preliminary Report (19 @ 21:01):    No growth to date.      Urinalysis Basic - ( 10 Jos 2019 11:40 )    Color: Yellow / Appearance: Clear / S.020 / pH: x  Gluc: x / Ketone: Negative  / Bili: Negative / Urobili: 0.2 mg/dL   Blood: x / Protein: Negative mg/dL / Nitrite: Negative   Leuk Esterase: Large / RBC: 3-5 /HPF / WBC >50 /HPF   Sq Epi: x / Non Sq Epi: x / Bacteria: x      MEDICATIONS:  MEDICATIONS  (STANDING):  ALBUTerol/ipratropium for Nebulization 3 milliLiter(s) Nebulizer every 6 hours  artificial  tears Solution 1 Drop(s) Both EYES three times a day  ascorbic acid 500 milliGRAM(s) Oral daily  atorvastatin 20 milliGRAM(s) Oral at bedtime  buDESOnide 160 MICROgram(s)/formoterol 4.5 MICROgram(s) Inhaler - Peds 2 Puff(s) Inhalation two times a day  chlorhexidine 4% Liquid 1 Application(s) Topical <User Schedule>  cholecalciferol 1000 Unit(s) Oral daily  dextrose 5%. 1000 milliLiter(s) (50 mL/Hr) IV Continuous <Continuous>  dextrose 50% Injectable 12.5 Gram(s) IV Push once  dextrose 50% Injectable 25 Gram(s) IV Push once  dextrose 50% Injectable 25 Gram(s) IV Push once  finasteride 5 milliGRAM(s) Oral daily  furosemide    Tablet 40 milliGRAM(s) Oral daily  heparin  Injectable 5000 Unit(s) SubCutaneous every 8 hours  insulin glargine Injectable (LANTUS) 32 Unit(s) SubCutaneous two times a day  insulin lispro (HumaLOG) corrective regimen sliding scale   SubCutaneous three times a day before meals  insulin lispro Injectable (HumaLOG) 17 Unit(s) SubCutaneous before breakfast  insulin lispro Injectable (HumaLOG) 17 Unit(s) SubCutaneous before lunch  insulin lispro Injectable (HumaLOG) 17 Unit(s) SubCutaneous before dinner  metoprolol tartrate 25 milliGRAM(s) Oral two times a day  nystatin Powder 1 Application(s) Topical two times a day  predniSONE   Tablet 10 milliGRAM(s) Oral daily  senna 2 Tablet(s) Oral at bedtime  sodium chloride 0.9% Bolus 500 milliLiter(s) IV Bolus once  sodium polystyrene sulfonate Suspension 30 Gram(s) Oral <User Schedule>  tamsulosin 0.4 milliGRAM(s) Oral at bedtime  tiotropium 18 MICROgram(s) Capsule 1 Capsule(s) Inhalation at bedtime  zinc oxide 40% Ointment 1 Application(s) Topical two times a day    MEDICATIONS  (PRN):  ALBUTerol/ipratropium for Nebulization 3 milliLiter(s) Nebulizer every 4 hours PRN Bronchospasm  dextrose 40% Gel 15 Gram(s) Oral once PRN Blood Glucose LESS THAN 70 milliGRAM(s)/deciliter  dextrose 50% Injectable 25 milliLiter(s) IV Push every 6 hours PRN BG <100  glucagon  Injectable 1 milliGRAM(s) IntraMuscular once PRN Glucose LESS THAN 70 milligrams/deciliter  polyethylene glycol 3350 17 Gram(s) Oral daily PRN Constipation      HOME MEDICATIONS:  sodium polystyrene sulfonate (12-28)      PHYSICAL EXAM:  GENERAL: obese male, sitting in chair on HFNC, NAD, well-developed  HEAD:  Atraumatic, Normocephalic  CHEST/LUNG: b/l diffuse diminished breath sounds  HEART: Regular rate and rhythm  ABDOMEN: Soft, Nontender, Nondistended; Bowel sounds present, obese  EXTREMITIES:  2+ Peripheral Pulses, +1 b/l LE edema  PSYCH: AAOx3
JIAN MANCIA  78y, Male  Allergy: aspirin (Other)  fish (Other)  iodine (Hives)  penicillin (Unknown)  shellfish (Other)    Hospital Day: 6d    Patient seen and examined earlier today. Patient states that his breathing is unchanged. BP low this am therefore unable to receive lasix. Pt reports that he always feels bloated, "watched what he ate" last night which helped with bloating. Denies chest pain, dyspnea, palpitations or cough. Tolerated bipap overnight, now on HFNC.    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  High cholesterol  GERD (gastroesophageal reflux disease)  Enlarged prostate  Oxygen dependent  COPD (chronic obstructive pulmonary disease)  Diabetes mellitus, type 2  Hypertension  Emphysema lung  History of hemorrhoidectomy  Dysplastic colon polyp: removed        VITALS:  T(F): 97.5 (19 @ 06:10), Max: 98.7 (19 @ 14:31)  HR: 132 (19 @ 06:10)  BP: 117/73 (19 @ 06:10) (98/63 - 117/73)  RR: 18 (19 @ 06:10)  SpO2: 92% (19 @ 00:29)    TESTS & MEASUREMENTS:  Weight (Kg):   BMI (kg/m2): 42.7 ()                          11.2   5.94  )-----------( 342      ( 2019 07:32 )             37.3           144  |  103  |  41<H>  ----------------------------<  105<H>  4.6   |  33<H>  |  1.7<H>    Ca    8.4<L>      2019 07:32              Culture - Urine (collected 01-10-19 @ 11:40)  Source: .Urine Clean Catch (Midstream)  Preliminary Report (19 @ 17:13):    50,000 - 99,000 CFU/mL Escherichia coli    Culture - Blood (collected 19 @ 00:20)  Source: .Blood Blood  Final Report (19 @ 21:16):    No growth at 5 days.    Culture - Blood (collected 19 @ 00:20)  Source: .Blood Blood  Final Report (19 @ 21:16):    No growth at 5 days.      Urinalysis Basic - ( 10 Jos 2019 11:40 )    Color: Yellow / Appearance: Clear / S.020 / pH: x  Gluc: x / Ketone: Negative  / Bili: Negative / Urobili: 0.2 mg/dL   Blood: x / Protein: Negative mg/dL / Nitrite: Negative   Leuk Esterase: Large / RBC: 3-5 /HPF / WBC >50 /HPF   Sq Epi: x / Non Sq Epi: x / Bacteria: x      MEDICATIONS:  MEDICATIONS  (STANDING):  ALBUTerol/ipratropium for Nebulization 3 milliLiter(s) Nebulizer every 6 hours  artificial  tears Solution 1 Drop(s) Both EYES three times a day  ascorbic acid 500 milliGRAM(s) Oral daily  atorvastatin 20 milliGRAM(s) Oral at bedtime  buDESOnide 160 MICROgram(s)/formoterol 4.5 MICROgram(s) Inhaler - Peds 2 Puff(s) Inhalation two times a day  chlorhexidine 4% Liquid 1 Application(s) Topical <User Schedule>  cholecalciferol 1000 Unit(s) Oral daily  dextrose 5%. 1000 milliLiter(s) (50 mL/Hr) IV Continuous <Continuous>  dextrose 50% Injectable 12.5 Gram(s) IV Push once  dextrose 50% Injectable 25 Gram(s) IV Push once  dextrose 50% Injectable 25 Gram(s) IV Push once  finasteride 5 milliGRAM(s) Oral daily  furosemide    Tablet 40 milliGRAM(s) Oral daily  heparin  Injectable 5000 Unit(s) SubCutaneous every 8 hours  insulin glargine Injectable (LANTUS) 32 Unit(s) SubCutaneous two times a day  insulin lispro (HumaLOG) corrective regimen sliding scale   SubCutaneous three times a day before meals  insulin lispro Injectable (HumaLOG) 17 Unit(s) SubCutaneous before breakfast  insulin lispro Injectable (HumaLOG) 17 Unit(s) SubCutaneous before lunch  insulin lispro Injectable (HumaLOG) 17 Unit(s) SubCutaneous before dinner  metoprolol tartrate 25 milliGRAM(s) Oral two times a day  nystatin Powder 1 Application(s) Topical two times a day  predniSONE   Tablet 10 milliGRAM(s) Oral daily  senna 2 Tablet(s) Oral at bedtime  sodium chloride 0.9% Bolus 500 milliLiter(s) IV Bolus once  sodium polystyrene sulfonate Suspension 30 Gram(s) Oral <User Schedule>  tamsulosin 0.4 milliGRAM(s) Oral at bedtime  tiotropium 18 MICROgram(s) Capsule 1 Capsule(s) Inhalation at bedtime  zinc oxide 40% Ointment 1 Application(s) Topical two times a day    MEDICATIONS  (PRN):  ALBUTerol/ipratropium for Nebulization 3 milliLiter(s) Nebulizer every 4 hours PRN Bronchospasm  dextrose 40% Gel 15 Gram(s) Oral once PRN Blood Glucose LESS THAN 70 milliGRAM(s)/deciliter  dextrose 50% Injectable 25 milliLiter(s) IV Push every 6 hours PRN BG <100  glucagon  Injectable 1 milliGRAM(s) IntraMuscular once PRN Glucose LESS THAN 70 milligrams/deciliter  polyethylene glycol 3350 17 Gram(s) Oral daily PRN Constipation      HOME MEDICATIONS:  sodium polystyrene sulfonate (12-28)      PHYSICAL EXAM:  GENERAL: elderly male, sitting in chair, in NAD, well-developed  HEAD:  Atraumatic, Normocephalic  CHEST/LUNG: faint breath sounds b/l, less diminished than day prior; No wheeze or crackles  HEART: Regular rate and rhythm; No murmurs, rubs, or gallops  ABDOMEN: Soft, Nontender, Nondistended; Bowel sounds present  EXTREMITIES:  2+ Peripheral Pulses, +2 b/l LE edema  PSYCH: AAOx3
JIAN MANCIA  78y, Male  Allergy: aspirin (Other)  fish (Other)  iodine (Hives)  penicillin (Unknown)  shellfish (Other)    Hospital Day: 8d    Patient seen and examined earlier today. Patient reports that he tolerated bipap last night. Was placed on 5L NC this am however felt that he had increased work of breathing and was put back on HFNC. Denies chest pain, palpitations, fevers or chills. Continues to report b/l LE edema.    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  High cholesterol  GERD (gastroesophageal reflux disease)  Enlarged prostate  Oxygen dependent  COPD (chronic obstructive pulmonary disease)  Diabetes mellitus, type 2  Hypertension  Emphysema lung  History of hemorrhoidectomy  Dysplastic colon polyp: removed        VITALS:  T(F): 97.7 (01-14-19 @ 06:00), Max: 97.7 (01-14-19 @ 06:00)  HR: 108 (01-14-19 @ 06:00)  BP: 116/59 (01-14-19 @ 06:00) (94/63 - 116/59)  RR: 18 (01-14-19 @ 09:12)  SpO2: 93% (01-14-19 @ 09:12)    TESTS & MEASUREMENTS:  Weight (Kg):       01-12-19 @ 07:01  -  01-13-19 @ 07:00  --------------------------------------------------------  IN: 1500 mL / OUT: 1000 mL / NET: 500 mL    01-14-19 @ 07:01 - 01-14-19 @ 13:35  --------------------------------------------------------  IN: 120 mL / OUT: 100 mL / NET: 20 mL                            11.2   6.91  )-----------( 389      ( 14 Jan 2019 06:36 )             38.0       01-14    145  |  103  |  43<H>  ----------------------------<  172<H>  4.9   |  34<H>  |  1.9<H>    Ca    8.8      14 Jan 2019 06:36              Culture - Urine (collected 01-10-19 @ 11:40)  Source: .Urine Clean Catch (Midstream)  Final Report (01-12-19 @ 17:19):    50,000 - 99,000 CFU/mL Escherichia coli ESBL  Organism: Escherichia coli ESBL (01-12-19 @ 17:19)  Organism: Escherichia coli ESBL (01-12-19 @ 17:19)      -  Amikacin: S <=8      -  Amoxicillin/Clavulanic Acid: I 16/8      -  Ampicillin: R >16 These ampicillin results predict results for amoxicillin      -  Ampicillin/Sulbactam: R >16/8      -  Aztreonam: R >16      -  Cefazolin: R >16 For uncomplicated UTI with K. pneumoniae, E. coli, or P. mirablis: ANSON <=16 is sensitive and ANSON >=32 is resistant. This also predicts results for oral agents cefaclor, cefdinir, cefpodoxime, cefprozil, cefuroxime axetil, cephalexin and locarbef for uncomplicated UTI. Note that some isolates may be susceptible to these agents while testing resistant to cefazolin.      -  Cefepime: R >16      -  Cefoxitin: S 8      -  Ceftriaxone: R >32 Enterobacter, Citrobacter, and Serratia may develop resistance during prolonged therapy      -  Ciprofloxacin: R >2      -  Ertapenem: S <=0.5      -  Gentamicin: S <=1      -  Imipenem: S <=1      -  Levofloxacin: R >4      -  Meropenem: S <=1      -  Nitrofurantoin: S <=32 Should not be used to treat pyelonephritis      -  Piperacillin/Tazobactam: R <=8      -  Tigecycline: S <=1      -  Tobramycin: S <=2      -  Trimethoprim/Sulfamethoxazole: R >2/38      Method Type: ANSON      RECENT DIAGNOSTIC ORDERS:  Xray Chest 1 View- PORTABLE-Routine: Routine   Indication: dyspnea  Transport: Portable (01-14-19 @ 13:35)      MEDICATIONS:  MEDICATIONS  (STANDING):  ALBUTerol/ipratropium for Nebulization 3 milliLiter(s) Nebulizer every 6 hours  artificial  tears Solution 1 Drop(s) Both EYES three times a day  ascorbic acid 500 milliGRAM(s) Oral daily  atorvastatin 20 milliGRAM(s) Oral at bedtime  buDESOnide 160 MICROgram(s)/formoterol 4.5 MICROgram(s) Inhaler - Peds 2 Puff(s) Inhalation two times a day  chlorhexidine 4% Liquid 1 Application(s) Topical <User Schedule>  cholecalciferol 1000 Unit(s) Oral daily  dextrose 5%. 1000 milliLiter(s) (50 mL/Hr) IV Continuous <Continuous>  dextrose 50% Injectable 12.5 Gram(s) IV Push once  dextrose 50% Injectable 25 Gram(s) IV Push once  dextrose 50% Injectable 25 Gram(s) IV Push once  finasteride 5 milliGRAM(s) Oral daily  furosemide    Tablet 40 milliGRAM(s) Oral daily  heparin  Injectable 5000 Unit(s) SubCutaneous every 8 hours  insulin glargine Injectable (LANTUS) 32 Unit(s) SubCutaneous two times a day  insulin lispro (HumaLOG) corrective regimen sliding scale   SubCutaneous three times a day before meals  insulin lispro Injectable (HumaLOG) 17 Unit(s) SubCutaneous before breakfast  insulin lispro Injectable (HumaLOG) 17 Unit(s) SubCutaneous before lunch  insulin lispro Injectable (HumaLOG) 17 Unit(s) SubCutaneous before dinner  metoprolol tartrate 25 milliGRAM(s) Oral two times a day  nystatin Powder 1 Application(s) Topical two times a day  predniSONE   Tablet 10 milliGRAM(s) Oral daily  senna 2 Tablet(s) Oral at bedtime  sodium chloride 0.9% Bolus 500 milliLiter(s) IV Bolus once  sodium polystyrene sulfonate Suspension 30 Gram(s) Oral <User Schedule>  tamsulosin 0.4 milliGRAM(s) Oral at bedtime  tiotropium 18 MICROgram(s) Capsule 1 Capsule(s) Inhalation at bedtime  zinc oxide 40% Ointment 1 Application(s) Topical two times a day    MEDICATIONS  (PRN):  ALBUTerol/ipratropium for Nebulization 3 milliLiter(s) Nebulizer every 4 hours PRN Bronchospasm  dextrose 40% Gel 15 Gram(s) Oral once PRN Blood Glucose LESS THAN 70 milliGRAM(s)/deciliter  dextrose 50% Injectable 25 milliLiter(s) IV Push every 6 hours PRN BG <100  glucagon  Injectable 1 milliGRAM(s) IntraMuscular once PRN Glucose LESS THAN 70 milligrams/deciliter  polyethylene glycol 3350 17 Gram(s) Oral daily PRN Constipation      HOME MEDICATIONS:  sodium polystyrene sulfonate (12-28)      PHYSICAL EXAM:  GENERAL: elderly obese male in NAD, sitting in chair, on HFNC, well-developed  HEAD:  Atraumatic, Normocephalic  CHEST/LUNG: diminished breath sounds due to large body habitus; No wheeze or crackles  HEART: Regular rate and rhythm; +s1 and d2  ABDOMEN: Soft, Nontender, Bowel sounds present, obese  EXTREMITIES:  2+ Peripheral Pulses, +1 b/l LE edema  PSYCH: AAOx3
JIAN MANCIA  78y, Male  Allergy: aspirin (Other)  fish (Other)  iodine (Hives)  penicillin (Unknown)  shellfish (Other)    Hospital Day: 9d    Patient seen and examined earlier today. Patient on 5L NC this am. Reported feeling better with some improvement in dyspnea. Denies chest pain, palpitations, fevers, chills or dysuria     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  High cholesterol  GERD (gastroesophageal reflux disease)  Enlarged prostate  Oxygen dependent  COPD (chronic obstructive pulmonary disease)  Diabetes mellitus, type 2  Hypertension  Emphysema lung  History of hemorrhoidectomy  Dysplastic colon polyp: removed        VITALS:  T(F): 97.4 (01-15-19 @ 06:09), Max: 97.8 (01-14-19 @ 14:30)  HR: 90 (01-15-19 @ 06:09)  BP: 133/65 (01-15-19 @ 06:09) (118/63 - 143/81)  RR: 18 (01-15-19 @ 06:09)  SpO2: --    TESTS & MEASUREMENTS:  Weight (Kg):       01-14-19 @ 07:01  -  01-15-19 @ 07:00  --------------------------------------------------------  IN: 120 mL / OUT: 600 mL / NET: -480 mL    01-15-19 @ 07:01  -  01-15-19 @ 13:36  --------------------------------------------------------  IN: 0 mL / OUT: 175 mL / NET: -175 mL                            12.0   7.93  )-----------( 390      ( 15 Jos 2019 08:25 )             40.5       01-15    144  |  102  |  42<H>  ----------------------------<  109<H>  5.1<H>   |  31  |  1.8<H>    Ca    8.9      15 Jos 2019 08:25              Culture - Urine (collected 01-10-19 @ 11:40)  Source: .Urine Clean Catch (Midstream)  Final Report (01-12-19 @ 17:19):    50,000 - 99,000 CFU/mL Escherichia coli ESBL  Organism: Escherichia coli ESBL (01-12-19 @ 17:19)  Organism: Escherichia coli ESBL (01-12-19 @ 17:19)      -  Amikacin: S <=8      -  Amoxicillin/Clavulanic Acid: I 16/8      -  Ampicillin: R >16 These ampicillin results predict results for amoxicillin      -  Ampicillin/Sulbactam: R >16/8      -  Aztreonam: R >16      -  Cefazolin: R >16 For uncomplicated UTI with K. pneumoniae, E. coli, or P. mirablis: ANSON <=16 is sensitive and ANSON >=32 is resistant. This also predicts results for oral agents cefaclor, cefdinir, cefpodoxime, cefprozil, cefuroxime axetil, cephalexin and locarbef for uncomplicated UTI. Note that some isolates may be susceptible to these agents while testing resistant to cefazolin.      -  Cefepime: R >16      -  Cefoxitin: S 8      -  Ceftriaxone: R >32 Enterobacter, Citrobacter, and Serratia may develop resistance during prolonged therapy      -  Ciprofloxacin: R >2      -  Ertapenem: S <=0.5      -  Gentamicin: S <=1      -  Imipenem: S <=1      -  Levofloxacin: R >4      -  Meropenem: S <=1      -  Nitrofurantoin: S <=32 Should not be used to treat pyelonephritis      -  Piperacillin/Tazobactam: R <=8      -  Tigecycline: S <=1      -  Tobramycin: S <=2      -  Trimethoprim/Sulfamethoxazole: R >2/38      Method Type: ANSON      RECENT DIAGNOSTIC ORDERS:  Culture - Urine: Routine  Specimen Source: Clean Catch (Midstream)  Addl Info: If indwelling urinary catheter > 14 days, obtain an order to remove and replace prior to c (01-14-19 @ 17:11)  Urinalysis: Routine (01-14-19 @ 17:11)      MEDICATIONS:  MEDICATIONS  (STANDING):  ALBUTerol/ipratropium for Nebulization 3 milliLiter(s) Nebulizer every 6 hours  artificial  tears Solution 1 Drop(s) Both EYES three times a day  ascorbic acid 500 milliGRAM(s) Oral daily  atorvastatin 20 milliGRAM(s) Oral at bedtime  buDESOnide 160 MICROgram(s)/formoterol 4.5 MICROgram(s) Inhaler - Peds 2 Puff(s) Inhalation two times a day  chlorhexidine 4% Liquid 1 Application(s) Topical <User Schedule>  cholecalciferol 1000 Unit(s) Oral daily  dextrose 5%. 1000 milliLiter(s) (50 mL/Hr) IV Continuous <Continuous>  dextrose 50% Injectable 12.5 Gram(s) IV Push once  dextrose 50% Injectable 25 Gram(s) IV Push once  dextrose 50% Injectable 25 Gram(s) IV Push once  finasteride 5 milliGRAM(s) Oral daily  furosemide    Tablet 40 milliGRAM(s) Oral every 12 hours  heparin  Injectable 5000 Unit(s) SubCutaneous every 8 hours  insulin glargine Injectable (LANTUS) 32 Unit(s) SubCutaneous two times a day  insulin lispro (HumaLOG) corrective regimen sliding scale   SubCutaneous three times a day before meals  insulin lispro Injectable (HumaLOG) 17 Unit(s) SubCutaneous before breakfast  insulin lispro Injectable (HumaLOG) 17 Unit(s) SubCutaneous before lunch  insulin lispro Injectable (HumaLOG) 17 Unit(s) SubCutaneous before dinner  metoprolol tartrate 25 milliGRAM(s) Oral two times a day  nystatin Powder 1 Application(s) Topical two times a day  predniSONE   Tablet 10 milliGRAM(s) Oral daily  senna 2 Tablet(s) Oral at bedtime  sodium chloride 0.9% Bolus 500 milliLiter(s) IV Bolus once  sodium polystyrene sulfonate Suspension 30 Gram(s) Oral <User Schedule>  tamsulosin 0.4 milliGRAM(s) Oral at bedtime  tiotropium 18 MICROgram(s) Capsule 1 Capsule(s) Inhalation at bedtime  zinc oxide 40% Ointment 1 Application(s) Topical two times a day    MEDICATIONS  (PRN):  ALBUTerol/ipratropium for Nebulization 3 milliLiter(s) Nebulizer every 4 hours PRN Bronchospasm  dextrose 40% Gel 15 Gram(s) Oral once PRN Blood Glucose LESS THAN 70 milliGRAM(s)/deciliter  dextrose 50% Injectable 25 milliLiter(s) IV Push every 6 hours PRN BG <100  glucagon  Injectable 1 milliGRAM(s) IntraMuscular once PRN Glucose LESS THAN 70 milligrams/deciliter  polyethylene glycol 3350 17 Gram(s) Oral daily PRN Constipation      HOME MEDICATIONS:  sodium polystyrene sulfonate (12-28)      PHYSICAL EXAM:  GENERAL: elderly male, sitting in chair, NAD, well-developed  HEAD:  Atraumatic, Normocephalic  CHEST/LUNG: improved aireation however lung sounds difficult to appreciate given large body habitus; No wheeze  HEART: Regular rate and rhythm; No murmurs, rubs, or gallops  ABDOMEN: Soft, Nontender, Nondistended; Bowel sounds present  EXTREMITIES:  2+ Peripheral Pulses, +1 b/ LE edema  PSYCH: AAOx3
Patient  with   mid sernal dyscomfort-non radiating and with no othr symptoms                                                                                       O/e      Stable vital signs. systemic   exam  un remarkable. ekg--sinus with nssttc. aand apcs No significant   changes from that of previous ekgs
Patient is a 78y old  Male who presents with a chief complaint of Acute Hypoxic Respiratory Failure (22 Jan 2019 10:29)      SUBJECTIVE / OVERNIGHT EVENTS:  no cp, abd pain, fever, sob at baseline    MEDICATIONS  (STANDING):  ALBUTerol/ipratropium for Nebulization 3 milliLiter(s) Nebulizer every 6 hours  artificial  tears Solution 1 Drop(s) Both EYES three times a day  ascorbic acid 500 milliGRAM(s) Oral daily  atorvastatin 20 milliGRAM(s) Oral at bedtime  buDESOnide 160 MICROgram(s)/formoterol 4.5 MICROgram(s) Inhaler - Peds 2 Puff(s) Inhalation two times a day  chlorhexidine 4% Liquid 1 Application(s) Topical <User Schedule>  cholecalciferol 1000 Unit(s) Oral daily  dextrose 5%. 1000 milliLiter(s) (50 mL/Hr) IV Continuous <Continuous>  dextrose 50% Injectable 12.5 Gram(s) IV Push once  dextrose 50% Injectable 25 Gram(s) IV Push once  dextrose 50% Injectable 25 Gram(s) IV Push once  finasteride 5 milliGRAM(s) Oral daily  heparin  Injectable 5000 Unit(s) SubCutaneous every 8 hours  insulin glargine Injectable (LANTUS) 30 Unit(s) SubCutaneous two times a day  insulin lispro (HumaLOG) corrective regimen sliding scale   SubCutaneous three times a day before meals  insulin lispro Injectable (HumaLOG) 20 Unit(s) SubCutaneous three times a day with meals  magnesium oxide 400 milliGRAM(s) Oral three times a day with meals  metoprolol tartrate 25 milliGRAM(s) Oral two times a day  morphine Concentrate 5 milliGRAM(s) Oral once  nystatin Powder 1 Application(s) Topical two times a day  predniSONE   Tablet 10 milliGRAM(s) Oral daily  senna 2 Tablet(s) Oral at bedtime  sodium polystyrene sulfonate Suspension 30 Gram(s) Oral <User Schedule>  tamsulosin 0.4 milliGRAM(s) Oral at bedtime  tiotropium 18 MICROgram(s) Capsule 1 Capsule(s) Inhalation at bedtime  trimethoprim  160 mG/sulfamethoxazole 800 mG 1 Tablet(s) Oral every 12 hours  zinc oxide 40% Ointment 1 Application(s) Topical two times a day    MEDICATIONS  (PRN):  ALBUTerol/ipratropium for Nebulization 3 milliLiter(s) Nebulizer every 4 hours PRN Bronchospasm  dextrose 40% Gel 15 Gram(s) Oral once PRN Blood Glucose LESS THAN 70 milliGRAM(s)/deciliter  dextrose 50% Injectable 25 milliLiter(s) IV Push every 6 hours PRN BG <100  glucagon  Injectable 1 milliGRAM(s) IntraMuscular once PRN Glucose LESS THAN 70 milligrams/deciliter  hydrocortisone 1% Cream 1 Application(s) Topical three times a day PRN Itching  polyethylene glycol 3350 17 Gram(s) Oral daily PRN Constipation        CAPILLARY BLOOD GLUCOSE      POCT Blood Glucose.: 112 mg/dL (24 Jan 2019 07:22)  POCT Blood Glucose.: 158 mg/dL (23 Jan 2019 21:12)  POCT Blood Glucose.: 251 mg/dL (23 Jan 2019 16:33)  POCT Blood Glucose.: 307 mg/dL (23 Jan 2019 11:54)    I&O's Summary    23 Jan 2019 07:01  -  24 Jan 2019 07:00  --------------------------------------------------------  IN: 400 mL / OUT: 970 mL / NET: -570 mL        PHYSICAL EXAM:  GENERAL: nad, mild resp distress  EYES:  NECK:   CHEST/LUNG: decreased breath sounds, pursed lips breathing  HEART: rrr no m/g/r  ABDOMEN: soft nt nd  EXTREMITIES:  no edema bl  PSYCH:   NEUROLOGY:   SKIN:    LABS:    01-24    142  |  97<L>  |  43<H>  ----------------------------<  115<H>  5.6<H>   |  39<H>  |  2.8<H>    Ca    8.9      24 Jan 2019 07:50                RADIOLOGY & ADDITIONAL TESTS:    Imaging Personally Reviewed:  Yes    Consultant(s) Notes Reviewed:      Care Discussed with Consultants/Other Providers:    Assessment and Plan   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  High cholesterol  GERD (gastroesophageal reflux disease)  Enlarged prostate  Oxygen dependent  COPD (chronic obstructive pulmonary disease)  Diabetes mellitus, type 2  Hypertension  Emphysema lung  Empyema lung  History of hemorrhoidectomy  Dysplastic colon polyp: removed  No significant past surgical history
Patient is a 78y old  Male who presents with a chief complaint of Acute Hypoxic Respiratory Failure (24 Jan 2019 10:01)      SUBJECTIVE / OVERNIGHT EVENTS:  no cp, abd pain, fever  + sob at baseline    MEDICATIONS  (STANDING):  ALBUTerol/ipratropium for Nebulization 3 milliLiter(s) Nebulizer every 6 hours  artificial  tears Solution 1 Drop(s) Both EYES three times a day  ascorbic acid 500 milliGRAM(s) Oral daily  atorvastatin 20 milliGRAM(s) Oral at bedtime  buDESOnide 160 MICROgram(s)/formoterol 4.5 MICROgram(s) Inhaler - Peds 2 Puff(s) Inhalation two times a day  chlorhexidine 4% Liquid 1 Application(s) Topical <User Schedule>  cholecalciferol 1000 Unit(s) Oral daily  dextrose 5%. 1000 milliLiter(s) (50 mL/Hr) IV Continuous <Continuous>  dextrose 50% Injectable 12.5 Gram(s) IV Push once  dextrose 50% Injectable 25 Gram(s) IV Push once  dextrose 50% Injectable 25 Gram(s) IV Push once  finasteride 5 milliGRAM(s) Oral daily  heparin  Injectable 5000 Unit(s) SubCutaneous every 8 hours  insulin glargine Injectable (LANTUS) 30 Unit(s) SubCutaneous two times a day  insulin lispro (HumaLOG) corrective regimen sliding scale   SubCutaneous three times a day before meals  insulin lispro Injectable (HumaLOG) 20 Unit(s) SubCutaneous three times a day with meals  magnesium oxide 400 milliGRAM(s) Oral three times a day with meals  metoprolol tartrate 25 milliGRAM(s) Oral two times a day  morphine Concentrate 5 milliGRAM(s) Oral once  nystatin Powder 1 Application(s) Topical two times a day  predniSONE   Tablet 10 milliGRAM(s) Oral daily  senna 2 Tablet(s) Oral at bedtime  sodium polystyrene sulfonate Suspension 30 Gram(s) Oral <User Schedule>  tamsulosin 0.4 milliGRAM(s) Oral at bedtime  tiotropium 18 MICROgram(s) Capsule 1 Capsule(s) Inhalation at bedtime  zinc oxide 40% Ointment 1 Application(s) Topical two times a day    MEDICATIONS  (PRN):  ALBUTerol/ipratropium for Nebulization 3 milliLiter(s) Nebulizer every 4 hours PRN Bronchospasm  benzocaine 15 mG/menthol 3.6 mG Lozenge 1 Lozenge Oral four times a day PRN Sore Throat  dextrose 40% Gel 15 Gram(s) Oral once PRN Blood Glucose LESS THAN 70 milliGRAM(s)/deciliter  dextrose 50% Injectable 25 milliLiter(s) IV Push every 6 hours PRN BG <100  glucagon  Injectable 1 milliGRAM(s) IntraMuscular once PRN Glucose LESS THAN 70 milligrams/deciliter  hydrocortisone 1% Cream 1 Application(s) Topical three times a day PRN Itching  polyethylene glycol 3350 17 Gram(s) Oral daily PRN Constipation        CAPILLARY BLOOD GLUCOSE      POCT Blood Glucose.: 130 mg/dL (25 Jan 2019 07:51)  POCT Blood Glucose.: 120 mg/dL (25 Jan 2019 07:18)  POCT Blood Glucose.: 104 mg/dL (24 Jan 2019 21:20)  POCT Blood Glucose.: 215 mg/dL (24 Jan 2019 16:17)  POCT Blood Glucose.: 146 mg/dL (24 Jan 2019 11:48)    I&O's Summary      PHYSICAL EXAM:  GENERAL: nad, a+o x3  EYES:  NECK:   CHEST/LUNG: ctab no w/r/r  HEART: rrr no m/g/r  ABDOMEN: soft nt nd  EXTREMITIES:    PSYCH:   NEUROLOGY:   SKIN:    LABS:                        12.5   12.43 )-----------( 507      ( 25 Jan 2019 07:00 )             42.0     01-25    141  |  100  |  45<H>  ----------------------------<  158<H>  5.8<H>   |  36<H>  |  2.6<H>    Ca    9.2      25 Jan 2019 07:00                RADIOLOGY & ADDITIONAL TESTS:    Imaging Personally Reviewed:  Yes    Consultant(s) Notes Reviewed:      Care Discussed with Consultants/Other Providers:    Assessment and Plan   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  High cholesterol  GERD (gastroesophageal reflux disease)  Enlarged prostate  Oxygen dependent  COPD (chronic obstructive pulmonary disease)  Diabetes mellitus, type 2  Hypertension  Emphysema lung  Empyema lung  History of hemorrhoidectomy  Dysplastic colon polyp: removed  No significant past surgical history

## 2019-01-25 NOTE — PROGRESS NOTE ADULT - ASSESSMENT
78M PMHx COPD on home o2, severe PHTN, DM2, MICHAEL, obesity, CKD III here with acute hypoxic and hypercapnic resp failure due to COPD exacerbation.     1. COPD exacerbation  s/p abx, course of steroids  cont prednisone 10  NC 5L and bipap qhs  d/c today  f/u home visit program  albuterol q6 prn  symbicort  spiriva  #hyperkalemia  previously on kayexylate daily at home  send kayexylate x1 to pharmacy  hyperkalemia w/u outpt  2. CKD stage IV  sl elevation in SCr  repeat bmp via home visit program in 2-3 days  3. HTN  resume home bp meds  4. DM2  lantus 30 bid  humalog 20 tid  5. BPH  finasteride 5  tamsulosin 0.4  6. pHTN  outpt f/u  7. DVT ppx  subq hep    pt requires hospital bed for discharge home due to diagnosis of chronic stage copd and needs to have HOB elevated more than 30 degrees.  pt requires hospital bed for skin breakdown prevention, pillows and wedges have been tried and failed.  pt requires bedside commode due to high risk of falls.    pt has a mobility limitation that significantly impairs pt ability to participate in one or more mradl such as toileting, eating, dressing, and bathing in customary locations in the home. pt home provides adequate access between rooms for the use of the manual wheelchair. wheelchair will significantly improve pt ability to participate in mradl and will be u sed on a regular basis in the home  pt mobility limitation cannot be resolved by the use of a walker or cane  pt does not have sufficient upper extremity function to safely propel the manual wheelchair int eh home during a typical day  pt has a caregiver that is willing and able to propel the manual wheelchair at anytime during the day  pt is willing to leidy the wheelchair in the home on a daily basis

## 2019-01-27 RX ORDER — SODIUM POLYSTYRENE SULFONATE 4.1 MEQ/G
15 POWDER, FOR SUSPENSION ORAL
Qty: 150 | Refills: 0 | OUTPATIENT
Start: 2019-01-27 | End: 2019-02-05

## 2019-01-28 DIAGNOSIS — Z66 DO NOT RESUSCITATE: ICD-10-CM

## 2019-01-28 DIAGNOSIS — R00.0 TACHYCARDIA, UNSPECIFIED: ICD-10-CM

## 2019-01-28 DIAGNOSIS — Z87.891 PERSONAL HISTORY OF NICOTINE DEPENDENCE: ICD-10-CM

## 2019-01-28 DIAGNOSIS — Z88.0 ALLERGY STATUS TO PENICILLIN: ICD-10-CM

## 2019-01-28 DIAGNOSIS — R53.81 OTHER MALAISE: ICD-10-CM

## 2019-01-28 DIAGNOSIS — Z88.6 ALLERGY STATUS TO ANALGESIC AGENT: ICD-10-CM

## 2019-01-28 DIAGNOSIS — I27.20 PULMONARY HYPERTENSION, UNSPECIFIED: ICD-10-CM

## 2019-01-28 DIAGNOSIS — E87.0 HYPEROSMOLALITY AND HYPERNATREMIA: ICD-10-CM

## 2019-01-28 DIAGNOSIS — N40.0 BENIGN PROSTATIC HYPERPLASIA WITHOUT LOWER URINARY TRACT SYMPTOMS: ICD-10-CM

## 2019-01-28 DIAGNOSIS — E78.00 PURE HYPERCHOLESTEROLEMIA, UNSPECIFIED: ICD-10-CM

## 2019-01-28 DIAGNOSIS — J96.21 ACUTE AND CHRONIC RESPIRATORY FAILURE WITH HYPOXIA: ICD-10-CM

## 2019-01-28 DIAGNOSIS — G47.33 OBSTRUCTIVE SLEEP APNEA (ADULT) (PEDIATRIC): ICD-10-CM

## 2019-01-28 DIAGNOSIS — E11.22 TYPE 2 DIABETES MELLITUS WITH DIABETIC CHRONIC KIDNEY DISEASE: ICD-10-CM

## 2019-01-28 DIAGNOSIS — E53.8 DEFICIENCY OF OTHER SPECIFIED B GROUP VITAMINS: ICD-10-CM

## 2019-01-28 DIAGNOSIS — N18.4 CHRONIC KIDNEY DISEASE, STAGE 4 (SEVERE): ICD-10-CM

## 2019-01-28 DIAGNOSIS — B95.62 METHICILLIN RESISTANT STAPHYLOCOCCUS AUREUS INFECTION AS THE CAUSE OF DISEASES CLASSIFIED ELSEWHERE: ICD-10-CM

## 2019-01-28 DIAGNOSIS — L02.511 CUTANEOUS ABSCESS OF RIGHT HAND: ICD-10-CM

## 2019-01-28 DIAGNOSIS — E55.9 VITAMIN D DEFICIENCY, UNSPECIFIED: ICD-10-CM

## 2019-01-28 DIAGNOSIS — N17.9 ACUTE KIDNEY FAILURE, UNSPECIFIED: ICD-10-CM

## 2019-01-28 DIAGNOSIS — N39.0 URINARY TRACT INFECTION, SITE NOT SPECIFIED: ICD-10-CM

## 2019-01-28 DIAGNOSIS — E87.5 HYPERKALEMIA: ICD-10-CM

## 2019-01-28 DIAGNOSIS — I50.33 ACUTE ON CHRONIC DIASTOLIC (CONGESTIVE) HEART FAILURE: ICD-10-CM

## 2019-01-28 DIAGNOSIS — E66.01 MORBID (SEVERE) OBESITY DUE TO EXCESS CALORIES: ICD-10-CM

## 2019-01-28 DIAGNOSIS — Z99.81 DEPENDENCE ON SUPPLEMENTAL OXYGEN: ICD-10-CM

## 2019-01-28 DIAGNOSIS — E66.2 MORBID (SEVERE) OBESITY WITH ALVEOLAR HYPOVENTILATION: ICD-10-CM

## 2019-01-28 DIAGNOSIS — Z79.4 LONG TERM (CURRENT) USE OF INSULIN: ICD-10-CM

## 2019-01-28 DIAGNOSIS — J44.1 CHRONIC OBSTRUCTIVE PULMONARY DISEASE WITH (ACUTE) EXACERBATION: ICD-10-CM

## 2019-01-28 DIAGNOSIS — K21.9 GASTRO-ESOPHAGEAL REFLUX DISEASE WITHOUT ESOPHAGITIS: ICD-10-CM

## 2019-01-28 DIAGNOSIS — I13.0 HYPERTENSIVE HEART AND CHRONIC KIDNEY DISEASE WITH HEART FAILURE AND STAGE 1 THROUGH STAGE 4 CHRONIC KIDNEY DISEASE, OR UNSPECIFIED CHRONIC KIDNEY DISEASE: ICD-10-CM

## 2019-01-28 DIAGNOSIS — B96.20 UNSPECIFIED ESCHERICHIA COLI [E. COLI] AS THE CAUSE OF DISEASES CLASSIFIED ELSEWHERE: ICD-10-CM

## 2019-02-01 ENCOUNTER — APPOINTMENT (OUTPATIENT)
Dept: GERIATRICS | Facility: HOME HEALTH | Age: 79
End: 2019-02-01

## 2019-02-01 DIAGNOSIS — Z00.00 ENCOUNTER FOR GENERAL ADULT MEDICAL EXAMINATION W/OUT ABNORMAL FINDINGS: ICD-10-CM

## 2019-02-01 DIAGNOSIS — Z87.891 PERSONAL HISTORY OF NICOTINE DEPENDENCE: ICD-10-CM

## 2019-02-01 DIAGNOSIS — K59.09 OTHER CONSTIPATION: ICD-10-CM

## 2019-02-01 DIAGNOSIS — Z80.6 FAMILY HISTORY OF LEUKEMIA: ICD-10-CM

## 2019-02-01 DIAGNOSIS — Z82.49 FAMILY HISTORY OF ISCHEMIC HEART DISEASE AND OTHER DISEASES OF THE CIRCULATORY SYSTEM: ICD-10-CM

## 2019-02-01 DIAGNOSIS — K21.9 GASTRO-ESOPHAGEAL REFLUX DISEASE W/OUT ESOPHAGITIS: ICD-10-CM

## 2019-02-01 DIAGNOSIS — E78.5 HYPERLIPIDEMIA, UNSPECIFIED: ICD-10-CM

## 2019-02-02 VITALS
DIASTOLIC BLOOD PRESSURE: 80 MMHG | SYSTOLIC BLOOD PRESSURE: 130 MMHG | HEIGHT: 68 IN | OXYGEN SATURATION: 82 % | BODY MASS INDEX: 37.13 KG/M2 | RESPIRATION RATE: 20 BRPM | HEART RATE: 76 BPM | WEIGHT: 245 LBS

## 2019-02-02 PROBLEM — Z80.6 FAMILY HISTORY OF LEUKEMIA: Status: ACTIVE | Noted: 2019-02-02

## 2019-02-02 PROBLEM — K59.09 CONSTIPATION, CHRONIC: Status: ACTIVE | Noted: 2019-02-02

## 2019-02-02 PROBLEM — E78.5 HYPERLIPIDEMIA: Status: ACTIVE | Noted: 2019-02-02

## 2019-02-02 PROBLEM — Z00.00 ENCOUNTER FOR PREVENTIVE HEALTH EXAMINATION: Status: ACTIVE | Noted: 2017-03-07

## 2019-02-02 PROBLEM — Z82.49 FAMILY HISTORY OF MYOCARDIAL INFARCTION: Status: ACTIVE | Noted: 2019-02-02

## 2019-02-02 PROBLEM — K21.9 CHRONIC GERD: Status: ACTIVE | Noted: 2019-02-02

## 2019-02-02 PROBLEM — Z87.891 EX-SMOKER FOR MORE THAN 1 YEAR: Status: ACTIVE | Noted: 2019-02-02

## 2019-02-02 RX ORDER — SODIUM POLYSTYRENE SULFONATE 4.1 MEQ/G
POWDER, FOR SUSPENSION ORAL; RECTAL
Qty: 454 | Refills: 0 | Status: DISCONTINUED | COMMUNITY
Start: 2019-01-27

## 2019-02-02 RX ORDER — TRAMADOL HYDROCHLORIDE 50 MG/1
50 TABLET, COATED ORAL
Qty: 45 | Refills: 0 | Status: DISCONTINUED | COMMUNITY
Start: 2018-10-17

## 2019-02-02 RX ORDER — MOMETASONE 50 UG/1
50 SPRAY, METERED NASAL
Qty: 17 | Refills: 0 | Status: DISCONTINUED | COMMUNITY
Start: 2018-07-03

## 2019-02-02 RX ORDER — CEFPODOXIME PROXETIL 200 MG/1
200 TABLET, FILM COATED ORAL
Qty: 14 | Refills: 0 | Status: DISCONTINUED | COMMUNITY
Start: 2018-11-14

## 2019-02-02 RX ORDER — LEVOFLOXACIN 250 MG/1
250 TABLET, FILM COATED ORAL
Qty: 5 | Refills: 0 | Status: DISCONTINUED | COMMUNITY
Start: 2018-08-01

## 2019-02-02 RX ORDER — OMEPRAZOLE 20 MG/1
20 CAPSULE, DELAYED RELEASE ORAL
Qty: 30 | Refills: 0 | Status: DISCONTINUED | COMMUNITY
Start: 2018-08-01

## 2019-02-02 RX ORDER — POLYETHYLENE GLYCOL 3350 17 G/17G
17 POWDER, FOR SOLUTION ORAL
Qty: 255 | Refills: 0 | Status: DISCONTINUED | COMMUNITY
Start: 2018-10-15 | End: 2019-02-02

## 2019-02-02 RX ORDER — CEPHALEXIN 500 MG/1
500 CAPSULE ORAL
Qty: 21 | Refills: 0 | Status: DISCONTINUED | COMMUNITY
Start: 2018-10-06

## 2019-02-02 RX ORDER — SODIUM POLYSTYRENE SULFONATE 15 G/60ML
15 SUSPENSION ORAL; RECTAL DAILY
Qty: 900 | Refills: 0 | Status: ACTIVE | COMMUNITY
Start: 2019-02-02 | End: 1900-01-01

## 2019-02-02 RX ORDER — NEBULIZER AND COMPRESSOR
EACH MISCELLANEOUS
Qty: 1 | Refills: 0 | Status: ACTIVE | COMMUNITY
Start: 2018-12-06

## 2019-02-02 RX ORDER — INSULIN ASPART 100 [IU]/ML
100 INJECTION, SOLUTION INTRAVENOUS; SUBCUTANEOUS
Qty: 20 | Refills: 0 | Status: DISCONTINUED | COMMUNITY
Start: 2019-01-25

## 2019-02-02 NOTE — PHYSICAL EXAM
[General Appearance - Alert] : alert [General Appearance - In No Acute Distress] : in no acute distress [General Appearance - Well Nourished] : well nourished [General Appearance - Well Developed] : well developed [Outer Ear] : the ears and nose were normal in appearance [Neck Appearance] : the appearance of the neck was normal [] : no respiratory distress [Respiration, Rhythm And Depth] : normal respiratory rhythm and effort [Auscultation Breath Sounds / Voice Sounds] : lungs were clear to auscultation bilaterally [Apical Impulse] : the apical impulse was normal [Heart Sounds] : normal S1 and S2 [Murmurs] : no murmurs [Abdomen Soft] : soft [Abdomen Tenderness] : non-tender [Oriented To Time, Place, And Person] : oriented to person, place, and time [Impaired Insight] : insight and judgment were intact [Affect] : the affect was normal

## 2019-02-05 ENCOUNTER — RX CHANGE (OUTPATIENT)
Age: 79
End: 2019-02-05

## 2019-02-05 ENCOUNTER — OTHER (OUTPATIENT)
Age: 79
End: 2019-02-05

## 2019-02-07 ENCOUNTER — OUTPATIENT (OUTPATIENT)
Dept: OUTPATIENT SERVICES | Facility: HOSPITAL | Age: 79
LOS: 1 days | Discharge: HOME | End: 2019-02-07

## 2019-02-07 DIAGNOSIS — K63.5 POLYP OF COLON: Chronic | ICD-10-CM

## 2019-02-07 DIAGNOSIS — I10 ESSENTIAL (PRIMARY) HYPERTENSION: ICD-10-CM

## 2019-02-07 DIAGNOSIS — Z98.890 OTHER SPECIFIED POSTPROCEDURAL STATES: Chronic | ICD-10-CM

## 2019-02-12 ENCOUNTER — LABORATORY RESULT (OUTPATIENT)
Age: 79
End: 2019-02-12

## 2019-02-12 ENCOUNTER — OUTPATIENT (OUTPATIENT)
Dept: OUTPATIENT SERVICES | Facility: HOSPITAL | Age: 79
LOS: 1 days | Discharge: HOME | End: 2019-02-12

## 2019-02-12 ENCOUNTER — RX RENEWAL (OUTPATIENT)
Age: 79
End: 2019-02-12

## 2019-02-12 DIAGNOSIS — Z02.9 ENCOUNTER FOR ADMINISTRATIVE EXAMINATIONS, UNSPECIFIED: ICD-10-CM

## 2019-02-12 DIAGNOSIS — K63.5 POLYP OF COLON: Chronic | ICD-10-CM

## 2019-02-12 DIAGNOSIS — Z98.890 OTHER SPECIFIED POSTPROCEDURAL STATES: Chronic | ICD-10-CM

## 2019-02-14 ENCOUNTER — LABORATORY RESULT (OUTPATIENT)
Age: 79
End: 2019-02-14

## 2019-02-14 ENCOUNTER — OUTPATIENT (OUTPATIENT)
Dept: OUTPATIENT SERVICES | Facility: HOSPITAL | Age: 79
LOS: 1 days | Discharge: HOME | End: 2019-02-14

## 2019-02-14 DIAGNOSIS — E61.2 MAGNESIUM DEFICIENCY: ICD-10-CM

## 2019-02-14 DIAGNOSIS — E78.00 PURE HYPERCHOLESTEROLEMIA, UNSPECIFIED: ICD-10-CM

## 2019-02-14 DIAGNOSIS — E11.9 TYPE 2 DIABETES MELLITUS WITHOUT COMPLICATIONS: ICD-10-CM

## 2019-02-14 DIAGNOSIS — E53.8 DEFICIENCY OF OTHER SPECIFIED B GROUP VITAMINS: ICD-10-CM

## 2019-02-14 DIAGNOSIS — E55.9 VITAMIN D DEFICIENCY, UNSPECIFIED: ICD-10-CM

## 2019-02-14 DIAGNOSIS — D52.9 FOLATE DEFICIENCY ANEMIA, UNSPECIFIED: ICD-10-CM

## 2019-02-14 DIAGNOSIS — D64.9 ANEMIA, UNSPECIFIED: ICD-10-CM

## 2019-02-14 DIAGNOSIS — K63.5 POLYP OF COLON: Chronic | ICD-10-CM

## 2019-02-14 DIAGNOSIS — E03.9 HYPOTHYROIDISM, UNSPECIFIED: ICD-10-CM

## 2019-02-14 DIAGNOSIS — Z98.890 OTHER SPECIFIED POSTPROCEDURAL STATES: Chronic | ICD-10-CM

## 2019-02-15 ENCOUNTER — OTHER (OUTPATIENT)
Age: 79
End: 2019-02-15

## 2019-02-20 ENCOUNTER — OTHER (OUTPATIENT)
Age: 79
End: 2019-02-20

## 2019-02-25 ENCOUNTER — RX RENEWAL (OUTPATIENT)
Age: 79
End: 2019-02-25

## 2019-02-25 DIAGNOSIS — R21 RASH AND OTHER NONSPECIFIC SKIN ERUPTION: ICD-10-CM

## 2019-02-25 RX ORDER — NYSTATIN 100000 U/G
100000 OINTMENT TOPICAL
Qty: 1 | Refills: 0 | Status: ACTIVE | COMMUNITY
Start: 2019-02-25 | End: 1900-01-01

## 2019-02-25 NOTE — ASSESSMENT
[FreeTextEntry1] : # ) Chronic Hyperkalemia; Pt is on Kayexalate. Does not want power. Will change to oral liquid. \par \par #) COPD: Continue inhalers, prednisone and O2. Pt's head needs to be elevated more than 30 degrees because of COPD. He may need an air mattress. \par \par #) HLD: Controlled on lipitor. \par \par #) HTN: Well controlled on Metoprolol. \par \par #) DM: Continue insulin. Check HbA1c. \par \par #) Chronic constipation: Continue docusate. \par \par #) BPH: Continue flomax and finasteride. \par \par Will check labs. \par f/up in 2-3 months. \par \par

## 2019-02-25 NOTE — REVIEW OF SYSTEMS
[As Noted in HPI] : as noted in HPI [Negative] : Heme/Lymph [FreeTextEntry9] : Chronic right shoulder pain

## 2019-02-25 NOTE — HISTORY OF PRESENT ILLNESS
[FreeTextEntry1] : Pt was seen and examined at his home in the presence of HHA. Pt is on home oxygen 5l for COPD. Denies any recent falls. He has chronic SOB. Denies any depression. He also has MICHAEL (On cpap). He was c/o chronic right shoulder pain. Other ROS were unremarkable.

## 2019-03-11 ENCOUNTER — INPATIENT (INPATIENT)
Facility: HOSPITAL | Age: 79
LOS: 17 days | Discharge: HOME HEALTH | End: 2019-03-29
Attending: INTERNAL MEDICINE | Admitting: INTERNAL MEDICINE

## 2019-03-11 VITALS — OXYGEN SATURATION: 98 % | HEART RATE: 97 BPM

## 2019-03-11 DIAGNOSIS — R06.02 SHORTNESS OF BREATH: ICD-10-CM

## 2019-03-11 DIAGNOSIS — E11.9 TYPE 2 DIABETES MELLITUS WITHOUT COMPLICATIONS: ICD-10-CM

## 2019-03-11 DIAGNOSIS — Z98.890 OTHER SPECIFIED POSTPROCEDURAL STATES: Chronic | ICD-10-CM

## 2019-03-11 DIAGNOSIS — J44.1 CHRONIC OBSTRUCTIVE PULMONARY DISEASE WITH (ACUTE) EXACERBATION: ICD-10-CM

## 2019-03-11 DIAGNOSIS — Z79.899 OTHER LONG TERM (CURRENT) DRUG THERAPY: ICD-10-CM

## 2019-03-11 DIAGNOSIS — N40.0 BENIGN PROSTATIC HYPERPLASIA WITHOUT LOWER URINARY TRACT SYMPTOMS: ICD-10-CM

## 2019-03-11 DIAGNOSIS — K63.5 POLYP OF COLON: Chronic | ICD-10-CM

## 2019-03-11 LAB
ALBUMIN SERPL ELPH-MCNC: 4 G/DL — SIGNIFICANT CHANGE UP (ref 3.5–5.2)
ALBUMIN SERPL ELPH-MCNC: 4.2 G/DL — SIGNIFICANT CHANGE UP (ref 3.5–5.2)
ALP SERPL-CCNC: 68 U/L — SIGNIFICANT CHANGE UP (ref 30–115)
ALP SERPL-CCNC: 70 U/L — SIGNIFICANT CHANGE UP (ref 30–115)
ALT FLD-CCNC: 10 U/L — SIGNIFICANT CHANGE UP (ref 0–41)
ALT FLD-CCNC: 11 U/L — SIGNIFICANT CHANGE UP (ref 0–41)
ANION GAP SERPL CALC-SCNC: 11 MMOL/L — SIGNIFICANT CHANGE UP (ref 7–14)
ANION GAP SERPL CALC-SCNC: 9 MMOL/L — SIGNIFICANT CHANGE UP (ref 7–14)
APTT BLD: 30.3 SEC — SIGNIFICANT CHANGE UP (ref 27–39.2)
AST SERPL-CCNC: 10 U/L — SIGNIFICANT CHANGE UP (ref 0–41)
AST SERPL-CCNC: 19 U/L — SIGNIFICANT CHANGE UP (ref 0–41)
BASE EXCESS BLDV CALC-SCNC: 4.5 MMOL/L — HIGH (ref -2–2)
BASOPHILS # BLD AUTO: 0.1 K/UL — SIGNIFICANT CHANGE UP (ref 0–0.2)
BASOPHILS NFR BLD AUTO: 0.8 % — SIGNIFICANT CHANGE UP (ref 0–1)
BILIRUB SERPL-MCNC: 0.5 MG/DL — SIGNIFICANT CHANGE UP (ref 0.2–1.2)
BILIRUB SERPL-MCNC: 0.5 MG/DL — SIGNIFICANT CHANGE UP (ref 0.2–1.2)
BUN SERPL-MCNC: 39 MG/DL — HIGH (ref 10–20)
BUN SERPL-MCNC: 44 MG/DL — HIGH (ref 10–20)
CA-I SERPL-SCNC: 1.24 MMOL/L — SIGNIFICANT CHANGE UP (ref 1.12–1.3)
CALCIUM SERPL-MCNC: 9 MG/DL — SIGNIFICANT CHANGE UP (ref 8.5–10.1)
CALCIUM SERPL-MCNC: 9.1 MG/DL — SIGNIFICANT CHANGE UP (ref 8.5–10.1)
CHLORIDE SERPL-SCNC: 104 MMOL/L — SIGNIFICANT CHANGE UP (ref 98–110)
CHLORIDE SERPL-SCNC: 104 MMOL/L — SIGNIFICANT CHANGE UP (ref 98–110)
CO2 SERPL-SCNC: 27 MMOL/L — SIGNIFICANT CHANGE UP (ref 17–32)
CO2 SERPL-SCNC: 30 MMOL/L — SIGNIFICANT CHANGE UP (ref 17–32)
CREAT SERPL-MCNC: 1.6 MG/DL — HIGH (ref 0.7–1.5)
CREAT SERPL-MCNC: 1.7 MG/DL — HIGH (ref 0.7–1.5)
EOSINOPHIL # BLD AUTO: 0.3 K/UL — SIGNIFICANT CHANGE UP (ref 0–0.7)
EOSINOPHIL NFR BLD AUTO: 2.3 % — SIGNIFICANT CHANGE UP (ref 0–8)
GAS PNL BLDV: 144 MMOL/L — SIGNIFICANT CHANGE UP (ref 136–145)
GAS PNL BLDV: SIGNIFICANT CHANGE UP
GLUCOSE BLDC GLUCOMTR-MCNC: 219 MG/DL — HIGH (ref 70–99)
GLUCOSE BLDC GLUCOMTR-MCNC: 278 MG/DL — HIGH (ref 70–99)
GLUCOSE BLDC GLUCOMTR-MCNC: 311 MG/DL — HIGH (ref 70–99)
GLUCOSE BLDC GLUCOMTR-MCNC: 323 MG/DL — HIGH (ref 70–99)
GLUCOSE SERPL-MCNC: 141 MG/DL — HIGH (ref 70–99)
GLUCOSE SERPL-MCNC: 214 MG/DL — HIGH (ref 70–99)
HCO3 BLDV-SCNC: 33 MMOL/L — HIGH (ref 22–29)
HCT VFR BLD CALC: 43.8 % — SIGNIFICANT CHANGE UP (ref 42–52)
HCT VFR BLD CALC: 45.5 % — SIGNIFICANT CHANGE UP (ref 42–52)
HCT VFR BLDA CALC: 42 % — SIGNIFICANT CHANGE UP (ref 34–44)
HGB BLD CALC-MCNC: 13.7 G/DL — LOW (ref 14–18)
HGB BLD-MCNC: 12.8 G/DL — LOW (ref 14–18)
HGB BLD-MCNC: 13.1 G/DL — LOW (ref 14–18)
HOROWITZ INDEX BLDV+IHG-RTO: 21 — SIGNIFICANT CHANGE UP
IMM GRANULOCYTES NFR BLD AUTO: 0.9 % — HIGH (ref 0.1–0.3)
INR BLD: 1.17 RATIO — SIGNIFICANT CHANGE UP (ref 0.65–1.3)
LACTATE BLDV-MCNC: 0.9 MMOL/L — SIGNIFICANT CHANGE UP (ref 0.5–1.6)
LYMPHOCYTES # BLD AUTO: 1.75 K/UL — SIGNIFICANT CHANGE UP (ref 1.2–3.4)
LYMPHOCYTES # BLD AUTO: 13.4 % — LOW (ref 20.5–51.1)
MAGNESIUM SERPL-MCNC: 2.1 MG/DL — SIGNIFICANT CHANGE UP (ref 1.8–2.4)
MAGNESIUM SERPL-MCNC: 2.5 MG/DL — HIGH (ref 1.8–2.4)
MCHC RBC-ENTMCNC: 22.9 PG — LOW (ref 27–31)
MCHC RBC-ENTMCNC: 23.2 PG — LOW (ref 27–31)
MCHC RBC-ENTMCNC: 28.8 G/DL — LOW (ref 32–37)
MCHC RBC-ENTMCNC: 29.2 G/DL — LOW (ref 32–37)
MCV RBC AUTO: 79.5 FL — LOW (ref 80–94)
MCV RBC AUTO: 79.5 FL — LOW (ref 80–94)
MONOCYTES # BLD AUTO: 1.11 K/UL — HIGH (ref 0.1–0.6)
MONOCYTES NFR BLD AUTO: 8.5 % — SIGNIFICANT CHANGE UP (ref 1.7–9.3)
NEUTROPHILS # BLD AUTO: 9.67 K/UL — HIGH (ref 1.4–6.5)
NEUTROPHILS NFR BLD AUTO: 74.1 % — SIGNIFICANT CHANGE UP (ref 42.2–75.2)
NRBC # BLD: 0 /100 WBCS — SIGNIFICANT CHANGE UP (ref 0–0)
NRBC # BLD: 0 /100 WBCS — SIGNIFICANT CHANGE UP (ref 0–0)
NT-PROBNP SERPL-SCNC: 1049 PG/ML — HIGH (ref 0–300)
PCO2 BLDV: 66 MMHG — HIGH (ref 41–51)
PH BLDV: 7.31 — SIGNIFICANT CHANGE UP (ref 7.26–7.43)
PLATELET # BLD AUTO: 447 K/UL — HIGH (ref 130–400)
PLATELET # BLD AUTO: 472 K/UL — HIGH (ref 130–400)
PO2 BLDV: 45 MMHG — HIGH (ref 20–40)
POTASSIUM BLDV-SCNC: 4.7 MMOL/L — SIGNIFICANT CHANGE UP (ref 3.3–5.6)
POTASSIUM SERPL-MCNC: 5.6 MMOL/L — HIGH (ref 3.5–5)
POTASSIUM SERPL-MCNC: 5.9 MMOL/L — HIGH (ref 3.5–5)
POTASSIUM SERPL-SCNC: 5.6 MMOL/L — HIGH (ref 3.5–5)
POTASSIUM SERPL-SCNC: 5.9 MMOL/L — HIGH (ref 3.5–5)
PROT SERPL-MCNC: 6.2 G/DL — SIGNIFICANT CHANGE UP (ref 6–8)
PROT SERPL-MCNC: 7 G/DL — SIGNIFICANT CHANGE UP (ref 6–8)
PROTHROM AB SERPL-ACNC: 13.4 SEC — HIGH (ref 9.95–12.87)
RBC # BLD: 5.51 M/UL — SIGNIFICANT CHANGE UP (ref 4.7–6.1)
RBC # BLD: 5.72 M/UL — SIGNIFICANT CHANGE UP (ref 4.7–6.1)
RBC # FLD: 17.4 % — HIGH (ref 11.5–14.5)
RBC # FLD: 17.8 % — HIGH (ref 11.5–14.5)
SAO2 % BLDV: 74 % — SIGNIFICANT CHANGE UP
SODIUM SERPL-SCNC: 142 MMOL/L — SIGNIFICANT CHANGE UP (ref 135–146)
SODIUM SERPL-SCNC: 143 MMOL/L — SIGNIFICANT CHANGE UP (ref 135–146)
TROPONIN T SERPL-MCNC: 0.01 NG/ML — SIGNIFICANT CHANGE UP
WBC # BLD: 11.87 K/UL — HIGH (ref 4.8–10.8)
WBC # BLD: 13.05 K/UL — HIGH (ref 4.8–10.8)
WBC # FLD AUTO: 11.87 K/UL — HIGH (ref 4.8–10.8)
WBC # FLD AUTO: 13.05 K/UL — HIGH (ref 4.8–10.8)

## 2019-03-11 RX ORDER — INSULIN LISPRO 100/ML
VIAL (ML) SUBCUTANEOUS
Qty: 0 | Refills: 0 | Status: DISCONTINUED | OUTPATIENT
Start: 2019-03-11 | End: 2019-03-29

## 2019-03-11 RX ORDER — IPRATROPIUM/ALBUTEROL SULFATE 18-103MCG
3 AEROSOL WITH ADAPTER (GRAM) INHALATION ONCE
Qty: 0 | Refills: 0 | Status: COMPLETED | OUTPATIENT
Start: 2019-03-11 | End: 2019-03-11

## 2019-03-11 RX ORDER — DEXTROSE 50 % IN WATER 50 %
15 SYRINGE (ML) INTRAVENOUS ONCE
Qty: 0 | Refills: 0 | Status: DISCONTINUED | OUTPATIENT
Start: 2019-03-11 | End: 2019-03-29

## 2019-03-11 RX ORDER — DEXTROSE 50 % IN WATER 50 %
25 SYRINGE (ML) INTRAVENOUS ONCE
Qty: 0 | Refills: 0 | Status: DISCONTINUED | OUTPATIENT
Start: 2019-03-11 | End: 2019-03-29

## 2019-03-11 RX ORDER — SODIUM CHLORIDE 9 MG/ML
1000 INJECTION, SOLUTION INTRAVENOUS
Qty: 0 | Refills: 0 | Status: DISCONTINUED | OUTPATIENT
Start: 2019-03-11 | End: 2019-03-29

## 2019-03-11 RX ORDER — ASCORBIC ACID 60 MG
500 TABLET,CHEWABLE ORAL DAILY
Qty: 0 | Refills: 0 | Status: DISCONTINUED | OUTPATIENT
Start: 2019-03-11 | End: 2019-03-29

## 2019-03-11 RX ORDER — TIOTROPIUM BROMIDE 18 UG/1
1 CAPSULE ORAL; RESPIRATORY (INHALATION) DAILY
Qty: 0 | Refills: 0 | Status: DISCONTINUED | OUTPATIENT
Start: 2019-03-11 | End: 2019-03-29

## 2019-03-11 RX ORDER — FINASTERIDE 5 MG/1
5 TABLET, FILM COATED ORAL DAILY
Qty: 0 | Refills: 0 | Status: DISCONTINUED | OUTPATIENT
Start: 2019-03-11 | End: 2019-03-29

## 2019-03-11 RX ORDER — CHLORHEXIDINE GLUCONATE 213 G/1000ML
1 SOLUTION TOPICAL
Qty: 0 | Refills: 0 | Status: DISCONTINUED | OUTPATIENT
Start: 2019-03-11 | End: 2019-03-29

## 2019-03-11 RX ORDER — INSULIN GLARGINE 100 [IU]/ML
10 INJECTION, SOLUTION SUBCUTANEOUS EVERY MORNING
Qty: 0 | Refills: 0 | Status: DISCONTINUED | OUTPATIENT
Start: 2019-03-11 | End: 2019-03-11

## 2019-03-11 RX ORDER — CHOLECALCIFEROL (VITAMIN D3) 125 MCG
1000 CAPSULE ORAL DAILY
Qty: 0 | Refills: 0 | Status: DISCONTINUED | OUTPATIENT
Start: 2019-03-11 | End: 2019-03-29

## 2019-03-11 RX ORDER — DOCUSATE SODIUM 100 MG
100 CAPSULE ORAL THREE TIMES A DAY
Qty: 0 | Refills: 0 | Status: DISCONTINUED | OUTPATIENT
Start: 2019-03-11 | End: 2019-03-29

## 2019-03-11 RX ORDER — INSULIN GLARGINE 100 [IU]/ML
10 INJECTION, SOLUTION SUBCUTANEOUS AT BEDTIME
Qty: 0 | Refills: 0 | Status: DISCONTINUED | OUTPATIENT
Start: 2019-03-11 | End: 2019-03-11

## 2019-03-11 RX ORDER — TAMSULOSIN HYDROCHLORIDE 0.4 MG/1
0.4 CAPSULE ORAL AT BEDTIME
Qty: 0 | Refills: 0 | Status: DISCONTINUED | OUTPATIENT
Start: 2019-03-11 | End: 2019-03-29

## 2019-03-11 RX ORDER — DEXTROSE 50 % IN WATER 50 %
12.5 SYRINGE (ML) INTRAVENOUS ONCE
Qty: 0 | Refills: 0 | Status: DISCONTINUED | OUTPATIENT
Start: 2019-03-11 | End: 2019-03-29

## 2019-03-11 RX ORDER — INSULIN LISPRO 100/ML
12 VIAL (ML) SUBCUTANEOUS
Qty: 0 | Refills: 0 | Status: DISCONTINUED | OUTPATIENT
Start: 2019-03-11 | End: 2019-03-17

## 2019-03-11 RX ORDER — SODIUM POLYSTYRENE SULFONATE 4.1 MEQ/G
30 POWDER, FOR SUSPENSION ORAL EVERY OTHER DAY
Qty: 0 | Refills: 0 | Status: DISCONTINUED | OUTPATIENT
Start: 2019-03-11 | End: 2019-03-11

## 2019-03-11 RX ORDER — HEPARIN SODIUM 5000 [USP'U]/ML
5000 INJECTION INTRAVENOUS; SUBCUTANEOUS EVERY 12 HOURS
Qty: 0 | Refills: 0 | Status: DISCONTINUED | OUTPATIENT
Start: 2019-03-11 | End: 2019-03-29

## 2019-03-11 RX ORDER — SENNA PLUS 8.6 MG/1
2 TABLET ORAL AT BEDTIME
Qty: 0 | Refills: 0 | Status: DISCONTINUED | OUTPATIENT
Start: 2019-03-11 | End: 2019-03-29

## 2019-03-11 RX ORDER — MAGNESIUM OXIDE 400 MG ORAL TABLET 241.3 MG
400 TABLET ORAL
Qty: 0 | Refills: 0 | Status: DISCONTINUED | OUTPATIENT
Start: 2019-03-11 | End: 2019-03-11

## 2019-03-11 RX ORDER — METOPROLOL TARTRATE 50 MG
25 TABLET ORAL
Qty: 0 | Refills: 0 | Status: DISCONTINUED | OUTPATIENT
Start: 2019-03-11 | End: 2019-03-29

## 2019-03-11 RX ORDER — INSULIN LISPRO 100/ML
8 VIAL (ML) SUBCUTANEOUS
Qty: 0 | Refills: 0 | Status: DISCONTINUED | OUTPATIENT
Start: 2019-03-11 | End: 2019-03-11

## 2019-03-11 RX ORDER — SODIUM POLYSTYRENE SULFONATE 4.1 MEQ/G
30 POWDER, FOR SUSPENSION ORAL
Qty: 0 | Refills: 0 | Status: DISCONTINUED | OUTPATIENT
Start: 2019-03-11 | End: 2019-03-29

## 2019-03-11 RX ORDER — IPRATROPIUM/ALBUTEROL SULFATE 18-103MCG
3 AEROSOL WITH ADAPTER (GRAM) INHALATION EVERY 4 HOURS
Qty: 0 | Refills: 0 | Status: DISCONTINUED | OUTPATIENT
Start: 2019-03-11 | End: 2019-03-23

## 2019-03-11 RX ORDER — BUDESONIDE AND FORMOTEROL FUMARATE DIHYDRATE 160; 4.5 UG/1; UG/1
2 AEROSOL RESPIRATORY (INHALATION)
Qty: 0 | Refills: 0 | Status: DISCONTINUED | OUTPATIENT
Start: 2019-03-11 | End: 2019-03-29

## 2019-03-11 RX ORDER — GLUCAGON INJECTION, SOLUTION 0.5 MG/.1ML
1 INJECTION, SOLUTION SUBCUTANEOUS ONCE
Qty: 0 | Refills: 0 | Status: DISCONTINUED | OUTPATIENT
Start: 2019-03-11 | End: 2019-03-29

## 2019-03-11 RX ORDER — INSULIN GLARGINE 100 [IU]/ML
25 INJECTION, SOLUTION SUBCUTANEOUS
Qty: 0 | Refills: 0 | Status: DISCONTINUED | OUTPATIENT
Start: 2019-03-11 | End: 2019-03-29

## 2019-03-11 RX ORDER — INSULIN LISPRO 100/ML
VIAL (ML) SUBCUTANEOUS AT BEDTIME
Qty: 0 | Refills: 0 | Status: DISCONTINUED | OUTPATIENT
Start: 2019-03-11 | End: 2019-03-16

## 2019-03-11 RX ORDER — MAGNESIUM SULFATE 500 MG/ML
2 VIAL (ML) INJECTION ONCE
Qty: 0 | Refills: 0 | Status: COMPLETED | OUTPATIENT
Start: 2019-03-11 | End: 2019-03-11

## 2019-03-11 RX ORDER — ATORVASTATIN CALCIUM 80 MG/1
20 TABLET, FILM COATED ORAL AT BEDTIME
Qty: 0 | Refills: 0 | Status: DISCONTINUED | OUTPATIENT
Start: 2019-03-11 | End: 2019-03-29

## 2019-03-11 RX ADMIN — MAGNESIUM OXIDE 400 MG ORAL TABLET 400 MILLIGRAM(S): 241.3 TABLET ORAL at 08:55

## 2019-03-11 RX ADMIN — Medication 3 MILLILITER(S): at 00:46

## 2019-03-11 RX ADMIN — INSULIN GLARGINE 10 UNIT(S): 100 INJECTION, SOLUTION SUBCUTANEOUS at 09:40

## 2019-03-11 RX ADMIN — Medication 8 UNIT(S): at 12:14

## 2019-03-11 RX ADMIN — Medication 500 MILLIGRAM(S): at 12:21

## 2019-03-11 RX ADMIN — INSULIN GLARGINE 25 UNIT(S): 100 INJECTION, SOLUTION SUBCUTANEOUS at 21:47

## 2019-03-11 RX ADMIN — HEPARIN SODIUM 5000 UNIT(S): 5000 INJECTION INTRAVENOUS; SUBCUTANEOUS at 18:05

## 2019-03-11 RX ADMIN — Medication 1000 UNIT(S): at 12:21

## 2019-03-11 RX ADMIN — Medication 3 MILLILITER(S): at 19:27

## 2019-03-11 RX ADMIN — ATORVASTATIN CALCIUM 20 MILLIGRAM(S): 80 TABLET, FILM COATED ORAL at 21:20

## 2019-03-11 RX ADMIN — Medication 8 UNIT(S): at 16:54

## 2019-03-11 RX ADMIN — Medication 60 MILLIGRAM(S): at 16:47

## 2019-03-11 RX ADMIN — Medication 3 MILLILITER(S): at 09:09

## 2019-03-11 RX ADMIN — Medication 1 DROP(S): at 21:20

## 2019-03-11 RX ADMIN — Medication 3 MILLILITER(S): at 01:06

## 2019-03-11 RX ADMIN — SODIUM POLYSTYRENE SULFONATE 30 GRAM(S): 4.1 POWDER, FOR SUSPENSION ORAL at 12:19

## 2019-03-11 RX ADMIN — Medication 100 MILLIGRAM(S): at 21:27

## 2019-03-11 RX ADMIN — Medication 3 MILLILITER(S): at 00:35

## 2019-03-11 RX ADMIN — Medication 1: at 21:48

## 2019-03-11 RX ADMIN — Medication 50 GRAM(S): at 00:45

## 2019-03-11 RX ADMIN — Medication 25 MILLIGRAM(S): at 18:05

## 2019-03-11 RX ADMIN — Medication 3 MILLILITER(S): at 15:38

## 2019-03-11 RX ADMIN — Medication 60 MILLIGRAM(S): at 06:52

## 2019-03-11 RX ADMIN — Medication 8 UNIT(S): at 08:50

## 2019-03-11 RX ADMIN — FINASTERIDE 5 MILLIGRAM(S): 5 TABLET, FILM COATED ORAL at 12:20

## 2019-03-11 RX ADMIN — Medication 25 MILLIGRAM(S): at 08:58

## 2019-03-11 RX ADMIN — TAMSULOSIN HYDROCHLORIDE 0.4 MILLIGRAM(S): 0.4 CAPSULE ORAL at 21:20

## 2019-03-11 RX ADMIN — Medication 3 MILLILITER(S): at 13:31

## 2019-03-11 RX ADMIN — BUDESONIDE AND FORMOTEROL FUMARATE DIHYDRATE 2 PUFF(S): 160; 4.5 AEROSOL RESPIRATORY (INHALATION) at 19:28

## 2019-03-11 RX ADMIN — Medication 1 DROP(S): at 16:48

## 2019-03-11 RX ADMIN — Medication 125 MILLIGRAM(S): at 00:45

## 2019-03-11 RX ADMIN — TIOTROPIUM BROMIDE 1 CAPSULE(S): 18 CAPSULE ORAL; RESPIRATORY (INHALATION) at 21:27

## 2019-03-11 RX ADMIN — SENNA PLUS 2 TABLET(S): 8.6 TABLET ORAL at 21:27

## 2019-03-11 NOTE — PROGRESS NOTE ADULT - ASSESSMENT
Impression:  chronic resp failure   copd / most nico has pulm htn   chf   elie / ohs         Plan:  start symbicort for now 160 mg Q 12 hrs   taper solumderol   keep pox > 87%   bipap at [facility] and as needed   need to add LAMA upon discharge   follow cx   dvt prophylaxis

## 2019-03-11 NOTE — ED PROVIDER NOTE - CLINICAL SUMMARY MEDICAL DECISION MAKING FREE TEXT BOX
Work-up reviewed.  CXR negative. EKG no acute changes. Will continue Bipap and admit for observation.

## 2019-03-11 NOTE — H&P ADULT - HISTORY OF PRESENT ILLNESS
77yo male with several previous admissions for respiratory issues presents to the ER earlier with worsening shortness of breath. Denies fevers or change in his usual cough. Actually got better with EMS interventions which included NIV and nebulizer medication (Difficult to fully understand as NIV is in place) 79yo male with several previous admissions for respiratory issues presents to the ER earlier with worsening shortness of breath. Denies fevers or change in his usual cough. Actually got better with EMS interventions which included NIV and nebulizer medication

## 2019-03-11 NOTE — H&P ADULT - NSHPLABSRESULTS_GEN_ALL_CORE
CXR ro me possible opacities to both bases but more likely due to obesity                          13.1   13.05 )-----------( 472      ( 11 Mar 2019 00:30 )             45.5     03-11    143  |  104  |  39<H>  ----------------------------<  141<H>  5.6<H>   |  30  |  1.7<H>    Ca    9.1      11 Mar 2019 00:30  Mg     2.1     03-11    TPro  7.0  /  Alb  4.2  /  TBili  0.5  /  DBili  x   /  AST  19  /  ALT  11  /  AlkPhos  70  03-11            PT/INR - ( 11 Mar 2019 00:30 )   PT: 13.40 sec;   INR: 1.17 ratio         PTT - ( 11 Mar 2019 00:30 )  PTT:30.3 sec  Lactate Trend    CARDIAC MARKERS ( 11 Mar 2019 00:30 )  x     / 0.01 ng/mL / x     / x     / x          CAPILLARY BLOOD GLUCOSE

## 2019-03-11 NOTE — ED PROVIDER NOTE - CARE PLAN
Principal Discharge DX:	Shortness of breath  Secondary Diagnosis:	Respiratory distress  Secondary Diagnosis:	COPD exacerbation

## 2019-03-11 NOTE — ED ADULT NURSE NOTE - NSIMPLEMENTINTERV_GEN_ALL_ED
Implemented All Universal Safety Interventions:  [place] to call system. Call bell, personal items and telephone within reach. Instruct patient to call for assistance. Room bathroom lighting operational. Non-slip footwear when patient is off stretcher. Physically safe environment: no spills, clutter or unnecessary equipment. Stretcher in lowest position, wheels locked, appropriate side rails in place.

## 2019-03-11 NOTE — ED PROVIDER NOTE - OBJECTIVE STATEMENT
79 y/o M with PMH DM, HTN, HLD, CKD, COPD on 5LPM at home, recent admission for acute on chronic respiratory failure presents with SOB x tonight. no palliating/provoking factors. Denies CP, palpitations, back pain, abdominal pain, n/v/d, fevers, sweats, chills, trauma, fall, cough, recent travel, recent illness, sick contacts, leg pain/swelling, urinary symptoms, rash.

## 2019-03-11 NOTE — H&P ADULT - PROBLEM SELECTOR PLAN 1
Due to COPD exacerbation Due to COPD exacerbation, will hold usual meds such as spiriva and prednisone and give duoneb every 4 hours with IV steroids and continue Levaquin started in the ER. Pulmonary consult

## 2019-03-11 NOTE — ED PROVIDER NOTE - NS ED ROS FT
Review of Systems    Constitutional: (-) fever  Eyes/ENT: (-) blurry vision  Cardiovascular: (-) chest pain, (-) syncope  Respiratory: (-) cough, (+) shortness of breath  Gastrointestinal: (-) vomiting, (-) diarrhea  Genitourinary:  (-) dysuria   Musculoskeletal: (-) neck pain, (-) back pain  Integumentary: (-) rash, (-) edema  Neurological: (-) headache  Hematologic: (-) easy bruising

## 2019-03-11 NOTE — ED PROVIDER NOTE - PHYSICAL EXAMINATION
PHYSICAL EXAM:    GENERAL: Alert, appears stated age, well appearing, non-toxic  SKIN: Warm, pink and dry. MMM.   EYE: Normal lids/conjunctiva  ENT: Normal hearing, patent oropharynx  NECK: +supple. No meningismus  Pulm: Bilateral BS, increased resp effort, no stridor, or retractions +wheezing throughout. on CPAP from EMS. speaking in 3 word sentences.   CV: RRR, no M/R/G, 2+and = radial pulses  Abd: soft, non-tender, non-distended  Mskel: no erythema, cyanosis, edema. no calf tenderness  Neuro: AAOx3, 5/5 strength throughout.

## 2019-03-11 NOTE — H&P ADULT - PROBLEM SELECTOR PLAN 5
List of medications is from Dec 2018 and Jan 2019 - although ordered for the most part unchanged (except lower insulin doses) will need to verify with usual MD's when possible (in particular Kayexalate doses) - initial potassium was high but normal on VBG

## 2019-03-11 NOTE — ED PROVIDER NOTE - ATTENDING CONTRIBUTION TO CARE
I personally evaluated the patient. I reviewed the Resident’s or Physician Assistant’s note (as assigned above), and agree with the findings and plan except as documented in my note.  Chart reviewed. H/O COPD, brought in by EMS in respiratory distress. Given Duoneb and placed on CPAP prior to arrival in ER. Exam shows alert patient in no distress, HEENT NCAT, bilateral wheezing, RR S1S2, abdomen soft NT +BS, no CCE. Will order BiPAP, nebs, Solumedrol, MG, labs, EKG, CXR and re-assess.

## 2019-03-11 NOTE — PROGRESS NOTE ADULT - SUBJECTIVE AND OBJECTIVE BOX
Patient is a 78y old  Male who presents with a chief complaint of COPD (11 Mar 2019 04:09)      HPI:  (Difficult to fully understand as NIV is in place) 79yo male with several previous admissions for respiratory issues presents to the ER earlier with worsening shortness of breath. Denies fevers or change in his usual cough. Actually got better with EMS interventions which included NIV and nebulizer medication (11 Mar 2019 04:09) patient supposed to be on LABA/LAMA and inhaled steroids which seems he was not getting them was only on rescue medication   now he is back to his baseline   he in on 4-5 liter on oxygen at home       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  High cholesterol  GERD (gastroesophageal reflux disease)  Enlarged prostate  Oxygen dependent  COPD (chronic obstructive pulmonary disease)  Diabetes mellitus, type 2  Hypertension  Emphysema lung  History of hemorrhoidectomy  Dysplastic colon polyp: removed      FAMILY HISTORY:  No pertinent family history in first degree relatives    Family history: No family cardiovascular system   Occupation:  Alochol: Denied  Smoking: ex  Drug Use: Denied  Marital Status:           Allergies    fish (Other)  iodine (Hives)  penicillin (Unknown)  shellfish (Other)    Intolerances    aspirin (Other)      Home Medications:  ascorbic acid 500 mg oral tablet: 1 tab(s) orally once a day (24 Jan 2019 10:06)  atorvastatin 20 mg oral tablet: 1 tab(s) orally once a day (at bedtime) (24 Jan 2019 10:06)  cholecalciferol oral tablet: 1000 unit(s) orally once a day (24 Jan 2019 10:06)  finasteride 5 mg oral tablet: 1 tab(s) orally once a day (24 Jan 2019 10:06)  magnesium oxide 400 mg (241.3 mg elemental magnesium) oral tablet: 1 tab(s) orally 3 times a day (with meals) (24 Jan 2019 10:06)  metoprolol tartrate 25 mg oral tablet: 1 tab(s) orally 2 times a day (24 Jan 2019 10:06)  ocular lubricant ophthalmic solution: 1 drop(s) to each affected eye 3 times a day (24 Jan 2019 10:06)  sodium polystyrene sulfonate: 30 milligram(s) orally 3 times a week (28 Dec 2018 08:26)  tamsulosin 0.4 mg oral capsule: 1 cap(s) orally once a day (at bedtime) (24 Jan 2019 10:06)      ROS: as in HPI; All other systems reviewed are negative        PHYSICAL EXAM:  Vital Signs Last 24 Hrs  T(C): 35.9 (11 Mar 2019 03:05), Max: 36.6 (11 Mar 2019 00:50)  T(F): 96.7 (11 Mar 2019 03:05), Max: 97.8 (11 Mar 2019 00:50)  HR: 88 (11 Mar 2019 08:38) (75 - 98)  BP: 140/73 (11 Mar 2019 03:05) (117/56 - 140/73)  BP(mean): --  RR: 22 (11 Mar 2019 08:38) (20 - 22)  SpO2: 92% (11 Mar 2019 08:38) (92% - 100%)      GENERAL: NAD, well-groomed, well-developed  HEAD:  Atraumatic, Normocephalic  EYES: EOMI, PERRLA, conjunctiva and sclera clear  ENMT: No tonsillar erythema, exudates, or enlargement; Moist mucous membranes, Good dentition, No lesions  NECK: Supple, No JVD, Normal thyroid  NERVOUS SYSTEM:  Alert & Oriented X3, Good concentration; Motor Strength 5/5 B/L upper and lower extremities; DTRs 2+ intact and symmetric  CHEST/LUNG: b/l wheeze mild   HEART: Regular rate and rhythm; No murmurs, rubs, or gallops  ABDOMEN: Soft, Nontender, Nondistended; Bowel sounds present  EXTREMITIES:  2+ Peripheral Pulses, No clubbing, cyanosis, 1 edema   LYMPH: No lymphadenopathy noted  SKIN: No rashes or lesions    HOSPITAL MEDICATIONS:  MEDICATIONS  (STANDING):  ALBUTerol/ipratropium for Nebulization 3 milliLiter(s) Nebulizer every 4 hours  artificial  tears Solution 1 Drop(s) Both EYES three times a day  ascorbic acid 500 milliGRAM(s) Oral daily  atorvastatin 20 milliGRAM(s) Oral at bedtime  chlorhexidine 4% Liquid 1 Application(s) Topical <User Schedule>  cholecalciferol 1000 Unit(s) Oral daily  dextrose 5%. 1000 milliLiter(s) (50 mL/Hr) IV Continuous <Continuous>  dextrose 50% Injectable 12.5 Gram(s) IV Push once  dextrose 50% Injectable 25 Gram(s) IV Push once  dextrose 50% Injectable 25 Gram(s) IV Push once  finasteride 5 milliGRAM(s) Oral daily  heparin  Injectable 5000 Unit(s) SubCutaneous every 12 hours  insulin glargine Injectable (LANTUS) 10 Unit(s) SubCutaneous every morning  insulin glargine Injectable (LANTUS) 10 Unit(s) SubCutaneous at bedtime  insulin lispro Injectable (HumaLOG) 8 Unit(s) SubCutaneous three times a day before meals  levoFLOXacin IVPB 750 milliGRAM(s) IV Intermittent every 24 hours  magnesium oxide 400 milliGRAM(s) Oral three times a day with meals  methylPREDNISolone sodium succinate Injectable 60 milliGRAM(s) IV Push every 8 hours  metoprolol tartrate 25 milliGRAM(s) Oral two times a day  sodium polystyrene sulfonate Enema 30 Gram(s) Rectal every other day  tamsulosin 0.4 milliGRAM(s) Oral at bedtime    MEDICATIONS  (PRN):  dextrose 40% Gel 15 Gram(s) Oral once PRN Blood Glucose LESS THAN 70 milliGRAM(s)/deciliter  glucagon  Injectable 1 milliGRAM(s) IntraMuscular once PRN Glucose LESS THAN 70 milligrams/deciliter      LABS:                        12.8   11.87 )-----------( 447      ( 11 Mar 2019 06:37 )             43.8     03-11    142  |  104  |  44<H>  ----------------------------<  214<H>  5.9<H>   |  27  |  1.6<H>    Ca    9.0      11 Mar 2019 06:37  Mg     2.5     03-11    TPro  6.2  /  Alb  4.0  /  TBili  0.5  /  DBili  x   /  AST  10  /  ALT  10  /  AlkPhos  68  03-11    PT/INR - ( 11 Mar 2019 00:30 )   PT: 13.40 sec;   INR: 1.17 ratio         PTT - ( 11 Mar 2019 00:30 )  PTT:30.3 sec      Venous Blood Gas:  03-11 @ 00:39  7.31/66/45/33/74  VBG Lactate: 0.9          RADIOLOGY:  [ ] Reviewed and interpreted by me    ECHO:    Point of Care Ultrasound Findings;    PFT:

## 2019-03-12 LAB
ANION GAP SERPL CALC-SCNC: 10 MMOL/L — SIGNIFICANT CHANGE UP (ref 7–14)
ANION GAP SERPL CALC-SCNC: 12 MMOL/L — SIGNIFICANT CHANGE UP (ref 7–14)
BUN SERPL-MCNC: 53 MG/DL — HIGH (ref 10–20)
BUN SERPL-MCNC: 57 MG/DL — HIGH (ref 10–20)
CALCIUM SERPL-MCNC: 9.1 MG/DL — SIGNIFICANT CHANGE UP (ref 8.5–10.1)
CALCIUM SERPL-MCNC: 9.2 MG/DL — SIGNIFICANT CHANGE UP (ref 8.5–10.1)
CHLORIDE SERPL-SCNC: 100 MMOL/L — SIGNIFICANT CHANGE UP (ref 98–110)
CHLORIDE SERPL-SCNC: 101 MMOL/L — SIGNIFICANT CHANGE UP (ref 98–110)
CO2 SERPL-SCNC: 26 MMOL/L — SIGNIFICANT CHANGE UP (ref 17–32)
CO2 SERPL-SCNC: 29 MMOL/L — SIGNIFICANT CHANGE UP (ref 17–32)
CREAT SERPL-MCNC: 1.5 MG/DL — SIGNIFICANT CHANGE UP (ref 0.7–1.5)
CREAT SERPL-MCNC: 1.6 MG/DL — HIGH (ref 0.7–1.5)
ESTIMATED AVERAGE GLUCOSE: 160 MG/DL — HIGH (ref 68–114)
GLUCOSE BLDC GLUCOMTR-MCNC: 171 MG/DL — HIGH (ref 70–99)
GLUCOSE BLDC GLUCOMTR-MCNC: 199 MG/DL — HIGH (ref 70–99)
GLUCOSE BLDC GLUCOMTR-MCNC: 214 MG/DL — HIGH (ref 70–99)
GLUCOSE BLDC GLUCOMTR-MCNC: 229 MG/DL — HIGH (ref 70–99)
GLUCOSE BLDC GLUCOMTR-MCNC: 287 MG/DL — HIGH (ref 70–99)
GLUCOSE BLDC GLUCOMTR-MCNC: 366 MG/DL — HIGH (ref 70–99)
GLUCOSE SERPL-MCNC: 321 MG/DL — HIGH (ref 70–99)
GLUCOSE SERPL-MCNC: 446 MG/DL — HIGH (ref 70–99)
HBA1C BLD-MCNC: 7.2 % — HIGH (ref 4–5.6)
HCT VFR BLD CALC: 44.6 % — SIGNIFICANT CHANGE UP (ref 42–52)
HGB BLD-MCNC: 12.8 G/DL — LOW (ref 14–18)
MAGNESIUM SERPL-MCNC: 2.2 MG/DL — SIGNIFICANT CHANGE UP (ref 1.8–2.4)
MCHC RBC-ENTMCNC: 23 PG — LOW (ref 27–31)
MCHC RBC-ENTMCNC: 28.7 G/DL — LOW (ref 32–37)
MCV RBC AUTO: 80.1 FL — SIGNIFICANT CHANGE UP (ref 80–94)
NRBC # BLD: 0 /100 WBCS — SIGNIFICANT CHANGE UP (ref 0–0)
PHOSPHATE SERPL-MCNC: 4.4 MG/DL — SIGNIFICANT CHANGE UP (ref 2.1–4.9)
PLATELET # BLD AUTO: 551 K/UL — HIGH (ref 130–400)
POTASSIUM SERPL-MCNC: 5.6 MMOL/L — HIGH (ref 3.5–5)
POTASSIUM SERPL-MCNC: 5.7 MMOL/L — HIGH (ref 3.5–5)
POTASSIUM SERPL-SCNC: 5.6 MMOL/L — HIGH (ref 3.5–5)
POTASSIUM SERPL-SCNC: 5.7 MMOL/L — HIGH (ref 3.5–5)
RBC # BLD: 5.57 M/UL — SIGNIFICANT CHANGE UP (ref 4.7–6.1)
RBC # FLD: 17.1 % — HIGH (ref 11.5–14.5)
SODIUM SERPL-SCNC: 139 MMOL/L — SIGNIFICANT CHANGE UP (ref 135–146)
SODIUM SERPL-SCNC: 139 MMOL/L — SIGNIFICANT CHANGE UP (ref 135–146)
WBC # BLD: 14.01 K/UL — HIGH (ref 4.8–10.8)
WBC # FLD AUTO: 14.01 K/UL — HIGH (ref 4.8–10.8)

## 2019-03-12 RX ORDER — SODIUM CHLORIDE 0.65 %
1 AEROSOL, SPRAY (ML) NASAL EVERY 4 HOURS
Qty: 0 | Refills: 0 | Status: DISCONTINUED | OUTPATIENT
Start: 2019-03-12 | End: 2019-03-29

## 2019-03-12 RX ORDER — SODIUM POLYSTYRENE SULFONATE 4.1 MEQ/G
30 POWDER, FOR SUSPENSION ORAL ONCE
Qty: 0 | Refills: 0 | Status: COMPLETED | OUTPATIENT
Start: 2019-03-12 | End: 2019-03-12

## 2019-03-12 RX ADMIN — Medication 1 DROP(S): at 06:46

## 2019-03-12 RX ADMIN — INSULIN GLARGINE 25 UNIT(S): 100 INJECTION, SOLUTION SUBCUTANEOUS at 10:06

## 2019-03-12 RX ADMIN — Medication 1 DROP(S): at 14:17

## 2019-03-12 RX ADMIN — Medication 3 MILLILITER(S): at 07:35

## 2019-03-12 RX ADMIN — Medication 2: at 09:01

## 2019-03-12 RX ADMIN — TIOTROPIUM BROMIDE 1 CAPSULE(S): 18 CAPSULE ORAL; RESPIRATORY (INHALATION) at 09:04

## 2019-03-12 RX ADMIN — Medication 3 MILLILITER(S): at 15:49

## 2019-03-12 RX ADMIN — ATORVASTATIN CALCIUM 20 MILLIGRAM(S): 80 TABLET, FILM COATED ORAL at 22:58

## 2019-03-12 RX ADMIN — Medication 25 MILLIGRAM(S): at 17:10

## 2019-03-12 RX ADMIN — Medication 12 UNIT(S): at 12:08

## 2019-03-12 RX ADMIN — Medication 3 MILLILITER(S): at 19:42

## 2019-03-12 RX ADMIN — Medication 12 UNIT(S): at 09:00

## 2019-03-12 RX ADMIN — Medication 1000 UNIT(S): at 11:32

## 2019-03-12 RX ADMIN — Medication 12 UNIT(S): at 17:06

## 2019-03-12 RX ADMIN — Medication 1 DROP(S): at 22:59

## 2019-03-12 RX ADMIN — HEPARIN SODIUM 5000 UNIT(S): 5000 INJECTION INTRAVENOUS; SUBCUTANEOUS at 17:10

## 2019-03-12 RX ADMIN — HEPARIN SODIUM 5000 UNIT(S): 5000 INJECTION INTRAVENOUS; SUBCUTANEOUS at 06:45

## 2019-03-12 RX ADMIN — Medication 3: at 12:09

## 2019-03-12 RX ADMIN — BUDESONIDE AND FORMOTEROL FUMARATE DIHYDRATE 2 PUFF(S): 160; 4.5 AEROSOL RESPIRATORY (INHALATION) at 19:43

## 2019-03-12 RX ADMIN — BUDESONIDE AND FORMOTEROL FUMARATE DIHYDRATE 2 PUFF(S): 160; 4.5 AEROSOL RESPIRATORY (INHALATION) at 09:05

## 2019-03-12 RX ADMIN — Medication 60 MILLIGRAM(S): at 06:45

## 2019-03-12 RX ADMIN — Medication 100 MILLIGRAM(S): at 22:58

## 2019-03-12 RX ADMIN — Medication 5: at 17:06

## 2019-03-12 RX ADMIN — TAMSULOSIN HYDROCHLORIDE 0.4 MILLIGRAM(S): 0.4 CAPSULE ORAL at 22:58

## 2019-03-12 RX ADMIN — SODIUM POLYSTYRENE SULFONATE 30 GRAM(S): 4.1 POWDER, FOR SUSPENSION ORAL at 18:28

## 2019-03-12 RX ADMIN — Medication 3 MILLILITER(S): at 00:15

## 2019-03-12 RX ADMIN — Medication 500 MILLIGRAM(S): at 11:32

## 2019-03-12 RX ADMIN — INSULIN GLARGINE 25 UNIT(S): 100 INJECTION, SOLUTION SUBCUTANEOUS at 22:57

## 2019-03-12 RX ADMIN — SENNA PLUS 2 TABLET(S): 8.6 TABLET ORAL at 22:59

## 2019-03-12 RX ADMIN — FINASTERIDE 5 MILLIGRAM(S): 5 TABLET, FILM COATED ORAL at 11:32

## 2019-03-12 NOTE — PROGRESS NOTE ADULT - ASSESSMENT
patient with dyspnea    1) Acute on Chronic Resp Failure with Hypoxia/decompensated COPD exac:  -continue with IV steroids, symbicort, nebs and high flow O2  -PPI for GI ppx  -saline nasal spray    2) Chronic Diastolic CHF; stable (not on lasix; will clarify)    3) CKD stage III; needs outpatient nephrology follow up and kidney monitoring    4) Hyperkalemia:  -BMP monitoring; Kayaxalate ordered    5) morbidly obese:  -weight loss and diet modification (non-compliant with diet)    6) DM II with hyperglycemia:  -adjust insulin regimen    7) BPH:  -on flomax    8) Debility  -rehab/pt and oob to chair with assistance    9) Suspected Vitamin D def; continue with oral supplements and outpatient lab monitoring       # Progress Note Handoff  PENDING as follows  consults: Pulm noted   Test: K+ and Finger sticks  Other:  Family discussion: no family at bedside but patient comprehends his medical care   Disposition:  Home ____ or SNF _likely____ Other _____ Unknown at this time ____

## 2019-03-12 NOTE — PROGRESS NOTE ADULT - SUBJECTIVE AND OBJECTIVE BOX
JIAN MANCIA  78y  Male      Patient is a 78y old  Male who presents with a chief complaint of COPD (12 Mar 2019 12:20)      INTERVAL HPI/OVERNIGHT EVENTS:  pt seen and examined at bedside; on high flow O2; but speaking appropriately in full sentences; comprehending his medical care  -Pulmonary consult noted  -continue with current meds as ordered and oob to chair with PT today (Spoke to PT in the morning)  -no labs ordered so K+ ordered and Kayxalate ordered stat  -prognosis guarded  -DNR      Vital Signs Last 24 Hrs  T(C): 36 (12 Mar 2019 14:28), Max: 36.3 (12 Mar 2019 00:23)  T(F): 96.8 (12 Mar 2019 14:28), Max: 97.3 (12 Mar 2019 00:23)  HR: 97 (12 Mar 2019 14:28) (70 - 97)  BP: 136/78 (12 Mar 2019 14:28) (123/60 - 143/68)  SpO2: 90% (12 Mar 2019 11:59) (90% - 96%)    PHYSICAL EXAM:  GENERAL: sitting up in chair, NAD and speaking in full sentences even though on High flow O2  HEAD:  Atraumatic, Normocephalic  EYES: EOMI, PERRLA, conjunctiva and sclera clear  NERVOUS SYSTEM:  Alert & Oriented X 3  CHEST/LUNG: decreased BS b/l  HEART: Regular rate and rhythm; No murmurs, rubs, or gallops  ABDOMEN: Soft, Nontender, obese abdomen, Bowel sounds present  EXTREMITIES:  2+ Peripheral Pulses present. LE edema noted   SKIN: No rashes or lesions    LAB:                        12.8   14.01 )-----------( 551      ( 12 Mar 2019 12:12 )             44.6     03-12    139  |  100  |  57<H>  ----------------------------<  446<H>  5.6<H>   |  29  |  1.6<H>    Ca    9.1      12 Mar 2019 15:43  Phos  4.4     03-12  Mg     2.2     03-12    TPro  6.2  /  Alb  4.0  /  TBili  0.5  /  DBili  x   /  AST  10  /  ALT  10  /  AlkPhos  68  03-11    CARDIAC MARKERS ( 11 Mar 2019 00:30 )  x     / 0.01 ng/mL / x     / x     / x        POCT Blood Glucose.: 366 mg/dL (12 Mar 2019 16:57)  POCT Blood Glucose.: 287 mg/dL (12 Mar 2019 11:49)  POCT Blood Glucose.: 214 mg/dL (12 Mar 2019 08:57)  POCT Blood Glucose.: 171 mg/dL (12 Mar 2019 07:48)  POCT Blood Glucose.: 278 mg/dL (11 Mar 2019 21:39)    LIVER FUNCTIONS - ( 11 Mar 2019 06:37 )  Alb: 4.0 g/dL / Pro: 6.2 g/dL / ALK PHOS: 68 U/L / ALT: 10 U/L / AST: 10 U/L / GGT: x           RADIOLOGY:    Imaging Personally Reviewed:  [ Y ] YES  [ ] NO    HEALTH ISSUES - PROBLEM Dx:  Medication management: Medication management  Enlarged prostate: Enlarged prostate  Type 2 diabetes mellitus without complication, with long-term current use of insulin: Type 2 diabetes mellitus without complication, with long-term current use of insulin  COPD exacerbation: COPD exacerbation  Shortness of breath: Shortness of breath      MEDS:    ALBUTerol/ipratropium for Nebulization 3 milliLiter(s) Nebulizer every 4 hours  artificial  tears Solution 1 Drop(s) Both EYES three times a day  ascorbic acid 500 milliGRAM(s) Oral daily  atorvastatin 20 milliGRAM(s) Oral at bedtime  buDESOnide 160 MICROgram(s)/formoterol 4.5 MICROgram(s) Inhaler 2 Puff(s) Inhalation two times a day  chlorhexidine 4% Liquid 1 Application(s) Topical <User Schedule>  cholecalciferol 1000 Unit(s) Oral daily  dextrose 40% Gel 15 Gram(s) Oral once PRN  dextrose 5%. 1000 milliLiter(s) IV Continuous <Continuous>  dextrose 50% Injectable 12.5 Gram(s) IV Push once  dextrose 50% Injectable 25 Gram(s) IV Push once  dextrose 50% Injectable 25 Gram(s) IV Push once  docusate sodium 100 milliGRAM(s) Oral three times a day  finasteride 5 milliGRAM(s) Oral daily  glucagon  Injectable 1 milliGRAM(s) IntraMuscular once PRN  heparin  Injectable 5000 Unit(s) SubCutaneous every 12 hours  insulin glargine Injectable (LANTUS) 25 Unit(s) SubCutaneous two times a day  insulin lispro (HumaLOG) corrective regimen sliding scale   SubCutaneous three times a day before meals  insulin lispro (HumaLOG) corrective regimen sliding scale   SubCutaneous at bedtime  insulin lispro Injectable (HumaLOG) 12 Unit(s) SubCutaneous three times a day before meals  levoFLOXacin IVPB 750 milliGRAM(s) IV Intermittent every 24 hours  methylPREDNISolone sodium succinate Injectable 60 milliGRAM(s) IV Push daily  metoprolol tartrate 25 milliGRAM(s) Oral two times a day  senna 2 Tablet(s) Oral at bedtime  sodium chloride 0.65% Nasal 1 Spray(s) Both Nostrils every 4 hours PRN  sodium polystyrene sulfonate Suspension 30 Gram(s) Oral every 48 hours  tamsulosin 0.4 milliGRAM(s) Oral at bedtime  tiotropium 18 MICROgram(s) Capsule 1 Capsule(s) Inhalation daily

## 2019-03-12 NOTE — PROGRESS NOTE ADULT - SUBJECTIVE AND OBJECTIVE BOX
Patient is a 78y old  Male who presents with a chief complaint of COPD (11 Mar 2019 11:07)      INTERVAL HISTORY/overnight events stable feel better still on high flow         Vital Signs Last 24 Hrs  T(C): 35.8 (12 Mar 2019 06:25), Max: 36.3 (11 Mar 2019 12:00)  T(F): 96.5 (12 Mar 2019 06:25), Max: 97.3 (11 Mar 2019 12:00)  HR: 70 (12 Mar 2019 06:25) (70 - 84)  BP: 123/60 (12 Mar 2019 06:25) (123/60 - 143/68)  BP(mean): --  RR: 22 (11 Mar 2019 12:00) (22 - 22)  SpO2: 92% (12 Mar 2019 08:38) (92% - 96%)  Daily     Daily   I&O's Summary      Physical Examination:    General: No acute distress.  Alert, oriented, interactive, nonfocal    HEENT: Pupils equal, reactive to light.  Symmetric.    PULM: Clear to auscultation bilaterally, no significant sputum production    CVS: Regular rate and rhythm, no murmurs, rubs, or gallops    ABD: Soft, nondistended, nontender, normoactive bowel sounds, no masses    EXT: 1edema, nontender    SKIN: Warm and well perfused, no rashes noted.    Neurology: no focla deficit     Musculoskeletal : Move all extremety         Lab Results:                        12.8   11.87 )-----------( 447      ( 11 Mar 2019 06:37 )             43.8     11 Mar 2019 06:37    142    |  104    |  44     ----------------------------<  214    5.9     |  27     |  1.6      Ca    9.0        11 Mar 2019 06:37  Mg     2.5       11 Mar 2019 06:37      PT/INR - ( 11 Mar 2019 00:30 )   PT: 13.40 sec;   INR: 1.17 ratio         PTT - ( 11 Mar 2019 00:30 )  PTT:30.3 sec    CARDIAC MARKERS ( 11 Mar 2019 00:30 )  x     / 0.01 ng/mL / x     / x     / x            Microbiology      Medication:  MEDICATIONS  (STANDING):  ALBUTerol/ipratropium for Nebulization 3 milliLiter(s) Nebulizer every 4 hours  artificial  tears Solution 1 Drop(s) Both EYES three times a day  ascorbic acid 500 milliGRAM(s) Oral daily  atorvastatin 20 milliGRAM(s) Oral at bedtime  buDESOnide 160 MICROgram(s)/formoterol 4.5 MICROgram(s) Inhaler 2 Puff(s) Inhalation two times a day  chlorhexidine 4% Liquid 1 Application(s) Topical <User Schedule>  cholecalciferol 1000 Unit(s) Oral daily  dextrose 5%. 1000 milliLiter(s) (50 mL/Hr) IV Continuous <Continuous>  dextrose 50% Injectable 12.5 Gram(s) IV Push once  dextrose 50% Injectable 25 Gram(s) IV Push once  dextrose 50% Injectable 25 Gram(s) IV Push once  docusate sodium 100 milliGRAM(s) Oral three times a day  finasteride 5 milliGRAM(s) Oral daily  heparin  Injectable 5000 Unit(s) SubCutaneous every 12 hours  insulin glargine Injectable (LANTUS) 25 Unit(s) SubCutaneous two times a day  insulin lispro (HumaLOG) corrective regimen sliding scale   SubCutaneous three times a day before meals  insulin lispro (HumaLOG) corrective regimen sliding scale   SubCutaneous at bedtime  insulin lispro Injectable (HumaLOG) 12 Unit(s) SubCutaneous three times a day before meals  levoFLOXacin IVPB 750 milliGRAM(s) IV Intermittent every 24 hours  methylPREDNISolone sodium succinate Injectable 60 milliGRAM(s) IV Push daily  metoprolol tartrate 25 milliGRAM(s) Oral two times a day  senna 2 Tablet(s) Oral at bedtime  sodium polystyrene sulfonate Suspension 30 Gram(s) Oral every 48 hours  tamsulosin 0.4 milliGRAM(s) Oral at bedtime  tiotropium 18 MICROgram(s) Capsule 1 Capsule(s) Inhalation daily    MEDICATIONS  (PRN):  dextrose 40% Gel 15 Gram(s) Oral once PRN Blood Glucose LESS THAN 70 milliGRAM(s)/deciliter  glucagon  Injectable 1 milliGRAM(s) IntraMuscular once PRN Glucose LESS THAN 70 milligrams/deciliter        IMAGING STUDIES:    Impression:    Plan:

## 2019-03-12 NOTE — CONSULT NOTE ADULT - ASSESSMENT
IMPRESSION: Rehab of 77 y/o  m ptn rehab  for debility      PRECAUTIONS: [  ] Cardiac  [  ] Respiratory  [  ] Seizures [  ] Contact Isolation  [  ] Droplet Isolation  [ FALL ] Other    Weight Bearing Status:     RECOMMENDATION:    Out of Bed to Chair     DVT/Decubiti Prophylaxis    REHAB PLAN:     [ x  ] Bedside P/T 3-5 times a week   [   ]   Bedside O/T  2-3 times a week             [   ] No Rehab Therapy Indicated                   [   ]  Speech Therapy   Conditioning/ROM                                    ADL  Bed Mobility                                               Conditioning/ROM  Transfers                                                     Bed Mobility  Sitting /Standing Balance                         Transfers                                        Gait Training                                               Sitting/Standing Balance  Stair Training [   ]Applicable                    Home equipment Eval                                                                        Splinting  [   ] Only      GOALS:   ADL   [  x ]   Independent                    Transfers  [  x ] Independent                          Ambulation  [   x] Independent     [ x   ] With device                            [   ]  CG                                                         [   ]  CG                                                                  [   ] CG                            [    ] Min A                                                   [   ] Min A                                                              [   ] Min  A          DISCHARGE PLAN:   [   ]  Good candidate for Intensive Rehabilitation/Hospital based-[facility]A [facility]                                             Will tolerate 3hrs Intensive Rehab Daily                                       [    ]  Short Term Rehab in Skilled Nursing Facility                                       [ xxx   ]  Home with Outpatient or VN services                                         [    ]  Possible Candidate for Intensive Hospital based Rehab

## 2019-03-12 NOTE — PROGRESS NOTE ADULT - ASSESSMENT
Impression:  chronic resp failure   copd / most nico has pulm htn   chf   elie / ohs         Plan:  stat BMP check K case discussed with hospitalist   symbicort for now 160 mg Q 12 hrs   taper solumderol   keep pox > 87%   bipap at night and as needed   need to add LAMA upon discharge   follow cx   dvt prophylaxis   taper oxygen top keep Pox > 88%

## 2019-03-12 NOTE — CONSULT NOTE ADULT - SUBJECTIVE AND OBJECTIVE BOX
HPI:  77yo male with several previous admissions for respiratory issues presents to the ER earlier with worsening shortness of breath. Denies fevers or change in his usual cough. Actually got better with EMS interventions which included NIV and nebulizer medication .     PTN  REFERRED TO ACUTE  REHAB  FOR  EVAL AND  TX   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  High cholesterol  GERD (gastroesophageal reflux disease)  Enlarged prostate  Oxygen dependent  COPD (chronic obstructive pulmonary disease)  Diabetes mellitus, type 2  Hypertension  Emphysema lung  History of hemorrhoidectomy  Dysplastic colon polyp: removed      Hospital Course:    TODAY'S SUBJECTIVE & REVIEW OF SYMPTOMS:     Constitutional WNL   Cardio WNL   Resp WNL   GI WNL  Heme WNL  Endo WNL  Skin WNL  MSK WNL  Neuro WNL  Cognitive WNL  Psych WNL      MEDICATIONS  (STANDING):  ALBUTerol/ipratropium for Nebulization 3 milliLiter(s) Nebulizer every 4 hours  artificial  tears Solution 1 Drop(s) Both EYES three times a day  ascorbic acid 500 milliGRAM(s) Oral daily  atorvastatin 20 milliGRAM(s) Oral at bedtime  buDESOnide 160 MICROgram(s)/formoterol 4.5 MICROgram(s) Inhaler 2 Puff(s) Inhalation two times a day  chlorhexidine 4% Liquid 1 Application(s) Topical <User Schedule>  cholecalciferol 1000 Unit(s) Oral daily  dextrose 5%. 1000 milliLiter(s) (50 mL/Hr) IV Continuous <Continuous>  dextrose 50% Injectable 12.5 Gram(s) IV Push once  dextrose 50% Injectable 25 Gram(s) IV Push once  dextrose 50% Injectable 25 Gram(s) IV Push once  docusate sodium 100 milliGRAM(s) Oral three times a day  finasteride 5 milliGRAM(s) Oral daily  heparin  Injectable 5000 Unit(s) SubCutaneous every 12 hours  insulin glargine Injectable (LANTUS) 25 Unit(s) SubCutaneous two times a day  insulin lispro (HumaLOG) corrective regimen sliding scale   SubCutaneous three times a day before meals  insulin lispro (HumaLOG) corrective regimen sliding scale   SubCutaneous at bedtime  insulin lispro Injectable (HumaLOG) 12 Unit(s) SubCutaneous three times a day before meals  levoFLOXacin IVPB 750 milliGRAM(s) IV Intermittent every 24 hours  methylPREDNISolone sodium succinate Injectable 60 milliGRAM(s) IV Push daily  metoprolol tartrate 25 milliGRAM(s) Oral two times a day  senna 2 Tablet(s) Oral at bedtime  sodium polystyrene sulfonate Suspension 30 Gram(s) Oral every 48 hours  tamsulosin 0.4 milliGRAM(s) Oral at bedtime  tiotropium 18 MICROgram(s) Capsule 1 Capsule(s) Inhalation daily    MEDICATIONS  (PRN):  dextrose 40% Gel 15 Gram(s) Oral once PRN Blood Glucose LESS THAN 70 milliGRAM(s)/deciliter  glucagon  Injectable 1 milliGRAM(s) IntraMuscular once PRN Glucose LESS THAN 70 milligrams/deciliter      FAMILY HISTORY:  No pertinent family history in first degree relatives      Allergies    fish (Other)  iodine (Hives)  penicillin (Unknown)  shellfish (Other)    Intolerances    aspirin (Other)      SOCIAL HISTORY:    [  ] Etoh  [  ] Smoking  [  ] Substance abuse     Home Environment:  [  ] Home Alone  [ x ] Lives with Family  [  ] Home Health Aid    Dwelling:  [  ] Apartment  [ x ] Private House  [  ] Adult Home  [  ] Skilled Nursing Facility      [  ] Short Term  [  ] Long Term  [ x ] Stairs       Elevator [  ]    FUNCTIONAL STATUS PTA: (Check all that apply)  Ambulation: [ x  ]Independent    [  ] Dependent     [  ] Non-Ambulatory  Assistive Device: [  ] SA Cane  [  ]  Q Cane  [x  ] Walker  [  ]  Wheelchair  ADL : [  x] Independent  [  ]  Dependent       Vital Signs Last 24 Hrs  T(C): 35.8 (12 Mar 2019 06:25), Max: 36.3 (12 Mar 2019 00:23)  T(F): 96.5 (12 Mar 2019 06:25), Max: 97.3 (12 Mar 2019 00:23)  HR: 70 (12 Mar 2019 06:25) (70 - 76)  BP: 123/60 (12 Mar 2019 06:25) (123/60 - 143/68)  BP(mean): --  RR: --  SpO2: 90% (12 Mar 2019 11:59) (90% - 96%)      PHYSICAL EXAM: Alert & Oriented X3  GENERAL: NAD, well-groomed, well-developed  HEAD:  Atraumatic, Normocephalic  EYES: EOMI, PERRLA, conjunctiva and sclera clear  NECK: Supple, No JVD, Normal thyroid  CHEST/LUNG: Clear to percussion bilaterally; No rales, rhonchi, wheezing, or rubs  HEART: Regular rate and rhythm; No murmurs, rubs, or gallops  ABDOMEN: Soft, Nontender, Nondistended; Bowel sounds present  EXTREMITIES:  2+ Peripheral Pulses, No clubbing, cyanosis, or edema    NERVOUS SYSTEM:  Cranial Nerves 2-12 intact [ x ] Abnormal  [  ]  ROM: WFL all extremities [ x ]  Abnormal [  ]  Motor Strength: WFL all extremities  [ x ]  Abnormal [  ]  Sensation: intact to light touch [x  ] Abnormal [  ]  Reflexes: Symmetric [x  ]  Abnormal [  ]    FUNCTIONAL STATUS:  Bed Mobility: Independent [  ]  Supervision [  x]  Needs Assistance [  ]  N/A [  ]  Transfers: Independent [  ]  Supervision [  x]  Needs Assistance [  ]  N/A [  ]   Ambulation: Independent [  ]  Supervision [ x ]  Needs Assistance [  ]  N/A [  ]  ADL: Independent [  ] Requires Assistance [  ] N/A [x  ]  SEE PT/OT IE NOTES    LABS:                        12.8   11.87 )-----------( 447      ( 11 Mar 2019 06:37 )             43.8     03-11    142  |  104  |  44<H>  ----------------------------<  214<H>  5.9<H>   |  27  |  1.6<H>    Ca    9.0      11 Mar 2019 06:37  Mg     2.5     03-11    TPro  6.2  /  Alb  4.0  /  TBili  0.5  /  DBili  x   /  AST  10  /  ALT  10  /  AlkPhos  68  03-11    PT/INR - ( 11 Mar 2019 00:30 )   PT: 13.40 sec;   INR: 1.17 ratio         PTT - ( 11 Mar 2019 00:30 )  PTT:30.3 sec      RADIOLOGY & ADDITIONAL STUDIES:    Assesment:

## 2019-03-13 LAB
ANION GAP SERPL CALC-SCNC: 9 MMOL/L — SIGNIFICANT CHANGE UP (ref 7–14)
BUN SERPL-MCNC: 60 MG/DL — HIGH (ref 10–20)
CALCIUM SERPL-MCNC: 8.8 MG/DL — SIGNIFICANT CHANGE UP (ref 8.5–10.1)
CHLORIDE SERPL-SCNC: 104 MMOL/L — SIGNIFICANT CHANGE UP (ref 98–110)
CO2 SERPL-SCNC: 30 MMOL/L — SIGNIFICANT CHANGE UP (ref 17–32)
CREAT SERPL-MCNC: 1.5 MG/DL — SIGNIFICANT CHANGE UP (ref 0.7–1.5)
GLUCOSE BLDC GLUCOMTR-MCNC: 134 MG/DL — HIGH (ref 70–99)
GLUCOSE BLDC GLUCOMTR-MCNC: 157 MG/DL — HIGH (ref 70–99)
GLUCOSE BLDC GLUCOMTR-MCNC: 159 MG/DL — HIGH (ref 70–99)
GLUCOSE BLDC GLUCOMTR-MCNC: 258 MG/DL — HIGH (ref 70–99)
GLUCOSE SERPL-MCNC: 151 MG/DL — HIGH (ref 70–99)
HCT VFR BLD CALC: 42.8 % — SIGNIFICANT CHANGE UP (ref 42–52)
HGB BLD-MCNC: 12 G/DL — LOW (ref 14–18)
MCHC RBC-ENTMCNC: 22.7 PG — LOW (ref 27–31)
MCHC RBC-ENTMCNC: 28 G/DL — LOW (ref 32–37)
MCV RBC AUTO: 81.1 FL — SIGNIFICANT CHANGE UP (ref 80–94)
NRBC # BLD: 0 /100 WBCS — SIGNIFICANT CHANGE UP (ref 0–0)
PLATELET # BLD AUTO: 453 K/UL — HIGH (ref 130–400)
POTASSIUM SERPL-MCNC: 5 MMOL/L — SIGNIFICANT CHANGE UP (ref 3.5–5)
POTASSIUM SERPL-SCNC: 5 MMOL/L — SIGNIFICANT CHANGE UP (ref 3.5–5)
RBC # BLD: 5.28 M/UL — SIGNIFICANT CHANGE UP (ref 4.7–6.1)
RBC # FLD: 17.2 % — HIGH (ref 11.5–14.5)
SODIUM SERPL-SCNC: 143 MMOL/L — SIGNIFICANT CHANGE UP (ref 135–146)
WBC # BLD: 12.99 K/UL — HIGH (ref 4.8–10.8)
WBC # FLD AUTO: 12.99 K/UL — HIGH (ref 4.8–10.8)

## 2019-03-13 RX ADMIN — FINASTERIDE 5 MILLIGRAM(S): 5 TABLET, FILM COATED ORAL at 11:49

## 2019-03-13 RX ADMIN — Medication 3 MILLILITER(S): at 20:01

## 2019-03-13 RX ADMIN — SODIUM POLYSTYRENE SULFONATE 30 GRAM(S): 4.1 POWDER, FOR SUSPENSION ORAL at 11:47

## 2019-03-13 RX ADMIN — TAMSULOSIN HYDROCHLORIDE 0.4 MILLIGRAM(S): 0.4 CAPSULE ORAL at 21:54

## 2019-03-13 RX ADMIN — HEPARIN SODIUM 5000 UNIT(S): 5000 INJECTION INTRAVENOUS; SUBCUTANEOUS at 07:15

## 2019-03-13 RX ADMIN — Medication 12 UNIT(S): at 17:51

## 2019-03-13 RX ADMIN — BUDESONIDE AND FORMOTEROL FUMARATE DIHYDRATE 2 PUFF(S): 160; 4.5 AEROSOL RESPIRATORY (INHALATION) at 08:13

## 2019-03-13 RX ADMIN — Medication 100 MILLIGRAM(S): at 07:13

## 2019-03-13 RX ADMIN — Medication 25 MILLIGRAM(S): at 07:15

## 2019-03-13 RX ADMIN — Medication 3 MILLILITER(S): at 08:12

## 2019-03-13 RX ADMIN — Medication 100 MILLIGRAM(S): at 21:08

## 2019-03-13 RX ADMIN — HEPARIN SODIUM 5000 UNIT(S): 5000 INJECTION INTRAVENOUS; SUBCUTANEOUS at 17:53

## 2019-03-13 RX ADMIN — Medication 3 MILLILITER(S): at 13:13

## 2019-03-13 RX ADMIN — Medication 3 MILLILITER(S): at 15:51

## 2019-03-13 RX ADMIN — SENNA PLUS 2 TABLET(S): 8.6 TABLET ORAL at 21:55

## 2019-03-13 RX ADMIN — Medication 60 MILLIGRAM(S): at 07:13

## 2019-03-13 RX ADMIN — INSULIN GLARGINE 25 UNIT(S): 100 INJECTION, SOLUTION SUBCUTANEOUS at 21:54

## 2019-03-13 RX ADMIN — Medication 1 DROP(S): at 21:08

## 2019-03-13 RX ADMIN — INSULIN GLARGINE 25 UNIT(S): 100 INJECTION, SOLUTION SUBCUTANEOUS at 08:41

## 2019-03-13 RX ADMIN — ATORVASTATIN CALCIUM 20 MILLIGRAM(S): 80 TABLET, FILM COATED ORAL at 21:08

## 2019-03-13 RX ADMIN — Medication 3 MILLILITER(S): at 23:51

## 2019-03-13 RX ADMIN — Medication 500 MILLIGRAM(S): at 11:49

## 2019-03-13 RX ADMIN — BUDESONIDE AND FORMOTEROL FUMARATE DIHYDRATE 2 PUFF(S): 160; 4.5 AEROSOL RESPIRATORY (INHALATION) at 21:07

## 2019-03-13 RX ADMIN — TIOTROPIUM BROMIDE 1 CAPSULE(S): 18 CAPSULE ORAL; RESPIRATORY (INHALATION) at 08:41

## 2019-03-13 RX ADMIN — Medication 1000 UNIT(S): at 11:49

## 2019-03-13 RX ADMIN — Medication 25 MILLIGRAM(S): at 17:53

## 2019-03-13 RX ADMIN — Medication 100 MILLIGRAM(S): at 13:42

## 2019-03-13 RX ADMIN — CHLORHEXIDINE GLUCONATE 1 APPLICATION(S): 213 SOLUTION TOPICAL at 08:46

## 2019-03-13 RX ADMIN — Medication 3: at 11:46

## 2019-03-13 RX ADMIN — Medication 12 UNIT(S): at 08:39

## 2019-03-13 RX ADMIN — Medication 12 UNIT(S): at 11:46

## 2019-03-13 RX ADMIN — Medication 1 DROP(S): at 07:11

## 2019-03-13 RX ADMIN — Medication 1 DROP(S): at 13:42

## 2019-03-13 NOTE — PROGRESS NOTE ADULT - SUBJECTIVE AND OBJECTIVE BOX
JIAN MANCIA  78y  Male      Patient is a 78y old  Male who presents with a chief complaint of COPD (12 Mar 2019 12:20)      INTERVAL HPI/OVERNIGHT EVENTS:  pt seen and examined at bedside; on high flow O2; but speaking appropriately in full sentences; comprehending his medical care  -oob to chair today as tolerated (pt desaturated while ambulating)    Vital Signs Last 24 Hrs  T(C): 35.8 (13 Mar 2019 06:25), Max: 37.1 (12 Mar 2019 22:52)  T(F): 96.5 (13 Mar 2019 06:25), Max: 98.8 (12 Mar 2019 22:52)  HR: 92 (13 Mar 2019 13:26) (70 - 97)  BP: 125/60 (13 Mar 2019 06:25) (125/60 - 138/69)  RR: 25 (13 Mar 2019 13:26) (20 - 25)  SpO2: 92% (13 Mar 2019 13:26) (92% - 97%)    PHYSICAL EXAM:  GENERAL: sitting up in chair, NAD and speaking in full sentences even though on High flow O2  HEAD:  Atraumatic, Normocephalic  EYES: EOMI, PERRLA, conjunctiva and sclera clear  NERVOUS SYSTEM:  Alert & Oriented X 3  CHEST/LUNG: decreased BS b/l  HEART: Regular rate and rhythm; No murmurs, rubs, or gallops  ABDOMEN: Soft, Nontender, obese abdomen, Bowel sounds present  EXTREMITIES:  2+ Peripheral Pulses present. LE edema noted   SKIN: No rashes or lesions    LAB:                            12.0   12.99 )-----------( 453      ( 13 Mar 2019 07:15 )             42.8   03-13    143  |  104  |  60<H>  ----------------------------<  151<H>  5.0   |  30  |  1.5    Ca    8.8      13 Mar 2019 07:15  Phos  4.4     03-12  Mg     2.2     03-12      CARDIAC MARKERS ( 11 Mar 2019 00:30 )  x     / 0.01 ng/mL / x     / x     / x        POCT Blood Glucose.: 366 mg/dL (12 Mar 2019 16:57)  POCT Blood Glucose.: 287 mg/dL (12 Mar 2019 11:49)  POCT Blood Glucose.: 214 mg/dL (12 Mar 2019 08:57)  POCT Blood Glucose.: 171 mg/dL (12 Mar 2019 07:48)  POCT Blood Glucose.: 278 mg/dL (11 Mar 2019 21:39)    LIVER FUNCTIONS - ( 11 Mar 2019 06:37 )  Alb: 4.0 g/dL / Pro: 6.2 g/dL / ALK PHOS: 68 U/L / ALT: 10 U/L / AST: 10 U/L / GGT: x           RADIOLOGY:    Imaging Personally Reviewed:  [ Y ] YES  [ ] NO    HEALTH ISSUES - PROBLEM Dx:  Medication management: Medication management  Enlarged prostate: Enlarged prostate  Type 2 diabetes mellitus without complication, with long-term current use of insulin: Type 2 diabetes mellitus without complication, with long-term current use of insulin  COPD exacerbation: COPD exacerbation  Shortness of breath: Shortness of breath    MEDICATIONS  (STANDING):  ALBUTerol/ipratropium for Nebulization 3 milliLiter(s) Nebulizer every 4 hours  artificial  tears Solution 1 Drop(s) Both EYES three times a day  ascorbic acid 500 milliGRAM(s) Oral daily  atorvastatin 20 milliGRAM(s) Oral at bedtime  buDESOnide 160 MICROgram(s)/formoterol 4.5 MICROgram(s) Inhaler 2 Puff(s) Inhalation two times a day  chlorhexidine 4% Liquid 1 Application(s) Topical <User Schedule>  cholecalciferol 1000 Unit(s) Oral daily  dextrose 5%. 1000 milliLiter(s) (50 mL/Hr) IV Continuous <Continuous>  dextrose 50% Injectable 12.5 Gram(s) IV Push once  dextrose 50% Injectable 25 Gram(s) IV Push once  dextrose 50% Injectable 25 Gram(s) IV Push once  docusate sodium 100 milliGRAM(s) Oral three times a day  finasteride 5 milliGRAM(s) Oral daily  heparin  Injectable 5000 Unit(s) SubCutaneous every 12 hours  insulin glargine Injectable (LANTUS) 25 Unit(s) SubCutaneous two times a day  insulin lispro (HumaLOG) corrective regimen sliding scale   SubCutaneous three times a day before meals  insulin lispro (HumaLOG) corrective regimen sliding scale   SubCutaneous at bedtime  insulin lispro Injectable (HumaLOG) 12 Unit(s) SubCutaneous three times a day before meals  levoFLOXacin IVPB 750 milliGRAM(s) IV Intermittent every 24 hours  methylPREDNISolone sodium succinate Injectable 60 milliGRAM(s) IV Push daily  metoprolol tartrate 25 milliGRAM(s) Oral two times a day  senna 2 Tablet(s) Oral at bedtime  sodium polystyrene sulfonate Suspension 30 Gram(s) Oral every 48 hours  tamsulosin 0.4 milliGRAM(s) Oral at bedtime  tiotropium 18 MICROgram(s) Capsule 1 Capsule(s) Inhalation daily    MEDICATIONS  (PRN):  dextrose 40% Gel 15 Gram(s) Oral once PRN Blood Glucose LESS THAN 70 milliGRAM(s)/deciliter  glucagon  Injectable 1 milliGRAM(s) IntraMuscular once PRN Glucose LESS THAN 70 milligrams/deciliter  sodium chloride 0.65% Nasal 1 Spray(s) Both Nostrils every 4 hours PRN Nasal Congestion

## 2019-03-13 NOTE — PHYSICAL THERAPY INITIAL EVALUATION ADULT - GENERAL OBSERVATIONS, REHAB EVAL
10:35-11:00 Chart reviewed. Pt encountered sitting in chair,  may be seen by Physical Therapist as confirmed with Nurse. Patient denied pain at rest but feels that his "legs are getting weak"+O2 via highflow

## 2019-03-13 NOTE — PHYSICAL THERAPY INITIAL EVALUATION ADULT - IMPAIRMENTS CONTRIBUTING TO GAIT DEVIATIONS, PT EVAL
decreased endurance with note of dec in spo2 to high 90's while on high flow O2/impaired balance/impaired postural control/decreased strength

## 2019-03-13 NOTE — PHYSICAL THERAPY INITIAL EVALUATION ADULT - ACTIVE RANGE OF MOTION EXAMINATION, REHAB EVAL
Both upper extremities/Both lower extremities joints within functional limits and painfree AAROM/AROM except for LOM (R) shoulder

## 2019-03-14 LAB
ANION GAP SERPL CALC-SCNC: 11 MMOL/L — SIGNIFICANT CHANGE UP (ref 7–14)
BUN SERPL-MCNC: 57 MG/DL — HIGH (ref 10–20)
CALCIUM SERPL-MCNC: 8.7 MG/DL — SIGNIFICANT CHANGE UP (ref 8.5–10.1)
CHLORIDE SERPL-SCNC: 106 MMOL/L — SIGNIFICANT CHANGE UP (ref 98–110)
CO2 SERPL-SCNC: 27 MMOL/L — SIGNIFICANT CHANGE UP (ref 17–32)
CREAT SERPL-MCNC: 1.9 MG/DL — HIGH (ref 0.7–1.5)
GLUCOSE BLDC GLUCOMTR-MCNC: 110 MG/DL — HIGH (ref 70–99)
GLUCOSE BLDC GLUCOMTR-MCNC: 256 MG/DL — HIGH (ref 70–99)
GLUCOSE BLDC GLUCOMTR-MCNC: 346 MG/DL — HIGH (ref 70–99)
GLUCOSE BLDC GLUCOMTR-MCNC: 365 MG/DL — HIGH (ref 70–99)
GLUCOSE SERPL-MCNC: 111 MG/DL — HIGH (ref 70–99)
HCT VFR BLD CALC: 43 % — SIGNIFICANT CHANGE UP (ref 42–52)
HGB BLD-MCNC: 12.2 G/DL — LOW (ref 14–18)
MCHC RBC-ENTMCNC: 22.6 PG — LOW (ref 27–31)
MCHC RBC-ENTMCNC: 28.4 G/DL — LOW (ref 32–37)
MCV RBC AUTO: 79.8 FL — LOW (ref 80–94)
NRBC # BLD: 0 /100 WBCS — SIGNIFICANT CHANGE UP (ref 0–0)
PLATELET # BLD AUTO: 464 K/UL — HIGH (ref 130–400)
POTASSIUM SERPL-MCNC: 4.7 MMOL/L — SIGNIFICANT CHANGE UP (ref 3.5–5)
POTASSIUM SERPL-SCNC: 4.7 MMOL/L — SIGNIFICANT CHANGE UP (ref 3.5–5)
RBC # BLD: 5.39 M/UL — SIGNIFICANT CHANGE UP (ref 4.7–6.1)
RBC # FLD: 17.3 % — HIGH (ref 11.5–14.5)
SODIUM SERPL-SCNC: 144 MMOL/L — SIGNIFICANT CHANGE UP (ref 135–146)
WBC # BLD: 12.22 K/UL — HIGH (ref 4.8–10.8)
WBC # FLD AUTO: 12.22 K/UL — HIGH (ref 4.8–10.8)

## 2019-03-14 RX ADMIN — ATORVASTATIN CALCIUM 20 MILLIGRAM(S): 80 TABLET, FILM COATED ORAL at 22:06

## 2019-03-14 RX ADMIN — Medication 25 MILLIGRAM(S): at 05:40

## 2019-03-14 RX ADMIN — SENNA PLUS 2 TABLET(S): 8.6 TABLET ORAL at 22:06

## 2019-03-14 RX ADMIN — HEPARIN SODIUM 5000 UNIT(S): 5000 INJECTION INTRAVENOUS; SUBCUTANEOUS at 05:41

## 2019-03-14 RX ADMIN — BUDESONIDE AND FORMOTEROL FUMARATE DIHYDRATE 2 PUFF(S): 160; 4.5 AEROSOL RESPIRATORY (INHALATION) at 09:36

## 2019-03-14 RX ADMIN — Medication 12 UNIT(S): at 17:22

## 2019-03-14 RX ADMIN — Medication 500 MILLIGRAM(S): at 11:13

## 2019-03-14 RX ADMIN — TAMSULOSIN HYDROCHLORIDE 0.4 MILLIGRAM(S): 0.4 CAPSULE ORAL at 22:06

## 2019-03-14 RX ADMIN — HEPARIN SODIUM 5000 UNIT(S): 5000 INJECTION INTRAVENOUS; SUBCUTANEOUS at 17:22

## 2019-03-14 RX ADMIN — Medication 3 MILLILITER(S): at 14:28

## 2019-03-14 RX ADMIN — Medication 600 MILLIGRAM(S): at 22:28

## 2019-03-14 RX ADMIN — Medication 1 DROP(S): at 05:41

## 2019-03-14 RX ADMIN — Medication 3 MILLILITER(S): at 15:52

## 2019-03-14 RX ADMIN — Medication 5: at 17:21

## 2019-03-14 RX ADMIN — Medication 4: at 12:49

## 2019-03-14 RX ADMIN — Medication 100 MILLIGRAM(S): at 05:41

## 2019-03-14 RX ADMIN — TIOTROPIUM BROMIDE 1 CAPSULE(S): 18 CAPSULE ORAL; RESPIRATORY (INHALATION) at 10:04

## 2019-03-14 RX ADMIN — Medication 12 UNIT(S): at 12:49

## 2019-03-14 RX ADMIN — Medication 1000 UNIT(S): at 11:13

## 2019-03-14 RX ADMIN — Medication 100 MILLIGRAM(S): at 22:06

## 2019-03-14 RX ADMIN — Medication 100 MILLIGRAM(S): at 13:20

## 2019-03-14 RX ADMIN — Medication 25 MILLIGRAM(S): at 17:22

## 2019-03-14 RX ADMIN — BUDESONIDE AND FORMOTEROL FUMARATE DIHYDRATE 2 PUFF(S): 160; 4.5 AEROSOL RESPIRATORY (INHALATION) at 19:41

## 2019-03-14 RX ADMIN — Medication 1 DROP(S): at 13:21

## 2019-03-14 RX ADMIN — INSULIN GLARGINE 25 UNIT(S): 100 INJECTION, SOLUTION SUBCUTANEOUS at 22:06

## 2019-03-14 RX ADMIN — Medication 3 MILLILITER(S): at 19:41

## 2019-03-14 RX ADMIN — Medication 3 MILLILITER(S): at 09:38

## 2019-03-14 RX ADMIN — FINASTERIDE 5 MILLIGRAM(S): 5 TABLET, FILM COATED ORAL at 11:13

## 2019-03-14 RX ADMIN — Medication 3 MILLILITER(S): at 23:52

## 2019-03-14 RX ADMIN — Medication 1 DROP(S): at 22:07

## 2019-03-14 RX ADMIN — Medication 60 MILLIGRAM(S): at 05:41

## 2019-03-14 RX ADMIN — INSULIN GLARGINE 25 UNIT(S): 100 INJECTION, SOLUTION SUBCUTANEOUS at 09:35

## 2019-03-14 NOTE — PROGRESS NOTE ADULT - ASSESSMENT
patient with dyspnea    1) Acute on Chronic Resp Failure with Hypoxia/decompensated COPD exac:  -continue with IV steroids, symbicort, nebs and high flow O2  -PPI for GI ppx  -saline nasal spray    2) Chronic Diastolic CHF; stable (not on lasix; will clarify)    3) JAMES on CKD stage III;   -not on nephrotoxins; repeat in 24 hrs to monitor kidney functioning and if needed; renal sono and nephrology consult     4) Hyperkalemia:  -BMP monitoring; improved K+ levels     5) morbidly obese:  -weight loss and diet modification (non-compliant with diet)    6) DM II with hyperglycemia:  -adjust insulin regimen    7) BPH:  -on flomax    8) Debility  -rehab/pt and oob to chair with assistance    9) Suspected Vitamin D def; continue with oral supplements and outpatient lab monitoring       # Progress Note Handoff  PENDING as follows  consults: Pulm following  Test: JAMES  Other:  Family discussion: no family at bedside but patient comprehends his medical care   Disposition:  Home _with home care ___ or SNF ___ Other _____ Unknown at this time ____

## 2019-03-14 NOTE — PROGRESS NOTE ADULT - SUBJECTIVE AND OBJECTIVE BOX
JIAN MANCIA  78y  Male      Patient is a 78y old  Male who presents with a chief complaint of COPD (12 Mar 2019 12:20)      INTERVAL HPI/OVERNIGHT EVENTS:  pt seen and examined at bedside; on high flow O2; but speaking appropriately in full sentences; comprehending his medical care  -oob to chair today as tolerated; pt would desaturate during PT; pulse ox to be charted   -continue with current care     Vital Signs Last 24 Hrs  T(C): 35.6 (14 Mar 2019 06:28), Max: 36 (13 Mar 2019 14:29)  T(F): 96.1 (14 Mar 2019 06:28), Max: 96.8 (13 Mar 2019 14:29)  HR: 108 (14 Mar 2019 09:38) (73 - 108)  BP: 115/55 (14 Mar 2019 06:28) (104/53 - 143/76)  RR: 20 (14 Mar 2019 09:38) (20 - 25)  SpO2: 87% (14 Mar 2019 09:38) (87% - 92%)    PHYSICAL EXAM:  GENERAL: sitting up in chair, NAD and speaking in full sentences even though on High flow O2  HEAD:  Atraumatic, Normocephalic  EYES: EOMI, PERRLA, conjunctiva and sclera clear  NERVOUS SYSTEM:  Alert & Oriented X 3  CHEST/LUNG: decreased BS b/l  HEART: Regular rate and rhythm; No murmurs, rubs, or gallops  ABDOMEN: Soft, Nontender, obese abdomen, Bowel sounds present  EXTREMITIES:  2+ Peripheral Pulses present. LE edema noted   SKIN: No rashes or lesions    LAB:                          12.2   12.22 )-----------( 464      ( 14 Mar 2019 07:07 )             43.0   03-14    144  |  106  |  57<H>  ----------------------------<  111<H>  4.7   |  27  |  1.9<H>    Ca    8.7      14 Mar 2019 07:07  Phos  4.4     03-12  Mg     2.2     03-12      03-13    143  |  104  |  60<H>  ----------------------------<  151<H>  5.0   |  30  |  1.5    Ca    8.8      13 Mar 2019 07:15  Phos  4.4     03-12  Mg     2.2     03-12      CARDIAC MARKERS ( 11 Mar 2019 00:30 )  x     / 0.01 ng/mL / x     / x     / x        POCT Blood Glucose.: 366 mg/dL (12 Mar 2019 16:57)  POCT Blood Glucose.: 287 mg/dL (12 Mar 2019 11:49)  POCT Blood Glucose.: 214 mg/dL (12 Mar 2019 08:57)  POCT Blood Glucose.: 171 mg/dL (12 Mar 2019 07:48)  POCT Blood Glucose.: 278 mg/dL (11 Mar 2019 21:39)    LIVER FUNCTIONS - ( 11 Mar 2019 06:37 )  Alb: 4.0 g/dL / Pro: 6.2 g/dL / ALK PHOS: 68 U/L / ALT: 10 U/L / AST: 10 U/L / GGT: x           RADIOLOGY:    Imaging Personally Reviewed:  [ Y ] YES  [ ] NO    HEALTH ISSUES - PROBLEM Dx:  Medication management: Medication management  Enlarged prostate: Enlarged prostate  Type 2 diabetes mellitus without complication, with long-term current use of insulin: Type 2 diabetes mellitus without complication, with long-term current use of insulin  COPD exacerbation: COPD exacerbation  Shortness of breath: Shortness of breath    MEDICATIONS  (STANDING):  ALBUTerol/ipratropium for Nebulization 3 milliLiter(s) Nebulizer every 4 hours  artificial  tears Solution 1 Drop(s) Both EYES three times a day  ascorbic acid 500 milliGRAM(s) Oral daily  atorvastatin 20 milliGRAM(s) Oral at bedtime  buDESOnide 160 MICROgram(s)/formoterol 4.5 MICROgram(s) Inhaler 2 Puff(s) Inhalation two times a day  chlorhexidine 4% Liquid 1 Application(s) Topical <User Schedule>  cholecalciferol 1000 Unit(s) Oral daily  dextrose 5%. 1000 milliLiter(s) (50 mL/Hr) IV Continuous <Continuous>  dextrose 50% Injectable 12.5 Gram(s) IV Push once  dextrose 50% Injectable 25 Gram(s) IV Push once  dextrose 50% Injectable 25 Gram(s) IV Push once  docusate sodium 100 milliGRAM(s) Oral three times a day  finasteride 5 milliGRAM(s) Oral daily  heparin  Injectable 5000 Unit(s) SubCutaneous every 12 hours  insulin glargine Injectable (LANTUS) 25 Unit(s) SubCutaneous two times a day  insulin lispro (HumaLOG) corrective regimen sliding scale   SubCutaneous three times a day before meals  insulin lispro (HumaLOG) corrective regimen sliding scale   SubCutaneous at bedtime  insulin lispro Injectable (HumaLOG) 12 Unit(s) SubCutaneous three times a day before meals  levoFLOXacin IVPB 750 milliGRAM(s) IV Intermittent every 24 hours  methylPREDNISolone sodium succinate Injectable 60 milliGRAM(s) IV Push daily  metoprolol tartrate 25 milliGRAM(s) Oral two times a day  senna 2 Tablet(s) Oral at bedtime  sodium polystyrene sulfonate Suspension 30 Gram(s) Oral every 48 hours  tamsulosin 0.4 milliGRAM(s) Oral at bedtime  tiotropium 18 MICROgram(s) Capsule 1 Capsule(s) Inhalation daily    MEDICATIONS  (PRN):  dextrose 40% Gel 15 Gram(s) Oral once PRN Blood Glucose LESS THAN 70 milliGRAM(s)/deciliter  glucagon  Injectable 1 milliGRAM(s) IntraMuscular once PRN Glucose LESS THAN 70 milligrams/deciliter  sodium chloride 0.65% Nasal 1 Spray(s) Both Nostrils every 4 hours PRN Nasal Congestion

## 2019-03-15 LAB
ANION GAP SERPL CALC-SCNC: 9 MMOL/L — SIGNIFICANT CHANGE UP (ref 7–14)
APPEARANCE UR: CLEAR — SIGNIFICANT CHANGE UP
BACTERIA # UR AUTO: ABNORMAL
BILIRUB UR-MCNC: NEGATIVE — SIGNIFICANT CHANGE UP
BUN SERPL-MCNC: 54 MG/DL — HIGH (ref 10–20)
CALCIUM SERPL-MCNC: 8.6 MG/DL — SIGNIFICANT CHANGE UP (ref 8.5–10.1)
CHLORIDE SERPL-SCNC: 107 MMOL/L — SIGNIFICANT CHANGE UP (ref 98–110)
CO2 SERPL-SCNC: 29 MMOL/L — SIGNIFICANT CHANGE UP (ref 17–32)
COD CRY URNS QL: NEGATIVE — SIGNIFICANT CHANGE UP
COLOR SPEC: YELLOW — SIGNIFICANT CHANGE UP
CREAT SERPL-MCNC: 1.7 MG/DL — HIGH (ref 0.7–1.5)
DIFF PNL FLD: NEGATIVE — SIGNIFICANT CHANGE UP
EPI CELLS # UR: ABNORMAL /HPF
GLUCOSE BLDC GLUCOMTR-MCNC: 102 MG/DL — HIGH (ref 70–99)
GLUCOSE BLDC GLUCOMTR-MCNC: 185 MG/DL — HIGH (ref 70–99)
GLUCOSE BLDC GLUCOMTR-MCNC: 189 MG/DL — HIGH (ref 70–99)
GLUCOSE BLDC GLUCOMTR-MCNC: 253 MG/DL — HIGH (ref 70–99)
GLUCOSE SERPL-MCNC: 85 MG/DL — SIGNIFICANT CHANGE UP (ref 70–99)
GLUCOSE UR QL: NEGATIVE MG/DL — SIGNIFICANT CHANGE UP
GRAN CASTS # UR COMP ASSIST: NEGATIVE — SIGNIFICANT CHANGE UP
HCT VFR BLD CALC: 42.1 % — SIGNIFICANT CHANGE UP (ref 42–52)
HGB BLD-MCNC: 12.1 G/DL — LOW (ref 14–18)
HYALINE CASTS # UR AUTO: NEGATIVE — SIGNIFICANT CHANGE UP
KETONES UR-MCNC: NEGATIVE — SIGNIFICANT CHANGE UP
LEUKOCYTE ESTERASE UR-ACNC: NEGATIVE — SIGNIFICANT CHANGE UP
MCHC RBC-ENTMCNC: 22.9 PG — LOW (ref 27–31)
MCHC RBC-ENTMCNC: 28.7 G/DL — LOW (ref 32–37)
MCV RBC AUTO: 79.6 FL — LOW (ref 80–94)
NITRITE UR-MCNC: NEGATIVE — SIGNIFICANT CHANGE UP
NRBC # BLD: 0 /100 WBCS — SIGNIFICANT CHANGE UP (ref 0–0)
PH UR: 6 — SIGNIFICANT CHANGE UP (ref 5–8)
PLATELET # BLD AUTO: 448 K/UL — HIGH (ref 130–400)
POTASSIUM SERPL-MCNC: 4.6 MMOL/L — SIGNIFICANT CHANGE UP (ref 3.5–5)
POTASSIUM SERPL-SCNC: 4.6 MMOL/L — SIGNIFICANT CHANGE UP (ref 3.5–5)
PROT UR-MCNC: ABNORMAL MG/DL
RBC # BLD: 5.29 M/UL — SIGNIFICANT CHANGE UP (ref 4.7–6.1)
RBC # FLD: 17.1 % — HIGH (ref 11.5–14.5)
RBC CASTS # UR COMP ASSIST: SIGNIFICANT CHANGE UP /HPF
SODIUM SERPL-SCNC: 145 MMOL/L — SIGNIFICANT CHANGE UP (ref 135–146)
SP GR SPEC: 1.02 — SIGNIFICANT CHANGE UP (ref 1.01–1.03)
TRI-PHOS CRY UR QL COMP ASSIST: NEGATIVE — SIGNIFICANT CHANGE UP
URATE CRY FLD QL MICRO: NEGATIVE — SIGNIFICANT CHANGE UP
UROBILINOGEN FLD QL: 0.2 MG/DL — SIGNIFICANT CHANGE UP (ref 0.2–0.2)
WBC # BLD: 11.31 K/UL — HIGH (ref 4.8–10.8)
WBC # FLD AUTO: 11.31 K/UL — HIGH (ref 4.8–10.8)
WBC UR QL: SIGNIFICANT CHANGE UP /HPF

## 2019-03-15 RX ORDER — POLYETHYLENE GLYCOL 3350 17 G/17G
17 POWDER, FOR SOLUTION ORAL DAILY
Qty: 0 | Refills: 0 | Status: DISCONTINUED | OUTPATIENT
Start: 2019-03-15 | End: 2019-03-29

## 2019-03-15 RX ADMIN — Medication 600 MILLIGRAM(S): at 06:12

## 2019-03-15 RX ADMIN — Medication 12 UNIT(S): at 16:40

## 2019-03-15 RX ADMIN — Medication 3 MILLILITER(S): at 15:59

## 2019-03-15 RX ADMIN — HEPARIN SODIUM 5000 UNIT(S): 5000 INJECTION INTRAVENOUS; SUBCUTANEOUS at 18:08

## 2019-03-15 RX ADMIN — Medication 600 MILLIGRAM(S): at 16:38

## 2019-03-15 RX ADMIN — Medication 3 MILLILITER(S): at 23:56

## 2019-03-15 RX ADMIN — Medication 12 UNIT(S): at 08:00

## 2019-03-15 RX ADMIN — Medication 25 MILLIGRAM(S): at 06:12

## 2019-03-15 RX ADMIN — BUDESONIDE AND FORMOTEROL FUMARATE DIHYDRATE 2 PUFF(S): 160; 4.5 AEROSOL RESPIRATORY (INHALATION) at 08:00

## 2019-03-15 RX ADMIN — POLYETHYLENE GLYCOL 3350 17 GRAM(S): 17 POWDER, FOR SOLUTION ORAL at 18:08

## 2019-03-15 RX ADMIN — Medication 1 DROP(S): at 21:51

## 2019-03-15 RX ADMIN — TAMSULOSIN HYDROCHLORIDE 0.4 MILLIGRAM(S): 0.4 CAPSULE ORAL at 21:50

## 2019-03-15 RX ADMIN — Medication 100 MILLIGRAM(S): at 13:36

## 2019-03-15 RX ADMIN — Medication 3: at 16:39

## 2019-03-15 RX ADMIN — INSULIN GLARGINE 25 UNIT(S): 100 INJECTION, SOLUTION SUBCUTANEOUS at 21:52

## 2019-03-15 RX ADMIN — Medication 1000 UNIT(S): at 11:33

## 2019-03-15 RX ADMIN — Medication 12 UNIT(S): at 12:04

## 2019-03-15 RX ADMIN — Medication 3 MILLILITER(S): at 09:50

## 2019-03-15 RX ADMIN — Medication 1 DROP(S): at 13:36

## 2019-03-15 RX ADMIN — Medication 500 MILLIGRAM(S): at 11:29

## 2019-03-15 RX ADMIN — Medication 100 MILLIGRAM(S): at 21:51

## 2019-03-15 RX ADMIN — Medication 25 MILLIGRAM(S): at 18:09

## 2019-03-15 RX ADMIN — INSULIN GLARGINE 25 UNIT(S): 100 INJECTION, SOLUTION SUBCUTANEOUS at 08:06

## 2019-03-15 RX ADMIN — ATORVASTATIN CALCIUM 20 MILLIGRAM(S): 80 TABLET, FILM COATED ORAL at 21:51

## 2019-03-15 RX ADMIN — Medication 3 MILLILITER(S): at 20:23

## 2019-03-15 RX ADMIN — Medication 100 MILLIGRAM(S): at 06:12

## 2019-03-15 RX ADMIN — TIOTROPIUM BROMIDE 1 CAPSULE(S): 18 CAPSULE ORAL; RESPIRATORY (INHALATION) at 09:51

## 2019-03-15 RX ADMIN — SENNA PLUS 2 TABLET(S): 8.6 TABLET ORAL at 21:51

## 2019-03-15 RX ADMIN — FINASTERIDE 5 MILLIGRAM(S): 5 TABLET, FILM COATED ORAL at 11:32

## 2019-03-15 RX ADMIN — Medication 60 MILLIGRAM(S): at 06:12

## 2019-03-15 RX ADMIN — Medication 1: at 12:03

## 2019-03-15 RX ADMIN — BUDESONIDE AND FORMOTEROL FUMARATE DIHYDRATE 2 PUFF(S): 160; 4.5 AEROSOL RESPIRATORY (INHALATION) at 20:23

## 2019-03-15 RX ADMIN — Medication 3 MILLILITER(S): at 13:12

## 2019-03-15 NOTE — PROGRESS NOTE ADULT - SUBJECTIVE AND OBJECTIVE BOX
JIAN MANCIA  78y  Male      Patient is a 78y old  Male who presents with a chief complaint of COPD (12 Mar 2019 12:20)      INTERVAL HPI/OVERNIGHT EVENTS:  pt seen and examined at bedside early this morning and note typed later in the day  -pt on bipap, speaking in full sentences  -continue with curren regimen  -PT as tolerated (if patient does not desaturates)      Vital Signs Last 24 Hrs  T(C): 36.1 (15 Mar 2019 06:19), Max: 37.2 (14 Mar 2019 22:00)  T(F): 96.9 (15 Mar 2019 06:19), Max: 98.9 (14 Mar 2019 22:00)  HR: 82 (15 Mar 2019 13:14) (79 - 92)  BP: 127/59 (15 Mar 2019 06:19) (117/59 - 132/60)  RR: 24 (15 Mar 2019 13:14) (16 - 24)  SpO2: 91% (15 Mar 2019 13:14) (90% - 91%)    PHYSICAL EXAM:  GENERAL: sitting up in chair, NAD and speaking in full sentences even though on High flow O2  HEAD:  Atraumatic, Normocephalic  EYES: EOMI, PERRLA, conjunctiva and sclera clear  NERVOUS SYSTEM:  Alert & Oriented X 3  CHEST/LUNG: decreased BS b/l  HEART: Regular rate and rhythm; No murmurs, rubs, or gallops  ABDOMEN: Soft, Nontender, obese abdomen, Bowel sounds present  EXTREMITIES:  2+ Peripheral Pulses present. LE edema noted   SKIN: No rashes or lesions    LAB:                         12.1   11.31 )-----------( 448      ( 15 Mar 2019 07:09 )             42.1   03-15    145  |  107  |  54<H>  ----------------------------<  85  4.6   |  29  |  1.7<H>    Ca    8.6      15 Mar 2019 07:09                POCT Blood Glucose.: 366 mg/dL (12 Mar 2019 16:57)  POCT Blood Glucose.: 287 mg/dL (12 Mar 2019 11:49)  POCT Blood Glucose.: 214 mg/dL (12 Mar 2019 08:57)  POCT Blood Glucose.: 171 mg/dL (12 Mar 2019 07:48)  POCT Blood Glucose.: 278 mg/dL (11 Mar 2019 21:39)    LIVER FUNCTIONS - ( 11 Mar 2019 06:37 )  Alb: 4.0 g/dL / Pro: 6.2 g/dL / ALK PHOS: 68 U/L / ALT: 10 U/L / AST: 10 U/L / GGT: x           RADIOLOGY:    Imaging Personally Reviewed:  [ Y ] YES  [ ] NO    HEALTH ISSUES - PROBLEM Dx:  Medication management: Medication management  Enlarged prostate: Enlarged prostate  Type 2 diabetes mellitus without complication, with long-term current use of insulin: Type 2 diabetes mellitus without complication, with long-term current use of insulin  COPD exacerbation: COPD exacerbation  Shortness of breath: Shortness of breath      MEDICATIONS  (STANDING):  ALBUTerol/ipratropium for Nebulization 3 milliLiter(s) Nebulizer every 4 hours  artificial  tears Solution 1 Drop(s) Both EYES three times a day  ascorbic acid 500 milliGRAM(s) Oral daily  atorvastatin 20 milliGRAM(s) Oral at bedtime  buDESOnide 160 MICROgram(s)/formoterol 4.5 MICROgram(s) Inhaler 2 Puff(s) Inhalation two times a day  chlorhexidine 4% Liquid 1 Application(s) Topical <User Schedule>  cholecalciferol 1000 Unit(s) Oral daily  dextrose 5%. 1000 milliLiter(s) (50 mL/Hr) IV Continuous <Continuous>  dextrose 50% Injectable 12.5 Gram(s) IV Push once  dextrose 50% Injectable 25 Gram(s) IV Push once  dextrose 50% Injectable 25 Gram(s) IV Push once  docusate sodium 100 milliGRAM(s) Oral three times a day  finasteride 5 milliGRAM(s) Oral daily  guaiFENesin  milliGRAM(s) Oral every 12 hours  heparin  Injectable 5000 Unit(s) SubCutaneous every 12 hours  insulin glargine Injectable (LANTUS) 25 Unit(s) SubCutaneous two times a day  insulin lispro (HumaLOG) corrective regimen sliding scale   SubCutaneous three times a day before meals  insulin lispro (HumaLOG) corrective regimen sliding scale   SubCutaneous at bedtime  insulin lispro Injectable (HumaLOG) 12 Unit(s) SubCutaneous three times a day before meals  levoFLOXacin IVPB 750 milliGRAM(s) IV Intermittent every 24 hours  methylPREDNISolone sodium succinate Injectable 60 milliGRAM(s) IV Push daily  metoprolol tartrate 25 milliGRAM(s) Oral two times a day  senna 2 Tablet(s) Oral at bedtime  sodium polystyrene sulfonate Suspension 30 Gram(s) Oral every 48 hours  tamsulosin 0.4 milliGRAM(s) Oral at bedtime  tiotropium 18 MICROgram(s) Capsule 1 Capsule(s) Inhalation daily    MEDICATIONS  (PRN):  aluminum hydroxide/magnesium hydroxide/simethicone Suspension 30 milliLiter(s) Oral every 4 hours PRN Dyspepsia  dextrose 40% Gel 15 Gram(s) Oral once PRN Blood Glucose LESS THAN 70 milliGRAM(s)/deciliter  glucagon  Injectable 1 milliGRAM(s) IntraMuscular once PRN Glucose LESS THAN 70 milligrams/deciliter  sodium chloride 0.65% Nasal 1 Spray(s) Both Nostrils every 4 hours PRN Nasal Congestion

## 2019-03-15 NOTE — PROGRESS NOTE ADULT - SUBJECTIVE AND OBJECTIVE BOX
Patient is a 78y old  Male who presents with a chief complaint of COPD (14 Mar 2019 11:21)      INTERVAL HISTORY/overnight events  feel better less FIO2 on high flow       Vital Signs Last 24 Hrs  T(C): 36.1 (15 Mar 2019 06:19), Max: 37.2 (14 Mar 2019 22:00)  T(F): 96.9 (15 Mar 2019 06:19), Max: 98.9 (14 Mar 2019 22:00)  HR: 79 (15 Mar 2019 06:19) (79 - 92)  BP: 127/59 (15 Mar 2019 06:19) (117/59 - 132/60)  BP(mean): --  RR: 20 (15 Mar 2019 06:19) (16 - 20)  SpO2: 90% (14 Mar 2019 22:31) (90% - 90%)  Daily     Daily   I&O's Summary    14 Mar 2019 07:01  -  15 Mar 2019 07:00  --------------------------------------------------------  IN: 0 mL / OUT: 3 mL / NET: -3 mL        Physical Examination:    General: No acute distress.  Alert, oriented, interactive, nonfocal    HEENT: Pupils equal, reactive to light.  Symmetric.    PULM: Clear to auscultation bilaterally, no significant sputum production    CVS: Regular rate and rhythm, no murmurs, rubs, or gallops    ABD: Soft, nondistended, nontender, normoactive bowel sounds, no masses    EXT: 2 edema, nontender    SKIN: Warm and well perfused, no rashes noted.    Neurology:    Musculoskeletal : Move all extremety         Lab Results:                        12.1   11.31 )-----------( 448      ( 15 Mar 2019 07:09 )             42.1     15 Mar 2019 07:09    145    |  107    |  54     ----------------------------<  85     4.6     |  29     |  1.7      Ca    8.6        15 Mar 2019 07:09        Urinalysis Basic - ( 15 Mar 2019 03:20 )    Color: Yellow / Appearance: Clear / S.020 / pH: x  Gluc: x / Ketone: Negative  / Bili: Negative / Urobili: 0.2 mg/dL   Blood: x / Protein: Trace mg/dL / Nitrite: Negative   Leuk Esterase: Negative / RBC: 1-2 /HPF / WBC 1-2 /HPF   Sq Epi: x / Non Sq Epi: Occasional /HPF / Bacteria: Few            Microbiology      Medication:  MEDICATIONS  (STANDING):  ALBUTerol/ipratropium for Nebulization 3 milliLiter(s) Nebulizer every 4 hours  artificial  tears Solution 1 Drop(s) Both EYES three times a day  ascorbic acid 500 milliGRAM(s) Oral daily  atorvastatin 20 milliGRAM(s) Oral at bedtime  buDESOnide 160 MICROgram(s)/formoterol 4.5 MICROgram(s) Inhaler 2 Puff(s) Inhalation two times a day  chlorhexidine 4% Liquid 1 Application(s) Topical <User Schedule>  cholecalciferol 1000 Unit(s) Oral daily  dextrose 5%. 1000 milliLiter(s) (50 mL/Hr) IV Continuous <Continuous>  dextrose 50% Injectable 12.5 Gram(s) IV Push once  dextrose 50% Injectable 25 Gram(s) IV Push once  dextrose 50% Injectable 25 Gram(s) IV Push once  docusate sodium 100 milliGRAM(s) Oral three times a day  finasteride 5 milliGRAM(s) Oral daily  guaiFENesin  milliGRAM(s) Oral every 12 hours  heparin  Injectable 5000 Unit(s) SubCutaneous every 12 hours  insulin glargine Injectable (LANTUS) 25 Unit(s) SubCutaneous two times a day  insulin lispro (HumaLOG) corrective regimen sliding scale   SubCutaneous three times a day before meals  insulin lispro (HumaLOG) corrective regimen sliding scale   SubCutaneous at bedtime  insulin lispro Injectable (HumaLOG) 12 Unit(s) SubCutaneous three times a day before meals  levoFLOXacin IVPB 750 milliGRAM(s) IV Intermittent every 24 hours  methylPREDNISolone sodium succinate Injectable 60 milliGRAM(s) IV Push daily  metoprolol tartrate 25 milliGRAM(s) Oral two times a day  senna 2 Tablet(s) Oral at bedtime  sodium polystyrene sulfonate Suspension 30 Gram(s) Oral every 48 hours  tamsulosin 0.4 milliGRAM(s) Oral at bedtime  tiotropium 18 MICROgram(s) Capsule 1 Capsule(s) Inhalation daily    MEDICATIONS  (PRN):  aluminum hydroxide/magnesium hydroxide/simethicone Suspension 30 milliLiter(s) Oral every 4 hours PRN Dyspepsia  dextrose 40% Gel 15 Gram(s) Oral once PRN Blood Glucose LESS THAN 70 milliGRAM(s)/deciliter  glucagon  Injectable 1 milliGRAM(s) IntraMuscular once PRN Glucose LESS THAN 70 milligrams/deciliter  sodium chloride 0.65% Nasal 1 Spray(s) Both Nostrils every 4 hours PRN Nasal Congestion        IMAGING STUDIES:

## 2019-03-15 NOTE — PROGRESS NOTE ADULT - ASSESSMENT
Impression:  chronic resp failure   copd / most nico has pulm htn   chf   elie / ohs         Plan:    symbicort for now 160 mg Q 12 hrs and spiriva   switch to porednisone 40 mg and taper   taper oxygen to keep pox > 87%   bipap at night and as needed   finish abx give total 5 days   rehab/ pt   dvt prophylaxis   taper oxygen top keep Pox > 88%

## 2019-03-15 NOTE — CHART NOTE - NSCHARTNOTEFT_GEN_A_CORE
Patient told RN he had burning pain on urination, he is already on iv antibiotics. Will order U/A (repeat) with urine C+S

## 2019-03-15 NOTE — PROGRESS NOTE ADULT - ASSESSMENT
patient with dyspnea    1) Acute on Chronic Resp Failure with Hypoxia/decompensated COPD exac:  -continue with IV steroids, symbicort, nebs and high flow O2  -PPI for GI ppx  -saline nasal spray    2) Chronic Diastolic CHF; stable   -will start lasix once kidney function normalizes     3) JAMES on CKD stage III;   -not on nephrotoxins; monitor BMP   -no need for renal sono and or renal consult as JAMES improving     4) Hyperkalemia:  -BMP monitoring; improved K+    5) morbidly obese:  -weight loss and diet modification (non-compliant with diet)    6) DM II with hyperglycemia:  -adjust insulin regimen    7) BPH:  -on flomax    8) Debility  -rehab/pt and oob to chair with assistance    9) Suspected Vitamin D def; continue with oral supplements and outpatient lab monitoring       # Progress Note Handoff  PENDING as follows  consults: Pulm following  Test: Serum Cr   Other:  Family discussion: no family at bedside but patient comprehends his medical care   Disposition:  Home _requests home with home care ___ or SNF ___ Other _____ Unknown at this time ____

## 2019-03-16 LAB
ANION GAP SERPL CALC-SCNC: 6 MMOL/L — LOW (ref 7–14)
BUN SERPL-MCNC: 51 MG/DL — HIGH (ref 10–20)
CALCIUM SERPL-MCNC: 9 MG/DL — SIGNIFICANT CHANGE UP (ref 8.5–10.1)
CHLORIDE SERPL-SCNC: 108 MMOL/L — SIGNIFICANT CHANGE UP (ref 98–110)
CO2 SERPL-SCNC: 30 MMOL/L — SIGNIFICANT CHANGE UP (ref 17–32)
CREAT SERPL-MCNC: 1.7 MG/DL — HIGH (ref 0.7–1.5)
CULTURE RESULTS: SIGNIFICANT CHANGE UP
CULTURE RESULTS: SIGNIFICANT CHANGE UP
GLUCOSE BLDC GLUCOMTR-MCNC: 168 MG/DL — HIGH (ref 70–99)
GLUCOSE BLDC GLUCOMTR-MCNC: 247 MG/DL — HIGH (ref 70–99)
GLUCOSE BLDC GLUCOMTR-MCNC: 321 MG/DL — HIGH (ref 70–99)
GLUCOSE BLDC GLUCOMTR-MCNC: 93 MG/DL — SIGNIFICANT CHANGE UP (ref 70–99)
GLUCOSE SERPL-MCNC: 101 MG/DL — HIGH (ref 70–99)
HCT VFR BLD CALC: 44.4 % — SIGNIFICANT CHANGE UP (ref 42–52)
HGB BLD-MCNC: 12.7 G/DL — LOW (ref 14–18)
MCHC RBC-ENTMCNC: 22.8 PG — LOW (ref 27–31)
MCHC RBC-ENTMCNC: 28.6 G/DL — LOW (ref 32–37)
MCV RBC AUTO: 79.6 FL — LOW (ref 80–94)
NRBC # BLD: 0 /100 WBCS — SIGNIFICANT CHANGE UP (ref 0–0)
PLATELET # BLD AUTO: 443 K/UL — HIGH (ref 130–400)
POTASSIUM SERPL-MCNC: 5 MMOL/L — SIGNIFICANT CHANGE UP (ref 3.5–5)
POTASSIUM SERPL-SCNC: 5 MMOL/L — SIGNIFICANT CHANGE UP (ref 3.5–5)
RBC # BLD: 5.58 M/UL — SIGNIFICANT CHANGE UP (ref 4.7–6.1)
RBC # FLD: 17.5 % — HIGH (ref 11.5–14.5)
SODIUM SERPL-SCNC: 144 MMOL/L — SIGNIFICANT CHANGE UP (ref 135–146)
SPECIMEN SOURCE: SIGNIFICANT CHANGE UP
SPECIMEN SOURCE: SIGNIFICANT CHANGE UP
WBC # BLD: 11.9 K/UL — HIGH (ref 4.8–10.8)
WBC # FLD AUTO: 11.9 K/UL — HIGH (ref 4.8–10.8)

## 2019-03-16 RX ORDER — NYSTATIN CREAM 100000 [USP'U]/G
1 CREAM TOPICAL
Qty: 0 | Refills: 0 | Status: DISCONTINUED | OUTPATIENT
Start: 2019-03-16 | End: 2019-03-29

## 2019-03-16 RX ORDER — FUROSEMIDE 40 MG
40 TABLET ORAL ONCE
Qty: 0 | Refills: 0 | Status: COMPLETED | OUTPATIENT
Start: 2019-03-16 | End: 2019-03-16

## 2019-03-16 RX ORDER — FUROSEMIDE 40 MG
40 TABLET ORAL DAILY
Qty: 0 | Refills: 0 | Status: DISCONTINUED | OUTPATIENT
Start: 2019-03-16 | End: 2019-03-17

## 2019-03-16 RX ADMIN — TIOTROPIUM BROMIDE 1 CAPSULE(S): 18 CAPSULE ORAL; RESPIRATORY (INHALATION) at 08:32

## 2019-03-16 RX ADMIN — Medication 12 UNIT(S): at 08:30

## 2019-03-16 RX ADMIN — INSULIN GLARGINE 25 UNIT(S): 100 INJECTION, SOLUTION SUBCUTANEOUS at 08:31

## 2019-03-16 RX ADMIN — HEPARIN SODIUM 5000 UNIT(S): 5000 INJECTION INTRAVENOUS; SUBCUTANEOUS at 06:49

## 2019-03-16 RX ADMIN — Medication 3 MILLILITER(S): at 20:23

## 2019-03-16 RX ADMIN — Medication 25 MILLIGRAM(S): at 06:50

## 2019-03-16 RX ADMIN — Medication 40 MILLIGRAM(S): at 11:21

## 2019-03-16 RX ADMIN — INSULIN GLARGINE 25 UNIT(S): 100 INJECTION, SOLUTION SUBCUTANEOUS at 21:32

## 2019-03-16 RX ADMIN — Medication 4: at 16:59

## 2019-03-16 RX ADMIN — Medication 1 DROP(S): at 13:20

## 2019-03-16 RX ADMIN — SENNA PLUS 2 TABLET(S): 8.6 TABLET ORAL at 21:31

## 2019-03-16 RX ADMIN — Medication 3 MILLILITER(S): at 07:49

## 2019-03-16 RX ADMIN — Medication 3 MILLILITER(S): at 12:49

## 2019-03-16 RX ADMIN — Medication 100 MILLIGRAM(S): at 13:20

## 2019-03-16 RX ADMIN — Medication 1 DROP(S): at 06:50

## 2019-03-16 RX ADMIN — Medication 25 MILLIGRAM(S): at 17:05

## 2019-03-16 RX ADMIN — TAMSULOSIN HYDROCHLORIDE 0.4 MILLIGRAM(S): 0.4 CAPSULE ORAL at 21:32

## 2019-03-16 RX ADMIN — Medication 12 UNIT(S): at 12:15

## 2019-03-16 RX ADMIN — ATORVASTATIN CALCIUM 20 MILLIGRAM(S): 80 TABLET, FILM COATED ORAL at 21:32

## 2019-03-16 RX ADMIN — Medication 600 MILLIGRAM(S): at 17:02

## 2019-03-16 RX ADMIN — Medication 1 SPRAY(S): at 06:51

## 2019-03-16 RX ADMIN — HEPARIN SODIUM 5000 UNIT(S): 5000 INJECTION INTRAVENOUS; SUBCUTANEOUS at 17:02

## 2019-03-16 RX ADMIN — Medication 1000 UNIT(S): at 12:17

## 2019-03-16 RX ADMIN — Medication 1 DROP(S): at 21:30

## 2019-03-16 RX ADMIN — Medication 100 MILLIGRAM(S): at 06:50

## 2019-03-16 RX ADMIN — Medication 100 MILLIGRAM(S): at 21:31

## 2019-03-16 RX ADMIN — NYSTATIN CREAM 1 APPLICATION(S): 100000 CREAM TOPICAL at 17:06

## 2019-03-16 RX ADMIN — Medication 60 MILLIGRAM(S): at 06:49

## 2019-03-16 RX ADMIN — NYSTATIN CREAM 1 APPLICATION(S): 100000 CREAM TOPICAL at 10:33

## 2019-03-16 RX ADMIN — Medication 12 UNIT(S): at 16:59

## 2019-03-16 RX ADMIN — BUDESONIDE AND FORMOTEROL FUMARATE DIHYDRATE 2 PUFF(S): 160; 4.5 AEROSOL RESPIRATORY (INHALATION) at 08:32

## 2019-03-16 RX ADMIN — Medication 600 MILLIGRAM(S): at 06:50

## 2019-03-16 RX ADMIN — Medication 500 MILLIGRAM(S): at 12:17

## 2019-03-16 RX ADMIN — BUDESONIDE AND FORMOTEROL FUMARATE DIHYDRATE 2 PUFF(S): 160; 4.5 AEROSOL RESPIRATORY (INHALATION) at 21:30

## 2019-03-16 RX ADMIN — FINASTERIDE 5 MILLIGRAM(S): 5 TABLET, FILM COATED ORAL at 12:17

## 2019-03-16 RX ADMIN — POLYETHYLENE GLYCOL 3350 17 GRAM(S): 17 POWDER, FOR SOLUTION ORAL at 12:15

## 2019-03-16 RX ADMIN — Medication 1: at 12:14

## 2019-03-16 RX ADMIN — CHLORHEXIDINE GLUCONATE 1 APPLICATION(S): 213 SOLUTION TOPICAL at 08:30

## 2019-03-16 NOTE — PROGRESS NOTE ADULT - ASSESSMENT
1) Acute on Chronic Hypoxic Resp Failure due to advanced COPD   -continue with IV steroids, symbicort, nebs and high flow O2  -PPI for GI ppx  -saline nasal spray    2) Chronic Diastolic CHF; stable   -restart Lasix     3) CKD stage III;   -not on nephrotoxins; monitor BMP     5) morbidly obese: BMI 42  -weight loss and diet modification (non-compliant with diet)    6) DM II with hyperglycemia:  -adjust insulin regimen    7) BPH:  -on flomax    8) Debility  -rehab/pt and oob to chair with assistance    9) Suspected Vitamin D def; continue with oral supplements and outpatient lab monitoring

## 2019-03-16 NOTE — PROGRESS NOTE ADULT - SUBJECTIVE AND OBJECTIVE BOX
Pt seen and examined at bedside. Denies any complaints. No events overnight. Continues to require high flow for support.     VITAL SIGNS (Last 24 hrs):  T(C): 36.4 (03-16-19 @ 06:05), Max: 37 (03-15-19 @ 22:38)  HR: 82 (03-16-19 @ 06:05) (80 - 82)  BP: 144/71 (03-16-19 @ 06:05) (123/74 - 147/69)  RR: 16 (03-16-19 @ 06:05) (16 - 24)  SpO2: 88% (03-16-19 @ 08:35) (88% - 92%)  Wt(kg): --  Daily     Daily     I&O's Summary    15 Mar 2019 07:01  -  16 Mar 2019 07:00  --------------------------------------------------------  IN: 0 mL / OUT: 100 mL / NET: -100 mL        PHYSICAL EXAM:  GENERAL: NAD, obese   HEAD:  Atraumatic, Normocephalic  EYES: EOMI, PERRLA, conjunctiva and sclera clear  NECK: Supple, No JVD  CHEST/LUNG: Decreased breath sounds bilaterally   HEART: Regular rate and rhythm; No murmurs, rubs, or gallops  ABDOMEN: Soft, Nontender, Nondistended; Bowel sounds present  EXTREMITIES:  2+ Peripheral Pulses, No clubbing, cyanosis, or edema  PSYCH: AAOx3  NEUROLOGY: non-focal  SKIN: No rashes or lesions    Labs Reviewed  Spoke to patient in regards to abnormal labs.    CBC Full  -  ( 16 Mar 2019 07:38 )  WBC Count : 11.90 K/uL  Hemoglobin : 12.7 g/dL  Hematocrit : 44.4 %  Platelet Count - Automated : 443 K/uL  Mean Cell Volume : 79.6 fL  Mean Cell Hemoglobin : 22.8 pg  Mean Cell Hemoglobin Concentration : 28.6 g/dL  Auto Neutrophil # : x  Auto Lymphocyte # : x  Auto Monocyte # : x  Auto Eosinophil # : x  Auto Basophil # : x  Auto Neutrophil % : x  Auto Lymphocyte % : x  Auto Monocyte % : x  Auto Eosinophil % : x  Auto Basophil % : x    BMP:    03-16 @ 07:38    Blood Urea Nitrogen - 51  Calcium - 9.0  Carbon Dioxide - 30  Chloride - 108  Creatinine - 1.7  Glucose - 101  Potassium - 5.0  Sodium - 144    Urine Culture:  03-14 @ 16:00 Urine culture: --    Culture Results:   <10,000 CFU/mL Normal Urogenital Gillian  Method Type: --  Organism: --  Organism Identification: --  Specimen Source: .Urine Clean Catch (Midstream)        MEDICATIONS  (STANDING):  ALBUTerol/ipratropium for Nebulization 3 milliLiter(s) Nebulizer every 4 hours  artificial  tears Solution 1 Drop(s) Both EYES three times a day  ascorbic acid 500 milliGRAM(s) Oral daily  atorvastatin 20 milliGRAM(s) Oral at bedtime  buDESOnide 160 MICROgram(s)/formoterol 4.5 MICROgram(s) Inhaler 2 Puff(s) Inhalation two times a day  chlorhexidine 4% Liquid 1 Application(s) Topical <User Schedule>  cholecalciferol 1000 Unit(s) Oral daily  dextrose 5%. 1000 milliLiter(s) (50 mL/Hr) IV Continuous <Continuous>  dextrose 50% Injectable 12.5 Gram(s) IV Push once  dextrose 50% Injectable 25 Gram(s) IV Push once  dextrose 50% Injectable 25 Gram(s) IV Push once  docusate sodium 100 milliGRAM(s) Oral three times a day  finasteride 5 milliGRAM(s) Oral daily  guaiFENesin  milliGRAM(s) Oral every 12 hours  heparin  Injectable 5000 Unit(s) SubCutaneous every 12 hours  insulin glargine Injectable (LANTUS) 25 Unit(s) SubCutaneous two times a day  insulin lispro (HumaLOG) corrective regimen sliding scale   SubCutaneous three times a day before meals  insulin lispro Injectable (HumaLOG) 12 Unit(s) SubCutaneous three times a day before meals  levoFLOXacin IVPB 750 milliGRAM(s) IV Intermittent every 24 hours  methylPREDNISolone sodium succinate Injectable 60 milliGRAM(s) IV Push daily  metoprolol tartrate 25 milliGRAM(s) Oral two times a day  nystatin Powder 1 Application(s) Topical two times a day  polyethylene glycol 3350 17 Gram(s) Oral daily  senna 2 Tablet(s) Oral at bedtime  sodium polystyrene sulfonate Suspension 30 Gram(s) Oral every 48 hours  tamsulosin 0.4 milliGRAM(s) Oral at bedtime  tiotropium 18 MICROgram(s) Capsule 1 Capsule(s) Inhalation daily    MEDICATIONS  (PRN):  aluminum hydroxide/magnesium hydroxide/simethicone Suspension 30 milliLiter(s) Oral every 4 hours PRN Dyspepsia  dextrose 40% Gel 15 Gram(s) Oral once PRN Blood Glucose LESS THAN 70 milliGRAM(s)/deciliter  glucagon  Injectable 1 milliGRAM(s) IntraMuscular once PRN Glucose LESS THAN 70 milligrams/deciliter  sodium chloride 0.65% Nasal 1 Spray(s) Both Nostrils every 4 hours PRN Nasal Congestion

## 2019-03-17 LAB
ANION GAP SERPL CALC-SCNC: 7 MMOL/L — SIGNIFICANT CHANGE UP (ref 7–14)
BUN SERPL-MCNC: 50 MG/DL — HIGH (ref 10–20)
CALCIUM SERPL-MCNC: 8.9 MG/DL — SIGNIFICANT CHANGE UP (ref 8.5–10.1)
CHLORIDE SERPL-SCNC: 105 MMOL/L — SIGNIFICANT CHANGE UP (ref 98–110)
CO2 SERPL-SCNC: 32 MMOL/L — SIGNIFICANT CHANGE UP (ref 17–32)
CREAT SERPL-MCNC: 2.1 MG/DL — HIGH (ref 0.7–1.5)
GLUCOSE BLDC GLUCOMTR-MCNC: 100 MG/DL — HIGH (ref 70–99)
GLUCOSE BLDC GLUCOMTR-MCNC: 161 MG/DL — HIGH (ref 70–99)
GLUCOSE BLDC GLUCOMTR-MCNC: 235 MG/DL — HIGH (ref 70–99)
GLUCOSE BLDC GLUCOMTR-MCNC: 300 MG/DL — HIGH (ref 70–99)
GLUCOSE SERPL-MCNC: 100 MG/DL — HIGH (ref 70–99)
MAGNESIUM SERPL-MCNC: 1.9 MG/DL — SIGNIFICANT CHANGE UP (ref 1.8–2.4)
NT-PROBNP SERPL-SCNC: 3166 PG/ML — HIGH (ref 0–300)
PHOSPHATE SERPL-MCNC: 4.2 MG/DL — SIGNIFICANT CHANGE UP (ref 2.1–4.9)
POTASSIUM SERPL-MCNC: 5 MMOL/L — SIGNIFICANT CHANGE UP (ref 3.5–5)
POTASSIUM SERPL-SCNC: 5 MMOL/L — SIGNIFICANT CHANGE UP (ref 3.5–5)
SODIUM SERPL-SCNC: 144 MMOL/L — SIGNIFICANT CHANGE UP (ref 135–146)

## 2019-03-17 RX ORDER — INSULIN LISPRO 100/ML
15 VIAL (ML) SUBCUTANEOUS
Qty: 0 | Refills: 0 | Status: DISCONTINUED | OUTPATIENT
Start: 2019-03-17 | End: 2019-03-19

## 2019-03-17 RX ADMIN — Medication 12 UNIT(S): at 17:39

## 2019-03-17 RX ADMIN — INSULIN GLARGINE 25 UNIT(S): 100 INJECTION, SOLUTION SUBCUTANEOUS at 21:41

## 2019-03-17 RX ADMIN — Medication 600 MILLIGRAM(S): at 05:57

## 2019-03-17 RX ADMIN — Medication 3: at 12:04

## 2019-03-17 RX ADMIN — Medication 1 DROP(S): at 05:59

## 2019-03-17 RX ADMIN — TIOTROPIUM BROMIDE 1 CAPSULE(S): 18 CAPSULE ORAL; RESPIRATORY (INHALATION) at 07:34

## 2019-03-17 RX ADMIN — Medication 600 MILLIGRAM(S): at 17:40

## 2019-03-17 RX ADMIN — ATORVASTATIN CALCIUM 20 MILLIGRAM(S): 80 TABLET, FILM COATED ORAL at 21:40

## 2019-03-17 RX ADMIN — NYSTATIN CREAM 1 APPLICATION(S): 100000 CREAM TOPICAL at 05:58

## 2019-03-17 RX ADMIN — HEPARIN SODIUM 5000 UNIT(S): 5000 INJECTION INTRAVENOUS; SUBCUTANEOUS at 05:58

## 2019-03-17 RX ADMIN — Medication 12 UNIT(S): at 12:05

## 2019-03-17 RX ADMIN — Medication 3 MILLILITER(S): at 13:01

## 2019-03-17 RX ADMIN — BUDESONIDE AND FORMOTEROL FUMARATE DIHYDRATE 2 PUFF(S): 160; 4.5 AEROSOL RESPIRATORY (INHALATION) at 21:37

## 2019-03-17 RX ADMIN — Medication 3 MILLILITER(S): at 20:19

## 2019-03-17 RX ADMIN — SENNA PLUS 2 TABLET(S): 8.6 TABLET ORAL at 21:39

## 2019-03-17 RX ADMIN — Medication 1000 UNIT(S): at 12:07

## 2019-03-17 RX ADMIN — Medication 100 MILLIGRAM(S): at 05:58

## 2019-03-17 RX ADMIN — Medication 100 MILLIGRAM(S): at 21:40

## 2019-03-17 RX ADMIN — NYSTATIN CREAM 1 APPLICATION(S): 100000 CREAM TOPICAL at 17:41

## 2019-03-17 RX ADMIN — Medication 2: at 17:38

## 2019-03-17 RX ADMIN — Medication 25 MILLIGRAM(S): at 17:41

## 2019-03-17 RX ADMIN — SODIUM POLYSTYRENE SULFONATE 30 GRAM(S): 4.1 POWDER, FOR SUSPENSION ORAL at 12:07

## 2019-03-17 RX ADMIN — Medication 60 MILLIGRAM(S): at 05:58

## 2019-03-17 RX ADMIN — BUDESONIDE AND FORMOTEROL FUMARATE DIHYDRATE 2 PUFF(S): 160; 4.5 AEROSOL RESPIRATORY (INHALATION) at 08:28

## 2019-03-17 RX ADMIN — INSULIN GLARGINE 25 UNIT(S): 100 INJECTION, SOLUTION SUBCUTANEOUS at 08:27

## 2019-03-17 RX ADMIN — Medication 1 DROP(S): at 17:41

## 2019-03-17 RX ADMIN — TAMSULOSIN HYDROCHLORIDE 0.4 MILLIGRAM(S): 0.4 CAPSULE ORAL at 21:39

## 2019-03-17 RX ADMIN — Medication 1 DROP(S): at 21:39

## 2019-03-17 RX ADMIN — Medication 25 MILLIGRAM(S): at 05:58

## 2019-03-17 RX ADMIN — Medication 100 MILLIGRAM(S): at 17:39

## 2019-03-17 RX ADMIN — FINASTERIDE 5 MILLIGRAM(S): 5 TABLET, FILM COATED ORAL at 12:05

## 2019-03-17 RX ADMIN — POLYETHYLENE GLYCOL 3350 17 GRAM(S): 17 POWDER, FOR SOLUTION ORAL at 12:05

## 2019-03-17 RX ADMIN — Medication 500 MILLIGRAM(S): at 12:05

## 2019-03-17 RX ADMIN — Medication 12 UNIT(S): at 08:26

## 2019-03-17 RX ADMIN — Medication 40 MILLIGRAM(S): at 17:40

## 2019-03-17 RX ADMIN — CHLORHEXIDINE GLUCONATE 1 APPLICATION(S): 213 SOLUTION TOPICAL at 07:33

## 2019-03-17 RX ADMIN — Medication 40 MILLIGRAM(S): at 05:58

## 2019-03-17 RX ADMIN — HEPARIN SODIUM 5000 UNIT(S): 5000 INJECTION INTRAVENOUS; SUBCUTANEOUS at 17:40

## 2019-03-17 RX ADMIN — Medication 3 MILLILITER(S): at 07:50

## 2019-03-17 NOTE — PROGRESS NOTE ADULT - SUBJECTIVE AND OBJECTIVE BOX
Pt seen and examined at bedside. No events overnight. No new complaints. Oxygen requirements decreased today from 45 to 35% on HFNC.     VITAL SIGNS (Last 24 hrs):  T(C): 35.7 (19 @ 06:09), Max: 36.8 (19 @ 14:10)  HR: 74 (19 @ 06:09) (74 - 87)  BP: 151/72 (19 @ 06:09) (109/56 - 151/72)  RR: 16 (19 @ 06:09) (16 - 17)  SpO2: 88% (19 @ 08:30) (88% - 96%)  Wt(kg): --  Daily     Daily Weight in k.1 (17 Mar 2019 06:09)    I&O's Summary    16 Mar 2019 07:01  -  17 Mar 2019 07:00  --------------------------------------------------------  IN: 360 mL / OUT: 1450 mL / NET: -1090 mL        PHYSICAL EXAM:  GENERAL: NAD, obese   HEAD:  Atraumatic, Normocephalic  EYES: EOMI, PERRLA, conjunctiva and sclera clear  NECK: Supple, No JVD  CHEST/LUNG: Clear to auscultation bilaterally; No wheeze  HEART: Regular rate and rhythm; No murmurs, rubs, or gallops  ABDOMEN: Soft, Nontender, Nondistended; Bowel sounds present  EXTREMITIES:  2+ Peripheral Pulses, No clubbing, cyanosis + edema  PSYCH: AAOx3  NEUROLOGY: non-focal  SKIN: No rashes or lesions    Labs Reviewed  Spoke to patient in regards to abnormal labs.    CBC Full  -  ( 16 Mar 2019 07:38 )  WBC Count : 11.90 K/uL  Hemoglobin : 12.7 g/dL  Hematocrit : 44.4 %  Platelet Count - Automated : 443 K/uL  Mean Cell Volume : 79.6 fL  Mean Cell Hemoglobin : 22.8 pg  Mean Cell Hemoglobin Concentration : 28.6 g/dL  Auto Neutrophil # : x  Auto Lymphocyte # : x  Auto Monocyte # : x  Auto Eosinophil # : x  Auto Basophil # : x  Auto Neutrophil % : x  Auto Lymphocyte % : x  Auto Monocyte % : x  Auto Eosinophil % : x  Auto Basophil % : x    BMP:    16 @ 07:38    Blood Urea Nitrogen - 51  Calcium - 9.0  Carbond Dioxide - 30  Chloride - 108  Creatinine - 1.7  Glucose - 101  Potassium - 5.0  Sodium - 144         Urine Culture:  03-15 @ 03:20 Urine culture: --    Culture Results:   <10,000 CFU/mL Normal Urogenital Gillian  Method Type: --  Organism: --  Organism Identification: --  Specimen Source: .Urine Clean Catch (Midstream)   @ 16:00 Urine culture: --    Culture Results:   <10,000 CFU/mL Normal Urogenital Gillian  Method Type: --  Organism: --  Organism Identification: --  Specimen Source: .Urine Clean Catch (Midstream)     MEDICATIONS  (STANDING):  ALBUTerol/ipratropium for Nebulization 3 milliLiter(s) Nebulizer every 4 hours  artificial  tears Solution 1 Drop(s) Both EYES three times a day  ascorbic acid 500 milliGRAM(s) Oral daily  atorvastatin 20 milliGRAM(s) Oral at bedtime  buDESOnide 160 MICROgram(s)/formoterol 4.5 MICROgram(s) Inhaler 2 Puff(s) Inhalation two times a day  chlorhexidine 4% Liquid 1 Application(s) Topical <User Schedule>  cholecalciferol 1000 Unit(s) Oral daily  dextrose 5%. 1000 milliLiter(s) (50 mL/Hr) IV Continuous <Continuous>  dextrose 50% Injectable 12.5 Gram(s) IV Push once  dextrose 50% Injectable 25 Gram(s) IV Push once  dextrose 50% Injectable 25 Gram(s) IV Push once  docusate sodium 100 milliGRAM(s) Oral three times a day  finasteride 5 milliGRAM(s) Oral daily  furosemide    Tablet 40 milliGRAM(s) Oral daily  guaiFENesin  milliGRAM(s) Oral every 12 hours  heparin  Injectable 5000 Unit(s) SubCutaneous every 12 hours  insulin glargine Injectable (LANTUS) 25 Unit(s) SubCutaneous two times a day  insulin lispro (HumaLOG) corrective regimen sliding scale   SubCutaneous three times a day before meals  insulin lispro Injectable (HumaLOG) 12 Unit(s) SubCutaneous three times a day before meals  methylPREDNISolone sodium succinate Injectable 60 milliGRAM(s) IV Push daily  metoprolol tartrate 25 milliGRAM(s) Oral two times a day  nystatin Powder 1 Application(s) Topical two times a day  polyethylene glycol 3350 17 Gram(s) Oral daily  senna 2 Tablet(s) Oral at bedtime  sodium polystyrene sulfonate Suspension 30 Gram(s) Oral every 48 hours  tamsulosin 0.4 milliGRAM(s) Oral at bedtime  tiotropium 18 MICROgram(s) Capsule 1 Capsule(s) Inhalation daily    MEDICATIONS  (PRN):  aluminum hydroxide/magnesium hydroxide/simethicone Suspension 30 milliLiter(s) Oral every 4 hours PRN Dyspepsia  dextrose 40% Gel 15 Gram(s) Oral once PRN Blood Glucose LESS THAN 70 milliGRAM(s)/deciliter  glucagon  Injectable 1 milliGRAM(s) IntraMuscular once PRN Glucose LESS THAN 70 milligrams/deciliter  sodium chloride 0.65% Nasal 1 Spray(s) Both Nostrils every 4 hours PRN Nasal Congestion Pt seen and examined at bedside. No events overnight. No new complaints. Responded to lasix yesterday. Oxygen requirements decreased today from 45 to 35% on HFNC.     VITAL SIGNS (Last 24 hrs):  T(C): 35.7 (19 @ 06:09), Max: 36.8 (19 @ 14:10)  HR: 74 (19 @ 06:09) (74 - 87)  BP: 151/72 (19 @ 06:09) (109/56 - 151/72)  RR: 16 (19 @ 06:09) (16 - 17)  SpO2: 88% (19 @ 08:30) (88% - 96%)  Wt(kg): --  Daily     Daily Weight in k.1 (17 Mar 2019 06:09)    I&O's Summary    16 Mar 2019 07:01  -  17 Mar 2019 07:00  --------------------------------------------------------  IN: 360 mL / OUT: 1450 mL / NET: -1090 mL        PHYSICAL EXAM:  GENERAL: NAD, obese   HEAD:  Atraumatic, Normocephalic  EYES: EOMI, PERRLA, conjunctiva and sclera clear  NECK: Supple, No JVD  CHEST/LUNG: Clear to auscultation bilaterally; No wheeze  HEART: Regular rate and rhythm; No murmurs, rubs, or gallops  ABDOMEN: Soft, Nontender, Nondistended; Bowel sounds present  EXTREMITIES:  2+ Peripheral Pulses, No clubbing, cyanosis + edema  PSYCH: AAOx3  NEUROLOGY: non-focal  SKIN: No rashes or lesions    Labs Reviewed  Spoke to patient in regards to abnormal labs.    CBC Full  -  ( 16 Mar 2019 07:38 )  WBC Count : 11.90 K/uL  Hemoglobin : 12.7 g/dL  Hematocrit : 44.4 %  Platelet Count - Automated : 443 K/uL  Mean Cell Volume : 79.6 fL  Mean Cell Hemoglobin : 22.8 pg  Mean Cell Hemoglobin Concentration : 28.6 g/dL  Auto Neutrophil # : x  Auto Lymphocyte # : x  Auto Monocyte # : x  Auto Eosinophil # : x  Auto Basophil # : x  Auto Neutrophil % : x  Auto Lymphocyte % : x  Auto Monocyte % : x  Auto Eosinophil % : x  Auto Basophil % : x    BMP:    16 @ 07:38    Blood Urea Nitrogen - 51  Calcium - 9.0  Carbond Dioxide - 30  Chloride - 108  Creatinine - 1.7  Glucose - 101  Potassium - 5.0  Sodium - 144         Urine Culture:  03-15 @ 03:20 Urine culture: --    Culture Results:   <10,000 CFU/mL Normal Urogenital Gillian  Method Type: --  Organism: --  Organism Identification: --  Specimen Source: .Urine Clean Catch (Midstream)   @ 16:00 Urine culture: --    Culture Results:   <10,000 CFU/mL Normal Urogenital Gillian  Method Type: --  Organism: --  Organism Identification: --  Specimen Source: .Urine Clean Catch (Midstream)     MEDICATIONS  (STANDING):  ALBUTerol/ipratropium for Nebulization 3 milliLiter(s) Nebulizer every 4 hours  artificial  tears Solution 1 Drop(s) Both EYES three times a day  ascorbic acid 500 milliGRAM(s) Oral daily  atorvastatin 20 milliGRAM(s) Oral at bedtime  buDESOnide 160 MICROgram(s)/formoterol 4.5 MICROgram(s) Inhaler 2 Puff(s) Inhalation two times a day  chlorhexidine 4% Liquid 1 Application(s) Topical <User Schedule>  cholecalciferol 1000 Unit(s) Oral daily  dextrose 5%. 1000 milliLiter(s) (50 mL/Hr) IV Continuous <Continuous>  dextrose 50% Injectable 12.5 Gram(s) IV Push once  dextrose 50% Injectable 25 Gram(s) IV Push once  dextrose 50% Injectable 25 Gram(s) IV Push once  docusate sodium 100 milliGRAM(s) Oral three times a day  finasteride 5 milliGRAM(s) Oral daily  furosemide    Tablet 40 milliGRAM(s) Oral daily  guaiFENesin  milliGRAM(s) Oral every 12 hours  heparin  Injectable 5000 Unit(s) SubCutaneous every 12 hours  insulin glargine Injectable (LANTUS) 25 Unit(s) SubCutaneous two times a day  insulin lispro (HumaLOG) corrective regimen sliding scale   SubCutaneous three times a day before meals  insulin lispro Injectable (HumaLOG) 12 Unit(s) SubCutaneous three times a day before meals  methylPREDNISolone sodium succinate Injectable 60 milliGRAM(s) IV Push daily  metoprolol tartrate 25 milliGRAM(s) Oral two times a day  nystatin Powder 1 Application(s) Topical two times a day  polyethylene glycol 3350 17 Gram(s) Oral daily  senna 2 Tablet(s) Oral at bedtime  sodium polystyrene sulfonate Suspension 30 Gram(s) Oral every 48 hours  tamsulosin 0.4 milliGRAM(s) Oral at bedtime  tiotropium 18 MICROgram(s) Capsule 1 Capsule(s) Inhalation daily    MEDICATIONS  (PRN):  aluminum hydroxide/magnesium hydroxide/simethicone Suspension 30 milliLiter(s) Oral every 4 hours PRN Dyspepsia  dextrose 40% Gel 15 Gram(s) Oral once PRN Blood Glucose LESS THAN 70 milliGRAM(s)/deciliter  glucagon  Injectable 1 milliGRAM(s) IntraMuscular once PRN Glucose LESS THAN 70 milligrams/deciliter  sodium chloride 0.65% Nasal 1 Spray(s) Both Nostrils every 4 hours PRN Nasal Congestion

## 2019-03-17 NOTE — PROGRESS NOTE ADULT - ASSESSMENT
1) Acute on Chronic Hypoxic Resp Failure due to advanced COPD   -taper steroids, start prednisone   -continue with symbicort, nebs and high flow O2  -PPI for GI ppx  -saline nasal spray  -cough syrup PRN  -c/w lasix 40mg PO daily (given IV yesterday and started on PO today)     2) Chronic Diastolic CHF; stable   -c/w Lasix     3) CKD stage III;   -not on nephrotoxins; monitor BMP     5) morbidly obese: BMI 42  -weight loss and diet modification (non-compliant with diet)    6) DM II with hyperglycemia:  -adjust insulin regimen    7) BPH:  -on flomax    8) Debility  -rehab/pt and oob to chair with assistance    9) Suspected Vitamin D def; continue with oral supplements and outpatient lab monitoring 1) Acute on Chronic Hypoxic Resp Failure due to advanced COPD   -taper steroids, start prednisone   -continue with symbicort, nebs and high flow O2  -PPI for GI ppx  -saline nasal spray  -cough syrup PRN  -hold lasix 40mg PO daily (given IV yesterday and PO today)     2) Chronic Diastolic CHF; stable   -hold Lasix due to rise in creatinine    3) CKD stage III;   -hold lasix for now   -not on nephrotoxins; monitor BMP     5) morbidly obese: BMI 42  -weight loss and diet modification (non-compliant with diet)    6) DM II with hyperglycemia:  -adjust insulin regimen    7) BPH:  -on flomax    8) Debility  -rehab/pt and oob to chair with assistance    9) Suspected Vitamin D def; continue with oral supplements and outpatient lab monitoring

## 2019-03-18 LAB
ANION GAP SERPL CALC-SCNC: 7 MMOL/L — SIGNIFICANT CHANGE UP (ref 7–14)
BUN SERPL-MCNC: 55 MG/DL — HIGH (ref 10–20)
CALCIUM SERPL-MCNC: 9.1 MG/DL — SIGNIFICANT CHANGE UP (ref 8.5–10.1)
CHLORIDE SERPL-SCNC: 101 MMOL/L — SIGNIFICANT CHANGE UP (ref 98–110)
CO2 SERPL-SCNC: 33 MMOL/L — HIGH (ref 17–32)
CREAT SERPL-MCNC: 1.8 MG/DL — HIGH (ref 0.7–1.5)
GLUCOSE BLDC GLUCOMTR-MCNC: 179 MG/DL — HIGH (ref 70–99)
GLUCOSE BLDC GLUCOMTR-MCNC: 180 MG/DL — HIGH (ref 70–99)
GLUCOSE BLDC GLUCOMTR-MCNC: 281 MG/DL — HIGH (ref 70–99)
GLUCOSE BLDC GLUCOMTR-MCNC: 308 MG/DL — HIGH (ref 70–99)
GLUCOSE SERPL-MCNC: 207 MG/DL — HIGH (ref 70–99)
HCT VFR BLD CALC: 44.5 % — SIGNIFICANT CHANGE UP (ref 42–52)
HGB BLD-MCNC: 13.1 G/DL — LOW (ref 14–18)
MAGNESIUM SERPL-MCNC: 1.8 MG/DL — SIGNIFICANT CHANGE UP (ref 1.8–2.4)
MCHC RBC-ENTMCNC: 22.9 PG — LOW (ref 27–31)
MCHC RBC-ENTMCNC: 29.4 G/DL — LOW (ref 32–37)
MCV RBC AUTO: 77.7 FL — LOW (ref 80–94)
NRBC # BLD: 0 /100 WBCS — SIGNIFICANT CHANGE UP (ref 0–0)
PLATELET # BLD AUTO: 484 K/UL — HIGH (ref 130–400)
POTASSIUM SERPL-MCNC: 5.4 MMOL/L — HIGH (ref 3.5–5)
POTASSIUM SERPL-SCNC: 5.4 MMOL/L — HIGH (ref 3.5–5)
RBC # BLD: 5.73 M/UL — SIGNIFICANT CHANGE UP (ref 4.7–6.1)
RBC # FLD: 18.1 % — HIGH (ref 11.5–14.5)
SODIUM SERPL-SCNC: 141 MMOL/L — SIGNIFICANT CHANGE UP (ref 135–146)
WBC # BLD: 14.76 K/UL — HIGH (ref 4.8–10.8)
WBC # FLD AUTO: 14.76 K/UL — HIGH (ref 4.8–10.8)

## 2019-03-18 RX ORDER — ALBUTEROL 90 UG/1
2 AEROSOL, METERED ORAL EVERY 6 HOURS
Qty: 0 | Refills: 0 | Status: DISCONTINUED | OUTPATIENT
Start: 2019-03-18 | End: 2019-03-29

## 2019-03-18 RX ADMIN — Medication 25 MILLIGRAM(S): at 05:22

## 2019-03-18 RX ADMIN — Medication 3 MILLILITER(S): at 16:50

## 2019-03-18 RX ADMIN — TAMSULOSIN HYDROCHLORIDE 0.4 MILLIGRAM(S): 0.4 CAPSULE ORAL at 21:27

## 2019-03-18 RX ADMIN — Medication 3: at 16:58

## 2019-03-18 RX ADMIN — Medication 15 UNIT(S): at 07:51

## 2019-03-18 RX ADMIN — NYSTATIN CREAM 1 APPLICATION(S): 100000 CREAM TOPICAL at 05:21

## 2019-03-18 RX ADMIN — Medication 1: at 07:51

## 2019-03-18 RX ADMIN — INSULIN GLARGINE 25 UNIT(S): 100 INJECTION, SOLUTION SUBCUTANEOUS at 07:52

## 2019-03-18 RX ADMIN — SENNA PLUS 2 TABLET(S): 8.6 TABLET ORAL at 21:27

## 2019-03-18 RX ADMIN — INSULIN GLARGINE 25 UNIT(S): 100 INJECTION, SOLUTION SUBCUTANEOUS at 21:27

## 2019-03-18 RX ADMIN — Medication 600 MILLIGRAM(S): at 17:54

## 2019-03-18 RX ADMIN — HEPARIN SODIUM 5000 UNIT(S): 5000 INJECTION INTRAVENOUS; SUBCUTANEOUS at 05:23

## 2019-03-18 RX ADMIN — Medication 500 MILLIGRAM(S): at 12:47

## 2019-03-18 RX ADMIN — Medication 600 MILLIGRAM(S): at 06:18

## 2019-03-18 RX ADMIN — Medication 1 DROP(S): at 13:08

## 2019-03-18 RX ADMIN — HEPARIN SODIUM 5000 UNIT(S): 5000 INJECTION INTRAVENOUS; SUBCUTANEOUS at 17:53

## 2019-03-18 RX ADMIN — Medication 100 MILLIGRAM(S): at 13:09

## 2019-03-18 RX ADMIN — Medication 3 MILLILITER(S): at 19:18

## 2019-03-18 RX ADMIN — Medication 15 UNIT(S): at 12:48

## 2019-03-18 RX ADMIN — POLYETHYLENE GLYCOL 3350 17 GRAM(S): 17 POWDER, FOR SOLUTION ORAL at 12:46

## 2019-03-18 RX ADMIN — Medication 100 MILLIGRAM(S): at 05:22

## 2019-03-18 RX ADMIN — Medication 1 DROP(S): at 05:21

## 2019-03-18 RX ADMIN — ATORVASTATIN CALCIUM 20 MILLIGRAM(S): 80 TABLET, FILM COATED ORAL at 21:27

## 2019-03-18 RX ADMIN — Medication 40 MILLIGRAM(S): at 05:25

## 2019-03-18 RX ADMIN — Medication 15 UNIT(S): at 16:58

## 2019-03-18 RX ADMIN — Medication 3 MILLILITER(S): at 08:24

## 2019-03-18 RX ADMIN — Medication 1000 UNIT(S): at 12:47

## 2019-03-18 RX ADMIN — FINASTERIDE 5 MILLIGRAM(S): 5 TABLET, FILM COATED ORAL at 12:47

## 2019-03-18 RX ADMIN — NYSTATIN CREAM 1 APPLICATION(S): 100000 CREAM TOPICAL at 17:54

## 2019-03-18 RX ADMIN — BUDESONIDE AND FORMOTEROL FUMARATE DIHYDRATE 2 PUFF(S): 160; 4.5 AEROSOL RESPIRATORY (INHALATION) at 08:18

## 2019-03-18 RX ADMIN — Medication 25 MILLIGRAM(S): at 17:54

## 2019-03-18 RX ADMIN — Medication 4: at 12:47

## 2019-03-18 RX ADMIN — TIOTROPIUM BROMIDE 1 CAPSULE(S): 18 CAPSULE ORAL; RESPIRATORY (INHALATION) at 07:52

## 2019-03-18 RX ADMIN — BUDESONIDE AND FORMOTEROL FUMARATE DIHYDRATE 2 PUFF(S): 160; 4.5 AEROSOL RESPIRATORY (INHALATION) at 21:28

## 2019-03-18 RX ADMIN — Medication 100 MILLIGRAM(S): at 21:27

## 2019-03-18 RX ADMIN — CHLORHEXIDINE GLUCONATE 1 APPLICATION(S): 213 SOLUTION TOPICAL at 07:52

## 2019-03-18 RX ADMIN — Medication 3 MILLILITER(S): at 12:11

## 2019-03-18 RX ADMIN — Medication 1 DROP(S): at 21:29

## 2019-03-18 NOTE — DIETITIAN INITIAL EVALUATION ADULT. - PERTINENT MEDS FT
heparin, humalog, lantus, prednisone, miralax, magnesium hydroxide, ascorbic acid, atorvastatin, cholecalciferol, docusate sodium, senna

## 2019-03-18 NOTE — DIETITIAN INITIAL EVALUATION ADULT. - INDICATOR
Will monitor intake, nutrition focused physical findings, + glucose, renal, and electrolyte profile.

## 2019-03-18 NOTE — PROGRESS NOTE ADULT - SUBJECTIVE AND OBJECTIVE BOX
Pt seen and examined at bedside.  -no significant events  -on high flow O2; pulse ox and decrease FiO2 as tolerated by the patient   -oob to chair as tolerated with PT    Vital Signs Last 24 Hrs  T(C): 35.9 (18 Mar 2019 05:33), Max: 36.8 (17 Mar 2019 14:13)  T(F): 96.6 (18 Mar 2019 05:33), Max: 98.3 (17 Mar 2019 14:13)  HR: 77 (18 Mar 2019 05:33) (77 - 94)  BP: 168/72 (18 Mar 2019 05:33) (127/99 - 168/72)  RR: 16 (18 Mar 2019 05:33) (16 - 16)  SpO2: 91% (18 Mar 2019 08:27) (91% - 91%)    16 Mar 2019 07:01  -  17 Mar 2019 07:00  --------------------------------------------------------  IN: 360 mL / OUT: 1450 mL / NET: -1090 mL      PHYSICAL EXAM:  GENERAL: NAD, well-groomed, well-developed  HEAD:  Atraumatic, Normocephalic  EYES: EOMI, PERRLA, conjunctiva and sclera clear  NERVOUS SYSTEM:  Alert & Oriented X 3  CHEST/LUNG: decreased Breath sounds   CV/HEART: Regular rate and rhythm; No murmurs, rubs, or gallops  GI/ABDOMEN: Soft, Nontender, obese abdomen   EXTREMITIES:  2+ Peripheral Pulses, No clubbing, cyanosis, or edema  SKIN: No rashes or lesions    Labs Reviewed  Spoke to patient in regards to abnormal labs.                          13.1   14.76 )-----------( 484      ( 18 Mar 2019 07:00 )             44.5   03-18    141  |  101  |  55<H>  ----------------------------<  207<H>  5.4<H>   |  33<H>  |  1.8<H>    Ca    9.1      18 Mar 2019 07:00  Phos  4.2     03-17  Mg     1.8     03-18    Urine Culture:  03-15 @ 03:20 Urine culture: --    Culture Results:   <10,000 CFU/mL Normal Urogenital Gillian  Method Type: --  Organism: --  Organism Identification: --  Specimen Source: .Urine Clean Catch (Midstream)  03-14 @ 16:00 Urine culture: --    Culture Results:   <10,000 CFU/mL Normal Urogenital Gillian  Method Type: --  Organism: --  Organism Identification: --  Specimen Source: .Urine Clean Catch (Midstream)     MEDICATIONS  (STANDING):  ALBUTerol/ipratropium for Nebulization 3 milliLiter(s) Nebulizer every 4 hours  artificial  tears Solution 1 Drop(s) Both EYES three times a day  ascorbic acid 500 milliGRAM(s) Oral daily  atorvastatin 20 milliGRAM(s) Oral at bedtime  buDESOnide 160 MICROgram(s)/formoterol 4.5 MICROgram(s) Inhaler 2 Puff(s) Inhalation two times a day  chlorhexidine 4% Liquid 1 Application(s) Topical <User Schedule>  cholecalciferol 1000 Unit(s) Oral daily  dextrose 5%. 1000 milliLiter(s) (50 mL/Hr) IV Continuous <Continuous>  dextrose 50% Injectable 12.5 Gram(s) IV Push once  dextrose 50% Injectable 25 Gram(s) IV Push once  dextrose 50% Injectable 25 Gram(s) IV Push once  docusate sodium 100 milliGRAM(s) Oral three times a day  finasteride 5 milliGRAM(s) Oral daily  guaiFENesin  milliGRAM(s) Oral every 12 hours  heparin  Injectable 5000 Unit(s) SubCutaneous every 12 hours  insulin glargine Injectable (LANTUS) 25 Unit(s) SubCutaneous two times a day  insulin lispro (HumaLOG) corrective regimen sliding scale   SubCutaneous three times a day before meals  insulin lispro Injectable (HumaLOG) 15 Unit(s) SubCutaneous three times a day before meals  metoprolol tartrate 25 milliGRAM(s) Oral two times a day  nystatin Powder 1 Application(s) Topical two times a day  polyethylene glycol 3350 17 Gram(s) Oral daily  predniSONE   Tablet 40 milliGRAM(s) Oral daily  senna 2 Tablet(s) Oral at bedtime  sodium polystyrene sulfonate Suspension 30 Gram(s) Oral every 48 hours  tamsulosin 0.4 milliGRAM(s) Oral at bedtime  tiotropium 18 MICROgram(s) Capsule 1 Capsule(s) Inhalation daily    MEDICATIONS  (PRN):  aluminum hydroxide/magnesium hydroxide/simethicone Suspension 30 milliLiter(s) Oral every 4 hours PRN Dyspepsia  dextrose 40% Gel 15 Gram(s) Oral once PRN Blood Glucose LESS THAN 70 milliGRAM(s)/deciliter  glucagon  Injectable 1 milliGRAM(s) IntraMuscular once PRN Glucose LESS THAN 70 milligrams/deciliter  sodium chloride 0.65% Nasal 1 Spray(s) Both Nostrils every 4 hours PRN Nasal Congestion

## 2019-03-18 NOTE — DIETITIAN INITIAL EVALUATION ADULT. - PROBLEM SELECTOR PLAN 1
Due to COPD exacerbation, will hold usual meds such as spiriva and prednisone and give duoneb every 4 hours with IV steroids and continue Levaquin started in the ER. Pulmonary consult

## 2019-03-18 NOTE — DIETITIAN INITIAL EVALUATION ADULT. - DIET TYPE
consistent carbohydrate (evening snack)/No fish/shellfish/DASH/TLC (sodium and cholesterol restricted diet)

## 2019-03-18 NOTE — DIETITIAN INITIAL EVALUATION ADULT. - ENERGY NEEDS
Calories: 1732-1905kcals/day (MSJ A.F 1-1.1) Lower A.F due to obesity   Protein: 59-71g/day (1-1.2g/IBW) monitor renal function  Fluid: 1:1ml/kcal or fluid as per LIP

## 2019-03-18 NOTE — CONSULT NOTE ADULT - ASSESSMENT
78M with pmhx of Severe COPD (on 5L home O2), HFw/PEF, Suspected Pulm-HTN Obesity, DM, CKD III, BPH admitted for Severe COPD exacerbation. Patients respiratory status improved with NIV and IV Solumedrol.    COPD Exacerbation: Currently improving   Pulm following, currently on pred taper  Continue with Symbicort, Spiriva, and Albuterol PRN  Patient should consider end of life/hospice care   Wean O2 as tolerated    HFw/PEF: currently stable  on lasix PO and Metoprolol     DM II: Better controlled on Basal Bolus Insulin     CKD III: at baseline     BPH: currently on Flomax and Finasteride     Dispo: Patient is a good candidate for home visits given his level of debility due to End Stage COPD 78M with pmhx of Severe COPD (on 5L home O2), HFw/PEF, Suspected Pulm-HTN Obesity, DM, CKD III, BPH admitted for Severe COPD exacerbation. Patients respiratory status improved with NIV and IV Solumedrol.    COPD Exacerbation: Currently improving   Pulm following, currently on pred taper  Continue with Symbicort, Spiriva, and Albuterol PRN  Patient should consider end of life/hospice care   Wean O2 as tolerated    HFw/PEF: currently stable  on lasix PO and Metoprolol     DM II: intermittent episodes of hyperglycemia   Suggest increasing basal insulin     CKD III w./hyperkalemia: at baseline   cont with kayexalate q48    BPH: currently on Flomax and Finasteride     Dispo: Patient is a good candidate for home visits given his level of debility due to End Stage COPD 78M with pmhx of Severe COPD (on 5L home O2), HFw/PEF, Suspected Pulm-HTN Obesity, DM, CKD III, BPH admitted for Severe COPD exacerbation. Patients respiratory status improved with NIV and IV Solumedrol. Recurrent admssions.    COPD Exacerbation: Currently improving   Pulm following, currently on pred taper  Continue with Symbicort, Spiriva, and Albuterol PRN  Patient should consider end of life/hospice care, but not ready at this time  may consider pulmonary rehab    HFw/PEF: currently stable  on lasix PO and Metoprolol     DM II: intermittent episodes of hyperglycemia   Suggest increasing basal insulin, will monitor at home     CKD III w./hyperkalemia: at baseline   cont with kayexalate q48    BPH: currently on Flomax and Finasteride     Dispo: Patient is a home visits patient, followed by [facility] team

## 2019-03-18 NOTE — DIETITIAN INITIAL EVALUATION ADULT. - OTHER INFO
Reason for assessment: LOS. PMH: colon polyp, COPD, DM, emphysema, enlarged prostate, GERD, high cholesterol, HTN, oxygen dependent. Pt presented to ED for worsening SOB. Pt is admitted for acute on chronic hypoxic resp failure due to advanced COPD. Chronic diastolic CHF, CKD lll, and suspected vit D def noted. Pt had a BM today 3/18 however he still feels constipated (BM regimen already ordered). Abdomen noted as soft/nontender/nondistended + BS. Pt is allergic to fish and shellfish.

## 2019-03-18 NOTE — PROGRESS NOTE ADULT - ASSESSMENT
1) Acute on Chronic Hypoxic Resp Failure due to advanced COPD   -tapered steroids, monitor as tolerated   -continue with symbicort, nebs and high flow O2  -PPI for GI ppx  -saline nasal spray  -cough syrup PRN      2) Chronic Diastolic CHF; stable   -lasix on hold     3) CKD stage III;   -monitor BMP     5) morbidly obese: BMI 42  -weight loss and diet modification (non-compliant with diet)    6) DM II with hyperglycemia:  -adjust insulin regimen    7) BPH:  -on flomax    8) Debility  -rehab/pt and oob to chair with assistance    9) Suspected Vitamin D def; continue with oral supplements and outpatient lab monitoring

## 2019-03-18 NOTE — CONSULT NOTE ADULT - SUBJECTIVE AND OBJECTIVE BOX
SUBJECTIVE:  78M with pmhx of Severe COPD (on 5L home O2), HFw/PEF, Suspected Pulm-HTN Obesity, DM, CKD III, BPH admitted for Severe COPD exacerbation. Patients respiratory status improved with NIV and IV Solumedrol. Patient was never intubated, not currently taking antibiotics. Patient has multiple hospital admissions for COPD within the last year. Patient states that he has 24hr home health aid at home. Currently awaiting a ramp at home due to in inability to ambulate. Patient states he does have difficulty going to see outside specialists due to the O2 requirements his tanks don't last long. He reports requiring an ambulance for transport.   Patient states currently he feels improved and has no specific complaints.       PAST MEDICAL & SURGICAL HISTORY  Colon polyp: claims it was malignant  High cholesterol  GERD (gastroesophageal reflux disease)  Enlarged prostate  Oxygen dependent  COPD (chronic obstructive pulmonary disease)  Diabetes mellitus, type 2  Hypertension  Emphysema lung  History of hemorrhoidectomy  Dysplastic colon polyp: removed    SOCIAL HISTORY:  Negative for smoking/alcohol/drug use.     ALLERGIES:  fish (Other)  iodine (Hives)  penicillin (Unknown)  shellfish (Other)    MEDICATIONS:  STANDING MEDICATIONS  ALBUTerol/ipratropium for Nebulization 3 milliLiter(s) Nebulizer every 4 hours  artificial  tears Solution 1 Drop(s) Both EYES three times a day  ascorbic acid 500 milliGRAM(s) Oral daily  atorvastatin 20 milliGRAM(s) Oral at bedtime  buDESOnide 160 MICROgram(s)/formoterol 4.5 MICROgram(s) Inhaler 2 Puff(s) Inhalation two times a day  chlorhexidine 4% Liquid 1 Application(s) Topical <User Schedule>  cholecalciferol 1000 Unit(s) Oral daily  docusate sodium 100 milliGRAM(s) Oral three times a day  finasteride 5 milliGRAM(s) Oral daily  guaiFENesin  milliGRAM(s) Oral every 12 hours  heparin  Injectable 5000 Unit(s) SubCutaneous every 12 hours  insulin glargine Injectable (LANTUS) 25 Unit(s) SubCutaneous two times a day  insulin lispro (HumaLOG) corrective regimen sliding scale   SubCutaneous three times a day before meals  insulin lispro Injectable (HumaLOG) 15 Unit(s) SubCutaneous three times a day before meals  metoprolol tartrate 25 milliGRAM(s) Oral two times a day  nystatin Powder 1 Application(s) Topical two times a day  polyethylene glycol 3350 17 Gram(s) Oral daily  predniSONE   Tablet 40 milliGRAM(s) Oral daily  senna 2 Tablet(s) Oral at bedtime  sodium polystyrene sulfonate Suspension 30 Gram(s) Oral every 48 hours  tamsulosin 0.4 milliGRAM(s) Oral at bedtime  tiotropium 18 MICROgram(s) Capsule 1 Capsule(s) Inhalation daily    PRN MEDICATIONS  ALBUTerol    90 MICROgram(s) HFA Inhaler 2 Puff(s) Inhalation every 6 hours PRN  aluminum hydroxide/magnesium hydroxide/simethicone Suspension 30 milliLiter(s) Oral every 4 hours PRN  dextrose 40% Gel 15 Gram(s) Oral once PRN  sodium chloride 0.65% Nasal 1 Spray(s) Both Nostrils every 4 hours PRN    VITALS:   T(F): 96.6  HR: 77  BP: 168/72  RR: 16  SpO2: 91%    LABS:                        13.1   14.76 )-----------( 484      ( 18 Mar 2019 07:00 )             44.5     03-18    141  |  101  |  55<H>  ----------------------------<  207<H>  5.4<H>   |  33<H>  |  1.8<H>    Ca    9.1      18 Mar 2019 07:00  Phos  4.2     03-17  Mg     1.8     03-18      RADIOLOGY:  < from: Xray Chest 1 View- PORTABLE-Routine (03.13.19 @ 09:04) >  Impression:      Bibasilar opacities, stable.    < end of copied text >        PHYSICAL EXAM:  GENERAL: NAD, morbidly obese, Non-toxic, stated age   HEAD:  Atraumatic, Normocephalic  EYES: EOMI, Sclera White   CHEST/LUNG: Clear to auscultation bilaterally; No wheezing, rhonchi, or crackles. Hi-Ike O2 in place and speaking full sentences   HEART: Regular rate and rhythm; s1, s2, No murmurs, rubs, or gallops  ABDOMEN: Soft, Nontender, Nondistended; Bowel sounds present, No rebound or guarding noted   EXTREMITIES:  b/p +1-2 pitting edema no calf tenderness to palpation.  No clubbing or cyanosis  PSYCH: AAOx3  NEUROLOGY: non-focal  SKIN: No rashes or lesions

## 2019-03-18 NOTE — DIETITIAN INITIAL EVALUATION ADULT. - NS AS NUTRI INTERV ED APPLICATION
During the Pts last admit I gave him a handout stating the high/med/low potassium foods. Pt requested another one today claiming that the previous one did not have the serving sizes stated? Reviewed handout w/ Pt.

## 2019-03-19 LAB
ANION GAP SERPL CALC-SCNC: 9 MMOL/L — SIGNIFICANT CHANGE UP (ref 7–14)
BUN SERPL-MCNC: 56 MG/DL — HIGH (ref 10–20)
CALCIUM SERPL-MCNC: 8.8 MG/DL — SIGNIFICANT CHANGE UP (ref 8.5–10.1)
CHLORIDE SERPL-SCNC: 106 MMOL/L — SIGNIFICANT CHANGE UP (ref 98–110)
CO2 SERPL-SCNC: 32 MMOL/L — SIGNIFICANT CHANGE UP (ref 17–32)
CREAT SERPL-MCNC: 2 MG/DL — HIGH (ref 0.7–1.5)
GLUCOSE BLDC GLUCOMTR-MCNC: 151 MG/DL — HIGH (ref 70–99)
GLUCOSE BLDC GLUCOMTR-MCNC: 236 MG/DL — HIGH (ref 70–99)
GLUCOSE BLDC GLUCOMTR-MCNC: 362 MG/DL — HIGH (ref 70–99)
GLUCOSE BLDC GLUCOMTR-MCNC: 90 MG/DL — SIGNIFICANT CHANGE UP (ref 70–99)
GLUCOSE SERPL-MCNC: 93 MG/DL — SIGNIFICANT CHANGE UP (ref 70–99)
HCT VFR BLD CALC: 45.2 % — SIGNIFICANT CHANGE UP (ref 42–52)
HGB BLD-MCNC: 13.1 G/DL — LOW (ref 14–18)
MCHC RBC-ENTMCNC: 22.9 PG — LOW (ref 27–31)
MCHC RBC-ENTMCNC: 29 G/DL — LOW (ref 32–37)
MCV RBC AUTO: 78.9 FL — LOW (ref 80–94)
NRBC # BLD: 0 /100 WBCS — SIGNIFICANT CHANGE UP (ref 0–0)
PLATELET # BLD AUTO: 467 K/UL — HIGH (ref 130–400)
POTASSIUM SERPL-MCNC: 4.8 MMOL/L — SIGNIFICANT CHANGE UP (ref 3.5–5)
POTASSIUM SERPL-SCNC: 4.8 MMOL/L — SIGNIFICANT CHANGE UP (ref 3.5–5)
RBC # BLD: 5.73 M/UL — SIGNIFICANT CHANGE UP (ref 4.7–6.1)
RBC # FLD: 18.3 % — HIGH (ref 11.5–14.5)
SODIUM SERPL-SCNC: 147 MMOL/L — HIGH (ref 135–146)
WBC # BLD: 13.82 K/UL — HIGH (ref 4.8–10.8)
WBC # FLD AUTO: 13.82 K/UL — HIGH (ref 4.8–10.8)

## 2019-03-19 RX ORDER — INSULIN LISPRO 100/ML
17 VIAL (ML) SUBCUTANEOUS
Qty: 0 | Refills: 0 | Status: DISCONTINUED | OUTPATIENT
Start: 2019-03-19 | End: 2019-03-29

## 2019-03-19 RX ADMIN — CHLORHEXIDINE GLUCONATE 1 APPLICATION(S): 213 SOLUTION TOPICAL at 07:30

## 2019-03-19 RX ADMIN — Medication 100 MILLIGRAM(S): at 21:19

## 2019-03-19 RX ADMIN — NYSTATIN CREAM 1 APPLICATION(S): 100000 CREAM TOPICAL at 17:43

## 2019-03-19 RX ADMIN — Medication 3 MILLILITER(S): at 20:43

## 2019-03-19 RX ADMIN — BUDESONIDE AND FORMOTEROL FUMARATE DIHYDRATE 2 PUFF(S): 160; 4.5 AEROSOL RESPIRATORY (INHALATION) at 07:15

## 2019-03-19 RX ADMIN — INSULIN GLARGINE 25 UNIT(S): 100 INJECTION, SOLUTION SUBCUTANEOUS at 21:21

## 2019-03-19 RX ADMIN — NYSTATIN CREAM 1 APPLICATION(S): 100000 CREAM TOPICAL at 06:37

## 2019-03-19 RX ADMIN — BUDESONIDE AND FORMOTEROL FUMARATE DIHYDRATE 2 PUFF(S): 160; 4.5 AEROSOL RESPIRATORY (INHALATION) at 20:43

## 2019-03-19 RX ADMIN — Medication 600 MILLIGRAM(S): at 06:36

## 2019-03-19 RX ADMIN — Medication 17 UNIT(S): at 17:30

## 2019-03-19 RX ADMIN — Medication 100 MILLIGRAM(S): at 06:36

## 2019-03-19 RX ADMIN — Medication 600 MILLIGRAM(S): at 17:43

## 2019-03-19 RX ADMIN — Medication 25 MILLIGRAM(S): at 17:42

## 2019-03-19 RX ADMIN — Medication 1000 UNIT(S): at 17:44

## 2019-03-19 RX ADMIN — Medication 25 MILLIGRAM(S): at 06:36

## 2019-03-19 RX ADMIN — Medication 1 DROP(S): at 06:36

## 2019-03-19 RX ADMIN — INSULIN GLARGINE 25 UNIT(S): 100 INJECTION, SOLUTION SUBCUTANEOUS at 07:30

## 2019-03-19 RX ADMIN — Medication 5: at 12:19

## 2019-03-19 RX ADMIN — Medication 40 MILLIGRAM(S): at 06:36

## 2019-03-19 RX ADMIN — HEPARIN SODIUM 5000 UNIT(S): 5000 INJECTION INTRAVENOUS; SUBCUTANEOUS at 17:40

## 2019-03-19 RX ADMIN — Medication 100 MILLIGRAM(S): at 17:42

## 2019-03-19 RX ADMIN — Medication 15 UNIT(S): at 12:20

## 2019-03-19 RX ADMIN — HEPARIN SODIUM 5000 UNIT(S): 5000 INJECTION INTRAVENOUS; SUBCUTANEOUS at 06:36

## 2019-03-19 RX ADMIN — Medication 1 DROP(S): at 21:20

## 2019-03-19 RX ADMIN — Medication 1 DROP(S): at 17:38

## 2019-03-19 RX ADMIN — TIOTROPIUM BROMIDE 1 CAPSULE(S): 18 CAPSULE ORAL; RESPIRATORY (INHALATION) at 07:31

## 2019-03-19 RX ADMIN — TAMSULOSIN HYDROCHLORIDE 0.4 MILLIGRAM(S): 0.4 CAPSULE ORAL at 21:20

## 2019-03-19 RX ADMIN — Medication 2: at 17:31

## 2019-03-19 RX ADMIN — SENNA PLUS 2 TABLET(S): 8.6 TABLET ORAL at 21:19

## 2019-03-19 RX ADMIN — Medication 3 MILLILITER(S): at 12:46

## 2019-03-19 RX ADMIN — ATORVASTATIN CALCIUM 20 MILLIGRAM(S): 80 TABLET, FILM COATED ORAL at 21:20

## 2019-03-19 RX ADMIN — Medication 3 MILLILITER(S): at 07:02

## 2019-03-19 RX ADMIN — POLYETHYLENE GLYCOL 3350 17 GRAM(S): 17 POWDER, FOR SOLUTION ORAL at 08:58

## 2019-03-19 RX ADMIN — Medication 500 MILLIGRAM(S): at 17:42

## 2019-03-19 RX ADMIN — FINASTERIDE 5 MILLIGRAM(S): 5 TABLET, FILM COATED ORAL at 17:43

## 2019-03-19 NOTE — PROGRESS NOTE ADULT - SUBJECTIVE AND OBJECTIVE BOX
Pt seen and examined at bedside.  -no significant events  -on high flow O2; pulse ox and decrease FiO2 as tolerated by the patient   -oob to chair as tolerated with PT  -continue with current regimen  -spoke to home care about home high flow O2 arrangement   -hyperglycemia noted; increased sq insulin     Vital Signs Last 24 Hrs  T(C): 35.8 (19 Mar 2019 14:02), Max: 37.1 (18 Mar 2019 22:12)  T(F): 96.5 (19 Mar 2019 14:02), Max: 98.7 (18 Mar 2019 22:12)  HR: 98 (19 Mar 2019 14:02) (87 - 98)  BP: 113/54 (19 Mar 2019 14:02) (113/54 - 151/86)  RR: 18 (19 Mar 2019 14:02) (16 - 22)  SpO2: 89% (19 Mar 2019 12:49) (84% - 90%)    16 Mar 2019 07:01  -  17 Mar 2019 07:00  --------------------------------------------------------  IN: 360 mL / OUT: 1450 mL / NET: -1090 mL    PHYSICAL EXAM:  GENERAL: NAD, well-groomed, well-developed  HEAD:  Atraumatic, Normocephalic  EYES: EOMI, PERRLA, conjunctiva and sclera clear  NERVOUS SYSTEM:  Alert & Oriented X 3  CHEST/LUNG: decreased Breath sounds   CV/HEART: Regular rate and rhythm; No murmurs, rubs, or gallops  GI/ABDOMEN: Soft, Nontender, obese abdomen   EXTREMITIES:  2+ Peripheral Pulses, No clubbing, cyanosis, or edema  SKIN: No rashes or lesions    Labs Reviewed  Spoke to patient in regards to abnormal labs.                        13.1   13.82 )-----------( 467      ( 19 Mar 2019 06:13 )             45.2   03-19    147<H>  |  106  |  56<H>  ----------------------------<  93  4.8   |  32  |  2.0<H>    Ca    8.8      19 Mar 2019 06:13  Mg     1.8     03-18    Urine Culture:  03-15 @ 03:20 Urine culture: --    Culture Results:   <10,000 CFU/mL Normal Urogenital Gillian  Method Type: --  Organism: --  Organism Identification: --  Specimen Source: .Urine Clean Catch (Midstream)  03-14 @ 16:00 Urine culture: --    Culture Results:   <10,000 CFU/mL Normal Urogenital Gillian  Method Type: --  Organism: --  Organism Identification: --  Specimen Source: .Urine Clean Catch (Midstream)     MEDICATIONS  (STANDING):  ALBUTerol/ipratropium for Nebulization 3 milliLiter(s) Nebulizer every 4 hours  artificial  tears Solution 1 Drop(s) Both EYES three times a day  ascorbic acid 500 milliGRAM(s) Oral daily  atorvastatin 20 milliGRAM(s) Oral at bedtime  buDESOnide 160 MICROgram(s)/formoterol 4.5 MICROgram(s) Inhaler 2 Puff(s) Inhalation two times a day  chlorhexidine 4% Liquid 1 Application(s) Topical <User Schedule>  cholecalciferol 1000 Unit(s) Oral daily  dextrose 5%. 1000 milliLiter(s) (50 mL/Hr) IV Continuous <Continuous>  dextrose 50% Injectable 12.5 Gram(s) IV Push once  dextrose 50% Injectable 25 Gram(s) IV Push once  dextrose 50% Injectable 25 Gram(s) IV Push once  docusate sodium 100 milliGRAM(s) Oral three times a day  finasteride 5 milliGRAM(s) Oral daily  guaiFENesin  milliGRAM(s) Oral every 12 hours  heparin  Injectable 5000 Unit(s) SubCutaneous every 12 hours  insulin glargine Injectable (LANTUS) 25 Unit(s) SubCutaneous two times a day  insulin lispro (HumaLOG) corrective regimen sliding scale   SubCutaneous three times a day before meals  insulin lispro Injectable (HumaLOG) 17 Unit(s) SubCutaneous three times a day with meals  metoprolol tartrate 25 milliGRAM(s) Oral two times a day  nystatin Powder 1 Application(s) Topical two times a day  polyethylene glycol 3350 17 Gram(s) Oral daily  predniSONE   Tablet 40 milliGRAM(s) Oral daily  senna 2 Tablet(s) Oral at bedtime  sodium polystyrene sulfonate Suspension 30 Gram(s) Oral every 48 hours  tamsulosin 0.4 milliGRAM(s) Oral at bedtime  tiotropium 18 MICROgram(s) Capsule 1 Capsule(s) Inhalation daily    MEDICATIONS  (PRN):  ALBUTerol    90 MICROgram(s) HFA Inhaler 2 Puff(s) Inhalation every 6 hours PRN Shortness of Breath and/or Wheezing  aluminum hydroxide/magnesium hydroxide/simethicone Suspension 30 milliLiter(s) Oral every 4 hours PRN Dyspepsia  dextrose 40% Gel 15 Gram(s) Oral once PRN Blood Glucose LESS THAN 70 milliGRAM(s)/deciliter  glucagon  Injectable 1 milliGRAM(s) IntraMuscular once PRN Glucose LESS THAN 70 milligrams/deciliter  sodium chloride 0.65% Nasal 1 Spray(s) Both Nostrils every 4 hours PRN Nasal Congestion

## 2019-03-19 NOTE — PROGRESS NOTE ADULT - ASSESSMENT
1) Acute on Chronic Hypoxic Resp Failure due to advanced COPD   -tapered steroids, monitor as tolerated   -continue with symbicort, nebs and high flow O2  -PPI for GI ppx  -saline nasal spray  -cough syrup PRN    2) Chronic Diastolic CHF; stable   -lasix on hold     3) CKD stage III;   -monitor BMP     5) morbidly obese: BMI 42  -weight loss and diet modification (non-compliant with diet)    6) DM II with hyperglycemia:  -adjusted insulin regimen    7) BPH:  -on flomax    8) Debility  -rehab/pt and oob to chair with assistance    9) Suspected Vitamin D def; continue with oral supplements and outpatient lab monitoring       # Progress Note Handoff  PENDING as follows  consults:  Test: CBC  Other:  Family discussion: patient comprehends his medical care   Disposition:  Home _with home care and arrangements of high flow O2___ or SNF ___ Other _____ Unknown at this time ____

## 2019-03-20 LAB
ANION GAP SERPL CALC-SCNC: 11 MMOL/L — SIGNIFICANT CHANGE UP (ref 7–14)
BUN SERPL-MCNC: 57 MG/DL — HIGH (ref 10–20)
CALCIUM SERPL-MCNC: 9.1 MG/DL — SIGNIFICANT CHANGE UP (ref 8.5–10.1)
CHLORIDE SERPL-SCNC: 106 MMOL/L — SIGNIFICANT CHANGE UP (ref 98–110)
CO2 SERPL-SCNC: 29 MMOL/L — SIGNIFICANT CHANGE UP (ref 17–32)
CREAT SERPL-MCNC: 1.7 MG/DL — HIGH (ref 0.7–1.5)
GLUCOSE BLDC GLUCOMTR-MCNC: 136 MG/DL — HIGH (ref 70–99)
GLUCOSE BLDC GLUCOMTR-MCNC: 186 MG/DL — HIGH (ref 70–99)
GLUCOSE BLDC GLUCOMTR-MCNC: 191 MG/DL — HIGH (ref 70–99)
GLUCOSE BLDC GLUCOMTR-MCNC: 266 MG/DL — HIGH (ref 70–99)
GLUCOSE BLDC GLUCOMTR-MCNC: 97 MG/DL — SIGNIFICANT CHANGE UP (ref 70–99)
GLUCOSE SERPL-MCNC: 97 MG/DL — SIGNIFICANT CHANGE UP (ref 70–99)
POTASSIUM SERPL-MCNC: 5 MMOL/L — SIGNIFICANT CHANGE UP (ref 3.5–5)
POTASSIUM SERPL-SCNC: 5 MMOL/L — SIGNIFICANT CHANGE UP (ref 3.5–5)
SODIUM SERPL-SCNC: 146 MMOL/L — SIGNIFICANT CHANGE UP (ref 135–146)

## 2019-03-20 RX ORDER — FUROSEMIDE 40 MG
20 TABLET ORAL DAILY
Qty: 0 | Refills: 0 | Status: DISCONTINUED | OUTPATIENT
Start: 2019-03-20 | End: 2019-03-24

## 2019-03-20 RX ADMIN — Medication 3 MILLILITER(S): at 08:26

## 2019-03-20 RX ADMIN — Medication 100 MILLIGRAM(S): at 13:57

## 2019-03-20 RX ADMIN — POLYETHYLENE GLYCOL 3350 17 GRAM(S): 17 POWDER, FOR SOLUTION ORAL at 08:39

## 2019-03-20 RX ADMIN — Medication 1 DROP(S): at 13:58

## 2019-03-20 RX ADMIN — SENNA PLUS 2 TABLET(S): 8.6 TABLET ORAL at 21:57

## 2019-03-20 RX ADMIN — INSULIN GLARGINE 25 UNIT(S): 100 INJECTION, SOLUTION SUBCUTANEOUS at 07:58

## 2019-03-20 RX ADMIN — ATORVASTATIN CALCIUM 20 MILLIGRAM(S): 80 TABLET, FILM COATED ORAL at 21:57

## 2019-03-20 RX ADMIN — Medication 1 DROP(S): at 06:33

## 2019-03-20 RX ADMIN — Medication 600 MILLIGRAM(S): at 06:33

## 2019-03-20 RX ADMIN — HEPARIN SODIUM 5000 UNIT(S): 5000 INJECTION INTRAVENOUS; SUBCUTANEOUS at 06:34

## 2019-03-20 RX ADMIN — Medication 100 MILLIGRAM(S): at 06:33

## 2019-03-20 RX ADMIN — Medication 17 UNIT(S): at 17:19

## 2019-03-20 RX ADMIN — Medication 600 MILLIGRAM(S): at 17:20

## 2019-03-20 RX ADMIN — Medication 17 UNIT(S): at 08:39

## 2019-03-20 RX ADMIN — Medication 1: at 12:20

## 2019-03-20 RX ADMIN — Medication 3 MILLILITER(S): at 19:56

## 2019-03-20 RX ADMIN — Medication 3 MILLILITER(S): at 00:05

## 2019-03-20 RX ADMIN — Medication 3 MILLILITER(S): at 14:11

## 2019-03-20 RX ADMIN — Medication 1000 UNIT(S): at 13:56

## 2019-03-20 RX ADMIN — Medication 25 MILLIGRAM(S): at 17:21

## 2019-03-20 RX ADMIN — Medication 20 MILLIGRAM(S): at 13:57

## 2019-03-20 RX ADMIN — NYSTATIN CREAM 1 APPLICATION(S): 100000 CREAM TOPICAL at 06:33

## 2019-03-20 RX ADMIN — BUDESONIDE AND FORMOTEROL FUMARATE DIHYDRATE 2 PUFF(S): 160; 4.5 AEROSOL RESPIRATORY (INHALATION) at 08:00

## 2019-03-20 RX ADMIN — TIOTROPIUM BROMIDE 1 CAPSULE(S): 18 CAPSULE ORAL; RESPIRATORY (INHALATION) at 07:59

## 2019-03-20 RX ADMIN — INSULIN GLARGINE 25 UNIT(S): 100 INJECTION, SOLUTION SUBCUTANEOUS at 23:49

## 2019-03-20 RX ADMIN — Medication 17 UNIT(S): at 12:20

## 2019-03-20 RX ADMIN — Medication 100 MILLIGRAM(S): at 21:57

## 2019-03-20 RX ADMIN — FINASTERIDE 5 MILLIGRAM(S): 5 TABLET, FILM COATED ORAL at 13:56

## 2019-03-20 RX ADMIN — Medication 1 DROP(S): at 21:57

## 2019-03-20 RX ADMIN — Medication 1: at 17:19

## 2019-03-20 RX ADMIN — Medication 40 MILLIGRAM(S): at 06:33

## 2019-03-20 RX ADMIN — Medication 25 MILLIGRAM(S): at 06:33

## 2019-03-20 RX ADMIN — Medication 500 MILLIGRAM(S): at 13:57

## 2019-03-20 RX ADMIN — TAMSULOSIN HYDROCHLORIDE 0.4 MILLIGRAM(S): 0.4 CAPSULE ORAL at 21:57

## 2019-03-20 NOTE — PROGRESS NOTE ADULT - ASSESSMENT
1) Acute on Chronic Hypoxic Resp Failure due to advanced COPD   -tapered steroids, monitor as tolerated   -continue with symbicort, nebs and high flow O2  -PPI for GI ppx  -saline nasal spray  -cough syrup PRN    2) Chronic Diastolic CHF; stable   -lasix low dose restarted as kidney function acceptable; monitor BMP     3) CKD stage III;   -outpatient nephrology follow up and kidney monitoring     5) morbidly obese: BMI 42  -weight loss and diet modification (non-compliant with diet)    6) DM II with hyperglycemia:  -adjusted insulin regimen    7) BPH:  -on flomax    8) Debility  -rehab/pt and oob to chair with assistance    9) Suspected Vitamin D def; continue with oral supplements and outpatient lab monitoring       # Progress Note Handoff  PENDING as follows  consults:  Test: serum Cr as low dose lasix restarted   Other: continues to be on high flow O2  Family discussion: patient comprehends his medical care   Disposition:  Home _with home care and arrangements of high flow O2___ or SNF ___ Other _____ Unknown at this time ____

## 2019-03-20 NOTE — PROGRESS NOTE ADULT - SUBJECTIVE AND OBJECTIVE BOX
Pt seen and examined at bedside.  -no significant events  -on high flow O2; pulse ox and decrease FiO2 as tolerated by the patient   -oob to chair as tolerated with PT  -continue with current regimen  -spoke to home care agency nurse; will try to arrange for high flow O2  -pt interested in pulm rehab; and home care looking into it for the patient  -respiratory team to taper O2 requirement to nasal cannula as tolerated (may stay on high flow O2)  -overall prognosis poor   -DNR/DNI      Vital Signs Last 24 Hrs  T(C): 36.1 (20 Mar 2019 06:22), Max: 36.7 (19 Mar 2019 22:00)  T(F): 96.9 (20 Mar 2019 06:22), Max: 98 (19 Mar 2019 22:00)  HR: 76 (20 Mar 2019 11:17) (68 - 98)  BP: 118/60 (20 Mar 2019 06:22) (113/54 - 119/58)  RR: 22 (20 Mar 2019 11:17) (16 - 22)  SpO2: 90% (20 Mar 2019 11:17) (89% - 90%)    16 Mar 2019 07:01  -  17 Mar 2019 07:00  --------------------------------------------------------  IN: 360 mL / OUT: 1450 mL / NET: -1090 mL    PHYSICAL EXAM:  GENERAL: on high flow O2 but speaking in full sentences and comprehends his medical care   HEAD:  Atraumatic, Normocephalic  EYES: EOMI, PERRLA, conjunctiva and sclera clear  NERVOUS SYSTEM:  Alert & Oriented X 3  CHEST/LUNG: decreased Breath sounds at bases   CV/HEART: Regular rate and rhythm; No murmurs, rubs, or gallops  GI/ABDOMEN: Soft, Nontender, obese abdomen   EXTREMITIES:  2+ Peripheral Pulses, No clubbing, cyanosis, or edema  SKIN: No rashes or lesions    Labs Reviewed  Spoke to patient in regards to abnormal labs.                 03-20  146  |  106  |  57<H>  ----------------------------<  97  5.0   |  29  |  1.7<H>    Ca    9.1      20 Mar 2019 06:48               13.1   13.82 )-----------( 467      ( 19 Mar 2019 06:13 )             45.2   03-19    147<H>  |  106  |  56<H>  ----------------------------<  93  4.8   |  32  |  2.0<H>    Ca    8.8      19 Mar 2019 06:13  Mg     1.8     03-18    Urine Culture:  03-15 @ 03:20 Urine culture: --    Culture Results:   <10,000 CFU/mL Normal Urogenital Gillian  Method Type: --  Organism: --  Organism Identification: --  Specimen Source: .Urine Clean Catch (Midstream)  03-14 @ 16:00 Urine culture: --    Culture Results:   <10,000 CFU/mL Normal Urogenital Gillian  Method Type: --    MEDICATIONS  (STANDING):  ALBUTerol/ipratropium for Nebulization 3 milliLiter(s) Nebulizer every 4 hours  artificial  tears Solution 1 Drop(s) Both EYES three times a day  ascorbic acid 500 milliGRAM(s) Oral daily  atorvastatin 20 milliGRAM(s) Oral at bedtime  buDESOnide 160 MICROgram(s)/formoterol 4.5 MICROgram(s) Inhaler 2 Puff(s) Inhalation two times a day  chlorhexidine 4% Liquid 1 Application(s) Topical <User Schedule>  cholecalciferol 1000 Unit(s) Oral daily  dextrose 5%. 1000 milliLiter(s) (50 mL/Hr) IV Continuous <Continuous>  dextrose 50% Injectable 12.5 Gram(s) IV Push once  dextrose 50% Injectable 25 Gram(s) IV Push once  dextrose 50% Injectable 25 Gram(s) IV Push once  docusate sodium 100 milliGRAM(s) Oral three times a day  finasteride 5 milliGRAM(s) Oral daily  furosemide    Tablet 20 milliGRAM(s) Oral daily  guaiFENesin  milliGRAM(s) Oral every 12 hours  heparin  Injectable 5000 Unit(s) SubCutaneous every 12 hours  insulin glargine Injectable (LANTUS) 25 Unit(s) SubCutaneous two times a day  insulin lispro (HumaLOG) corrective regimen sliding scale   SubCutaneous three times a day before meals  insulin lispro Injectable (HumaLOG) 17 Unit(s) SubCutaneous three times a day with meals  metoprolol tartrate 25 milliGRAM(s) Oral two times a day  nystatin Powder 1 Application(s) Topical two times a day  polyethylene glycol 3350 17 Gram(s) Oral daily  predniSONE   Tablet 40 milliGRAM(s) Oral daily  senna 2 Tablet(s) Oral at bedtime  sodium polystyrene sulfonate Suspension 30 Gram(s) Oral every 48 hours  tamsulosin 0.4 milliGRAM(s) Oral at bedtime  tiotropium 18 MICROgram(s) Capsule 1 Capsule(s) Inhalation daily    MEDICATIONS  (PRN):  ALBUTerol    90 MICROgram(s) HFA Inhaler 2 Puff(s) Inhalation every 6 hours PRN Shortness of Breath and/or Wheezing  aluminum hydroxide/magnesium hydroxide/simethicone Suspension 30 milliLiter(s) Oral every 4 hours PRN Dyspepsia  dextrose 40% Gel 15 Gram(s) Oral once PRN Blood Glucose LESS THAN 70 milliGRAM(s)/deciliter  glucagon  Injectable 1 milliGRAM(s) IntraMuscular once PRN Glucose LESS THAN 70 milligrams/deciliter  sodium chloride 0.65% Nasal 1 Spray(s) Both Nostrils every 4 hours PRN Nasal Congestion

## 2019-03-21 LAB
ANION GAP SERPL CALC-SCNC: 14 MMOL/L — SIGNIFICANT CHANGE UP (ref 7–14)
BUN SERPL-MCNC: 58 MG/DL — HIGH (ref 10–20)
CALCIUM SERPL-MCNC: 8.8 MG/DL — SIGNIFICANT CHANGE UP (ref 8.5–10.1)
CHLORIDE SERPL-SCNC: 105 MMOL/L — SIGNIFICANT CHANGE UP (ref 98–110)
CO2 SERPL-SCNC: 26 MMOL/L — SIGNIFICANT CHANGE UP (ref 17–32)
CREAT SERPL-MCNC: 2.1 MG/DL — HIGH (ref 0.7–1.5)
GLUCOSE BLDC GLUCOMTR-MCNC: 127 MG/DL — HIGH (ref 70–99)
GLUCOSE BLDC GLUCOMTR-MCNC: 166 MG/DL — HIGH (ref 70–99)
GLUCOSE BLDC GLUCOMTR-MCNC: 222 MG/DL — HIGH (ref 70–99)
GLUCOSE BLDC GLUCOMTR-MCNC: 277 MG/DL — HIGH (ref 70–99)
GLUCOSE BLDC GLUCOMTR-MCNC: 283 MG/DL — HIGH (ref 70–99)
GLUCOSE SERPL-MCNC: 100 MG/DL — HIGH (ref 70–99)
HCT VFR BLD CALC: 49.3 % — SIGNIFICANT CHANGE UP (ref 42–52)
HGB BLD-MCNC: 14.2 G/DL — SIGNIFICANT CHANGE UP (ref 14–18)
MCHC RBC-ENTMCNC: 22.9 PG — LOW (ref 27–31)
MCHC RBC-ENTMCNC: 28.8 G/DL — LOW (ref 32–37)
MCV RBC AUTO: 79.5 FL — LOW (ref 80–94)
NRBC # BLD: 0 /100 WBCS — SIGNIFICANT CHANGE UP (ref 0–0)
PLATELET # BLD AUTO: 257 K/UL — SIGNIFICANT CHANGE UP (ref 130–400)
POTASSIUM SERPL-MCNC: 6.2 MMOL/L — CRITICAL HIGH (ref 3.5–5)
POTASSIUM SERPL-MCNC: 6.3 MMOL/L — CRITICAL HIGH (ref 3.5–5)
POTASSIUM SERPL-SCNC: 6.2 MMOL/L — CRITICAL HIGH (ref 3.5–5)
POTASSIUM SERPL-SCNC: 6.3 MMOL/L — CRITICAL HIGH (ref 3.5–5)
RBC # BLD: 6.2 M/UL — HIGH (ref 4.7–6.1)
RBC # FLD: 18.7 % — HIGH (ref 11.5–14.5)
SODIUM SERPL-SCNC: 145 MMOL/L — SIGNIFICANT CHANGE UP (ref 135–146)
WBC # BLD: 13.92 K/UL — HIGH (ref 4.8–10.8)
WBC # FLD AUTO: 13.92 K/UL — HIGH (ref 4.8–10.8)

## 2019-03-21 RX ADMIN — Medication 3 MILLILITER(S): at 08:18

## 2019-03-21 RX ADMIN — NYSTATIN CREAM 1 APPLICATION(S): 100000 CREAM TOPICAL at 17:45

## 2019-03-21 RX ADMIN — SODIUM POLYSTYRENE SULFONATE 30 GRAM(S): 4.1 POWDER, FOR SUSPENSION ORAL at 17:04

## 2019-03-21 RX ADMIN — Medication 17 UNIT(S): at 12:28

## 2019-03-21 RX ADMIN — TIOTROPIUM BROMIDE 1 CAPSULE(S): 18 CAPSULE ORAL; RESPIRATORY (INHALATION) at 08:16

## 2019-03-21 RX ADMIN — INSULIN GLARGINE 25 UNIT(S): 100 INJECTION, SOLUTION SUBCUTANEOUS at 08:29

## 2019-03-21 RX ADMIN — Medication 20 MILLIGRAM(S): at 06:38

## 2019-03-21 RX ADMIN — Medication 1 DROP(S): at 21:20

## 2019-03-21 RX ADMIN — Medication 2: at 12:28

## 2019-03-21 RX ADMIN — NYSTATIN CREAM 1 APPLICATION(S): 100000 CREAM TOPICAL at 06:38

## 2019-03-21 RX ADMIN — Medication 3 MILLILITER(S): at 20:13

## 2019-03-21 RX ADMIN — Medication 25 MILLIGRAM(S): at 06:38

## 2019-03-21 RX ADMIN — Medication 25 MILLIGRAM(S): at 17:39

## 2019-03-21 RX ADMIN — Medication 600 MILLIGRAM(S): at 06:38

## 2019-03-21 RX ADMIN — Medication 100 MILLIGRAM(S): at 21:20

## 2019-03-21 RX ADMIN — Medication 3: at 09:41

## 2019-03-21 RX ADMIN — Medication 40 MILLIGRAM(S): at 06:38

## 2019-03-21 RX ADMIN — BUDESONIDE AND FORMOTEROL FUMARATE DIHYDRATE 2 PUFF(S): 160; 4.5 AEROSOL RESPIRATORY (INHALATION) at 20:13

## 2019-03-21 RX ADMIN — Medication 3 MILLILITER(S): at 13:37

## 2019-03-21 RX ADMIN — Medication 1000 UNIT(S): at 11:38

## 2019-03-21 RX ADMIN — ATORVASTATIN CALCIUM 20 MILLIGRAM(S): 80 TABLET, FILM COATED ORAL at 21:20

## 2019-03-21 RX ADMIN — Medication 100 MILLIGRAM(S): at 15:33

## 2019-03-21 RX ADMIN — Medication 1 DROP(S): at 06:38

## 2019-03-21 RX ADMIN — HEPARIN SODIUM 5000 UNIT(S): 5000 INJECTION INTRAVENOUS; SUBCUTANEOUS at 17:39

## 2019-03-21 RX ADMIN — INSULIN GLARGINE 25 UNIT(S): 100 INJECTION, SOLUTION SUBCUTANEOUS at 22:33

## 2019-03-21 RX ADMIN — BUDESONIDE AND FORMOTEROL FUMARATE DIHYDRATE 2 PUFF(S): 160; 4.5 AEROSOL RESPIRATORY (INHALATION) at 08:15

## 2019-03-21 RX ADMIN — Medication 500 MILLIGRAM(S): at 11:33

## 2019-03-21 RX ADMIN — HEPARIN SODIUM 5000 UNIT(S): 5000 INJECTION INTRAVENOUS; SUBCUTANEOUS at 06:38

## 2019-03-21 RX ADMIN — Medication 1 DROP(S): at 15:33

## 2019-03-21 RX ADMIN — Medication 17 UNIT(S): at 17:01

## 2019-03-21 RX ADMIN — Medication 17 UNIT(S): at 09:40

## 2019-03-21 RX ADMIN — Medication 3: at 17:01

## 2019-03-21 RX ADMIN — SENNA PLUS 2 TABLET(S): 8.6 TABLET ORAL at 21:20

## 2019-03-21 RX ADMIN — Medication 3 MILLILITER(S): at 16:16

## 2019-03-21 RX ADMIN — FINASTERIDE 5 MILLIGRAM(S): 5 TABLET, FILM COATED ORAL at 11:33

## 2019-03-21 RX ADMIN — Medication 600 MILLIGRAM(S): at 17:40

## 2019-03-21 RX ADMIN — TAMSULOSIN HYDROCHLORIDE 0.4 MILLIGRAM(S): 0.4 CAPSULE ORAL at 21:20

## 2019-03-21 RX ADMIN — Medication 100 MILLIGRAM(S): at 06:38

## 2019-03-21 RX ADMIN — POLYETHYLENE GLYCOL 3350 17 GRAM(S): 17 POWDER, FOR SOLUTION ORAL at 08:29

## 2019-03-21 NOTE — PROGRESS NOTE ADULT - SUBJECTIVE AND OBJECTIVE BOX
JIAN MANCIA  78y Male    CHIEF COMPLAINT:    Patient is a 78y old  Male who presents with a chief complaint of COPD (20 Mar 2019 13:39)      INTERVAL HPI/OVERNIGHT EVENTS:    Patient seen and examined. No acute events overnight. Was able to participate with PT yesterday    ROS: All other systems are negative.    Vital Signs:    T(F): 96.9 (19 @ 06:05), Max: 98.4 (19 @ 14:19)  HR: 74 (19 @ 06:05) (74 - 98)  BP: 138/82 (19 @ 06:05) (108/55 - 138/82)  RR: 18 (19 @ 06:05) (18 - 20)  SpO2: 86% (19 @ 15:06) (86% - 86%)  I&O's Summary    20 Mar 2019 07:01  -  21 Mar 2019 07:00  --------------------------------------------------------  IN: 330 mL / OUT: 400 mL / NET: -70 mL      Daily     Daily Weight in k.4 (21 Mar 2019 06:05)  CAPILLARY BLOOD GLUCOSE      POCT Blood Glucose.: 283 mg/dL (21 Mar 2019 09:16)  POCT Blood Glucose.: 127 mg/dL (21 Mar 2019 07:29)  POCT Blood Glucose.: 266 mg/dL (20 Mar 2019 23:41)  POCT Blood Glucose.: 136 mg/dL (20 Mar 2019 21:56)  POCT Blood Glucose.: 186 mg/dL (20 Mar 2019 17:04)      PHYSICAL EXAM:    GENERAL:  NAD  SKIN: No rashes or lesions  HEENT: Atraumatic. Normocephalic.   NECK: Supple, No JVD. No lymphadenopathy.  PULMONARY: Poor inspiratory effort No wheezing. No rales  CVS: Normal S1, S2. Rate and Rhythm are regular.   ABDOMEN/GI: Soft, Nontender, Nondistended; BS present  MSK:  No edema B/L LE. No clubbing/cyanosis  NEUROLOGIC:  No motor or sensory deficit.  PSYCH: Alert & oriented x 3, normal affect      LABS:                        14.2   13.92 )-----------( 257      ( 21 Mar 2019 06:36 )             49.3     145  |  105  |  58<H>  ----------------------------<  100<H>  6.2<HH>   |  26  |  2.1<H>    Ca    8.8      21 Mar 2019 06:36  Serum Pro-Brain Natriuretic Peptide: 3166 pg/mL (19 @ 07:34)    RADIOLOGY & ADDITIONAL TESTS:      Imaging or report Personally Reviewed:  [x] YES  [ ] NO  EKG reviewed: [x] YES  [ ] NO    Medications:  Standing  ALBUTerol/ipratropium for Nebulization 3 milliLiter(s) Nebulizer every 4 hours  artificial  tears Solution 1 Drop(s) Both EYES three times a day  ascorbic acid 500 milliGRAM(s) Oral daily  atorvastatin 20 milliGRAM(s) Oral at bedtime  buDESOnide 160 MICROgram(s)/formoterol 4.5 MICROgram(s) Inhaler 2 Puff(s) Inhalation two times a day  chlorhexidine 4% Liquid 1 Application(s) Topical <User Schedule>  cholecalciferol 1000 Unit(s) Oral daily  dextrose 5%. 1000 milliLiter(s) IV Continuous <Continuous>  dextrose 50% Injectable 12.5 Gram(s) IV Push once  dextrose 50% Injectable 25 Gram(s) IV Push once  dextrose 50% Injectable 25 Gram(s) IV Push once  docusate sodium 100 milliGRAM(s) Oral three times a day  finasteride 5 milliGRAM(s) Oral daily  furosemide    Tablet 20 milliGRAM(s) Oral daily  guaiFENesin  milliGRAM(s) Oral every 12 hours  heparin  Injectable 5000 Unit(s) SubCutaneous every 12 hours  insulin glargine Injectable (LANTUS) 25 Unit(s) SubCutaneous two times a day  insulin lispro (HumaLOG) corrective regimen sliding scale   SubCutaneous three times a day before meals  insulin lispro Injectable (HumaLOG) 17 Unit(s) SubCutaneous three times a day with meals  metoprolol tartrate 25 milliGRAM(s) Oral two times a day  nystatin Powder 1 Application(s) Topical two times a day  polyethylene glycol 3350 17 Gram(s) Oral daily  predniSONE   Tablet 40 milliGRAM(s) Oral daily  senna 2 Tablet(s) Oral at bedtime  sodium polystyrene sulfonate Suspension 30 Gram(s) Oral every 48 hours  tamsulosin 0.4 milliGRAM(s) Oral at bedtime  tiotropium 18 MICROgram(s) Capsule 1 Capsule(s) Inhalation daily    PRN Meds  ALBUTerol    90 MICROgram(s) HFA Inhaler 2 Puff(s) Inhalation every 6 hours PRN  aluminum hydroxide/magnesium hydroxide/simethicone Suspension 30 milliLiter(s) Oral every 4 hours PRN  dextrose 40% Gel 15 Gram(s) Oral once PRN  glucagon  Injectable 1 milliGRAM(s) IntraMuscular once PRN  sodium chloride 0.65% Nasal 1 Spray(s) Both Nostrils every 4 hours PRN      Eleni Aguilera MD  s. [phone]

## 2019-03-21 NOTE — PROGRESS NOTE ADULT - ASSESSMENT
1) Acute on Chronic Hypoxic Resp Failure due to advanced COPD   -tapered steroids, monitor as tolerated   -continue with symbicort, nebs. Minimal High flow requirements today (35%)  - attempt to switch to nasal cannula today, f/u PT (patient was able to participate with PT yesterday)  -PPI for GI ppx  -saline nasal spray  -cough syrup PRN    2) Chronic Diastolic CHF; stable   -lasix low dose restarted yesterday as kidney function acceptable; monitor BMP     3) CKD stage III;   -outpatient nephrology follow up and kidney monitoring     5) morbidly obese: BMI 42  -weight loss and diet modification (non-compliant with diet)    6) DM II with hyperglycemia:  -adjusted insulin regimen    7) BPH:  -on flomax    8) Debility  -rehab/pt and oob to chair with assistance    9) Suspected Vitamin D def; continue with oral supplements and outpatient lab monitoring     #Progress Note Handoff  Pending (specify):  Consults_________, Tests________, Test Results_______, Other____decreasing oxygen requirements_____  Family discussion: aplan of care discussed with patient at bedside  Disposition: Home___/SNF___/Other________/Unknown at this time________

## 2019-03-22 LAB
ANION GAP SERPL CALC-SCNC: 10 MMOL/L — SIGNIFICANT CHANGE UP (ref 7–14)
BUN SERPL-MCNC: 54 MG/DL — HIGH (ref 10–20)
CALCIUM SERPL-MCNC: 9 MG/DL — SIGNIFICANT CHANGE UP (ref 8.5–10.1)
CHLORIDE SERPL-SCNC: 102 MMOL/L — SIGNIFICANT CHANGE UP (ref 98–110)
CO2 SERPL-SCNC: 33 MMOL/L — HIGH (ref 17–32)
CREAT SERPL-MCNC: 1.8 MG/DL — HIGH (ref 0.7–1.5)
GLUCOSE BLDC GLUCOMTR-MCNC: 140 MG/DL — HIGH (ref 70–99)
GLUCOSE BLDC GLUCOMTR-MCNC: 251 MG/DL — HIGH (ref 70–99)
GLUCOSE BLDC GLUCOMTR-MCNC: 267 MG/DL — HIGH (ref 70–99)
GLUCOSE BLDC GLUCOMTR-MCNC: 82 MG/DL — SIGNIFICANT CHANGE UP (ref 70–99)
GLUCOSE SERPL-MCNC: 81 MG/DL — SIGNIFICANT CHANGE UP (ref 70–99)
MAGNESIUM SERPL-MCNC: 1.9 MG/DL — SIGNIFICANT CHANGE UP (ref 1.8–2.4)
POTASSIUM SERPL-MCNC: 5.1 MMOL/L — HIGH (ref 3.5–5)
POTASSIUM SERPL-SCNC: 5.1 MMOL/L — HIGH (ref 3.5–5)
SODIUM SERPL-SCNC: 145 MMOL/L — SIGNIFICANT CHANGE UP (ref 135–146)

## 2019-03-22 RX ORDER — BENZOCAINE AND MENTHOL 5; 1 G/100ML; G/100ML
1 LIQUID ORAL ONCE
Qty: 0 | Refills: 0 | Status: COMPLETED | OUTPATIENT
Start: 2019-03-22 | End: 2019-03-22

## 2019-03-22 RX ADMIN — Medication 3: at 12:05

## 2019-03-22 RX ADMIN — ATORVASTATIN CALCIUM 20 MILLIGRAM(S): 80 TABLET, FILM COATED ORAL at 22:03

## 2019-03-22 RX ADMIN — Medication 20 MILLIGRAM(S): at 06:16

## 2019-03-22 RX ADMIN — SENNA PLUS 2 TABLET(S): 8.6 TABLET ORAL at 22:04

## 2019-03-22 RX ADMIN — Medication 600 MILLIGRAM(S): at 17:16

## 2019-03-22 RX ADMIN — Medication 3 MILLILITER(S): at 08:29

## 2019-03-22 RX ADMIN — Medication 100 MILLIGRAM(S): at 06:16

## 2019-03-22 RX ADMIN — Medication 3 MILLILITER(S): at 19:42

## 2019-03-22 RX ADMIN — INSULIN GLARGINE 25 UNIT(S): 100 INJECTION, SOLUTION SUBCUTANEOUS at 08:28

## 2019-03-22 RX ADMIN — Medication 25 MILLIGRAM(S): at 17:16

## 2019-03-22 RX ADMIN — Medication 600 MILLIGRAM(S): at 06:16

## 2019-03-22 RX ADMIN — NYSTATIN CREAM 1 APPLICATION(S): 100000 CREAM TOPICAL at 17:16

## 2019-03-22 RX ADMIN — Medication 17 UNIT(S): at 12:05

## 2019-03-22 RX ADMIN — Medication 1 DROP(S): at 14:04

## 2019-03-22 RX ADMIN — BUDESONIDE AND FORMOTEROL FUMARATE DIHYDRATE 2 PUFF(S): 160; 4.5 AEROSOL RESPIRATORY (INHALATION) at 19:42

## 2019-03-22 RX ADMIN — Medication 17 UNIT(S): at 16:37

## 2019-03-22 RX ADMIN — Medication 3 MILLILITER(S): at 11:46

## 2019-03-22 RX ADMIN — Medication 25 MILLIGRAM(S): at 06:16

## 2019-03-22 RX ADMIN — NYSTATIN CREAM 1 APPLICATION(S): 100000 CREAM TOPICAL at 06:15

## 2019-03-22 RX ADMIN — HEPARIN SODIUM 5000 UNIT(S): 5000 INJECTION INTRAVENOUS; SUBCUTANEOUS at 17:14

## 2019-03-22 RX ADMIN — Medication 3: at 16:37

## 2019-03-22 RX ADMIN — Medication 1000 UNIT(S): at 11:21

## 2019-03-22 RX ADMIN — FINASTERIDE 5 MILLIGRAM(S): 5 TABLET, FILM COATED ORAL at 11:21

## 2019-03-22 RX ADMIN — Medication 3 MILLILITER(S): at 16:02

## 2019-03-22 RX ADMIN — BENZOCAINE AND MENTHOL 1 LOZENGE: 5; 1 LIQUID ORAL at 22:00

## 2019-03-22 RX ADMIN — BUDESONIDE AND FORMOTEROL FUMARATE DIHYDRATE 2 PUFF(S): 160; 4.5 AEROSOL RESPIRATORY (INHALATION) at 07:59

## 2019-03-22 RX ADMIN — Medication 1 DROP(S): at 06:15

## 2019-03-22 RX ADMIN — Medication 1 DROP(S): at 22:03

## 2019-03-22 RX ADMIN — Medication 100 MILLIGRAM(S): at 22:03

## 2019-03-22 RX ADMIN — INSULIN GLARGINE 25 UNIT(S): 100 INJECTION, SOLUTION SUBCUTANEOUS at 22:05

## 2019-03-22 RX ADMIN — TIOTROPIUM BROMIDE 1 CAPSULE(S): 18 CAPSULE ORAL; RESPIRATORY (INHALATION) at 08:30

## 2019-03-22 RX ADMIN — Medication 40 MILLIGRAM(S): at 06:15

## 2019-03-22 RX ADMIN — Medication 500 MILLIGRAM(S): at 11:21

## 2019-03-22 RX ADMIN — TAMSULOSIN HYDROCHLORIDE 0.4 MILLIGRAM(S): 0.4 CAPSULE ORAL at 22:04

## 2019-03-22 RX ADMIN — POLYETHYLENE GLYCOL 3350 17 GRAM(S): 17 POWDER, FOR SOLUTION ORAL at 07:57

## 2019-03-22 RX ADMIN — HEPARIN SODIUM 5000 UNIT(S): 5000 INJECTION INTRAVENOUS; SUBCUTANEOUS at 06:16

## 2019-03-22 NOTE — PROGRESS NOTE ADULT - SUBJECTIVE AND OBJECTIVE BOX
Pt seen and examined at bedside. Denies any new complaints. Saturating well on 5L via NC (baseline).     VITAL SIGNS (Last 24 hrs):  T(C): 35.7 (03-22-19 @ 06:37), Max: 36.3 (03-21-19 @ 21:08)  HR: 73 (03-22-19 @ 06:37) (73 - 97)  BP: 132/67 (03-22-19 @ 06:37) (99/51 - 132/67)  RR: 16 (03-22-19 @ 06:37) (16 - 20)  SpO2: 89% (03-22-19 @ 11:59) (87% - 91%)  Wt(kg): --  Daily     Daily     I&O's Summary    21 Mar 2019 07:01  -  22 Mar 2019 07:00  -------------------------------------------------   IN: 220 mL / OUT: 300 mL / NET: -80 mL        PHYSICAL EXAM:  GENERAL: NAD, obese   HEAD:  Atraumatic, Normocephalic  EYES: EOMI, PERRLA, conjunctiva and sclera clear  NECK: Supple, No JVD  CHEST/LUNG: Clear to auscultation bilaterally; No wheeze  HEART: Regular rate and rhythm; No murmurs, rubs, or gallops  ABDOMEN: Soft, Nontender, Nondistended; Bowel sounds present  EXTREMITIES:  2+ Peripheral Pulses, No clubbing, cyanosis, or edema  PSYCH: AAOx3  NEUROLOGY: non-focal  SKIN: No rashes or lesions    Labs Reviewed  Spoke to patient in regards to abnormal labs.    CBC Full  -  ( 21 Mar 2019 06:36 )  WBC Count : 13.92 K/uL  Hemoglobin : 14.2 g/dL  Hematocrit : 49.3 %  Platelet Count - Automated : 257 K/uL  Mean Cell Volume : 79.5 fL  Mean Cell Hemoglobin : 22.9 pg  Mean Cell Hemoglobin Concentration : 28.8 g/dL  Auto Neutrophil # : x  Auto Lymphocyte # : x  Auto Monocyte # : x  Auto Eosinophil # : x  Auto Basophil # : x  Auto Neutrophil % : x  Auto Lymphocyte % : x  Auto Monocyte % : x  Auto Eosinophil % : x  Auto Basophil % : x    BMP:    03-22 @ 07:33    Blood Urea Nitrogen - 54  Calcium - 9.0  Carbond Dioxide - 33  Chloride - 102  Creatinine - 1.8  Glucose - 81  Potassium - 5.1  Sodium - 145         MEDICATIONS  (STANDING):  ALBUTerol/ipratropium for Nebulization 3 milliLiter(s) Nebulizer every 4 hours  artificial  tears Solution 1 Drop(s) Both EYES three times a day  ascorbic acid 500 milliGRAM(s) Oral daily  atorvastatin 20 milliGRAM(s) Oral at bedtime  buDESOnide 160 MICROgram(s)/formoterol 4.5 MICROgram(s) Inhaler 2 Puff(s) Inhalation two times a day  chlorhexidine 4% Liquid 1 Application(s) Topical <User Schedule>  cholecalciferol 1000 Unit(s) Oral daily  dextrose 5%. 1000 milliLiter(s) (50 mL/Hr) IV Continuous <Continuous>  dextrose 50% Injectable 12.5 Gram(s) IV Push once  dextrose 50% Injectable 25 Gram(s) IV Push once  dextrose 50% Injectable 25 Gram(s) IV Push once  docusate sodium 100 milliGRAM(s) Oral three times a day  finasteride 5 milliGRAM(s) Oral daily  furosemide    Tablet 20 milliGRAM(s) Oral daily  guaiFENesin  milliGRAM(s) Oral every 12 hours  heparin  Injectable 5000 Unit(s) SubCutaneous every 12 hours  insulin glargine Injectable (LANTUS) 25 Unit(s) SubCutaneous two times a day  insulin lispro (HumaLOG) corrective regimen sliding scale   SubCutaneous three times a day before meals  insulin lispro Injectable (HumaLOG) 17 Unit(s) SubCutaneous three times a day with meals  metoprolol tartrate 25 milliGRAM(s) Oral two times a day  nystatin Powder 1 Application(s) Topical two times a day  polyethylene glycol 3350 17 Gram(s) Oral daily  predniSONE   Tablet 40 milliGRAM(s) Oral daily  senna 2 Tablet(s) Oral at bedtime  sodium polystyrene sulfonate Suspension 30 Gram(s) Oral every 48 hours  tamsulosin 0.4 milliGRAM(s) Oral at bedtime  tiotropium 18 MICROgram(s) Capsule 1 Capsule(s) Inhalation daily    MEDICATIONS  (PRN):  ALBUTerol    90 MICROgram(s) HFA Inhaler 2 Puff(s) Inhalation every 6 hours PRN Shortness of Breath and/or Wheezing  aluminum hydroxide/magnesium hydroxide/simethicone Suspension 30 milliLiter(s) Oral every 4 hours PRN Dyspepsia  dextrose 40% Gel 15 Gram(s) Oral once PRN Blood Glucose LESS THAN 70 milliGRAM(s)/deciliter  glucagon  Injectable 1 milliGRAM(s) IntraMuscular once PRN Glucose LESS THAN 70 milligrams/deciliter  sodium chloride 0.65% Nasal 1 Spray(s) Both Nostrils every 4 hours PRN Nasal Congestion

## 2019-03-22 NOTE — PROGRESS NOTE ADULT - ASSESSMENT
1) Acute on Chronic Hypoxic Resp Failure due to advanced COPD   -Prednisone taper   -continue with symbicort, nebs.    -PPI for GI ppx  -saline nasal spray  -cough syrup PRN    2) Chronic Diastolic CHF; stable   -c/w lasix; monitor BMP     3) CKD stage III;   -outpatient nephrology follow up and kidney monitoring     5) morbidly obese: BMI 42  -weight loss and diet modification (non-compliant with diet)    6) DM II with hyperglycemia:  -adjusted insulin regimen    7) BPH:  -on flomax    8) Debility  -rehab/pt and oob to chair with assistance    9) Suspected Vitamin D def; continue with oral supplements and outpatient lab monitoring #Acute on Chronic Hypoxic Resp Failure due to advanced COPD   -Prednisone taper   -continue with symbicort, nebs.    -PPI for GI ppx  -saline nasal spray  -cough syrup PRN    #Chronic Diastolic CHF; stable   -c/w lasix; monitor BMP     #CKD stage III;   #Hyperkalemia  -c/w low K diet, Kayexalate q48h   -outpatient nephrology follow up and kidney monitoring     5) morbidly obese: BMI 42  -weight loss and diet modification (non-compliant with diet)    6) DM II with hyperglycemia:  -c/w insulin regimen    7) BPH:  -on flomax    8) Debility  -rehab/pt and oob to chair with assistance    9) Suspected Vitamin D def; continue with oral supplements and outpatient lab monitoring

## 2019-03-23 LAB
ANION GAP SERPL CALC-SCNC: 12 MMOL/L — SIGNIFICANT CHANGE UP (ref 7–14)
BUN SERPL-MCNC: 52 MG/DL — HIGH (ref 10–20)
CALCIUM SERPL-MCNC: 8.7 MG/DL — SIGNIFICANT CHANGE UP (ref 8.5–10.1)
CHLORIDE SERPL-SCNC: 104 MMOL/L — SIGNIFICANT CHANGE UP (ref 98–110)
CO2 SERPL-SCNC: 28 MMOL/L — SIGNIFICANT CHANGE UP (ref 17–32)
CREAT SERPL-MCNC: 1.7 MG/DL — HIGH (ref 0.7–1.5)
GLUCOSE BLDC GLUCOMTR-MCNC: 105 MG/DL — HIGH (ref 70–99)
GLUCOSE BLDC GLUCOMTR-MCNC: 152 MG/DL — HIGH (ref 70–99)
GLUCOSE BLDC GLUCOMTR-MCNC: 222 MG/DL — HIGH (ref 70–99)
GLUCOSE BLDC GLUCOMTR-MCNC: 234 MG/DL — HIGH (ref 70–99)
GLUCOSE SERPL-MCNC: 117 MG/DL — HIGH (ref 70–99)
POTASSIUM SERPL-MCNC: 5.5 MMOL/L — HIGH (ref 3.5–5)
POTASSIUM SERPL-SCNC: 5.5 MMOL/L — HIGH (ref 3.5–5)
SODIUM SERPL-SCNC: 144 MMOL/L — SIGNIFICANT CHANGE UP (ref 135–146)

## 2019-03-23 RX ORDER — IPRATROPIUM/ALBUTEROL SULFATE 18-103MCG
3 AEROSOL WITH ADAPTER (GRAM) INHALATION EVERY 6 HOURS
Qty: 0 | Refills: 0 | Status: DISCONTINUED | OUTPATIENT
Start: 2019-03-23 | End: 2019-03-29

## 2019-03-23 RX ADMIN — BUDESONIDE AND FORMOTEROL FUMARATE DIHYDRATE 2 PUFF(S): 160; 4.5 AEROSOL RESPIRATORY (INHALATION) at 19:56

## 2019-03-23 RX ADMIN — Medication 3 MILLILITER(S): at 07:30

## 2019-03-23 RX ADMIN — SODIUM POLYSTYRENE SULFONATE 30 GRAM(S): 4.1 POWDER, FOR SUSPENSION ORAL at 11:34

## 2019-03-23 RX ADMIN — Medication 17 UNIT(S): at 08:09

## 2019-03-23 RX ADMIN — NYSTATIN CREAM 1 APPLICATION(S): 100000 CREAM TOPICAL at 17:17

## 2019-03-23 RX ADMIN — Medication 17 UNIT(S): at 16:49

## 2019-03-23 RX ADMIN — Medication 25 MILLIGRAM(S): at 17:17

## 2019-03-23 RX ADMIN — Medication 1 DROP(S): at 14:08

## 2019-03-23 RX ADMIN — Medication 3 MILLILITER(S): at 13:01

## 2019-03-23 RX ADMIN — TIOTROPIUM BROMIDE 1 CAPSULE(S): 18 CAPSULE ORAL; RESPIRATORY (INHALATION) at 07:31

## 2019-03-23 RX ADMIN — Medication 20 MILLIGRAM(S): at 06:54

## 2019-03-23 RX ADMIN — Medication 600 MILLIGRAM(S): at 17:17

## 2019-03-23 RX ADMIN — Medication 1: at 11:39

## 2019-03-23 RX ADMIN — Medication 600 MILLIGRAM(S): at 06:54

## 2019-03-23 RX ADMIN — FINASTERIDE 5 MILLIGRAM(S): 5 TABLET, FILM COATED ORAL at 11:32

## 2019-03-23 RX ADMIN — Medication 2: at 16:50

## 2019-03-23 RX ADMIN — Medication 100 MILLIGRAM(S): at 14:08

## 2019-03-23 RX ADMIN — SENNA PLUS 2 TABLET(S): 8.6 TABLET ORAL at 21:34

## 2019-03-23 RX ADMIN — INSULIN GLARGINE 25 UNIT(S): 100 INJECTION, SOLUTION SUBCUTANEOUS at 21:42

## 2019-03-23 RX ADMIN — Medication 1 DROP(S): at 21:34

## 2019-03-23 RX ADMIN — Medication 1000 UNIT(S): at 11:32

## 2019-03-23 RX ADMIN — ATORVASTATIN CALCIUM 20 MILLIGRAM(S): 80 TABLET, FILM COATED ORAL at 21:34

## 2019-03-23 RX ADMIN — Medication 17 UNIT(S): at 11:39

## 2019-03-23 RX ADMIN — Medication 100 MILLIGRAM(S): at 21:34

## 2019-03-23 RX ADMIN — HEPARIN SODIUM 5000 UNIT(S): 5000 INJECTION INTRAVENOUS; SUBCUTANEOUS at 17:18

## 2019-03-23 RX ADMIN — HEPARIN SODIUM 5000 UNIT(S): 5000 INJECTION INTRAVENOUS; SUBCUTANEOUS at 06:54

## 2019-03-23 RX ADMIN — Medication 1 DROP(S): at 06:54

## 2019-03-23 RX ADMIN — POLYETHYLENE GLYCOL 3350 17 GRAM(S): 17 POWDER, FOR SOLUTION ORAL at 08:09

## 2019-03-23 RX ADMIN — Medication 30 MILLIGRAM(S): at 16:52

## 2019-03-23 RX ADMIN — Medication 100 MILLIGRAM(S): at 06:54

## 2019-03-23 RX ADMIN — Medication 40 MILLIGRAM(S): at 06:54

## 2019-03-23 RX ADMIN — Medication 500 MILLIGRAM(S): at 11:32

## 2019-03-23 RX ADMIN — BUDESONIDE AND FORMOTEROL FUMARATE DIHYDRATE 2 PUFF(S): 160; 4.5 AEROSOL RESPIRATORY (INHALATION) at 08:09

## 2019-03-23 RX ADMIN — Medication 25 MILLIGRAM(S): at 06:54

## 2019-03-23 RX ADMIN — TAMSULOSIN HYDROCHLORIDE 0.4 MILLIGRAM(S): 0.4 CAPSULE ORAL at 21:34

## 2019-03-23 NOTE — PROGRESS NOTE ADULT - SUBJECTIVE AND OBJECTIVE BOX
Pt seen and examined at bedside. Denies any new complaints.     VITAL SIGNS (Last 24 hrs):  T(C): 36.9 (19 @ 06:28), Max: 37.1 (19 @ 14:04)  HR: 78 (19 @ 08:35) (74 - 113)  BP: 121/73 (19 @ 06:28) (121/73 - 159/74)  RR: 16 (19 @ 06:28) (16 - 16)  SpO2: 91% (19 @ 08:35) (91% - 91%)  Wt(kg): --  Daily     Daily Weight in k.7 (23 Mar 2019 06:28)    I&O's Summary    22 Mar 2019 07:01  -  23 Mar 2019 07:00  --------------------------------------------------------  IN: 775 mL / OUT: 900 mL / NET: -125 mL    23 Mar 2019 07:01  -  23 Mar 2019 13:45  --------------------------------------------------------  IN: 0 mL / OUT: 500 mL / NET: -500 mL      PHYSICAL EXAM:  GENERAL: NAD   HEAD:  Atraumatic, Normocephalic  EYES: conjunctiva and sclera clear  NECK: Supple, No JVD  CHEST/LUNG: Clear to auscultation bilaterally; No wheeze  HEART: Regular rate and rhythm; No murmurs, rubs, or gallops  ABDOMEN: Soft, Nontender, Nondistended; Bowel sounds present  EXTREMITIES:  2+ Peripheral Pulses, No clubbing, cyanosis, or edema  PSYCH: AAOx3  NEUROLOGY: non-focal  SKIN: No rashes or lesions     Labs Reviewed  Spoke to patient in regards to abnormal labs.    CBC Full  -  ( 21 Mar 2019 06:36 )  WBC Count : 13.92 K/uL  Hemoglobin : 14.2 g/dL  Hematocrit : 49.3 %  Platelet Count - Automated : 257 K/uL  Mean Cell Volume : 79.5 fL  Mean Cell Hemoglobin : 22.9 pg  Mean Cell Hemoglobin Concentration : 28.8 g/dL  Auto Neutrophil # : x  Auto Lymphocyte # : x  Auto Monocyte # : x  Auto Eosinophil # : x  Auto Basophil # : x  Auto Neutrophil % : x  Auto Lymphocyte % : x  Auto Monocyte % : x  Auto Eosinophil % : x  Auto Basophil % : x    BMP:    03-23 @ 06:50    Blood Urea Nitrogen - 52  Calcium - 8.7  Carbond Dioxide - 28  Chloride - 104  Creatinine - 1.7  Glucose - 117  Potassium - 5.5  Sodium - 144      MEDICATIONS  (STANDING):  ALBUTerol/ipratropium for Nebulization 3 milliLiter(s) Nebulizer every 4 hours  artificial  tears Solution 1 Drop(s) Both EYES three times a day  ascorbic acid 500 milliGRAM(s) Oral daily  atorvastatin 20 milliGRAM(s) Oral at bedtime  buDESOnide 160 MICROgram(s)/formoterol 4.5 MICROgram(s) Inhaler 2 Puff(s) Inhalation two times a day  chlorhexidine 4% Liquid 1 Application(s) Topical <User Schedule>  cholecalciferol 1000 Unit(s) Oral daily  dextrose 5%. 1000 milliLiter(s) (50 mL/Hr) IV Continuous <Continuous>  dextrose 50% Injectable 12.5 Gram(s) IV Push once  dextrose 50% Injectable 25 Gram(s) IV Push once  dextrose 50% Injectable 25 Gram(s) IV Push once  docusate sodium 100 milliGRAM(s) Oral three times a day  finasteride 5 milliGRAM(s) Oral daily  furosemide    Tablet 20 milliGRAM(s) Oral daily  guaiFENesin  milliGRAM(s) Oral every 12 hours  heparin  Injectable 5000 Unit(s) SubCutaneous every 12 hours  insulin glargine Injectable (LANTUS) 25 Unit(s) SubCutaneous two times a day  insulin lispro (HumaLOG) corrective regimen sliding scale   SubCutaneous three times a day before meals  insulin lispro Injectable (HumaLOG) 17 Unit(s) SubCutaneous three times a day with meals  metoprolol tartrate 25 milliGRAM(s) Oral two times a day  nystatin Powder 1 Application(s) Topical two times a day  polyethylene glycol 3350 17 Gram(s) Oral daily  predniSONE   Tablet 40 milliGRAM(s) Oral daily  senna 2 Tablet(s) Oral at bedtime  sodium polystyrene sulfonate Suspension 30 Gram(s) Oral every 48 hours  tamsulosin 0.4 milliGRAM(s) Oral at bedtime  tiotropium 18 MICROgram(s) Capsule 1 Capsule(s) Inhalation daily    MEDICATIONS  (PRN):  ALBUTerol    90 MICROgram(s) HFA Inhaler 2 Puff(s) Inhalation every 6 hours PRN Shortness of Breath and/or Wheezing  aluminum hydroxide/magnesium hydroxide/simethicone Suspension 30 milliLiter(s) Oral every 4 hours PRN Dyspepsia  dextrose 40% Gel 15 Gram(s) Oral once PRN Blood Glucose LESS THAN 70 milliGRAM(s)/deciliter  glucagon  Injectable 1 milliGRAM(s) IntraMuscular once PRN Glucose LESS THAN 70 milligrams/deciliter  sodium chloride 0.65% Nasal 1 Spray(s) Both Nostrils every 4 hours PRN Nasal Congestion

## 2019-03-23 NOTE — PROGRESS NOTE ADULT - ASSESSMENT
#Acute on Chronic Hypoxic Resp Failure due to advanced COPD   -Prednisone taper   -continue with symbicort, nebs.    -PPI for GI ppx  -saline nasal spray  -cough syrup PRN    #Chronic Diastolic CHF; stable   -c/w lasix; monitor BMP     #CKD stage III;   #Hyperkalemia  -c/w low K diet, Kayexalate q48h   -outpatient nephrology follow up and kidney monitoring     5) morbidly obese: BMI 42  -weight loss and diet modification (non-compliant with diet)    6) DM II with hyperglycemia:  -c/w insulin regimen    7) BPH:  -on flomax    8) Debility  -rehab/pt and oob to chair with assistance    9) Suspected Vitamin D def; continue with oral supplements and outpatient lab monitoring

## 2019-03-24 LAB
ANION GAP SERPL CALC-SCNC: 9 MMOL/L — SIGNIFICANT CHANGE UP (ref 7–14)
BUN SERPL-MCNC: 52 MG/DL — HIGH (ref 10–20)
CALCIUM SERPL-MCNC: 8.8 MG/DL — SIGNIFICANT CHANGE UP (ref 8.5–10.1)
CHLORIDE SERPL-SCNC: 103 MMOL/L — SIGNIFICANT CHANGE UP (ref 98–110)
CO2 SERPL-SCNC: 32 MMOL/L — SIGNIFICANT CHANGE UP (ref 17–32)
CREAT SERPL-MCNC: 1.6 MG/DL — HIGH (ref 0.7–1.5)
GLUCOSE BLDC GLUCOMTR-MCNC: 143 MG/DL — HIGH (ref 70–99)
GLUCOSE BLDC GLUCOMTR-MCNC: 150 MG/DL — HIGH (ref 70–99)
GLUCOSE BLDC GLUCOMTR-MCNC: 152 MG/DL — HIGH (ref 70–99)
GLUCOSE BLDC GLUCOMTR-MCNC: 185 MG/DL — HIGH (ref 70–99)
GLUCOSE SERPL-MCNC: 228 MG/DL — HIGH (ref 70–99)
POTASSIUM SERPL-MCNC: 5.4 MMOL/L — HIGH (ref 3.5–5)
POTASSIUM SERPL-SCNC: 5.4 MMOL/L — HIGH (ref 3.5–5)
SODIUM SERPL-SCNC: 144 MMOL/L — SIGNIFICANT CHANGE UP (ref 135–146)

## 2019-03-24 RX ORDER — ACETAMINOPHEN 500 MG
650 TABLET ORAL EVERY 6 HOURS
Qty: 0 | Refills: 0 | Status: DISCONTINUED | OUTPATIENT
Start: 2019-03-24 | End: 2019-03-29

## 2019-03-24 RX ADMIN — Medication 25 MILLIGRAM(S): at 17:07

## 2019-03-24 RX ADMIN — Medication 600 MILLIGRAM(S): at 17:07

## 2019-03-24 RX ADMIN — Medication 1000 UNIT(S): at 11:21

## 2019-03-24 RX ADMIN — Medication 30 MILLILITER(S): at 17:55

## 2019-03-24 RX ADMIN — ATORVASTATIN CALCIUM 20 MILLIGRAM(S): 80 TABLET, FILM COATED ORAL at 21:58

## 2019-03-24 RX ADMIN — TAMSULOSIN HYDROCHLORIDE 0.4 MILLIGRAM(S): 0.4 CAPSULE ORAL at 21:58

## 2019-03-24 RX ADMIN — Medication 17 UNIT(S): at 08:30

## 2019-03-24 RX ADMIN — SENNA PLUS 2 TABLET(S): 8.6 TABLET ORAL at 21:58

## 2019-03-24 RX ADMIN — INSULIN GLARGINE 25 UNIT(S): 100 INJECTION, SOLUTION SUBCUTANEOUS at 08:18

## 2019-03-24 RX ADMIN — Medication 1: at 08:30

## 2019-03-24 RX ADMIN — Medication 17 UNIT(S): at 17:08

## 2019-03-24 RX ADMIN — Medication 17 UNIT(S): at 12:28

## 2019-03-24 RX ADMIN — BUDESONIDE AND FORMOTEROL FUMARATE DIHYDRATE 2 PUFF(S): 160; 4.5 AEROSOL RESPIRATORY (INHALATION) at 19:09

## 2019-03-24 RX ADMIN — INSULIN GLARGINE 25 UNIT(S): 100 INJECTION, SOLUTION SUBCUTANEOUS at 21:58

## 2019-03-24 RX ADMIN — TIOTROPIUM BROMIDE 1 CAPSULE(S): 18 CAPSULE ORAL; RESPIRATORY (INHALATION) at 07:52

## 2019-03-24 RX ADMIN — FINASTERIDE 5 MILLIGRAM(S): 5 TABLET, FILM COATED ORAL at 11:23

## 2019-03-24 RX ADMIN — HEPARIN SODIUM 5000 UNIT(S): 5000 INJECTION INTRAVENOUS; SUBCUTANEOUS at 17:07

## 2019-03-24 RX ADMIN — NYSTATIN CREAM 1 APPLICATION(S): 100000 CREAM TOPICAL at 17:06

## 2019-03-24 RX ADMIN — Medication 500 MILLIGRAM(S): at 11:21

## 2019-03-24 RX ADMIN — Medication 1 DROP(S): at 21:58

## 2019-03-24 RX ADMIN — BUDESONIDE AND FORMOTEROL FUMARATE DIHYDRATE 2 PUFF(S): 160; 4.5 AEROSOL RESPIRATORY (INHALATION) at 08:28

## 2019-03-24 RX ADMIN — Medication 100 MILLIGRAM(S): at 21:58

## 2019-03-24 NOTE — CHART NOTE - NSCHARTNOTEFT_GEN_A_CORE
Patient complaining of chest pain with inspiration but actually getting better on its own. Stat EKG does not show any acute ischemic changes. Will also check CXR (Pulse ox 90% on supplemental oxygen, no complaints of shortness of breath)

## 2019-03-24 NOTE — PROGRESS NOTE ADULT - ASSESSMENT
#Acute on Chronic Hypoxic Resp Failure due to advanced COPD   -Prednisone taper (Lowered to 30mg, first dose today)   -continue with symbicort, nebs.    -PPI for GI ppx  -saline nasal spray  -cough syrup PRN    #Chronic Diastolic CHF; stable   -c/w Lasix monitor BMP     #CKD stage III;   #Hyperkalemia  -c/w low K diet, Kayexalate q48h   -outpatient nephrology follow up and kidney monitoring     5) morbidly obese: BMI 42  -weight loss and diet modification (non-compliant with diet)    6) DM II with hyperglycemia:  -c/w insulin regimen    7) BPH:  -on flomax    8) Debility  -rehab/pt and oob to chair with assistance    9) Suspected Vitamin D def; continue with oral supplements and outpatient lab monitoring #Acute on Chronic Hypoxic Resp Failure due to advanced COPD   -Prednisone taper (Lowered to 30mg, first dose today)   -continue with symbicort, nebs.    -PPI for GI ppx  -saline nasal spray  -cough syrup PRN    #Chronic Diastolic CHF; stable   -monitor off Lasix (was not on Lasix at home)   -renal function worsened with Lasix trial   -LE edema due to vascular insufficiency and immobility     #CKD stage III;   #Hyperkalemia, possibly secondary to BB, CKD not advanced enough to lead to hyperkalemia   -c/w low K diet, Kayexalate q48h   -outpatient nephrology follow up and kidney monitoring     5) morbidly obese: BMI 42  -weight loss and diet modification (non-compliant with diet)    6) DM II with hyperglycemia:  -c/w insulin regimen    7) BPH:  -on flomax    8) Debility  -rehab/pt and oob to chair with assistance    9) Suspected Vitamin D def; continue with oral supplements and outpatient lab monitoring

## 2019-03-24 NOTE — PROGRESS NOTE ADULT - SUBJECTIVE AND OBJECTIVE BOX
Pt seen and examined at bedside. Denies any complaints. Pending oxygen delivery prior to discharge.     VITAL SIGNS (Last 24 hrs):  T(C): 36.8 (19 @ 05:47), Max: 37.4 (19 @ 14:18)  HR: 95 (19 @ 07:53) (93 - 99)  BP: 126/67 (19 @ 05:47) (111/62 - 138/67)  RR: 16 (19 @ 05:47) (16 - 16)  SpO2: 91% (19 @ 07:53) (91% - 91%)  Wt(kg): --  Daily     Daily Weight in k.9 (24 Mar 2019 05:47)    I&O's Summary    23 Mar 2019 07:01  -  24 Mar 2019 07:00  --------------------------------------------------------  IN: 780 mL / OUT: 1600 mL / NET: -820 mL        PHYSICAL EXAM:  GENERAL: NAD, obese   HEAD:  Atraumatic, Normocephalic  EYES: EOMI, PERRLA, conjunctiva and sclera clear  NECK: Supple, No JVD  CHEST/LUNG: Clear to auscultation bilaterally; No wheeze  HEART: Regular rate and rhythm; No murmurs, rubs, or gallops  ABDOMEN: Soft, Nontender, Nondistended; Bowel sounds present  EXTREMITIES:  2+ Peripheral Pulses, No clubbing, cyanosis, or edema  PSYCH: AAOx3  NEUROLOGY: non-focal  SKIN: No rashes or lesions    Labs Reviewed  Spoke to patient in regards to abnormal labs.    CBC Full  -  ( 21 Mar 2019 06:36 )  WBC Count : 13.92 K/uL  Hemoglobin : 14.2 g/dL  Hematocrit : 49.3 %  Platelet Count - Automated : 257 K/uL  Mean Cell Volume : 79.5 fL  Mean Cell Hemoglobin : 22.9 pg  Mean Cell Hemoglobin Concentration : 28.8 g/dL  Auto Neutrophil # : x  Auto Lymphocyte # : x  Auto Monocyte # : x  Auto Eosinophil # : x  Auto Basophil # : x  Auto Neutrophil % : x  Auto Lymphocyte % : x  Auto Monocyte % : x  Auto Eosinophil % : x  Auto Basophil % : x    BMP:    03-24 @ 07:24    Blood Urea Nitrogen - 52  Calcium - 8.8  Carbond Dioxide - 32  Chloride - 103  Creatinine - 1.6  Glucose - 228  Potassium - 5.4  Sodium - 144      Hemoglobin A1c -     Urine Culture:        Imaging reviewed    EKG reviewed    Tele Reviewed    MEDICATIONS  (STANDING):  artificial  tears Solution 1 Drop(s) Both EYES three times a day  ascorbic acid 500 milliGRAM(s) Oral daily  atorvastatin 20 milliGRAM(s) Oral at bedtime  buDESOnide 160 MICROgram(s)/formoterol 4.5 MICROgram(s) Inhaler 2 Puff(s) Inhalation two times a day  chlorhexidine 4% Liquid 1 Application(s) Topical <User Schedule>  cholecalciferol 1000 Unit(s) Oral daily  dextrose 5%. 1000 milliLiter(s) (50 mL/Hr) IV Continuous <Continuous>  dextrose 50% Injectable 12.5 Gram(s) IV Push once  dextrose 50% Injectable 25 Gram(s) IV Push once  dextrose 50% Injectable 25 Gram(s) IV Push once  docusate sodium 100 milliGRAM(s) Oral three times a day  finasteride 5 milliGRAM(s) Oral daily  furosemide    Tablet 20 milliGRAM(s) Oral daily  guaiFENesin  milliGRAM(s) Oral every 12 hours  heparin  Injectable 5000 Unit(s) SubCutaneous every 12 hours  insulin glargine Injectable (LANTUS) 25 Unit(s) SubCutaneous two times a day  insulin lispro (HumaLOG) corrective regimen sliding scale   SubCutaneous three times a day before meals  insulin lispro Injectable (HumaLOG) 17 Unit(s) SubCutaneous three times a day with meals  metoprolol tartrate 25 milliGRAM(s) Oral two times a day  nystatin Powder 1 Application(s) Topical two times a day  polyethylene glycol 3350 17 Gram(s) Oral daily  predniSONE   Tablet 30 milliGRAM(s) Oral daily  senna 2 Tablet(s) Oral at bedtime  sodium polystyrene sulfonate Suspension 30 Gram(s) Oral every 48 hours  tamsulosin 0.4 milliGRAM(s) Oral at bedtime  tiotropium 18 MICROgram(s) Capsule 1 Capsule(s) Inhalation daily    MEDICATIONS  (PRN):  ALBUTerol    90 MICROgram(s) HFA Inhaler 2 Puff(s) Inhalation every 6 hours PRN Shortness of Breath and/or Wheezing  ALBUTerol/ipratropium for Nebulization 3 milliLiter(s) Nebulizer every 6 hours PRN Shortness of Breath and/or Wheezing  aluminum hydroxide/magnesium hydroxide/simethicone Suspension 30 milliLiter(s) Oral every 4 hours PRN Dyspepsia  dextrose 40% Gel 15 Gram(s) Oral once PRN Blood Glucose LESS THAN 70 milliGRAM(s)/deciliter  glucagon  Injectable 1 milliGRAM(s) IntraMuscular once PRN Glucose LESS THAN 70 milligrams/deciliter  sodium chloride 0.65% Nasal 1 Spray(s) Both Nostrils every 4 hours PRN Nasal Congestion

## 2019-03-24 NOTE — CHART NOTE - NSCHARTNOTEFT_GEN_A_CORE
To me CXR shows possible more intense appearance of previously noted bibasilar opacities. If symptoms recur will consider diuretic (or if fever develops, antibiotics for respiratory involvement)

## 2019-03-25 LAB
ANION GAP SERPL CALC-SCNC: 10 MMOL/L — SIGNIFICANT CHANGE UP (ref 7–14)
BUN SERPL-MCNC: 65 MG/DL — CRITICAL HIGH (ref 10–20)
CALCIUM SERPL-MCNC: 8.9 MG/DL — SIGNIFICANT CHANGE UP (ref 8.5–10.1)
CHLORIDE SERPL-SCNC: 104 MMOL/L — SIGNIFICANT CHANGE UP (ref 98–110)
CO2 SERPL-SCNC: 32 MMOL/L — SIGNIFICANT CHANGE UP (ref 17–32)
CREAT SERPL-MCNC: 1.8 MG/DL — HIGH (ref 0.7–1.5)
GLUCOSE BLDC GLUCOMTR-MCNC: 171 MG/DL — HIGH (ref 70–99)
GLUCOSE BLDC GLUCOMTR-MCNC: 186 MG/DL — HIGH (ref 70–99)
GLUCOSE BLDC GLUCOMTR-MCNC: 263 MG/DL — HIGH (ref 70–99)
GLUCOSE BLDC GLUCOMTR-MCNC: 277 MG/DL — HIGH (ref 70–99)
GLUCOSE BLDC GLUCOMTR-MCNC: 97 MG/DL — SIGNIFICANT CHANGE UP (ref 70–99)
GLUCOSE SERPL-MCNC: 97 MG/DL — SIGNIFICANT CHANGE UP (ref 70–99)
HCT VFR BLD CALC: 45.2 % — SIGNIFICANT CHANGE UP (ref 42–52)
HGB BLD-MCNC: 12.9 G/DL — LOW (ref 14–18)
MAGNESIUM SERPL-MCNC: 1.9 MG/DL — SIGNIFICANT CHANGE UP (ref 1.8–2.4)
MCHC RBC-ENTMCNC: 22.6 PG — LOW (ref 27–31)
MCHC RBC-ENTMCNC: 28.5 G/DL — LOW (ref 32–37)
MCV RBC AUTO: 79.2 FL — LOW (ref 80–94)
NRBC # BLD: 0 /100 WBCS — SIGNIFICANT CHANGE UP (ref 0–0)
PLATELET # BLD AUTO: 424 K/UL — HIGH (ref 130–400)
POTASSIUM SERPL-MCNC: 5.1 MMOL/L — HIGH (ref 3.5–5)
POTASSIUM SERPL-SCNC: 5.1 MMOL/L — HIGH (ref 3.5–5)
RBC # BLD: 5.71 M/UL — SIGNIFICANT CHANGE UP (ref 4.7–6.1)
RBC # FLD: 18.7 % — HIGH (ref 11.5–14.5)
SODIUM SERPL-SCNC: 146 MMOL/L — SIGNIFICANT CHANGE UP (ref 135–146)
WBC # BLD: 15.14 K/UL — HIGH (ref 4.8–10.8)
WBC # FLD AUTO: 15.14 K/UL — HIGH (ref 4.8–10.8)

## 2019-03-25 RX ADMIN — Medication 30 MILLIGRAM(S): at 08:47

## 2019-03-25 RX ADMIN — Medication 600 MILLIGRAM(S): at 17:13

## 2019-03-25 RX ADMIN — BUDESONIDE AND FORMOTEROL FUMARATE DIHYDRATE 2 PUFF(S): 160; 4.5 AEROSOL RESPIRATORY (INHALATION) at 20:06

## 2019-03-25 RX ADMIN — Medication 100 MILLIGRAM(S): at 21:53

## 2019-03-25 RX ADMIN — Medication 25 MILLIGRAM(S): at 08:47

## 2019-03-25 RX ADMIN — INSULIN GLARGINE 25 UNIT(S): 100 INJECTION, SOLUTION SUBCUTANEOUS at 08:42

## 2019-03-25 RX ADMIN — Medication 1000 UNIT(S): at 11:49

## 2019-03-25 RX ADMIN — Medication 100 MILLIGRAM(S): at 08:47

## 2019-03-25 RX ADMIN — Medication 600 MILLIGRAM(S): at 08:47

## 2019-03-25 RX ADMIN — Medication 40 MILLIGRAM(S): at 17:14

## 2019-03-25 RX ADMIN — Medication 3 MILLILITER(S): at 20:24

## 2019-03-25 RX ADMIN — NYSTATIN CREAM 1 APPLICATION(S): 100000 CREAM TOPICAL at 08:48

## 2019-03-25 RX ADMIN — HEPARIN SODIUM 5000 UNIT(S): 5000 INJECTION INTRAVENOUS; SUBCUTANEOUS at 08:44

## 2019-03-25 RX ADMIN — TAMSULOSIN HYDROCHLORIDE 0.4 MILLIGRAM(S): 0.4 CAPSULE ORAL at 21:53

## 2019-03-25 RX ADMIN — INSULIN GLARGINE 25 UNIT(S): 100 INJECTION, SOLUTION SUBCUTANEOUS at 21:55

## 2019-03-25 RX ADMIN — Medication 1 DROP(S): at 15:24

## 2019-03-25 RX ADMIN — SENNA PLUS 2 TABLET(S): 8.6 TABLET ORAL at 21:53

## 2019-03-25 RX ADMIN — SODIUM POLYSTYRENE SULFONATE 30 GRAM(S): 4.1 POWDER, FOR SUSPENSION ORAL at 15:22

## 2019-03-25 RX ADMIN — POLYETHYLENE GLYCOL 3350 17 GRAM(S): 17 POWDER, FOR SOLUTION ORAL at 08:04

## 2019-03-25 RX ADMIN — HEPARIN SODIUM 5000 UNIT(S): 5000 INJECTION INTRAVENOUS; SUBCUTANEOUS at 17:14

## 2019-03-25 RX ADMIN — Medication 3: at 11:48

## 2019-03-25 RX ADMIN — Medication 17 UNIT(S): at 11:48

## 2019-03-25 RX ADMIN — Medication 1 DROP(S): at 21:53

## 2019-03-25 RX ADMIN — Medication 1: at 17:12

## 2019-03-25 RX ADMIN — TIOTROPIUM BROMIDE 1 CAPSULE(S): 18 CAPSULE ORAL; RESPIRATORY (INHALATION) at 08:48

## 2019-03-25 RX ADMIN — Medication 17 UNIT(S): at 17:11

## 2019-03-25 RX ADMIN — Medication 500 MILLIGRAM(S): at 11:49

## 2019-03-25 RX ADMIN — BUDESONIDE AND FORMOTEROL FUMARATE DIHYDRATE 2 PUFF(S): 160; 4.5 AEROSOL RESPIRATORY (INHALATION) at 08:45

## 2019-03-25 RX ADMIN — Medication 25 MILLIGRAM(S): at 18:29

## 2019-03-25 RX ADMIN — ATORVASTATIN CALCIUM 20 MILLIGRAM(S): 80 TABLET, FILM COATED ORAL at 21:53

## 2019-03-25 RX ADMIN — Medication 1 DROP(S): at 08:44

## 2019-03-25 RX ADMIN — Medication 100 MILLIGRAM(S): at 15:24

## 2019-03-25 RX ADMIN — Medication 40 MILLIGRAM(S): at 09:48

## 2019-03-25 RX ADMIN — FINASTERIDE 5 MILLIGRAM(S): 5 TABLET, FILM COATED ORAL at 11:49

## 2019-03-25 RX ADMIN — Medication 3 MILLILITER(S): at 14:16

## 2019-03-25 NOTE — PROGRESS NOTE ADULT - ASSESSMENT
1) Acute on Chronic Hypoxic Resp Failure due to advanced COPD   -switched to IV steroids; re-assess in am for tapering steroids  -continue with symbicort, nebs and high flow O2  -PPI for GI ppx  -saline nasal spray  -cough syrup PRN    2) Chronic Diastolic CHF; stable   -lasix low dose   -monitor kidney function     3) CKD stage III;   -outpatient nephrology follow up and kidney monitoring     5) morbidly obese: BMI 42  -weight loss and diet modification (non-compliant with diet)    6) DM II with hyperglycemia:  -on sq insulin regimen    7) BPH:  -on flomax    8) Debility  -rehab/pt and oob to chair with assistance    9) Suspected Vitamin D def; continue with oral supplements and outpatient lab monitoring       # Progress Note Handoff  PENDING as follows  consults:  Test: chest xray   Other: continues to be on high flow O2  Family discussion: patient comprehends his medical care   Disposition:  Home _with home care and arrangements of high flow O2___ or SNF ___ Other _____ Unknown at this time ____

## 2019-03-25 NOTE — CHART NOTE - NSCHARTNOTEFT_GEN_A_CORE
Nutrition Follow-up note     Pt will remain on a carb con (snack), DASH/TLC, low K+ diet. Pt is allergic to fish and shellfish. Pt had questions on a low potassium diet which were answered during the follow-up. Pt reports a good appetite. EMR reports 100% intake. Pt denies N/V/D. Last BM was today 3/25. Herberth score 16. Skin intact. Edema 2+ located on left and right foot. Meds: heparin, humalog, miralax, magnesium hydroxide, ascorbic acid, atorvastatin, cholecalciferol, docusate sodium, senna. Labs (3/25) H/H 12.9/45.2, potassium 5.1, BUN 65, cr 1.8. Will monitor energy intake, nutrition focused physical findings, glucose and renal profile. Not at risk. Follow-up in 7 days.

## 2019-03-25 NOTE — PROGRESS NOTE ADULT - ASSESSMENT
Patient is an 78M with PMHx of Severe COPD (on 5L home O2), HFw/PEF, Suspected Pulm-HTN Obesity, DM, CKD III, BPH admitted for Severe COPD exacerbation. Patients respiratory status improved with NIV and IV Solumedrol. Now on Prednisone taper.     Acute Hypoxic Respiratory Failure 2/2 to COPD Exacerbation: Currently improving   Now on IV Solumedrol and High flow, using BiPAP at night   Continue with Symbicort, Spiriva, and Albuterol PRN  Awaiting acceptance to pulmonary rehab     HFw/PEF: currently stable  On Metoprolol   Monitoring off lasix as per primary team due to worsening renal function with lasix trial     DM II: intermittent episodes of hyperglycemia   Fingersticks controlled  Continue to monitor and adjust insulin as needed     CKD III w./hyperkalemia: at baseline   cont with kayexalate q48  Cr at baseline     BPH: currently on Flomax and Finasteride     Dispo: Patient is a home visits patient, followed by [facility] team

## 2019-03-25 NOTE — PROGRESS NOTE ADULT - SUBJECTIVE AND OBJECTIVE BOX
Pt seen and examined at bedside.  -overnight events noted; pt decompensated; now on IV steroids; feels much better sitting up in chair, on high flow O2; tired but speaking in full sentences  -continue with inpatient monitoring  -pt continues to request pulm rehab upon d/c; home care nurse aware   -pt will require a very slow steroid taper   -overall prognosis poor   -DNR/DNI      Vital Signs Last 24 Hrs  T(C): 36.1 (20 Mar 2019 06:22), Max: 36.7 (19 Mar 2019 22:00)  T(F): 96.9 (20 Mar 2019 06:22), Max: 98 (19 Mar 2019 22:00)  HR: 76 (20 Mar 2019 11:17) (68 - 98)  BP: 118/60 (20 Mar 2019 06:22) (113/54 - 119/58)  RR: 22 (20 Mar 2019 11:17) (16 - 22)  SpO2: 90% (20 Mar 2019 11:17) (89% - 90%)    16 Mar 2019 07:01  -  17 Mar 2019 07:00  --------------------------------------------------------  IN: 360 mL / OUT: 1450 mL / NET: -1090 mL    PHYSICAL EXAM:  GENERAL: on high flow O2 but speaking in full sentences and comprehends his medical care   HEAD:  Atraumatic, Normocephalic  EYES: EOMI, PERRLA, conjunctiva and sclera clear  NERVOUS SYSTEM:  Alert & Oriented X 3  CHEST/LUNG: decreased Breath sounds at bases   CV/HEART: Regular rate and rhythm; No murmurs, rubs, or gallops  GI/ABDOMEN: Soft, Nontender, obese abdomen   EXTREMITIES:  2+ Peripheral Pulses, No clubbing, cyanosis, or edema  SKIN: No rashes or lesions    Labs Reviewed  Spoke to patient in regards to abnormal labs.                                    12.9   15.14 )-----------( 424      ( 25 Mar 2019 06:37 )             45.2   03-25    146  |  104  |  65<HH>  ----------------------------<  97  5.1<H>   |  32  |  1.8<H>    Ca    8.9      25 Mar 2019 06:37  Mg     1.9     03-25      Urine Culture:  03-15 @ 03:20 Urine culture: --    Culture Results:   <10,000 CFU/mL Normal Urogenital Gillian  Method Type: --  Organism: --  Organism Identification: --  Specimen Source: .Urine Clean Catch (Midstream)  03-14 @ 16:00 Urine culture: --    Culture Results:   <10,000 CFU/mL Normal Urogenital Gillian  Method Type: --      MEDICATIONS  (STANDING):  artificial  tears Solution 1 Drop(s) Both EYES three times a day  ascorbic acid 500 milliGRAM(s) Oral daily  atorvastatin 20 milliGRAM(s) Oral at bedtime  buDESOnide 160 MICROgram(s)/formoterol 4.5 MICROgram(s) Inhaler 2 Puff(s) Inhalation two times a day  chlorhexidine 4% Liquid 1 Application(s) Topical <User Schedule>  cholecalciferol 1000 Unit(s) Oral daily  dextrose 5%. 1000 milliLiter(s) (50 mL/Hr) IV Continuous <Continuous>  dextrose 50% Injectable 12.5 Gram(s) IV Push once  dextrose 50% Injectable 25 Gram(s) IV Push once  dextrose 50% Injectable 25 Gram(s) IV Push once  docusate sodium 100 milliGRAM(s) Oral three times a day  finasteride 5 milliGRAM(s) Oral daily  guaiFENesin  milliGRAM(s) Oral every 12 hours  heparin  Injectable 5000 Unit(s) SubCutaneous every 12 hours  insulin glargine Injectable (LANTUS) 25 Unit(s) SubCutaneous two times a day  insulin lispro (HumaLOG) corrective regimen sliding scale   SubCutaneous three times a day before meals  insulin lispro Injectable (HumaLOG) 17 Unit(s) SubCutaneous three times a day with meals  methylPREDNISolone sodium succinate Injectable 40 milliGRAM(s) IV Push every 12 hours  metoprolol tartrate 25 milliGRAM(s) Oral two times a day  nystatin Powder 1 Application(s) Topical two times a day  polyethylene glycol 3350 17 Gram(s) Oral daily  senna 2 Tablet(s) Oral at bedtime  sodium polystyrene sulfonate Suspension 30 Gram(s) Oral every 48 hours  tamsulosin 0.4 milliGRAM(s) Oral at bedtime  tiotropium 18 MICROgram(s) Capsule 1 Capsule(s) Inhalation daily    MEDICATIONS  (PRN):  acetaminophen   Tablet .. 650 milliGRAM(s) Oral every 6 hours PRN Mild Pain (1 - 3)  ALBUTerol    90 MICROgram(s) HFA Inhaler 2 Puff(s) Inhalation every 6 hours PRN Shortness of Breath and/or Wheezing  ALBUTerol/ipratropium for Nebulization 3 milliLiter(s) Nebulizer every 6 hours PRN Shortness of Breath and/or Wheezing  aluminum hydroxide/magnesium hydroxide/simethicone Suspension 30 milliLiter(s) Oral every 4 hours PRN Dyspepsia  dextrose 40% Gel 15 Gram(s) Oral once PRN Blood Glucose LESS THAN 70 milliGRAM(s)/deciliter  glucagon  Injectable 1 milliGRAM(s) IntraMuscular once PRN Glucose LESS THAN 70 milligrams/deciliter  sodium chloride 0.65% Nasal 1 Spray(s) Both Nostrils every 4 hours PRN Nasal Congestion

## 2019-03-25 NOTE — PROGRESS NOTE ADULT - SUBJECTIVE AND OBJECTIVE BOX
SUBJECTIVE:    Patient is a 78y old Male who presents with a chief complaint of COPD (24 Mar 2019 10:55)    Currently admitted to medicine with the primary diagnosis of Shortness of breath     Today is hospital day 14d. Patient feels well. He had a brief episode of chest tightness which is resolving. He was put back on high flow and has yet to be titrated down.     PAST MEDICAL & SURGICAL HISTORY  Colon polyp: claims it was malignant  High cholesterol  GERD (gastroesophageal reflux disease)  Enlarged prostate  Oxygen dependent  COPD (chronic obstructive pulmonary disease)  Diabetes mellitus, type 2  Hypertension  Emphysema lung  History of hemorrhoidectomy  Dysplastic colon polyp: removed    SOCIAL HISTORY:  Negative for smoking/alcohol/drug use.     ALLERGIES:  fish (Other)  iodine (Hives)  penicillin (Unknown)  shellfish (Other)    MEDICATIONS:  STANDING MEDICATIONS  artificial  tears Solution 1 Drop(s) Both EYES three times a day  ascorbic acid 500 milliGRAM(s) Oral daily  atorvastatin 20 milliGRAM(s) Oral at bedtime  buDESOnide 160 MICROgram(s)/formoterol 4.5 MICROgram(s) Inhaler 2 Puff(s) Inhalation two times a day  chlorhexidine 4% Liquid 1 Application(s) Topical <User Schedule>  cholecalciferol 1000 Unit(s) Oral daily  dextrose 5%. 1000 milliLiter(s) IV Continuous <Continuous>  dextrose 50% Injectable 12.5 Gram(s) IV Push once  dextrose 50% Injectable 25 Gram(s) IV Push once  dextrose 50% Injectable 25 Gram(s) IV Push once  docusate sodium 100 milliGRAM(s) Oral three times a day  finasteride 5 milliGRAM(s) Oral daily  guaiFENesin  milliGRAM(s) Oral every 12 hours  heparin  Injectable 5000 Unit(s) SubCutaneous every 12 hours  insulin glargine Injectable (LANTUS) 25 Unit(s) SubCutaneous two times a day  insulin lispro (HumaLOG) corrective regimen sliding scale   SubCutaneous three times a day before meals  insulin lispro Injectable (HumaLOG) 17 Unit(s) SubCutaneous three times a day with meals  methylPREDNISolone sodium succinate Injectable 40 milliGRAM(s) IV Push every 8 hours  metoprolol tartrate 25 milliGRAM(s) Oral two times a day  nystatin Powder 1 Application(s) Topical two times a day  polyethylene glycol 3350 17 Gram(s) Oral daily  senna 2 Tablet(s) Oral at bedtime  sodium polystyrene sulfonate Suspension 30 Gram(s) Oral every 48 hours  tamsulosin 0.4 milliGRAM(s) Oral at bedtime  tiotropium 18 MICROgram(s) Capsule 1 Capsule(s) Inhalation daily    PRN MEDICATIONS  acetaminophen   Tablet .. 650 milliGRAM(s) Oral every 6 hours PRN  ALBUTerol    90 MICROgram(s) HFA Inhaler 2 Puff(s) Inhalation every 6 hours PRN  ALBUTerol/ipratropium for Nebulization 3 milliLiter(s) Nebulizer every 6 hours PRN  aluminum hydroxide/magnesium hydroxide/simethicone Suspension 30 milliLiter(s) Oral every 4 hours PRN  dextrose 40% Gel 15 Gram(s) Oral once PRN  glucagon  Injectable 1 milliGRAM(s) IntraMuscular once PRN  sodium chloride 0.65% Nasal 1 Spray(s) Both Nostrils every 4 hours PRN    VITALS:   T(F): 96.9  HR: 104  BP: 92/51  RR: 18  SpO2: 95%      PHYSICAL EXAM:  General: well developed, well nourished, NAD, on high flow   Neuro: alert and oriented, no focal deficits, moves all extremities spontaneously  HEENT: NCAT, EOMI, anicteric, mucosa moist  Respiratory: Decreased breath sounds bilaterally   CVS: regular rate and rhythm  Abdomen: soft, nontender, nondistended  Extremities: +1 pitting edema on bilateral lower ext  Skin: warm, dry, appropriate color      LABS:                        12.9   15.14 )-----------( 424      ( 25 Mar 2019 06:37 )             45.2     03-25    146  |  104  |  65<HH>  ----------------------------<  97  5.1<H>   |  32  |  1.8<H>    Ca    8.9      25 Mar 2019 06:37  Mg     1.9     03-25          RADIOLOGY:

## 2019-03-26 LAB
BUN SERPL-MCNC: 59 MG/DL — HIGH (ref 10–20)
CALCIUM SERPL-MCNC: 8.6 MG/DL — SIGNIFICANT CHANGE UP (ref 8.5–10.1)
CHLORIDE SERPL-SCNC: 104 MMOL/L — SIGNIFICANT CHANGE UP (ref 98–110)
CO2 SERPL-SCNC: 30 MMOL/L — SIGNIFICANT CHANGE UP (ref 17–32)
CREAT SERPL-MCNC: 1.5 MG/DL — SIGNIFICANT CHANGE UP (ref 0.7–1.5)
GLUCOSE BLDC GLUCOMTR-MCNC: 140 MG/DL — HIGH (ref 70–99)
GLUCOSE BLDC GLUCOMTR-MCNC: 188 MG/DL — HIGH (ref 70–99)
GLUCOSE BLDC GLUCOMTR-MCNC: 243 MG/DL — HIGH (ref 70–99)
GLUCOSE BLDC GLUCOMTR-MCNC: 346 MG/DL — HIGH (ref 70–99)
GLUCOSE SERPL-MCNC: 205 MG/DL — HIGH (ref 70–99)
HCT VFR BLD CALC: 43.2 % — SIGNIFICANT CHANGE UP (ref 42–52)
HGB BLD-MCNC: 12.5 G/DL — LOW (ref 14–18)
MCHC RBC-ENTMCNC: 22.8 PG — LOW (ref 27–31)
MCHC RBC-ENTMCNC: 28.9 G/DL — LOW (ref 32–37)
MCV RBC AUTO: 78.7 FL — LOW (ref 80–94)
NRBC # BLD: 0 /100 WBCS — SIGNIFICANT CHANGE UP (ref 0–0)
PLATELET # BLD AUTO: 413 K/UL — HIGH (ref 130–400)
POTASSIUM SERPL-MCNC: 5.8 MMOL/L — HIGH (ref 3.5–5)
POTASSIUM SERPL-SCNC: 5.8 MMOL/L — HIGH (ref 3.5–5)
RBC # BLD: 5.49 M/UL — SIGNIFICANT CHANGE UP (ref 4.7–6.1)
RBC # FLD: 18.1 % — HIGH (ref 11.5–14.5)
SODIUM SERPL-SCNC: 145 MMOL/L — SIGNIFICANT CHANGE UP (ref 135–146)
WBC # BLD: 14.8 K/UL — HIGH (ref 4.8–10.8)
WBC # FLD AUTO: 14.8 K/UL — HIGH (ref 4.8–10.8)

## 2019-03-26 RX ORDER — SODIUM POLYSTYRENE SULFONATE 4.1 MEQ/G
30 POWDER, FOR SUSPENSION ORAL ONCE
Qty: 0 | Refills: 0 | Status: COMPLETED | OUTPATIENT
Start: 2019-03-26 | End: 2019-03-26

## 2019-03-26 RX ADMIN — Medication 100 MILLIGRAM(S): at 21:41

## 2019-03-26 RX ADMIN — HEPARIN SODIUM 5000 UNIT(S): 5000 INJECTION INTRAVENOUS; SUBCUTANEOUS at 17:26

## 2019-03-26 RX ADMIN — Medication 17 UNIT(S): at 12:41

## 2019-03-26 RX ADMIN — TIOTROPIUM BROMIDE 1 CAPSULE(S): 18 CAPSULE ORAL; RESPIRATORY (INHALATION) at 08:17

## 2019-03-26 RX ADMIN — INSULIN GLARGINE 25 UNIT(S): 100 INJECTION, SOLUTION SUBCUTANEOUS at 22:03

## 2019-03-26 RX ADMIN — BUDESONIDE AND FORMOTEROL FUMARATE DIHYDRATE 2 PUFF(S): 160; 4.5 AEROSOL RESPIRATORY (INHALATION) at 08:17

## 2019-03-26 RX ADMIN — Medication 1: at 08:16

## 2019-03-26 RX ADMIN — Medication 100 MILLIGRAM(S): at 15:47

## 2019-03-26 RX ADMIN — Medication 17 UNIT(S): at 08:17

## 2019-03-26 RX ADMIN — Medication 3 MILLILITER(S): at 22:36

## 2019-03-26 RX ADMIN — Medication 4: at 12:40

## 2019-03-26 RX ADMIN — Medication 60 MILLIGRAM(S): at 15:48

## 2019-03-26 RX ADMIN — Medication 1 DROP(S): at 15:51

## 2019-03-26 RX ADMIN — Medication 1 DROP(S): at 06:32

## 2019-03-26 RX ADMIN — Medication 600 MILLIGRAM(S): at 17:26

## 2019-03-26 RX ADMIN — Medication 1000 UNIT(S): at 12:45

## 2019-03-26 RX ADMIN — Medication 100 MILLIGRAM(S): at 06:32

## 2019-03-26 RX ADMIN — Medication 1 DROP(S): at 21:41

## 2019-03-26 RX ADMIN — FINASTERIDE 5 MILLIGRAM(S): 5 TABLET, FILM COATED ORAL at 12:46

## 2019-03-26 RX ADMIN — Medication 17 UNIT(S): at 16:59

## 2019-03-26 RX ADMIN — SENNA PLUS 2 TABLET(S): 8.6 TABLET ORAL at 21:41

## 2019-03-26 RX ADMIN — NYSTATIN CREAM 1 APPLICATION(S): 100000 CREAM TOPICAL at 06:33

## 2019-03-26 RX ADMIN — TAMSULOSIN HYDROCHLORIDE 0.4 MILLIGRAM(S): 0.4 CAPSULE ORAL at 21:41

## 2019-03-26 RX ADMIN — Medication 25 MILLIGRAM(S): at 06:32

## 2019-03-26 RX ADMIN — INSULIN GLARGINE 25 UNIT(S): 100 INJECTION, SOLUTION SUBCUTANEOUS at 08:15

## 2019-03-26 RX ADMIN — HEPARIN SODIUM 5000 UNIT(S): 5000 INJECTION INTRAVENOUS; SUBCUTANEOUS at 06:32

## 2019-03-26 RX ADMIN — Medication 500 MILLIGRAM(S): at 12:45

## 2019-03-26 RX ADMIN — Medication 40 MILLIGRAM(S): at 06:33

## 2019-03-26 RX ADMIN — Medication 25 MILLIGRAM(S): at 17:25

## 2019-03-26 RX ADMIN — Medication 3 MILLILITER(S): at 15:40

## 2019-03-26 RX ADMIN — NYSTATIN CREAM 1 APPLICATION(S): 100000 CREAM TOPICAL at 15:50

## 2019-03-26 RX ADMIN — Medication 600 MILLIGRAM(S): at 06:32

## 2019-03-26 RX ADMIN — ATORVASTATIN CALCIUM 20 MILLIGRAM(S): 80 TABLET, FILM COATED ORAL at 21:41

## 2019-03-26 RX ADMIN — Medication 3 MILLILITER(S): at 08:37

## 2019-03-26 RX ADMIN — SODIUM POLYSTYRENE SULFONATE 30 GRAM(S): 4.1 POWDER, FOR SUSPENSION ORAL at 17:26

## 2019-03-26 RX ADMIN — BUDESONIDE AND FORMOTEROL FUMARATE DIHYDRATE 2 PUFF(S): 160; 4.5 AEROSOL RESPIRATORY (INHALATION) at 20:50

## 2019-03-26 NOTE — PROGRESS NOTE ADULT - SUBJECTIVE AND OBJECTIVE BOX
Pt seen and examined at bedside sitting up in chair, in good spirits and moods; appears comfortable   -switch to oral steroid (SLOW TAPER)  -discussed with home care nurse Juhi about home O2 supply (renew/refill if needed)  -also discussed with Dr. Peralta/Geriatrician from outpatient home visit program and is well aware of his medical care in the community; working on making sure home nursing program/supplies in place before his discharge  -d/c planning for end of the week once home care in place and in the meanwhile patient is adamant that his O2 requirement be weaned/de-escalated as he is still on high flow O2 (pulse ox drops when switched to nasal canula O2 and when steroids tapered abruptly; well known from previous hospitalizations)  -respiratory therapist to wean O2 as tolerated today and if so d/c planning likely before Friday this week for the home care PA to make a visit if all okay with Dr. Peralta  -oob to chair as tolerated     Vital Signs Last 24 Hrs  T(C): 35.9 (26 Mar 2019 06:25), Max: 36.1 (25 Mar 2019 14:00)  T(F): 96.6 (26 Mar 2019 06:25), Max: 97 (25 Mar 2019 14:00)  HR: 75 (26 Mar 2019 09:40) (75 - 97)  BP: 143/63 (26 Mar 2019 06:25) (104/54 - 143/63)  RR: 18 (26 Mar 2019 06:25) (18 - 18)  SpO2: 91% (26 Mar 2019 09:40) (91% - 92%)      PHYSICAL EXAM:  GENERAL: on high flow O2 but speaking in full sentences and comprehends his medical care   HEAD:  Atraumatic, Normocephalic  EYES: EOMI, PERRLA, conjunctiva and sclera clear  NERVOUS SYSTEM:  Alert & Oriented X 3  CHEST/LUNG: decreased Breath sounds at bases   CV/HEART: Regular rate and rhythm; No murmurs, rubs, or gallops  GI/ABDOMEN: Soft, Nontender, obese abdomen   EXTREMITIES:  2+ Peripheral Pulses, No clubbing, cyanosis, or edema  SKIN: No rashes or lesions    Labs Reviewed  Spoke to patient in regards to abnormal labs.                                           12.5   14.80 )-----------( 413      ( 26 Mar 2019 06:56 )             43.2   03-26    145  |  104  |  59<H>  ----------------------------<  205<H>  5.8<H>   |  30  |  1.5    Ca    8.6      26 Mar 2019 06:56  Mg     1.9     03-25                   12.9   15.14 )-----------( 424      ( 25 Mar 2019 06:37 )             45.2   03-25    146  |  104  |  65<HH>  ----------------------------<  97  5.1<H>   |  32  |  1.8<H>    Ca    8.9      25 Mar 2019 06:37  Mg     1.9     03-25      Urine Culture:  03-15 @ 03:20 Urine culture: --    Culture Results:   <10,000 CFU/mL Normal Urogenital Gillian  Method Type: --  Organism: --  Organism Identification: --  Specimen Source: .Urine Clean Catch (Midstream)  03-14 @ 16:00 Urine culture: --    Culture Results:   <10,000 CFU/mL Normal Urogenital Gillian  Method Type: --      MEDICATIONS  (STANDING):  artificial  tears Solution 1 Drop(s) Both EYES three times a day  ascorbic acid 500 milliGRAM(s) Oral daily  atorvastatin 20 milliGRAM(s) Oral at bedtime  buDESOnide 160 MICROgram(s)/formoterol 4.5 MICROgram(s) Inhaler 2 Puff(s) Inhalation two times a day  chlorhexidine 4% Liquid 1 Application(s) Topical <User Schedule>  cholecalciferol 1000 Unit(s) Oral daily  dextrose 5%. 1000 milliLiter(s) (50 mL/Hr) IV Continuous <Continuous>  dextrose 50% Injectable 12.5 Gram(s) IV Push once  dextrose 50% Injectable 25 Gram(s) IV Push once  dextrose 50% Injectable 25 Gram(s) IV Push once  docusate sodium 100 milliGRAM(s) Oral three times a day  finasteride 5 milliGRAM(s) Oral daily  guaiFENesin  milliGRAM(s) Oral every 12 hours  heparin  Injectable 5000 Unit(s) SubCutaneous every 12 hours  insulin glargine Injectable (LANTUS) 25 Unit(s) SubCutaneous two times a day  insulin lispro (HumaLOG) corrective regimen sliding scale   SubCutaneous three times a day before meals  insulin lispro Injectable (HumaLOG) 17 Unit(s) SubCutaneous three times a day with meals  metoprolol tartrate 25 milliGRAM(s) Oral two times a day  nystatin Powder 1 Application(s) Topical two times a day  polyethylene glycol 3350 17 Gram(s) Oral daily  predniSONE   Tablet 60 milliGRAM(s) Oral daily  senna 2 Tablet(s) Oral at bedtime  sodium polystyrene sulfonate Suspension 30 Gram(s) Oral every 48 hours  tamsulosin 0.4 milliGRAM(s) Oral at bedtime  tiotropium 18 MICROgram(s) Capsule 1 Capsule(s) Inhalation daily    MEDICATIONS  (PRN):  acetaminophen   Tablet .. 650 milliGRAM(s) Oral every 6 hours PRN Mild Pain (1 - 3)  ALBUTerol    90 MICROgram(s) HFA Inhaler 2 Puff(s) Inhalation every 6 hours PRN Shortness of Breath and/or Wheezing  ALBUTerol/ipratropium for Nebulization 3 milliLiter(s) Nebulizer every 6 hours PRN Shortness of Breath and/or Wheezing  aluminum hydroxide/magnesium hydroxide/simethicone Suspension 30 milliLiter(s) Oral every 4 hours PRN Dyspepsia  dextrose 40% Gel 15 Gram(s) Oral once PRN Blood Glucose LESS THAN 70 milliGRAM(s)/deciliter  glucagon  Injectable 1 milliGRAM(s) IntraMuscular once PRN Glucose LESS THAN 70 milligrams/deciliter  sodium chloride 0.65% Nasal 1 Spray(s) Both Nostrils every 4 hours PRN Nasal Congestion

## 2019-03-26 NOTE — PROGRESS NOTE ADULT - ASSESSMENT
1) Acute on Chronic Hypoxic Resp Failure due to advanced COPD   -switched to IV steroids yesterday; re-assessed today and can switch to oral steroids   -continue with symbicort, nebs and high flow O2  -PPI for GI ppx  -saline nasal spray  -cough syrup PRN    2) Chronic Diastolic CHF; stable   -lasix low dose   -monitor kidney function     3) CKD stage III; serum Cr stable  -outpatient nephrology follow up and kidney monitoring     5) morbidly obese: BMI 42  -weight loss and diet modification (non-compliant with diet)    6) DM II with hyperglycemia:  -on sq insulin regimen    7) BPH:  -on flomax    8) Debility  -rehab/pt and oob to chair with assistance    9) Suspected Vitamin D def; continue with oral supplements and outpatient lab monitoring     10) Hyperkalemia:  -kayaxalate     # Progress Note Handoff  PENDING as follows  consults:  Test: reviewed labs   Other: continues to be on high flow O2 (weaning trial with Resp therapist today)  Family discussion: patient comprehends his medical care   Disposition:  Home _with home care and arrangements of high flow O2_(SEE HPI FOR MORE DETAILS WITH HOME CARE ISSUES)__ or SNF ___ Other _____ Unknown at this time ____

## 2019-03-26 NOTE — CHART NOTE - NSCHARTNOTEFT_GEN_A_CORE
patient now on 5L nasal cannula O2 saturating 88%  -he keeps changing his home d/c plan; mentioning pulm rehab arrangements and be discharged home on nasal canula (I was working on high flow O2 arrangement for home d/c with Juhi/home care nurse)  -will monitor patient on NC O2 and if stable next 24 hrs; d/c planning as so; otherwise high likelihood of impending resp failure requiring high flow O2  -prognosis guarded   -DNR/DNI

## 2019-03-27 LAB
ANION GAP SERPL CALC-SCNC: 11 MMOL/L — SIGNIFICANT CHANGE UP (ref 7–14)
BUN SERPL-MCNC: 59 MG/DL — HIGH (ref 10–20)
CALCIUM SERPL-MCNC: 8.9 MG/DL — SIGNIFICANT CHANGE UP (ref 8.5–10.1)
CHLORIDE SERPL-SCNC: 103 MMOL/L — SIGNIFICANT CHANGE UP (ref 98–110)
CO2 SERPL-SCNC: 29 MMOL/L — SIGNIFICANT CHANGE UP (ref 17–32)
CREAT SERPL-MCNC: 1.5 MG/DL — SIGNIFICANT CHANGE UP (ref 0.7–1.5)
GLUCOSE BLDC GLUCOMTR-MCNC: 137 MG/DL — HIGH (ref 70–99)
GLUCOSE BLDC GLUCOMTR-MCNC: 194 MG/DL — HIGH (ref 70–99)
GLUCOSE BLDC GLUCOMTR-MCNC: 240 MG/DL — HIGH (ref 70–99)
GLUCOSE BLDC GLUCOMTR-MCNC: 289 MG/DL — HIGH (ref 70–99)
GLUCOSE SERPL-MCNC: 214 MG/DL — HIGH (ref 70–99)
HCT VFR BLD CALC: 43.6 % — SIGNIFICANT CHANGE UP (ref 42–52)
HGB BLD-MCNC: 12.6 G/DL — LOW (ref 14–18)
MCHC RBC-ENTMCNC: 22.7 PG — LOW (ref 27–31)
MCHC RBC-ENTMCNC: 28.9 G/DL — LOW (ref 32–37)
MCV RBC AUTO: 78.6 FL — LOW (ref 80–94)
NRBC # BLD: 0 /100 WBCS — SIGNIFICANT CHANGE UP (ref 0–0)
PLATELET # BLD AUTO: 431 K/UL — HIGH (ref 130–400)
POTASSIUM SERPL-MCNC: 5.8 MMOL/L — HIGH (ref 3.5–5)
POTASSIUM SERPL-SCNC: 5.8 MMOL/L — HIGH (ref 3.5–5)
RBC # BLD: 5.55 M/UL — SIGNIFICANT CHANGE UP (ref 4.7–6.1)
RBC # FLD: 18 % — HIGH (ref 11.5–14.5)
SODIUM SERPL-SCNC: 143 MMOL/L — SIGNIFICANT CHANGE UP (ref 135–146)
WBC # BLD: 16.22 K/UL — HIGH (ref 4.8–10.8)
WBC # FLD AUTO: 16.22 K/UL — HIGH (ref 4.8–10.8)

## 2019-03-27 RX ORDER — FUROSEMIDE 40 MG
40 TABLET ORAL DAILY
Qty: 0 | Refills: 0 | Status: DISCONTINUED | OUTPATIENT
Start: 2019-03-27 | End: 2019-03-28

## 2019-03-27 RX ADMIN — BUDESONIDE AND FORMOTEROL FUMARATE DIHYDRATE 2 PUFF(S): 160; 4.5 AEROSOL RESPIRATORY (INHALATION) at 20:43

## 2019-03-27 RX ADMIN — Medication 1 DROP(S): at 21:37

## 2019-03-27 RX ADMIN — Medication 100 MILLIGRAM(S): at 21:37

## 2019-03-27 RX ADMIN — Medication 3: at 11:59

## 2019-03-27 RX ADMIN — Medication 60 MILLIGRAM(S): at 06:05

## 2019-03-27 RX ADMIN — TIOTROPIUM BROMIDE 1 CAPSULE(S): 18 CAPSULE ORAL; RESPIRATORY (INHALATION) at 08:44

## 2019-03-27 RX ADMIN — INSULIN GLARGINE 25 UNIT(S): 100 INJECTION, SOLUTION SUBCUTANEOUS at 21:37

## 2019-03-27 RX ADMIN — Medication 17 UNIT(S): at 11:59

## 2019-03-27 RX ADMIN — Medication 17 UNIT(S): at 08:49

## 2019-03-27 RX ADMIN — POLYETHYLENE GLYCOL 3350 17 GRAM(S): 17 POWDER, FOR SOLUTION ORAL at 08:40

## 2019-03-27 RX ADMIN — HEPARIN SODIUM 5000 UNIT(S): 5000 INJECTION INTRAVENOUS; SUBCUTANEOUS at 18:28

## 2019-03-27 RX ADMIN — NYSTATIN CREAM 1 APPLICATION(S): 100000 CREAM TOPICAL at 18:29

## 2019-03-27 RX ADMIN — Medication 600 MILLIGRAM(S): at 18:27

## 2019-03-27 RX ADMIN — TAMSULOSIN HYDROCHLORIDE 0.4 MILLIGRAM(S): 0.4 CAPSULE ORAL at 21:37

## 2019-03-27 RX ADMIN — Medication 1 DROP(S): at 06:05

## 2019-03-27 RX ADMIN — NYSTATIN CREAM 1 APPLICATION(S): 100000 CREAM TOPICAL at 06:06

## 2019-03-27 RX ADMIN — BUDESONIDE AND FORMOTEROL FUMARATE DIHYDRATE 2 PUFF(S): 160; 4.5 AEROSOL RESPIRATORY (INHALATION) at 08:44

## 2019-03-27 RX ADMIN — ATORVASTATIN CALCIUM 20 MILLIGRAM(S): 80 TABLET, FILM COATED ORAL at 21:37

## 2019-03-27 RX ADMIN — HEPARIN SODIUM 5000 UNIT(S): 5000 INJECTION INTRAVENOUS; SUBCUTANEOUS at 06:06

## 2019-03-27 RX ADMIN — SENNA PLUS 2 TABLET(S): 8.6 TABLET ORAL at 21:37

## 2019-03-27 RX ADMIN — Medication 25 MILLIGRAM(S): at 06:05

## 2019-03-27 RX ADMIN — Medication 500 MILLIGRAM(S): at 12:00

## 2019-03-27 RX ADMIN — Medication 1000 UNIT(S): at 12:00

## 2019-03-27 RX ADMIN — Medication 100 MILLIGRAM(S): at 14:30

## 2019-03-27 RX ADMIN — Medication 100 MILLIGRAM(S): at 06:05

## 2019-03-27 RX ADMIN — FINASTERIDE 5 MILLIGRAM(S): 5 TABLET, FILM COATED ORAL at 12:00

## 2019-03-27 RX ADMIN — Medication 600 MILLIGRAM(S): at 06:05

## 2019-03-27 RX ADMIN — Medication 1: at 08:49

## 2019-03-27 RX ADMIN — Medication 1 DROP(S): at 14:29

## 2019-03-27 RX ADMIN — INSULIN GLARGINE 25 UNIT(S): 100 INJECTION, SOLUTION SUBCUTANEOUS at 08:36

## 2019-03-27 RX ADMIN — SODIUM POLYSTYRENE SULFONATE 30 GRAM(S): 4.1 POWDER, FOR SUSPENSION ORAL at 14:28

## 2019-03-27 RX ADMIN — Medication 25 MILLIGRAM(S): at 18:28

## 2019-03-27 NOTE — PROGRESS NOTE ADULT - ASSESSMENT
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  He denied any CP, palpitations, cough, dizziness, lightheadedness, abdominal pain, change in bowel or urinary habits. He was admitted for further management of COPD exacerbation.         Assessment and Plan:    1. Acute on Chronic Hypoxic respiratory failure:   Due to end stage COPD exacerbation.  Patient stable on NC 5L and BiPAP qhs.  Continue Symbicort, Bronchodilators ATC and prn and Prednisone with slow Taper to home dose of 10 mg daily.   Remains DNR & DNI.       2. Hyperkalemia:   Continue Low K diet & Kayexalate 30 gm po TTS (3x week).        3. h/o Hypertension:  BP stable. Continue Metoprolol.         4. Diabetes mellitus, type 2: Uncontrolled.  Monitor FS with Insulin coverage.  Hemoglobin A1C, Whole Blood: 7.2 % (03.12.19 @ 07:00)      5. CKD stage 3:  Stable creatinine.  Avoid Nephrotoxins. Monitor BMP and Electrolytes.  Follow up with Nephrology out patient.        6. BPH:  Continue Flomax and Finasteride.       7. Obesity:  Dietary & Lifestyle modification.      8. B12 deficiency & Vitamin D deficiency:  Continue with supplementation.       9. MICHAEL/OHVS:  BiPAP at night.      10. Severe Pulmonary Hypertension/Chronic Diastolic HF:  Supportive care.   ECHO 11/2018: Normal LVEF, no RWMA, Trace TR, Moderate pulmonary hypertension.  ECHO 12/25/18: severe Right side heart dilation, RSVP 100 (was 40 in 10/2018)  Continue low dose Lasix      11. Microcytosis:  Follow up Iron studies.  Hgb stable.        DVT prophylaxis: Heparin  Disposition: Home with home care.  CODE status: DNR/DNI

## 2019-03-27 NOTE — PROGRESS NOTE ADULT - SUBJECTIVE AND OBJECTIVE BOX
JIAN MANCIA  78y  Male    Patient is a 78y old  Male who presents with a chief complaint of Dyspnea (09 Dec 2018 09:44)      INTERVAL HPI/OVERNIGHT EVENTS:  No interval events.  Patient has no new complaints this AM.   Dyspnea and oxygen saturation stable on 5L NC. Off HF NC.          REVIEW OF SYSTEMS:  CONSTITUTIONAL: No fever, weight loss, fatigue  EYES: No eye pain, visual disturbances, or discharge  ENMT:  No difficulty hearing, tinnitus, vertigo; No sinus or throat pain  NECK: No pain or stiffness  RESPIRATORY: No cough, No wheezing, chills or hemoptysis; + shortness of breath  CARDIOVASCULAR: No chest pain, palpitations, dizziness, + leg swelling  GASTROINTESTINAL: No abdominal or epigastric pain. No nausea, vomiting, or hematemesis; No diarrhea or constipation. No melena or hematochezia.  GENITOURINARY: No dysuria, frequency, hematuria, or incontinence  NEUROLOGICAL: No headaches, memory loss, loss of strength, numbness, or tremors  SKIN: No itching, burning, rashes, or lesions   ENDOCRINE: No heat or cold intolerance; No hair loss  MUSCULOSKELETAL: No joint pain or swelling; No muscle, back, or extremity pain  PSYCHIATRIC: No depression, anxiety, mood swings, or difficulty sleeping        VITALS:  T(C): 36.1 (27 Mar 2019 13:51), Max: 36.7 (27 Mar 2019 05:37)  T(F): 97 (27 Mar 2019 13:51), Max: 98.1 (27 Mar 2019 05:37)  HR: 95 (27 Mar 2019 13:51) (75 - 95)  BP: 122/65 (27 Mar 2019 13:51) (122/65 - 173/82)  BP(mean): --  RR: 18 (26 Mar 2019 22:23) (18 - 18)  SpO2: 88% (27 Mar 2019 15:33) (87% - 93%)        CAPILLARY BLOOD GLUCOSE  POCT Blood Glucose.: 137 mg/dL (27 Mar 2019 16:27)  POCT Blood Glucose.: 289 mg/dL (27 Mar 2019 11:09)  POCT Blood Glucose.: 194 mg/dL (27 Mar 2019 07:13)  POCT Blood Glucose.: 243 mg/dL (26 Mar 2019 21:45)      PHYSICAL EXAM:  GENERAL: NAD, obese  HEAD:  Atraumatic, Normocephalic  EYES: conjunctiva and sclera clear  ENMT: Moist mucous membranes  NECK: Supple, Normal thyroid  NERVOUS SYSTEM:  Alert & Oriented X 3, Good concentration; Motor Strength 5/5 B/L upper and lower extremities.  CHEST/LUNG: Clear to auscultation bilaterally; No rales, No rhonchi, No wheezing.   HEART: Regular rate and rhythm; No murmurs, rubs, or gallops  ABDOMEN: Soft, Nontender, + distended; Bowel sounds present  EXTREMITIES:  2+ Peripheral Pulses, No clubbing, cyanosis, bipedal edema +  LYMPH: No lymphadenopathy noted  SKIN: No rashes or lesions    Consultant(s) Notes Reviewed:  [x ] YES  [ ] NO  Care Discussed with Consultants/Other Providers [ x] YES  [ ] NO    LABS:                                                  12.6   16.22 )-----------( 431      ( 27 Mar 2019 07:01 )             43.6       03-27    143  |  103  |  59<H>  ----------------------------<  214<H>  5.8<H>   |  29  |  1.5    Ca    8.9      27 Mar 2019 07:01          MICROBIOLOGY:   Culture - Urine (03.15.19 @ 03:20)    Specimen Source: .Urine Clean Catch (Midstream)    Culture Results:   <10,000 CFU/mL Normal Urogenital Gillian        RADIOLOGY & ADDITIONAL TESTS  Xray Chest 1 View- PORTABLE-Urgent (03.24.19 @ 21:17)   In comparison with the prior chest x-ray dated March 13, 2019 time 8:49   AM:    Stable examination.      Imaging Personally Reviewed:  [x] YES  [ ] NO        MEDICATIONS  (STANDING):  artificial  tears Solution 1 Drop(s) Both EYES three times a day  ascorbic acid 500 milliGRAM(s) Oral daily  atorvastatin 20 milliGRAM(s) Oral at bedtime  buDESOnide 160 MICROgram(s)/formoterol 4.5 MICROgram(s) Inhaler 2 Puff(s) Inhalation two times a day  chlorhexidine 4% Liquid 1 Application(s) Topical <User Schedule>  cholecalciferol 1000 Unit(s) Oral daily  dextrose 5%. 1000 milliLiter(s) (50 mL/Hr) IV Continuous <Continuous>  dextrose 50% Injectable 12.5 Gram(s) IV Push once  dextrose 50% Injectable 25 Gram(s) IV Push once  dextrose 50% Injectable 25 Gram(s) IV Push once  docusate sodium 100 milliGRAM(s) Oral three times a day  finasteride 5 milliGRAM(s) Oral daily  guaiFENesin  milliGRAM(s) Oral every 12 hours  heparin  Injectable 5000 Unit(s) SubCutaneous every 12 hours  insulin glargine Injectable (LANTUS) 25 Unit(s) SubCutaneous two times a day  insulin lispro (HumaLOG) corrective regimen sliding scale   SubCutaneous three times a day before meals  insulin lispro Injectable (HumaLOG) 17 Unit(s) SubCutaneous three times a day with meals  metoprolol tartrate 25 milliGRAM(s) Oral two times a day  nystatin Powder 1 Application(s) Topical two times a day  polyethylene glycol 3350 17 Gram(s) Oral daily  predniSONE   Tablet 60 milliGRAM(s) Oral daily  senna 2 Tablet(s) Oral at bedtime  sodium polystyrene sulfonate Suspension 30 Gram(s) Oral every 48 hours  tamsulosin 0.4 milliGRAM(s) Oral at bedtime  tiotropium 18 MICROgram(s) Capsule 1 Capsule(s) Inhalation daily    MEDICATIONS  (PRN):  acetaminophen   Tablet .. 650 milliGRAM(s) Oral every 6 hours PRN Mild Pain (1 - 3)  ALBUTerol    90 MICROgram(s) HFA Inhaler 2 Puff(s) Inhalation every 6 hours PRN Shortness of Breath and/or Wheezing  ALBUTerol/ipratropium for Nebulization 3 milliLiter(s) Nebulizer every 6 hours PRN Shortness of Breath and/or Wheezing  aluminum hydroxide/magnesium hydroxide/simethicone Suspension 30 milliLiter(s) Oral every 4 hours PRN Dyspepsia  dextrose 40% Gel 15 Gram(s) Oral once PRN Blood Glucose LESS THAN 70 milliGRAM(s)/deciliter  glucagon  Injectable 1 milliGRAM(s) IntraMuscular once PRN Glucose LESS THAN 70 milligrams/deciliter  sodium chloride 0.65% Nasal 1 Spray(s) Both Nostrils every 4 hours PRN Nasal Congestion        HEALTH ISSUES - PROBLEM Dx:  COPD (chronic obstructive pulmonary disease) with exacerbation  Hyperkalemia  High cholesterol  GERD (gastroesophageal reflux disease)  Enlarged prostate  Oxygen dependent  Diabetes mellitus, type 2  Hypertension  Emphysema lung

## 2019-03-28 LAB
ALBUMIN SERPL ELPH-MCNC: 4 G/DL — SIGNIFICANT CHANGE UP (ref 3.5–5.2)
ALP SERPL-CCNC: 70 U/L — SIGNIFICANT CHANGE UP (ref 30–115)
ALT FLD-CCNC: 88 U/L — HIGH (ref 0–41)
ANION GAP SERPL CALC-SCNC: 13 MMOL/L — SIGNIFICANT CHANGE UP (ref 7–14)
AST SERPL-CCNC: 27 U/L — SIGNIFICANT CHANGE UP (ref 0–41)
BASOPHILS # BLD AUTO: 0.06 K/UL — SIGNIFICANT CHANGE UP (ref 0–0.2)
BASOPHILS NFR BLD AUTO: 0.4 % — SIGNIFICANT CHANGE UP (ref 0–1)
BILIRUB SERPL-MCNC: 0.5 MG/DL — SIGNIFICANT CHANGE UP (ref 0.2–1.2)
BUN SERPL-MCNC: 57 MG/DL — HIGH (ref 10–20)
CALCIUM SERPL-MCNC: 8.9 MG/DL — SIGNIFICANT CHANGE UP (ref 8.5–10.1)
CHLORIDE SERPL-SCNC: 105 MMOL/L — SIGNIFICANT CHANGE UP (ref 98–110)
CO2 SERPL-SCNC: 28 MMOL/L — SIGNIFICANT CHANGE UP (ref 17–32)
CREAT SERPL-MCNC: 1.6 MG/DL — HIGH (ref 0.7–1.5)
EOSINOPHIL # BLD AUTO: 0.27 K/UL — SIGNIFICANT CHANGE UP (ref 0–0.7)
EOSINOPHIL NFR BLD AUTO: 1.9 % — SIGNIFICANT CHANGE UP (ref 0–8)
FERRITIN SERPL-MCNC: 28 NG/ML — LOW (ref 30–400)
GLUCOSE BLDC GLUCOMTR-MCNC: 110 MG/DL — HIGH (ref 70–99)
GLUCOSE BLDC GLUCOMTR-MCNC: 140 MG/DL — HIGH (ref 70–99)
GLUCOSE BLDC GLUCOMTR-MCNC: 218 MG/DL — HIGH (ref 70–99)
GLUCOSE BLDC GLUCOMTR-MCNC: 268 MG/DL — HIGH (ref 70–99)
GLUCOSE SERPL-MCNC: 123 MG/DL — HIGH (ref 70–99)
HCT VFR BLD CALC: 47.1 % — SIGNIFICANT CHANGE UP (ref 42–52)
HGB BLD-MCNC: 13.3 G/DL — LOW (ref 14–18)
IMM GRANULOCYTES NFR BLD AUTO: 1.1 % — HIGH (ref 0.1–0.3)
IRON SATN MFR SERPL: 24 UG/DL — LOW (ref 35–150)
IRON SATN MFR SERPL: 8 % — LOW (ref 15–50)
LYMPHOCYTES # BLD AUTO: 1.44 K/UL — SIGNIFICANT CHANGE UP (ref 1.2–3.4)
LYMPHOCYTES # BLD AUTO: 10.2 % — LOW (ref 20.5–51.1)
MAGNESIUM SERPL-MCNC: 1.9 MG/DL — SIGNIFICANT CHANGE UP (ref 1.8–2.4)
MCHC RBC-ENTMCNC: 22.4 PG — LOW (ref 27–31)
MCHC RBC-ENTMCNC: 28.2 G/DL — LOW (ref 32–37)
MCV RBC AUTO: 79.2 FL — LOW (ref 80–94)
MONOCYTES # BLD AUTO: 1.17 K/UL — HIGH (ref 0.1–0.6)
MONOCYTES NFR BLD AUTO: 8.3 % — SIGNIFICANT CHANGE UP (ref 1.7–9.3)
NEUTROPHILS # BLD AUTO: 11.02 K/UL — HIGH (ref 1.4–6.5)
NEUTROPHILS NFR BLD AUTO: 78.1 % — HIGH (ref 42.2–75.2)
NRBC # BLD: 0 /100 WBCS — SIGNIFICANT CHANGE UP (ref 0–0)
PHOSPHATE SERPL-MCNC: 4.6 MG/DL — SIGNIFICANT CHANGE UP (ref 2.1–4.9)
PLATELET # BLD AUTO: 466 K/UL — HIGH (ref 130–400)
POTASSIUM SERPL-MCNC: 5.1 MMOL/L — HIGH (ref 3.5–5)
POTASSIUM SERPL-SCNC: 5.1 MMOL/L — HIGH (ref 3.5–5)
PROT SERPL-MCNC: 6 G/DL — SIGNIFICANT CHANGE UP (ref 6–8)
RBC # BLD: 5.95 M/UL — SIGNIFICANT CHANGE UP (ref 4.7–6.1)
RBC # FLD: 18.7 % — HIGH (ref 11.5–14.5)
SODIUM SERPL-SCNC: 146 MMOL/L — SIGNIFICANT CHANGE UP (ref 135–146)
TIBC SERPL-MCNC: 311 UG/DL — SIGNIFICANT CHANGE UP (ref 220–430)
UIBC SERPL-MCNC: 287 UG/DL — SIGNIFICANT CHANGE UP (ref 110–370)
WBC # BLD: 14.12 K/UL — HIGH (ref 4.8–10.8)
WBC # FLD AUTO: 14.12 K/UL — HIGH (ref 4.8–10.8)

## 2019-03-28 RX ORDER — FUROSEMIDE 40 MG
20 TABLET ORAL DAILY
Qty: 0 | Refills: 0 | Status: DISCONTINUED | OUTPATIENT
Start: 2019-03-29 | End: 2019-03-29

## 2019-03-28 RX ADMIN — Medication 3 MILLILITER(S): at 20:56

## 2019-03-28 RX ADMIN — BUDESONIDE AND FORMOTEROL FUMARATE DIHYDRATE 2 PUFF(S): 160; 4.5 AEROSOL RESPIRATORY (INHALATION) at 23:21

## 2019-03-28 RX ADMIN — Medication 1000 UNIT(S): at 15:39

## 2019-03-28 RX ADMIN — Medication 100 MILLIGRAM(S): at 21:35

## 2019-03-28 RX ADMIN — TAMSULOSIN HYDROCHLORIDE 0.4 MILLIGRAM(S): 0.4 CAPSULE ORAL at 21:35

## 2019-03-28 RX ADMIN — Medication 17 UNIT(S): at 17:40

## 2019-03-28 RX ADMIN — BUDESONIDE AND FORMOTEROL FUMARATE DIHYDRATE 2 PUFF(S): 160; 4.5 AEROSOL RESPIRATORY (INHALATION) at 08:31

## 2019-03-28 RX ADMIN — Medication 40 MILLIGRAM(S): at 06:28

## 2019-03-28 RX ADMIN — Medication 500 MILLIGRAM(S): at 15:38

## 2019-03-28 RX ADMIN — TIOTROPIUM BROMIDE 1 CAPSULE(S): 18 CAPSULE ORAL; RESPIRATORY (INHALATION) at 08:31

## 2019-03-28 RX ADMIN — ATORVASTATIN CALCIUM 20 MILLIGRAM(S): 80 TABLET, FILM COATED ORAL at 21:35

## 2019-03-28 RX ADMIN — SENNA PLUS 2 TABLET(S): 8.6 TABLET ORAL at 21:35

## 2019-03-28 RX ADMIN — INSULIN GLARGINE 25 UNIT(S): 100 INJECTION, SOLUTION SUBCUTANEOUS at 09:03

## 2019-03-28 RX ADMIN — Medication 100 MILLIGRAM(S): at 15:41

## 2019-03-28 RX ADMIN — Medication 1 DROP(S): at 06:28

## 2019-03-28 RX ADMIN — Medication 60 MILLIGRAM(S): at 06:28

## 2019-03-28 RX ADMIN — NYSTATIN CREAM 1 APPLICATION(S): 100000 CREAM TOPICAL at 06:30

## 2019-03-28 RX ADMIN — Medication 25 MILLIGRAM(S): at 06:28

## 2019-03-28 RX ADMIN — Medication 600 MILLIGRAM(S): at 06:28

## 2019-03-28 RX ADMIN — Medication 100 MILLIGRAM(S): at 06:28

## 2019-03-28 RX ADMIN — Medication 1 DROP(S): at 21:35

## 2019-03-28 RX ADMIN — INSULIN GLARGINE 25 UNIT(S): 100 INJECTION, SOLUTION SUBCUTANEOUS at 21:35

## 2019-03-28 RX ADMIN — HEPARIN SODIUM 5000 UNIT(S): 5000 INJECTION INTRAVENOUS; SUBCUTANEOUS at 06:28

## 2019-03-28 RX ADMIN — FINASTERIDE 5 MILLIGRAM(S): 5 TABLET, FILM COATED ORAL at 15:39

## 2019-03-28 RX ADMIN — POLYETHYLENE GLYCOL 3350 17 GRAM(S): 17 POWDER, FOR SOLUTION ORAL at 09:06

## 2019-03-28 RX ADMIN — Medication 1 DROP(S): at 15:41

## 2019-03-28 RX ADMIN — Medication 3 MILLILITER(S): at 08:25

## 2019-03-28 NOTE — PROGRESS NOTE ADULT - SUBJECTIVE AND OBJECTIVE BOX
JIAN MANCIA  78y  Male    Patient is a 78y old  Male who presents with a chief complaint of Dyspnea (09 Dec 2018 09:44)      INTERVAL HPI/OVERNIGHT EVENTS:  No interval events.  Patient has no new complaints this AM.   Dyspnea and oxygen saturation stable on 4-5L NC. Off HF NC.          REVIEW OF SYSTEMS:  CONSTITUTIONAL: No fever, weight loss, fatigue  EYES: No eye pain, visual disturbances, or discharge  ENMT:  No difficulty hearing, tinnitus, vertigo; No sinus or throat pain  NECK: No pain or stiffness  RESPIRATORY: No cough, No wheezing, chills or hemoptysis; + shortness of breath  CARDIOVASCULAR: No chest pain, palpitations, dizziness, + leg swelling  GASTROINTESTINAL: No abdominal or epigastric pain. No nausea, vomiting, or hematemesis; No diarrhea or constipation. No melena or hematochezia.  GENITOURINARY: No dysuria, frequency, hematuria, or incontinence  NEUROLOGICAL: No headaches, memory loss, loss of strength, numbness, or tremors  SKIN: No itching, burning, rashes, or lesions   ENDOCRINE: No heat or cold intolerance; No hair loss  MUSCULOSKELETAL: No joint pain or swelling; No muscle, back, or extremity pain  PSYCHIATRIC: No depression, anxiety, mood swings, or difficulty sleeping        VITALS:  T(C): 36.7 (28 Mar 2019 05:55), Max: 36.7 (28 Mar 2019 05:55)  T(F): 98.1 (28 Mar 2019 05:55), Max: 98.1 (28 Mar 2019 05:55)  HR: 83 (28 Mar 2019 05:55) (83 - 99)  BP: 130/81 (28 Mar 2019 05:55) (122/65 - 130/81)  BP(mean): --  RR: 16 (28 Mar 2019 05:55) (16 - 16)  SpO2: 88% (28 Mar 2019 10:18) (76% - 93%)        CAPILLARY BLOOD GLUCOSE  POCT Blood Glucose.: 140 mg/dL (28 Mar 2019 11:07)  POCT Blood Glucose.: 110 mg/dL (28 Mar 2019 07:05)  POCT Blood Glucose.: 240 mg/dL (27 Mar 2019 21:38)  POCT Blood Glucose.: 137 mg/dL (27 Mar 2019 16:27)        PHYSICAL EXAM:  GENERAL: NAD, obese  HEAD:  Atraumatic, Normocephalic  EYES: conjunctiva and sclera clear  ENMT: Moist mucous membranes  NECK: Supple, Normal thyroid  NERVOUS SYSTEM:  Alert & Oriented X 3, Good concentration; Motor Strength 5/5 B/L upper and lower extremities.  CHEST/LUNG: Clear to auscultation bilaterally; No rales, No rhonchi, No wheezing.   HEART: Regular rate and rhythm; No murmurs, rubs, or gallops  ABDOMEN: Soft, Nontender, + distended; Bowel sounds present  EXTREMITIES:  2+ Peripheral Pulses, No clubbing, cyanosis, bipedal edema +  LYMPH: No lymphadenopathy noted  SKIN: No rashes or lesions    Consultant(s) Notes Reviewed:  [x ] YES  [ ] NO  Care Discussed with Consultants/Other Providers [ x] YES  [ ] NO    LABS:                                                  13.3   14.12 )-----------( 466      ( 28 Mar 2019 06:53 )             47.1       03-28    146  |  105  |  57<H>  ----------------------------<  123<H>  5.1<H>   |  28  |  1.6<H>    Ca    8.9      28 Mar 2019 06:53  Phos  4.6     03-28  Mg     1.9     03-28    TPro  6.0  /  Alb  4.0  /  TBili  0.5  /  DBili  x   /  AST  27  /  ALT  88<H>  /  AlkPhos  70  03-28        MICROBIOLOGY:   Culture - Urine (03.15.19 @ 03:20)    Specimen Source: .Urine Clean Catch (Midstream)    Culture Results:   <10,000 CFU/mL Normal Urogenital Gillian        RADIOLOGY & ADDITIONAL TESTS  X-ray Chest 1 View- PORTABLE-Urgent (03.24.19 @ 21:17)   In comparison with the prior chest x-ray dated March 13, 2019 time 8:49   AM:    Stable examination.      Imaging Personally Reviewed:  [x] YES  [ ] NO        MEDICATIONS  (STANDING):  artificial  tears Solution 1 Drop(s) Both EYES three times a day  ascorbic acid 500 milliGRAM(s) Oral daily  atorvastatin 20 milliGRAM(s) Oral at bedtime  buDESOnide 160 MICROgram(s)/formoterol 4.5 MICROgram(s) Inhaler 2 Puff(s) Inhalation two times a day  chlorhexidine 4% Liquid 1 Application(s) Topical <User Schedule>  cholecalciferol 1000 Unit(s) Oral daily  dextrose 5%. 1000 milliLiter(s) (50 mL/Hr) IV Continuous <Continuous>  dextrose 50% Injectable 12.5 Gram(s) IV Push once  dextrose 50% Injectable 25 Gram(s) IV Push once  dextrose 50% Injectable 25 Gram(s) IV Push once  docusate sodium 100 milliGRAM(s) Oral three times a day  finasteride 5 milliGRAM(s) Oral daily  guaiFENesin  milliGRAM(s) Oral every 12 hours  heparin  Injectable 5000 Unit(s) SubCutaneous every 12 hours  insulin glargine Injectable (LANTUS) 25 Unit(s) SubCutaneous two times a day  insulin lispro (HumaLOG) corrective regimen sliding scale   SubCutaneous three times a day before meals  insulin lispro Injectable (HumaLOG) 17 Unit(s) SubCutaneous three times a day with meals  metoprolol tartrate 25 milliGRAM(s) Oral two times a day  nystatin Powder 1 Application(s) Topical two times a day  polyethylene glycol 3350 17 Gram(s) Oral daily  predniSONE   Tablet 60 milliGRAM(s) Oral daily  senna 2 Tablet(s) Oral at bedtime  sodium polystyrene sulfonate Suspension 30 Gram(s) Oral every 48 hours  tamsulosin 0.4 milliGRAM(s) Oral at bedtime  tiotropium 18 MICROgram(s) Capsule 1 Capsule(s) Inhalation daily    MEDICATIONS  (PRN):  acetaminophen   Tablet .. 650 milliGRAM(s) Oral every 6 hours PRN Mild Pain (1 - 3)  ALBUTerol    90 MICROgram(s) HFA Inhaler 2 Puff(s) Inhalation every 6 hours PRN Shortness of Breath and/or Wheezing  ALBUTerol/ipratropium for Nebulization 3 milliLiter(s) Nebulizer every 6 hours PRN Shortness of Breath and/or Wheezing  aluminum hydroxide/magnesium hydroxide/simethicone Suspension 30 milliLiter(s) Oral every 4 hours PRN Dyspepsia  dextrose 40% Gel 15 Gram(s) Oral once PRN Blood Glucose LESS THAN 70 milliGRAM(s)/deciliter  glucagon  Injectable 1 milliGRAM(s) IntraMuscular once PRN Glucose LESS THAN 70 milligrams/deciliter  sodium chloride 0.65% Nasal 1 Spray(s) Both Nostrils every 4 hours PRN Nasal Congestion        HEALTH ISSUES - PROBLEM Dx:  COPD (chronic obstructive pulmonary disease) with exacerbation  Hyperkalemia  High cholesterol  GERD (gastroesophageal reflux disease)  Enlarged prostate  Oxygen dependent  Diabetes mellitus, type 2  Hypertension  Emphysema lung

## 2019-03-28 NOTE — PROGRESS NOTE ADULT - ASSESSMENT
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  He denied any CP, palpitations, cough, dizziness, lightheadedness, abdominal pain, change in bowel or urinary habits. He was admitted for further management of COPD exacerbation.         Assessment and Plan:    1. Acute on Chronic Hypoxic respiratory failure:   Due to end stage COPD exacerbation.  Patient stable on NC 5L and BiPAP qhs.  Continue Symbicort, Bronchodilators ATC and prn and Prednisone (60 mg day 2) with slow Taper to home dose of 10 mg daily.   Remains DNR & DNI.       2. Hyperkalemia:   Continue Low K diet & Kayexalate 30 gm po TTS (3x week).        3. h/o Hypertension:  BP stable. Continue Metoprolol.         4. Diabetes mellitus, type 2: Uncontrolled.  Monitor FS with Insulin coverage.  Hemoglobin A1C, Whole Blood: 7.2 % (03.12.19 @ 07:00)      5. CKD stage 3:  Stable creatinine.  Avoid Nephrotoxins. Monitor BMP and Electrolytes.  Follow up with Nephrology out patient.        6. BPH:  Continue Flomax and Finasteride.       7. Obesity:  Dietary & Lifestyle modification.      8. B12 deficiency & Vitamin D deficiency:  Continue with supplementation.       9. MICHAEL/OHVS:  BiPAP at night.      10. Severe Pulmonary Hypertension/Chronic Diastolic HF:  Supportive care.   ECHO 11/2018: Normal LVEF, no RWMA, Trace TR, Moderate pulmonary hypertension.  ECHO 12/25/18: severe Right side heart dilation, RSVP 100 (was 40 in 10/2018)  Continue low dose Lasix      11. Microcytosis:  Follow up Iron studies.  Hgb stable.        DVT prophylaxis: Heparin  Disposition: Home with home care.  CODE status: DNR/DNI

## 2019-03-29 ENCOUNTER — TRANSCRIPTION ENCOUNTER (OUTPATIENT)
Age: 79
End: 2019-03-29

## 2019-03-29 VITALS — OXYGEN SATURATION: 89 %

## 2019-03-29 LAB
GLUCOSE BLDC GLUCOMTR-MCNC: 108 MG/DL — HIGH (ref 70–99)
GLUCOSE BLDC GLUCOMTR-MCNC: 199 MG/DL — HIGH (ref 70–99)

## 2019-03-29 RX ORDER — ACETAMINOPHEN 500 MG
2 TABLET ORAL
Qty: 0 | Refills: 0 | COMMUNITY
Start: 2019-03-29

## 2019-03-29 RX ORDER — OMEPRAZOLE 10 MG/1
1 CAPSULE, DELAYED RELEASE ORAL
Qty: 30 | Refills: 0
Start: 2019-03-29 | End: 2019-04-27

## 2019-03-29 RX ORDER — POLYETHYLENE GLYCOL 3350 17 G/17G
17 POWDER, FOR SOLUTION ORAL
Qty: 0 | Refills: 0 | DISCHARGE
Start: 2019-03-29

## 2019-03-29 RX ORDER — SENNA PLUS 8.6 MG/1
2 TABLET ORAL
Qty: 0 | Refills: 0 | DISCHARGE
Start: 2019-03-29

## 2019-03-29 RX ORDER — FUROSEMIDE 40 MG
1 TABLET ORAL
Qty: 0 | Refills: 0 | COMMUNITY
Start: 2019-03-29

## 2019-03-29 RX ORDER — FERROUS SULFATE 325(65) MG
1 TABLET ORAL
Qty: 30 | Refills: 0
Start: 2019-03-29 | End: 2019-04-27

## 2019-03-29 RX ORDER — NYSTATIN CREAM 100000 [USP'U]/G
1 CREAM TOPICAL
Qty: 0 | Refills: 0 | DISCHARGE
Start: 2019-03-29

## 2019-03-29 RX ORDER — TIOTROPIUM BROMIDE 18 UG/1
1 CAPSULE ORAL; RESPIRATORY (INHALATION)
Qty: 1 | Refills: 0 | OUTPATIENT
Start: 2019-03-29 | End: 2019-04-27

## 2019-03-29 RX ORDER — IPRATROPIUM/ALBUTEROL SULFATE 18-103MCG
3 AEROSOL WITH ADAPTER (GRAM) INHALATION
Qty: 0 | Refills: 0 | COMMUNITY
Start: 2019-03-29

## 2019-03-29 RX ORDER — DOCUSATE SODIUM 100 MG
1 CAPSULE ORAL
Qty: 0 | Refills: 0 | DISCHARGE
Start: 2019-03-29

## 2019-03-29 RX ADMIN — FINASTERIDE 5 MILLIGRAM(S): 5 TABLET, FILM COATED ORAL at 12:31

## 2019-03-29 RX ADMIN — Medication 17 UNIT(S): at 08:16

## 2019-03-29 RX ADMIN — POLYETHYLENE GLYCOL 3350 17 GRAM(S): 17 POWDER, FOR SOLUTION ORAL at 08:16

## 2019-03-29 RX ADMIN — Medication 3 MILLILITER(S): at 08:15

## 2019-03-29 RX ADMIN — INSULIN GLARGINE 25 UNIT(S): 100 INJECTION, SOLUTION SUBCUTANEOUS at 08:16

## 2019-03-29 RX ADMIN — Medication 500 MILLIGRAM(S): at 12:31

## 2019-03-29 RX ADMIN — Medication 1: at 12:31

## 2019-03-29 RX ADMIN — Medication 3 MILLILITER(S): at 15:45

## 2019-03-29 RX ADMIN — Medication 17 UNIT(S): at 12:32

## 2019-03-29 RX ADMIN — BUDESONIDE AND FORMOTEROL FUMARATE DIHYDRATE 2 PUFF(S): 160; 4.5 AEROSOL RESPIRATORY (INHALATION) at 08:18

## 2019-03-29 RX ADMIN — TIOTROPIUM BROMIDE 1 CAPSULE(S): 18 CAPSULE ORAL; RESPIRATORY (INHALATION) at 08:18

## 2019-03-29 RX ADMIN — Medication 1000 UNIT(S): at 12:31

## 2019-03-29 NOTE — PROGRESS NOTE ADULT - NSHPATTENDINGPLANDISCUSS_GEN_ALL_CORE
RN and hospitalist
medical staff
hospital care team

## 2019-03-29 NOTE — DISCHARGE NOTE NURSING/CASE MANAGEMENT/SOCIAL WORK - NSDCDPATPORTLINK_GEN_ALL_CORE
You can access the [email or web address] Portal, offered by [facility], by registering with the following website: [email or web address]

## 2019-03-29 NOTE — DISCHARGE NOTE NURSING/CASE MANAGEMENT/SOCIAL WORK - NSDCPEEMAIL_GEN_ALL_CORE
[facility] for Tobacco Control email [email or web address]

## 2019-03-29 NOTE — DISCHARGE NOTE NURSING/CASE MANAGEMENT/SOCIAL WORK - NSDCPEWEB_GEN_ALL_CORE
[facility] for Tobacco Control website --- [email or web address] website --- [email or web address]

## 2019-03-29 NOTE — PROGRESS NOTE ADULT - SUBJECTIVE AND OBJECTIVE BOX
JIAN MANCIA  78y  Male    Patient is a 78y old  Male who presents with a chief complaint of Dyspnea (09 Dec 2018 09:44)      INTERVAL HPI/OVERNIGHT EVENTS:  No interval events.  Patient has no new complaints this AM.   Dyspnea and oxygen saturation stable on 4-5L NC. Off HF NC.          REVIEW OF SYSTEMS:  CONSTITUTIONAL: No fever, weight loss, fatigue  EYES: No eye pain, visual disturbances, or discharge  ENMT:  No difficulty hearing, tinnitus, vertigo; No sinus or throat pain  NECK: No pain or stiffness  RESPIRATORY: No cough, No wheezing, chills or hemoptysis; + shortness of breath  CARDIOVASCULAR: No chest pain, palpitations, dizziness, + leg swelling  GASTROINTESTINAL: No abdominal or epigastric pain. No nausea, vomiting, or hematemesis; No diarrhea or constipation. No melena or hematochezia.  GENITOURINARY: No dysuria, frequency, hematuria, or incontinence  NEUROLOGICAL: No headaches, memory loss, loss of strength, numbness, or tremors  SKIN: No itching, burning, rashes, or lesions   ENDOCRINE: No heat or cold intolerance; No hair loss  MUSCULOSKELETAL: No joint pain or swelling; No muscle, back, or extremity pain  PSYCHIATRIC: No depression, anxiety, mood swings, or difficulty sleeping        VITALS:  T(C): 35.8 (28 Mar 2019 21:47), Max: 35.9 (28 Mar 2019 14:00)  T(F): 96.5 (28 Mar 2019 21:47), Max: 96.7 (28 Mar 2019 14:00)  HR: 105 (28 Mar 2019 21:47) (99 - 105)  BP: 106/59 (28 Mar 2019 21:47) (106/59 - 125/60)  BP(mean): --  RR: 16 (28 Mar 2019 21:47) (16 - 16)  SpO2: 88% (28 Mar 2019 10:18) (88% - 88%)        CAPILLARY BLOOD GLUCOSE  POCT Blood Glucose.: 218 mg/dL (28 Mar 2019 21:35)  POCT Blood Glucose.: 268 mg/dL (28 Mar 2019 16:14)  POCT Blood Glucose.: 140 mg/dL (28 Mar 2019 11:07)  POCT Blood Glucose.: 110 mg/dL (28 Mar 2019 07:05)        PHYSICAL EXAM:  GENERAL: NAD, obese  HEAD:  Atraumatic, Normocephalic  EYES: conjunctiva and sclera clear  ENMT: Moist mucous membranes  NECK: Supple, Normal thyroid  NERVOUS SYSTEM:  Alert & Oriented X 3, Good concentration; Motor Strength 5/5 B/L upper and lower extremities.  CHEST/LUNG: Clear to auscultation bilaterally; No rales, No rhonchi, No wheezing.   HEART: Regular rate and rhythm; No murmurs, rubs, or gallops  ABDOMEN: Soft, Nontender, + distended; Bowel sounds present  EXTREMITIES:  2+ Peripheral Pulses, No clubbing, cyanosis, bipedal edema +  LYMPH: No lymphadenopathy noted  SKIN: No rashes or lesions    Consultant(s) Notes Reviewed:  [x ] YES  [ ] NO  Care Discussed with Consultants/Other Providers [ x] YES  [ ] NO    LABS:                                                  13.3   14.12 )-----------( 466      ( 28 Mar 2019 06:53 )             47.1       03-28    146  |  105  |  57<H>  ----------------------------<  123<H>  5.1<H>   |  28  |  1.6<H>    Ca    8.9      28 Mar 2019 06:53  Phos  4.6     03-28  Mg     1.9     03-28    TPro  6.0  /  Alb  4.0  /  TBili  0.5  /  DBili  x   /  AST  27  /  ALT  88<H>  /  AlkPhos  70  03-28        MICROBIOLOGY:   Culture - Urine (03.15.19 @ 03:20)    Specimen Source: .Urine Clean Catch (Midstream)    Culture Results:   <10,000 CFU/mL Normal Urogenital Gillian        RADIOLOGY & ADDITIONAL TESTS  X-ray Chest 1 View- PORTABLE-Urgent (03.24.19 @ 21:17)   In comparison with the prior chest x-ray dated March 13, 2019 time 8:49   AM:    Stable examination.      Imaging Personally Reviewed:  [x] YES  [ ] NO        MEDICATIONS  (STANDING):  artificial  tears Solution 1 Drop(s) Both EYES three times a day  ascorbic acid 500 milliGRAM(s) Oral daily  atorvastatin 20 milliGRAM(s) Oral at bedtime  buDESOnide 160 MICROgram(s)/formoterol 4.5 MICROgram(s) Inhaler 2 Puff(s) Inhalation two times a day  chlorhexidine 4% Liquid 1 Application(s) Topical <User Schedule>  cholecalciferol 1000 Unit(s) Oral daily  dextrose 5%. 1000 milliLiter(s) (50 mL/Hr) IV Continuous <Continuous>  dextrose 50% Injectable 12.5 Gram(s) IV Push once  dextrose 50% Injectable 25 Gram(s) IV Push once  dextrose 50% Injectable 25 Gram(s) IV Push once  docusate sodium 100 milliGRAM(s) Oral three times a day  finasteride 5 milliGRAM(s) Oral daily  guaiFENesin  milliGRAM(s) Oral every 12 hours  heparin  Injectable 5000 Unit(s) SubCutaneous every 12 hours  insulin glargine Injectable (LANTUS) 25 Unit(s) SubCutaneous two times a day  insulin lispro (HumaLOG) corrective regimen sliding scale   SubCutaneous three times a day before meals  insulin lispro Injectable (HumaLOG) 17 Unit(s) SubCutaneous three times a day with meals  metoprolol tartrate 25 milliGRAM(s) Oral two times a day  nystatin Powder 1 Application(s) Topical two times a day  polyethylene glycol 3350 17 Gram(s) Oral daily  predniSONE   Tablet 60 milliGRAM(s) Oral daily  senna 2 Tablet(s) Oral at bedtime  sodium polystyrene sulfonate Suspension 30 Gram(s) Oral every 48 hours  tamsulosin 0.4 milliGRAM(s) Oral at bedtime  tiotropium 18 MICROgram(s) Capsule 1 Capsule(s) Inhalation daily    MEDICATIONS  (PRN):  acetaminophen   Tablet .. 650 milliGRAM(s) Oral every 6 hours PRN Mild Pain (1 - 3)  ALBUTerol    90 MICROgram(s) HFA Inhaler 2 Puff(s) Inhalation every 6 hours PRN Shortness of Breath and/or Wheezing  ALBUTerol/ipratropium for Nebulization 3 milliLiter(s) Nebulizer every 6 hours PRN Shortness of Breath and/or Wheezing  aluminum hydroxide/magnesium hydroxide/simethicone Suspension 30 milliLiter(s) Oral every 4 hours PRN Dyspepsia  dextrose 40% Gel 15 Gram(s) Oral once PRN Blood Glucose LESS THAN 70 milliGRAM(s)/deciliter  glucagon  Injectable 1 milliGRAM(s) IntraMuscular once PRN Glucose LESS THAN 70 milligrams/deciliter  sodium chloride 0.65% Nasal 1 Spray(s) Both Nostrils every 4 hours PRN Nasal Congestion        HEALTH ISSUES - PROBLEM Dx:  COPD (chronic obstructive pulmonary disease) with exacerbation  Hyperkalemia  High cholesterol  GERD (gastroesophageal reflux disease)  Enlarged prostate  Oxygen dependent  Diabetes mellitus, type 2  Hypertension  Emphysema lung JIAN MANCIA  78y  Male    Patient is a 78y old  Male who presents with a chief complaint of Dyspnea (09 Dec 2018 09:44)      INTERVAL HPI/OVERNIGHT EVENTS:  No interval events.  Patient has no new complaints this AM.   Dyspnea and oxygen saturation stable on 4-5L NC. Off HF NC.          REVIEW OF SYSTEMS:  CONSTITUTIONAL: No fever, weight loss, fatigue  EYES: No eye pain, visual disturbances, or discharge  ENMT:  No difficulty hearing, tinnitus, vertigo; No sinus or throat pain  NECK: No pain or stiffness  RESPIRATORY: No cough, No wheezing, chills or hemoptysis; + shortness of breath  CARDIOVASCULAR: No chest pain, palpitations, dizziness, + leg swelling  GASTROINTESTINAL: No abdominal or epigastric pain. No nausea, vomiting, or hematemesis; No diarrhea or constipation. No melena or hematochezia.  GENITOURINARY: No dysuria, frequency, hematuria, or incontinence  NEUROLOGICAL: No headaches, memory loss, loss of strength, numbness, or tremors  SKIN: No itching, burning, rashes, or lesions   ENDOCRINE: No heat or cold intolerance; No hair loss  MUSCULOSKELETAL: No joint pain or swelling; No muscle, back, or extremity pain  PSYCHIATRIC: No depression, anxiety, mood swings, or difficulty sleeping        VITALS:  T(C): 35.8 (28 Mar 2019 21:47), Max: 35.9 (28 Mar 2019 14:00)  T(F): 96.5 (28 Mar 2019 21:47), Max: 96.7 (28 Mar 2019 14:00)  HR: 105 (28 Mar 2019 21:47) (99 - 105)  BP: 106/59 (28 Mar 2019 21:47) (106/59 - 125/60)  BP(mean): --  RR: 16 (28 Mar 2019 21:47) (16 - 16)  SpO2: 88% (28 Mar 2019 10:18) (88% - 88%)        CAPILLARY BLOOD GLUCOSE  POCT Blood Glucose.: 218 mg/dL (28 Mar 2019 21:35)  POCT Blood Glucose.: 268 mg/dL (28 Mar 2019 16:14)  POCT Blood Glucose.: 140 mg/dL (28 Mar 2019 11:07)  POCT Blood Glucose.: 110 mg/dL (28 Mar 2019 07:05)        PHYSICAL EXAM:  GENERAL: NAD, obese  HEAD:  Atraumatic, Normocephalic  EYES: conjunctiva and sclera clear  ENMT: Moist mucous membranes  NECK: Supple, Normal thyroid  NERVOUS SYSTEM:  Alert & Oriented X 3, Good concentration; Motor Strength 5/5 B/L upper and lower extremities.  CHEST/LUNG: Clear to auscultation bilaterally; No rales, No rhonchi, No wheezing.   HEART: Regular rate and rhythm; No murmurs, rubs, or gallops  ABDOMEN: Soft, Nontender, + distended; Bowel sounds present  EXTREMITIES:  2+ Peripheral Pulses, No clubbing, cyanosis, bipedal edema +  LYMPH: No lymphadenopathy noted  SKIN: No rashes or lesions    Consultant(s) Notes Reviewed:  [x ] YES  [ ] NO  Care Discussed with Consultants/Other Providers [ x] YES  [ ] NO    LABS:                                       13.3   14.12 )-----------( 466      ( 28 Mar 2019 06:53 )             47.1     03-28    146  |  105  |  57<H>  ----------------------------<  123<H>  5.1<H>   |  28  |  1.6<H>    Ca    8.9      28 Mar 2019 06:53  Phos  4.6     03-28  Mg     1.9     03-28    TPro  6.0  /  Alb  4.0  /  TBili  0.5  /  DBili  x   /  AST  27  /  ALT  88<H>  /  AlkPhos  70  03-28          MICROBIOLOGY:   Culture - Urine (03.15.19 @ 03:20)    Specimen Source: .Urine Clean Catch (Midstream)    Culture Results:   <10,000 CFU/mL Normal Urogenital Gillian        RADIOLOGY & ADDITIONAL TESTS  X-ray Chest 1 View- PORTABLE-Urgent (03.24.19 @ 21:17)   In comparison with the prior chest x-ray dated March 13, 2019 time 8:49   AM:    Stable examination.      Imaging Personally Reviewed:  [x] YES  [ ] NO        MEDICATIONS  (STANDING):  artificial  tears Solution 1 Drop(s) Both EYES three times a day  ascorbic acid 500 milliGRAM(s) Oral daily  atorvastatin 20 milliGRAM(s) Oral at bedtime  buDESOnide 160 MICROgram(s)/formoterol 4.5 MICROgram(s) Inhaler 2 Puff(s) Inhalation two times a day  chlorhexidine 4% Liquid 1 Application(s) Topical <User Schedule>  cholecalciferol 1000 Unit(s) Oral daily  dextrose 5%. 1000 milliLiter(s) (50 mL/Hr) IV Continuous <Continuous>  dextrose 50% Injectable 12.5 Gram(s) IV Push once  dextrose 50% Injectable 25 Gram(s) IV Push once  dextrose 50% Injectable 25 Gram(s) IV Push once  docusate sodium 100 milliGRAM(s) Oral three times a day  finasteride 5 milliGRAM(s) Oral daily  guaiFENesin  milliGRAM(s) Oral every 12 hours  heparin  Injectable 5000 Unit(s) SubCutaneous every 12 hours  insulin glargine Injectable (LANTUS) 25 Unit(s) SubCutaneous two times a day  insulin lispro (HumaLOG) corrective regimen sliding scale   SubCutaneous three times a day before meals  insulin lispro Injectable (HumaLOG) 17 Unit(s) SubCutaneous three times a day with meals  metoprolol tartrate 25 milliGRAM(s) Oral two times a day  nystatin Powder 1 Application(s) Topical two times a day  polyethylene glycol 3350 17 Gram(s) Oral daily  predniSONE   Tablet 60 milliGRAM(s) Oral daily  senna 2 Tablet(s) Oral at bedtime  sodium polystyrene sulfonate Suspension 30 Gram(s) Oral every 48 hours  tamsulosin 0.4 milliGRAM(s) Oral at bedtime  tiotropium 18 MICROgram(s) Capsule 1 Capsule(s) Inhalation daily    MEDICATIONS  (PRN):  acetaminophen   Tablet .. 650 milliGRAM(s) Oral every 6 hours PRN Mild Pain (1 - 3)  ALBUTerol    90 MICROgram(s) HFA Inhaler 2 Puff(s) Inhalation every 6 hours PRN Shortness of Breath and/or Wheezing  ALBUTerol/ipratropium for Nebulization 3 milliLiter(s) Nebulizer every 6 hours PRN Shortness of Breath and/or Wheezing  aluminum hydroxide/magnesium hydroxide/simethicone Suspension 30 milliLiter(s) Oral every 4 hours PRN Dyspepsia  dextrose 40% Gel 15 Gram(s) Oral once PRN Blood Glucose LESS THAN 70 milliGRAM(s)/deciliter  glucagon  Injectable 1 milliGRAM(s) IntraMuscular once PRN Glucose LESS THAN 70 milligrams/deciliter  sodium chloride 0.65% Nasal 1 Spray(s) Both Nostrils every 4 hours PRN Nasal Congestion        HEALTH ISSUES - PROBLEM Dx:  COPD (chronic obstructive pulmonary disease) with exacerbation  Hyperkalemia  High cholesterol  GERD (gastroesophageal reflux disease)  Enlarged prostate  Oxygen dependent  Diabetes mellitus, type 2  Hypertension  Emphysema lung JIAN MANCIA  78y  Male    Patient is a 78y old  Male who presents with a chief complaint of Dyspnea (09 Dec 2018 09:44)      INTERVAL HPI/OVERNIGHT EVENTS:  No interval events.  Patient has no new complaints this AM.   Dyspnea and oxygen saturation stable on 4-5L NC. Off HF NC.      Patient stable for discharge home today but with a higher likelihood of being readmitted.       REVIEW OF SYSTEMS:  CONSTITUTIONAL: No fever, weight loss, fatigue  EYES: No eye pain, visual disturbances, or discharge  ENMT:  No difficulty hearing, tinnitus, vertigo; No sinus or throat pain  NECK: No pain or stiffness  RESPIRATORY: No cough, No wheezing, chills or hemoptysis; + shortness of breath  CARDIOVASCULAR: No chest pain, palpitations, dizziness, + leg swelling  GASTROINTESTINAL: No abdominal or epigastric pain. No nausea, vomiting, or hematemesis; No diarrhea or constipation. No melena or hematochezia.  GENITOURINARY: No dysuria, frequency, hematuria, or incontinence  NEUROLOGICAL: No headaches, memory loss, loss of strength, numbness, or tremors  SKIN: No itching, burning, rashes, or lesions   ENDOCRINE: No heat or cold intolerance; No hair loss  MUSCULOSKELETAL: No joint pain or swelling; No muscle, back, or extremity pain  PSYCHIATRIC: No depression, anxiety, mood swings, or difficulty sleeping        VITALS:  T(C): 35.8 (28 Mar 2019 21:47), Max: 35.9 (28 Mar 2019 14:00)  T(F): 96.5 (28 Mar 2019 21:47), Max: 96.7 (28 Mar 2019 14:00)  HR: 105 (28 Mar 2019 21:47) (99 - 105)  BP: 106/59 (28 Mar 2019 21:47) (106/59 - 125/60)  BP(mean): --  RR: 16 (28 Mar 2019 21:47) (16 - 16)  SpO2: 88% (28 Mar 2019 10:18) (88% - 88%)        CAPILLARY BLOOD GLUCOSE  POCT Blood Glucose.: 218 mg/dL (28 Mar 2019 21:35)  POCT Blood Glucose.: 268 mg/dL (28 Mar 2019 16:14)  POCT Blood Glucose.: 140 mg/dL (28 Mar 2019 11:07)  POCT Blood Glucose.: 110 mg/dL (28 Mar 2019 07:05)        PHYSICAL EXAM:  GENERAL: NAD, obese  HEAD:  Atraumatic, Normocephalic  EYES: conjunctiva and sclera clear  ENMT: Moist mucous membranes  NECK: Supple, Normal thyroid  NERVOUS SYSTEM:  Alert & Oriented X 3, Good concentration; Motor Strength 5/5 B/L upper and lower extremities.  CHEST/LUNG: Clear to auscultation bilaterally; No rales, No rhonchi, No wheezing.   HEART: Regular rate and rhythm; No murmurs, rubs, or gallops  ABDOMEN: Soft, Nontender, + distended; Bowel sounds present  EXTREMITIES:  2+ Peripheral Pulses, No clubbing, cyanosis, bipedal edema +  LYMPH: No lymphadenopathy noted  SKIN: No rashes or lesions    Consultant(s) Notes Reviewed:  [x ] YES  [ ] NO  Care Discussed with Consultants/Other Providers [ x] YES  [ ] NO    LABS:                                       13.3   14.12 )-----------( 466      ( 28 Mar 2019 06:53 )             47.1     03-28    146  |  105  |  57<H>  ----------------------------<  123<H>  5.1<H>   |  28  |  1.6<H>    Ca    8.9      28 Mar 2019 06:53  Phos  4.6     03-28  Mg     1.9     03-28    TPro  6.0  /  Alb  4.0  /  TBili  0.5  /  DBili  x   /  AST  27  /  ALT  88<H>  /  AlkPhos  70  03-28      Iron with Total Binding Capacity in AM (03.28.19 @ 06:53)    Iron - Total Binding Capacity.: 311 ug/dL    % Saturation, Iron: 8 %    Iron Total, Serum: 24 ug/dL    Unsaturated Iron Binding Capacity: 287 ug/dL        MICROBIOLOGY:   Culture - Urine (03.15.19 @ 03:20)    Specimen Source: .Urine Clean Catch (Midstream)    Culture Results:   <10,000 CFU/mL Normal Urogenital Gillian        RADIOLOGY & ADDITIONAL TESTS  X-ray Chest 1 View- PORTABLE-Urgent (03.24.19 @ 21:17)   In comparison with the prior chest x-ray dated March 13, 2019 time 8:49   AM:    Stable examination.      Imaging Personally Reviewed:  [x] YES  [ ] NO        MEDICATIONS  (STANDING):  artificial  tears Solution 1 Drop(s) Both EYES three times a day  ascorbic acid 500 milliGRAM(s) Oral daily  atorvastatin 20 milliGRAM(s) Oral at bedtime  buDESOnide 160 MICROgram(s)/formoterol 4.5 MICROgram(s) Inhaler 2 Puff(s) Inhalation two times a day  chlorhexidine 4% Liquid 1 Application(s) Topical <User Schedule>  cholecalciferol 1000 Unit(s) Oral daily  dextrose 5%. 1000 milliLiter(s) (50 mL/Hr) IV Continuous <Continuous>  dextrose 50% Injectable 12.5 Gram(s) IV Push once  dextrose 50% Injectable 25 Gram(s) IV Push once  dextrose 50% Injectable 25 Gram(s) IV Push once  docusate sodium 100 milliGRAM(s) Oral three times a day  finasteride 5 milliGRAM(s) Oral daily  guaiFENesin  milliGRAM(s) Oral every 12 hours  heparin  Injectable 5000 Unit(s) SubCutaneous every 12 hours  insulin glargine Injectable (LANTUS) 25 Unit(s) SubCutaneous two times a day  insulin lispro (HumaLOG) corrective regimen sliding scale   SubCutaneous three times a day before meals  insulin lispro Injectable (HumaLOG) 17 Unit(s) SubCutaneous three times a day with meals  metoprolol tartrate 25 milliGRAM(s) Oral two times a day  nystatin Powder 1 Application(s) Topical two times a day  polyethylene glycol 3350 17 Gram(s) Oral daily  predniSONE   Tablet 60 milliGRAM(s) Oral daily  senna 2 Tablet(s) Oral at bedtime  sodium polystyrene sulfonate Suspension 30 Gram(s) Oral every 48 hours  tamsulosin 0.4 milliGRAM(s) Oral at bedtime  tiotropium 18 MICROgram(s) Capsule 1 Capsule(s) Inhalation daily    MEDICATIONS  (PRN):  acetaminophen   Tablet .. 650 milliGRAM(s) Oral every 6 hours PRN Mild Pain (1 - 3)  ALBUTerol    90 MICROgram(s) HFA Inhaler 2 Puff(s) Inhalation every 6 hours PRN Shortness of Breath and/or Wheezing  ALBUTerol/ipratropium for Nebulization 3 milliLiter(s) Nebulizer every 6 hours PRN Shortness of Breath and/or Wheezing  aluminum hydroxide/magnesium hydroxide/simethicone Suspension 30 milliLiter(s) Oral every 4 hours PRN Dyspepsia  dextrose 40% Gel 15 Gram(s) Oral once PRN Blood Glucose LESS THAN 70 milliGRAM(s)/deciliter  glucagon  Injectable 1 milliGRAM(s) IntraMuscular once PRN Glucose LESS THAN 70 milligrams/deciliter  sodium chloride 0.65% Nasal 1 Spray(s) Both Nostrils every 4 hours PRN Nasal Congestion        HEALTH ISSUES - PROBLEM Dx:  COPD (chronic obstructive pulmonary disease) with exacerbation  Hyperkalemia  High cholesterol  GERD (gastroesophageal reflux disease)  Enlarged prostate  Oxygen dependent  Diabetes mellitus, type 2  Hypertension  Emphysema lung

## 2019-03-29 NOTE — PROGRESS NOTE ADULT - ASSESSMENT
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  He denied any CP, palpitations, cough, dizziness, lightheadedness, abdominal pain, change in bowel or urinary habits. He was admitted for further management of COPD exacerbation.         Assessment and Plan:    1. Acute on Chronic Hypoxic respiratory failure:   Due to end stage COPD exacerbation.  Patient stable on NC 5L and BiPAP qhs.  Continue Symbicort, Bronchodilators ATC and prn and Prednisone (60 mg day 2) with slow Taper to home dose of 10 mg daily.   Remains DNR & DNI.       2. Hyperkalemia:   Continue Low K diet & Kayexalate 30 gm po TTS (3x week).        3. h/o Hypertension:  BP stable. Continue Metoprolol.         4. Diabetes mellitus, type 2: Uncontrolled.  Monitor FS with Insulin coverage.  Hemoglobin A1C, Whole Blood: 7.2 % (03.12.19 @ 07:00)      5. CKD stage 3:  Stable creatinine.  Avoid Nephrotoxins. Monitor BMP and Electrolytes.  Follow up with Nephrology out patient.        6. BPH:  Continue Flomax and Finasteride.       7. Obesity:  Dietary & Lifestyle modification.      8. B12 deficiency & Vitamin D deficiency:  Continue with supplementation.       9. MICHAEL/OHVS:  BiPAP at night.      10. Severe Pulmonary Hypertension/Chronic Diastolic HF:  Supportive care.   ECHO 11/2018: Normal LVEF, no RWMA, Trace TR, Moderate pulmonary hypertension.  ECHO 12/25/18: severe Right side heart dilation, RSVP 100 (was 40 in 10/2018)  Continue low dose Lasix      11. Microcytosis:  Follow up Iron studies.  Hgb stable.        DVT prophylaxis: Heparin  Disposition: Home with home care.  CODE status: DNR/DNI Patient is an 78y Male with h/o End stage COPD on supplemental oxygen, severe pulmonary hypertension, DM, MICHAEL, obesity, CKD 3, with multiple admissions for AHRF, recently discharged on 1/24 presented from home with complaints of worsening dyspnea.  He denied any CP, palpitations, cough, dizziness, lightheadedness, abdominal pain, change in bowel or urinary habits. He was admitted for further management of COPD exacerbation.         Assessment and Plan:    1. Acute on Chronic Hypoxic respiratory failure:   Due to end stage COPD exacerbation.  Patient stable on NC 5L and BiPAP qhs.  Continue Symbicort, Bronchodilators ATC and prn and Prednisone (60 mg day 3) with slow Taper (10 mg every 3 days) to home dose of 10 mg daily.   Remains DNR & DNI.       2. Hyperkalemia:   Continue Low K diet & Kayexalate 30 gm po TTS (3x week).        3. h/o Hypertension:  BP stable. Continue Metoprolol.         4. Diabetes mellitus, type 2: Uncontrolled.  Monitor FS with Insulin coverage.  Hemoglobin A1C, Whole Blood: 7.2 % (03.12.19 @ 07:00)      5. CKD stage 3:  Stable creatinine.  Avoid Nephrotoxins. Monitor BMP and Electrolytes.  Follow up with Nephrology out patient.        6. BPH:  Continue Flomax and Finasteride.       7. Obesity:  Dietary & Lifestyle modification.      8. B12 deficiency & Vitamin D deficiency:  Continue with supplementation.       9. MICHAEL/OHVS:  BiPAP at night.      10. Severe Pulmonary Hypertension/Chronic Diastolic HF:  Supportive care.   ECHO 11/2018: Normal LVEF, no RWMA, Trace TR, Moderate pulmonary hypertension.  ECHO 12/25/18: severe Right side heart dilation, RSVP 100 (was 40 in 10/2018)  Continue low dose Lasix      11. Microcytosis:  Follow up Iron studies.  Hgb stable.        DVT prophylaxis: Heparin  Disposition: Home with home care.  CODE status: DNR/DNI Patient is an 78y Male with h/o End stage COPD on supplemental oxygen, severe pulmonary hypertension, DM, MICHAEL, obesity, CKD 3, with multiple admissions for AHRF, recently discharged on 1/24 presented from home with complaints of worsening dyspnea.  He denied any CP, palpitations, cough, dizziness, lightheadedness, abdominal pain, change in bowel or urinary habits. He was admitted for further management of COPD exacerbation.         Assessment and Plan:    1. Acute on Chronic Hypoxic respiratory failure:   Due to end stage COPD exacerbation.  Patient stable on NC 5L and BiPAP qhs.  Continue Symbicort, Bronchodilators ATC and prn and Prednisone (60 mg day 3) with slow Taper (10 mg every 3 days) to home dose of 10 mg daily.   Remains DNR & DNI.       2. Hyperkalemia:   Continue Low K diet & Kayexalate 30 gm po TTS (3x week).        3. h/o Hypertension:  BP stable. Continue Metoprolol.         4. Diabetes mellitus, type 2: Uncontrolled.  Monitor FS with Insulin coverage.  Hemoglobin A1C, Whole Blood: 7.2 % (03.12.19 @ 07:00)      5. CKD stage 3:  Stable creatinine.  Avoid Nephrotoxins. Monitor BMP and Electrolytes.  Follow up with Nephrology out patient.        6. BPH:  Continue Flomax and Finasteride.       7. Obesity:  Dietary & Lifestyle modification.      8. B12 deficiency & Vitamin D deficiency:  Continue with supplementation.       9. MICHAEL/OHVS:  BiPAP at night.      10. Severe Pulmonary Hypertension/Chronic Diastolic HF:  Supportive care.   ECHO 11/2018: Normal LVEF, no RWMA, Trace TR, Moderate pulmonary hypertension.  ECHO 12/25/18: severe Right side heart dilation, RSVP 100 (was 40 in 10/2018)  Continue low dose Lasix      11. Microcytosis due to EMMY:  Started on Iron supplement.  Hgb stable.      12. Debility:  Due to underlying medical condition.  Geriatrics following: appreciate input. Recommendations noted.      DVT prophylaxis: Heparin  Disposition: Home with home care.  CODE status: DNR/DNI

## 2019-03-29 NOTE — DISCHARGE NOTE PROVIDER - NSDCCPCAREPLAN_GEN_ALL_CORE_FT
PRINCIPAL DISCHARGE DIAGNOSIS  Diagnosis: Acute on chronic respiratory failure with hypoxia  Assessment and Plan of Treatment: Due to end stage COPD exacerbation.  Patient stable on NC 5L and BiPAP qhs.  Continue Symbicort, Bronchodilators and Prednisone (60 mg day 3) with slow Taper (10 mg every 3 days) to home dose of 10 mg daily.  50 milligram(s) orally once a day for 3 days then  40 mg daily for 3 days then  30 mg daily for 3 days then  20 mg daily for 3 days then 10 mg daily      SECONDARY DISCHARGE DIAGNOSES  Diagnosis: EMMY (iron deficiency anemia)  Assessment and Plan of Treatment: Follow up PMD. Started on Iron supplement.    Diagnosis: Chronic diastolic heart failure  Assessment and Plan of Treatment: Supportive care.   Continued low dose Lasix    Diagnosis: Pulmonary hypertension  Assessment and Plan of Treatment: Severe Pulmonary Hypertension. Supportive care.    Diagnosis: Diabetes mellitus  Assessment and Plan of Treatment: Monitored blood sugars. Continue Insulin.  Hemoglobin A1C, Whole Blood: 7.2 % (03.12.19 @ 07:00)    Diagnosis: Sleep apnea  Assessment and Plan of Treatment: Continue CPAP at night.    Diagnosis: Hyperkalemia  Assessment and Plan of Treatment: Continue Low K diet & Kayexalate 30 gm po TTS (3x week).    Diagnosis: CKD (chronic kidney disease), stage III  Assessment and Plan of Treatment: Stable creatinine.  No Nephrotoxins. Monitor BMP and Electrolytes.  Follow up with Nephrology out patient.

## 2019-03-29 NOTE — DISCHARGE NOTE PROVIDER - HOSPITAL COURSE
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  He denied any CP, palpitations, cough, dizziness, lightheadedness, abdominal pain, change in bowel or urinary habits. He was admitted for further management of COPD exacerbation.                 Assessment and Plan:        1. Acute on Chronic Hypoxic respiratory failure:     Due to end stage COPD exacerbation.    Patient stable on NC 5L and BiPAP qhs.    Continue Symbicort, Bronchodilators ATC and prn and Prednisone (60 mg day 3) with slow Taper (10 mg every 3 days) to home dose of 10 mg daily.     Remains DNR & DNI.             2. Hyperkalemia:     Continued Low K diet & Kayexalate 30 gm po TTS (3x week).                3. h/o Hypertension:    BP stable. Continued Metoprolol.                 4. Diabetes mellitus, type 2: Uncontrolled.    Monitored FS with Insulin coverage.    Hemoglobin A1C, Whole Blood: 7.2 % (03.12.19 @ 07:00)            5. CKD stage 3:    Stable creatinine.    No Nephrotoxins. Monitor BMP and Electrolytes.    Follow up with Nephrology out patient.                6. BPH:    Continued Flomax and Finasteride.             7. Obesity:    Dietary & Lifestyle modification.            8. B12 deficiency & Vitamin D deficiency:    Continued with supplementation.             9. MICHAEL/OHVS:    BiPAP at night.            10. Severe Pulmonary Hypertension/Chronic Diastolic HF:    Supportive care.     ECHO 11/2018: Normal LVEF, no RWMA, Trace TR, Moderate pulmonary hypertension.    ECHO 12/25/18: severe Right side heart dilation, RSVP 100 (was 40 in 10/2018)    Continued low dose Lasix            11. Microcytosis:    Due to EMMY. Started on Oral Iron.    Hgb stable.
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
## 2019-03-29 NOTE — PROGRESS NOTE ADULT - REASON FOR ADMISSION
Acute Hypoxic Respiratory Failure
COPD

## 2019-03-30 ENCOUNTER — INPATIENT (INPATIENT)
Facility: HOSPITAL | Age: 79
LOS: 4 days | Discharge: HOME HEALTH | End: 2019-04-04
Attending: INTERNAL MEDICINE | Admitting: INTERNAL MEDICINE
Payer: MEDICARE

## 2019-03-30 VITALS
OXYGEN SATURATION: 93 % | HEIGHT: 69 IN | TEMPERATURE: 97 F | HEART RATE: 121 BPM | SYSTOLIC BLOOD PRESSURE: 125 MMHG | DIASTOLIC BLOOD PRESSURE: 60 MMHG | WEIGHT: 225.09 LBS | RESPIRATION RATE: 24 BRPM

## 2019-03-30 DIAGNOSIS — K63.5 POLYP OF COLON: Chronic | ICD-10-CM

## 2019-03-30 DIAGNOSIS — Z98.890 OTHER SPECIFIED POSTPROCEDURAL STATES: Chronic | ICD-10-CM

## 2019-03-30 LAB
ALBUMIN SERPL ELPH-MCNC: 3.8 G/DL — SIGNIFICANT CHANGE UP (ref 3.5–5.2)
ALP SERPL-CCNC: 66 U/L — SIGNIFICANT CHANGE UP (ref 30–115)
ALT FLD-CCNC: 55 U/L — HIGH (ref 0–41)
ANION GAP SERPL CALC-SCNC: 9 MMOL/L — SIGNIFICANT CHANGE UP (ref 7–14)
APTT BLD: 62.6 SEC — HIGH (ref 27–39.2)
AST SERPL-CCNC: 14 U/L — SIGNIFICANT CHANGE UP (ref 0–41)
BASE EXCESS BLDV CALC-SCNC: 3.8 MMOL/L — HIGH (ref -2–2)
BASOPHILS # BLD AUTO: 0.05 K/UL — SIGNIFICANT CHANGE UP (ref 0–0.2)
BASOPHILS NFR BLD AUTO: 0.4 % — SIGNIFICANT CHANGE UP (ref 0–1)
BILIRUB SERPL-MCNC: 0.6 MG/DL — SIGNIFICANT CHANGE UP (ref 0.2–1.2)
BUN SERPL-MCNC: 46 MG/DL — HIGH (ref 10–20)
CA-I SERPL-SCNC: 1.21 MMOL/L — SIGNIFICANT CHANGE UP (ref 1.12–1.3)
CALCIUM SERPL-MCNC: 9 MG/DL — SIGNIFICANT CHANGE UP (ref 8.5–10.1)
CHLORIDE SERPL-SCNC: 105 MMOL/L — SIGNIFICANT CHANGE UP (ref 98–110)
CO2 SERPL-SCNC: 30 MMOL/L — SIGNIFICANT CHANGE UP (ref 17–32)
CREAT SERPL-MCNC: 1.6 MG/DL — HIGH (ref 0.7–1.5)
EOSINOPHIL # BLD AUTO: 0.46 K/UL — SIGNIFICANT CHANGE UP (ref 0–0.7)
EOSINOPHIL NFR BLD AUTO: 3.6 % — SIGNIFICANT CHANGE UP (ref 0–8)
GAS PNL BLDV: 141 MMOL/L — SIGNIFICANT CHANGE UP (ref 136–145)
GAS PNL BLDV: SIGNIFICANT CHANGE UP
GLUCOSE BLDC GLUCOMTR-MCNC: 210 MG/DL — HIGH (ref 70–99)
GLUCOSE BLDC GLUCOMTR-MCNC: 255 MG/DL — HIGH (ref 70–99)
GLUCOSE BLDC GLUCOMTR-MCNC: 330 MG/DL — HIGH (ref 70–99)
GLUCOSE SERPL-MCNC: 175 MG/DL — HIGH (ref 70–99)
HCO3 BLDV-SCNC: 30 MMOL/L — HIGH (ref 22–29)
HCT VFR BLD CALC: 46.6 % — SIGNIFICANT CHANGE UP (ref 42–52)
HCT VFR BLDA CALC: 43.3 % — SIGNIFICANT CHANGE UP (ref 34–44)
HGB BLD CALC-MCNC: 14.1 G/DL — SIGNIFICANT CHANGE UP (ref 14–18)
HGB BLD-MCNC: 13.4 G/DL — LOW (ref 14–18)
HOROWITZ INDEX BLDV+IHG-RTO: 21 — SIGNIFICANT CHANGE UP
IMM GRANULOCYTES NFR BLD AUTO: 0.6 % — HIGH (ref 0.1–0.3)
INR BLD: 1.17 RATIO — SIGNIFICANT CHANGE UP (ref 0.65–1.3)
LACTATE BLDV-MCNC: 0.9 MMOL/L — SIGNIFICANT CHANGE UP (ref 0.5–1.6)
LYMPHOCYTES # BLD AUTO: 1.52 K/UL — SIGNIFICANT CHANGE UP (ref 1.2–3.4)
LYMPHOCYTES # BLD AUTO: 11.9 % — LOW (ref 20.5–51.1)
MCHC RBC-ENTMCNC: 22.6 PG — LOW (ref 27–31)
MCHC RBC-ENTMCNC: 28.8 G/DL — LOW (ref 32–37)
MCV RBC AUTO: 78.6 FL — LOW (ref 80–94)
MONOCYTES # BLD AUTO: 1.11 K/UL — HIGH (ref 0.1–0.6)
MONOCYTES NFR BLD AUTO: 8.7 % — SIGNIFICANT CHANGE UP (ref 1.7–9.3)
NEUTROPHILS # BLD AUTO: 9.54 K/UL — HIGH (ref 1.4–6.5)
NEUTROPHILS NFR BLD AUTO: 74.8 % — SIGNIFICANT CHANGE UP (ref 42.2–75.2)
NRBC # BLD: 0 /100 WBCS — SIGNIFICANT CHANGE UP (ref 0–0)
NT-PROBNP SERPL-SCNC: 4246 PG/ML — HIGH (ref 0–300)
PCO2 BLDV: 51 MMHG — SIGNIFICANT CHANGE UP (ref 41–51)
PH BLDV: 7.38 — SIGNIFICANT CHANGE UP (ref 7.26–7.43)
PLATELET # BLD AUTO: 440 K/UL — HIGH (ref 130–400)
PO2 BLDV: 38 MMHG — SIGNIFICANT CHANGE UP (ref 20–40)
POTASSIUM BLDV-SCNC: 4.4 MMOL/L — SIGNIFICANT CHANGE UP (ref 3.3–5.6)
POTASSIUM SERPL-MCNC: 5 MMOL/L — SIGNIFICANT CHANGE UP (ref 3.5–5)
POTASSIUM SERPL-SCNC: 5 MMOL/L — SIGNIFICANT CHANGE UP (ref 3.5–5)
PROT SERPL-MCNC: 6.2 G/DL — SIGNIFICANT CHANGE UP (ref 6–8)
PROTHROM AB SERPL-ACNC: 13.4 SEC — HIGH (ref 9.95–12.87)
RBC # BLD: 5.93 M/UL — SIGNIFICANT CHANGE UP (ref 4.7–6.1)
RBC # FLD: 18.7 % — HIGH (ref 11.5–14.5)
SAO2 % BLDV: 71 % — SIGNIFICANT CHANGE UP
SODIUM SERPL-SCNC: 144 MMOL/L — SIGNIFICANT CHANGE UP (ref 135–146)
TROPONIN T SERPL-MCNC: 0.05 NG/ML — CRITICAL HIGH
WBC # BLD: 12.76 K/UL — HIGH (ref 4.8–10.8)
WBC # FLD AUTO: 12.76 K/UL — HIGH (ref 4.8–10.8)

## 2019-03-30 PROCEDURE — 93970 EXTREMITY STUDY: CPT | Mod: 26

## 2019-03-30 RX ORDER — PANTOPRAZOLE SODIUM 20 MG/1
40 TABLET, DELAYED RELEASE ORAL
Qty: 0 | Refills: 0 | Status: DISCONTINUED | OUTPATIENT
Start: 2019-03-30 | End: 2019-04-04

## 2019-03-30 RX ORDER — INSULIN LISPRO 100/ML
VIAL (ML) SUBCUTANEOUS AT BEDTIME
Qty: 0 | Refills: 0 | Status: DISCONTINUED | OUTPATIENT
Start: 2019-03-30 | End: 2019-04-02

## 2019-03-30 RX ORDER — HEPARIN SODIUM 5000 [USP'U]/ML
5000 INJECTION INTRAVENOUS; SUBCUTANEOUS EVERY 12 HOURS
Qty: 0 | Refills: 0 | Status: DISCONTINUED | OUTPATIENT
Start: 2019-03-30 | End: 2019-04-04

## 2019-03-30 RX ORDER — BUDESONIDE AND FORMOTEROL FUMARATE DIHYDRATE 160; 4.5 UG/1; UG/1
2 AEROSOL RESPIRATORY (INHALATION)
Qty: 0 | Refills: 0 | Status: DISCONTINUED | OUTPATIENT
Start: 2019-03-30 | End: 2019-04-04

## 2019-03-30 RX ORDER — DEXTROSE 50 % IN WATER 50 %
25 SYRINGE (ML) INTRAVENOUS ONCE
Qty: 0 | Refills: 0 | Status: DISCONTINUED | OUTPATIENT
Start: 2019-03-30 | End: 2019-04-04

## 2019-03-30 RX ORDER — ASCORBIC ACID 60 MG
500 TABLET,CHEWABLE ORAL DAILY
Qty: 0 | Refills: 0 | Status: DISCONTINUED | OUTPATIENT
Start: 2019-03-30 | End: 2019-04-04

## 2019-03-30 RX ORDER — TIOTROPIUM BROMIDE 18 UG/1
1 CAPSULE ORAL; RESPIRATORY (INHALATION) AT BEDTIME
Qty: 0 | Refills: 0 | Status: DISCONTINUED | OUTPATIENT
Start: 2019-03-30 | End: 2019-03-31

## 2019-03-30 RX ORDER — FUROSEMIDE 40 MG
40 TABLET ORAL ONCE
Qty: 0 | Refills: 0 | Status: COMPLETED | OUTPATIENT
Start: 2019-03-30 | End: 2019-03-30

## 2019-03-30 RX ORDER — NYSTATIN CREAM 100000 [USP'U]/G
1 CREAM TOPICAL
Qty: 0 | Refills: 0 | Status: DISCONTINUED | OUTPATIENT
Start: 2019-03-30 | End: 2019-04-04

## 2019-03-30 RX ORDER — METOPROLOL TARTRATE 50 MG
25 TABLET ORAL
Qty: 0 | Refills: 0 | Status: DISCONTINUED | OUTPATIENT
Start: 2019-03-30 | End: 2019-04-04

## 2019-03-30 RX ORDER — DOCUSATE SODIUM 100 MG
100 CAPSULE ORAL THREE TIMES A DAY
Qty: 0 | Refills: 0 | Status: DISCONTINUED | OUTPATIENT
Start: 2019-03-30 | End: 2019-04-04

## 2019-03-30 RX ORDER — DEXTROSE 50 % IN WATER 50 %
15 SYRINGE (ML) INTRAVENOUS ONCE
Qty: 0 | Refills: 0 | Status: DISCONTINUED | OUTPATIENT
Start: 2019-03-30 | End: 2019-04-04

## 2019-03-30 RX ORDER — POLYETHYLENE GLYCOL 3350 17 G/17G
17 POWDER, FOR SOLUTION ORAL DAILY
Qty: 0 | Refills: 0 | Status: DISCONTINUED | OUTPATIENT
Start: 2019-03-30 | End: 2019-04-04

## 2019-03-30 RX ORDER — INSULIN GLARGINE 100 [IU]/ML
30 INJECTION, SOLUTION SUBCUTANEOUS EVERY MORNING
Qty: 0 | Refills: 0 | Status: DISCONTINUED | OUTPATIENT
Start: 2019-03-30 | End: 2019-04-04

## 2019-03-30 RX ORDER — FUROSEMIDE 40 MG
40 TABLET ORAL DAILY
Qty: 0 | Refills: 0 | Status: DISCONTINUED | OUTPATIENT
Start: 2019-03-31 | End: 2019-03-31

## 2019-03-30 RX ORDER — TAMSULOSIN HYDROCHLORIDE 0.4 MG/1
0.4 CAPSULE ORAL AT BEDTIME
Qty: 0 | Refills: 0 | Status: DISCONTINUED | OUTPATIENT
Start: 2019-03-30 | End: 2019-04-04

## 2019-03-30 RX ORDER — ACETAMINOPHEN 500 MG
650 TABLET ORAL EVERY 6 HOURS
Qty: 0 | Refills: 0 | Status: DISCONTINUED | OUTPATIENT
Start: 2019-03-30 | End: 2019-04-04

## 2019-03-30 RX ORDER — INSULIN LISPRO 100/ML
VIAL (ML) SUBCUTANEOUS
Qty: 0 | Refills: 0 | Status: DISCONTINUED | OUTPATIENT
Start: 2019-03-30 | End: 2019-04-02

## 2019-03-30 RX ORDER — SODIUM CHLORIDE 9 MG/ML
1000 INJECTION, SOLUTION INTRAVENOUS
Qty: 0 | Refills: 0 | Status: DISCONTINUED | OUTPATIENT
Start: 2019-03-30 | End: 2019-04-04

## 2019-03-30 RX ORDER — ATORVASTATIN CALCIUM 80 MG/1
20 TABLET, FILM COATED ORAL AT BEDTIME
Qty: 0 | Refills: 0 | Status: DISCONTINUED | OUTPATIENT
Start: 2019-03-30 | End: 2019-04-04

## 2019-03-30 RX ORDER — CHOLECALCIFEROL (VITAMIN D3) 125 MCG
1000 CAPSULE ORAL DAILY
Qty: 0 | Refills: 0 | Status: DISCONTINUED | OUTPATIENT
Start: 2019-03-30 | End: 2019-04-04

## 2019-03-30 RX ORDER — SENNA PLUS 8.6 MG/1
2 TABLET ORAL AT BEDTIME
Qty: 0 | Refills: 0 | Status: DISCONTINUED | OUTPATIENT
Start: 2019-03-30 | End: 2019-04-04

## 2019-03-30 RX ORDER — GLUCAGON INJECTION, SOLUTION 0.5 MG/.1ML
1 INJECTION, SOLUTION SUBCUTANEOUS ONCE
Qty: 0 | Refills: 0 | Status: DISCONTINUED | OUTPATIENT
Start: 2019-03-30 | End: 2019-04-04

## 2019-03-30 RX ORDER — SODIUM POLYSTYRENE SULFONATE 4.1 MEQ/G
30 POWDER, FOR SUSPENSION ORAL
Qty: 0 | Refills: 0 | Status: DISCONTINUED | OUTPATIENT
Start: 2019-03-31 | End: 2019-04-04

## 2019-03-30 RX ORDER — IPRATROPIUM/ALBUTEROL SULFATE 18-103MCG
3 AEROSOL WITH ADAPTER (GRAM) INHALATION EVERY 4 HOURS
Qty: 0 | Refills: 0 | Status: DISCONTINUED | OUTPATIENT
Start: 2019-03-30 | End: 2019-04-04

## 2019-03-30 RX ORDER — INSULIN LISPRO 100/ML
15 VIAL (ML) SUBCUTANEOUS
Qty: 0 | Refills: 0 | Status: DISCONTINUED | OUTPATIENT
Start: 2019-03-30 | End: 2019-04-01

## 2019-03-30 RX ORDER — FUROSEMIDE 40 MG
40 TABLET ORAL
Qty: 0 | Refills: 0 | Status: COMPLETED | OUTPATIENT
Start: 2019-03-30 | End: 2019-03-30

## 2019-03-30 RX ORDER — FINASTERIDE 5 MG/1
5 TABLET, FILM COATED ORAL DAILY
Qty: 0 | Refills: 0 | Status: DISCONTINUED | OUTPATIENT
Start: 2019-03-30 | End: 2019-04-04

## 2019-03-30 RX ORDER — AZITHROMYCIN 500 MG/1
500 TABLET, FILM COATED ORAL ONCE
Qty: 0 | Refills: 0 | Status: COMPLETED | OUTPATIENT
Start: 2019-03-30 | End: 2019-03-30

## 2019-03-30 RX ORDER — IPRATROPIUM/ALBUTEROL SULFATE 18-103MCG
3 AEROSOL WITH ADAPTER (GRAM) INHALATION
Qty: 0 | Refills: 0 | Status: COMPLETED | OUTPATIENT
Start: 2019-03-30 | End: 2019-03-30

## 2019-03-30 RX ORDER — FERROUS SULFATE 325(65) MG
325 TABLET ORAL
Qty: 0 | Refills: 0 | Status: DISCONTINUED | OUTPATIENT
Start: 2019-03-30 | End: 2019-04-04

## 2019-03-30 RX ORDER — INSULIN GLARGINE 100 [IU]/ML
30 INJECTION, SOLUTION SUBCUTANEOUS AT BEDTIME
Qty: 0 | Refills: 0 | Status: DISCONTINUED | OUTPATIENT
Start: 2019-03-30 | End: 2019-04-04

## 2019-03-30 RX ORDER — METOPROLOL TARTRATE 50 MG
25 TABLET ORAL ONCE
Qty: 0 | Refills: 0 | Status: COMPLETED | OUTPATIENT
Start: 2019-03-30 | End: 2019-03-30

## 2019-03-30 RX ORDER — DEXTROSE 50 % IN WATER 50 %
12.5 SYRINGE (ML) INTRAVENOUS ONCE
Qty: 0 | Refills: 0 | Status: DISCONTINUED | OUTPATIENT
Start: 2019-03-30 | End: 2019-04-04

## 2019-03-30 RX ADMIN — BUDESONIDE AND FORMOTEROL FUMARATE DIHYDRATE 2 PUFF(S): 160; 4.5 AEROSOL RESPIRATORY (INHALATION) at 21:13

## 2019-03-30 RX ADMIN — Medication 15 UNIT(S): at 11:48

## 2019-03-30 RX ADMIN — SENNA PLUS 2 TABLET(S): 8.6 TABLET ORAL at 21:13

## 2019-03-30 RX ADMIN — Medication 3 MILLILITER(S): at 12:46

## 2019-03-30 RX ADMIN — TAMSULOSIN HYDROCHLORIDE 0.4 MILLIGRAM(S): 0.4 CAPSULE ORAL at 21:14

## 2019-03-30 RX ADMIN — Medication 2: at 11:48

## 2019-03-30 RX ADMIN — Medication 100 MILLIGRAM(S): at 21:13

## 2019-03-30 RX ADMIN — AZITHROMYCIN 255 MILLIGRAM(S): 500 TABLET, FILM COATED ORAL at 07:45

## 2019-03-30 RX ADMIN — Medication 40 MILLIGRAM(S): at 17:18

## 2019-03-30 RX ADMIN — Medication 500 MILLIGRAM(S): at 13:14

## 2019-03-30 RX ADMIN — HEPARIN SODIUM 5000 UNIT(S): 5000 INJECTION INTRAVENOUS; SUBCUTANEOUS at 18:13

## 2019-03-30 RX ADMIN — Medication 100 MILLIGRAM(S): at 13:14

## 2019-03-30 RX ADMIN — INSULIN GLARGINE 30 UNIT(S): 100 INJECTION, SOLUTION SUBCUTANEOUS at 22:01

## 2019-03-30 RX ADMIN — Medication 1 DROP(S): at 21:20

## 2019-03-30 RX ADMIN — Medication 3 MILLILITER(S): at 20:35

## 2019-03-30 RX ADMIN — Medication 4: at 17:09

## 2019-03-30 RX ADMIN — POLYETHYLENE GLYCOL 3350 17 GRAM(S): 17 POWDER, FOR SOLUTION ORAL at 17:14

## 2019-03-30 RX ADMIN — Medication 1000 UNIT(S): at 13:14

## 2019-03-30 RX ADMIN — INSULIN GLARGINE 30 UNIT(S): 100 INJECTION, SOLUTION SUBCUTANEOUS at 11:47

## 2019-03-30 RX ADMIN — Medication 25 MILLIGRAM(S): at 15:34

## 2019-03-30 RX ADMIN — Medication 15 UNIT(S): at 17:09

## 2019-03-30 RX ADMIN — Medication 1: at 21:58

## 2019-03-30 RX ADMIN — Medication 60 MILLIGRAM(S): at 18:17

## 2019-03-30 RX ADMIN — Medication 3 MILLILITER(S): at 06:33

## 2019-03-30 RX ADMIN — Medication 1 DROP(S): at 13:15

## 2019-03-30 RX ADMIN — Medication 40 MILLIGRAM(S): at 08:53

## 2019-03-30 RX ADMIN — Medication 600 MILLIGRAM(S): at 17:17

## 2019-03-30 RX ADMIN — NYSTATIN CREAM 1 APPLICATION(S): 100000 CREAM TOPICAL at 17:19

## 2019-03-30 RX ADMIN — AZITHROMYCIN 500 MILLIGRAM(S): 500 TABLET, FILM COATED ORAL at 08:45

## 2019-03-30 RX ADMIN — FINASTERIDE 5 MILLIGRAM(S): 5 TABLET, FILM COATED ORAL at 13:14

## 2019-03-30 RX ADMIN — Medication 3 MILLILITER(S): at 06:44

## 2019-03-30 RX ADMIN — ATORVASTATIN CALCIUM 20 MILLIGRAM(S): 80 TABLET, FILM COATED ORAL at 21:14

## 2019-03-30 RX ADMIN — Medication 3 MILLILITER(S): at 17:00

## 2019-03-30 RX ADMIN — Medication 3 MILLILITER(S): at 06:41

## 2019-03-30 NOTE — ED PROVIDER NOTE - NS ED ROS FT
Review of Systems    Constitutional: (-) fever  Eyes/ENT: (-) sore throat  Cardiovascular: (-) chest pain, (-) palpitation   Respiratory: SEE HPI  Gastrointestinal: (-) abdominal pain (-) vomiting, (-) diarrhea  Musculoskeletal: (-) neck pain, (-) back pain, (-) joint pain  Integumentary: (-) rash, (-) edema  Neurological: (-) headache, (-) altered mental status  Heme/Lymph: (-) easy bruising (-) easy bleeding   Allergic/Immunologic: (-) pruritus

## 2019-03-30 NOTE — PATIENT PROFILE ADULT - NSPROALCOHOLUSE2_GEN_A_NUR
Assessment and plan of treatment:	Follow up with your primary care provider within 48-72 hours for wound check. Keep sutures covered and dry for 24 hours then clean with soap and water daily.  Apply bacitracin and cover.  Return to ED for suture removal 7 days. Any increased pain, redness, streaking (red lines), swelling, fever, chills return to ER. Principal Discharge DX:	Laceration of thumb  Assessment and plan of treatment:	Follow up with your primary care provider within 48-72 hours for wound check. Keep sutures covered and dry for 24 hours then clean with soap and water daily.  Apply bacitracin and cover.  Return to ED for suture removal 7 days. Any increased pain, redness, streaking (red lines), swelling, fever, chills return to ER. never

## 2019-03-30 NOTE — H&P ADULT - ASSESSMENT
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  Patient mentions waking up from sleep very dyspneic from his baseline and worse on exertion thus [facility]HA activated EMS. Per EMS he had decreased b/l breath sound and tachypneic thus given Duoneb and 125 mg of solumedrol and put on CPAP treatment with significant improvement. He denied any CP, palpitations, cough, dizziness, lightheadedness, worsening pedal edema/calf pain. He was admitted for further management of COPD exacerbation.       Assessment and Plan:    1. 1. Acute on Chronic Hypoxic respiratory failure:   Due to end stage COPD exacerbation.  Chest X-ray showed cardiomegaly with Pulmonary vascular congestion.  Continue Symbicort, Spiriva & Bronchodilators ATC and prn and Steroids.   Doubt Pneumonia but follow up repeat x-ray in the AM after adequate diuresis.  Wean off BiPAP as Tolerated to 4-5L NC. BiPAP qhs.  DNR & DNI.       2. Severe Pulmonary Hypertension:  Acute Diastolic HF exacerbation:  CHF protocol.   ECHO 11/2018: Normal LVEF, no RWMA, Trace TR, Moderate pulmonary hypertension.  ECHO 12/25/18: severe Right side heart dilation, RSVP 100 (was 40 in 10/2018).  Repeat ECHO pending.  Continue Lasix.        3. h/o Hypertension:  BP stable. Continue Metoprolol.         4. Diabetes mellitus, type 2: Uncontrolled.  Monitor FS with Insulin coverage.  Hemoglobin A1C, Whole Blood: 7.2 % (03.12.19 @ 07:00)  ADA diet.      5. CKD stage 3:  Stable creatinine.  Avoid Nephrotoxins. Monitor BMP and Electrolytes.          6. BPH:  Continue Flomax and Finasteride.       7. Obesity:  Dietary & Lifestyle modification.      8. B12 deficiency & Vitamin D deficiency:  Continue with supplementation.       9. MICHAEL/OHVS:  BiPAP at night.      10.  Hyperkalemia:   Continue Low K diet & Kayexalate 30 gm po TTS (3x week).      11. Microcytosis due to EMMY:  Started on Iron supplement.  Hgb stable.      12. Debility:  Due to underlying medical condition.  Geriatrics consulted.       DVT prophylaxis: Heparin  Disposition: pending stabilization.   CODE status: DNR/DNI Patient is an 78y Male with h/o End stage COPD on supplemental oxygen (4-5L NC), DNR/DNI, severe pulmonary hypertension, DM, MICHAEL/OHS, obesity, CKD 3, with multiple admissions for AHRF, recently discharged on 3/29/30 presented from home with complaints of worsening dyspnea.  Patient mentions waking up from sleep very dyspneic from his baseline and worse on exertion thus HHA activated EMS. Per EMS he had decreased b/l breath sound and tachypneic thus given Duoneb and 125 mg of solumedrol and put on CPAP treatment with significant improvement. He denied any CP, palpitations, cough, dizziness, lightheadedness, worsening pedal edema/calf pain. He was admitted for further management of COPD exacerbation.       Assessment and Plan:    1. 1. Acute on Chronic Hypoxic respiratory failure:   Due to end stage COPD exacerbation.  Chest X-ray showed cardiomegaly with Pulmonary vascular congestion.  Continue Symbicort, Spiriva & Bronchodilators ATC and prn and Steroids.   Doubt Pneumonia but follow up repeat x-ray in the AM after adequate diuresis.  Wean off BiPAP as Tolerated to 4-5L NC. BiPAP qhs.  Maintain saturation >88%  DNR & DNI.       2. Severe Pulmonary Hypertension:  Acute Diastolic HF exacerbation:  CHF protocol.   ECHO 11/2018: Normal LVEF, no RWMA, Trace TR, Moderate pulmonary hypertension.  ECHO 12/25/18: severe Right side heart dilation, RSVP 100 (was 40 in 10/2018).  Repeat ECHO pending.  Continue Lasix.        3. h/o Hypertension:  BP stable. Continue Metoprolol.         4. Diabetes mellitus, type 2: Uncontrolled.  Monitor FS with Insulin coverage.  Hemoglobin A1C, Whole Blood: 7.2 % (03.12.19 @ 07:00)  ADA diet.      5. CKD stage 3:  Stable creatinine.  Avoid Nephrotoxins. Monitor BMP and Electrolytes.          6. BPH:  Continue Flomax and Finasteride.       7. Obesity:  Dietary & Lifestyle modification.      8. B12 deficiency & Vitamin D deficiency:  Continue with supplementation.       9. MICHAEL/OHVS:  BiPAP at night.      10.  Hyperkalemia:   Continue Low K diet & Kayexalate 30 gm po TTS (3x week).      11. Microcytosis due to EMMY:  Started on Iron supplement.  Hgb stable.      12. Debility:  Due to underlying medical condition.  Geriatrics consulted.       DVT prophylaxis: Heparin  Disposition: pending stabilization.   CODE status: DNR/DNI

## 2019-03-30 NOTE — ED PROVIDER NOTE - ATTENDING CONTRIBUTION TO CARE
I personally evaluated the patient. I reviewed the Resident’s or Physician Assistant’s note (as assigned above), and agree with the findings and plan except as documented in my note.    77 y/o M w hx of pulm htn, COPD, CKD recent admission for COPD exacerbation presents w sob since discharge. + Cough. No fever. No nausea, vomiting. No CP, SOB.     Patient placed on bipap on arrival.     Exam: deminished breath sounds throughout. abd soft.     Plan: CXR, labs, bipap, reassess

## 2019-03-30 NOTE — ED PROVIDER NOTE - PROGRESS NOTE DETAILS
Patient was given solumedrol and duonebs en route. Now on bipapa. IV access establsihed. Patient placed on the monitor. Patient signed out to Dr. Guzman pt feels much better, reports baseline saturation on 2L 87-92%, HR 110s, was discharged home but did not receive delivery of his liquid oxygen, was using condenser with which he is not comfortable, does not think he was getting air, also thinks he was on a diuretic which may have been stopped, has increasing LE edema, currently sat 91% on bipap 40%, labs and studies reviewed, will give lasix and admit floor; pt DNR/DNI

## 2019-03-30 NOTE — ED ADULT NURSE NOTE - NSIMPLEMENTINTERV_GEN_ALL_ED
Implemented All Fall Risk Interventions:  [place] to call system. Call bell, personal items and telephone within reach. Instruct patient to call for assistance. Room bathroom lighting operational. Non-slip footwear when patient is off stretcher. Physically safe environment: no spills, clutter or unnecessary equipment. Stretcher in lowest position, wheels locked, appropriate side rails in place. Provide visual cue, wrist band, yellow gown, etc. Monitor gait and stability. Monitor for mental status changes and reorient to person, place, and time. Review medications for side effects contributing to fall risk. Reinforce activity limits and safety measures with patient and family.

## 2019-03-30 NOTE — ED PROVIDER NOTE - PHYSICAL EXAMINATION
Physical Exam    Vital Signs: I have reviewed the initial vital signs.  Constitutional: well-nourished, appears stated age, no acute distress  Eyes: PERRLA, and symmetrical lids.  ENT: Neck supple with no adenopathy, moist MM.  Cardiovascular: regular rate, regular rhythm, well-perfused extremities  Respiratory: pt is on BI-PAP in the ED with decreased b/l breath sound and wheezing, no retractions or belly breathing, POCUS minimal b/l B-lines.  Gastrointestinal: soft, non-tender abdomen, no pulsatile mass  Musculoskeletal: supple neck, no lower extremity edema  Integumentary: no abrasions, no lacerations  Neurologic: awake, alert, extremities’ motor and sensory functions grossly intact  Psychiatric: A&Ox3

## 2019-03-30 NOTE — H&P ADULT - NSHPREVIEWOFSYSTEMS_GEN_ALL_CORE
CONSTITUTIONAL: No fever, weight loss, or fatigue  EYES: No eye pain, visual disturbances, or discharge  ENMT:  No difficulty hearing, tinnitus, vertigo; No sinus or throat pain  NECK: No pain or stiffness  BREASTS: No pain, masses, or nipple discharge  RESPIRATORY: No cough, wheezing, chills or hemoptysis; + shortness of breath  CARDIOVASCULAR: No chest pain, palpitations, dizziness, + leg swelling  GASTROINTESTINAL: No abdominal or epigastric pain. No nausea, vomiting, or hematemesis; No diarrhea or constipation. No melena or hematochezia.  GENITOURINARY: No dysuria, frequency, hematuria, or incontinence  NEUROLOGICAL: No headaches, memory loss, loss of strength, numbness, or tremors  SKIN: No itching, burning, rashes, or lesions   LYMPH NODES: No enlarged glands  ENDOCRINE: No heat or cold intolerance; No hair loss  MUSCULOSKELETAL: No joint pain or swelling; No muscle, back, or extremity pain  PSYCHIATRIC: No depression, anxiety, mood swings, or difficulty sleeping  HEME/LYMPH: No easy bruising, or bleeding gums  ALLERGY AND IMMUNOLOGIC: No hives or eczema

## 2019-03-30 NOTE — H&P ADULT - NSHPLABSRESULTS_GEN_ALL_CORE
13.4   12.76 )-----------( 440      ( 30 Mar 2019 06:30 )             46.6       03-30    144  |  105  |  46<H>  ----------------------------<  175<H>  5.0   |  30  |  1.6<H>    Ca    9.0      30 Mar 2019 06:30    TPro  6.2  /  Alb  3.8  /  TBili  0.6  /  DBili  x   /  AST  14  /  ALT  55<H>  /  AlkPhos  66  03-30        CARDIAC MARKERS ( 30 Mar 2019 06:30 )  x     / 0.05 ng/mL / x     / x     / x        X-ray Chest 1 View-PORTABLE IMMEDIATE (03.30.19 @ 06:59)   Cardiomegaly and pulmonary vascular congestion, stable.

## 2019-03-30 NOTE — H&P ADULT - NSHPPHYSICALEXAM_GEN_ALL_CORE
VITALS:  T(F): 98.6 (03-30-19 @ 08:10), Max: 98.6 (03-30-19 @ 08:10)  HR: 108 (03-30-19 @ 08:45) (108 - 122)  BP: 126/62 (03-30-19 @ 08:45) (112/62 - 126/62)  RR: 22 (03-30-19 @ 08:45) (22 - 24)  SpO2: 93% (03-30-19 @ 08:45) (91% - 93%)    PHYSICAL EXAM:  GENERAL: NAD, morbidly obese  HEAD:  Atraumatic, Normocephalic  EYES: conjunctiva and sclera clear  ENMT: Moist mucous membranes  NECK: Supple, Normal thyroid  NERVOUS SYSTEM:  Alert & Oriented x 3, Good concentration; Motor Strength 5/5 B/L upper and lower extremities.  CHEST/LUNG: Decreased AE bilaterally; No rales, rhonchi, wheezing, or rubs, On BiPAP, 12/6,   HEART: Regular rate and rhythm; No murmurs, rubs, or gallops  ABDOMEN: Soft, Nontender, obese; Bowel sounds present  EXTREMITIES:  2+ Peripheral Pulses, No clubbing, cyanosis, Bipedal pitting edema to the knee  LYMPH: No lymphadenopathy noted  SKIN: No rashes or lesions

## 2019-03-30 NOTE — H&P ADULT - HISTORY OF PRESENT ILLNESS
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  Patient mentions waking up from sleep very dyspneic from his baseline and worse on exertion thus HHA activated EMS. Per EMS he had decreased b/l breath sound and tachypneic thus given Duoneb and 125 mg of solumedrol and put on CPAP treatment with significant improvement. He denied any CP, palpitations, cough, dizziness, lightheadedness, worsening pedal edema/calf pain. He was admitted for further management of COPD exacerbation.

## 2019-03-30 NOTE — ED PROVIDER NOTE - OBJECTIVE STATEMENT
79 yo pmhx sig for HF, 4 L of NC COPD pw quick onset of SOB in the AM woke pt up from sleep the SOB was noted by home health aid EMS activated. As per EMS pt had decreased b/l breath sound and tachypnea at the scene was given 2 Duoneb and 125 mg of solumedrol and put on CPAP since treatment pt had symptomatic improvement. In the ED pt is reporting doing muc better on BIB-PAP, speak full sentences, and reports MOLST DNR/DNI. Denies cough, fevers, CP, SOB and palpitations, n/v, diaphoresis. No unilateral swelling, hemoptysis, no hormone supplementation, malignancy, recent immobilization or surgery, or prior DVT/PE.      I have reviewed available current nursing and previous documentation of past medical, surgical, family, and/or social history.

## 2019-03-30 NOTE — ED ADULT NURSE NOTE - NSINTERVENTIONOPT_GEN_ALL_ED
Stretcher Alarms/Enhanced Supervision/Hourly Rounding/Move Toilet (commode/urinal) to patient using "arms reach"

## 2019-03-30 NOTE — H&P ADULT - NSICDXPASTMEDICALHX_GEN_ALL_CORE_FT
PAST MEDICAL HISTORY:  Colon polyp claims it was malignant    COPD (chronic obstructive pulmonary disease)     Diabetes mellitus, type 2     Emphysema lung     Enlarged prostate     GERD (gastroesophageal reflux disease)     High cholesterol     Hypertension     Oxygen dependent

## 2019-03-30 NOTE — ED PROVIDER NOTE - CLINICAL SUMMARY MEDICAL DECISION MAKING FREE TEXT BOX
Patient presented dyspneic, recently discharged, did not have usual O2 at home/won't be delivered until tomorrow, also increasing LE edema ?on lasix, much improved on bipap here, labs and studies reviewed, admit floor

## 2019-03-31 LAB
ALBUMIN SERPL ELPH-MCNC: 4.1 G/DL — SIGNIFICANT CHANGE UP (ref 3.5–5.2)
ALP SERPL-CCNC: 70 U/L — SIGNIFICANT CHANGE UP (ref 30–115)
ALT FLD-CCNC: 42 U/L — HIGH (ref 0–41)
ANION GAP SERPL CALC-SCNC: 12 MMOL/L — SIGNIFICANT CHANGE UP (ref 7–14)
AST SERPL-CCNC: 9 U/L — SIGNIFICANT CHANGE UP (ref 0–41)
BASOPHILS # BLD AUTO: 0.02 K/UL — SIGNIFICANT CHANGE UP (ref 0–0.2)
BASOPHILS NFR BLD AUTO: 0.1 % — SIGNIFICANT CHANGE UP (ref 0–1)
BILIRUB SERPL-MCNC: 0.6 MG/DL — SIGNIFICANT CHANGE UP (ref 0.2–1.2)
BUN SERPL-MCNC: 52 MG/DL — HIGH (ref 10–20)
CALCIUM SERPL-MCNC: 9.4 MG/DL — SIGNIFICANT CHANGE UP (ref 8.5–10.1)
CHLORIDE SERPL-SCNC: 99 MMOL/L — SIGNIFICANT CHANGE UP (ref 98–110)
CO2 SERPL-SCNC: 31 MMOL/L — SIGNIFICANT CHANGE UP (ref 17–32)
CREAT SERPL-MCNC: 1.8 MG/DL — HIGH (ref 0.7–1.5)
EOSINOPHIL # BLD AUTO: 0 K/UL — SIGNIFICANT CHANGE UP (ref 0–0.7)
EOSINOPHIL NFR BLD AUTO: 0 % — SIGNIFICANT CHANGE UP (ref 0–8)
GLUCOSE BLDC GLUCOMTR-MCNC: 236 MG/DL — HIGH (ref 70–99)
GLUCOSE BLDC GLUCOMTR-MCNC: 283 MG/DL — HIGH (ref 70–99)
GLUCOSE BLDC GLUCOMTR-MCNC: 306 MG/DL — HIGH (ref 70–99)
GLUCOSE BLDC GLUCOMTR-MCNC: 334 MG/DL — HIGH (ref 70–99)
GLUCOSE BLDC GLUCOMTR-MCNC: 344 MG/DL — HIGH (ref 70–99)
GLUCOSE SERPL-MCNC: 341 MG/DL — HIGH (ref 70–99)
HCT VFR BLD CALC: 46.9 % — SIGNIFICANT CHANGE UP (ref 42–52)
HGB BLD-MCNC: 13.7 G/DL — LOW (ref 14–18)
IMM GRANULOCYTES NFR BLD AUTO: 1.3 % — HIGH (ref 0.1–0.3)
LYMPHOCYTES # BLD AUTO: 0.43 K/UL — LOW (ref 1.2–3.4)
LYMPHOCYTES # BLD AUTO: 3 % — LOW (ref 20.5–51.1)
MAGNESIUM SERPL-MCNC: 1.8 MG/DL — SIGNIFICANT CHANGE UP (ref 1.8–2.4)
MCHC RBC-ENTMCNC: 22.4 PG — LOW (ref 27–31)
MCHC RBC-ENTMCNC: 29.2 G/DL — LOW (ref 32–37)
MCV RBC AUTO: 76.8 FL — LOW (ref 80–94)
MONOCYTES # BLD AUTO: 0.64 K/UL — HIGH (ref 0.1–0.6)
MONOCYTES NFR BLD AUTO: 4.4 % — SIGNIFICANT CHANGE UP (ref 1.7–9.3)
NEUTROPHILS # BLD AUTO: 13.11 K/UL — HIGH (ref 1.4–6.5)
NEUTROPHILS NFR BLD AUTO: 91.2 % — HIGH (ref 42.2–75.2)
NRBC # BLD: 0 /100 WBCS — SIGNIFICANT CHANGE UP (ref 0–0)
PHOSPHATE SERPL-MCNC: 4.4 MG/DL — SIGNIFICANT CHANGE UP (ref 2.1–4.9)
PLATELET # BLD AUTO: 446 K/UL — HIGH (ref 130–400)
POTASSIUM SERPL-MCNC: 5.7 MMOL/L — HIGH (ref 3.5–5)
POTASSIUM SERPL-SCNC: 5.7 MMOL/L — HIGH (ref 3.5–5)
PROT SERPL-MCNC: 6.2 G/DL — SIGNIFICANT CHANGE UP (ref 6–8)
RBC # BLD: 6.11 M/UL — HIGH (ref 4.7–6.1)
RBC # FLD: 18.6 % — HIGH (ref 11.5–14.5)
SODIUM SERPL-SCNC: 142 MMOL/L — SIGNIFICANT CHANGE UP (ref 135–146)
WBC # BLD: 14.39 K/UL — HIGH (ref 4.8–10.8)
WBC # FLD AUTO: 14.39 K/UL — HIGH (ref 4.8–10.8)

## 2019-03-31 RX ORDER — FUROSEMIDE 40 MG
40 TABLET ORAL DAILY
Qty: 0 | Refills: 0 | Status: DISCONTINUED | OUTPATIENT
Start: 2019-03-31 | End: 2019-04-02

## 2019-03-31 RX ORDER — TIOTROPIUM BROMIDE 18 UG/1
1 CAPSULE ORAL; RESPIRATORY (INHALATION) DAILY
Qty: 0 | Refills: 0 | Status: DISCONTINUED | OUTPATIENT
Start: 2019-03-31 | End: 2019-04-04

## 2019-03-31 RX ADMIN — FINASTERIDE 5 MILLIGRAM(S): 5 TABLET, FILM COATED ORAL at 14:56

## 2019-03-31 RX ADMIN — NYSTATIN CREAM 1 APPLICATION(S): 100000 CREAM TOPICAL at 06:49

## 2019-03-31 RX ADMIN — TAMSULOSIN HYDROCHLORIDE 0.4 MILLIGRAM(S): 0.4 CAPSULE ORAL at 21:14

## 2019-03-31 RX ADMIN — INSULIN GLARGINE 30 UNIT(S): 100 INJECTION, SOLUTION SUBCUTANEOUS at 08:12

## 2019-03-31 RX ADMIN — Medication 600 MILLIGRAM(S): at 17:22

## 2019-03-31 RX ADMIN — Medication 60 MILLIGRAM(S): at 17:20

## 2019-03-31 RX ADMIN — Medication 15 UNIT(S): at 08:11

## 2019-03-31 RX ADMIN — Medication 3 MILLILITER(S): at 08:12

## 2019-03-31 RX ADMIN — INSULIN GLARGINE 30 UNIT(S): 100 INJECTION, SOLUTION SUBCUTANEOUS at 21:54

## 2019-03-31 RX ADMIN — Medication 15 UNIT(S): at 17:16

## 2019-03-31 RX ADMIN — Medication 15 UNIT(S): at 12:36

## 2019-03-31 RX ADMIN — PANTOPRAZOLE SODIUM 40 MILLIGRAM(S): 20 TABLET, DELAYED RELEASE ORAL at 06:46

## 2019-03-31 RX ADMIN — Medication 3 MILLILITER(S): at 13:10

## 2019-03-31 RX ADMIN — Medication 325 MILLIGRAM(S): at 06:45

## 2019-03-31 RX ADMIN — HEPARIN SODIUM 5000 UNIT(S): 5000 INJECTION INTRAVENOUS; SUBCUTANEOUS at 06:47

## 2019-03-31 RX ADMIN — Medication 2: at 21:52

## 2019-03-31 RX ADMIN — Medication 1 DROP(S): at 21:15

## 2019-03-31 RX ADMIN — Medication 25 MILLIGRAM(S): at 06:47

## 2019-03-31 RX ADMIN — Medication 1 DROP(S): at 14:57

## 2019-03-31 RX ADMIN — Medication 100 MILLIGRAM(S): at 21:14

## 2019-03-31 RX ADMIN — BUDESONIDE AND FORMOTEROL FUMARATE DIHYDRATE 2 PUFF(S): 160; 4.5 AEROSOL RESPIRATORY (INHALATION) at 08:12

## 2019-03-31 RX ADMIN — HEPARIN SODIUM 5000 UNIT(S): 5000 INJECTION INTRAVENOUS; SUBCUTANEOUS at 17:22

## 2019-03-31 RX ADMIN — Medication 4: at 12:37

## 2019-03-31 RX ADMIN — Medication 100 MILLIGRAM(S): at 14:56

## 2019-03-31 RX ADMIN — SODIUM POLYSTYRENE SULFONATE 30 GRAM(S): 4.1 POWDER, FOR SUSPENSION ORAL at 09:37

## 2019-03-31 RX ADMIN — Medication 3 MILLILITER(S): at 01:49

## 2019-03-31 RX ADMIN — Medication 3 MILLILITER(S): at 20:13

## 2019-03-31 RX ADMIN — ATORVASTATIN CALCIUM 20 MILLIGRAM(S): 80 TABLET, FILM COATED ORAL at 21:14

## 2019-03-31 RX ADMIN — Medication 4: at 17:16

## 2019-03-31 RX ADMIN — Medication 100 MILLIGRAM(S): at 06:45

## 2019-03-31 RX ADMIN — Medication 60 MILLIGRAM(S): at 06:43

## 2019-03-31 RX ADMIN — POLYETHYLENE GLYCOL 3350 17 GRAM(S): 17 POWDER, FOR SOLUTION ORAL at 08:22

## 2019-03-31 RX ADMIN — TIOTROPIUM BROMIDE 1 CAPSULE(S): 18 CAPSULE ORAL; RESPIRATORY (INHALATION) at 09:36

## 2019-03-31 RX ADMIN — Medication 1000 UNIT(S): at 14:56

## 2019-03-31 RX ADMIN — Medication 600 MILLIGRAM(S): at 06:46

## 2019-03-31 RX ADMIN — SENNA PLUS 2 TABLET(S): 8.6 TABLET ORAL at 21:14

## 2019-03-31 RX ADMIN — Medication 3 MILLILITER(S): at 15:55

## 2019-03-31 RX ADMIN — Medication 1 DROP(S): at 06:48

## 2019-03-31 RX ADMIN — Medication 25 MILLIGRAM(S): at 17:24

## 2019-03-31 RX ADMIN — Medication 3: at 08:11

## 2019-03-31 RX ADMIN — BUDESONIDE AND FORMOTEROL FUMARATE DIHYDRATE 2 PUFF(S): 160; 4.5 AEROSOL RESPIRATORY (INHALATION) at 21:12

## 2019-03-31 RX ADMIN — Medication 40 MILLIGRAM(S): at 06:46

## 2019-03-31 RX ADMIN — Medication 500 MILLIGRAM(S): at 14:56

## 2019-03-31 NOTE — PROGRESS NOTE ADULT - ASSESSMENT
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  Patient mentions waking up from sleep very dyspneic from his baseline and worse on exertion thus HHA activated EMS. Per EMS he had decreased b/l breath sound and tachypneic thus given Duoneb and 125 mg of solumedrol and put on CPAP treatment with significant improvement. He denied any CP, palpitations, cough, dizziness, lightheadedness, worsening pedal edema/calf pain. He was admitted for further management of COPD exacerbation.       Assessment and Plan:    1. Acute on Chronic Hypoxic respiratory failure:   Due to end stage COPD exacerbation.  Chest X-ray showed cardiomegaly with Pulmonary vascular congestion.  Continue Symbicort, Spiriva & Bronchodilators ATC and prn and Steroids.   Doubt Pneumonia but follow up repeat x-ray in the AM after adequate diuresis.  Tolerating 4-5L NC. BiPAP qhs.  Maintain saturation >88%  DNR & DNI.       2. Severe Pulmonary Hypertension:  Acute Diastolic HF exacerbation:  CHF protocol.   ECHO 11/2018: Normal LVEF, no RWMA, Trace TR, Moderate pulmonary hypertension.  ECHO 12/25/18: severe Right side heart dilation, RSVP 100 (was 40 in 10/2018).  Repeat ECHO pending.  Continue Lasix.        3. h/o Hypertension:  BP stable. Continue Metoprolol.         4. Diabetes mellitus, type 2: Uncontrolled.  Monitor FS with Insulin coverage.  Hemoglobin A1C, Whole Blood: 7.2 % (03.12.19 @ 07:00)  ADA diet.      5. CKD stage 3:  Stable creatinine.  Avoid Nephrotoxins. Monitor BMP and Electrolytes.          6. BPH:  Continue Flomax and Finasteride.       7. Obesity:  Dietary & Lifestyle modification.      8. B12 deficiency & Vitamin D deficiency:  Continue with supplementation.       9. MICHAEL/OHVS:  BiPAP at night.      10.  Hyperkalemia:   Continue Low K diet & Kayexalate 30 gm po TTS (3x week).      11. Microcytosis due to EMMY:  Started on Iron supplement.  Hgb stable.      12. Debility:  Due to underlying medical condition.  Geriatrics consulted.       DVT prophylaxis: Heparin  Disposition: pending stabilization. Home with home care.  CODE status: DNR/DNI

## 2019-04-01 DIAGNOSIS — Z87.891 PERSONAL HISTORY OF NICOTINE DEPENDENCE: ICD-10-CM

## 2019-04-01 DIAGNOSIS — N17.9 ACUTE KIDNEY FAILURE, UNSPECIFIED: ICD-10-CM

## 2019-04-01 DIAGNOSIS — E55.9 VITAMIN D DEFICIENCY, UNSPECIFIED: ICD-10-CM

## 2019-04-01 DIAGNOSIS — D50.9 IRON DEFICIENCY ANEMIA, UNSPECIFIED: ICD-10-CM

## 2019-04-01 DIAGNOSIS — I13.0 HYPERTENSIVE HEART AND CHRONIC KIDNEY DISEASE WITH HEART FAILURE AND STAGE 1 THROUGH STAGE 4 CHRONIC KIDNEY DISEASE, OR UNSPECIFIED CHRONIC KIDNEY DISEASE: ICD-10-CM

## 2019-04-01 DIAGNOSIS — N18.3 CHRONIC KIDNEY DISEASE, STAGE 3 (MODERATE): ICD-10-CM

## 2019-04-01 DIAGNOSIS — Z66 DO NOT RESUSCITATE: ICD-10-CM

## 2019-04-01 DIAGNOSIS — J96.21 ACUTE AND CHRONIC RESPIRATORY FAILURE WITH HYPOXIA: ICD-10-CM

## 2019-04-01 DIAGNOSIS — E78.5 HYPERLIPIDEMIA, UNSPECIFIED: ICD-10-CM

## 2019-04-01 DIAGNOSIS — Z99.81 DEPENDENCE ON SUPPLEMENTAL OXYGEN: ICD-10-CM

## 2019-04-01 DIAGNOSIS — N40.0 BENIGN PROSTATIC HYPERPLASIA WITHOUT LOWER URINARY TRACT SYMPTOMS: ICD-10-CM

## 2019-04-01 DIAGNOSIS — Z79.4 LONG TERM (CURRENT) USE OF INSULIN: ICD-10-CM

## 2019-04-01 DIAGNOSIS — I50.32 CHRONIC DIASTOLIC (CONGESTIVE) HEART FAILURE: ICD-10-CM

## 2019-04-01 DIAGNOSIS — J44.1 CHRONIC OBSTRUCTIVE PULMONARY DISEASE WITH (ACUTE) EXACERBATION: ICD-10-CM

## 2019-04-01 DIAGNOSIS — Z88.0 ALLERGY STATUS TO PENICILLIN: ICD-10-CM

## 2019-04-01 DIAGNOSIS — E53.8 DEFICIENCY OF OTHER SPECIFIED B GROUP VITAMINS: ICD-10-CM

## 2019-04-01 DIAGNOSIS — E11.65 TYPE 2 DIABETES MELLITUS WITH HYPERGLYCEMIA: ICD-10-CM

## 2019-04-01 DIAGNOSIS — G47.33 OBSTRUCTIVE SLEEP APNEA (ADULT) (PEDIATRIC): ICD-10-CM

## 2019-04-01 DIAGNOSIS — Z91.013 ALLERGY TO SEAFOOD: ICD-10-CM

## 2019-04-01 DIAGNOSIS — E66.01 MORBID (SEVERE) OBESITY DUE TO EXCESS CALORIES: ICD-10-CM

## 2019-04-01 DIAGNOSIS — E87.5 HYPERKALEMIA: ICD-10-CM

## 2019-04-01 LAB
GLUCOSE BLDC GLUCOMTR-MCNC: 147 MG/DL — HIGH (ref 70–99)
GLUCOSE BLDC GLUCOMTR-MCNC: 226 MG/DL — HIGH (ref 70–99)
GLUCOSE BLDC GLUCOMTR-MCNC: 232 MG/DL — HIGH (ref 70–99)
GLUCOSE BLDC GLUCOMTR-MCNC: 233 MG/DL — HIGH (ref 70–99)
GLUCOSE BLDC GLUCOMTR-MCNC: 276 MG/DL — HIGH (ref 70–99)

## 2019-04-01 RX ORDER — INSULIN LISPRO 100/ML
17 VIAL (ML) SUBCUTANEOUS
Qty: 0 | Refills: 0 | Status: DISCONTINUED | OUTPATIENT
Start: 2019-04-01 | End: 2019-04-04

## 2019-04-01 RX ADMIN — SENNA PLUS 2 TABLET(S): 8.6 TABLET ORAL at 21:41

## 2019-04-01 RX ADMIN — Medication 40 MILLIGRAM(S): at 06:56

## 2019-04-01 RX ADMIN — TIOTROPIUM BROMIDE 1 CAPSULE(S): 18 CAPSULE ORAL; RESPIRATORY (INHALATION) at 07:48

## 2019-04-01 RX ADMIN — Medication 500 MILLIGRAM(S): at 11:49

## 2019-04-01 RX ADMIN — INSULIN GLARGINE 30 UNIT(S): 100 INJECTION, SOLUTION SUBCUTANEOUS at 22:50

## 2019-04-01 RX ADMIN — Medication 25 MILLIGRAM(S): at 06:51

## 2019-04-01 RX ADMIN — Medication 100 MILLIGRAM(S): at 21:41

## 2019-04-01 RX ADMIN — PANTOPRAZOLE SODIUM 40 MILLIGRAM(S): 20 TABLET, DELAYED RELEASE ORAL at 06:51

## 2019-04-01 RX ADMIN — HEPARIN SODIUM 5000 UNIT(S): 5000 INJECTION INTRAVENOUS; SUBCUTANEOUS at 06:56

## 2019-04-01 RX ADMIN — Medication 100 MILLIGRAM(S): at 13:53

## 2019-04-01 RX ADMIN — Medication 3 MILLILITER(S): at 23:56

## 2019-04-01 RX ADMIN — Medication 3 MILLILITER(S): at 07:46

## 2019-04-01 RX ADMIN — POLYETHYLENE GLYCOL 3350 17 GRAM(S): 17 POWDER, FOR SOLUTION ORAL at 07:54

## 2019-04-01 RX ADMIN — Medication 2: at 17:07

## 2019-04-01 RX ADMIN — Medication 2: at 11:50

## 2019-04-01 RX ADMIN — Medication 3 MILLILITER(S): at 15:28

## 2019-04-01 RX ADMIN — Medication 600 MILLIGRAM(S): at 06:51

## 2019-04-01 RX ADMIN — Medication 60 MILLIGRAM(S): at 06:52

## 2019-04-01 RX ADMIN — BUDESONIDE AND FORMOTEROL FUMARATE DIHYDRATE 2 PUFF(S): 160; 4.5 AEROSOL RESPIRATORY (INHALATION) at 21:41

## 2019-04-01 RX ADMIN — Medication 17 UNIT(S): at 17:07

## 2019-04-01 RX ADMIN — TAMSULOSIN HYDROCHLORIDE 0.4 MILLIGRAM(S): 0.4 CAPSULE ORAL at 21:41

## 2019-04-01 RX ADMIN — HEPARIN SODIUM 5000 UNIT(S): 5000 INJECTION INTRAVENOUS; SUBCUTANEOUS at 18:42

## 2019-04-01 RX ADMIN — Medication 100 MILLIGRAM(S): at 06:52

## 2019-04-01 RX ADMIN — Medication 3 MILLILITER(S): at 12:19

## 2019-04-01 RX ADMIN — Medication 2: at 07:56

## 2019-04-01 RX ADMIN — Medication 15 UNIT(S): at 11:50

## 2019-04-01 RX ADMIN — Medication 1 DROP(S): at 13:54

## 2019-04-01 RX ADMIN — FINASTERIDE 5 MILLIGRAM(S): 5 TABLET, FILM COATED ORAL at 11:49

## 2019-04-01 RX ADMIN — ATORVASTATIN CALCIUM 20 MILLIGRAM(S): 80 TABLET, FILM COATED ORAL at 21:41

## 2019-04-01 RX ADMIN — INSULIN GLARGINE 30 UNIT(S): 100 INJECTION, SOLUTION SUBCUTANEOUS at 07:55

## 2019-04-01 RX ADMIN — Medication 325 MILLIGRAM(S): at 06:51

## 2019-04-01 RX ADMIN — Medication 15 UNIT(S): at 07:56

## 2019-04-01 RX ADMIN — Medication 3 MILLILITER(S): at 19:38

## 2019-04-01 RX ADMIN — Medication 600 MILLIGRAM(S): at 18:42

## 2019-04-01 RX ADMIN — Medication 3 MILLILITER(S): at 00:23

## 2019-04-01 RX ADMIN — BUDESONIDE AND FORMOTEROL FUMARATE DIHYDRATE 2 PUFF(S): 160; 4.5 AEROSOL RESPIRATORY (INHALATION) at 07:46

## 2019-04-01 RX ADMIN — Medication 1 DROP(S): at 06:58

## 2019-04-01 RX ADMIN — Medication 25 MILLIGRAM(S): at 18:42

## 2019-04-01 RX ADMIN — Medication 1000 UNIT(S): at 11:49

## 2019-04-01 NOTE — CONSULT NOTE ADULT - REASON FOR ADMISSION
Acute on Chronic Hypoxic Respiratory failure due to End stage COPD.
Acute on Chronic Hypoxic Respiratory failure due to End stage COPD.

## 2019-04-01 NOTE — CONSULT NOTE ADULT - SUBJECTIVE AND OBJECTIVE BOX
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 As per pt his home O2was not delivered and he used concentrator overnight. Pt woke  up from sleep very dyspneic from his baseline and worse on exertion thus HHA activated EMS. Per EMS he had decreased b/l breath sound and tachypneic thus given Duoneb and 125 mg of solumedrol and put on CPAP treatment with significant improvement. He denied any CP, palpitations, cough, dizziness, lightheadedness, worsening pedal edema/calf pain. He was admitted for further management of COPD exacerbation.    Pt lives by himself at home ; has 24/7 home health aid. Pt gets meals on wheels . Pt has limited mobility sec to severe INFANTE , can ambulate to bathroom using a walker . But as per pt lately his mobility is severely affected sec worsening lung function.      PERTINENT PMH REVIEWED:  [+ ] YES [ ] NO           SOCIAL HISTORY:  Significant other/partner:  [ ] YES  [+ ] NO            Children:  [ ] YES  [+ ] NO                    Substance hx:  [ ] YES   [+ ] NO           Tobacco hx:  [+ ] YES  [ ] NO             Alcohol hx: [ ] YES  [+ ] NO          Home Opioid hx:  [ ] YES  [ +] NO   Living Situation: [+ ] Home  [ ] Long term care  [ ] Rehab    FAMILY HISTORY:  No pertinent family history in first degree relatives    [+ ] Family history non contributory     BASELINE ADLs (prior to admission):  Independent [ ] moderately [ ] fully   Dependent   [ ] moderately [+ ] fully    ADVANCE DIRECTIVES:  [ +] YES [ ] NO   DNR [+ ] YES [ ] NO                      MOLST  [ +] YES [ ] NO      Allergies    fish (Other)  iodine (Hives)  penicillin (Unknown)  shellfish (Other)    Intolerances    aspirin (Other)      MEDICATIONS  (STANDING):  ALBUTerol/ipratropium for Nebulization 3 milliLiter(s) Nebulizer every 4 hours  artificial tears (preservative free) Ophthalmic Solution 1 Drop(s) Both EYES three times a day  ascorbic acid 500 milliGRAM(s) Oral daily  atorvastatin 20 milliGRAM(s) Oral at bedtime  buDESOnide 160 MICROgram(s)/formoterol 4.5 MICROgram(s) Inhaler 2 Puff(s) Inhalation two times a day  cholecalciferol 1000 Unit(s) Oral daily  dextrose 5%. 1000 milliLiter(s) (50 mL/Hr) IV Continuous <Continuous>  dextrose 50% Injectable 12.5 Gram(s) IV Push once  dextrose 50% Injectable 25 Gram(s) IV Push once  dextrose 50% Injectable 25 Gram(s) IV Push once  docusate sodium 100 milliGRAM(s) Oral three times a day  ferrous    sulfate 325 milliGRAM(s) Oral <User Schedule>  finasteride 5 milliGRAM(s) Oral daily  furosemide    Tablet 40 milliGRAM(s) Oral daily  guaiFENesin  milliGRAM(s) Oral every 12 hours  heparin  Injectable 5000 Unit(s) SubCutaneous every 12 hours  insulin glargine Injectable (LANTUS) 30 Unit(s) SubCutaneous every morning  insulin glargine Injectable (LANTUS) 30 Unit(s) SubCutaneous at bedtime  insulin lispro (HumaLOG) corrective regimen sliding scale   SubCutaneous three times a day before meals  insulin lispro (HumaLOG) corrective regimen sliding scale   SubCutaneous at bedtime  insulin lispro Injectable (HumaLOG) 15 Unit(s) SubCutaneous three times a day before meals  levoFLOXacin  Tablet 750 milliGRAM(s) Oral every 24 hours  metoprolol tartrate 25 milliGRAM(s) Oral two times a day  nystatin Powder 1 Application(s) Topical two times a day  pantoprazole    Tablet 40 milliGRAM(s) Oral before breakfast  polyethylene glycol 3350 17 Gram(s) Oral daily  predniSONE   Tablet 60 milliGRAM(s) Oral daily  senna 2 Tablet(s) Oral at bedtime  sodium polystyrene sulfonate Suspension 30 Gram(s) Oral every 48 hours  tamsulosin 0.4 milliGRAM(s) Oral at bedtime  tiotropium 18 MICROgram(s) Capsule 1 Capsule(s) Inhalation daily    MEDICATIONS  (PRN):  acetaminophen   Tablet .. 650 milliGRAM(s) Oral every 6 hours PRN Temp greater or equal to 38C (100.4F), Moderate Pain (4 - 6)  ALBUTerol/ipratropium for Nebulization 3 milliLiter(s) Nebulizer every 4 hours PRN Shortness of Breath and/or Wheezing  aluminum hydroxide/magnesium hydroxide/simethicone Suspension 30 milliLiter(s) Oral every 4 hours PRN Dyspepsia  dextrose 40% Gel 15 Gram(s) Oral once PRN Blood Glucose LESS THAN 70 milliGRAM(s)/deciliter  glucagon  Injectable 1 milliGRAM(s) IntraMuscular once PRN Glucose LESS THAN 70 milligrams/deciliter      PRESENT SYMPTOMS:  Source: [+ ] Patient   [ ] Family   [ ] Team       Other Symptoms:  [+ ] All other review of systems negative   [ ] Unable to obtain due to poor mentation       Vital Signs Last 24 Hrs  T(C): 36 (01 Apr 2019 06:21), Max: 36.8 (31 Mar 2019 22:52)  T(F): 96.8 (01 Apr 2019 06:21), Max: 98.2 (31 Mar 2019 22:52)  HR: 69 (01 Apr 2019 06:21) (69 - 111)  BP: 124/64 (01 Apr 2019 06:21) (106/59 - 154/77)  BP(mean): --  RR: 18 (01 Apr 2019 08:49) (18 - 18)  SpO2: 90% (01 Apr 2019 08:49) (89% - 90%)    Physical Exam:    General: [ +] Alert,  A&O x 3   Obese male of stated chronological age.  HEENT:NC / AT PERRLA  Lungs: DEC AIR ENTRY B/L LUNG CAMACHO , NO WHEEZING OR RHONCHI  Cardiovascular:   Regular rate and rhythm  , NO MURMURS OR GALLOPS  Abdomen: Soft , NON Distended  , +BS  ,Non tender  Musculoskeletal:  Generalized weakness  , b/l lower extremity pitting edema.  Neurological: No focal deficits Cognitive impairment     Skin: Normal   , noPressure ulcers     LABS:                        13.7   14.39 )-----------( 446      ( 31 Mar 2019 08:30 )             46.9     03-31    142  |  99  |  52<H>  ----------------------------<  341<H>  5.7<H>   |  31  |  1.8<H>    Ca    9.4      31 Mar 2019 08:30  Phos  4.4     03-31  Mg     1.8     03-31    TPro  6.2  /  Alb  4.1  /  TBili  0.6  /  DBili  x   /  AST  9   /  ALT  42<H>  /  AlkPhos  70  03-31        I&O's Summary    31 Mar 2019 07:01  -  01 Apr 2019 07:00  --------------------------------------------------------  IN: 520 mL / OUT: 975 mL / NET: -455 mL    01 Apr 2019 07:01  -  01 Apr 2019 10:37  --------------------------------------------------------  IN: 0 mL / OUT: 500 mL / NET: -500 mL        RADIOLOGY & ADDITIONAL STUDIES:
Patient is a 78y old  Male who presents with a chief complaint of Acute on Chronic Hypoxic Respiratory failure due to End stage COPD. 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  Patient mentions waking up from sleep very dyspneic from his baseline and worse on exertion thus HHA activated EMS. Per EMS he had decreased b/l breath sound and tachypneic thus given Duoneb and 125 mg of solumedrol and put on CPAP treatment with significant improvement. He denied any CP, palpitations, cough, dizziness, lightheadedness, worsening pedal edema/calf pain. He was admitted for further management of COPD exacerbation. (30 Mar 2019 09:47)    Pt discharged [facility] yesterday, his liquid oxygen was not refilled/delivered upon his discharge, he utilized his concentrator, but subsequently became sob, + produsctive cough he believes yellowish phlegm.  No fevers or chills or chest pain. Doing well upon eval      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  High cholesterol  GERD (gastroesophageal reflux disease)  Enlarged prostate  Oxygen dependent  COPD (chronic obstructive pulmonary disease)  Diabetes mellitus, type 2  Hypertension  Emphysema lung  History of hemorrhoidectomy  Dysplastic colon polyp: removed    Allergies    fish (Other)  iodine (Hives)  penicillin (Unknown)  shellfish (Other)    Intolerances    aspirin (Other)    FAMILY HISTORY:  No pertinent family history in first degree relatives    Home Medications:  acetaminophen 325 mg oral tablet: 2 tab(s) orally every 6 hours, As needed, Mild Pain (1 - 3) (29 Mar 2019 12:34)  aluminum hydroxide-magnesium hydroxide 200 mg-200 mg/5 mL oral suspension: 30 milliliter(s) orally every 4 hours, As needed, Dyspepsia (29 Mar 2019 12:34)  ascorbic acid 500 mg oral tablet: 1 tab(s) orally once a day (24 Jan 2019 10:06)  atorvastatin 20 mg oral tablet: 1 tab(s) orally once a day (at bedtime) (24 Jan 2019 10:06)  cholecalciferol oral tablet: 1000 unit(s) orally once a day (24 Jan 2019 10:06)  docusate sodium 100 mg oral capsule: 1 cap(s) orally 3 times a day (29 Mar 2019 12:34)  finasteride 5 mg oral tablet: 1 tab(s) orally once a day (24 Jan 2019 10:06)  furosemide 20 mg oral tablet: 1 tab(s) orally once a day (29 Mar 2019 12:34)  guaiFENesin 600 mg oral tablet, extended release: 1 tab(s) orally every 12 hours (29 Mar 2019 12:34)  ipratropium-albuterol 0.5 mg-2.5 mg/3 mLinhalation solution: 3 milliliter(s) inhaled every 6 hours, As needed, Shortness of Breath and/or Wheezing (29 Mar 2019 12:34)  magnesium oxide 400 mg (241.3 mg elemental magnesium) oral tablet: 1 tab(s) orally 3 times a day (with meals) (24 Jan 2019 10:06)  metoprolol tartrate 25 mg oral tablet: 1 tab(s) orally 2 times a day (24 Jan 2019 10:06)  nystatin 100,000 units/g topical powder: 1 application topically 2 times a day (29 Mar 2019 12:34)  ocular lubricant ophthalmic solution: 1 drop(s) to each affected eye 3 times a day (24 Jan 2019 10:06)  polyethylene glycol 3350 oral powder for reconstitution: 17 gram(s) orally once a day (29 Mar 2019 12:34)  senna oral tablet: 2 tab(s) orally once a day (at bedtime) (29 Mar 2019 12:34)  sodium polystyrene sulfonate: 30 milligram(s) orally 3 times a week (28 Dec 2018 08:26)  tamsulosin 0.4 mg oral capsule: 1 cap(s) orally once a day (at bedtime) (24 Jan 2019 10:06)    Occupation:  Alochol: Denied  Smoking: Denied  Drug Use: Denied  Marital Status:         ROS: as in HPI; All other systems reviewed are negative    ICU Vital Signs Last 24 Hrs  T(C): 37 (30 Mar 2019 08:10), Max: 37 (30 Mar 2019 08:10)  T(F): 98.6 (30 Mar 2019 08:10), Max: 98.6 (30 Mar 2019 08:10)  HR: 108 (30 Mar 2019 08:45) (108 - 122)  BP: 126/62 (30 Mar 2019 08:45) (112/62 - 126/62)  BP(mean): --  ABP: --  ABP(mean): --  RR: 22 (30 Mar 2019 08:45) (22 - 24)  SpO2: 93% (30 Mar 2019 08:45) (91% - 93%)        Physical Examination:    General: awake and responsive, speaking in full sentences    HEENT: Pupils equal, reactive to light.  Symmetric.    PULM: Clear bilaterally, +air entry, -rales/rhonchi/wheezes    CVS: Regular rate and rhythm, no murmurs, rubs, or gallops    ABD: obese, nontender, nondistended, +bowel sounds    EXT: 2+ edema, b/l lower extremities, -clubbing/cyanosis    SKIN: Warm and well perfused, no rashes noted.              I&O's Detail    30 Mar 2019 07:01  -  30 Mar 2019 12:34  --------------------------------------------------------  IN:    IV PiggyBack: 250 mL  Total IN: 250 mL    OUT:    Voided: 1175 mL  Total OUT: 1175 mL    Total NET: -925 mL            LABS:                        13.4   12.76 )-----------( 440      ( 30 Mar 2019 06:30 )             46.6     30 Mar 2019 06:30    144    |  105    |  46     ----------------------------<  175    5.0     |  30     |  1.6      Ca    9.0        30 Mar 2019 06:30    TPro  6.2    /  Alb  3.8    /  TBili  0.6    /  DBili  x      /  AST  14     /  ALT  55     /  AlkPhos  66     30 Mar 2019 06:30  Amylase x     lipase x          CARDIAC MARKERS ( 30 Mar 2019 06:30 )  x     / 0.05 ng/mL / x     / x     / x          CAPILLARY BLOOD GLUCOSE      POCT Blood Glucose.: 210 mg/dL (30 Mar 2019 11:19)    PT/INR - ( 30 Mar 2019 06:30 )   PT: 13.40 sec;   INR: 1.17 ratio         PTT - ( 30 Mar 2019 06:30 )  PTT:62.6 sec    Culture        MEDICATIONS  (STANDING):  ALBUTerol/ipratropium for Nebulization 3 milliLiter(s) Nebulizer every 4 hours  artificial tears (preservative free) Ophthalmic Solution 1 Drop(s) Both EYES three times a day  ascorbic acid 500 milliGRAM(s) Oral daily  atorvastatin 20 milliGRAM(s) Oral at bedtime  buDESOnide 160 MICROgram(s)/formoterol 4.5 MICROgram(s) Inhaler 2 Puff(s) Inhalation two times a day  cholecalciferol 1000 Unit(s) Oral daily  dextrose 5%. 1000 milliLiter(s) (50 mL/Hr) IV Continuous <Continuous>  dextrose 50% Injectable 12.5 Gram(s) IV Push once  dextrose 50% Injectable 25 Gram(s) IV Push once  dextrose 50% Injectable 25 Gram(s) IV Push once  docusate sodium 100 milliGRAM(s) Oral three times a day  ferrous    sulfate 325 milliGRAM(s) Oral <User Schedule>  finasteride 5 milliGRAM(s) Oral daily  furosemide   Injectable 40 milliGRAM(s) IV Push two times a day  guaiFENesin  milliGRAM(s) Oral every 12 hours  heparin  Injectable 5000 Unit(s) SubCutaneous every 12 hours  insulin glargine Injectable (LANTUS) 30 Unit(s) SubCutaneous every morning  insulin glargine Injectable (LANTUS) 30 Unit(s) SubCutaneous at bedtime  insulin lispro (HumaLOG) corrective regimen sliding scale   SubCutaneous three times a day before meals  insulin lispro (HumaLOG) corrective regimen sliding scale   SubCutaneous at bedtime  insulin lispro Injectable (HumaLOG) 15 Unit(s) SubCutaneous three times a day before meals  methylPREDNISolone sodium succinate Injectable 60 milliGRAM(s) IV Push every 12 hours  metoprolol tartrate 25 milliGRAM(s) Oral two times a day  nystatin Powder 1 Application(s) Topical two times a day  pantoprazole    Tablet 40 milliGRAM(s) Oral before breakfast  polyethylene glycol 3350 17 Gram(s) Oral daily  senna 2 Tablet(s) Oral at bedtime  tamsulosin 0.4 milliGRAM(s) Oral at bedtime  tiotropium 18 MICROgram(s) Capsule 1 Capsule(s) Inhalation at bedtime    MEDICATIONS  (PRN):  acetaminophen   Tablet .. 650 milliGRAM(s) Oral every 6 hours PRN Temp greater or equal to 38C (100.4F), Moderate Pain (4 - 6)  ALBUTerol/ipratropium for Nebulization 3 milliLiter(s) Nebulizer every 4 hours PRN Shortness of Breath and/or Wheezing  aluminum hydroxide/magnesium hydroxide/simethicone Suspension 30 milliLiter(s) Oral every 4 hours PRN Dyspepsia  dextrose 40% Gel 15 Gram(s) Oral once PRN Blood Glucose LESS THAN 70 milliGRAM(s)/deciliter  glucagon  Injectable 1 milliGRAM(s) IntraMuscular once PRN Glucose LESS THAN 70 milligrams/deciliter        RADIOLOGY: ***     CXR:  EXAM: XR CHEST PORTABLE IMMED 1V       PROCEDURE DATE: 03/30/2019           INTERPRETATION: Clinical History / Reason for exam: Pain     Comparison : Chest radiograph March 24, 2019.     Technique/Positioning: 3 frontal views.     Findings:     Support devices: Telemetry leads.     Cardiac/mediastinum/hilum: Cardiomegaly, thoracic aortic calcification..   Right hilar vascular enlargement, stable.     Lung parenchyma/Pleura: Pulmonary vascular congestion.     Skeleton/soft tissues: Stable.     Impression:     Cardiomegaly and pulmonary vascular congestion, stable.                   SHERRIE DIXON M.D., ATTENDING RADIOLOGIST         IMPRESSION/PLAN:     COPD  MICHAEL  Heart Failure  Pulmonary hypertension  Renal Insufficiency  DM    pt w/ acute sob after discharge home due to inadequate home O2.  Pt doing better upon return to [facility].  At this time would continue solumedrol 60 mg IV Q12h, continue levofloxacin, continue duoneb.  Lasix as needed. Continue CPAP for MICHAEL. Currently on high flow O2, maintain O2 sat >88%. Optimize COPD regimen to minimize, pulmonary hypertension. Continue glargine for DM.  Pt is DNR/DNI.

## 2019-04-01 NOTE — CONSULT NOTE ADULT - ASSESSMENT
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  Patient mentions waking up from sleep very dyspneic from his baseline and worse on exertion thus HHA activated EMS. Per EMS he had decreased b/l breath sound and tachypneic thus given Duoneb and 125 mg of solumedrol and put on CPAP treatment with significant improvement. He denied any CP, palpitations, cough, dizziness, lightheadedness, worsening pedal edema/calf pain. He was admitted for further management of COPD exacerbation.     Pt lives by himself at home ; has 24/7 home health aid. Pt gets meals on wheels . Pt has limited mobility sec to severe INFANTE , can ambulate to bathroom using a walker . But as per pt lately his mobility is severely affected sec worsening lung function    Assessment and Plan:    # Acute on Chronic Hypoxic respiratory failure / END STAGE COPD :     - On O2 by NC.  Continue Symbicort, Spiriva & Bronchodilators ATC and prn and Steroids.   Doubt Pneumonia but follow up repeat x-ray in the AM after adequate diuresis.  Tolerating 4-5L NC. BiPAP qhs.  Maintain saturation >88%  DNR & DNI.       # Severe Pulmonary Hypertension:  Acute Diastolic HF exacerbation:  CHF protocol.   ECHO 11/2018: Normal LVEF, no RWMA, Trace TR, Moderate pulmonary hypertension  on iv lasix.  - will benefit from Cardiovascular rehab.      # Obesity:  Dietary & Lifestyle modification.      # MICHAEL/OHVS:  BiPAP at night.      # Debility:  Due to underlying medical condition.    DVT prophylaxis: Heparin  Disposition: pending stabilization. Home with home care.  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  Patient mentions waking up from sleep very dyspneic from his baseline and worse on exertion thus HHA activated EMS. Per EMS he had decreased b/l breath sound and tachypneic thus given Duoneb and 125 mg of solumedrol and put on CPAP treatment with significant improvement. He denied any CP, palpitations, cough, dizziness, lightheadedness, worsening pedal edema/calf pain. He was admitted for further management of COPD exacerbation.     Pt lives by himself at home ; has 24/7 home health aid. Pt gets meals on wheels . Pt has limited mobility sec to severe INFANTE , can ambulate to bathroom using a walker on previous admission was recommended to use a urinal or a condemn cath. Pt was discharged and had home O2 concentrator at home but was waiting for his liquid O2 to be delivered. Will make sure its delivered before pt is discharged.    - sever COPD / end stage , limited life expectancy , pt to be evaluated by Home Hospice.  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  Patient mentions waking up from sleep very dyspneic from his baseline and worse on exertion thus HHA activated EMS. Per EMS he had decreased b/l breath sound and tachypneic thus given Duoneb and 125 mg of solumedrol and put on CPAP treatment with significant improvement. He denied any CP, palpitations, cough, dizziness, lightheadedness, worsening pedal edema/calf pain. He was admitted for further management of COPD exacerbation.     Pt lives by himself at home ; has 24/7 home health aid. Pt gets meals on wheels . Pt has limited mobility sec to severe INFANTE , can ambulate to bathroom using a walker on previous admission was recommended to use a urinal or a condemn cath. Pt was discharged and had home O2 concentrator at home but was waiting for his liquid O2 to be delivered. Will make sure its delivered before pt is discharged.    - severe COPD / end stage , limited life expectancy , pt to be evaluated by Home Hospice.  - DNR / DNI

## 2019-04-02 ENCOUNTER — TRANSCRIPTION ENCOUNTER (OUTPATIENT)
Age: 79
End: 2019-04-02

## 2019-04-02 LAB
ALBUMIN SERPL ELPH-MCNC: 3.7 G/DL — SIGNIFICANT CHANGE UP (ref 3.5–5.2)
ALP SERPL-CCNC: 57 U/L — SIGNIFICANT CHANGE UP (ref 30–115)
ALT FLD-CCNC: 31 U/L — SIGNIFICANT CHANGE UP (ref 0–41)
ANION GAP SERPL CALC-SCNC: 7 MMOL/L — SIGNIFICANT CHANGE UP (ref 7–14)
AST SERPL-CCNC: 9 U/L — SIGNIFICANT CHANGE UP (ref 0–41)
BILIRUB SERPL-MCNC: 0.4 MG/DL — SIGNIFICANT CHANGE UP (ref 0.2–1.2)
BUN SERPL-MCNC: 59 MG/DL — HIGH (ref 10–20)
CALCIUM SERPL-MCNC: 9.1 MG/DL — SIGNIFICANT CHANGE UP (ref 8.5–10.1)
CHLORIDE SERPL-SCNC: 99 MMOL/L — SIGNIFICANT CHANGE UP (ref 98–110)
CO2 SERPL-SCNC: 38 MMOL/L — HIGH (ref 17–32)
CREAT SERPL-MCNC: 2.2 MG/DL — HIGH (ref 0.7–1.5)
GLUCOSE BLDC GLUCOMTR-MCNC: 150 MG/DL — HIGH (ref 70–99)
GLUCOSE BLDC GLUCOMTR-MCNC: 202 MG/DL — HIGH (ref 70–99)
GLUCOSE BLDC GLUCOMTR-MCNC: 233 MG/DL — HIGH (ref 70–99)
GLUCOSE BLDC GLUCOMTR-MCNC: 312 MG/DL — HIGH (ref 70–99)
GLUCOSE BLDC GLUCOMTR-MCNC: >600 MG/DL — CRITICAL HIGH (ref 70–99)
GLUCOSE SERPL-MCNC: 180 MG/DL — HIGH (ref 70–99)
HCT VFR BLD CALC: 44.4 % — SIGNIFICANT CHANGE UP (ref 42–52)
HGB BLD-MCNC: 12.7 G/DL — LOW (ref 14–18)
MAGNESIUM SERPL-MCNC: 1.9 MG/DL — SIGNIFICANT CHANGE UP (ref 1.8–2.4)
MCHC RBC-ENTMCNC: 22.3 PG — LOW (ref 27–31)
MCHC RBC-ENTMCNC: 28.6 G/DL — LOW (ref 32–37)
MCV RBC AUTO: 77.9 FL — LOW (ref 80–94)
NRBC # BLD: 0 /100 WBCS — SIGNIFICANT CHANGE UP (ref 0–0)
PHOSPHATE SERPL-MCNC: 4.2 MG/DL — SIGNIFICANT CHANGE UP (ref 2.1–4.9)
PLATELET # BLD AUTO: 389 K/UL — SIGNIFICANT CHANGE UP (ref 130–400)
POTASSIUM SERPL-MCNC: 5.4 MMOL/L — HIGH (ref 3.5–5)
POTASSIUM SERPL-SCNC: 5.4 MMOL/L — HIGH (ref 3.5–5)
PROT SERPL-MCNC: 5.6 G/DL — LOW (ref 6–8)
RBC # BLD: 5.7 M/UL — SIGNIFICANT CHANGE UP (ref 4.7–6.1)
RBC # FLD: 18.5 % — HIGH (ref 11.5–14.5)
SODIUM SERPL-SCNC: 144 MMOL/L — SIGNIFICANT CHANGE UP (ref 135–146)
WBC # BLD: 14.28 K/UL — HIGH (ref 4.8–10.8)
WBC # FLD AUTO: 14.28 K/UL — HIGH (ref 4.8–10.8)

## 2019-04-02 PROCEDURE — 71045 X-RAY EXAM CHEST 1 VIEW: CPT | Mod: 26

## 2019-04-02 RX ORDER — GUAIFENESIN/DEXTROMETHORPHAN 600MG-30MG
10 TABLET, EXTENDED RELEASE 12 HR ORAL EVERY 6 HOURS
Qty: 0 | Refills: 0 | Status: DISCONTINUED | OUTPATIENT
Start: 2019-04-03 | End: 2019-04-04

## 2019-04-02 RX ORDER — INSULIN LISPRO 100/ML
VIAL (ML) SUBCUTANEOUS
Qty: 0 | Refills: 0 | Status: DISCONTINUED | OUTPATIENT
Start: 2019-04-02 | End: 2019-04-04

## 2019-04-02 RX ORDER — GUAIFENESIN/DEXTROMETHORPHAN 600MG-30MG
10 TABLET, EXTENDED RELEASE 12 HR ORAL EVERY 6 HOURS
Qty: 0 | Refills: 0 | Status: COMPLETED | OUTPATIENT
Start: 2019-04-02 | End: 2019-04-02

## 2019-04-02 RX ORDER — CIPROFLOXACIN LACTATE 400MG/40ML
1 VIAL (ML) INTRAVENOUS
Qty: 0 | Refills: 0 | DISCHARGE
Start: 2019-04-02

## 2019-04-02 RX ORDER — GUAIFENESIN/DEXTROMETHORPHAN 600MG-30MG
10 TABLET, EXTENDED RELEASE 12 HR ORAL
Qty: 0 | Refills: 0 | COMMUNITY
Start: 2019-04-02

## 2019-04-02 RX ORDER — GUAIFENESIN/DEXTROMETHORPHAN 600MG-30MG
10 TABLET, EXTENDED RELEASE 12 HR ORAL
Qty: 0 | Refills: 0 | DISCHARGE
Start: 2019-04-02

## 2019-04-02 RX ORDER — INSULIN LISPRO 100/ML
VIAL (ML) SUBCUTANEOUS AT BEDTIME
Qty: 0 | Refills: 0 | Status: DISCONTINUED | OUTPATIENT
Start: 2019-04-02 | End: 2019-04-04

## 2019-04-02 RX ADMIN — ATORVASTATIN CALCIUM 20 MILLIGRAM(S): 80 TABLET, FILM COATED ORAL at 21:55

## 2019-04-02 RX ADMIN — Medication 17 UNIT(S): at 16:35

## 2019-04-02 RX ADMIN — PANTOPRAZOLE SODIUM 40 MILLIGRAM(S): 20 TABLET, DELAYED RELEASE ORAL at 06:22

## 2019-04-02 RX ADMIN — BUDESONIDE AND FORMOTEROL FUMARATE DIHYDRATE 2 PUFF(S): 160; 4.5 AEROSOL RESPIRATORY (INHALATION) at 07:30

## 2019-04-02 RX ADMIN — Medication 25 MILLIGRAM(S): at 17:52

## 2019-04-02 RX ADMIN — Medication 17 UNIT(S): at 12:56

## 2019-04-02 RX ADMIN — Medication 325 MILLIGRAM(S): at 06:22

## 2019-04-02 RX ADMIN — Medication 1 DROP(S): at 21:28

## 2019-04-02 RX ADMIN — HEPARIN SODIUM 5000 UNIT(S): 5000 INJECTION INTRAVENOUS; SUBCUTANEOUS at 17:54

## 2019-04-02 RX ADMIN — FINASTERIDE 5 MILLIGRAM(S): 5 TABLET, FILM COATED ORAL at 11:41

## 2019-04-02 RX ADMIN — POLYETHYLENE GLYCOL 3350 17 GRAM(S): 17 POWDER, FOR SOLUTION ORAL at 08:01

## 2019-04-02 RX ADMIN — Medication 25 MILLIGRAM(S): at 06:22

## 2019-04-02 RX ADMIN — TAMSULOSIN HYDROCHLORIDE 0.4 MILLIGRAM(S): 0.4 CAPSULE ORAL at 21:26

## 2019-04-02 RX ADMIN — Medication 8: at 16:35

## 2019-04-02 RX ADMIN — Medication 500 MILLIGRAM(S): at 11:41

## 2019-04-02 RX ADMIN — Medication 2: at 12:55

## 2019-04-02 RX ADMIN — Medication 3 MILLILITER(S): at 19:16

## 2019-04-02 RX ADMIN — TIOTROPIUM BROMIDE 1 CAPSULE(S): 18 CAPSULE ORAL; RESPIRATORY (INHALATION) at 07:29

## 2019-04-02 RX ADMIN — NYSTATIN CREAM 1 APPLICATION(S): 100000 CREAM TOPICAL at 06:22

## 2019-04-02 RX ADMIN — BUDESONIDE AND FORMOTEROL FUMARATE DIHYDRATE 2 PUFF(S): 160; 4.5 AEROSOL RESPIRATORY (INHALATION) at 21:23

## 2019-04-02 RX ADMIN — Medication 3 MILLILITER(S): at 07:28

## 2019-04-02 RX ADMIN — Medication 0: at 21:27

## 2019-04-02 RX ADMIN — Medication 10 MILLILITER(S): at 17:52

## 2019-04-02 RX ADMIN — Medication 3 MILLILITER(S): at 06:55

## 2019-04-02 RX ADMIN — Medication 100 MILLIGRAM(S): at 21:25

## 2019-04-02 RX ADMIN — Medication 3 MILLILITER(S): at 23:57

## 2019-04-02 RX ADMIN — NYSTATIN CREAM 1 APPLICATION(S): 100000 CREAM TOPICAL at 17:54

## 2019-04-02 RX ADMIN — Medication 1 DROP(S): at 06:32

## 2019-04-02 RX ADMIN — Medication 100 MILLIGRAM(S): at 13:08

## 2019-04-02 RX ADMIN — Medication 3 MILLILITER(S): at 15:10

## 2019-04-02 RX ADMIN — INSULIN GLARGINE 30 UNIT(S): 100 INJECTION, SOLUTION SUBCUTANEOUS at 21:26

## 2019-04-02 RX ADMIN — Medication 10 MILLILITER(S): at 12:57

## 2019-04-02 RX ADMIN — HEPARIN SODIUM 5000 UNIT(S): 5000 INJECTION INTRAVENOUS; SUBCUTANEOUS at 06:23

## 2019-04-02 RX ADMIN — Medication 60 MILLIGRAM(S): at 06:21

## 2019-04-02 RX ADMIN — Medication 3 MILLILITER(S): at 13:04

## 2019-04-02 RX ADMIN — Medication 1 DROP(S): at 13:09

## 2019-04-02 RX ADMIN — Medication 30 MILLILITER(S): at 19:24

## 2019-04-02 RX ADMIN — INSULIN GLARGINE 30 UNIT(S): 100 INJECTION, SOLUTION SUBCUTANEOUS at 08:00

## 2019-04-02 RX ADMIN — Medication 100 MILLIGRAM(S): at 06:22

## 2019-04-02 RX ADMIN — SODIUM POLYSTYRENE SULFONATE 30 GRAM(S): 4.1 POWDER, FOR SUSPENSION ORAL at 10:11

## 2019-04-02 RX ADMIN — Medication 600 MILLIGRAM(S): at 06:22

## 2019-04-02 RX ADMIN — SENNA PLUS 2 TABLET(S): 8.6 TABLET ORAL at 21:26

## 2019-04-02 RX ADMIN — Medication 40 MILLIGRAM(S): at 06:22

## 2019-04-02 RX ADMIN — Medication 1000 UNIT(S): at 11:41

## 2019-04-02 RX ADMIN — Medication 17 UNIT(S): at 07:59

## 2019-04-02 NOTE — PHYSICAL THERAPY INITIAL EVALUATION ADULT - GAIT DEVIATIONS NOTED, PT EVAL
decreased step length/decreased matthew/decreased weight-shifting ability/increased time in double stance

## 2019-04-02 NOTE — DISCHARGE NOTE PROVIDER - NSDCCPCAREPLAN_GEN_ALL_CORE_FT
PRINCIPAL DISCHARGE DIAGNOSIS  Diagnosis: Acute on chronic respiratory failure with hypoxia  Assessment and Plan of Treatment: Due to end stage COPD exacerbation.  Patient stable on NC 5L and BiPAP qhs.  Continue Symbicort, Bronchodilators and Prednisone with slow Taper (10 mg every 3 days) until back on home dose of 10 mg daily.  50 milligram(s) orally once a day for 3 days then  40 mg daily for 3 days then  30 mg daily for 3 days then  20 mg daily for 3 days then 10 mg daily.      SECONDARY DISCHARGE DIAGNOSES  Diagnosis: Hyperkalemia  Assessment and Plan of Treatment: Continue Low K diet & Kayexalate 30 gm po TTS (3x week).      Diagnosis: CKD (chronic kidney disease) stage 3, GFR 30-59 ml/min  Assessment and Plan of Treatment: Stable creatinine.  No Nephrotoxins. Monitor BMP and Electrolytes.  Follow up with Nephrology out patient    Diagnosis: Severe pulmonary arterial systolic hypertension  Assessment and Plan of Treatment: Supportive care.      Diagnosis: DM (diabetes mellitus)  Assessment and Plan of Treatment: Monitored blood sugars. Continue Insulin.  Hemoglobin A1C, Whole Blood: 7.2 % (03.12.19 @ 07:00).    Diagnosis: Diastolic CHF, acute on chronic  Assessment and Plan of Treatment: Follow up PMD. Started on Iron supplement.    Diagnosis: EMMY (iron deficiency anemia)  Assessment and Plan of Treatment: Follow up PMD. Started on Iron supplement. PRINCIPAL DISCHARGE DIAGNOSIS  Diagnosis: Acute on chronic respiratory failure with hypoxia  Assessment and Plan of Treatment: Due to end stage COPD exacerbation.  --continue with medications as recommended with slow steroid taper      SECONDARY DISCHARGE DIAGNOSES  Diagnosis: Hyperkalemia  Assessment and Plan of Treatment: Continue Low K diet & Kayexalate 30 gm po TTS (3x week).      Diagnosis: CKD (chronic kidney disease) stage 3, GFR 30-59 ml/min  Assessment and Plan of Treatment: Stable creatinine.  No Nephrotoxins. Monitor BMP and Electrolytes.  Follow up with Nephrology out patient    Diagnosis: Severe pulmonary arterial systolic hypertension  Assessment and Plan of Treatment: Supportive care.      Diagnosis: DM (diabetes mellitus)  Assessment and Plan of Treatment: Monitored blood sugars. Continue Insulin.  Hemoglobin A1C, Whole Blood: 7.2 % (03.12.19 @ 07:00).    Diagnosis: Diastolic CHF, acute on chronic  Assessment and Plan of Treatment: stable.  Follow up PMD. Started on Iron supplement.    Diagnosis: EMMY (iron deficiency anemia)  Assessment and Plan of Treatment: Follow up PMD. Started on Iron supplement.

## 2019-04-02 NOTE — PHYSICAL THERAPY INITIAL EVALUATION ADULT - GENERAL OBSERVATIONS, REHAB EVAL
13:30 - 14:00.  Chart reviewed. Patient available to be seen for physical therapy, confirmed with nurse. Patient encountered already out of bed in chair, +5L/min O2 via nasal cannula.  Agreeable for PT.

## 2019-04-02 NOTE — DISCHARGE NOTE NURSING/CASE MANAGEMENT/SOCIAL WORK - NSDCPEWEB_GEN_ALL_CORE
[facility] for Tobacco Control website --- [email or web address] website --- [email or web address]

## 2019-04-02 NOTE — DISCHARGE NOTE NURSING/CASE MANAGEMENT/SOCIAL WORK - NSDCPEPT PROEDHF_GEN_ALL_CORE
Activities as tolerated/Report signs and symptoms to primary care provider/Call primary care provider for follow up after discharge/Monitor weight daily/Low salt diet

## 2019-04-02 NOTE — PHYSICAL THERAPY INITIAL EVALUATION ADULT - ACTIVE RANGE OF MOTION EXAMINATION, REHAB EVAL
BLE AROM performed 1x15 each in all available planes in seated position/no Active ROM deficits were identified

## 2019-04-02 NOTE — DISCHARGE NOTE PROVIDER - CARE PROVIDERS DIRECT ADDRESSES
,[email or web address]

## 2019-04-02 NOTE — PROGRESS NOTE ADULT - ASSESSMENT
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  Patient mentions waking up from sleep very dyspneic from his baseline and worse on exertion thus HHA activated EMS. Per EMS he had decreased b/l breath sound and tachypneic thus given Duoneb and 125 mg of solumedrol and put on CPAP treatment with significant improvement. He denied any CP, palpitations, cough, dizziness, lightheadedness, worsening pedal edema/calf pain. He was admitted for further management of COPD exacerbation.       Assessment and Plan:    1. Acute on Chronic Hypoxic respiratory failure:   Due to end stage COPD exacerbation vs. Acute on chronic Diastolic Heart Failure.  Chest X-ray showed cardiomegaly with Pulmonary vascular congestion.  Continue Symbicort, Spiriva & Bronchodilators ATC and prn and Steroids.   Doubt Pneumonia but follow up repeat x-ray in the AM after adequate diuresis.  Tolerating 4-5L NC. BiPAP qhs.  Maintain saturation >88%  DNR & DNI.       2. Severe Pulmonary Hypertension:  3. Acute on chronic Diastolic HF exacerbation:  ECHO 11/2018: Normal LVEF, no RWMA, Trace TR, Moderate pulmonary hypertension.  ECHO 12/25/18: severe Right side heart dilation, RSVP 100 (was 40 in 10/2018).  Lasix held due to rising Creatinine.        4. h/o Hypertension:  BP stable. Continue Metoprolol.         5. Diabetes mellitus, type 2: Uncontrolled.  Monitor FS with Insulin coverage.  Hemoglobin A1C, Whole Blood: 7.2 % (03.12.19 @ 07:00)  ADA diet.        6. CKD stage 3:  Creatinine trending up but still within his baseline (1.6- 2.8).  Avoid nephrotoxins. Monitor BMP and Electrolytes.        7. BPH:  Continue Flomax and Finasteride.         8. Obesity:  Dietary & Lifestyle modification.        9. B12 deficiency & Vitamin D deficiency:  Continue with supplementation.         10. MICHAEL/OHVS:  BiPAP at night.      11.  Hyperkalemia:   Continue Low K diet & Kayexalate 30 gm po TTS (3x week).      12. Microcytosis due to EMMY:  % Saturation, Iron: 8 % (03.28.19 @ 06:53)  Started on Iron supplement.  Hgb stable.      13. Debility/limited life expectancy:  Due to underlying medical condition.  Geriatrics consulted: Recommended Hospice consult. Patient seen by hospice but refusing hospice.         DVT prophylaxis: Heparin  Disposition: Home with home care. Patient refusing and will appeal discharge.  CODE status: DNR/DNI    Attending: Dr. Kimberly Bolden.  Spectra 1503. 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  Patient mentions waking up from sleep very dyspneic from his baseline and worse on exertion thus HHA activated EMS. Per EMS he had decreased b/l breath sound and tachypneic thus given Duoneb and 125 mg of solumedrol and put on CPAP treatment with significant improvement. He denied any CP, palpitations, cough, dizziness, lightheadedness, worsening pedal edema/calf pain. He was admitted for further management of COPD exacerbation.       Assessment and Plan:    1. Acute on Chronic Hypoxic respiratory failure:   Due to end stage COPD exacerbation vs. Acute on chronic Diastolic Heart Failure.  Chest X-ray showed cardiomegaly with Pulmonary vascular congestion.  Continue Symbicort, Spiriva & Bronchodilators ATC and prn and Steroids (slow taper: 10 mg every 3 days until back on his home dose of 10 mg daily).   Doubt Pneumonia but follow up repeat x-ray in the AM after adequate diuresis.  Complete Levaquin day 3/5.  Tolerating 4-5L NC. BiPAP qhs.  Maintain saturation >88%  DNR & DNI.       2. Severe Pulmonary Hypertension:  3. Acute on chronic Diastolic HF exacerbation:  ECHO 11/2018: Normal LVEF, no RWMA, Trace TR, Moderate pulmonary hypertension.  ECHO 12/25/18: severe Right side heart dilation, RSVP 100 (was 40 in 10/2018).  Lasix held due to rising Creatinine.        4. h/o Hypertension:  BP stable. Continue Metoprolol.         5. Diabetes mellitus, type 2: Uncontrolled.  Monitor FS with Insulin coverage.  Hemoglobin A1C, Whole Blood: 7.2 % (03.12.19 @ 07:00)  ADA diet.        6. CKD stage 3:  Creatinine trending up but still within his baseline (1.6- 2.8).  Avoid nephrotoxins. Monitor BMP and Electrolytes.        7. BPH:  Continue Flomax and Finasteride.         8. Obesity:  Dietary & Lifestyle modification.        9. B12 deficiency & Vitamin D deficiency:  Continue with supplementation.         10. MICHAEL/OHVS:  BiPAP at night.      11.  Hyperkalemia:   Continue Low K diet & Kayexalate 30 gm po TTS (3x week).      12. Microcytosis due to EMMY:  % Saturation, Iron: 8 % (03.28.19 @ 06:53)  Started on Iron supplement.  Hgb stable.      13. Debility/limited life expectancy:  Due to underlying medical condition.  Geriatrics consulted: Recommended Hospice consult. Patient seen by hospice but refusing hospice.         DVT prophylaxis: Heparin  Disposition: Home with home care. Patient refusing and will appeal discharge.  CODE status: DNR/DNI    Attending: Dr. Kimberly Bolden.  Spectra 1503.

## 2019-04-02 NOTE — DISCHARGE NOTE NURSING/CASE MANAGEMENT/SOCIAL WORK - NSDCDPATPORTLINK_GEN_ALL_CORE
You can access the [facility] Patient Portal, offered by [facility], by registering with the following website: [email or web address]

## 2019-04-02 NOTE — DISCHARGE NOTE PROVIDER - PROVIDER TOKENS
PROVIDER:[TOKEN:[[id number]]] PROVIDER:[TOKEN:[[id number]]],PROVIDER:[TOKEN:[[id number]]]

## 2019-04-02 NOTE — PHYSICAL THERAPY INITIAL EVALUATION ADULT - PLANNED THERAPY INTERVENTIONS, PT EVAL
Patient provided with rolling walker to use for ambulation to/from bathroom with assistance from nursing staff/gait training/transfer training/bed mobility training/balance training/strengthening

## 2019-04-02 NOTE — DISCHARGE NOTE NURSING/CASE MANAGEMENT/SOCIAL WORK - NSDCPEEMAIL_GEN_ALL_CORE
[facility] email [email or web address]

## 2019-04-02 NOTE — DISCHARGE NOTE PROVIDER - CARE PROVIDER_API CALL
Irena Cohen)  Geriatric Medicine; Internal Medicine  [address] [place] [address]  Phone: [phone]  Fax: [phone]  Follow Up Time: Irena Cohen)  Geriatric Medicine; Internal Medicine  [address] [place] [address]  Phone: [phone]  Fax: [phone]  Follow Up Time:     Raul Castrejon)  Internal Medicine; Pulmonary Disease  [address] [place] [address], [facility] [address]  [place] [address]  Phone: [phone]  Fax: [phone]  Follow Up Time:

## 2019-04-02 NOTE — DISCHARGE NOTE PROVIDER - HOSPITAL COURSE
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  Patient mentions waking up from sleep very dyspneic from his baseline and worse on exertion thus HHA activated EMS. Per EMS he had decreased b/l breath sound and tachypneic thus given Duoneb and 125 mg of solumedrol and put on CPAP treatment with significant improvement. He denied any CP, palpitations, cough, dizziness, lightheadedness, worsening pedal edema/calf pain. He was admitted for further management of COPD exacerbation.             Assessment and Plan:        1. Acute on Chronic Hypoxic respiratory failure:     Due to end stage COPD exacerbation vs. Acute on chronic Diastolic Heart Failure.    Chest X-ray showed cardiomegaly with Pulmonary vascular congestion.    Continued Symbicort, Spiriva & Bronchodilators ATC and prn and Steroids (slow taper: 10 mg every 3 days until back on his home dose of 10 mg daily).     To Complete Levaquin course day 3/5.    Tolerating 4-5L NC. BiPAP qhs.    Maintained saturation >88%    DNR & DNI.             2. Severe Pulmonary Hypertension:    3. Acute on chronic Diastolic HF exacerbation:    ECHO 11/2018: Normal LVEF, no RWMA, Trace TR, Moderate pulmonary hypertension.    ECHO 12/25/18: severe Right side heart dilation, RSVP 100 (was 40 in 10/2018).    Lasix held due to rising Creatinine.                4. h/o Hypertension:    BP stable. Continued Metoprolol.                 5. Diabetes mellitus, type 2: Uncontrolled.    Monitored FS with Insulin coverage.    Hemoglobin A1C, Whole Blood: 7.2 % (03.12.19 @ 07:00)    ADA diet.                6. CKD stage 3:    Creatinine trending up but still within his baseline (1.6- 2.8).    Avoid nephrotoxins. Monitor BMP and Electrolytes.                7. BPH:    Continued Flomax and Finasteride.                 8. Obesity:    Dietary & Lifestyle modification.                9. B12 deficiency & Vitamin D deficiency:    Continued with supplementation.                 10. MICHAEL/OHVS:    BiPAP at night.            11.  Hyperkalemia:     Continued Low K diet & Kayexalate 30 gm po TTS (3x week).            12. Microcytosis due to EMMY:    % Saturation, Iron: 8 % (03.28.19 @ 06:53)    Started on Iron supplement.    Hgb stable.            13. Debility/limited life expectancy:    Due to underlying medical condition.    Geriatrics consulted: Recommended Hospice consult. Patient seen by hospice but refusing hospice. 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  Patient mentions waking up from sleep very dyspneic from his baseline and worse on exertion thus HHA activated EMS. Per EMS he had decreased b/l breath sound and tachypneic thus given Duoneb and 125 mg of solumedrol and put on CPAP treatment with significant improvement. He denied any CP, palpitations, cough, dizziness, lightheadedness, worsening pedal edema/calf pain. He was admitted for further management of COPD exacerbation.             patient with multiple hospitalizations with prolonged inpatient stay. He is stable to be discharged home on home O2 (arranged and delivered)    -pt agreeable for today's discharge    -medications reconciled and prednisone slow taper sent to his pharmacy     -outpatient follow up with PMD (Dr. Cohen's group to pay home visit)        Assessment and Plan:        1. Acute on Chronic Hypoxic respiratory failure:     Due to end stage COPD exacerbation vs. Acute on chronic Diastolic Heart Failure.    Chest X-ray showed cardiomegaly with Pulmonary vascular congestion.    Continued Symbicort, Spiriva & Bronchodilators ATC and prn and Steroids (slow taper: 10 mg every 3 days until back on his home dose of 10 mg daily).     To Complete Levaquin course day 3/5.    Tolerating 4-5L NC. BiPAP qhs.    Maintained saturation >88%    DNR & DNI.             2. Severe Pulmonary Hypertension:    3. Acute on chronic Diastolic HF exacerbation:    ECHO 11/2018: Normal LVEF, no RWMA, Trace TR, Moderate pulmonary hypertension.    ECHO 12/25/18: severe Right side heart dilation, RSVP 100 (was 40 in 10/2018).    Lasix held due to rising Creatinine.                4. h/o Hypertension:    BP stable. Continued Metoprolol.                 5. Diabetes mellitus, type 2: Uncontrolled.    Monitored FS with Insulin coverage.    Hemoglobin A1C, Whole Blood: 7.2 % (03.12.19 @ 07:00)    ADA diet.                6. CKD stage 3:    Creatinine trending up but still within his baseline (1.6- 2.8).    Avoid nephrotoxins. Monitor BMP and Electrolytes.                7. BPH:    Continued Flomax and Finasteride.                 8. Obesity:    Dietary & Lifestyle modification.                9. B12 deficiency & Vitamin D deficiency:    Continued with supplementation.                 10. MICHAEL/OHVS:    BiPAP at night.            11.  Hyperkalemia:     Continued Low K diet & Kayexalate 30 gm po TTS (3x week).            12. Microcytosis due to EMMY:    % Saturation, Iron: 8 % (03.28.19 @ 06:53)    Started on Iron supplement.    Hgb stable.            13. Debility/limited life expectancy:    Due to underlying medical condition.    Geriatrics consulted: Recommended Hospice consult. Patient seen by hospice but refusing hospice.

## 2019-04-02 NOTE — PHYSICAL THERAPY INITIAL EVALUATION ADULT - GAIT DISTANCE, PT EVAL
3 feet x 2 ; limited by shortness of breath, SpO2 = 78% during short gait on 5L/min O2 via nasal cannula

## 2019-04-03 LAB
ANION GAP SERPL CALC-SCNC: 8 MMOL/L — SIGNIFICANT CHANGE UP (ref 7–14)
BUN SERPL-MCNC: 59 MG/DL — HIGH (ref 10–20)
CALCIUM SERPL-MCNC: 8.8 MG/DL — SIGNIFICANT CHANGE UP (ref 8.5–10.1)
CHLORIDE SERPL-SCNC: 101 MMOL/L — SIGNIFICANT CHANGE UP (ref 98–110)
CO2 SERPL-SCNC: 38 MMOL/L — HIGH (ref 17–32)
CREAT SERPL-MCNC: 2 MG/DL — HIGH (ref 0.7–1.5)
GLUCOSE BLDC GLUCOMTR-MCNC: 121 MG/DL — HIGH (ref 70–99)
GLUCOSE BLDC GLUCOMTR-MCNC: 169 MG/DL — HIGH (ref 70–99)
GLUCOSE BLDC GLUCOMTR-MCNC: 233 MG/DL — HIGH (ref 70–99)
GLUCOSE BLDC GLUCOMTR-MCNC: 261 MG/DL — HIGH (ref 70–99)
GLUCOSE BLDC GLUCOMTR-MCNC: 84 MG/DL — SIGNIFICANT CHANGE UP (ref 70–99)
GLUCOSE SERPL-MCNC: 86 MG/DL — SIGNIFICANT CHANGE UP (ref 70–99)
POTASSIUM SERPL-MCNC: 4.6 MMOL/L — SIGNIFICANT CHANGE UP (ref 3.5–5)
POTASSIUM SERPL-SCNC: 4.6 MMOL/L — SIGNIFICANT CHANGE UP (ref 3.5–5)
SODIUM SERPL-SCNC: 147 MMOL/L — HIGH (ref 135–146)

## 2019-04-03 RX ORDER — BENZOCAINE AND MENTHOL 5; 1 G/100ML; G/100ML
1 LIQUID ORAL
Qty: 0 | Refills: 0 | Status: DISCONTINUED | OUTPATIENT
Start: 2019-04-03 | End: 2019-04-04

## 2019-04-03 RX ADMIN — Medication 10 MILLILITER(S): at 06:49

## 2019-04-03 RX ADMIN — Medication 325 MILLIGRAM(S): at 06:44

## 2019-04-03 RX ADMIN — Medication 17 UNIT(S): at 17:14

## 2019-04-03 RX ADMIN — Medication 6: at 17:14

## 2019-04-03 RX ADMIN — Medication 3 MILLILITER(S): at 11:36

## 2019-04-03 RX ADMIN — Medication 25 MILLIGRAM(S): at 17:15

## 2019-04-03 RX ADMIN — Medication 30 MILLILITER(S): at 18:13

## 2019-04-03 RX ADMIN — Medication 30 MILLILITER(S): at 00:22

## 2019-04-03 RX ADMIN — HEPARIN SODIUM 5000 UNIT(S): 5000 INJECTION INTRAVENOUS; SUBCUTANEOUS at 06:49

## 2019-04-03 RX ADMIN — Medication 1000 UNIT(S): at 11:10

## 2019-04-03 RX ADMIN — Medication 50 MILLIGRAM(S): at 06:47

## 2019-04-03 RX ADMIN — Medication 3 MILLILITER(S): at 20:13

## 2019-04-03 RX ADMIN — NYSTATIN CREAM 1 APPLICATION(S): 100000 CREAM TOPICAL at 17:15

## 2019-04-03 RX ADMIN — Medication 30 MILLILITER(S): at 13:32

## 2019-04-03 RX ADMIN — BUDESONIDE AND FORMOTEROL FUMARATE DIHYDRATE 2 PUFF(S): 160; 4.5 AEROSOL RESPIRATORY (INHALATION) at 08:39

## 2019-04-03 RX ADMIN — NYSTATIN CREAM 1 APPLICATION(S): 100000 CREAM TOPICAL at 06:54

## 2019-04-03 RX ADMIN — Medication 10 MILLILITER(S): at 00:06

## 2019-04-03 RX ADMIN — SENNA PLUS 2 TABLET(S): 8.6 TABLET ORAL at 21:11

## 2019-04-03 RX ADMIN — Medication 1 DROP(S): at 21:13

## 2019-04-03 RX ADMIN — Medication 25 MILLIGRAM(S): at 06:44

## 2019-04-03 RX ADMIN — FINASTERIDE 5 MILLIGRAM(S): 5 TABLET, FILM COATED ORAL at 11:10

## 2019-04-03 RX ADMIN — Medication 1 DROP(S): at 13:32

## 2019-04-03 RX ADMIN — TIOTROPIUM BROMIDE 1 CAPSULE(S): 18 CAPSULE ORAL; RESPIRATORY (INHALATION) at 08:07

## 2019-04-03 RX ADMIN — POLYETHYLENE GLYCOL 3350 17 GRAM(S): 17 POWDER, FOR SOLUTION ORAL at 08:06

## 2019-04-03 RX ADMIN — BUDESONIDE AND FORMOTEROL FUMARATE DIHYDRATE 2 PUFF(S): 160; 4.5 AEROSOL RESPIRATORY (INHALATION) at 20:13

## 2019-04-03 RX ADMIN — INSULIN GLARGINE 30 UNIT(S): 100 INJECTION, SOLUTION SUBCUTANEOUS at 21:13

## 2019-04-03 RX ADMIN — Medication 100 MILLIGRAM(S): at 13:29

## 2019-04-03 RX ADMIN — Medication 3 MILLILITER(S): at 17:13

## 2019-04-03 RX ADMIN — Medication 3 MILLILITER(S): at 08:38

## 2019-04-03 RX ADMIN — HEPARIN SODIUM 5000 UNIT(S): 5000 INJECTION INTRAVENOUS; SUBCUTANEOUS at 17:17

## 2019-04-03 RX ADMIN — Medication 500 MILLIGRAM(S): at 11:10

## 2019-04-03 RX ADMIN — ATORVASTATIN CALCIUM 20 MILLIGRAM(S): 80 TABLET, FILM COATED ORAL at 21:11

## 2019-04-03 RX ADMIN — Medication 30 MILLILITER(S): at 06:50

## 2019-04-03 RX ADMIN — TAMSULOSIN HYDROCHLORIDE 0.4 MILLIGRAM(S): 0.4 CAPSULE ORAL at 21:11

## 2019-04-03 RX ADMIN — PANTOPRAZOLE SODIUM 40 MILLIGRAM(S): 20 TABLET, DELAYED RELEASE ORAL at 06:51

## 2019-04-03 RX ADMIN — Medication 100 MILLIGRAM(S): at 21:11

## 2019-04-03 RX ADMIN — Medication 100 MILLIGRAM(S): at 06:44

## 2019-04-03 RX ADMIN — BENZOCAINE AND MENTHOL 1 LOZENGE: 5; 1 LIQUID ORAL at 20:28

## 2019-04-03 NOTE — PROGRESS NOTE ADULT - ASSESSMENT
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  Patient mentions waking up from sleep very dyspneic from his baseline and worse on exertion thus HHA activated EMS. Per EMS he had decreased b/l breath sound and tachypneic thus given Duoneb and 125 mg of solumedrol and put on CPAP treatment with significant improvement. He denied any CP, palpitations, cough, dizziness, lightheadedness, worsening pedal edema/calf pain. He was admitted for further management of COPD exacerbation.       Assessment and Plan:    1. Acute on Chronic Hypoxic respiratory failure:   Due to end stage COPD exacerbation vs. Acute on chronic Diastolic Heart Failure.  Chest X-ray showed cardiomegaly with Pulmonary vascular congestion.  Continue Symbicort, Spiriva & Bronchodilators ATC and prn and Steroids (slow taper: 10 mg every 3 days until back on his home dose of 10 mg daily).   Doubt Pneumonia but follow up repeat x-ray in the AM after adequate diuresis.  Complete Levaquin day 4/5.  Tolerating 4-5L NC. BiPAP qhs.  Maintain saturation >88%  DNR & DNI.       2. Severe Pulmonary Hypertension:  3. Acute on chronic Diastolic HF exacerbation:  ECHO 11/2018: Normal LVEF, no RWMA, Trace TR, Moderate pulmonary hypertension.  ECHO 12/25/18: severe Right side heart dilation, RSVP 100 (was 40 in 10/2018).  Lasix held due to rising Creatinine.  (monitor kidney function)      4. h/o Hypertension:  BP stable. Continue Metoprolol.         5. Diabetes mellitus, type 2: Uncontrolled.  Monitor FS with Insulin coverage.  Hemoglobin A1C, Whole Blood: 7.2 % (03.12.19 @ 07:00)  ADA diet.        6. CKD stage 3:  -monitor kidney function       7. BPH:  Continue Flomax and Finasteride.       8. Obesity:  weight lossDietary & Lifestyle modification.      9. B12 deficiency & Vitamin D deficiency:  Continue with supplementation.         10. MICHAEL/OHVS:  BiPAP at night.      11.  Hyperkalemia:   Continue Low K diet & Kayexalate 30 gm po TTS (3x week).      12. Microcytosis due to EMMY:  % Saturation, Iron: 8 % (03.28.19 @ 06:53)  Started on Iron supplement.  Hgb stable.      13. Debility/limited life expectancy:  Due to underlying medical condition.  Geriatrics consulted: Recommended Hospice consult. Patient seen by hospice but refusing hospice.         DVT prophylaxis: Heparin      # Progress Note Handoff  PENDING as follows  consults:  Test: no labs   Other:  Family discussion: discussed with patient in length  Disposition:  Home ___with home care services/in appeal process  _ or SNF ____ Other _____ Unknown at this time ____

## 2019-04-04 VITALS — OXYGEN SATURATION: 83 %

## 2019-04-04 LAB
GLUCOSE BLDC GLUCOMTR-MCNC: 264 MG/DL — HIGH (ref 70–99)
GLUCOSE BLDC GLUCOMTR-MCNC: 95 MG/DL — SIGNIFICANT CHANGE UP (ref 70–99)

## 2019-04-04 RX ORDER — IPRATROPIUM/ALBUTEROL SULFATE 18-103MCG
3 AEROSOL WITH ADAPTER (GRAM) INHALATION
Qty: 360 | Refills: 0
Start: 2019-04-04 | End: 2019-05-03

## 2019-04-04 RX ORDER — FUROSEMIDE 40 MG
1 TABLET ORAL
Qty: 30 | Refills: 0
Start: 2019-04-04 | End: 2019-05-03

## 2019-04-04 RX ORDER — BUDESONIDE AND FORMOTEROL FUMARATE DIHYDRATE 160; 4.5 UG/1; UG/1
1 AEROSOL RESPIRATORY (INHALATION)
Qty: 1 | Refills: 0
Start: 2019-04-04 | End: 2019-04-17

## 2019-04-04 RX ORDER — TIOTROPIUM BROMIDE 18 UG/1
1 CAPSULE ORAL; RESPIRATORY (INHALATION)
Qty: 1 | Refills: 0
Start: 2019-04-04 | End: 2019-05-03

## 2019-04-04 RX ADMIN — Medication 1 DROP(S): at 06:28

## 2019-04-04 RX ADMIN — NYSTATIN CREAM 1 APPLICATION(S): 100000 CREAM TOPICAL at 09:53

## 2019-04-04 RX ADMIN — Medication 325 MILLIGRAM(S): at 06:30

## 2019-04-04 RX ADMIN — Medication 6: at 12:04

## 2019-04-04 RX ADMIN — POLYETHYLENE GLYCOL 3350 17 GRAM(S): 17 POWDER, FOR SOLUTION ORAL at 11:53

## 2019-04-04 RX ADMIN — Medication 17 UNIT(S): at 12:10

## 2019-04-04 RX ADMIN — SODIUM POLYSTYRENE SULFONATE 30 GRAM(S): 4.1 POWDER, FOR SUSPENSION ORAL at 13:27

## 2019-04-04 RX ADMIN — Medication 1000 UNIT(S): at 11:51

## 2019-04-04 RX ADMIN — Medication 3 MILLILITER(S): at 08:43

## 2019-04-04 RX ADMIN — Medication 25 MILLIGRAM(S): at 06:30

## 2019-04-04 RX ADMIN — HEPARIN SODIUM 5000 UNIT(S): 5000 INJECTION INTRAVENOUS; SUBCUTANEOUS at 06:30

## 2019-04-04 RX ADMIN — BUDESONIDE AND FORMOTEROL FUMARATE DIHYDRATE 2 PUFF(S): 160; 4.5 AEROSOL RESPIRATORY (INHALATION) at 08:44

## 2019-04-04 RX ADMIN — Medication 3 MILLILITER(S): at 11:49

## 2019-04-04 RX ADMIN — PANTOPRAZOLE SODIUM 40 MILLIGRAM(S): 20 TABLET, DELAYED RELEASE ORAL at 06:30

## 2019-04-04 RX ADMIN — FINASTERIDE 5 MILLIGRAM(S): 5 TABLET, FILM COATED ORAL at 11:51

## 2019-04-04 RX ADMIN — Medication 500 MILLIGRAM(S): at 11:52

## 2019-04-04 RX ADMIN — Medication 50 MILLIGRAM(S): at 06:30

## 2019-04-04 RX ADMIN — Medication 30 MILLILITER(S): at 11:12

## 2019-04-04 RX ADMIN — Medication 100 MILLIGRAM(S): at 06:30

## 2019-04-04 RX ADMIN — TIOTROPIUM BROMIDE 1 CAPSULE(S): 18 CAPSULE ORAL; RESPIRATORY (INHALATION) at 08:43

## 2019-04-04 NOTE — PROGRESS NOTE ADULT - REASON FOR ADMISSION
Acute on Chronic Hypoxic Respiratory failure due to End stage COPD.

## 2019-04-04 NOTE — PROGRESS NOTE ADULT - SUBJECTIVE AND OBJECTIVE BOX
JIAN MANCIA  78y  Male    Patient is a 78y old  Male who presents with a chief complaint of Dyspnea (30 Mar 2019 12:32)      INTERVAL HPI/OVERNIGHT EVENTS:  No interval events.  Patient complaining of difficulty expectorating.    Dyspnea and oxygen saturation stable on 4-5L NC. Off HF NC.      He is refusing discharge home until Liquid oxygen is delivered and his son is home on Thursday.      REVIEW OF SYSTEMS:  CONSTITUTIONAL: No fever, weight loss, fatigue  EYES: No eye pain, visual disturbances, or discharge  ENMT:  No difficulty hearing, tinnitus, vertigo; No sinus or throat pain  NECK: No pain or stiffness  RESPIRATORY: + cough, No wheezing, chills or hemoptysis; + Exertional shortness of breath  CARDIOVASCULAR: No chest pain, palpitations, dizziness, + leg swelling  GASTROINTESTINAL: No abdominal or epigastric pain. No nausea, vomiting, or hematemesis; No diarrhea or constipation. No melena or hematochezia.  GENITOURINARY: No dysuria, frequency, hematuria, or incontinence  NEUROLOGICAL: No headaches, memory loss, loss of strength, numbness, or tremors  SKIN: No itching, burning, rashes, or lesions   ENDOCRINE: No heat or cold intolerance; No hair loss  MUSCULOSKELETAL: No joint pain or swelling; No muscle, back, or extremity pain  PSYCHIATRIC: No depression, anxiety, mood swings, or difficulty sleeping        VITALS:  T(C): 36.4 (02 Apr 2019 05:15), Max: 37.2 (01 Apr 2019 22:00)  T(F): 97.5 (02 Apr 2019 05:15), Max: 98.9 (01 Apr 2019 22:00)  HR: 85 (02 Apr 2019 05:15) (85 - 109)  BP: 110/60 (02 Apr 2019 05:15) (110/60 - 129/62)  BP(mean): --  RR: 18 (02 Apr 2019 05:15) (18 - 21)  SpO2: --        CAPILLARY BLOOD GLUCOSE  POCT Blood Glucose.: 233 mg/dL (02 Apr 2019 11:34)  POCT Blood Glucose.: 150 mg/dL (02 Apr 2019 07:23)  POCT Blood Glucose.: 276 mg/dL (01 Apr 2019 20:53)  POCT Blood Glucose.: 226 mg/dL (01 Apr 2019 16:40)      PHYSICAL EXAM:  GENERAL: NAD, obese  HEAD:  Atraumatic, Normocephalic  EYES: conjunctiva and sclera clear  ENMT: Moist mucous membranes  NECK: Supple, Normal thyroid  NERVOUS SYSTEM:  Alert & Oriented X 3, Good concentration; Motor Strength 5/5 B/L upper and lower extremities.  CHEST/LUNG: Decreased AE bilaterally; No rales, No rhonchi, No wheezing.   HEART: Regular rate and rhythm; No murmurs, rubs, or gallops  ABDOMEN: Soft, Nontender, + distended; Bowel sounds present  EXTREMITIES:  2+ Peripheral Pulses, No clubbing, cyanosis, bipedal edema +  LYMPH: No lymphadenopathy noted  SKIN: No rashes or lesions    Consultant(s) Notes Reviewed:  [x ] YES  [ ] NO  Care Discussed with Consultants/Other Providers [ x] YES  [ ] NO    LABS:                                  12.7   14.28 )-----------( 389      ( 02 Apr 2019 06:59 )             44.4       04-02    144  |  99  |  59<H>  ----------------------------<  180<H>  5.4<H>   |  38<H>  |  2.2<H>    Ca    9.1      02 Apr 2019 06:59  Phos  4.2     04-02  Mg     1.9     04-02    TPro  5.6<L>  /  Alb  3.7  /  TBili  0.4  /  DBili  x   /  AST  9   /  ALT  31  /  AlkPhos  57  04-02                              Iron with Total Binding Capacity in AM (03.28.19 @ 06:53)    Iron - Total Binding Capacity.: 311 ug/dL    % Saturation, Iron: 8 %    Iron Total, Serum: 24 ug/dL    Unsaturated Iron Binding Capacity: 287 ug/dL        MICROBIOLOGY:   Culture - Urine (03.15.19 @ 03:20)    Specimen Source: .Urine Clean Catch (Midstream)    Culture Results:   <10,000 CFU/mL Normal Urogenital Gillian        RADIOLOGY & ADDITIONAL TESTS  X-ray Chest 1 View- PORTABLE-Routine (03.31.19 @ 07:47)   Cardiomegaly and pulmonary vascular congestion, stable.          Imaging Personally Reviewed:  [x] YES  [ ] NO        MEDICATIONS  (STANDING):  ALBUTerol/ipratropium for Nebulization 3 milliLiter(s) Nebulizer every 4 hours  artificial tears (preservative free) Ophthalmic Solution 1 Drop(s) Both EYES three times a day  ascorbic acid 500 milliGRAM(s) Oral daily  atorvastatin 20 milliGRAM(s) Oral at bedtime  buDESOnide 160 MICROgram(s)/formoterol 4.5 MICROgram(s) Inhaler 2 Puff(s) Inhalation two times a day  cholecalciferol 1000 Unit(s) Oral daily  dextrose 5%. 1000 milliLiter(s) (50 mL/Hr) IV Continuous <Continuous>  dextrose 50% Injectable 12.5 Gram(s) IV Push once  dextrose 50% Injectable 25 Gram(s) IV Push once  dextrose 50% Injectable 25 Gram(s) IV Push once  docusate sodium 100 milliGRAM(s) Oral three times a day  ferrous    sulfate 325 milliGRAM(s) Oral <User Schedule>  finasteride 5 milliGRAM(s) Oral daily  furosemide    Tablet 40 milliGRAM(s) Oral daily  guaiFENesin/dextromethorphan  Syrup 10 milliLiter(s) Oral every 6 hours  heparin  Injectable 5000 Unit(s) SubCutaneous every 12 hours  insulin glargine Injectable (LANTUS) 30 Unit(s) SubCutaneous every morning  insulin glargine Injectable (LANTUS) 30 Unit(s) SubCutaneous at bedtime  insulin lispro Injectable (HumaLOG) 17 Unit(s) SubCutaneous three times a day with meals  levoFLOXacin  Tablet 750 milliGRAM(s) Oral every 24 hours  metoprolol tartrate 25 milliGRAM(s) Oral two times a day  nystatin Powder 1 Application(s) Topical two times a day  pantoprazole    Tablet 40 milliGRAM(s) Oral before breakfast  polyethylene glycol 3350 17 Gram(s) Oral daily  predniSONE   Tablet 60 milliGRAM(s) Oral daily  senna 2 Tablet(s) Oral at bedtime  sodium polystyrene sulfonate Suspension 30 Gram(s) Oral every 48 hours  tamsulosin 0.4 milliGRAM(s) Oral at bedtime  tiotropium 18 MICROgram(s) Capsule 1 Capsule(s) Inhalation daily    MEDICATIONS  (PRN):  acetaminophen   Tablet .. 650 milliGRAM(s) Oral every 6 hours PRN Temp greater or equal to 38C (100.4F), Moderate Pain (4 - 6)  ALBUTerol/ipratropium for Nebulization 3 milliLiter(s) Nebulizer every 4 hours PRN Shortness of Breath and/or Wheezing  aluminum hydroxide/magnesium hydroxide/simethicone Suspension 30 milliLiter(s) Oral every 4 hours PRN Dyspepsia  dextrose 40% Gel 15 Gram(s) Oral once PRN Blood Glucose LESS THAN 70 milliGRAM(s)/deciliter  glucagon  Injectable 1 milliGRAM(s) IntraMuscular once PRN Glucose LESS THAN 70 milligrams/deciliter      HEALTH ISSUES - PROBLEM Dx:  COPD (chronic obstructive pulmonary disease) with exacerbation  Hyperkalemia  High cholesterol  GERD (gastroesophageal reflux disease)  Enlarged prostate  Oxygen dependent  Diabetes mellitus, type 2  Hypertension  Emphysema lung
JIAN MANCIA  78y  Male    Patient is a 78y old  Male who presents with a chief complaint of Dyspnea (30 Mar 2019 12:32)      INTERVAL HPI/OVERNIGHT EVENTS:  No interval events.  -pt is doing well and can be discharged home with home care services today  -home O2 is set up and delivered  -sent in medications to his pharmacy   -pt needs outpatient follow up in the community with Dr. Cohen's group       REVIEW OF SYSTEMS:  CONSTITUTIONAL: No fever, weight loss, fatigue  EYES: No eye pain, visual disturbances, or discharge  ENMT:  No difficulty hearing, tinnitus, vertigo; No sinus or throat pain  NECK: No pain or stiffness  RESPIRATORY: + cough, No wheezing, chills or hemoptysis; + Exertional shortness of breath  CARDIOVASCULAR: No chest pain, palpitations, dizziness, + leg swelling  GASTROINTESTINAL: No abdominal or epigastric pain. No nausea, vomiting, or hematemesis; No diarrhea or constipation. No melena or hematochezia.  GENITOURINARY: No dysuria, frequency, hematuria, or incontinence  NEUROLOGICAL: No headaches, memory loss, loss of strength, numbness, or tremors  SKIN: No itching, burning, rashes, or lesions   ENDOCRINE: No heat or cold intolerance; No hair loss  MUSCULOSKELETAL: No joint pain or swelling; No muscle, back, or extremity pain  PSYCHIATRIC: No depression, anxiety, mood swings, or difficulty sleeping      Vital Signs Last 24 Hrs  T(C): 36.1 (03 Apr 2019 22:06), Max: 36.3 (03 Apr 2019 14:27)  T(F): 97 (03 Apr 2019 22:06), Max: 97.3 (03 Apr 2019 14:27)  HR: 101 (03 Apr 2019 22:06) (101 - 103)  BP: 111/57 (03 Apr 2019 22:06) (109/66 - 111/57)  RR: 16 (03 Apr 2019 22:06) (16 - 16)  SpO2: 93% (03 Apr 2019 23:19) (93% - 93%)    CAPILLARY BLOOD GLUCOSE  POCT Blood Glucose.: 233 mg/dL (02 Apr 2019 11:34)  POCT Blood Glucose.: 150 mg/dL (02 Apr 2019 07:23)  POCT Blood Glucose.: 276 mg/dL (01 Apr 2019 20:53)  POCT Blood Glucose.: 226 mg/dL (01 Apr 2019 16:40)      PHYSICAL EXAM:  GENERAL: NAD, obese  HEAD:  Atraumatic, Normocephalic  EYES: conjunctiva and sclera clear  ENMT: Moist mucous membranes  NECK: Supple, Normal thyroid  NERVOUS SYSTEM:  Alert & Oriented X 3, Good concentration; Motor Strength 5/5 B/L upper and lower extremities.  CHEST/LUNG: Decreased AE bilaterally; No rales, No rhonchi, No wheezing.   CV/HEART: Regular rate and rhythm; No murmurs, rubs, or gallops  GI/ABDOMEN: Soft, Nontender, obese abdomen; Bowel sounds present  EXTREMITIES:  2+ Peripheral Pulses, No clubbing, cyanosis, bipedal edema +  LYMPH: No lymphadenopathy noted  SKIN: No rashes or lesions    Consultant(s) Notes Reviewed:  [x ] YES  [ ] NO  Care Discussed with Consultants/Other Providers [ x] YES  [ ] NO    LABS:                                            12.7   14.28 )-----------( 389      ( 02 Apr 2019 06:59 )             44.4     04-03    147<H>  |  101  |  59<H>  ----------------------------<  86  4.6   |  38<H>  |  2.0<H>    Ca    8.8      03 Apr 2019 08:17      04-02    144  |  99  |  59<H>  ----------------------------<  180<H>  5.4<H>   |  38<H>  |  2.2<H>    Ca    9.1      02 Apr 2019 06:59  Phos  4.2     04-02  Mg     1.9     04-02    TPro  5.6<L>  /  Alb  3.7  /  TBili  0.4  /  DBili  x   /  AST  9   /  ALT  31  /  AlkPhos  57  04-02                              Iron with Total Binding Capacity in AM (03.28.19 @ 06:53)    Iron - Total Binding Capacity.: 311 ug/dL    % Saturation, Iron: 8 %    Iron Total, Serum: 24 ug/dL    Unsaturated Iron Binding Capacity: 287 ug/dL        MICROBIOLOGY:   Culture - Urine (03.15.19 @ 03:20)    Specimen Source: .Urine Clean Catch (Midstream)    Culture Results:   <10,000 CFU/mL Normal Urogenital Gillian        RADIOLOGY & ADDITIONAL TESTS  X-ray Chest 1 View- PORTABLE-Routine (03.31.19 @ 07:47)   Cardiomegaly and pulmonary vascular congestion, stable.          Imaging Personally Reviewed:  [x] YES  [ ] NO      HEALTH ISSUES - PROBLEM Dx:  COPD (chronic obstructive pulmonary disease) with exacerbation  Hyperkalemia  High cholesterol  GERD (gastroesophageal reflux disease)  Enlarged prostate  Oxygen dependent  Diabetes mellitus, type 2  Hypertension  Emphysema lung        MEDICATIONS  (STANDING):  ALBUTerol/ipratropium for Nebulization 3 milliLiter(s) Nebulizer every 4 hours  artificial tears (preservative free) Ophthalmic Solution 1 Drop(s) Both EYES three times a day  ascorbic acid 500 milliGRAM(s) Oral daily  atorvastatin 20 milliGRAM(s) Oral at bedtime  buDESOnide 160 MICROgram(s)/formoterol 4.5 MICROgram(s) Inhaler 2 Puff(s) Inhalation two times a day  cholecalciferol 1000 Unit(s) Oral daily  dextrose 5%. 1000 milliLiter(s) (50 mL/Hr) IV Continuous <Continuous>  dextrose 50% Injectable 12.5 Gram(s) IV Push once  dextrose 50% Injectable 25 Gram(s) IV Push once  dextrose 50% Injectable 25 Gram(s) IV Push once  docusate sodium 100 milliGRAM(s) Oral three times a day  ferrous    sulfate 325 milliGRAM(s) Oral <User Schedule>  finasteride 5 milliGRAM(s) Oral daily  heparin  Injectable 5000 Unit(s) SubCutaneous every 12 hours  insulin glargine Injectable (LANTUS) 30 Unit(s) SubCutaneous every morning  insulin glargine Injectable (LANTUS) 30 Unit(s) SubCutaneous at bedtime  insulin lispro (HumaLOG) corrective regimen sliding scale   SubCutaneous three times a day before meals  insulin lispro (HumaLOG) corrective regimen sliding scale   SubCutaneous at bedtime  insulin lispro Injectable (HumaLOG) 17 Unit(s) SubCutaneous three times a day with meals  metoprolol tartrate 25 milliGRAM(s) Oral two times a day  nystatin Powder 1 Application(s) Topical two times a day  pantoprazole    Tablet 40 milliGRAM(s) Oral before breakfast  polyethylene glycol 3350 17 Gram(s) Oral daily  predniSONE   Tablet 50 milliGRAM(s) Oral daily  senna 2 Tablet(s) Oral at bedtime  sodium polystyrene sulfonate Suspension 30 Gram(s) Oral every 48 hours  tamsulosin 0.4 milliGRAM(s) Oral at bedtime  tiotropium 18 MICROgram(s) Capsule 1 Capsule(s) Inhalation daily    MEDICATIONS  (PRN):  acetaminophen   Tablet .. 650 milliGRAM(s) Oral every 6 hours PRN Temp greater or equal to 38C (100.4F), Moderate Pain (4 - 6)  ALBUTerol/ipratropium for Nebulization 3 milliLiter(s) Nebulizer every 4 hours PRN Shortness of Breath and/or Wheezing  aluminum hydroxide/magnesium hydroxide/simethicone Suspension 30 milliLiter(s) Oral every 4 hours PRN Dyspepsia  benzocaine 15 mG/menthol 3.6 mG Lozenge 1 Lozenge Oral every 2 hours PRN Sore Throat  dextrose 40% Gel 15 Gram(s) Oral once PRN Blood Glucose LESS THAN 70 milliGRAM(s)/deciliter  glucagon  Injectable 1 milliGRAM(s) IntraMuscular once PRN Glucose LESS THAN 70 milligrams/deciliter  guaiFENesin/dextromethorphan  Syrup 10 milliLiter(s) Oral every 6 hours PRN Cough
JIAN MANCIA  78y  Male    Patient is a 78y old  Male who presents with a chief complaint of Dyspnea (30 Mar 2019 12:32)      INTERVAL HPI/OVERNIGHT EVENTS:  No interval events.  -still reports cough with difficulty expectorating   -patient is in appeal process and case management working on the d/c process   -wants to be sent directly to pulmonary rehab from medical floor; case management following       REVIEW OF SYSTEMS:  CONSTITUTIONAL: No fever, weight loss, fatigue  EYES: No eye pain, visual disturbances, or discharge  ENMT:  No difficulty hearing, tinnitus, vertigo; No sinus or throat pain  NECK: No pain or stiffness  RESPIRATORY: + cough, No wheezing, chills or hemoptysis; + Exertional shortness of breath  CARDIOVASCULAR: No chest pain, palpitations, dizziness, + leg swelling  GASTROINTESTINAL: No abdominal or epigastric pain. No nausea, vomiting, or hematemesis; No diarrhea or constipation. No melena or hematochezia.  GENITOURINARY: No dysuria, frequency, hematuria, or incontinence  NEUROLOGICAL: No headaches, memory loss, loss of strength, numbness, or tremors  SKIN: No itching, burning, rashes, or lesions   ENDOCRINE: No heat or cold intolerance; No hair loss  MUSCULOSKELETAL: No joint pain or swelling; No muscle, back, or extremity pain  PSYCHIATRIC: No depression, anxiety, mood swings, or difficulty sleeping        Vital Signs Last 24 Hrs  T(C): 35.9 (03 Apr 2019 06:49), Max: 36.7 (02 Apr 2019 22:00)  T(F): 96.6 (03 Apr 2019 06:49), Max: 98 (02 Apr 2019 22:00)  HR: 91 (03 Apr 2019 06:49) (91 - 101)  BP: 122/60 (03 Apr 2019 06:49) (102/55 - 122/60)  RR: 16 (03 Apr 2019 06:49) (16 - 18)  SpO2: 93% (02 Apr 2019 22:45) (93% - 93%)        CAPILLARY BLOOD GLUCOSE  POCT Blood Glucose.: 233 mg/dL (02 Apr 2019 11:34)  POCT Blood Glucose.: 150 mg/dL (02 Apr 2019 07:23)  POCT Blood Glucose.: 276 mg/dL (01 Apr 2019 20:53)  POCT Blood Glucose.: 226 mg/dL (01 Apr 2019 16:40)      PHYSICAL EXAM:  GENERAL: NAD, obese  HEAD:  Atraumatic, Normocephalic  EYES: conjunctiva and sclera clear  ENMT: Moist mucous membranes  NECK: Supple, Normal thyroid  NERVOUS SYSTEM:  Alert & Oriented X 3, Good concentration; Motor Strength 5/5 B/L upper and lower extremities.  CHEST/LUNG: Decreased AE bilaterally; No rales, No rhonchi, No wheezing.   HEART: Regular rate and rhythm; No murmurs, rubs, or gallops  ABDOMEN: Soft, Nontender, + distended; Bowel sounds present  EXTREMITIES:  2+ Peripheral Pulses, No clubbing, cyanosis, bipedal edema +  LYMPH: No lymphadenopathy noted  SKIN: No rashes or lesions    Consultant(s) Notes Reviewed:  [x ] YES  [ ] NO  Care Discussed with Consultants/Other Providers [ x] YES  [ ] NO    LABS:                          no new labs                      12.7   14.28 )-----------( 389      ( 02 Apr 2019 06:59 )             44.4       04-02    144  |  99  |  59<H>  ----------------------------<  180<H>  5.4<H>   |  38<H>  |  2.2<H>    Ca    9.1      02 Apr 2019 06:59  Phos  4.2     04-02  Mg     1.9     04-02    TPro  5.6<L>  /  Alb  3.7  /  TBili  0.4  /  DBili  x   /  AST  9   /  ALT  31  /  AlkPhos  57  04-02                              Iron with Total Binding Capacity in AM (03.28.19 @ 06:53)    Iron - Total Binding Capacity.: 311 ug/dL    % Saturation, Iron: 8 %    Iron Total, Serum: 24 ug/dL    Unsaturated Iron Binding Capacity: 287 ug/dL        MICROBIOLOGY:   Culture - Urine (03.15.19 @ 03:20)    Specimen Source: .Urine Clean Catch (Midstream)    Culture Results:   <10,000 CFU/mL Normal Urogenital Gillian        RADIOLOGY & ADDITIONAL TESTS  X-ray Chest 1 View- PORTABLE-Routine (03.31.19 @ 07:47)   Cardiomegaly and pulmonary vascular congestion, stable.          Imaging Personally Reviewed:  [x] YES  [ ] NO      HEALTH ISSUES - PROBLEM Dx:  COPD (chronic obstructive pulmonary disease) with exacerbation  Hyperkalemia  High cholesterol  GERD (gastroesophageal reflux disease)  Enlarged prostate  Oxygen dependent  Diabetes mellitus, type 2  Hypertension  Emphysema lung        MEDICATIONS  (STANDING):  ALBUTerol/ipratropium for Nebulization 3 milliLiter(s) Nebulizer every 4 hours  artificial tears (preservative free) Ophthalmic Solution 1 Drop(s) Both EYES three times a day  ascorbic acid 500 milliGRAM(s) Oral daily  atorvastatin 20 milliGRAM(s) Oral at bedtime  buDESOnide 160 MICROgram(s)/formoterol 4.5 MICROgram(s) Inhaler 2 Puff(s) Inhalation two times a day  cholecalciferol 1000 Unit(s) Oral daily  dextrose 5%. 1000 milliLiter(s) (50 mL/Hr) IV Continuous <Continuous>  dextrose 50% Injectable 12.5 Gram(s) IV Push once  dextrose 50% Injectable 25 Gram(s) IV Push once  dextrose 50% Injectable 25 Gram(s) IV Push once  docusate sodium 100 milliGRAM(s) Oral three times a day  ferrous    sulfate 325 milliGRAM(s) Oral <User Schedule>  finasteride 5 milliGRAM(s) Oral daily  heparin  Injectable 5000 Unit(s) SubCutaneous every 12 hours  insulin glargine Injectable (LANTUS) 30 Unit(s) SubCutaneous every morning  insulin glargine Injectable (LANTUS) 30 Unit(s) SubCutaneous at bedtime  insulin lispro (HumaLOG) corrective regimen sliding scale   SubCutaneous three times a day before meals  insulin lispro (HumaLOG) corrective regimen sliding scale   SubCutaneous at bedtime  insulin lispro Injectable (HumaLOG) 17 Unit(s) SubCutaneous three times a day with meals  levoFLOXacin  Tablet 750 milliGRAM(s) Oral every 24 hours  metoprolol tartrate 25 milliGRAM(s) Oral two times a day  nystatin Powder 1 Application(s) Topical two times a day  pantoprazole    Tablet 40 milliGRAM(s) Oral before breakfast  polyethylene glycol 3350 17 Gram(s) Oral daily  predniSONE   Tablet 50 milliGRAM(s) Oral daily  senna 2 Tablet(s) Oral at bedtime  sodium polystyrene sulfonate Suspension 30 Gram(s) Oral every 48 hours  tamsulosin 0.4 milliGRAM(s) Oral at bedtime  tiotropium 18 MICROgram(s) Capsule 1 Capsule(s) Inhalation daily    MEDICATIONS  (PRN):  acetaminophen   Tablet .. 650 milliGRAM(s) Oral every 6 hours PRN Temp greater or equal to 38C (100.4F), Moderate Pain (4 - 6)  ALBUTerol/ipratropium for Nebulization 3 milliLiter(s) Nebulizer every 4 hours PRN Shortness of Breath and/or Wheezing  aluminum hydroxide/magnesium hydroxide/simethicone Suspension 30 milliLiter(s) Oral every 4 hours PRN Dyspepsia  dextrose 40% Gel 15 Gram(s) Oral once PRN Blood Glucose LESS THAN 70 milliGRAM(s)/deciliter  glucagon  Injectable 1 milliGRAM(s) IntraMuscular once PRN Glucose LESS THAN 70 milligrams/deciliter  guaiFENesin/dextromethorphan  Syrup 10 milliLiter(s) Oral every 6 hours PRN Cough
JIAN MANCIA  78y  Male    Patient is a 78y old  Male who presents with a chief complaint of Dyspnea (30 Mar 2019 12:32)      INTERVAL HPI/OVERNIGHT EVENTS:  No interval events.  Patient has no new complaints this AM.   Dyspnea and oxygen saturation stable on 4-5L NC. Off HF NC.          REVIEW OF SYSTEMS:  CONSTITUTIONAL: No fever, weight loss, fatigue  EYES: No eye pain, visual disturbances, or discharge  ENMT:  No difficulty hearing, tinnitus, vertigo; No sinus or throat pain  NECK: No pain or stiffness  RESPIRATORY: No cough, No wheezing, chills or hemoptysis; No shortness of breath  CARDIOVASCULAR: No chest pain, palpitations, dizziness, + leg swelling  GASTROINTESTINAL: No abdominal or epigastric pain. No nausea, vomiting, or hematemesis; No diarrhea or constipation. No melena or hematochezia.  GENITOURINARY: No dysuria, frequency, hematuria, or incontinence  NEUROLOGICAL: No headaches, memory loss, loss of strength, numbness, or tremors  SKIN: No itching, burning, rashes, or lesions   ENDOCRINE: No heat or cold intolerance; No hair loss  MUSCULOSKELETAL: No joint pain or swelling; No muscle, back, or extremity pain  PSYCHIATRIC: No depression, anxiety, mood swings, or difficulty sleeping        VITALS:  T(C): 36 (01 Apr 2019 06:21), Max: 36.8 (31 Mar 2019 22:52)  T(F): 96.8 (01 Apr 2019 06:21), Max: 98.2 (31 Mar 2019 22:52)  HR: 69 (01 Apr 2019 06:21) (69 - 111)  BP: 124/64 (01 Apr 2019 06:21) (106/59 - 154/77)  BP(mean): --  RR: 18 (01 Apr 2019 08:49) (18 - 18)  SpO2: 90% (01 Apr 2019 08:49) (89% - 90%)        CAPILLARY BLOOD GLUCOSE  POCT Blood Glucose.: 232 mg/dL (01 Apr 2019 11:48)  POCT Blood Glucose.: 233 mg/dL (01 Apr 2019 07:18)  POCT Blood Glucose.: 306 mg/dL (31 Mar 2019 21:36)  POCT Blood Glucose.: 344 mg/dL (31 Mar 2019 16:29)        PHYSICAL EXAM:  GENERAL: NAD, obese  HEAD:  Atraumatic, Normocephalic  EYES: conjunctiva and sclera clear  ENMT: Moist mucous membranes  NECK: Supple, Normal thyroid  NERVOUS SYSTEM:  Alert & Oriented X 3, Good concentration; Motor Strength 5/5 B/L upper and lower extremities.  CHEST/LUNG: Decreased AE bilaterally; No rales, No rhonchi, No wheezing.   HEART: Regular rate and rhythm; No murmurs, rubs, or gallops  ABDOMEN: Soft, Nontender, + distended; Bowel sounds present  EXTREMITIES:  2+ Peripheral Pulses, No clubbing, cyanosis, bipedal edema +  LYMPH: No lymphadenopathy noted  SKIN: No rashes or lesions    Consultant(s) Notes Reviewed:  [x ] YES  [ ] NO  Care Discussed with Consultants/Other Providers [ x] YES  [ ] NO    LABS:                                                  13.7   14.39 )-----------( 446      ( 31 Mar 2019 08:30 )             46.9     03-31    142  |  99  |  52<H>  ----------------------------<  341<H>  5.7<H>   |  31  |  1.8<H>    Ca    9.4      31 Mar 2019 08:30  Phos  4.4     03-31  Mg     1.8     03-31    TPro  6.2  /  Alb  4.1  /  TBili  0.6  /  DBili  x   /  AST  9   /  ALT  42<H>  /  AlkPhos  70  03-31        Iron with Total Binding Capacity in AM (03.28.19 @ 06:53)    Iron - Total Binding Capacity.: 311 ug/dL    % Saturation, Iron: 8 %    Iron Total, Serum: 24 ug/dL    Unsaturated Iron Binding Capacity: 287 ug/dL        MICROBIOLOGY:   Culture - Urine (03.15.19 @ 03:20)    Specimen Source: .Urine Clean Catch (Midstream)    Culture Results:   <10,000 CFU/mL Normal Urogenital Gillian        RADIOLOGY & ADDITIONAL TESTS  Xray Chest 1 View- PORTABLE-Routine (03.31.19 @ 07:47)   Cardiomegaly and pulmonary vascular congestion, stable.      Imaging Personally Reviewed:  [x] YES  [ ] NO        MEDICATIONS  (STANDING):  ALBUTerol/ipratropium for Nebulization 3 milliLiter(s) Nebulizer every 4 hours  artificial tears (preservative free) Ophthalmic Solution 1 Drop(s) Both EYES three times a day  ascorbic acid 500 milliGRAM(s) Oral daily  atorvastatin 20 milliGRAM(s) Oral at bedtime  buDESOnide 160 MICROgram(s)/formoterol 4.5 MICROgram(s) Inhaler 2 Puff(s) Inhalation two times a day  cholecalciferol 1000 Unit(s) Oral daily  dextrose 5%. 1000 milliLiter(s) (50 mL/Hr) IV Continuous <Continuous>  dextrose 50% Injectable 12.5 Gram(s) IV Push once  dextrose 50% Injectable 25 Gram(s) IV Push once  dextrose 50% Injectable 25 Gram(s) IV Push once  docusate sodium 100 milliGRAM(s) Oral three times a day  ferrous    sulfate 325 milliGRAM(s) Oral <User Schedule>  finasteride 5 milliGRAM(s) Oral daily  furosemide    Tablet 40 milliGRAM(s) Oral daily  guaiFENesin  milliGRAM(s) Oral every 12 hours  heparin  Injectable 5000 Unit(s) SubCutaneous every 12 hours  insulin glargine Injectable (LANTUS) 30 Unit(s) SubCutaneous every morning  insulin glargine Injectable (LANTUS) 30 Unit(s) SubCutaneous at bedtime  insulin lispro (HumaLOG) corrective regimen sliding scale   SubCutaneous three times a day before meals  insulin lispro (HumaLOG) corrective regimen sliding scale   SubCutaneous at bedtime  levoFLOXacin  Tablet 750 milliGRAM(s) Oral every 24 hours  metoprolol tartrate 25 milliGRAM(s) Oral two times a day  nystatin Powder 1 Application(s) Topical two times a day  pantoprazole    Tablet 40 milliGRAM(s) Oral before breakfast  polyethylene glycol 3350 17 Gram(s) Oral daily  predniSONE   Tablet 60 milliGRAM(s) Oral daily  senna 2 Tablet(s) Oral at bedtime  sodium polystyrene sulfonate Suspension 30 Gram(s) Oral every 48 hours  tamsulosin 0.4 milliGRAM(s) Oral at bedtime  tiotropium 18 MICROgram(s) Capsule 1 Capsule(s) Inhalation daily      MEDICATIONS  (PRN):  acetaminophen   Tablet .. 650 milliGRAM(s) Oral every 6 hours PRN Temp greater or equal to 38C (100.4F), Moderate Pain (4 - 6)  ALBUTerol/ipratropium for Nebulization 3 milliLiter(s) Nebulizer every 4 hours PRN Shortness of Breath and/or Wheezing  aluminum hydroxide/magnesium hydroxide/simethicone Suspension 30 milliLiter(s) Oral every 4 hours PRN Dyspepsia  dextrose 40% Gel 15 Gram(s) Oral once PRN Blood Glucose LESS THAN 70 milliGRAM(s)/deciliter  glucagon  Injectable 1 milliGRAM(s) IntraMuscular once PRN Glucose LESS THAN 70 milligrams/deciliter          HEALTH ISSUES - PROBLEM Dx:  COPD (chronic obstructive pulmonary disease) with exacerbation  Hyperkalemia  High cholesterol  GERD (gastroesophageal reflux disease)  Enlarged prostate  Oxygen dependent  Diabetes mellitus, type 2  Hypertension  Emphysema lung
JIAN MANCIA  78y  Male    Patient is a 78y old  Male who presents with a chief complaint of Dyspnea (30 Mar 2019 12:32)      INTERVAL HPI/OVERNIGHT EVENTS:  No interval events.  Patient has no new complaints this AM.   Dyspnea and oxygen saturation stable on 4-5L NC. Off HF NC.          REVIEW OF SYSTEMS:  CONSTITUTIONAL: No fever, weight loss, fatigue  EYES: No eye pain, visual disturbances, or discharge  ENMT:  No difficulty hearing, tinnitus, vertigo; No sinus or throat pain  NECK: No pain or stiffness  RESPIRATORY: No cough, No wheezing, chills or hemoptysis; No shortness of breath  CARDIOVASCULAR: No chest pain, palpitations, dizziness, + leg swelling  GASTROINTESTINAL: No abdominal or epigastric pain. No nausea, vomiting, or hematemesis; No diarrhea or constipation. No melena or hematochezia.  GENITOURINARY: No dysuria, frequency, hematuria, or incontinence  NEUROLOGICAL: No headaches, memory loss, loss of strength, numbness, or tremors  SKIN: No itching, burning, rashes, or lesions   ENDOCRINE: No heat or cold intolerance; No hair loss  MUSCULOSKELETAL: No joint pain or swelling; No muscle, back, or extremity pain  PSYCHIATRIC: No depression, anxiety, mood swings, or difficulty sleeping        VITALS:  T(C): 36.3 (31 Mar 2019 06:13), Max: 36.8 (30 Mar 2019 22:06)  T(F): 97.3 (31 Mar 2019 06:13), Max: 98.3 (30 Mar 2019 22:06)  HR: 72 (31 Mar 2019 06:13) (72 - 113)  BP: 147/77 (31 Mar 2019 06:13) (133/72 - 148/76)  BP(mean): --  RR: 18 (31 Mar 2019 06:13) (18 - 18)  SpO2: 92% (30 Mar 2019 19:30) (88% - 92%)        CAPILLARY BLOOD GLUCOSE  POCT Blood Glucose.: 334 mg/dL (31 Mar 2019 12:35)  POCT Blood Glucose.: 236 mg/dL (31 Mar 2019 11:18)  POCT Blood Glucose.: 283 mg/dL (31 Mar 2019 07:57)  POCT Blood Glucose.: 255 mg/dL (30 Mar 2019 21:42)  POCT Blood Glucose.: 330 mg/dL (30 Mar 2019 16:39)        PHYSICAL EXAM:  GENERAL: NAD, obese  HEAD:  Atraumatic, Normocephalic  EYES: conjunctiva and sclera clear  ENMT: Moist mucous membranes  NECK: Supple, Normal thyroid  NERVOUS SYSTEM:  Alert & Oriented X 3, Good concentration; Motor Strength 5/5 B/L upper and lower extremities.  CHEST/LUNG: Decreased AE bilaterally; No rales, No rhonchi, No wheezing.   HEART: Regular rate and rhythm; No murmurs, rubs, or gallops  ABDOMEN: Soft, Nontender, + distended; Bowel sounds present  EXTREMITIES:  2+ Peripheral Pulses, No clubbing, cyanosis, bipedal edema +  LYMPH: No lymphadenopathy noted  SKIN: No rashes or lesions    Consultant(s) Notes Reviewed:  [x ] YES  [ ] NO  Care Discussed with Consultants/Other Providers [ x] YES  [ ] NO    LABS:                                                  13.7   14.39 )-----------( 446      ( 31 Mar 2019 08:30 )             46.9     03-31    142  |  99  |  52<H>  ----------------------------<  341<H>  5.7<H>   |  31  |  1.8<H>    Ca    9.4      31 Mar 2019 08:30  Phos  4.4     03-31  Mg     1.8     03-31    TPro  6.2  /  Alb  4.1  /  TBili  0.6  /  DBili  x   /  AST  9   /  ALT  42<H>  /  AlkPhos  70  03-31        Iron with Total Binding Capacity in AM (03.28.19 @ 06:53)    Iron - Total Binding Capacity.: 311 ug/dL    % Saturation, Iron: 8 %    Iron Total, Serum: 24 ug/dL    Unsaturated Iron Binding Capacity: 287 ug/dL        MICROBIOLOGY:   Culture - Urine (03.15.19 @ 03:20)    Specimen Source: .Urine Clean Catch (Midstream)    Culture Results:   <10,000 CFU/mL Normal Urogenital Gillian        RADIOLOGY & ADDITIONAL TESTS  X-ray Chest 1 View- PORTABLE-Urgent (03.24.19 @ 21:17)   In comparison with the prior chest x-ray dated March 13, 2019 time 8:49   AM:    Stable examination.      Imaging Personally Reviewed:  [x] YES  [ ] NO        MEDICATIONS  (STANDING):  ALBUTerol/ipratropium for Nebulization 3 milliLiter(s) Nebulizer every 4 hours  artificial tears (preservative free) Ophthalmic Solution 1 Drop(s) Both EYES three times a day  ascorbic acid 500 milliGRAM(s) Oral daily  atorvastatin 20 milliGRAM(s) Oral at bedtime  buDESOnide 160 MICROgram(s)/formoterol 4.5 MICROgram(s) Inhaler 2 Puff(s) Inhalation two times a day  cholecalciferol 1000 Unit(s) Oral daily  dextrose 5%. 1000 milliLiter(s) (50 mL/Hr) IV Continuous <Continuous>  dextrose 50% Injectable 12.5 Gram(s) IV Push once  dextrose 50% Injectable 25 Gram(s) IV Push once  dextrose 50% Injectable 25 Gram(s) IV Push once  docusate sodium 100 milliGRAM(s) Oral three times a day  ferrous    sulfate 325 milliGRAM(s) Oral <User Schedule>  finasteride 5 milliGRAM(s) Oral daily  guaiFENesin  milliGRAM(s) Oral every 12 hours  heparin  Injectable 5000 Unit(s) SubCutaneous every 12 hours  insulin glargine Injectable (LANTUS) 30 Unit(s) SubCutaneous every morning  insulin glargine Injectable (LANTUS) 30 Unit(s) SubCutaneous at bedtime  insulin lispro (HumaLOG) corrective regimen sliding scale   SubCutaneous three times a day before meals  insulin lispro (HumaLOG) corrective regimen sliding scale   SubCutaneous at bedtime  insulin lispro Injectable (HumaLOG) 15 Unit(s) SubCutaneous three times a day before meals  levoFLOXacin  Tablet 750 milliGRAM(s) Oral every 24 hours  methylPREDNISolone sodium succinate Injectable 60 milliGRAM(s) IV Push every 12 hours  metoprolol tartrate 25 milliGRAM(s) Oral two times a day  nystatin Powder 1 Application(s) Topical two times a day  pantoprazole    Tablet 40 milliGRAM(s) Oral before breakfast  polyethylene glycol 3350 17 Gram(s) Oral daily  senna 2 Tablet(s) Oral at bedtime  sodium polystyrene sulfonate Suspension 30 Gram(s) Oral every 48 hours  tamsulosin 0.4 milliGRAM(s) Oral at bedtime  tiotropium 18 MICROgram(s) Capsule 1 Capsule(s) Inhalation daily    MEDICATIONS  (PRN):  acetaminophen   Tablet .. 650 milliGRAM(s) Oral every 6 hours PRN Temp greater or equal to 38C (100.4F), Moderate Pain (4 - 6)  ALBUTerol/ipratropium for Nebulization 3 milliLiter(s) Nebulizer every 4 hours PRN Shortness of Breath and/or Wheezing  aluminum hydroxide/magnesium hydroxide/simethicone Suspension 30 milliLiter(s) Oral every 4 hours PRN Dyspepsia  dextrose 40% Gel 15 Gram(s) Oral once PRN Blood Glucose LESS THAN 70 milliGRAM(s)/deciliter  glucagon  Injectable 1 milliGRAM(s) IntraMuscular once PRN Glucose LESS THAN 70 milligrams/deciliter          HEALTH ISSUES - PROBLEM Dx:  COPD (chronic obstructive pulmonary disease) with exacerbation  Hyperkalemia  High cholesterol  GERD (gastroesophageal reflux disease)  Enlarged prostate  Oxygen dependent  Diabetes mellitus, type 2  Hypertension  Emphysema lung

## 2019-04-04 NOTE — PROGRESS NOTE ADULT - ASSESSMENT
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  Patient mentions waking up from sleep very dyspneic from his baseline and worse on exertion thus [facility] activated EMS. Per EMS he had decreased b/l breath sound and tachypneic thus given Duoneb and 125 mg of solumedrol and put on CPAP treatment with significant improvement. He denied any CP, palpitations, cough, dizziness, lightheadedness, worsening pedal edema/calf pain. He was admitted for further management of COPD exacerbation.       Assessment and Plan:    1. Acute on Chronic Hypoxic respiratory failure:   Due to end stage COPD exacerbation   -slow steroid taper and nebs at home with continuation of symbicort and follow up with pulmonary Dr. Castrejon    2. Severe Pulmonary Hypertension:  3. chronic Diastolic CHF:  ECHO 11/2018: Normal LVEF, no RWMA, Trace TR, Moderate pulmonary hypertension.  ECHO 12/25/18: severe Right side heart dilation, RSVP 100 (was 40 in 10/2018).  -resume low dose lasix for home   (monitor kidney function on the outside)      4. h/o Hypertension:  BP stable. Continue Metoprolol.         5. Diabetes mellitus, type 2: Uncontrolled.  Monitor FS with Insulin coverage.  Hemoglobin A1C, Whole Blood: 7.2 % (03.12.19 @ 07:00)  ADA diet.        6. CKD stage 3:  -monitor kidney function       7. BPH:  Continue Flomax and Finasteride.       8. Obesity:  weight lossDietary & Lifestyle modification.      9. B12 deficiency & Vitamin D deficiency:  Continue with supplementation.         10. MICHAEL/OHVS:  BiPAP at night.      11.  Hyperkalemia:   Continue Low K diet & Kayexalate 30 gm po TTS (3x week).      12. Microcytosis due to EMMY:  % Saturation, Iron: 8 % (03.28.19 @ 06:53)  Started on Iron supplement.  Hgb stable.      13. Debility/limited life expectancy:  Due to underlying medical condition.  Geriatrics consulted: Recommended Hospice consult. Patient seen by hospice but refusing hospice.         DVT prophylaxis: Heparin      # Progress Note Handoff  PENDING as follows  consults:  Test:   Other: with multiple re-admissions for decompensated COPD   Family discussion: discussed with patient in length at bedside and since home liquid O2 delivered, pt agreeable for today's discharge   Disposition:  Home ___with home care services today  _ or SNF ____ Other _____ Unknown at this time ____

## 2019-04-09 DIAGNOSIS — Z91.013 ALLERGY TO SEAFOOD: ICD-10-CM

## 2019-04-09 DIAGNOSIS — I50.9 HEART FAILURE, UNSPECIFIED: ICD-10-CM

## 2019-04-09 DIAGNOSIS — Z87.891 PERSONAL HISTORY OF NICOTINE DEPENDENCE: ICD-10-CM

## 2019-04-09 DIAGNOSIS — N40.0 BENIGN PROSTATIC HYPERPLASIA WITHOUT LOWER URINARY TRACT SYMPTOMS: ICD-10-CM

## 2019-04-09 DIAGNOSIS — E66.2 MORBID (SEVERE) OBESITY WITH ALVEOLAR HYPOVENTILATION: ICD-10-CM

## 2019-04-09 DIAGNOSIS — Z79.899 OTHER LONG TERM (CURRENT) DRUG THERAPY: ICD-10-CM

## 2019-04-09 DIAGNOSIS — Z88.0 ALLERGY STATUS TO PENICILLIN: ICD-10-CM

## 2019-04-09 DIAGNOSIS — Z91.041 RADIOGRAPHIC DYE ALLERGY STATUS: ICD-10-CM

## 2019-04-09 DIAGNOSIS — Z88.8 ALLERGY STATUS TO OTHER DRUGS, MEDICAMENTS AND BIOLOGICAL SUBSTANCES: ICD-10-CM

## 2019-04-09 DIAGNOSIS — E11.22 TYPE 2 DIABETES MELLITUS WITH DIABETIC CHRONIC KIDNEY DISEASE: ICD-10-CM

## 2019-04-09 DIAGNOSIS — Z99.81 DEPENDENCE ON SUPPLEMENTAL OXYGEN: ICD-10-CM

## 2019-04-09 DIAGNOSIS — Z79.52 LONG TERM (CURRENT) USE OF SYSTEMIC STEROIDS: ICD-10-CM

## 2019-04-09 DIAGNOSIS — N18.3 CHRONIC KIDNEY DISEASE, STAGE 3 (MODERATE): ICD-10-CM

## 2019-04-09 DIAGNOSIS — E87.5 HYPERKALEMIA: ICD-10-CM

## 2019-04-09 DIAGNOSIS — J96.21 ACUTE AND CHRONIC RESPIRATORY FAILURE WITH HYPOXIA: ICD-10-CM

## 2019-04-09 DIAGNOSIS — R06.02 SHORTNESS OF BREATH: ICD-10-CM

## 2019-04-09 DIAGNOSIS — Z66 DO NOT RESUSCITATE: ICD-10-CM

## 2019-04-09 DIAGNOSIS — I50.31 ACUTE DIASTOLIC (CONGESTIVE) HEART FAILURE: ICD-10-CM

## 2019-04-09 DIAGNOSIS — J44.1 CHRONIC OBSTRUCTIVE PULMONARY DISEASE WITH (ACUTE) EXACERBATION: ICD-10-CM

## 2019-04-09 DIAGNOSIS — Z79.4 LONG TERM (CURRENT) USE OF INSULIN: ICD-10-CM

## 2019-04-09 DIAGNOSIS — I13.0 HYPERTENSIVE HEART AND CHRONIC KIDNEY DISEASE WITH HEART FAILURE AND STAGE 1 THROUGH STAGE 4 CHRONIC KIDNEY DISEASE, OR UNSPECIFIED CHRONIC KIDNEY DISEASE: ICD-10-CM

## 2019-04-09 DIAGNOSIS — E11.65 TYPE 2 DIABETES MELLITUS WITH HYPERGLYCEMIA: ICD-10-CM

## 2019-04-09 DIAGNOSIS — I27.20 PULMONARY HYPERTENSION, UNSPECIFIED: ICD-10-CM

## 2019-04-09 DIAGNOSIS — E55.9 VITAMIN D DEFICIENCY, UNSPECIFIED: ICD-10-CM

## 2019-04-09 DIAGNOSIS — E53.8 DEFICIENCY OF OTHER SPECIFIED B GROUP VITAMINS: ICD-10-CM

## 2019-04-18 ENCOUNTER — OUTPATIENT (OUTPATIENT)
Dept: OUTPATIENT SERVICES | Facility: HOSPITAL | Age: 79
LOS: 1 days | Discharge: HOME | End: 2019-04-18

## 2019-04-18 ENCOUNTER — LABORATORY RESULT (OUTPATIENT)
Age: 79
End: 2019-04-18

## 2019-04-18 DIAGNOSIS — Z98.890 OTHER SPECIFIED POSTPROCEDURAL STATES: Chronic | ICD-10-CM

## 2019-04-18 DIAGNOSIS — K63.5 POLYP OF COLON: Chronic | ICD-10-CM

## 2019-04-18 DIAGNOSIS — R79.89 OTHER SPECIFIED ABNORMAL FINDINGS OF BLOOD CHEMISTRY: ICD-10-CM

## 2019-04-18 DIAGNOSIS — D64.9 ANEMIA, UNSPECIFIED: ICD-10-CM

## 2019-04-19 ENCOUNTER — APPOINTMENT (OUTPATIENT)
Dept: GERIATRICS | Facility: HOME HEALTH | Age: 79
End: 2019-04-19

## 2019-04-30 ENCOUNTER — LABORATORY RESULT (OUTPATIENT)
Age: 79
End: 2019-04-30

## 2019-04-30 ENCOUNTER — RESULT REVIEW (OUTPATIENT)
Age: 79
End: 2019-04-30

## 2019-04-30 ENCOUNTER — OUTPATIENT (OUTPATIENT)
Dept: OUTPATIENT SERVICES | Facility: HOSPITAL | Age: 79
LOS: 1 days | Discharge: HOME | End: 2019-04-30

## 2019-04-30 DIAGNOSIS — D64.9 ANEMIA, UNSPECIFIED: ICD-10-CM

## 2019-04-30 DIAGNOSIS — K63.5 POLYP OF COLON: Chronic | ICD-10-CM

## 2019-04-30 DIAGNOSIS — Z98.890 OTHER SPECIFIED POSTPROCEDURAL STATES: Chronic | ICD-10-CM

## 2019-05-01 ENCOUNTER — MESSAGE (OUTPATIENT)
Age: 79
End: 2019-05-01

## 2019-05-02 ENCOUNTER — LABORATORY RESULT (OUTPATIENT)
Age: 79
End: 2019-05-02

## 2019-05-02 ENCOUNTER — OUTPATIENT (OUTPATIENT)
Dept: OUTPATIENT SERVICES | Facility: HOSPITAL | Age: 79
LOS: 1 days | Discharge: HOME | End: 2019-05-02

## 2019-05-02 DIAGNOSIS — K63.5 POLYP OF COLON: Chronic | ICD-10-CM

## 2019-05-02 DIAGNOSIS — R79.89 OTHER SPECIFIED ABNORMAL FINDINGS OF BLOOD CHEMISTRY: ICD-10-CM

## 2019-05-02 DIAGNOSIS — D64.9 ANEMIA, UNSPECIFIED: ICD-10-CM

## 2019-05-02 DIAGNOSIS — Z98.890 OTHER SPECIFIED POSTPROCEDURAL STATES: Chronic | ICD-10-CM

## 2019-05-03 ENCOUNTER — EMERGENCY (EMERGENCY)
Facility: HOSPITAL | Age: 79
LOS: 0 days | Discharge: HOME | End: 2019-05-03
Attending: EMERGENCY MEDICINE | Admitting: EMERGENCY MEDICINE
Payer: MEDICARE

## 2019-05-03 VITALS
OXYGEN SATURATION: 91 % | TEMPERATURE: 97 F | HEIGHT: 68 IN | WEIGHT: 250 LBS | RESPIRATION RATE: 22 BRPM | HEART RATE: 89 BPM | DIASTOLIC BLOOD PRESSURE: 68 MMHG | SYSTOLIC BLOOD PRESSURE: 128 MMHG

## 2019-05-03 VITALS
RESPIRATION RATE: 20 BRPM | HEART RATE: 87 BPM | OXYGEN SATURATION: 91 % | SYSTOLIC BLOOD PRESSURE: 146 MMHG | TEMPERATURE: 97 F | DIASTOLIC BLOOD PRESSURE: 85 MMHG

## 2019-05-03 DIAGNOSIS — Z90.89 ACQUIRED ABSENCE OF OTHER ORGANS: ICD-10-CM

## 2019-05-03 DIAGNOSIS — Z98.890 OTHER SPECIFIED POSTPROCEDURAL STATES: Chronic | ICD-10-CM

## 2019-05-03 DIAGNOSIS — Z79.51 LONG TERM (CURRENT) USE OF INHALED STEROIDS: ICD-10-CM

## 2019-05-03 DIAGNOSIS — J43.9 EMPHYSEMA, UNSPECIFIED: ICD-10-CM

## 2019-05-03 DIAGNOSIS — E78.00 PURE HYPERCHOLESTEROLEMIA, UNSPECIFIED: ICD-10-CM

## 2019-05-03 DIAGNOSIS — E11.9 TYPE 2 DIABETES MELLITUS WITHOUT COMPLICATIONS: ICD-10-CM

## 2019-05-03 DIAGNOSIS — N40.0 BENIGN PROSTATIC HYPERPLASIA WITHOUT LOWER URINARY TRACT SYMPTOMS: ICD-10-CM

## 2019-05-03 DIAGNOSIS — M25.519 PAIN IN UNSPECIFIED SHOULDER: ICD-10-CM

## 2019-05-03 DIAGNOSIS — M25.511 PAIN IN RIGHT SHOULDER: ICD-10-CM

## 2019-05-03 DIAGNOSIS — Z98.890 OTHER SPECIFIED POSTPROCEDURAL STATES: ICD-10-CM

## 2019-05-03 DIAGNOSIS — I12.9 HYPERTENSIVE CHRONIC KIDNEY DISEASE WITH STAGE 1 THROUGH STAGE 4 CHRONIC KIDNEY DISEASE, OR UNSPECIFIED CHRONIC KIDNEY DISEASE: ICD-10-CM

## 2019-05-03 DIAGNOSIS — K63.5 POLYP OF COLON: Chronic | ICD-10-CM

## 2019-05-03 DIAGNOSIS — Z87.891 PERSONAL HISTORY OF NICOTINE DEPENDENCE: ICD-10-CM

## 2019-05-03 DIAGNOSIS — K21.9 GASTRO-ESOPHAGEAL REFLUX DISEASE WITHOUT ESOPHAGITIS: ICD-10-CM

## 2019-05-03 DIAGNOSIS — N18.9 CHRONIC KIDNEY DISEASE, UNSPECIFIED: ICD-10-CM

## 2019-05-03 PROCEDURE — 99284 EMERGENCY DEPT VISIT MOD MDM: CPT

## 2019-05-03 PROCEDURE — 73030 X-RAY EXAM OF SHOULDER: CPT | Mod: 26,RT

## 2019-05-03 PROCEDURE — 71045 X-RAY EXAM CHEST 1 VIEW: CPT | Mod: 26

## 2019-05-03 RX ORDER — OXYCODONE AND ACETAMINOPHEN 5; 325 MG/1; MG/1
1 TABLET ORAL ONCE
Qty: 0 | Refills: 0 | Status: DISCONTINUED | OUTPATIENT
Start: 2019-05-03 | End: 2019-05-03

## 2019-05-03 RX ORDER — ACETAMINOPHEN 500 MG
2 TABLET ORAL
Qty: 30 | Refills: 0
Start: 2019-05-03

## 2019-05-03 RX ADMIN — OXYCODONE AND ACETAMINOPHEN 1 TABLET(S): 5; 325 TABLET ORAL at 19:45

## 2019-05-03 NOTE — ED ADULT TRIAGE NOTE - ARRIVAL FROM
Quality 226: Preventive Care And Screening: Tobacco Use: Screening And Cessation Intervention: Patient screened for tobacco use and is an ex/non-smoker Quality 402: Tobacco Use And Help With Quitting Among Adolescents: Patient screened for tobacco and never smoked Quality 111:Pneumonia Vaccination Status For Older Adults: Pneumococcal Vaccination not Administered or Previously Received, Reason not Otherwise Specified Detail Level: Detailed Home

## 2019-05-03 NOTE — ED ADULT TRIAGE NOTE - CHIEF COMPLAINT QUOTE
right shoulder pain radiating down arm with numbness in hand that began this AM upon awakening. patient also c/o decreased pulse ox at home, patient on 5l/nc O2 at home

## 2019-05-03 NOTE — ED PROVIDER NOTE - CLINICAL SUMMARY MEDICAL DECISION MAKING FREE TEXT BOX
Pt with exacerbation of chronic right shoulder pain. XR (+) DJD, no fracture. Will discharge with ortho follow up on Tuesday as scheduled. Tylenol for pain.

## 2019-05-03 NOTE — ED PROVIDER NOTE - ATTENDING CONTRIBUTION TO CARE
78 y.o. M, PMH of COPD on 5L NC O2 at home, MICHAEL/OHS, PHTN, CKD, HTN, DLD, GERD c/o of chronic right shoulder pain, which he had for years but got worse today. Pt states he did some exercises yesterday and pain got worse today where he has difficulty raising his arm/shoulder. No fever/chills, CP/SOB, back pain or neck pain. No direct trauma. Has ortho apt on tuesday. On exam, pt in NAD, AAOx3, head NC/AT, CN II-XII intact, neck (-) midline tenderness, lungs CTA B/L, CV S1S2 regular, abdomen soft/NT/ND/(+)BS, ext (-) deformity to the shoulder, (-) pain on palpation, pain on active abduction and extension, no pain on passive ROM, sensation and pulses intact, FROM of elbow/wrist. Will do XR, EKG and reevaluate.

## 2019-05-03 NOTE — ED PROVIDER NOTE - NS ED ROS FT
Review of Systems:  •	CONSTITUTIONAL - No fever, No diaphoresis, No weight change  •	SKIN - No rash  •	HEMATOLOGIC - No abnormal bleeding or bruising  •	EYES - No eye pain, No blurred vision  •	ENT - No change in hearing, No sore throat, No neck pain, No rhinorrhea, No ear pain  •	RESPIRATORY - No shortness of breath, No cough  •	CARDIAC -No chest pain, No palpitations  •	GI - No abdominal pain, No nausea, No vomiting, No diarrhea, No constipation, No bright red blood per rectum or melena. No flank pain  •                 - No dysuria, frequency, hematuria.   •	ENDO - No polydypsia, No polyuria, No heat/cold intolerance  •	MUSCULOSKELETAL - Right shoulder pain, No swelling, No back pain  •	NEUROLOGIC - No numbness, No focal weakness, No headache, No dizziness  All other systems negative, unless specified in HPI

## 2019-05-03 NOTE — ED PROVIDER NOTE - PHYSICAL EXAMINATION
VITAL SIGNS: AFebrile, vital signs stable  CONSTITUTIONAL: Well-developed; well-nourished; in no acute distress.  SKIN: Skin exam is warm and dry, no acute rash.  HEAD: Normocephalic; atraumatic.  EYES: Pupils equal round reactive to light, Extraocular movements intact; conjunctiva and sclera clear.  ENT: No nasal discharge; airway clear. Moist mucus membranes.  NECK: Supple; non tender. No rigidity  CARD: Regular rate and rhythm. Normal S1, S2; no murmurs, gallops, or rubs.  RESP: Lungs clear to auscultation bilaterally. No wheezes, rales or rhonchi.  ABD: Abdomen soft; non-tender; non-distended;  no hepatosplenomegaly. No costovertebral angle tenderness.   EXT: Righ shoulder pain on arm passive and active R. arm extension. No clubbing, cyanosis or edema. No calf tenderness to palpation.  NEURO: Alert and oriented x 3. No focal deficits.  PSYCH: Cooperative, appropriate.

## 2019-05-03 NOTE — ED PROVIDER NOTE - CARE PLAN
Principal Discharge DX:	Shoulder pain, right  Goal:	pain control  Assessment and plan of treatment:	follow up with orthopedics on Tuesday next week

## 2019-05-03 NOTE — ED PROVIDER NOTE - OBJECTIVE STATEMENT
79 y/o M with h/o COPD on 5L NC O2 at home, MICHAEL/OHS, PHTN, CKD, HTN, DLD, GERD came for Right shoulder pain . He has had chronic R shoulder pain following up with PMD and advised to see orthopedics. He has had worsening R. shoulder pain since this morning , radiating to his forearm , worsening with extension of arm.  He denied any C.P, SOB, fever, chills, Trauma, any FI or  symptoms.  He is suppose to see orthopedics on Tuesday.

## 2019-05-19 ENCOUNTER — APPOINTMENT (OUTPATIENT)
Dept: GERIATRICS | Facility: HOME HEALTH | Age: 79
End: 2019-05-19

## 2019-05-21 ENCOUNTER — LABORATORY RESULT (OUTPATIENT)
Age: 79
End: 2019-05-21

## 2019-05-21 ENCOUNTER — OUTPATIENT (OUTPATIENT)
Dept: OUTPATIENT SERVICES | Facility: HOSPITAL | Age: 79
LOS: 1 days | Discharge: HOME | End: 2019-05-21

## 2019-05-21 DIAGNOSIS — K63.5 POLYP OF COLON: Chronic | ICD-10-CM

## 2019-05-21 DIAGNOSIS — R79.89 OTHER SPECIFIED ABNORMAL FINDINGS OF BLOOD CHEMISTRY: ICD-10-CM

## 2019-05-21 DIAGNOSIS — D64.9 ANEMIA, UNSPECIFIED: ICD-10-CM

## 2019-05-21 DIAGNOSIS — Z98.890 OTHER SPECIFIED POSTPROCEDURAL STATES: Chronic | ICD-10-CM

## 2019-05-22 ENCOUNTER — INPATIENT (INPATIENT)
Facility: HOSPITAL | Age: 79
LOS: 6 days | Discharge: HOME | End: 2019-05-29
Attending: INTERNAL MEDICINE | Admitting: INTERNAL MEDICINE
Payer: MEDICARE

## 2019-05-22 VITALS — WEIGHT: 250 LBS

## 2019-05-22 DIAGNOSIS — Z98.890 OTHER SPECIFIED POSTPROCEDURAL STATES: Chronic | ICD-10-CM

## 2019-05-22 DIAGNOSIS — K63.5 POLYP OF COLON: Chronic | ICD-10-CM

## 2019-05-22 LAB
ALBUMIN SERPL ELPH-MCNC: 3.7 G/DL — SIGNIFICANT CHANGE UP (ref 3.5–5.2)
ALP SERPL-CCNC: 69 U/L — SIGNIFICANT CHANGE UP (ref 30–115)
ALT FLD-CCNC: 14 U/L — SIGNIFICANT CHANGE UP (ref 0–41)
ANION GAP SERPL CALC-SCNC: 8 MMOL/L — SIGNIFICANT CHANGE UP (ref 7–14)
AST SERPL-CCNC: 12 U/L — SIGNIFICANT CHANGE UP (ref 0–41)
BASOPHILS # BLD AUTO: 0.07 K/UL — SIGNIFICANT CHANGE UP (ref 0–0.2)
BASOPHILS NFR BLD AUTO: 0.6 % — SIGNIFICANT CHANGE UP (ref 0–1)
BILIRUB SERPL-MCNC: 0.4 MG/DL — SIGNIFICANT CHANGE UP (ref 0.2–1.2)
BUN SERPL-MCNC: 39 MG/DL — HIGH (ref 10–20)
CALCIUM SERPL-MCNC: 9.3 MG/DL — SIGNIFICANT CHANGE UP (ref 8.5–10.1)
CHLORIDE SERPL-SCNC: 104 MMOL/L — SIGNIFICANT CHANGE UP (ref 98–110)
CO2 SERPL-SCNC: 31 MMOL/L — SIGNIFICANT CHANGE UP (ref 17–32)
CREAT SERPL-MCNC: 1.9 MG/DL — HIGH (ref 0.7–1.5)
EOSINOPHIL # BLD AUTO: 0.26 K/UL — SIGNIFICANT CHANGE UP (ref 0–0.7)
EOSINOPHIL NFR BLD AUTO: 2.4 % — SIGNIFICANT CHANGE UP (ref 0–8)
GLUCOSE BLDC GLUCOMTR-MCNC: 401 MG/DL — HIGH (ref 70–99)
GLUCOSE BLDC GLUCOMTR-MCNC: 450 MG/DL — CRITICAL HIGH (ref 70–99)
GLUCOSE SERPL-MCNC: 227 MG/DL — HIGH (ref 70–99)
HCT VFR BLD CALC: 45.5 % — SIGNIFICANT CHANGE UP (ref 42–52)
HGB BLD-MCNC: 13 G/DL — LOW (ref 14–18)
IMM GRANULOCYTES NFR BLD AUTO: 0.7 % — HIGH (ref 0.1–0.3)
LYMPHOCYTES # BLD AUTO: 1.7 K/UL — SIGNIFICANT CHANGE UP (ref 1.2–3.4)
LYMPHOCYTES # BLD AUTO: 15.8 % — LOW (ref 20.5–51.1)
MCHC RBC-ENTMCNC: 21.8 PG — LOW (ref 27–31)
MCHC RBC-ENTMCNC: 28.6 G/DL — LOW (ref 32–37)
MCV RBC AUTO: 76.2 FL — LOW (ref 80–94)
MONOCYTES # BLD AUTO: 0.86 K/UL — HIGH (ref 0.1–0.6)
MONOCYTES NFR BLD AUTO: 8 % — SIGNIFICANT CHANGE UP (ref 1.7–9.3)
NEUTROPHILS # BLD AUTO: 7.8 K/UL — HIGH (ref 1.4–6.5)
NEUTROPHILS NFR BLD AUTO: 72.5 % — SIGNIFICANT CHANGE UP (ref 42.2–75.2)
NRBC # BLD: 0 /100 WBCS — SIGNIFICANT CHANGE UP (ref 0–0)
NT-PROBNP SERPL-SCNC: 1469 PG/ML — HIGH (ref 0–300)
PLATELET # BLD AUTO: 379 K/UL — SIGNIFICANT CHANGE UP (ref 130–400)
POTASSIUM SERPL-MCNC: 5.2 MMOL/L — HIGH (ref 3.5–5)
POTASSIUM SERPL-SCNC: 5.2 MMOL/L — HIGH (ref 3.5–5)
PROT SERPL-MCNC: 6.2 G/DL — SIGNIFICANT CHANGE UP (ref 6–8)
RBC # BLD: 5.97 M/UL — SIGNIFICANT CHANGE UP (ref 4.7–6.1)
RBC # FLD: 19.7 % — HIGH (ref 11.5–14.5)
SODIUM SERPL-SCNC: 143 MMOL/L — SIGNIFICANT CHANGE UP (ref 135–146)
TROPONIN T SERPL-MCNC: 0.01 NG/ML — SIGNIFICANT CHANGE UP
WBC # BLD: 10.77 K/UL — SIGNIFICANT CHANGE UP (ref 4.8–10.8)
WBC # FLD AUTO: 10.77 K/UL — SIGNIFICANT CHANGE UP (ref 4.8–10.8)

## 2019-05-22 PROCEDURE — 71045 X-RAY EXAM CHEST 1 VIEW: CPT | Mod: 26

## 2019-05-22 PROCEDURE — 99291 CRITICAL CARE FIRST HOUR: CPT

## 2019-05-22 RX ORDER — FINASTERIDE 5 MG/1
5 TABLET, FILM COATED ORAL DAILY
Refills: 0 | Status: DISCONTINUED | OUTPATIENT
Start: 2019-05-22 | End: 2019-05-29

## 2019-05-22 RX ORDER — FUROSEMIDE 40 MG
40 TABLET ORAL DAILY
Refills: 0 | Status: DISCONTINUED | OUTPATIENT
Start: 2019-05-22 | End: 2019-05-29

## 2019-05-22 RX ORDER — ACETAMINOPHEN 500 MG
650 TABLET ORAL EVERY 6 HOURS
Refills: 0 | Status: DISCONTINUED | OUTPATIENT
Start: 2019-05-22 | End: 2019-05-29

## 2019-05-22 RX ORDER — DEXTROSE 50 % IN WATER 50 %
25 SYRINGE (ML) INTRAVENOUS ONCE
Refills: 0 | Status: DISCONTINUED | OUTPATIENT
Start: 2019-05-22 | End: 2019-05-29

## 2019-05-22 RX ORDER — DOCUSATE SODIUM 100 MG
100 CAPSULE ORAL THREE TIMES A DAY
Refills: 0 | Status: DISCONTINUED | OUTPATIENT
Start: 2019-05-22 | End: 2019-05-29

## 2019-05-22 RX ORDER — DEXTROSE 50 % IN WATER 50 %
15 SYRINGE (ML) INTRAVENOUS ONCE
Refills: 0 | Status: DISCONTINUED | OUTPATIENT
Start: 2019-05-22 | End: 2019-05-29

## 2019-05-22 RX ORDER — IPRATROPIUM/ALBUTEROL SULFATE 18-103MCG
3 AEROSOL WITH ADAPTER (GRAM) INHALATION EVERY 4 HOURS
Refills: 0 | Status: DISCONTINUED | OUTPATIENT
Start: 2019-05-22 | End: 2019-05-29

## 2019-05-22 RX ORDER — PANTOPRAZOLE SODIUM 20 MG/1
40 TABLET, DELAYED RELEASE ORAL
Refills: 0 | Status: DISCONTINUED | OUTPATIENT
Start: 2019-05-22 | End: 2019-05-29

## 2019-05-22 RX ORDER — CHOLECALCIFEROL (VITAMIN D3) 125 MCG
1000 CAPSULE ORAL DAILY
Refills: 0 | Status: DISCONTINUED | OUTPATIENT
Start: 2019-05-22 | End: 2019-05-29

## 2019-05-22 RX ORDER — GUAIFENESIN/DEXTROMETHORPHAN 600MG-30MG
10 TABLET, EXTENDED RELEASE 12 HR ORAL EVERY 6 HOURS
Refills: 0 | Status: DISCONTINUED | OUTPATIENT
Start: 2019-05-22 | End: 2019-05-29

## 2019-05-22 RX ORDER — DEXTROSE 50 % IN WATER 50 %
12.5 SYRINGE (ML) INTRAVENOUS ONCE
Refills: 0 | Status: DISCONTINUED | OUTPATIENT
Start: 2019-05-22 | End: 2019-05-29

## 2019-05-22 RX ORDER — HEPARIN SODIUM 5000 [USP'U]/ML
5000 INJECTION INTRAVENOUS; SUBCUTANEOUS EVERY 12 HOURS
Refills: 0 | Status: DISCONTINUED | OUTPATIENT
Start: 2019-05-22 | End: 2019-05-29

## 2019-05-22 RX ORDER — SODIUM CHLORIDE 9 MG/ML
1000 INJECTION, SOLUTION INTRAVENOUS
Refills: 0 | Status: DISCONTINUED | OUTPATIENT
Start: 2019-05-22 | End: 2019-05-29

## 2019-05-22 RX ORDER — SENNA PLUS 8.6 MG/1
2 TABLET ORAL AT BEDTIME
Refills: 0 | Status: DISCONTINUED | OUTPATIENT
Start: 2019-05-22 | End: 2019-05-29

## 2019-05-22 RX ORDER — INSULIN GLARGINE 100 [IU]/ML
20 INJECTION, SOLUTION SUBCUTANEOUS EVERY MORNING
Refills: 0 | Status: DISCONTINUED | OUTPATIENT
Start: 2019-05-22 | End: 2019-05-29

## 2019-05-22 RX ORDER — INSULIN GLARGINE 100 [IU]/ML
20 INJECTION, SOLUTION SUBCUTANEOUS AT BEDTIME
Refills: 0 | Status: DISCONTINUED | OUTPATIENT
Start: 2019-05-22 | End: 2019-05-29

## 2019-05-22 RX ORDER — TAMSULOSIN HYDROCHLORIDE 0.4 MG/1
0.4 CAPSULE ORAL AT BEDTIME
Refills: 0 | Status: DISCONTINUED | OUTPATIENT
Start: 2019-05-22 | End: 2019-05-29

## 2019-05-22 RX ORDER — BUDESONIDE AND FORMOTEROL FUMARATE DIHYDRATE 160; 4.5 UG/1; UG/1
2 AEROSOL RESPIRATORY (INHALATION)
Refills: 0 | Status: DISCONTINUED | OUTPATIENT
Start: 2019-05-22 | End: 2019-05-29

## 2019-05-22 RX ORDER — ATORVASTATIN CALCIUM 80 MG/1
20 TABLET, FILM COATED ORAL AT BEDTIME
Refills: 0 | Status: DISCONTINUED | OUTPATIENT
Start: 2019-05-22 | End: 2019-05-29

## 2019-05-22 RX ORDER — INSULIN LISPRO 100/ML
10 VIAL (ML) SUBCUTANEOUS
Refills: 0 | Status: DISCONTINUED | OUTPATIENT
Start: 2019-05-22 | End: 2019-05-29

## 2019-05-22 RX ORDER — GLUCAGON INJECTION, SOLUTION 0.5 MG/.1ML
1 INJECTION, SOLUTION SUBCUTANEOUS ONCE
Refills: 0 | Status: DISCONTINUED | OUTPATIENT
Start: 2019-05-22 | End: 2019-05-29

## 2019-05-22 RX ORDER — POLYETHYLENE GLYCOL 3350 17 G/17G
17 POWDER, FOR SOLUTION ORAL DAILY
Refills: 0 | Status: DISCONTINUED | OUTPATIENT
Start: 2019-05-22 | End: 2019-05-29

## 2019-05-22 RX ORDER — TIOTROPIUM BROMIDE 18 UG/1
1 CAPSULE ORAL; RESPIRATORY (INHALATION) AT BEDTIME
Refills: 0 | Status: DISCONTINUED | OUTPATIENT
Start: 2019-05-22 | End: 2019-05-28

## 2019-05-22 RX ORDER — ASCORBIC ACID 60 MG
500 TABLET,CHEWABLE ORAL DAILY
Refills: 0 | Status: DISCONTINUED | OUTPATIENT
Start: 2019-05-22 | End: 2019-05-29

## 2019-05-22 RX ORDER — FUROSEMIDE 40 MG
40 TABLET ORAL ONCE
Refills: 0 | Status: COMPLETED | OUTPATIENT
Start: 2019-05-22 | End: 2019-05-22

## 2019-05-22 RX ORDER — METOPROLOL TARTRATE 50 MG
25 TABLET ORAL
Refills: 0 | Status: DISCONTINUED | OUTPATIENT
Start: 2019-05-22 | End: 2019-05-29

## 2019-05-22 RX ORDER — SODIUM POLYSTYRENE SULFONATE 4.1 MEQ/G
30 POWDER, FOR SUSPENSION ORAL
Refills: 0 | Status: DISCONTINUED | OUTPATIENT
Start: 2019-05-22 | End: 2019-05-29

## 2019-05-22 RX ORDER — FERROUS SULFATE 325(65) MG
325 TABLET ORAL DAILY
Refills: 0 | Status: DISCONTINUED | OUTPATIENT
Start: 2019-05-22 | End: 2019-05-29

## 2019-05-22 RX ORDER — NYSTATIN CREAM 100000 [USP'U]/G
1 CREAM TOPICAL
Refills: 0 | Status: DISCONTINUED | OUTPATIENT
Start: 2019-05-22 | End: 2019-05-29

## 2019-05-22 RX ORDER — INSULIN LISPRO 100/ML
VIAL (ML) SUBCUTANEOUS
Refills: 0 | Status: DISCONTINUED | OUTPATIENT
Start: 2019-05-22 | End: 2019-05-29

## 2019-05-22 RX ORDER — ALBUTEROL 90 UG/1
2 AEROSOL, METERED ORAL EVERY 6 HOURS
Refills: 0 | Status: DISCONTINUED | OUTPATIENT
Start: 2019-05-22 | End: 2019-05-29

## 2019-05-22 RX ADMIN — SENNA PLUS 2 TABLET(S): 8.6 TABLET ORAL at 21:27

## 2019-05-22 RX ADMIN — Medication 6: at 17:49

## 2019-05-22 RX ADMIN — Medication 10 UNIT(S): at 17:49

## 2019-05-22 RX ADMIN — Medication 3 MILLILITER(S): at 19:02

## 2019-05-22 RX ADMIN — Medication 25 MILLIGRAM(S): at 17:50

## 2019-05-22 RX ADMIN — HEPARIN SODIUM 5000 UNIT(S): 5000 INJECTION INTRAVENOUS; SUBCUTANEOUS at 17:51

## 2019-05-22 RX ADMIN — Medication 3 MILLILITER(S): at 16:07

## 2019-05-22 RX ADMIN — ATORVASTATIN CALCIUM 20 MILLIGRAM(S): 80 TABLET, FILM COATED ORAL at 21:28

## 2019-05-22 RX ADMIN — INSULIN GLARGINE 20 UNIT(S): 100 INJECTION, SOLUTION SUBCUTANEOUS at 21:29

## 2019-05-22 RX ADMIN — TAMSULOSIN HYDROCHLORIDE 0.4 MILLIGRAM(S): 0.4 CAPSULE ORAL at 21:27

## 2019-05-22 RX ADMIN — Medication 40 MILLIGRAM(S): at 21:27

## 2019-05-22 RX ADMIN — Medication 40 MILLIGRAM(S): at 12:17

## 2019-05-22 RX ADMIN — Medication 100 MILLIGRAM(S): at 21:28

## 2019-05-22 NOTE — PATIENT PROFILE ADULT - SURGICAL SITE INCISION
Patient informed procedure on hold today due to good blood counts. Discharge instructions given to patient-verbalizes understanding. Ambulated off unit per self with belongings. no

## 2019-05-22 NOTE — PROVIDER CONTACT NOTE (OTHER) - ACTION/TREATMENT ORDERED:
placed pt on nrb pt went up to 97% on nrb. respiratory and md adams notified. will continue to montior pt.

## 2019-05-22 NOTE — ED ADULT NURSE NOTE - NSIMPLEMENTINTERV_GEN_ALL_ED
Implemented All Fall with Harm Risk Interventions:  [place] to call system. Call bell, personal items and telephone within reach. Instruct patient to call for assistance. Room bathroom lighting operational. Non-slip footwear when patient is off stretcher. Physically safe environment: no spills, clutter or unnecessary equipment. Stretcher in lowest position, wheels locked, appropriate side rails in place. Provide visual cue, wrist band, yellow gown, etc. Monitor gait and stability. Monitor for mental status changes and reorient to person, place, and time. Review medications for side effects contributing to fall risk. Reinforce activity limits and safety measures with patient and family. Provide visual clues: red socks.

## 2019-05-22 NOTE — PATIENT PROFILE ADULT - NSPROGENOTHERPROVIDER_GEN_A_NUR
Transitional Care Management - Hospital Discharge Notification    Mr. Marcelino West was discharged from [facility] on 3/1/18 Mr. West has a Transitional Care Management follow-up appointment already scheduled with  Soila Urias NP on 3/5/18.    Please remember to initiate the Transitional Care Management telephone encounter within 2 business days of discharge.       none

## 2019-05-22 NOTE — PATIENT PROFILE ADULT - NSTOBACCO TYPE_GEN_A_CORE_RD
Problem: Patient Care Overview  Goal: Plan of Care Review  Outcome: Ongoing (interventions implemented as appropriate)   10/18/18 [id number]   Coping/Psychosocial   Plan of Care Reviewed With patient;spouse     Goal: Individualization and Mutuality  Outcome: Ongoing (interventions implemented as appropriate)    Goal: Discharge Needs Assessment  Outcome: Ongoing (interventions implemented as appropriate)    Goal: Interprofessional Rounds/Family Conf  Outcome: Outcome(s) achieved Date Met: 10/18/18      Problem: Labor (Cervical Ripen, Induct, Augment) (Adult,Obstetrics,Pediatric)  Goal: Signs and Symptoms of Listed Potential Problems Will be Absent, Minimized or Managed (Labor)  Outcome: Outcome(s) achieved Date Met: 10/18/18         Cigarettes

## 2019-05-22 NOTE — H&P ADULT - NSHPPHYSICALEXAM_GEN_ALL_CORE
VITALS:  T(F): --  HR: 101 (05-22-19 @ 11:13) (101 - 101)  BP: 109/56 (05-22-19 @ 11:13) (109/56 - 109/56)  RR: 20 (05-22-19 @ 11:13) (20 - 20)  SpO2: 99% (05-22-19 @ 11:13) (99% - 99%)      PHYSICAL EXAM:  GENERAL: NAD, well-developed  HEAD:  Atraumatic, Normocephalic  EYES: EOMI, PERRLA, + bilateral conjunctival injection and sclera clear  ENMT: No tonsillar erythema, exudates, or enlargement; Moist mucous membranes  NECK: Supple, No JVD, Normal thyroid  NERVOUS SYSTEM:  Alert & Oriented X3, Good concentration; Motor Strength 5/5 B/L upper and lower extremities  CHEST/LUNG: Decreased AE bilaterally; No rales, rhonchi, wheezing, or rubs  HEART: Regular rate and rhythm; No murmurs, rubs, or gallops  ABDOMEN: Soft, Nontender, ++ distended; Bowel sounds present  EXTREMITIES:  2+ Peripheral Pulses, No clubbing, cyanosis, trace bipedal edema  LYMPH: No lymphadenopathy noted  SKIN: Plaque like lesion on the left elbow.

## 2019-05-22 NOTE — H&P ADULT - ASSESSMENT
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  Patient mentions having only minimal dyspnea and mild productive cough with bipedal edema markedly improved form his most recent admission. Hypoxia was also confirmed on EMS arrival thus was started on BiPAP and given 5 nebs with Solumedrol 125mg IM with improvement.  He was admitted for further management.      Assessment & Plan:    1. Acute on Chronic Hypoxic respiratory failure:   Due to end stage COPD exacerbation vs. Acute on Chronic Diastolic Heart Failure.  Chest X-ray showed stable cardiomegaly with Pulmonary vascular congestion.  Continue Symbicort, Spiriva & Bronchodilators ATC and prn and Steroids (On chronic Steroids Prednisone 10 mg daily).   Complete course of Levaquin.   Supplemental oxygen. On 4-5L NC at home. BiPAP prn & qhs.  Maintain saturation >88%. Pulm consult.  DNR & DNI.       2. Severe Pulmonary Hypertension:  3. Acute on Chronic Diastolic HF exacerbation:  ECHO 3/19: LVH, LVEF 50-55%, Moderate to severe TR. Dilated RA & RV. Severe pulmonary hypertension.  Continue Lasix.          4. h/o Hypertension:  BP stable. Continue Metoprolol.         5. Diabetes mellitus, type 2:   Monitor FS with Insulin coverage.  Hemoglobin A1C, Whole Blood: 7.2 % (03.12.19 @ 07:00)  ADA diet.        6. CKD stage 3:  Creatinine stable. Baseline (1.6- 2.8).  Avoid nephrotoxins. Monitor BMP and Electrolytes.        7. BPH:  Continue Flomax and Finasteride.         8. Morbid Obesity:  Dietary & Lifestyle modification.  BMI >40.      9. B12 deficiency & Vitamin D deficiency:  Continue with supplementation.         10. MICHAEL/OHVS:  BiPAP at night.      11.  Hyperkalemia:   Continue Low K diet & Kayexalate 30 gm po TTS (3x week).      12. Microcytosis due to EMMY:  % Saturation, Iron: 8 % (03.28.19 @ 06:53)  Continue Iron supplement.  Hgb stable.      13. Debility/limited life expectancy:  Due to underlying medical conditions.  Recommended Hospice several times.  Patient qualifies but refuses hospice.      Prophylaxis: Heparin  Code status: DNR/DNI    Progress Note Handoff:  Pending consults: Pulm  Pending Tests: None  Significant Test results: CXR  Other issues: None  Family/Patient discussion: Plan of care discussed with Patient and Son Aj.  Disposition: Unknown at this time.      Attending: Dr. Kimberly Bolden. Spectra [address]3. 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  Patient mentions having only minimal dyspnea and mild productive cough with bipedal edema markedly improved form his most recent admission. Hypoxia was also confirmed on EMS arrival thus was started on BiPAP and given 5 nebs with Solumedrol 125mg IM with improvement.  He was admitted for further management.      Assessment & Plan:    1. Acute on Chronic Hypoxic respiratory failure:   Due to end stage COPD exacerbation vs. Acute on Chronic Diastolic Heart Failure.  Chest X-ray showed stable cardiomegaly with Pulmonary vascular congestion.  Continue Symbicort, Spiriva & Bronchodilators ATC and prn and Steroids (On chronic Steroids Prednisone 10 mg daily).   Complete course of Levaquin.   Supplemental oxygen. On 4-5L NC at home. BiPAP prn & qhs.  Maintain saturation >88%. Pulm consult.  DNR & DNI.       2. Severe Pulmonary Hypertension:  3. Acute on Chronic Diastolic HF exacerbation:  ECHO 3/19: LVH, LVEF 50-55%, Moderate to severe TR. Dilated RA & RV. Severe pulmonary hypertension.  Continue Lasix PO 40 mg daily. No overt Evidence of Fluid overload.        4. h/o Hypertension:  BP stable. Continue Metoprolol.         5. Diabetes mellitus, type 2:   Monitor FS with Insulin coverage.  Hemoglobin A1C, Whole Blood: 7.2 % (03.12.19 @ 07:00)  ADA diet.        6. CKD stage 3:  Creatinine stable. Baseline (1.6- 2.8).  Avoid nephrotoxins. Monitor BMP and Electrolytes.        7. BPH:  Continue Flomax and Finasteride.         8. Morbid Obesity:  Dietary & Lifestyle modification.  BMI >40.      9. Vitamin D deficiency:  Continue with supplementation.         10. MICHAEL/OHVS:  BiPAP at night.      11.  Hyperkalemia:   Continue Low K diet & Kayexalate 30 gm po TTS (3x week).      12. Microcytosis due to EMMY:  % Saturation, Iron: 8 % (03.28.19 @ 06:53)  Continue Iron supplement.  Hgb stable.      13. Debility/limited life expectancy:  Due to underlying medical conditions.  Recommended Hospice several times.  Patient qualifies but refuses hospice.      14. Conjunctivitis:  Continue home eye drops.      Prophylaxis: Heparin  Code status: DNR/DNI    Progress Note Handoff:  Pending consults: Pulm  Pending Tests: None  Significant Test results: CXR  Other issues: None  Family/Patient discussion: Plan of care discussed with Patient and Son Aj.  Disposition: Unknown at this time.      Attending: Dr. Kimberly Bolden. [address] [address]3.

## 2019-05-22 NOTE — ED PROVIDER NOTE - NS ED ROS FT
Review of Systems    Constitutional: (-) fever, (-) chills  Eyes/ENT: (-) blurry vision, (-) epistaxis, (-) sore throat  Cardiovascular: (-) chest pain, (-) syncope  Respiratory: (+) cough, (+) shortness of breath  Gastrointestinal: (-) pain, (-) nausea, (-) vomiting, (-) diarrhea  Musculoskeletal: (-) neck pain, (-) back pain, (-) joint pain  Integumentary: (-) rash, (-) edema  Neurological: (-) headache, (-) altered mental status  Psychiatric: (-) hallucinations  Allergic/Immunologic: (-) pruritus

## 2019-05-22 NOTE — H&P ADULT - HISTORY OF PRESENT ILLNESS
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  Patient mentions having only minimal dyspnea and mild productive cough with bipedal edema markedly improved form his most recent admission. Hypoxia was also confirmed on EMS arrival thus was started on BiPAP and given 5 nebs with Solumedrol 125mg IM with improvement. He was admitted for further management.

## 2019-05-22 NOTE — H&P ADULT - NSHPREVIEWOFSYSTEMS_GEN_ALL_CORE
CONSTITUTIONAL: No fever, weight loss, or fatigue  EYES: No eye pain, visual disturbances, or discharge  ENMT:  No difficulty hearing, tinnitus, vertigo; No sinus or throat pain  NECK: No pain or stiffness  BREASTS: No pain, masses, or nipple discharge  RESPIRATORY: + cough, No wheezing, chills or hemoptysis; + shortness of breath  CARDIOVASCULAR: No chest pain, palpitations, dizziness, or leg swelling  GASTROINTESTINAL: No abdominal or epigastric pain. No nausea, vomiting, or hematemesis; No diarrhea or constipation. No melena or hematochezia.  GENITOURINARY: No dysuria, frequency, hematuria, or incontinence  NEUROLOGICAL: No headaches, memory loss, loss of strength, numbness, or tremors  SKIN: No itching, burning, rashes, or lesions   LYMPH NODES: No enlarged glands  ENDOCRINE: No heat or cold intolerance; No hair loss  MUSCULOSKELETAL: No joint pain or swelling; No muscle, back, or extremity pain  PSYCHIATRIC: No depression, anxiety, mood swings, or difficulty sleeping  HEME/LYMPH: No easy bruising, or bleeding gums  ALLERGY AND IMMUNOLOGIC: No hives or eczema
Patrick: 56 yo male with a h/o HTN s/p third finger open fracture on 6/24 sen tin by an outside hand surgeon for an infection at the site. Pt was sutured at [facility] and has been following up as an outpatient. yesterday he was seen by a workers comp hand specialist who started him on Po doxycyline and recommended that he come to the ED. Pt endorses increased drainage from the finger, and worsening pain. No fevers or chills. Exam: GENERAL: well appearing, NAD, HEENT: MMM, CARDIO: +S1/S2, no murmurs, rubs or gallops, LUNGS: CTA B/L, no wheezing, rales or rhonchi, ABD: soft, nontender, BSx4 quadrants, no guarding or rigidity. EXT: right third digit wound- intact but with malodorous drainage, + TTP and necrosis around nail bed. + surrounding erythema and soft tissue swelling. decreased sensation at distal tip of finger and unable to flex at DIP.  NEURO: AxOx3, SKIN: finger wound as noted above. A/P- 56 yo male with concern for non healing previously open tuft fracture. will obtain labs, XR, give IV abx and consult hand.

## 2019-05-22 NOTE — ED PROVIDER NOTE - ATTENDING CONTRIBUTION TO CARE
I was present for and supervised the key and critical aspects of the procedures performed during the care of the patient. patient presents for evaluation of sudden onset worsening shortness of breath that has been present and worsening over the past several hours he has a history of copd with no chest pain no fevers or chills.  He denies any chest pain.  He denies any diaphoresis ems arrived found patient to have increased wob with resp effort saturation in the 70's.    upon arrival to the ED patient improved with bipap, we obtained labs I will admit for further monitoring at this time.

## 2019-05-22 NOTE — ED PROVIDER NOTE - OBJECTIVE STATEMENT
hx from patient and EMS.  77 yo M hx of DM, COPD c/o SOB x few days, increased today. Patient found by EMS with Sat in 70's. Given 5 nebs with Solumedrol 125mg IM with improvement. +cough.  No CP, fevers, or abdominal pains.

## 2019-05-22 NOTE — ED PROVIDER NOTE - PHYSICAL EXAMINATION
Gen: Alert, tachypneic  Head: NC, AT, PERRL, EOMI, normal lids/conjunctiva  ENT: normal hearing, patent oropharynx without   Neck: +supple, no tenderness/meningismus  Pulm: decreased BS b/l.    CV: S1S2, tachy  Abd: soft, NT/ND  Mskel: +1  edema b/l  LE. No erythema/cyanosis  Skin: no rash, warm/dry  Neuro: AAOx3, no sensory/motor deficits

## 2019-05-22 NOTE — H&P ADULT - NSHPLABSRESULTS_GEN_ALL_CORE
13.0   10.77 )-----------( 379      ( 22 May 2019 10:25 )                  45.5     05-22    143  |  104  |  39<H>  ----------------------------<  227<H>  5.2<H>   |  31  |  1.9<H>    Ca    9.3      22 May 2019 10:25    TPro  6.2  /  Alb  3.7  /  TBili  0.4  /  DBili  x   /  AST  12  /  ALT  14  /  AlkPhos  69  05-22      CARDIAC MARKERS ( 22 May 2019 10:25 )  x     / 0.01 ng/mL / x     / x     / x      Blood Gas Profile - Arterial (05.22.19 @ 10:40)    pH, Arterial: 7.38    pCO2, Arterial: 53 mmHg    pO2, Arterial: 200 mmHg    HCO3, Arterial: 31 mmoL/L    Base Excess, Arterial: 4.8 mmoL/L    Oxygen Saturation, Arterial: 100 %    FIO2, Arterial: 21    Serum Pro-Brain Natriuretic Peptide: 1469 pg/mL (05.22.19 @ 10:25)      X-ray Chest 1 View- PORTABLE-Routine (05.22.19 @ 11:19)     Stable enlarged cardiac silhouette.    No significant change in pulmonary vascular congestion.

## 2019-05-23 LAB
-  COAGULASE NEGATIVE STAPHYLOCOCCUS: SIGNIFICANT CHANGE UP
ESTIMATED AVERAGE GLUCOSE: 192 MG/DL — HIGH (ref 68–114)
GLUCOSE BLDC GLUCOMTR-MCNC: 219 MG/DL — HIGH (ref 70–99)
GLUCOSE BLDC GLUCOMTR-MCNC: 225 MG/DL — HIGH (ref 70–99)
GLUCOSE BLDC GLUCOMTR-MCNC: 255 MG/DL — HIGH (ref 70–99)
GLUCOSE BLDC GLUCOMTR-MCNC: 349 MG/DL — HIGH (ref 70–99)
GLUCOSE BLDC GLUCOMTR-MCNC: 417 MG/DL — HIGH (ref 70–99)
GRAM STN FLD: SIGNIFICANT CHANGE UP
HBA1C BLD-MCNC: 8.3 % — HIGH (ref 4–5.6)
METHOD TYPE: SIGNIFICANT CHANGE UP
SPECIMEN SOURCE: SIGNIFICANT CHANGE UP

## 2019-05-23 RX ORDER — ZALEPLON 10 MG
5 CAPSULE ORAL AT BEDTIME
Refills: 0 | Status: DISCONTINUED | OUTPATIENT
Start: 2019-05-23 | End: 2019-05-27

## 2019-05-23 RX ORDER — INSULIN LISPRO 100/ML
6 VIAL (ML) SUBCUTANEOUS ONCE
Refills: 0 | Status: COMPLETED | OUTPATIENT
Start: 2019-05-23 | End: 2019-05-23

## 2019-05-23 RX ADMIN — Medication 25 MILLIGRAM(S): at 06:16

## 2019-05-23 RX ADMIN — Medication 40 MILLIGRAM(S): at 06:14

## 2019-05-23 RX ADMIN — TIOTROPIUM BROMIDE 1 CAPSULE(S): 18 CAPSULE ORAL; RESPIRATORY (INHALATION) at 11:28

## 2019-05-23 RX ADMIN — TIOTROPIUM BROMIDE 1 CAPSULE(S): 18 CAPSULE ORAL; RESPIRATORY (INHALATION) at 22:21

## 2019-05-23 RX ADMIN — Medication 3 MILLILITER(S): at 00:23

## 2019-05-23 RX ADMIN — SODIUM POLYSTYRENE SULFONATE 30 GRAM(S): 4.1 POWDER, FOR SUSPENSION ORAL at 22:21

## 2019-05-23 RX ADMIN — POLYETHYLENE GLYCOL 3350 17 GRAM(S): 17 POWDER, FOR SOLUTION ORAL at 11:29

## 2019-05-23 RX ADMIN — Medication 10 UNIT(S): at 11:35

## 2019-05-23 RX ADMIN — HEPARIN SODIUM 5000 UNIT(S): 5000 INJECTION INTRAVENOUS; SUBCUTANEOUS at 06:15

## 2019-05-23 RX ADMIN — Medication 3: at 07:55

## 2019-05-23 RX ADMIN — Medication 3 MILLILITER(S): at 19:58

## 2019-05-23 RX ADMIN — NYSTATIN CREAM 1 APPLICATION(S): 100000 CREAM TOPICAL at 06:16

## 2019-05-23 RX ADMIN — Medication 10 UNIT(S): at 07:55

## 2019-05-23 RX ADMIN — INSULIN GLARGINE 20 UNIT(S): 100 INJECTION, SOLUTION SUBCUTANEOUS at 07:56

## 2019-05-23 RX ADMIN — INSULIN GLARGINE 20 UNIT(S): 100 INJECTION, SOLUTION SUBCUTANEOUS at 21:16

## 2019-05-23 RX ADMIN — Medication 4: at 11:35

## 2019-05-23 RX ADMIN — TAMSULOSIN HYDROCHLORIDE 0.4 MILLIGRAM(S): 0.4 CAPSULE ORAL at 21:15

## 2019-05-23 RX ADMIN — Medication 40 MILLIGRAM(S): at 06:16

## 2019-05-23 RX ADMIN — Medication 100 MILLIGRAM(S): at 21:14

## 2019-05-23 RX ADMIN — FINASTERIDE 5 MILLIGRAM(S): 5 TABLET, FILM COATED ORAL at 11:29

## 2019-05-23 RX ADMIN — Medication 40 MILLIGRAM(S): at 14:03

## 2019-05-23 RX ADMIN — Medication 1000 UNIT(S): at 11:29

## 2019-05-23 RX ADMIN — Medication 10 UNIT(S): at 16:56

## 2019-05-23 RX ADMIN — PANTOPRAZOLE SODIUM 40 MILLIGRAM(S): 20 TABLET, DELAYED RELEASE ORAL at 14:03

## 2019-05-23 RX ADMIN — Medication 25 MILLIGRAM(S): at 17:00

## 2019-05-23 RX ADMIN — Medication 6 UNIT(S): at 00:30

## 2019-05-23 RX ADMIN — Medication 3 MILLILITER(S): at 07:29

## 2019-05-23 RX ADMIN — Medication 3 MILLILITER(S): at 13:03

## 2019-05-23 RX ADMIN — Medication 100 MILLIGRAM(S): at 06:14

## 2019-05-23 RX ADMIN — BUDESONIDE AND FORMOTEROL FUMARATE DIHYDRATE 2 PUFF(S): 160; 4.5 AEROSOL RESPIRATORY (INHALATION) at 21:13

## 2019-05-23 RX ADMIN — Medication 500 MILLIGRAM(S): at 11:29

## 2019-05-23 RX ADMIN — BUDESONIDE AND FORMOTEROL FUMARATE DIHYDRATE 2 PUFF(S): 160; 4.5 AEROSOL RESPIRATORY (INHALATION) at 07:56

## 2019-05-23 RX ADMIN — Medication 2: at 16:56

## 2019-05-23 RX ADMIN — ATORVASTATIN CALCIUM 20 MILLIGRAM(S): 80 TABLET, FILM COATED ORAL at 21:14

## 2019-05-23 RX ADMIN — Medication 325 MILLIGRAM(S): at 11:29

## 2019-05-23 RX ADMIN — NYSTATIN CREAM 1 APPLICATION(S): 100000 CREAM TOPICAL at 17:00

## 2019-05-23 RX ADMIN — Medication 100 MILLIGRAM(S): at 14:03

## 2019-05-23 RX ADMIN — Medication 30 MILLILITER(S): at 19:06

## 2019-05-23 RX ADMIN — SENNA PLUS 2 TABLET(S): 8.6 TABLET ORAL at 21:14

## 2019-05-23 NOTE — PROGRESS NOTE ADULT - SUBJECTIVE AND OBJECTIVE BOX
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  Patient mentions having only minimal dyspnea and mild productive cough with bipedal edema markedly improved form his most recent admission. Hypoxia was also confirmed on EMS arrival thus was started on BiPAP and given 5 nebs with Solumedrol 125mg IM with improvement. He was admitted for further management. (22 May 2019 13:56)      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  High cholesterol  GERD (gastroesophageal reflux disease)  Enlarged prostate  Oxygen dependent  COPD (chronic obstructive pulmonary disease)  Diabetes mellitus, type 2  Hypertension  Emphysema lung  History of hemorrhoidectomy  Dysplastic colon polyp: removed      FAMILY HISTORY:  No pertinent family history in first degree relatives    Family history: No family cardiovascular system   Occupation:  Alochol: Denied  Smoking: Denied  Drug Use: Denied  Marital Status:           Allergies    fish (Other)  iodine (Hives)  penicillin (Unknown)  shellfish (Other)    Intolerances    aspirin (Other)      Home Medications:  aluminum hydroxide-magnesium hydroxide 200 mg-200 mg/5 mL oral suspension: 30 milliliter(s) orally every 4 hours, As needed, Dyspepsia (29 Mar 2019 12:34)  ascorbic acid 500 mg oral tablet: 1 tab(s) orally once a day (24 Jan 2019 10:06)  atorvastatin 20 mg oral tablet: 1 tab(s) orally once a day (at bedtime) (24 Jan 2019 10:06)  cholecalciferol oral tablet: 1000 unit(s) orally once a day (24 Jan 2019 10:06)  docusate sodium 100 mg oral capsule: 1 cap(s) orally 3 times a day (29 Mar 2019 12:34)  finasteride 5 mg oral tablet: 1 tab(s) orally once a day (24 Jan 2019 10:06)  guaifenesin-dextromethorphan 100 mg-10 mg/5 mL oral liquid: 10 milliliter(s) orally every 6 hours, As needed, Cough (02 Apr 2019 13:26)  levoFLOXacin 750 mg oral tablet: 1 tab(s) orally every 24 hours STOP 4/4/19 (02 Apr 2019 13:26)  magnesium oxide 400 mg (241.3 mg elemental magnesium) oral tablet: 1 tab(s) orally 3 times a day (with meals) (24 Jan 2019 10:06)  metoprolol tartrate 25 mg oral tablet: 1 tab(s) orally 2 times a day (24 Jan 2019 10:06)  nystatin 100,000 units/g topical powder: 1 application topically 2 times a day (29 Mar 2019 12:34)  ocular lubricant ophthalmic solution: 1 drop(s) to each affected eye 3 times a day (24 Jan 2019 10:06)  polyethylene glycol 3350 oral powder for reconstitution: 17 gram(s) orally once a day (29 Mar 2019 12:34)  senna oral tablet: 2 tab(s) orally once a day (at bedtime) (29 Mar 2019 12:34)  sodium polystyrene sulfonate: 30 milligram(s) orally 3 times a week (28 Dec 2018 08:26)  tamsulosin 0.4 mg oral capsule: 1 cap(s) orally once a day (at bedtime) (24 Jan 2019 10:06)      ROS: as in HPI; All other systems reviewed are negative        PHYSICAL EXAM:  Vital Signs Last 24 Hrs  T(C): 35.8 (23 May 2019 05:33), Max: 36.4 (22 May 2019 21:04)  T(F): 96.4 (23 May 2019 05:33), Max: 97.6 (22 May 2019 21:04)  HR: 81 (23 May 2019 05:33) (81 - 101)  BP: 124/69 (23 May 2019 05:33) (102/57 - 136/76)  BP(mean): --  RR: 19 (23 May 2019 05:33) (16 - 20)  SpO2: 89% (22 May 2019 17:58) (76% - 99%)      GENERAL: NAD, well-groomed, well-developed  HEAD:  Atraumatic, Normocephalic  EYES: EOMI, PERRLA, conjunctiva and sclera clear  ENMT: No tonsillar erythema, exudates, or enlargement; Moist mucous membranes, Good dentition, No lesions  NECK: Supple, No JVD, Normal thyroid  NERVOUS SYSTEM:  Alert & Oriented X3, Good concentration; Motor Strength 5/5 B/L upper and lower extremities; DTRs 2+ intact and symmetric  CHEST/LUNG: Clear to percussion bilaterally; No rales, rhonchi, wheezing, or rubs  HEART: Regular rate and rhythm; No murmurs, rubs, or gallops  ABDOMEN: Soft, Nontender, Nondistended; Bowel sounds present  EXTREMITIES:  2+ Peripheral Pulses, No clubbing, cyanosis, or edema  LYMPH: No lymphadenopathy noted  SKIN: No rashes or lesions    HOSPITAL MEDICATIONS:  MEDICATIONS  (STANDING):  ALBUTerol/ipratropium for Nebulization 3 milliLiter(s) Nebulizer every 4 hours  ascorbic acid 500 milliGRAM(s) Oral daily  atorvastatin 20 milliGRAM(s) Oral at bedtime  buDESOnide 160 MICROgram(s)/formoterol 4.5 MICROgram(s) Inhaler 2 Puff(s) Inhalation two times a day  cholecalciferol 1000 Unit(s) Oral daily  dextrose 5%. 1000 milliLiter(s) (50 mL/Hr) IV Continuous <Continuous>  dextrose 50% Injectable 12.5 Gram(s) IV Push once  dextrose 50% Injectable 25 Gram(s) IV Push once  dextrose 50% Injectable 25 Gram(s) IV Push once  docusate sodium 100 milliGRAM(s) Oral three times a day  ferrous    sulfate 325 milliGRAM(s) Oral daily  finasteride 5 milliGRAM(s) Oral daily  furosemide    Tablet 40 milliGRAM(s) Oral daily  heparin  Injectable 5000 Unit(s) SubCutaneous every 12 hours  insulin glargine Injectable (LANTUS) 20 Unit(s) SubCutaneous every morning  insulin glargine Injectable (LANTUS) 20 Unit(s) SubCutaneous at bedtime  insulin lispro (HumaLOG) corrective regimen sliding scale   SubCutaneous three times a day before meals  insulin lispro Injectable (HumaLOG) 10 Unit(s) SubCutaneous three times a day before meals  levoFLOXacin IVPB      levoFLOXacin IVPB 250 milliGRAM(s) IV Intermittent daily  methylPREDNISolone sodium succinate Injectable 40 milliGRAM(s) IV Push every 8 hours  metoprolol tartrate 25 milliGRAM(s) Oral two times a day  nystatin Powder 1 Application(s) Topical two times a day  pantoprazole    Tablet 40 milliGRAM(s) Oral before breakfast  polyethylene glycol 3350 17 Gram(s) Oral daily  senna 2 Tablet(s) Oral at bedtime  sodium polystyrene sulfonate Suspension 30 Gram(s) Oral <User Schedule>  tamsulosin 0.4 milliGRAM(s) Oral at bedtime  tiotropium 18 MICROgram(s) Capsule 1 Capsule(s) Inhalation at bedtime    MEDICATIONS  (PRN):  acetaminophen   Tablet .. 650 milliGRAM(s) Oral every 6 hours PRN Temp greater or equal to 38C (100.4F), Moderate Pain (4 - 6)  ALBUTerol    90 MICROgram(s) HFA Inhaler 2 Puff(s) Inhalation every 6 hours PRN Shortness of Breath and/or Wheezing  aluminum hydroxide/magnesium hydroxide/simethicone Suspension 30 milliLiter(s) Oral every 4 hours PRN Dyspepsia  dextrose 40% Gel 15 Gram(s) Oral once PRN Blood Glucose LESS THAN 70 milliGRAM(s)/deciliter  glucagon  Injectable 1 milliGRAM(s) IntraMuscular once PRN Glucose LESS THAN 70 milligrams/deciliter  guaiFENesin/dextromethorphan  Syrup 10 milliLiter(s) Oral every 6 hours PRN Cough      LABS:                        13.0   10.77 )-----------( 379      ( 22 May 2019 10:25 )             45.5     05-22    143  |  104  |  39<H>  ----------------------------<  227<H>  5.2<H>   |  31  |  1.9<H>    Ca    9.3      22 May 2019 10:25    TPro  6.2  /  Alb  3.7  /  TBili  0.4  /  DBili  x   /  AST  12  /  ALT  14  /  AlkPhos  69  05-22        Arterial Blood Gas:  05-22 @ 10:40  7.38/53/200/31/100/4.8  ABG lactate: --            RADIOLOGY:  [ ] Reviewed and interpreted by me  stable   ECHO:    Point of Care Ultrasound Findings;    PFT:      Impression:          Plan: Patient is a 78y old  Male who presents with a chief complaint of Hypoxia (22 May 2019 13:56)      HPI:  Patient is an 78y Male with h/o 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  Patient mentions having only minimal dyspnea and mild productive cough with bipedal edema markedly improved form his most recent admission. Hypoxia was also confirmed on EMS arrival thus was started on BiPAP and given 5 nebs with Solumedrol 125mg IM with improvement. He was admitted for further management. (22 May 2019 13:56)  now comfortable not on distress     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  High cholesterol  GERD (gastroesophageal reflux disease)  Enlarged prostate  Oxygen dependent  COPD (chronic obstructive pulmonary disease)  Diabetes mellitus, type 2  Hypertension  Emphysema lung  History of hemorrhoidectomy  Dysplastic colon polyp: removed      FAMILY HISTORY:  No pertinent family history in first degree relatives    Family history: No family cardiovascular system   Occupation:  Alochol: Denied  Smoking: ex  Drug Use: Denied  Marital Status:           Allergies    fish (Other)  iodine (Hives)  penicillin (Unknown)  shellfish (Other)    Intolerances    aspirin (Other)      Home Medications:  aluminum hydroxide-magnesium hydroxide 200 mg-200 mg/5 mL oral suspension: 30 milliliter(s) orally every 4 hours, As needed, Dyspepsia (29 Mar 2019 12:34)  ascorbic acid 500 mg oral tablet: 1 tab(s) orally once a day (24 Jan 2019 10:06)  atorvastatin 20 mg oral tablet: 1 tab(s) orally once a day (at bedtime) (24 Jan 2019 10:06)  cholecalciferol oral tablet: 1000 unit(s) orally once a day (24 Jan 2019 10:06)  docusate sodium 100 mg oral capsule: 1 cap(s) orally 3 times a day (29 Mar 2019 12:34)  finasteride 5 mg oral tablet: 1 tab(s) orally once a day (24 Jan 2019 10:06)  guaifenesin-dextromethorphan 100 mg-10 mg/5 mL oral liquid: 10 milliliter(s) orally every 6 hours, As needed, Cough (02 Apr 2019 13:26)  levoFLOXacin 750 mg oral tablet: 1 tab(s) orally every 24 hours STOP 4/4/19 (02 Apr 2019 13:26)  magnesium oxide 400 mg (241.3 mg elemental magnesium) oral tablet: 1 tab(s) orally 3 times a day (with meals) (24 Jan 2019 10:06)  metoprolol tartrate 25 mg oral tablet: 1 tab(s) orally 2 times a day (24 Jan 2019 10:06)  nystatin 100,000 units/g topical powder: 1 application topically 2 times a day (29 Mar 2019 12:34)  ocular lubricant ophthalmic solution: 1 drop(s) to each affected eye 3 times a day (24 Jan 2019 10:06)  polyethylene glycol 3350 oral powder for reconstitution: 17 gram(s) orally once a day (29 Mar 2019 12:34)  senna oral tablet: 2 tab(s) orally once a day (at bedtime) (29 Mar 2019 12:34)  sodium polystyrene sulfonate: 30 milligram(s) orally 3 times a week (28 Dec 2018 08:26)  tamsulosin 0.4 mg oral capsule: 1 cap(s) orally once a day (at bedtime) (24 Jan 2019 10:06)      ROS: as in HPI; All other systems reviewed are negative        PHYSICAL EXAM:  Vital Signs Last 24 Hrs  T(C): 35.8 (23 May 2019 05:33), Max: 36.4 (22 May 2019 21:04)  T(F): 96.4 (23 May 2019 05:33), Max: 97.6 (22 May 2019 21:04)  HR: 81 (23 May 2019 05:33) (81 - 101)  BP: 124/69 (23 May 2019 05:33) (102/57 - 136/76)  BP(mean): --  RR: 19 (23 May 2019 05:33) (16 - 20)  SpO2: 89% (22 May 2019 17:58) (76% - 99%)      GENERAL: NAD, well-groomed, well-developed  HEAD:  Atraumatic, Normocephalic  EYES: EOMI, PERRLA, conjunctiva and sclera clear  ENMT: No tonsillar erythema, exudates, or enlargement; Moist mucous membranes, Good dentition, No lesions  NECK: Supple, No JVD, Normal thyroid  NERVOUS SYSTEM:  Alert & Oriented X3, Good concentration; Motor Strength 5/5 B/L upper and lower extremities; DTRs 2+ intact and symmetric  CHEST/LUNG: mild wheeze   HEART: Regular rate and rhythm; No murmurs, rubs, or gallops  ABDOMEN: Soft, Nontender, Nondistended; Bowel sounds present  EXTREMITIES:  2+ Peripheral Pulses, No clubbing, cyanosis, or edema  LYMPH: No lymphadenopathy noted  SKIN: No rashes or lesions    HOSPITAL MEDICATIONS:  MEDICATIONS  (STANDING):  ALBUTerol/ipratropium for Nebulization 3 milliLiter(s) Nebulizer every 4 hours  ascorbic acid 500 milliGRAM(s) Oral daily  atorvastatin 20 milliGRAM(s) Oral at bedtime  buDESOnide 160 MICROgram(s)/formoterol 4.5 MICROgram(s) Inhaler 2 Puff(s) Inhalation two times a day  cholecalciferol 1000 Unit(s) Oral daily  dextrose 5%. 1000 milliLiter(s) (50 mL/Hr) IV Continuous <Continuous>  dextrose 50% Injectable 12.5 Gram(s) IV Push once  dextrose 50% Injectable 25 Gram(s) IV Push once  dextrose 50% Injectable 25 Gram(s) IV Push once  docusate sodium 100 milliGRAM(s) Oral three times a day  ferrous    sulfate 325 milliGRAM(s) Oral daily  finasteride 5 milliGRAM(s) Oral daily  furosemide    Tablet 40 milliGRAM(s) Oral daily  heparin  Injectable 5000 Unit(s) SubCutaneous every 12 hours  insulin glargine Injectable (LANTUS) 20 Unit(s) SubCutaneous every morning  insulin glargine Injectable (LANTUS) 20 Unit(s) SubCutaneous at bedtime  insulin lispro (HumaLOG) corrective regimen sliding scale   SubCutaneous three times a day before meals  insulin lispro Injectable (HumaLOG) 10 Unit(s) SubCutaneous three times a day before meals  levoFLOXacin IVPB      levoFLOXacin IVPB 250 milliGRAM(s) IV Intermittent daily  methylPREDNISolone sodium succinate Injectable 40 milliGRAM(s) IV Push every 8 hours  metoprolol tartrate 25 milliGRAM(s) Oral two times a day  nystatin Powder 1 Application(s) Topical two times a day  pantoprazole    Tablet 40 milliGRAM(s) Oral before breakfast  polyethylene glycol 3350 17 Gram(s) Oral daily  senna 2 Tablet(s) Oral at bedtime  sodium polystyrene sulfonate Suspension 30 Gram(s) Oral <User Schedule>  tamsulosin 0.4 milliGRAM(s) Oral at bedtime  tiotropium 18 MICROgram(s) Capsule 1 Capsule(s) Inhalation at bedtime    MEDICATIONS  (PRN):  acetaminophen   Tablet .. 650 milliGRAM(s) Oral every 6 hours PRN Temp greater or equal to 38C (100.4F), Moderate Pain (4 - 6)  ALBUTerol    90 MICROgram(s) HFA Inhaler 2 Puff(s) Inhalation every 6 hours PRN Shortness of Breath and/or Wheezing  aluminum hydroxide/magnesium hydroxide/simethicone Suspension 30 milliLiter(s) Oral every 4 hours PRN Dyspepsia  dextrose 40% Gel 15 Gram(s) Oral once PRN Blood Glucose LESS THAN 70 milliGRAM(s)/deciliter  glucagon  Injectable 1 milliGRAM(s) IntraMuscular once PRN Glucose LESS THAN 70 milligrams/deciliter  guaiFENesin/dextromethorphan  Syrup 10 milliLiter(s) Oral every 6 hours PRN Cough      LABS:                        13.0   10.77 )-----------( 379      ( 22 May 2019 10:25 )             45.5     05-22    143  |  104  |  39<H>  ----------------------------<  227<H>  5.2<H>   |  31  |  1.9<H>    Ca    9.3      22 May 2019 10:25    TPro  6.2  /  Alb  3.7  /  TBili  0.4  /  DBili  x   /  AST  12  /  ALT  14  /  AlkPhos  69  05-22        Arterial Blood Gas:  05-22 @ 10:40  7.38/53/200/31/100/4.8  ABG lactate: --            RADIOLOGY:  [ ] Reviewed and interpreted by me  stable   ECHO:    Point of Care Ultrasound Findings;    PFT:

## 2019-05-23 NOTE — PROGRESS NOTE ADULT - SUBJECTIVE AND OBJECTIVE BOX
Progress Note:  Provider Speciality                            Hospitalist      JIAN MANCIA [id number] 78y Male     CHIEF PRESENTING COMPLAINT:  Patient is a 78y old  Male who presents with a chief complaint of Hypoxia (23 May 2019 08:12)        SUBJECTIVE:  Patient was seen and examined at bedside. Reports improvement in  presenting complaint. No significant overnight events reported.     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  Patient mentions having only minimal dyspnea and mild productive cough with bipedal edema markedly improved form his most recent admission. Hypoxia was also confirmed on EMS arrival thus was started on BiPAP and given 5 nebs with Solumedrol 125mg IM with improvement. He was admitted for further management. (22 May 2019 13:56)      PAST MEDICAL & SURGICAL HISTORY:  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  High cholesterol  GERD (gastroesophageal reflux disease)  Enlarged prostate  Oxygen dependent  COPD (chronic obstructive pulmonary disease)  Diabetes mellitus, type 2  Hypertension  Emphysema lung  History of hemorrhoidectomy  Dysplastic colon polyp: removed      VITAL SIGNS:  Vital Signs Last 24 Hrs  T(C): 35.8 (23 May 2019 14:07), Max: 36.4 (22 May 2019 21:04)  T(F): 96.4 (23 May 2019 14:07), Max: 97.6 (22 May 2019 21:04)  HR: 82 (23 May 2019 14:07) (81 - 82)  BP: 103/61 (23 May 2019 14:07) (102/57 - 136/76)  BP(mean): --  RR: 18 (23 May 2019 14:07) (16 - 19)  SpO2: 92% (23 May 2019 08:20) (89% - 92%)    PHYSICAL EXAMINATION:  Not in acute distress  General: No pallor, or icterus, afebrile  HEENT:  MIS, EOMI, no JVD, no Bruit.  Heart: S1+S2 audible, no murmur  Lungs: bilateral  fair air entry, no wheezing, no crepitations.  Abdomen: Soft, non-tender, non-distended , no  rigidity or guarding.  CNS: AAOx3, CN  grossly intact.  Extremities:  No edema          REVIEW OF SYSTEMS:  Patient denies any headache, any vision complaints, runny nose, fever, chills, sore throat. Denies chest pain, shortness of breath, palpitation. Denies nausea, vomiting, abdominal pain, diarrhoea, Denies urinary burning, urgency, frequency, dysuria. Denies weakness in any part of the body or numbness.   At least 10 systems were reviewed in ROS. All systems reviewed  are within normal limits except for the complaints as described in Subjective.    CONSULTS:  Consultant(s) Notes Reviewed by me.   Care Discussed with Consultants/Other Providers where required.        MEDICATIONS:  MEDICATIONS  (STANDING):  ALBUTerol/ipratropium for Nebulization 3 milliLiter(s) Nebulizer every 4 hours  ascorbic acid 500 milliGRAM(s) Oral daily  atorvastatin 20 milliGRAM(s) Oral at bedtime  buDESOnide 160 MICROgram(s)/formoterol 4.5 MICROgram(s) Inhaler 2 Puff(s) Inhalation two times a day  cholecalciferol 1000 Unit(s) Oral daily  dextrose 5%. 1000 milliLiter(s) (50 mL/Hr) IV Continuous <Continuous>  dextrose 50% Injectable 12.5 Gram(s) IV Push once  dextrose 50% Injectable 25 Gram(s) IV Push once  dextrose 50% Injectable 25 Gram(s) IV Push once  docusate sodium 100 milliGRAM(s) Oral three times a day  ferrous    sulfate 325 milliGRAM(s) Oral daily  finasteride 5 milliGRAM(s) Oral daily  furosemide    Tablet 40 milliGRAM(s) Oral daily  heparin  Injectable 5000 Unit(s) SubCutaneous every 12 hours  insulin glargine Injectable (LANTUS) 20 Unit(s) SubCutaneous every morning  insulin glargine Injectable (LANTUS) 20 Unit(s) SubCutaneous at bedtime  insulin lispro (HumaLOG) corrective regimen sliding scale   SubCutaneous three times a day before meals  insulin lispro Injectable (HumaLOG) 10 Unit(s) SubCutaneous three times a day before meals  levoFLOXacin IVPB      levoFLOXacin IVPB 250 milliGRAM(s) IV Intermittent daily  methylPREDNISolone sodium succinate Injectable 40 milliGRAM(s) IV Push every 8 hours  metoprolol tartrate 25 milliGRAM(s) Oral two times a day  nystatin Powder 1 Application(s) Topical two times a day  pantoprazole    Tablet 40 milliGRAM(s) Oral before breakfast  polyethylene glycol 3350 17 Gram(s) Oral daily  senna 2 Tablet(s) Oral at bedtime  sodium polystyrene sulfonate Suspension 30 Gram(s) Oral <User Schedule>  tamsulosin 0.4 milliGRAM(s) Oral at bedtime  tiotropium 18 MICROgram(s) Capsule 1 Capsule(s) Inhalation at bedtime    MEDICATIONS  (PRN):  acetaminophen   Tablet .. 650 milliGRAM(s) Oral every 6 hours PRN Temp greater or equal to 38C (100.4F), Moderate Pain (4 - 6)  ALBUTerol    90 MICROgram(s) HFA Inhaler 2 Puff(s) Inhalation every 6 hours PRN Shortness of Breath and/or Wheezing  aluminum hydroxide/magnesium hydroxide/simethicone Suspension 30 milliLiter(s) Oral every 4 hours PRN Dyspepsia  dextrose 40% Gel 15 Gram(s) Oral once PRN Blood Glucose LESS THAN 70 milliGRAM(s)/deciliter  glucagon  Injectable 1 milliGRAM(s) IntraMuscular once PRN Glucose LESS THAN 70 milligrams/deciliter  guaiFENesin/dextromethorphan  Syrup 10 milliLiter(s) Oral every 6 hours PRN Cough      LABOROTORY DATA/MICROBIOLOGY/I & O's:                        13.0   10.77 )-----------( 379      ( 22 May 2019 10:25 )             45.5     05-22    143  |  104  |  39<H>  ----------------------------<  227<H>  5.2<H>   |  31  |  1.9<H>    Ca    9.3      22 May 2019 10:25    TPro  6.2  /  Alb  3.7  /  TBili  0.4  /  DBili  x   /  AST  12  /  ALT  14  /  AlkPhos  69  05-22        CAPILLARY BLOOD GLUCOSE      POCT Blood Glucose.: 349 mg/dL (23 May 2019 11:34)  POCT Blood Glucose.: 255 mg/dL (23 May 2019 07:37)  POCT Blood Glucose.: 417 mg/dL (23 May 2019 00:00)  POCT Blood Glucose.: 450 mg/dL (22 May 2019 21:28)  POCT Blood Glucose.: 401 mg/dL (22 May 2019 17:12)    ABG - ( 22 May 2019 10:40 )  pH, Arterial: 7.38  pH, Blood: x     /  pCO2: 53    /  pO2: 200   / HCO3: 31    / Base Excess: 4.8   /  SaO2: 100                         05-22-19 @ 07:01  -  05-23-19 @ 07:00  --------------------------------------------------------  IN: 1000 mL / OUT: 1250 mL / NET: -250 mL    05-23-19 @ 07:01  - 05-23-19 @ 14:54  --------------------------------------------------------  IN: 360 mL / OUT: 800 mL / NET: -440 mL              ASSESSMENT:      78y Male with h/o 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      ASSESSMENT:  Principal Diagnosis:  Acute on Chronic Hypoxic & hypercapnic respiratory failure secondary to Chronic obstructive pulmonary disease exacerbation   Heart failure with preserved ejection fraction -stable     Associated Active Comorbid Conditions:  Pulmnonary Hypertension   Obstructive sleep apnea   Obesity hypoventilation syndrome   Hypertension   Diabetes mellitis type 2   Chronic renal disease Stage  stage 3  Benign Prostatic Hypertrophy   Morbid obesity    PLAN:    O2 via NC@ 2L/Min, keep sat >94% at all times  Solumedrol tapered to prednisone  Continue Symbicort, Spiriva & Bronchodilators ATC and prn and Steroids (On chronic Steroids Prednisone 10 mg daily).   Complete course of Levaquin.   Albuterol/Atrovent 1 unit neb Q6hrs  Continue Lasix PO 40 mg daily. No overt Evidence of Fluid overload.    Supplemental oxygen. On 4-5L NC at home. BiPAP prn & qhs.  Maintain saturation >88%. Pulm consult.  GI Prophylaxis with Protonix 40mg poQDaily  DVT Prophylaxis with Heparin 5000units sc q8hrs	   DNR & DNI.     Discharge Disposition: anticipate discharge in 48 hrs , pending medical stabilization

## 2019-05-24 LAB
ANION GAP SERPL CALC-SCNC: 9 MMOL/L — SIGNIFICANT CHANGE UP (ref 7–14)
BUN SERPL-MCNC: 55 MG/DL — HIGH (ref 10–20)
CALCIUM SERPL-MCNC: 9.6 MG/DL — SIGNIFICANT CHANGE UP (ref 8.5–10.1)
CHLORIDE SERPL-SCNC: 99 MMOL/L — SIGNIFICANT CHANGE UP (ref 98–110)
CO2 SERPL-SCNC: 35 MMOL/L — HIGH (ref 17–32)
CREAT SERPL-MCNC: 2.1 MG/DL — HIGH (ref 0.7–1.5)
CULTURE RESULTS: SIGNIFICANT CHANGE UP
GLUCOSE BLDC GLUCOMTR-MCNC: 197 MG/DL — HIGH (ref 70–99)
GLUCOSE BLDC GLUCOMTR-MCNC: 268 MG/DL — HIGH (ref 70–99)
GLUCOSE BLDC GLUCOMTR-MCNC: 385 MG/DL — HIGH (ref 70–99)
GLUCOSE BLDC GLUCOMTR-MCNC: 393 MG/DL — HIGH (ref 70–99)
GLUCOSE SERPL-MCNC: 214 MG/DL — HIGH (ref 70–99)
HCT VFR BLD CALC: 47.7 % — SIGNIFICANT CHANGE UP (ref 42–52)
HGB BLD-MCNC: 13.4 G/DL — LOW (ref 14–18)
MCHC RBC-ENTMCNC: 21.5 PG — LOW (ref 27–31)
MCHC RBC-ENTMCNC: 28.1 G/DL — LOW (ref 32–37)
MCV RBC AUTO: 76.7 FL — LOW (ref 80–94)
NRBC # BLD: 0 /100 WBCS — SIGNIFICANT CHANGE UP (ref 0–0)
ORGANISM # SPEC MICROSCOPIC CNT: SIGNIFICANT CHANGE UP
ORGANISM # SPEC MICROSCOPIC CNT: SIGNIFICANT CHANGE UP
PLATELET # BLD AUTO: 470 K/UL — HIGH (ref 130–400)
POTASSIUM SERPL-MCNC: 5.5 MMOL/L — HIGH (ref 3.5–5)
POTASSIUM SERPL-SCNC: 5.5 MMOL/L — HIGH (ref 3.5–5)
RBC # BLD: 6.22 M/UL — HIGH (ref 4.7–6.1)
RBC # FLD: 19.9 % — HIGH (ref 11.5–14.5)
SODIUM SERPL-SCNC: 143 MMOL/L — SIGNIFICANT CHANGE UP (ref 135–146)
SPECIMEN SOURCE: SIGNIFICANT CHANGE UP
WBC # BLD: 14.67 K/UL — HIGH (ref 4.8–10.8)
WBC # FLD AUTO: 14.67 K/UL — HIGH (ref 4.8–10.8)

## 2019-05-24 RX ADMIN — Medication 5 MILLIGRAM(S): at 23:50

## 2019-05-24 RX ADMIN — Medication 10 UNIT(S): at 08:14

## 2019-05-24 RX ADMIN — Medication 3: at 12:10

## 2019-05-24 RX ADMIN — Medication 100 MILLIGRAM(S): at 14:28

## 2019-05-24 RX ADMIN — NYSTATIN CREAM 1 APPLICATION(S): 100000 CREAM TOPICAL at 06:02

## 2019-05-24 RX ADMIN — PANTOPRAZOLE SODIUM 40 MILLIGRAM(S): 20 TABLET, DELAYED RELEASE ORAL at 06:01

## 2019-05-24 RX ADMIN — Medication 25 MILLIGRAM(S): at 06:02

## 2019-05-24 RX ADMIN — HEPARIN SODIUM 5000 UNIT(S): 5000 INJECTION INTRAVENOUS; SUBCUTANEOUS at 06:02

## 2019-05-24 RX ADMIN — ATORVASTATIN CALCIUM 20 MILLIGRAM(S): 80 TABLET, FILM COATED ORAL at 21:31

## 2019-05-24 RX ADMIN — BUDESONIDE AND FORMOTEROL FUMARATE DIHYDRATE 2 PUFF(S): 160; 4.5 AEROSOL RESPIRATORY (INHALATION) at 13:07

## 2019-05-24 RX ADMIN — INSULIN GLARGINE 20 UNIT(S): 100 INJECTION, SOLUTION SUBCUTANEOUS at 08:14

## 2019-05-24 RX ADMIN — Medication 1: at 08:14

## 2019-05-24 RX ADMIN — Medication 100 MILLIGRAM(S): at 21:31

## 2019-05-24 RX ADMIN — Medication 3 MILLILITER(S): at 16:16

## 2019-05-24 RX ADMIN — Medication 5 MILLIGRAM(S): at 00:25

## 2019-05-24 RX ADMIN — Medication 3 MILLILITER(S): at 08:30

## 2019-05-24 RX ADMIN — INSULIN GLARGINE 20 UNIT(S): 100 INJECTION, SOLUTION SUBCUTANEOUS at 21:39

## 2019-05-24 RX ADMIN — Medication 10 UNIT(S): at 17:04

## 2019-05-24 RX ADMIN — Medication 50 MILLIGRAM(S): at 06:02

## 2019-05-24 RX ADMIN — FINASTERIDE 5 MILLIGRAM(S): 5 TABLET, FILM COATED ORAL at 11:04

## 2019-05-24 RX ADMIN — Medication 25 MILLIGRAM(S): at 17:23

## 2019-05-24 RX ADMIN — Medication 3 MILLILITER(S): at 00:20

## 2019-05-24 RX ADMIN — Medication 10 MILLILITER(S): at 21:35

## 2019-05-24 RX ADMIN — Medication 40 MILLIGRAM(S): at 06:01

## 2019-05-24 RX ADMIN — Medication 3 MILLILITER(S): at 13:08

## 2019-05-24 RX ADMIN — TAMSULOSIN HYDROCHLORIDE 0.4 MILLIGRAM(S): 0.4 CAPSULE ORAL at 21:31

## 2019-05-24 RX ADMIN — POLYETHYLENE GLYCOL 3350 17 GRAM(S): 17 POWDER, FOR SOLUTION ORAL at 11:05

## 2019-05-24 RX ADMIN — Medication 1000 UNIT(S): at 11:04

## 2019-05-24 RX ADMIN — NYSTATIN CREAM 1 APPLICATION(S): 100000 CREAM TOPICAL at 17:09

## 2019-05-24 RX ADMIN — Medication 500 MILLIGRAM(S): at 11:04

## 2019-05-24 RX ADMIN — HEPARIN SODIUM 5000 UNIT(S): 5000 INJECTION INTRAVENOUS; SUBCUTANEOUS at 17:05

## 2019-05-24 RX ADMIN — Medication 325 MILLIGRAM(S): at 11:04

## 2019-05-24 RX ADMIN — SENNA PLUS 2 TABLET(S): 8.6 TABLET ORAL at 21:31

## 2019-05-24 RX ADMIN — Medication 3 MILLILITER(S): at 20:00

## 2019-05-24 RX ADMIN — Medication 100 MILLIGRAM(S): at 06:01

## 2019-05-24 RX ADMIN — Medication 10 UNIT(S): at 12:10

## 2019-05-24 RX ADMIN — Medication 5: at 17:04

## 2019-05-24 NOTE — PROGRESS NOTE ADULT - SUBJECTIVE AND OBJECTIVE BOX
Progress Note:  Provider Speciality                            Hospitalist      JIAN MANCIA [id number] 78y Male     CHIEF PRESENTING COMPLAINT:  Patient is a 78y old  Male who presents with a chief complaint of Hypoxia (24 May 2019 12:43)        SUBJECTIVE:  Patient was seen and examined at bedside. Reports improvement in  presenting complaint. No significant overnight events reported.     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  Patient mentions having only minimal dyspnea and mild productive cough with bipedal edema markedly improved form his most recent admission. Hypoxia was also confirmed on EMS arrival thus was started on BiPAP and given 5 nebs with Solumedrol 125mg IM with improvement. He was admitted for further management. (22 May 2019 13:56)      PAST MEDICAL & SURGICAL HISTORY:  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  High cholesterol  GERD (gastroesophageal reflux disease)  Enlarged prostate  Oxygen dependent  COPD (chronic obstructive pulmonary disease)  Diabetes mellitus, type 2  Hypertension  Emphysema lung  History of hemorrhoidectomy  Dysplastic colon polyp: removed      VITAL SIGNS:  Vital Signs Last 24 Hrs  T(C): 35.7 (24 May 2019 06:30), Max: 36.3 (23 May 2019 21:04)  T(F): 96.2 (24 May 2019 06:30), Max: 97.4 (23 May 2019 21:04)  HR: 102 (24 May 2019 09:06) (81 - 102)  BP: 106/57 (24 May 2019 09:06) (103/61 - 128/66)  BP(mean): --  RR: 18 (24 May 2019 06:30) (18 - 18)  SpO2: --    PHYSICAL EXAMINATION:  Not in acute distress. obesity  General: No pallor, or icterus, afebrile  HEENT:  MIS, EOMI, no JVD, no Bruit.  Heart: S1+S2 audible, no murmur  Lungs: bilateral  fair air entry, no wheezing, no crepitations.  Abdomen: Soft, non-tender, non-distended , no  rigidity or guarding.  CNS: AAOx3, CN  grossly intact.  Extremities:  No edema          REVIEW OF SYSTEMS:  Patient denies any headache, any vision complaints, runny nose, fever, chills, sore throat. Denies chest pain, shortness of breath, palpitation. Denies nausea, vomiting, abdominal pain, diarrhoea, Denies urinary burning, urgency, frequency, dysuria. Denies weakness in any part of the body or numbness.   At least 10 systems were reviewed in ROS. All systems reviewed  are within normal limits except for the complaints as described in Subjective.    CONSULTS:  Consultant(s) Notes Reviewed by me.   Care Discussed with Consultants/Other Providers where required.        MEDICATIONS:  MEDICATIONS  (STANDING):  ALBUTerol/ipratropium for Nebulization 3 milliLiter(s) Nebulizer every 4 hours  ascorbic acid 500 milliGRAM(s) Oral daily  atorvastatin 20 milliGRAM(s) Oral at bedtime  buDESOnide 160 MICROgram(s)/formoterol 4.5 MICROgram(s) Inhaler 2 Puff(s) Inhalation two times a day  cholecalciferol 1000 Unit(s) Oral daily  dextrose 5%. 1000 milliLiter(s) (50 mL/Hr) IV Continuous <Continuous>  dextrose 50% Injectable 12.5 Gram(s) IV Push once  dextrose 50% Injectable 25 Gram(s) IV Push once  dextrose 50% Injectable 25 Gram(s) IV Push once  docusate sodium 100 milliGRAM(s) Oral three times a day  ferrous    sulfate 325 milliGRAM(s) Oral daily  finasteride 5 milliGRAM(s) Oral daily  furosemide    Tablet 40 milliGRAM(s) Oral daily  heparin  Injectable 5000 Unit(s) SubCutaneous every 12 hours  insulin glargine Injectable (LANTUS) 20 Unit(s) SubCutaneous every morning  insulin glargine Injectable (LANTUS) 20 Unit(s) SubCutaneous at bedtime  insulin lispro (HumaLOG) corrective regimen sliding scale   SubCutaneous three times a day before meals  insulin lispro Injectable (HumaLOG) 10 Unit(s) SubCutaneous three times a day before meals  levoFLOXacin IVPB      levoFLOXacin IVPB 250 milliGRAM(s) IV Intermittent daily  metoprolol tartrate 25 milliGRAM(s) Oral two times a day  nystatin Powder 1 Application(s) Topical two times a day  pantoprazole    Tablet 40 milliGRAM(s) Oral before breakfast  polyethylene glycol 3350 17 Gram(s) Oral daily  predniSONE   Tablet 50 milliGRAM(s) Oral daily  senna 2 Tablet(s) Oral at bedtime  sodium polystyrene sulfonate Suspension 30 Gram(s) Oral <User Schedule>  tamsulosin 0.4 milliGRAM(s) Oral at bedtime  tiotropium 18 MICROgram(s) Capsule 1 Capsule(s) Inhalation at bedtime    MEDICATIONS  (PRN):  acetaminophen   Tablet .. 650 milliGRAM(s) Oral every 6 hours PRN Temp greater or equal to 38C (100.4F), Moderate Pain (4 - 6)  ALBUTerol    90 MICROgram(s) HFA Inhaler 2 Puff(s) Inhalation every 6 hours PRN Shortness of Breath and/or Wheezing  aluminum hydroxide/magnesium hydroxide/simethicone Suspension 30 milliLiter(s) Oral every 4 hours PRN Dyspepsia  dextrose 40% Gel 15 Gram(s) Oral once PRN Blood Glucose LESS THAN 70 milliGRAM(s)/deciliter  glucagon  Injectable 1 milliGRAM(s) IntraMuscular once PRN Glucose LESS THAN 70 milligrams/deciliter  guaiFENesin/dextromethorphan  Syrup 10 milliLiter(s) Oral every 6 hours PRN Cough  zaleplon 5 milliGRAM(s) Oral at bedtime PRN Insomnia      LABOROTORY DATA/MICROBIOLOGY/I & O's:                        13.4   14.67 )-----------( 470      ( 24 May 2019 08:31 )             47.7     05-24    143  |  99  |  55<H>  ----------------------------<  214<H>  5.5<H>   |  35<H>  |  2.1<H>    Ca    9.6      24 May 2019 08:31          CAPILLARY BLOOD GLUCOSE      POCT Blood Glucose.: 268 mg/dL (24 May 2019 11:54)  POCT Blood Glucose.: 197 mg/dL (24 May 2019 08:00)  POCT Blood Glucose.: 219 mg/dL (23 May 2019 21:13)  POCT Blood Glucose.: 225 mg/dL (23 May 2019 16:21)                05-23-19 @ 07:01  -  05-24-19 @ 07:00  --------------------------------------------------------  IN: 650 mL / OUT: 1750 mL / NET: -1100 mL              ASSESSMENT:      78y Male with h/o 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      ASSESSMENT:  Principal Diagnosis:  Acute on Chronic Hypoxic & hypercapnic respiratory failure secondary to Chronic obstructive pulmonary disease exacerbation   Heart failure with preserved ejection fraction -stable   Bacteremia, GPC    Associated Active Comorbid Conditions:  Pulmnonary Hypertension   Obstructive sleep apnea   Obesity hypoventilation syndrome   Hypertension   Diabetes mellitis type 2   Chronic renal disease Stage  stage 3  Benign Prostatic Hypertrophy   Morbid obesity    PLAN:    f/u blood cx ( could be contaminant will send repeat cultures). ID consult  O2 via NC@ 2L/Min, keep sat >94% at all times  Solumedrol tapered to prednisone  Continue Symbicort, Spiriva & Bronchodilators ATC and prn and Steroids (On chronic Steroids Prednisone 10 mg daily).   Currently on  Levaquin for bronchitis  Albuterol/Atrovent 1 unit neb Q6hrs  Continue Lasix PO 40 mg daily. No overt Evidence of Fluid overload.    Supplemental oxygen. On 4-5L NC at home. BiPAP prn & qhs.  Maintain saturation >88%. Pulm consult.  GI Prophylaxis with Protonix 40mg poQDaily  DVT Prophylaxis with Heparin 5000units sc q8hrs	   DNR & DNI.     Discharge Disposition: anticipate discharge in 48 hrs , pending medical stabilization

## 2019-05-24 NOTE — CHART NOTE - NSCHARTNOTEFT_GEN_A_CORE
Stat EKG done because patient stating he has mid-chest pain ("again"). To me it is without ischemic change-sinus with pac

## 2019-05-24 NOTE — PROGRESS NOTE ADULT - SUBJECTIVE AND OBJECTIVE BOX
Patient is a 78y old  Male who presents with a chief complaint of Hypoxia (23 May 2019 14:54)      INTERVAL HISTORY/overnight events  back to his baseline from pulmonary point       Vital Signs Last 24 Hrs  T(C): 35.7 (24 May 2019 06:30), Max: 36.3 (23 May 2019 21:04)  T(F): 96.2 (24 May 2019 06:30), Max: 97.4 (23 May 2019 21:04)  HR: 102 (24 May 2019 09:06) (81 - 102)  BP: 106/57 (24 May 2019 09:06) (103/61 - 128/66)  BP(mean): --  RR: 18 (24 May 2019 06:30) (18 - 18)  SpO2: --  Daily     Daily Weight in k.5 (24 May 2019 03:58)  I&O's Summary    23 May 2019 07:01  -  24 May 2019 07:00  --------------------------------------------------------  IN: 650 mL / OUT: 1750 mL / NET: -1100 mL        Physical Examination:    General: No acute distress.  Alert, oriented, interactive, nonfocal    HEENT: Pupils equal, reactive to light.  Symmetric.    PULM: Clear to auscultation bilaterally, no significant sputum production    CVS: Regular rate and rhythm, no murmurs, rubs, or gallops    ABD: Soft, nondistended, nontender, normoactive bowel sounds, no masses    EXT: 1 edema, nontender    SKIN: Warm and well perfused, no rashes noted.    Neurology: no focla deficit     Musculoskeletal : Move all extremety         Lab Results:                        13.4   14.67 )-----------( 470      ( 24 May 2019 08:31 )             47.7     24 May 2019 08:31    143    |  99     |  55     ----------------------------<  214    5.5     |  35     |  2.1      Ca    9.6        24 May 2019 08:31                Microbiology    Culture - Blood (collected 22 May 2019 10:25)  Source: .Blood Blood  Gram Stain (23 May 2019 18:34):    Growth in aerobic bottle: Gram Positive Cocci in Clusters  Preliminary Report (23 May 2019 18:34):    Growth in aerobic bottle: Gram Positive Cocci in Clusters    "Due to technical problems, Proteus sp. will Not be reported as part of    the BCID panel until further notice"    ***Blood Panel PCR results on this specimen are available    approximately 3 hours after the Gram stain result.***    Gram stain, PCR, and/or culture results may not always    correspond due to difference in methodologies.    ************************************************************    This PCR assay was performed using [employer].    The following targets are tested for: Enterococcus,    vancomycin resistant enterococci, Listeria monocytogenes,    coagulase negative staphylococci, S. aureus,    methicillin resistant S. aureus, Streptococcus agalactiae    (Group B), S. pneumoniae, S.pyogenes (Group A),    Acinetobacter baumannii, Enterobacter cloacae, E. coli,    Klebsiella oxytoca, K. pneumoniae, Proteus sp.,    Serratia marcescens, Haemophilus influenzae,    Neisseria meningitidis, Pseudomonas aeruginosa, Candida    albicans, C. glabrata, C krusei, C parapsilosis,    C. tropicalis and the KPC resistance gene.  Organism: Blood Culture PCR (23 May 2019 20:24)  Organism: Blood Culture PCR (23 May 2019 20:24)        Medication:  MEDICATIONS  (STANDING):  ALBUTerol/ipratropium for Nebulization 3 milliLiter(s) Nebulizer every 4 hours  ascorbic acid 500 milliGRAM(s) Oral daily  atorvastatin 20 milliGRAM(s) Oral at bedtime  buDESOnide 160 MICROgram(s)/formoterol 4.5 MICROgram(s) Inhaler 2 Puff(s) Inhalation two times a day  cholecalciferol 1000 Unit(s) Oral daily  dextrose 5%. 1000 milliLiter(s) (50 mL/Hr) IV Continuous <Continuous>  dextrose 50% Injectable 12.5 Gram(s) IV Push once  dextrose 50% Injectable 25 Gram(s) IV Push once  dextrose 50% Injectable 25 Gram(s) IV Push once  docusate sodium 100 milliGRAM(s) Oral three times a day  ferrous    sulfate 325 milliGRAM(s) Oral daily  finasteride 5 milliGRAM(s) Oral daily  furosemide    Tablet 40 milliGRAM(s) Oral daily  heparin  Injectable 5000 Unit(s) SubCutaneous every 12 hours  insulin glargine Injectable (LANTUS) 20 Unit(s) SubCutaneous every morning  insulin glargine Injectable (LANTUS) 20 Unit(s) SubCutaneous at bedtime  insulin lispro (HumaLOG) corrective regimen sliding scale   SubCutaneous three times a day before meals  insulin lispro Injectable (HumaLOG) 10 Unit(s) SubCutaneous three times a day before meals  levoFLOXacin IVPB      levoFLOXacin IVPB 250 milliGRAM(s) IV Intermittent daily  metoprolol tartrate 25 milliGRAM(s) Oral two times a day  nystatin Powder 1 Application(s) Topical two times a day  pantoprazole    Tablet 40 milliGRAM(s) Oral before breakfast  polyethylene glycol 3350 17 Gram(s) Oral daily  predniSONE   Tablet 50 milliGRAM(s) Oral daily  senna 2 Tablet(s) Oral at bedtime  sodium polystyrene sulfonate Suspension 30 Gram(s) Oral <User Schedule>  tamsulosin 0.4 milliGRAM(s) Oral at bedtime  tiotropium 18 MICROgram(s) Capsule 1 Capsule(s) Inhalation at bedtime    MEDICATIONS  (PRN):  acetaminophen   Tablet .. 650 milliGRAM(s) Oral every 6 hours PRN Temp greater or equal to 38C (100.4F), Moderate Pain (4 - 6)  ALBUTerol    90 MICROgram(s) HFA Inhaler 2 Puff(s) Inhalation every 6 hours PRN Shortness of Breath and/or Wheezing  aluminum hydroxide/magnesium hydroxide/simethicone Suspension 30 milliLiter(s) Oral every 4 hours PRN Dyspepsia  dextrose 40% Gel 15 Gram(s) Oral once PRN Blood Glucose LESS THAN 70 milliGRAM(s)/deciliter  glucagon  Injectable 1 milliGRAM(s) IntraMuscular once PRN Glucose LESS THAN 70 milligrams/deciliter  guaiFENesin/dextromethorphan  Syrup 10 milliLiter(s) Oral every 6 hours PRN Cough  zaleplon 5 milliGRAM(s) Oral at bedtime PRN Insomnia        IMAGING STUDIES:

## 2019-05-25 LAB
ANION GAP SERPL CALC-SCNC: 8 MMOL/L — SIGNIFICANT CHANGE UP (ref 7–14)
BUN SERPL-MCNC: 57 MG/DL — HIGH (ref 10–20)
CALCIUM SERPL-MCNC: 9.5 MG/DL — SIGNIFICANT CHANGE UP (ref 8.5–10.1)
CHLORIDE SERPL-SCNC: 96 MMOL/L — LOW (ref 98–110)
CO2 SERPL-SCNC: 37 MMOL/L — HIGH (ref 17–32)
CREAT SERPL-MCNC: 1.9 MG/DL — HIGH (ref 0.7–1.5)
GLUCOSE BLDC GLUCOMTR-MCNC: 174 MG/DL — HIGH (ref 70–99)
GLUCOSE BLDC GLUCOMTR-MCNC: 219 MG/DL — HIGH (ref 70–99)
GLUCOSE BLDC GLUCOMTR-MCNC: 294 MG/DL — HIGH (ref 70–99)
GLUCOSE BLDC GLUCOMTR-MCNC: 368 MG/DL — HIGH (ref 70–99)
GLUCOSE SERPL-MCNC: 241 MG/DL — HIGH (ref 70–99)
HCT VFR BLD CALC: 47.1 % — SIGNIFICANT CHANGE UP (ref 42–52)
HGB BLD-MCNC: 13.2 G/DL — LOW (ref 14–18)
MCHC RBC-ENTMCNC: 21.6 PG — LOW (ref 27–31)
MCHC RBC-ENTMCNC: 28 G/DL — LOW (ref 32–37)
MCV RBC AUTO: 77.1 FL — LOW (ref 80–94)
NRBC # BLD: 0 /100 WBCS — SIGNIFICANT CHANGE UP (ref 0–0)
PLATELET # BLD AUTO: 421 K/UL — HIGH (ref 130–400)
POTASSIUM SERPL-MCNC: 5 MMOL/L — SIGNIFICANT CHANGE UP (ref 3.5–5)
POTASSIUM SERPL-SCNC: 5 MMOL/L — SIGNIFICANT CHANGE UP (ref 3.5–5)
RBC # BLD: 6.11 M/UL — HIGH (ref 4.7–6.1)
RBC # FLD: 19.8 % — HIGH (ref 11.5–14.5)
SODIUM SERPL-SCNC: 141 MMOL/L — SIGNIFICANT CHANGE UP (ref 135–146)
WBC # BLD: 13.6 K/UL — HIGH (ref 4.8–10.8)
WBC # FLD AUTO: 13.6 K/UL — HIGH (ref 4.8–10.8)

## 2019-05-25 RX ORDER — ZOLPIDEM TARTRATE 10 MG/1
5 TABLET ORAL ONCE
Refills: 0 | Status: DISCONTINUED | OUTPATIENT
Start: 2019-05-25 | End: 2019-05-25

## 2019-05-25 RX ORDER — BENZOCAINE AND MENTHOL 5; 1 G/100ML; G/100ML
1 LIQUID ORAL EVERY 4 HOURS
Refills: 0 | Status: DISCONTINUED | OUTPATIENT
Start: 2019-05-25 | End: 2019-05-29

## 2019-05-25 RX ADMIN — HEPARIN SODIUM 5000 UNIT(S): 5000 INJECTION INTRAVENOUS; SUBCUTANEOUS at 16:46

## 2019-05-25 RX ADMIN — Medication 25 MILLIGRAM(S): at 06:25

## 2019-05-25 RX ADMIN — Medication 325 MILLIGRAM(S): at 11:06

## 2019-05-25 RX ADMIN — Medication 1: at 07:54

## 2019-05-25 RX ADMIN — ATORVASTATIN CALCIUM 20 MILLIGRAM(S): 80 TABLET, FILM COATED ORAL at 21:37

## 2019-05-25 RX ADMIN — Medication 10 UNIT(S): at 07:54

## 2019-05-25 RX ADMIN — Medication 3 MILLILITER(S): at 07:54

## 2019-05-25 RX ADMIN — NYSTATIN CREAM 1 APPLICATION(S): 100000 CREAM TOPICAL at 16:46

## 2019-05-25 RX ADMIN — TAMSULOSIN HYDROCHLORIDE 0.4 MILLIGRAM(S): 0.4 CAPSULE ORAL at 21:40

## 2019-05-25 RX ADMIN — Medication 10 UNIT(S): at 16:45

## 2019-05-25 RX ADMIN — Medication 3 MILLILITER(S): at 06:26

## 2019-05-25 RX ADMIN — Medication 50 MILLIGRAM(S): at 06:24

## 2019-05-25 RX ADMIN — PANTOPRAZOLE SODIUM 40 MILLIGRAM(S): 20 TABLET, DELAYED RELEASE ORAL at 06:24

## 2019-05-25 RX ADMIN — Medication 100 MILLIGRAM(S): at 21:37

## 2019-05-25 RX ADMIN — Medication 100 MILLIGRAM(S): at 06:25

## 2019-05-25 RX ADMIN — BUDESONIDE AND FORMOTEROL FUMARATE DIHYDRATE 2 PUFF(S): 160; 4.5 AEROSOL RESPIRATORY (INHALATION) at 07:47

## 2019-05-25 RX ADMIN — Medication 3 MILLILITER(S): at 15:25

## 2019-05-25 RX ADMIN — FINASTERIDE 5 MILLIGRAM(S): 5 TABLET, FILM COATED ORAL at 11:06

## 2019-05-25 RX ADMIN — Medication 10 MILLILITER(S): at 07:45

## 2019-05-25 RX ADMIN — Medication 10 UNIT(S): at 11:38

## 2019-05-25 RX ADMIN — Medication 40 MILLIGRAM(S): at 06:25

## 2019-05-25 RX ADMIN — INSULIN GLARGINE 20 UNIT(S): 100 INJECTION, SOLUTION SUBCUTANEOUS at 07:50

## 2019-05-25 RX ADMIN — Medication 25 MILLIGRAM(S): at 16:46

## 2019-05-25 RX ADMIN — Medication 100 MILLIGRAM(S): at 14:06

## 2019-05-25 RX ADMIN — Medication 3 MILLILITER(S): at 13:03

## 2019-05-25 RX ADMIN — Medication 10 MILLILITER(S): at 23:58

## 2019-05-25 RX ADMIN — HEPARIN SODIUM 5000 UNIT(S): 5000 INJECTION INTRAVENOUS; SUBCUTANEOUS at 06:25

## 2019-05-25 RX ADMIN — NYSTATIN CREAM 1 APPLICATION(S): 100000 CREAM TOPICAL at 06:29

## 2019-05-25 RX ADMIN — Medication 1000 UNIT(S): at 11:06

## 2019-05-25 RX ADMIN — Medication 500 MILLIGRAM(S): at 11:06

## 2019-05-25 RX ADMIN — SENNA PLUS 2 TABLET(S): 8.6 TABLET ORAL at 21:37

## 2019-05-25 RX ADMIN — POLYETHYLENE GLYCOL 3350 17 GRAM(S): 17 POWDER, FOR SOLUTION ORAL at 11:07

## 2019-05-25 RX ADMIN — Medication 3: at 16:45

## 2019-05-25 RX ADMIN — BUDESONIDE AND FORMOTEROL FUMARATE DIHYDRATE 2 PUFF(S): 160; 4.5 AEROSOL RESPIRATORY (INHALATION) at 19:23

## 2019-05-25 RX ADMIN — Medication 5: at 11:37

## 2019-05-25 RX ADMIN — ZOLPIDEM TARTRATE 5 MILLIGRAM(S): 10 TABLET ORAL at 23:47

## 2019-05-25 RX ADMIN — INSULIN GLARGINE 20 UNIT(S): 100 INJECTION, SOLUTION SUBCUTANEOUS at 21:38

## 2019-05-25 RX ADMIN — Medication 3 MILLILITER(S): at 19:18

## 2019-05-25 NOTE — CONSULT NOTE ADULT - ASSESSMENT
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  Patient mentions having only minimal dyspnea and mild productive cough with bipedal edema markedly improved form his most recent admission. Hypoxia was also confirmed on EMS arrival thus was started on BiPAP and given 5 nebs with Solumedrol 125mg IM with improvement. He was admitted for further management. Now The ID consult requested to assist with evaluation of positive Blood culture.      # COPD Exacerbation  # Positive Blood culture- Coagulase Negative Staph. - Most likely a contaminant  # Leukocytosis      would recommend:    1. Follow up repeat blood culture to document clearing the blood stream  2. Monitor WBC count, elevated most likely due to steroid  3. Management of COPD exacerbation as per Primary  4. WEan off oxygen as tolerated    will follow the patient with you and make further recommendation based on the clinical course and Lab results  Thank you for the opportunity to participate in Mr. MANCIA's care

## 2019-05-25 NOTE — CONSULT NOTE ADULT - SUBJECTIVE AND OBJECTIVE BOX
Patient is a 78y old  Male who presents with a chief complaint of Hypoxia (25 May 2019 10:26)      INTERVAL HPI/OVERNIGHT EVENTS:        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  High cholesterol  GERD (gastroesophageal reflux disease)  Enlarged prostate  Oxygen dependent  COPD (chronic obstructive pulmonary disease)  Diabetes mellitus, type 2  Hypertension  Emphysema lung  History of hemorrhoidectomy  Dysplastic colon polyp: removed      REVIEW OF SYSTEMS: Total of twelve systems have been reviewed with patient and found to be negative unless mentioned in HPI      SOCIAL HISTORY  Alcohol: Does not drink  Tobacco: Does not smoke  Illicit substance use: None      FAMILY HISTORY: Non contributory to the present illness        aspirin (Other)  fish (Other)  iodine (Hives)  penicillin (Unknown)  shellfish (Other)        T(C): 35.6 (05-25-19 @ 13:46), Max: 36.1 (05-24-19 @ 22:53)  HR: 80 (05-25-19 @ 13:46) (78 - 83)  BP: 124/55 (05-25-19 @ 13:46) (119/56 - 124/55)  RR: 16 (05-25-19 @ 05:00) (16 - 18)  SpO2: --      05-24-19 @ 07:01  -  05-25-19 @ 07:00  --------------------------------------------------------  IN: 600 mL / OUT: 1450 mL / NET: -850 mL    05-25-19 @ 07:01  -  05-25-19 @ 20:03  --------------------------------------------------------  IN: 240 mL / OUT: 700 mL / NET: -460 mL        PHYSICAL EXAM:  GENERAL: Not in distress   CHEST/LUNG:  Aire ntry bilaterally  HEART: s1 and s2 present  ABDOMEN:  Nontender and  Nondistended  EXTREMITIES: No pedal  edema  CNS: Awake and Alert      LABS:                        13.2   13.60 )-----------( 421      ( 25 May 2019 08:28 )             47.1     05-25    141  |  96<L>  |  57<H>  ----------------------------<  241<H>  5.0   |  37<H>  |  1.9<H>    Ca    9.5      25 May 2019 08:28          CAPILLARY BLOOD GLUCOSE      POCT Blood Glucose.: 294 mg/dL (25 May 2019 16:26)  POCT Blood Glucose.: 368 mg/dL (25 May 2019 11:36)  POCT Blood Glucose.: 174 mg/dL (25 May 2019 07:52)  POCT Blood Glucose.: 385 mg/dL (24 May 2019 21:37)            MEDICATIONS  (STANDING):  ALBUTerol/ipratropium for Nebulization 3 milliLiter(s) Nebulizer every 4 hours  ascorbic acid 500 milliGRAM(s) Oral daily  atorvastatin 20 milliGRAM(s) Oral at bedtime  buDESOnide 160 MICROgram(s)/formoterol 4.5 MICROgram(s) Inhaler 2 Puff(s) Inhalation two times a day  cholecalciferol 1000 Unit(s) Oral daily  dextrose 5%. 1000 milliLiter(s) (50 mL/Hr) IV Continuous <Continuous>  dextrose 50% Injectable 12.5 Gram(s) IV Push once  dextrose 50% Injectable 25 Gram(s) IV Push once  dextrose 50% Injectable 25 Gram(s) IV Push once  docusate sodium 100 milliGRAM(s) Oral three times a day  ferrous    sulfate 325 milliGRAM(s) Oral daily  finasteride 5 milliGRAM(s) Oral daily  furosemide    Tablet 40 milliGRAM(s) Oral daily  heparin  Injectable 5000 Unit(s) SubCutaneous every 12 hours  insulin glargine Injectable (LANTUS) 20 Unit(s) SubCutaneous every morning  insulin glargine Injectable (LANTUS) 20 Unit(s) SubCutaneous at bedtime  insulin lispro (HumaLOG) corrective regimen sliding scale   SubCutaneous three times a day before meals  insulin lispro Injectable (HumaLOG) 10 Unit(s) SubCutaneous three times a day before meals  levoFLOXacin IVPB      levoFLOXacin IVPB 250 milliGRAM(s) IV Intermittent daily  metoprolol tartrate 25 milliGRAM(s) Oral two times a day  nystatin Powder 1 Application(s) Topical two times a day  pantoprazole    Tablet 40 milliGRAM(s) Oral before breakfast  polyethylene glycol 3350 17 Gram(s) Oral daily  predniSONE   Tablet 50 milliGRAM(s) Oral daily  senna 2 Tablet(s) Oral at bedtime  sodium polystyrene sulfonate Suspension 30 Gram(s) Oral <User Schedule>  tamsulosin 0.4 milliGRAM(s) Oral at bedtime  tiotropium 18 MICROgram(s) Capsule 1 Capsule(s) Inhalation at bedtime    MEDICATIONS  (PRN):  acetaminophen   Tablet .. 650 milliGRAM(s) Oral every 6 hours PRN Temp greater or equal to 38C (100.4F), Moderate Pain (4 - 6)  ALBUTerol    90 MICROgram(s) HFA Inhaler 2 Puff(s) Inhalation every 6 hours PRN Shortness of Breath and/or Wheezing  aluminum hydroxide/magnesium hydroxide/simethicone Suspension 30 milliLiter(s) Oral every 4 hours PRN Dyspepsia  benzocaine 15 mG/menthol 3.6 mG (Sugar-Free) Lozenge 1 Lozenge Oral every 4 hours PRN Sore Throat  dextrose 40% Gel 15 Gram(s) Oral once PRN Blood Glucose LESS THAN 70 milliGRAM(s)/deciliter  glucagon  Injectable 1 milliGRAM(s) IntraMuscular once PRN Glucose LESS THAN 70 milligrams/deciliter  guaiFENesin/dextromethorphan  Syrup 10 milliLiter(s) Oral every 6 hours PRN Cough  zaleplon 5 milliGRAM(s) Oral at bedtime PRN Insomnia          RADIOLOGY & ADDITIONAL TESTS: Patient is an 78y Male with h/o 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  Patient mentions having only minimal dyspnea and mild productive cough with bipedal edema markedly improved form his most recent admission. Hypoxia was also confirmed on EMS arrival thus was started on BiPAP and given 5 nebs with Solumedrol 125mg IM with improvement. He was admitted for further management. Now The ID consult requested to assist with evaluation of positive Blood culture.        REVIEW OF SYSTEMS: Total of twelve systems have been reviewed with patient and found to be negative unless mentioned in HPI        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  High cholesterol  GERD (gastroesophageal reflux disease)  Enlarged prostate  COPD on  home oxygen, Emphysema lung  Diabetes mellitus, type 2  Hypertension  History of hemorrhoidectomy  Dysplastic colon polyp: removed        SOCIAL HISTORY  Alcohol: Does not drink  Tobacco: Does not smoke  Illicit substance use: None      FAMILY HISTORY: Non contributory to the present illness        ALLERGIES: aspirin (Other), fish (Other), iodine (Hives), penicillin (Unknown), shellfish (Other)        T(C): 35.6 (05-25-19 @ 13:46), Max: 36.1 (05-24-19 @ 22:53)  HR: 80 (05-25-19 @ 13:46) (78 - 83)  BP: 124/55 (05-25-19 @ 13:46) (119/56 - 124/55)  RR: 16 (05-25-19 @ 05:00) (16 - 18)  SpO2: --      05-24-19 @ 07:01  -  05-25-19 @ 07:00  --------------------------------------------------------  IN: 600 mL / OUT: 1450 mL / NET: -850 mL    05-25-19 @ 07:01  -  05-25-19 @ 20:03  --------------------------------------------------------  IN: 240 mL / OUT: 700 mL / NET: -460 mL        PHYSICAL EXAM:  GENERAL: Not in distress, on high flow oxygen  CHEST/LUNG:  Air entry bilaterally  HEART: s1 and s2 present  ABDOMEN:  grossly obese  EXTREMITIES: No pedal  edema  CNS: Awake and Alert      LABS:                        13.2   13.60 )-----------( 421      ( 25 May 2019 08:28 )             47.1         05-25    141  |  96<L>  |  57<H>  ----------------------------<  241<H>  5.0   |  37<H>  |  1.9<H>    Ca    9.5      25 May 2019 08:28          CAPILLARY BLOOD GLUCOSE  POCT Blood Glucose.: 294 mg/dL (25 May 2019 16:26)  POCT Blood Glucose.: 368 mg/dL (25 May 2019 11:36)  POCT Blood Glucose.: 174 mg/dL (25 May 2019 07:52)  POCT Blood Glucose.: 385 mg/dL (24 May 2019 21:37)            MEDICATIONS  (STANDING):  ALBUTerol/ipratropium for Nebulization 3 milliLiter(s) Nebulizer every 4 hours  ascorbic acid 500 milliGRAM(s) Oral daily  atorvastatin 20 milliGRAM(s) Oral at bedtime  buDESOnide 160 MICROgram(s)/formoterol 4.5 MICROgram(s) Inhaler 2 Puff(s) Inhalation two times a day  cholecalciferol 1000 Unit(s) Oral daily  dextrose 5%. 1000 milliLiter(s) (50 mL/Hr) IV Continuous <Continuous>  dextrose 50% Injectable 12.5 Gram(s) IV Push once  dextrose 50% Injectable 25 Gram(s) IV Push once  dextrose 50% Injectable 25 Gram(s) IV Push once  docusate sodium 100 milliGRAM(s) Oral three times a day  ferrous    sulfate 325 milliGRAM(s) Oral daily  finasteride 5 milliGRAM(s) Oral daily  furosemide    Tablet 40 milliGRAM(s) Oral daily  heparin  Injectable 5000 Unit(s) SubCutaneous every 12 hours  insulin glargine Injectable (LANTUS) 20 Unit(s) SubCutaneous every morning  insulin glargine Injectable (LANTUS) 20 Unit(s) SubCutaneous at bedtime  insulin lispro (HumaLOG) corrective regimen sliding scale   SubCutaneous three times a day before meals  insulin lispro Injectable (HumaLOG) 10 Unit(s) SubCutaneous three times a day before meals  levoFLOXacin IVPB      levoFLOXacin IVPB 250 milliGRAM(s) IV Intermittent daily  metoprolol tartrate 25 milliGRAM(s) Oral two times a day  nystatin Powder 1 Application(s) Topical two times a day  pantoprazole    Tablet 40 milliGRAM(s) Oral before breakfast  polyethylene glycol 3350 17 Gram(s) Oral daily  predniSONE   Tablet 50 milliGRAM(s) Oral daily  senna 2 Tablet(s) Oral at bedtime  sodium polystyrene sulfonate Suspension 30 Gram(s) Oral <User Schedule>  tamsulosin 0.4 milliGRAM(s) Oral at bedtime  tiotropium 18 MICROgram(s) Capsule 1 Capsule(s) Inhalation at bedtime    MEDICATIONS  (PRN):  acetaminophen   Tablet .. 650 milliGRAM(s) Oral every 6 hours PRN Temp greater or equal to 38C (100.4F), Moderate Pain (4 - 6)  ALBUTerol    90 MICROgram(s) HFA Inhaler 2 Puff(s) Inhalation every 6 hours PRN Shortness of Breath and/or Wheezing  aluminum hydroxide/magnesium hydroxide/simethicone Suspension 30 milliLiter(s) Oral every 4 hours PRN Dyspepsia  benzocaine 15 mG/menthol 3.6 mG (Sugar-Free) Lozenge 1 Lozenge Oral every 4 hours PRN Sore Throat  dextrose 40% Gel 15 Gram(s) Oral once PRN Blood Glucose LESS THAN 70 milliGRAM(s)/deciliter  glucagon  Injectable 1 milliGRAM(s) IntraMuscular once PRN Glucose LESS THAN 70 milligrams/deciliter  guaiFENesin/dextromethorphan  Syrup 10 milliLiter(s) Oral every 6 hours PRN Cough  zaleplon 5 milliGRAM(s) Oral at bedtime PRN Insomnia        RADIOLOGY & ADDITIONAL TESTS:      < from: Xray Chest 1 View- PORTABLE-Routine (05.22.19 @ 11:19) >    Stable enlarged cardiac silhouette.    No significant change in pulmonary vascular congestion.        < end of copied text >         MICROBIOLOGY DATA:    Culture - Blood (05.24.19 @ 16:00)    Specimen Source: .Blood Blood-Peripheral    Culture Results:   No growth to date.      Culture - Blood (05.22.19 @ 10:25)    -  Coagulase negative Staphylococcus: Detec    Gram Stain:   Growth in aerobic bottle: Gram Positive Cocci in Clusters    Specimen Source: .Blood Blood    Organism: Blood Culture PCR    Culture Results:   Growth in aerobic bottle: Coag Negative Staphylococcus  Single set isolate, possible contaminant. Contact  Microbiology if susceptibility testing clinically  indicated.  "Due to technical problems, Proteus sp. will Not be reported as part of  the BCID panel until further notice"  ***Blood Panel PCR results on this specimen are available  approximately 3 hours after the Gram stain result.***  Gram stain, PCR, and/or culture results may not always  correspond due to difference in methodologies.  ************************************************************  This PCR assay was performed using [employer].  The following targets are tested for: Enterococcus,  vancomycin resistant enterococci, Listeria monocytogenes,  coagulase negative staphylococci, S. aureus,  methicillin resistant S. aureus, Streptococcus agalactiae  (Group B), S. pneumoniae, S. pyogenes (Group A),  Acinetobacter baumannii, Enterobacter cloacae, E. coli,  Klebsiella oxytoca, K. pneumoniae, Proteus sp.,  Serratia marcescens, Haemophilus influenzae,  Neisseria meningitidis, Pseudomonas aeruginosa, Candida  albicans, C. glabrata, C krusei, C parapsilosis,  C. tropicalis and the KPC resistance gene.    Organism Identification: Blood Culture PCR    Method Type: PCR

## 2019-05-25 NOTE — PROGRESS NOTE ADULT - SUBJECTIVE AND OBJECTIVE BOX
Progress Note:  Provider Speciality                            Hospitalist      JIAN MANCIA [id number] 78y Male     CHIEF PRESENTING COMPLAINT:  Patient is a 78y old  Male who presents with a chief complaint of Hypoxia (24 May 2019 12:43)        SUBJECTIVE:  Patient was seen and examined at bedside. Reports improvement in  presenting complaint. No significant overnight events reported.     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  Patient mentions having only minimal dyspnea and mild productive cough with bipedal edema markedly improved form his most recent admission. Hypoxia was also confirmed on EMS arrival thus was started on BiPAP and given 5 nebs with Solumedrol 125mg IM with improvement. He was admitted for further management. (22 May 2019 13:56)      PAST MEDICAL & SURGICAL HISTORY:  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  High cholesterol  GERD (gastroesophageal reflux disease)  Enlarged prostate  Oxygen dependent  COPD (chronic obstructive pulmonary disease)  Diabetes mellitus, type 2  Hypertension  Emphysema lung  History of hemorrhoidectomy  Dysplastic colon polyp: removed      VITAL SIGNS:  Vital Signs Last 24 Hrs  T(C): 35.7 (24 May 2019 06:30), Max: 36.3 (23 May 2019 21:04)  T(F): 96.2 (24 May 2019 06:30), Max: 97.4 (23 May 2019 21:04)  HR: 102 (24 May 2019 09:06) (81 - 102)  BP: 106/57 (24 May 2019 09:06) (103/61 - 128/66)  BP(mean): --  RR: 18 (24 May 2019 06:30) (18 - 18)  SpO2: --    PHYSICAL EXAMINATION:  Not in acute distress. obesity  General: No pallor, or icterus, afebrile  HEENT:  MIS, EOMI, no JVD, no Bruit.  Heart: S1+S2 audible, no murmur  Lungs: bilateral  fair air entry, no wheezing, no crepitations.  Abdomen: Soft, non-tender, non-distended , no  rigidity or guarding.  CNS: AAOx3, CN  grossly intact.  Extremities:  No edema          REVIEW OF SYSTEMS:  Patient denies any headache, any vision complaints, runny nose, fever, chills, sore throat. Denies chest pain, shortness of breath, palpitation. Denies nausea, vomiting, abdominal pain, diarrhoea, Denies urinary burning, urgency, frequency, dysuria. Denies weakness in any part of the body or numbness.   At least 10 systems were reviewed in ROS. All systems reviewed  are within normal limits except for the complaints as described in Subjective.    CONSULTS:  Consultant(s) Notes Reviewed by me.   Care Discussed with Consultants/Other Providers where required.        MEDICATIONS:  MEDICATIONS  (STANDING):  ALBUTerol/ipratropium for Nebulization 3 milliLiter(s) Nebulizer every 4 hours  ascorbic acid 500 milliGRAM(s) Oral daily  atorvastatin 20 milliGRAM(s) Oral at bedtime  buDESOnide 160 MICROgram(s)/formoterol 4.5 MICROgram(s) Inhaler 2 Puff(s) Inhalation two times a day  cholecalciferol 1000 Unit(s) Oral daily  dextrose 5%. 1000 milliLiter(s) (50 mL/Hr) IV Continuous <Continuous>  dextrose 50% Injectable 12.5 Gram(s) IV Push once  dextrose 50% Injectable 25 Gram(s) IV Push once  dextrose 50% Injectable 25 Gram(s) IV Push once  docusate sodium 100 milliGRAM(s) Oral three times a day  ferrous    sulfate 325 milliGRAM(s) Oral daily  finasteride 5 milliGRAM(s) Oral daily  furosemide    Tablet 40 milliGRAM(s) Oral daily  heparin  Injectable 5000 Unit(s) SubCutaneous every 12 hours  insulin glargine Injectable (LANTUS) 20 Unit(s) SubCutaneous every morning  insulin glargine Injectable (LANTUS) 20 Unit(s) SubCutaneous at bedtime  insulin lispro (HumaLOG) corrective regimen sliding scale   SubCutaneous three times a day before meals  insulin lispro Injectable (HumaLOG) 10 Unit(s) SubCutaneous three times a day before meals  levoFLOXacin IVPB      levoFLOXacin IVPB 250 milliGRAM(s) IV Intermittent daily  metoprolol tartrate 25 milliGRAM(s) Oral two times a day  nystatin Powder 1 Application(s) Topical two times a day  pantoprazole    Tablet 40 milliGRAM(s) Oral before breakfast  polyethylene glycol 3350 17 Gram(s) Oral daily  predniSONE   Tablet 50 milliGRAM(s) Oral daily  senna 2 Tablet(s) Oral at bedtime  sodium polystyrene sulfonate Suspension 30 Gram(s) Oral <User Schedule>  tamsulosin 0.4 milliGRAM(s) Oral at bedtime  tiotropium 18 MICROgram(s) Capsule 1 Capsule(s) Inhalation at bedtime    MEDICATIONS  (PRN):  acetaminophen   Tablet .. 650 milliGRAM(s) Oral every 6 hours PRN Temp greater or equal to 38C (100.4F), Moderate Pain (4 - 6)  ALBUTerol    90 MICROgram(s) HFA Inhaler 2 Puff(s) Inhalation every 6 hours PRN Shortness of Breath and/or Wheezing  aluminum hydroxide/magnesium hydroxide/simethicone Suspension 30 milliLiter(s) Oral every 4 hours PRN Dyspepsia  dextrose 40% Gel 15 Gram(s) Oral once PRN Blood Glucose LESS THAN 70 milliGRAM(s)/deciliter  glucagon  Injectable 1 milliGRAM(s) IntraMuscular once PRN Glucose LESS THAN 70 milligrams/deciliter  guaiFENesin/dextromethorphan  Syrup 10 milliLiter(s) Oral every 6 hours PRN Cough  zaleplon 5 milliGRAM(s) Oral at bedtime PRN Insomnia      LABOROTORY DATA/MICROBIOLOGY/I & O's:                        13.4   14.67 )-----------( 470      ( 24 May 2019 08:31 )             47.7     05-24    143  |  99  |  55<H>  ----------------------------<  214<H>  5.5<H>   |  35<H>  |  2.1<H>    Ca    9.6      24 May 2019 08:31          CAPILLARY BLOOD GLUCOSE      POCT Blood Glucose.: 268 mg/dL (24 May 2019 11:54)  POCT Blood Glucose.: 197 mg/dL (24 May 2019 08:00)  POCT Blood Glucose.: 219 mg/dL (23 May 2019 21:13)  POCT Blood Glucose.: 225 mg/dL (23 May 2019 16:21)                05-23-19 @ 07:01  -  05-24-19 @ 07:00  --------------------------------------------------------  IN: 650 mL / OUT: 1750 mL / NET: -1100 mL              ASSESSMENT:      78y Male with h/o 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      ASSESSMENT:  Principal Diagnosis:  Acute on Chronic Hypoxic & hypercapnic respiratory failure secondary to Chronic obstructive pulmonary disease exacerbation   Heart failure with preserved ejection fraction -stable   Bacteremia, GPC, possible contamination    Associated Active Comorbid Conditions:  Pulmnonary Hypertension   Obstructive sleep apnea   Obesity hypoventilation syndrome   Hypertension   Diabetes mellitis type 2   Chronic renal disease Stage  stage 3  Benign Prostatic Hypertrophy   Morbid obesity    PLAN:    f/u blood cx ( could be contaminant , follow up repeat blood cx ). ID consult  O2 via NC@ 2L/Min, keep sat >94% at all times  Solumedrol tapered to prednisone  Continue Symbicort, Spiriva & Bronchodilators ATC and prn and Steroids (On chronic Steroids Prednisone 10 mg daily).   Currently on  Levaquin for bronchitis  Albuterol/Atrovent 1 unit neb Q6hrs  Continue Lasix PO 40 mg daily. No overt Evidence of Fluid overload.    Supplemental oxygen. On 4-5L NC at home. BiPAP prn & qhs.  Maintain saturation >88%. Pulm consult.  GI Prophylaxis with Protonix 40mg poQDaily  DVT Prophylaxis with Heparin 5000units sc q8hrs	   DNR & DNI.     Discharge Disposition: anticipate discharge in 24-48 hrs , pending medical stabilization

## 2019-05-26 LAB
ANION GAP SERPL CALC-SCNC: 8 MMOL/L — SIGNIFICANT CHANGE UP (ref 7–14)
BUN SERPL-MCNC: 57 MG/DL — HIGH (ref 10–20)
CALCIUM SERPL-MCNC: 9.5 MG/DL — SIGNIFICANT CHANGE UP (ref 8.5–10.1)
CHLORIDE SERPL-SCNC: 97 MMOL/L — LOW (ref 98–110)
CO2 SERPL-SCNC: 37 MMOL/L — HIGH (ref 17–32)
CREAT SERPL-MCNC: 2 MG/DL — HIGH (ref 0.7–1.5)
GLUCOSE BLDC GLUCOMTR-MCNC: 259 MG/DL — HIGH (ref 70–99)
GLUCOSE BLDC GLUCOMTR-MCNC: 294 MG/DL — HIGH (ref 70–99)
GLUCOSE BLDC GLUCOMTR-MCNC: 299 MG/DL — HIGH (ref 70–99)
GLUCOSE BLDC GLUCOMTR-MCNC: 416 MG/DL — HIGH (ref 70–99)
GLUCOSE SERPL-MCNC: 299 MG/DL — HIGH (ref 70–99)
HCT VFR BLD CALC: 49.3 % — SIGNIFICANT CHANGE UP (ref 42–52)
HGB BLD-MCNC: 13.8 G/DL — LOW (ref 14–18)
MCHC RBC-ENTMCNC: 21.4 PG — LOW (ref 27–31)
MCHC RBC-ENTMCNC: 28 G/DL — LOW (ref 32–37)
MCV RBC AUTO: 76.6 FL — LOW (ref 80–94)
NRBC # BLD: 0 /100 WBCS — SIGNIFICANT CHANGE UP (ref 0–0)
PLATELET # BLD AUTO: 425 K/UL — HIGH (ref 130–400)
POTASSIUM SERPL-MCNC: 4.9 MMOL/L — SIGNIFICANT CHANGE UP (ref 3.5–5)
POTASSIUM SERPL-SCNC: 4.9 MMOL/L — SIGNIFICANT CHANGE UP (ref 3.5–5)
RBC # BLD: 6.44 M/UL — HIGH (ref 4.7–6.1)
RBC # FLD: 20.1 % — HIGH (ref 11.5–14.5)
SODIUM SERPL-SCNC: 142 MMOL/L — SIGNIFICANT CHANGE UP (ref 135–146)
WBC # BLD: 11.22 K/UL — HIGH (ref 4.8–10.8)
WBC # FLD AUTO: 11.22 K/UL — HIGH (ref 4.8–10.8)

## 2019-05-26 RX ORDER — SODIUM CHLORIDE 9 MG/ML
1000 INJECTION INTRAMUSCULAR; INTRAVENOUS; SUBCUTANEOUS
Refills: 0 | Status: DISCONTINUED | OUTPATIENT
Start: 2019-05-26 | End: 2019-05-29

## 2019-05-26 RX ADMIN — Medication 10 UNIT(S): at 08:44

## 2019-05-26 RX ADMIN — Medication 10 UNIT(S): at 11:57

## 2019-05-26 RX ADMIN — HEPARIN SODIUM 5000 UNIT(S): 5000 INJECTION INTRAVENOUS; SUBCUTANEOUS at 06:02

## 2019-05-26 RX ADMIN — INSULIN GLARGINE 20 UNIT(S): 100 INJECTION, SOLUTION SUBCUTANEOUS at 08:46

## 2019-05-26 RX ADMIN — POLYETHYLENE GLYCOL 3350 17 GRAM(S): 17 POWDER, FOR SOLUTION ORAL at 11:57

## 2019-05-26 RX ADMIN — Medication 100 MILLIGRAM(S): at 21:54

## 2019-05-26 RX ADMIN — INSULIN GLARGINE 20 UNIT(S): 100 INJECTION, SOLUTION SUBCUTANEOUS at 21:54

## 2019-05-26 RX ADMIN — SODIUM CHLORIDE 75 MILLILITER(S): 9 INJECTION INTRAMUSCULAR; INTRAVENOUS; SUBCUTANEOUS at 15:35

## 2019-05-26 RX ADMIN — Medication 10 UNIT(S): at 17:11

## 2019-05-26 RX ADMIN — Medication 6: at 17:10

## 2019-05-26 RX ADMIN — Medication 100 MILLIGRAM(S): at 15:39

## 2019-05-26 RX ADMIN — Medication 25 MILLIGRAM(S): at 17:45

## 2019-05-26 RX ADMIN — PANTOPRAZOLE SODIUM 40 MILLIGRAM(S): 20 TABLET, DELAYED RELEASE ORAL at 06:02

## 2019-05-26 RX ADMIN — SENNA PLUS 2 TABLET(S): 8.6 TABLET ORAL at 21:55

## 2019-05-26 RX ADMIN — Medication 500 MILLIGRAM(S): at 11:57

## 2019-05-26 RX ADMIN — BUDESONIDE AND FORMOTEROL FUMARATE DIHYDRATE 2 PUFF(S): 160; 4.5 AEROSOL RESPIRATORY (INHALATION) at 07:20

## 2019-05-26 RX ADMIN — Medication 3 MILLILITER(S): at 20:05

## 2019-05-26 RX ADMIN — TIOTROPIUM BROMIDE 1 CAPSULE(S): 18 CAPSULE ORAL; RESPIRATORY (INHALATION) at 21:55

## 2019-05-26 RX ADMIN — Medication 3 MILLILITER(S): at 16:11

## 2019-05-26 RX ADMIN — SODIUM POLYSTYRENE SULFONATE 30 GRAM(S): 4.1 POWDER, FOR SUSPENSION ORAL at 06:01

## 2019-05-26 RX ADMIN — Medication 3 MILLILITER(S): at 14:01

## 2019-05-26 RX ADMIN — Medication 3: at 08:44

## 2019-05-26 RX ADMIN — Medication 50 MILLIGRAM(S): at 06:00

## 2019-05-26 RX ADMIN — Medication 25 MILLIGRAM(S): at 06:00

## 2019-05-26 RX ADMIN — Medication 10 MILLILITER(S): at 08:48

## 2019-05-26 RX ADMIN — Medication 3 MILLILITER(S): at 07:18

## 2019-05-26 RX ADMIN — Medication 325 MILLIGRAM(S): at 11:56

## 2019-05-26 RX ADMIN — BUDESONIDE AND FORMOTEROL FUMARATE DIHYDRATE 2 PUFF(S): 160; 4.5 AEROSOL RESPIRATORY (INHALATION) at 21:53

## 2019-05-26 RX ADMIN — Medication 40 MILLIGRAM(S): at 06:00

## 2019-05-26 RX ADMIN — Medication 3: at 11:57

## 2019-05-26 RX ADMIN — FINASTERIDE 5 MILLIGRAM(S): 5 TABLET, FILM COATED ORAL at 11:56

## 2019-05-26 RX ADMIN — TAMSULOSIN HYDROCHLORIDE 0.4 MILLIGRAM(S): 0.4 CAPSULE ORAL at 21:53

## 2019-05-26 RX ADMIN — ATORVASTATIN CALCIUM 20 MILLIGRAM(S): 80 TABLET, FILM COATED ORAL at 21:54

## 2019-05-26 RX ADMIN — Medication 100 MILLIGRAM(S): at 06:00

## 2019-05-26 RX ADMIN — Medication 1000 UNIT(S): at 11:56

## 2019-05-26 RX ADMIN — Medication 10 MILLILITER(S): at 17:45

## 2019-05-26 NOTE — PROGRESS NOTE ADULT - SUBJECTIVE AND OBJECTIVE BOX
Progress Note:  Provider Speciality                            Hospitalist      JIAN MANCIA [id number] 78y Male     CHIEF PRESENTING COMPLAINT:  Patient is a 78y old  Male who presents with a chief complaint of Hypoxia (24 May 2019 12:43)        SUBJECTIVE:  Patient was seen and examined at bedside. Reports improvement in  presenting complaint. No significant overnight events reported.     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  Patient mentions having only minimal dyspnea and mild productive cough with bipedal edema markedly improved form his most recent admission. Hypoxia was also confirmed on EMS arrival thus was started on BiPAP and given 5 nebs with Solumedrol 125mg IM with improvement. He was admitted for further management. (22 May 2019 13:56)      PAST MEDICAL & SURGICAL HISTORY:  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  High cholesterol  GERD (gastroesophageal reflux disease)  Enlarged prostate  Oxygen dependent  COPD (chronic obstructive pulmonary disease)  Diabetes mellitus, type 2  Hypertension  Emphysema lung  History of hemorrhoidectomy  Dysplastic colon polyp: removed      VITAL SIGNS:  Vital Signs Last 24 Hrs  T(C): 35.7 (24 May 2019 06:30), Max: 36.3 (23 May 2019 21:04)  T(F): 96.2 (24 May 2019 06:30), Max: 97.4 (23 May 2019 21:04)  HR: 102 (24 May 2019 09:06) (81 - 102)  BP: 106/57 (24 May 2019 09:06) (103/61 - 128/66)  BP(mean): --  RR: 18 (24 May 2019 06:30) (18 - 18)  SpO2: --    PHYSICAL EXAMINATION:  Not in acute distress. obesity  General: No pallor, or icterus, afebrile  HEENT:  MIS, EOMI, no JVD, no Bruit.  Heart: S1+S2 audible, no murmur  Lungs: bilateral  fair air entry, no wheezing, no crepitations.  Abdomen: Soft, non-tender, non-distended , no  rigidity or guarding.  CNS: AAOx3, CN  grossly intact.  Extremities:  No edema          REVIEW OF SYSTEMS:  Patient denies any headache, any vision complaints, runny nose, fever, chills, sore throat. Denies chest pain, shortness of breath, palpitation. Denies nausea, vomiting, abdominal pain, diarrhoea, Denies urinary burning, urgency, frequency, dysuria. Denies weakness in any part of the body or numbness.   At least 10 systems were reviewed in ROS. All systems reviewed  are within normal limits except for the complaints as described in Subjective.    CONSULTS:  Consultant(s) Notes Reviewed by me.   Care Discussed with Consultants/Other Providers where required.        MEDICATIONS:  MEDICATIONS  (STANDING):  ALBUTerol/ipratropium for Nebulization 3 milliLiter(s) Nebulizer every 4 hours  ascorbic acid 500 milliGRAM(s) Oral daily  atorvastatin 20 milliGRAM(s) Oral at bedtime  buDESOnide 160 MICROgram(s)/formoterol 4.5 MICROgram(s) Inhaler 2 Puff(s) Inhalation two times a day  cholecalciferol 1000 Unit(s) Oral daily  dextrose 5%. 1000 milliLiter(s) (50 mL/Hr) IV Continuous <Continuous>  dextrose 50% Injectable 12.5 Gram(s) IV Push once  dextrose 50% Injectable 25 Gram(s) IV Push once  dextrose 50% Injectable 25 Gram(s) IV Push once  docusate sodium 100 milliGRAM(s) Oral three times a day  ferrous    sulfate 325 milliGRAM(s) Oral daily  finasteride 5 milliGRAM(s) Oral daily  furosemide    Tablet 40 milliGRAM(s) Oral daily  heparin  Injectable 5000 Unit(s) SubCutaneous every 12 hours  insulin glargine Injectable (LANTUS) 20 Unit(s) SubCutaneous every morning  insulin glargine Injectable (LANTUS) 20 Unit(s) SubCutaneous at bedtime  insulin lispro (HumaLOG) corrective regimen sliding scale   SubCutaneous three times a day before meals  insulin lispro Injectable (HumaLOG) 10 Unit(s) SubCutaneous three times a day before meals  levoFLOXacin IVPB      levoFLOXacin IVPB 250 milliGRAM(s) IV Intermittent daily  metoprolol tartrate 25 milliGRAM(s) Oral two times a day  nystatin Powder 1 Application(s) Topical two times a day  pantoprazole    Tablet 40 milliGRAM(s) Oral before breakfast  polyethylene glycol 3350 17 Gram(s) Oral daily  predniSONE   Tablet 50 milliGRAM(s) Oral daily  senna 2 Tablet(s) Oral at bedtime  sodium polystyrene sulfonate Suspension 30 Gram(s) Oral <User Schedule>  tamsulosin 0.4 milliGRAM(s) Oral at bedtime  tiotropium 18 MICROgram(s) Capsule 1 Capsule(s) Inhalation at bedtime    MEDICATIONS  (PRN):  acetaminophen   Tablet .. 650 milliGRAM(s) Oral every 6 hours PRN Temp greater or equal to 38C (100.4F), Moderate Pain (4 - 6)  ALBUTerol    90 MICROgram(s) HFA Inhaler 2 Puff(s) Inhalation every 6 hours PRN Shortness of Breath and/or Wheezing  aluminum hydroxide/magnesium hydroxide/simethicone Suspension 30 milliLiter(s) Oral every 4 hours PRN Dyspepsia  dextrose 40% Gel 15 Gram(s) Oral once PRN Blood Glucose LESS THAN 70 milliGRAM(s)/deciliter  glucagon  Injectable 1 milliGRAM(s) IntraMuscular once PRN Glucose LESS THAN 70 milligrams/deciliter  guaiFENesin/dextromethorphan  Syrup 10 milliLiter(s) Oral every 6 hours PRN Cough  zaleplon 5 milliGRAM(s) Oral at bedtime PRN Insomnia      LABOROTORY DATA/MICROBIOLOGY/I & O's:                        13.4   14.67 )-----------( 470      ( 24 May 2019 08:31 )             47.7     05-24    143  |  99  |  55<H>  ----------------------------<  214<H>  5.5<H>   |  35<H>  |  2.1<H>    Ca    9.6      24 May 2019 08:31          CAPILLARY BLOOD GLUCOSE      POCT Blood Glucose.: 268 mg/dL (24 May 2019 11:54)  POCT Blood Glucose.: 197 mg/dL (24 May 2019 08:00)  POCT Blood Glucose.: 219 mg/dL (23 May 2019 21:13)  POCT Blood Glucose.: 225 mg/dL (23 May 2019 16:21)                05-23-19 @ 07:01  -  05-24-19 @ 07:00  --------------------------------------------------------  IN: 650 mL / OUT: 1750 mL / NET: -1100 mL              ASSESSMENT:      78y Male with h/o 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      ASSESSMENT:  Principal Diagnosis:  Acute on Chronic Hypoxic & hypercapnic respiratory failure secondary to Chronic obstructive pulmonary disease exacerbation   Heart failure with preserved ejection fraction -stable   Bacteremia, GPC, possible contamination      Associated Active Comorbid Conditions:  Pulmnonary Hypertension   Obstructive sleep apnea   Obesity hypoventilation syndrome   Hypertension   Diabetes mellitis type 2   Chronic renal disease Stage  stage 3  Benign Prostatic Hypertrophy   Morbid obesity    PLAN:    f/u blood cx ( could be contaminant , follow up repeat blood cx ). ID consult  O2 via NC@ 2L/Min, keep sat >94% at all times  Solumedrol tapered to prednisone  Continue Symbicort, Spiriva & Bronchodilators ATC and prn and Steroids (On chronic Steroids Prednisone 10 mg daily).   Currently on  Levaquin for bronchitis  Albuterol/Atrovent 1 unit neb Q6hrs  Continue Lasix PO 40 mg daily. No overt Evidence of Fluid overload.    Supplemental oxygen. On 4-5L NC at home. BiPAP prn & qhs.  Maintain saturation >88%. Pulm consult.  GI Prophylaxis with Protonix 40mg poQDaily  DVT Prophylaxis with Heparin 5000units sc q8hrs	   DNR & DNI.     Discharge Disposition: anticipate discharge in 24-48 hrs , pending medical stabilization

## 2019-05-27 LAB
ANION GAP SERPL CALC-SCNC: 7 MMOL/L — SIGNIFICANT CHANGE UP (ref 7–14)
BUN SERPL-MCNC: 54 MG/DL — HIGH (ref 10–20)
CALCIUM SERPL-MCNC: 9 MG/DL — SIGNIFICANT CHANGE UP (ref 8.5–10.1)
CHLORIDE SERPL-SCNC: 99 MMOL/L — SIGNIFICANT CHANGE UP (ref 98–110)
CO2 SERPL-SCNC: 39 MMOL/L — HIGH (ref 17–32)
CREAT SERPL-MCNC: 2 MG/DL — HIGH (ref 0.7–1.5)
GLUCOSE BLDC GLUCOMTR-MCNC: 151 MG/DL — HIGH (ref 70–99)
GLUCOSE BLDC GLUCOMTR-MCNC: 250 MG/DL — HIGH (ref 70–99)
GLUCOSE BLDC GLUCOMTR-MCNC: 279 MG/DL — HIGH (ref 70–99)
GLUCOSE BLDC GLUCOMTR-MCNC: 285 MG/DL — HIGH (ref 70–99)
GLUCOSE BLDC GLUCOMTR-MCNC: 286 MG/DL — HIGH (ref 70–99)
GLUCOSE SERPL-MCNC: 170 MG/DL — HIGH (ref 70–99)
HCT VFR BLD CALC: 45.1 % — SIGNIFICANT CHANGE UP (ref 42–52)
HGB BLD-MCNC: 12.7 G/DL — LOW (ref 14–18)
MCHC RBC-ENTMCNC: 21.7 PG — LOW (ref 27–31)
MCHC RBC-ENTMCNC: 28.2 G/DL — LOW (ref 32–37)
MCV RBC AUTO: 77.1 FL — LOW (ref 80–94)
NRBC # BLD: 0 /100 WBCS — SIGNIFICANT CHANGE UP (ref 0–0)
PLATELET # BLD AUTO: 350 K/UL — SIGNIFICANT CHANGE UP (ref 130–400)
POTASSIUM SERPL-MCNC: 4.5 MMOL/L — SIGNIFICANT CHANGE UP (ref 3.5–5)
POTASSIUM SERPL-SCNC: 4.5 MMOL/L — SIGNIFICANT CHANGE UP (ref 3.5–5)
RBC # BLD: 5.85 M/UL — SIGNIFICANT CHANGE UP (ref 4.7–6.1)
RBC # FLD: 19.6 % — HIGH (ref 11.5–14.5)
SODIUM SERPL-SCNC: 145 MMOL/L — SIGNIFICANT CHANGE UP (ref 135–146)
WBC # BLD: 9.58 K/UL — SIGNIFICANT CHANGE UP (ref 4.8–10.8)
WBC # FLD AUTO: 9.58 K/UL — SIGNIFICANT CHANGE UP (ref 4.8–10.8)

## 2019-05-27 RX ORDER — ZOLPIDEM TARTRATE 10 MG/1
5 TABLET ORAL AT BEDTIME
Refills: 0 | Status: DISCONTINUED | OUTPATIENT
Start: 2019-05-27 | End: 2019-05-29

## 2019-05-27 RX ADMIN — Medication 3 MILLILITER(S): at 08:43

## 2019-05-27 RX ADMIN — Medication 325 MILLIGRAM(S): at 11:35

## 2019-05-27 RX ADMIN — FINASTERIDE 5 MILLIGRAM(S): 5 TABLET, FILM COATED ORAL at 11:35

## 2019-05-27 RX ADMIN — Medication 25 MILLIGRAM(S): at 17:41

## 2019-05-27 RX ADMIN — BUDESONIDE AND FORMOTEROL FUMARATE DIHYDRATE 2 PUFF(S): 160; 4.5 AEROSOL RESPIRATORY (INHALATION) at 07:41

## 2019-05-27 RX ADMIN — Medication 1: at 07:41

## 2019-05-27 RX ADMIN — Medication 2: at 11:34

## 2019-05-27 RX ADMIN — HEPARIN SODIUM 5000 UNIT(S): 5000 INJECTION INTRAVENOUS; SUBCUTANEOUS at 17:41

## 2019-05-27 RX ADMIN — Medication 3 MILLILITER(S): at 15:19

## 2019-05-27 RX ADMIN — Medication 10 UNIT(S): at 11:34

## 2019-05-27 RX ADMIN — Medication 100 MILLIGRAM(S): at 21:18

## 2019-05-27 RX ADMIN — NYSTATIN CREAM 1 APPLICATION(S): 100000 CREAM TOPICAL at 06:09

## 2019-05-27 RX ADMIN — BENZOCAINE AND MENTHOL 1 LOZENGE: 5; 1 LIQUID ORAL at 08:05

## 2019-05-27 RX ADMIN — Medication 25 MILLIGRAM(S): at 06:08

## 2019-05-27 RX ADMIN — TAMSULOSIN HYDROCHLORIDE 0.4 MILLIGRAM(S): 0.4 CAPSULE ORAL at 21:18

## 2019-05-27 RX ADMIN — Medication 3 MILLILITER(S): at 00:48

## 2019-05-27 RX ADMIN — Medication 50 MILLIGRAM(S): at 06:08

## 2019-05-27 RX ADMIN — ATORVASTATIN CALCIUM 20 MILLIGRAM(S): 80 TABLET, FILM COATED ORAL at 21:18

## 2019-05-27 RX ADMIN — INSULIN GLARGINE 20 UNIT(S): 100 INJECTION, SOLUTION SUBCUTANEOUS at 07:41

## 2019-05-27 RX ADMIN — NYSTATIN CREAM 1 APPLICATION(S): 100000 CREAM TOPICAL at 17:41

## 2019-05-27 RX ADMIN — BUDESONIDE AND FORMOTEROL FUMARATE DIHYDRATE 2 PUFF(S): 160; 4.5 AEROSOL RESPIRATORY (INHALATION) at 21:19

## 2019-05-27 RX ADMIN — HEPARIN SODIUM 5000 UNIT(S): 5000 INJECTION INTRAVENOUS; SUBCUTANEOUS at 06:08

## 2019-05-27 RX ADMIN — Medication 100 MILLIGRAM(S): at 06:07

## 2019-05-27 RX ADMIN — SENNA PLUS 2 TABLET(S): 8.6 TABLET ORAL at 21:18

## 2019-05-27 RX ADMIN — ZOLPIDEM TARTRATE 5 MILLIGRAM(S): 10 TABLET ORAL at 01:00

## 2019-05-27 RX ADMIN — Medication 3 MILLILITER(S): at 12:09

## 2019-05-27 RX ADMIN — Medication 10 MILLILITER(S): at 16:48

## 2019-05-27 RX ADMIN — ZOLPIDEM TARTRATE 5 MILLIGRAM(S): 10 TABLET ORAL at 23:28

## 2019-05-27 RX ADMIN — Medication 10 UNIT(S): at 07:41

## 2019-05-27 RX ADMIN — Medication 3: at 16:48

## 2019-05-27 RX ADMIN — POLYETHYLENE GLYCOL 3350 17 GRAM(S): 17 POWDER, FOR SOLUTION ORAL at 11:34

## 2019-05-27 RX ADMIN — Medication 3 MILLILITER(S): at 19:51

## 2019-05-27 RX ADMIN — PANTOPRAZOLE SODIUM 40 MILLIGRAM(S): 20 TABLET, DELAYED RELEASE ORAL at 06:07

## 2019-05-27 RX ADMIN — Medication 500 MILLIGRAM(S): at 11:35

## 2019-05-27 RX ADMIN — Medication 10 UNIT(S): at 16:48

## 2019-05-27 RX ADMIN — INSULIN GLARGINE 20 UNIT(S): 100 INJECTION, SOLUTION SUBCUTANEOUS at 21:18

## 2019-05-27 RX ADMIN — Medication 40 MILLIGRAM(S): at 06:08

## 2019-05-27 RX ADMIN — Medication 1000 UNIT(S): at 11:35

## 2019-05-27 RX ADMIN — Medication 10 MILLILITER(S): at 08:05

## 2019-05-27 NOTE — PROGRESS NOTE ADULT - SUBJECTIVE AND OBJECTIVE BOX
Progress Note:  Provider Speciality                            Hospitalist      JIAN MANCIA [id number] 78y Male     CHIEF PRESENTING COMPLAINT:  Patient is a 78y old  Male who presents with a chief complaint of Hypoxia (24 May 2019 12:43)        SUBJECTIVE:  Patient was seen and examined at bedside. Reports improvement in  presenting complaint. No significant overnight events reported.     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  Patient mentions having only minimal dyspnea and mild productive cough with bipedal edema markedly improved form his most recent admission. Hypoxia was also confirmed on EMS arrival thus was started on BiPAP and given 5 nebs with Solumedrol 125mg IM with improvement. He was admitted for further management. (22 May 2019 13:56)      PAST MEDICAL & SURGICAL HISTORY:  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  High cholesterol  GERD (gastroesophageal reflux disease)  Enlarged prostate  Oxygen dependent  COPD (chronic obstructive pulmonary disease)  Diabetes mellitus, type 2  Hypertension  Emphysema lung  History of hemorrhoidectomy  Dysplastic colon polyp: removed      VITAL SIGNS:  Vital Signs Last 24 Hrs  T(C): 35.7 (24 May 2019 06:30), Max: 36.3 (23 May 2019 21:04)  T(F): 96.2 (24 May 2019 06:30), Max: 97.4 (23 May 2019 21:04)  HR: 102 (24 May 2019 09:06) (81 - 102)  BP: 106/57 (24 May 2019 09:06) (103/61 - 128/66)  BP(mean): --  RR: 18 (24 May 2019 06:30) (18 - 18)  SpO2: --    PHYSICAL EXAMINATION:  Not in acute distress. obesity  General: No pallor, or icterus, afebrile  HEENT:  MIS, EOMI, no JVD, no Bruit.  Heart: S1+S2 audible, no murmur  Lungs: bilateral  fair air entry, no wheezing, no crepitations.  Abdomen: Soft, non-tender, non-distended , no  rigidity or guarding.  CNS: AAOx3, CN  grossly intact.  Extremities:  No edema          REVIEW OF SYSTEMS:  Patient denies any headache, any vision complaints, runny nose, fever, chills, sore throat. Denies chest pain, shortness of breath, palpitation. Denies nausea, vomiting, abdominal pain, diarrhoea, Denies urinary burning, urgency, frequency, dysuria. Denies weakness in any part of the body or numbness.   At least 10 systems were reviewed in ROS. All systems reviewed  are within normal limits except for the complaints as described in Subjective.    CONSULTS:  Consultant(s) Notes Reviewed by me.   Care Discussed with Consultants/Other Providers where required.        MEDICATIONS:  MEDICATIONS  (STANDING):  ALBUTerol/ipratropium for Nebulization 3 milliLiter(s) Nebulizer every 4 hours  ascorbic acid 500 milliGRAM(s) Oral daily  atorvastatin 20 milliGRAM(s) Oral at bedtime  buDESOnide 160 MICROgram(s)/formoterol 4.5 MICROgram(s) Inhaler 2 Puff(s) Inhalation two times a day  cholecalciferol 1000 Unit(s) Oral daily  dextrose 5%. 1000 milliLiter(s) (50 mL/Hr) IV Continuous <Continuous>  dextrose 50% Injectable 12.5 Gram(s) IV Push once  dextrose 50% Injectable 25 Gram(s) IV Push once  dextrose 50% Injectable 25 Gram(s) IV Push once  docusate sodium 100 milliGRAM(s) Oral three times a day  ferrous    sulfate 325 milliGRAM(s) Oral daily  finasteride 5 milliGRAM(s) Oral daily  furosemide    Tablet 40 milliGRAM(s) Oral daily  heparin  Injectable 5000 Unit(s) SubCutaneous every 12 hours  insulin glargine Injectable (LANTUS) 20 Unit(s) SubCutaneous every morning  insulin glargine Injectable (LANTUS) 20 Unit(s) SubCutaneous at bedtime  insulin lispro (HumaLOG) corrective regimen sliding scale   SubCutaneous three times a day before meals  insulin lispro Injectable (HumaLOG) 10 Unit(s) SubCutaneous three times a day before meals  levoFLOXacin IVPB      levoFLOXacin IVPB 250 milliGRAM(s) IV Intermittent daily  metoprolol tartrate 25 milliGRAM(s) Oral two times a day  nystatin Powder 1 Application(s) Topical two times a day  pantoprazole    Tablet 40 milliGRAM(s) Oral before breakfast  polyethylene glycol 3350 17 Gram(s) Oral daily  predniSONE   Tablet 50 milliGRAM(s) Oral daily  senna 2 Tablet(s) Oral at bedtime  sodium polystyrene sulfonate Suspension 30 Gram(s) Oral <User Schedule>  tamsulosin 0.4 milliGRAM(s) Oral at bedtime  tiotropium 18 MICROgram(s) Capsule 1 Capsule(s) Inhalation at bedtime    MEDICATIONS  (PRN):  acetaminophen   Tablet .. 650 milliGRAM(s) Oral every 6 hours PRN Temp greater or equal to 38C (100.4F), Moderate Pain (4 - 6)  ALBUTerol    90 MICROgram(s) HFA Inhaler 2 Puff(s) Inhalation every 6 hours PRN Shortness of Breath and/or Wheezing  aluminum hydroxide/magnesium hydroxide/simethicone Suspension 30 milliLiter(s) Oral every 4 hours PRN Dyspepsia  dextrose 40% Gel 15 Gram(s) Oral once PRN Blood Glucose LESS THAN 70 milliGRAM(s)/deciliter  glucagon  Injectable 1 milliGRAM(s) IntraMuscular once PRN Glucose LESS THAN 70 milligrams/deciliter  guaiFENesin/dextromethorphan  Syrup 10 milliLiter(s) Oral every 6 hours PRN Cough  zaleplon 5 milliGRAM(s) Oral at bedtime PRN Insomnia      LABOROTORY DATA/MICROBIOLOGY/I & O's:                        13.4   14.67 )-----------( 470      ( 24 May 2019 08:31 )             47.7     05-24    143  |  99  |  55<H>  ----------------------------<  214<H>  5.5<H>   |  35<H>  |  2.1<H>    Ca    9.6      24 May 2019 08:31          CAPILLARY BLOOD GLUCOSE      POCT Blood Glucose.: 268 mg/dL (24 May 2019 11:54)  POCT Blood Glucose.: 197 mg/dL (24 May 2019 08:00)  POCT Blood Glucose.: 219 mg/dL (23 May 2019 21:13)  POCT Blood Glucose.: 225 mg/dL (23 May 2019 16:21)                05-23-19 @ 07:01  -  05-24-19 @ 07:00  --------------------------------------------------------  IN: 650 mL / OUT: 1750 mL / NET: -1100 mL              ASSESSMENT:      78y Male with h/o 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      ASSESSMENT:  Principal Diagnosis:  Acute on Chronic Hypoxic & hypercapnic respiratory failure secondary to Chronic obstructive pulmonary disease exacerbation   Heart failure with preserved ejection fraction -stable   Bacteremia, GPC, possible contamination      Associated Active Comorbid Conditions:  Pulmnonary Hypertension   Obstructive sleep apnea   Obesity hypoventilation syndrome   Hypertension   Diabetes mellitis type 2   Chronic renal disease Stage  stage 3  Benign Prostatic Hypertrophy   Morbid obesity    PLAN:    f/u blood cx ( could be contaminant , follow up repeat blood cx ). ID consult  O2 via NC@ 2L/Min, keep sat >94% at all times  Solumedrol tapered to prednisone  Continue Symbicort, Spiriva & Bronchodilators ATC and prn and Steroids (On chronic Steroids Prednisone 10 mg daily).   Currently on  Levaquin for bronchitis  Albuterol/Atrovent 1 unit neb Q6hrs  Continue Lasix PO 40 mg daily. No overt Evidence of Fluid overload.    Supplemental oxygen. On 4-5L NC at home. BiPAP prn & qhs.  Maintain saturation >88%. Pulm consult.  GI Prophylaxis with Protonix 40mg poQDaily  DVT Prophylaxis with Heparin 5000units sc q8hrs	   DNR & DNI.     Discharge Disposition: anticipate discharge in AM pending negative blood cx

## 2019-05-28 ENCOUNTER — TRANSCRIPTION ENCOUNTER (OUTPATIENT)
Age: 79
End: 2019-05-28

## 2019-05-28 LAB
ANION GAP SERPL CALC-SCNC: 8 MMOL/L — SIGNIFICANT CHANGE UP (ref 7–14)
BUN SERPL-MCNC: 56 MG/DL — HIGH (ref 10–20)
CALCIUM SERPL-MCNC: 9.3 MG/DL — SIGNIFICANT CHANGE UP (ref 8.5–10.1)
CHLORIDE SERPL-SCNC: 99 MMOL/L — SIGNIFICANT CHANGE UP (ref 98–110)
CO2 SERPL-SCNC: 38 MMOL/L — HIGH (ref 17–32)
CREAT SERPL-MCNC: 1.8 MG/DL — HIGH (ref 0.7–1.5)
GLUCOSE BLDC GLUCOMTR-MCNC: 122 MG/DL — HIGH (ref 70–99)
GLUCOSE BLDC GLUCOMTR-MCNC: 240 MG/DL — HIGH (ref 70–99)
GLUCOSE BLDC GLUCOMTR-MCNC: 347 MG/DL — HIGH (ref 70–99)
GLUCOSE SERPL-MCNC: 141 MG/DL — HIGH (ref 70–99)
HCT VFR BLD CALC: 47.3 % — SIGNIFICANT CHANGE UP (ref 42–52)
HGB BLD-MCNC: 13.3 G/DL — LOW (ref 14–18)
MCHC RBC-ENTMCNC: 21.7 PG — LOW (ref 27–31)
MCHC RBC-ENTMCNC: 28.1 G/DL — LOW (ref 32–37)
MCV RBC AUTO: 77 FL — LOW (ref 80–94)
NRBC # BLD: 0 /100 WBCS — SIGNIFICANT CHANGE UP (ref 0–0)
PLATELET # BLD AUTO: 384 K/UL — SIGNIFICANT CHANGE UP (ref 130–400)
POTASSIUM SERPL-MCNC: 4.8 MMOL/L — SIGNIFICANT CHANGE UP (ref 3.5–5)
POTASSIUM SERPL-SCNC: 4.8 MMOL/L — SIGNIFICANT CHANGE UP (ref 3.5–5)
RBC # BLD: 6.14 M/UL — HIGH (ref 4.7–6.1)
RBC # FLD: 19.9 % — HIGH (ref 11.5–14.5)
SODIUM SERPL-SCNC: 145 MMOL/L — SIGNIFICANT CHANGE UP (ref 135–146)
WBC # BLD: 14.83 K/UL — HIGH (ref 4.8–10.8)
WBC # FLD AUTO: 14.83 K/UL — HIGH (ref 4.8–10.8)

## 2019-05-28 RX ORDER — FLUTICASONE PROPIONATE 50 MCG
1 SPRAY, SUSPENSION NASAL
Refills: 0 | Status: DISCONTINUED | OUTPATIENT
Start: 2019-05-28 | End: 2019-05-29

## 2019-05-28 RX ORDER — TIOTROPIUM BROMIDE 18 UG/1
1 CAPSULE ORAL; RESPIRATORY (INHALATION) DAILY
Refills: 0 | Status: DISCONTINUED | OUTPATIENT
Start: 2019-05-28 | End: 2019-05-29

## 2019-05-28 RX ADMIN — Medication 10 MILLILITER(S): at 09:08

## 2019-05-28 RX ADMIN — INSULIN GLARGINE 20 UNIT(S): 100 INJECTION, SOLUTION SUBCUTANEOUS at 08:58

## 2019-05-28 RX ADMIN — Medication 50 MILLIGRAM(S): at 06:22

## 2019-05-28 RX ADMIN — Medication 40 MILLIGRAM(S): at 06:22

## 2019-05-28 RX ADMIN — Medication 3 MILLILITER(S): at 13:02

## 2019-05-28 RX ADMIN — Medication 10 UNIT(S): at 12:04

## 2019-05-28 RX ADMIN — Medication 10 UNIT(S): at 09:00

## 2019-05-28 RX ADMIN — INSULIN GLARGINE 20 UNIT(S): 100 INJECTION, SOLUTION SUBCUTANEOUS at 21:12

## 2019-05-28 RX ADMIN — Medication 500 MILLIGRAM(S): at 11:45

## 2019-05-28 RX ADMIN — Medication 2: at 12:05

## 2019-05-28 RX ADMIN — HEPARIN SODIUM 5000 UNIT(S): 5000 INJECTION INTRAVENOUS; SUBCUTANEOUS at 06:22

## 2019-05-28 RX ADMIN — Medication 10 UNIT(S): at 17:24

## 2019-05-28 RX ADMIN — HEPARIN SODIUM 5000 UNIT(S): 5000 INJECTION INTRAVENOUS; SUBCUTANEOUS at 17:27

## 2019-05-28 RX ADMIN — SENNA PLUS 2 TABLET(S): 8.6 TABLET ORAL at 21:12

## 2019-05-28 RX ADMIN — Medication 3 MILLILITER(S): at 09:04

## 2019-05-28 RX ADMIN — TIOTROPIUM BROMIDE 1 CAPSULE(S): 18 CAPSULE ORAL; RESPIRATORY (INHALATION) at 11:44

## 2019-05-28 RX ADMIN — POLYETHYLENE GLYCOL 3350 17 GRAM(S): 17 POWDER, FOR SOLUTION ORAL at 11:46

## 2019-05-28 RX ADMIN — Medication 1 SPRAY(S): at 17:43

## 2019-05-28 RX ADMIN — Medication 4: at 17:24

## 2019-05-28 RX ADMIN — Medication 3 MILLILITER(S): at 20:13

## 2019-05-28 RX ADMIN — TAMSULOSIN HYDROCHLORIDE 0.4 MILLIGRAM(S): 0.4 CAPSULE ORAL at 21:12

## 2019-05-28 RX ADMIN — NYSTATIN CREAM 1 APPLICATION(S): 100000 CREAM TOPICAL at 06:31

## 2019-05-28 RX ADMIN — Medication 100 MILLIGRAM(S): at 21:12

## 2019-05-28 RX ADMIN — Medication 25 MILLIGRAM(S): at 17:25

## 2019-05-28 RX ADMIN — Medication 3 MILLILITER(S): at 16:24

## 2019-05-28 RX ADMIN — BUDESONIDE AND FORMOTEROL FUMARATE DIHYDRATE 2 PUFF(S): 160; 4.5 AEROSOL RESPIRATORY (INHALATION) at 09:04

## 2019-05-28 RX ADMIN — Medication 3 MILLILITER(S): at 00:05

## 2019-05-28 RX ADMIN — BUDESONIDE AND FORMOTEROL FUMARATE DIHYDRATE 2 PUFF(S): 160; 4.5 AEROSOL RESPIRATORY (INHALATION) at 21:13

## 2019-05-28 RX ADMIN — Medication 100 MILLIGRAM(S): at 06:22

## 2019-05-28 RX ADMIN — FINASTERIDE 5 MILLIGRAM(S): 5 TABLET, FILM COATED ORAL at 11:45

## 2019-05-28 RX ADMIN — Medication 325 MILLIGRAM(S): at 11:45

## 2019-05-28 RX ADMIN — Medication 1000 UNIT(S): at 11:45

## 2019-05-28 RX ADMIN — ZOLPIDEM TARTRATE 5 MILLIGRAM(S): 10 TABLET ORAL at 22:57

## 2019-05-28 RX ADMIN — PANTOPRAZOLE SODIUM 40 MILLIGRAM(S): 20 TABLET, DELAYED RELEASE ORAL at 06:22

## 2019-05-28 RX ADMIN — Medication 25 MILLIGRAM(S): at 06:22

## 2019-05-28 RX ADMIN — Medication 100 MILLIGRAM(S): at 15:14

## 2019-05-28 RX ADMIN — ATORVASTATIN CALCIUM 20 MILLIGRAM(S): 80 TABLET, FILM COATED ORAL at 21:12

## 2019-05-28 NOTE — DISCHARGE NOTE PROVIDER - NSDCCPCAREPLAN_GEN_ALL_CORE_FT
PRINCIPAL DISCHARGE DIAGNOSIS  Diagnosis: Shortness of breath  Assessment and Plan of Treatment: Resolving, at basline of Chronic obstructive pulmonary disease   Home with home care & oxygen  Levaquin x 5 days after discharge.  Tapering dose of prednsione( switching to baseline 10 mg once daily after taepring)  follow up with pulmonary as outpatient

## 2019-05-28 NOTE — PROGRESS NOTE ADULT - SUBJECTIVE AND OBJECTIVE BOX
JIAN MANCIA  78y, Male  Allergy: aspirin (Other)  fish (Other)  iodine (Hives)  penicillin (Unknown)  shellfish (Other)      CHIEF COMPLAINT: Hypoxia (27 May 2019 09:58)      INTERVAL EVENTS/HPI  - No acute events overnight  - T(F): , Max: 98 (05-27-19 @ 21:51)  - Denies any worsening symptoms  - Tolerating medication  -     ROS  General: Denies fevers, chills, nightsweats, weight loss  HEENT: Denies headache, rhinorrhea, sore throat, eye pain  CV: Denies CP, palpitations  PULM: Denies SOB, cough  GI: Denies abdominal pain, diarrhea  : Denies dysuria, hematuria  MSK: Denies arthralgias  SKIN: Denies rash   NEURO: Denies paresthesias, weakness  PSYCH: Denies depression    FH noncontributory     VITALS:  T(F): 96.3, Max: 98 (05-27-19 @ 21:51)  HR: 90  BP: 109/58  RR: 20Vital Signs Last 24 Hrs  T(C): 35.7 (28 May 2019 06:11), Max: 36.7 (27 May 2019 21:51)  T(F): 96.3 (28 May 2019 06:11), Max: 98 (27 May 2019 21:51)  HR: 90 (28 May 2019 06:11) (90 - 99)  BP: 109/58 (28 May 2019 06:11) (98/57 - 134/75)  BP(mean): --  RR: 20 (28 May 2019 06:11) (20 - 20)  SpO2: --    PHYSICAL EXAM:  Gen: NAD, resting in bed  HEENT: Normocephalic, atraumatic  Neck: supple, no lymphadenopathy  CV: Regular rate & regular rhythm  Lungs: decreased BS at bases, no fremitus  Abdomen: Soft, BS present  Ext: Warm, well perfused  Neuro: non focal, awake  Skin: no rash, no erythema      TESTS & MEASUREMENTS:                        12.7   9.58  )-----------( 350      ( 27 May 2019 07:07 )             45.1     05-27    145  |  99  |  54<H>  ----------------------------<  170<H>  4.5   |  39<H>  |  2.0<H>    Ca    9.0      27 May 2019 07:07              Culture - Blood (collected 05-24-19 @ 16:00)  Source: .Blood Blood-Peripheral  Preliminary Report (05-25-19 @ 23:01):    No growth to date.    Culture - Blood (collected 05-22-19 @ 10:25)  Source: .Blood Blood  Gram Stain (05-23-19 @ 18:34):    Growth in aerobic bottle: Gram Positive Cocci in Clusters  Final Report (05-24-19 @ 18:22):    Growth in aerobic bottle: Coag Negative Staphylococcus    Single set isolate, possible contaminant. Contact    Microbiology if susceptibility testing clinically    indicated.    "Due to technical problems, Proteus sp. will Not be reported as part of    the BCID panel until further notice"    ***Blood Panel PCR results on this specimen are available    approximately 3 hours after the Gram stain result.***    Gram stain, PCR, and/or culture results may not always    correspond due to difference in methodologies.    ************************************************************    This PCR assay was performed using [employer].    The following targets are tested for: Enterococcus,    vancomycin resistant enterococci, Listeria monocytogenes,    coagulase negative staphylococci, S. aureus,    methicillin resistant S. aureus, Streptococcus agalactiae    (Group B), S. pneumoniae, S. pyogenes (Group A),    Acinetobacter baumannii, Enterobacter cloacae, E. coli,    Klebsiella oxytoca, K. pneumoniae, Proteus sp.,    Serratia marcescens, Haemophilus influenzae,    Neisseria meningitidis, Pseudomonas aeruginosa, Candida    albicans, C. glabrata, C krusei, C parapsilosis,    C. tropicalis and the KPC resistance gene.  Organism: Blood Culture PCR (05-24-19 @ 18:22)  Organism: Blood Culture PCR (05-24-19 @ 18:22)      -  Coagulase negative Staphylococcus: Detec      Method Type: PCR            INFECTIOUS DISEASES TESTING  Legionella Antigen, Urine: Negative (11-06-18 @ 07:50)  Legionella Antigen, Urine: Negative (07-24-18 @ 07:46)      RADIOLOGY & ADDITIONAL TESTS:  I have personally reviewed the last Chest xray  CXR Stable enlarged cardiac silhouette. No significant change in pulmonary vascular congestion.      CARDIOLOGY TESTING  12 Lead ECG:   Ventricular Rate 106 BPM    Atrial Rate 106 BPM    P-R Interval 154 ms    QRS Duration 98 ms    Q-T Interval 350 ms    QTC Calculation(Bezet) 464 ms    P Axis 17 degrees    R Axis 90 degrees    T Axis 42 degrees    Diagnosis Line Sinus tachycardia  Rightward axis  Borderline ECG  Poor R wave progression  Confirmed by GIANFRANCO FISHMAN, RITCHIE ([id number]) on 5/24/2019 9:29:49 PM (05-24-19 @ 08:22)  12 Lead ECG:   Ventricular Rate 114 BPM    Atrial Rate 114 BPM    P-R Interval 148 ms    QRS Duration 86 ms    Q-T Interval 336 ms    QTC Calculation(Bezet) 463 ms    P Axis 44 degrees    R Axis 161 degrees    T Axis 12 degrees    Diagnosis Line Sinus tachycardiawith Premature atrial complexes  Left posterior fascicular block  Abnormal ECG    Confirmed by TIERNEY RUIZ MD ([id number]) on 5/23/2019 10:41:42 AM (05-22-19 @ 10:27)      MEDICATIONS  ALBUTerol/ipratropium for Nebulization 3  ascorbic acid 500  atorvastatin 20  buDESOnide 160 MICROgram(s)/formoterol 4.5 MICROgram(s) Inhaler 2  cholecalciferol 1000  dextrose 5%. 1000  dextrose 50% Injectable 12.5  dextrose 50% Injectable 25  dextrose 50% Injectable 25  docusate sodium 100  ferrous    sulfate 325  finasteride 5  furosemide    Tablet 40  heparin  Injectable 5000  insulin glargine Injectable (LANTUS) 20  insulin glargine Injectable (LANTUS) 20  insulin lispro (HumaLOG) corrective regimen sliding scale   insulin lispro Injectable (HumaLOG) 10  levoFLOXacin IVPB   levoFLOXacin IVPB 250  metoprolol tartrate 25  nystatin Powder 1  pantoprazole    Tablet 40  polyethylene glycol 3350 17  predniSONE   Tablet 50  senna 2  sodium chloride 0.9%. 1000  sodium polystyrene sulfonate Suspension 30  tamsulosin 0.4  tiotropium 18 MICROgram(s) Capsule 1      ANTIBIOTICS:  levoFLOXacin IVPB      levoFLOXacin IVPB 250 milliGRAM(s) IV Intermittent daily

## 2019-05-28 NOTE — DISCHARGE NOTE PROVIDER - CARE PROVIDERS DIRECT ADDRESSES
[email or web address]

## 2019-05-28 NOTE — DISCHARGE NOTE PROVIDER - HOSPITAL COURSE
Patient is an 78y Male with h/o End stage COPD on supplemental oxygen (4-5L NC), DNR/DNI, severe pulmonary hypertension, DM, MICHAEL/OHS on CPAP, Morbid obesity, CKD 3, with     multiple admissions with prolonged inpatient stay for Acute on chronic Hypoxic RF, recently discharged on 4/2/19, presented from home with complaints of hypoxia with oxygen     saturation in the low 70s on %L NC.  Patient mentions having only minimal dyspnea and mild productive cough with bipedal edema markedly improved form his most recent admission. Hypoxia was also confirmed on EMS arrival thus was started on BiPAP and given 5 nebs with Solumedrol 125mg IM with improvement. He was admitted for further management.Patient was admitted for management of chronic obstructive pulmonary disease exacerbation . Pt was started on duoneb treatments and IV steroids in the form of solumedrol was started which was gradually tapered in lieu of improvement in patient's complaint of shortness of breath. Regular pulse oximetry was done to assess the response of therapy which showed improvement. His blood cx was found positive for coagulase negative GPC which was possibly a contaminnat. repeat blood cx are negative.  Regular monitoring of CBC & BMP was done along with serum electrolytes.DVT & GI prophylaxis was adequately ensured. Pt is now clinically very stable for discharge. Discharge diagnosis, medicines and follow up appointments discussed and ensured with the patient prior to discharge.  Pt will require arrangment for home oxygen and BIPAP continuation prior to discharge with home care.

## 2019-05-28 NOTE — PROGRESS NOTE ADULT - SUBJECTIVE AND OBJECTIVE BOX
Progress Note:  Provider Speciality                            Hospitalist      JIAN MANCIA [id number] 78y Male     CHIEF PRESENTING COMPLAINT:  Patient is a 78y old  Male who presents with a chief complaint of Hypoxia (28 May 2019 09:58)        SUBJECTIVE:  Patient was seen and examined at bedside. Reports improvement in  presenting complaint. No significant overnight events reported.     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  Patient mentions having only minimal dyspnea and mild productive cough with bipedal edema markedly improved form his most recent admission. Hypoxia was also confirmed on EMS arrival thus was started on BiPAP and given 5 nebs with Solumedrol 125mg IM with improvement. He was admitted for further management. (22 May 2019 13:56)      PAST MEDICAL & SURGICAL HISTORY:  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  High cholesterol  GERD (gastroesophageal reflux disease)  Enlarged prostate  Oxygen dependent  COPD (chronic obstructive pulmonary disease)  Diabetes mellitus, type 2  Hypertension  Emphysema lung  History of hemorrhoidectomy  Dysplastic colon polyp: removed      VITAL SIGNS:  Vital Signs Last 24 Hrs  T(C): 35.7 (28 May 2019 06:11), Max: 36.7 (27 May 2019 21:51)  T(F): 96.3 (28 May 2019 06:11), Max: 98 (27 May 2019 21:51)  HR: 90 (28 May 2019 06:11) (90 - 99)  BP: 109/58 (28 May 2019 06:11) (98/57 - 134/75)  BP(mean): --  RR: 20 (28 May 2019 06:11) (20 - 20)  SpO2: --    PHYSICAL EXAMINATION:  Not in acute distress. obesity  General: No pallor, or icterus, afebrile  HEENT:  MIS, EOMI, no JVD, no Bruit.  Heart: S1+S2 audible, no murmur  Lungs: bilateral  fair air entry, no wheezing, no crepitations.  Abdomen: Soft, non-tender, non-distended , no  rigidity or guarding.  CNS: AAOx3, CN  grossly intact.  Extremities:  No edema          REVIEW OF SYSTEMS:  Patient denies any headache, any vision complaints, runny nose, fever, chills, sore throat. Denies chest pain, palpitation. Denies nausea, vomiting, abdominal pain, diarrhoea, Denies urinary burning, urgency, frequency, dysuria. Denies weakness in any part of the body or numbness.   At least 10 systems were reviewed in ROS. All systems reviewed  are within normal limits except for the complaints as described in Subjective.    CONSULTS:  Consultant(s) Notes Reviewed by me.   Care Discussed with Consultants/Other Providers where required.        MEDICATIONS:  MEDICATIONS  (STANDING):  ALBUTerol/ipratropium for Nebulization 3 milliLiter(s) Nebulizer every 4 hours  ascorbic acid 500 milliGRAM(s) Oral daily  atorvastatin 20 milliGRAM(s) Oral at bedtime  buDESOnide 160 MICROgram(s)/formoterol 4.5 MICROgram(s) Inhaler 2 Puff(s) Inhalation two times a day  cholecalciferol 1000 Unit(s) Oral daily  dextrose 5%. 1000 milliLiter(s) (50 mL/Hr) IV Continuous <Continuous>  dextrose 50% Injectable 12.5 Gram(s) IV Push once  dextrose 50% Injectable 25 Gram(s) IV Push once  dextrose 50% Injectable 25 Gram(s) IV Push once  docusate sodium 100 milliGRAM(s) Oral three times a day  ferrous    sulfate 325 milliGRAM(s) Oral daily  finasteride 5 milliGRAM(s) Oral daily  furosemide    Tablet 40 milliGRAM(s) Oral daily  heparin  Injectable 5000 Unit(s) SubCutaneous every 12 hours  insulin glargine Injectable (LANTUS) 20 Unit(s) SubCutaneous every morning  insulin glargine Injectable (LANTUS) 20 Unit(s) SubCutaneous at bedtime  insulin lispro (HumaLOG) corrective regimen sliding scale   SubCutaneous three times a day before meals  insulin lispro Injectable (HumaLOG) 10 Unit(s) SubCutaneous three times a day before meals  levoFLOXacin IVPB      levoFLOXacin IVPB 250 milliGRAM(s) IV Intermittent daily  metoprolol tartrate 25 milliGRAM(s) Oral two times a day  nystatin Powder 1 Application(s) Topical two times a day  pantoprazole    Tablet 40 milliGRAM(s) Oral before breakfast  polyethylene glycol 3350 17 Gram(s) Oral daily  predniSONE   Tablet 50 milliGRAM(s) Oral daily  senna 2 Tablet(s) Oral at bedtime  sodium chloride 0.9%. 1000 milliLiter(s) (75 mL/Hr) IV Continuous <Continuous>  sodium polystyrene sulfonate Suspension 30 Gram(s) Oral <User Schedule>  tamsulosin 0.4 milliGRAM(s) Oral at bedtime  tiotropium 18 MICROgram(s) Capsule 1 Capsule(s) Inhalation at bedtime    MEDICATIONS  (PRN):  acetaminophen   Tablet .. 650 milliGRAM(s) Oral every 6 hours PRN Temp greater or equal to 38C (100.4F), Moderate Pain (4 - 6)  ALBUTerol    90 MICROgram(s) HFA Inhaler 2 Puff(s) Inhalation every 6 hours PRN Shortness of Breath and/or Wheezing  aluminum hydroxide/magnesium hydroxide/simethicone Suspension 30 milliLiter(s) Oral every 4 hours PRN Dyspepsia  benzocaine 15 mG/menthol 3.6 mG (Sugar-Free) Lozenge 1 Lozenge Oral every 4 hours PRN Sore Throat  dextrose 40% Gel 15 Gram(s) Oral once PRN Blood Glucose LESS THAN 70 milliGRAM(s)/deciliter  glucagon  Injectable 1 milliGRAM(s) IntraMuscular once PRN Glucose LESS THAN 70 milligrams/deciliter  guaiFENesin/dextromethorphan  Syrup 10 milliLiter(s) Oral every 6 hours PRN Cough  zolpidem 5 milliGRAM(s) Oral at bedtime PRN Insomnia      LABOROTORY DATA/MICROBIOLOGY/I & O's:                        13.3   14.83 )-----------( 384      ( 28 May 2019 08:43 )             47.3     05-28    145  |  99  |  56<H>  ----------------------------<  141<H>  4.8   |  38<H>  |  1.8<H>    Ca    9.3      28 May 2019 08:43          CAPILLARY BLOOD GLUCOSE      POCT Blood Glucose.: 122 mg/dL (28 May 2019 07:17)  POCT Blood Glucose.: 286 mg/dL (27 May 2019 21:16)  POCT Blood Glucose.: 285 mg/dL (27 May 2019 16:30)  POCT Blood Glucose.: 279 mg/dL (27 May 2019 14:36)  POCT Blood Glucose.: 250 mg/dL (27 May 2019 11:23)                05-27-19 @ 07:01  -  05-28-19 @ 07:00  --------------------------------------------------------  IN: 0 mL / OUT: 300 mL / NET: -300 mL              ASSESSMENT:    78y Male with h/o 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      ASSESSMENT:  Principal Diagnosis:  Acute on Chronic Hypoxic & hypercapnic respiratory failure secondary to Chronic obstructive pulmonary disease exacerbation   Heart failure with preserved ejection fraction -stable   Bacteremia, GPC( coagulase negative), possible contamination      Associated Active Comorbid Conditions:  Pulmonary Hypertension   Obstructive sleep apnea   Obesity hypoventilation syndrome   Hypertension   Diabetes mellitis type 2   Chronic renal disease Stage  stage 3  Benign Prostatic Hypertrophy   Morbid obesity    PLAN:    f/u blood cx ( could be contaminant , follow up repeat blood cx ). ID consult on board  O2 via NC@ 2L/Min, keep sat >94% at all times  Solumedrol tapered to prednisone. Tapering dose of prednisone on discharge with finally switching to 10 milligrams once daily formaintenencae.  Continue Symbicort, Spiriva & Bronchodilators ATC and prn and Steroids (On chronic Steroids Prednisone 10 mg daily).   Currently on  Levaquin for bronchitis. Discharge on Levaquin POx 4 days  Albuterol/Atrovent 1 unit neb Q6hrs  Continue Lasix PO 40 mg daily. No overt Evidence of Fluid overload.    Supplemental oxygen. On 4-5L NC at home. BiPAP prn & qhs.  Maintain saturation >88%. Pulm consult.  GI Prophylaxis with Protonix 40mg poQDaily  DVT Prophylaxis with Heparin 5000units sc q8hrs	   DNR & DNI.   Discharge Disposition: anticipate discharge in AM pending restoration of home care Progress Note:  Provider Speciality                            Hospitalist      JIAN MANCIA [id number] 78y Male     CHIEF PRESENTING COMPLAINT:  Patient is a 78y old  Male who presents with a chief complaint of Hypoxia (28 May 2019 09:58)        SUBJECTIVE:  Patient was seen and examined at bedside. Reports improvement in  presenting complaint. No significant overnight events reported.     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  Patient mentions having only minimal dyspnea and mild productive cough with bipedal edema markedly improved form his most recent admission. Hypoxia was also confirmed on EMS arrival thus was started on BiPAP and given 5 nebs with Solumedrol 125mg IM with improvement. He was admitted for further management. (22 May 2019 13:56)      PAST MEDICAL & SURGICAL HISTORY:  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  High cholesterol  GERD (gastroesophageal reflux disease)  Enlarged prostate  Oxygen dependent  COPD (chronic obstructive pulmonary disease)  Diabetes mellitus, type 2  Hypertension  Emphysema lung  History of hemorrhoidectomy  Dysplastic colon polyp: removed      VITAL SIGNS:  Vital Signs Last 24 Hrs  T(C): 35.7 (28 May 2019 06:11), Max: 36.7 (27 May 2019 21:51)  T(F): 96.3 (28 May 2019 06:11), Max: 98 (27 May 2019 21:51)  HR: 90 (28 May 2019 06:11) (90 - 99)  BP: 109/58 (28 May 2019 06:11) (98/57 - 134/75)  BP(mean): --  RR: 20 (28 May 2019 06:11) (20 - 20)  SpO2: --    PHYSICAL EXAMINATION:  Not in acute distress. obesity  General: No pallor, or icterus, afebrile  HEENT:  MIS, EOMI, no JVD, no Bruit.  Heart: S1+S2 audible, no murmur  Lungs: bilateral  fair air entry, no wheezing, no crepitations.  Abdomen: Soft, non-tender, non-distended , no  rigidity or guarding.  CNS: AAOx3, CN  grossly intact.  Extremities:  No edema          REVIEW OF SYSTEMS:  Patient denies any headache, any vision complaints, runny nose, fever, chills, sore throat. Denies chest pain, palpitation. Denies nausea, vomiting, abdominal pain, diarrhoea, Denies urinary burning, urgency, frequency, dysuria. Denies weakness in any part of the body or numbness.   At least 10 systems were reviewed in ROS. All systems reviewed  are within normal limits except for the complaints as described in Subjective.    CONSULTS:  Consultant(s) Notes Reviewed by me.   Care Discussed with Consultants/Other Providers where required.        MEDICATIONS:  MEDICATIONS  (STANDING):  ALBUTerol/ipratropium for Nebulization 3 milliLiter(s) Nebulizer every 4 hours  ascorbic acid 500 milliGRAM(s) Oral daily  atorvastatin 20 milliGRAM(s) Oral at bedtime  buDESOnide 160 MICROgram(s)/formoterol 4.5 MICROgram(s) Inhaler 2 Puff(s) Inhalation two times a day  cholecalciferol 1000 Unit(s) Oral daily  dextrose 5%. 1000 milliLiter(s) (50 mL/Hr) IV Continuous <Continuous>  dextrose 50% Injectable 12.5 Gram(s) IV Push once  dextrose 50% Injectable 25 Gram(s) IV Push once  dextrose 50% Injectable 25 Gram(s) IV Push once  docusate sodium 100 milliGRAM(s) Oral three times a day  ferrous    sulfate 325 milliGRAM(s) Oral daily  finasteride 5 milliGRAM(s) Oral daily  furosemide    Tablet 40 milliGRAM(s) Oral daily  heparin  Injectable 5000 Unit(s) SubCutaneous every 12 hours  insulin glargine Injectable (LANTUS) 20 Unit(s) SubCutaneous every morning  insulin glargine Injectable (LANTUS) 20 Unit(s) SubCutaneous at bedtime  insulin lispro (HumaLOG) corrective regimen sliding scale   SubCutaneous three times a day before meals  insulin lispro Injectable (HumaLOG) 10 Unit(s) SubCutaneous three times a day before meals  levoFLOXacin IVPB      levoFLOXacin IVPB 250 milliGRAM(s) IV Intermittent daily  metoprolol tartrate 25 milliGRAM(s) Oral two times a day  nystatin Powder 1 Application(s) Topical two times a day  pantoprazole    Tablet 40 milliGRAM(s) Oral before breakfast  polyethylene glycol 3350 17 Gram(s) Oral daily  predniSONE   Tablet 50 milliGRAM(s) Oral daily  senna 2 Tablet(s) Oral at bedtime  sodium chloride 0.9%. 1000 milliLiter(s) (75 mL/Hr) IV Continuous <Continuous>  sodium polystyrene sulfonate Suspension 30 Gram(s) Oral <User Schedule>  tamsulosin 0.4 milliGRAM(s) Oral at bedtime  tiotropium 18 MICROgram(s) Capsule 1 Capsule(s) Inhalation at bedtime    MEDICATIONS  (PRN):  acetaminophen   Tablet .. 650 milliGRAM(s) Oral every 6 hours PRN Temp greater or equal to 38C (100.4F), Moderate Pain (4 - 6)  ALBUTerol    90 MICROgram(s) HFA Inhaler 2 Puff(s) Inhalation every 6 hours PRN Shortness of Breath and/or Wheezing  aluminum hydroxide/magnesium hydroxide/simethicone Suspension 30 milliLiter(s) Oral every 4 hours PRN Dyspepsia  benzocaine 15 mG/menthol 3.6 mG (Sugar-Free) Lozenge 1 Lozenge Oral every 4 hours PRN Sore Throat  dextrose 40% Gel 15 Gram(s) Oral once PRN Blood Glucose LESS THAN 70 milliGRAM(s)/deciliter  glucagon  Injectable 1 milliGRAM(s) IntraMuscular once PRN Glucose LESS THAN 70 milligrams/deciliter  guaiFENesin/dextromethorphan  Syrup 10 milliLiter(s) Oral every 6 hours PRN Cough  zolpidem 5 milliGRAM(s) Oral at bedtime PRN Insomnia      LABOROTORY DATA/MICROBIOLOGY/I & O's:                        13.3   14.83 )-----------( 384      ( 28 May 2019 08:43 )             47.3     05-28    145  |  99  |  56<H>  ----------------------------<  141<H>  4.8   |  38<H>  |  1.8<H>    Ca    9.3      28 May 2019 08:43          CAPILLARY BLOOD GLUCOSE      POCT Blood Glucose.: 122 mg/dL (28 May 2019 07:17)  POCT Blood Glucose.: 286 mg/dL (27 May 2019 21:16)  POCT Blood Glucose.: 285 mg/dL (27 May 2019 16:30)  POCT Blood Glucose.: 279 mg/dL (27 May 2019 14:36)  POCT Blood Glucose.: 250 mg/dL (27 May 2019 11:23)                05-27-19 @ 07:01  -  05-28-19 @ 07:00  --------------------------------------------------------  IN: 0 mL / OUT: 300 mL / NET: -300 mL              ASSESSMENT:    78y Male with h/o 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      ASSESSMENT:  Principal Diagnosis:  Acute on Chronic Hypoxic & hypercapnic respiratory failure secondary to Chronic obstructive pulmonary disease exacerbation   Heart failure with preserved ejection fraction -stable . No acute exacerbation  Bacteremia, GPC( coagulase negative), possible contamination      Associated Active Comorbid Conditions:  Pulmonary Hypertension   Obstructive sleep apnea   Obesity hypoventilation syndrome   Hypertension   Diabetes mellitis type 2   Chronic renal disease Stage  stage 3  Benign Prostatic Hypertrophy   Morbid obesity    PLAN:    f/u blood cx ( could be contaminant , follow up repeat blood cx ). ID consult on board  O2 via NC@ 2L/Min, keep sat >94% at all times  Solumedrol tapered to prednisone. Tapering dose of prednisone on discharge with finally switching to 10 milligrams once daily formaintenencae.  Continue Symbicort, Spiriva & Bronchodilators ATC and prn and Steroids (On chronic Steroids Prednisone 10 mg daily).   Currently on  Levaquin for bronchitis. Discharge on Levaquin POx 4 days  Albuterol/Atrovent 1 unit neb Q6hrs  Continue Lasix PO 40 mg daily. No overt Evidence of Fluid overload.    Supplemental oxygen. On 4-5L NC at home. BiPAP prn & qhs.  Maintain saturation >88%. Pulm consult.  GI Prophylaxis with Protonix 40mg poQDaily  DVT Prophylaxis with Heparin 5000units sc q8hrs	   DNR & DNI.   Discharge Disposition: anticipate discharge in AM pending restoration of home care

## 2019-05-29 ENCOUNTER — TRANSCRIPTION ENCOUNTER (OUTPATIENT)
Age: 79
End: 2019-05-29

## 2019-05-29 VITALS
TEMPERATURE: 97 F | SYSTOLIC BLOOD PRESSURE: 143 MMHG | DIASTOLIC BLOOD PRESSURE: 72 MMHG | HEART RATE: 90 BPM | RESPIRATION RATE: 16 BRPM

## 2019-05-29 LAB
CULTURE RESULTS: SIGNIFICANT CHANGE UP
GLUCOSE BLDC GLUCOMTR-MCNC: 155 MG/DL — HIGH (ref 70–99)
GLUCOSE BLDC GLUCOMTR-MCNC: 220 MG/DL — HIGH (ref 70–99)
GLUCOSE BLDC GLUCOMTR-MCNC: 237 MG/DL — HIGH (ref 70–99)
SPECIMEN SOURCE: SIGNIFICANT CHANGE UP

## 2019-05-29 RX ORDER — NYSTATIN CREAM 100000 [USP'U]/G
1 CREAM TOPICAL
Refills: 0 | Status: DISCONTINUED | OUTPATIENT
Start: 2019-05-29 | End: 2019-05-29

## 2019-05-29 RX ADMIN — Medication 10 UNIT(S): at 12:08

## 2019-05-29 RX ADMIN — Medication 100 MILLIGRAM(S): at 15:03

## 2019-05-29 RX ADMIN — Medication 10 UNIT(S): at 08:24

## 2019-05-29 RX ADMIN — Medication 1: at 08:23

## 2019-05-29 RX ADMIN — Medication 2: at 12:08

## 2019-05-29 RX ADMIN — Medication 1 SPRAY(S): at 05:45

## 2019-05-29 RX ADMIN — Medication 3 MILLILITER(S): at 08:22

## 2019-05-29 RX ADMIN — Medication 500 MILLIGRAM(S): at 11:19

## 2019-05-29 RX ADMIN — Medication 2: at 17:29

## 2019-05-29 RX ADMIN — Medication 100 MILLIGRAM(S): at 05:45

## 2019-05-29 RX ADMIN — FINASTERIDE 5 MILLIGRAM(S): 5 TABLET, FILM COATED ORAL at 11:19

## 2019-05-29 RX ADMIN — Medication 3 MILLILITER(S): at 13:21

## 2019-05-29 RX ADMIN — HEPARIN SODIUM 5000 UNIT(S): 5000 INJECTION INTRAVENOUS; SUBCUTANEOUS at 17:29

## 2019-05-29 RX ADMIN — Medication 40 MILLIGRAM(S): at 05:45

## 2019-05-29 RX ADMIN — PANTOPRAZOLE SODIUM 40 MILLIGRAM(S): 20 TABLET, DELAYED RELEASE ORAL at 06:04

## 2019-05-29 RX ADMIN — Medication 3 MILLILITER(S): at 06:49

## 2019-05-29 RX ADMIN — Medication 25 MILLIGRAM(S): at 05:45

## 2019-05-29 RX ADMIN — Medication 1000 UNIT(S): at 11:19

## 2019-05-29 RX ADMIN — Medication 3 MILLILITER(S): at 15:59

## 2019-05-29 RX ADMIN — NYSTATIN CREAM 1 APPLICATION(S): 100000 CREAM TOPICAL at 17:30

## 2019-05-29 RX ADMIN — Medication 1 SPRAY(S): at 17:29

## 2019-05-29 RX ADMIN — INSULIN GLARGINE 20 UNIT(S): 100 INJECTION, SOLUTION SUBCUTANEOUS at 08:22

## 2019-05-29 RX ADMIN — HEPARIN SODIUM 5000 UNIT(S): 5000 INJECTION INTRAVENOUS; SUBCUTANEOUS at 05:20

## 2019-05-29 RX ADMIN — SODIUM POLYSTYRENE SULFONATE 30 GRAM(S): 4.1 POWDER, FOR SUSPENSION ORAL at 05:45

## 2019-05-29 RX ADMIN — Medication 50 MILLIGRAM(S): at 05:45

## 2019-05-29 RX ADMIN — Medication 30 MILLILITER(S): at 08:21

## 2019-05-29 RX ADMIN — POLYETHYLENE GLYCOL 3350 17 GRAM(S): 17 POWDER, FOR SOLUTION ORAL at 11:19

## 2019-05-29 RX ADMIN — Medication 325 MILLIGRAM(S): at 11:19

## 2019-05-29 RX ADMIN — Medication 10 UNIT(S): at 17:28

## 2019-05-29 RX ADMIN — BUDESONIDE AND FORMOTEROL FUMARATE DIHYDRATE 2 PUFF(S): 160; 4.5 AEROSOL RESPIRATORY (INHALATION) at 08:24

## 2019-05-29 RX ADMIN — TIOTROPIUM BROMIDE 1 CAPSULE(S): 18 CAPSULE ORAL; RESPIRATORY (INHALATION) at 08:22

## 2019-05-29 NOTE — PROGRESS NOTE ADULT - ASSESSMENT
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  Patient mentions having only minimal dyspnea and mild productive cough with bipedal edema markedly improved form his most recent admission. Hypoxia was also confirmed on EMS arrival thus was started on BiPAP and given 5 nebs with Solumedrol 125mg IM with improvement.     PROBLEMS  1. COPD Exacerbation  On levoFLOXacin IVPB 250 milliGRAM(s) IV Intermittent daily fro brochitic exacerbation    2. 5/22 Blood culture- Coagulase Negative Staph. - Most likely a contaminant  5/24 B/C x1 negative    3. Leukocytosis resolved  On predniSONE   Tablet 50      PLAN  Continue IV Levaquin for bronchitis exacerbation of COPD.  Ultimate switch to PO Levaquin
78y Male with h/o 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      ASSESSMENT:  Principal Diagnosis:  Acute on Chronic Hypoxic & hypercapnic respiratory failure secondary to Chronic obstructive pulmonary disease exacerbation   Heart failure with preserved ejection fraction -stable . No acute exacerbation  Bacteremia, GPC( coagulase negative), possible contamination      Associated Active Comorbid Conditions:  Pulmonary Hypertension   Obstructive sleep apnea   Obesity hypoventilation syndrome   Hypertension   Diabetes mellitis type 2   Chronic renal disease Stage  stage 3  Benign Prostatic Hypertrophy   Morbid obesity    PLAN:    f/u blood cx ( could be contaminant , follow up repeat blood cx ). ID consult on board  O2 via NC@ 2L/Min, keep sat >94% at all times  Solumedrol tapered to prednisone. Tapering dose of prednisone on discharge with finally switching to 10 milligrams once daily formaintenencae.  Continue Symbicort, Spiriva & Bronchodilators ATC and prn and Steroids (On chronic Steroids Prednisone 10 mg daily).   Currently on  Levaquin for bronchitis. Discharge on Levaquin POx 4 days  Albuterol/Atrovent 1 unit neb Q6hrs  Continue Lasix PO 40 mg daily. No overt Evidence of Fluid overload.    Supplemental oxygen. On 4-5L NC at home. BiPAP prn & qhs.  Maintain saturation >88%. Pulm consult.  GI Prophylaxis with Protonix 40mg poQDaily  DVT Prophylaxis with Heparin 5000units sc q8hrs	   DNR & DNI.   Discharge Disposition: anticipate for today if patient's home oxygen delivered and available for him.
Impression:  CRF hypoxic / hypercapnia   COPD stable   ?? pulm htn   Kathy / OHS         Plan:  from pulmonary point follow blood cx  and if cleared by ID   can be d/chome on prednisone 40 mg and taper down to his baseline 10 mg   continue home inhalers symbicort and spiriva   home oxygen , bipapa   patient chronic hypoxic require 4-5 liter oxygen   now sitting comfortable   taper oxygen keep po 88%   bipap at night   keep IS < OS   guarded prognosis   DNR/DNI   case discussed with hospitalist
Impression:  CRF hypoxic / hypercapnia   COPD stable   ?? pulm htn   Kathy / OHS         Plan: patient chronic hypoxic require 4-5 liter oxygen   now sitting comfortable   can switch to prednisone 60 mg and taper over 9 days   taper oxygen keep po 88%   bipap at night   continue home inhaler symbicort and spiriva   keep IS < OS   from pulmonary possible d/c in 24 hrs told him need to increase oxygen to 5 upon exertion   guarded prognosis   DNR/DNI

## 2019-05-29 NOTE — DISCHARGE NOTE NURSING/CASE MANAGEMENT/SOCIAL WORK - NSDCDPATPORTLINK_GEN_ALL_CORE
You can access the [employer] Patient Portal, offered by [facility], by registering with the following website: [email or web address]

## 2019-05-29 NOTE — DIETITIAN INITIAL EVALUATION ADULT. - FACTORS AFF FOOD INTAKE
none/Pt reports a good appetite. EMR reports 100% intake. Observed lunch tray and Pt ate everything.

## 2019-05-29 NOTE — DIETITIAN INITIAL EVALUATION ADULT. - OTHER INFO
Reason for assessment: LOS. PMH: end stage COPD, severe pulmonary HTN, SM, morbid obesity, CKD lll. Pt presented to ED for hypoxia w/ oxygen saturation. Pt is admitted for acute on chronic hypoxic and hypercapnic resp failure 2/2 COPD exacerbation. Heart failure noted. Pt denies wt loss. Last BM was today 5/29. Abdomen noted as soft/nontender/nondistended. Pt is allergic to fish and shellfish. Reason for assessment: LOS. PMH: end stage COPD, severe pulmonary HTN, SM, morbid obesity, CKD lll. Pt presented to ED for hypoxia w/ oxygen saturation. Pt is admitted for acute on chronic hypoxic and hypercapnic resp failure 2/2 COPD exacerbation. Heart failure noted. Pts K+ was 5.5 but now it's 4.8. Pt denies wt loss. Last BM was today 5/29. Abdomen noted as soft/nontender/nondistended. Pt is allergic to fish and shellfish.

## 2019-05-29 NOTE — PROVIDER CONTACT NOTE (OTHER) - SITUATION
Pt on 5LNC O2, Po 85%, Dr. Hollins notified. will continue to monitor.
pt wanted to eat lunch and pulse ox dipped on 5l nc

## 2019-05-29 NOTE — DIETITIAN INITIAL EVALUATION ADULT. - ENERGY NEEDS
Calories: 1819-2001kcals/day (MSJ A.F 1-1.1)   Protein: 70-84g/day (1-1.2g/IBW) monitor renal function   Fluid: 1:1ml/kcal or fluid as per LIP

## 2019-05-29 NOTE — DIETITIAN INITIAL EVALUATION ADULT. - PERTINENT MEDS FT
heparin, sodium chloride 0.9%, lantus, humalog, prednisone, magnesium hypoxide, ascorbic, atorvastatin, cholecalciferol, docusate sodium, ferrous sulfate, lasix, pantoprazole, miralax, senna

## 2019-05-29 NOTE — PROGRESS NOTE ADULT - SUBJECTIVE AND OBJECTIVE BOX
SUBJECTIVE:  Patient was seen and examined at bedside. States he is overall improved but is awaiting delivery of his liquid oxygen at home before he will agree to be discharged home.      VITAL SIGNS:  Vital Signs Last 24 Hrs  T(C): 35.8 (29 May 2019 05:53), Max: 36.9 (28 May 2019 22:41)  T(F): 96.4 (29 May 2019 05:53), Max: 98.4 (28 May 2019 22:41)  HR: 71 (29 May 2019 05:53) (71 - 88)  BP: 104/54 (29 May 2019 05:53) (104/54 - 137/78)  BP(mean): --  RR: 16 (29 May 2019 05:53) (16 - 20)  SpO2: 85% (29 May 2019 07:23) (85% - 90%)      PHYSICAL EXAMINATION:  Not in acute distress. obesity  General: No pallor, or icterus, afebrile  HEENT:  MIS, EOMI, no JVD, no Bruit.  Heart: S1+S2 audible, no murmur  Lungs: bilateral  fair air entry, no wheezing, no crepitations.  Abdomen: Soft, non-tender, non-distended , no  rigidity or guarding.  CNS: AAOx3, CN  grossly intact.  Extremities:  No edema

## 2019-05-31 ENCOUNTER — APPOINTMENT (OUTPATIENT)
Dept: GERIATRICS | Facility: HOME HEALTH | Age: 79
End: 2019-05-31

## 2019-06-03 ENCOUNTER — OUTPATIENT (OUTPATIENT)
Dept: OUTPATIENT SERVICES | Facility: HOSPITAL | Age: 79
LOS: 1 days | Discharge: HOME | End: 2019-06-03

## 2019-06-03 ENCOUNTER — LABORATORY RESULT (OUTPATIENT)
Age: 79
End: 2019-06-03

## 2019-06-03 DIAGNOSIS — Z98.890 OTHER SPECIFIED POSTPROCEDURAL STATES: Chronic | ICD-10-CM

## 2019-06-03 DIAGNOSIS — K63.5 POLYP OF COLON: Chronic | ICD-10-CM

## 2019-06-03 DIAGNOSIS — Z02.9 ENCOUNTER FOR ADMINISTRATIVE EXAMINATIONS, UNSPECIFIED: ICD-10-CM

## 2019-06-04 ENCOUNTER — OUTPATIENT (OUTPATIENT)
Dept: OUTPATIENT SERVICES | Facility: HOSPITAL | Age: 79
LOS: 1 days | Discharge: HOME | End: 2019-06-04

## 2019-06-04 ENCOUNTER — LABORATORY RESULT (OUTPATIENT)
Age: 79
End: 2019-06-04

## 2019-06-04 DIAGNOSIS — I50.33 ACUTE ON CHRONIC DIASTOLIC (CONGESTIVE) HEART FAILURE: ICD-10-CM

## 2019-06-04 DIAGNOSIS — Z98.890 OTHER SPECIFIED POSTPROCEDURAL STATES: Chronic | ICD-10-CM

## 2019-06-04 DIAGNOSIS — J43.9 EMPHYSEMA, UNSPECIFIED: ICD-10-CM

## 2019-06-04 DIAGNOSIS — Z87.891 PERSONAL HISTORY OF NICOTINE DEPENDENCE: ICD-10-CM

## 2019-06-04 DIAGNOSIS — D50.9 IRON DEFICIENCY ANEMIA, UNSPECIFIED: ICD-10-CM

## 2019-06-04 DIAGNOSIS — Z66 DO NOT RESUSCITATE: ICD-10-CM

## 2019-06-04 DIAGNOSIS — I13.0 HYPERTENSIVE HEART AND CHRONIC KIDNEY DISEASE WITH HEART FAILURE AND STAGE 1 THROUGH STAGE 4 CHRONIC KIDNEY DISEASE, OR UNSPECIFIED CHRONIC KIDNEY DISEASE: ICD-10-CM

## 2019-06-04 DIAGNOSIS — J96.21 ACUTE AND CHRONIC RESPIRATORY FAILURE WITH HYPOXIA: ICD-10-CM

## 2019-06-04 DIAGNOSIS — E11.22 TYPE 2 DIABETES MELLITUS WITH DIABETIC CHRONIC KIDNEY DISEASE: ICD-10-CM

## 2019-06-04 DIAGNOSIS — K63.5 POLYP OF COLON: Chronic | ICD-10-CM

## 2019-06-04 DIAGNOSIS — N40.0 BENIGN PROSTATIC HYPERPLASIA WITHOUT LOWER URINARY TRACT SYMPTOMS: ICD-10-CM

## 2019-06-04 DIAGNOSIS — N18.3 CHRONIC KIDNEY DISEASE, STAGE 3 (MODERATE): ICD-10-CM

## 2019-06-04 DIAGNOSIS — Z91.013 ALLERGY TO SEAFOOD: ICD-10-CM

## 2019-06-04 DIAGNOSIS — Z99.81 DEPENDENCE ON SUPPLEMENTAL OXYGEN: ICD-10-CM

## 2019-06-04 DIAGNOSIS — H10.9 UNSPECIFIED CONJUNCTIVITIS: ICD-10-CM

## 2019-06-04 DIAGNOSIS — I27.20 PULMONARY HYPERTENSION, UNSPECIFIED: ICD-10-CM

## 2019-06-04 DIAGNOSIS — Z88.6 ALLERGY STATUS TO ANALGESIC AGENT: ICD-10-CM

## 2019-06-04 DIAGNOSIS — Z99.89 DEPENDENCE ON OTHER ENABLING MACHINES AND DEVICES: ICD-10-CM

## 2019-06-04 DIAGNOSIS — Z88.0 ALLERGY STATUS TO PENICILLIN: ICD-10-CM

## 2019-06-04 DIAGNOSIS — E87.5 HYPERKALEMIA: ICD-10-CM

## 2019-06-04 DIAGNOSIS — J96.22 ACUTE AND CHRONIC RESPIRATORY FAILURE WITH HYPERCAPNIA: ICD-10-CM

## 2019-06-04 DIAGNOSIS — E66.2 MORBID (SEVERE) OBESITY WITH ALVEOLAR HYPOVENTILATION: ICD-10-CM

## 2019-06-04 DIAGNOSIS — E78.5 HYPERLIPIDEMIA, UNSPECIFIED: ICD-10-CM

## 2019-06-04 DIAGNOSIS — E55.9 VITAMIN D DEFICIENCY, UNSPECIFIED: ICD-10-CM

## 2019-06-04 DIAGNOSIS — K21.9 GASTRO-ESOPHAGEAL REFLUX DISEASE WITHOUT ESOPHAGITIS: ICD-10-CM

## 2019-06-04 DIAGNOSIS — J44.1 CHRONIC OBSTRUCTIVE PULMONARY DISEASE WITH (ACUTE) EXACERBATION: ICD-10-CM

## 2019-06-04 DIAGNOSIS — Z91.041 RADIOGRAPHIC DYE ALLERGY STATUS: ICD-10-CM

## 2019-06-04 DIAGNOSIS — R06.02 SHORTNESS OF BREATH: ICD-10-CM

## 2019-06-04 DIAGNOSIS — G47.33 OBSTRUCTIVE SLEEP APNEA (ADULT) (PEDIATRIC): ICD-10-CM

## 2019-06-04 DIAGNOSIS — Z98.890 OTHER SPECIFIED POSTPROCEDURAL STATES: ICD-10-CM

## 2019-06-04 DIAGNOSIS — R79.89 OTHER SPECIFIED ABNORMAL FINDINGS OF BLOOD CHEMISTRY: ICD-10-CM

## 2019-06-04 DIAGNOSIS — R07.9 CHEST PAIN, UNSPECIFIED: ICD-10-CM

## 2019-06-18 ENCOUNTER — OUTPATIENT (OUTPATIENT)
Dept: OUTPATIENT SERVICES | Facility: HOSPITAL | Age: 79
LOS: 1 days | Discharge: HOME | End: 2019-06-18

## 2019-06-18 ENCOUNTER — LABORATORY RESULT (OUTPATIENT)
Age: 79
End: 2019-06-18

## 2019-06-18 DIAGNOSIS — R79.89 OTHER SPECIFIED ABNORMAL FINDINGS OF BLOOD CHEMISTRY: ICD-10-CM

## 2019-06-18 DIAGNOSIS — K63.5 POLYP OF COLON: Chronic | ICD-10-CM

## 2019-06-18 DIAGNOSIS — Z98.890 OTHER SPECIFIED POSTPROCEDURAL STATES: Chronic | ICD-10-CM

## 2019-06-21 ENCOUNTER — RX RENEWAL (OUTPATIENT)
Age: 79
End: 2019-06-21

## 2019-07-01 ENCOUNTER — OUTPATIENT (OUTPATIENT)
Dept: OUTPATIENT SERVICES | Facility: HOSPITAL | Age: 79
LOS: 1 days | End: 2019-07-01
Payer: MEDICARE

## 2019-07-01 ENCOUNTER — INPATIENT (INPATIENT)
Facility: HOSPITAL | Age: 79
LOS: 4 days | Discharge: HOME | End: 2019-07-06
Attending: INTERNAL MEDICINE | Admitting: INTERNAL MEDICINE
Payer: MEDICARE

## 2019-07-01 VITALS
HEIGHT: 68 IN | HEART RATE: 89 BPM | OXYGEN SATURATION: 94 % | RESPIRATION RATE: 20 BRPM | WEIGHT: 300.05 LBS | DIASTOLIC BLOOD PRESSURE: 52 MMHG | SYSTOLIC BLOOD PRESSURE: 112 MMHG

## 2019-07-01 DIAGNOSIS — K63.5 POLYP OF COLON: Chronic | ICD-10-CM

## 2019-07-01 DIAGNOSIS — Z98.890 OTHER SPECIFIED POSTPROCEDURAL STATES: Chronic | ICD-10-CM

## 2019-07-01 DIAGNOSIS — I10 ESSENTIAL (PRIMARY) HYPERTENSION: ICD-10-CM

## 2019-07-01 DIAGNOSIS — E11.9 TYPE 2 DIABETES MELLITUS WITHOUT COMPLICATIONS: ICD-10-CM

## 2019-07-01 DIAGNOSIS — E78.00 PURE HYPERCHOLESTEROLEMIA, UNSPECIFIED: ICD-10-CM

## 2019-07-01 DIAGNOSIS — J44.9 CHRONIC OBSTRUCTIVE PULMONARY DISEASE, UNSPECIFIED: ICD-10-CM

## 2019-07-01 LAB
ALBUMIN SERPL ELPH-MCNC: 3.8 G/DL — SIGNIFICANT CHANGE UP (ref 3.5–5.2)
ALP SERPL-CCNC: 70 U/L — SIGNIFICANT CHANGE UP (ref 30–115)
ALT FLD-CCNC: 15 U/L — SIGNIFICANT CHANGE UP (ref 0–41)
ANION GAP SERPL CALC-SCNC: 13 MMOL/L — SIGNIFICANT CHANGE UP (ref 7–14)
AST SERPL-CCNC: 17 U/L — SIGNIFICANT CHANGE UP (ref 0–41)
BASE EXCESS BLDV CALC-SCNC: 6.5 MMOL/L — HIGH (ref -2–2)
BASOPHILS # BLD AUTO: 0.07 K/UL — SIGNIFICANT CHANGE UP (ref 0–0.2)
BASOPHILS NFR BLD AUTO: 0.7 % — SIGNIFICANT CHANGE UP (ref 0–1)
BILIRUB SERPL-MCNC: 0.8 MG/DL — SIGNIFICANT CHANGE UP (ref 0.2–1.2)
BUN SERPL-MCNC: 31 MG/DL — HIGH (ref 10–20)
CALCIUM SERPL-MCNC: 9.1 MG/DL — SIGNIFICANT CHANGE UP (ref 8.5–10.1)
CHLORIDE SERPL-SCNC: 102 MMOL/L — SIGNIFICANT CHANGE UP (ref 98–110)
CK MB CFR SERPL CALC: 3.8 NG/ML — SIGNIFICANT CHANGE UP (ref 0.6–6.3)
CK SERPL-CCNC: 60 U/L — SIGNIFICANT CHANGE UP (ref 0–225)
CO2 SERPL-SCNC: 30 MMOL/L — SIGNIFICANT CHANGE UP (ref 17–32)
CREAT SERPL-MCNC: 1.8 MG/DL — HIGH (ref 0.7–1.5)
EOSINOPHIL # BLD AUTO: 0.25 K/UL — SIGNIFICANT CHANGE UP (ref 0–0.7)
EOSINOPHIL NFR BLD AUTO: 2.4 % — SIGNIFICANT CHANGE UP (ref 0–8)
GLUCOSE SERPL-MCNC: 205 MG/DL — HIGH (ref 70–99)
HCO3 BLDV-SCNC: 33 MMOL/L — HIGH (ref 22–29)
HCT VFR BLD CALC: 47.2 % — SIGNIFICANT CHANGE UP (ref 42–52)
HGB BLD-MCNC: 13.3 G/DL — LOW (ref 14–18)
HOROWITZ INDEX BLDV+IHG-RTO: 21 — SIGNIFICANT CHANGE UP
IMM GRANULOCYTES NFR BLD AUTO: 0.8 % — HIGH (ref 0.1–0.3)
LACTATE BLDV-MCNC: 2 MMOL/L — HIGH (ref 0.5–1.6)
LACTATE SERPL-SCNC: 2 MMOL/L — SIGNIFICANT CHANGE UP (ref 0.5–2.2)
LYMPHOCYTES # BLD AUTO: 0.88 K/UL — LOW (ref 1.2–3.4)
LYMPHOCYTES # BLD AUTO: 8.4 % — LOW (ref 20.5–51.1)
MAGNESIUM SERPL-MCNC: 1.9 MG/DL — SIGNIFICANT CHANGE UP (ref 1.8–2.4)
MCHC RBC-ENTMCNC: 22.4 PG — LOW (ref 27–31)
MCHC RBC-ENTMCNC: 28.2 G/DL — LOW (ref 32–37)
MCV RBC AUTO: 79.5 FL — LOW (ref 80–94)
MONOCYTES # BLD AUTO: 0.81 K/UL — HIGH (ref 0.1–0.6)
MONOCYTES NFR BLD AUTO: 7.7 % — SIGNIFICANT CHANGE UP (ref 1.7–9.3)
NEUTROPHILS # BLD AUTO: 8.41 K/UL — HIGH (ref 1.4–6.5)
NEUTROPHILS NFR BLD AUTO: 80 % — HIGH (ref 42.2–75.2)
NRBC # BLD: 0 /100 WBCS — SIGNIFICANT CHANGE UP (ref 0–0)
NT-PROBNP SERPL-SCNC: 2284 PG/ML — HIGH (ref 0–300)
PCO2 BLDV: 55 MMHG — HIGH (ref 41–51)
PH BLDV: 7.39 — SIGNIFICANT CHANGE UP (ref 7.26–7.43)
PLATELET # BLD AUTO: 381 K/UL — SIGNIFICANT CHANGE UP (ref 130–400)
PO2 BLDV: 40 MMHG — SIGNIFICANT CHANGE UP (ref 20–40)
POTASSIUM SERPL-MCNC: 4.6 MMOL/L — SIGNIFICANT CHANGE UP (ref 3.5–5)
POTASSIUM SERPL-SCNC: 4.6 MMOL/L — SIGNIFICANT CHANGE UP (ref 3.5–5)
PROT SERPL-MCNC: 6 G/DL — SIGNIFICANT CHANGE UP (ref 6–8)
RBC # BLD: 5.94 M/UL — SIGNIFICANT CHANGE UP (ref 4.7–6.1)
RBC # FLD: 21.7 % — HIGH (ref 11.5–14.5)
SAO2 % BLDV: 70 % — SIGNIFICANT CHANGE UP
SODIUM SERPL-SCNC: 145 MMOL/L — SIGNIFICANT CHANGE UP (ref 135–146)
TROPONIN T SERPL-MCNC: 0.01 NG/ML — SIGNIFICANT CHANGE UP
TROPONIN T SERPL-MCNC: 0.03 NG/ML — CRITICAL HIGH
WBC # BLD: 10.5 K/UL — SIGNIFICANT CHANGE UP (ref 4.8–10.8)
WBC # FLD AUTO: 10.5 K/UL — SIGNIFICANT CHANGE UP (ref 4.8–10.8)

## 2019-07-01 PROCEDURE — 71045 X-RAY EXAM CHEST 1 VIEW: CPT | Mod: 26

## 2019-07-01 PROCEDURE — 99291 CRITICAL CARE FIRST HOUR: CPT

## 2019-07-01 PROCEDURE — 99223 1ST HOSP IP/OBS HIGH 75: CPT

## 2019-07-01 RX ORDER — METOPROLOL TARTRATE 50 MG
25 TABLET ORAL
Refills: 0 | Status: DISCONTINUED | OUTPATIENT
Start: 2019-07-01 | End: 2019-07-06

## 2019-07-01 RX ORDER — SODIUM POLYSTYRENE SULFONATE 4.1 MEQ/G
30 POWDER, FOR SUSPENSION ORAL
Refills: 0 | Status: DISCONTINUED | OUTPATIENT
Start: 2019-07-01 | End: 2019-07-01

## 2019-07-01 RX ORDER — DEXTROSE 50 % IN WATER 50 %
15 SYRINGE (ML) INTRAVENOUS ONCE
Refills: 0 | Status: DISCONTINUED | OUTPATIENT
Start: 2019-07-01 | End: 2019-07-06

## 2019-07-01 RX ORDER — BUDESONIDE AND FORMOTEROL FUMARATE DIHYDRATE 160; 4.5 UG/1; UG/1
2 AEROSOL RESPIRATORY (INHALATION)
Refills: 0 | Status: DISCONTINUED | OUTPATIENT
Start: 2019-07-01 | End: 2019-07-06

## 2019-07-01 RX ORDER — CHLORHEXIDINE GLUCONATE 213 G/1000ML
1 SOLUTION TOPICAL
Refills: 0 | Status: DISCONTINUED | OUTPATIENT
Start: 2019-07-01 | End: 2019-07-06

## 2019-07-01 RX ORDER — SODIUM CHLORIDE 9 MG/ML
1000 INJECTION, SOLUTION INTRAVENOUS
Refills: 0 | Status: DISCONTINUED | OUTPATIENT
Start: 2019-07-01 | End: 2019-07-06

## 2019-07-01 RX ORDER — DEXTROSE 50 % IN WATER 50 %
25 SYRINGE (ML) INTRAVENOUS ONCE
Refills: 0 | Status: DISCONTINUED | OUTPATIENT
Start: 2019-07-01 | End: 2019-07-06

## 2019-07-01 RX ORDER — PANTOPRAZOLE SODIUM 20 MG/1
40 TABLET, DELAYED RELEASE ORAL
Refills: 0 | Status: DISCONTINUED | OUTPATIENT
Start: 2019-07-01 | End: 2019-07-06

## 2019-07-01 RX ORDER — TAMSULOSIN HYDROCHLORIDE 0.4 MG/1
0.4 CAPSULE ORAL AT BEDTIME
Refills: 0 | Status: DISCONTINUED | OUTPATIENT
Start: 2019-07-01 | End: 2019-07-06

## 2019-07-01 RX ORDER — ASCORBIC ACID 60 MG
500 TABLET,CHEWABLE ORAL DAILY
Refills: 0 | Status: DISCONTINUED | OUTPATIENT
Start: 2019-07-01 | End: 2019-07-06

## 2019-07-01 RX ORDER — GLUCAGON INJECTION, SOLUTION 0.5 MG/.1ML
1 INJECTION, SOLUTION SUBCUTANEOUS ONCE
Refills: 0 | Status: DISCONTINUED | OUTPATIENT
Start: 2019-07-01 | End: 2019-07-06

## 2019-07-01 RX ORDER — DOCUSATE SODIUM 100 MG
100 CAPSULE ORAL THREE TIMES A DAY
Refills: 0 | Status: DISCONTINUED | OUTPATIENT
Start: 2019-07-01 | End: 2019-07-06

## 2019-07-01 RX ORDER — FINASTERIDE 5 MG/1
5 TABLET, FILM COATED ORAL DAILY
Refills: 0 | Status: DISCONTINUED | OUTPATIENT
Start: 2019-07-01 | End: 2019-07-06

## 2019-07-01 RX ORDER — IPRATROPIUM/ALBUTEROL SULFATE 18-103MCG
3 AEROSOL WITH ADAPTER (GRAM) INHALATION EVERY 6 HOURS
Refills: 0 | Status: DISCONTINUED | OUTPATIENT
Start: 2019-07-01 | End: 2019-07-06

## 2019-07-01 RX ORDER — TIOTROPIUM BROMIDE 18 UG/1
1 CAPSULE ORAL; RESPIRATORY (INHALATION) AT BEDTIME
Refills: 0 | Status: DISCONTINUED | OUTPATIENT
Start: 2019-07-01 | End: 2019-07-06

## 2019-07-01 RX ORDER — CHOLECALCIFEROL (VITAMIN D3) 125 MCG
1000 CAPSULE ORAL DAILY
Refills: 0 | Status: DISCONTINUED | OUTPATIENT
Start: 2019-07-01 | End: 2019-07-06

## 2019-07-01 RX ORDER — DEXTROSE 50 % IN WATER 50 %
12.5 SYRINGE (ML) INTRAVENOUS ONCE
Refills: 0 | Status: DISCONTINUED | OUTPATIENT
Start: 2019-07-01 | End: 2019-07-06

## 2019-07-01 RX ORDER — SENNA PLUS 8.6 MG/1
2 TABLET ORAL AT BEDTIME
Refills: 0 | Status: DISCONTINUED | OUTPATIENT
Start: 2019-07-01 | End: 2019-07-06

## 2019-07-01 RX ORDER — MAGNESIUM OXIDE 400 MG ORAL TABLET 241.3 MG
400 TABLET ORAL
Refills: 0 | Status: DISCONTINUED | OUTPATIENT
Start: 2019-07-01 | End: 2019-07-06

## 2019-07-01 RX ORDER — INSULIN GLARGINE 100 [IU]/ML
30 INJECTION, SOLUTION SUBCUTANEOUS AT BEDTIME
Refills: 0 | Status: DISCONTINUED | OUTPATIENT
Start: 2019-07-01 | End: 2019-07-06

## 2019-07-01 RX ORDER — AZITHROMYCIN 500 MG/1
500 TABLET, FILM COATED ORAL DAILY
Refills: 0 | Status: COMPLETED | OUTPATIENT
Start: 2019-07-01 | End: 2019-07-06

## 2019-07-01 RX ORDER — INSULIN LISPRO 100/ML
20 VIAL (ML) SUBCUTANEOUS
Refills: 0 | Status: DISCONTINUED | OUTPATIENT
Start: 2019-07-01 | End: 2019-07-06

## 2019-07-01 RX ORDER — FERROUS SULFATE 325(65) MG
325 TABLET ORAL DAILY
Refills: 0 | Status: DISCONTINUED | OUTPATIENT
Start: 2019-07-01 | End: 2019-07-06

## 2019-07-01 RX ORDER — FUROSEMIDE 40 MG
20 TABLET ORAL DAILY
Refills: 0 | Status: DISCONTINUED | OUTPATIENT
Start: 2019-07-01 | End: 2019-07-06

## 2019-07-01 RX ORDER — GUAIFENESIN/DEXTROMETHORPHAN 600MG-30MG
10 TABLET, EXTENDED RELEASE 12 HR ORAL EVERY 6 HOURS
Refills: 0 | Status: DISCONTINUED | OUTPATIENT
Start: 2019-07-01 | End: 2019-07-06

## 2019-07-01 RX ORDER — IPRATROPIUM/ALBUTEROL SULFATE 18-103MCG
3 AEROSOL WITH ADAPTER (GRAM) INHALATION EVERY 4 HOURS
Refills: 0 | Status: DISCONTINUED | OUTPATIENT
Start: 2019-07-01 | End: 2019-07-06

## 2019-07-01 RX ORDER — ATORVASTATIN CALCIUM 80 MG/1
20 TABLET, FILM COATED ORAL AT BEDTIME
Refills: 0 | Status: DISCONTINUED | OUTPATIENT
Start: 2019-07-01 | End: 2019-07-06

## 2019-07-01 RX ORDER — CARBAMIDE PEROXIDE 81.86 MG/ML
4 SOLUTION/ DROPS AURICULAR (OTIC)
Refills: 0 | Status: DISCONTINUED | OUTPATIENT
Start: 2019-07-01 | End: 2019-07-06

## 2019-07-01 RX ORDER — ALBUTEROL 90 UG/1
1 AEROSOL, METERED ORAL EVERY 4 HOURS
Refills: 0 | Status: DISCONTINUED | OUTPATIENT
Start: 2019-07-01 | End: 2019-07-06

## 2019-07-01 RX ORDER — ENOXAPARIN SODIUM 100 MG/ML
40 INJECTION SUBCUTANEOUS EVERY 24 HOURS
Refills: 0 | Status: DISCONTINUED | OUTPATIENT
Start: 2019-07-01 | End: 2019-07-06

## 2019-07-01 RX ADMIN — INSULIN GLARGINE 30 UNIT(S): 100 INJECTION, SOLUTION SUBCUTANEOUS at 21:38

## 2019-07-01 RX ADMIN — Medication 100 MILLIGRAM(S): at 21:39

## 2019-07-01 RX ADMIN — Medication 3 MILLILITER(S): at 19:46

## 2019-07-01 RX ADMIN — ATORVASTATIN CALCIUM 20 MILLIGRAM(S): 80 TABLET, FILM COATED ORAL at 21:39

## 2019-07-01 RX ADMIN — Medication 60 MILLIGRAM(S): at 21:41

## 2019-07-01 RX ADMIN — BUDESONIDE AND FORMOTEROL FUMARATE DIHYDRATE 2 PUFF(S): 160; 4.5 AEROSOL RESPIRATORY (INHALATION) at 20:35

## 2019-07-01 RX ADMIN — AZITHROMYCIN 500 MILLIGRAM(S): 500 TABLET, FILM COATED ORAL at 18:59

## 2019-07-01 RX ADMIN — ENOXAPARIN SODIUM 40 MILLIGRAM(S): 100 INJECTION SUBCUTANEOUS at 18:59

## 2019-07-01 RX ADMIN — TAMSULOSIN HYDROCHLORIDE 0.4 MILLIGRAM(S): 0.4 CAPSULE ORAL at 21:39

## 2019-07-01 RX ADMIN — SENNA PLUS 2 TABLET(S): 8.6 TABLET ORAL at 21:38

## 2019-07-01 RX ADMIN — Medication 125 MILLIGRAM(S): at 14:07

## 2019-07-01 NOTE — ED PROVIDER NOTE - PHYSICAL EXAMINATION
CONST: Obese male, in mild distress  EYES: PERRL, EOMI, Sclera and conjunctiva clear.   ENT: No nasal discharge. TM's clear B/L without drainage. Oropharynx normal appearing, no erythema or exudates. No abscess or swelling.   NECK: Non-tender, no meningeal signs. normal ROM. supple   CARD: Normal S1 S2; Normal rate and rhythm  RESP: Equal BS B/L, + decreased air flow bilaterally, mild wheezing  GI: Soft, non-tender, non-distended. no cva tenderness. normal BS  MS: Normal ROM in all extremities. No midline spinal tenderness. pulses 2 +. no calf tenderness or swelling  SKIN: Warm, dry, no acute rashes. Good turgor  NEURO: A&Ox3, No focal deficits. Strength 5/5 with no sensory deficits.

## 2019-07-01 NOTE — ED ADULT TRIAGE NOTE - CHIEF COMPLAINT QUOTE
girish from home, initial call was for htn, upon ems arrival pt had oxygen sat in the low 80's, pt became anxious en route and sat dropped to 70's, pt placed on cipap and one combi given.

## 2019-07-01 NOTE — ED PROVIDER NOTE - ATTENDING CONTRIBUTION TO CARE
I was present for and supervised the key and critical aspects of the procedures performed during the care of the patient. patient presents for evaluation of shortness of breath he has a history of copd and is on home o2 found to be hypoxic, he denies any chest pain, he denies any fevers or chills he denies any vomiting. he denies any chest pain at this time.    On physical exam patient was placed on bipap he improved, nc/at perrla eomi oropharynx clear cta wheezes at this time, abd- soft nt nd bs+ ext- no edema.    a/P- patient placed on bibpap, he improved I will admit for further management

## 2019-07-01 NOTE — H&P ADULT - NSHPPHYSICALEXAM_GEN_ALL_CORE
VITALS: Vital Signs Last 24 Hrs  HR: 89 (01 Jul 2019 13:46) (89 - 89)  BP: 112/52 (01 Jul 2019 13:46) (112/52 - 112/52)  RR: 20 (01 Jul 2019 13:46) (20 - 20)  SpO2: 94% (01 Jul 2019 13:46) (94% - 94%)    GENERAL: Lying in bed with CPAP,   HEAD:  Atraumatic, Normocephalic  EYES: EOMI, PERRLA, conjunctiva and sclera clear  ENT: Moist mucous membranes  NECK: Supple, No JVD  CHEST/LUNG: Clear to auscultation bilaterally; No rales, rhonchi, wheezing, or rubs. Unlabored respirations  HEART: Regular rate and rhythm; No murmurs, rubs, or gallops  ABDOMEN: Bowel sounds present; Soft, Nontender, Nondistended. No hepatomegally  EXTREMITIES:  2+ Peripheral Pulses, brisk capillary refill. No clubbing, cyanosis. + b/l edema   NERVOUS SYSTEM:  Alert & Oriented X3, speech clear. No deficits   MSK: FROM all 4 extremities, full and equal strength  SKIN: No rashes or lesions

## 2019-07-01 NOTE — H&P ADULT - PROBLEM SELECTOR PLAN 1
-   - CPAP  - Continue symbicort, spiriva and bronchodilators   - Consider Pulmonary consult  - Solumedrol  - ABX  - DVT/GI PPX (Lovenox/Protonix)  - CHG 4% Bath QD and PRN -   - CPAP  - Continue symbicort, spiriva and bronchodilators   - Consider Pulmonary consult  - Solumedrol  - Azithromycin x 5 days  - PT and Physiatry c/s  - DVT/GI PPX (Lovenox/Protonix)  - CHG 4% Bath QD and PRN  - D/w attending above assessments and plan.

## 2019-07-01 NOTE — H&P ADULT - NSHPLABSRESULTS_GEN_ALL_CORE
13.3   10.50 )-----------( 381      ( 01 Jul 2019 14:09 )             47.2       07-01    145  |  102  |  31<H>  ----------------------------<  205<H>  4.6   |  30  |  1.8<H>    Ca    9.1      01 Jul 2019 14:09  Mg     1.9     07-01    TPro  6.0  /  Alb  3.8  /  TBili  0.8  /  DBili  x   /  AST  17  /  ALT  15  /  AlkPhos  70  07-01          Magnesium, Serum: 1.9 mg/dL (07-01-19 @ 14:09)        Lactate Trend  07-01 @ 14:09 Lactate:2.0       CARDIAC MARKERS ( 01 Jul 2019 14:09 )  x     / 0.03 ng/mL / x     / x     / x            Xray Chest 1 View-PORTABLE IMMEDIATE (07.01.19 @ 14:11)  Impression:    Bilateral lung opacities, right hilar enlargement, and a small right   pleural effusion versus pleural thickening, unchanged.

## 2019-07-01 NOTE — H&P ADULT - NSHPSOCIALHISTORY_GEN_ALL_CORE
Tobacco use: Former smoker  EtOH use: Denies  Illicit drug use: Denies  Marital Status: Lives at home.  Has a HHA.

## 2019-07-01 NOTE — ED PROVIDER NOTE - NS ED ROS FT
Review of Systems:  	•	CONSTITUTIONAL - no fever, no diaphoresis, no chills  	•	SKIN - no rash  	•	HEMATOLOGIC - no bleeding, no bruising  	•	EYES - no eye pain, no blurry vision  	•	ENT - no change in hearing, no sore throat, no ear pain or tinnitus  	•	RESPIRATORY - + shortness of breath, no cough  	•	CARDIAC - no chest pain, no palpitations  	•	GI - no abd pain, no nausea, no vomiting, no diarrhea, no constipation  	•	GENITO-URINARY - no discharge, no dysuria; no hematuria, no increased urinary frequency  	•	MUSCULOSKELETAL - no joint paint, no swelling, no redness  	•	NEUROLOGIC - no weakness, no headache, no paresthesias, no LOC  	•	PSYCH - no anxiety, non suicidal, non homicidal, no hallucination, no depression

## 2019-07-01 NOTE — H&P ADULT - ATTENDING COMMENTS
78 M w/ PMHx of COPD, HTN presents to ED w/ aid 2/2 SOB x last night. Saw the patient at bedside. Pt states condition has improved with Solumedrol 125mg and CPAP. Pt denies CP, Palpitations, F/C, N/V/D, headaches, consumption of allergic foods, recent travel, tobacco use. Pt is being admitted to Medicine for COPD exacerbation. Pt also with recurrent admissions to the hospital and further evaluation of home situation needs to take place by CM.    Acute on Chronic COPD Exarc   - Recurrent Hospitalization   - Non - compliant with therapy   - CM / SW Evaluations  - [facility] Discussion  - Solumedrol 60mg IV q8h  - Nebs q4h  - Lovenox / Protonix dvt proph    For all other problems resume home meds / Orders reviwed / Will follow

## 2019-07-01 NOTE — ED PROVIDER NOTE - CRITICAL CARE PROVIDED
direct patient care (not related to procedure)/consult w/ pt's family directly relating to pts condition/consultation with other physicians/documentation/interpretation of diagnostic studies/additional history taking

## 2019-07-01 NOTE — ED PROVIDER NOTE - OBJECTIVE STATEMENT
78 year old male with pmhx of COPD on home o2, DM, CKD, recent admission 1 month ago to hospital, [facility] for SOB. as per EMS< O2 sat was in 80s, placed on cpap and given combvient treatments. pt denies CP, fever, chills, calf pain or swelling.

## 2019-07-01 NOTE — H&P ADULT - HISTORY OF PRESENT ILLNESS
78 M w/ PMHx of COPD, HTN presents to ED w/ aid 2/2 SOB x last night. Saw the patient at bedside. Pt states condition has improved with Solumedrol 125mg and CPAP. Pt denies CP, Palpitations, F/C, N/V/D, headaches, consumption of allergic foods, recent travel, tobacco use. Pt is being admitted to Medicine for COPD exacerbation.

## 2019-07-02 DIAGNOSIS — Z71.89 OTHER SPECIFIED COUNSELING: ICD-10-CM

## 2019-07-02 LAB
ALBUMIN SERPL ELPH-MCNC: 4.2 G/DL — SIGNIFICANT CHANGE UP (ref 3.5–5.2)
ALP SERPL-CCNC: 77 U/L — SIGNIFICANT CHANGE UP (ref 30–115)
ALT FLD-CCNC: 16 U/L — SIGNIFICANT CHANGE UP (ref 0–41)
ANION GAP SERPL CALC-SCNC: 14 MMOL/L — SIGNIFICANT CHANGE UP (ref 7–14)
AST SERPL-CCNC: 10 U/L — SIGNIFICANT CHANGE UP (ref 0–41)
BILIRUB SERPL-MCNC: 0.8 MG/DL — SIGNIFICANT CHANGE UP (ref 0.2–1.2)
BUN SERPL-MCNC: 43 MG/DL — HIGH (ref 10–20)
CALCIUM SERPL-MCNC: 9 MG/DL — SIGNIFICANT CHANGE UP (ref 8.5–10.1)
CHLORIDE SERPL-SCNC: 99 MMOL/L — SIGNIFICANT CHANGE UP (ref 98–110)
CK MB CFR SERPL CALC: 4.2 NG/ML — SIGNIFICANT CHANGE UP (ref 0.6–6.3)
CK SERPL-CCNC: 44 U/L — SIGNIFICANT CHANGE UP (ref 0–225)
CO2 SERPL-SCNC: 28 MMOL/L — SIGNIFICANT CHANGE UP (ref 17–32)
CREAT SERPL-MCNC: 1.8 MG/DL — HIGH (ref 0.7–1.5)
GLUCOSE SERPL-MCNC: 272 MG/DL — HIGH (ref 70–99)
HCT VFR BLD CALC: 48.4 % — SIGNIFICANT CHANGE UP (ref 42–52)
HGB BLD-MCNC: 13.7 G/DL — LOW (ref 14–18)
MCHC RBC-ENTMCNC: 22.3 PG — LOW (ref 27–31)
MCHC RBC-ENTMCNC: 28.3 G/DL — LOW (ref 32–37)
MCV RBC AUTO: 78.8 FL — LOW (ref 80–94)
NRBC # BLD: 0 /100 WBCS — SIGNIFICANT CHANGE UP (ref 0–0)
PLATELET # BLD AUTO: 403 K/UL — HIGH (ref 130–400)
POTASSIUM SERPL-MCNC: 5.3 MMOL/L — HIGH (ref 3.5–5)
POTASSIUM SERPL-SCNC: 5.3 MMOL/L — HIGH (ref 3.5–5)
PROT SERPL-MCNC: 6.4 G/DL — SIGNIFICANT CHANGE UP (ref 6–8)
RBC # BLD: 6.14 M/UL — HIGH (ref 4.7–6.1)
RBC # FLD: 21.3 % — HIGH (ref 11.5–14.5)
SODIUM SERPL-SCNC: 141 MMOL/L — SIGNIFICANT CHANGE UP (ref 135–146)
TROPONIN T SERPL-MCNC: 0.01 NG/ML — SIGNIFICANT CHANGE UP
WBC # BLD: 9.15 K/UL — SIGNIFICANT CHANGE UP (ref 4.8–10.8)
WBC # FLD AUTO: 9.15 K/UL — SIGNIFICANT CHANGE UP (ref 4.8–10.8)

## 2019-07-02 PROCEDURE — 99221 1ST HOSP IP/OBS SF/LOW 40: CPT

## 2019-07-02 PROCEDURE — 99233 SBSQ HOSP IP/OBS HIGH 50: CPT

## 2019-07-02 RX ADMIN — Medication 60 MILLIGRAM(S): at 21:24

## 2019-07-02 RX ADMIN — MAGNESIUM OXIDE 400 MG ORAL TABLET 400 MILLIGRAM(S): 241.3 TABLET ORAL at 12:12

## 2019-07-02 RX ADMIN — TAMSULOSIN HYDROCHLORIDE 0.4 MILLIGRAM(S): 0.4 CAPSULE ORAL at 21:21

## 2019-07-02 RX ADMIN — PANTOPRAZOLE SODIUM 40 MILLIGRAM(S): 20 TABLET, DELAYED RELEASE ORAL at 06:28

## 2019-07-02 RX ADMIN — CARBAMIDE PEROXIDE 4 DROP(S): 81.86 SOLUTION/ DROPS AURICULAR (OTIC) at 17:43

## 2019-07-02 RX ADMIN — SENNA PLUS 2 TABLET(S): 8.6 TABLET ORAL at 21:21

## 2019-07-02 RX ADMIN — Medication 3 MILLILITER(S): at 13:42

## 2019-07-02 RX ADMIN — Medication 20 UNIT(S): at 08:28

## 2019-07-02 RX ADMIN — Medication 3 MILLILITER(S): at 06:27

## 2019-07-02 RX ADMIN — ENOXAPARIN SODIUM 40 MILLIGRAM(S): 100 INJECTION SUBCUTANEOUS at 17:44

## 2019-07-02 RX ADMIN — Medication 60 MILLIGRAM(S): at 15:07

## 2019-07-02 RX ADMIN — Medication 100 MILLIGRAM(S): at 15:09

## 2019-07-02 RX ADMIN — Medication 100 MILLIGRAM(S): at 21:21

## 2019-07-02 RX ADMIN — MAGNESIUM OXIDE 400 MG ORAL TABLET 400 MILLIGRAM(S): 241.3 TABLET ORAL at 17:42

## 2019-07-02 RX ADMIN — Medication 20 MILLIGRAM(S): at 06:28

## 2019-07-02 RX ADMIN — Medication 3 MILLILITER(S): at 20:21

## 2019-07-02 RX ADMIN — Medication 3 MILLILITER(S): at 00:20

## 2019-07-02 RX ADMIN — Medication 20 UNIT(S): at 17:42

## 2019-07-02 RX ADMIN — Medication 1000 UNIT(S): at 12:12

## 2019-07-02 RX ADMIN — ATORVASTATIN CALCIUM 20 MILLIGRAM(S): 80 TABLET, FILM COATED ORAL at 21:21

## 2019-07-02 RX ADMIN — TIOTROPIUM BROMIDE 1 CAPSULE(S): 18 CAPSULE ORAL; RESPIRATORY (INHALATION) at 21:24

## 2019-07-02 RX ADMIN — INSULIN GLARGINE 30 UNIT(S): 100 INJECTION, SOLUTION SUBCUTANEOUS at 21:41

## 2019-07-02 RX ADMIN — BUDESONIDE AND FORMOTEROL FUMARATE DIHYDRATE 2 PUFF(S): 160; 4.5 AEROSOL RESPIRATORY (INHALATION) at 07:50

## 2019-07-02 RX ADMIN — Medication 3 MILLILITER(S): at 07:52

## 2019-07-02 RX ADMIN — Medication 325 MILLIGRAM(S): at 12:12

## 2019-07-02 RX ADMIN — Medication 500 MILLIGRAM(S): at 12:12

## 2019-07-02 RX ADMIN — Medication 25 MILLIGRAM(S): at 17:42

## 2019-07-02 RX ADMIN — Medication 100 MILLIGRAM(S): at 06:28

## 2019-07-02 RX ADMIN — FINASTERIDE 5 MILLIGRAM(S): 5 TABLET, FILM COATED ORAL at 12:12

## 2019-07-02 RX ADMIN — Medication 25 MILLIGRAM(S): at 06:28

## 2019-07-02 RX ADMIN — Medication 60 MILLIGRAM(S): at 06:29

## 2019-07-02 RX ADMIN — AZITHROMYCIN 500 MILLIGRAM(S): 500 TABLET, FILM COATED ORAL at 12:12

## 2019-07-02 RX ADMIN — MAGNESIUM OXIDE 400 MG ORAL TABLET 400 MILLIGRAM(S): 241.3 TABLET ORAL at 08:27

## 2019-07-02 RX ADMIN — CARBAMIDE PEROXIDE 4 DROP(S): 81.86 SOLUTION/ DROPS AURICULAR (OTIC) at 06:28

## 2019-07-02 RX ADMIN — Medication 20 UNIT(S): at 12:13

## 2019-07-02 RX ADMIN — BUDESONIDE AND FORMOTEROL FUMARATE DIHYDRATE 2 PUFF(S): 160; 4.5 AEROSOL RESPIRATORY (INHALATION) at 20:23

## 2019-07-02 NOTE — CONSULT NOTE ADULT - SUBJECTIVE AND OBJECTIVE BOX
HPI:  78 M w/ PMHx of COPD, HTN presents to ED w/ aid 2/2 SOB x last night. Saw the patient at bedside. Pt states condition has improved with Solumedrol 125mg and CPAP. Pt denies CP, Palpitations, F/C, N/V/D, headaches, consumption of allergic foods, recent travel, tobacco use. Pt is being admitted to Medicine for COPD exacerbation.     PTN  REFERRED TO ACUTE  REHAB  FOR  EVAL AND  TX   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  High cholesterol  GERD (gastroesophageal reflux disease)  Enlarged prostate  Oxygen dependent  COPD (chronic obstructive pulmonary disease)  Diabetes mellitus, type 2  Hypertension  Emphysema lung  History of hemorrhoidectomy  Dysplastic colon polyp: removed      Hospital Course:    TODAY'S SUBJECTIVE & REVIEW OF SYMPTOMS:     Constitutional WNL   Cardio WNL   Resp WNL   GI WNL  Heme WNL  Endo WNL  Skin WNL  MSK WNL  Neuro WNL  Cognitive WNL  Psych WNL      MEDICATIONS  (STANDING):  ALBUTerol    90 MICROgram(s) HFA Inhaler 1 Puff(s) Inhalation every 4 hours  ALBUTerol/ipratropium for Nebulization 3 milliLiter(s) Nebulizer every 6 hours  ascorbic acid 500 milliGRAM(s) Oral daily  atorvastatin 20 milliGRAM(s) Oral at bedtime  azithromycin   Tablet 500 milliGRAM(s) Oral daily  buDESOnide 160 MICROgram(s)/formoterol 4.5 MICROgram(s) Inhaler 2 Puff(s) Inhalation two times a day  carbamide peroxide Otic Solution 4 Drop(s) Both Ears two times a day  chlorhexidine 4% Liquid 1 Application(s) Topical <User Schedule>  cholecalciferol 1000 Unit(s) Oral daily  dextrose 5%. 1000 milliLiter(s) (50 mL/Hr) IV Continuous <Continuous>  dextrose 50% Injectable 12.5 Gram(s) IV Push once  dextrose 50% Injectable 25 Gram(s) IV Push once  dextrose 50% Injectable 25 Gram(s) IV Push once  docusate sodium 100 milliGRAM(s) Oral three times a day  enoxaparin Injectable 40 milliGRAM(s) SubCutaneous every 24 hours  ferrous    sulfate 325 milliGRAM(s) Oral daily  finasteride 5 milliGRAM(s) Oral daily  furosemide    Tablet 20 milliGRAM(s) Oral daily  insulin glargine Injectable (LANTUS) 30 Unit(s) SubCutaneous at bedtime  insulin lispro Injectable (HumaLOG) 20 Unit(s) SubCutaneous three times a day before meals  magnesium oxide 400 milliGRAM(s) Oral three times a day with meals  methylPREDNISolone sodium succinate Injectable 60 milliGRAM(s) IV Push every 8 hours  metoprolol tartrate 25 milliGRAM(s) Oral two times a day  pantoprazole    Tablet 40 milliGRAM(s) Oral before breakfast  senna 2 Tablet(s) Oral at bedtime  tamsulosin 0.4 milliGRAM(s) Oral at bedtime  tiotropium 18 MICROgram(s) Capsule 1 Capsule(s) Inhalation at bedtime    MEDICATIONS  (PRN):  ALBUTerol/ipratropium for Nebulization 3 milliLiter(s) Nebulizer every 4 hours PRN Bronchospasm  dextrose 40% Gel 15 Gram(s) Oral once PRN Blood Glucose LESS THAN 70 milliGRAM(s)/deciliter  glucagon  Injectable 1 milliGRAM(s) IntraMuscular once PRN Glucose LESS THAN 70 milligrams/deciliter  guaiFENesin/dextromethorphan  Syrup 10 milliLiter(s) Oral every 6 hours PRN Cough      FAMILY HISTORY:  No pertinent family history in first degree relatives      Allergies    fish (Other)  iodine (Hives)  penicillin (Unknown)  shellfish (Other)    Intolerances    aspirin (Other)      SOCIAL HISTORY:    [  ] Etoh  [  ] Smoking  [  ] Substance abuse     Home Environment:  [x  ] Home Alone  [  ] Lives with Family  [ x ] Home Health Aid 24  he  7  days      Dwelling:  [  ] Apartment  [ x ] Private House  [  ] Adult Home  [  ] Skilled Nursing Facility      [  ] Short Term  [  ] Long Term  [ x ] Stairs       Elevator [  ]    FUNCTIONAL STATUS PTA: (Check all that apply)  Ambulation: [  x ]Independent    [  ] Dependent     [  ] Non-Ambulatory  Assistive Device: [  ] SA Cane  [  ]  Q Cane  [  x] Walker  [  ]  Wheelchair  ADL : [ x ] Independent  [ x ]  Dependent       Vital Signs Last 24 Hrs  T(C): 35.6 (02 Jul 2019 06:03), Max: 36.4 (01 Jul 2019 21:30)  T(F): 96 (02 Jul 2019 06:03), Max: 97.6 (01 Jul 2019 21:30)  HR: 105 (02 Jul 2019 06:03) (83 - 105)  BP: 138/81 (02 Jul 2019 06:03) (106/58 - 138/81)  BP(mean): --  RR: 16 (02 Jul 2019 06:03) (16 - 20)  SpO2: 89% (02 Jul 2019 07:33) (89% - 94%)      PHYSICAL EXAM: Alert & Oriented X3  GENERAL: NAD, well-groomed, well-developed  HEAD:  Atraumatic, Normocephalic  EYES: EOMI, PERRLA, conjunctiva and sclera clear  NECK: Supple, No JVD, Normal thyroid  CHEST/LUNG: Clear to percussion bilaterally; No rales, rhonchi, wheezing, or rubs  HEART: Regular rate and rhythm; No murmurs, rubs, or gallops  ABDOMEN: Soft, Nontender, Nondistended; Bowel sounds present  EXTREMITIES:  2+ Peripheral Pulses, No clubbing, cyanosis, or edema    NERVOUS SYSTEM:  Cranial Nerves 2-12 intact [ x ] Abnormal  [  ]  ROM: WFL all extremities [  ]  Abnormal [x  ]  Motor Strength: WFL all extremities  [  ]  Abnormal [x  ]  Sensation: intact to light touch [  ] Abnormal [x  ]  Reflexes: Symmetric [  ]  Abnormal [ x ]    FUNCTIONAL STATUS:  Bed Mobility: Independent [  ]  Supervision [  ]  Needs Assistance [ x ]  N/A [  ]  Transfers: Independent [  ]  Supervision [  ]  Needs Assistance [x  ]  N/A [  ]   Ambulation: Independent [  ]  Supervision [  ]  Needs Assistance [x  ]  N/A [  ]  ADL: Independent [  ] Requires Assistance [  ] N/A [x  ]  SEE PT/OT IE NOTES    LABS:                        13.3   10.50 )-----------( 381      ( 01 Jul 2019 14:09 )             47.2     07-01    145  |  102  |  31<H>  ----------------------------<  205<H>  4.6   |  30  |  1.8<H>    Ca    9.1      01 Jul 2019 14:09  Mg     1.9     07-01    TPro  6.0  /  Alb  3.8  /  TBili  0.8  /  DBili  x   /  AST  17  /  ALT  15  /  AlkPhos  70  07-01          RADIOLOGY & ADDITIONAL STUDIES:    Assesment:

## 2019-07-02 NOTE — PROGRESS NOTE ADULT - SUBJECTIVE AND OBJECTIVE BOX
JIAN MANCIA  78y  Male    Patient is a 78y old  Male who presents with a chief complaint of COPD exacerbation (02 Jul 2019 08:41)      INTERVAL HPI/OVERNIGHT EVENTS:    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  High cholesterol  GERD (gastroesophageal reflux disease)  Enlarged prostate  Oxygen dependent  COPD (chronic obstructive pulmonary disease)  Diabetes mellitus, type 2  Hypertension  Emphysema lung  History of hemorrhoidectomy  Dysplastic colon polyp: removed    Vitals:   T(F): 97.9 (07-02-19 @ 13:41), Max: 97.9 (07-02-19 @ 13:41)  HR: 88 (07-02-19 @ 13:41) (83 - 105)  BP: 103/59 (07-02-19 @ 13:41) (103/59 - 138/81)  RR: 16 (07-02-19 @ 06:03) (16 - 18)  SpO2: 89% (07-02-19 @ 07:33) (89% - 93%)    PHYSICAL EXAM:  GENERAL: NAD, well-developed  HEAD:  Atraumatic, Normocephalic  EYES: EOMI, PERRLA, conjunctiva and sclera clear  NECK: Supple, No JVD  CHEST/LUNG: ++wheeze  HEART: Regular rate and rhythm; No murmurs, rubs, or gallops  ABDOMEN: Soft, Nontender, Nondistended; Bowel sounds present  EXTREMITIES:  2+ Peripheral Pulses, No clubbing, cyanosis, or edema  PSYCH: AAOx2  NEUROLOGY: non-focal  SKIN: No rashes or lesions    LABS:                        13.7   9.15  )-----------( 403      ( 02 Jul 2019 06:54 )             48.4       07-02    141  |  99  |  43<H>  ----------------------------<  272<H>  5.3<H>   |  28  |  1.8<H>    Ca    9.0      02 Jul 2019 06:54  Mg     1.9     07-01    TPro  6.4  /  Alb  4.2  /  TBili  0.8  /  DBili  x   /  AST  10  /  ALT  16  /  AlkPhos  77  07-02    RADIOLOGY   Impression:    Bilateral lung opacities, right hilar enlargement, and a small right pleural effusion versus pleural thickening, unchanged.    PROBLEM LIST:  Diabetes mellitus, type 2: Diabetes mellitus, type 2  High cholesterol: High cholesterol  Hypertension: Hypertension  COPD (chronic obstructive pulmonary disease): COPD (chronic obstructive pulmonary disease)    MEDICATIONS  (STANDING):  ALBUTerol    90 MICROgram(s) HFA Inhaler 1 Puff(s) Inhalation every 4 hours  ALBUTerol/ipratropium for Nebulization 3 milliLiter(s) Nebulizer every 6 hours  ascorbic acid 500 milliGRAM(s) Oral daily  atorvastatin 20 milliGRAM(s) Oral at bedtime  azithromycin   Tablet 500 milliGRAM(s) Oral daily  buDESOnide 160 MICROgram(s)/formoterol 4.5 MICROgram(s) Inhaler 2 Puff(s) Inhalation two times a day  carbamide peroxide Otic Solution 4 Drop(s) Both Ears two times a day  chlorhexidine 4% Liquid 1 Application(s) Topical <User Schedule>  cholecalciferol 1000 Unit(s) Oral daily  dextrose 5%. 1000 milliLiter(s) (50 mL/Hr) IV Continuous <Continuous>  dextrose 50% Injectable 12.5 Gram(s) IV Push once  dextrose 50% Injectable 25 Gram(s) IV Push once  dextrose 50% Injectable 25 Gram(s) IV Push once  docusate sodium 100 milliGRAM(s) Oral three times a day  enoxaparin Injectable 40 milliGRAM(s) SubCutaneous every 24 hours  ferrous    sulfate 325 milliGRAM(s) Oral daily  finasteride 5 milliGRAM(s) Oral daily  furosemide    Tablet 20 milliGRAM(s) Oral daily  insulin glargine Injectable (LANTUS) 30 Unit(s) SubCutaneous at bedtime  insulin lispro Injectable (HumaLOG) 20 Unit(s) SubCutaneous three times a day before meals  magnesium oxide 400 milliGRAM(s) Oral three times a day with meals  methylPREDNISolone sodium succinate Injectable 60 milliGRAM(s) IV Push every 8 hours  metoprolol tartrate 25 milliGRAM(s) Oral two times a day  pantoprazole    Tablet 40 milliGRAM(s) Oral before breakfast  senna 2 Tablet(s) Oral at bedtime  tamsulosin 0.4 milliGRAM(s) Oral at bedtime  tiotropium 18 MICROgram(s) Capsule 1 Capsule(s) Inhalation at bedtime    MEDICATIONS  (PRN):  ALBUTerol/ipratropium for Nebulization 3 milliLiter(s) Nebulizer every 4 hours PRN Bronchospasm  dextrose 40% Gel 15 Gram(s) Oral once PRN Blood Glucose LESS THAN 70 milliGRAM(s)/deciliter  glucagon  Injectable 1 milliGRAM(s) IntraMuscular once PRN Glucose LESS THAN 70 milligrams/deciliter  guaiFENesin/dextromethorphan  Syrup 10 milliLiter(s) Oral every 6 hours PRN Cough

## 2019-07-02 NOTE — PROGRESS NOTE ADULT - ASSESSMENT
# Acute on Chronic COPD Exarc   - Recurrent Hospitalization   - Non - compliant with therapy??? vs Severe COPD/ILD   - Solumedrol 60mg IV q8h  - Nebs q4h  - Oxygen suppl       # HTN - Metoprolol / Lasix     # HLD - Atorvastatin     # Flomax -  Flomax     Dispo: Acute

## 2019-07-02 NOTE — CHART NOTE - NSCHARTNOTEFT_GEN_A_CORE
Upon Nutritional Assessment by the Registered Dietitian your patient was determined to meet criteria / has evidence of the following diagnosis/diagnoses:          [x] Morbid Obesity / BMI > 40      Findings as based on:  •  Anthropometric data    Ht: 172.72 cm.  Wt: 124 kg.  BMI: 41.6      PROVIDER Section:     By signing this assessment you are acknowledging and agree with the diagnosis/diagnoses assigned by the Registered Dietitian    Comments:

## 2019-07-02 NOTE — PHYSICAL THERAPY INITIAL EVALUATION ADULT - GAIT DEVIATIONS NOTED, PT EVAL
decreased step length/decreased matthew/increased time in double stance/decreased weight-shifting ability

## 2019-07-02 NOTE — PHYSICAL THERAPY INITIAL EVALUATION ADULT - ACTIVE RANGE OF MOTION EXAMINATION, REHAB EVAL
no Active ROM deficits were identified/Patient performed BLE and BUE AROM 1x10 each all available planes

## 2019-07-02 NOTE — PHYSICAL THERAPY INITIAL EVALUATION ADULT - GENERAL OBSERVATIONS, REHAB EVAL
9:40 - 10:05. Chart reviewed. Patient available to be seen for physical therapy, confirmed with nurse. Patient encountered already out of bed in chair, +high flow O2 via nasal cannula.  Patient denies pain at rest, would like to walk with PT now.

## 2019-07-02 NOTE — CONSULT NOTE ADULT - ASSESSMENT
IMPRESSION: Rehab of 89  y/o  m rehab  for  debility  gd      PRECAUTIONS: [  ] Cardiac  [x  ] Respiratory need o2  all  time    [  ] Seizures [  ] Contact Isolation  [  ] Droplet Isolation  [ FALL ] Other    Weight Bearing Status:     RECOMMENDATION:    Out of Bed to Chair     DVT/Decubiti Prophylaxis    REHAB PLAN:     [  x ] Bedside P/T 3-5 times a week   [   ]   Bedside O/T  2-3 times a week             [   ] No Rehab Therapy Indicated                   [   ]  Speech Therapy   Conditioning/ROM                                    ADL  Bed Mobility                                               Conditioning/ROM  Transfers                                                     Bed Mobility  Sitting /Standing Balance                         Transfers                                        Gait Training                                               Sitting/Standing Balance  Stair Training [   ]Applicable                    Home equipment Eval                                                                        Splinting  [   ] Only      GOALS:   ADL   [ x  ]   Independent                    Transfers  [   ] Independent                          Ambulation  [   ] Independent     [    ] With device                            [   ]  CG                                                         [   ]  CG                                                                  [   ] CG                            [    ] Min A                                                   [   ] Min A                                                              [   ] Min  A          DISCHARGE PLAN:   [   ]  Good candidate for Intensive Rehabilitation/Hospital based-[facility]A [facility]                                             Will tolerate 3hrs Intensive Rehab Daily                                       [  xx  ]  Short Term Rehab in Skilled Nursing Facility                                       [    ]  Home with Outpatient or VN services                                         [    ]  Possible Candidate for Intensive Hospital based Rehab

## 2019-07-02 NOTE — PROGRESS NOTE ADULT - SUBJECTIVE AND OBJECTIVE BOX
Elderly white male well known to me from previous hospitalizations and HVP -I have visited him at home several times and we have averted admissions with close interactions with f/u at home and EMS visits without admission; apparently after 2 days of tachycardia seen on his PO, [facility] and [facility].  He is chronically on O2 by nasal canula at home; his PO at home can dip into 70s; I ran his tubing for O2 which was 100feet - when we shortened it his PO improved.  At home he is compliant with use of nebi treatments; uses CPAP every night and wears O2 fairly consistently; he is not compliant with a diet but he does take medications regularly; he uses CPAP and O2 as directed; he monitors his labs; [facility].  He has 24/7 HHAs.    He is however deconditioned with poor muscle tone and can't reliably ambulate independently - he stumbles into chairs and is at hi risk to fall    ON PE today  HR now at about 90  afebrile  thick neck chronically; wearing hi flow O2  better air exchange than his baseline - no acute wheeze  RR,Nl; S1S2;   abdomen soft, obese; NT  pitting edema up anterior shins bilat (worse than baseline)

## 2019-07-03 LAB
ANION GAP SERPL CALC-SCNC: 9 MMOL/L — SIGNIFICANT CHANGE UP (ref 7–14)
BUN SERPL-MCNC: 61 MG/DL — CRITICAL HIGH (ref 10–20)
CALCIUM SERPL-MCNC: 9.4 MG/DL — SIGNIFICANT CHANGE UP (ref 8.5–10.1)
CHLORIDE SERPL-SCNC: 98 MMOL/L — SIGNIFICANT CHANGE UP (ref 98–110)
CO2 SERPL-SCNC: 32 MMOL/L — SIGNIFICANT CHANGE UP (ref 17–32)
CREAT SERPL-MCNC: 2.3 MG/DL — HIGH (ref 0.7–1.5)
GLUCOSE BLDC GLUCOMTR-MCNC: 208 MG/DL — HIGH (ref 70–99)
GLUCOSE BLDC GLUCOMTR-MCNC: 234 MG/DL — HIGH (ref 70–99)
GLUCOSE SERPL-MCNC: 363 MG/DL — HIGH (ref 70–99)
POTASSIUM SERPL-MCNC: 5.9 MMOL/L — HIGH (ref 3.5–5)
POTASSIUM SERPL-SCNC: 5.9 MMOL/L — HIGH (ref 3.5–5)
SODIUM SERPL-SCNC: 139 MMOL/L — SIGNIFICANT CHANGE UP (ref 135–146)

## 2019-07-03 PROCEDURE — 99233 SBSQ HOSP IP/OBS HIGH 50: CPT

## 2019-07-03 RX ADMIN — BUDESONIDE AND FORMOTEROL FUMARATE DIHYDRATE 2 PUFF(S): 160; 4.5 AEROSOL RESPIRATORY (INHALATION) at 07:29

## 2019-07-03 RX ADMIN — Medication 3 MILLILITER(S): at 07:28

## 2019-07-03 RX ADMIN — Medication 20 MILLIGRAM(S): at 05:59

## 2019-07-03 RX ADMIN — Medication 1 DROP(S): at 17:51

## 2019-07-03 RX ADMIN — AZITHROMYCIN 500 MILLIGRAM(S): 500 TABLET, FILM COATED ORAL at 16:01

## 2019-07-03 RX ADMIN — CHLORHEXIDINE GLUCONATE 1 APPLICATION(S): 213 SOLUTION TOPICAL at 06:04

## 2019-07-03 RX ADMIN — Medication 20 UNIT(S): at 12:04

## 2019-07-03 RX ADMIN — Medication 25 MILLIGRAM(S): at 05:59

## 2019-07-03 RX ADMIN — SENNA PLUS 2 TABLET(S): 8.6 TABLET ORAL at 21:22

## 2019-07-03 RX ADMIN — MAGNESIUM OXIDE 400 MG ORAL TABLET 400 MILLIGRAM(S): 241.3 TABLET ORAL at 08:00

## 2019-07-03 RX ADMIN — ENOXAPARIN SODIUM 40 MILLIGRAM(S): 100 INJECTION SUBCUTANEOUS at 18:00

## 2019-07-03 RX ADMIN — Medication 25 MILLIGRAM(S): at 17:57

## 2019-07-03 RX ADMIN — Medication 100 MILLIGRAM(S): at 05:59

## 2019-07-03 RX ADMIN — CARBAMIDE PEROXIDE 4 DROP(S): 81.86 SOLUTION/ DROPS AURICULAR (OTIC) at 05:59

## 2019-07-03 RX ADMIN — Medication 20 UNIT(S): at 16:59

## 2019-07-03 RX ADMIN — PANTOPRAZOLE SODIUM 40 MILLIGRAM(S): 20 TABLET, DELAYED RELEASE ORAL at 06:04

## 2019-07-03 RX ADMIN — Medication 3 MILLILITER(S): at 13:50

## 2019-07-03 RX ADMIN — MAGNESIUM OXIDE 400 MG ORAL TABLET 400 MILLIGRAM(S): 241.3 TABLET ORAL at 17:02

## 2019-07-03 RX ADMIN — Medication 325 MILLIGRAM(S): at 16:01

## 2019-07-03 RX ADMIN — Medication 20 UNIT(S): at 08:00

## 2019-07-03 RX ADMIN — INSULIN GLARGINE 30 UNIT(S): 100 INJECTION, SOLUTION SUBCUTANEOUS at 21:21

## 2019-07-03 RX ADMIN — ATORVASTATIN CALCIUM 20 MILLIGRAM(S): 80 TABLET, FILM COATED ORAL at 21:21

## 2019-07-03 RX ADMIN — Medication 500 MILLIGRAM(S): at 17:00

## 2019-07-03 RX ADMIN — FINASTERIDE 5 MILLIGRAM(S): 5 TABLET, FILM COATED ORAL at 12:05

## 2019-07-03 RX ADMIN — Medication 30 MILLILITER(S): at 18:24

## 2019-07-03 RX ADMIN — TIOTROPIUM BROMIDE 1 CAPSULE(S): 18 CAPSULE ORAL; RESPIRATORY (INHALATION) at 20:50

## 2019-07-03 RX ADMIN — Medication 100 MILLIGRAM(S): at 21:21

## 2019-07-03 RX ADMIN — Medication 100 MILLIGRAM(S): at 16:00

## 2019-07-03 RX ADMIN — Medication 1 DROP(S): at 21:21

## 2019-07-03 RX ADMIN — BUDESONIDE AND FORMOTEROL FUMARATE DIHYDRATE 2 PUFF(S): 160; 4.5 AEROSOL RESPIRATORY (INHALATION) at 20:50

## 2019-07-03 RX ADMIN — Medication 60 MILLIGRAM(S): at 15:58

## 2019-07-03 RX ADMIN — Medication 60 MILLIGRAM(S): at 21:21

## 2019-07-03 RX ADMIN — Medication 60 MILLIGRAM(S): at 05:59

## 2019-07-03 RX ADMIN — Medication 3 MILLILITER(S): at 20:49

## 2019-07-03 RX ADMIN — MAGNESIUM OXIDE 400 MG ORAL TABLET 400 MILLIGRAM(S): 241.3 TABLET ORAL at 12:05

## 2019-07-03 RX ADMIN — TAMSULOSIN HYDROCHLORIDE 0.4 MILLIGRAM(S): 0.4 CAPSULE ORAL at 21:21

## 2019-07-03 RX ADMIN — Medication 1000 UNIT(S): at 16:00

## 2019-07-03 NOTE — PROGRESS NOTE ADULT - SUBJECTIVE AND OBJECTIVE BOX
Progress Note:  Provider Speciality                            Hospitalist      JIAN MANCIA MRN-[id number] 78y Male     CHIEF PRESENTING COMPLAINT:  Patient is a 78y old  Male who presents with a chief complaint of COPD exacerbation (02 Jul 2019 19:19)        SUBJECTIVE:  Patient was seen and examined at bedside. Reports improvement in  presenting complaint. No significant overnight events reported.     HISTORY OF PRESENTING ILLNESS:  HPI:  78 M w/ PMHx of COPD, HTN presents to ED w/ aid 2/2 SOB x last night. Saw the patient at bedside. Pt states condition has improved with Solumedrol 125mg and CPAP. Pt denies CP, Palpitations, F/C, N/V/D, headaches, consumption of allergic foods, recent travel, tobacco use. Pt is being admitted to Medicine for COPD exacerbation. 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  High cholesterol  GERD (gastroesophageal reflux disease)  Enlarged prostate  Oxygen dependent  COPD (chronic obstructive pulmonary disease)  Diabetes mellitus, type 2  Hypertension  Emphysema lung  History of hemorrhoidectomy  Dysplastic colon polyp: removed      VITAL SIGNS:  Vital Signs Last 24 Hrs  T(C): 35.6 (03 Jul 2019 13:45), Max: 36.7 (02 Jul 2019 22:03)  T(F): 96.1 (03 Jul 2019 13:45), Max: 98.1 (02 Jul 2019 22:03)  HR: 91 (03 Jul 2019 13:45) (81 - 91)  BP: 107/55 (03 Jul 2019 13:45) (101/55 - 117/59)  BP(mean): --  RR: 16 (03 Jul 2019 05:53) (16 - 16)  SpO2: 86% (03 Jul 2019 08:19) (86% - 96%)    PHYSICAL EXAMINATION:  Not in acute distress, obese  General: No pallor, or icterus, afebrile  HEENT:  MIS, EOMI, no JVD, no Bruit.  Heart: S1+S2 audible, no murmur  Lungs: bilateral  fair air entry, bilateral  wheezing, no crepitations.  Abdomen: Soft, non-tender, non-distended , no  rigidity or guarding.  CNS: AAOx3, CN  grossly intact.  Extremities:  No edema          REVIEW OF SYSTEMS:  Patient denies any headache, any vision complaints, runny nose, fever, chills, sore throat. Denies chest pain, shortness of breath, palpitation. Denies nausea, vomiting, abdominal pain, diarrhoea, Denies urinary burning, urgency, frequency, dysuria. Denies weakness in any part of the body or numbness.   At least 10 systems were reviewed in ROS. All systems reviewed  are within normal limits except for the complaints as described in Subjective.    CONSULTS:  Consultant(s) Notes Reviewed by me.   Care Discussed with Consultants/Other Providers where required.        MEDICATIONS:  MEDICATIONS  (STANDING):  ALBUTerol    90 MICROgram(s) HFA Inhaler 1 Puff(s) Inhalation every 4 hours  ALBUTerol/ipratropium for Nebulization 3 milliLiter(s) Nebulizer every 6 hours  artificial  tears Solution 1 Drop(s) Both EYES three times a day  ascorbic acid 500 milliGRAM(s) Oral daily  atorvastatin 20 milliGRAM(s) Oral at bedtime  azithromycin   Tablet 500 milliGRAM(s) Oral daily  buDESOnide 160 MICROgram(s)/formoterol 4.5 MICROgram(s) Inhaler 2 Puff(s) Inhalation two times a day  carbamide peroxide Otic Solution 4 Drop(s) Both Ears two times a day  chlorhexidine 4% Liquid 1 Application(s) Topical <User Schedule>  cholecalciferol 1000 Unit(s) Oral daily  dextrose 5%. 1000 milliLiter(s) (50 mL/Hr) IV Continuous <Continuous>  dextrose 50% Injectable 12.5 Gram(s) IV Push once  dextrose 50% Injectable 25 Gram(s) IV Push once  dextrose 50% Injectable 25 Gram(s) IV Push once  docusate sodium 100 milliGRAM(s) Oral three times a day  enoxaparin Injectable 40 milliGRAM(s) SubCutaneous every 24 hours  ferrous    sulfate 325 milliGRAM(s) Oral daily  finasteride 5 milliGRAM(s) Oral daily  furosemide    Tablet 20 milliGRAM(s) Oral daily  insulin glargine Injectable (LANTUS) 30 Unit(s) SubCutaneous at bedtime  insulin lispro Injectable (HumaLOG) 20 Unit(s) SubCutaneous three times a day before meals  magnesium oxide 400 milliGRAM(s) Oral three times a day with meals  methylPREDNISolone sodium succinate Injectable 60 milliGRAM(s) IV Push every 8 hours  metoprolol tartrate 25 milliGRAM(s) Oral two times a day  pantoprazole    Tablet 40 milliGRAM(s) Oral before breakfast  senna 2 Tablet(s) Oral at bedtime  tamsulosin 0.4 milliGRAM(s) Oral at bedtime  tiotropium 18 MICROgram(s) Capsule 1 Capsule(s) Inhalation at bedtime    MEDICATIONS  (PRN):  ALBUTerol/ipratropium for Nebulization 3 milliLiter(s) Nebulizer every 4 hours PRN Bronchospasm  dextrose 40% Gel 15 Gram(s) Oral once PRN Blood Glucose LESS THAN 70 milliGRAM(s)/deciliter  glucagon  Injectable 1 milliGRAM(s) IntraMuscular once PRN Glucose LESS THAN 70 milligrams/deciliter  guaiFENesin/dextromethorphan  Syrup 10 milliLiter(s) Oral every 6 hours PRN Cough      LABOROTORY DATA/MICROBIOLOGY/I & O's:                        13.7   9.15  )-----------( 403      ( 02 Jul 2019 06:54 )             48.4     07-03    139  |  98  |  61<HH>  ----------------------------<  363<H>  5.9<H>   |  32  |  2.3<H>    Ca    9.4      03 Jul 2019 11:00    TPro  6.4  /  Alb  4.2  /  TBili  0.8  /  DBili  x   /  AST  10  /  ALT  16  /  AlkPhos  77  07-02        CAPILLARY BLOOD GLUCOSE      POCT Blood Glucose.: 351 mg/dL (03 Jul 2019 11:53)  POCT Blood Glucose.: 193 mg/dL (03 Jul 2019 07:25)  POCT Blood Glucose.: 152 mg/dL (02 Jul 2019 21:26)  POCT Blood Glucose.: 165 mg/dL (02 Jul 2019 16:29)                        ASSESSMENT:    -Acute on Chronic COPD exacerbation    - Recurrent Hospitalization   - Non - compliant with therapy vs Severe COPD/ILD   -Zithromax 500mg PO daily  -O2 via NC@ 2L/Min, keep sat >94% at all times  -Solumedrol 40mg IVPB Q6hrs, monitor c/s without coverage QAC & QHS while on steroids.  -Albuterol/Atrovent 1 unit neb Q6hrs  -Budesonide every 12 hrs   -Tylenol 650mg po q6hrs PRN for fever/mild pain  -Percocet for sever pain as needed   -GI Prophylaxis with Protonix 40mg poQDaily  -DVT Prophylaxis with Heparin 5000units sc q8hrs	   -HTN - Metoprolol / Lasix   -HLD - Atorvastatin   -Flomax -  Flomax   -Dispo:Still  Acute

## 2019-07-04 LAB
ANION GAP SERPL CALC-SCNC: 11 MMOL/L — SIGNIFICANT CHANGE UP (ref 7–14)
ANION GAP SERPL CALC-SCNC: 11 MMOL/L — SIGNIFICANT CHANGE UP (ref 7–14)
BUN SERPL-MCNC: 66 MG/DL — CRITICAL HIGH (ref 10–20)
BUN SERPL-MCNC: 66 MG/DL — CRITICAL HIGH (ref 10–20)
CALCIUM SERPL-MCNC: 9.2 MG/DL — SIGNIFICANT CHANGE UP (ref 8.5–10.1)
CALCIUM SERPL-MCNC: 9.3 MG/DL — SIGNIFICANT CHANGE UP (ref 8.5–10.1)
CHLORIDE SERPL-SCNC: 97 MMOL/L — LOW (ref 98–110)
CHLORIDE SERPL-SCNC: 99 MMOL/L — SIGNIFICANT CHANGE UP (ref 98–110)
CO2 SERPL-SCNC: 29 MMOL/L — SIGNIFICANT CHANGE UP (ref 17–32)
CO2 SERPL-SCNC: 30 MMOL/L — SIGNIFICANT CHANGE UP (ref 17–32)
CREAT SERPL-MCNC: 2.2 MG/DL — HIGH (ref 0.7–1.5)
CREAT SERPL-MCNC: 2.5 MG/DL — HIGH (ref 0.7–1.5)
GLUCOSE BLDC GLUCOMTR-MCNC: 229 MG/DL — HIGH (ref 70–99)
GLUCOSE BLDC GLUCOMTR-MCNC: 245 MG/DL — HIGH (ref 70–99)
GLUCOSE BLDC GLUCOMTR-MCNC: 251 MG/DL — HIGH (ref 70–99)
GLUCOSE BLDC GLUCOMTR-MCNC: 321 MG/DL — HIGH (ref 70–99)
GLUCOSE SERPL-MCNC: 231 MG/DL — HIGH (ref 70–99)
GLUCOSE SERPL-MCNC: 323 MG/DL — HIGH (ref 70–99)
HCT VFR BLD CALC: 49 % — SIGNIFICANT CHANGE UP (ref 42–52)
HGB BLD-MCNC: 14 G/DL — SIGNIFICANT CHANGE UP (ref 14–18)
MCHC RBC-ENTMCNC: 22.6 PG — LOW (ref 27–31)
MCHC RBC-ENTMCNC: 28.6 G/DL — LOW (ref 32–37)
MCV RBC AUTO: 79 FL — LOW (ref 80–94)
NRBC # BLD: 0 /100 WBCS — SIGNIFICANT CHANGE UP (ref 0–0)
PLATELET # BLD AUTO: 471 K/UL — HIGH (ref 130–400)
POTASSIUM SERPL-MCNC: 5.7 MMOL/L — HIGH (ref 3.5–5)
POTASSIUM SERPL-MCNC: 6 MMOL/L — CRITICAL HIGH (ref 3.5–5)
POTASSIUM SERPL-SCNC: 5.7 MMOL/L — HIGH (ref 3.5–5)
POTASSIUM SERPL-SCNC: 6 MMOL/L — CRITICAL HIGH (ref 3.5–5)
RBC # BLD: 6.2 M/UL — HIGH (ref 4.7–6.1)
RBC # FLD: 21.2 % — HIGH (ref 11.5–14.5)
SODIUM SERPL-SCNC: 138 MMOL/L — SIGNIFICANT CHANGE UP (ref 135–146)
SODIUM SERPL-SCNC: 139 MMOL/L — SIGNIFICANT CHANGE UP (ref 135–146)
WBC # BLD: 14.58 K/UL — HIGH (ref 4.8–10.8)
WBC # FLD AUTO: 14.58 K/UL — HIGH (ref 4.8–10.8)

## 2019-07-04 PROCEDURE — 99233 SBSQ HOSP IP/OBS HIGH 50: CPT

## 2019-07-04 RX ORDER — SODIUM CHLORIDE 9 MG/ML
1000 INJECTION, SOLUTION INTRAVENOUS
Refills: 0 | Status: DISCONTINUED | OUTPATIENT
Start: 2019-07-04 | End: 2019-07-05

## 2019-07-04 RX ORDER — INSULIN HUMAN 100 [IU]/ML
5 INJECTION, SOLUTION SUBCUTANEOUS ONCE
Refills: 0 | Status: COMPLETED | OUTPATIENT
Start: 2019-07-04 | End: 2019-07-04

## 2019-07-04 RX ORDER — ZOLPIDEM TARTRATE 10 MG/1
5 TABLET ORAL AT BEDTIME
Refills: 0 | Status: DISCONTINUED | OUTPATIENT
Start: 2019-07-04 | End: 2019-07-06

## 2019-07-04 RX ORDER — SODIUM POLYSTYRENE SULFONATE 4.1 MEQ/G
30 POWDER, FOR SUSPENSION ORAL ONCE
Refills: 0 | Status: COMPLETED | OUTPATIENT
Start: 2019-07-04 | End: 2019-07-04

## 2019-07-04 RX ORDER — ZALEPLON 10 MG
5 CAPSULE ORAL AT BEDTIME
Refills: 0 | Status: DISCONTINUED | OUTPATIENT
Start: 2019-07-04 | End: 2019-07-04

## 2019-07-04 RX ORDER — DEXTROSE 50 % IN WATER 50 %
25 SYRINGE (ML) INTRAVENOUS ONCE
Refills: 0 | Status: COMPLETED | OUTPATIENT
Start: 2019-07-04 | End: 2019-07-04

## 2019-07-04 RX ADMIN — Medication 500 MILLIGRAM(S): at 14:44

## 2019-07-04 RX ADMIN — Medication 100 MILLIGRAM(S): at 17:04

## 2019-07-04 RX ADMIN — TAMSULOSIN HYDROCHLORIDE 0.4 MILLIGRAM(S): 0.4 CAPSULE ORAL at 21:10

## 2019-07-04 RX ADMIN — TIOTROPIUM BROMIDE 1 CAPSULE(S): 18 CAPSULE ORAL; RESPIRATORY (INHALATION) at 21:11

## 2019-07-04 RX ADMIN — CARBAMIDE PEROXIDE 4 DROP(S): 81.86 SOLUTION/ DROPS AURICULAR (OTIC) at 06:22

## 2019-07-04 RX ADMIN — ENOXAPARIN SODIUM 40 MILLIGRAM(S): 100 INJECTION SUBCUTANEOUS at 19:17

## 2019-07-04 RX ADMIN — Medication 3 MILLILITER(S): at 08:29

## 2019-07-04 RX ADMIN — AZITHROMYCIN 500 MILLIGRAM(S): 500 TABLET, FILM COATED ORAL at 11:49

## 2019-07-04 RX ADMIN — Medication 100 MILLIGRAM(S): at 21:10

## 2019-07-04 RX ADMIN — Medication 20 UNIT(S): at 17:04

## 2019-07-04 RX ADMIN — Medication 25 MILLIGRAM(S): at 17:05

## 2019-07-04 RX ADMIN — Medication 20 UNIT(S): at 11:48

## 2019-07-04 RX ADMIN — Medication 1 DROP(S): at 21:14

## 2019-07-04 RX ADMIN — Medication 25 MILLILITER(S): at 11:47

## 2019-07-04 RX ADMIN — PANTOPRAZOLE SODIUM 40 MILLIGRAM(S): 20 TABLET, DELAYED RELEASE ORAL at 06:20

## 2019-07-04 RX ADMIN — Medication 20 UNIT(S): at 08:09

## 2019-07-04 RX ADMIN — SODIUM POLYSTYRENE SULFONATE 30 GRAM(S): 4.1 POWDER, FOR SUSPENSION ORAL at 11:47

## 2019-07-04 RX ADMIN — Medication 1 DROP(S): at 14:43

## 2019-07-04 RX ADMIN — INSULIN GLARGINE 30 UNIT(S): 100 INJECTION, SOLUTION SUBCUTANEOUS at 21:10

## 2019-07-04 RX ADMIN — ZOLPIDEM TARTRATE 5 MILLIGRAM(S): 10 TABLET ORAL at 00:29

## 2019-07-04 RX ADMIN — CARBAMIDE PEROXIDE 4 DROP(S): 81.86 SOLUTION/ DROPS AURICULAR (OTIC) at 17:05

## 2019-07-04 RX ADMIN — BUDESONIDE AND FORMOTEROL FUMARATE DIHYDRATE 2 PUFF(S): 160; 4.5 AEROSOL RESPIRATORY (INHALATION) at 21:11

## 2019-07-04 RX ADMIN — SODIUM CHLORIDE 50 MILLILITER(S): 9 INJECTION, SOLUTION INTRAVENOUS at 11:46

## 2019-07-04 RX ADMIN — Medication 20 MILLIGRAM(S): at 06:20

## 2019-07-04 RX ADMIN — Medication 3 MILLILITER(S): at 16:23

## 2019-07-04 RX ADMIN — Medication 60 MILLIGRAM(S): at 06:21

## 2019-07-04 RX ADMIN — Medication 25 MILLIGRAM(S): at 06:20

## 2019-07-04 RX ADMIN — ATORVASTATIN CALCIUM 20 MILLIGRAM(S): 80 TABLET, FILM COATED ORAL at 21:10

## 2019-07-04 RX ADMIN — FINASTERIDE 5 MILLIGRAM(S): 5 TABLET, FILM COATED ORAL at 11:49

## 2019-07-04 RX ADMIN — Medication 3 MILLILITER(S): at 20:07

## 2019-07-04 RX ADMIN — Medication 1000 UNIT(S): at 11:49

## 2019-07-04 RX ADMIN — MAGNESIUM OXIDE 400 MG ORAL TABLET 400 MILLIGRAM(S): 241.3 TABLET ORAL at 08:15

## 2019-07-04 RX ADMIN — SENNA PLUS 2 TABLET(S): 8.6 TABLET ORAL at 21:10

## 2019-07-04 RX ADMIN — Medication 3 MILLILITER(S): at 14:14

## 2019-07-04 RX ADMIN — Medication 325 MILLIGRAM(S): at 11:49

## 2019-07-04 RX ADMIN — Medication 100 MILLIGRAM(S): at 06:20

## 2019-07-04 RX ADMIN — MAGNESIUM OXIDE 400 MG ORAL TABLET 400 MILLIGRAM(S): 241.3 TABLET ORAL at 17:04

## 2019-07-04 RX ADMIN — MAGNESIUM OXIDE 400 MG ORAL TABLET 400 MILLIGRAM(S): 241.3 TABLET ORAL at 11:49

## 2019-07-04 RX ADMIN — INSULIN HUMAN 5 UNIT(S): 100 INJECTION, SOLUTION SUBCUTANEOUS at 12:01

## 2019-07-04 RX ADMIN — Medication 1 DROP(S): at 06:21

## 2019-07-05 LAB
ANION GAP SERPL CALC-SCNC: 11 MMOL/L — SIGNIFICANT CHANGE UP (ref 7–14)
BUN SERPL-MCNC: 63 MG/DL — CRITICAL HIGH (ref 10–20)
CALCIUM SERPL-MCNC: 8.4 MG/DL — LOW (ref 8.5–10.1)
CHLORIDE SERPL-SCNC: 99 MMOL/L — SIGNIFICANT CHANGE UP (ref 98–110)
CO2 SERPL-SCNC: 34 MMOL/L — HIGH (ref 17–32)
CREAT SERPL-MCNC: 2 MG/DL — HIGH (ref 0.7–1.5)
GLUCOSE BLDC GLUCOMTR-MCNC: 142 MG/DL — HIGH (ref 70–99)
GLUCOSE BLDC GLUCOMTR-MCNC: 196 MG/DL — HIGH (ref 70–99)
GLUCOSE BLDC GLUCOMTR-MCNC: 197 MG/DL — HIGH (ref 70–99)
GLUCOSE BLDC GLUCOMTR-MCNC: 261 MG/DL — HIGH (ref 70–99)
GLUCOSE BLDC GLUCOMTR-MCNC: 317 MG/DL — HIGH (ref 70–99)
GLUCOSE SERPL-MCNC: 202 MG/DL — HIGH (ref 70–99)
HCT VFR BLD CALC: 45.7 % — SIGNIFICANT CHANGE UP (ref 42–52)
HGB BLD-MCNC: 13.1 G/DL — LOW (ref 14–18)
MCHC RBC-ENTMCNC: 22.6 PG — LOW (ref 27–31)
MCHC RBC-ENTMCNC: 28.7 G/DL — LOW (ref 32–37)
MCV RBC AUTO: 78.9 FL — LOW (ref 80–94)
NRBC # BLD: 0 /100 WBCS — SIGNIFICANT CHANGE UP (ref 0–0)
PLATELET # BLD AUTO: 386 K/UL — SIGNIFICANT CHANGE UP (ref 130–400)
POTASSIUM SERPL-MCNC: 5.1 MMOL/L — HIGH (ref 3.5–5)
POTASSIUM SERPL-SCNC: 5.1 MMOL/L — HIGH (ref 3.5–5)
RBC # BLD: 5.79 M/UL — SIGNIFICANT CHANGE UP (ref 4.7–6.1)
RBC # FLD: 21.5 % — HIGH (ref 11.5–14.5)
SODIUM SERPL-SCNC: 144 MMOL/L — SIGNIFICANT CHANGE UP (ref 135–146)
WBC # BLD: 10.9 K/UL — HIGH (ref 4.8–10.8)
WBC # FLD AUTO: 10.9 K/UL — HIGH (ref 4.8–10.8)

## 2019-07-05 PROCEDURE — 99233 SBSQ HOSP IP/OBS HIGH 50: CPT

## 2019-07-05 RX ADMIN — TIOTROPIUM BROMIDE 1 CAPSULE(S): 18 CAPSULE ORAL; RESPIRATORY (INHALATION) at 21:07

## 2019-07-05 RX ADMIN — MAGNESIUM OXIDE 400 MG ORAL TABLET 400 MILLIGRAM(S): 241.3 TABLET ORAL at 11:53

## 2019-07-05 RX ADMIN — MAGNESIUM OXIDE 400 MG ORAL TABLET 400 MILLIGRAM(S): 241.3 TABLET ORAL at 16:56

## 2019-07-05 RX ADMIN — FINASTERIDE 5 MILLIGRAM(S): 5 TABLET, FILM COATED ORAL at 11:51

## 2019-07-05 RX ADMIN — ZOLPIDEM TARTRATE 5 MILLIGRAM(S): 10 TABLET ORAL at 00:04

## 2019-07-05 RX ADMIN — Medication 3 MILLILITER(S): at 19:39

## 2019-07-05 RX ADMIN — Medication 1 DROP(S): at 21:07

## 2019-07-05 RX ADMIN — BUDESONIDE AND FORMOTEROL FUMARATE DIHYDRATE 2 PUFF(S): 160; 4.5 AEROSOL RESPIRATORY (INHALATION) at 19:41

## 2019-07-05 RX ADMIN — ATORVASTATIN CALCIUM 20 MILLIGRAM(S): 80 TABLET, FILM COATED ORAL at 21:07

## 2019-07-05 RX ADMIN — Medication 1 DROP(S): at 06:11

## 2019-07-05 RX ADMIN — Medication 325 MILLIGRAM(S): at 11:51

## 2019-07-05 RX ADMIN — Medication 3 MILLILITER(S): at 13:15

## 2019-07-05 RX ADMIN — Medication 20 MILLIGRAM(S): at 11:53

## 2019-07-05 RX ADMIN — Medication 25 MILLIGRAM(S): at 16:57

## 2019-07-05 RX ADMIN — ENOXAPARIN SODIUM 40 MILLIGRAM(S): 100 INJECTION SUBCUTANEOUS at 16:56

## 2019-07-05 RX ADMIN — Medication 20 UNIT(S): at 16:54

## 2019-07-05 RX ADMIN — Medication 100 MILLIGRAM(S): at 16:55

## 2019-07-05 RX ADMIN — AZITHROMYCIN 500 MILLIGRAM(S): 500 TABLET, FILM COATED ORAL at 11:51

## 2019-07-05 RX ADMIN — Medication 500 MILLIGRAM(S): at 11:51

## 2019-07-05 RX ADMIN — Medication 3 MILLILITER(S): at 09:41

## 2019-07-05 RX ADMIN — Medication 1000 UNIT(S): at 11:51

## 2019-07-05 RX ADMIN — TAMSULOSIN HYDROCHLORIDE 0.4 MILLIGRAM(S): 0.4 CAPSULE ORAL at 21:06

## 2019-07-05 RX ADMIN — SENNA PLUS 2 TABLET(S): 8.6 TABLET ORAL at 21:07

## 2019-07-05 RX ADMIN — Medication 40 MILLIGRAM(S): at 14:26

## 2019-07-05 RX ADMIN — INSULIN GLARGINE 30 UNIT(S): 100 INJECTION, SOLUTION SUBCUTANEOUS at 21:06

## 2019-07-05 RX ADMIN — Medication 100 MILLIGRAM(S): at 21:07

## 2019-07-05 RX ADMIN — CARBAMIDE PEROXIDE 4 DROP(S): 81.86 SOLUTION/ DROPS AURICULAR (OTIC) at 06:11

## 2019-07-05 NOTE — CONSULT NOTE ADULT - ASSESSMENT
Impression:  chronic resp failure hypoxia and hypercapnia   COPD   pulm htn ??   MICHAEL   CHF         Plan: patient said he came for palpitation not worsening SOB   he is at baseline 4-6 liter oxygen with pox 88-90%     now he is at baseline   i don't think patient needed to be admitted   he understand that this the best he will be was evaluated by Hospice before   so recommend to taper prednisone to 10 mg and keep on 10   continue home inhaler he should be on inhaled steroids with LABAB and LAMA  home oxygen already has   and AVAP already has   PT / REhab

## 2019-07-05 NOTE — CONSULT NOTE ADULT - SUBJECTIVE AND OBJECTIVE BOX
Patient is a 78y old  Male who presents with a chief complaint of COPD exacerbation (04 Jul 2019 13:58)      HPI:  78 M w/ PMHx of COPD, HTN presents to ED w/ aid 2/2 SOB x last night. Saw the patient at bedside. Pt states condition has improved with Solumedrol 125mg and CPAP. Pt denies CP, Palpitations, F/C, N/V/D, headaches, consumption of allergic foods, recent travel, tobacco use. Pt is being admitted to Medicine for COPD exacerbation. (01 Jul 2019 16:22)  patient now at his baseline , he is baseline on 4-6 liter oxygen     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  High cholesterol  GERD (gastroesophageal reflux disease)  Enlarged prostate  Oxygen dependent  COPD (chronic obstructive pulmonary disease)  Diabetes mellitus, type 2  Hypertension  Emphysema lung  History of hemorrhoidectomy  Dysplastic colon polyp: removed      FAMILY HISTORY:  No pertinent family history in first degree relatives    Family history: No family cardiovascular system   Occupation:  Alochol: Denied  Smoking:ex  Drug Use: Denied  Marital Status:           Allergies    fish (Other)  iodine (Hives)  penicillin (Unknown)  shellfish (Other)    Intolerances    aspirin (Other)      Home Medications:  aluminum hydroxide-magnesium hydroxide 200 mg-200 mg/5 mL oral suspension: 30 milliliter(s) orally every 4 hours, As needed, Dyspepsia (01 Jul 2019 16:46)  ascorbic acid 500 mg oral tablet: 1 tab(s) orally once a day (01 Jul 2019 16:46)  atorvastatin 20 mg oral tablet: 1 tab(s) orally once a day (at bedtime) (01 Jul 2019 16:46)  cholecalciferol oral tablet: 1000 unit(s) orally once a day (01 Jul 2019 16:46)  docusate sodium 100 mg oral capsule: 1 cap(s) orally 3 times a day (01 Jul 2019 16:46)  finasteride 5 mg oral tablet: 1 tab(s) orally once a day (01 Jul 2019 16:46)  guaifenesin-dextromethorphan 100 mg-10 mg/5 mL oral liquid: 10 milliliter(s) orally every 6 hours, As needed, Cough (01 Jul 2019 16:46)  magnesium oxide 400 mg (241.3 mg elemental magnesium) oral tablet: 1 tab(s) orally 3 times a day (with meals) (01 Jul 2019 16:46)  metoprolol tartrate 25 mg oral tablet: 1 tab(s) orally 2 times a day (01 Jul 2019 16:46)  nystatin 100,000 units/g topical powder: 1 application topically 2 times a day (01 Jul 2019 16:46)  ocular lubricant ophthalmic solution: 1 drop(s) to each affected eye 3 times a day (01 Jul 2019 16:46)  polyethylene glycol 3350 oral powder for reconstitution: 17 gram(s) orally once a day (01 Jul 2019 16:46)  senna oral tablet: 2 tab(s) orally once a day (at bedtime) (01 Jul 2019 16:46)  sodium polystyrene sulfonate: 30 milligram(s) orally 3 times a week (01 Jul 2019 16:46)  tamsulosin 0.4 mg oral capsule: 1 cap(s) orally once a day (at bedtime) (01 Jul 2019 16:46)      ROS: as in HPI; All other systems reviewed are negative        PHYSICAL EXAM:  Vital Signs Last 24 Hrs  T(C): 36.6 (05 Jul 2019 05:40), Max: 36.6 (05 Jul 2019 05:40)  T(F): 97.8 (05 Jul 2019 05:40), Max: 97.8 (05 Jul 2019 05:40)  HR: 81 (05 Jul 2019 05:40) (81 - 100)  BP: 107/63 (05 Jul 2019 05:40) (107/63 - 131/68)  BP(mean): --  RR: 16 (05 Jul 2019 05:40) (16 - 18)  SpO2: 95% (05 Jul 2019 06:15) (95% - 95%)      GENERAL: NAD, well-groomed, well-developed  HEAD:  Atraumatic, Normocephalic  EYES: EOMI, PERRLA, conjunctiva and sclera clear  ENMT: No tonsillar erythema, exudates, or enlargement; Moist mucous membranes, Good dentition, No lesions  NECK: Supple, No JVD, Normal thyroid  NERVOUS SYSTEM:  Alert & Oriented X3, Good concentration; Motor Strength 5/5 B/L upper and lower extremities; DTRs 2+ intact and symmetric  CHEST/LUNG: Clear to percussion bilaterally; No rales, rhonchi, wheezing, or rubs  HEART: Regular rate and rhythm; No murmurs, rubs, or gallops  ABDOMEN: Soft, Nontender, Nondistended; Bowel sounds present  EXTREMITIES:  2+ Peripheral Pulses, No clubbing, cyanosis, 1 edema  LYMPH: No lymphadenopathy noted  SKIN: No rashes or lesions    HOSPITAL MEDICATIONS:  MEDICATIONS  (STANDING):  ALBUTerol    90 MICROgram(s) HFA Inhaler 1 Puff(s) Inhalation every 4 hours  ALBUTerol/ipratropium for Nebulization 3 milliLiter(s) Nebulizer every 6 hours  artificial  tears Solution 1 Drop(s) Both EYES three times a day  ascorbic acid 500 milliGRAM(s) Oral daily  atorvastatin 20 milliGRAM(s) Oral at bedtime  azithromycin   Tablet 500 milliGRAM(s) Oral daily  buDESOnide 160 MICROgram(s)/formoterol 4.5 MICROgram(s) Inhaler 2 Puff(s) Inhalation two times a day  carbamide peroxide Otic Solution 4 Drop(s) Both Ears two times a day  chlorhexidine 4% Liquid 1 Application(s) Topical <User Schedule>  cholecalciferol 1000 Unit(s) Oral daily  dextrose 5%. 1000 milliLiter(s) (50 mL/Hr) IV Continuous <Continuous>  dextrose 50% Injectable 12.5 Gram(s) IV Push once  dextrose 50% Injectable 25 Gram(s) IV Push once  dextrose 50% Injectable 25 Gram(s) IV Push once  docusate sodium 100 milliGRAM(s) Oral three times a day  enoxaparin Injectable 40 milliGRAM(s) SubCutaneous every 24 hours  ferrous    sulfate 325 milliGRAM(s) Oral daily  finasteride 5 milliGRAM(s) Oral daily  furosemide    Tablet 20 milliGRAM(s) Oral daily  insulin glargine Injectable (LANTUS) 30 Unit(s) SubCutaneous at bedtime  insulin lispro Injectable (HumaLOG) 20 Unit(s) SubCutaneous three times a day before meals  magnesium oxide 400 milliGRAM(s) Oral three times a day with meals  metoprolol tartrate 25 milliGRAM(s) Oral two times a day  pantoprazole    Tablet 40 milliGRAM(s) Oral before breakfast  predniSONE   Tablet 40 milliGRAM(s) Oral daily  senna 2 Tablet(s) Oral at bedtime  tamsulosin 0.4 milliGRAM(s) Oral at bedtime  tiotropium 18 MICROgram(s) Capsule 1 Capsule(s) Inhalation at bedtime    MEDICATIONS  (PRN):  ALBUTerol/ipratropium for Nebulization 3 milliLiter(s) Nebulizer every 4 hours PRN Bronchospasm  aluminum hydroxide/magnesium hydroxide/simethicone Suspension 30 milliLiter(s) Oral every 6 hours PRN Dyspepsia  dextrose 40% Gel 15 Gram(s) Oral once PRN Blood Glucose LESS THAN 70 milliGRAM(s)/deciliter  glucagon  Injectable 1 milliGRAM(s) IntraMuscular once PRN Glucose LESS THAN 70 milligrams/deciliter  guaiFENesin/dextromethorphan  Syrup 10 milliLiter(s) Oral every 6 hours PRN Cough  zolpidem 5 milliGRAM(s) Oral at bedtime PRN Insomnia      LABS:                        14.0   14.58 )-----------( 471      ( 04 Jul 2019 09:12 )             49.0     07-04    138  |  97<L>  |  66<HH>  ----------------------------<  231<H>  5.7<H>   |  30  |  2.5<H>    Ca    9.3      04 Jul 2019 15:31                    RADIOLOGY:< from: Xray Chest 1 View-PORTABLE IMMEDIATE (07.01.19 @ 14:11) >    Bilateral lung opacities, right hilar enlargement, and a small right   pleural effusion versus pleural thickening, unchanged.    < end of copied text >    [ ] Reviewed and interpreted by me    ECHO:    Point of Care Ultrasound Findings;    PFT:

## 2019-07-05 NOTE — PROGRESS NOTE ADULT - SUBJECTIVE AND OBJECTIVE BOX
Progress Note:  Provider Speciality                            Hospitalist      JIAN MANCIA MRN-[id number] 78y Male     CHIEF PRESENTING COMPLAINT:  Patient is a 78y old  Male who presents with a chief complaint of COPD exacerbation (02 Jul 2019 19:19)        SUBJECTIVE:  Patient was seen and examined at bedside. Reports improvement in  presenting complaint. No significant overnight events reported.     HISTORY OF PRESENTING ILLNESS:  HPI:  78 M w/ PMHx of COPD, HTN presents to ED w/ aid 2/2 SOB x last night. Saw the patient at bedside. Pt states condition has improved with Solumedrol 125mg and CPAP. Pt denies CP, Palpitations, F/C, N/V/D, headaches, consumption of allergic foods, recent travel, tobacco use. Pt is being admitted to Medicine for COPD exacerbation. 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  High cholesterol  GERD (gastroesophageal reflux disease)  Enlarged prostate  Oxygen dependent  COPD (chronic obstructive pulmonary disease)  Diabetes mellitus, type 2  Hypertension  Emphysema lung  History of hemorrhoidectomy  Dysplastic colon polyp: removed      VITAL SIGNS:  Vital Signs Last 24 Hrs  T(C): 35.6 (03 Jul 2019 13:45), Max: 36.7 (02 Jul 2019 22:03)  T(F): 96.1 (03 Jul 2019 13:45), Max: 98.1 (02 Jul 2019 22:03)  HR: 91 (03 Jul 2019 13:45) (81 - 91)  BP: 107/55 (03 Jul 2019 13:45) (101/55 - 117/59)  BP(mean): --  RR: 16 (03 Jul 2019 05:53) (16 - 16)  SpO2: 86% (03 Jul 2019 08:19) (86% - 96%)    PHYSICAL EXAMINATION:  Not in acute distress, obese  General: No pallor, or icterus, afebrile  HEENT:  MIS, EOMI, no JVD, no Bruit.  Heart: S1+S2 audible, no murmur  Lungs: bilateral  fair air entry, bilateral  wheezing, no crepitations.  Abdomen: Soft, non-tender, non-distended , no  rigidity or guarding.  CNS: AAOx3, CN  grossly intact.  Extremities:  No edema          REVIEW OF SYSTEMS:  Patient denies any headache, any vision complaints, runny nose, fever, chills, sore throat. Denies chest pain, shortness of breath, palpitation. Denies nausea, vomiting, abdominal pain, diarrhoea, Denies urinary burning, urgency, frequency, dysuria. Denies weakness in any part of the body or numbness.   At least 10 systems were reviewed in ROS. All systems reviewed  are within normal limits except for the complaints as described in Subjective.    CONSULTS:  Consultant(s) Notes Reviewed by me.   Care Discussed with Consultants/Other Providers where required.        MEDICATIONS:  MEDICATIONS  (STANDING):  ALBUTerol    90 MICROgram(s) HFA Inhaler 1 Puff(s) Inhalation every 4 hours  ALBUTerol/ipratropium for Nebulization 3 milliLiter(s) Nebulizer every 6 hours  artificial  tears Solution 1 Drop(s) Both EYES three times a day  ascorbic acid 500 milliGRAM(s) Oral daily  atorvastatin 20 milliGRAM(s) Oral at bedtime  azithromycin   Tablet 500 milliGRAM(s) Oral daily  buDESOnide 160 MICROgram(s)/formoterol 4.5 MICROgram(s) Inhaler 2 Puff(s) Inhalation two times a day  carbamide peroxide Otic Solution 4 Drop(s) Both Ears two times a day  chlorhexidine 4% Liquid 1 Application(s) Topical <User Schedule>  cholecalciferol 1000 Unit(s) Oral daily  dextrose 5%. 1000 milliLiter(s) (50 mL/Hr) IV Continuous <Continuous>  dextrose 50% Injectable 12.5 Gram(s) IV Push once  dextrose 50% Injectable 25 Gram(s) IV Push once  dextrose 50% Injectable 25 Gram(s) IV Push once  docusate sodium 100 milliGRAM(s) Oral three times a day  enoxaparin Injectable 40 milliGRAM(s) SubCutaneous every 24 hours  ferrous    sulfate 325 milliGRAM(s) Oral daily  finasteride 5 milliGRAM(s) Oral daily  furosemide    Tablet 20 milliGRAM(s) Oral daily  insulin glargine Injectable (LANTUS) 30 Unit(s) SubCutaneous at bedtime  insulin lispro Injectable (HumaLOG) 20 Unit(s) SubCutaneous three times a day before meals  magnesium oxide 400 milliGRAM(s) Oral three times a day with meals  methylPREDNISolone sodium succinate Injectable 60 milliGRAM(s) IV Push every 8 hours  metoprolol tartrate 25 milliGRAM(s) Oral two times a day  pantoprazole    Tablet 40 milliGRAM(s) Oral before breakfast  senna 2 Tablet(s) Oral at bedtime  tamsulosin 0.4 milliGRAM(s) Oral at bedtime  tiotropium 18 MICROgram(s) Capsule 1 Capsule(s) Inhalation at bedtime    MEDICATIONS  (PRN):  ALBUTerol/ipratropium for Nebulization 3 milliLiter(s) Nebulizer every 4 hours PRN Bronchospasm  dextrose 40% Gel 15 Gram(s) Oral once PRN Blood Glucose LESS THAN 70 milliGRAM(s)/deciliter  glucagon  Injectable 1 milliGRAM(s) IntraMuscular once PRN Glucose LESS THAN 70 milligrams/deciliter  guaiFENesin/dextromethorphan  Syrup 10 milliLiter(s) Oral every 6 hours PRN Cough      LABOROTORY DATA/MICROBIOLOGY/I & O's:                        13.7   9.15  )-----------( 403      ( 02 Jul 2019 06:54 )             48.4     07-03    139  |  98  |  61<HH>  ----------------------------<  363<H>  5.9<H>   |  32  |  2.3<H>    Ca    9.4      03 Jul 2019 11:00    TPro  6.4  /  Alb  4.2  /  TBili  0.8  /  DBili  x   /  AST  10  /  ALT  16  /  AlkPhos  77  07-02        CAPILLARY BLOOD GLUCOSE      POCT Blood Glucose.: 351 mg/dL (03 Jul 2019 11:53)  POCT Blood Glucose.: 193 mg/dL (03 Jul 2019 07:25)  POCT Blood Glucose.: 152 mg/dL (02 Jul 2019 21:26)  POCT Blood Glucose.: 165 mg/dL (02 Jul 2019 16:29)                        ASSESSMENT:    -Acute on Chronic COPD exacerbation    - Will get pulmonary consult  - Recurrent Hospitalization   - Non - compliant with therapy vs Severe COPD/ILD   -Zithromax 500mg PO daily  -O2 via NC@ 2L/Min, keep sat >94% at all times  -Solumedrol 40mg IVPB Q6hrs, monitor c/s without coverage QAC & QHS while on steroids.  -Albuterol/Atrovent 1 unit neb Q6hrs  -Budesonide every 12 hrs   -Tylenol 650mg po q6hrs PRN for fever/mild pain  -Percocet for sever pain as needed   -GI Prophylaxis with Protonix 40mg poQDaily  -DVT Prophylaxis with Heparin 5000units sc q8hrs	   -HTN - Metoprolol / Lasix   -HLD - Atorvastatin   -Flomax -  Flomax   Hyperkalemia to 6- given IV insulin, D50 and kayexalate  -Dispo: Anticipated for discharge in AM with home care.

## 2019-07-06 ENCOUNTER — TRANSCRIPTION ENCOUNTER (OUTPATIENT)
Age: 79
End: 2019-07-06

## 2019-07-06 VITALS — OXYGEN SATURATION: 90 %

## 2019-07-06 LAB
GLUCOSE BLDC GLUCOMTR-MCNC: 129 MG/DL — HIGH (ref 70–99)
GLUCOSE BLDC GLUCOMTR-MCNC: 173 MG/DL — HIGH (ref 70–99)
GLUCOSE BLDC GLUCOMTR-MCNC: 188 MG/DL — HIGH (ref 70–99)
GLUCOSE BLDC GLUCOMTR-MCNC: 86 MG/DL — SIGNIFICANT CHANGE UP (ref 70–99)

## 2019-07-06 PROCEDURE — 99239 HOSP IP/OBS DSCHRG MGMT >30: CPT

## 2019-07-06 RX ADMIN — Medication 1000 UNIT(S): at 11:12

## 2019-07-06 RX ADMIN — Medication 20 UNIT(S): at 11:48

## 2019-07-06 RX ADMIN — Medication 3 MILLILITER(S): at 13:50

## 2019-07-06 RX ADMIN — ENOXAPARIN SODIUM 40 MILLIGRAM(S): 100 INJECTION SUBCUTANEOUS at 17:09

## 2019-07-06 RX ADMIN — Medication 20 UNIT(S): at 07:40

## 2019-07-06 RX ADMIN — AZITHROMYCIN 500 MILLIGRAM(S): 500 TABLET, FILM COATED ORAL at 11:12

## 2019-07-06 RX ADMIN — Medication 1 DROP(S): at 14:41

## 2019-07-06 RX ADMIN — Medication 25 MILLIGRAM(S): at 07:42

## 2019-07-06 RX ADMIN — Medication 3 MILLILITER(S): at 08:43

## 2019-07-06 RX ADMIN — BUDESONIDE AND FORMOTEROL FUMARATE DIHYDRATE 2 PUFF(S): 160; 4.5 AEROSOL RESPIRATORY (INHALATION) at 07:43

## 2019-07-06 RX ADMIN — Medication 25 MILLIGRAM(S): at 17:08

## 2019-07-06 RX ADMIN — CARBAMIDE PEROXIDE 4 DROP(S): 81.86 SOLUTION/ DROPS AURICULAR (OTIC) at 17:08

## 2019-07-06 RX ADMIN — MAGNESIUM OXIDE 400 MG ORAL TABLET 400 MILLIGRAM(S): 241.3 TABLET ORAL at 17:08

## 2019-07-06 RX ADMIN — MAGNESIUM OXIDE 400 MG ORAL TABLET 400 MILLIGRAM(S): 241.3 TABLET ORAL at 07:42

## 2019-07-06 RX ADMIN — Medication 20 MILLIGRAM(S): at 07:42

## 2019-07-06 RX ADMIN — Medication 325 MILLIGRAM(S): at 11:12

## 2019-07-06 RX ADMIN — MAGNESIUM OXIDE 400 MG ORAL TABLET 400 MILLIGRAM(S): 241.3 TABLET ORAL at 11:48

## 2019-07-06 RX ADMIN — Medication 100 MILLIGRAM(S): at 14:41

## 2019-07-06 RX ADMIN — FINASTERIDE 5 MILLIGRAM(S): 5 TABLET, FILM COATED ORAL at 11:12

## 2019-07-06 RX ADMIN — ZOLPIDEM TARTRATE 5 MILLIGRAM(S): 10 TABLET ORAL at 03:24

## 2019-07-06 RX ADMIN — PANTOPRAZOLE SODIUM 40 MILLIGRAM(S): 20 TABLET, DELAYED RELEASE ORAL at 07:42

## 2019-07-06 RX ADMIN — Medication 500 MILLIGRAM(S): at 11:12

## 2019-07-06 NOTE — DISCHARGE NOTE PROVIDER - CARE PROVIDER_API CALL
Mckay Reddy)  Geriatric Medicine; Internal Medicine  [address] [place] [address]  Phone: [phone]  Fax: [phone]  Follow Up Time:     Raul Castrejon)  Internal Medicine; Pulmonary Disease  [address] [place] [address], [place] [address]  Phone: [phone]  Fax: [phone]  Follow Up Time:

## 2019-07-06 NOTE — DISCHARGE NOTE PROVIDER - CARE PROVIDERS DIRECT ADDRESSES
[email or web address]

## 2019-07-06 NOTE — DISCHARGE NOTE PROVIDER - HOSPITAL COURSE
Patient is an 78y Male with h/o End stage COPD on supplemental oxygen (4-5L NC), DNR/DNI, severe pulmonary hypertension, DM, MICHAEL/OHS on CPAP, Morbid obesity, CKD 3, with     multiple admissions with prolonged inpatient stay for Acute on chronic Hypoxic RF, presented from home with complaints of hypoxia with oxygen saturation.  Patient reported having only minimal dyspnea and mild productive cough. He was admitted for further management. Patient was admitted for management of chronic obstructive pulmonary disease exacerbation . Pt was started on duoneb treatments and IV steroids in the form of solumedrol was started which was gradually tapered in lieu of improvement in patient's complaint of shortness of breath. Regular pulse oximetry was done to assess the response of therapy which showed improvement. .  Regular monitoring of CBC & BMP was done along with serum electrolytes. DVT & GI prophylaxis was adequately ensured. Pt is now clinically very stable for discharge. Discharge diagnosis, medicines and follow up appointments discussed and ensured with the patient prior to discharge.  Pt will require arrangement for home oxygen and BIPAP continuation prior to discharge with home care. Patient is an 78y Male with h/o End stage COPD on supplemental oxygen (4-5L NC), DNR/DNI, severe pulmonary hypertension, DM, MICHAEL/OHS on CPAP, Morbid obesity, CKD 3, with multiple admissions with prolonged inpatient stay for Acute on chronic Hypoxic RF, presented from home with complaints of hypoxia with oxygen saturation.  Patient reported having only minimal dyspnea and mild productive cough. He was admitted for further management. Patient was admitted for management of chronic obstructive pulmonary disease exacerbation . Pt was started on duoneb treatments and IV steroids in the form of solumedrol was started which was gradually tapered in lieu of improvement in patient's complaint of shortness of breath. Regular pulse oximetry was done to assess the response of therapy which showed improvement. .  Regular monitoring of CBC & BMP was done along with serum electrolytes. DVT & GI prophylaxis was adequately ensured. Pt is now clinically very stable for discharge. Discharge diagnosis, medicines and follow up appointments discussed and ensured with the patient prior to discharge.  Pt will require arrangement for home oxygen and BIPAP continuation prior to discharge with home care.

## 2019-07-06 NOTE — DISCHARGE NOTE NURSING/CASE MANAGEMENT/SOCIAL WORK - NSDCDPATPORTLINK_GEN_ALL_CORE
You can access the [facility] Portal, offered by [facility], by registering with the following website: [email or web address]

## 2019-07-06 NOTE — DISCHARGE NOTE PROVIDER - NSDCFUADDINST_GEN_ALL_CORE_FT
patient  has AVAP setp up at home since prior to admission which  he will continue.  Home care set up.  Home oxygen.  Pulmonary follow up as outpatient.

## 2019-07-06 NOTE — DISCHARGE NOTE PROVIDER - NSDCCPCAREPLAN_GEN_ALL_CORE_FT
PRINCIPAL DISCHARGE DIAGNOSIS  Diagnosis: COPD (chronic obstructive pulmonary disease)  Assessment and Plan of Treatment: Advanced end stage COPD. At [facility] now

## 2019-07-11 ENCOUNTER — APPOINTMENT (OUTPATIENT)
Dept: GERIATRICS | Facility: HOME HEALTH | Age: 79
End: 2019-07-11
Payer: MEDICARE

## 2019-07-11 DIAGNOSIS — E78.5 HYPERLIPIDEMIA, UNSPECIFIED: ICD-10-CM

## 2019-07-11 DIAGNOSIS — N40.0 BENIGN PROSTATIC HYPERPLASIA WITHOUT LOWER URINARY TRACT SYMPTOMS: ICD-10-CM

## 2019-07-11 DIAGNOSIS — J43.9 EMPHYSEMA, UNSPECIFIED: ICD-10-CM

## 2019-07-11 DIAGNOSIS — G47.33 OBSTRUCTIVE SLEEP APNEA (ADULT) (PEDIATRIC): ICD-10-CM

## 2019-07-11 DIAGNOSIS — Z88.0 ALLERGY STATUS TO PENICILLIN: ICD-10-CM

## 2019-07-11 DIAGNOSIS — N18.3 CHRONIC KIDNEY DISEASE, STAGE 3 (MODERATE): ICD-10-CM

## 2019-07-11 DIAGNOSIS — J96.11 CHRONIC RESPIRATORY FAILURE WITH HYPOXIA: ICD-10-CM

## 2019-07-11 DIAGNOSIS — Z99.81 DEPENDENCE ON SUPPLEMENTAL OXYGEN: ICD-10-CM

## 2019-07-11 DIAGNOSIS — J96.12 CHRONIC RESPIRATORY FAILURE WITH HYPERCAPNIA: ICD-10-CM

## 2019-07-11 DIAGNOSIS — Z91.19 PATIENT'S NONCOMPLIANCE WITH OTHER MEDICAL TREATMENT AND REGIMEN: ICD-10-CM

## 2019-07-11 DIAGNOSIS — K21.9 GASTRO-ESOPHAGEAL REFLUX DISEASE WITHOUT ESOPHAGITIS: ICD-10-CM

## 2019-07-11 DIAGNOSIS — I50.9 HEART FAILURE, UNSPECIFIED: ICD-10-CM

## 2019-07-11 DIAGNOSIS — Z91.013 ALLERGY TO SEAFOOD: ICD-10-CM

## 2019-07-11 DIAGNOSIS — E87.5 HYPERKALEMIA: ICD-10-CM

## 2019-07-11 DIAGNOSIS — E66.01 MORBID (SEVERE) OBESITY DUE TO EXCESS CALORIES: ICD-10-CM

## 2019-07-11 DIAGNOSIS — Z91.09 OTHER ALLERGY STATUS, OTHER THAN TO DRUGS AND BIOLOGICAL SUBSTANCES: ICD-10-CM

## 2019-07-11 DIAGNOSIS — I13.0 HYPERTENSIVE HEART AND CHRONIC KIDNEY DISEASE WITH HEART FAILURE AND STAGE 1 THROUGH STAGE 4 CHRONIC KIDNEY DISEASE, OR UNSPECIFIED CHRONIC KIDNEY DISEASE: ICD-10-CM

## 2019-07-11 DIAGNOSIS — E11.22 TYPE 2 DIABETES MELLITUS WITH DIABETIC CHRONIC KIDNEY DISEASE: ICD-10-CM

## 2019-07-11 DIAGNOSIS — Z66 DO NOT RESUSCITATE: ICD-10-CM

## 2019-07-11 DIAGNOSIS — I27.20 PULMONARY HYPERTENSION, UNSPECIFIED: ICD-10-CM

## 2019-07-11 PROCEDURE — 99349 HOME/RES VST EST MOD MDM 40: CPT

## 2019-07-12 VITALS
OXYGEN SATURATION: 85 % | HEART RATE: 98 BPM | DIASTOLIC BLOOD PRESSURE: 72 MMHG | SYSTOLIC BLOOD PRESSURE: 112 MMHG | RESPIRATION RATE: 18 BRPM

## 2019-07-12 PROBLEM — G47.33 OSA (OBSTRUCTIVE SLEEP APNEA): Status: ACTIVE | Noted: 2019-07-12

## 2019-07-12 RX ORDER — FERROUS SULFATE 325(65) MG
325 TABLET ORAL
Refills: 0 | Status: ACTIVE | COMMUNITY

## 2019-07-18 NOTE — PHYSICAL EXAM
[General Appearance - Alert] : alert [General Appearance - In No Acute Distress] : in no acute distress VITAL SIGNS: I have reviewed nursing notes and confirm.  CONSTITUTIONAL: Well-developed; well-nourished; in no acute distress.  SKIN: Skin exam is warm and dry, no acute rash.  HEAD: Normocephalic; atraumatic.  EYES: PERRL, EOM intact; conjunctiva and sclera clear.  ENT: No nasal discharge; airway clear. Throat clear.  NECK: Supple; non tender.  No lymphadenopathy.  CARD: S1, S2 normal; no murmurs, gallops, or rubs. Regular rate and rhythm.  RESP: No wheezes,  no rales or rhonchi.   ABD: Normal bowel sounds; soft; non-distended; non-tender; no hepatosplenomegaly.  EXT: Normal ROM. No clubbing, cyanosis or edema.  NEURO: Alert, oriented. Grossly unremarkable. No focal deficits. no facial droop, moves all extremities,    PSYCH: Cooperative, appropriate. [General Appearance - Well Nourished] : well nourished [General Appearance - Well Developed] : well developed [Sclera] : the sclera and conjunctiva were normal [No Oral Cyanosis] : no oral cyanosis [Normal Oral Mucosa] : normal oral mucosa [No Oral Pallor] : no oral pallor [Examination Of The Oral Cavity] : the lips and gums were normal [Outer Ear] : the ears and nose were normal in appearance [Hearing Threshold Finger Rub Not Rock] : hearing was normal [Neck Cervical Mass (___cm)] : no neck mass was observed [Exaggerated Use Of Accessory Muscles For Inspiration] : no accessory muscle use [Heart Rate And Rhythm] : heart rate was normal and rhythm regular [Heart Sounds] : normal S1 and S2 [Abdomen Soft] : soft [Abdomen Tenderness] : non-tender [] : no rash [Involuntary Movements] : no involuntary movements were seen [Skin Lesions] : no skin lesions [Cranial Nerves] : cranial nerves 2-12 were intact [Impaired Insight] : insight and judgment were intact [Oriented To Time, Place, And Person] : oriented to person, place, and time [No Focal Deficits] : no focal deficits [FreeTextEntry1] : 2+ BLE pitting edema

## 2019-07-18 NOTE — HISTORY OF PRESENT ILLNESS
[FreeTextEntry1] : Patient seen and examined at home.  Patient is homebound.  HHA present during visit.  Patient recently discharged from [facility] 2/2 COPD exacerbation Acute on Chronic RF.  Patient discharged home on steroid taper and medications reconciled.  Patient reports SOB at baseline since discharge.  Patient was on 4l during visit and satting at 84%.  Appears comfortable.  No acute complaints.  Last K+ 5.1 on discharge 7/5, to be repeated 7/19 (q2week).  No acute complaints.

## 2019-07-18 NOTE — ASSESSMENT
[FreeTextEntry1] : COPD Exacerbation/P. HTN/MICHAEL/OHS\par - at baseline\par - complete steroid taper\par - c/w current inhalers\par - outpatient FU with Pulmonology\par - CPAP at HS\par - Supplemental o2 to maintain o2 sat 88-92%\par \par HTN\par - b/p acceptable\par - c/w Amlodipine 10mg PO QD\par - low Na+ diet\par \par DM2\par - b/s at baseline per patient\par - c/w Basal/Bolus insulin\par \par CKD 3 w/Hyperkalemia\par - monitor BMP q2week, next due 7/19\par - Kayexalate PRN\par - Ferrous Sulfate QD\par - c/w Supplements \par \par BPH\par - no UR\par - c/w Flomax/Finasteride\par \par \par Geriatrics - saw patient in hsopital; concur with current plan of care\par ADELINA Cohen MD\par \par

## 2019-07-23 ENCOUNTER — OUTPATIENT (OUTPATIENT)
Dept: OUTPATIENT SERVICES | Facility: HOSPITAL | Age: 79
LOS: 1 days | Discharge: HOME | End: 2019-07-23

## 2019-07-23 ENCOUNTER — LABORATORY RESULT (OUTPATIENT)
Age: 79
End: 2019-07-23

## 2019-07-23 DIAGNOSIS — K63.5 POLYP OF COLON: Chronic | ICD-10-CM

## 2019-07-23 DIAGNOSIS — Z98.890 OTHER SPECIFIED POSTPROCEDURAL STATES: Chronic | ICD-10-CM

## 2019-07-23 DIAGNOSIS — E78.5 HYPERLIPIDEMIA, UNSPECIFIED: ICD-10-CM

## 2019-07-24 NOTE — H&P ADULT - PROBLEM SELECTOR PROBLEM 1
Consultation Charge $ (Use Numbers Only, No Text Please.): 0 Detail Level: Detailed COPD (chronic obstructive pulmonary disease)

## 2019-07-25 DIAGNOSIS — G47.00 INSOMNIA, UNSPECIFIED: ICD-10-CM

## 2019-08-04 ENCOUNTER — INPATIENT (INPATIENT)
Facility: HOSPITAL | Age: 79
LOS: 5 days | Discharge: HOME HEALTH | End: 2019-08-10
Attending: HOSPITALIST | Admitting: HOSPITALIST
Payer: MEDICARE

## 2019-08-04 VITALS
OXYGEN SATURATION: 92 % | SYSTOLIC BLOOD PRESSURE: 124 MMHG | HEART RATE: 104 BPM | DIASTOLIC BLOOD PRESSURE: 59 MMHG | RESPIRATION RATE: 22 BRPM

## 2019-08-04 DIAGNOSIS — K63.5 POLYP OF COLON: Chronic | ICD-10-CM

## 2019-08-04 DIAGNOSIS — Z98.890 OTHER SPECIFIED POSTPROCEDURAL STATES: Chronic | ICD-10-CM

## 2019-08-04 LAB
BASE EXCESS BLDV CALC-SCNC: 5.4 MMOL/L — HIGH (ref -2–2)
CA-I SERPL-SCNC: 1.13 MMOL/L — SIGNIFICANT CHANGE UP (ref 1.12–1.3)
GAS PNL BLDV: 140 MMOL/L — SIGNIFICANT CHANGE UP (ref 136–145)
GAS PNL BLDV: SIGNIFICANT CHANGE UP
HCO3 BLDV-SCNC: 34 MMOL/L — HIGH (ref 22–29)
HCT VFR BLDA CALC: 43.1 % — SIGNIFICANT CHANGE UP (ref 34–44)
HGB BLD CALC-MCNC: 14 G/DL — SIGNIFICANT CHANGE UP (ref 14–18)
HOROWITZ INDEX BLDV+IHG-RTO: 21 — SIGNIFICANT CHANGE UP
LACTATE BLDV-MCNC: 1.9 MMOL/L — HIGH (ref 0.5–1.6)
PCO2 BLDV: 65 MMHG — HIGH (ref 41–51)
PH BLDV: 7.32 — SIGNIFICANT CHANGE UP (ref 7.26–7.43)
PO2 BLDV: 46 MMHG — HIGH (ref 20–40)
POTASSIUM BLDV-SCNC: 4.4 MMOL/L — SIGNIFICANT CHANGE UP (ref 3.3–5.6)
SAO2 % BLDV: 74 % — SIGNIFICANT CHANGE UP

## 2019-08-04 PROCEDURE — 99285 EMERGENCY DEPT VISIT HI MDM: CPT

## 2019-08-04 PROCEDURE — 71045 X-RAY EXAM CHEST 1 VIEW: CPT | Mod: 26

## 2019-08-04 RX ORDER — ALBUTEROL 90 UG/1
2.5 AEROSOL, METERED ORAL
Refills: 0 | Status: COMPLETED | OUTPATIENT
Start: 2019-08-04 | End: 2019-08-04

## 2019-08-04 RX ADMIN — ALBUTEROL 2.5 MILLIGRAM(S): 90 AEROSOL, METERED ORAL at 23:51

## 2019-08-04 RX ADMIN — Medication 125 MILLIGRAM(S): at 23:52

## 2019-08-04 RX ADMIN — ALBUTEROL 2.5 MILLIGRAM(S): 90 AEROSOL, METERED ORAL at 23:52

## 2019-08-05 DIAGNOSIS — K21.9 GASTRO-ESOPHAGEAL REFLUX DISEASE WITHOUT ESOPHAGITIS: ICD-10-CM

## 2019-08-05 DIAGNOSIS — E78.00 PURE HYPERCHOLESTEROLEMIA, UNSPECIFIED: ICD-10-CM

## 2019-08-05 DIAGNOSIS — J44.1 CHRONIC OBSTRUCTIVE PULMONARY DISEASE WITH (ACUTE) EXACERBATION: ICD-10-CM

## 2019-08-05 DIAGNOSIS — I10 ESSENTIAL (PRIMARY) HYPERTENSION: ICD-10-CM

## 2019-08-05 DIAGNOSIS — N40.0 BENIGN PROSTATIC HYPERPLASIA WITHOUT LOWER URINARY TRACT SYMPTOMS: ICD-10-CM

## 2019-08-05 DIAGNOSIS — E11.9 TYPE 2 DIABETES MELLITUS WITHOUT COMPLICATIONS: ICD-10-CM

## 2019-08-05 LAB
ALBUMIN SERPL ELPH-MCNC: 3.9 G/DL — SIGNIFICANT CHANGE UP (ref 3.5–5.2)
ALP SERPL-CCNC: 76 U/L — SIGNIFICANT CHANGE UP (ref 30–115)
ALT FLD-CCNC: 12 U/L — SIGNIFICANT CHANGE UP (ref 0–41)
ANION GAP SERPL CALC-SCNC: 12 MMOL/L — SIGNIFICANT CHANGE UP (ref 7–14)
APTT BLD: 35.9 SEC — SIGNIFICANT CHANGE UP (ref 27–39.2)
AST SERPL-CCNC: 9 U/L — SIGNIFICANT CHANGE UP (ref 0–41)
BASOPHILS # BLD AUTO: 0.06 K/UL — SIGNIFICANT CHANGE UP (ref 0–0.2)
BASOPHILS NFR BLD AUTO: 0.5 % — SIGNIFICANT CHANGE UP (ref 0–1)
BILIRUB SERPL-MCNC: 0.5 MG/DL — SIGNIFICANT CHANGE UP (ref 0.2–1.2)
BUN SERPL-MCNC: 39 MG/DL — HIGH (ref 10–20)
CALCIUM SERPL-MCNC: 9.1 MG/DL — SIGNIFICANT CHANGE UP (ref 8.5–10.1)
CHLORIDE SERPL-SCNC: 98 MMOL/L — SIGNIFICANT CHANGE UP (ref 98–110)
CK MB CFR SERPL CALC: 5 NG/ML — SIGNIFICANT CHANGE UP (ref 0.6–6.3)
CO2 SERPL-SCNC: 33 MMOL/L — HIGH (ref 17–32)
CREAT SERPL-MCNC: 2 MG/DL — HIGH (ref 0.7–1.5)
EOSINOPHIL # BLD AUTO: 0.05 K/UL — SIGNIFICANT CHANGE UP (ref 0–0.7)
EOSINOPHIL NFR BLD AUTO: 0.4 % — SIGNIFICANT CHANGE UP (ref 0–8)
GLUCOSE BLDC GLUCOMTR-MCNC: 193 MG/DL — HIGH (ref 70–99)
GLUCOSE BLDC GLUCOMTR-MCNC: 209 MG/DL — HIGH (ref 70–99)
GLUCOSE BLDC GLUCOMTR-MCNC: 328 MG/DL — HIGH (ref 70–99)
GLUCOSE BLDC GLUCOMTR-MCNC: 411 MG/DL — HIGH (ref 70–99)
GLUCOSE SERPL-MCNC: 223 MG/DL — HIGH (ref 70–99)
HCT VFR BLD CALC: 48 % — SIGNIFICANT CHANGE UP (ref 42–52)
HGB BLD-MCNC: 13.9 G/DL — LOW (ref 14–18)
IMM GRANULOCYTES NFR BLD AUTO: 1 % — HIGH (ref 0.1–0.3)
INR BLD: 1.17 RATIO — SIGNIFICANT CHANGE UP (ref 0.65–1.3)
LYMPHOCYTES # BLD AUTO: 0.84 K/UL — LOW (ref 1.2–3.4)
LYMPHOCYTES # BLD AUTO: 6.9 % — LOW (ref 20.5–51.1)
MCHC RBC-ENTMCNC: 22.8 PG — LOW (ref 27–31)
MCHC RBC-ENTMCNC: 29 G/DL — LOW (ref 32–37)
MCV RBC AUTO: 78.8 FL — LOW (ref 80–94)
MONOCYTES # BLD AUTO: 0.58 K/UL — SIGNIFICANT CHANGE UP (ref 0.1–0.6)
MONOCYTES NFR BLD AUTO: 4.8 % — SIGNIFICANT CHANGE UP (ref 1.7–9.3)
NEUTROPHILS # BLD AUTO: 10.54 K/UL — HIGH (ref 1.4–6.5)
NEUTROPHILS NFR BLD AUTO: 86.4 % — HIGH (ref 42.2–75.2)
NRBC # BLD: 0 /100 WBCS — SIGNIFICANT CHANGE UP (ref 0–0)
NT-PROBNP SERPL-SCNC: 1742 PG/ML — HIGH (ref 0–300)
PLATELET # BLD AUTO: 355 K/UL — SIGNIFICANT CHANGE UP (ref 130–400)
POTASSIUM SERPL-MCNC: 4.4 MMOL/L — SIGNIFICANT CHANGE UP (ref 3.5–5)
POTASSIUM SERPL-SCNC: 4.4 MMOL/L — SIGNIFICANT CHANGE UP (ref 3.5–5)
PROT SERPL-MCNC: 6.1 G/DL — SIGNIFICANT CHANGE UP (ref 6–8)
PROTHROM AB SERPL-ACNC: 13.4 SEC — HIGH (ref 9.95–12.87)
RBC # BLD: 6.09 M/UL — SIGNIFICANT CHANGE UP (ref 4.7–6.1)
RBC # FLD: 20.9 % — HIGH (ref 11.5–14.5)
SODIUM SERPL-SCNC: 143 MMOL/L — SIGNIFICANT CHANGE UP (ref 135–146)
TROPONIN T SERPL-MCNC: 0.02 NG/ML — HIGH
TROPONIN T SERPL-MCNC: 0.03 NG/ML — CRITICAL HIGH
WBC # BLD: 12.19 K/UL — HIGH (ref 4.8–10.8)
WBC # FLD AUTO: 12.19 K/UL — HIGH (ref 4.8–10.8)

## 2019-08-05 PROCEDURE — 99221 1ST HOSP IP/OBS SF/LOW 40: CPT

## 2019-08-05 RX ORDER — FINASTERIDE 5 MG/1
5 TABLET, FILM COATED ORAL DAILY
Refills: 0 | Status: DISCONTINUED | OUTPATIENT
Start: 2019-08-05 | End: 2019-08-10

## 2019-08-05 RX ORDER — ALBUTEROL 90 UG/1
2 AEROSOL, METERED ORAL EVERY 6 HOURS
Refills: 0 | Status: DISCONTINUED | OUTPATIENT
Start: 2019-08-05 | End: 2019-08-05

## 2019-08-05 RX ORDER — BUDESONIDE AND FORMOTEROL FUMARATE DIHYDRATE 160; 4.5 UG/1; UG/1
2 AEROSOL RESPIRATORY (INHALATION)
Refills: 0 | Status: DISCONTINUED | OUTPATIENT
Start: 2019-08-05 | End: 2019-08-10

## 2019-08-05 RX ORDER — ALBUTEROL 90 UG/1
1 AEROSOL, METERED ORAL
Refills: 0 | Status: DISCONTINUED | OUTPATIENT
Start: 2019-08-05 | End: 2019-08-05

## 2019-08-05 RX ORDER — METOPROLOL TARTRATE 50 MG
25 TABLET ORAL
Refills: 0 | Status: DISCONTINUED | OUTPATIENT
Start: 2019-08-05 | End: 2019-08-10

## 2019-08-05 RX ORDER — INSULIN LISPRO 100/ML
20 VIAL (ML) SUBCUTANEOUS
Refills: 0 | Status: DISCONTINUED | OUTPATIENT
Start: 2019-08-05 | End: 2019-08-10

## 2019-08-05 RX ORDER — GLUCAGON INJECTION, SOLUTION 0.5 MG/.1ML
1 INJECTION, SOLUTION SUBCUTANEOUS ONCE
Refills: 0 | Status: DISCONTINUED | OUTPATIENT
Start: 2019-08-05 | End: 2019-08-10

## 2019-08-05 RX ORDER — FERROUS SULFATE 325(65) MG
325 TABLET ORAL DAILY
Refills: 0 | Status: DISCONTINUED | OUTPATIENT
Start: 2019-08-05 | End: 2019-08-10

## 2019-08-05 RX ORDER — FUROSEMIDE 40 MG
20 TABLET ORAL DAILY
Refills: 0 | Status: DISCONTINUED | OUTPATIENT
Start: 2019-08-05 | End: 2019-08-05

## 2019-08-05 RX ORDER — PANTOPRAZOLE SODIUM 20 MG/1
40 TABLET, DELAYED RELEASE ORAL
Refills: 0 | Status: DISCONTINUED | OUTPATIENT
Start: 2019-08-05 | End: 2019-08-10

## 2019-08-05 RX ORDER — MAGNESIUM OXIDE 400 MG ORAL TABLET 241.3 MG
400 TABLET ORAL
Refills: 0 | Status: DISCONTINUED | OUTPATIENT
Start: 2019-08-05 | End: 2019-08-10

## 2019-08-05 RX ORDER — TAMSULOSIN HYDROCHLORIDE 0.4 MG/1
0.4 CAPSULE ORAL AT BEDTIME
Refills: 0 | Status: DISCONTINUED | OUTPATIENT
Start: 2019-08-05 | End: 2019-08-10

## 2019-08-05 RX ORDER — DEXTROSE 50 % IN WATER 50 %
15 SYRINGE (ML) INTRAVENOUS ONCE
Refills: 0 | Status: DISCONTINUED | OUTPATIENT
Start: 2019-08-05 | End: 2019-08-10

## 2019-08-05 RX ORDER — INSULIN GLARGINE 100 [IU]/ML
30 INJECTION, SOLUTION SUBCUTANEOUS EVERY MORNING
Refills: 0 | Status: DISCONTINUED | OUTPATIENT
Start: 2019-08-05 | End: 2019-08-10

## 2019-08-05 RX ORDER — ACETAMINOPHEN 500 MG
650 TABLET ORAL EVERY 6 HOURS
Refills: 0 | Status: DISCONTINUED | OUTPATIENT
Start: 2019-08-05 | End: 2019-08-10

## 2019-08-05 RX ORDER — TIOTROPIUM BROMIDE 18 UG/1
1 CAPSULE ORAL; RESPIRATORY (INHALATION) AT BEDTIME
Refills: 0 | Status: DISCONTINUED | OUTPATIENT
Start: 2019-08-05 | End: 2019-08-10

## 2019-08-05 RX ORDER — INSULIN LISPRO 100/ML
8 VIAL (ML) SUBCUTANEOUS ONCE
Refills: 0 | Status: COMPLETED | OUTPATIENT
Start: 2019-08-05 | End: 2019-08-05

## 2019-08-05 RX ORDER — IPRATROPIUM/ALBUTEROL SULFATE 18-103MCG
3 AEROSOL WITH ADAPTER (GRAM) INHALATION EVERY 6 HOURS
Refills: 0 | Status: DISCONTINUED | OUTPATIENT
Start: 2019-08-05 | End: 2019-08-10

## 2019-08-05 RX ORDER — DEXTROSE 50 % IN WATER 50 %
25 SYRINGE (ML) INTRAVENOUS ONCE
Refills: 0 | Status: DISCONTINUED | OUTPATIENT
Start: 2019-08-05 | End: 2019-08-10

## 2019-08-05 RX ORDER — DOCUSATE SODIUM 100 MG
100 CAPSULE ORAL THREE TIMES A DAY
Refills: 0 | Status: DISCONTINUED | OUTPATIENT
Start: 2019-08-05 | End: 2019-08-10

## 2019-08-05 RX ORDER — HEPARIN SODIUM 5000 [USP'U]/ML
5000 INJECTION INTRAVENOUS; SUBCUTANEOUS EVERY 12 HOURS
Refills: 0 | Status: DISCONTINUED | OUTPATIENT
Start: 2019-08-05 | End: 2019-08-10

## 2019-08-05 RX ORDER — CHOLECALCIFEROL (VITAMIN D3) 125 MCG
1000 CAPSULE ORAL DAILY
Refills: 0 | Status: DISCONTINUED | OUTPATIENT
Start: 2019-08-05 | End: 2019-08-10

## 2019-08-05 RX ORDER — FUROSEMIDE 40 MG
40 TABLET ORAL ONCE
Refills: 0 | Status: COMPLETED | OUTPATIENT
Start: 2019-08-05 | End: 2019-08-05

## 2019-08-05 RX ORDER — INSULIN LISPRO 100/ML
VIAL (ML) SUBCUTANEOUS
Refills: 0 | Status: DISCONTINUED | OUTPATIENT
Start: 2019-08-05 | End: 2019-08-10

## 2019-08-05 RX ORDER — DEXTROSE 50 % IN WATER 50 %
12.5 SYRINGE (ML) INTRAVENOUS ONCE
Refills: 0 | Status: DISCONTINUED | OUTPATIENT
Start: 2019-08-05 | End: 2019-08-10

## 2019-08-05 RX ORDER — SENNA PLUS 8.6 MG/1
2 TABLET ORAL AT BEDTIME
Refills: 0 | Status: DISCONTINUED | OUTPATIENT
Start: 2019-08-05 | End: 2019-08-10

## 2019-08-05 RX ORDER — AZITHROMYCIN 500 MG/1
500 TABLET, FILM COATED ORAL ONCE
Refills: 0 | Status: COMPLETED | OUTPATIENT
Start: 2019-08-05 | End: 2019-08-05

## 2019-08-05 RX ORDER — NYSTATIN CREAM 100000 [USP'U]/G
1 CREAM TOPICAL
Refills: 0 | Status: DISCONTINUED | OUTPATIENT
Start: 2019-08-05 | End: 2019-08-10

## 2019-08-05 RX ORDER — ATORVASTATIN CALCIUM 80 MG/1
20 TABLET, FILM COATED ORAL AT BEDTIME
Refills: 0 | Status: DISCONTINUED | OUTPATIENT
Start: 2019-08-05 | End: 2019-08-10

## 2019-08-05 RX ORDER — AZITHROMYCIN 500 MG/1
500 TABLET, FILM COATED ORAL EVERY 24 HOURS
Refills: 0 | Status: DISCONTINUED | OUTPATIENT
Start: 2019-08-05 | End: 2019-08-10

## 2019-08-05 RX ORDER — POLYETHYLENE GLYCOL 3350 17 G/17G
17 POWDER, FOR SOLUTION ORAL DAILY
Refills: 0 | Status: DISCONTINUED | OUTPATIENT
Start: 2019-08-05 | End: 2019-08-10

## 2019-08-05 RX ORDER — ASCORBIC ACID 60 MG
500 TABLET,CHEWABLE ORAL DAILY
Refills: 0 | Status: DISCONTINUED | OUTPATIENT
Start: 2019-08-05 | End: 2019-08-10

## 2019-08-05 RX ORDER — SODIUM POLYSTYRENE SULFONATE 4.1 MEQ/G
30 POWDER, FOR SUSPENSION ORAL THREE TIMES A DAY
Refills: 0 | Status: DISCONTINUED | OUTPATIENT
Start: 2019-08-05 | End: 2019-08-05

## 2019-08-05 RX ORDER — NYSTATIN 500MM UNIT
500000 POWDER (EA) MISCELLANEOUS
Refills: 0 | Status: DISCONTINUED | OUTPATIENT
Start: 2019-08-05 | End: 2019-08-10

## 2019-08-05 RX ORDER — GUAIFENESIN/DEXTROMETHORPHAN 600MG-30MG
10 TABLET, EXTENDED RELEASE 12 HR ORAL EVERY 6 HOURS
Refills: 0 | Status: DISCONTINUED | OUTPATIENT
Start: 2019-08-05 | End: 2019-08-10

## 2019-08-05 RX ORDER — SODIUM CHLORIDE 9 MG/ML
1000 INJECTION, SOLUTION INTRAVENOUS
Refills: 0 | Status: DISCONTINUED | OUTPATIENT
Start: 2019-08-05 | End: 2019-08-10

## 2019-08-05 RX ADMIN — MAGNESIUM OXIDE 400 MG ORAL TABLET 400 MILLIGRAM(S): 241.3 TABLET ORAL at 17:13

## 2019-08-05 RX ADMIN — ATORVASTATIN CALCIUM 20 MILLIGRAM(S): 80 TABLET, FILM COATED ORAL at 21:26

## 2019-08-05 RX ADMIN — Medication 3 MILLILITER(S): at 20:17

## 2019-08-05 RX ADMIN — Medication 8 UNIT(S): at 12:35

## 2019-08-05 RX ADMIN — TIOTROPIUM BROMIDE 1 CAPSULE(S): 18 CAPSULE ORAL; RESPIRATORY (INHALATION) at 22:57

## 2019-08-05 RX ADMIN — Medication 3 MILLILITER(S): at 13:21

## 2019-08-05 RX ADMIN — Medication 12: at 17:10

## 2019-08-05 RX ADMIN — Medication 40 MILLIGRAM(S): at 20:37

## 2019-08-05 RX ADMIN — Medication 500000 UNIT(S): at 17:13

## 2019-08-05 RX ADMIN — Medication 100 MILLIGRAM(S): at 21:26

## 2019-08-05 RX ADMIN — Medication 40 MILLIGRAM(S): at 12:10

## 2019-08-05 RX ADMIN — FINASTERIDE 5 MILLIGRAM(S): 5 TABLET, FILM COATED ORAL at 12:10

## 2019-08-05 RX ADMIN — Medication 100 MILLIGRAM(S): at 15:00

## 2019-08-05 RX ADMIN — Medication 500 MILLIGRAM(S): at 12:10

## 2019-08-05 RX ADMIN — Medication 25 MILLIGRAM(S): at 17:13

## 2019-08-05 RX ADMIN — Medication 40 MILLIGRAM(S): at 17:17

## 2019-08-05 RX ADMIN — BUDESONIDE AND FORMOTEROL FUMARATE DIHYDRATE 2 PUFF(S): 160; 4.5 AEROSOL RESPIRATORY (INHALATION) at 08:19

## 2019-08-05 RX ADMIN — Medication 325 MILLIGRAM(S): at 12:10

## 2019-08-05 RX ADMIN — BUDESONIDE AND FORMOTEROL FUMARATE DIHYDRATE 2 PUFF(S): 160; 4.5 AEROSOL RESPIRATORY (INHALATION) at 20:38

## 2019-08-05 RX ADMIN — ALBUTEROL 1 PUFF(S): 90 AEROSOL, METERED ORAL at 08:21

## 2019-08-05 RX ADMIN — Medication 1000 UNIT(S): at 12:10

## 2019-08-05 RX ADMIN — AZITHROMYCIN 255 MILLIGRAM(S): 500 TABLET, FILM COATED ORAL at 02:17

## 2019-08-05 RX ADMIN — NYSTATIN CREAM 1 APPLICATION(S): 100000 CREAM TOPICAL at 17:13

## 2019-08-05 RX ADMIN — TAMSULOSIN HYDROCHLORIDE 0.4 MILLIGRAM(S): 0.4 CAPSULE ORAL at 21:26

## 2019-08-05 RX ADMIN — SENNA PLUS 2 TABLET(S): 8.6 TABLET ORAL at 21:26

## 2019-08-05 RX ADMIN — Medication 20 MILLIGRAM(S): at 15:00

## 2019-08-05 RX ADMIN — HEPARIN SODIUM 5000 UNIT(S): 5000 INJECTION INTRAVENOUS; SUBCUTANEOUS at 17:12

## 2019-08-05 RX ADMIN — Medication 20 UNIT(S): at 17:10

## 2019-08-05 RX ADMIN — NYSTATIN CREAM 1 APPLICATION(S): 100000 CREAM TOPICAL at 06:53

## 2019-08-05 RX ADMIN — MAGNESIUM OXIDE 400 MG ORAL TABLET 400 MILLIGRAM(S): 241.3 TABLET ORAL at 09:09

## 2019-08-05 RX ADMIN — MAGNESIUM OXIDE 400 MG ORAL TABLET 400 MILLIGRAM(S): 241.3 TABLET ORAL at 12:12

## 2019-08-05 RX ADMIN — INSULIN GLARGINE 30 UNIT(S): 100 INJECTION, SOLUTION SUBCUTANEOUS at 15:01

## 2019-08-05 NOTE — PHYSICAL THERAPY INITIAL EVALUATION ADULT - GAIT DEVIATIONS NOTED, PT EVAL
increased time in double stance/decreased weight-shifting ability/decreased matthew/decreased step length

## 2019-08-05 NOTE — ED PROVIDER NOTE - NSTIMEPROVIDERCAREINITIATE_GEN_ER
Refill request for:     Disp Refills Start End    buPROPion (WELLBUTRIN XL) 150 MG 24 hr tablet 30 tablet 5 9/6/2018     Sig - Route: Take 1 tablet by mouth daily. - Oral    Sent to pharmacy as: BuPROPion HCl ER (XL) 150 MG Oral Tablet Extended Release 24 Hour    Class: Eprescribe        LOV:  9/06/18  Upcoming appointment:  none  Last Rx: see above    *Pt is requesting 90 tablets with 4 refills  Routed to Dr. Katz   04-Aug-2019 22:56

## 2019-08-05 NOTE — ED PROVIDER NOTE - OBJECTIVE STATEMENT
79 yo male hx of COPD on Home O2 (4-5L NC)/ HTN/HLD/DM BIBA 2/2 SOB. as per patient, he didn't feel well today and felt a trouble to breath (need to use much more effort to breath) so he finally called EMS to bring him to ED for evaluation. tried nebulizer and BiPAP at home but didn't improve. denies fever/chill/productive coughing/chest pain/abd pain/n/v/d.

## 2019-08-05 NOTE — CONSULT NOTE ADULT - ASSESSMENT
78 yr M ,DNR/DNI ,with multiple co morbidities, COPD on home O2 4-5 L ,MICHAEL on CPAP ,Chronic respiratory failure and sees Dr Cohen (pmd and home visit)  and Dr Castellano (pulm) .  pt has spent 4 out of last 6 months in hospital ,and being here multiple times with copd exacerbation ,was asked to get evaluated by Hospice by Dr Castrejon  last visit too .Pt is known to [facility] ,being seen by Dr Cohen and on home visits program .   but pt dont want to be assessed by palliative or hospice team rather looking for cardio/pulm rehab .      #COPD execration /acute on chronic hypercapnic and hypoxic respiratory faliure/ MICHAEL on CPAP /OHS? pulm HTN ? ;  pt has frequent hospitalization with COPD ex   hospice candidate as per Dr Castrejon   will recommend supportive care 78 yr M ,DNR/DNI ,with multiple co morbidities, COPD on home O2 4-5 L ,MICHAEL on CPAP ,Chronic respiratory failure and sees Dr Cohen (pmd and home visit)  and Dr Castellano (pulm) .  pt has spent 4 out of last 6 months in hospital ,and being here multiple times with copd exacerbation ,was asked to get evaluated by Hospice by Dr Castrejon  last visit too .Pt is known to [facility] team ,being seen by Dr Cohen and on home visits program .   but pt dont want to be assessed by palliative or hospice team rather looking for cardio/pulm rehab .      #COPD execration /acute on chronic hypercapnic and hypoxic respiratory faliure/ MICHAEL on CPAP /OHS? pulm HTN ? ;  pt has frequent hospitalization with COPD ex   hospice candidate as per Dr Castrejon   will recommend supportive care, will send with the action plan and give rescue prednisone  agreed for telemedicine program

## 2019-08-05 NOTE — PHYSICAL THERAPY INITIAL EVALUATION ADULT - IMPAIRMENTS FOUND, PT EVAL
aerobic capacity/endurance/ergonomics and body mechanics/gait, locomotion, and balance/muscle strength/posture

## 2019-08-05 NOTE — ED PROVIDER NOTE - PHYSICAL EXAMINATION
CONSTITUTIONAL: Chronic ill-appearing; mild respiratory distress.   EYES: PERRL; EOM intact.   ENT: normal nose; no rhinorrhea; normal pharynx with no tonsillar hypertrophy. Dry tongue. + white plaque on tongue. ? Thrush  CARDIOVASCULAR: Normal S1, S2; no murmurs, rubs, or gallops.   RESPIRATORY: Diffuse b/l wheezing with poor air movement. RR - 24 with use of accessory muscles.   GI/: Normal bowel sounds; non-distended; non-tender; no palpable organomegaly.   MS: Trace edema to b/l LE.   SKIN: Normal for age and race; warm; dry; good turgor; no apparent lesions or exudate.   NEURO/PSYCH: A & O x 4; grossly unremarkable.

## 2019-08-05 NOTE — ED PROVIDER NOTE - PROGRESS NOTE DETAILS
licha - s/o to Dr. Alfred, pending labs patient clinically improved. D/C BiPAP mode and patient wants to stay on CPAP. Breath sound increased. no more accessory muscles use. son at bedside and report patient was cyanotic and SaO2 was down to 58%. will admit for further monitoring and evaluation. Patient's baseline SaO2 - 80-90%

## 2019-08-05 NOTE — H&P ADULT - NSHPREVIEWOFSYSTEMS_GEN_ALL_CORE
malaise  	Eyes: no redness/discharge/pain/vision changes  	ENT: no rhinorrhea/ear pain/sore throat  	Cardiac: No chest pain, SOB or edema.  	Respiratory: see HPI  	GI: No nausea, vomiting, diarrhea or abdominal pain.  	: No dysuria, frequency, urgency or hematuria  	MS: no pain to back or extremities, no loss of ROM, no weakness  	Neuro: No headache or weakness. No LOC.  	Skin: No skin rash.

## 2019-08-05 NOTE — H&P ADULT - NSICDXPASTMEDICALHX_GEN_ALL_CORE_FT
PAST MEDICAL HISTORY:  Colon polyp claims it was malignant    COPD (chronic obstructive pulmonary disease)     Diabetes mellitus, type 2     Emphysema lung     Enlarged prostate     GERD (gastroesophageal reflux disease)     High cholesterol     Hypertension     Oxygen dependent V-Y Flap Text: The defect edges were debeveled with a #15 scalpel blade.  Given the location of the defect, shape of the defect and the proximity to free margins a V-Y flap was deemed most appropriate.  Using a sterile surgical marker, an appropriate advancement flap was drawn incorporating the defect and placing the expected incisions within the relaxed skin tension lines where possible.    The area thus outlined was incised deep to adipose tissue with a #15 scalpel blade.  The skin margins were undermined to an appropriate distance in all directions utilizing iris scissors.

## 2019-08-05 NOTE — H&P ADULT - HISTORY OF PRESENT ILLNESS
· Chief Complaint: The patient is a 78y Male complaining of shortness of breath.	  · HPI Objective Statement: 79 yo male hx of COPD on Home O2 (4-5L NC)/ HTN/HLD/DM BIBA 2/2 SOB. as per patient, he didn't feel well today and felt a trouble to breath (need to use much more effort to breath) so he finally called EMS to bring him to ED for evaluation. tried nebulizer and BiPAP at home but didn't improve. denies fever/chill/productive coughing/chest pain/abd pain/n/v/d.

## 2019-08-05 NOTE — ED PROVIDER NOTE - ATTENDING CONTRIBUTION TO CARE
I personally evaluated the patient. I reviewed the Resident’s or Physician Assistant’s note (as assigned above), and agree with the findings and plan except as documented in my note.    79yo M with PMHx End stage COPD on 4-5L home O2, DNR/DNI, pulm HTN, DM, MICHAEL on CPAP, CKD 3, p/w worsening SOB since yesterday. Chest tightness, dry cough. No fever. Denies chest pain. Denies headache, lightheadedness, nausea, vomiting, diarrhea, abd pain. Has baseline mild leg swelling, not worse than baseline today. Has multiple admissions for COPD/acute on chronic hypoxic respiratory failure, last admission was last month.     Vital signs reviewed  GENERAL: Patient in moderate respiratory distress  HEAD: NCAT  EYES: Anicteric  ENT: Slightly dry MM  NECK: Supple, non tender  RESPIRATORY: Increased respiratory effort, mild retractions, decreased air entry B/L, speaking medium length sentences, +wheezing.   CARDIOVASCULAR: Slightly tachycardic.   ABDOMEN: Soft. Nondistended. Obese. Nontender. No guarding or rebound. No CVA tenderness.  MUSCULOSKELETAL/EXTREMITIES: Brisk cap refill. Equal radial pulses bilaterally. Minimal leg edema.  SKIN:  Warm and dry. Ecchymoses on abdomen  NEURO: AAOx3. No gross FND.    Labs, CXR, EKG, Bipap, Nebs, Steroids, reassess

## 2019-08-05 NOTE — H&P ADULT - ASSESSMENT
Patient is a 78y old  Male who presents with a chief complaint of                                                                                                                                                                                                                                                                                      HEALTH ISSUES - PROBLEM Dx:  Hypertension: Hypertension  Diabetes mellitus, type 2: Diabetes mellitus, type 2  Enlarged prostate: Enlarged prostate  GERD (gastroesophageal reflux disease): GERD (gastroesophageal reflux disease)  High cholesterol: High cholesterol  COPD exacerbation: COPD exacerbation

## 2019-08-05 NOTE — CONSULT NOTE ADULT - SUBJECTIVE AND OBJECTIVE BOX
HPI:  · Chief Complaint: The patient is a 78y Male complaining of shortness of breath.	  · HPI Objective Statement: 77 yo male hx of COPD on Home O2 (4-5L NC)/ HTN/HLD/DM BIBA 2/2 SOB. as per patient, he didn't feel well today and felt a trouble to breath (need to use much more effort to breath) so he finally called EMS to bring him to ED for evaluation. tried nebulizer and BiPAP at home but didn't improve. denies fever/chill/productive coughing/chest pain/abd pain/n/v/d. (05 Aug 2019 03:12)    [facility];    pt is 78 yr M ,DNR/DNI ,with multiple co morbidities, COPD on home O2 4-5 L ,MICHAEL on CPAP ,Chronic respiratory failure and sees Dr Cohen (pmd and home visit)  and Dr Castellano (pulm) .  pt has spent 4 out of last 6 months in hospital ,and being here multiple times with copd exacerbation ,was asked to get evaluated by Hospice by Dr Castrejon  last visit too .Pt is known to [facility] team ,being seen by Dr Cohen and on home visits program .   but pt dont want to be assessed by palliative or hospice team rather looking for cardio/pulm rehab .  Today ,he is still on hi flow and sitting on chair .Said he is feeling better     PAST MEDICAL & SURGICAL HISTORY  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  High cholesterol  GERD (gastroesophageal reflux disease)  Enlarged prostate  Oxygen dependent  COPD (chronic obstructive pulmonary disease)  Diabetes mellitus, type 2  Hypertension  Emphysema lung  History of hemorrhoidectomy  Dysplastic colon polyp: removed    SOCIAL HISTORY:  ex smoker ,quit 8 yrs ago    Home Medications:  aluminum hydroxide-magnesium hydroxide 200 mg-200 mg/5 mL oral suspension: 30 milliliter(s) orally every 4 hours, As needed, Dyspepsia (05 Aug 2019 14:13)  ascorbic acid 500 mg oral tablet: 1 tab(s) orally once a day (05 Aug 2019 14:13)  atorvastatin 20 mg oral tablet: 1 tab(s) orally once a day (at bedtime) (05 Aug 2019 14:13)  cholecalciferol oral tablet: 1000 unit(s) orally once a day (05 Aug 2019 14:13)  docusate sodium 100 mg oral capsule: 1 cap(s) orally 3 times a day (05 Aug 2019 14:13)  finasteride 5 mg oral tablet: 1 tab(s) orally once a day (05 Aug 2019 14:13)  guaifenesin-dextromethorphan 100 mg-10 mg/5 mL oral liquid: 10 milliliter(s) orally every 6 hours, As needed, Cough (05 Aug 2019 14:13)  magnesium oxide 400 mg (241.3 mg elemental magnesium) oral tablet: 1 tab(s) orally 3 times a day (with meals) (05 Aug 2019 14:13)  metoprolol tartrate 25 mg oral tablet: 1 tab(s) orally 2 times a day (05 Aug 2019 14:13)  nystatin 100,000 units/g topical powder: 1 application topically 2 times a day (05 Aug 2019 14:13)  ocular lubricant ophthalmic solution: 1 drop(s) to each affected eye 3 times a day (05 Aug 2019 14:13)  polyethylene glycol 3350 oral powder for reconstitution: 17 gram(s) orally once a day (05 Aug 2019 14:13)  senna oral tablet: 2 tab(s) orally once a day (at bedtime) (05 Aug 2019 14:13)  sodium polystyrene sulfonate: 30 milligram(s) orally 3 times a week (05 Aug 2019 14:13)  tamsulosin 0.4 mg oral capsule: 1 cap(s) orally once a day (at bedtime) (05 Aug 2019 14:13)    ALLERGIES:  fish (Other)  iodine (Hives)  penicillin (Unknown)  shellfish (Other)    MEDICATIONS:  STANDING MEDICATIONS  ALBUTerol/ipratropium for Nebulization 3 milliLiter(s) Nebulizer every 6 hours  ascorbic acid 500 milliGRAM(s) Oral daily  atorvastatin 20 milliGRAM(s) Oral at bedtime  azithromycin  IVPB 500 milliGRAM(s) IV Intermittent every 24 hours  buDESOnide 160 MICROgram(s)/formoterol 4.5 MICROgram(s) Inhaler 2 Puff(s) Inhalation two times a day  cholecalciferol 1000 Unit(s) Oral daily  dextrose 5%. 1000 milliLiter(s) IV Continuous <Continuous>  dextrose 50% Injectable 12.5 Gram(s) IV Push once  dextrose 50% Injectable 25 Gram(s) IV Push once  dextrose 50% Injectable 25 Gram(s) IV Push once  docusate sodium 100 milliGRAM(s) Oral three times a day  ferrous    sulfate 325 milliGRAM(s) Oral daily  finasteride 5 milliGRAM(s) Oral daily  furosemide    Tablet 20 milliGRAM(s) Oral daily  heparin  Injectable 5000 Unit(s) SubCutaneous every 12 hours  insulin glargine Injectable (LANTUS) 30 Unit(s) SubCutaneous every morning  insulin lispro (HumaLOG) corrective regimen sliding scale   SubCutaneous three times a day before meals  insulin lispro Injectable (HumaLOG) 20 Unit(s) SubCutaneous three times a day before meals  magnesium oxide 400 milliGRAM(s) Oral three times a day with meals  methylPREDNISolone sodium succinate Injectable 40 milliGRAM(s) IV Push every 8 hours  metoprolol tartrate 25 milliGRAM(s) Oral two times a day  nystatin    Suspension 200770 Unit(s) Oral two times a day  nystatin Powder 1 Application(s) Topical two times a day  pantoprazole    Tablet 40 milliGRAM(s) Oral before breakfast  polyethylene glycol 3350 17 Gram(s) Oral daily  senna 2 Tablet(s) Oral at bedtime  tamsulosin 0.4 milliGRAM(s) Oral at bedtime  tiotropium 18 MICROgram(s) Capsule 1 Capsule(s) Inhalation at bedtime    PRN MEDICATIONS  acetaminophen   Tablet .. 650 milliGRAM(s) Oral every 6 hours PRN  aluminum hydroxide/magnesium hydroxide/simethicone Suspension 30 milliLiter(s) Oral every 4 hours PRN  dextrose 40% Gel 15 Gram(s) Oral once PRN  glucagon  Injectable 1 milliGRAM(s) IntraMuscular once PRN  guaiFENesin/dextromethorphan  Syrup 10 milliLiter(s) Oral every 6 hours PRN    VITALS:   T(F): 97.1  HR: 109  BP: 140/82  RR: 16  SpO2: 90%    LABS:                        13.9   12.19 )-----------( 355      ( 04 Aug 2019 23:30 )             48.0     08-05    143  |  98  |  39<H>  ----------------------------<  223<H>  4.4   |  33<H>  |  2.0<H>    Ca    9.1      05 Aug 2019 00:45    TPro  6.1  /  Alb  3.9  /  TBili  0.5  /  DBili  x   /  AST  9   /  ALT  12  /  AlkPhos  76  08-05    PT/INR - ( 05 Aug 2019 00:45 )   PT: 13.40 sec;   INR: 1.17 ratio         PTT - ( 05 Aug 2019 00:45 )  PTT:35.9 sec      Troponin T, Serum: 0.03 ng/mL <HH> (08-05-19 @ 00:45)      CARDIAC MARKERS ( 05 Aug 2019 00:45 )  x     / 0.03 ng/mL / x     / x     / x          RADIOLOGY:    PHYSICAL EXAM:  GEN: obese ,with hi flow nasal canula   HEENT: wide neck  LUNGS: decrease breath sounds on  auscultation bilaterally   HEART: S1/S2 present.   ABD: Soft, non-tender, non-distended. Bowel sounds present  EXT: ++ LE edema   NEURO: AAOX3  non focal

## 2019-08-05 NOTE — CHART NOTE - NSCHARTNOTEFT_GEN_A_CORE
Upon Nutritional Assessment by the Registered Dietitian your patient was determined to meet criteria / has evidence of the following diagnosis/diagnoses:          [x] Morbid Obesity / BMI > 40      Findings as based on:  •  Anthropometric data    Ht: 172.72 cm.  Wt: 119.5 kg.  BMI 40.1    PROVIDER Section:     By signing this assessment you are acknowledging and agree with the diagnosis/diagnoses assigned by the Registered Dietitian    Comments:

## 2019-08-05 NOTE — H&P ADULT - NSHPLABSRESULTS_GEN_ALL_CORE
13.9   12.19 )-----------( 355      ( 04 Aug 2019 23:30 )             48.0     08-05    143  |  98  |  39<H>  ----------------------------<  223<H>  4.4   |  33<H>  |  2.0<H>    Ca    9.1      05 Aug 2019 00:45    TPro  6.1  /  Alb  3.9  /  TBili  0.5  /  DBili  x   /  AST  9   /  ALT  12  /  AlkPhos  76  08-05            PT/INR - ( 05 Aug 2019 00:45 )   PT: 13.40 sec;   INR: 1.17 ratio         PTT - ( 05 Aug 2019 00:45 )  PTT:35.9 sec  Lactate Trend    CARDIAC MARKERS ( 05 Aug 2019 00:45 )  x     / 0.03 ng/mL / x     / x     / x          CAPILLARY BLOOD GLUCOSE

## 2019-08-05 NOTE — PHYSICAL THERAPY INITIAL EVALUATION ADULT - GENERAL OBSERVATIONS, REHAB EVAL
15:05 - 15:38. Chart reviewed. Patient available to be seen for physical therapy, confirmed with nurse. Patient encountered already out of bed in chair, +high flow O2 via nasal cannula.  Denies pain at rest, agreeable for PT evaluation.

## 2019-08-06 ENCOUNTER — LABORATORY RESULT (OUTPATIENT)
Age: 79
End: 2019-08-06

## 2019-08-06 ENCOUNTER — OUTPATIENT (OUTPATIENT)
Dept: OUTPATIENT SERVICES | Facility: HOSPITAL | Age: 79
LOS: 1 days | Discharge: HOME | End: 2019-08-06

## 2019-08-06 DIAGNOSIS — K63.5 POLYP OF COLON: Chronic | ICD-10-CM

## 2019-08-06 DIAGNOSIS — Z98.890 OTHER SPECIFIED POSTPROCEDURAL STATES: Chronic | ICD-10-CM

## 2019-08-06 DIAGNOSIS — R79.89 OTHER SPECIFIED ABNORMAL FINDINGS OF BLOOD CHEMISTRY: ICD-10-CM

## 2019-08-06 LAB
ALBUMIN SERPL ELPH-MCNC: 4 G/DL — SIGNIFICANT CHANGE UP (ref 3.5–5.2)
ALP SERPL-CCNC: 78 U/L — SIGNIFICANT CHANGE UP (ref 30–115)
ALT FLD-CCNC: 14 U/L — SIGNIFICANT CHANGE UP (ref 0–41)
ANION GAP SERPL CALC-SCNC: 12 MMOL/L — SIGNIFICANT CHANGE UP (ref 7–14)
AST SERPL-CCNC: 11 U/L — SIGNIFICANT CHANGE UP (ref 0–41)
BASOPHILS # BLD AUTO: 0.03 K/UL — SIGNIFICANT CHANGE UP (ref 0–0.2)
BASOPHILS NFR BLD AUTO: 0.2 % — SIGNIFICANT CHANGE UP (ref 0–1)
BILIRUB SERPL-MCNC: 0.5 MG/DL — SIGNIFICANT CHANGE UP (ref 0.2–1.2)
BUN SERPL-MCNC: 57 MG/DL — HIGH (ref 10–20)
CALCIUM SERPL-MCNC: 9.3 MG/DL — SIGNIFICANT CHANGE UP (ref 8.5–10.1)
CHLORIDE SERPL-SCNC: 96 MMOL/L — LOW (ref 98–110)
CK MB CFR SERPL CALC: 4.2 NG/ML — SIGNIFICANT CHANGE UP (ref 0.6–6.3)
CO2 SERPL-SCNC: 33 MMOL/L — HIGH (ref 17–32)
CREAT SERPL-MCNC: 2.2 MG/DL — HIGH (ref 0.7–1.5)
EOSINOPHIL # BLD AUTO: 0 K/UL — SIGNIFICANT CHANGE UP (ref 0–0.7)
EOSINOPHIL NFR BLD AUTO: 0 % — SIGNIFICANT CHANGE UP (ref 0–8)
ESTIMATED AVERAGE GLUCOSE: 194 MG/DL — HIGH (ref 68–114)
GLUCOSE BLDC GLUCOMTR-MCNC: 146 MG/DL — HIGH (ref 70–99)
GLUCOSE BLDC GLUCOMTR-MCNC: 162 MG/DL — HIGH (ref 70–99)
GLUCOSE BLDC GLUCOMTR-MCNC: 219 MG/DL — HIGH (ref 70–99)
GLUCOSE BLDC GLUCOMTR-MCNC: 265 MG/DL — HIGH (ref 70–99)
GLUCOSE SERPL-MCNC: 232 MG/DL — HIGH (ref 70–99)
HBA1C BLD-MCNC: 8.4 % — HIGH (ref 4–5.6)
HCT VFR BLD CALC: 46.7 % — SIGNIFICANT CHANGE UP (ref 42–52)
HGB BLD-MCNC: 13.7 G/DL — LOW (ref 14–18)
IMM GRANULOCYTES NFR BLD AUTO: 2.3 % — HIGH (ref 0.1–0.3)
LYMPHOCYTES # BLD AUTO: 0.59 K/UL — LOW (ref 1.2–3.4)
LYMPHOCYTES # BLD AUTO: 4.2 % — LOW (ref 20.5–51.1)
MAGNESIUM SERPL-MCNC: 2.2 MG/DL — SIGNIFICANT CHANGE UP (ref 1.8–2.4)
MCHC RBC-ENTMCNC: 22.7 PG — LOW (ref 27–31)
MCHC RBC-ENTMCNC: 29.3 G/DL — LOW (ref 32–37)
MCV RBC AUTO: 77.4 FL — LOW (ref 80–94)
MONOCYTES # BLD AUTO: 0.66 K/UL — HIGH (ref 0.1–0.6)
MONOCYTES NFR BLD AUTO: 4.7 % — SIGNIFICANT CHANGE UP (ref 1.7–9.3)
NEUTROPHILS # BLD AUTO: 12.48 K/UL — HIGH (ref 1.4–6.5)
NEUTROPHILS NFR BLD AUTO: 88.6 % — HIGH (ref 42.2–75.2)
NRBC # BLD: 0 /100 WBCS — SIGNIFICANT CHANGE UP (ref 0–0)
PHOSPHATE SERPL-MCNC: 5.7 MG/DL — HIGH (ref 2.1–4.9)
PLATELET # BLD AUTO: 438 K/UL — HIGH (ref 130–400)
POTASSIUM SERPL-MCNC: 5.5 MMOL/L — HIGH (ref 3.5–5)
POTASSIUM SERPL-SCNC: 5.5 MMOL/L — HIGH (ref 3.5–5)
PROT SERPL-MCNC: 6.5 G/DL — SIGNIFICANT CHANGE UP (ref 6–8)
RBC # BLD: 6.03 M/UL — SIGNIFICANT CHANGE UP (ref 4.7–6.1)
RBC # FLD: 20.3 % — HIGH (ref 11.5–14.5)
SODIUM SERPL-SCNC: 141 MMOL/L — SIGNIFICANT CHANGE UP (ref 135–146)
TROPONIN T SERPL-MCNC: 0.02 NG/ML — HIGH
WBC # BLD: 14.08 K/UL — HIGH (ref 4.8–10.8)
WBC # FLD AUTO: 14.08 K/UL — HIGH (ref 4.8–10.8)

## 2019-08-06 PROCEDURE — 99233 SBSQ HOSP IP/OBS HIGH 50: CPT

## 2019-08-06 RX ORDER — FUROSEMIDE 40 MG
40 TABLET ORAL EVERY 12 HOURS
Refills: 0 | Status: DISCONTINUED | OUTPATIENT
Start: 2019-08-06 | End: 2019-08-08

## 2019-08-06 RX ADMIN — INSULIN GLARGINE 30 UNIT(S): 100 INJECTION, SOLUTION SUBCUTANEOUS at 07:45

## 2019-08-06 RX ADMIN — HEPARIN SODIUM 5000 UNIT(S): 5000 INJECTION INTRAVENOUS; SUBCUTANEOUS at 17:12

## 2019-08-06 RX ADMIN — Medication 20 UNIT(S): at 11:25

## 2019-08-06 RX ADMIN — PANTOPRAZOLE SODIUM 40 MILLIGRAM(S): 20 TABLET, DELAYED RELEASE ORAL at 06:43

## 2019-08-06 RX ADMIN — ATORVASTATIN CALCIUM 20 MILLIGRAM(S): 80 TABLET, FILM COATED ORAL at 21:51

## 2019-08-06 RX ADMIN — Medication 4: at 07:43

## 2019-08-06 RX ADMIN — SENNA PLUS 2 TABLET(S): 8.6 TABLET ORAL at 21:52

## 2019-08-06 RX ADMIN — Medication 100 MILLIGRAM(S): at 21:51

## 2019-08-06 RX ADMIN — Medication 40 MILLIGRAM(S): at 18:01

## 2019-08-06 RX ADMIN — Medication 20 UNIT(S): at 17:11

## 2019-08-06 RX ADMIN — Medication 10 MILLIGRAM(S): at 21:51

## 2019-08-06 RX ADMIN — MAGNESIUM OXIDE 400 MG ORAL TABLET 400 MILLIGRAM(S): 241.3 TABLET ORAL at 07:46

## 2019-08-06 RX ADMIN — Medication 500000 UNIT(S): at 18:02

## 2019-08-06 RX ADMIN — MAGNESIUM OXIDE 400 MG ORAL TABLET 400 MILLIGRAM(S): 241.3 TABLET ORAL at 18:01

## 2019-08-06 RX ADMIN — Medication 10 MILLILITER(S): at 18:02

## 2019-08-06 RX ADMIN — Medication 3 MILLILITER(S): at 14:40

## 2019-08-06 RX ADMIN — BUDESONIDE AND FORMOTEROL FUMARATE DIHYDRATE 2 PUFF(S): 160; 4.5 AEROSOL RESPIRATORY (INHALATION) at 07:43

## 2019-08-06 RX ADMIN — Medication 6: at 11:25

## 2019-08-06 RX ADMIN — HEPARIN SODIUM 5000 UNIT(S): 5000 INJECTION INTRAVENOUS; SUBCUTANEOUS at 06:43

## 2019-08-06 RX ADMIN — Medication 25 MILLIGRAM(S): at 18:01

## 2019-08-06 RX ADMIN — Medication 40 MILLIGRAM(S): at 18:02

## 2019-08-06 RX ADMIN — TAMSULOSIN HYDROCHLORIDE 0.4 MILLIGRAM(S): 0.4 CAPSULE ORAL at 21:51

## 2019-08-06 RX ADMIN — Medication 40 MILLIGRAM(S): at 06:43

## 2019-08-06 RX ADMIN — FINASTERIDE 5 MILLIGRAM(S): 5 TABLET, FILM COATED ORAL at 11:13

## 2019-08-06 RX ADMIN — Medication 500000 UNIT(S): at 06:43

## 2019-08-06 RX ADMIN — AZITHROMYCIN 255 MILLIGRAM(S): 500 TABLET, FILM COATED ORAL at 06:58

## 2019-08-06 RX ADMIN — NYSTATIN CREAM 1 APPLICATION(S): 100000 CREAM TOPICAL at 06:44

## 2019-08-06 RX ADMIN — Medication 3 MILLILITER(S): at 06:57

## 2019-08-06 RX ADMIN — Medication 500 MILLIGRAM(S): at 11:13

## 2019-08-06 RX ADMIN — MAGNESIUM OXIDE 400 MG ORAL TABLET 400 MILLIGRAM(S): 241.3 TABLET ORAL at 13:49

## 2019-08-06 RX ADMIN — Medication 100 MILLIGRAM(S): at 06:43

## 2019-08-06 RX ADMIN — NYSTATIN CREAM 1 APPLICATION(S): 100000 CREAM TOPICAL at 18:02

## 2019-08-06 RX ADMIN — Medication 325 MILLIGRAM(S): at 11:13

## 2019-08-06 RX ADMIN — Medication 2: at 17:11

## 2019-08-06 RX ADMIN — Medication 25 MILLIGRAM(S): at 06:43

## 2019-08-06 RX ADMIN — Medication 20 UNIT(S): at 07:42

## 2019-08-06 RX ADMIN — TIOTROPIUM BROMIDE 1 CAPSULE(S): 18 CAPSULE ORAL; RESPIRATORY (INHALATION) at 21:52

## 2019-08-06 RX ADMIN — Medication 3 MILLILITER(S): at 19:15

## 2019-08-06 RX ADMIN — Medication 1000 UNIT(S): at 11:13

## 2019-08-06 NOTE — PROGRESS NOTE ADULT - ASSESSMENT
77 yo male hx of COPD on Home O2 (4-5L NC)/ HTN/HLD/DM, HFpEF, BIBA 2/2 SOB.    #acute on chronic hypoxic/hypercapnic respiratory failure  #acute on chronic diastolic CHF  #mild acute COPD exacerbation  #MICHAEL/OHS on NIPPV  pt was on low dose (20mg PO lasix).  will increase to 40mg IV BID for now.  will get old ECHO report  CXR shows pulm vascular congestion  change prednisone to 40mg PO daily for now and taper as able  continue nebs, symbicort, spiriva, azithromycin, cough suppressant  currently HFNC, wean as able to NC  d/w pulm    #CKD3 - monitor renal function with diuresis  #HTN/HLD - BP ok, continue metoprolol, statin  #IDDM - continue insulin regimen  #BPH - continue flomax  #PPx - HSQ    DNR/DNI (does not want hospice)    Progress Note Handoff  Pending:  clinical improvement  Patient/Family discussion: d/w pt, agrees  Disposition: unclear at this time 79 yo male hx of COPD on Home O2 (4-5L NC)/ HTN/HLD/DM, HFpEF, BIBA 2/2 SOB.    #acute on chronic hypoxic/hypercapnic respiratory failure  #acute on chronic diastolic CHF exacerbation  #mild acute COPD exacerbation  #MICHAEL/OHS on NIPPV  pt was on low dose (20mg PO lasix).  will increase to 40mg IV BID for now.  will get old ECHO report  CXR shows pulm vascular congestion  change prednisone to 40mg PO daily for now and taper as able  continue nebs, symbicort, spiriva, azithromycin, cough suppressant  currently HFNC, wean as able to NC  d/w pulm    #CKD3 - monitor renal function with diuresis  #HTN/HLD - BP ok, continue metoprolol, statin  #IDDM - continue insulin regimen  #BPH - continue flomax  #suspected vitamin D deficiency - continue supplement  #PPx - HSQ    DNR/DNI (does not want hospice)  	  Progress Note Handoff  Pending:  clinical improvement  Patient/Family discussion: d/w pt, agrees  Disposition: unclear at this time

## 2019-08-06 NOTE — PROGRESS NOTE ADULT - SUBJECTIVE AND OBJECTIVE BOX
JIAN MANCIA  78y, Male  Allergy: aspirin (Other)  fish (Other)  iodine (Hives)  penicillin (Unknown)  shellfish (Other)    Hospital Day: 1d    Patient seen and examined earlier today.  c/o sob and cough, not able to bring up phlegm, also c/o some feet swelling    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  High cholesterol  GERD (gastroesophageal reflux disease)  Enlarged prostate  Oxygen dependent  COPD (chronic obstructive pulmonary disease)  Diabetes mellitus, type 2  Hypertension  Emphysema lung  History of hemorrhoidectomy  Dysplastic colon polyp: removed        VITALS:  T(F): 97.4 (08-05-19 @ 22:08), Max: 97.4 (08-05-19 @ 22:08)  HR: 88 (08-06-19 @ 05:35)  BP: 127/75 (08-06-19 @ 05:35) (106/70 - 140/82)  RR: 16 (08-06-19 @ 05:35)  SpO2: 94% (08-06-19 @ 07:54)    PHYSICAL EXAM:  GENERAL: NAD  HEENT: MMM, +HFNC  CVS:  RRR  CHEST/LUNG: diminished air entry b/l  ABD:  soft, NT/ND +bs  EXTREMITIES:  trace edema b/l feet  NEURO: AOx3    TESTS & MEASUREMENTS:                        13.7   14.08 )-----------( 438      ( 06 Aug 2019 07:08 )             46.7     PT/INR - ( 05 Aug 2019 00:45 )   PT: 13.40 sec;   INR: 1.17 ratio         PTT - ( 05 Aug 2019 00:45 )  PTT:35.9 sec  08-06    141  |  96<L>  |  57<H>  ----------------------------<  232<H>  5.5<H>   |  33<H>  |  2.2<H>    Ca    9.3      06 Aug 2019 07:08  Phos  5.7     08-06  Mg     2.2     08-06    TPro  6.5  /  Alb  4.0  /  TBili  0.5  /  DBili  x   /  AST  11  /  ALT  14  /  AlkPhos  78  08-06    LIVER FUNCTIONS - ( 06 Aug 2019 07:08 )  Alb: 4.0 g/dL / Pro: 6.5 g/dL / ALK PHOS: 78 U/L / ALT: 14 U/L / AST: 11 U/L / GGT: x           CARDIAC MARKERS ( 06 Aug 2019 07:08 )  x     / 0.02 ng/mL / x     / x     / 4.2 ng/mL  CARDIAC MARKERS ( 05 Aug 2019 19:38 )  x     / 0.02 ng/mL / x     / x     / 5.0 ng/mL  CARDIAC MARKERS ( 05 Aug 2019 00:45 )  x     / 0.03 ng/mL / x     / x     / x          RADIOLOGY & ADDITIONAL TESTS:  < from: Xray Chest 1 View-PORTABLE IMMEDIATE (08.04.19 @ 23:45) >  Impression:      Pulmonary vascular congestion.    < end of copied text >      MEDICATIONS:  MEDICATIONS  (STANDING):  ALBUTerol/ipratropium for Nebulization 3 milliLiter(s) Nebulizer every 6 hours  ascorbic acid 500 milliGRAM(s) Oral daily  atorvastatin 20 milliGRAM(s) Oral at bedtime  azithromycin  IVPB 500 milliGRAM(s) IV Intermittent every 24 hours  bisacodyl 10 milliGRAM(s) Oral at bedtime  buDESOnide 160 MICROgram(s)/formoterol 4.5 MICROgram(s) Inhaler 2 Puff(s) Inhalation two times a day  cholecalciferol 1000 Unit(s) Oral daily  dextrose 5%. 1000 milliLiter(s) (50 mL/Hr) IV Continuous <Continuous>  dextrose 50% Injectable 12.5 Gram(s) IV Push once  dextrose 50% Injectable 25 Gram(s) IV Push once  dextrose 50% Injectable 25 Gram(s) IV Push once  docusate sodium 100 milliGRAM(s) Oral three times a day  ferrous    sulfate 325 milliGRAM(s) Oral daily  finasteride 5 milliGRAM(s) Oral daily  furosemide   Injectable 40 milliGRAM(s) IV Push every 12 hours  heparin  Injectable 5000 Unit(s) SubCutaneous every 12 hours  insulin glargine Injectable (LANTUS) 30 Unit(s) SubCutaneous every morning  insulin lispro (HumaLOG) corrective regimen sliding scale   SubCutaneous three times a day before meals  insulin lispro Injectable (HumaLOG) 20 Unit(s) SubCutaneous three times a day before meals  magnesium oxide 400 milliGRAM(s) Oral three times a day with meals  metoprolol tartrate 25 milliGRAM(s) Oral two times a day  nystatin    Suspension 162107 Unit(s) Oral two times a day  nystatin Powder 1 Application(s) Topical two times a day  pantoprazole    Tablet 40 milliGRAM(s) Oral before breakfast  polyethylene glycol 3350 17 Gram(s) Oral daily  predniSONE   Tablet 40 milliGRAM(s) Oral daily  senna 2 Tablet(s) Oral at bedtime  tamsulosin 0.4 milliGRAM(s) Oral at bedtime  tiotropium 18 MICROgram(s) Capsule 1 Capsule(s) Inhalation at bedtime    MEDICATIONS  (PRN):  acetaminophen   Tablet .. 650 milliGRAM(s) Oral every 6 hours PRN Temp greater or equal to 38C (100.4F), Moderate Pain (4 - 6)  aluminum hydroxide/magnesium hydroxide/simethicone Suspension 30 milliLiter(s) Oral every 4 hours PRN Dyspepsia  dextrose 40% Gel 15 Gram(s) Oral once PRN Blood Glucose LESS THAN 70 milliGRAM(s)/deciliter  glucagon  Injectable 1 milliGRAM(s) IntraMuscular once PRN Glucose LESS THAN 70 milligrams/deciliter  guaiFENesin/dextromethorphan  Syrup 10 milliLiter(s) Oral every 6 hours PRN Cough      HOME MEDICATIONS:  aluminum hydroxide-magnesium hydroxide 200 mg-200 mg/5 mL oral suspension (08-05)  ascorbic acid 500 mg oral tablet (08-05)  atorvastatin 20 mg oral tablet (08-05)  cholecalciferol oral tablet (08-05)  docusate sodium 100 mg oral capsule (08-05)  finasteride 5 mg oral tablet (08-05)  guaifenesin-dextromethorphan 100 mg-10 mg/5 mL oral liquid (08-05)  magnesium oxide 400 mg (241.3 mg elemental magnesium) oral tablet (08-05)  metoprolol tartrate 25 mg oral tablet (08-05)  nystatin 100,000 units/g topical powder (08-05)  ocular lubricant ophthalmic solution (08-05)  polyethylene glycol 3350 oral powder for reconstitution (08-05)  senna oral tablet (08-05)  sodium polystyrene sulfonate (08-05)  tamsulosin 0.4 mg oral capsule (08-05)

## 2019-08-06 NOTE — CONSULT NOTE ADULT - SUBJECTIVE AND OBJECTIVE BOX
HPI:: 77 yo male hx of COPD on Home O2  co  sob  (4-5L NC)/ HTN/HLD/DM BIBA 2/2 SOB. as per patient, he didn't feel well today and felt a trouble to breath (need to use much more effort to breath) so he finally called EMS to bring him to ED for evaluation. tried nebulizer and BiPAP at home but didn't improve. denies fever/chill/productive coughing/chest pain/abd pain/n/v/d.     PTN  REFERRED TO ACUTE  REHAB  FOR  EVAL AND  TX   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  High cholesterol  GERD (gastroesophageal reflux disease)  Enlarged prostate  Oxygen dependent  COPD (chronic obstructive pulmonary disease)  Diabetes mellitus, type 2  Hypertension  Emphysema lung  History of hemorrhoidectomy  Dysplastic colon polyp: removed      Hospital Course:    TODAY'S SUBJECTIVE & REVIEW OF SYMPTOMS:     Constitutional WNL   Cardio WNL   Resp WNL   GI WNL  Heme WNL  Endo WNL  Skin WNL  MSK WNL  Neuro WNL  Cognitive WNL  Psych WNL      MEDICATIONS  (STANDING):  ALBUTerol/ipratropium for Nebulization 3 milliLiter(s) Nebulizer every 6 hours  ascorbic acid 500 milliGRAM(s) Oral daily  atorvastatin 20 milliGRAM(s) Oral at bedtime  azithromycin  IVPB 500 milliGRAM(s) IV Intermittent every 24 hours  bisacodyl 10 milliGRAM(s) Oral at bedtime  buDESOnide 160 MICROgram(s)/formoterol 4.5 MICROgram(s) Inhaler 2 Puff(s) Inhalation two times a day  cholecalciferol 1000 Unit(s) Oral daily  dextrose 5%. 1000 milliLiter(s) (50 mL/Hr) IV Continuous <Continuous>  dextrose 50% Injectable 12.5 Gram(s) IV Push once  dextrose 50% Injectable 25 Gram(s) IV Push once  dextrose 50% Injectable 25 Gram(s) IV Push once  docusate sodium 100 milliGRAM(s) Oral three times a day  ferrous    sulfate 325 milliGRAM(s) Oral daily  finasteride 5 milliGRAM(s) Oral daily  furosemide   Injectable 40 milliGRAM(s) IV Push every 12 hours  heparin  Injectable 5000 Unit(s) SubCutaneous every 12 hours  insulin glargine Injectable (LANTUS) 30 Unit(s) SubCutaneous every morning  insulin lispro (HumaLOG) corrective regimen sliding scale   SubCutaneous three times a day before meals  insulin lispro Injectable (HumaLOG) 20 Unit(s) SubCutaneous three times a day before meals  magnesium oxide 400 milliGRAM(s) Oral three times a day with meals  metoprolol tartrate 25 milliGRAM(s) Oral two times a day  nystatin    Suspension 003821 Unit(s) Oral two times a day  nystatin Powder 1 Application(s) Topical two times a day  pantoprazole    Tablet 40 milliGRAM(s) Oral before breakfast  polyethylene glycol 3350 17 Gram(s) Oral daily  predniSONE   Tablet 40 milliGRAM(s) Oral daily  senna 2 Tablet(s) Oral at bedtime  tamsulosin 0.4 milliGRAM(s) Oral at bedtime  tiotropium 18 MICROgram(s) Capsule 1 Capsule(s) Inhalation at bedtime    MEDICATIONS  (PRN):  acetaminophen   Tablet .. 650 milliGRAM(s) Oral every 6 hours PRN Temp greater or equal to 38C (100.4F), Moderate Pain (4 - 6)  aluminum hydroxide/magnesium hydroxide/simethicone Suspension 30 milliLiter(s) Oral every 4 hours PRN Dyspepsia  dextrose 40% Gel 15 Gram(s) Oral once PRN Blood Glucose LESS THAN 70 milliGRAM(s)/deciliter  glucagon  Injectable 1 milliGRAM(s) IntraMuscular once PRN Glucose LESS THAN 70 milligrams/deciliter  guaiFENesin/dextromethorphan  Syrup 10 milliLiter(s) Oral every 6 hours PRN Cough      FAMILY HISTORY:  No pertinent family history in first degree relatives      Allergies    fish (Other)  iodine (Hives)  penicillin (Unknown)  shellfish (Other)    Intolerances    aspirin (Other)      SOCIAL HISTORY:    [  ] Etoh  [  ] Smoking  [  ] Substance abuse     Home Environment:  [x  ] Home Alone  [ x ] Lives with Family  [  ] Home Health Aid    Dwelling:  [x  ] Apartment  [x  ] Private House  [  ] Adult Home  [  ] Skilled Nursing Facility      [  ] Short Term  [  ] Long Term  [  x] Stairs       Elevator [  ]    FUNCTIONAL STATUS PTA: (Check all that apply)  Ambulation: [ x  ]Independent    [  ] Dependent     [  ] Non-Ambulatory  Assistive Device: [ x ] SA Cane  [  ]  Q Cane  [ x ] Walker  [  ]  Wheelchair  ADL : [  x] Independent  [  ]  Dependent       Vital Signs Last 24 Hrs  T(C): 36.3 (05 Aug 2019 22:08), Max: 36.3 (05 Aug 2019 22:08)  T(F): 97.4 (05 Aug 2019 22:08), Max: 97.4 (05 Aug 2019 22:08)  HR: 88 (06 Aug 2019 05:35) (85 - 109)  BP: 127/75 (06 Aug 2019 05:35) (106/70 - 140/82)  BP(mean): --  RR: 16 (06 Aug 2019 05:35) (16 - 16)  SpO2: 94% (06 Aug 2019 07:54) (89% - 94%)      PHYSICAL EXAM: Alert & Oriented X3  GENERAL: NAD, well-groomed, well-developed  HEAD:  Atraumatic, Normocephalic  EYES: EOMI, PERRLA, conjunctiva and sclera clear  NECK: Supple, No JVD, Normal thyroid  CHEST/LUNG: Clear to percussion bilaterally; No rales, rhonchi, wheezing, or rubs  HEART: Regular rate and rhythm; No murmurs, rubs, or gallops  ABDOMEN: Soft, Nontender, Nondistended; Bowel sounds present  EXTREMITIES:  2+ Peripheral Pulses, No clubbing, cyanosis, or edema    NERVOUS SYSTEM:  Cranial Nerves 2-12 intact [ x ] Abnormal  [  ]  ROM: WFL all extremities [ x ]  Abnormal [  ]  Motor Strength: WFL all extremities  [  ]  Abnormal [x  ]  Sensation: intact to light touch [  ] Abnormal [x  ]  Reflexes: Symmetric [  ]  Abnormal [  x]    FUNCTIONAL STATUS:  Bed Mobility: Independent [  ]  Supervision [  ]  Needs Assistance [x  ]  N/A [  ]  Transfers: Independent [  ]  Supervision [  ]  Needs Assistance [x  ]  N/A [  ]   Ambulation: Independent [  ]  Supervision [  ]  Needs Assistance [x  ]  N/A [  ]  ADL: Independent [  ] Requires Assistance [  ] N/A [x  ]  SEE PT/OT IE NOTES    LABS:                        13.7   14.08 )-----------( 438      ( 06 Aug 2019 07:08 )             46.7     08-06    141  |  96<L>  |  57<H>  ----------------------------<  232<H>  5.5<H>   |  33<H>  |  2.2<H>    Ca    9.3      06 Aug 2019 07:08  Phos  5.7     08-06  Mg     2.2     08-06    TPro  6.5  /  Alb  4.0  /  TBili  0.5  /  DBili  x   /  AST  11  /  ALT  14  /  AlkPhos  78  08-06    PT/INR - ( 05 Aug 2019 00:45 )   PT: 13.40 sec;   INR: 1.17 ratio         PTT - ( 05 Aug 2019 00:45 )  PTT:35.9 sec      RADIOLOGY & ADDITIONAL STUDIES:    Assesment:

## 2019-08-06 NOTE — CONSULT NOTE ADULT - ASSESSMENT
Impression:  Chronic hypercapnic/hypoxic respiratory failure on home O2  Severe COPD with mild exacerbation   HO MICHAEL/OHS on NIPPV  Pulmonary edema     Recommendations:  Albuterol nebs q 4 and PRN; C/w triple therapy Symbicort/ Spiriva    D/C ipratropium;  Taper solumedrol to prednisone 60 mg with slow taper to home dose   Wean HF to target So2 >88%; NIV PRN and qHS  Obtain previous TTE results; Keep I<O; Lasix IV when inpatient   Palliative care per patients wishes   DNR/DNI  Poor overall prognosis Impression:  Chronic hypercapnic/hypoxic respiratory failure on home O2  Severe COPD   HO MICHAEL/OHS on NIPPV  Pulmonary edema   Pulmonary hypertension     Recommendations:  Albuterol nebs q 4 and PRN; C/w triple therapy Symbicort/ Spiriva    D/C ipratropium;  Taper solumedrol to prednisone 60 mg with taper to home dose   Wean HF to target So2 >88%; NIV PRN and qHS  Obtain previous TTE results; Keep I<O; Lasix IV when inpatient    DNR/DNI  Poor overall prognosis Impression:  Chronic hypercapnic/hypoxic respiratory failure on home O2  Severe COPD   HO MICHAEL/OHS on NIPPV  Pulmonary edema   Pulmonary hypertension     Recommendations:  Albuterol nebs q 4 and PRN; C/w triple therapy Symbicort/ Spiriva    D/C ipratropium;  Taper solumedrol to prednisone 40 mg with taper to home dose   lasix 40 mg IV Q 12 hrs   Wean HF to target So2 >88%; NIV PRN and qHS  Obtain previous TTE results; Keep I<O; Lasix IV when inpatient    DNR/DNI  Poor overall prognosis

## 2019-08-06 NOTE — CONSULT NOTE ADULT - SUBJECTIVE AND OBJECTIVE BOX
Patient is a 78y old  Male who presents with a chief complaint of copd exacerbation (05 Aug 2019 16:20)    HPI:  · Chief Complaint: The patient is a 78y Male complaining of shortness of breath.	  · HPI Objective Statement: 77 yo male hx of COPD on Home O2 (4-5L NC)/ HTN/HLD/DM BIBA 2/2 SOB. as per patient, he didn't feel well today and felt a trouble to breath (need to use much more effort to breath) so he finally called EMS to bring him to ED for evaluation. tried nebulizer and BiPAP at home but didn't improve. denies fever/chill/productive coughing/chest pain/abd pain/n/v/d. (05 Aug 2019 03:12)      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  High cholesterol  GERD (gastroesophageal reflux disease)  Enlarged prostate  Oxygen dependent  COPD (chronic obstructive pulmonary disease)  Diabetes mellitus, type 2  Hypertension  Emphysema lung  History of hemorrhoidectomy  Dysplastic colon polyp: removed      SOCIAL HX:   Former smoker     FAMILY HISTORY:  No pertinent family history in first degree relatives    Allergies    fish (Other)  iodine (Hives)  penicillin (Unknown)  shellfish (Other)    Intolerances    aspirin (Other)      PHYSICAL EXAM  Vital Signs Last 24 Hrs  T(C): 36.3 (05 Aug 2019 22:08), Max: 36.3 (05 Aug 2019 22:08)  T(F): 97.4 (05 Aug 2019 22:08), Max: 97.4 (05 Aug 2019 22:08)  HR: 88 (06 Aug 2019 05:35) (85 - 109)  BP: 127/75 (06 Aug 2019 05:35) (106/70 - 140/82)  BP(mean): --  RR: 16 (06 Aug 2019 05:35) (16 - 22)  SpO2: 94% (06 Aug 2019 07:54) (89% - 94%)  I&O's Summary    05 Aug 2019 07:01  -  06 Aug 2019 07:00  --------------------------------------------------------  IN: 0 mL / OUT: 1115 mL / NET: -1115 mL      General: Comfortable in bed  HEENT:  On NC  Lymph node: No palpable LN             Lungs: JAKOB over bases   Cardiovascular: RRR, S1S2  Abdomen: BS+ve; soft non tender  Extremities:   Skin:  No evident Rash  Neurological:  AAOx3; No focal deficit      LABS:                          13.7   14.08 )-----------( 438      ( 06 Aug 2019 07:08 )             46.7   08-05    143  |  98  |  39<H>  ----------------------------<  223<H>  4.4   |  33<H>  |  2.0<H>      Ca    9.1      05 Aug 2019 00:45    TPro  6.1  /  Alb  3.9  /  TBili  0.5  /  DBili  x   /  AST  9   /  ALT  12  /  AlkPhos  76  08-05    PT/INR - ( 05 Aug 2019 00:45 )   PT: 13.40 sec;   INR: 1.17 ratio       PTT - ( 05 Aug 2019 00:45 )  PTT:35.9 sec                                           Serum Pro-Brain Natriuretic Peptide: 1742 pg/mL (08.05.19 @ 00:45)    CARDIAC MARKERS ( 05 Aug 2019 19:38 )  x     / 0.02 ng/mL / x     / x     / 5.0 ng/mL  CARDIAC MARKERS ( 05 Aug 2019 00:45 )  x     / 0.03 ng/mL / x     / x     / x        LIVER FUNCTIONS - ( 05 Aug 2019 00:45 )  Alb: 3.9 g/dL / Pro: 6.1 g/dL / ALK PHOS: 76 U/L / ALT: 12 U/L / AST: 9 U/L / GGT: x           Lactate (08-04-19 @ 23:19): 1.9<H>    Blood Gas Profile - Venous (08.04.19 @ 23:19)    pH, Venous: 7.32    pCO2, Venous: 65 mmHg    Serum Pro-Brain Natriuretic Peptide: 1742 pg/mL (08.05.19 @ 00:45)      MEDICATIONS  (STANDING):  ALBUTerol/ipratropium for Nebulization 3 milliLiter(s) Nebulizer every 6 hours  ascorbic acid 500 milliGRAM(s) Oral daily  atorvastatin 20 milliGRAM(s) Oral at bedtime  azithromycin  IVPB 500 milliGRAM(s) IV Intermittent every 24 hours  buDESOnide 160 MICROgram(s)/formoterol 4.5 MICROgram(s) Inhaler 2 Puff(s) Inhalation two times a day  cholecalciferol 1000 Unit(s) Oral daily  dextrose 5%. 1000 milliLiter(s) (50 mL/Hr) IV Continuous <Continuous>  dextrose 50% Injectable 12.5 Gram(s) IV Push once  dextrose 50% Injectable 25 Gram(s) IV Push once  dextrose 50% Injectable 25 Gram(s) IV Push once  docusate sodium 100 milliGRAM(s) Oral three times a day  ferrous    sulfate 325 milliGRAM(s) Oral daily  finasteride 5 milliGRAM(s) Oral daily  heparin  Injectable 5000 Unit(s) SubCutaneous every 12 hours  insulin glargine Injectable (LANTUS) 30 Unit(s) SubCutaneous every morning  insulin lispro (HumaLOG) corrective regimen sliding scale   SubCutaneous three times a day before meals  insulin lispro Injectable (HumaLOG) 20 Unit(s) SubCutaneous three times a day before meals  magnesium oxide 400 milliGRAM(s) Oral three times a day with meals  methylPREDNISolone sodium succinate Injectable 40 milliGRAM(s) IV Push every 8 hours  metoprolol tartrate 25 milliGRAM(s) Oral two times a day  nystatin    Suspension 127453 Unit(s) Oral two times a day  nystatin Powder 1 Application(s) Topical two times a day  pantoprazole    Tablet 40 milliGRAM(s) Oral before breakfast  polyethylene glycol 3350 17 Gram(s) Oral daily  senna 2 Tablet(s) Oral at bedtime  tamsulosin 0.4 milliGRAM(s) Oral at bedtime  tiotropium 18 MICROgram(s) Capsule 1 Capsule(s) Inhalation at bedtime    MEDICATIONS  (PRN):  acetaminophen   Tablet .. 650 milliGRAM(s) Oral every 6 hours PRN Temp greater or equal to 38C (100.4F), Moderate Pain (4 - 6)  aluminum hydroxide/magnesium hydroxide/simethicone Suspension 30 milliLiter(s) Oral every 4 hours PRN Dyspepsia  dextrose 40% Gel 15 Gram(s) Oral once PRN Blood Glucose LESS THAN 70 milliGRAM(s)/deciliter  glucagon  Injectable 1 milliGRAM(s) IntraMuscular once PRN Glucose LESS THAN 70 milligrams/deciliter  guaiFENesin/dextromethorphan  Syrup 10 milliLiter(s) Oral every 6 hours PRN Cough      Radiology:   Cxr: Hilar congestion Patient is a 78y old  Male who presents with a chief complaint of copd exacerbation (05 Aug 2019 16:20)    HPI:  · Chief Complaint: The patient is a 78y Male complaining of shortness of breath.	  · HPI Objective Statement: 79 yo male hx of COPD on Home O2 (4-5L NC)/ HTN/HLD/DM BIBA 2/2 SOB. as per patient, he didn't feel well today and felt a trouble to breath (need to use much more effort to breath) so he finally called EMS to bring him to ED for evaluation. tried nebulizer and BiPAP at home but didn't improve. denies fever/chill/productive coughing/chest pain/abd pain/n/v/d. (05 Aug 2019 03:12)      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  High cholesterol  GERD (gastroesophageal reflux disease)  Enlarged prostate  Oxygen dependent  COPD (chronic obstructive pulmonary disease)  Diabetes mellitus, type 2  Hypertension  Emphysema lung  History of hemorrhoidectomy  Dysplastic colon polyp: removed      SOCIAL HX:   Former smoker     FAMILY HISTORY:  No pertinent family history in first degree relatives    Allergies    fish (Other)  iodine (Hives)  penicillin (Unknown)  shellfish (Other)    Intolerances    aspirin (Other)      PHYSICAL EXAM  Vital Signs Last 24 Hrs  T(C): 36.3 (05 Aug 2019 22:08), Max: 36.3 (05 Aug 2019 22:08)  T(F): 97.4 (05 Aug 2019 22:08), Max: 97.4 (05 Aug 2019 22:08)  HR: 88 (06 Aug 2019 05:35) (85 - 109)  BP: 127/75 (06 Aug 2019 05:35) (106/70 - 140/82)  BP(mean): --  RR: 16 (06 Aug 2019 05:35) (16 - 22)  SpO2: 94% (06 Aug 2019 07:54) (89% - 94%)  I&O's Summary    05 Aug 2019 07:01  -  06 Aug 2019 07:00  --------------------------------------------------------  IN: 0 mL / OUT: 1115 mL / NET: -1115 mL      General: Comfortable in bed  HEENT:  On NC HF  Lymph node: No palpable LN             Lungs: JAKOB over bases; No active wheezing    Cardiovascular: RRR, S1S2  Abdomen: BS+ve; soft non tender  Extremities: No clubbing   Skin:  No evident Rash  Neurological:  AAOx3; No focal deficit      LABS:                          13.7   14.08 )-----------( 438      ( 06 Aug 2019 07:08 )             46.7   08-05    143  |  98  |  39<H>  ----------------------------<  223<H>  4.4   |  33<H>  |  2.0<H>      Ca    9.1      05 Aug 2019 00:45    TPro  6.1  /  Alb  3.9  /  TBili  0.5  /  DBili  x   /  AST  9   /  ALT  12  /  AlkPhos  76  08-05    PT/INR - ( 05 Aug 2019 00:45 )   PT: 13.40 sec;   INR: 1.17 ratio       PTT - ( 05 Aug 2019 00:45 )  PTT:35.9 sec                                           Serum Pro-Brain Natriuretic Peptide: 1742 pg/mL (08.05.19 @ 00:45)    CARDIAC MARKERS ( 05 Aug 2019 19:38 )  x     / 0.02 ng/mL / x     / x     / 5.0 ng/mL  CARDIAC MARKERS ( 05 Aug 2019 00:45 )  x     / 0.03 ng/mL / x     / x     / x        LIVER FUNCTIONS - ( 05 Aug 2019 00:45 )  Alb: 3.9 g/dL / Pro: 6.1 g/dL / ALK PHOS: 76 U/L / ALT: 12 U/L / AST: 9 U/L / GGT: x           Lactate (08-04-19 @ 23:19): 1.9<H>    Blood Gas Profile - Venous (08.04.19 @ 23:19)    pH, Venous: 7.32    pCO2, Venous: 65 mmHg    Serum Pro-Brain Natriuretic Peptide: 1742 pg/mL (08.05.19 @ 00:45)      MEDICATIONS  (STANDING):  ALBUTerol/ipratropium for Nebulization 3 milliLiter(s) Nebulizer every 6 hours  ascorbic acid 500 milliGRAM(s) Oral daily  atorvastatin 20 milliGRAM(s) Oral at bedtime  azithromycin  IVPB 500 milliGRAM(s) IV Intermittent every 24 hours  buDESOnide 160 MICROgram(s)/formoterol 4.5 MICROgram(s) Inhaler 2 Puff(s) Inhalation two times a day  cholecalciferol 1000 Unit(s) Oral daily  dextrose 5%. 1000 milliLiter(s) (50 mL/Hr) IV Continuous <Continuous>  dextrose 50% Injectable 12.5 Gram(s) IV Push once  dextrose 50% Injectable 25 Gram(s) IV Push once  dextrose 50% Injectable 25 Gram(s) IV Push once  docusate sodium 100 milliGRAM(s) Oral three times a day  ferrous    sulfate 325 milliGRAM(s) Oral daily  finasteride 5 milliGRAM(s) Oral daily  heparin  Injectable 5000 Unit(s) SubCutaneous every 12 hours  insulin glargine Injectable (LANTUS) 30 Unit(s) SubCutaneous every morning  insulin lispro (HumaLOG) corrective regimen sliding scale   SubCutaneous three times a day before meals  insulin lispro Injectable (HumaLOG) 20 Unit(s) SubCutaneous three times a day before meals  magnesium oxide 400 milliGRAM(s) Oral three times a day with meals  methylPREDNISolone sodium succinate Injectable 40 milliGRAM(s) IV Push every 8 hours  metoprolol tartrate 25 milliGRAM(s) Oral two times a day  nystatin    Suspension 750830 Unit(s) Oral two times a day  nystatin Powder 1 Application(s) Topical two times a day  pantoprazole    Tablet 40 milliGRAM(s) Oral before breakfast  polyethylene glycol 3350 17 Gram(s) Oral daily  senna 2 Tablet(s) Oral at bedtime  tamsulosin 0.4 milliGRAM(s) Oral at bedtime  tiotropium 18 MICROgram(s) Capsule 1 Capsule(s) Inhalation at bedtime    MEDICATIONS  (PRN):  acetaminophen   Tablet .. 650 milliGRAM(s) Oral every 6 hours PRN Temp greater or equal to 38C (100.4F), Moderate Pain (4 - 6)  aluminum hydroxide/magnesium hydroxide/simethicone Suspension 30 milliLiter(s) Oral every 4 hours PRN Dyspepsia  dextrose 40% Gel 15 Gram(s) Oral once PRN Blood Glucose LESS THAN 70 milliGRAM(s)/deciliter  glucagon  Injectable 1 milliGRAM(s) IntraMuscular once PRN Glucose LESS THAN 70 milligrams/deciliter  guaiFENesin/dextromethorphan  Syrup 10 milliLiter(s) Oral every 6 hours PRN Cough      Radiology:   Cxr: Hilar congestion

## 2019-08-06 NOTE — CONSULT NOTE ADULT - ASSESSMENT
IMPRESSION: Rehab of 77 y/o  m  rehab  for  debility     PRECAUTIONS: [  x] Cardiac  [ x ] Respiratory  [  ] Seizures [  ] Contact Isolation  [  ] Droplet Isolation  [ FALL ] Other    Weight Bearing Status:     RECOMMENDATION:    Out of Bed to Chair     DVT/Decubiti Prophylaxis    REHAB PLAN:     [   xx] Bedside P/T 3-5 times a week   [   ]   Bedside O/T  2-3 times a week             [   ] No Rehab Therapy Indicated                   [   ]  Speech Therapy   Conditioning/ROM                                    ADL  Bed Mobility                                               Conditioning/ROM  Transfers                                                     Bed Mobility  Sitting /Standing Balance                         Transfers                                        Gait Training                                               Sitting/Standing Balance  Stair Training [   ]Applicable                    Home equipment Eval                                                                        Splinting  [   ] Only      GOALS:   ADL   [   x]   Independent                    Transfers  [x ] Independent                          Ambulation  [ x  ] Independent     [  x  ] With device                            [   ]  CG                                                         [  x ]  CG                                                                  [   ] CG                            [    ] Min A                                                   [   ] Min A                                                              [   ] Min  A          DISCHARGE PLAN:   [   ]  Good candidate for Intensive Rehabilitation/Hospital based-[facility]                                             Will tolerate 3hrs Intensive Rehab Daily                                       [  xx  ]  Short Term Rehab in Skilled Nursing Facility                                       [    ]  Home with Outpatient or  services                                         [    ]  Possible Candidate for Intensive Hospital based Rehab

## 2019-08-07 DIAGNOSIS — Z02.9 ENCOUNTER FOR ADMINISTRATIVE EXAMINATIONS, UNSPECIFIED: ICD-10-CM

## 2019-08-07 LAB
ANION GAP SERPL CALC-SCNC: 9 MMOL/L — SIGNIFICANT CHANGE UP (ref 7–14)
BUN SERPL-MCNC: 67 MG/DL — CRITICAL HIGH (ref 10–20)
CALCIUM SERPL-MCNC: 9.3 MG/DL — SIGNIFICANT CHANGE UP (ref 8.5–10.1)
CHLORIDE SERPL-SCNC: 100 MMOL/L — SIGNIFICANT CHANGE UP (ref 98–110)
CO2 SERPL-SCNC: 32 MMOL/L — SIGNIFICANT CHANGE UP (ref 17–32)
CREAT SERPL-MCNC: 2.3 MG/DL — HIGH (ref 0.7–1.5)
GLUCOSE BLDC GLUCOMTR-MCNC: 115 MG/DL — HIGH (ref 70–99)
GLUCOSE BLDC GLUCOMTR-MCNC: 172 MG/DL — HIGH (ref 70–99)
GLUCOSE BLDC GLUCOMTR-MCNC: 174 MG/DL — HIGH (ref 70–99)
GLUCOSE BLDC GLUCOMTR-MCNC: 305 MG/DL — HIGH (ref 70–99)
GLUCOSE SERPL-MCNC: 161 MG/DL — HIGH (ref 70–99)
HCT VFR BLD CALC: 49.5 % — SIGNIFICANT CHANGE UP (ref 42–52)
HGB BLD-MCNC: 14.2 G/DL — SIGNIFICANT CHANGE UP (ref 14–18)
MCHC RBC-ENTMCNC: 22.6 PG — LOW (ref 27–31)
MCHC RBC-ENTMCNC: 28.7 G/DL — LOW (ref 32–37)
MCV RBC AUTO: 78.9 FL — LOW (ref 80–94)
NRBC # BLD: 0 /100 WBCS — SIGNIFICANT CHANGE UP (ref 0–0)
PLATELET # BLD AUTO: 477 K/UL — HIGH (ref 130–400)
POTASSIUM SERPL-MCNC: 5.6 MMOL/L — HIGH (ref 3.5–5)
POTASSIUM SERPL-SCNC: 5.6 MMOL/L — HIGH (ref 3.5–5)
RBC # BLD: 6.27 M/UL — HIGH (ref 4.7–6.1)
RBC # FLD: 20.3 % — HIGH (ref 11.5–14.5)
SODIUM SERPL-SCNC: 141 MMOL/L — SIGNIFICANT CHANGE UP (ref 135–146)
WBC # BLD: 14.85 K/UL — HIGH (ref 4.8–10.8)
WBC # FLD AUTO: 14.85 K/UL — HIGH (ref 4.8–10.8)

## 2019-08-07 PROCEDURE — 99233 SBSQ HOSP IP/OBS HIGH 50: CPT

## 2019-08-07 RX ADMIN — INSULIN GLARGINE 30 UNIT(S): 100 INJECTION, SOLUTION SUBCUTANEOUS at 08:15

## 2019-08-07 RX ADMIN — NYSTATIN CREAM 1 APPLICATION(S): 100000 CREAM TOPICAL at 06:56

## 2019-08-07 RX ADMIN — FINASTERIDE 5 MILLIGRAM(S): 5 TABLET, FILM COATED ORAL at 12:29

## 2019-08-07 RX ADMIN — Medication 3 MILLILITER(S): at 13:59

## 2019-08-07 RX ADMIN — Medication 40 MILLIGRAM(S): at 17:59

## 2019-08-07 RX ADMIN — Medication 10 MILLIGRAM(S): at 21:12

## 2019-08-07 RX ADMIN — Medication 500000 UNIT(S): at 06:43

## 2019-08-07 RX ADMIN — Medication 500 MILLIGRAM(S): at 12:29

## 2019-08-07 RX ADMIN — MAGNESIUM OXIDE 400 MG ORAL TABLET 400 MILLIGRAM(S): 241.3 TABLET ORAL at 08:15

## 2019-08-07 RX ADMIN — Medication 325 MILLIGRAM(S): at 12:29

## 2019-08-07 RX ADMIN — BUDESONIDE AND FORMOTEROL FUMARATE DIHYDRATE 2 PUFF(S): 160; 4.5 AEROSOL RESPIRATORY (INHALATION) at 21:13

## 2019-08-07 RX ADMIN — Medication 500000 UNIT(S): at 18:01

## 2019-08-07 RX ADMIN — POLYETHYLENE GLYCOL 3350 17 GRAM(S): 17 POWDER, FOR SOLUTION ORAL at 12:31

## 2019-08-07 RX ADMIN — NYSTATIN CREAM 1 APPLICATION(S): 100000 CREAM TOPICAL at 18:02

## 2019-08-07 RX ADMIN — Medication 30 MILLILITER(S): at 08:23

## 2019-08-07 RX ADMIN — HEPARIN SODIUM 5000 UNIT(S): 5000 INJECTION INTRAVENOUS; SUBCUTANEOUS at 06:42

## 2019-08-07 RX ADMIN — Medication 3 MILLILITER(S): at 19:08

## 2019-08-07 RX ADMIN — MAGNESIUM OXIDE 400 MG ORAL TABLET 400 MILLIGRAM(S): 241.3 TABLET ORAL at 17:59

## 2019-08-07 RX ADMIN — Medication 20 UNIT(S): at 08:13

## 2019-08-07 RX ADMIN — TAMSULOSIN HYDROCHLORIDE 0.4 MILLIGRAM(S): 0.4 CAPSULE ORAL at 21:12

## 2019-08-07 RX ADMIN — Medication 40 MILLIGRAM(S): at 06:41

## 2019-08-07 RX ADMIN — Medication 40 MILLIGRAM(S): at 06:42

## 2019-08-07 RX ADMIN — Medication 20 UNIT(S): at 12:29

## 2019-08-07 RX ADMIN — Medication 100 MILLIGRAM(S): at 21:12

## 2019-08-07 RX ADMIN — TIOTROPIUM BROMIDE 1 CAPSULE(S): 18 CAPSULE ORAL; RESPIRATORY (INHALATION) at 21:12

## 2019-08-07 RX ADMIN — Medication 1000 UNIT(S): at 12:29

## 2019-08-07 RX ADMIN — SENNA PLUS 2 TABLET(S): 8.6 TABLET ORAL at 21:12

## 2019-08-07 RX ADMIN — Medication 100 MILLIGRAM(S): at 06:41

## 2019-08-07 RX ADMIN — MAGNESIUM OXIDE 400 MG ORAL TABLET 400 MILLIGRAM(S): 241.3 TABLET ORAL at 12:29

## 2019-08-07 RX ADMIN — HEPARIN SODIUM 5000 UNIT(S): 5000 INJECTION INTRAVENOUS; SUBCUTANEOUS at 17:59

## 2019-08-07 RX ADMIN — Medication 8: at 12:29

## 2019-08-07 RX ADMIN — Medication 25 MILLIGRAM(S): at 06:42

## 2019-08-07 RX ADMIN — ATORVASTATIN CALCIUM 20 MILLIGRAM(S): 80 TABLET, FILM COATED ORAL at 21:12

## 2019-08-07 RX ADMIN — Medication 25 MILLIGRAM(S): at 17:59

## 2019-08-07 RX ADMIN — PANTOPRAZOLE SODIUM 40 MILLIGRAM(S): 20 TABLET, DELAYED RELEASE ORAL at 06:42

## 2019-08-07 RX ADMIN — BUDESONIDE AND FORMOTEROL FUMARATE DIHYDRATE 2 PUFF(S): 160; 4.5 AEROSOL RESPIRATORY (INHALATION) at 08:14

## 2019-08-07 RX ADMIN — AZITHROMYCIN 255 MILLIGRAM(S): 500 TABLET, FILM COATED ORAL at 06:43

## 2019-08-07 RX ADMIN — Medication 20 UNIT(S): at 17:56

## 2019-08-07 NOTE — PROGRESS NOTE ADULT - ASSESSMENT
Impression:  Chronic hypercapnic/hypoxic respiratory failure on home O2  Severe COPD   HO MICHAEL/OHS on NIPPV  Pulmonary edema   Pulmonary hypertension     Recommendations:  Albuterol nebs q 4 and PRN; C/w triple therapy Symbicort/ Spiriva    D/C ipratropium   Prednisone 40 mg with taper to home dose   Lasix 40 mg IV Q 12 hrs to keep I<O; Strict I and Os  Wean HF to target So2 >88%; NIV PRN and qHS  OOB as tolerated   DNR/DNI  Poor overall prognosis Impression:  Chronic hypercapnic/hypoxic respiratory failure on home O2  Severe COPD   HO MICHAEL/OHS on NIPPV  Pulmonary edema   Pulmonary hypertension     Recommendations:  Chest xray   Albuterol nebs q 4 and PRN; C/w triple therapy Symbicort/ Spiriva    D/C ipratropium   Prednisone 40 mg with taper to home dose   Lasix 40 mg IV Q 12 hrs to keep I<O; Strict I and Os  Wean HF to target So2 >88%; NIV PRN and qHS  OOB as tolerated   DNR/DNI  Poor overall prognosis Impression:  Chronic hypercapnic/hypoxic respiratory failure on home O2  Severe COPD   HO MICHAEL/OHS on NIPPV  Pulmonary edema   Pulmonary hypertension     Recommendations:  Repeat Chest xray   Albuterol nebs q 4 and PRN; C/w triple therapy Symbicort/ Spiriva    D/C ipratropium   Prednisone 40 mg with taper to home dose   Lasix 40 mg IV Q 12 hrs to keep I<O; Strict I and Os  Wean HF to NC; target So2 >88%; NIV PRN and qHS  OOB as tolerated   DNR/DNI  Poor overall prognosis

## 2019-08-07 NOTE — PROGRESS NOTE ADULT - SUBJECTIVE AND OBJECTIVE BOX
Patient is a 78y old  Male who presents with a chief complaint of sob (06 Aug 2019 13:21)      SUBJECTIVE:      REVIEW OF SYSTEMS:  See HPI    PHYSICAL EXAM  Vital Signs Last 24 Hrs  T(C): 35.3 (07 Aug 2019 06:00), Max: 36.4 (06 Aug 2019 13:56)  T(F): 95.6 (07 Aug 2019 06:00), Max: 97.5 (06 Aug 2019 13:56)  HR: 74 (07 Aug 2019 06:00) (74 - 94)  BP: 127/75 (07 Aug 2019 06:00) (102/60 - 131/78)  RR: 16 (07 Aug 2019 06:00) (16 - 18)  SpO2: --  I&O's Summary    06 Aug 2019 07:01  -  07 Aug 2019 07:00  --------------------------------------------------------  IN: 480 mL / OUT: 300 mL / NET: 180 mL    General: Comfortable in bed  HEENT:  On NC HF  Lymph node: No palpable LN             Lungs: JAKOB over bases; No active wheezing    Cardiovascular: RRR, S1S2  Abdomen: BS+ve; soft non tender  Extremities: No clubbing   Skin:  No evident Rash  Neurological:  AAOx3; No focal deficit      LABS:                          14.2   14.85 )-----------( 477      ( 07 Aug 2019 07:03 )             49.5   08-07    141  |  100  |  67<HH>  ----------------------------<  161<H>  5.6<H>   |  32  |  2.3<H>    Ca    9.3      07 Aug 2019 07:03  Phos  5.7     08-06  Mg     2.2     08-06    TPro  6.5  /  Alb  4.0  /  TBili  0.5  /  DBili  x   /  AST  11  /  ALT  14  /  AlkPhos  78  08-06    CARDIAC MARKERS ( 06 Aug 2019 07:08 )  x     / 0.02 ng/mL / x     / x     / 4.2 ng/mL  CARDIAC MARKERS ( 05 Aug 2019 19:38 )  x     / 0.02 ng/mL / x     / x     / 5.0 ng/mL    LIVER FUNCTIONS - ( 06 Aug 2019 07:08 )  Alb: 4.0 g/dL / Pro: 6.5 g/dL / ALK PHOS: 78 U/L / ALT: 14 U/L / AST: 11 U/L / GGT: x                                              Serum Pro-Brain Natriuretic Peptide: 1742 pg/mL (08.05.19 @ 00:45)    Lactate (08-04-19 @ 23:19): 1.9<H>      MEDICATIONS  (STANDING):  ALBUTerol/ipratropium for Nebulization 3 milliLiter(s) Nebulizer every 6 hours  ascorbic acid 500 milliGRAM(s) Oral daily  atorvastatin 20 milliGRAM(s) Oral at bedtime  azithromycin  IVPB 500 milliGRAM(s) IV Intermittent every 24 hours  bisacodyl 10 milliGRAM(s) Oral at bedtime  buDESOnide 160 MICROgram(s)/formoterol 4.5 MICROgram(s) Inhaler 2 Puff(s) Inhalation two times a day  cholecalciferol 1000 Unit(s) Oral daily  dextrose 5%. 1000 milliLiter(s) (50 mL/Hr) IV Continuous <Continuous>  dextrose 50% Injectable 12.5 Gram(s) IV Push once  dextrose 50% Injectable 25 Gram(s) IV Push once  dextrose 50% Injectable 25 Gram(s) IV Push once  docusate sodium 100 milliGRAM(s) Oral three times a day  ferrous    sulfate 325 milliGRAM(s) Oral daily  finasteride 5 milliGRAM(s) Oral daily  furosemide   Injectable 40 milliGRAM(s) IV Push every 12 hours  heparin  Injectable 5000 Unit(s) SubCutaneous every 12 hours  insulin glargine Injectable (LANTUS) 30 Unit(s) SubCutaneous every morning  insulin lispro (HumaLOG) corrective regimen sliding scale   SubCutaneous three times a day before meals  insulin lispro Injectable (HumaLOG) 20 Unit(s) SubCutaneous three times a day before meals  magnesium oxide 400 milliGRAM(s) Oral three times a day with meals  metoprolol tartrate 25 milliGRAM(s) Oral two times a day  nystatin    Suspension 467735 Unit(s) Oral two times a day  nystatin Powder 1 Application(s) Topical two times a day  pantoprazole    Tablet 40 milliGRAM(s) Oral before breakfast  polyethylene glycol 3350 17 Gram(s) Oral daily  predniSONE   Tablet 40 milliGRAM(s) Oral daily  senna 2 Tablet(s) Oral at bedtime  tamsulosin 0.4 milliGRAM(s) Oral at bedtime  tiotropium 18 MICROgram(s) Capsule 1 Capsule(s) Inhalation at bedtime    MEDICATIONS  (PRN):  acetaminophen   Tablet .. 650 milliGRAM(s) Oral every 6 hours PRN Temp greater or equal to 38C (100.4F), Moderate Pain (4 - 6)  aluminum hydroxide/magnesium hydroxide/simethicone Suspension 30 milliLiter(s) Oral every 4 hours PRN Dyspepsia  dextrose 40% Gel 15 Gram(s) Oral once PRN Blood Glucose LESS THAN 70 milliGRAM(s)/deciliter  glucagon  Injectable 1 milliGRAM(s) IntraMuscular once PRN Glucose LESS THAN 70 milligrams/deciliter  guaiFENesin/dextromethorphan  Syrup 10 milliLiter(s) Oral every 6 hours PRN Cough    Radiology: Patient is a 78y old  Male who presents with a chief complaint of sob (06 Aug 2019 13:21)      SUBJECTIVE:  Feels better; Still on HF    REVIEW OF SYSTEMS:  See HPI    PHYSICAL EXAM  Vital Signs Last 24 Hrs  T(C): 35.3 (07 Aug 2019 06:00), Max: 36.4 (06 Aug 2019 13:56)  T(F): 95.6 (07 Aug 2019 06:00), Max: 97.5 (06 Aug 2019 13:56)  HR: 74 (07 Aug 2019 06:00) (74 - 94)  BP: 127/75 (07 Aug 2019 06:00) (102/60 - 131/78)  RR: 16 (07 Aug 2019 06:00) (16 - 18)  SpO2: --  I&O's Summary    06 Aug 2019 07:01  -  07 Aug 2019 07:00  --------------------------------------------------------  IN: 480 mL / OUT: 300 mL / NET: 180 mL    General: Comfortable in bed  HEENT:  On NC HF  Lymph node: No palpable LN             Lungs: JAKOB over bases; No active wheezing    Cardiovascular: RRR, S1S2  Abdomen: BS+ve; soft non tender  Extremities: No clubbing; Ankle edema   Neurological:  AAOx3; No focal deficit    LABS:                          14.2   14.85 )-----------( 477      ( 07 Aug 2019 07:03 )             49.5   08-07    141  |  100  |  67<HH>  ----------------------------<  161<H>  5.6<H>   |  32  |  2.3<H>    Ca    9.3      07 Aug 2019 07:03  Phos  5.7     08-06  Mg     2.2     08-06    TPro  6.5  /  Alb  4.0  /  TBili  0.5  /  DBili  x   /  AST  11  /  ALT  14  /  AlkPhos  78  08-06    CARDIAC MARKERS ( 06 Aug 2019 07:08 )  x     / 0.02 ng/mL / x     / x     / 4.2 ng/mL  CARDIAC MARKERS ( 05 Aug 2019 19:38 )  x     / 0.02 ng/mL / x     / x     / 5.0 ng/mL    LIVER FUNCTIONS - ( 06 Aug 2019 07:08 )  Alb: 4.0 g/dL / Pro: 6.5 g/dL / ALK PHOS: 78 U/L / ALT: 14 U/L / AST: 11 U/L / GGT: x                                              Serum Pro-Brain Natriuretic Peptide: 1742 pg/mL (08.05.19 @ 00:45)    Lactate (08-04-19 @ 23:19): 1.9<H>      MEDICATIONS  (STANDING):  ALBUTerol/ipratropium for Nebulization 3 milliLiter(s) Nebulizer every 6 hours  ascorbic acid 500 milliGRAM(s) Oral daily  atorvastatin 20 milliGRAM(s) Oral at bedtime  azithromycin  IVPB 500 milliGRAM(s) IV Intermittent every 24 hours  bisacodyl 10 milliGRAM(s) Oral at bedtime  buDESOnide 160 MICROgram(s)/formoterol 4.5 MICROgram(s) Inhaler 2 Puff(s) Inhalation two times a day  cholecalciferol 1000 Unit(s) Oral daily  dextrose 5%. 1000 milliLiter(s) (50 mL/Hr) IV Continuous <Continuous>  dextrose 50% Injectable 12.5 Gram(s) IV Push once  dextrose 50% Injectable 25 Gram(s) IV Push once  dextrose 50% Injectable 25 Gram(s) IV Push once  docusate sodium 100 milliGRAM(s) Oral three times a day  ferrous    sulfate 325 milliGRAM(s) Oral daily  finasteride 5 milliGRAM(s) Oral daily  furosemide   Injectable 40 milliGRAM(s) IV Push every 12 hours  heparin  Injectable 5000 Unit(s) SubCutaneous every 12 hours  insulin glargine Injectable (LANTUS) 30 Unit(s) SubCutaneous every morning  insulin lispro (HumaLOG) corrective regimen sliding scale   SubCutaneous three times a day before meals  insulin lispro Injectable (HumaLOG) 20 Unit(s) SubCutaneous three times a day before meals  magnesium oxide 400 milliGRAM(s) Oral three times a day with meals  metoprolol tartrate 25 milliGRAM(s) Oral two times a day  nystatin    Suspension 046639 Unit(s) Oral two times a day  nystatin Powder 1 Application(s) Topical two times a day  pantoprazole    Tablet 40 milliGRAM(s) Oral before breakfast  polyethylene glycol 3350 17 Gram(s) Oral daily  predniSONE   Tablet 40 milliGRAM(s) Oral daily  senna 2 Tablet(s) Oral at bedtime  tamsulosin 0.4 milliGRAM(s) Oral at bedtime  tiotropium 18 MICROgram(s) Capsule 1 Capsule(s) Inhalation at bedtime    MEDICATIONS  (PRN):  acetaminophen   Tablet .. 650 milliGRAM(s) Oral every 6 hours PRN Temp greater or equal to 38C (100.4F), Moderate Pain (4 - 6)  aluminum hydroxide/magnesium hydroxide/simethicone Suspension 30 milliLiter(s) Oral every 4 hours PRN Dyspepsia  dextrose 40% Gel 15 Gram(s) Oral once PRN Blood Glucose LESS THAN 70 milliGRAM(s)/deciliter  glucagon  Injectable 1 milliGRAM(s) IntraMuscular once PRN Glucose LESS THAN 70 milligrams/deciliter  guaiFENesin/dextromethorphan  Syrup 10 milliLiter(s) Oral every 6 hours PRN Cough    Radiology:

## 2019-08-07 NOTE — PROGRESS NOTE ADULT - SUBJECTIVE AND OBJECTIVE BOX
JIAN MANCIA  78y, Male  Allergy: aspirin (Other)  fish (Other)  iodine (Hives)  penicillin (Unknown)  shellfish (Other)    Hospital Day: 2d    Patient seen and examined earlier today.  Overall, says he is feeling much better.  urinating a lot and had a BM    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  High cholesterol  GERD (gastroesophageal reflux disease)  Enlarged prostate  Oxygen dependent  COPD (chronic obstructive pulmonary disease)  Diabetes mellitus, type 2  Hypertension  Emphysema lung  History of hemorrhoidectomy  Dysplastic colon polyp: removed      VITALS:  T(F): 95.6 (08-07-19 @ 06:00), Max: 97.5 (08-06-19 @ 13:56)  HR: 74 (08-07-19 @ 06:00)  BP: 127/75 (08-07-19 @ 06:00) (102/60 - 131/78)  RR: 16 (08-07-19 @ 06:00)  SpO2: --    PHYSICAL EXAM:  GENERAL: NAD  HEENT: MMM, on HFNC  CVS:  RRR  CHEST/LUNG:  diminshed b/l  ABD:  soft, NT/ND +bs  EXTREMITIES:  1+ edema b/l  NEURO: AOx3    TESTS & MEASUREMENTS:                          14.2   14.85 )-----------( 477      ( 07 Aug 2019 07:03 )             49.5       08-07    141  |  100  |  67<HH>  ----------------------------<  161<H>  5.6<H>   |  32  |  2.3<H>    Ca    9.3      07 Aug 2019 07:03  Phos  5.7     08-06  Mg     2.2     08-06    TPro  6.5  /  Alb  4.0  /  TBili  0.5  /  DBili  x   /  AST  11  /  ALT  14  /  AlkPhos  78  08-06    LIVER FUNCTIONS - ( 06 Aug 2019 07:08 )  Alb: 4.0 g/dL / Pro: 6.5 g/dL / ALK PHOS: 78 U/L / ALT: 14 U/L / AST: 11 U/L / GGT: x           CARDIAC MARKERS ( 06 Aug 2019 07:08 )  x     / 0.02 ng/mL / x     / x     / 4.2 ng/mL  CARDIAC MARKERS ( 05 Aug 2019 19:38 )  x     / 0.02 ng/mL / x     / x     / 5.0 ng/mL      RADIOLOGY & ADDITIONAL TESTS:  < from: Xray Chest 1 View-PORTABLE IMMEDIATE (08.04.19 @ 23:45) >  Impression:      Pulmonary vascular congestion.    < end of copied text >      MEDICATIONS:  MEDICATIONS  (STANDING):  ALBUTerol/ipratropium for Nebulization 3 milliLiter(s) Nebulizer every 6 hours  ascorbic acid 500 milliGRAM(s) Oral daily  atorvastatin 20 milliGRAM(s) Oral at bedtime  azithromycin  IVPB 500 milliGRAM(s) IV Intermittent every 24 hours  bisacodyl 10 milliGRAM(s) Oral at bedtime  buDESOnide 160 MICROgram(s)/formoterol 4.5 MICROgram(s) Inhaler 2 Puff(s) Inhalation two times a day  cholecalciferol 1000 Unit(s) Oral daily  dextrose 5%. 1000 milliLiter(s) (50 mL/Hr) IV Continuous <Continuous>  dextrose 50% Injectable 12.5 Gram(s) IV Push once  dextrose 50% Injectable 25 Gram(s) IV Push once  dextrose 50% Injectable 25 Gram(s) IV Push once  docusate sodium 100 milliGRAM(s) Oral three times a day  ferrous    sulfate 325 milliGRAM(s) Oral daily  finasteride 5 milliGRAM(s) Oral daily  furosemide   Injectable 40 milliGRAM(s) IV Push every 12 hours  heparin  Injectable 5000 Unit(s) SubCutaneous every 12 hours  insulin glargine Injectable (LANTUS) 30 Unit(s) SubCutaneous every morning  insulin lispro (HumaLOG) corrective regimen sliding scale   SubCutaneous three times a day before meals  insulin lispro Injectable (HumaLOG) 20 Unit(s) SubCutaneous three times a day before meals  magnesium oxide 400 milliGRAM(s) Oral three times a day with meals  metoprolol tartrate 25 milliGRAM(s) Oral two times a day  nystatin    Suspension 905081 Unit(s) Oral two times a day  nystatin Powder 1 Application(s) Topical two times a day  pantoprazole    Tablet 40 milliGRAM(s) Oral before breakfast  polyethylene glycol 3350 17 Gram(s) Oral daily  predniSONE   Tablet 40 milliGRAM(s) Oral daily  senna 2 Tablet(s) Oral at bedtime  tamsulosin 0.4 milliGRAM(s) Oral at bedtime  tiotropium 18 MICROgram(s) Capsule 1 Capsule(s) Inhalation at bedtime    MEDICATIONS  (PRN):  acetaminophen   Tablet .. 650 milliGRAM(s) Oral every 6 hours PRN Temp greater or equal to 38C (100.4F), Moderate Pain (4 - 6)  aluminum hydroxide/magnesium hydroxide/simethicone Suspension 30 milliLiter(s) Oral every 4 hours PRN Dyspepsia  dextrose 40% Gel 15 Gram(s) Oral once PRN Blood Glucose LESS THAN 70 milliGRAM(s)/deciliter  glucagon  Injectable 1 milliGRAM(s) IntraMuscular once PRN Glucose LESS THAN 70 milligrams/deciliter  guaiFENesin/dextromethorphan  Syrup 10 milliLiter(s) Oral every 6 hours PRN Cough      HOME MEDICATIONS:  aluminum hydroxide-magnesium hydroxide 200 mg-200 mg/5 mL oral suspension (08-05)  ascorbic acid 500 mg oral tablet (08-05)  atorvastatin 20 mg oral tablet (08-05)  cholecalciferol oral tablet (08-05)  docusate sodium 100 mg oral capsule (08-05)  finasteride 5 mg oral tablet (08-05)  guaifenesin-dextromethorphan 100 mg-10 mg/5 mL oral liquid (08-05)  magnesium oxide 400 mg (241.3 mg elemental magnesium) oral tablet (08-05)  metoprolol tartrate 25 mg oral tablet (08-05)  nystatin 100,000 units/g topical powder (08-05)  ocular lubricant ophthalmic solution (08-05)  polyethylene glycol 3350 oral powder for reconstitution (08-05)  senna oral tablet (08-05)  sodium polystyrene sulfonate (08-05)  tamsulosin 0.4 mg oral capsule (08-05)

## 2019-08-08 LAB
ANION GAP SERPL CALC-SCNC: 13 MMOL/L — SIGNIFICANT CHANGE UP (ref 7–14)
BUN SERPL-MCNC: 73 MG/DL — CRITICAL HIGH (ref 10–20)
CALCIUM SERPL-MCNC: 9.3 MG/DL — SIGNIFICANT CHANGE UP (ref 8.5–10.1)
CHLORIDE SERPL-SCNC: 94 MMOL/L — LOW (ref 98–110)
CO2 SERPL-SCNC: 34 MMOL/L — HIGH (ref 17–32)
CREAT SERPL-MCNC: 2.4 MG/DL — HIGH (ref 0.7–1.5)
GLUCOSE BLDC GLUCOMTR-MCNC: 146 MG/DL — HIGH (ref 70–99)
GLUCOSE BLDC GLUCOMTR-MCNC: 213 MG/DL — HIGH (ref 70–99)
GLUCOSE BLDC GLUCOMTR-MCNC: 337 MG/DL — HIGH (ref 70–99)
GLUCOSE BLDC GLUCOMTR-MCNC: 555 MG/DL — CRITICAL HIGH (ref 70–99)
GLUCOSE SERPL-MCNC: 260 MG/DL — HIGH (ref 70–99)
HCT VFR BLD CALC: 50.7 % — SIGNIFICANT CHANGE UP (ref 42–52)
HGB BLD-MCNC: 14.6 G/DL — SIGNIFICANT CHANGE UP (ref 14–18)
MCHC RBC-ENTMCNC: 22.8 PG — LOW (ref 27–31)
MCHC RBC-ENTMCNC: 28.8 G/DL — LOW (ref 32–37)
MCV RBC AUTO: 79.2 FL — LOW (ref 80–94)
NRBC # BLD: 0 /100 WBCS — SIGNIFICANT CHANGE UP (ref 0–0)
NT-PROBNP SERPL-SCNC: 2058 PG/ML — HIGH (ref 0–300)
PLATELET # BLD AUTO: 450 K/UL — HIGH (ref 130–400)
POTASSIUM SERPL-MCNC: 5.9 MMOL/L — HIGH (ref 3.5–5)
POTASSIUM SERPL-SCNC: 5.9 MMOL/L — HIGH (ref 3.5–5)
RBC # BLD: 6.4 M/UL — HIGH (ref 4.7–6.1)
RBC # FLD: 20 % — HIGH (ref 11.5–14.5)
SODIUM SERPL-SCNC: 141 MMOL/L — SIGNIFICANT CHANGE UP (ref 135–146)
WBC # BLD: 12.02 K/UL — HIGH (ref 4.8–10.8)
WBC # FLD AUTO: 12.02 K/UL — HIGH (ref 4.8–10.8)

## 2019-08-08 PROCEDURE — 99233 SBSQ HOSP IP/OBS HIGH 50: CPT

## 2019-08-08 PROCEDURE — 71045 X-RAY EXAM CHEST 1 VIEW: CPT | Mod: 26

## 2019-08-08 RX ORDER — FUROSEMIDE 40 MG
40 TABLET ORAL
Refills: 0 | Status: DISCONTINUED | OUTPATIENT
Start: 2019-08-08 | End: 2019-08-08

## 2019-08-08 RX ORDER — SODIUM POLYSTYRENE SULFONATE 4.1 MEQ/G
30 POWDER, FOR SUSPENSION ORAL ONCE
Refills: 0 | Status: COMPLETED | OUTPATIENT
Start: 2019-08-08 | End: 2019-08-08

## 2019-08-08 RX ADMIN — Medication 100 MILLIGRAM(S): at 22:18

## 2019-08-08 RX ADMIN — Medication 3 MILLILITER(S): at 20:28

## 2019-08-08 RX ADMIN — MAGNESIUM OXIDE 400 MG ORAL TABLET 400 MILLIGRAM(S): 241.3 TABLET ORAL at 08:02

## 2019-08-08 RX ADMIN — HEPARIN SODIUM 5000 UNIT(S): 5000 INJECTION INTRAVENOUS; SUBCUTANEOUS at 05:20

## 2019-08-08 RX ADMIN — MAGNESIUM OXIDE 400 MG ORAL TABLET 400 MILLIGRAM(S): 241.3 TABLET ORAL at 12:02

## 2019-08-08 RX ADMIN — Medication 40 MILLIGRAM(S): at 05:20

## 2019-08-08 RX ADMIN — MAGNESIUM OXIDE 400 MG ORAL TABLET 400 MILLIGRAM(S): 241.3 TABLET ORAL at 17:49

## 2019-08-08 RX ADMIN — NYSTATIN CREAM 1 APPLICATION(S): 100000 CREAM TOPICAL at 17:52

## 2019-08-08 RX ADMIN — INSULIN GLARGINE 30 UNIT(S): 100 INJECTION, SOLUTION SUBCUTANEOUS at 08:01

## 2019-08-08 RX ADMIN — Medication 20 UNIT(S): at 12:02

## 2019-08-08 RX ADMIN — TIOTROPIUM BROMIDE 1 CAPSULE(S): 18 CAPSULE ORAL; RESPIRATORY (INHALATION) at 21:37

## 2019-08-08 RX ADMIN — SODIUM POLYSTYRENE SULFONATE 30 GRAM(S): 4.1 POWDER, FOR SUSPENSION ORAL at 17:43

## 2019-08-08 RX ADMIN — Medication 3 MILLILITER(S): at 01:08

## 2019-08-08 RX ADMIN — TAMSULOSIN HYDROCHLORIDE 0.4 MILLIGRAM(S): 0.4 CAPSULE ORAL at 22:18

## 2019-08-08 RX ADMIN — Medication 3 MILLILITER(S): at 07:43

## 2019-08-08 RX ADMIN — POLYETHYLENE GLYCOL 3350 17 GRAM(S): 17 POWDER, FOR SOLUTION ORAL at 12:03

## 2019-08-08 RX ADMIN — Medication 30 MILLILITER(S): at 08:01

## 2019-08-08 RX ADMIN — PANTOPRAZOLE SODIUM 40 MILLIGRAM(S): 20 TABLET, DELAYED RELEASE ORAL at 05:25

## 2019-08-08 RX ADMIN — Medication 4: at 08:01

## 2019-08-08 RX ADMIN — NYSTATIN CREAM 1 APPLICATION(S): 100000 CREAM TOPICAL at 05:22

## 2019-08-08 RX ADMIN — Medication 500000 UNIT(S): at 17:49

## 2019-08-08 RX ADMIN — Medication 10 MILLIGRAM(S): at 22:18

## 2019-08-08 RX ADMIN — Medication 20 UNIT(S): at 08:01

## 2019-08-08 RX ADMIN — Medication 500 MILLIGRAM(S): at 12:01

## 2019-08-08 RX ADMIN — Medication 500000 UNIT(S): at 05:20

## 2019-08-08 RX ADMIN — BUDESONIDE AND FORMOTEROL FUMARATE DIHYDRATE 2 PUFF(S): 160; 4.5 AEROSOL RESPIRATORY (INHALATION) at 20:28

## 2019-08-08 RX ADMIN — BUDESONIDE AND FORMOTEROL FUMARATE DIHYDRATE 2 PUFF(S): 160; 4.5 AEROSOL RESPIRATORY (INHALATION) at 07:43

## 2019-08-08 RX ADMIN — Medication 40 MILLIGRAM(S): at 05:21

## 2019-08-08 RX ADMIN — Medication 12: at 17:46

## 2019-08-08 RX ADMIN — Medication 100 MILLIGRAM(S): at 05:21

## 2019-08-08 RX ADMIN — Medication 25 MILLIGRAM(S): at 17:51

## 2019-08-08 RX ADMIN — Medication 8: at 12:03

## 2019-08-08 RX ADMIN — Medication 100 MILLIGRAM(S): at 17:43

## 2019-08-08 RX ADMIN — Medication 3 MILLILITER(S): at 14:03

## 2019-08-08 RX ADMIN — HEPARIN SODIUM 5000 UNIT(S): 5000 INJECTION INTRAVENOUS; SUBCUTANEOUS at 17:50

## 2019-08-08 RX ADMIN — Medication 20 UNIT(S): at 17:44

## 2019-08-08 RX ADMIN — FINASTERIDE 5 MILLIGRAM(S): 5 TABLET, FILM COATED ORAL at 12:02

## 2019-08-08 RX ADMIN — AZITHROMYCIN 255 MILLIGRAM(S): 500 TABLET, FILM COATED ORAL at 06:08

## 2019-08-08 RX ADMIN — Medication 1000 UNIT(S): at 12:01

## 2019-08-08 RX ADMIN — ATORVASTATIN CALCIUM 20 MILLIGRAM(S): 80 TABLET, FILM COATED ORAL at 22:18

## 2019-08-08 RX ADMIN — Medication 325 MILLIGRAM(S): at 12:01

## 2019-08-08 NOTE — PROGRESS NOTE ADULT - ASSESSMENT
Impression:  Chronic hypercapnic/hypoxic respiratory failure on home O2  Severe COPD   HO MICHAEL/OHS on NIPPV  Pulmonary edema   Pulmonary hypertension     Recommendations:  Repeat Chest xray today   Albuterol nebs q 4 and PRN; C/w triple therapy Symbicort/ Spiriva    do BMP today follow BUN/ cr   decrease Prednisone to  20 mg with taper to home dose   Lasix 40 mg IV Q 12 hrs to keep I<O; Strict I and Os follow bun/ cr   Wean HF to NC; target So2 >88%; NIV PRN and qHS  OOB as tolerated   DNR/DNI  Poor overall prognosis   case discussed with hospitalist   social service for d/c plan

## 2019-08-08 NOTE — PROGRESS NOTE ADULT - SUBJECTIVE AND OBJECTIVE BOX
Patient is a 78y old  Male who presents with a chief complaint of sob (07 Aug 2019 12:58)      INTERVAL HISTORY/overnight events feel better back to baseline ambulating         Vital Signs Last 24 Hrs  T(C): 36.2 (08 Aug 2019 05:00), Max: 36.9 (07 Aug 2019 14:21)  T(F): 97.2 (08 Aug 2019 05:00), Max: 98.4 (07 Aug 2019 14:21)  HR: 59 (08 Aug 2019 05:00) (59 - 84)  BP: 126/70 (08 Aug 2019 05:00) (114/62 - 127/66)  BP(mean): --  RR: 18 (08 Aug 2019 05:00) (16 - 18)  SpO2: --  Daily     Daily   I&O's Summary    07 Aug 2019 07:01  -  08 Aug 2019 07:00  --------------------------------------------------------  IN: 780 mL / OUT: 2270 mL / NET: -1490 mL        Physical Examination:    General: No acute distress.  Alert, oriented, interactive, nonfocal    HEENT: Pupils equal, reactive to light.  Symmetric.    PULM: Clear to auscultation bilaterally, no significant sputum production    CVS: Regular rate and rhythm, no murmurs, rubs, or gallops    ABD: Soft, nondistended, nontender, normoactive bowel sounds, no masses    EXT: No edema, nontender    SKIN: Warm and well perfused, no rashes noted.    Neurology: no focal deficit     Musculoskeletal : Move all extremety         Lab Results:                        14.2   14.85 )-----------( 477      ( 07 Aug 2019 07:03 )             49.5     07 Aug 2019 07:03    141    |  100    |  67     ----------------------------<  161    5.6     |  32     |  2.3      Ca    9.3        07 Aug 2019 07:03                Microbiology      Medication:  MEDICATIONS  (STANDING):  ALBUTerol/ipratropium for Nebulization 3 milliLiter(s) Nebulizer every 6 hours  ascorbic acid 500 milliGRAM(s) Oral daily  atorvastatin 20 milliGRAM(s) Oral at bedtime  azithromycin  IVPB 500 milliGRAM(s) IV Intermittent every 24 hours  bisacodyl 10 milliGRAM(s) Oral at bedtime  buDESOnide 160 MICROgram(s)/formoterol 4.5 MICROgram(s) Inhaler 2 Puff(s) Inhalation two times a day  cholecalciferol 1000 Unit(s) Oral daily  dextrose 5%. 1000 milliLiter(s) (50 mL/Hr) IV Continuous <Continuous>  dextrose 50% Injectable 12.5 Gram(s) IV Push once  dextrose 50% Injectable 25 Gram(s) IV Push once  dextrose 50% Injectable 25 Gram(s) IV Push once  docusate sodium 100 milliGRAM(s) Oral three times a day  ferrous    sulfate 325 milliGRAM(s) Oral daily  finasteride 5 milliGRAM(s) Oral daily  furosemide   Injectable 40 milliGRAM(s) IV Push every 12 hours  heparin  Injectable 5000 Unit(s) SubCutaneous every 12 hours  insulin glargine Injectable (LANTUS) 30 Unit(s) SubCutaneous every morning  insulin lispro (HumaLOG) corrective regimen sliding scale   SubCutaneous three times a day before meals  insulin lispro Injectable (HumaLOG) 20 Unit(s) SubCutaneous three times a day before meals  magnesium oxide 400 milliGRAM(s) Oral three times a day with meals  metoprolol tartrate 25 milliGRAM(s) Oral two times a day  nystatin    Suspension 630920 Unit(s) Oral two times a day  nystatin Powder 1 Application(s) Topical two times a day  pantoprazole    Tablet 40 milliGRAM(s) Oral before breakfast  polyethylene glycol 3350 17 Gram(s) Oral daily  predniSONE   Tablet 40 milliGRAM(s) Oral daily  senna 2 Tablet(s) Oral at bedtime  tamsulosin 0.4 milliGRAM(s) Oral at bedtime  tiotropium 18 MICROgram(s) Capsule 1 Capsule(s) Inhalation at bedtime    MEDICATIONS  (PRN):  acetaminophen   Tablet .. 650 milliGRAM(s) Oral every 6 hours PRN Temp greater or equal to 38C (100.4F), Moderate Pain (4 - 6)  aluminum hydroxide/magnesium hydroxide/simethicone Suspension 30 milliLiter(s) Oral every 4 hours PRN Dyspepsia  dextrose 40% Gel 15 Gram(s) Oral once PRN Blood Glucose LESS THAN 70 milliGRAM(s)/deciliter  glucagon  Injectable 1 milliGRAM(s) IntraMuscular once PRN Glucose LESS THAN 70 milligrams/deciliter  guaiFENesin/dextromethorphan  Syrup 10 milliLiter(s) Oral every 6 hours PRN Cough        IMAGING STUDIES:

## 2019-08-08 NOTE — PROGRESS NOTE ADULT - SUBJECTIVE AND OBJECTIVE BOX
JIAN MANCIA  78y, Male  Allergy: aspirin (Other)  fish (Other)  iodine (Hives)  penicillin (Unknown)  shellfish (Other)    Hospital Day: 3d    Patient seen and examined earlier today.  No new complaints.  Overall, says he's feeling better.  wants to go to [facility]    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  High cholesterol  GERD (gastroesophageal reflux disease)  Enlarged prostate  Oxygen dependent  COPD (chronic obstructive pulmonary disease)  Diabetes mellitus, type 2  Hypertension  Emphysema lung  History of hemorrhoidectomy  Dysplastic colon polyp: removed      VITALS:  T(F): 97.2 (08-08-19 @ 05:00), Max: 98.4 (08-07-19 @ 14:21)  HR: 59 (08-08-19 @ 05:00)  BP: 126/70 (08-08-19 @ 05:00) (114/62 - 127/66)  RR: 18 (08-08-19 @ 05:00)  SpO2: --    PHYSICAL EXAM:  GENERAL: NAD  HEENT: MMM  CVS:  RRR  CHEST/LUNG:  diminished b/l  ABD:  soft, NT/ND +bs  EXTREMITIES:  1+ edema b/l  NEURO: AOx3    TESTS & MEASUREMENTS:                          14.2   14.85 )-----------( 477      ( 07 Aug 2019 07:03 )             49.5       08-07    141  |  100  |  67<HH>  ----------------------------<  161<H>  5.6<H>   |  32  |  2.3<H>    Ca    9.3      07 Aug 2019 07:03      RADIOLOGY & ADDITIONAL TESTS:  < from: Xray Chest 1 View- PORTABLE-Routine (08.08.19 @ 09:21) >  Impression:      Resolving pulmonary vascular congestion.    Stable bibasilar atelectasis.    < end of copied text >      MEDICATIONS:  MEDICATIONS  (STANDING):  ALBUTerol/ipratropium for Nebulization 3 milliLiter(s) Nebulizer every 6 hours  ascorbic acid 500 milliGRAM(s) Oral daily  atorvastatin 20 milliGRAM(s) Oral at bedtime  azithromycin  IVPB 500 milliGRAM(s) IV Intermittent every 24 hours  bisacodyl 10 milliGRAM(s) Oral at bedtime  buDESOnide 160 MICROgram(s)/formoterol 4.5 MICROgram(s) Inhaler 2 Puff(s) Inhalation two times a day  cholecalciferol 1000 Unit(s) Oral daily  dextrose 5%. 1000 milliLiter(s) (50 mL/Hr) IV Continuous <Continuous>  dextrose 50% Injectable 12.5 Gram(s) IV Push once  dextrose 50% Injectable 25 Gram(s) IV Push once  dextrose 50% Injectable 25 Gram(s) IV Push once  docusate sodium 100 milliGRAM(s) Oral three times a day  ferrous    sulfate 325 milliGRAM(s) Oral daily  finasteride 5 milliGRAM(s) Oral daily  furosemide    Tablet 40 milliGRAM(s) Oral two times a day  heparin  Injectable 5000 Unit(s) SubCutaneous every 12 hours  insulin glargine Injectable (LANTUS) 30 Unit(s) SubCutaneous every morning  insulin lispro (HumaLOG) corrective regimen sliding scale   SubCutaneous three times a day before meals  insulin lispro Injectable (HumaLOG) 20 Unit(s) SubCutaneous three times a day before meals  magnesium oxide 400 milliGRAM(s) Oral three times a day with meals  metoprolol tartrate 25 milliGRAM(s) Oral two times a day  nystatin    Suspension 937317 Unit(s) Oral two times a day  nystatin Powder 1 Application(s) Topical two times a day  pantoprazole    Tablet 40 milliGRAM(s) Oral before breakfast  polyethylene glycol 3350 17 Gram(s) Oral daily  predniSONE   Tablet 40 milliGRAM(s) Oral daily  senna 2 Tablet(s) Oral at bedtime  tamsulosin 0.4 milliGRAM(s) Oral at bedtime  tiotropium 18 MICROgram(s) Capsule 1 Capsule(s) Inhalation at bedtime    MEDICATIONS  (PRN):  acetaminophen   Tablet .. 650 milliGRAM(s) Oral every 6 hours PRN Temp greater or equal to 38C (100.4F), Moderate Pain (4 - 6)  aluminum hydroxide/magnesium hydroxide/simethicone Suspension 30 milliLiter(s) Oral every 4 hours PRN Dyspepsia  dextrose 40% Gel 15 Gram(s) Oral once PRN Blood Glucose LESS THAN 70 milliGRAM(s)/deciliter  glucagon  Injectable 1 milliGRAM(s) IntraMuscular once PRN Glucose LESS THAN 70 milligrams/deciliter  guaiFENesin/dextromethorphan  Syrup 10 milliLiter(s) Oral every 6 hours PRN Cough      HOME MEDICATIONS:  aluminum hydroxide-magnesium hydroxide 200 mg-200 mg/5 mL oral suspension (08-05)  ascorbic acid 500 mg oral tablet (08-05)  atorvastatin 20 mg oral tablet (08-05)  cholecalciferol oral tablet (08-05)  docusate sodium 100 mg oral capsule (08-05)  finasteride 5 mg oral tablet (08-05)  guaifenesin-dextromethorphan 100 mg-10 mg/5 mL oral liquid (08-05)  magnesium oxide 400 mg (241.3 mg elemental magnesium) oral tablet (08-05)  metoprolol tartrate 25 mg oral tablet (08-05)  nystatin 100,000 units/g topical powder (08-05)  ocular lubricant ophthalmic solution (08-05)  polyethylene glycol 3350 oral powder for reconstitution (08-05)  senna oral tablet (08-05)  sodium polystyrene sulfonate (08-05)  tamsulosin 0.4 mg oral capsule (08-05)

## 2019-08-08 NOTE — PROGRESS NOTE ADULT - ASSESSMENT
79 yo male hx of COPD on Home O2 (4-5L NC)/ HTN/HLD/DM, HFpEF, BIBA 2/2 SOB.    #acute on chronic hypoxic/hypercapnic respiratory failure  #acute on chronic diastolic CHF exacerbation  #mild acute COPD exacerbation  #MICHAEL/OHS on NIPPV  lasix changed to PO today; see how he does  repeat CXR shows improvement in vascular congestion  wean off high flow today; d/w RT  continue on 40mg PO prednisone and taper slowly  continue nebs, symbicort, spiriva, azithromycin, cough suppressant    #CKD3 - monitor renal function with diuresis  #HTN/HLD - BP ok, continue metoprolol, statin  #IDDM - continue insulin regimen  #BPH - continue flomax  #suspected vitamin D deficiency - continue supplement  #PPx - HSQ    DNR/DNI (does not want hospice)  	  Progress Note Handoff  Pending:  clinical improvement  Patient/Family discussion: d/w pt, agrees  Disposition: home with home care, apparently, STR is not option for patient due to use of AVAPS and high O2 requirements

## 2019-08-09 LAB
ALBUMIN SERPL ELPH-MCNC: 3.7 G/DL — SIGNIFICANT CHANGE UP (ref 3.5–5.2)
ALP SERPL-CCNC: 66 U/L — SIGNIFICANT CHANGE UP (ref 30–115)
ALT FLD-CCNC: 25 U/L — SIGNIFICANT CHANGE UP (ref 0–41)
ANION GAP SERPL CALC-SCNC: 11 MMOL/L — SIGNIFICANT CHANGE UP (ref 7–14)
AST SERPL-CCNC: 17 U/L — SIGNIFICANT CHANGE UP (ref 0–41)
BASOPHILS # BLD AUTO: 0.04 K/UL — SIGNIFICANT CHANGE UP (ref 0–0.2)
BASOPHILS NFR BLD AUTO: 0.3 % — SIGNIFICANT CHANGE UP (ref 0–1)
BILIRUB SERPL-MCNC: 0.4 MG/DL — SIGNIFICANT CHANGE UP (ref 0.2–1.2)
BUN SERPL-MCNC: 64 MG/DL — CRITICAL HIGH (ref 10–20)
CALCIUM SERPL-MCNC: 8.9 MG/DL — SIGNIFICANT CHANGE UP (ref 8.5–10.1)
CHLORIDE SERPL-SCNC: 96 MMOL/L — LOW (ref 98–110)
CO2 SERPL-SCNC: 36 MMOL/L — HIGH (ref 17–32)
CREAT SERPL-MCNC: 2 MG/DL — HIGH (ref 0.7–1.5)
EOSINOPHIL # BLD AUTO: 0.22 K/UL — SIGNIFICANT CHANGE UP (ref 0–0.7)
EOSINOPHIL NFR BLD AUTO: 1.9 % — SIGNIFICANT CHANGE UP (ref 0–8)
GLUCOSE BLDC GLUCOMTR-MCNC: 153 MG/DL — HIGH (ref 70–99)
GLUCOSE BLDC GLUCOMTR-MCNC: 223 MG/DL — HIGH (ref 70–99)
GLUCOSE BLDC GLUCOMTR-MCNC: 329 MG/DL — HIGH (ref 70–99)
GLUCOSE BLDC GLUCOMTR-MCNC: 405 MG/DL — HIGH (ref 70–99)
GLUCOSE SERPL-MCNC: 165 MG/DL — HIGH (ref 70–99)
HCT VFR BLD CALC: 49.8 % — SIGNIFICANT CHANGE UP (ref 42–52)
HGB BLD-MCNC: 14.3 G/DL — SIGNIFICANT CHANGE UP (ref 14–18)
IMM GRANULOCYTES NFR BLD AUTO: 1.3 % — HIGH (ref 0.1–0.3)
LYMPHOCYTES # BLD AUTO: 1.22 K/UL — SIGNIFICANT CHANGE UP (ref 1.2–3.4)
LYMPHOCYTES # BLD AUTO: 10.3 % — LOW (ref 20.5–51.1)
MAGNESIUM SERPL-MCNC: 2.5 MG/DL — HIGH (ref 1.8–2.4)
MCHC RBC-ENTMCNC: 22.8 PG — LOW (ref 27–31)
MCHC RBC-ENTMCNC: 28.7 G/DL — LOW (ref 32–37)
MCV RBC AUTO: 79.3 FL — LOW (ref 80–94)
MONOCYTES # BLD AUTO: 1.2 K/UL — HIGH (ref 0.1–0.6)
MONOCYTES NFR BLD AUTO: 10.1 % — HIGH (ref 1.7–9.3)
NEUTROPHILS # BLD AUTO: 9.03 K/UL — HIGH (ref 1.4–6.5)
NEUTROPHILS NFR BLD AUTO: 76.1 % — HIGH (ref 42.2–75.2)
NRBC # BLD: 0 /100 WBCS — SIGNIFICANT CHANGE UP (ref 0–0)
PLATELET # BLD AUTO: 432 K/UL — HIGH (ref 130–400)
POTASSIUM SERPL-MCNC: 5.2 MMOL/L — HIGH (ref 3.5–5)
POTASSIUM SERPL-SCNC: 5.2 MMOL/L — HIGH (ref 3.5–5)
PROT SERPL-MCNC: 5.8 G/DL — LOW (ref 6–8)
RBC # BLD: 6.28 M/UL — HIGH (ref 4.7–6.1)
RBC # FLD: 20.3 % — HIGH (ref 11.5–14.5)
SODIUM SERPL-SCNC: 143 MMOL/L — SIGNIFICANT CHANGE UP (ref 135–146)
WBC # BLD: 11.86 K/UL — HIGH (ref 4.8–10.8)
WBC # FLD AUTO: 11.86 K/UL — HIGH (ref 4.8–10.8)

## 2019-08-09 PROCEDURE — 99233 SBSQ HOSP IP/OBS HIGH 50: CPT

## 2019-08-09 RX ORDER — FUROSEMIDE 40 MG
40 TABLET ORAL DAILY
Refills: 0 | Status: DISCONTINUED | OUTPATIENT
Start: 2019-08-10 | End: 2019-08-10

## 2019-08-09 RX ADMIN — Medication 20 UNIT(S): at 17:00

## 2019-08-09 RX ADMIN — MAGNESIUM OXIDE 400 MG ORAL TABLET 400 MILLIGRAM(S): 241.3 TABLET ORAL at 17:00

## 2019-08-09 RX ADMIN — AZITHROMYCIN 255 MILLIGRAM(S): 500 TABLET, FILM COATED ORAL at 11:13

## 2019-08-09 RX ADMIN — Medication 3 MILLILITER(S): at 20:01

## 2019-08-09 RX ADMIN — Medication 40 MILLIGRAM(S): at 05:49

## 2019-08-09 RX ADMIN — Medication 100 MILLIGRAM(S): at 21:15

## 2019-08-09 RX ADMIN — Medication 500000 UNIT(S): at 05:50

## 2019-08-09 RX ADMIN — Medication 500 MILLIGRAM(S): at 11:55

## 2019-08-09 RX ADMIN — Medication 500000 UNIT(S): at 17:00

## 2019-08-09 RX ADMIN — FINASTERIDE 5 MILLIGRAM(S): 5 TABLET, FILM COATED ORAL at 11:55

## 2019-08-09 RX ADMIN — Medication 10 MILLIGRAM(S): at 21:15

## 2019-08-09 RX ADMIN — BUDESONIDE AND FORMOTEROL FUMARATE DIHYDRATE 2 PUFF(S): 160; 4.5 AEROSOL RESPIRATORY (INHALATION) at 20:00

## 2019-08-09 RX ADMIN — SENNA PLUS 2 TABLET(S): 8.6 TABLET ORAL at 21:16

## 2019-08-09 RX ADMIN — NYSTATIN CREAM 1 APPLICATION(S): 100000 CREAM TOPICAL at 17:02

## 2019-08-09 RX ADMIN — Medication 25 MILLIGRAM(S): at 05:49

## 2019-08-09 RX ADMIN — Medication 3 MILLILITER(S): at 07:44

## 2019-08-09 RX ADMIN — Medication 2: at 09:06

## 2019-08-09 RX ADMIN — NYSTATIN CREAM 1 APPLICATION(S): 100000 CREAM TOPICAL at 05:50

## 2019-08-09 RX ADMIN — PANTOPRAZOLE SODIUM 40 MILLIGRAM(S): 20 TABLET, DELAYED RELEASE ORAL at 05:49

## 2019-08-09 RX ADMIN — Medication 3 MILLILITER(S): at 00:48

## 2019-08-09 RX ADMIN — TIOTROPIUM BROMIDE 1 CAPSULE(S): 18 CAPSULE ORAL; RESPIRATORY (INHALATION) at 21:16

## 2019-08-09 RX ADMIN — Medication 1000 UNIT(S): at 11:55

## 2019-08-09 RX ADMIN — Medication 100 MILLIGRAM(S): at 13:00

## 2019-08-09 RX ADMIN — ATORVASTATIN CALCIUM 20 MILLIGRAM(S): 80 TABLET, FILM COATED ORAL at 21:15

## 2019-08-09 RX ADMIN — Medication 12: at 17:00

## 2019-08-09 RX ADMIN — Medication 20 UNIT(S): at 11:55

## 2019-08-09 RX ADMIN — BUDESONIDE AND FORMOTEROL FUMARATE DIHYDRATE 2 PUFF(S): 160; 4.5 AEROSOL RESPIRATORY (INHALATION) at 07:44

## 2019-08-09 RX ADMIN — MAGNESIUM OXIDE 400 MG ORAL TABLET 400 MILLIGRAM(S): 241.3 TABLET ORAL at 11:57

## 2019-08-09 RX ADMIN — TAMSULOSIN HYDROCHLORIDE 0.4 MILLIGRAM(S): 0.4 CAPSULE ORAL at 21:15

## 2019-08-09 RX ADMIN — HEPARIN SODIUM 5000 UNIT(S): 5000 INJECTION INTRAVENOUS; SUBCUTANEOUS at 17:00

## 2019-08-09 RX ADMIN — Medication 20 UNIT(S): at 09:06

## 2019-08-09 RX ADMIN — Medication 325 MILLIGRAM(S): at 11:55

## 2019-08-09 RX ADMIN — POLYETHYLENE GLYCOL 3350 17 GRAM(S): 17 POWDER, FOR SOLUTION ORAL at 11:56

## 2019-08-09 RX ADMIN — Medication 100 MILLIGRAM(S): at 05:49

## 2019-08-09 RX ADMIN — HEPARIN SODIUM 5000 UNIT(S): 5000 INJECTION INTRAVENOUS; SUBCUTANEOUS at 05:50

## 2019-08-09 RX ADMIN — Medication 3 MILLILITER(S): at 14:03

## 2019-08-09 RX ADMIN — MAGNESIUM OXIDE 400 MG ORAL TABLET 400 MILLIGRAM(S): 241.3 TABLET ORAL at 09:07

## 2019-08-09 RX ADMIN — INSULIN GLARGINE 30 UNIT(S): 100 INJECTION, SOLUTION SUBCUTANEOUS at 09:07

## 2019-08-09 RX ADMIN — Medication 8: at 11:55

## 2019-08-09 RX ADMIN — Medication 25 MILLIGRAM(S): at 17:00

## 2019-08-09 NOTE — PROGRESS NOTE ADULT - ASSESSMENT
79 yo male hx of COPD on Home O2 (4-5L NC)/ HTN/HLD/DM, HFpEF, BIBA 2/2 SOB.    #acute on chronic hypoxic/hypercapnic respiratory failure  #acute on chronic diastolic CHF exacerbation  #mild acute COPD exacerbation  #MICHAEL/OHS on NIPPV  lasix changed to PO  repeat CXR shows improvement in vascular congestion  on home dose O2  decrease steroids to 20mg PO daily  continue nebs, symbicort, spiriva, azithromycin, cough suppressant    #CKD3 - Cr around baselin, elevated BUN likely from steroids.  #HTN/HLD - BP ok, continue metoprolol, statin  #IDDM - continue insulin regimen  #BPH - continue flomax  #suspected vitamin D deficiency - continue supplement  #PPx - HSQ    DNR/DNI (does not want hospice)  	  Progress Note Handoff  [facility] home [facility]  Patient/Family discussion: d/w pt, agrees  Disposition: home with 24hr home care, tomorrow.

## 2019-08-09 NOTE — PROGRESS NOTE ADULT - SUBJECTIVE AND OBJECTIVE BOX
JIAN MANCIA  78y, Male  Allergy: aspirin (Other)  fish (Other)  iodine (Hives)  penicillin (Unknown)  shellfish (Other)    Hospital Day: 4d    Patient seen and examined earlier today.  Overall doing better, back on 5-6L via NC.    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  High cholesterol  GERD (gastroesophageal reflux disease)  Enlarged prostate  Oxygen dependent  COPD (chronic obstructive pulmonary disease)  Diabetes mellitus, type 2  Hypertension  Emphysema lung  History of hemorrhoidectomy  Dysplastic colon polyp: removed      VITALS:  T(F): 97.7 (08-09-19 @ 14:36), Max: 97.7 (08-09-19 @ 14:36)  HR: 87 (08-09-19 @ 14:36)  BP: 129/62 (08-09-19 @ 14:36) (129/62 - 141/65)  RR: 16 (08-09-19 @ 14:36)  SpO2: 94% (08-09-19 @ 08:08)    PHYSICAL EXAM:  GENERAL: NAD  HEENT: MMM  CVS:  RRR  CHEST/LUNG:  diminshed b/l  ABD:  soft, NT/ND +bs  EXTREMITIES:  trace edema  NEURO: AOx3    TESTS & MEASUREMENTS:                          14.3   11.86 )-----------( 432      ( 09 Aug 2019 07:28 )             49.8       08-09    143  |  96<L>  |  64<HH>  ----------------------------<  165<H>  5.2<H>   |  36<H>  |  2.0<H>    Ca    8.9      09 Aug 2019 07:28  Mg     2.5     08-09    TPro  5.8<L>  /  Alb  3.7  /  TBili  0.4  /  DBili  x   /  AST  17  /  ALT  25  /  AlkPhos  66  08-09    LIVER FUNCTIONS - ( 09 Aug 2019 07:28 )  Alb: 3.7 g/dL / Pro: 5.8 g/dL / ALK PHOS: 66 U/L / ALT: 25 U/L / AST: 17 U/L / GGT: x             RADIOLOGY & ADDITIONAL TESTS:  < from: Xray Chest 1 View- PORTABLE-Routine (08.08.19 @ 09:21) >  Impression:      Resolving pulmonary vascular congestion.    Stable bibasilar atelectasis.    < end of copied text >        MEDICATIONS:  MEDICATIONS  (STANDING):  ALBUTerol/ipratropium for Nebulization 3 milliLiter(s) Nebulizer every 6 hours  ascorbic acid 500 milliGRAM(s) Oral daily  atorvastatin 20 milliGRAM(s) Oral at bedtime  azithromycin  IVPB 500 milliGRAM(s) IV Intermittent every 24 hours  bisacodyl 10 milliGRAM(s) Oral at bedtime  buDESOnide 160 MICROgram(s)/formoterol 4.5 MICROgram(s) Inhaler 2 Puff(s) Inhalation two times a day  cholecalciferol 1000 Unit(s) Oral daily  dextrose 5%. 1000 milliLiter(s) (50 mL/Hr) IV Continuous <Continuous>  dextrose 50% Injectable 12.5 Gram(s) IV Push once  dextrose 50% Injectable 25 Gram(s) IV Push once  dextrose 50% Injectable 25 Gram(s) IV Push once  docusate sodium 100 milliGRAM(s) Oral three times a day  ferrous    sulfate 325 milliGRAM(s) Oral daily  finasteride 5 milliGRAM(s) Oral daily  heparin  Injectable 5000 Unit(s) SubCutaneous every 12 hours  insulin glargine Injectable (LANTUS) 30 Unit(s) SubCutaneous every morning  insulin lispro (HumaLOG) corrective regimen sliding scale   SubCutaneous three times a day before meals  insulin lispro Injectable (HumaLOG) 20 Unit(s) SubCutaneous three times a day before meals  magnesium oxide 400 milliGRAM(s) Oral three times a day with meals  metoprolol tartrate 25 milliGRAM(s) Oral two times a day  nystatin    Suspension [id number] Unit(s) Oral two times a day  nystatin Powder 1 Application(s) Topical two times a day  pantoprazole    Tablet 40 milliGRAM(s) Oral before breakfast  polyethylene glycol 3350 17 Gram(s) Oral daily  predniSONE   Tablet 40 milliGRAM(s) Oral daily  senna 2 Tablet(s) Oral at bedtime  tamsulosin 0.4 milliGRAM(s) Oral at bedtime  tiotropium 18 MICROgram(s) Capsule 1 Capsule(s) Inhalation at bedtime    MEDICATIONS  (PRN):  acetaminophen   Tablet .. 650 milliGRAM(s) Oral every 6 hours PRN Temp greater or equal to 38C (100.4F), Moderate Pain (4 - 6)  aluminum hydroxide/magnesium hydroxide/simethicone Suspension 30 milliLiter(s) Oral every 4 hours PRN Dyspepsia  dextrose 40% Gel 15 Gram(s) Oral once PRN Blood Glucose LESS THAN 70 milliGRAM(s)/deciliter  glucagon  Injectable 1 milliGRAM(s) IntraMuscular once PRN Glucose LESS THAN 70 milligrams/deciliter  guaiFENesin/dextromethorphan  Syrup 10 milliLiter(s) Oral every 6 hours PRN Cough      HOME MEDICATIONS:  aluminum hydroxide-magnesium hydroxide 200 mg-200 mg/5 mL oral suspension (08-05)  ascorbic acid 500 mg oral tablet (08-05)  atorvastatin 20 mg oral tablet (08-05)  cholecalciferol oral tablet (08-05)  docusate sodium 100 mg oral capsule (08-05)  finasteride 5 mg oral tablet (08-05)  guaifenesin-dextromethorphan 100 mg-10 mg/5 mL oral liquid (08-05)  magnesium oxide 400 mg (241.3 mg elemental magnesium) oral tablet (08-05)  metoprolol tartrate 25 mg oral tablet (08-05)  nystatin 100,000 units/g topical powder (08-05)  ocular lubricant ophthalmic solution (08-05)  polyethylene glycol 3350 oral powder for reconstitution (08-05)  senna oral tablet (08-05)  sodium polystyrene sulfonate (08-05)  tamsulosin 0.4 mg oral capsule (08-05)

## 2019-08-09 NOTE — PROGRESS NOTE ADULT - SUBJECTIVE AND OBJECTIVE BOX
Patient is a 78y old  Male who presents with a chief complaint of sob (08 Aug 2019 11:46)      INTERVAL HISTORY/overnight events  feel much better back to baseline same oxygen requirement at baseline   cxr improved         Vital Signs Last 24 Hrs  T(C): 36.2 (08 Aug 2019 22:27), Max: 37.4 (08 Aug 2019 14:07)  T(F): 97.2 (08 Aug 2019 22:27), Max: 99.4 (08 Aug 2019 14:07)  HR: 96 (09 Aug 2019 08:08) (81 - 96)  BP: 141/65 (08 Aug 2019 22:27) (131/78 - 141/65)  BP(mean): --  RR: 18 (09 Aug 2019 08:08) (18 - 18)  SpO2: 94% (09 Aug 2019 08:08) (94% - 94%)  Daily     Daily   I&O's Summary    08 Aug 2019 07:01  -  09 Aug 2019 07:00  --------------------------------------------------------  IN: 510 mL / OUT: 1300 mL / NET: -790 mL        Physical Examination:    General: No acute distress.  Alert, oriented, interactive, nonfocal    HEENT: Pupils equal, reactive to light.  Symmetric.    PULM: Clear to auscultation bilaterally, no significant sputum production    CVS: Regular rate and rhythm, no murmurs, rubs, or gallops    ABD: Soft, nondistended, nontender, normoactive bowel sounds, no masses    EXT: 1 edema, nontender    SKIN: Warm and well perfused, no rashes noted.    Neurology: no focal deficit     Musculoskeletal : Move all extremety         Lab Results:                        14.3   11.86 )-----------( 432      ( 09 Aug 2019 07:28 )             49.8     08 Aug 2019 11:49    141    |  94     |  73     ----------------------------<  260    5.9     |  34     |  2.4      Ca    9.3        08 Aug 2019 11:49                Microbiology      Medication:  MEDICATIONS  (STANDING):  ALBUTerol/ipratropium for Nebulization 3 milliLiter(s) Nebulizer every 6 hours  ascorbic acid 500 milliGRAM(s) Oral daily  atorvastatin 20 milliGRAM(s) Oral at bedtime  azithromycin  IVPB 500 milliGRAM(s) IV Intermittent every 24 hours  bisacodyl 10 milliGRAM(s) Oral at bedtime  buDESOnide 160 MICROgram(s)/formoterol 4.5 MICROgram(s) Inhaler 2 Puff(s) Inhalation two times a day  cholecalciferol 1000 Unit(s) Oral daily  dextrose 5%. 1000 milliLiter(s) (50 mL/Hr) IV Continuous <Continuous>  dextrose 50% Injectable 12.5 Gram(s) IV Push once  dextrose 50% Injectable 25 Gram(s) IV Push once  dextrose 50% Injectable 25 Gram(s) IV Push once  docusate sodium 100 milliGRAM(s) Oral three times a day  ferrous    sulfate 325 milliGRAM(s) Oral daily  finasteride 5 milliGRAM(s) Oral daily  heparin  Injectable 5000 Unit(s) SubCutaneous every 12 hours  insulin glargine Injectable (LANTUS) 30 Unit(s) SubCutaneous every morning  insulin lispro (HumaLOG) corrective regimen sliding scale   SubCutaneous three times a day before meals  insulin lispro Injectable (HumaLOG) 20 Unit(s) SubCutaneous three times a day before meals  magnesium oxide 400 milliGRAM(s) Oral three times a day with meals  metoprolol tartrate 25 milliGRAM(s) Oral two times a day  nystatin    Suspension [id number] Unit(s) Oral two times a day  nystatin Powder 1 Application(s) Topical two times a day  pantoprazole    Tablet 40 milliGRAM(s) Oral before breakfast  polyethylene glycol 3350 17 Gram(s) Oral daily  predniSONE   Tablet 40 milliGRAM(s) Oral daily  senna 2 Tablet(s) Oral at bedtime  tamsulosin 0.4 milliGRAM(s) Oral at bedtime  tiotropium 18 MICROgram(s) Capsule 1 Capsule(s) Inhalation at bedtime    MEDICATIONS  (PRN):  acetaminophen   Tablet .. 650 milliGRAM(s) Oral every 6 hours PRN Temp greater or equal to 38C (100.4F), Moderate Pain (4 - 6)  aluminum hydroxide/magnesium hydroxide/simethicone Suspension 30 milliLiter(s) Oral every 4 hours PRN Dyspepsia  dextrose 40% Gel 15 Gram(s) Oral once PRN Blood Glucose LESS THAN 70 milliGRAM(s)/deciliter  glucagon  Injectable 1 milliGRAM(s) IntraMuscular once PRN Glucose LESS THAN 70 milligrams/deciliter  guaiFENesin/dextromethorphan  Syrup 10 milliLiter(s) Oral every 6 hours PRN Cough        IMAGING STUDIES:  cxr decrease b/l congestion

## 2019-08-09 NOTE — PROGRESS NOTE ADULT - ASSESSMENT
Impression:  Chronic hypercapnic/hypoxic respiratory failure on home O2  Severe COPD   HO MICHAEL/OHS on NIPPV  Pulmonary edema   Pulmonary hypertension     Recommendations:  consider switch to po lasix 40 mg daily   Albuterol nebs q 4 and PRN; C/w triple therapy Symbicort/ Spiriva    do BMP today follow BUN/ cr   decrease Prednisone to  20 mg with taper to home dose   Wean HF to NC; target So2 >88%; NIV PRN and qHS  OOB as tolerated   DNR/DNI  Poor overall prognosis   case discussed with hospitalist   social service for d/c plan   recall prn

## 2019-08-10 ENCOUNTER — TRANSCRIPTION ENCOUNTER (OUTPATIENT)
Age: 79
End: 2019-08-10

## 2019-08-10 VITALS
SYSTOLIC BLOOD PRESSURE: 138 MMHG | RESPIRATION RATE: 18 BRPM | HEART RATE: 102 BPM | DIASTOLIC BLOOD PRESSURE: 88 MMHG | TEMPERATURE: 98 F

## 2019-08-10 LAB
ANION GAP SERPL CALC-SCNC: 11 MMOL/L — SIGNIFICANT CHANGE UP (ref 7–14)
BUN SERPL-MCNC: 60 MG/DL — HIGH (ref 10–20)
CALCIUM SERPL-MCNC: 8.9 MG/DL — SIGNIFICANT CHANGE UP (ref 8.5–10.1)
CHLORIDE SERPL-SCNC: 96 MMOL/L — LOW (ref 98–110)
CO2 SERPL-SCNC: 35 MMOL/L — HIGH (ref 17–32)
CREAT SERPL-MCNC: 1.9 MG/DL — HIGH (ref 0.7–1.5)
GLUCOSE BLDC GLUCOMTR-MCNC: 238 MG/DL — HIGH (ref 70–99)
GLUCOSE BLDC GLUCOMTR-MCNC: 264 MG/DL — HIGH (ref 70–99)
GLUCOSE BLDC GLUCOMTR-MCNC: 285 MG/DL — HIGH (ref 70–99)
GLUCOSE SERPL-MCNC: 272 MG/DL — HIGH (ref 70–99)
POTASSIUM SERPL-MCNC: 5.1 MMOL/L — HIGH (ref 3.5–5)
POTASSIUM SERPL-SCNC: 5.1 MMOL/L — HIGH (ref 3.5–5)
SODIUM SERPL-SCNC: 142 MMOL/L — SIGNIFICANT CHANGE UP (ref 135–146)

## 2019-08-10 PROCEDURE — 99239 HOSP IP/OBS DSCHRG MGMT >30: CPT

## 2019-08-10 RX ORDER — FUROSEMIDE 40 MG
1 TABLET ORAL
Qty: 30 | Refills: 0
Start: 2019-08-10 | End: 2019-09-08

## 2019-08-10 RX ORDER — NYSTATIN 500MM UNIT
5 POWDER (EA) MISCELLANEOUS
Qty: 100 | Refills: 0
Start: 2019-08-10 | End: 2019-08-16

## 2019-08-10 RX ORDER — NYSTATIN CREAM 100000 [USP'U]/G
1 CREAM TOPICAL
Qty: 63 | Refills: 2
Start: 2019-08-10 | End: 2019-09-20

## 2019-08-10 RX ADMIN — MAGNESIUM OXIDE 400 MG ORAL TABLET 400 MILLIGRAM(S): 241.3 TABLET ORAL at 17:43

## 2019-08-10 RX ADMIN — Medication 500 MILLIGRAM(S): at 11:11

## 2019-08-10 RX ADMIN — Medication 4: at 07:51

## 2019-08-10 RX ADMIN — Medication 20 UNIT(S): at 16:25

## 2019-08-10 RX ADMIN — HEPARIN SODIUM 5000 UNIT(S): 5000 INJECTION INTRAVENOUS; SUBCUTANEOUS at 05:59

## 2019-08-10 RX ADMIN — AZITHROMYCIN 255 MILLIGRAM(S): 500 TABLET, FILM COATED ORAL at 06:00

## 2019-08-10 RX ADMIN — Medication 25 MILLIGRAM(S): at 17:50

## 2019-08-10 RX ADMIN — Medication 20 UNIT(S): at 07:53

## 2019-08-10 RX ADMIN — Medication 25 MILLIGRAM(S): at 05:58

## 2019-08-10 RX ADMIN — Medication 20 UNIT(S): at 11:43

## 2019-08-10 RX ADMIN — Medication 6: at 16:25

## 2019-08-10 RX ADMIN — NYSTATIN CREAM 1 APPLICATION(S): 100000 CREAM TOPICAL at 06:00

## 2019-08-10 RX ADMIN — NYSTATIN CREAM 1 APPLICATION(S): 100000 CREAM TOPICAL at 17:51

## 2019-08-10 RX ADMIN — MAGNESIUM OXIDE 400 MG ORAL TABLET 400 MILLIGRAM(S): 241.3 TABLET ORAL at 11:46

## 2019-08-10 RX ADMIN — Medication 3 MILLILITER(S): at 02:46

## 2019-08-10 RX ADMIN — Medication 100 MILLIGRAM(S): at 05:58

## 2019-08-10 RX ADMIN — INSULIN GLARGINE 30 UNIT(S): 100 INJECTION, SOLUTION SUBCUTANEOUS at 07:40

## 2019-08-10 RX ADMIN — Medication 3 MILLILITER(S): at 07:39

## 2019-08-10 RX ADMIN — Medication 500000 UNIT(S): at 17:50

## 2019-08-10 RX ADMIN — Medication 3 MILLILITER(S): at 13:43

## 2019-08-10 RX ADMIN — BUDESONIDE AND FORMOTEROL FUMARATE DIHYDRATE 2 PUFF(S): 160; 4.5 AEROSOL RESPIRATORY (INHALATION) at 07:39

## 2019-08-10 RX ADMIN — Medication 100 MILLIGRAM(S): at 13:43

## 2019-08-10 RX ADMIN — Medication 1000 UNIT(S): at 11:09

## 2019-08-10 RX ADMIN — Medication 325 MILLIGRAM(S): at 11:10

## 2019-08-10 RX ADMIN — Medication 20 MILLIGRAM(S): at 05:58

## 2019-08-10 RX ADMIN — Medication 40 MILLIGRAM(S): at 05:58

## 2019-08-10 RX ADMIN — POLYETHYLENE GLYCOL 3350 17 GRAM(S): 17 POWDER, FOR SOLUTION ORAL at 11:11

## 2019-08-10 RX ADMIN — MAGNESIUM OXIDE 400 MG ORAL TABLET 400 MILLIGRAM(S): 241.3 TABLET ORAL at 07:39

## 2019-08-10 RX ADMIN — HEPARIN SODIUM 5000 UNIT(S): 5000 INJECTION INTRAVENOUS; SUBCUTANEOUS at 17:51

## 2019-08-10 RX ADMIN — PANTOPRAZOLE SODIUM 40 MILLIGRAM(S): 20 TABLET, DELAYED RELEASE ORAL at 06:01

## 2019-08-10 RX ADMIN — FINASTERIDE 5 MILLIGRAM(S): 5 TABLET, FILM COATED ORAL at 11:09

## 2019-08-10 RX ADMIN — Medication 500000 UNIT(S): at 05:59

## 2019-08-10 RX ADMIN — Medication 6: at 11:43

## 2019-08-10 NOTE — DISCHARGE NOTE NURSING/CASE MANAGEMENT/SOCIAL WORK - NSDCDPATPORTLINK_GEN_ALL_CORE
You can access the [facility] Portal, offered by [facility], by registering with the following website: [email or web address]

## 2019-08-10 NOTE — DISCHARGE NOTE PROVIDER - CARE PROVIDERS DIRECT ADDRESSES
[email or web address]

## 2019-08-10 NOTE — DISCHARGE NOTE NURSING/CASE MANAGEMENT/SOCIAL WORK - NSDCPEEMAIL_GEN_ALL_CORE
[facility] for Tobacco Control email [email or web address]

## 2019-08-10 NOTE — DISCHARGE NOTE PROVIDER - NSDCCPCAREPLAN_GEN_ALL_CORE_FT
PRINCIPAL DISCHARGE DIAGNOSIS  Diagnosis: COPD exacerbation  Assessment and Plan of Treatment: contintinue steroids, nebs, breathing treatments, O2      SECONDARY DISCHARGE DIAGNOSES  Diagnosis: Diastolic CHF, acute on chronic  Assessment and Plan of Treatment: continue lasix at 40mg daily

## 2019-08-10 NOTE — DISCHARGE NOTE PROVIDER - CARE PROVIDER_API CALL
Irena Cohen)  Geriatric Medicine; Internal Medicine  [address] [place] [address]  Phone: [phone]  Fax: [phone]  Follow Up Time: 1 week    Raul Castrejon)  Internal Medicine; Pulmonary Disease  [address] [place] [address], [facility] [address]  [place] [address]  Phone: [phone]  Fax: [phone]  Follow Up Time: 2 weeks

## 2019-08-10 NOTE — DISCHARGE NOTE PROVIDER - PROVIDER TOKENS
PROVIDER:[TOKEN:[[id number]],FOLLOWUP:[1 week]],PROVIDER:[TOKEN:[[id number]],FOLLOWUP:[2 weeks]]

## 2019-08-10 NOTE — DISCHARGE NOTE NURSING/CASE MANAGEMENT/SOCIAL WORK - NSDCPEWEB_GEN_ALL_CORE
[place] website --- [email or web address] Tobacco [email or web address] website --- [email or web address]

## 2019-08-13 ENCOUNTER — RX RENEWAL (OUTPATIENT)
Age: 79
End: 2019-08-13

## 2019-08-14 ENCOUNTER — MESSAGE (OUTPATIENT)
Age: 79
End: 2019-08-14

## 2019-08-15 DIAGNOSIS — Z88.0 ALLERGY STATUS TO PENICILLIN: ICD-10-CM

## 2019-08-15 DIAGNOSIS — E11.22 TYPE 2 DIABETES MELLITUS WITH DIABETIC CHRONIC KIDNEY DISEASE: ICD-10-CM

## 2019-08-15 DIAGNOSIS — E66.2 MORBID (SEVERE) OBESITY WITH ALVEOLAR HYPOVENTILATION: ICD-10-CM

## 2019-08-15 DIAGNOSIS — J96.22 ACUTE AND CHRONIC RESPIRATORY FAILURE WITH HYPERCAPNIA: ICD-10-CM

## 2019-08-15 DIAGNOSIS — I13.0 HYPERTENSIVE HEART AND CHRONIC KIDNEY DISEASE WITH HEART FAILURE AND STAGE 1 THROUGH STAGE 4 CHRONIC KIDNEY DISEASE, OR UNSPECIFIED CHRONIC KIDNEY DISEASE: ICD-10-CM

## 2019-08-15 DIAGNOSIS — I50.33 ACUTE ON CHRONIC DIASTOLIC (CONGESTIVE) HEART FAILURE: ICD-10-CM

## 2019-08-15 DIAGNOSIS — Z91.013 ALLERGY TO SEAFOOD: ICD-10-CM

## 2019-08-15 DIAGNOSIS — N40.0 BENIGN PROSTATIC HYPERPLASIA WITHOUT LOWER URINARY TRACT SYMPTOMS: ICD-10-CM

## 2019-08-15 DIAGNOSIS — R06.02 SHORTNESS OF BREATH: ICD-10-CM

## 2019-08-15 DIAGNOSIS — E78.5 HYPERLIPIDEMIA, UNSPECIFIED: ICD-10-CM

## 2019-08-15 DIAGNOSIS — Z79.4 LONG TERM (CURRENT) USE OF INSULIN: ICD-10-CM

## 2019-08-15 DIAGNOSIS — Z66 DO NOT RESUSCITATE: ICD-10-CM

## 2019-08-15 DIAGNOSIS — N18.3 CHRONIC KIDNEY DISEASE, STAGE 3 (MODERATE): ICD-10-CM

## 2019-08-15 DIAGNOSIS — Z87.891 PERSONAL HISTORY OF NICOTINE DEPENDENCE: ICD-10-CM

## 2019-08-15 DIAGNOSIS — Z88.6 ALLERGY STATUS TO ANALGESIC AGENT: ICD-10-CM

## 2019-08-15 DIAGNOSIS — Z98.890 OTHER SPECIFIED POSTPROCEDURAL STATES: ICD-10-CM

## 2019-08-15 DIAGNOSIS — Z91.041 RADIOGRAPHIC DYE ALLERGY STATUS: ICD-10-CM

## 2019-08-15 DIAGNOSIS — J43.9 EMPHYSEMA, UNSPECIFIED: ICD-10-CM

## 2019-08-15 DIAGNOSIS — I27.20 PULMONARY HYPERTENSION, UNSPECIFIED: ICD-10-CM

## 2019-08-15 DIAGNOSIS — J96.21 ACUTE AND CHRONIC RESPIRATORY FAILURE WITH HYPOXIA: ICD-10-CM

## 2019-08-15 DIAGNOSIS — E55.9 VITAMIN D DEFICIENCY, UNSPECIFIED: ICD-10-CM

## 2019-08-15 DIAGNOSIS — Z86.010 PERSONAL HISTORY OF COLONIC POLYPS: ICD-10-CM

## 2019-08-15 DIAGNOSIS — Z99.81 DEPENDENCE ON SUPPLEMENTAL OXYGEN: ICD-10-CM

## 2019-08-20 ENCOUNTER — APPOINTMENT (OUTPATIENT)
Dept: GERIATRICS | Facility: HOME HEALTH | Age: 79
End: 2019-08-20
Payer: MEDICARE

## 2019-08-20 ENCOUNTER — LABORATORY RESULT (OUTPATIENT)
Age: 79
End: 2019-08-20

## 2019-08-20 ENCOUNTER — OUTPATIENT (OUTPATIENT)
Dept: OUTPATIENT SERVICES | Facility: HOSPITAL | Age: 79
LOS: 1 days | Discharge: HOME | End: 2019-08-20

## 2019-08-20 DIAGNOSIS — E66.2 MORBID (SEVERE) OBESITY WITH ALVEOLAR HYPOVENTILATION: ICD-10-CM

## 2019-08-20 DIAGNOSIS — Z98.890 OTHER SPECIFIED POSTPROCEDURAL STATES: Chronic | ICD-10-CM

## 2019-08-20 DIAGNOSIS — E78.5 HYPERLIPIDEMIA, UNSPECIFIED: ICD-10-CM

## 2019-08-20 DIAGNOSIS — K63.5 POLYP OF COLON: Chronic | ICD-10-CM

## 2019-08-20 PROCEDURE — 99495 TRANSJ CARE MGMT MOD F2F 14D: CPT

## 2019-08-21 ENCOUNTER — INPATIENT (INPATIENT)
Facility: HOSPITAL | Age: 79
LOS: 7 days | Discharge: HOME HEALTH | End: 2019-08-29
Attending: STUDENT IN AN ORGANIZED HEALTH CARE EDUCATION/TRAINING PROGRAM | Admitting: STUDENT IN AN ORGANIZED HEALTH CARE EDUCATION/TRAINING PROGRAM
Payer: MEDICARE

## 2019-08-21 VITALS
RESPIRATION RATE: 22 BRPM | SYSTOLIC BLOOD PRESSURE: 144 MMHG | HEART RATE: 121 BPM | OXYGEN SATURATION: 78 % | DIASTOLIC BLOOD PRESSURE: 69 MMHG

## 2019-08-21 DIAGNOSIS — R06.02 SHORTNESS OF BREATH: ICD-10-CM

## 2019-08-21 DIAGNOSIS — Z98.890 OTHER SPECIFIED POSTPROCEDURAL STATES: Chronic | ICD-10-CM

## 2019-08-21 DIAGNOSIS — K63.5 POLYP OF COLON: Chronic | ICD-10-CM

## 2019-08-21 DIAGNOSIS — T85.618A BREAKDOWN (MECHANICAL) OF OTHER SPECIFIED INTERNAL PROSTHETIC DEVICES, IMPLANTS AND GRAFTS, INITIAL ENCOUNTER: ICD-10-CM

## 2019-08-21 DIAGNOSIS — E11.9 TYPE 2 DIABETES MELLITUS WITHOUT COMPLICATIONS: ICD-10-CM

## 2019-08-21 DIAGNOSIS — I50.9 HEART FAILURE, UNSPECIFIED: ICD-10-CM

## 2019-08-21 DIAGNOSIS — N18.3 CHRONIC KIDNEY DISEASE, STAGE 3 (MODERATE): ICD-10-CM

## 2019-08-21 DIAGNOSIS — N40.0 BENIGN PROSTATIC HYPERPLASIA WITHOUT LOWER URINARY TRACT SYMPTOMS: ICD-10-CM

## 2019-08-21 DIAGNOSIS — R74.8 ABNORMAL LEVELS OF OTHER SERUM ENZYMES: ICD-10-CM

## 2019-08-21 DIAGNOSIS — R91.8 OTHER NONSPECIFIC ABNORMAL FINDING OF LUNG FIELD: ICD-10-CM

## 2019-08-21 DIAGNOSIS — J44.9 CHRONIC OBSTRUCTIVE PULMONARY DISEASE, UNSPECIFIED: ICD-10-CM

## 2019-08-21 DIAGNOSIS — E87.5 HYPERKALEMIA: ICD-10-CM

## 2019-08-21 DIAGNOSIS — J95.859: ICD-10-CM
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
## 2019-08-21 DIAGNOSIS — J81.1 CHRONIC PULMONARY EDEMA: ICD-10-CM

## 2019-08-21 LAB
ALBUMIN SERPL ELPH-MCNC: 4.5 G/DL — SIGNIFICANT CHANGE UP (ref 3.5–5.2)
ALP SERPL-CCNC: 70 U/L — SIGNIFICANT CHANGE UP (ref 30–115)
ALT FLD-CCNC: 19 U/L — SIGNIFICANT CHANGE UP (ref 0–41)
ANION GAP SERPL CALC-SCNC: 12 MMOL/L — SIGNIFICANT CHANGE UP (ref 7–14)
ANISOCYTOSIS BLD QL: SLIGHT — SIGNIFICANT CHANGE UP
APPEARANCE UR: CLEAR — SIGNIFICANT CHANGE UP
APTT BLD: 31.8 SEC — SIGNIFICANT CHANGE UP (ref 27–39.2)
AST SERPL-CCNC: 25 U/L — SIGNIFICANT CHANGE UP (ref 0–41)
BASE EXCESS BLDV CALC-SCNC: 7.3 MMOL/L — HIGH (ref -2–2)
BASOPHILS # BLD AUTO: 0 K/UL — SIGNIFICANT CHANGE UP (ref 0–0.2)
BASOPHILS NFR BLD AUTO: 0 % — SIGNIFICANT CHANGE UP (ref 0–1)
BILIRUB SERPL-MCNC: 0.7 MG/DL — SIGNIFICANT CHANGE UP (ref 0.2–1.2)
BILIRUB UR-MCNC: NEGATIVE — SIGNIFICANT CHANGE UP
BUN SERPL-MCNC: 46 MG/DL — HIGH (ref 10–20)
CA-I SERPL-SCNC: 1.24 MMOL/L — SIGNIFICANT CHANGE UP (ref 1.12–1.3)
CALCIUM SERPL-MCNC: 9.6 MG/DL — SIGNIFICANT CHANGE UP (ref 8.5–10.1)
CHLORIDE SERPL-SCNC: 99 MMOL/L — SIGNIFICANT CHANGE UP (ref 98–110)
CO2 SERPL-SCNC: 31 MMOL/L — SIGNIFICANT CHANGE UP (ref 17–32)
COLOR SPEC: YELLOW — SIGNIFICANT CHANGE UP
CREAT SERPL-MCNC: 1.7 MG/DL — HIGH (ref 0.7–1.5)
DIFF PNL FLD: NEGATIVE — SIGNIFICANT CHANGE UP
EOSINOPHIL NFR BLD AUTO: 0 % — SIGNIFICANT CHANGE UP (ref 0–8)
EPI CELLS # UR: ABNORMAL /HPF
GAS PNL BLDV: 142 MMOL/L — SIGNIFICANT CHANGE UP (ref 136–145)
GAS PNL BLDV: SIGNIFICANT CHANGE UP
GLUCOSE BLDC GLUCOMTR-MCNC: 189 MG/DL — HIGH (ref 70–99)
GLUCOSE BLDC GLUCOMTR-MCNC: 192 MG/DL — HIGH (ref 70–99)
GLUCOSE BLDC GLUCOMTR-MCNC: 210 MG/DL — HIGH (ref 70–99)
GLUCOSE BLDC GLUCOMTR-MCNC: 214 MG/DL — HIGH (ref 70–99)
GLUCOSE SERPL-MCNC: 82 MG/DL — SIGNIFICANT CHANGE UP (ref 70–99)
GLUCOSE UR QL: NEGATIVE MG/DL — SIGNIFICANT CHANGE UP
HCO3 BLDV-SCNC: 37 MMOL/L — HIGH (ref 22–29)
HCT VFR BLD CALC: 55.8 % — HIGH (ref 42–52)
HCT VFR BLDA CALC: 52.1 % — HIGH (ref 34–44)
HGB BLD CALC-MCNC: 17 G/DL — SIGNIFICANT CHANGE UP (ref 14–18)
HGB BLD-MCNC: 16.2 G/DL — SIGNIFICANT CHANGE UP (ref 14–18)
HOROWITZ INDEX BLDV+IHG-RTO: 21 — SIGNIFICANT CHANGE UP
INR BLD: 1.08 RATIO — SIGNIFICANT CHANGE UP (ref 0.65–1.3)
KETONES UR-MCNC: ABNORMAL
LACTATE BLDV-MCNC: 1.3 MMOL/L — SIGNIFICANT CHANGE UP (ref 0.5–1.6)
LEUKOCYTE ESTERASE UR-ACNC: NEGATIVE — SIGNIFICANT CHANGE UP
LYMPHOCYTES # BLD AUTO: 2.07 K/UL — SIGNIFICANT CHANGE UP (ref 1.2–3.4)
LYMPHOCYTES # BLD AUTO: 7 % — LOW (ref 20.5–51.1)
MAGNESIUM SERPL-MCNC: 2.2 MG/DL — SIGNIFICANT CHANGE UP (ref 1.8–2.4)
MANUAL SMEAR VERIFICATION: SIGNIFICANT CHANGE UP
MCHC RBC-ENTMCNC: 23.1 PG — LOW (ref 27–31)
MCHC RBC-ENTMCNC: 29 G/DL — LOW (ref 32–37)
MCV RBC AUTO: 79.6 FL — LOW (ref 80–94)
MONOCYTES # BLD AUTO: 1.18 K/UL — HIGH (ref 0.1–0.6)
MONOCYTES NFR BLD AUTO: 4 % — SIGNIFICANT CHANGE UP (ref 1.7–9.3)
NEUTROPHILS # BLD AUTO: 26.34 K/UL — HIGH (ref 1.4–6.5)
NEUTROPHILS NFR BLD AUTO: 89 % — HIGH (ref 42.2–75.2)
NITRITE UR-MCNC: NEGATIVE — SIGNIFICANT CHANGE UP
NRBC # BLD: 0 /100 — SIGNIFICANT CHANGE UP (ref 0–0)
NRBC # BLD: SIGNIFICANT CHANGE UP /100 WBCS (ref 0–0)
NT-PROBNP SERPL-SCNC: 1283 PG/ML — HIGH (ref 0–300)
PCO2 BLDV: 70 MMHG — HIGH (ref 41–51)
PH BLDV: 7.33 — SIGNIFICANT CHANGE UP (ref 7.26–7.43)
PH UR: 5.5 — SIGNIFICANT CHANGE UP (ref 5–8)
PLAT MORPH BLD: NORMAL — SIGNIFICANT CHANGE UP
PLATELET # BLD AUTO: 524 K/UL — HIGH (ref 130–400)
PO2 BLDV: 28 MMHG — SIGNIFICANT CHANGE UP (ref 20–40)
POTASSIUM BLDV-SCNC: 4.7 MMOL/L — SIGNIFICANT CHANGE UP (ref 3.3–5.6)
POTASSIUM SERPL-MCNC: 5.7 MMOL/L — HIGH (ref 3.5–5)
POTASSIUM SERPL-SCNC: 5.7 MMOL/L — HIGH (ref 3.5–5)
PROT SERPL-MCNC: 7.5 G/DL — SIGNIFICANT CHANGE UP (ref 6–8)
PROT UR-MCNC: ABNORMAL MG/DL
PROTHROM AB SERPL-ACNC: 12.4 SEC — SIGNIFICANT CHANGE UP (ref 9.95–12.87)
RBC # BLD: 7.01 M/UL — HIGH (ref 4.7–6.1)
RBC # FLD: 21 % — HIGH (ref 11.5–14.5)
RBC BLD AUTO: ABNORMAL
RBC CASTS # UR COMP ASSIST: NEGATIVE — SIGNIFICANT CHANGE UP
SAO2 % BLDV: 44 % — SIGNIFICANT CHANGE UP
SODIUM SERPL-SCNC: 142 MMOL/L — SIGNIFICANT CHANGE UP (ref 135–146)
SP GR SPEC: 1.02 — SIGNIFICANT CHANGE UP (ref 1.01–1.03)
TROPONIN T SERPL-MCNC: 0.03 NG/ML — CRITICAL HIGH
UROBILINOGEN FLD QL: 0.2 MG/DL — SIGNIFICANT CHANGE UP (ref 0.2–0.2)
WBC # BLD: 29.59 K/UL — HIGH (ref 4.8–10.8)
WBC # FLD AUTO: 29.59 K/UL — HIGH (ref 4.8–10.8)
WBC UR QL: SIGNIFICANT CHANGE UP /HPF

## 2019-08-21 PROCEDURE — 99291 CRITICAL CARE FIRST HOUR: CPT

## 2019-08-21 PROCEDURE — 71045 X-RAY EXAM CHEST 1 VIEW: CPT | Mod: 26

## 2019-08-21 PROCEDURE — 99223 1ST HOSP IP/OBS HIGH 75: CPT

## 2019-08-21 PROCEDURE — 99497 ADVNCD CARE PLAN 30 MIN: CPT | Mod: 25

## 2019-08-21 RX ORDER — GLUCAGON INJECTION, SOLUTION 0.5 MG/.1ML
1 INJECTION, SOLUTION SUBCUTANEOUS ONCE
Refills: 0 | Status: DISCONTINUED | OUTPATIENT
Start: 2019-08-21 | End: 2019-08-24

## 2019-08-21 RX ORDER — TIOTROPIUM BROMIDE 18 UG/1
1 CAPSULE ORAL; RESPIRATORY (INHALATION) DAILY
Refills: 0 | Status: DISCONTINUED | OUTPATIENT
Start: 2019-08-21 | End: 2019-08-29

## 2019-08-21 RX ORDER — IPRATROPIUM/ALBUTEROL SULFATE 18-103MCG
3 AEROSOL WITH ADAPTER (GRAM) INHALATION EVERY 4 HOURS
Refills: 0 | Status: DISCONTINUED | OUTPATIENT
Start: 2019-08-21 | End: 2019-08-29

## 2019-08-21 RX ORDER — CHOLECALCIFEROL (VITAMIN D3) 125 MCG
1000 CAPSULE ORAL DAILY
Refills: 0 | Status: DISCONTINUED | OUTPATIENT
Start: 2019-08-21 | End: 2019-08-29

## 2019-08-21 RX ORDER — CHLORHEXIDINE GLUCONATE 213 G/1000ML
1 SOLUTION TOPICAL
Refills: 0 | Status: DISCONTINUED | OUTPATIENT
Start: 2019-08-21 | End: 2019-08-29

## 2019-08-21 RX ORDER — BUDESONIDE AND FORMOTEROL FUMARATE DIHYDRATE 160; 4.5 UG/1; UG/1
2 AEROSOL RESPIRATORY (INHALATION)
Refills: 0 | Status: DISCONTINUED | OUTPATIENT
Start: 2019-08-21 | End: 2019-08-29

## 2019-08-21 RX ORDER — ATORVASTATIN CALCIUM 80 MG/1
20 TABLET, FILM COATED ORAL AT BEDTIME
Refills: 0 | Status: DISCONTINUED | OUTPATIENT
Start: 2019-08-21 | End: 2019-08-29

## 2019-08-21 RX ORDER — DOCUSATE SODIUM 100 MG
100 CAPSULE ORAL THREE TIMES A DAY
Refills: 0 | Status: DISCONTINUED | OUTPATIENT
Start: 2019-08-21 | End: 2019-08-29

## 2019-08-21 RX ORDER — FUROSEMIDE 40 MG
40 TABLET ORAL DAILY
Refills: 0 | Status: DISCONTINUED | OUTPATIENT
Start: 2019-08-21 | End: 2019-08-27

## 2019-08-21 RX ORDER — IPRATROPIUM/ALBUTEROL SULFATE 18-103MCG
3 AEROSOL WITH ADAPTER (GRAM) INHALATION
Refills: 0 | Status: COMPLETED | OUTPATIENT
Start: 2019-08-21 | End: 2019-08-21

## 2019-08-21 RX ORDER — HEPARIN SODIUM 5000 [USP'U]/ML
5000 INJECTION INTRAVENOUS; SUBCUTANEOUS EVERY 8 HOURS
Refills: 0 | Status: DISCONTINUED | OUTPATIENT
Start: 2019-08-21 | End: 2019-08-29

## 2019-08-21 RX ORDER — TAMSULOSIN HYDROCHLORIDE 0.4 MG/1
0.4 CAPSULE ORAL AT BEDTIME
Refills: 0 | Status: DISCONTINUED | OUTPATIENT
Start: 2019-08-21 | End: 2019-08-29

## 2019-08-21 RX ORDER — GUAIFENESIN/DEXTROMETHORPHAN 600MG-30MG
10 TABLET, EXTENDED RELEASE 12 HR ORAL EVERY 6 HOURS
Refills: 0 | Status: DISCONTINUED | OUTPATIENT
Start: 2019-08-21 | End: 2019-08-29

## 2019-08-21 RX ORDER — ASCORBIC ACID 60 MG
500 TABLET,CHEWABLE ORAL DAILY
Refills: 0 | Status: DISCONTINUED | OUTPATIENT
Start: 2019-08-21 | End: 2019-08-29

## 2019-08-21 RX ORDER — METOPROLOL TARTRATE 50 MG
25 TABLET ORAL
Refills: 0 | Status: DISCONTINUED | OUTPATIENT
Start: 2019-08-21 | End: 2019-08-29

## 2019-08-21 RX ORDER — DEXTROSE 50 % IN WATER 50 %
25 SYRINGE (ML) INTRAVENOUS ONCE
Refills: 0 | Status: DISCONTINUED | OUTPATIENT
Start: 2019-08-21 | End: 2019-08-24

## 2019-08-21 RX ORDER — DEXTROSE 50 % IN WATER 50 %
15 SYRINGE (ML) INTRAVENOUS ONCE
Refills: 0 | Status: DISCONTINUED | OUTPATIENT
Start: 2019-08-21 | End: 2019-08-24

## 2019-08-21 RX ORDER — MAGNESIUM OXIDE 400 MG ORAL TABLET 241.3 MG
400 TABLET ORAL
Refills: 0 | Status: DISCONTINUED | OUTPATIENT
Start: 2019-08-21 | End: 2019-08-29

## 2019-08-21 RX ORDER — NYSTATIN 500MM UNIT
100000 POWDER (EA) MISCELLANEOUS ONCE
Refills: 0 | Status: COMPLETED | OUTPATIENT
Start: 2019-08-21 | End: 2019-08-21

## 2019-08-21 RX ORDER — FERROUS SULFATE 325(65) MG
325 TABLET ORAL DAILY
Refills: 0 | Status: DISCONTINUED | OUTPATIENT
Start: 2019-08-21 | End: 2019-08-22

## 2019-08-21 RX ORDER — INSULIN LISPRO 100/ML
VIAL (ML) SUBCUTANEOUS
Refills: 0 | Status: DISCONTINUED | OUTPATIENT
Start: 2019-08-21 | End: 2019-08-24

## 2019-08-21 RX ORDER — FINASTERIDE 5 MG/1
5 TABLET, FILM COATED ORAL DAILY
Refills: 0 | Status: DISCONTINUED | OUTPATIENT
Start: 2019-08-21 | End: 2019-08-29

## 2019-08-21 RX ORDER — SODIUM CHLORIDE 9 MG/ML
1000 INJECTION, SOLUTION INTRAVENOUS
Refills: 0 | Status: DISCONTINUED | OUTPATIENT
Start: 2019-08-21 | End: 2019-08-24

## 2019-08-21 RX ORDER — INSULIN GLARGINE 100 [IU]/ML
30 INJECTION, SOLUTION SUBCUTANEOUS AT BEDTIME
Refills: 0 | Status: DISCONTINUED | OUTPATIENT
Start: 2019-08-21 | End: 2019-08-24

## 2019-08-21 RX ORDER — ACETAMINOPHEN 500 MG
650 TABLET ORAL EVERY 6 HOURS
Refills: 0 | Status: DISCONTINUED | OUTPATIENT
Start: 2019-08-21 | End: 2019-08-29

## 2019-08-21 RX ORDER — DEXTROSE 50 % IN WATER 50 %
12.5 SYRINGE (ML) INTRAVENOUS ONCE
Refills: 0 | Status: DISCONTINUED | OUTPATIENT
Start: 2019-08-21 | End: 2019-08-24

## 2019-08-21 RX ORDER — CEFEPIME 1 G/1
1000 INJECTION, POWDER, FOR SOLUTION INTRAMUSCULAR; INTRAVENOUS EVERY 12 HOURS
Refills: 0 | Status: DISCONTINUED | OUTPATIENT
Start: 2019-08-21 | End: 2019-08-22

## 2019-08-21 RX ORDER — INSULIN LISPRO 100/ML
20 VIAL (ML) SUBCUTANEOUS
Refills: 0 | Status: DISCONTINUED | OUTPATIENT
Start: 2019-08-21 | End: 2019-08-24

## 2019-08-21 RX ORDER — PANTOPRAZOLE SODIUM 20 MG/1
40 TABLET, DELAYED RELEASE ORAL
Refills: 0 | Status: DISCONTINUED | OUTPATIENT
Start: 2019-08-21 | End: 2019-08-29

## 2019-08-21 RX ORDER — POLYETHYLENE GLYCOL 3350 17 G/17G
17 POWDER, FOR SOLUTION ORAL DAILY
Refills: 0 | Status: DISCONTINUED | OUTPATIENT
Start: 2019-08-21 | End: 2019-08-29

## 2019-08-21 RX ORDER — SENNA PLUS 8.6 MG/1
2 TABLET ORAL AT BEDTIME
Refills: 0 | Status: DISCONTINUED | OUTPATIENT
Start: 2019-08-21 | End: 2019-08-29

## 2019-08-21 RX ORDER — CEFEPIME 1 G/1
1000 INJECTION, POWDER, FOR SOLUTION INTRAMUSCULAR; INTRAVENOUS ONCE
Refills: 0 | Status: COMPLETED | OUTPATIENT
Start: 2019-08-21 | End: 2019-08-21

## 2019-08-21 RX ADMIN — Medication 3 MILLILITER(S): at 20:20

## 2019-08-21 RX ADMIN — Medication 100 MILLIGRAM(S): at 21:50

## 2019-08-21 RX ADMIN — Medication 325 MILLIGRAM(S): at 11:44

## 2019-08-21 RX ADMIN — Medication 2: at 11:41

## 2019-08-21 RX ADMIN — Medication 3 MILLILITER(S): at 14:42

## 2019-08-21 RX ADMIN — TAMSULOSIN HYDROCHLORIDE 0.4 MILLIGRAM(S): 0.4 CAPSULE ORAL at 21:50

## 2019-08-21 RX ADMIN — Medication 125 MILLIGRAM(S): at 06:40

## 2019-08-21 RX ADMIN — Medication 3 MILLILITER(S): at 16:17

## 2019-08-21 RX ADMIN — Medication 1000 UNIT(S): at 12:24

## 2019-08-21 RX ADMIN — INSULIN GLARGINE 30 UNIT(S): 100 INJECTION, SOLUTION SUBCUTANEOUS at 21:50

## 2019-08-21 RX ADMIN — Medication 60 MILLIGRAM(S): at 21:50

## 2019-08-21 RX ADMIN — MAGNESIUM OXIDE 400 MG ORAL TABLET 400 MILLIGRAM(S): 241.3 TABLET ORAL at 11:44

## 2019-08-21 RX ADMIN — Medication 25 MILLIGRAM(S): at 17:36

## 2019-08-21 RX ADMIN — POLYETHYLENE GLYCOL 3350 17 GRAM(S): 17 POWDER, FOR SOLUTION ORAL at 11:44

## 2019-08-21 RX ADMIN — TIOTROPIUM BROMIDE 1 CAPSULE(S): 18 CAPSULE ORAL; RESPIRATORY (INHALATION) at 20:53

## 2019-08-21 RX ADMIN — HEPARIN SODIUM 5000 UNIT(S): 5000 INJECTION INTRAVENOUS; SUBCUTANEOUS at 21:50

## 2019-08-21 RX ADMIN — Medication 3 MILLILITER(S): at 06:41

## 2019-08-21 RX ADMIN — MAGNESIUM OXIDE 400 MG ORAL TABLET 400 MILLIGRAM(S): 241.3 TABLET ORAL at 17:36

## 2019-08-21 RX ADMIN — Medication 1 DROP(S): at 21:51

## 2019-08-21 RX ADMIN — Medication 3 MILLILITER(S): at 06:40

## 2019-08-21 RX ADMIN — FINASTERIDE 5 MILLIGRAM(S): 5 TABLET, FILM COATED ORAL at 11:44

## 2019-08-21 RX ADMIN — Medication 500 MILLIGRAM(S): at 11:44

## 2019-08-21 RX ADMIN — CEFEPIME 100 MILLIGRAM(S): 1 INJECTION, POWDER, FOR SOLUTION INTRAMUSCULAR; INTRAVENOUS at 08:00

## 2019-08-21 RX ADMIN — HEPARIN SODIUM 5000 UNIT(S): 5000 INJECTION INTRAVENOUS; SUBCUTANEOUS at 14:01

## 2019-08-21 RX ADMIN — Medication 100000 UNIT(S): at 23:11

## 2019-08-21 RX ADMIN — Medication 20 UNIT(S): at 11:41

## 2019-08-21 RX ADMIN — CEFEPIME 100 MILLIGRAM(S): 1 INJECTION, POWDER, FOR SOLUTION INTRAMUSCULAR; INTRAVENOUS at 17:36

## 2019-08-21 RX ADMIN — Medication 60 MILLIGRAM(S): at 14:01

## 2019-08-21 RX ADMIN — Medication 1 DROP(S): at 14:02

## 2019-08-21 RX ADMIN — Medication 20 UNIT(S): at 16:41

## 2019-08-21 RX ADMIN — SENNA PLUS 2 TABLET(S): 8.6 TABLET ORAL at 21:50

## 2019-08-21 RX ADMIN — Medication 2: at 16:41

## 2019-08-21 RX ADMIN — Medication 100 MILLIGRAM(S): at 14:02

## 2019-08-21 RX ADMIN — ATORVASTATIN CALCIUM 20 MILLIGRAM(S): 80 TABLET, FILM COATED ORAL at 21:50

## 2019-08-21 RX ADMIN — Medication 3 MILLILITER(S): at 07:31

## 2019-08-21 RX ADMIN — BUDESONIDE AND FORMOTEROL FUMARATE DIHYDRATE 2 PUFF(S): 160; 4.5 AEROSOL RESPIRATORY (INHALATION) at 19:54

## 2019-08-21 NOTE — H&P ADULT - HISTORY OF PRESENT ILLNESS
From ED:  79 yo M with PMHx of HTN, COPD on home O2 4-5L, Afib on Eliquis, HLD, CHF and DM presents to the ED c/o SOB. Symptoms started earlier yesterday but worsened tonight. There was a mechanical issue with pt's CPAP machine at home and symptoms progressed so he called EMS. When EMS arrived pt was hypoxic to 70s. He was given combivent and placed on CPAP with improved in O2 sat to 94. Pt admits to associated cough. He denies other complaints. Pt denies fever, chills, nausea, vomiting, abdominal pain, diarrhea, headache, dizziness, weakness, chest pain, back pain, LOC, trauma, urinary symptoms.  ________________________________________________________________________________________________________   Patient presents to ED with complaints of hypoxia several hours prior to admission.  Patient reports he was experiencing a problem with his CPAP last night and it was not properly titrating.  He reports that approx 2200 he went to the bathroom to wash up and was unable to get himself out of it.  At that point his HHA brought in the CPAP and EMS was called.  After a few minutes on the CPAP that patient improved.  EMS came and checked the patient, but he reported he felt better and left.  He went back to bed and at approx 0000 noticed his CPAP wasn't functioning properly and he became SOB with hypoxia approx 78%.  He called his son who came to the house, and despite supplemental o2 was unable to increase his oxygenation.  EMS was activated again at approx 0400 and the patient was brought to the ED.  He was placed on BiPAP with improvement to symptoms and increased SpO2.  He was found to have an elevated white count and poss RLL opacity on admission CXR and admitted for further management.  Patient seen and examined at bedside, reports he was experiencing some SOB x 2-3 days PTA with INFANTE and increased o2 demands of up to 6 l/m.  He reports his cough was at baseline except for 2 days ago when he had an increase with scant white production.  He denies any fever/chills.  No CP/Palpitations.  No abdominal complaints including N/V/D or pain.  No urinary complaints.

## 2019-08-21 NOTE — ED PROVIDER NOTE - CARE PLAN
Principal Discharge DX:	SOB (shortness of breath)  Secondary Diagnosis:	Opacity of lung on imaging study  Secondary Diagnosis:	Leukocytosis  Secondary Diagnosis:	Elevated troponin

## 2019-08-21 NOTE — ED PROVIDER NOTE - PROGRESS NOTE DETAILS
Pt reports improvement in symptoms, doing well on BIPAP--speaking in full sentences, no longer tachypneic. Pt denies chest pain still. Discussed case with Dr. Malik, accepts admission.

## 2019-08-21 NOTE — ED PROVIDER NOTE - NS ED ROS FT
Review of Systems  Constitutional:  No fever, chills.  Eyes:  No visual changes, eye pain, or discharge.  ENMT:  No hearing changes, pain, or discharge. No nasal congestion, discharge, or bleeding. No throat pain, swelling, or difficulty swallowing.  Cardiac:  No chest pain, palpitations, syncope.  Respiratory:  (+) SOB, cough. No hemoptysis.  GI:  No nausea, vomiting, diarrhea, or abdominal pain.   :  No dysuria, hematuria, frequency, or burning.   MS:  No back pain.  Skin:  No skin rash, pruritis, jaundice, or lesions.  Neuro:  No headache, dizziness, loss of sensation, or focal weakness.  No change in mental status.   Endocrine: (+) DM

## 2019-08-21 NOTE — H&P ADULT - NSHPLABSRESULTS_GEN_ALL_CORE
16.2   29.59 )-----------( 524      ( 21 Aug 2019 06:37 )             55.8       08-21    142  |  99  |  46<H>  ----------------------------<  82  5.7<H>   |  31  |  1.7<H>    Ca    9.6      21 Aug 2019 06:37  Mg     2.2     08-21    TPro  7.5  /  Alb  4.5  /  TBili  0.7  /  DBili  x   /  AST  25  /  ALT  19  /  AlkPhos  70  08-21          Magnesium, Serum: 2.2 mg/dL (08-21-19 @ 06:37)              PT/INR - ( 21 Aug 2019 06:37 )   PT: 12.40 sec;   INR: 1.08 ratio         PTT - ( 21 Aug 2019 06:37 )  PTT:31.8 sec    Lactate Trend      CARDIAC MARKERS ( 21 Aug 2019 06:37 )  x     / 0.03 ng/mL / x     / x     / x        CXR  - No offical report 16.2   29.59 )-----------( 524      ( 21 Aug 2019 06:37 )             55.8       08-21    142  |  99  |  46<H>  ----------------------------<  82  5.7<H>   |  31  |  1.7<H>    Ca    9.6      21 Aug 2019 06:37  Mg     2.2     08-21    TPro  7.5  /  Alb  4.5  /  TBili  0.7  /  DBili  x   /  AST  25  /  ALT  19  /  AlkPhos  70  08-21          Magnesium, Serum: 2.2 mg/dL (08-21-19 @ 06:37)      PT/INR - ( 21 Aug 2019 06:37 )   PT: 12.40 sec;   INR: 1.08 ratio         PTT - ( 21 Aug 2019 06:37 )  PTT:31.8 sec        CARDIAC MARKERS ( 21 Aug 2019 06:37 )  x     / 0.03 ng/mL / x     / x     / x            RADIOLOGY:  < from: Xray Chest 1 View- PORTABLE-Urgent (08.21.19 @ 07:03) >  Impression:    Bilateral opacities and bibasilar opacifications, left greater than the   right.  < end of copied text >

## 2019-08-21 NOTE — H&P ADULT - PROBLEM SELECTOR PLAN 6
..  - at baseline without acute exacerbation  - c/w Slow Prednisone taper, 20mg PO QD  - c/w Triple therapy (Symbicort/Spiriva)  - consider Pulmonology c/s if worsening  - b2 as above ..  - change Prednisone taper to Solumedrol 60mg IVP q8h while in house  - c/w Triple therapy (Symbicort/Spiriva)  - FU Pulmonology c/s  - b2 as above

## 2019-08-21 NOTE — H&P ADULT - NSHPPHYSICALEXAM_GEN_ALL_CORE
PHYSICAL EXAM:  GENERAL: NAD, well-developed, on BiPAP, speaking in complete sentences   SKIN: No rashes or lesions; Ecchymosis noted to anterior abdomen  HEAD:  Atraumatic, Normocephalic  EYES: EOMI, PERRLA, conjunctiva and sclera clear  NECK: Supple, No JVD  CHEST/LUNG: severe airflow restriction b/l  HEART: Regular rate and rhythm; No murmurs, rubs, or gallops  ABDOMEN: Soft, Nontender, Nondistended; Bowel sounds present  EXTREMITIES:  No clubbing, cyanosis. 2+ BLE pitting edema  CNS: AAOx3 Vital Signs Last 24 Hrs  T(C): 36.9 (21 Aug 2019 17:20), Max: 37.3 (21 Aug 2019 07:23)  T(F): 98.5 (21 Aug 2019 17:20), Max: 99.1 (21 Aug 2019 07:23)  HR: 115 (21 Aug 2019 17:20) (97 - 121)  BP: 126/72 (21 Aug 2019 17:20) (98/54 - 148/72)  BP(mean): --  RR: 18 (21 Aug 2019 14:59) (18 - 22)  SpO2: 92% (21 Aug 2019 14:59) (78% - 95%) on highflow    PHYSICAL EXAM:  GENERAL: NAD, well-developed, on BiPAP, speaking in complete sentences   SKIN: No rashes or lesions; Ecchymosis noted to anterior abdomen  HEAD:  Atraumatic, Normocephalic  EYES: EOMI, PERRLA, conjunctiva and sclera clear  NECK: Supple, No JVD  CHEST/LUNG: severe airflow restriction b/l  HEART: Regular rate and rhythm; No murmurs, rubs, or gallops  ABDOMEN: Soft, obese, Nontender, Nondistended; Bowel sounds present  EXTREMITIES:  No clubbing, cyanosis. 2+ BLE pitting edema  CNS: AAOx3

## 2019-08-21 NOTE — H&P ADULT - PROBLEM SELECTOR PLAN 1
w/hypoxia requiring NIPPV, elevated WBC count and ?opacity RLL  - admit to medicine  - symptomatically improved on BiPAP, c/w NIPPV for now and wean as tolerated  - FU official CXR report  - s/p Cefepime and Levaquin in ED; concern for HCAP:       Cefepime        Levaquin       Vancomycin  - ID c/s  - Pulmonology c/s  - b2 q4h RTC and PRN  - DVT/GI PPX (Heparin/Protonix)  - CHG 4% QD and PRN w/hypoxia requiring NIPPV, elevated WBC count and ?opacity RLL  - admit to medicine  - symptomatically improved on BiPAP, c/w NIPPV for now and wean as tolerated  - FU official CXR report  - s/p Cefepime and Levaquin in ED; concern for HCAP:       Cefepime        Levaquin       Vancomycin  - ID c/s  - Pulmonology c/s  - FU BCx  - FU UA results  - b2 q4h RTC and PRN  - DVT/GI PPX (Heparin/Protonix)  - CHG 4% QD and PRN w/hypoxia requiring NIPPV, elevated WBC count and ?opacity RLL  - admit to medicine  - symptomatically improved on BiPAP, c/w NIPPV for now and wean as tolerated  - FU official CXR report  - s/p Cefepime and Levaquin in ED; concern for HCAP:       Cefepime 1gm IVPB q12h (renally dosed)       Levaquin 250mg IVPB q24h (Renall dosed and s/p 750 in ED)       No need for Vancomycin at this point.  FU ID c/s  - ID c/s  - Pulmonology c/s  - FU BCx (done in ED)  - FU UA results (ordered)  - b2 q4h RTC and PRN  - DVT/GI PPX (Heparin/Protonix)  - CHG 4% QD and PRN w/hypoxia requiring NIPPV, elevated WBC count and ?opacity RLL  - admit to medicine  - symptomatically improved on BiPAP, c/w NIPPV for now and wean as tolerated  - s/p Cefepime and Levaquin in ED; concern for HCAP:       Cefepime 1gm IVPB q12h (renally dosed)       Levaquin 250mg IVPB q24h (Renall dosed and s/p 750 in ED)       No need for Vancomycin at this point.  FU ID c/s  - ID c/s  - Pulmonology c/s  - FU BCx (done in ED)  - FU UA results (ordered)  - b2 q4h RTC and PRN  - DVT/GI PPX (Heparin/Protonix)  - CHG 4% QD and PRN

## 2019-08-21 NOTE — H&P ADULT - NSICDXPASTMEDICALHX_GEN_ALL_CORE_FT
PAST MEDICAL HISTORY:  CKD (chronic kidney disease) stage 3, GFR 30-59 ml/min     Colon polyp claims it was malignant    COPD (chronic obstructive pulmonary disease)     Diabetes mellitus, type 2     Emphysema lung     Enlarged prostate     GERD (gastroesophageal reflux disease)     High cholesterol     Hypertension     Oxygen dependent

## 2019-08-21 NOTE — H&P ADULT - PROBLEM SELECTOR PLAN 4
w/Hx of Hyperkalemia  - at baseline renal function  - K+ elevated on admit but hemolyzed.  Repeat VBG WNL.  Mg2+ 2.2  - c/w current supplementation, Kayexalate PRN  - Daily BMP/Mg  - avoid nephrotoxins  - consider renal c/s if worsening w/Hx of Hyperkalemia  - at baseline renal function  - K+ elevated on admit but hemolyzed.  Repeat VBG WNL.  Mg2+ 2.2  - c/w current supplementation, Kayexalate PRN  - Daily BMP/Mg  - avoid nephrotoxins  - consider renal c/s if acute worsening w/Hx of Hyperkalemia  - at baseline renal function  - K+ elevated on admit but hemolyzed.  Repeat VBG WNL.  Mg2+ 2.2  - c/w current supplementation, Kayexalate PRN  - Daily BMP/Mg  - avoid nephrotoxins  - renal c/s if acute worsening

## 2019-08-21 NOTE — H&P ADULT - ASSESSMENT
78M with extensive PMHX and multiple comorbidities, with multiple recent hospitalizations presents to the ED w/Hypoxia and admitted to medicine for further management 78M with extensive PMHX and multiple comorbidities, with multiple recent hospitalizations presents to the ED with hypoxia and admitted to medicine for further management

## 2019-08-21 NOTE — ED PROVIDER NOTE - CLINICAL SUMMARY MEDICAL DECISION MAKING FREE TEXT BOX
pt with  end stage COPD  , DNR DNI  -  home cpap  machine  malfunctioning  came in SOB  -   BIPAP  started  pt improved clinically  CXR with  right opacity  and WBC  29 -  abx started  for HCAP -  pt  admitted

## 2019-08-21 NOTE — H&P ADULT - PROBLEM SELECTOR PLAN 3
>????? ..  - Tt 0.03 in ED (baseline from previous admissions), no complaints of CP/Palpitations   - No need to Trend given Hx of CKD 3/CHF and no acute symptoms  - continue monitoring on Med/Surg ..  - Tt 0.03 in ED (baseline from previous admissions), no complaints of CP/Palpitations   - No need to Trend given Hx of CKD 3/CHF and no acute symptoms

## 2019-08-21 NOTE — GOALS OF CARE CONVERSATION - PERSONAL ADVANCE DIRECTIVE - CONVERSATION DETAILS
Patient  has several hospitalizations for COPD exacerbation  He is a DNR/DNI - son to bring in MOL[facility] form today

## 2019-08-21 NOTE — H&P ADULT - PROBLEM SELECTOR PLAN 5
..  - at baseline without acute exacerbation  - c/w Lasix 40mg PO QD  - c/w BB  - Weights QD  - Daily I/O's  - low Na+ diet ..  - at baseline without acute exacerbation - of note: pt did not take the recommended increased dose of lasix after his last hospitalization  - c/w Lasix 40mg PO QD  - c/w BB  - Weights QD  - Daily I/O's  - low Na+ diet

## 2019-08-21 NOTE — H&P ADULT - PROBLEM SELECTOR PLAN 7
insulin dependent  - Lantus 30u SQ QHS  - Humalog 20u SQ ac TID  - F/S AC and HS w/SS coverage  - carb consistent diet insulin dependent  - Lantus 30u SQ QHS  - Humalog 20u SQ ac TID  - F/S AC and HS w/ss coverage  - carb consistent diet

## 2019-08-21 NOTE — ED PROVIDER NOTE - PHYSICAL EXAMINATION
VITAL SIGNS: I have reviewed nursing notes and confirm.  CONSTITUTIONAL: Well-developed; well-nourished; in mild distress.  SKIN: Skin exam is warm and dry, no acute rash.  HEAD: Normocephalic; atraumatic.  EYES: Conjunctiva and sclera clear.  ENT: No nasal discharge; airway clear.   NECK: Supple; non tender.  CARD: S1, S2 normal; no murmurs, gallops, or rubs. Regular rate and rhythm.  RESP: (+) diffuse wheezing. (+) rales to right base. (+) tachypnea  ABD: Normal bowel sounds; soft; non-distended; non-tender; No rebound or guarding. No CVA tenderness.  EXT: Normal ROM.   NEURO: Alert, oriented. Grossly unremarkable. No focal deficits.

## 2019-08-21 NOTE — ED PROVIDER NOTE - OBJECTIVE STATEMENT
79 yo M with PMHx of HTN, COPD on home O2 4-5L, Afib on Eliquis, HLD, CHF and DM presents to the ED c/o SOB. Symptoms started earlier yesterday but worsened tonight. There was a mechanical issue with pt's CPAP machine at home and symptoms progressed so he called EMS. When EMS arrived pt was hypoxic to 70s. He was given combivent and placed on CPAP with improved in O2 sat to 94. Pt admits to associated cough. He denies other complaints. Pt denies fever, chills, nausea, vomiting, abdominal pain, diarrhea, headache, dizziness, weakness, chest pain, back pain, LOC, trauma, urinary symptoms.

## 2019-08-22 VITALS
DIASTOLIC BLOOD PRESSURE: 78 MMHG | RESPIRATION RATE: 22 BRPM | SYSTOLIC BLOOD PRESSURE: 132 MMHG | OXYGEN SATURATION: 86 %

## 2019-08-22 DIAGNOSIS — D72.828 OTHER ELEVATED WHITE BLOOD CELL COUNT: ICD-10-CM

## 2019-08-22 DIAGNOSIS — J44.9 CHRONIC OBSTRUCTIVE PULMONARY DISEASE, UNSPECIFIED: ICD-10-CM

## 2019-08-22 PROBLEM — J81.1 PULMONARY EDEMA: Status: ACTIVE | Noted: 2019-08-22

## 2019-08-22 PROBLEM — E87.5 HYPERKALEMIA: Status: ACTIVE | Noted: 2019-02-02

## 2019-08-22 LAB
ANION GAP SERPL CALC-SCNC: 12 MMOL/L — SIGNIFICANT CHANGE UP (ref 7–14)
BASOPHILS # BLD AUTO: 0.01 K/UL — SIGNIFICANT CHANGE UP (ref 0–0.2)
BASOPHILS NFR BLD AUTO: 0.1 % — SIGNIFICANT CHANGE UP (ref 0–1)
BUN SERPL-MCNC: 60 MG/DL — HIGH (ref 10–20)
CALCIUM SERPL-MCNC: 9 MG/DL — SIGNIFICANT CHANGE UP (ref 8.5–10.1)
CHLORIDE SERPL-SCNC: 97 MMOL/L — LOW (ref 98–110)
CO2 SERPL-SCNC: 29 MMOL/L — SIGNIFICANT CHANGE UP (ref 17–32)
CREAT SERPL-MCNC: 2.1 MG/DL — HIGH (ref 0.7–1.5)
EOSINOPHIL # BLD AUTO: 0 K/UL — SIGNIFICANT CHANGE UP (ref 0–0.7)
EOSINOPHIL NFR BLD AUTO: 0 % — SIGNIFICANT CHANGE UP (ref 0–8)
GLUCOSE BLDC GLUCOMTR-MCNC: 168 MG/DL — HIGH (ref 70–99)
GLUCOSE BLDC GLUCOMTR-MCNC: 218 MG/DL — HIGH (ref 70–99)
GLUCOSE BLDC GLUCOMTR-MCNC: 311 MG/DL — HIGH (ref 70–99)
GLUCOSE BLDC GLUCOMTR-MCNC: 354 MG/DL — HIGH (ref 70–99)
GLUCOSE SERPL-MCNC: 357 MG/DL — HIGH (ref 70–99)
HCT VFR BLD CALC: 47.9 % — SIGNIFICANT CHANGE UP (ref 42–52)
HGB BLD-MCNC: 13.9 G/DL — LOW (ref 14–18)
IMM GRANULOCYTES NFR BLD AUTO: 2 % — HIGH (ref 0.1–0.3)
LYMPHOCYTES # BLD AUTO: 0.32 K/UL — LOW (ref 1.2–3.4)
LYMPHOCYTES # BLD AUTO: 1.9 % — LOW (ref 20.5–51.1)
MAGNESIUM SERPL-MCNC: 2.4 MG/DL — SIGNIFICANT CHANGE UP (ref 1.8–2.4)
MCHC RBC-ENTMCNC: 23 PG — LOW (ref 27–31)
MCHC RBC-ENTMCNC: 29 G/DL — LOW (ref 32–37)
MCV RBC AUTO: 79.3 FL — LOW (ref 80–94)
MONOCYTES # BLD AUTO: 0.6 K/UL — SIGNIFICANT CHANGE UP (ref 0.1–0.6)
MONOCYTES NFR BLD AUTO: 3.6 % — SIGNIFICANT CHANGE UP (ref 1.7–9.3)
NEUTROPHILS # BLD AUTO: 15.54 K/UL — HIGH (ref 1.4–6.5)
NEUTROPHILS NFR BLD AUTO: 92.4 % — HIGH (ref 42.2–75.2)
NRBC # BLD: 0 /100 WBCS — SIGNIFICANT CHANGE UP (ref 0–0)
PLATELET # BLD AUTO: 422 K/UL — HIGH (ref 130–400)
POTASSIUM SERPL-MCNC: 5.8 MMOL/L — HIGH (ref 3.5–5)
POTASSIUM SERPL-SCNC: 5.8 MMOL/L — HIGH (ref 3.5–5)
RBC # BLD: 6.04 M/UL — SIGNIFICANT CHANGE UP (ref 4.7–6.1)
RBC # FLD: 20.8 % — HIGH (ref 11.5–14.5)
SODIUM SERPL-SCNC: 138 MMOL/L — SIGNIFICANT CHANGE UP (ref 135–146)
WBC # BLD: 16.8 K/UL — HIGH (ref 4.8–10.8)
WBC # FLD AUTO: 16.8 K/UL — HIGH (ref 4.8–10.8)

## 2019-08-22 PROCEDURE — 99233 SBSQ HOSP IP/OBS HIGH 50: CPT

## 2019-08-22 RX ORDER — NYSTATIN 500MM UNIT
500000 POWDER (EA) MISCELLANEOUS EVERY 6 HOURS
Refills: 0 | Status: DISCONTINUED | OUTPATIENT
Start: 2019-08-22 | End: 2019-08-29

## 2019-08-22 RX ORDER — SODIUM POLYSTYRENE SULFONATE 4.1 MEQ/G
15 POWDER, FOR SUSPENSION ORAL ONCE
Refills: 0 | Status: COMPLETED | OUTPATIENT
Start: 2019-08-22 | End: 2019-08-22

## 2019-08-22 RX ORDER — FUROSEMIDE 20 MG/1
20 TABLET ORAL
Refills: 0 | Status: DISCONTINUED | COMMUNITY
End: 2019-08-22

## 2019-08-22 RX ADMIN — Medication 3 MILLILITER(S): at 12:49

## 2019-08-22 RX ADMIN — MAGNESIUM OXIDE 400 MG ORAL TABLET 400 MILLIGRAM(S): 241.3 TABLET ORAL at 07:48

## 2019-08-22 RX ADMIN — Medication 60 MILLIGRAM(S): at 05:58

## 2019-08-22 RX ADMIN — CEFEPIME 100 MILLIGRAM(S): 1 INJECTION, POWDER, FOR SOLUTION INTRAMUSCULAR; INTRAVENOUS at 05:57

## 2019-08-22 RX ADMIN — SENNA PLUS 2 TABLET(S): 8.6 TABLET ORAL at 21:34

## 2019-08-22 RX ADMIN — Medication 25 MILLIGRAM(S): at 05:58

## 2019-08-22 RX ADMIN — Medication 20 UNIT(S): at 12:35

## 2019-08-22 RX ADMIN — HEPARIN SODIUM 5000 UNIT(S): 5000 INJECTION INTRAVENOUS; SUBCUTANEOUS at 21:35

## 2019-08-22 RX ADMIN — Medication 325 MILLIGRAM(S): at 11:14

## 2019-08-22 RX ADMIN — Medication 4: at 07:47

## 2019-08-22 RX ADMIN — Medication 3 MILLILITER(S): at 00:40

## 2019-08-22 RX ADMIN — Medication 20 UNIT(S): at 07:47

## 2019-08-22 RX ADMIN — BUDESONIDE AND FORMOTEROL FUMARATE DIHYDRATE 2 PUFF(S): 160; 4.5 AEROSOL RESPIRATORY (INHALATION) at 20:42

## 2019-08-22 RX ADMIN — MAGNESIUM OXIDE 400 MG ORAL TABLET 400 MILLIGRAM(S): 241.3 TABLET ORAL at 17:36

## 2019-08-22 RX ADMIN — Medication 1 DROP(S): at 23:04

## 2019-08-22 RX ADMIN — Medication 40 MILLIGRAM(S): at 05:58

## 2019-08-22 RX ADMIN — BUDESONIDE AND FORMOTEROL FUMARATE DIHYDRATE 2 PUFF(S): 160; 4.5 AEROSOL RESPIRATORY (INHALATION) at 07:43

## 2019-08-22 RX ADMIN — Medication 2: at 17:39

## 2019-08-22 RX ADMIN — Medication 4: at 12:36

## 2019-08-22 RX ADMIN — Medication 500 MILLIGRAM(S): at 11:14

## 2019-08-22 RX ADMIN — INSULIN GLARGINE 30 UNIT(S): 100 INJECTION, SOLUTION SUBCUTANEOUS at 21:35

## 2019-08-22 RX ADMIN — Medication 500000 UNIT(S): at 12:36

## 2019-08-22 RX ADMIN — Medication 100 MILLIGRAM(S): at 12:37

## 2019-08-22 RX ADMIN — Medication 60 MILLIGRAM(S): at 13:33

## 2019-08-22 RX ADMIN — TIOTROPIUM BROMIDE 1 CAPSULE(S): 18 CAPSULE ORAL; RESPIRATORY (INHALATION) at 07:43

## 2019-08-22 RX ADMIN — Medication 500000 UNIT(S): at 17:36

## 2019-08-22 RX ADMIN — Medication 3 MILLILITER(S): at 16:40

## 2019-08-22 RX ADMIN — PANTOPRAZOLE SODIUM 40 MILLIGRAM(S): 20 TABLET, DELAYED RELEASE ORAL at 05:58

## 2019-08-22 RX ADMIN — Medication 20 UNIT(S): at 17:39

## 2019-08-22 RX ADMIN — Medication 3 MILLILITER(S): at 07:43

## 2019-08-22 RX ADMIN — Medication 3 MILLILITER(S): at 20:42

## 2019-08-22 RX ADMIN — Medication 1 DROP(S): at 13:33

## 2019-08-22 RX ADMIN — Medication 1 DROP(S): at 05:58

## 2019-08-22 RX ADMIN — POLYETHYLENE GLYCOL 3350 17 GRAM(S): 17 POWDER, FOR SOLUTION ORAL at 11:15

## 2019-08-22 RX ADMIN — Medication 100 MILLIGRAM(S): at 05:58

## 2019-08-22 RX ADMIN — ATORVASTATIN CALCIUM 20 MILLIGRAM(S): 80 TABLET, FILM COATED ORAL at 21:34

## 2019-08-22 RX ADMIN — HEPARIN SODIUM 5000 UNIT(S): 5000 INJECTION INTRAVENOUS; SUBCUTANEOUS at 05:58

## 2019-08-22 RX ADMIN — TAMSULOSIN HYDROCHLORIDE 0.4 MILLIGRAM(S): 0.4 CAPSULE ORAL at 21:34

## 2019-08-22 RX ADMIN — Medication 100 MILLIGRAM(S): at 21:34

## 2019-08-22 RX ADMIN — MAGNESIUM OXIDE 400 MG ORAL TABLET 400 MILLIGRAM(S): 241.3 TABLET ORAL at 12:36

## 2019-08-22 RX ADMIN — FINASTERIDE 5 MILLIGRAM(S): 5 TABLET, FILM COATED ORAL at 11:14

## 2019-08-22 RX ADMIN — HEPARIN SODIUM 5000 UNIT(S): 5000 INJECTION INTRAVENOUS; SUBCUTANEOUS at 13:33

## 2019-08-22 RX ADMIN — SODIUM POLYSTYRENE SULFONATE 15 GRAM(S): 4.1 POWDER, FOR SUSPENSION ORAL at 15:27

## 2019-08-22 RX ADMIN — Medication 60 MILLIGRAM(S): at 21:35

## 2019-08-22 RX ADMIN — Medication 1000 UNIT(S): at 11:14

## 2019-08-22 NOTE — PROGRESS NOTE ADULT - SUBJECTIVE AND OBJECTIVE BOX
JIAN MANCIA  78y  Male      Patient is a 78y old  Male who presents with a chief complaint of Hypoxia x 1 day (22 Aug 2019 09:47)      INTERVAL HPI/OVERNIGHT EVENTS:  Patient seen and examined earlier this morning.  Sitting comfortably in bed.  In NAD.  Appears comfortable.  Ambulated to the bathroom and had a bowel movement.       REVIEW OF SYSTEMS:  CONSTITUTIONAL: No fever, weight loss, or fatigue  EYES: No eye pain, visual disturbances, or discharge  ENMT:  No difficulty hearing, tinnitus, vertigo; No sinus or throat pain  NECK: No pain or stiffness  RESPIRATORY: No cough, wheezing, chills or hemoptysis; No shortness of breath  CARDIOVASCULAR: No chest pain, palpitations, dizziness, or leg swelling  GASTROINTESTINAL: No abdominal or epigastric pain. No nausea, vomiting, or hematemesis; No diarrhea or constipation. No melena or hematochezia.  GENITOURINARY: No dysuria, frequency, hematuria, or incontinence  NEUROLOGICAL: No headaches, memory loss, loss of strength, numbness, or tremors  SKIN: No itching, burning, rashes, or lesions   LYMPH NODES: No enlarged glands  ENDOCRINE: No heat or cold intolerance; No hair loss  MUSCULOSKELETAL: No joint pain or swelling; No muscle, back, or extremity pain  PSYCHIATRIC: No depression, anxiety, mood swings, or difficulty sleeping  HEME/LYMPH: No easy bruising, or bleeding gums  ALLERY AND IMMUNOLOGIC: No hives or eczema    T(C): 37 (19 @ 22:20), Max: 37 (19 @ 22:20)  HR: 88 (19 @ 06:08) (82 - 115)  BP: 134/72 (19 @ 06:08) (98/54 - 134/72)  RR: 18 (19 @ 06:08) (18 - 20)  SpO2: 93% (19 @ 06:08) (92% - 93%) on highflow oxygen    PHYSICAL EXAM:  GENERAL: NAD, well-groomed, well-developed  HEAD:  Atraumatic, Normocephalic  EYES: EOMI, PERRLA, conjunctiva and sclera clear  ENMT: No tonsillar erythema, exudates, or enlargement; Moist mucous membranes, Good dentition, No lesions  NECK: Supple, No JVD, Normal thyroid  NERVOUS SYSTEM:  Alert & Oriented X3, Good concentration; Motor Strength 5/5 B/L upper and lower extremities; DTRs 2+ intact and symmetric  CHEST/LUNG: decreased breath sounds b/l; no wheezing  HEART: Regular rate and rhythm; No murmurs, rubs, or gallops  ABDOMEN: Soft, Nontender, Nondistended; Bowel sounds present, obese  EXTREMITIES:  2+ Peripheral Pulses, No clubbing, cyanosis, +2 edema  LYMPH: No lymphadenopathy noted  SKIN: No rashes or lesions    Consultant(s) Notes Reviewed:  [x ] YES  [ ] NO  Care Discussed with Consultants/Other Providers [ x] YES  [ ] NO    LAB:                        13.9   16.80 )-----------( 422      ( 22 Aug 2019 07:05 )             47.9     08-    138  |  97<L>  |  60<H>  ----------------------------<  357<H>  5.8<H>   |  29  |  2.1<H>    Ca    9.0      22 Aug 2019 07:05  Mg     2.4         TPro  7.5  /  Alb  4.5  /  TBili  0.7  /  DBili  x   /  AST  25  /  ALT  19  /  AlkPhos  70  08-21    LIVER FUNCTIONS - ( 21 Aug 2019 06:37 )  Alb: 4.5 g/dL / Pro: 7.5 g/dL / ALK PHOS: 70 U/L / ALT: 19 U/L / AST: 25 U/L / GGT: x           CARDIAC MARKERS ( 21 Aug 2019 06:37 )  x     / 0.03 ng/mL / x     / x     / x            Drug Dosing Weight  Height (cm): 172.72 (04 Aug 2019 23:00)  Weight (kg): 119.5 (05 Aug 2019 02:48)  BMI (kg/m2): 40.1 (05 Aug 2019 02:48)  BSA (m2): 2.3 (05 Aug 2019 02:48)    CAPILLARY BLOOD GLUCOSE  POCT Blood Glucose.: 354 mg/dL (22 Aug 2019 11:30)  POCT Blood Glucose.: 311 mg/dL (22 Aug 2019 07:46)  POCT Blood Glucose.: 192 mg/dL (21 Aug 2019 21:14)  POCT Blood Glucose.: 210 mg/dL (21 Aug 2019 16:25)    I&O's Summary    21 Aug 2019 07:  -  22 Aug 2019 07:00  --------------------------------------------------------  IN: 0 mL / OUT: 350 mL / NET: -350 mL    22 Aug 2019 07:  -  22 Aug 2019 13:34  --------------------------------------------------------  IN: 0 mL / OUT: 400 mL / NET: -400 mL      Urinalysis Basic - ( 21 Aug 2019 13:03 )    Color: Yellow / Appearance: Clear / S.020 / pH: x  Gluc: x / Ketone: Trace  / Bili: Negative / Urobili: 0.2 mg/dL   Blood: x / Protein: Trace mg/dL / Nitrite: Negative   Leuk Esterase: Negative / RBC: Negative / WBC 1-2 /HPF   Sq Epi: x / Non Sq Epi: Occasional /HPF / Bacteria: x        RADIOLOGY & ADDITIONAL TESTS:  Imaging Personally Reviewed:  [x] YES  [ ] NO    HEALTH ISSUES - PROBLEM Dx:  Other elevated white blood cell (WBC) count: Other elevated white blood cell (WBC) count  Chronic obstructive pulmonary disease, unspecified COPD type: Chronic obstructive pulmonary disease, unspecified COPD type  Enlarged prostate: Enlarged prostate  Diabetes mellitus, type 2: Diabetes mellitus, type 2  COPD (chronic obstructive pulmonary disease): COPD (chronic obstructive pulmonary disease)  Chronic congestive heart failure: Chronic congestive heart failure  CKD (chronic kidney disease) stage 3, GFR 30-59 ml/min: CKD (chronic kidney disease) stage 3, GFR 30-59 ml/min  Elevated troponin: Elevated troponin  Opacity of lung on imaging study: Opacity of lung on imaging study  SOB (shortness of breath): SOB (shortness of breath)          MEDS:  acetaminophen   Tablet .. 650 milliGRAM(s) Oral every 6 hours PRN  ALBUTerol/ipratropium for Nebulization 3 milliLiter(s) Nebulizer every 4 hours  ALBUTerol/ipratropium for Nebulization 3 milliLiter(s) Nebulizer every 4 hours PRN  aluminum hydroxide/magnesium hydroxide/simethicone Suspension 30 milliLiter(s) Oral every 4 hours PRN  artificial  tears Solution 1 Drop(s) Both EYES three times a day  ascorbic acid 500 milliGRAM(s) Oral daily  atorvastatin 20 milliGRAM(s) Oral at bedtime  buDESOnide 160 MICROgram(s)/formoterol 4.5 MICROgram(s) Inhaler 2 Puff(s) Inhalation two times a day  chlorhexidine 4% Liquid 1 Application(s) Topical <User Schedule>  cholecalciferol 1000 Unit(s) Oral daily  dextrose 40% Gel 15 Gram(s) Oral once PRN  dextrose 5%. 1000 milliLiter(s) IV Continuous <Continuous>  dextrose 50% Injectable 12.5 Gram(s) IV Push once  dextrose 50% Injectable 25 Gram(s) IV Push once  dextrose 50% Injectable 25 Gram(s) IV Push once  docusate sodium 100 milliGRAM(s) Oral three times a day  ferrous    sulfate 325 milliGRAM(s) Oral daily  finasteride 5 milliGRAM(s) Oral daily  furosemide    Tablet 40 milliGRAM(s) Oral daily  glucagon  Injectable 1 milliGRAM(s) IntraMuscular once PRN  guaiFENesin/dextromethorphan  Syrup 10 milliLiter(s) Oral every 6 hours PRN  heparin  Injectable 5000 Unit(s) SubCutaneous every 8 hours  insulin glargine Injectable (LANTUS) 30 Unit(s) SubCutaneous at bedtime  insulin lispro (HumaLOG) corrective regimen sliding scale   SubCutaneous three times a day before meals  insulin lispro Injectable (HumaLOG) 20 Unit(s) SubCutaneous three times a day before meals  magnesium oxide 400 milliGRAM(s) Oral three times a day with meals  methylPREDNISolone sodium succinate Injectable 60 milliGRAM(s) IV Push every 8 hours  metoprolol tartrate 25 milliGRAM(s) Oral two times a day  nystatin    Suspension 728381 Unit(s) Oral every 6 hours  pantoprazole    Tablet 40 milliGRAM(s) Oral before breakfast  polyethylene glycol 3350 17 Gram(s) Oral daily  senna 2 Tablet(s) Oral at bedtime  tamsulosin 0.4 milliGRAM(s) Oral at bedtime  tiotropium 18 MICROgram(s) Capsule 1 Capsule(s) Inhalation daily      Progress Note Handoff  Pending Consults: none  Pending Tests: none  Pending Results: none  Family Discussion: discussed plan with pt - he is in agreement  Disposition: Home__x___/SNF______/Other_____/Unknown at this time_____    Please call me with any questions at extension [phone]

## 2019-08-22 NOTE — CONSULT NOTE ADULT - SUBJECTIVE AND OBJECTIVE BOX
MATRONE, JIAN  78y, Male  Allergy: aspirin (Other)  fish (Other)  iodine (Hives)  penicillin (Unknown)  shellfish (Other)      CHIEF COMPLAINT: Hypoxia x 1 day (22 Aug 2019 09:17)      HPI:  From ED:  79 yo M with PMHx of HTN, COPD on home O2 4-5L, Afib on Eliquis, HLD, CHF and DM presents to the ED c/o SOB. Symptoms started earlier yesterday but worsened tonight. There was a mechanical issue with pt's CPAP machine at home and symptoms progressed so he called EMS. When EMS arrived pt was hypoxic to 70s. He was given combivent and placed on CPAP with improved in O2 sat to 94. Pt admits to associated cough. He denies other complaints. Pt denies fever, chills, nausea, vomiting, abdominal pain, diarrhea, headache, dizziness, weakness, chest pain, back pain, LOC, trauma, urinary symptoms.  ________________________________________________________________________________________________________   Patient presents to ED with complaints of hypoxia several hours prior to admission.  Patient reports he was experiencing a problem with his CPAP last night and it was not properly titrating.  He reports that approx 2200 he went to the bathroom to wash up and was unable to get himself out of it.  At that point his HHA brought in the CPAP and EMS was called.  After a few minutes on the CPAP that patient improved.  EMS came and checked the patient, but he reported he felt better and left.  He went back to bed and at approx 0000 noticed his CPAP wasn't functioning properly and he became SOB with hypoxia approx 78%.  He called his son who came to the house, and despite supplemental o2 was unable to increase his oxygenation.  EMS was activated again at approx 0400 and the patient was brought to the ED.  He was placed on BiPAP with improvement to symptoms and increased SpO2.  He was found to have an elevated white count and poss RLL opacity on admission CXR and admitted for further management.  Patient seen and examined at bedside, reports he was experiencing some SOB x 2-3 days PTA with INFANTE and increased o2 demands of up to 6 l/m.  He reports his cough was at baseline except for 2 days ago when he had an increase with scant white production.  He denies any fever/chills.  No CP/Palpitations.  No abdominal complaints including N/V/D or pain.  No urinary complaints. (21 Aug 2019 08:46)      Pt recently in hospital - discharged with tapering steroids  No fevers   acute illness - heat / malfunctioning CPAP machine   feels back to baseline       FAMILY HISTORY:  No pertinent family history in first degree relatives    PAST MEDICAL & SURGICAL HISTORY:  CKD (chronic kidney disease) stage 3, GFR 30-59 ml/min  Colon polyp: claims it was malignant  High cholesterol  GERD (gastroesophageal reflux disease)  Enlarged prostate  Oxygen dependent  COPD (chronic obstructive pulmonary disease)  Diabetes mellitus, type 2  Hypertension  Emphysema lung  History of hemorrhoidectomy  Dysplastic colon polyp: removed      Substance Use ( X ) never used  (  ) IVDU (  ) Other:  Tobacco Usage:  (   ) never smoked   (  X ) former smoker   (   ) current smoker   Alcohol Usage: (   ) social  (   ) daily use (  X ) denies  Sexual History: not relevant       ROS  10 system review - not contributory     VITALS:  T(F): 98.6, Max: 98.6 (19 @ 22:20)  HR: 88  BP: 134/72  RR: 18Vital Signs Last 24 Hrs  T(C): 37 (21 Aug 2019 22:20), Max: 37 (21 Aug 2019 22:20)  T(F): 98.6 (21 Aug 2019 22:20), Max: 98.6 (21 Aug 2019 22:20)  HR: 88 (22 Aug 2019 06:08) (82 - 115)  BP: 134/72 (22 Aug 2019 06:08) (98/54 - 134/72)  BP(mean): --  RR: 18 (22 Aug 2019 06:08) (18 - 20)  SpO2: 93% (22 Aug 2019 06:08) (92% - 93%)    PHYSICAL EXAM:  Gen: NAD, resting in bed  HEENT: Normocephalic, atraumatic  Neck: supple, no lymphadenopathy  CV: s1 s2 +   Lungs: decreased BS  Abdomen: Soft, BS present. obese  Ext: Warm, well perfused  Neuro: non focal, awake  Skin: ecchymoses ++    TESTS & MEASUREMENTS:                        13.9   16.80 )-----------( 422      ( 22 Aug 2019 07:05 )             47.9         138  |  97<L>  |  60<H>  ----------------------------<  357<H>  5.8<H>   |  29  |  2.1<H>    Ca    9.0      22 Aug 2019 07:05  Mg     2.4         TPro  7.5  /  Alb  4.5  /  TBili  0.7  /  DBili  x   /  AST  25  /  ALT  19  /  AlkPhos  70      eGFR if Non African American: 29 mL/min/1.73M2 (19 @ 07:05)  eGFR if African American: 33 mL/min/1.73M2 (19 @ 07:05)    LIVER FUNCTIONS - ( 21 Aug 2019 06:37 )  Alb: 4.5 g/dL / Pro: 7.5 g/dL / ALK PHOS: 70 U/L / ALT: 19 U/L / AST: 25 U/L / GGT: x           Urinalysis Basic - ( 21 Aug 2019 13:03 )    Color: Yellow / Appearance: Clear / S.020 / pH: x  Gluc: x / Ketone: Trace  / Bili: Negative / Urobili: 0.2 mg/dL   Blood: x / Protein: Trace mg/dL / Nitrite: Negative   Leuk Esterase: Negative / RBC: Negative / WBC 1-2 /HPF   Sq Epi: x / Non Sq Epi: Occasional /HPF / Bacteria: x          Blood Gas Venous - Lactate: 1.3 mmoL/L (19 @ 06:09)      INFECTIOUS DISEASES TESTING  Legionella Antigen, Urine: Negative (18 @ 07:50)      RADIOLOGY & ADDITIONAL TESTS:  I have personally reviewed the last Chest xray  CXR  Xray Chest 1 View- PORTABLE-Urgent:   EXAM:  XR CHEST PORTABLE URGENT 1V            PROCEDURE DATE:  2019            INTERPRETATION:  Clinical History / Reason for exam: Shortness of breath.    Comparison : Chest radiograph 2019    Technique/Positioning: AP view the chest..    Findings:    Support devices: None.    Cardiac/mediastinum/hilum: Cardiomegaly. Calcified aortic knob.    Lung parenchyma/Pleura: Bilateral opacities and bibasilar opacifications.    Skeleton/soft tissues: Stable.    Impression:      Bilateral opacities and bibasilar opacifications, left greater than the   right.                  PAULO TESFAYE M.D., ATTENDING RADIOLOGIST  This document has been electronically signed. Aug 21 2019 11:20AM             (19 @ 07:03)      CT      CARDIOLOGY TESTING  12 Lead ECG:   Ventricular Rate 115 BPM    Atrial Rate 144 BPM    QRS Duration 78 ms    Q-T Interval 314 ms    QTC Calculation(Bezet) 434 ms    R Axis 63 degrees    T Axis 87 degrees    Diagnosis Line Atrial fibrillation with rapid ventricular response  Nonspecific ST and T wave abnormality  Abnormal ECG    Confirmed by TIERNEY RUIZ MD ([id number]) on 2019 1:49:29 PM (19 @ 05:59)      MEDICATIONS  ALBUTerol/ipratropium for Nebulization 3  artificial  tears Solution 1  ascorbic acid 500  atorvastatin 20  buDESOnide 160 MICROgram(s)/formoterol 4.5 MICROgram(s) Inhaler 2  chlorhexidine 4% Liquid 1  cholecalciferol 1000  dextrose 5%. 1000  dextrose 50% Injectable 12.5  dextrose 50% Injectable 25  dextrose 50% Injectable 25  docusate sodium 100  ferrous    sulfate 325  finasteride 5  furosemide    Tablet 40  heparin  Injectable 5000  insulin glargine Injectable (LANTUS) 30  insulin lispro (HumaLOG) corrective regimen sliding scale   insulin lispro Injectable (HumaLOG) 20  magnesium oxide 400  methylPREDNISolone sodium succinate Injectable 60  metoprolol tartrate 25  pantoprazole    Tablet 40  polyethylene glycol 3350 17  senna 2  tamsulosin 0.4  tiotropium 18 MICROgram(s) Capsule 1      ANTIBIOTICS:

## 2019-08-22 NOTE — ASSESSMENT
[FreeTextEntry1] : 1. now to increase to 40mg Lasix daily\par 2. will arrange for BMP check q 2 weeks to f/u renal function on increased diuretic as well as chronic hyperkalemia (which should be helped by increasing furosemide)\par 3. patient flustered because he can't find paperwork i need to review for transportation (not [employer]); reassured patient to have papers sent for me and I will complete them.\par 4. continuing with palliative approach towards advanced COPD and CHF - reviewed all of maintenance medications with him.\par 5. planning to [facility] next month, hopefully for flu vaccine then.\par Note: patient is driven by need to 'be there" for his disabled son; as previously documented arrangements already made that upon patient's death son will have use of home for rest of his life and other son is aware.\par Counseled patient also re high humidity, how this affects his underlying COPD; does have very good AC in place and is using it.

## 2019-08-22 NOTE — PHYSICAL EXAM
[Normal Bowel Sounds] : normal bowel sounds [Supple] : the neck was supple [de-identified] : rhonda [de-identified] : no rhinitis [de-identified] : no exudates [de-identified] : thick [de-identified] : slt dec BS but adeq air exchange; no acute wheeze; (note that PO today is Best I have ever had at home) [de-identified] : [id number] [de-identified] : obese [de-identified] : chronic dependent edema with PVD but less leg edema than last visit

## 2019-08-22 NOTE — REVIEW OF SYSTEMS
[Shortness Of Breath] : shortness of breath [Negative] : Eyes [Fever] : no fever [Chills] : no chills [Chest Pain] : no chest pain [Palpitations] : no palpitations [Abdominal Pain] : no abdominal pain [Vomiting] : no vomiting [Dysuria] : no dysuria [Hematuria] : no hematuria [FreeTextEntry4] : recent itch in nose - not acute [FreeTextEntry6] : always SOB with minimal exertion [FreeTextEntry9] : frequent swellilng of LE and hands

## 2019-08-22 NOTE — PROGRESS NOTE ADULT - SUBJECTIVE AND OBJECTIVE BOX
Patient is a 78y old  Male who presents with a chief complaint of Hypoxia x 1 day (21 Aug 2019 08:46). Pt known to service recently discharged. CPAP isn't working at home and patient felt SOB. SaO2 low when EMS arrived.        Interval Events: No overnight events.    REVIEW OF SYSTEMS:  Constitutional: No fevers or chills. No weight loss. No fatigue or generalized malaise.  Eyes: No itching or discharge from the eyes  ENT: No ear pain. No ear discharge. No nasal congestion. No post nasal drip. No epistaxis. No throat pain. No sore throat. No difficulty swallowing.   CV: No chest pain. No palpitations. No lightheadedness or dizziness.   Resp: +dyspnea at rest. +dyspnea on exertion. No orthopnea. +wheezing. No cough. No stridor. No sputum production. No chest pain with respiration.  GI: No nausea. No vomiting. No diarrhea.  MSK: No joint pain or pain in any extremities  Integumentary: No skin lesions. No pedal edema.  Neurological: No gross motor weakness. No sensory changes.      OBJECTIVE:  ICU Vital Signs Last 24 Hrs  T(C): 37 (21 Aug 2019 22:20), Max: 37 (21 Aug 2019 22:20)  T(F): 98.6 (21 Aug 2019 22:20), Max: 98.6 (21 Aug 2019 22:20)  HR: 88 (22 Aug 2019 06:08) (82 - 115)  BP: 134/72 (22 Aug 2019 06:08) (98/54 - 134/72)    RR: 18 (22 Aug 2019 06:08) (18 - 20)  SpO2: 93% (22 Aug 2019 06:08) (92% - 95%)         @ 07:  -   @ 07:00  --------------------------------------------------------  IN: 0 mL / OUT: 350 mL / NET: -350 mL     @ 07:01  -   @ 09:17  --------------------------------------------------------  IN: 0 mL / OUT: 400 mL / NET: -400 mL      CAPILLARY BLOOD GLUCOSE      POCT Blood Glucose.: 311 mg/dL (22 Aug 2019 07:46)      PHYSICAL EXAM:  General: Awake, alert, oriented X 3.   HEENT: Atraumatic, normocephalic.                 Mallampatti Grade                 No nasal congestion.                No tonsillar or pharyngeal exudates.  Lymph Nodes: No palpable lymphadenopathy neck, supraclavicular region  Neck: No JVD. No carotid bruit, no thyromegally  Respiratory: Normal chest expansion                         Normal percussion                         decreased air entry                         +wheeze, rhonchi or rales.  Cardiovascular: S1 S2 normal. No murmurs, rubs or gallops.   Abdomen: Soft, non-tender, non-distended. No organomegaly.  Extremities: Warm to touch. Peripheral pulse palpable. No pedal edema.   Skin: No rashes or skin lesions, warm dry  Neurological: Motor and sensory examination equal and normal in all four extremities.  Psychiatry: Appropriate mood and affect.    HOSPITAL MEDICATIONS:  MEDICATIONS  (STANDING):  ALBUTerol/ipratropium for Nebulization 3 milliLiter(s) Nebulizer every 4 hours  artificial  tears Solution 1 Drop(s) Both EYES three times a day  ascorbic acid 500 milliGRAM(s) Oral daily  atorvastatin 20 milliGRAM(s) Oral at bedtime  buDESOnide 160 MICROgram(s)/formoterol 4.5 MICROgram(s) Inhaler 2 Puff(s) Inhalation two times a day  cefepime   IVPB 1000 milliGRAM(s) IV Intermittent every 12 hours  chlorhexidine 4% Liquid 1 Application(s) Topical <User Schedule>  cholecalciferol 1000 Unit(s) Oral daily  dextrose 5%. 1000 milliLiter(s) (50 mL/Hr) IV Continuous <Continuous>  dextrose 50% Injectable 12.5 Gram(s) IV Push once  dextrose 50% Injectable 25 Gram(s) IV Push once  dextrose 50% Injectable 25 Gram(s) IV Push once  docusate sodium 100 milliGRAM(s) Oral three times a day  ferrous    sulfate 325 milliGRAM(s) Oral daily  finasteride 5 milliGRAM(s) Oral daily  furosemide    Tablet 40 milliGRAM(s) Oral daily  heparin  Injectable 5000 Unit(s) SubCutaneous every 8 hours  insulin glargine Injectable (LANTUS) 30 Unit(s) SubCutaneous at bedtime  insulin lispro (HumaLOG) corrective regimen sliding scale   SubCutaneous three times a day before meals  insulin lispro Injectable (HumaLOG) 20 Unit(s) SubCutaneous three times a day before meals  levoFLOXacin IVPB 250 milliGRAM(s) IV Intermittent every 24 hours  magnesium oxide 400 milliGRAM(s) Oral three times a day with meals  methylPREDNISolone sodium succinate Injectable 60 milliGRAM(s) IV Push every 8 hours  metoprolol tartrate 25 milliGRAM(s) Oral two times a day  pantoprazole    Tablet 40 milliGRAM(s) Oral before breakfast  polyethylene glycol 3350 17 Gram(s) Oral daily  senna 2 Tablet(s) Oral at bedtime  tamsulosin 0.4 milliGRAM(s) Oral at bedtime  tiotropium 18 MICROgram(s) Capsule 1 Capsule(s) Inhalation daily    MEDICATIONS  (PRN):  acetaminophen   Tablet .. 650 milliGRAM(s) Oral every 6 hours PRN Temp greater or equal to 38C (100.4F), Mild Pain (1 - 3)  ALBUTerol/ipratropium for Nebulization 3 milliLiter(s) Nebulizer every 4 hours PRN Bronchospasm  aluminum hydroxide/magnesium hydroxide/simethicone Suspension 30 milliLiter(s) Oral every 4 hours PRN Dyspepsia  dextrose 40% Gel 15 Gram(s) Oral once PRN Blood Glucose LESS THAN 70 milliGRAM(s)/deciliter  glucagon  Injectable 1 milliGRAM(s) IntraMuscular once PRN Glucose LESS THAN 70 milligrams/deciliter  guaiFENesin/dextromethorphan  Syrup 10 milliLiter(s) Oral every 6 hours PRN Cough      LABS:                        16.2   29.59 )-----------( 524      ( 21 Aug 2019 06:37 )             55.8     08-    142  |  99  |  46<H>  ----------------------------<  82  5.7<H>   |  31  |  1.7<H>    Ca    9.6      21 Aug 2019 06:37  Mg     2.2     08-    TPro  7.5  /  Alb  4.5  /  TBili  0.7  /  DBili  x   /  AST  25  /  ALT  19  /  AlkPhos  70  08-    PT/INR - ( 21 Aug 2019 06:37 )   PT: 12.40 sec;   INR: 1.08 ratio         PTT - ( 21 Aug 2019 06:37 )  PTT:31.8 sec  Urinalysis Basic - ( 21 Aug 2019 13:03 )    Color: Yellow / Appearance: Clear / S.020 / pH: x  Gluc: x / Ketone: Trace  / Bili: Negative / Urobili: 0.2 mg/dL   Blood: x / Protein: Trace mg/dL / Nitrite: Negative   Leuk Esterase: Negative / RBC: Negative / WBC 1-2 /HPF   Sq Epi: x / Non Sq Epi: Occasional /HPF / Bacteria: x        Venous Blood Gas:   @ 06:09  7.33/70/28/37/44  VBG Lactate: 1.3              RADIOLOGY:Radiology personally reviewed. Likely atelectatic changes at bases

## 2019-08-22 NOTE — PROGRESS NOTE ADULT - PROBLEM SELECTOR PLAN 1
w/hypoxia requiring NIPPV due to malfunctioning CPAP/ elevated WBC count   - pulm eval appreciated   - symptomatically improved on BiPAP, c/w highflow oxygen  - off abx as per ID  - FU BCx   - b2 q4h RTC and PRN  - DVT/GI PPX (Heparin/Protonix)  - CHG 4% QD and PRN

## 2019-08-22 NOTE — PROGRESS NOTE ADULT - PROBLEM SELECTOR PLAN 4
w/Hx of Hyperkalemia  - at baseline renal function  - K+ elevated on admit but hemolyzed.  Repeat VBG WNL.  Mg2+ 2.2  - c/w current supplementation, Kayexalate PRN  - Daily BMP/Mg  - avoid nephrotoxins  - renal c/s if acute worsening

## 2019-08-23 LAB
ALBUMIN SERPL ELPH-MCNC: 3.6 G/DL — SIGNIFICANT CHANGE UP (ref 3.5–5.2)
ALP SERPL-CCNC: 74 U/L — SIGNIFICANT CHANGE UP (ref 30–115)
ALT FLD-CCNC: 14 U/L — SIGNIFICANT CHANGE UP (ref 0–41)
ANION GAP SERPL CALC-SCNC: 13 MMOL/L — SIGNIFICANT CHANGE UP (ref 7–14)
ANION GAP SERPL CALC-SCNC: 16 MMOL/L — HIGH (ref 7–14)
AST SERPL-CCNC: 7 U/L — SIGNIFICANT CHANGE UP (ref 0–41)
BILIRUB SERPL-MCNC: 0.3 MG/DL — SIGNIFICANT CHANGE UP (ref 0.2–1.2)
BUN SERPL-MCNC: 79 MG/DL — CRITICAL HIGH (ref 10–20)
BUN SERPL-MCNC: 80 MG/DL — CRITICAL HIGH (ref 10–20)
CALCIUM SERPL-MCNC: 9 MG/DL — SIGNIFICANT CHANGE UP (ref 8.5–10.1)
CALCIUM SERPL-MCNC: 9.1 MG/DL — SIGNIFICANT CHANGE UP (ref 8.5–10.1)
CHLORIDE SERPL-SCNC: 96 MMOL/L — LOW (ref 98–110)
CHLORIDE SERPL-SCNC: 96 MMOL/L — LOW (ref 98–110)
CO2 SERPL-SCNC: 27 MMOL/L — SIGNIFICANT CHANGE UP (ref 17–32)
CO2 SERPL-SCNC: 30 MMOL/L — SIGNIFICANT CHANGE UP (ref 17–32)
CREAT SERPL-MCNC: 2.3 MG/DL — HIGH (ref 0.7–1.5)
CREAT SERPL-MCNC: 2.5 MG/DL — HIGH (ref 0.7–1.5)
GLUCOSE BLDC GLUCOMTR-MCNC: 265 MG/DL — HIGH (ref 70–99)
GLUCOSE BLDC GLUCOMTR-MCNC: 297 MG/DL — HIGH (ref 70–99)
GLUCOSE BLDC GLUCOMTR-MCNC: 308 MG/DL — HIGH (ref 70–99)
GLUCOSE BLDC GLUCOMTR-MCNC: 453 MG/DL — CRITICAL HIGH (ref 70–99)
GLUCOSE SERPL-MCNC: 314 MG/DL — HIGH (ref 70–99)
GLUCOSE SERPL-MCNC: 368 MG/DL — HIGH (ref 70–99)
HCT VFR BLD CALC: 48.8 % — SIGNIFICANT CHANGE UP (ref 42–52)
HGB BLD-MCNC: 13.9 G/DL — LOW (ref 14–18)
MCHC RBC-ENTMCNC: 22.7 PG — LOW (ref 27–31)
MCHC RBC-ENTMCNC: 28.5 G/DL — LOW (ref 32–37)
MCV RBC AUTO: 79.7 FL — LOW (ref 80–94)
NRBC # BLD: 0 /100 WBCS — SIGNIFICANT CHANGE UP (ref 0–0)
PLATELET # BLD AUTO: 487 K/UL — HIGH (ref 130–400)
POTASSIUM SERPL-MCNC: 5.9 MMOL/L — HIGH (ref 3.5–5)
POTASSIUM SERPL-MCNC: 6.3 MMOL/L — CRITICAL HIGH (ref 3.5–5)
POTASSIUM SERPL-SCNC: 5.9 MMOL/L — HIGH (ref 3.5–5)
POTASSIUM SERPL-SCNC: 6.3 MMOL/L — CRITICAL HIGH (ref 3.5–5)
PROT SERPL-MCNC: 6.2 G/DL — SIGNIFICANT CHANGE UP (ref 6–8)
RBC # BLD: 6.12 M/UL — HIGH (ref 4.7–6.1)
RBC # FLD: 20.4 % — HIGH (ref 11.5–14.5)
SODIUM SERPL-SCNC: 139 MMOL/L — SIGNIFICANT CHANGE UP (ref 135–146)
SODIUM SERPL-SCNC: 139 MMOL/L — SIGNIFICANT CHANGE UP (ref 135–146)
WBC # BLD: 16.88 K/UL — HIGH (ref 4.8–10.8)
WBC # FLD AUTO: 16.88 K/UL — HIGH (ref 4.8–10.8)

## 2019-08-23 PROCEDURE — 99233 SBSQ HOSP IP/OBS HIGH 50: CPT

## 2019-08-23 RX ORDER — SODIUM POLYSTYRENE SULFONATE 4.1 MEQ/G
30 POWDER, FOR SUSPENSION ORAL ONCE
Refills: 0 | Status: COMPLETED | OUTPATIENT
Start: 2019-08-23 | End: 2019-08-23

## 2019-08-23 RX ORDER — DEXTROSE 50 % IN WATER 50 %
50 SYRINGE (ML) INTRAVENOUS ONCE
Refills: 0 | Status: COMPLETED | OUTPATIENT
Start: 2019-08-23 | End: 2019-08-23

## 2019-08-23 RX ORDER — INSULIN LISPRO 100/ML
10 VIAL (ML) SUBCUTANEOUS ONCE
Refills: 0 | Status: COMPLETED | OUTPATIENT
Start: 2019-08-23 | End: 2019-08-23

## 2019-08-23 RX ADMIN — Medication 40 MILLIGRAM(S): at 08:46

## 2019-08-23 RX ADMIN — Medication 3 MILLILITER(S): at 12:45

## 2019-08-23 RX ADMIN — HEPARIN SODIUM 5000 UNIT(S): 5000 INJECTION INTRAVENOUS; SUBCUTANEOUS at 06:46

## 2019-08-23 RX ADMIN — INSULIN GLARGINE 30 UNIT(S): 100 INJECTION, SOLUTION SUBCUTANEOUS at 21:06

## 2019-08-23 RX ADMIN — TAMSULOSIN HYDROCHLORIDE 0.4 MILLIGRAM(S): 0.4 CAPSULE ORAL at 21:05

## 2019-08-23 RX ADMIN — Medication 10 UNIT(S): at 11:11

## 2019-08-23 RX ADMIN — Medication 1000 UNIT(S): at 12:01

## 2019-08-23 RX ADMIN — Medication 3: at 17:12

## 2019-08-23 RX ADMIN — TIOTROPIUM BROMIDE 1 CAPSULE(S): 18 CAPSULE ORAL; RESPIRATORY (INHALATION) at 08:48

## 2019-08-23 RX ADMIN — Medication 3 MILLILITER(S): at 19:31

## 2019-08-23 RX ADMIN — Medication 25 MILLIGRAM(S): at 08:46

## 2019-08-23 RX ADMIN — Medication 100 MILLIGRAM(S): at 21:05

## 2019-08-23 RX ADMIN — Medication 25 MILLIGRAM(S): at 17:59

## 2019-08-23 RX ADMIN — BUDESONIDE AND FORMOTEROL FUMARATE DIHYDRATE 2 PUFF(S): 160; 4.5 AEROSOL RESPIRATORY (INHALATION) at 08:48

## 2019-08-23 RX ADMIN — Medication 40 MILLIGRAM(S): at 21:03

## 2019-08-23 RX ADMIN — SODIUM POLYSTYRENE SULFONATE 30 GRAM(S): 4.1 POWDER, FOR SUSPENSION ORAL at 11:12

## 2019-08-23 RX ADMIN — Medication 20 UNIT(S): at 08:23

## 2019-08-23 RX ADMIN — Medication 50 MILLILITER(S): at 11:13

## 2019-08-23 RX ADMIN — HEPARIN SODIUM 5000 UNIT(S): 5000 INJECTION INTRAVENOUS; SUBCUTANEOUS at 13:52

## 2019-08-23 RX ADMIN — Medication 500 MILLIGRAM(S): at 12:01

## 2019-08-23 RX ADMIN — Medication 100 MILLIGRAM(S): at 08:45

## 2019-08-23 RX ADMIN — Medication 1 DROP(S): at 13:57

## 2019-08-23 RX ADMIN — HEPARIN SODIUM 5000 UNIT(S): 5000 INJECTION INTRAVENOUS; SUBCUTANEOUS at 21:05

## 2019-08-23 RX ADMIN — Medication 4: at 08:23

## 2019-08-23 RX ADMIN — BUDESONIDE AND FORMOTEROL FUMARATE DIHYDRATE 2 PUFF(S): 160; 4.5 AEROSOL RESPIRATORY (INHALATION) at 19:31

## 2019-08-23 RX ADMIN — Medication 60 MILLIGRAM(S): at 06:46

## 2019-08-23 RX ADMIN — SENNA PLUS 2 TABLET(S): 8.6 TABLET ORAL at 21:05

## 2019-08-23 RX ADMIN — PANTOPRAZOLE SODIUM 40 MILLIGRAM(S): 20 TABLET, DELAYED RELEASE ORAL at 06:46

## 2019-08-23 RX ADMIN — Medication 3 MILLILITER(S): at 01:08

## 2019-08-23 RX ADMIN — Medication 1 DROP(S): at 21:45

## 2019-08-23 RX ADMIN — Medication 1 DROP(S): at 06:45

## 2019-08-23 RX ADMIN — Medication 500000 UNIT(S): at 17:59

## 2019-08-23 RX ADMIN — Medication 3 MILLILITER(S): at 09:14

## 2019-08-23 RX ADMIN — Medication 500000 UNIT(S): at 01:02

## 2019-08-23 RX ADMIN — Medication 10 MILLILITER(S): at 18:03

## 2019-08-23 RX ADMIN — Medication 20 UNIT(S): at 12:00

## 2019-08-23 RX ADMIN — ATORVASTATIN CALCIUM 20 MILLIGRAM(S): 80 TABLET, FILM COATED ORAL at 21:05

## 2019-08-23 RX ADMIN — FINASTERIDE 5 MILLIGRAM(S): 5 TABLET, FILM COATED ORAL at 12:01

## 2019-08-23 RX ADMIN — Medication 500000 UNIT(S): at 08:46

## 2019-08-23 RX ADMIN — MAGNESIUM OXIDE 400 MG ORAL TABLET 400 MILLIGRAM(S): 241.3 TABLET ORAL at 17:57

## 2019-08-23 RX ADMIN — Medication 100 MILLIGRAM(S): at 13:52

## 2019-08-23 RX ADMIN — Medication 20 UNIT(S): at 17:13

## 2019-08-23 RX ADMIN — Medication 3 MILLILITER(S): at 15:58

## 2019-08-23 RX ADMIN — MAGNESIUM OXIDE 400 MG ORAL TABLET 400 MILLIGRAM(S): 241.3 TABLET ORAL at 12:02

## 2019-08-23 RX ADMIN — MAGNESIUM OXIDE 400 MG ORAL TABLET 400 MILLIGRAM(S): 241.3 TABLET ORAL at 08:48

## 2019-08-23 RX ADMIN — Medication 6: at 12:00

## 2019-08-23 RX ADMIN — Medication 60 MILLIGRAM(S): at 13:53

## 2019-08-23 RX ADMIN — POLYETHYLENE GLYCOL 3350 17 GRAM(S): 17 POWDER, FOR SOLUTION ORAL at 12:02

## 2019-08-23 NOTE — PROGRESS NOTE ADULT - SUBJECTIVE AND OBJECTIVE BOX
JIAN MANCIA  78y  Male      Patient is a 78y old  Male who presents with a chief complaint of Hypoxia x 1 day (22 Aug 2019 09:47)      INTERVAL HPI/OVERNIGHT EVENTS:  Patient seen and examined earlier this morning.  Sitting comfortably in bed.  In NAD.  Appears comfortable.  Denies SOB.  Found to    REVIEW OF SYSTEMS:  CONSTITUTIONAL: No fever, weight loss, or fatigue  EYES: No eye pain, visual disturbances, or discharge  ENMT:  No difficulty hearing, tinnitus, vertigo; No sinus or throat pain  NECK: No pain or stiffness  RESPIRATORY: No cough, wheezing, chills or hemoptysis; No shortness of breath  CARDIOVASCULAR: No chest pain, palpitations, dizziness, or leg swelling  GASTROINTESTINAL: No abdominal or epigastric pain. No nausea, vomiting, or hematemesis; No diarrhea or constipation. No melena or hematochezia.  GENITOURINARY: No dysuria, frequency, hematuria, or incontinence  NEUROLOGICAL: No headaches, memory loss, loss of strength, numbness, or tremors  SKIN: No itching, burning, rashes, or lesions   LYMPH NODES: No enlarged glands  ENDOCRINE: No heat or cold intolerance; No hair loss  MUSCULOSKELETAL: No joint pain or swelling; No muscle, back, or extremity pain  PSYCHIATRIC: No depression, anxiety, mood swings, or difficulty sleeping  HEME/LYMPH: No easy bruising, or bleeding gums  ALLERY AND IMMUNOLOGIC: No hives or eczema    Vital Signs Last 24 Hrs  T(C): 36.1 (23 Aug 2019 06:47), Max: 36.1 (22 Aug 2019 21:00)  T(F): 96.9 (23 Aug 2019 06:47), Max: 97 (22 Aug 2019 21:00)  HR: 104 (23 Aug 2019 09:17) (85 - 104)  BP: 143/86 (23 Aug 2019 08:45) (95/50 - 146/75)  BP(mean): --  RR: 24 (23 Aug 2019 09:17) (18 - 24)  SpO2: 90% (23 Aug 2019 09:17) (90% - 96%) on highflow    PHYSICAL EXAM:  GENERAL: NAD, well-groomed, well-developed  HEAD:  Atraumatic, Normocephalic  EYES: EOMI, PERRLA, conjunctiva and sclera clear  ENMT: No tonsillar erythema, exudates, or enlargement; Moist mucous membranes, Good dentition, No lesions  NECK: Supple, No JVD, Normal thyroid  NERVOUS SYSTEM:  Alert & Oriented X3, Good concentration; Motor Strength 5/5 B/L upper and lower extremities; DTRs 2+ intact and symmetric  CHEST/LUNG: decreased breath sounds b/l; no wheezing  HEART: Regular rate and rhythm; No murmurs, rubs, or gallops  ABDOMEN: Soft, Nontender, Nondistended; Bowel sounds present, obese  EXTREMITIES:  2+ Peripheral Pulses, No clubbing, cyanosis, +2 edema  LYMPH: No lymphadenopathy noted  SKIN: No rashes or lesions    Consultant(s) Notes Reviewed:  [x ] YES  [ ] NO  Care Discussed with Consultants/Other Providers [ x] YES  [ ] NO    LAB:                                   13.9   16.88 )-----------( 487      ( 23 Aug 2019 06:57 )             48.8         139  |  96<L>  |  79<HH>  ----------------------------<  314<H>  6.3<HH>   |  27  |  2.3<H>    Ca    9.0      23 Aug 2019 06:57  Mg     2.4         TPro  6.2  /  Alb  3.6  /  TBili  0.3  /  DBili  x   /  AST  7   /  ALT  14  /  AlkPhos  74        Drug Dosing Weight  Height (cm): 172.72 (04 Aug 2019 23:00)  Weight (kg): 119.5 (05 Aug 2019 02:48)  BMI (kg/m2): 40.1 (05 Aug 2019 02:48)  BSA (m2): 2.3 (05 Aug 2019 02:48)    CAPILLARY BLOOD GLUCOSE  POCT Blood Glucose.: 453 mg/dL (23 Aug 2019 11:48)  POCT Blood Glucose.: 308 mg/dL (23 Aug 2019 07:25)  POCT Blood Glucose.: 168 mg/dL (22 Aug 2019 21:14)  POCT Blood Glucose.: 218 mg/dL (22 Aug 2019 16:27)    I&O's Summary    22 Aug 2019 07:01  -  23 Aug 2019 07:00  --------------------------------------------------------  IN: 1360 mL / OUT: 1201 mL / NET: 159 mL    23 Aug 2019 07:01  -  23 Aug 2019 14:06  --------------------------------------------------------  IN: 0 mL / OUT: 175 mL / NET: -175 mL        Urinalysis Basic - ( 21 Aug 2019 13:03 )    Color: Yellow / Appearance: Clear / S.020 / pH: x  Gluc: x / Ketone: Trace  / Bili: Negative / Urobili: 0.2 mg/dL   Blood: x / Protein: Trace mg/dL / Nitrite: Negative   Leuk Esterase: Negative / RBC: Negative / WBC 1-2 /HPF   Sq Epi: x / Non Sq Epi: Occasional /HPF / Bacteria: x        RADIOLOGY & ADDITIONAL TESTS:  Imaging Personally Reviewed:  [x] YES  [ ] NO    HEALTH ISSUES - PROBLEM Dx:  Other elevated white blood cell (WBC) count: Other elevated white blood cell (WBC) count  Chronic obstructive pulmonary disease, unspecified COPD type: Chronic obstructive pulmonary disease, unspecified COPD type  Enlarged prostate: Enlarged prostate  Diabetes mellitus, type 2: Diabetes mellitus, type 2  COPD (chronic obstructive pulmonary disease): COPD (chronic obstructive pulmonary disease)  Chronic congestive heart failure: Chronic congestive heart failure  CKD (chronic kidney disease) stage 3, GFR 30-59 ml/min: CKD (chronic kidney disease) stage 3, GFR 30-59 ml/min  Elevated troponin: Elevated troponin  Opacity of lung on imaging study: Opacity of lung on imaging study  SOB (shortness of breath): SOB (shortness of breath)        MEDICATIONS  (STANDING):  ALBUTerol/ipratropium for Nebulization 3 milliLiter(s) Nebulizer every 4 hours  artificial  tears Solution 1 Drop(s) Both EYES three times a day  ascorbic acid 500 milliGRAM(s) Oral daily  atorvastatin 20 milliGRAM(s) Oral at bedtime  buDESOnide 160 MICROgram(s)/formoterol 4.5 MICROgram(s) Inhaler 2 Puff(s) Inhalation two times a day  chlorhexidine 4% Liquid 1 Application(s) Topical <User Schedule>  cholecalciferol 1000 Unit(s) Oral daily  dextrose 5%. 1000 milliLiter(s) (50 mL/Hr) IV Continuous <Continuous>  dextrose 50% Injectable 12.5 Gram(s) IV Push once  dextrose 50% Injectable 25 Gram(s) IV Push once  dextrose 50% Injectable 25 Gram(s) IV Push once  docusate sodium 100 milliGRAM(s) Oral three times a day  finasteride 5 milliGRAM(s) Oral daily  furosemide    Tablet 40 milliGRAM(s) Oral daily  heparin  Injectable 5000 Unit(s) SubCutaneous every 8 hours  insulin glargine Injectable (LANTUS) 30 Unit(s) SubCutaneous at bedtime  insulin lispro (HumaLOG) corrective regimen sliding scale   SubCutaneous three times a day before meals  insulin lispro Injectable (HumaLOG) 20 Unit(s) SubCutaneous three times a day before meals  magnesium oxide 400 milliGRAM(s) Oral three times a day with meals  methylPREDNISolone sodium succinate Injectable 40 milliGRAM(s) IV Push every 12 hours  metoprolol tartrate 25 milliGRAM(s) Oral two times a day  nystatin    Suspension 964658 Unit(s) Oral every 6 hours  pantoprazole    Tablet 40 milliGRAM(s) Oral before breakfast  polyethylene glycol 3350 17 Gram(s) Oral daily  senna 2 Tablet(s) Oral at bedtime  tamsulosin 0.4 milliGRAM(s) Oral at bedtime  tiotropium 18 MICROgram(s) Capsule 1 Capsule(s) Inhalation daily    MEDICATIONS  (PRN):  acetaminophen   Tablet .. 650 milliGRAM(s) Oral every 6 hours PRN Temp greater or equal to 38C (100.4F), Mild Pain (1 - 3)  ALBUTerol/ipratropium for Nebulization 3 milliLiter(s) Nebulizer every 4 hours PRN Bronchospasm  aluminum hydroxide/magnesium hydroxide/simethicone Suspension 30 milliLiter(s) Oral every 4 hours PRN Dyspepsia  dextrose 40% Gel 15 Gram(s) Oral once PRN Blood Glucose LESS THAN 70 milliGRAM(s)/deciliter  glucagon  Injectable 1 milliGRAM(s) IntraMuscular once PRN Glucose LESS THAN 70 milligrams/deciliter  guaiFENesin/dextromethorphan  Syrup 10 milliLiter(s) Oral every 6 hours PRN Cough      Progress Note Handoff  Pending Consults: none  Pending Tests: none  Pending Results: none  Family Discussion: discussed plan with pt - he is in agreement  Disposition: Home__x___/SNF______/Other_____/Unknown at this time_____    Please call me with any questions at extension [phone]

## 2019-08-23 NOTE — PROGRESS NOTE ADULT - PROBLEM SELECTOR PLAN 4
w/Hx of Hyperkalemia  - at baseline renal function  - patient given insulin, D50 and kayexelate - repeat BMP at 3pm today  - c/w current supplementation, Kayexalate PRN  - Daily BMP/Mg  - avoid nephrotoxins  - renal c/s if worsening

## 2019-08-23 NOTE — PROGRESS NOTE ADULT - PROBLEM SELECTOR PLAN 1
w/hypoxia requiring NIPPV due to malfunctioning CPAP/ elevated WBC count   - pulm eval appreciated   - symptomatically improved on BiPAP, c/w highflow oxygen  - off abx as per ID  - FU BCx   - b2 q4h RTC and PRN  - DVT/GI PPX (Heparin/Protonix)  - CHG 4% QD and PRN  -taper steroids

## 2019-08-23 NOTE — CHART NOTE - NSCHARTNOTEFT_GEN_A_CORE
Patient tells me he had 2 doses of Kayexalate today (declines additional dose) resulting in a good bowel movement sometime after 6 PM. Adds that he is urinating well commenting that he is losing potassium that way too.

## 2019-08-23 NOTE — PROGRESS NOTE ADULT - SUBJECTIVE AND OBJECTIVE BOX
Patient is a 78y old  Male who presents with a chief complaint of Hypoxia x 1 day (22 Aug 2019 13:33)        Interval Events: No overnight events.    REVIEW OF SYSTEMS:  Constitutional: No fevers or chills. No weight loss. No fatigue or generalized malaise.  Eyes: No itching or discharge from the eyes  ENT: No ear pain. No ear discharge. No nasal congestion. No post nasal drip. No epistaxis. No throat pain. No sore throat. No difficulty swallowing.   CV: No chest pain. No palpitations. No lightheadedness or dizziness.   Resp: No dyspnea at rest. +dyspnea on exertion. No orthopnea. +wheezing. No cough. No stridor. No sputum production. No chest pain with respiration.  GI: No nausea. No vomiting. No diarrhea.  MSK: No joint pain or pain in any extremities  Integumentary: No skin lesions. No pedal edema.  Neurological: No gross motor weakness. No sensory changes.      OBJECTIVE:  ICU Vital Signs Last 24 Hrs  T(C): 36.1 (23 Aug 2019 06:47), Max: 36.1 (22 Aug 2019 21:00)  T(F): 96.9 (23 Aug 2019 06:47), Max: 97 (22 Aug 2019 21:00)  HR: 104 (23 Aug 2019 09:17) (85 - 104)  BP: 143/86 (23 Aug 2019 08:45) (95/50 - 146/75)  RR: 24 (23 Aug 2019 09:17) (18 - 24)  SpO2: 90% (23 Aug 2019 09:17) (90% - 96%)         @ 07:01  -   @ 07:00  --------------------------------------------------------  IN: 1360 mL / OUT: 1201 mL / NET: 159 mL      CAPILLARY BLOOD GLUCOSE      POCT Blood Glucose.: 453 mg/dL (23 Aug 2019 11:48)      PHYSICAL EXAM:  General: Awake, alert, oriented X 3.   HEENT: Atraumatic, normocephalic.                 Mallampatti Grade                 No nasal congestion.                No tonsillar or pharyngeal exudates.  Lymph Nodes: No palpable lymphadenopathy neck, supraclavicular region  Neck: No JVD. No carotid bruit, no thyromegally  Respiratory: Normal chest expansion                         Normal percussion                         decreased air entry                         +wheeze,   Cardiovascular: S1 S2 normal. No murmurs, rubs or gallops.   Abdomen: Soft, non-tender, non-distended. No organomegaly.  Extremities: Warm to touch. Peripheral pulse palpable. No pedal edema.   Skin: No rashes or skin lesions, warm dry  Neurological: Motor and sensory examination equal and normal in all four extremities.  Psychiatry: Appropriate mood and affect.    HOSPITAL MEDICATIONS:  MEDICATIONS  (STANDING):  ALBUTerol/ipratropium for Nebulization 3 milliLiter(s) Nebulizer every 4 hours  artificial  tears Solution 1 Drop(s) Both EYES three times a day  ascorbic acid 500 milliGRAM(s) Oral daily  atorvastatin 20 milliGRAM(s) Oral at bedtime  buDESOnide 160 MICROgram(s)/formoterol 4.5 MICROgram(s) Inhaler 2 Puff(s) Inhalation two times a day  chlorhexidine 4% Liquid 1 Application(s) Topical <User Schedule>  cholecalciferol 1000 Unit(s) Oral daily  dextrose 5%. 1000 milliLiter(s) (50 mL/Hr) IV Continuous <Continuous>  dextrose 50% Injectable 12.5 Gram(s) IV Push once  dextrose 50% Injectable 25 Gram(s) IV Push once  dextrose 50% Injectable 25 Gram(s) IV Push once  docusate sodium 100 milliGRAM(s) Oral three times a day  finasteride 5 milliGRAM(s) Oral daily  furosemide    Tablet 40 milliGRAM(s) Oral daily  heparin  Injectable 5000 Unit(s) SubCutaneous every 8 hours  insulin glargine Injectable (LANTUS) 30 Unit(s) SubCutaneous at bedtime  insulin lispro (HumaLOG) corrective regimen sliding scale   SubCutaneous three times a day before meals  insulin lispro Injectable (HumaLOG) 20 Unit(s) SubCutaneous three times a day before meals  magnesium oxide 400 milliGRAM(s) Oral three times a day with meals  methylPREDNISolone sodium succinate Injectable 60 milliGRAM(s) IV Push every 8 hours  metoprolol tartrate 25 milliGRAM(s) Oral two times a day  nystatin    Suspension 254020 Unit(s) Oral every 6 hours  pantoprazole    Tablet 40 milliGRAM(s) Oral before breakfast  polyethylene glycol 3350 17 Gram(s) Oral daily  senna 2 Tablet(s) Oral at bedtime  tamsulosin 0.4 milliGRAM(s) Oral at bedtime  tiotropium 18 MICROgram(s) Capsule 1 Capsule(s) Inhalation daily    MEDICATIONS  (PRN):  acetaminophen   Tablet .. 650 milliGRAM(s) Oral every 6 hours PRN Temp greater or equal to 38C (100.4F), Mild Pain (1 - 3)  ALBUTerol/ipratropium for Nebulization 3 milliLiter(s) Nebulizer every 4 hours PRN Bronchospasm  aluminum hydroxide/magnesium hydroxide/simethicone Suspension 30 milliLiter(s) Oral every 4 hours PRN Dyspepsia  dextrose 40% Gel 15 Gram(s) Oral once PRN Blood Glucose LESS THAN 70 milliGRAM(s)/deciliter  glucagon  Injectable 1 milliGRAM(s) IntraMuscular once PRN Glucose LESS THAN 70 milligrams/deciliter  guaiFENesin/dextromethorphan  Syrup 10 milliLiter(s) Oral every 6 hours PRN Cough      LABS:                        13.9   16.88 )-----------( 487      ( 23 Aug 2019 06:57 )             48.8     08    139  |  96<L>  |  79<HH>  ----------------------------<  314<H>  6.3<HH>   |  27  |  2.3<H>    Ca    9.0      23 Aug 2019 06:57  Mg     2.4         TPro  6.2  /  Alb  3.6  /  TBili  0.3  /  DBili  x   /  AST  7   /  ALT  14  /  AlkPhos  74        Urinalysis Basic - ( 21 Aug 2019 13:03 )    Color: Yellow / Appearance: Clear / S.020 / pH: x  Gluc: x / Ketone: Trace  / Bili: Negative / Urobili: 0.2 mg/dL   Blood: x / Protein: Trace mg/dL / Nitrite: Negative   Leuk Esterase: Negative / RBC: Negative / WBC 1-2 /HPF   Sq Epi: x / Non Sq Epi: Occasional /HPF / Bacteria: x                    RADIOLOGY: Radiology personally reviewed.

## 2019-08-24 DIAGNOSIS — E66.01 MORBID (SEVERE) OBESITY DUE TO EXCESS CALORIES: ICD-10-CM

## 2019-08-24 LAB
ANION GAP SERPL CALC-SCNC: 11 MMOL/L — SIGNIFICANT CHANGE UP (ref 7–14)
ANION GAP SERPL CALC-SCNC: 9 MMOL/L — SIGNIFICANT CHANGE UP (ref 7–14)
BUN SERPL-MCNC: 83 MG/DL — CRITICAL HIGH (ref 10–20)
BUN SERPL-MCNC: 84 MG/DL — CRITICAL HIGH (ref 10–20)
CALCIUM SERPL-MCNC: 8.8 MG/DL — SIGNIFICANT CHANGE UP (ref 8.5–10.1)
CALCIUM SERPL-MCNC: 9.3 MG/DL — SIGNIFICANT CHANGE UP (ref 8.5–10.1)
CHLORIDE SERPL-SCNC: 97 MMOL/L — LOW (ref 98–110)
CHLORIDE SERPL-SCNC: 98 MMOL/L — SIGNIFICANT CHANGE UP (ref 98–110)
CO2 SERPL-SCNC: 33 MMOL/L — HIGH (ref 17–32)
CO2 SERPL-SCNC: 34 MMOL/L — HIGH (ref 17–32)
CREAT SERPL-MCNC: 2.2 MG/DL — HIGH (ref 0.7–1.5)
CREAT SERPL-MCNC: 2.3 MG/DL — HIGH (ref 0.7–1.5)
GLUCOSE BLDC GLUCOMTR-MCNC: 170 MG/DL — HIGH (ref 70–99)
GLUCOSE BLDC GLUCOMTR-MCNC: 215 MG/DL — HIGH (ref 70–99)
GLUCOSE BLDC GLUCOMTR-MCNC: 230 MG/DL — HIGH (ref 70–99)
GLUCOSE BLDC GLUCOMTR-MCNC: 419 MG/DL — HIGH (ref 70–99)
GLUCOSE SERPL-MCNC: 276 MG/DL — HIGH (ref 70–99)
GLUCOSE SERPL-MCNC: 448 MG/DL — HIGH (ref 70–99)
POTASSIUM SERPL-MCNC: 5.6 MMOL/L — HIGH (ref 3.5–5)
POTASSIUM SERPL-MCNC: 6.2 MMOL/L — CRITICAL HIGH (ref 3.5–5)
POTASSIUM SERPL-SCNC: 5.6 MMOL/L — HIGH (ref 3.5–5)
POTASSIUM SERPL-SCNC: 6.2 MMOL/L — CRITICAL HIGH (ref 3.5–5)
SODIUM SERPL-SCNC: 140 MMOL/L — SIGNIFICANT CHANGE UP (ref 135–146)
SODIUM SERPL-SCNC: 142 MMOL/L — SIGNIFICANT CHANGE UP (ref 135–146)

## 2019-08-24 PROCEDURE — 76775 US EXAM ABDO BACK WALL LIM: CPT | Mod: 26

## 2019-08-24 PROCEDURE — 99233 SBSQ HOSP IP/OBS HIGH 50: CPT

## 2019-08-24 RX ORDER — SODIUM CHLORIDE 9 MG/ML
1000 INJECTION, SOLUTION INTRAVENOUS
Refills: 0 | Status: DISCONTINUED | OUTPATIENT
Start: 2019-08-24 | End: 2019-08-29

## 2019-08-24 RX ORDER — SODIUM POLYSTYRENE SULFONATE 4.1 MEQ/G
30 POWDER, FOR SUSPENSION ORAL ONCE
Refills: 0 | Status: COMPLETED | OUTPATIENT
Start: 2019-08-24 | End: 2019-08-24

## 2019-08-24 RX ORDER — MORPHINE SULFATE 50 MG/1
1 CAPSULE, EXTENDED RELEASE ORAL ONCE
Refills: 0 | Status: DISCONTINUED | OUTPATIENT
Start: 2019-08-24 | End: 2019-08-29

## 2019-08-24 RX ORDER — DEXTROSE 50 % IN WATER 50 %
15 SYRINGE (ML) INTRAVENOUS ONCE
Refills: 0 | Status: DISCONTINUED | OUTPATIENT
Start: 2019-08-24 | End: 2019-08-29

## 2019-08-24 RX ORDER — INSULIN GLARGINE 100 [IU]/ML
40 INJECTION, SOLUTION SUBCUTANEOUS AT BEDTIME
Refills: 0 | Status: DISCONTINUED | OUTPATIENT
Start: 2019-08-24 | End: 2019-08-29

## 2019-08-24 RX ORDER — INSULIN LISPRO 100/ML
20 VIAL (ML) SUBCUTANEOUS
Refills: 0 | Status: DISCONTINUED | OUTPATIENT
Start: 2019-08-24 | End: 2019-08-29

## 2019-08-24 RX ORDER — HYDROXYZINE HCL 10 MG
25 TABLET ORAL ONCE
Refills: 0 | Status: COMPLETED | OUTPATIENT
Start: 2019-08-24 | End: 2019-08-24

## 2019-08-24 RX ORDER — INSULIN LISPRO 100/ML
VIAL (ML) SUBCUTANEOUS
Refills: 0 | Status: DISCONTINUED | OUTPATIENT
Start: 2019-08-24 | End: 2019-08-29

## 2019-08-24 RX ORDER — DEXTROSE 50 % IN WATER 50 %
50 SYRINGE (ML) INTRAVENOUS ONCE
Refills: 0 | Status: COMPLETED | OUTPATIENT
Start: 2019-08-24 | End: 2019-08-24

## 2019-08-24 RX ORDER — DEXTROSE 50 % IN WATER 50 %
12.5 SYRINGE (ML) INTRAVENOUS ONCE
Refills: 0 | Status: DISCONTINUED | OUTPATIENT
Start: 2019-08-24 | End: 2019-08-29

## 2019-08-24 RX ORDER — GLUCAGON INJECTION, SOLUTION 0.5 MG/.1ML
1 INJECTION, SOLUTION SUBCUTANEOUS ONCE
Refills: 0 | Status: DISCONTINUED | OUTPATIENT
Start: 2019-08-24 | End: 2019-08-29

## 2019-08-24 RX ORDER — DEXTROSE 50 % IN WATER 50 %
25 SYRINGE (ML) INTRAVENOUS ONCE
Refills: 0 | Status: DISCONTINUED | OUTPATIENT
Start: 2019-08-24 | End: 2019-08-29

## 2019-08-24 RX ORDER — INSULIN LISPRO 100/ML
10 VIAL (ML) SUBCUTANEOUS ONCE
Refills: 0 | Status: COMPLETED | OUTPATIENT
Start: 2019-08-24 | End: 2019-08-24

## 2019-08-24 RX ADMIN — SODIUM POLYSTYRENE SULFONATE 30 GRAM(S): 4.1 POWDER, FOR SUSPENSION ORAL at 18:37

## 2019-08-24 RX ADMIN — HEPARIN SODIUM 5000 UNIT(S): 5000 INJECTION INTRAVENOUS; SUBCUTANEOUS at 05:03

## 2019-08-24 RX ADMIN — HEPARIN SODIUM 5000 UNIT(S): 5000 INJECTION INTRAVENOUS; SUBCUTANEOUS at 21:30

## 2019-08-24 RX ADMIN — Medication 500000 UNIT(S): at 11:41

## 2019-08-24 RX ADMIN — Medication 6: at 11:41

## 2019-08-24 RX ADMIN — MAGNESIUM OXIDE 400 MG ORAL TABLET 400 MILLIGRAM(S): 241.3 TABLET ORAL at 08:31

## 2019-08-24 RX ADMIN — TAMSULOSIN HYDROCHLORIDE 0.4 MILLIGRAM(S): 0.4 CAPSULE ORAL at 21:29

## 2019-08-24 RX ADMIN — Medication 1000 UNIT(S): at 11:41

## 2019-08-24 RX ADMIN — Medication 2: at 08:29

## 2019-08-24 RX ADMIN — Medication 40 MILLIGRAM(S): at 05:03

## 2019-08-24 RX ADMIN — Medication 2: at 18:35

## 2019-08-24 RX ADMIN — Medication 3 MILLILITER(S): at 13:07

## 2019-08-24 RX ADMIN — Medication 10 UNIT(S): at 10:42

## 2019-08-24 RX ADMIN — MAGNESIUM OXIDE 400 MG ORAL TABLET 400 MILLIGRAM(S): 241.3 TABLET ORAL at 11:41

## 2019-08-24 RX ADMIN — POLYETHYLENE GLYCOL 3350 17 GRAM(S): 17 POWDER, FOR SOLUTION ORAL at 11:41

## 2019-08-24 RX ADMIN — Medication 3 MILLILITER(S): at 07:52

## 2019-08-24 RX ADMIN — Medication 3 MILLILITER(S): at 17:14

## 2019-08-24 RX ADMIN — Medication 100 MILLIGRAM(S): at 21:30

## 2019-08-24 RX ADMIN — Medication 500 MILLIGRAM(S): at 11:41

## 2019-08-24 RX ADMIN — Medication 100 MILLIGRAM(S): at 05:03

## 2019-08-24 RX ADMIN — Medication 25 MILLIGRAM(S): at 18:36

## 2019-08-24 RX ADMIN — Medication 100 MILLIGRAM(S): at 14:55

## 2019-08-24 RX ADMIN — Medication 50 MILLILITER(S): at 10:43

## 2019-08-24 RX ADMIN — Medication 25 MILLIGRAM(S): at 05:05

## 2019-08-24 RX ADMIN — INSULIN GLARGINE 40 UNIT(S): 100 INJECTION, SOLUTION SUBCUTANEOUS at 21:27

## 2019-08-24 RX ADMIN — Medication 1 DROP(S): at 14:55

## 2019-08-24 RX ADMIN — MAGNESIUM OXIDE 400 MG ORAL TABLET 400 MILLIGRAM(S): 241.3 TABLET ORAL at 18:36

## 2019-08-24 RX ADMIN — FINASTERIDE 5 MILLIGRAM(S): 5 TABLET, FILM COATED ORAL at 11:41

## 2019-08-24 RX ADMIN — Medication 40 MILLIGRAM(S): at 18:36

## 2019-08-24 RX ADMIN — Medication 3 MILLILITER(S): at 20:16

## 2019-08-24 RX ADMIN — Medication 20 UNIT(S): at 18:34

## 2019-08-24 RX ADMIN — Medication 20 UNIT(S): at 11:41

## 2019-08-24 RX ADMIN — BUDESONIDE AND FORMOTEROL FUMARATE DIHYDRATE 2 PUFF(S): 160; 4.5 AEROSOL RESPIRATORY (INHALATION) at 07:51

## 2019-08-24 RX ADMIN — Medication 500000 UNIT(S): at 05:05

## 2019-08-24 RX ADMIN — SENNA PLUS 2 TABLET(S): 8.6 TABLET ORAL at 21:28

## 2019-08-24 RX ADMIN — TIOTROPIUM BROMIDE 1 CAPSULE(S): 18 CAPSULE ORAL; RESPIRATORY (INHALATION) at 07:51

## 2019-08-24 RX ADMIN — SODIUM POLYSTYRENE SULFONATE 30 GRAM(S): 4.1 POWDER, FOR SUSPENSION ORAL at 11:43

## 2019-08-24 RX ADMIN — ATORVASTATIN CALCIUM 20 MILLIGRAM(S): 80 TABLET, FILM COATED ORAL at 21:29

## 2019-08-24 RX ADMIN — Medication 20 UNIT(S): at 08:29

## 2019-08-24 RX ADMIN — Medication 40 MILLIGRAM(S): at 05:04

## 2019-08-24 RX ADMIN — HEPARIN SODIUM 5000 UNIT(S): 5000 INJECTION INTRAVENOUS; SUBCUTANEOUS at 14:55

## 2019-08-24 NOTE — PROGRESS NOTE ADULT - PROBLEM SELECTOR PLAN 1
w/hypoxia requiring NIPPV due to malfunctioning CPAP/ elevated WBC count   - pulm eval appreciated   - symptomatically improved on BiPAP, c/w highflow oxygen  - off abx as per ID  - FU BCx   - b2 q4h RTC and PRN  - DVT/GI PPX (Heparin/Protonix)  - CHG 4% QD and PRN  -taper steroids as tolerated

## 2019-08-24 NOTE — CONSULT NOTE ADULT - ASSESSMENT
79 yo M with PMHx of HTN, COPD on home O2 4-5L, Afib on Eliquis, HLD, CHF and DM presents to the ED c/o SOB.   Pt was admitted for COPD exacerbation.  Pt also has H/O CKD possibly stage 3B, renal consulted for evaluation of CKD and persistent  hyperkalemia    - CKD 3B  - Hyperkalemia could be 2nd to CKD 3B and also pt is eating and drinking lots of high K foods, fruits, and fruit juices (present at bedside upon my visit)  - Other less possible causes include:  R/O partial urinary retention with H/O BPH  R/O blood in GI tract  - BPH  - COPD  - CHF  - DM (uncontrolled)  Renal plan:  - Had a detailed talk with the pt about the absolute need for low K diet, he had good understanding and stated he will not eat/drink  anything from out side hospital (as per PMD, he is on low K diet officially and takes food/drinks from out side)  - Please repeat BMP (Kayexalate was given today) and repeat Kayexalate if K still high (pt denies diarrhea)  - Kayexalate 15 gr QD and daily BMP  - 2 gr K diet, please ask for dietitian ejucation  - Please obtain stool for OB to R/O blood in GI tract  - Please obtain bladder sonogram to R/O urinary retention  - Adjust insulin for optimal glycemic control which could also help hyperkalemia  Monitor BUN/Cr and correct lytes as needed  Avoid nephrotoxic agents  Continue general care and management of the rest of comorbidities as per medicine  Management D/W PMD and explained to the pt in details  Thank you  Will f/u

## 2019-08-24 NOTE — PROGRESS NOTE ADULT - PROBLEM SELECTOR PLAN 4
w/Hx of Hyperkalemia  - at baseline renal function  - patient given insulin, D50 and kayexelate - repeat BMP at 11am today  - c/w current supplementation, Kayexalate PRN  - Daily BMP/Mg  - avoid nephrotoxins  - renal c/s   -renal diet

## 2019-08-24 NOTE — CONSULT NOTE ADULT - SUBJECTIVE AND OBJECTIVE BOX
NEPHROLOGY CONSULTATION NOTE    Patient is a 79 yo M with PMHx of HTN, COPD on home O2 4-5L, Afib on Eliquis, HLD, CHF and DM presents to the ED c/o SOB.  There was a mechanical issue with pt's CPAP machine at home and symptoms progressed so he called EMS. When EMS arrived pt was hypoxic to 70s.   He was admitted and treated for COPD exacerbation.   Pt also has H/O CKD 3B and found to have persistent hyperkalemia so renal was consulted      PAST MEDICAL & SURGICAL HISTORY:  CKD (chronic kidney disease) stage 3, GFR 30-59 ml/min  Colon polyp: claims it was malignant  High cholesterol  GERD (gastroesophageal reflux disease)  Enlarged prostate  Oxygen dependent  COPD (chronic obstructive pulmonary disease)  Diabetes mellitus, type 2  Hypertension  Emphysema lung  History of hemorrhoidectomy  Dysplastic colon polyp: removed    Allergies:  aspirin (Other)  fish (Other)  iodine (Hives)  penicillin (Unknown)  shellfish (Other)    Home Medications Reviewed  Hospital Medications:   MEDICATIONS  (STANDING):  ALBUTerol/ipratropium for Nebulization 3 milliLiter(s) Nebulizer every 4 hours  artificial  tears Solution 1 Drop(s) Both EYES three times a day  ascorbic acid 500 milliGRAM(s) Oral daily  atorvastatin 20 milliGRAM(s) Oral at bedtime  buDESOnide 160 MICROgram(s)/formoterol 4.5 MICROgram(s) Inhaler 2 Puff(s) Inhalation two times a day  chlorhexidine 4% Liquid 1 Application(s) Topical <User Schedule>  cholecalciferol 1000 Unit(s) Oral daily  dextrose 5%. 1000 milliLiter(s) (50 mL/Hr) IV Continuous <Continuous>  dextrose 50% Injectable 12.5 Gram(s) IV Push once  dextrose 50% Injectable 25 Gram(s) IV Push once  dextrose 50% Injectable 25 Gram(s) IV Push once  docusate sodium 100 milliGRAM(s) Oral three times a day  finasteride 5 milliGRAM(s) Oral daily  furosemide    Tablet 40 milliGRAM(s) Oral daily  heparin  Injectable 5000 Unit(s) SubCutaneous every 8 hours  insulin glargine Injectable (LANTUS) 40 Unit(s) SubCutaneous at bedtime  insulin lispro (HumaLOG) corrective regimen sliding scale   SubCutaneous three times a day before meals  insulin lispro Injectable (HumaLOG) 20 Unit(s) SubCutaneous before breakfast  insulin lispro Injectable (HumaLOG) 20 Unit(s) SubCutaneous before lunch  insulin lispro Injectable (HumaLOG) 20 Unit(s) SubCutaneous before dinner  magnesium oxide 400 milliGRAM(s) Oral three times a day with meals  methylPREDNISolone sodium succinate Injectable 40 milliGRAM(s) IV Push every 12 hours  metoprolol tartrate 25 milliGRAM(s) Oral two times a day  morphine  - Injectable 1 milliGRAM(s) IV Push once  nystatin    Suspension [id number] Unit(s) Oral every 6 hours  pantoprazole    Tablet 40 milliGRAM(s) Oral before breakfast  polyethylene glycol 3350 17 Gram(s) Oral daily  senna 2 Tablet(s) Oral at bedtime  tamsulosin 0.4 milliGRAM(s) Oral at bedtime  tiotropium 18 MICROgram(s) Capsule 1 Capsule(s) Inhalation daily      REVIEW OF SYSTEMS:  CONSTITUTIONAL: No weakness, fevers or chills  EYES/ENT: No visual changes;  No vertigo or throat pain   NECK: No pain or stiffness  RESPIRATORY: No cough, wheezing, hemoptysis; shortness of breath has improved  CARDIOVASCULAR: No chest pain or palpitations.  GASTROINTESTINAL: No abdominal or epigastric pain. No nausea, vomiting, or hematemesis; No diarrhea or constipation. No melena or hematochezia.  GENITOURINARY: H/O BPH but denies any urinary symptoms  NEUROLOGICAL: No numbness or weakness  SKIN: No itching, burning, chronic arms rashes  VASCULAR: one plus lower extremity edema  All other review of systems is negative unless indicated above    VITALS:  T(F): 97.9 (19 @ 14:47), Max: 98.3 (19 @ 22:00)  HR: 93 (19 @ 14:47)  BP: 134/68 (19 @ 14:47)  RR: 18 (19 @ 14:47)  SpO2: 90% (19 @ 07:52)     @ 07:01  -   @ 07:00  --------------------------------------------------------  IN: 650 mL / OUT: 1825 mL / NET: -1175 mL     @ 07: @ 19:00  --------------------------------------------------------  IN: 0 mL / OUT: 350 mL / NET: -350 mL            I&O's Detail    23 Aug 2019 07:  -  24 Aug 2019 07:00  --------------------------------------------------------  IN:    Oral Fluid: 650 mL  Total IN: 650 mL    OUT:    Voided: 1825 mL  Total OUT: 1825 mL    Total NET: -1175 mL      24 Aug 2019 07:  -  24 Aug 2019 19:00  --------------------------------------------------------  IN:  Total IN: 0 mL    OUT:    Voided: 350 mL  Total OUT: 350 mL    Total NET: -350 mL            PHYSICAL EXAM:  Constitutional: NAD, OOB to chair  HEENT: anicteric sclera, oropharynx clear, MMM  Neck: No JVD  Respiratory: decreased BS camila, occ rhonchi  Cardiovascular: S1, S2, irregular pulse  Gastrointestinal: BS+, soft, NT/ND  Extremities: No cyanosis or clubbing. One plus peripheral edema  Neurological: A/O x 3, hard of hearing otherwise no focal deficits  Psychiatric: Normal mood, normal affect  : No CVA tenderness. No chapman  Skin: UE bib    LABS:      140  |  97<L>  |  83<HH>  ----------------------------<  448<H>  5.6<H>   |  34<H>  |  2.3<H>    Ca    8.8      24 Aug 2019 11:01    TPro  6.2  /  Alb  3.6  /  TBili  0.3  /  DBili      /  AST  7   /  ALT  14  /  AlkPhos  74      Creatinine Trend: 2.3 <--, 2.2 <--, 2.5 <--, 2.3 <--, 2.1 <--, 1.7 <--, 1.9 <--, 2.0 <--, 2.4 <--, 2.3 <--, 2.2 <--, 2.0 <--                        13.9   16.88 )-----------( 487      ( 23 Aug 2019 06:57 )             48.8     Urine Studies:  Urinalysis Basic - ( 21 Aug 2019 13:03 )    Color: Yellow / Appearance: Clear / S.020 / pH:   Gluc:  / Ketone: Trace  / Bili: Negative / Urobili: 0.2 mg/dL   Blood:  / Protein: Trace mg/dL / Nitrite: Negative   Leuk Esterase: Negative / RBC: Negative / WBC 1-2 /HPF   Sq Epi:  / Non Sq Epi: Occasional /HPF / Bacteria:                 RADIOLOGY & ADDITIONAL STUDIES:  Available studies reviewed

## 2019-08-24 NOTE — PROGRESS NOTE ADULT - SUBJECTIVE AND OBJECTIVE BOX
JIAN MANCIA  78y  Male      Patient is a 78y old  Male who presents with a chief complaint of Hypoxia x 1 day (22 Aug 2019 09:47)      INTERVAL HPI/OVERNIGHT EVENTS:  Patient seen and examined earlier this morning.  Sitting comfortably in chair.  In NAD.  Appears comfortable.  Denies SOB.  Found to have elevated potassium again this morning. Treated.  Will get nephrology to see.     REVIEW OF SYSTEMS:  CONSTITUTIONAL: No fever, weight loss, or fatigue  EYES: No eye pain, visual disturbances, or discharge  ENMT:  No difficulty hearing, tinnitus, vertigo; No sinus or throat pain  NECK: No pain or stiffness  RESPIRATORY: No cough, wheezing, chills or hemoptysis; No shortness of breath  CARDIOVASCULAR: No chest pain, palpitations, dizziness, or leg swelling  GASTROINTESTINAL: No abdominal or epigastric pain. No nausea, vomiting, or hematemesis; No diarrhea or constipation. No melena or hematochezia.  GENITOURINARY: No dysuria, frequency, hematuria, or incontinence  NEUROLOGICAL: No headaches, memory loss, loss of strength, numbness, or tremors  SKIN: No itching, burning, rashes, or lesions   LYMPH NODES: No enlarged glands  ENDOCRINE: No heat or cold intolerance; No hair loss  MUSCULOSKELETAL: No joint pain or swelling; No muscle, back, or extremity pain  PSYCHIATRIC: No depression, anxiety, mood swings, or difficulty sleeping  HEME/LYMPH: No easy bruising, or bleeding gums  ALLERY AND IMMUNOLOGIC: No hives or eczema    Vital Signs Last 24 Hrs  T(C): 35.8 (24 Aug 2019 04:55), Max: 36.8 (23 Aug 2019 22:00)  T(F): 96.5 (24 Aug 2019 04:55), Max: 98.3 (23 Aug 2019 22:00)  HR: 89 (24 Aug 2019 07:52) (89 - 108)  BP: 136/71 (24 Aug 2019 04:55) (117/62 - 139/61)  BP(mean): --  RR: 20 (24 Aug 2019 07:52) (18 - 20)  SpO2: 90% (24 Aug 2019 07:52) (89% - 92%) on high flow     PHYSICAL EXAM:  GENERAL: NAD, well-groomed, well-developed  HEAD:  Atraumatic, Normocephalic  EYES: EOMI, PERRLA, conjunctiva and sclera clear  ENMT: No tonsillar erythema, exudates, or enlargement; Moist mucous membranes, Good dentition, No lesions  NECK: Supple, No JVD, Normal thyroid  NERVOUS SYSTEM:  Alert & Oriented X3, Good concentration; Motor Strength 5/5 B/L upper and lower extremities; DTRs 2+ intact and symmetric  CHEST/LUNG: decreased breath sounds b/l; no wheezing  HEART: Regular rate and rhythm; No murmurs, rubs, or gallops  ABDOMEN: Soft, Nontender, Nondistended; Bowel sounds present, obese  EXTREMITIES:  2+ Peripheral Pulses, No clubbing, cyanosis, +2 edema  LYMPH: No lymphadenopathy noted  SKIN: No rashes or lesions    Consultant(s) Notes Reviewed:  [x ] YES  [ ] NO  Care Discussed with Consultants/Other Providers [ x] YES  [ ] NO    LAB:                                              13.9   16.88 )-----------( 487      ( 23 Aug 2019 06:57 )             48.8     08    142  |  98  |  84<HH>  ----------------------------<  276<H>  6.2<HH>   |  33<H>  |  2.2<H>    Ca    9.3      24 Aug 2019 06:28    TPro  6.2  /  Alb  3.6  /  TBili  0.3  /  DBili  x   /  AST  7   /  ALT  14  /  AlkPhos  74  08-23      Drug Dosing Weight  Height (cm): 172.72 (04 Aug 2019 23:00)  Weight (kg): 119.5 (05 Aug 2019 02:48)  BMI (kg/m2): 40.1 (05 Aug 2019 02:48)  BSA (m2): 2.3 (05 Aug 2019 02:48)    CAPILLARY BLOOD GLUCOSE  POCT Blood Glucose.: 419 mg/dL (24 Aug 2019 11:27)  POCT Blood Glucose.: 230 mg/dL (24 Aug 2019 07:33)  POCT Blood Glucose.: 265 mg/dL (23 Aug 2019 21:05)  POCT Blood Glucose.: 297 mg/dL (23 Aug 2019 16:32)  POCT Blood Glucose.: 453 mg/dL (23 Aug 2019 11:48)    I&O's Summary    23 Aug 2019 07:01  -  24 Aug 2019 07:00  --------------------------------------------------------  IN: 650 mL / OUT: 1825 mL / NET: -1175 mL    24 Aug 2019 07:  -  24 Aug 2019 11:50  --------------------------------------------------------  IN: 0 mL / OUT: 350 mL / NET: -350 mL          Urinalysis Basic - ( 21 Aug 2019 13:03 )    Color: Yellow / Appearance: Clear / S.020 / pH: x  Gluc: x / Ketone: Trace  / Bili: Negative / Urobili: 0.2 mg/dL   Blood: x / Protein: Trace mg/dL / Nitrite: Negative   Leuk Esterase: Negative / RBC: Negative / WBC 1-2 /HPF   Sq Epi: x / Non Sq Epi: Occasional /HPF / Bacteria: x        RADIOLOGY & ADDITIONAL TESTS:  Imaging Personally Reviewed:  [x] YES  [ ] NO    HEALTH ISSUES - PROBLEM Dx:  Other elevated white blood cell (WBC) count: Other elevated white blood cell (WBC) count  Chronic obstructive pulmonary disease, unspecified COPD type: Chronic obstructive pulmonary disease, unspecified COPD type  Enlarged prostate: Enlarged prostate  Diabetes mellitus, type 2: Diabetes mellitus, type 2  COPD (chronic obstructive pulmonary disease): COPD (chronic obstructive pulmonary disease)  Chronic congestive heart failure: Chronic congestive heart failure  CKD (chronic kidney disease) stage 3, GFR 30-59 ml/min: CKD (chronic kidney disease) stage 3, GFR 30-59 ml/min  Elevated troponin: Elevated troponin  Opacity of lung on imaging study: Opacity of lung on imaging study  SOB (shortness of breath): SOB (shortness of breath)        MEDICATIONS  (STANDING):  ALBUTerol/ipratropium for Nebulization 3 milliLiter(s) Nebulizer every 4 hours  artificial  tears Solution 1 Drop(s) Both EYES three times a day  ascorbic acid 500 milliGRAM(s) Oral daily  atorvastatin 20 milliGRAM(s) Oral at bedtime  buDESOnide 160 MICROgram(s)/formoterol 4.5 MICROgram(s) Inhaler 2 Puff(s) Inhalation two times a day  chlorhexidine 4% Liquid 1 Application(s) Topical <User Schedule>  cholecalciferol 1000 Unit(s) Oral daily  dextrose 5%. 1000 milliLiter(s) (50 mL/Hr) IV Continuous <Continuous>  dextrose 50% Injectable 12.5 Gram(s) IV Push once  dextrose 50% Injectable 25 Gram(s) IV Push once  dextrose 50% Injectable 25 Gram(s) IV Push once  docusate sodium 100 milliGRAM(s) Oral three times a day  finasteride 5 milliGRAM(s) Oral daily  furosemide    Tablet 40 milliGRAM(s) Oral daily  heparin  Injectable 5000 Unit(s) SubCutaneous every 8 hours  insulin glargine Injectable (LANTUS) 40 Unit(s) SubCutaneous at bedtime  insulin lispro (HumaLOG) corrective regimen sliding scale   SubCutaneous three times a day before meals  insulin lispro Injectable (HumaLOG) 20 Unit(s) SubCutaneous before breakfast  insulin lispro Injectable (HumaLOG) 20 Unit(s) SubCutaneous before lunch  insulin lispro Injectable (HumaLOG) 20 Unit(s) SubCutaneous before dinner  magnesium oxide 400 milliGRAM(s) Oral three times a day with meals  methylPREDNISolone sodium succinate Injectable 40 milliGRAM(s) IV Push every 12 hours  metoprolol tartrate 25 milliGRAM(s) Oral two times a day  morphine  - Injectable 1 milliGRAM(s) IV Push once  nystatin    Suspension [id number] Unit(s) Oral every 6 hours  pantoprazole    Tablet 40 milliGRAM(s) Oral before breakfast  polyethylene glycol 3350 17 Gram(s) Oral daily  senna 2 Tablet(s) Oral at bedtime  tamsulosin 0.4 milliGRAM(s) Oral at bedtime  tiotropium 18 MICROgram(s) Capsule 1 Capsule(s) Inhalation daily    MEDICATIONS  (PRN):  acetaminophen   Tablet .. 650 milliGRAM(s) Oral every 6 hours PRN Temp greater or equal to 38C (100.4F), Mild Pain (1 - 3)  ALBUTerol/ipratropium for Nebulization 3 milliLiter(s) Nebulizer every 4 hours PRN Bronchospasm  aluminum hydroxide/magnesium hydroxide/simethicone Suspension 30 milliLiter(s) Oral every 4 hours PRN Dyspepsia  dextrose 40% Gel 15 Gram(s) Oral once PRN Blood Glucose LESS THAN 70 milliGRAM(s)/deciliter  glucagon  Injectable 1 milliGRAM(s) IntraMuscular once PRN Glucose LESS THAN 70 milligrams/deciliter  guaiFENesin/dextromethorphan  Syrup 10 milliLiter(s) Oral every 6 hours PRN Cough        Progress Note Handoff  Pending Consults: renal  Pending Tests: fanny  Pending Results: bmp  Family Discussion: discussed plan with pt - he is in agreement  Disposition: Home__x___/SNF______/Other_____/Unknown at this time_____    Please call me with any questions at extension [phone]

## 2019-08-25 LAB
ANION GAP SERPL CALC-SCNC: 10 MMOL/L — SIGNIFICANT CHANGE UP (ref 7–14)
BUN SERPL-MCNC: 79 MG/DL — CRITICAL HIGH (ref 10–20)
CALCIUM SERPL-MCNC: 9.1 MG/DL — SIGNIFICANT CHANGE UP (ref 8.5–10.1)
CHLORIDE SERPL-SCNC: 99 MMOL/L — SIGNIFICANT CHANGE UP (ref 98–110)
CHLORIDE UR-SCNC: 97 — SIGNIFICANT CHANGE UP
CO2 SERPL-SCNC: 34 MMOL/L — HIGH (ref 17–32)
CREAT SERPL-MCNC: 2 MG/DL — HIGH (ref 0.7–1.5)
GLUCOSE BLDC GLUCOMTR-MCNC: 121 MG/DL — HIGH (ref 70–99)
GLUCOSE BLDC GLUCOMTR-MCNC: 161 MG/DL — HIGH (ref 70–99)
GLUCOSE BLDC GLUCOMTR-MCNC: 196 MG/DL — HIGH (ref 70–99)
GLUCOSE BLDC GLUCOMTR-MCNC: 297 MG/DL — HIGH (ref 70–99)
GLUCOSE SERPL-MCNC: 146 MG/DL — HIGH (ref 70–99)
HCT VFR BLD CALC: 50.7 % — SIGNIFICANT CHANGE UP (ref 42–52)
HGB BLD-MCNC: 14.6 G/DL — SIGNIFICANT CHANGE UP (ref 14–18)
MCHC RBC-ENTMCNC: 22.9 PG — LOW (ref 27–31)
MCHC RBC-ENTMCNC: 28.8 G/DL — LOW (ref 32–37)
MCV RBC AUTO: 79.6 FL — LOW (ref 80–94)
NRBC # BLD: 0 /100 WBCS — SIGNIFICANT CHANGE UP (ref 0–0)
PLATELET # BLD AUTO: 423 K/UL — HIGH (ref 130–400)
POTASSIUM SERPL-MCNC: 5.5 MMOL/L — HIGH (ref 3.5–5)
POTASSIUM SERPL-SCNC: 5.5 MMOL/L — HIGH (ref 3.5–5)
POTASSIUM UR-SCNC: 14 MMOL/L — SIGNIFICANT CHANGE UP
PROT ?TM UR-MCNC: <5 MG/DLG/24H — SIGNIFICANT CHANGE UP
RBC # BLD: 6.37 M/UL — HIGH (ref 4.7–6.1)
RBC # FLD: 20.7 % — HIGH (ref 11.5–14.5)
SODIUM SERPL-SCNC: 143 MMOL/L — SIGNIFICANT CHANGE UP (ref 135–146)
SODIUM UR-SCNC: 109 MMOL/L — SIGNIFICANT CHANGE UP
URATE UR-MCNC: 14.8 MG/DL — SIGNIFICANT CHANGE UP
WBC # BLD: 13.13 K/UL — HIGH (ref 4.8–10.8)
WBC # FLD AUTO: 13.13 K/UL — HIGH (ref 4.8–10.8)

## 2019-08-25 PROCEDURE — 99233 SBSQ HOSP IP/OBS HIGH 50: CPT

## 2019-08-25 RX ORDER — SODIUM POLYSTYRENE SULFONATE 4.1 MEQ/G
15 POWDER, FOR SUSPENSION ORAL DAILY
Refills: 0 | Status: DISCONTINUED | OUTPATIENT
Start: 2019-08-25 | End: 2019-08-29

## 2019-08-25 RX ADMIN — MAGNESIUM OXIDE 400 MG ORAL TABLET 400 MILLIGRAM(S): 241.3 TABLET ORAL at 17:28

## 2019-08-25 RX ADMIN — ATORVASTATIN CALCIUM 20 MILLIGRAM(S): 80 TABLET, FILM COATED ORAL at 22:09

## 2019-08-25 RX ADMIN — MAGNESIUM OXIDE 400 MG ORAL TABLET 400 MILLIGRAM(S): 241.3 TABLET ORAL at 11:33

## 2019-08-25 RX ADMIN — SENNA PLUS 2 TABLET(S): 8.6 TABLET ORAL at 22:09

## 2019-08-25 RX ADMIN — Medication 20 UNIT(S): at 17:29

## 2019-08-25 RX ADMIN — Medication 100 MILLIGRAM(S): at 17:27

## 2019-08-25 RX ADMIN — Medication 2: at 17:29

## 2019-08-25 RX ADMIN — Medication 3 MILLILITER(S): at 09:04

## 2019-08-25 RX ADMIN — PANTOPRAZOLE SODIUM 40 MILLIGRAM(S): 20 TABLET, DELAYED RELEASE ORAL at 06:05

## 2019-08-25 RX ADMIN — MAGNESIUM OXIDE 400 MG ORAL TABLET 400 MILLIGRAM(S): 241.3 TABLET ORAL at 09:49

## 2019-08-25 RX ADMIN — Medication 3 MILLILITER(S): at 13:34

## 2019-08-25 RX ADMIN — Medication 6: at 11:37

## 2019-08-25 RX ADMIN — Medication 20 UNIT(S): at 11:37

## 2019-08-25 RX ADMIN — Medication 1 DROP(S): at 14:33

## 2019-08-25 RX ADMIN — Medication 500000 UNIT(S): at 05:03

## 2019-08-25 RX ADMIN — HEPARIN SODIUM 5000 UNIT(S): 5000 INJECTION INTRAVENOUS; SUBCUTANEOUS at 21:41

## 2019-08-25 RX ADMIN — INSULIN GLARGINE 40 UNIT(S): 100 INJECTION, SOLUTION SUBCUTANEOUS at 21:41

## 2019-08-25 RX ADMIN — Medication 40 MILLIGRAM(S): at 06:05

## 2019-08-25 RX ADMIN — HEPARIN SODIUM 5000 UNIT(S): 5000 INJECTION INTRAVENOUS; SUBCUTANEOUS at 06:05

## 2019-08-25 RX ADMIN — Medication 25 MILLIGRAM(S): at 17:28

## 2019-08-25 RX ADMIN — Medication 1000 UNIT(S): at 11:34

## 2019-08-25 RX ADMIN — Medication 500000 UNIT(S): at 11:34

## 2019-08-25 RX ADMIN — BUDESONIDE AND FORMOTEROL FUMARATE DIHYDRATE 2 PUFF(S): 160; 4.5 AEROSOL RESPIRATORY (INHALATION) at 09:05

## 2019-08-25 RX ADMIN — Medication 500000 UNIT(S): at 17:28

## 2019-08-25 RX ADMIN — TIOTROPIUM BROMIDE 1 CAPSULE(S): 18 CAPSULE ORAL; RESPIRATORY (INHALATION) at 09:05

## 2019-08-25 RX ADMIN — Medication 1 DROP(S): at 21:42

## 2019-08-25 RX ADMIN — Medication 100 MILLIGRAM(S): at 22:09

## 2019-08-25 RX ADMIN — Medication 40 MILLIGRAM(S): at 17:28

## 2019-08-25 RX ADMIN — TAMSULOSIN HYDROCHLORIDE 0.4 MILLIGRAM(S): 0.4 CAPSULE ORAL at 22:09

## 2019-08-25 RX ADMIN — Medication 25 MILLIGRAM(S): at 06:05

## 2019-08-25 RX ADMIN — Medication 500 MILLIGRAM(S): at 11:34

## 2019-08-25 RX ADMIN — Medication 100 MILLIGRAM(S): at 06:05

## 2019-08-25 RX ADMIN — SODIUM POLYSTYRENE SULFONATE 15 GRAM(S): 4.1 POWDER, FOR SUSPENSION ORAL at 09:51

## 2019-08-25 RX ADMIN — FINASTERIDE 5 MILLIGRAM(S): 5 TABLET, FILM COATED ORAL at 11:34

## 2019-08-25 RX ADMIN — BUDESONIDE AND FORMOTEROL FUMARATE DIHYDRATE 2 PUFF(S): 160; 4.5 AEROSOL RESPIRATORY (INHALATION) at 06:06

## 2019-08-25 RX ADMIN — BUDESONIDE AND FORMOTEROL FUMARATE DIHYDRATE 2 PUFF(S): 160; 4.5 AEROSOL RESPIRATORY (INHALATION) at 22:11

## 2019-08-25 NOTE — PROGRESS NOTE ADULT - PROBLEM SELECTOR PLAN 4
w/Hx of Hyperkalemia  - at baseline renal function  - patient given insulin, D50 and kayexelate - repeat BMP better  - c/w current supplementation, Kayexalate PRN  - Daily BMP/Mg  - avoid nephrotoxins  - renal c/s appreciated  -renal sono - no hydro  -renal diet  -dietary eval

## 2019-08-25 NOTE — PROGRESS NOTE ADULT - SUBJECTIVE AND OBJECTIVE BOX
JIAN MANCIA  78y  Male      Patient is a 78y old  Male who presents with a chief complaint of Hypoxia x 1 day (22 Aug 2019 09:47)      INTERVAL HPI/OVERNIGHT EVENTS:  Patient seen and examined earlier this morning.  Sitting comfortably in chair.  In NAD.  Appears comfortable.  Denies SOB.  Potassium better this morning.  Pt is asking to see PT and the dietician  insulin adjusted yesterday, fs better controlled       REVIEW OF SYSTEMS:  CONSTITUTIONAL: No fever, weight loss, or fatigue  EYES: No eye pain, visual disturbances, or discharge  ENMT:  No difficulty hearing, tinnitus, vertigo; No sinus or throat pain  NECK: No pain or stiffness  RESPIRATORY: No cough, wheezing, chills or hemoptysis; No shortness of breath  CARDIOVASCULAR: No chest pain, palpitations, dizziness, or leg swelling  GASTROINTESTINAL: No abdominal or epigastric pain. No nausea, vomiting, or hematemesis; No diarrhea or constipation. No melena or hematochezia.  GENITOURINARY: No dysuria, frequency, hematuria, or incontinence  NEUROLOGICAL: No headaches, memory loss, loss of strength, numbness, or tremors  SKIN: No itching, burning, rashes, or lesions   LYMPH NODES: No enlarged glands  ENDOCRINE: No heat or cold intolerance; No hair loss  MUSCULOSKELETAL: No joint pain or swelling; No muscle, back, or extremity pain  PSYCHIATRIC: No depression, anxiety, mood swings, or difficulty sleeping  HEME/LYMPH: No easy bruising, or bleeding gums  ALLERY AND IMMUNOLOGIC: No hives or eczema    Vital Signs Last 24 Hrs  T(C): 36.7 (24 Aug 2019 22:02), Max: 36.7 (24 Aug 2019 22:02)  T(F): 98 (24 Aug 2019 22:02), Max: 98 (24 Aug 2019 22:02)  HR: 109 (25 Aug 2019 09:07) (93 - 109)  BP: 136/76 (24 Aug 2019 22:02) (134/68 - 136/76)  BP(mean): --  RR: 22 (25 Aug 2019 09:07) (18 - 22)  SpO2: 91% (25 Aug 2019 09:07) (89% - 91%) on high flow     PHYSICAL EXAM:  GENERAL: NAD, well-groomed, well-developed  HEAD:  Atraumatic, Normocephalic  EYES: EOMI, PERRLA, conjunctiva and sclera clear  ENMT: No tonsillar erythema, exudates, or enlargement; Moist mucous membranes, Good dentition, No lesions  NECK: Supple, No JVD, Normal thyroid  NERVOUS SYSTEM:  Alert & Oriented X3, Good concentration; Motor Strength 5/5 B/L upper and lower extremities; DTRs 2+ intact and symmetric  CHEST/LUNG: decreased breath sounds b/l; no wheezing  HEART: Regular rate and rhythm; No murmurs, rubs, or gallops  ABDOMEN: Soft, Nontender, Nondistended; Bowel sounds present, obese  EXTREMITIES:  2+ Peripheral Pulses, No clubbing, cyanosis, +2 edema  LYMPH: No lymphadenopathy noted  SKIN: No rashes or lesions    Consultant(s) Notes Reviewed:  [x ] YES  [ ] NO  Care Discussed with Consultants/Other Providers [ x] YES  [ ] NO    LAB:                                   14.6   13.13 )-----------( 423      ( 25 Aug 2019 06:58 )             50.7         143  |  99  |  79<HH>  ----------------------------<  146<H>  5.5<H>   |  34<H>  |  2.0<H>    Ca    9.1      25 Aug 2019 06:58          Drug Dosing Weight  Height (cm): 172.72 (04 Aug 2019 23:00)  Weight (kg): 119.5 (05 Aug 2019 02:48)  BMI (kg/m2): 40.1 (05 Aug 2019 02:48)  BSA (m2): 2.3 (05 Aug 2019 02:48)    CAPILLARY BLOOD GLUCOSE  POCT Blood Glucose.: 121 mg/dL (25 Aug 2019 07:38)  POCT Blood Glucose.: 215 mg/dL (24 Aug 2019 21:17)  POCT Blood Glucose.: 170 mg/dL (24 Aug 2019 16:34)  POCT Blood Glucose.: 419 mg/dL (24 Aug 2019 11:27)    I&O's Summary    24 Aug 2019 07:01  -  25 Aug 2019 07:00  --------------------------------------------------------  IN: 0 mL / OUT: 950 mL / NET: -950 mL    25 Aug 2019 07:01  -  25 Aug 2019 09:36  --------------------------------------------------------  IN: 0 mL / OUT: 700 mL / NET: -700 mL          Urinalysis Basic - ( 21 Aug 2019 13:03 )    Color: Yellow / Appearance: Clear / S.020 / pH: x  Gluc: x / Ketone: Trace  / Bili: Negative / Urobili: 0.2 mg/dL   Blood: x / Protein: Trace mg/dL / Nitrite: Negative   Leuk Esterase: Negative / RBC: Negative / WBC 1-2 /HPF   Sq Epi: x / Non Sq Epi: Occasional /HPF / Bacteria: x        RADIOLOGY & ADDITIONAL TESTS:  Imaging Personally Reviewed:  [x] YES  [ ] NO    HEALTH ISSUES - PROBLEM Dx:  Other elevated white blood cell (WBC) count: Other elevated white blood cell (WBC) count  Chronic obstructive pulmonary disease, unspecified COPD type: Chronic obstructive pulmonary disease, unspecified COPD type  Enlarged prostate: Enlarged prostate  Diabetes mellitus, type 2: Diabetes mellitus, type 2  COPD (chronic obstructive pulmonary disease): COPD (chronic obstructive pulmonary disease)  Chronic congestive heart failure: Chronic congestive heart failure  CKD (chronic kidney disease) stage 3, GFR 30-59 ml/min: CKD (chronic kidney disease) stage 3, GFR 30-59 ml/min  Elevated troponin: Elevated troponin  Opacity of lung on imaging study: Opacity of lung on imaging study  SOB (shortness of breath): SOB (shortness of breath)      MEDICATIONS  (STANDING):  ALBUTerol/ipratropium for Nebulization 3 milliLiter(s) Nebulizer every 4 hours  artificial  tears Solution 1 Drop(s) Both EYES three times a day  ascorbic acid 500 milliGRAM(s) Oral daily  atorvastatin 20 milliGRAM(s) Oral at bedtime  buDESOnide 160 MICROgram(s)/formoterol 4.5 MICROgram(s) Inhaler 2 Puff(s) Inhalation two times a day  chlorhexidine 4% Liquid 1 Application(s) Topical <User Schedule>  cholecalciferol 1000 Unit(s) Oral daily  dextrose 5%. 1000 milliLiter(s) (50 mL/Hr) IV Continuous <Continuous>  dextrose 50% Injectable 12.5 Gram(s) IV Push once  dextrose 50% Injectable 25 Gram(s) IV Push once  dextrose 50% Injectable 25 Gram(s) IV Push once  docusate sodium 100 milliGRAM(s) Oral three times a day  finasteride 5 milliGRAM(s) Oral daily  furosemide    Tablet 40 milliGRAM(s) Oral daily  heparin  Injectable 5000 Unit(s) SubCutaneous every 8 hours  insulin glargine Injectable (LANTUS) 40 Unit(s) SubCutaneous at bedtime  insulin lispro (HumaLOG) corrective regimen sliding scale   SubCutaneous three times a day before meals  insulin lispro Injectable (HumaLOG) 20 Unit(s) SubCutaneous before breakfast  insulin lispro Injectable (HumaLOG) 20 Unit(s) SubCutaneous before lunch  insulin lispro Injectable (HumaLOG) 20 Unit(s) SubCutaneous before dinner  magnesium oxide 400 milliGRAM(s) Oral three times a day with meals  methylPREDNISolone sodium succinate Injectable 40 milliGRAM(s) IV Push every 12 hours  metoprolol tartrate 25 milliGRAM(s) Oral two times a day  morphine  - Injectable 1 milliGRAM(s) IV Push once  nystatin    Suspension [id number] Unit(s) Oral every 6 hours  pantoprazole    Tablet 40 milliGRAM(s) Oral before breakfast  polyethylene glycol 3350 17 Gram(s) Oral daily  senna 2 Tablet(s) Oral at bedtime  sodium polystyrene sulfonate Suspension 15 Gram(s) Oral daily  tamsulosin 0.4 milliGRAM(s) Oral at bedtime  tiotropium 18 MICROgram(s) Capsule 1 Capsule(s) Inhalation daily    MEDICATIONS  (PRN):  acetaminophen   Tablet .. 650 milliGRAM(s) Oral every 6 hours PRN Temp greater or equal to 38C (100.4F), Mild Pain (1 - 3)  ALBUTerol/ipratropium for Nebulization 3 milliLiter(s) Nebulizer every 4 hours PRN Bronchospasm  aluminum hydroxide/magnesium hydroxide/simethicone Suspension 30 milliLiter(s) Oral every 4 hours PRN Dyspepsia  dextrose 40% Gel 15 Gram(s) Oral once PRN Blood Glucose LESS THAN 70 milliGRAM(s)/deciliter  glucagon  Injectable 1 milliGRAM(s) IntraMuscular once PRN Glucose LESS THAN 70 milligrams/deciliter  guaiFENesin/dextromethorphan  Syrup 10 milliLiter(s) Oral every 6 hours PRN Cough          Progress Note Handoff  Pending Consults: renal  Pending Tests: bmp  Pending Results: fanny  Family Discussion: discussed plan with pt - he is in agreement  Disposition: Home__x___/SNF______/Other_____/Unknown at this time_____    Please call me with any questions at extension [phone]

## 2019-08-26 LAB
ALBUMIN SERPL ELPH-MCNC: 3.8 G/DL — SIGNIFICANT CHANGE UP (ref 3.5–5.2)
ALP SERPL-CCNC: 55 U/L — SIGNIFICANT CHANGE UP (ref 30–115)
ALT FLD-CCNC: 24 U/L — SIGNIFICANT CHANGE UP (ref 0–41)
ANION GAP SERPL CALC-SCNC: 9 MMOL/L — SIGNIFICANT CHANGE UP (ref 7–14)
AST SERPL-CCNC: 10 U/L — SIGNIFICANT CHANGE UP (ref 0–41)
BILIRUB SERPL-MCNC: 0.6 MG/DL — SIGNIFICANT CHANGE UP (ref 0.2–1.2)
BUN SERPL-MCNC: 72 MG/DL — CRITICAL HIGH (ref 10–20)
CALCIUM SERPL-MCNC: 9.5 MG/DL — SIGNIFICANT CHANGE UP (ref 8.5–10.1)
CHLORIDE SERPL-SCNC: 99 MMOL/L — SIGNIFICANT CHANGE UP (ref 98–110)
CO2 SERPL-SCNC: 38 MMOL/L — HIGH (ref 17–32)
CREAT SERPL-MCNC: 1.9 MG/DL — HIGH (ref 0.7–1.5)
CULTURE RESULTS: SIGNIFICANT CHANGE UP
CULTURE RESULTS: SIGNIFICANT CHANGE UP
GLUCOSE BLDC GLUCOMTR-MCNC: 131 MG/DL — HIGH (ref 70–99)
GLUCOSE BLDC GLUCOMTR-MCNC: 154 MG/DL — HIGH (ref 70–99)
GLUCOSE BLDC GLUCOMTR-MCNC: 223 MG/DL — HIGH (ref 70–99)
GLUCOSE BLDC GLUCOMTR-MCNC: 242 MG/DL — HIGH (ref 70–99)
GLUCOSE SERPL-MCNC: 145 MG/DL — HIGH (ref 70–99)
HCT VFR BLD CALC: 52.7 % — HIGH (ref 42–52)
HGB BLD-MCNC: 15.3 G/DL — SIGNIFICANT CHANGE UP (ref 14–18)
MCHC RBC-ENTMCNC: 23 PG — LOW (ref 27–31)
MCHC RBC-ENTMCNC: 29 G/DL — LOW (ref 32–37)
MCV RBC AUTO: 79.1 FL — LOW (ref 80–94)
NRBC # BLD: 0 /100 WBCS — SIGNIFICANT CHANGE UP (ref 0–0)
PLATELET # BLD AUTO: 397 K/UL — SIGNIFICANT CHANGE UP (ref 130–400)
POTASSIUM SERPL-MCNC: 5.2 MMOL/L — HIGH (ref 3.5–5)
POTASSIUM SERPL-SCNC: 5.2 MMOL/L — HIGH (ref 3.5–5)
PROT SERPL-MCNC: 6.3 G/DL — SIGNIFICANT CHANGE UP (ref 6–8)
RBC # BLD: 6.66 M/UL — HIGH (ref 4.7–6.1)
RBC # FLD: 20.5 % — HIGH (ref 11.5–14.5)
SODIUM SERPL-SCNC: 146 MMOL/L — SIGNIFICANT CHANGE UP (ref 135–146)
SPECIMEN SOURCE: SIGNIFICANT CHANGE UP
SPECIMEN SOURCE: SIGNIFICANT CHANGE UP
WBC # BLD: 12.58 K/UL — HIGH (ref 4.8–10.8)
WBC # FLD AUTO: 12.58 K/UL — HIGH (ref 4.8–10.8)

## 2019-08-26 PROCEDURE — 99233 SBSQ HOSP IP/OBS HIGH 50: CPT

## 2019-08-26 RX ADMIN — Medication 500000 UNIT(S): at 05:51

## 2019-08-26 RX ADMIN — Medication 20 UNIT(S): at 17:05

## 2019-08-26 RX ADMIN — Medication 3 MILLILITER(S): at 08:08

## 2019-08-26 RX ADMIN — HEPARIN SODIUM 5000 UNIT(S): 5000 INJECTION INTRAVENOUS; SUBCUTANEOUS at 05:51

## 2019-08-26 RX ADMIN — TIOTROPIUM BROMIDE 1 CAPSULE(S): 18 CAPSULE ORAL; RESPIRATORY (INHALATION) at 08:09

## 2019-08-26 RX ADMIN — SODIUM POLYSTYRENE SULFONATE 15 GRAM(S): 4.1 POWDER, FOR SUSPENSION ORAL at 11:05

## 2019-08-26 RX ADMIN — MAGNESIUM OXIDE 400 MG ORAL TABLET 400 MILLIGRAM(S): 241.3 TABLET ORAL at 17:12

## 2019-08-26 RX ADMIN — Medication 1 DROP(S): at 21:25

## 2019-08-26 RX ADMIN — Medication 500000 UNIT(S): at 11:05

## 2019-08-26 RX ADMIN — HEPARIN SODIUM 5000 UNIT(S): 5000 INJECTION INTRAVENOUS; SUBCUTANEOUS at 14:48

## 2019-08-26 RX ADMIN — Medication 500000 UNIT(S): at 17:06

## 2019-08-26 RX ADMIN — ATORVASTATIN CALCIUM 20 MILLIGRAM(S): 80 TABLET, FILM COATED ORAL at 21:25

## 2019-08-26 RX ADMIN — Medication 60 MILLIGRAM(S): at 14:45

## 2019-08-26 RX ADMIN — Medication 4: at 12:07

## 2019-08-26 RX ADMIN — Medication 1000 UNIT(S): at 11:04

## 2019-08-26 RX ADMIN — Medication 500000 UNIT(S): at 23:18

## 2019-08-26 RX ADMIN — MAGNESIUM OXIDE 400 MG ORAL TABLET 400 MILLIGRAM(S): 241.3 TABLET ORAL at 07:52

## 2019-08-26 RX ADMIN — TAMSULOSIN HYDROCHLORIDE 0.4 MILLIGRAM(S): 0.4 CAPSULE ORAL at 21:25

## 2019-08-26 RX ADMIN — Medication 3 MILLILITER(S): at 13:46

## 2019-08-26 RX ADMIN — BUDESONIDE AND FORMOTEROL FUMARATE DIHYDRATE 2 PUFF(S): 160; 4.5 AEROSOL RESPIRATORY (INHALATION) at 07:52

## 2019-08-26 RX ADMIN — Medication 100 MILLIGRAM(S): at 14:45

## 2019-08-26 RX ADMIN — Medication 1 DROP(S): at 14:46

## 2019-08-26 RX ADMIN — Medication 20 UNIT(S): at 07:58

## 2019-08-26 RX ADMIN — POLYETHYLENE GLYCOL 3350 17 GRAM(S): 17 POWDER, FOR SOLUTION ORAL at 11:08

## 2019-08-26 RX ADMIN — Medication 1 DROP(S): at 05:52

## 2019-08-26 RX ADMIN — Medication 40 MILLIGRAM(S): at 05:51

## 2019-08-26 RX ADMIN — FINASTERIDE 5 MILLIGRAM(S): 5 TABLET, FILM COATED ORAL at 11:04

## 2019-08-26 RX ADMIN — Medication 20 UNIT(S): at 12:07

## 2019-08-26 RX ADMIN — Medication 3 MILLILITER(S): at 15:28

## 2019-08-26 RX ADMIN — Medication 4: at 17:05

## 2019-08-26 RX ADMIN — SENNA PLUS 2 TABLET(S): 8.6 TABLET ORAL at 21:25

## 2019-08-26 RX ADMIN — INSULIN GLARGINE 40 UNIT(S): 100 INJECTION, SOLUTION SUBCUTANEOUS at 21:36

## 2019-08-26 RX ADMIN — Medication 3 MILLILITER(S): at 20:09

## 2019-08-26 RX ADMIN — Medication 0: at 08:38

## 2019-08-26 RX ADMIN — Medication 100 MILLIGRAM(S): at 05:51

## 2019-08-26 RX ADMIN — HEPARIN SODIUM 5000 UNIT(S): 5000 INJECTION INTRAVENOUS; SUBCUTANEOUS at 21:25

## 2019-08-26 RX ADMIN — Medication 25 MILLIGRAM(S): at 17:06

## 2019-08-26 RX ADMIN — Medication 100 MILLIGRAM(S): at 21:25

## 2019-08-26 RX ADMIN — MAGNESIUM OXIDE 400 MG ORAL TABLET 400 MILLIGRAM(S): 241.3 TABLET ORAL at 11:07

## 2019-08-26 RX ADMIN — BUDESONIDE AND FORMOTEROL FUMARATE DIHYDRATE 2 PUFF(S): 160; 4.5 AEROSOL RESPIRATORY (INHALATION) at 20:08

## 2019-08-26 RX ADMIN — Medication 25 MILLIGRAM(S): at 05:51

## 2019-08-26 RX ADMIN — PANTOPRAZOLE SODIUM 40 MILLIGRAM(S): 20 TABLET, DELAYED RELEASE ORAL at 05:51

## 2019-08-26 RX ADMIN — Medication 500 MILLIGRAM(S): at 14:45

## 2019-08-26 NOTE — PROGRESS NOTE ADULT - SUBJECTIVE AND OBJECTIVE BOX
Nephrology progress note    Patient is seen and examined, events over the last 24 h noted .  Remains on high flow, denies high K foods  Allergies:  aspirin (Other)  fish (Other)  iodine (Hives)  penicillin (Unknown)  shellfish (Other)    Hospital Medications:   MEDICATIONS  (STANDING):  ALBUTerol/ipratropium for Nebulization 3 milliLiter(s) Nebulizer every 4 hours  artificial  tears Solution 1 Drop(s) Both EYES three times a day  ascorbic acid 500 milliGRAM(s) Oral daily  atorvastatin 20 milliGRAM(s) Oral at bedtime  buDESOnide 160 MICROgram(s)/formoterol 4.5 MICROgram(s) Inhaler 2 Puff(s) Inhalation two times a day  chlorhexidine 4% Liquid 1 Application(s) Topical <User Schedule>  cholecalciferol 1000 Unit(s) Oral daily  dextrose 5%. 1000 milliLiter(s) (50 mL/Hr) IV Continuous <Continuous>  dextrose 50% Injectable 12.5 Gram(s) IV Push once  dextrose 50% Injectable 25 Gram(s) IV Push once  dextrose 50% Injectable 25 Gram(s) IV Push once  docusate sodium 100 milliGRAM(s) Oral three times a day  finasteride 5 milliGRAM(s) Oral daily  furosemide    Tablet 40 milliGRAM(s) Oral daily  heparin  Injectable 5000 Unit(s) SubCutaneous every 8 hours  insulin glargine Injectable (LANTUS) 40 Unit(s) SubCutaneous at bedtime  insulin lispro (HumaLOG) corrective regimen sliding scale   SubCutaneous three times a day before meals  insulin lispro Injectable (HumaLOG) 20 Unit(s) SubCutaneous before breakfast  insulin lispro Injectable (HumaLOG) 20 Unit(s) SubCutaneous before lunch  insulin lispro Injectable (HumaLOG) 20 Unit(s) SubCutaneous before dinner  magnesium oxide 400 milliGRAM(s) Oral three times a day with meals  methylPREDNISolone sodium succinate Injectable 40 milliGRAM(s) IV Push every 12 hours  metoprolol tartrate 25 milliGRAM(s) Oral two times a day  morphine  - Injectable 1 milliGRAM(s) IV Push once  nystatin    Suspension 994957 Unit(s) Oral every 6 hours  pantoprazole    Tablet 40 milliGRAM(s) Oral before breakfast  polyethylene glycol 3350 17 Gram(s) Oral daily  senna 2 Tablet(s) Oral at bedtime  sodium polystyrene sulfonate Suspension 15 Gram(s) Oral daily  tamsulosin 0.4 milliGRAM(s) Oral at bedtime  tiotropium 18 MICROgram(s) Capsule 1 Capsule(s) Inhalation daily        VITALS:  T(F): 96.3 (19 @ 05:45), Max: 97.7 (19 @ 22:02)  HR: 95 (19 @ 05:45)  BP: 184/86 (19 @ 05:45)  RR: 20 (19 @ 05:45)  SpO2: 92% (19 @ 21:42)  Wt(kg): --     @ 07:01  -   @ 07:00  --------------------------------------------------------  IN: 0 mL / OUT: 950 mL / NET: -950 mL     @ 07:01  -   @ 07:00  --------------------------------------------------------  IN: 0 mL / OUT: 1500 mL / NET: -1500 mL          PHYSICAL EXAM:  Constitutional: NAD, obese, on AVAP  HEENT: anicteric sclera, MMM  Respiratory: CTA  Cardiovascular: S1, S2, RRR  Gastrointestinal: BS+, soft, NT/ND  Extremities: 1+peripheral edema  Neurological: A/O x 3  : No CVA tenderness. No chapman.   Skin: No rashes  Vascular Access:    LABS:      146  |  99  |  72<HH>  ----------------------------<  145<H>  5.2<H>   |  38<H>  |  1.9<H>  Creatinine Trend: 1.9<--, 2.0<--, 2.3<--, 2.2<--, 2.5<--, 2.3<--  Potassium Trend: 5.2<--, 5.5<--, 5.6<--, 6.2<--, 5.9<--    Ca    9.5      26 Aug 2019 07:55    TPro  6.3  /  Alb  3.8  /  TBili  0.6  /  DBili      /  AST  10  /  ALT  24  /  AlkPhos  55                            15.3   12.58 )-----------( 397      ( 26 Aug 2019 07:55 )             52.7       Urine Studies:  Urinalysis Basic - ( 21 Aug 2019 13:03 )    Color: Yellow / Appearance: Clear / S.020 / pH:   Gluc:  / Ketone: Trace  / Bili: Negative / Urobili: 0.2 mg/dL   Blood:  / Protein: Trace mg/dL / Nitrite: Negative   Leuk Esterase: Negative / RBC: Negative / WBC 1-2 /HPF   Sq Epi:  / Non Sq Epi: Occasional /HPF / Bacteria:       Sodium, Random Urine: 109.0 mmoL/L ( @ 16:48)  Potassium, Random Urine: 14 mmol/L ( @ 16:48)  Chloride, Random Urine: 97 ( @ 16:48)    RADIOLOGY & ADDITIONAL STUDIES:  < from: US Renal (19 @ 18:22) >  IMPRESSION:      Suboptimal evaluation secondary to body habitus without evidence of   definite hydronephrosis.     < end of copied text >  < from: Xray Chest 1 View- PORTABLE-Urgent (19 @ 07:03) >  ilateral opacities and bibasilar opacifications, left greater than the   right.    < end of copied text >

## 2019-08-26 NOTE — PROGRESS NOTE ADULT - SUBJECTIVE AND OBJECTIVE BOX
JIAN MANCIA  78y  Male      Patient is a 78y old  Male who presents with a chief complaint of Hypoxia x 1 day (22 Aug 2019 09:47)      INTERVAL HPI/OVERNIGHT EVENTS:  Patient seen and examined earlier this morning.  Sitting comfortably in chair on highflow.  In NAD.  Appears comfortable.  Denies SOB.  Potassium better this morning.  Pt is asking to see PT and the dietician today  CPAP will be evaluated when the pt arrives home upon d/c      REVIEW OF SYSTEMS:  CONSTITUTIONAL: No fever, weight loss, or fatigue  EYES: No eye pain, visual disturbances, or discharge  ENMT:  No difficulty hearing, tinnitus, vertigo; No sinus or throat pain  NECK: No pain or stiffness  RESPIRATORY: No cough, wheezing, chills or hemoptysis; No shortness of breath  CARDIOVASCULAR: No chest pain, palpitations, dizziness, or leg swelling  GASTROINTESTINAL: No abdominal or epigastric pain. No nausea, vomiting, or hematemesis; No diarrhea or constipation. No melena or hematochezia.  GENITOURINARY: No dysuria, frequency, hematuria, or incontinence  NEUROLOGICAL: No headaches, memory loss, loss of strength, numbness, or tremors  SKIN: No itching, burning, rashes, or lesions   LYMPH NODES: No enlarged glands  ENDOCRINE: No heat or cold intolerance; No hair loss  MUSCULOSKELETAL: No joint pain or swelling; No muscle, back, or extremity pain  PSYCHIATRIC: No depression, anxiety, mood swings, or difficulty sleeping  HEME/LYMPH: No easy bruising, or bleeding gums  ALLERY AND IMMUNOLOGIC: No hives or eczema    Vital Signs Last 24 Hrs  T(C): 36.7 (24 Aug 2019 22:02), Max: 36.7 (24 Aug 2019 22:02)  T(F): 98 (24 Aug 2019 22:02), Max: 98 (24 Aug 2019 22:02)  HR: 109 (25 Aug 2019 09:07) (93 - 109)  BP: 136/76 (24 Aug 2019 22:02) (134/68 - 136/76)  BP(mean): --  RR: 22 (25 Aug 2019 09:07) (18 - 22)  SpO2: 91% (25 Aug 2019 09:07) (89% - 91%) on high flow     PHYSICAL EXAM:  GENERAL: NAD, well-groomed, well-developed  HEAD:  Atraumatic, Normocephalic  EYES: EOMI, PERRLA, conjunctiva and sclera clear  ENMT: No tonsillar erythema, exudates, or enlargement; Moist mucous membranes, Good dentition, No lesions  NECK: Supple, No JVD, Normal thyroid  NERVOUS SYSTEM:  Alert & Oriented X3, Good concentration; Motor Strength 5/5 B/L upper and lower extremities; DTRs 2+ intact and symmetric  CHEST/LUNG: decreased breath sounds b/l; no wheezing  HEART: Regular rate and rhythm; No murmurs, rubs, or gallops  ABDOMEN: Soft, Nontender, Nondistended; Bowel sounds present, obese  EXTREMITIES:  2+ Peripheral Pulses, No clubbing, cyanosis, +2 edema  LYMPH: No lymphadenopathy noted  SKIN: No rashes or lesions    Consultant(s) Notes Reviewed:  [x ] YES  [ ] NO  Care Discussed with Consultants/Other Providers [ x] YES  [ ] NO    LAB:                              15.3   12.58 )-----------( 397      ( 26 Aug 2019 07:55 )             52.7         146  |  99  |  72<HH>  ----------------------------<  145<H>  5.2<H>   |  38<H>  |  1.9<H>    Ca    9.5      26 Aug 2019 07:55    TPro  6.3  /  Alb  3.8  /  TBili  0.6  /  DBili  x   /  AST  10  /  ALT  24  /  AlkPhos  55        Drug Dosing Weight  Height (cm): 172.72 (04 Aug 2019 23:00)  Weight (kg): 119.5 (05 Aug 2019 02:48)  BMI (kg/m2): 40.1 (05 Aug 2019 02:48)  BSA (m2): 2.3 (05 Aug 2019 02:48)    CAPILLARY BLOOD GLUCOSE  POCT Blood Glucose.: 131 mg/dL (26 Aug 2019 07:44)  POCT Blood Glucose.: 161 mg/dL (25 Aug 2019 20:52)  POCT Blood Glucose.: 196 mg/dL (25 Aug 2019 16:51)  POCT Blood Glucose.: 297 mg/dL (25 Aug 2019 11:33)    I&O's Summary    25 Aug 2019 07:01  -  26 Aug 2019 07:00  --------------------------------------------------------  IN: 0 mL / OUT: 1500 mL / NET: -1500 mL        Urinalysis Basic - ( 21 Aug 2019 13:03 )    Color: Yellow / Appearance: Clear / S.020 / pH: x  Gluc: x / Ketone: Trace  / Bili: Negative / Urobili: 0.2 mg/dL   Blood: x / Protein: Trace mg/dL / Nitrite: Negative   Leuk Esterase: Negative / RBC: Negative / WBC 1-2 /HPF   Sq Epi: x / Non Sq Epi: Occasional /HPF / Bacteria: x        RADIOLOGY & ADDITIONAL TESTS:  Imaging Personally Reviewed:  [x] YES  [ ] NO    HEALTH ISSUES - PROBLEM Dx:  Other elevated white blood cell (WBC) count: Other elevated white blood cell (WBC) count  Chronic obstructive pulmonary disease, unspecified COPD type: Chronic obstructive pulmonary disease, unspecified COPD type  Enlarged prostate: Enlarged prostate  Diabetes mellitus, type 2: Diabetes mellitus, type 2  COPD (chronic obstructive pulmonary disease): COPD (chronic obstructive pulmonary disease)  Chronic congestive heart failure: Chronic congestive heart failure  CKD (chronic kidney disease) stage 3, GFR 30-59 ml/min: CKD (chronic kidney disease) stage 3, GFR 30-59 ml/min  Elevated troponin: Elevated troponin  Opacity of lung on imaging study: Opacity of lung on imaging study  SOB (shortness of breath): SOB (shortness of breath)      MEDICATIONS  (STANDING):  ALBUTerol/ipratropium for Nebulization 3 milliLiter(s) Nebulizer every 4 hours  artificial  tears Solution 1 Drop(s) Both EYES three times a day  ascorbic acid 500 milliGRAM(s) Oral daily  atorvastatin 20 milliGRAM(s) Oral at bedtime  buDESOnide 160 MICROgram(s)/formoterol 4.5 MICROgram(s) Inhaler 2 Puff(s) Inhalation two times a day  chlorhexidine 4% Liquid 1 Application(s) Topical <User Schedule>  cholecalciferol 1000 Unit(s) Oral daily  dextrose 5%. 1000 milliLiter(s) (50 mL/Hr) IV Continuous <Continuous>  dextrose 50% Injectable 12.5 Gram(s) IV Push once  dextrose 50% Injectable 25 Gram(s) IV Push once  dextrose 50% Injectable 25 Gram(s) IV Push once  docusate sodium 100 milliGRAM(s) Oral three times a day  finasteride 5 milliGRAM(s) Oral daily  furosemide    Tablet 40 milliGRAM(s) Oral daily  heparin  Injectable 5000 Unit(s) SubCutaneous every 8 hours  insulin glargine Injectable (LANTUS) 40 Unit(s) SubCutaneous at bedtime  insulin lispro (HumaLOG) corrective regimen sliding scale   SubCutaneous three times a day before meals  insulin lispro Injectable (HumaLOG) 20 Unit(s) SubCutaneous before breakfast  insulin lispro Injectable (HumaLOG) 20 Unit(s) SubCutaneous before lunch  insulin lispro Injectable (HumaLOG) 20 Unit(s) SubCutaneous before dinner  magnesium oxide 400 milliGRAM(s) Oral three times a day with meals  metoprolol tartrate 25 milliGRAM(s) Oral two times a day  morphine  - Injectable 1 milliGRAM(s) IV Push once  nystatin    Suspension [id number] Unit(s) Oral every 6 hours  pantoprazole    Tablet 40 milliGRAM(s) Oral before breakfast  polyethylene glycol 3350 17 Gram(s) Oral daily  predniSONE   Tablet 60 milliGRAM(s) Oral daily  senna 2 Tablet(s) Oral at bedtime  sodium polystyrene sulfonate Suspension 15 Gram(s) Oral daily  tamsulosin 0.4 milliGRAM(s) Oral at bedtime  tiotropium 18 MICROgram(s) Capsule 1 Capsule(s) Inhalation daily    MEDICATIONS  (PRN):  acetaminophen   Tablet .. 650 milliGRAM(s) Oral every 6 hours PRN Temp greater or equal to 38C (100.4F), Mild Pain (1 - 3)  ALBUTerol/ipratropium for Nebulization 3 milliLiter(s) Nebulizer every 4 hours PRN Bronchospasm  aluminum hydroxide/magnesium hydroxide/simethicone Suspension 30 milliLiter(s) Oral every 4 hours PRN Dyspepsia  dextrose 40% Gel 15 Gram(s) Oral once PRN Blood Glucose LESS THAN 70 milliGRAM(s)/deciliter  glucagon  Injectable 1 milliGRAM(s) IntraMuscular once PRN Glucose LESS THAN 70 milligrams/deciliter  guaiFENesin/dextromethorphan  Syrup 10 milliLiter(s) Oral every 6 hours PRN Cough          Progress Note Handoff  Pending Consults: dietary, social work  Pending Tests: fanny  Pending Results: fanny  Family Discussion: discussed plan with pt - he is in agreement  Disposition: Home__x___/SNF______/Other_____/Unknown at this time_____    Please call me with any questions at extension [phone]

## 2019-08-26 NOTE — CONSULT NOTE ADULT - ASSESSMENT
77 yo M with PMHx of HTN, COPD on home O2 4-5L, Afib on Eliquis, HLD, CHF and DM presents to the ED c/o SOB.    # Acute on Chronic COPD exacerbation  # CKD 3b  # Hyperkalemia  # Afib  # CHF  # DM    Plan:  Patient is DNR/ DNI.  He is currently receiving IV solu-medrol 40 mg q12 for acute on chronic copd exacerbation. Must monitor for steroid related delirium. /  working with patient in obtaining AVAPS vs. CPAP. He is currently on AVAPS at 88%. 77 yo M with PMHx of HTN, COPD on home O2 4-5L, Afib on Eliquis, HLD, CHF and DM presents to the ED c/o SOB.    # Acute on Chronic COPD exacerbation  # CKD 3b  # Hyperkalemia  # Afib  # CHF  # DM    Plan:  Patient is DNR/ DNI.  He is currently receiving IV solu-medrol 40 mg q12 for acute on chronic copd exacerbation. Must monitor for steroid related delirium.  Patient reports worsening respiratory failure without steroids.  /  working with patient in obtaining AVAPS vs. CPAP. He is currently on AVAPS at 88%.   - will follow. 77 yo M with PMHx of HTN, COPD on home O2 4-5L, Afib on Eliquis, HLD, CHF and DM presents to the ED c/o SOB.    # Acute on Chronic COPD exacerbation, seen in [facility], was seen by the PMD 18 hours before admission, patient does not accept hospice enrollment and has low health literacy, inconsistently adherent to recommended therapy leading to frequent hospitalizations and EMS utilization  # CKD 3b  # Hyperkalemia  # Afib  # CHF  # DM    Plan:  Patient is DNR/ DNI.  He is currently receiving IV solu-medrol 40 mg q12 for acute on chronic copd exacerbation. Must monitor for steroid related delirium, but none present at this time.  Patient reports worsening respiratory failure without steroids.  /  working with patient in obtaining AVAPS vs. CPAP. He is currently on AVAPS at 88%.   would recommend discharging on steroids and tapering the dose to maintenance dose, will need antiresorptive therapy for prevention of OP and PPI when on long term oral steroids  [facility] will follow up as outpatient

## 2019-08-26 NOTE — CDI QUERY NOTE - NSCDIOTHERTXTBX_GEN_ALL_CORE_HH
79 yo male with a documented history of ''COPD on home O2 4-5L'' presented 8/21/19 with c/o shortness of breath.     H&P, [place]:   Symptoms started earlier yesterday but worsened tonight. There was a mechanical issue with pt's CPAP machine at home and symptoms progressed so he called EMS. When EMS arrived pt was hypoxic to 70s. He was given combivent and placed on CPAP with improved in O2 sat to 94.      Problem/Plan - 1:  ·  Problem: SOB (shortness of breath).  Plan: w/hypoxia requiring NIPPV, elevated WBC count and ?opacity RLL  - admit to medicine  - symptomatically improved on BiPAP, c/w NIPPV for now and wean as tolerated    Per nursing flowsheets, patient on high flow oxygen at 40%, as of this AM (8/26).   CPAP BiPap record on 8/26 notes: Comment: Pt is on HFNC 40 L /40 % FI02, Saturation 88%, no respiratory distress noted. NIV on s/b.      Based on the information, could the patient's condition be further specified as:     > Acute on chronic hypoxic respiratory failure  > Chronic hypoxic respiratory failure   > Other (please specify)______________________  > Unable to determine

## 2019-08-26 NOTE — PROGRESS NOTE ADULT - PROBLEM SELECTOR PLAN 4
w/Hx of Hyperkalemia  - at baseline renal function  - patient given insulin, D50 and kayexelate - repeat BMP better  - c/w current supplementation, Kayexalate PRN  - Daily BMP/Mg  - avoid nephrotoxins  - renal c/s appreciated  -renal sono - no hydro  -renal diet  -dietary eval pending w/Hx of Hyperkalemia  - at baseline renal function  - patient given insulin, D50 and kayexelate - repeat BMP better  - c/w current supplementation, Kayexalate PRN  - Daily BMP/Mg  - avoid nephrotoxins  - renal c/s appreciated  -renal sono - no hydro  -renal diet  -dietary eval pending    metabolic alkalosis  -repeat BMP in am - may need diamox

## 2019-08-26 NOTE — CONSULT NOTE ADULT - SUBJECTIVE AND OBJECTIVE BOX
MATRJIAN SMITH  MRN-[id number]    HISTORY OF PRESENT ILLNESS:  HPI:  FROM ADMISSION H&P: 77 yo M with PMHx of HTN, COPD on home O2 4-5L, Afib on Eliquis, HLD, CHF and DM presents to the ED c/o SOB. Symptoms started earlier yesterday but worsened tonight. There was a mechanical issue with pt's CPAP machine at home and symptoms progressed so he called EMS. When EMS arrived pt was hypoxic to 70s. He was given combivent and placed on CPAP with improved in O2 sat to 94. Pt admits to associated cough. He denies other complaints. Pt denies fever, chills, nausea, vomiting, abdominal pain, diarrhea, headache, dizziness, weakness, chest pain, back pain, LOC, trauma, urinary symptoms.  ________________________________________________________________________________________________________   Patient presents to ED with complaints of hypoxia several hours prior to admission.  Patient reports he was experiencing a problem with his CPAP last night and it was not properly titrating.  He reports that approx 2200 he went to the bathroom to wash up and was unable to get himself out of it.  At that point his HHA brought in the CPAP and EMS was called.  After a few minutes on the CPAP that patient improved.  EMS came and checked the patient, but he reported he felt better and left.  He went back to bed and at approx 0000 noticed his CPAP wasn't functioning properly and he became SOB with hypoxia approx 78%.  He called his son who came to the house, and despite supplemental o2 was unable to increase his oxygenation.  EMS was activated again at approx 0400 and the patient was brought to the ED.  He was placed on BiPAP with improvement to symptoms and increased SpO2.  He was found to have an elevated white count and poss RLL opacity on admission CXR and admitted for further management.  Patient seen and examined at bedside, reports he was experiencing some SOB x 2-3 days PTA with INFANTE and increased o2 demands of up to 6 l/m.  He reports his cough was at baseline except for 2 days ago when he had an increase with scant white production.  He denies any fever/chills.  No CP/Palpitations.  No abdominal complaints including N/V/D or pain.  No urinary complaints. (21 Aug 2019 08:46)    Subjective:  Patient reports he has portable AVAPS at home. He does have CPAP but it is malfunctioning. He reports he wears AVAPS when he walks but he needs CPAP at night.     PMH/PSH:  PAST MEDICAL & SURGICAL HISTORY:  CKD (chronic kidney disease) stage 3, GFR 30-59 ml/min  Colon polyp: claims it was malignant  High cholesterol  GERD (gastroesophageal reflux disease)  Enlarged prostate  Oxygen dependent  COPD (chronic obstructive pulmonary disease)  Diabetes mellitus, type 2  Hypertension  Emphysema lung  History of hemorrhoidectomy  Dysplastic colon polyp: removed    ALLERGIES:  Allergies    fish (Other)  iodine (Hives)  penicillin (Unknown)  shellfish (Other)    Intolerances    aspirin (Other)    SOCIAL HABITS:    FAMILY HISTORY:   FAMILY HISTORY:  No pertinent family history in first degree relatives      REVIEW OF SYSTEM:  Elements of review of systems are negative or non-applicable except as noted above in HPI section.       HOME MEDICATIONS:  aluminum hydroxide-magnesium hydroxide 200 mg-200 mg/5 mL oral suspension  ascorbic acid 500 mg oral tablet  atorvastatin 20 mg oral tablet  cholecalciferol oral tablet  docusate sodium 100 mg oral capsule  finasteride 5 mg oral tablet  guaifenesin-dextromethorphan 100 mg-10 mg/5 mL oral liquid  magnesium oxide 400 mg (241.3 mg elemental magnesium) oral tablet  metoprolol tartrate 25 mg oral tablet  ocular lubricant ophthalmic solution  polyethylene glycol 3350 oral powder for reconstitution  senna oral tablet  sodium polystyrene sulfonate  tamsulosin 0.4 mg oral capsule    MEDICATIONS:  MEDICATIONS  (STANDING):  ALBUTerol/ipratropium for Nebulization 3 milliLiter(s) Nebulizer every 4 hours  artificial  tears Solution 1 Drop(s) Both EYES three times a day  ascorbic acid 500 milliGRAM(s) Oral daily  atorvastatin 20 milliGRAM(s) Oral at bedtime  buDESOnide 160 MICROgram(s)/formoterol 4.5 MICROgram(s) Inhaler 2 Puff(s) Inhalation two times a day  chlorhexidine 4% Liquid 1 Application(s) Topical <User Schedule>  cholecalciferol 1000 Unit(s) Oral daily  dextrose 5%. 1000 milliLiter(s) (50 mL/Hr) IV Continuous <Continuous>  dextrose 50% Injectable 12.5 Gram(s) IV Push once  dextrose 50% Injectable 25 Gram(s) IV Push once  dextrose 50% Injectable 25 Gram(s) IV Push once  docusate sodium 100 milliGRAM(s) Oral three times a day  finasteride 5 milliGRAM(s) Oral daily  furosemide    Tablet 40 milliGRAM(s) Oral daily  heparin  Injectable 5000 Unit(s) SubCutaneous every 8 hours  insulin glargine Injectable (LANTUS) 40 Unit(s) SubCutaneous at bedtime  insulin lispro (HumaLOG) corrective regimen sliding scale   SubCutaneous three times a day before meals  insulin lispro Injectable (HumaLOG) 20 Unit(s) SubCutaneous before breakfast  insulin lispro Injectable (HumaLOG) 20 Unit(s) SubCutaneous before lunch  insulin lispro Injectable (HumaLOG) 20 Unit(s) SubCutaneous before dinner  magnesium oxide 400 milliGRAM(s) Oral three times a day with meals  metoprolol tartrate 25 milliGRAM(s) Oral two times a day  morphine  - Injectable 1 milliGRAM(s) IV Push once  nystatin    Suspension [id number] Unit(s) Oral every 6 hours  pantoprazole    Tablet 40 milliGRAM(s) Oral before breakfast  polyethylene glycol 3350 17 Gram(s) Oral daily  predniSONE   Tablet 60 milliGRAM(s) Oral daily  senna 2 Tablet(s) Oral at bedtime  sodium polystyrene sulfonate Suspension 15 Gram(s) Oral daily  tamsulosin 0.4 milliGRAM(s) Oral at bedtime  tiotropium 18 MICROgram(s) Capsule 1 Capsule(s) Inhalation daily    MEDICATIONS  (PRN):  acetaminophen   Tablet .. 650 milliGRAM(s) Oral every 6 hours PRN Temp greater or equal to 38C (100.4F), Mild Pain (1 - 3)  ALBUTerol/ipratropium for Nebulization 3 milliLiter(s) Nebulizer every 4 hours PRN Bronchospasm  aluminum hydroxide/magnesium hydroxide/simethicone Suspension 30 milliLiter(s) Oral every 4 hours PRN Dyspepsia  dextrose 40% Gel 15 Gram(s) Oral once PRN Blood Glucose LESS THAN 70 milliGRAM(s)/deciliter  glucagon  Injectable 1 milliGRAM(s) IntraMuscular once PRN Glucose LESS THAN 70 milligrams/deciliter  guaiFENesin/dextromethorphan  Syrup 10 milliLiter(s) Oral every 6 hours PRN Cough        VITALS:   Vital Signs Last 24 Hrs  T(C): 35.7 (26 Aug 2019 05:45), Max: 36.5 (25 Aug 2019 22:02)  T(F): 96.3 (26 Aug 2019 05:45), Max: 97.7 (25 Aug 2019 22:02)  HR: 95 (26 Aug 2019 05:45) (89 - 100)  BP: 184/86 (26 Aug 2019 05:45) (112/61 - 184/86)  BP(mean): --  RR: 20 (26 Aug 2019 05:45) (16 - 20)  SpO2: 92% (25 Aug 2019 21:42) (92% - 92%)        PHYSICAL EXAM:    GENERAL: NAD, well-developed  HEAD:  Atraumatic, Normocephalic  NECK: Supple, No JVD  CHEST/LUNG: Clear to auscultation bilaterally; No wheeze or crackes  HEART: Regular rate and rhythm; No murmurs, rubs, or gallops  ABDOMEN: Soft, Nontender, Nondistended; Bowel sounds present  EXTREMITIES:  Good peripheral Pulses, Pitting edema      LABS:                        15.3   12.58 )-----------( 397      ( 26 Aug 2019 07:55 )             52.7     08-26    146  |  99  |  72<HH>  ----------------------------<  145<H>  5.2<H>   |  38<H>  |  1.9<H>    Ca    9.5      26 Aug 2019 07:55    TPro  6.3  /  Alb  3.8  /  TBili  0.6  /  DBili  x   /  AST  10  /  ALT  24  /  AlkPhos  55  08-26    LIVER FUNCTIONS - ( 26 Aug 2019 07:55 )  Alb: 3.8 g/dL / Pro: 6.3 g/dL / ALK PHOS: 55 U/L / ALT: 24 U/L / AST: 10 U/L / GGT: x                   Culture - Blood (collected 08-21-19 @ 07:46)  Source: .Blood Blood  Preliminary Report (08-22-19 @ 20:01):    No growth to date.    Culture - Blood (collected 08-21-19 @ 07:44)  Source: .Blood Blood  Preliminary Report (08-22-19 @ 18:01):    No growth to date.            ABG & VENT SETTINGS (when applicable)        DIAGNOSTIC STUDIES:

## 2019-08-26 NOTE — PROGRESS NOTE ADULT - PROBLEM SELECTOR PLAN 1
w/hypoxia requiring NIPPV due to malfunctioning CPAP/ elevated WBC count   - pulm eval appreciated   - symptomatically improved on BiPAP, c/w highflow oxygen as needed  - off abx as per ID  - BCx negative  - b2 q4h RTC and PRN  - DVT/GI PPX (Heparin/Protonix)  -taper steroids as tolerated - switch to PO in am w/hypoxia requiring NIPPV due to malfunctioning CPAP/ elevated WBC count   Acute on chronic respiratory failure  - pulm eval appreciated   - symptomatically improved on BiPAP, c/w highflow oxygen as needed  - off abx as per ID  - BCx negative  - b2 q4h RTC and PRN  - DVT/GI PPX (Heparin/Protonix)  -taper steroids as tolerated - switch to PO in am

## 2019-08-27 ENCOUNTER — TRANSCRIPTION ENCOUNTER (OUTPATIENT)
Age: 79
End: 2019-08-27

## 2019-08-27 LAB
ALBUMIN SERPL ELPH-MCNC: 3.9 G/DL — SIGNIFICANT CHANGE UP (ref 3.5–5.2)
ALP SERPL-CCNC: 59 U/L — SIGNIFICANT CHANGE UP (ref 30–115)
ALT FLD-CCNC: 27 U/L — SIGNIFICANT CHANGE UP (ref 0–41)
ANION GAP SERPL CALC-SCNC: 11 MMOL/L — SIGNIFICANT CHANGE UP (ref 7–14)
AST SERPL-CCNC: 12 U/L — SIGNIFICANT CHANGE UP (ref 0–41)
BILIRUB SERPL-MCNC: 0.5 MG/DL — SIGNIFICANT CHANGE UP (ref 0.2–1.2)
BUN SERPL-MCNC: 75 MG/DL — CRITICAL HIGH (ref 10–20)
CALCIUM SERPL-MCNC: 9.4 MG/DL — SIGNIFICANT CHANGE UP (ref 8.5–10.1)
CHLORIDE SERPL-SCNC: 98 MMOL/L — SIGNIFICANT CHANGE UP (ref 98–110)
CO2 SERPL-SCNC: 38 MMOL/L — HIGH (ref 17–32)
CREAT SERPL-MCNC: 1.8 MG/DL — HIGH (ref 0.7–1.5)
GLUCOSE BLDC GLUCOMTR-MCNC: 207 MG/DL — HIGH (ref 70–99)
GLUCOSE BLDC GLUCOMTR-MCNC: 227 MG/DL — HIGH (ref 70–99)
GLUCOSE BLDC GLUCOMTR-MCNC: 263 MG/DL — HIGH (ref 70–99)
GLUCOSE BLDC GLUCOMTR-MCNC: 277 MG/DL — HIGH (ref 70–99)
GLUCOSE SERPL-MCNC: 204 MG/DL — HIGH (ref 70–99)
HCT VFR BLD CALC: 53.5 % — HIGH (ref 42–52)
HGB BLD-MCNC: 15.4 G/DL — SIGNIFICANT CHANGE UP (ref 14–18)
MCHC RBC-ENTMCNC: 23 PG — LOW (ref 27–31)
MCHC RBC-ENTMCNC: 28.8 G/DL — LOW (ref 32–37)
MCV RBC AUTO: 80 FL — SIGNIFICANT CHANGE UP (ref 80–94)
NRBC # BLD: 0 /100 WBCS — SIGNIFICANT CHANGE UP (ref 0–0)
PLATELET # BLD AUTO: 433 K/UL — HIGH (ref 130–400)
POTASSIUM SERPL-MCNC: 5.5 MMOL/L — HIGH (ref 3.5–5)
POTASSIUM SERPL-SCNC: 5.5 MMOL/L — HIGH (ref 3.5–5)
PROT SERPL-MCNC: 6.6 G/DL — SIGNIFICANT CHANGE UP (ref 6–8)
RBC # BLD: 6.69 M/UL — HIGH (ref 4.7–6.1)
RBC # FLD: 20.6 % — HIGH (ref 11.5–14.5)
SODIUM SERPL-SCNC: 147 MMOL/L — HIGH (ref 135–146)
WBC # BLD: 13.5 K/UL — HIGH (ref 4.8–10.8)
WBC # FLD AUTO: 13.5 K/UL — HIGH (ref 4.8–10.8)

## 2019-08-27 PROCEDURE — 99233 SBSQ HOSP IP/OBS HIGH 50: CPT

## 2019-08-27 RX ORDER — DIPHENHYDRAMINE HCL 50 MG
25 CAPSULE ORAL EVERY 4 HOURS
Refills: 0 | Status: COMPLETED | OUTPATIENT
Start: 2019-08-27 | End: 2019-08-28

## 2019-08-27 RX ORDER — ACETAZOLAMIDE 250 MG/1
250 TABLET ORAL EVERY 12 HOURS
Refills: 0 | Status: COMPLETED | OUTPATIENT
Start: 2019-08-27 | End: 2019-08-28

## 2019-08-27 RX ADMIN — HEPARIN SODIUM 5000 UNIT(S): 5000 INJECTION INTRAVENOUS; SUBCUTANEOUS at 06:34

## 2019-08-27 RX ADMIN — Medication 20 UNIT(S): at 16:29

## 2019-08-27 RX ADMIN — Medication 500000 UNIT(S): at 11:48

## 2019-08-27 RX ADMIN — HEPARIN SODIUM 5000 UNIT(S): 5000 INJECTION INTRAVENOUS; SUBCUTANEOUS at 21:59

## 2019-08-27 RX ADMIN — Medication 1 DROP(S): at 21:59

## 2019-08-27 RX ADMIN — MAGNESIUM OXIDE 400 MG ORAL TABLET 400 MILLIGRAM(S): 241.3 TABLET ORAL at 11:47

## 2019-08-27 RX ADMIN — Medication 1 DROP(S): at 06:34

## 2019-08-27 RX ADMIN — INSULIN GLARGINE 40 UNIT(S): 100 INJECTION, SOLUTION SUBCUTANEOUS at 21:59

## 2019-08-27 RX ADMIN — Medication 4: at 07:53

## 2019-08-27 RX ADMIN — BUDESONIDE AND FORMOTEROL FUMARATE DIHYDRATE 2 PUFF(S): 160; 4.5 AEROSOL RESPIRATORY (INHALATION) at 20:18

## 2019-08-27 RX ADMIN — ACETAZOLAMIDE 250 MILLIGRAM(S): 250 TABLET ORAL at 18:04

## 2019-08-27 RX ADMIN — FINASTERIDE 5 MILLIGRAM(S): 5 TABLET, FILM COATED ORAL at 11:47

## 2019-08-27 RX ADMIN — Medication 3 MILLILITER(S): at 17:07

## 2019-08-27 RX ADMIN — Medication 1000 UNIT(S): at 11:47

## 2019-08-27 RX ADMIN — POLYETHYLENE GLYCOL 3350 17 GRAM(S): 17 POWDER, FOR SOLUTION ORAL at 11:49

## 2019-08-27 RX ADMIN — Medication 3 MILLILITER(S): at 08:02

## 2019-08-27 RX ADMIN — HEPARIN SODIUM 5000 UNIT(S): 5000 INJECTION INTRAVENOUS; SUBCUTANEOUS at 13:27

## 2019-08-27 RX ADMIN — Medication 25 MILLIGRAM(S): at 18:04

## 2019-08-27 RX ADMIN — MAGNESIUM OXIDE 400 MG ORAL TABLET 400 MILLIGRAM(S): 241.3 TABLET ORAL at 07:52

## 2019-08-27 RX ADMIN — SENNA PLUS 2 TABLET(S): 8.6 TABLET ORAL at 21:59

## 2019-08-27 RX ADMIN — Medication 25 MILLIGRAM(S): at 03:10

## 2019-08-27 RX ADMIN — TIOTROPIUM BROMIDE 1 CAPSULE(S): 18 CAPSULE ORAL; RESPIRATORY (INHALATION) at 07:52

## 2019-08-27 RX ADMIN — ATORVASTATIN CALCIUM 20 MILLIGRAM(S): 80 TABLET, FILM COATED ORAL at 21:59

## 2019-08-27 RX ADMIN — Medication 500000 UNIT(S): at 18:03

## 2019-08-27 RX ADMIN — SODIUM POLYSTYRENE SULFONATE 15 GRAM(S): 4.1 POWDER, FOR SUSPENSION ORAL at 13:41

## 2019-08-27 RX ADMIN — Medication 60 MILLIGRAM(S): at 06:28

## 2019-08-27 RX ADMIN — MAGNESIUM OXIDE 400 MG ORAL TABLET 400 MILLIGRAM(S): 241.3 TABLET ORAL at 18:03

## 2019-08-27 RX ADMIN — Medication 500 MILLIGRAM(S): at 11:47

## 2019-08-27 RX ADMIN — Medication 4: at 16:29

## 2019-08-27 RX ADMIN — Medication 40 MILLIGRAM(S): at 06:33

## 2019-08-27 RX ADMIN — Medication 100 MILLIGRAM(S): at 21:59

## 2019-08-27 RX ADMIN — PANTOPRAZOLE SODIUM 40 MILLIGRAM(S): 20 TABLET, DELAYED RELEASE ORAL at 06:33

## 2019-08-27 RX ADMIN — Medication 25 MILLIGRAM(S): at 06:33

## 2019-08-27 RX ADMIN — Medication 20 UNIT(S): at 07:54

## 2019-08-27 RX ADMIN — BUDESONIDE AND FORMOTEROL FUMARATE DIHYDRATE 2 PUFF(S): 160; 4.5 AEROSOL RESPIRATORY (INHALATION) at 07:55

## 2019-08-27 RX ADMIN — Medication 3 MILLILITER(S): at 14:16

## 2019-08-27 RX ADMIN — Medication 1 DROP(S): at 13:42

## 2019-08-27 RX ADMIN — TAMSULOSIN HYDROCHLORIDE 0.4 MILLIGRAM(S): 0.4 CAPSULE ORAL at 21:59

## 2019-08-27 RX ADMIN — Medication 100 MILLIGRAM(S): at 13:27

## 2019-08-27 RX ADMIN — Medication 3 MILLILITER(S): at 20:17

## 2019-08-27 RX ADMIN — Medication 100 MILLIGRAM(S): at 06:28

## 2019-08-27 RX ADMIN — Medication 20 UNIT(S): at 11:49

## 2019-08-27 RX ADMIN — Medication 500000 UNIT(S): at 06:34

## 2019-08-27 RX ADMIN — Medication 6: at 11:49

## 2019-08-27 NOTE — PROGRESS NOTE ADULT - PROBLEM SELECTOR PLAN 2
off abx as per ID

## 2019-08-27 NOTE — DISCHARGE NOTE PROVIDER - NSDCCPCAREPLAN_GEN_ALL_CORE_FT
PRINCIPAL DISCHARGE DIAGNOSIS  Diagnosis: SOB (shortness of breath)  Assessment and Plan of Treatment: take medication as prescribed  follow up with pulmonary and your PMD  measure your weight daily  measure intake and outpt  restrict your fluid to 1.5 liters per day PRINCIPAL DISCHARGE DIAGNOSIS  Diagnosis: Acute on chronic respiratory failure with hypoxia  Assessment and Plan of Treatment: Patient's respiratory failure has improved clinically although requiring HFNC O2 now and he is instructed to use AVAP machine at home as needed day time and at night. He is to complete po prednisone taper and to follow up with pulm as OP. For CHF, take lasix as instructed , fluid restriction to 1.5L/day, daily weights.      SECONDARY DISCHARGE DIAGNOSES  Diagnosis: CKD (chronic kidney disease)  Assessment and Plan of Treatment: ckd stage 3, continue lasix as instructed. kidney function is stable now. fu nephro as per PMD referral .

## 2019-08-27 NOTE — PROGRESS NOTE ADULT - PROBLEM SELECTOR PLAN 1
w/hypoxia requiring NIPPV due to malfunctioning CPAP/ elevated WBC count   Acute on chronic respiratory failure  - pulm eval appreciated   - symptomatically improved on BiPAP, c/w highflow oxygen as needed  - off abx as per ID  - BCx negative  - b2 q4h RTC and PRN  - DVT/GI PPX (Heparin/Protonix)  -taper steroids as tolerated - switched to PO prednisone today   -ambulate

## 2019-08-27 NOTE — PHYSICAL THERAPY INITIAL EVALUATION ADULT - GAIT DEVIATIONS NOTED, PT EVAL
decreased matthew/increased time in double stance/decreased weight-shifting ability/decreased step length

## 2019-08-27 NOTE — PROGRESS NOTE ADULT - PROBLEM SELECTOR PROBLEM 2
Opacity of lung on imaging study

## 2019-08-27 NOTE — PROGRESS NOTE ADULT - PROBLEM SELECTOR PLAN 9
diet and lifestyle modification

## 2019-08-27 NOTE — PROGRESS NOTE ADULT - PROBLEM SELECTOR PROBLEM 9
Obesity, morbid, BMI 40.0-49.9

## 2019-08-27 NOTE — DISCHARGE NOTE PROVIDER - CARE PROVIDER_API CALL
Irena Cohen)  Geriatric Medicine; Internal Medicine  [address] [place] [address]  Phone: [phone]  Fax: [phone]  Follow Up Time: 1-3 days Irena Cohen)  Geriatric Medicine; Internal Medicine  [address] [place] [address]  Phone: [phone]  Fax: [phone]  Follow Up Time: 1-3 days    Raul Castrejon)  Internal Medicine; Pulmonary Disease  [address] [facility] [address]  [place] [address]  Phone: [phone]  Fax: [phone]  Follow Up Time: 1 week    Luciano Ralph)  Cardiovascular Disease; Internal Medicine  [address] [place] [address]  Phone: [phone]  Fax: [phone]  Follow Up Time: 2 weeks

## 2019-08-27 NOTE — DISCHARGE NOTE PROVIDER - PROVIDER TOKENS
PROVIDER:[TOKEN:[[id number]],FOLLOWUP:[1-3 days]] PROVIDER:[TOKEN:[[id number]],FOLLOWUP:[1-3 days]],PROVIDER:[TOKEN:[[id number]],FOLLOWUP:[1 week]],PROVIDER:[TOKEN:[[id number]],FOLLOWUP:[2 weeks]]

## 2019-08-27 NOTE — PROGRESS NOTE ADULT - PROBLEM SELECTOR PLAN 8
w/BPH and no retention  - at baseline  - c/w Flomax/Finasteride  - monitor urine output

## 2019-08-27 NOTE — PROGRESS NOTE ADULT - SUBJECTIVE AND OBJECTIVE BOX
Nephrology progress note    Patient is seen and examined, events over the last 24 h noted .  On AVAP, SOB at baseline  Allergies:  aspirin (Other)  fish (Other)  iodine (Hives)  penicillin (Unknown)  shellfish (Other)    Hospital Medications:   MEDICATIONS  (STANDING):  ALBUTerol/ipratropium for Nebulization 3 milliLiter(s) Nebulizer every 4 hours  artificial  tears Solution 1 Drop(s) Both EYES three times a day  ascorbic acid 500 milliGRAM(s) Oral daily  atorvastatin 20 milliGRAM(s) Oral at bedtime  buDESOnide 160 MICROgram(s)/formoterol 4.5 MICROgram(s) Inhaler 2 Puff(s) Inhalation two times a day  chlorhexidine 4% Liquid 1 Application(s) Topical <User Schedule>  cholecalciferol 1000 Unit(s) Oral daily  dextrose 5%. 1000 milliLiter(s) (50 mL/Hr) IV Continuous <Continuous>  dextrose 50% Injectable 12.5 Gram(s) IV Push once  dextrose 50% Injectable 25 Gram(s) IV Push once  dextrose 50% Injectable 25 Gram(s) IV Push once  docusate sodium 100 milliGRAM(s) Oral three times a day  finasteride 5 milliGRAM(s) Oral daily  furosemide    Tablet 40 milliGRAM(s) Oral daily  heparin  Injectable 5000 Unit(s) SubCutaneous every 8 hours  insulin glargine Injectable (LANTUS) 40 Unit(s) SubCutaneous at bedtime  insulin lispro (HumaLOG) corrective regimen sliding scale   SubCutaneous three times a day before meals  insulin lispro Injectable (HumaLOG) 20 Unit(s) SubCutaneous before breakfast  insulin lispro Injectable (HumaLOG) 20 Unit(s) SubCutaneous before lunch  insulin lispro Injectable (HumaLOG) 20 Unit(s) SubCutaneous before dinner  magnesium oxide 400 milliGRAM(s) Oral three times a day with meals  metoprolol tartrate 25 milliGRAM(s) Oral two times a day  morphine  - Injectable 1 milliGRAM(s) IV Push once  nystatin    Suspension 545473 Unit(s) Oral every 6 hours  pantoprazole    Tablet 40 milliGRAM(s) Oral before breakfast  polyethylene glycol 3350 17 Gram(s) Oral daily  predniSONE   Tablet 60 milliGRAM(s) Oral daily  senna 2 Tablet(s) Oral at bedtime  sodium polystyrene sulfonate Suspension 15 Gram(s) Oral daily  tamsulosin 0.4 milliGRAM(s) Oral at bedtime  tiotropium 18 MICROgram(s) Capsule 1 Capsule(s) Inhalation daily        VITALS:  T(F): 97 (19 @ 22:23), Max: 98.2 (19 @ 14:44)  HR: 104 (19 @ 06:29)  BP: 139/93 (19 @ 06:29)  RR: 16 (19 @ 06:29)  SpO2: 92% (19 @ 03:47)  Wt(kg): --     @ 07:  -   @ 07:00  --------------------------------------------------------  IN: 0 mL / OUT: 950 mL / NET: -950 mL     @ 07:  -   @ 07:00  --------------------------------------------------------  IN: 0 mL / OUT: 1500 mL / NET: -1500 mL     @ 07:01  -   @ 06:44  --------------------------------------------------------  IN: 720 mL / OUT: 1500 mL / NET: -780 mL          PHYSICAL EXAM:  Constitutional: NAD, obese  HEENT: anicteric sclera, MMM  Neck: No JVD  Respiratory: CTA, decreased at bases  Cardiovascular: S1, S2, RRR  Gastrointestinal: BS+, soft, NT/ND  Extremities: + peripheral edema  Neurological: A/O x 3  : No CVA tenderness. No chapman.   Skin: No rashes  Vascular Access:    LABS:      146  |  99  |  72<HH>  ----------------------------<  145<H>  5.2<H>   |  38<H>  |  1.9<H>  Potassium Trend: 5.2<--, 5.5<--, 5.6<--, 6.2<--, 5.9<--    Ca    9.5      26 Aug 2019 07:55    TPro  6.3  /  Alb  3.8  /  TBili  0.6  /  DBili      /  AST  10  /  ALT  24  /  AlkPhos  55                            15.3   12.58 )-----------( 397      ( 26 Aug 2019 07:55 )             52.7       Urine Studies:  Urinalysis Basic - ( 21 Aug 2019 13:03 )    Color: Yellow / Appearance: Clear / S.020 / pH:   Gluc:  / Ketone: Trace  / Bili: Negative / Urobili: 0.2 mg/dL   Blood:  / Protein: Trace mg/dL / Nitrite: Negative   Leuk Esterase: Negative / RBC: Negative / WBC 1-2 /HPF   Sq Epi:  / Non Sq Epi: Occasional /HPF / Bacteria:       Sodium, Random Urine: 109.0 mmoL/L ( @ 16:48)  Potassium, Random Urine: 14 mmol/L ( @ 16:48)  Chloride, Random Urine: 97 ( @ 16:48)    RADIOLOGY & ADDITIONAL STUDIES:

## 2019-08-27 NOTE — PROGRESS NOTE ADULT - PROBLEM SELECTOR PLAN 6
..  - change Prednisone taper to Solumedrol 60mg IVP q8h while in house  - c/w Triple therapy (Symbicort/Spiriva)  - Pulmonology c/s appreciated  - bronchodilators
..  - on Solumedrol IV  - c/w Triple therapy (Symbicort/Spiriva)  - Pulmonology c/s appreciated  - bronchodilators
..  - on Solumedrol IV  - c/w Triple therapy (Symbicort/Spiriva)  - Pulmonology c/s appreciated  - bronchodilators
..  - taper steroids as tolerated - placed on PO today  - c/w Triple therapy (Symbicort/Spiriva)  - Pulmonology c/s appreciated  - bronchodilators
..  - taper steroids as tolerated - placed on PO today  - c/w Triple therapy (Symbicort/Spiriva)  - Pulmonology c/s appreciated  - bronchodilators
..  - change Prednisone taper to Solumedrol 60mg IVP q8h while in house  - c/w Triple therapy (Symbicort/Spiriva)  - Pulmonology c/s appreciated  - bronchodilators

## 2019-08-27 NOTE — PROGRESS NOTE ADULT - PROBLEM SELECTOR PLAN 4
w/Hx of Hyperkalemia  - at baseline renal function  - c/w current supplementation, Kayexalate PRN  - Daily BMP/Mg  - avoid nephrotoxins  - renal c/s appreciated  -renal sono - no hydro  -renal diet  -dietary eval pending    Metabolic alkalosis  -hold lasix tomorrow  -started on diamox    metabolic alkalosis  -repeat BMP in am - may need diamox

## 2019-08-27 NOTE — PROGRESS NOTE ADULT - PROBLEM SELECTOR PLAN 3
..  - Tt 0.03 in ED (baseline from previous admissions), no complaints of CP/Palpitations   - No need to Trend given Hx of CKD 3/CHF and no acute symptoms

## 2019-08-27 NOTE — PHYSICAL THERAPY INITIAL EVALUATION ADULT - IMPAIRMENTS FOUND, PT EVAL
muscle strength/posture/ergonomics and body mechanics/gait, locomotion, and balance/aerobic capacity/endurance

## 2019-08-27 NOTE — PHYSICAL THERAPY INITIAL EVALUATION ADULT - GENERAL OBSERVATIONS, REHAB EVAL
9:00 - 9:40. Chart reviewed. Patient available to be seen for physical therapy, confirmed with nurse. Patient encountered already out of bed in chair, +high-flow O2 via nasal cannula. Patient says his "legs feel weak," but is agreeable for PT now.

## 2019-08-27 NOTE — DISCHARGE NOTE PROVIDER - CARE PROVIDERS DIRECT ADDRESSES
,[email or web address],[email or web address]

## 2019-08-27 NOTE — PROGRESS NOTE ADULT - ASSESSMENT
79 yo M with PMHx of HTN, COPD on home O2 4-5L, Afib on Eliquis, HLD, CHF and DM presents to the ED c/o SOB.   Pt was admitted for COPD exacerbation.  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   - consider Diamox 250 q 12 po x 3 doses, if pH>7.5, repeat ABG  -pulm f/u
78M with extensive PMHX and multiple comorbidities, with multiple recent hospitalizations presents to the ED with hypoxia and admitted to medicine for further management
78M with extensive PMHX and multiple comorbidities, with multiple recent hospitalizations presents to the ED with hypoxia and admitted to medicine for further management
77 yo M with PMHx of HTN, COPD on home O2 4-5L, Afib on Eliquis, HLD, CHF and DM presents to the ED c/o SOB due to  COPD exacerbation.    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   - consider Diamox 250 q 12 po x 3 doses, if pH>7.5, repeat ABG  - HOLD LAsix x1  - repeat CXR to assess volume  - pulm f/u      HyperKemia and CKD stable  Cont current tx  will sign off  Call as needed
78M with extensive PMHX and multiple comorbidities, with multiple recent hospitalizations presents to the ED with hypoxia and admitted to medicine for further management
Impression:  Acute chronic COPD with exacerbation due to CPAP failure  No Impending respiratory failure currently  no clinical signs of pneumonia   high WBC may be due to steroids      Check O2 sat on [place], record, continue O2 as necessary to maintain sats > 90%  Continue IV solumedrol   bronchodilator treatments ATC and PRN  high flow O2  NIPPV as needed  OOB to chair  GI and DVT prophylaxis  pulmonary toilet  Monitor clinically, convert to oral prednisone as clinical improvement allows  Upon D/C the patient will need ICS/LABA, PRN albuterol, finish abx, slow taper of steroids ie. start Prednisone 40 mg and taper 10 mg weekly.  Pt to see Dr. Castrejon in the office in approx. 2 weeks    The patient needs social service evaluation for possible AVAPS at home v. new CPAP unit.
Impression:  Acute chronic COPD with exacerbation due to CPAP failure  No Impending respiratory failure currently  no clinical signs of pneumonia   high WBC may be due to steroids      Check O2 sat on [place], record, continue O2 as necessary to maintain sats > 90%  taper steroids   bronchodilator treatments ATC and PRN  high flow O2  NIPPV as needed  OOB to chair  GI and DVT prophylaxis  pulmonary toilet  Monitor clinically, convert to oral prednisone as clinical improvement allows  Upon D/C the patient will need ICS/LABA, PRN albuterol, finish abx, slow taper of steroids ie. start Prednisone 40 mg and taper 10 mg weekly.  Pt to see Dr. Castrejon in the office in approx. 2 weeks    The patient is a candidate fo AVAPS at home given chronic hypercapnea >55 and multiple admissions for respiratory failure.
78M with extensive PMHX and multiple comorbidities, with multiple recent hospitalizations presents to the ED with hypoxia and admitted to medicine for further management

## 2019-08-27 NOTE — PROGRESS NOTE ADULT - PROBLEM SELECTOR PLAN 5
..  - at baseline without acute exacerbation - of note: pt did not take the recommended increased dose of lasix after his last hospitalization  - c/w Lasix 40mg PO QD  - c/w BB  - Weights QD  - Daily I/O's  - low Na+ diet
..  - at baseline without acute exacerbation - of note: pt did not take the recommended increased dose of lasix after his last hospitalization  - c/w Lasix 40mg PO QD  - c/w BB  - Weights QD  - Daily I/O's  - low Na+ diet
(DIASTOLIC)     at baseline without acute exacerbation - of note: pt did not take the recommended increased dose of lasix after his last hospitalization  - c/w Lasix 40mg PO QD  - c/w BB  - Weights QD  - Daily I/O's  - low Na+ diet
(DIASTOLIC)     at baseline without acute exacerbation - of note: pt did not take the recommended increased dose of lasix after his last hospitalization  - c/w Lasix 40mg PO QD  - c/w BB  - Weights QD  - Daily I/O's  - low Na+ diet
..  - at baseline without acute exacerbation - of note: pt did not take the recommended increased dose of lasix after his last hospitalization  - c/w Lasix 40mg PO QD  - c/w BB  - Weights QD  - Daily I/O's  - low Na+ diet
..  - at baseline without acute exacerbation - of note: pt did not take the recommended increased dose of lasix after his last hospitalization  - c/w Lasix 40mg PO QD  - c/w BB  - Weights QD  - Daily I/O's  - low Na+ diet

## 2019-08-27 NOTE — PROGRESS NOTE ADULT - PROBLEM SELECTOR PLAN 7
RX refill request sent via e-scribe from [facility]'s pharmacy on [address]. Requesting a refill on Levothyroxine 125 mcg tablet. QTY 90 allowed no refills. Next appointment scheduled on 4/18/19  
insulin dependent  - Lantus 30u SQ QHS  - Humalog 20u SQ ac TID  - F/S AC and HS w/ss coverage  - carb consistent diet
insulin dependent  - uncontrolled  -insulin increased  - F/S AC and HS w/ss coverage  - carb consistent diet
insulin dependent  - uncontrolled  -insulin increased  - F/S AC and HS w/ss coverage  - carb consistent diet
insulin dependent  -better controlled  -insulin increased  - F/S AC and HS w/ss coverage  - carb consistent diet
insulin dependent  -better controlled  -insulin increased  - F/S AC and HS w/ss coverage  - carb consistent diet
insulin dependent  - Lantus 30u SQ QHS  - Humalog 20u SQ ac TID  - F/S AC and HS w/ss coverage  - carb consistent diet

## 2019-08-27 NOTE — DISCHARGE NOTE PROVIDER - HOSPITAL COURSE
Patient presents to ED with complaints of hypoxia several hours prior to admission.  Patient reports he was experiencing a problem with his CPAP last night and it was not properly titrating.  He reports that approx 2200 he went to the bathroom to wash up and was unable to get himself out of it.  At that point his HHA brought in the CPAP and EMS was called.  After a few minutes on the CPAP that patient improved.  EMS came and checked the patient, but he reported he felt better and left.  He went back to bed and at approx 0000 noticed his CPAP wasn't functioning properly and he became SOB with hypoxia approx 78%.  He called his son who came to the house, and despite supplemental o2 was unable to increase his oxygenation.  EMS was activated again at approx 0400 and the patient was brought to the ED.  He was placed on BiPAP with improvement to symptoms and increased SpO2.  He was found to have an elevated white count and poss RLL opacity on admission CXR and admitted for further management.  Patient seen and examined at bedside, reports he was experiencing some SOB x 2-3 days PTA with INFANTE and increased o2 demands of up to 6 l/m.  He reports his cough was at baseline except for 2 days ago when he had an increase with scant white production.  He denies any fever/chills.  No CP/Palpitations.  No abdominal complaints including N/V/D or pain.  No urinary complaints. Patient presents to ED with complaints of hypoxia several hours prior to admission.  Patient reports he was experiencing a problem with his CPAP last night and it was not properly titrating.  He reports that approx 2200 he went to the bathroom to wash up and was unable to get himself out of it.  At that point his HHA brought in the CPAP and EMS was called.  After a few minutes on the CPAP that patient improved.  EMS came and checked the patient, but he reported he felt better and left.  He went back to bed and at approx 0000 noticed his CPAP wasn't functioning properly and he became SOB with hypoxia approx 78%.  He called his son who came to the house, and despite supplemental o2 was unable to increase his oxygenation.  EMS was activated again at approx 0400 and the patient was brought to the ED.  He was placed on BiPAP with improvement to symptoms and increased SpO2.  He was found to have an elevated white count and poss RLL opacity on admission CXR and admitted for further management.  Patient seen and examined at bedside, reports he was experiencing some SOB x 2-3 days PTA with INFANTE and increased o2 demands of up to 6 l/m.  He reports his cough was at baseline except for 2 days ago when he had an increase with scant white production.  He denies any fever/chills.  No CP/Palpitations.  No abdominal complaints including N/V/D or pain.  No urinary complaints.          Patient's respiratory failure has improved clinically although requiring HFNC O2 now and he is instructed to use AVAP machine at home as needed day time and at night. He is to complete po prednisone taper and to follow up with pulm as OP.         He has CKD, kidney function is stable, was evaluated by nephro , instructed to use kahexylate as instructed and follow up nephro as OP.         clinically stable for discharge home.

## 2019-08-27 NOTE — PROGRESS NOTE ADULT - PROBLEM SELECTOR PROBLEM 3
Elevated troponin

## 2019-08-27 NOTE — DISCHARGE NOTE PROVIDER - NSDCFUADDINST_GEN_ALL_CORE_FT
Heart failure (HF) is a condition that does not allow your heart to fill or pump properly. Not enough oxygen in your blood gets to your organs and tissues. HF can occur in the right side, the left side, or both lower chambers of your heart. HF is often caused by damage or injury to your heart. The damage may be caused by heart attack, other heart conditions, or high blood pressure. HF is a long-term condition that tends to get worse over time. It is important to manage your health to improve your quality of life. HF can be worsened by heavy alcohol use, smoking, diabetes that is not controlled, or obesity.    Call 911 for:  Squeezing, pressure, or pain in your chest that lasts longer than 5 minutes or returns  Discomfort or pain in your back, neck, jaw, stomach, or arm  Trouble breathing  Nausea or vomiting  Lightheadedness or a sudden cold sweat, especially with chest pain or trouble breathing.    Seek care immediately if:  You gain 3 or more pounds (1.4 kg) in a day  Your heartbeat is fast, slow, or uneven all the time.    Do not smoke. Nicotine and other chemicals in cigarettes and cigars can cause lung damage and make HF difficult to manage.   Do not drink alcohol or take illegal drugs. . Weigh yourself every morning. Use the same scale, in the same spot. Do this after you use the bathroom, but before you eat or drink anything. Record your weight each day so you will notice any sudden weight gain. Swelling and weight gain are signs of fluid retention.Manage any chronic health conditions you have. These include high blood pressure, diabetes, obesity, high cholesterol, metabolic syndrome, and COPD. Stay active. If you are not active, your symptoms are likely to worsen quickly. Get a flu shot every year. You may also need the pneumonia vaccine. The flu and pneumonia can be severe for a person who has HF.

## 2019-08-27 NOTE — PROGRESS NOTE ADULT - SUBJECTIVE AND OBJECTIVE BOX
JIAN MANCIA  78y  Male      Patient is a 78y old  Male who presents with a chief complaint of Hypoxia x 1 day (22 Aug 2019 09:47)      INTERVAL HPI/OVERNIGHT EVENTS:  Patient seen and examined earlier this morning.  Sitting comfortably in chair on highflow oxygen.  In NAD.  Appears comfortable.  Denies SOB.  Ambulated with PT today  As per home care - CPAP will be evaluated when the pt arrives home upon d/c      REVIEW OF SYSTEMS:  CONSTITUTIONAL: No fever, weight loss, or fatigue  EYES: No eye pain, visual disturbances, or discharge  ENMT:  No difficulty hearing, tinnitus, vertigo; No sinus or throat pain  NECK: No pain or stiffness  RESPIRATORY: No cough, wheezing, chills or hemoptysis; No shortness of breath  CARDIOVASCULAR: No chest pain, palpitations, dizziness, or leg swelling  GASTROINTESTINAL: No abdominal or epigastric pain. No nausea, vomiting, or hematemesis; No diarrhea or constipation. No melena or hematochezia.  GENITOURINARY: No dysuria, frequency, hematuria, or incontinence  NEUROLOGICAL: No headaches, memory loss, loss of strength, numbness, or tremors  SKIN: No itching, burning, rashes, or lesions   LYMPH NODES: No enlarged glands  ENDOCRINE: No heat or cold intolerance; No hair loss  MUSCULOSKELETAL: No joint pain or swelling; No muscle, back, or extremity pain  PSYCHIATRIC: No depression, anxiety, mood swings, or difficulty sleeping  HEME/LYMPH: No easy bruising, or bleeding gums  ALLERY AND IMMUNOLOGIC: No hives or eczema    Vital Signs Last 24 Hrs  T(C): 36.7 (27 Aug 2019 14:35), Max: 36.7 (27 Aug 2019 06:29)  T(F): 98.1 (27 Aug 2019 14:35), Max: 98.1 (27 Aug 2019 14:35)  HR: 101 (27 Aug 2019 14:35) (97 - 109)  BP: 143/86 (27 Aug 2019 14:35) (139/93 - 155/84)  BP(mean): --  RR: 16 (27 Aug 2019 06:29) (16 - 16)  SpO2: 93% (27 Aug 2019 08:47) (92% - 93%) on high flow     PHYSICAL EXAM:  GENERAL: NAD, well-groomed, well-developed  HEAD:  Atraumatic, Normocephalic  EYES: EOMI, PERRLA, conjunctiva and sclera clear  ENMT: No tonsillar erythema, exudates, or enlargement; Moist mucous membranes, Good dentition, No lesions  NECK: Supple, No JVD, Normal thyroid  NERVOUS SYSTEM:  Alert & Oriented X3, Good concentration; Motor Strength 5/5 B/L upper and lower extremities; DTRs 2+ intact and symmetric  CHEST/LUNG: decreased breath sounds b/l; no wheezing  HEART: Regular rate and rhythm; No murmurs, rubs, or gallops  ABDOMEN: Soft, Nontender, Nondistended; Bowel sounds present, obese  EXTREMITIES:  2+ Peripheral Pulses, No clubbing, cyanosis, +2 edema  LYMPH: No lymphadenopathy noted  SKIN: No rashes or lesions    Consultant(s) Notes Reviewed:  [x ] YES  [ ] NO  Care Discussed with Consultants/Other Providers [ x] YES  [ ] NO    LAB:                                   15.4   13.50 )-----------( 433      ( 27 Aug 2019 06:49 )             53.5         147<H>  |  98  |  75<HH>  ----------------------------<  204<H>  5.5<H>   |  38<H>  |  1.8<H>    Ca    9.4      27 Aug 2019 06:49    TPro  6.6  /  Alb  3.9  /  TBili  0.5  /  DBili  x   /  AST  12  /  ALT  27  /  AlkPhos  59        Drug Dosing Weight  Height (cm): 172.72 (04 Aug 2019 23:00)  Weight (kg): 119.5 (05 Aug 2019 02:48)  BMI (kg/m2): 40.1 (05 Aug 2019 02:48)  BSA (m2): 2.3 (05 Aug 2019 02:48)      CAPILLARY BLOOD GLUCOSE  POCT Blood Glucose.: 277 mg/dL (27 Aug 2019 11:17)  POCT Blood Glucose.: 207 mg/dL (27 Aug 2019 07:18)  POCT Blood Glucose.: 154 mg/dL (26 Aug 2019 21:25)  POCT Blood Glucose.: 223 mg/dL (26 Aug 2019 16:13)        I&O's Summary    26 Aug 2019 07:01  -  27 Aug 2019 07:00  --------------------------------------------------------  IN: 720 mL / OUT: 1500 mL / NET: -780 mL    27 Aug 2019 07:01  -  27 Aug 2019 15:40  --------------------------------------------------------  IN: 0 mL / OUT: 600 mL / NET: -600 mL        Urinalysis Basic - ( 21 Aug 2019 13:03 )    Color: Yellow / Appearance: Clear / S.020 / pH: x  Gluc: x / Ketone: Trace  / Bili: Negative / Urobili: 0.2 mg/dL   Blood: x / Protein: Trace mg/dL / Nitrite: Negative   Leuk Esterase: Negative / RBC: Negative / WBC 1-2 /HPF   Sq Epi: x / Non Sq Epi: Occasional /HPF / Bacteria: x        RADIOLOGY & ADDITIONAL TESTS:  Imaging Personally Reviewed:  [x] YES  [ ] NO    HEALTH ISSUES - PROBLEM Dx:  Other elevated white blood cell (WBC) count: Other elevated white blood cell (WBC) count  Chronic obstructive pulmonary disease, unspecified COPD type: Chronic obstructive pulmonary disease, unspecified COPD type  Enlarged prostate: Enlarged prostate  Diabetes mellitus, type 2: Diabetes mellitus, type 2  COPD (chronic obstructive pulmonary disease): COPD (chronic obstructive pulmonary disease)  Chronic congestive heart failure: Chronic congestive heart failure  CKD (chronic kidney disease) stage 3, GFR 30-59 ml/min: CKD (chronic kidney disease) stage 3, GFR 30-59 ml/min  Elevated troponin: Elevated troponin  Opacity of lung on imaging study: Opacity of lung on imaging study  SOB (shortness of breath): SOB (shortness of breath)      MEDICATIONS  (STANDING):  acetazolamide    Tablet 250 milliGRAM(s) Oral every 12 hours  ALBUTerol/ipratropium for Nebulization 3 milliLiter(s) Nebulizer every 4 hours  artificial  tears Solution 1 Drop(s) Both EYES three times a day  ascorbic acid 500 milliGRAM(s) Oral daily  atorvastatin 20 milliGRAM(s) Oral at bedtime  buDESOnide 160 MICROgram(s)/formoterol 4.5 MICROgram(s) Inhaler 2 Puff(s) Inhalation two times a day  chlorhexidine 4% Liquid 1 Application(s) Topical <User Schedule>  cholecalciferol 1000 Unit(s) Oral daily  dextrose 5%. 1000 milliLiter(s) (50 mL/Hr) IV Continuous <Continuous>  dextrose 50% Injectable 12.5 Gram(s) IV Push once  dextrose 50% Injectable 25 Gram(s) IV Push once  dextrose 50% Injectable 25 Gram(s) IV Push once  docusate sodium 100 milliGRAM(s) Oral three times a day  finasteride 5 milliGRAM(s) Oral daily  heparin  Injectable 5000 Unit(s) SubCutaneous every 8 hours  insulin glargine Injectable (LANTUS) 40 Unit(s) SubCutaneous at bedtime  insulin lispro (HumaLOG) corrective regimen sliding scale   SubCutaneous three times a day before meals  insulin lispro Injectable (HumaLOG) 20 Unit(s) SubCutaneous before breakfast  insulin lispro Injectable (HumaLOG) 20 Unit(s) SubCutaneous before lunch  insulin lispro Injectable (HumaLOG) 20 Unit(s) SubCutaneous before dinner  magnesium oxide 400 milliGRAM(s) Oral three times a day with meals  metoprolol tartrate 25 milliGRAM(s) Oral two times a day  morphine  - Injectable 1 milliGRAM(s) IV Push once  nystatin    Suspension 355055 Unit(s) Oral every 6 hours  pantoprazole    Tablet 40 milliGRAM(s) Oral before breakfast  polyethylene glycol 3350 17 Gram(s) Oral daily  predniSONE   Tablet 60 milliGRAM(s) Oral daily  senna 2 Tablet(s) Oral at bedtime  sodium polystyrene sulfonate Suspension 15 Gram(s) Oral daily  tamsulosin 0.4 milliGRAM(s) Oral at bedtime  tiotropium 18 MICROgram(s) Capsule 1 Capsule(s) Inhalation daily    MEDICATIONS  (PRN):  acetaminophen   Tablet .. 650 milliGRAM(s) Oral every 6 hours PRN Temp greater or equal to 38C (100.4F), Mild Pain (1 - 3)  ALBUTerol/ipratropium for Nebulization 3 milliLiter(s) Nebulizer every 4 hours PRN Bronchospasm  aluminum hydroxide/magnesium hydroxide/simethicone Suspension 30 milliLiter(s) Oral every 4 hours PRN Dyspepsia  dextrose 40% Gel 15 Gram(s) Oral once PRN Blood Glucose LESS THAN 70 milliGRAM(s)/deciliter  diphenhydrAMINE 25 milliGRAM(s) Oral every 4 hours PRN Rash and/or Itching  glucagon  Injectable 1 milliGRAM(s) IntraMuscular once PRN Glucose LESS THAN 70 milligrams/deciliter  guaiFENesin/dextromethorphan  Syrup 10 milliLiter(s) Oral every 6 hours PRN Cough        Progress Note Handoff  Pending Consults: dietary, social work  Pending Tests: fanny  Pending Results: fanny  Family Discussion: discussed plan with pt - he is in agreement  Disposition: Home__x___/SNF______/Other_____/Unknown at this time_____    Please call me with any questions at extension [phone]

## 2019-08-28 LAB
ALBUMIN SERPL ELPH-MCNC: 4.1 G/DL — SIGNIFICANT CHANGE UP (ref 3.5–5.2)
ALP SERPL-CCNC: 59 U/L — SIGNIFICANT CHANGE UP (ref 30–115)
ALT FLD-CCNC: 29 U/L — SIGNIFICANT CHANGE UP (ref 0–41)
ANION GAP SERPL CALC-SCNC: 12 MMOL/L — SIGNIFICANT CHANGE UP (ref 7–14)
AST SERPL-CCNC: 14 U/L — SIGNIFICANT CHANGE UP (ref 0–41)
BILIRUB SERPL-MCNC: 0.6 MG/DL — SIGNIFICANT CHANGE UP (ref 0.2–1.2)
BUN SERPL-MCNC: 71 MG/DL — CRITICAL HIGH (ref 10–20)
CALCIUM SERPL-MCNC: 9.6 MG/DL — SIGNIFICANT CHANGE UP (ref 8.5–10.1)
CHLORIDE SERPL-SCNC: 97 MMOL/L — LOW (ref 98–110)
CO2 SERPL-SCNC: 35 MMOL/L — HIGH (ref 17–32)
CREAT SERPL-MCNC: 1.8 MG/DL — HIGH (ref 0.7–1.5)
GLUCOSE BLDC GLUCOMTR-MCNC: 160 MG/DL — HIGH (ref 70–99)
GLUCOSE BLDC GLUCOMTR-MCNC: 231 MG/DL — HIGH (ref 70–99)
GLUCOSE BLDC GLUCOMTR-MCNC: 244 MG/DL — HIGH (ref 70–99)
GLUCOSE BLDC GLUCOMTR-MCNC: 257 MG/DL — HIGH (ref 70–99)
GLUCOSE SERPL-MCNC: 265 MG/DL — HIGH (ref 70–99)
HCT VFR BLD CALC: 55 % — HIGH (ref 42–52)
HGB BLD-MCNC: 15.5 G/DL — SIGNIFICANT CHANGE UP (ref 14–18)
MAGNESIUM SERPL-MCNC: 2.6 MG/DL — HIGH (ref 1.8–2.4)
MCHC RBC-ENTMCNC: 22.9 PG — LOW (ref 27–31)
MCHC RBC-ENTMCNC: 28.2 G/DL — LOW (ref 32–37)
MCV RBC AUTO: 81.4 FL — SIGNIFICANT CHANGE UP (ref 80–94)
NRBC # BLD: 0 /100 WBCS — SIGNIFICANT CHANGE UP (ref 0–0)
PLATELET # BLD AUTO: 384 K/UL — SIGNIFICANT CHANGE UP (ref 130–400)
POTASSIUM SERPL-MCNC: 4.9 MMOL/L — SIGNIFICANT CHANGE UP (ref 3.5–5)
POTASSIUM SERPL-SCNC: 4.9 MMOL/L — SIGNIFICANT CHANGE UP (ref 3.5–5)
PROT SERPL-MCNC: 6.6 G/DL — SIGNIFICANT CHANGE UP (ref 6–8)
RBC # BLD: 6.76 M/UL — HIGH (ref 4.7–6.1)
RBC # FLD: 21 % — HIGH (ref 11.5–14.5)
SODIUM SERPL-SCNC: 144 MMOL/L — SIGNIFICANT CHANGE UP (ref 135–146)
WBC # BLD: 15.23 K/UL — HIGH (ref 4.8–10.8)
WBC # FLD AUTO: 15.23 K/UL — HIGH (ref 4.8–10.8)

## 2019-08-28 PROCEDURE — 99233 SBSQ HOSP IP/OBS HIGH 50: CPT

## 2019-08-28 PROCEDURE — 71045 X-RAY EXAM CHEST 1 VIEW: CPT | Mod: 26

## 2019-08-28 RX ORDER — FUROSEMIDE 40 MG
40 TABLET ORAL DAILY
Refills: 0 | Status: DISCONTINUED | OUTPATIENT
Start: 2019-08-28 | End: 2019-08-29

## 2019-08-28 RX ADMIN — CHLORHEXIDINE GLUCONATE 1 APPLICATION(S): 213 SOLUTION TOPICAL at 06:31

## 2019-08-28 RX ADMIN — Medication 100 MILLIGRAM(S): at 21:15

## 2019-08-28 RX ADMIN — Medication 500000 UNIT(S): at 23:24

## 2019-08-28 RX ADMIN — Medication 20 UNIT(S): at 08:17

## 2019-08-28 RX ADMIN — Medication 30 MILLILITER(S): at 21:14

## 2019-08-28 RX ADMIN — Medication 3 MILLILITER(S): at 11:58

## 2019-08-28 RX ADMIN — SENNA PLUS 2 TABLET(S): 8.6 TABLET ORAL at 21:15

## 2019-08-28 RX ADMIN — Medication 100 MILLIGRAM(S): at 06:27

## 2019-08-28 RX ADMIN — Medication 1000 UNIT(S): at 12:11

## 2019-08-28 RX ADMIN — Medication 500000 UNIT(S): at 12:11

## 2019-08-28 RX ADMIN — Medication 500000 UNIT(S): at 16:48

## 2019-08-28 RX ADMIN — Medication 25 MILLIGRAM(S): at 16:49

## 2019-08-28 RX ADMIN — HEPARIN SODIUM 5000 UNIT(S): 5000 INJECTION INTRAVENOUS; SUBCUTANEOUS at 06:27

## 2019-08-28 RX ADMIN — HEPARIN SODIUM 5000 UNIT(S): 5000 INJECTION INTRAVENOUS; SUBCUTANEOUS at 21:45

## 2019-08-28 RX ADMIN — Medication 500 MILLIGRAM(S): at 12:10

## 2019-08-28 RX ADMIN — MAGNESIUM OXIDE 400 MG ORAL TABLET 400 MILLIGRAM(S): 241.3 TABLET ORAL at 12:11

## 2019-08-28 RX ADMIN — Medication 25 MILLIGRAM(S): at 23:23

## 2019-08-28 RX ADMIN — Medication 1 DROP(S): at 21:18

## 2019-08-28 RX ADMIN — Medication 20 UNIT(S): at 16:47

## 2019-08-28 RX ADMIN — Medication 3 MILLILITER(S): at 23:31

## 2019-08-28 RX ADMIN — MAGNESIUM OXIDE 400 MG ORAL TABLET 400 MILLIGRAM(S): 241.3 TABLET ORAL at 08:19

## 2019-08-28 RX ADMIN — TIOTROPIUM BROMIDE 1 CAPSULE(S): 18 CAPSULE ORAL; RESPIRATORY (INHALATION) at 07:27

## 2019-08-28 RX ADMIN — TAMSULOSIN HYDROCHLORIDE 0.4 MILLIGRAM(S): 0.4 CAPSULE ORAL at 21:15

## 2019-08-28 RX ADMIN — FINASTERIDE 5 MILLIGRAM(S): 5 TABLET, FILM COATED ORAL at 12:11

## 2019-08-28 RX ADMIN — Medication 25 MILLIGRAM(S): at 06:27

## 2019-08-28 RX ADMIN — INSULIN GLARGINE 40 UNIT(S): 100 INJECTION, SOLUTION SUBCUTANEOUS at 21:15

## 2019-08-28 RX ADMIN — Medication 2: at 16:47

## 2019-08-28 RX ADMIN — Medication 60 MILLIGRAM(S): at 06:27

## 2019-08-28 RX ADMIN — PANTOPRAZOLE SODIUM 40 MILLIGRAM(S): 20 TABLET, DELAYED RELEASE ORAL at 06:27

## 2019-08-28 RX ADMIN — MAGNESIUM OXIDE 400 MG ORAL TABLET 400 MILLIGRAM(S): 241.3 TABLET ORAL at 16:48

## 2019-08-28 RX ADMIN — HEPARIN SODIUM 5000 UNIT(S): 5000 INJECTION INTRAVENOUS; SUBCUTANEOUS at 14:00

## 2019-08-28 RX ADMIN — Medication 3 MILLILITER(S): at 20:07

## 2019-08-28 RX ADMIN — Medication 3 MILLILITER(S): at 15:36

## 2019-08-28 RX ADMIN — Medication 1 DROP(S): at 06:31

## 2019-08-28 RX ADMIN — POLYETHYLENE GLYCOL 3350 17 GRAM(S): 17 POWDER, FOR SOLUTION ORAL at 12:11

## 2019-08-28 RX ADMIN — Medication 20 UNIT(S): at 12:10

## 2019-08-28 RX ADMIN — ACETAZOLAMIDE 250 MILLIGRAM(S): 250 TABLET ORAL at 06:28

## 2019-08-28 RX ADMIN — Medication 6: at 12:10

## 2019-08-28 RX ADMIN — SODIUM POLYSTYRENE SULFONATE 15 GRAM(S): 4.1 POWDER, FOR SUSPENSION ORAL at 16:47

## 2019-08-28 RX ADMIN — Medication 1 DROP(S): at 14:01

## 2019-08-28 RX ADMIN — BUDESONIDE AND FORMOTEROL FUMARATE DIHYDRATE 2 PUFF(S): 160; 4.5 AEROSOL RESPIRATORY (INHALATION) at 23:24

## 2019-08-28 RX ADMIN — BUDESONIDE AND FORMOTEROL FUMARATE DIHYDRATE 2 PUFF(S): 160; 4.5 AEROSOL RESPIRATORY (INHALATION) at 07:28

## 2019-08-28 RX ADMIN — Medication 3 MILLILITER(S): at 07:22

## 2019-08-28 RX ADMIN — ACETAZOLAMIDE 250 MILLIGRAM(S): 250 TABLET ORAL at 16:49

## 2019-08-28 RX ADMIN — Medication 4: at 08:17

## 2019-08-28 RX ADMIN — ATORVASTATIN CALCIUM 20 MILLIGRAM(S): 80 TABLET, FILM COATED ORAL at 21:15

## 2019-08-28 RX ADMIN — Medication 500000 UNIT(S): at 06:27

## 2019-08-28 RX ADMIN — Medication 100 MILLIGRAM(S): at 14:01

## 2019-08-28 NOTE — DIETITIAN INITIAL EVALUATION ADULT. - OTHER INFO
Pt presented to ED c/o hypoxia x1 day- dx acute on chronic resp failure. Pt with hx COPD, CHF (without acute exacerbation). Note CKD stage II, DM2.

## 2019-08-28 NOTE — DIETITIAN INITIAL EVALUATION ADULT. - PROBLEM SELECTOR PLAN 6
..  - change Prednisone taper to Solumedrol 60mg IVP q8h while in house  - c/w Triple therapy (Symbicort/Spiriva)  - FU Pulmonology c/s  - b2 as above

## 2019-08-28 NOTE — DIETITIAN INITIAL EVALUATION ADULT. - RD TO REMAIN AVAILABLE
GOAL: pt to maintain PO intake >75% of meal trays within 7 days. RD to monitor diet order, energy intake, NFPF, body comp, glucose and renal profile. Pt not at risk as pt eating well, f/u 7 days./yes

## 2019-08-28 NOTE — DIETITIAN INITIAL EVALUATION ADULT. - PROBLEM SELECTOR PLAN 7
insulin dependent  - Lantus 30u SQ QHS  - Humalog 20u SQ ac TID  - F/S AC and HS w/ss coverage  - carb consistent diet

## 2019-08-28 NOTE — DIETITIAN INITIAL EVALUATION ADULT. - PERTINENT MEDS FT
heparin, nystatin, lantus, humalog, diamox, prednisone, morphine, albuterol, vit C, vit D, lipitor, colace, lopressor, protonix, miralax, senna,

## 2019-08-28 NOTE — DIETITIAN INITIAL EVALUATION ADULT. - ENERGY NEEDS
estimated energy needs: ~1880kcal (MSJx1.0AF) morbid obese BMI, wt loss desirable  estimated protein needs: 56-70g (0.8-1.0g/kgIBW) as above, CKD considered  estimated fluids: per LIP, 1500ml fluid restriction at present

## 2019-08-28 NOTE — PROGRESS NOTE ADULT - SUBJECTIVE AND OBJECTIVE BOX
Progress Note:  Provider Speciality                            Hospitalist      JIAN MANCIA MRN-[id number] 78y Male     CHIEF PRESENTING COMPLAINT:  Patient is a 78y old  Male who presents with a chief complaint of Hypoxia x 1 day (27 Aug 2019 16:33)        SUBJECTIVE:  Patient was seen and examined at bedside.   Patient very upset today that he will not leave the hospital until his CPAP machine is fixed here / wanted to see pulmonary   No significant overnight events reported.     HISTORY OF PRESENTING ILLNESS:  HPI:  From ED:  77 yo M with PMHx of HTN, COPD on home O2 4-5L, Afib on Eliquis, HLD, CHF and DM presents to the ED c/o SOB. Symptoms started earlier yesterday but worsened tonight. There was a mechanical issue with pt's CPAP machine at home and symptoms progressed so he called EMS. When EMS arrived pt was hypoxic to 70s. He was given combivent and placed on CPAP with improved in O2 sat to 94. Pt admits to associated cough. He denies other complaints. Pt denies fever, chills, nausea, vomiting, abdominal pain, diarrhea, headache, dizziness, weakness, chest pain, back pain, LOC, trauma, urinary symptoms.  ________________________________________________________________________________________________________   Patient presents to ED with complaints of hypoxia several hours prior to admission.  Patient reports he was experiencing a problem with his CPAP last night and it was not properly titrating.  He reports that approx 2200 he went to the bathroom to wash up and was unable to get himself out of it.  At that point his HHA brought in the CPAP and EMS was called.  After a few minutes on the CPAP that patient improved.  EMS came and checked the patient, but he reported he felt better and left.  He went back to bed and at approx 0000 noticed his CPAP wasn't functioning properly and he became SOB with hypoxia approx 78%.  He called his son who came to the house, and despite supplemental o2 was unable to increase his oxygenation.  EMS was activated again at approx 0400 and the patient was brought to the ED.  He was placed on BiPAP with improvement to symptoms and increased SpO2.  He was found to have an elevated white count and poss RLL opacity on admission CXR and admitted for further management.  Patient seen and examined at bedside, reports he was experiencing some SOB x 2-3 days PTA with INFANTE and increased o2 demands of up to 6 l/m.  He reports his cough was at baseline except for 2 days ago when he had an increase with scant white production.  He denies any fever/chills.  No CP/Palpitations.  No abdominal complaints including N/V/D or pain.  No urinary complaints. (21 Aug 2019 08:46)        REVIEW OF SYSTEMS:    At least 10 systems were reviewed in ROS. All systems reviewed  are within normal limits except for the complaints as described in Subjective.    PAST MEDICAL & SURGICAL HISTORY:  PAST MEDICAL & SURGICAL HISTORY:  CKD (chronic kidney disease) stage 3, GFR 30-59 ml/min  Colon polyp: claims it was malignant  High cholesterol  GERD (gastroesophageal reflux disease)  Enlarged prostate  Oxygen dependent  COPD (chronic obstructive pulmonary disease)  Diabetes mellitus, type 2  Hypertension  Emphysema lung  History of hemorrhoidectomy  Dysplastic colon polyp: removed          VITAL SIGNS:  Vital Signs Last 24 Hrs  T(C): 35.6 (28 Aug 2019 14:39), Max: 36.1 (27 Aug 2019 21:50)  T(F): 96.1 (28 Aug 2019 14:39), Max: 97 (27 Aug 2019 21:50)  HR: 86 (28 Aug 2019 14:39) (86 - 102)  BP: 97/60 (28 Aug 2019 14:39) (97/60 - 149/83)  BP(mean): --  RR: 16 (28 Aug 2019 14:39) (16 - 16)  SpO2: --          PHYSICAL EXAMINATION:    General: AAO, Not in acute distress  HEENT:   EOMI, NO JVD, atraumatic/on HFNC   Heart: S1+S2 audible, no murmur  Lungs: diminished BS   Abdomen: Soft, non-tender, non-distended   CNS:  no focal deficits.  Extremities:  B/L LE  edema            CONSULTS:  Consultant(s) Notes Reviewed by me.   Care Discussed with Consultants/Other Providers where required.        MEDICATIONS:  MEDICATIONS  (STANDING):  ALBUTerol/ipratropium for Nebulization 3 milliLiter(s) Nebulizer every 4 hours  artificial  tears Solution 1 Drop(s) Both EYES three times a day  ascorbic acid 500 milliGRAM(s) Oral daily  atorvastatin 20 milliGRAM(s) Oral at bedtime  buDESOnide 160 MICROgram(s)/formoterol 4.5 MICROgram(s) Inhaler 2 Puff(s) Inhalation two times a day  chlorhexidine 4% Liquid 1 Application(s) Topical <User Schedule>  cholecalciferol 1000 Unit(s) Oral daily  dextrose 5%. 1000 milliLiter(s) (50 mL/Hr) IV Continuous <Continuous>  dextrose 50% Injectable 12.5 Gram(s) IV Push once  dextrose 50% Injectable 25 Gram(s) IV Push once  dextrose 50% Injectable 25 Gram(s) IV Push once  docusate sodium 100 milliGRAM(s) Oral three times a day  finasteride 5 milliGRAM(s) Oral daily  heparin  Injectable 5000 Unit(s) SubCutaneous every 8 hours  insulin glargine Injectable (LANTUS) 40 Unit(s) SubCutaneous at bedtime  insulin lispro (HumaLOG) corrective regimen sliding scale   SubCutaneous three times a day before meals  insulin lispro Injectable (HumaLOG) 20 Unit(s) SubCutaneous before breakfast  insulin lispro Injectable (HumaLOG) 20 Unit(s) SubCutaneous before lunch  insulin lispro Injectable (HumaLOG) 20 Unit(s) SubCutaneous before dinner  magnesium oxide 400 milliGRAM(s) Oral three times a day with meals  metoprolol tartrate 25 milliGRAM(s) Oral two times a day  morphine  - Injectable 1 milliGRAM(s) IV Push once  nystatin    Suspension 753836 Unit(s) Oral every 6 hours  pantoprazole    Tablet 40 milliGRAM(s) Oral before breakfast  polyethylene glycol 3350 17 Gram(s) Oral daily  predniSONE   Tablet 60 milliGRAM(s) Oral daily  senna 2 Tablet(s) Oral at bedtime  sodium polystyrene sulfonate Suspension 15 Gram(s) Oral daily  tamsulosin 0.4 milliGRAM(s) Oral at bedtime  tiotropium 18 MICROgram(s) Capsule 1 Capsule(s) Inhalation daily    MEDICATIONS  (PRN):  acetaminophen   Tablet .. 650 milliGRAM(s) Oral every 6 hours PRN Temp greater or equal to 38C (100.4F), Mild Pain (1 - 3)  ALBUTerol/ipratropium for Nebulization 3 milliLiter(s) Nebulizer every 4 hours PRN Bronchospasm  aluminum hydroxide/magnesium hydroxide/simethicone Suspension 30 milliLiter(s) Oral every 4 hours PRN Dyspepsia  dextrose 40% Gel 15 Gram(s) Oral once PRN Blood Glucose LESS THAN 70 milliGRAM(s)/deciliter  diphenhydrAMINE 25 milliGRAM(s) Oral every 4 hours PRN Rash and/or Itching  glucagon  Injectable 1 milliGRAM(s) IntraMuscular once PRN Glucose LESS THAN 70 milligrams/deciliter  guaiFENesin/dextromethorphan  Syrup 10 milliLiter(s) Oral every 6 hours PRN Cough      [facility] O's:                        15.5   15.23 )-----------( 384      ( 28 Aug 2019 08:46 )             55.0     08-28    144  |  97<L>  |  71<HH>  ----------------------------<  265<H>  4.9   |  35<H>  |  1.8<H>    Ca    9.6      28 Aug 2019 08:46  Mg     2.6     08-28    TPro  6.6  /  Alb  4.1  /  TBili  0.6  /  DBili  x   /  AST  14  /  ALT  29  /  AlkPhos  59  08-28        CAPILLARY BLOOD GLUCOSE      POCT Blood Glucose.: 160 mg/dL (28 Aug 2019 16:26)  POCT Blood Glucose.: 257 mg/dL (28 Aug 2019 11:31)  POCT Blood Glucose.: 231 mg/dL (28 Aug 2019 07:39)  POCT Blood Glucose.: 263 mg/dL (27 Aug 2019 21:08)                08-27-19 @ 07:01  -  08-28-19 @ 07:00  --------------------------------------------------------  IN: 360 mL / OUT: 1350 mL / NET: -990 mL    08-28-19 @ 07:01  - 08-28-19 @ 17:18  --------------------------------------------------------  IN: 0 mL / OUT: 200 mL / NET: -200 mL            ASSESSMENT:  78M with extensive PMHX and multiple comorbidities, with multiple recent hospitalizations presents to the ED with hypoxia and admitted to medicine for further management.     Problem/Plan - 1:  ·  Problem: SOB (shortness of breath).  Plan: w/hypoxia requiring NIPPV due to malfunctioning CPAP/ elevated WBC count   Acute on chronic respiratory failure  - pulm eval appreciated   - symptomatically improved on BiPAP, c/w highflow oxygen as needed  - off abx as per ID  - BCx negative  - b2 q4h RTC and PRN  - DVT/GI PPX (Heparin/Protonix)  -taper steroids as tolerated - switched to PO prednisone today   -ambulate.      Problem/Plan - 2:  ·  Problem: Opacity of lung on imaging study.  Plan: off abx as per ID.      Problem/Plan - 3:  ·  Problem: Elevated troponin.  Plan: ..  - Tt 0.03 in ED (baseline from previous admissions), no complaints of CP/Palpitations   - No need to Trend given Hx of CKD 3/CHF and no acute symptoms.      Problem/Plan - 4:  ·  Problem: CKD (chronic kidney disease) stage 3, GFR 30-59 ml/min.  Plan: w/Hx of Hyperkalemia  - at baseline renal function  - c/w current supplementation, Kayexalate PRN  - Daily BMP/Mg  - avoid nephrotoxins  - renal c/s appreciated  -renal sono - no hydro  -renal diet  -dietary eval pending    Metabolic alkalosis  -hold lasix tomorrow  -started on diamox    metabolic alkalosis  -on diamox for 3 days       Chronic diastolic congestive heart failure:  at baseline without acute exacerbation - of note: pt did not take the recommended increased dose of lasix after his last hospitalization  - c/w Lasix 40mg PO QD  - c/w BB  - Weights QD  - Daily I/O's  - low Na+ diet.      COPD (chronic obstructive pulmonary disease).   - c/w po prednisone   - c/w Triple therapy (Symbicort/Spiriva)  - Pulmonology c/s appreciated  - bronchodilators.      Diabetes mellitus, type 2.  insulin dependent  -better controlled  -c/w current insulin   - F/S AC and HS w/ss coverage  - carb consistent diet.       BPH and no retention  - at baseline  - c/w Flomax/Finasteride  - monitor urine output.      Obesity, morbid, BMI 40.0-49.9.  Plan: diet and lifestyle modification.           Progress note handoff:   pending:   disposition:   discussion: Progress Note:  Provider Speciality                            Hospitalist      JIAN MANCIA [id number] 78y Male     CHIEF PRESENTING COMPLAINT:  Patient is a 78y old  Male who presents with a chief complaint of Hypoxia x 1 day (27 Aug 2019 16:33)        SUBJECTIVE:  Patient was seen and examined at bedside.   Patient very upset today that he will not leave the hospital until his CPAP machine is fixed here / wanted to see pulmonary   No significant overnight events reported.     HISTORY OF PRESENTING ILLNESS:  HPI:  From ED:  77 yo M with PMHx of HTN, COPD on home O2 4-5L, Afib on Eliquis, HLD, CHF and DM presents to the ED c/o SOB. Symptoms started earlier yesterday but worsened tonight. There was a mechanical issue with pt's CPAP machine at home and symptoms progressed so he called EMS. When EMS arrived pt was hypoxic to 70s. He was given combivent and placed on CPAP with improved in O2 sat to 94. Pt admits to associated cough. He denies other complaints. Pt denies fever, chills, nausea, vomiting, abdominal pain, diarrhea, headache, dizziness, weakness, chest pain, back pain, LOC, trauma, urinary symptoms.  ________________________________________________________________________________________________________   Patient presents to ED with complaints of hypoxia several hours prior to admission.  Patient reports he was experiencing a problem with his CPAP last night and it was not properly titrating.  He reports that approx 2200 he went to the bathroom to wash up and was unable to get himself out of it.  At that point his HHA brought in the CPAP and EMS was called.  After a few minutes on the CPAP that patient improved.  EMS came and checked the patient, but he reported he felt better and left.  He went back to bed and at approx 0000 noticed his CPAP wasn't functioning properly and he became SOB with hypoxia approx 78%.  He called his son who came to the house, and despite supplemental o2 was unable to increase his oxygenation.  EMS was activated again at approx 0400 and the patient was brought to the ED.  He was placed on BiPAP with improvement to symptoms and increased SpO2.  He was found to have an elevated white count and poss RLL opacity on admission CXR and admitted for further management.  Patient seen and examined at bedside, reports he was experiencing some SOB x 2-3 days PTA with INFANTE and increased o2 demands of up to 6 l/m.  He reports his cough was at baseline except for 2 days ago when he had an increase with scant white production.  He denies any fever/chills.  No CP/Palpitations.  No abdominal complaints including N/V/D or pain.  No urinary complaints. (21 Aug 2019 08:46)        REVIEW OF SYSTEMS:    At least 10 systems were reviewed in ROS. All systems reviewed  are within normal limits except for the complaints as described in Subjective.    PAST MEDICAL & SURGICAL HISTORY:  PAST MEDICAL & SURGICAL HISTORY:  CKD (chronic kidney disease) stage 3, GFR 30-59 ml/min  Colon polyp: claims it was malignant  High cholesterol  GERD (gastroesophageal reflux disease)  Enlarged prostate  Oxygen dependent  COPD (chronic obstructive pulmonary disease)  Diabetes mellitus, type 2  Hypertension  Emphysema lung  History of hemorrhoidectomy  Dysplastic colon polyp: removed          VITAL SIGNS:  Vital Signs Last 24 Hrs  T(C): 35.6 (28 Aug 2019 14:39), Max: 36.1 (27 Aug 2019 21:50)  T(F): 96.1 (28 Aug 2019 14:39), Max: 97 (27 Aug 2019 21:50)  HR: 86 (28 Aug 2019 14:39) (86 - 102)  BP: 97/60 (28 Aug 2019 14:39) (97/60 - 149/83)  BP(mean): --  RR: 16 (28 Aug 2019 14:39) (16 - 16)  SpO2: --          PHYSICAL EXAMINATION:    General: AAO, Not in acute distress  HEENT:   EOMI, NO JVD, atraumatic/on HFNC   Heart: S1+S2 audible, no murmur  Lungs: diminished BS   Abdomen: Soft, non-tender, non-distended   CNS:  no focal deficits.  Extremities:  B/L LE  edema            CONSULTS:  Consultant(s) Notes Reviewed by me.   Care Discussed with Consultants/Other Providers where required.        MEDICATIONS:  MEDICATIONS  (STANDING):  ALBUTerol/ipratropium for Nebulization 3 milliLiter(s) Nebulizer every 4 hours  artificial  tears Solution 1 Drop(s) Both EYES three times a day  ascorbic acid 500 milliGRAM(s) Oral daily  atorvastatin 20 milliGRAM(s) Oral at bedtime  buDESOnide 160 MICROgram(s)/formoterol 4.5 MICROgram(s) Inhaler 2 Puff(s) Inhalation two times a day  chlorhexidine 4% Liquid 1 Application(s) Topical <User Schedule>  cholecalciferol 1000 Unit(s) Oral daily  dextrose 5%. 1000 milliLiter(s) (50 mL/Hr) IV Continuous <Continuous>  dextrose 50% Injectable 12.5 Gram(s) IV Push once  dextrose 50% Injectable 25 Gram(s) IV Push once  dextrose 50% Injectable 25 Gram(s) IV Push once  docusate sodium 100 milliGRAM(s) Oral three times a day  finasteride 5 milliGRAM(s) Oral daily  heparin  Injectable 5000 Unit(s) SubCutaneous every 8 hours  insulin glargine Injectable (LANTUS) 40 Unit(s) SubCutaneous at bedtime  insulin lispro (HumaLOG) corrective regimen sliding scale   SubCutaneous three times a day before meals  insulin lispro Injectable (HumaLOG) 20 Unit(s) SubCutaneous before breakfast  insulin lispro Injectable (HumaLOG) 20 Unit(s) SubCutaneous before lunch  insulin lispro Injectable (HumaLOG) 20 Unit(s) SubCutaneous before dinner  magnesium oxide 400 milliGRAM(s) Oral three times a day with meals  metoprolol tartrate 25 milliGRAM(s) Oral two times a day  morphine  - Injectable 1 milliGRAM(s) IV Push once  nystatin    Suspension 543142 Unit(s) Oral every 6 hours  pantoprazole    Tablet 40 milliGRAM(s) Oral before breakfast  polyethylene glycol 3350 17 Gram(s) Oral daily  predniSONE   Tablet 60 milliGRAM(s) Oral daily  senna 2 Tablet(s) Oral at bedtime  sodium polystyrene sulfonate Suspension 15 Gram(s) Oral daily  tamsulosin 0.4 milliGRAM(s) Oral at bedtime  tiotropium 18 MICROgram(s) Capsule 1 Capsule(s) Inhalation daily    MEDICATIONS  (PRN):  acetaminophen   Tablet .. 650 milliGRAM(s) Oral every 6 hours PRN Temp greater or equal to 38C (100.4F), Mild Pain (1 - 3)  ALBUTerol/ipratropium for Nebulization 3 milliLiter(s) Nebulizer every 4 hours PRN Bronchospasm  aluminum hydroxide/magnesium hydroxide/simethicone Suspension 30 milliLiter(s) Oral every 4 hours PRN Dyspepsia  dextrose 40% Gel 15 Gram(s) Oral once PRN Blood Glucose LESS THAN 70 milliGRAM(s)/deciliter  diphenhydrAMINE 25 milliGRAM(s) Oral every 4 hours PRN Rash and/or Itching  glucagon  Injectable 1 milliGRAM(s) IntraMuscular once PRN Glucose LESS THAN 70 milligrams/deciliter  guaiFENesin/dextromethorphan  Syrup 10 milliLiter(s) Oral every 6 hours PRN Cough      [facility]/MICROBIOLOGY/I & O's:                        15.5   15.23 )-----------( 384      ( 28 Aug 2019 08:46 )             55.0     08-28    144  |  97<L>  |  71<HH>  ----------------------------<  265<H>  4.9   |  35<H>  |  1.8<H>    Ca    9.6      28 Aug 2019 08:46  Mg     2.6     08-28    TPro  6.6  /  Alb  4.1  /  TBili  0.6  /  DBili  x   /  AST  14  /  ALT  29  /  AlkPhos  59  08-28        CAPILLARY BLOOD GLUCOSE      POCT Blood Glucose.: 160 mg/dL (28 Aug 2019 16:26)  POCT Blood Glucose.: 257 mg/dL (28 Aug 2019 11:31)  POCT Blood Glucose.: 231 mg/dL (28 Aug 2019 07:39)  POCT Blood Glucose.: 263 mg/dL (27 Aug 2019 21:08)                08-27-19 @ 07:01  -  08-28-19 @ 07:00  --------------------------------------------------------  IN: 360 mL / OUT: 1350 mL / NET: -990 mL    08-28-19 @ 07:01  - 08-28-19 @ 17:18  --------------------------------------------------------  IN: 0 mL / OUT: 200 mL / NET: -200 mL            ASSESSMENT:  78M with extensive PMHX and multiple comorbidities, with multiple recent hospitalizations presents to the ED with hypoxia and admitted to medicine for further management.       Acute on chronic respiratory failure due to COPD exacerbation and   - pulm eval appreciated   - symptomatically improved on BiPAP, c/w highflow oxygen as needed  -c/w symbicort/ spiriva   - off abx as per ID  - BCx negative  - b2 q4h RTC and PRN  -c/w po prednisone   -ambulate.      Opacity of lung on imaging study:  off ABX as per ID         Elevated troponin:   - Tt 0.03 in ED (baseline from previous admissions), no complaints of CP/Palpitations   - No need to Trend given Hx of CKD 3/CHF and no acute symptoms.     CKD (chronic kidney disease) stage 3, GFR 30-59 ml/min with Hx of Hyperkalemia:  - at baseline renal function  - c/w current supplementation, Kayexalate PRN   avoid nephrotoxins  - renal signed off   -renal sono - no hydro    Metabolic alkalosis  -hold lasix tomorrow  -started on diamox    metabolic alkalosis  -on diamox for 3 days       Chronic diastolic congestive heart failure:  at baseline without acute exacerbation - of note: pt did not take the recommended increased dose of lasix after his last hospitalization  - c/w Lasix 40mg PO QD  - c/w BB  - Weights QD  - Daily I/O's  - low Na+ diet.       Diabetes mellitus, type 2.  insulin dependent  -better controlled  -c/w current insulin   - F/S AC and HS w/ss coverage  - carb consistent diet.       BPH and no retention  - at baseline  - c/w Flomax/Finasteride  - monitor urine output.      Obesity, morbid, BMI 40.0-49.9.  Plan: diet and lifestyle modification.           Progress note handoff:   pending: CPAP machine delivery home   disposition: home/anticipated for tmrw   discussion: patient - satisfied

## 2019-08-28 NOTE — DIETITIAN INITIAL EVALUATION ADULT. - FACTORS AFF FOOD INTAKE
Pt has good appetite and PO intake, consistently consuming >75% of meal trays. Pt says at home he tries to avoid sodium and starchy foods. Says his FS are often high d/t steroids. RD provided diet ed and materials. Lst BM 8/26- denies GI distress. Denies chew/swallow difficulty. Supplements vit D, vit C, Mg at home.

## 2019-08-28 NOTE — DIETITIAN INITIAL EVALUATION ADULT. - PROBLEM SELECTOR PLAN 1
w/hypoxia requiring NIPPV, elevated WBC count and ?opacity RLL  - admit to medicine  - symptomatically improved on BiPAP, c/w NIPPV for now and wean as tolerated  - s/p Cefepime and Levaquin in ED; concern for HCAP:       Cefepime 1gm IVPB q12h (renally dosed)       Levaquin 250mg IVPB q24h (Renall dosed and s/p 750 in ED)       No need for Vancomycin at this point.  FU ID c/s  - ID c/s  - Pulmonology c/s  - FU BCx (done in ED)  - FU UA results (ordered)  - b2 q4h RTC and PRN  - DVT/GI PPX (Heparin/Protonix)  - CHG 4% QD and PRN

## 2019-08-28 NOTE — DIETITIAN INITIAL EVALUATION ADULT. - DIET TYPE
renal replacement pts:no protein restr,no conc K & phos, low sodium/60g Protein restriction. Diet education provided.

## 2019-08-28 NOTE — DIETITIAN INITIAL EVALUATION ADULT. - PHYSICAL APPEARANCE
BMI: 40.1: ht 68", stated wt # as of 1-2 weeks ago per pt (will use for calculations). Note wts in EMR: 261# and 271#. Edema 1+ b/l LE noted, ecchymosis, BS 19./obese

## 2019-08-28 NOTE — DIETITIAN INITIAL EVALUATION ADULT. - PROBLEM SELECTOR PLAN 5
..  - at baseline without acute exacerbation - of note: pt did not take the recommended increased dose of lasix after his last hospitalization  - c/w Lasix 40mg PO QD  - c/w BB  - Weights QD  - Daily I/O's  - low Na+ diet

## 2019-08-29 ENCOUNTER — TRANSCRIPTION ENCOUNTER (OUTPATIENT)
Age: 79
End: 2019-08-29

## 2019-08-29 VITALS
DIASTOLIC BLOOD PRESSURE: 52 MMHG | HEART RATE: 97 BPM | TEMPERATURE: 96 F | SYSTOLIC BLOOD PRESSURE: 94 MMHG | RESPIRATION RATE: 17 BRPM

## 2019-08-29 PROBLEM — N18.3 CHRONIC KIDNEY DISEASE, STAGE 3 (MODERATE): Chronic | Status: ACTIVE | Noted: 2019-08-21

## 2019-08-29 LAB
GLUCOSE BLDC GLUCOMTR-MCNC: 113 MG/DL — HIGH (ref 70–99)
GLUCOSE BLDC GLUCOMTR-MCNC: 253 MG/DL — HIGH (ref 70–99)

## 2019-08-29 PROCEDURE — 99239 HOSP IP/OBS DSCHRG MGMT >30: CPT

## 2019-08-29 RX ORDER — GUAIFENESIN/DEXTROMETHORPHAN 600MG-30MG
10 TABLET, EXTENDED RELEASE 12 HR ORAL
Qty: 0 | Refills: 0 | DISCHARGE
Start: 2019-08-29

## 2019-08-29 RX ORDER — POLYETHYLENE GLYCOL 3350 17 G/17G
17 POWDER, FOR SOLUTION ORAL
Qty: 1 | Refills: 0
Start: 2019-08-29

## 2019-08-29 RX ORDER — METOPROLOL TARTRATE 50 MG
1 TABLET ORAL
Qty: 0 | Refills: 0 | DISCHARGE
Start: 2019-08-29

## 2019-08-29 RX ORDER — FUROSEMIDE 40 MG
1 TABLET ORAL
Qty: 30 | Refills: 0
Start: 2019-08-29 | End: 2019-09-27

## 2019-08-29 RX ORDER — FUROSEMIDE 40 MG
1 TABLET ORAL
Qty: 0 | Refills: 0 | DISCHARGE
Start: 2019-08-29

## 2019-08-29 RX ADMIN — SODIUM POLYSTYRENE SULFONATE 15 GRAM(S): 4.1 POWDER, FOR SUSPENSION ORAL at 11:47

## 2019-08-29 RX ADMIN — Medication 20 UNIT(S): at 11:46

## 2019-08-29 RX ADMIN — Medication 500 MILLIGRAM(S): at 11:47

## 2019-08-29 RX ADMIN — Medication 3 MILLILITER(S): at 11:49

## 2019-08-29 RX ADMIN — BUDESONIDE AND FORMOTEROL FUMARATE DIHYDRATE 2 PUFF(S): 160; 4.5 AEROSOL RESPIRATORY (INHALATION) at 07:22

## 2019-08-29 RX ADMIN — Medication 1000 UNIT(S): at 11:47

## 2019-08-29 RX ADMIN — MAGNESIUM OXIDE 400 MG ORAL TABLET 400 MILLIGRAM(S): 241.3 TABLET ORAL at 08:19

## 2019-08-29 RX ADMIN — Medication 6: at 11:46

## 2019-08-29 RX ADMIN — MAGNESIUM OXIDE 400 MG ORAL TABLET 400 MILLIGRAM(S): 241.3 TABLET ORAL at 11:46

## 2019-08-29 RX ADMIN — Medication 1 DROP(S): at 13:43

## 2019-08-29 RX ADMIN — TIOTROPIUM BROMIDE 1 CAPSULE(S): 18 CAPSULE ORAL; RESPIRATORY (INHALATION) at 07:22

## 2019-08-29 RX ADMIN — FINASTERIDE 5 MILLIGRAM(S): 5 TABLET, FILM COATED ORAL at 11:47

## 2019-08-29 RX ADMIN — Medication 100 MILLIGRAM(S): at 13:42

## 2019-08-29 RX ADMIN — Medication 500000 UNIT(S): at 11:47

## 2019-08-29 RX ADMIN — Medication 20 UNIT(S): at 08:19

## 2019-08-29 RX ADMIN — HEPARIN SODIUM 5000 UNIT(S): 5000 INJECTION INTRAVENOUS; SUBCUTANEOUS at 13:42

## 2019-08-29 RX ADMIN — POLYETHYLENE GLYCOL 3350 17 GRAM(S): 17 POWDER, FOR SOLUTION ORAL at 11:47

## 2019-08-29 RX ADMIN — Medication 3 MILLILITER(S): at 07:21

## 2019-08-29 NOTE — PROVIDER CONTACT NOTE (OTHER) - SITUATION
pt refused all 6 am medications. including blood pressure medications. bp 106/56. Dr. Dobbins notified

## 2019-08-29 NOTE — PROGRESS NOTE ADULT - REASON FOR ADMISSION
Hypoxia x 1 day

## 2019-08-29 NOTE — DISCHARGE NOTE NURSING/CASE MANAGEMENT/SOCIAL WORK - PATIENT PORTAL LINK FT
You can access the FollowMyHealth Patient Portal offered by Health system by registering at the following website: [email or web address]. By joining [facility]’s FollowMyHealth portal, you will also be able to view your health information using other applications (apps) compatible with our system.

## 2019-08-29 NOTE — PROGRESS NOTE ADULT - SUBJECTIVE AND OBJECTIVE BOX
Progress Note:  Provider Speciality                            Hospitalist      MATRJIAN SMITH [id number] 78y Male     CHIEF PRESENTING COMPLAINT:  Patient is a 78y old  Male who presents with a chief complaint of Hypoxia x 1 day (27 Aug 2019 16:33)        SUBJECTIVE:  Patient was seen and examined at bedside.   Patient doing ok, upset but  in agreement to go home today   No significant overnight events reported.     HISTORY OF PRESENTING ILLNESS:  HPI:  From ED:  77 yo M with PMHx of HTN, COPD on home O2 4-5L, Afib on Eliquis, HLD, CHF and DM presents to the ED c/o SOB. Symptoms started earlier yesterday but worsened tonight. There was a mechanical issue with pt's CPAP machine at home and symptoms progressed so he called EMS. When EMS arrived pt was hypoxic to 70s. He was given combivent and placed on CPAP with improved in O2 sat to 94. Pt admits to associated cough. He denies other complaints. Pt denies fever, chills, nausea, vomiting, abdominal pain, diarrhea, headache, dizziness, weakness, chest pain, back pain, LOC, trauma, urinary symptoms.  ________________________________________________________________________________________________________   Patient presents to ED with complaints of hypoxia several hours prior to admission.  Patient reports he was experiencing a problem with his CPAP last night and it was not properly titrating.  He reports that approx 2200 he went to the bathroom to wash up and was unable to get himself out of it.  At that point his HHA brought in the CPAP and EMS was called.  After a few minutes on the CPAP that patient improved.  EMS came and checked the patient, but he reported he felt better and left.  He went back to bed and at approx 0000 noticed his CPAP wasn't functioning properly and he became SOB with hypoxia approx 78%.  He called his son who came to the house, and despite supplemental o2 was unable to increase his oxygenation.  EMS was activated again at approx 0400 and the patient was brought to the ED.  He was placed on BiPAP with improvement to symptoms and increased SpO2.  He was found to have an elevated white count and poss RLL opacity on admission CXR and admitted for further management.  Patient seen and examined at bedside, reports he was experiencing some SOB x 2-3 days PTA with INFANTE and increased o2 demands of up to 6 l/m.  He reports his cough was at baseline except for 2 days ago when he had an increase with scant white production.  He denies any fever/chills.  No CP/Palpitations.  No abdominal complaints including N/V/D or pain.  No urinary complaints. (21 Aug 2019 08:46)        REVIEW OF SYSTEMS:    At least 10 systems were reviewed in ROS. All systems reviewed  are within normal limits except for the complaints as described in Subjective.    PAST MEDICAL & SURGICAL HISTORY:  PAST MEDICAL & SURGICAL HISTORY:  CKD (chronic kidney disease) stage 3, GFR 30-59 ml/min  Colon polyp: claims it was malignant  High cholesterol  GERD (gastroesophageal reflux disease)  Enlarged prostate  Oxygen dependent  COPD (chronic obstructive pulmonary disease)  Diabetes mellitus, type 2  Hypertension  Emphysema lung  History of hemorrhoidectomy  Dysplastic colon polyp: removed          VITAL SIGNS:  Vital Signs Last 24 Hrs  T(C): 35.8 (29 Aug 2019 06:17), Max: 36.2 (28 Aug 2019 21:32)  T(F): 96.5 (29 Aug 2019 06:17), Max: 97.2 (28 Aug 2019 21:32)  HR: 83 (29 Aug 2019 06:17) (83 - 95)  BP: 106/56 (29 Aug 2019 06:17) (97/60 - 111/56)  BP(mean): --  RR: 16 (29 Aug 2019 06:17) (16 - 16)  SpO2: --          PHYSICAL EXAMINATION:    General: AAO, Not in acute distress  HEENT:   EOMI, NO JVD, atraumatic/on HFNC   Heart: S1+S2 audible, no murmur  Lungs: diminished BS   Abdomen: Soft, non-tender, non-distended   CNS:  no focal deficits.  Extremities:  B/L LE  edema    -trace        CONSULTS:  Consultant(s) Notes Reviewed by me.   Care Discussed with Consultants/Other Providers where required.        MEDICATIONS:  MEDICATIONS  (STANDING):  ALBUTerol/ipratropium for Nebulization 3 milliLiter(s) Nebulizer every 4 hours  artificial  tears Solution 1 Drop(s) Both EYES three times a day  ascorbic acid 500 milliGRAM(s) Oral daily  atorvastatin 20 milliGRAM(s) Oral at bedtime  buDESOnide 160 MICROgram(s)/formoterol 4.5 MICROgram(s) Inhaler 2 Puff(s) Inhalation two times a day  chlorhexidine 4% Liquid 1 Application(s) Topical <User Schedule>  cholecalciferol 1000 Unit(s) Oral daily  dextrose 5%. 1000 milliLiter(s) (50 mL/Hr) IV Continuous <Continuous>  dextrose 50% Injectable 12.5 Gram(s) IV Push once  dextrose 50% Injectable 25 Gram(s) IV Push once  dextrose 50% Injectable 25 Gram(s) IV Push once  docusate sodium 100 milliGRAM(s) Oral three times a day  finasteride 5 milliGRAM(s) Oral daily  heparin  Injectable 5000 Unit(s) SubCutaneous every 8 hours  insulin glargine Injectable (LANTUS) 40 Unit(s) SubCutaneous at bedtime  insulin lispro (HumaLOG) corrective regimen sliding scale   SubCutaneous three times a day before meals  insulin lispro Injectable (HumaLOG) 20 Unit(s) SubCutaneous before breakfast  insulin lispro Injectable (HumaLOG) 20 Unit(s) SubCutaneous before lunch  insulin lispro Injectable (HumaLOG) 20 Unit(s) SubCutaneous before dinner  magnesium oxide 400 milliGRAM(s) Oral three times a day with meals  metoprolol tartrate 25 milliGRAM(s) Oral two times a day  morphine  - Injectable 1 milliGRAM(s) IV Push once  nystatin    Suspension 429045 Unit(s) Oral every 6 hours  pantoprazole    Tablet 40 milliGRAM(s) Oral before breakfast  polyethylene glycol 3350 17 Gram(s) Oral daily  predniSONE   Tablet 60 milliGRAM(s) Oral daily  senna 2 Tablet(s) Oral at bedtime  sodium polystyrene sulfonate Suspension 15 Gram(s) Oral daily  tamsulosin 0.4 milliGRAM(s) Oral at bedtime  tiotropium 18 MICROgram(s) Capsule 1 Capsule(s) Inhalation daily    MEDICATIONS  (PRN):  acetaminophen   Tablet .. 650 milliGRAM(s) Oral every 6 hours PRN Temp greater or equal to 38C (100.4F), Mild Pain (1 - 3)  ALBUTerol/ipratropium for Nebulization 3 milliLiter(s) Nebulizer every 4 hours PRN Bronchospasm  aluminum hydroxide/magnesium hydroxide/simethicone Suspension 30 milliLiter(s) Oral every 4 hours PRN Dyspepsia  dextrose 40% Gel 15 Gram(s) Oral once PRN Blood Glucose LESS THAN 70 milliGRAM(s)/deciliter  diphenhydrAMINE 25 milliGRAM(s) Oral every 4 hours PRN Rash and/or Itching  glucagon  Injectable 1 milliGRAM(s) IntraMuscular once PRN Glucose LESS THAN 70 milligrams/deciliter  guaiFENesin/dextromethorphan  Syrup 10 milliLiter(s) Oral every 6 hours PRN Cough      [facility] DATA/MICROBIOLOGY/I & O's:                        15.5   15.23 )-----------( 384      ( 28 Aug 2019 08:46 )             55.0     08-28    144  |  97<L>  |  71<HH>  ----------------------------<  265<H>  4.9   |  35<H>  |  1.8<H>    Ca    9.6      28 Aug 2019 08:46  Mg     2.6     08-28    TPro  6.6  /  Alb  4.1  /  TBili  0.6  /  DBili  x   /  AST  14  /  ALT  29  /  AlkPhos  59  08-28        CAPILLARY BLOOD GLUCOSE      POCT Blood Glucose.: 160 mg/dL (28 Aug 2019 16:26)  POCT Blood Glucose.: 257 mg/dL (28 Aug 2019 11:31)  POCT Blood Glucose.: 231 mg/dL (28 Aug 2019 07:39)  POCT Blood Glucose.: 263 mg/dL (27 Aug 2019 21:08)                08-27-19 @ 07:01  -  08-28-19 @ 07:00  --------------------------------------------------------  IN: 360 mL / OUT: 1350 mL / NET: -990 mL    08-28-19 @ 07:01  - 08-28-19 @ 17:18  --------------------------------------------------------  IN: 0 mL / OUT: 200 mL / NET: -200 mL            ASSESSMENT:  78M with extensive PMHX and multiple comorbidities, with multiple recent hospitalizations presents to the ED with hypoxia and admitted to medicine for further management.       Acute on chronic respiratory failure due to COPD exacerbation and   - pulm eval appreciated   - symptomatically improved on BiPAP, c/w highflow oxygen as needed  -c/w symbicort/ spiriva   - off abx as per ID  - BCx negative  - b2 q4h RTC and PRN  -c/w po prednisone taper and then maintenance 10mg  at home        Opacity of lung on imaging study:  off ABX as per ID         Elevated troponin:   - Tt 0.03 in ED (baseline from previous admissions), no complaints of CP/Palpitations   - No need to Trend given Hx of CKD 3/CHF and no acute symptoms.     CKD (chronic kidney disease) stage 3, GFR 30-59 ml/min with Hx of Hyperkalemia:  - at baseline renal function  - c/w current supplementation, Kayexalate PRN   avoid nephrotoxins  - renal signed off   -renal sono - no hydro    Metabolic alkalosis  improved  completed diamox    metabolic alkalosis  -on diamox for 3 days       Chronic diastolic congestive heart failure:  at baseline without acute exacerbation - of note: pt did not take the recommended increased dose of lasix after his last hospitalization  - c/w Lasix 40mg PO QD  - c/w BB  - Weights QD  - Daily I/O's  - low Na+ diet.       Diabetes mellitus, type 2.  insulin dependent  -better controlled  -c/w current insulin   - F/S AC and HS w/ss coverage  - carb consistent diet.       BPH and no retention  - at baseline  - c/w Flomax/Finasteride  - monitor urine output.      Obesity, morbid, BMI 40.0-49.9.  Plan: diet and lifestyle modification.     clinically stable for dc home           Progress note handoff:   pending: none    disposition: home today   discussion: patient - satisfied

## 2019-08-29 NOTE — PROGRESS NOTE ADULT - PROVIDER SPECIALTY LIST ADULT
Hospitalist
Nephrology
Pulmonology
Pulmonology
Nephrology
Hospitalist
Hospitalist

## 2019-09-01 PROCEDURE — G9005: CPT

## 2019-09-01 PROCEDURE — G9001: CPT

## 2019-09-03 ENCOUNTER — LABORATORY RESULT (OUTPATIENT)
Age: 79
End: 2019-09-03

## 2019-09-03 ENCOUNTER — APPOINTMENT (OUTPATIENT)
Dept: GERIATRICS | Facility: HOME HEALTH | Age: 79
End: 2019-09-03
Payer: MEDICARE

## 2019-09-03 ENCOUNTER — OUTPATIENT (OUTPATIENT)
Dept: OUTPATIENT SERVICES | Facility: HOSPITAL | Age: 79
LOS: 1 days | Discharge: HOME | End: 2019-09-03

## 2019-09-03 DIAGNOSIS — E11.65 TYPE 2 DIABETES MELLITUS WITH HYPERGLYCEMIA: ICD-10-CM

## 2019-09-03 DIAGNOSIS — Z79.01 LONG TERM (CURRENT) USE OF ANTICOAGULANTS: ICD-10-CM

## 2019-09-03 DIAGNOSIS — Z98.890 OTHER SPECIFIED POSTPROCEDURAL STATES: Chronic | ICD-10-CM

## 2019-09-03 DIAGNOSIS — Z91.09 OTHER ALLERGY STATUS, OTHER THAN TO DRUGS AND BIOLOGICAL SUBSTANCES: ICD-10-CM

## 2019-09-03 DIAGNOSIS — Z87.891 PERSONAL HISTORY OF NICOTINE DEPENDENCE: ICD-10-CM

## 2019-09-03 DIAGNOSIS — K21.9 GASTRO-ESOPHAGEAL REFLUX DISEASE WITHOUT ESOPHAGITIS: ICD-10-CM

## 2019-09-03 DIAGNOSIS — Z66 DO NOT RESUSCITATE: ICD-10-CM

## 2019-09-03 DIAGNOSIS — N18.3 CHRONIC KIDNEY DISEASE, STAGE 3 (MODERATE): ICD-10-CM

## 2019-09-03 DIAGNOSIS — Z86.010 PERSONAL HISTORY OF COLONIC POLYPS: ICD-10-CM

## 2019-09-03 DIAGNOSIS — J44.1 CHRONIC OBSTRUCTIVE PULMONARY DISEASE WITH (ACUTE) EXACERBATION: ICD-10-CM

## 2019-09-03 DIAGNOSIS — E78.5 HYPERLIPIDEMIA, UNSPECIFIED: ICD-10-CM

## 2019-09-03 DIAGNOSIS — I13.0 HYPERTENSIVE HEART AND CHRONIC KIDNEY DISEASE WITH HEART FAILURE AND STAGE 1 THROUGH STAGE 4 CHRONIC KIDNEY DISEASE, OR UNSPECIFIED CHRONIC KIDNEY DISEASE: ICD-10-CM

## 2019-09-03 DIAGNOSIS — N40.0 BENIGN PROSTATIC HYPERPLASIA WITHOUT LOWER URINARY TRACT SYMPMS: ICD-10-CM

## 2019-09-03 DIAGNOSIS — E66.2 MORBID (SEVERE) OBESITY WITH ALVEOLAR HYPOVENTILATION: ICD-10-CM

## 2019-09-03 DIAGNOSIS — N40.0 BENIGN PROSTATIC HYPERPLASIA WITHOUT LOWER URINARY TRACT SYMPTOMS: ICD-10-CM

## 2019-09-03 DIAGNOSIS — E87.5 HYPERKALEMIA: ICD-10-CM

## 2019-09-03 DIAGNOSIS — Z91.013 ALLERGY TO SEAFOOD: ICD-10-CM

## 2019-09-03 DIAGNOSIS — E66.01 MORBID (SEVERE) OBESITY DUE TO EXCESS CALORIES: ICD-10-CM

## 2019-09-03 DIAGNOSIS — E87.3 ALKALOSIS: ICD-10-CM

## 2019-09-03 DIAGNOSIS — J96.21 ACUTE AND CHRONIC RESPIRATORY FAILURE WITH HYPOXIA: ICD-10-CM

## 2019-09-03 DIAGNOSIS — E11.22 TYPE 2 DIABETES MELLITUS WITH DIABETIC CHRONIC KIDNEY DISEASE: ICD-10-CM

## 2019-09-03 DIAGNOSIS — I48.91 UNSPECIFIED ATRIAL FIBRILLATION: ICD-10-CM

## 2019-09-03 DIAGNOSIS — I50.32 CHRONIC DIASTOLIC (CONGESTIVE) HEART FAILURE: ICD-10-CM

## 2019-09-03 DIAGNOSIS — I10 ESSENTIAL (PRIMARY) HYPERTENSION: ICD-10-CM

## 2019-09-03 DIAGNOSIS — I27.20 PULMONARY HYPERTENSION, UNSPECIFIED: ICD-10-CM

## 2019-09-03 DIAGNOSIS — L60.3 NAIL DYSTROPHY: ICD-10-CM

## 2019-09-03 DIAGNOSIS — Z88.0 ALLERGY STATUS TO PENICILLIN: ICD-10-CM

## 2019-09-03 DIAGNOSIS — Z79.4 LONG TERM (CURRENT) USE OF INSULIN: ICD-10-CM

## 2019-09-03 DIAGNOSIS — K63.5 POLYP OF COLON: Chronic | ICD-10-CM

## 2019-09-03 PROCEDURE — 99496 TRANSJ CARE MGMT HIGH F2F 7D: CPT

## 2019-09-04 ENCOUNTER — MOBILE ON CALL (OUTPATIENT)
Age: 79
End: 2019-09-04

## 2019-09-04 VITALS
DIASTOLIC BLOOD PRESSURE: 72 MMHG | HEART RATE: 88 BPM | SYSTOLIC BLOOD PRESSURE: 118 MMHG | RESPIRATION RATE: 18 BRPM | OXYGEN SATURATION: 89 % | TEMPERATURE: 98.6 F

## 2019-09-04 PROBLEM — I27.20 PULMONARY HYPERTENSION: Status: ACTIVE | Noted: 2019-07-12

## 2019-09-04 PROBLEM — I10 BENIGN ESSENTIAL HTN: Status: ACTIVE | Noted: 2019-02-02

## 2019-09-04 PROBLEM — N40.0 BPH (BENIGN PROSTATIC HYPERPLASIA): Status: ACTIVE | Noted: 2019-02-02

## 2019-09-04 PROBLEM — L60.3 DYSTROPHIC NAIL: Status: ACTIVE | Noted: 2019-09-04

## 2019-09-04 NOTE — PHYSICAL EXAM
[General Appearance - Alert] : alert [General Appearance - Well Nourished] : well nourished [General Appearance - In No Acute Distress] : in no acute distress [General Appearance - Well Developed] : well developed [Normal Oral Mucosa] : normal oral mucosa [Sclera] : the sclera and conjunctiva were normal [No Oral Pallor] : no oral pallor [No Oral Cyanosis] : no oral cyanosis [Outer Ear] : the ears and nose were normal in appearance [Hearing Threshold Finger Rub Not [place]] : hearing was normal [Examination Of The Oral Cavity] : the lips and gums were normal [Neck Cervical Mass (___cm)] : no neck mass was observed [Exaggerated Use Of Accessory Muscles For Inspiration] : no accessory muscle use [Heart Rate And Rhythm] : heart rate was normal and rhythm regular [Heart Sounds] : normal S1 and S2 [Abdomen Soft] : soft [Abdomen Tenderness] : non-tender [Involuntary Movements] : no involuntary movements were seen [] : no rash [Cranial Nerves] : cranial nerves 2-12 were intact [No Focal Deficits] : no focal deficits [Oriented To Time, Place, And Person] : oriented to person, place, and time [Impaired Insight] : insight and judgment were intact [FreeTextEntry1] : Skin tear to RUE, upper portion of RUE.  No erythema/discharge

## 2019-09-04 NOTE — ASSESSMENT
[FreeTextEntry1] : COPD Exacerbation/P. HTN/MICHAEL/OHS\par - at baseline, on portable AVAP ([id number])\par - complete steroid taper\par - c/w current inhalers, refilled DuoNeb to pharmacy\par - outpatient FU with Pulmonology\par - CPAP at HS\par - Supplemental o2 to maintain o2 sat 88-92%\par \par HTN\par - b/p acceptable\par - c/w Amlodipine 10mg PO QD\par - low Na+ diet\par \par DM2\par - b/s at baseline per patient\par - c/w Basal/Bolus insulin, Refilled Basaglar \par \par CKD 3 w/Hyperkalemia\par - monitor BMP q2week, reviewed 9/3, K+ WNL\par - Kayexalate PRN\par - Ferrous Sulfate QD\par - c/w Supplements \par \par BPH\par - no UR\par - c/w Flomax/Finasteride\par \par Dystrophic Toenails\par - Podiatry evaluation for toe nail trim \par \par Patient seen post discharge visit, counseled on the importance of taking medications as Rx'd and to call office with new or developing symptoms.  We reviewed medication during the visit and explained their uses and importance.  Patient verbalized understanding.  Advised to call w/changes in status. \par

## 2019-09-04 NOTE — HISTORY OF PRESENT ILLNESS
[FreeTextEntry1] : Patient seen and examined at home.  Patient is homebound.  HHA present during visit.   Patient recently discharged from hospital, had a problem with his CPAP.  He was discharged home on portable AVAPs ([id number]) and is currently using it at the visit.  He reports his saturations have been in the high 80's to low 90's.  Still on Prednisone taper, now on 30mg.  Reports SOB has improved.  He is still using his CPAP at  and it is working.  Medications reviewed, no changes.  No acute complaints.  No worsening cough or edema.  Is reporting skin tear to SHERIE, sustained several days ago.  No erythema/discharge.  No acute complaints.

## 2019-09-17 ENCOUNTER — LABORATORY RESULT (OUTPATIENT)
Age: 79
End: 2019-09-17

## 2019-09-17 ENCOUNTER — OUTPATIENT (OUTPATIENT)
Dept: OUTPATIENT SERVICES | Facility: HOSPITAL | Age: 79
LOS: 1 days | Discharge: HOME | End: 2019-09-17

## 2019-09-17 DIAGNOSIS — E66.2 MORBID (SEVERE) OBESITY WITH ALVEOLAR HYPOVENTILATION: ICD-10-CM

## 2019-09-17 DIAGNOSIS — J44.9 CHRONIC OBSTRUCTIVE PULMONARY DISEASE, UNSPECIFIED: ICD-10-CM

## 2019-09-17 DIAGNOSIS — E78.5 HYPERLIPIDEMIA, UNSPECIFIED: ICD-10-CM

## 2019-09-17 DIAGNOSIS — K63.5 POLYP OF COLON: Chronic | ICD-10-CM

## 2019-09-17 DIAGNOSIS — Z98.890 OTHER SPECIFIED POSTPROCEDURAL STATES: Chronic | ICD-10-CM

## 2019-09-17 DIAGNOSIS — Z02.9 ENCOUNTER FOR ADMINISTRATIVE EXAMINATIONS, UNSPECIFIED: ICD-10-CM

## 2019-09-18 ENCOUNTER — OUTPATIENT (OUTPATIENT)
Dept: OUTPATIENT SERVICES | Facility: HOSPITAL | Age: 79
LOS: 1 days | Discharge: HOME | End: 2019-09-18

## 2019-09-18 ENCOUNTER — LABORATORY RESULT (OUTPATIENT)
Age: 79
End: 2019-09-18

## 2019-09-18 DIAGNOSIS — Z02.9 ENCOUNTER FOR ADMINISTRATIVE EXAMINATIONS, UNSPECIFIED: ICD-10-CM

## 2019-09-18 DIAGNOSIS — E66.2 MORBID (SEVERE) OBESITY WITH ALVEOLAR HYPOVENTILATION: ICD-10-CM

## 2019-09-18 DIAGNOSIS — E78.5 HYPERLIPIDEMIA, UNSPECIFIED: ICD-10-CM

## 2019-09-18 DIAGNOSIS — Z98.890 OTHER SPECIFIED POSTPROCEDURAL STATES: Chronic | ICD-10-CM

## 2019-09-18 DIAGNOSIS — K63.5 POLYP OF COLON: Chronic | ICD-10-CM

## 2019-09-19 ENCOUNTER — LABORATORY RESULT (OUTPATIENT)
Age: 79
End: 2019-09-19

## 2019-09-19 ENCOUNTER — OUTPATIENT (OUTPATIENT)
Dept: OUTPATIENT SERVICES | Facility: HOSPITAL | Age: 79
LOS: 1 days | Discharge: HOME | End: 2019-09-19

## 2019-09-19 DIAGNOSIS — Z98.890 OTHER SPECIFIED POSTPROCEDURAL STATES: Chronic | ICD-10-CM

## 2019-09-19 DIAGNOSIS — K63.5 POLYP OF COLON: Chronic | ICD-10-CM

## 2019-09-19 DIAGNOSIS — E66.2 MORBID (SEVERE) OBESITY WITH ALVEOLAR HYPOVENTILATION: ICD-10-CM

## 2019-09-19 DIAGNOSIS — Z02.9 ENCOUNTER FOR ADMINISTRATIVE EXAMINATIONS, UNSPECIFIED: ICD-10-CM

## 2019-09-19 DIAGNOSIS — E78.5 HYPERLIPIDEMIA, UNSPECIFIED: ICD-10-CM

## 2019-09-19 RX ORDER — TAMSULOSIN HYDROCHLORIDE 0.4 MG/1
0.4 CAPSULE ORAL
Qty: 30 | Refills: 5 | Status: ACTIVE | COMMUNITY
Start: 2019-02-02 | End: 1900-01-01

## 2019-09-19 RX ORDER — BUDESONIDE AND FORMOTEROL FUMARATE DIHYDRATE 160; 4.5 UG/1; UG/1
160-4.5 AEROSOL RESPIRATORY (INHALATION)
Qty: 1 | Refills: 5 | Status: ACTIVE | COMMUNITY
Start: 2018-11-14 | End: 1900-01-01

## 2019-09-19 RX ORDER — PREDNISONE 10 MG/1
10 TABLET ORAL
Qty: 30 | Refills: 5 | Status: ACTIVE | COMMUNITY
Start: 2019-01-24 | End: 1900-01-01

## 2019-09-19 RX ORDER — METOPROLOL TARTRATE 25 MG/1
25 TABLET, FILM COATED ORAL TWICE DAILY
Qty: 60 | Refills: 5 | Status: ACTIVE | COMMUNITY
Start: 2019-02-02 | End: 1900-01-01

## 2019-09-19 RX ORDER — ALBUTEROL SULFATE 108 UG/1
108 (90 BASE) AEROSOL, METERED RESPIRATORY (INHALATION)
Qty: 1 | Refills: 5 | Status: ACTIVE | COMMUNITY
Start: 2019-02-20

## 2019-09-19 RX ORDER — DOCUSATE SODIUM 100 MG
100 CAPSULE ORAL
Qty: 60 | Refills: 5 | Status: ACTIVE | COMMUNITY
Start: 2019-02-02 | End: 1900-01-01

## 2019-09-19 RX ORDER — INSULIN GLARGINE 100 [IU]/ML
100 INJECTION, SOLUTION SUBCUTANEOUS
Qty: 1 | Refills: 3 | Status: ACTIVE | COMMUNITY
Start: 2019-01-25 | End: 1900-01-01

## 2019-09-19 RX ORDER — ZOLPIDEM TARTRATE 5 MG/1
5 TABLET ORAL
Qty: 30 | Refills: 0 | Status: ACTIVE | COMMUNITY
Start: 2019-07-25 | End: 1900-01-01

## 2019-09-19 RX ORDER — OMEPRAZOLE 40 MG/1
40 CAPSULE, DELAYED RELEASE ORAL
Qty: 30 | Refills: 5 | Status: ACTIVE | COMMUNITY
Start: 2018-08-27 | End: 1900-01-01

## 2019-09-19 RX ORDER — ATORVASTATIN CALCIUM 20 MG/1
20 TABLET, FILM COATED ORAL
Qty: 90 | Refills: 3 | Status: ACTIVE | COMMUNITY
Start: 2019-02-02 | End: 1900-01-01

## 2019-09-19 RX ORDER — FINASTERIDE 5 MG/1
5 TABLET, FILM COATED ORAL DAILY
Qty: 30 | Refills: 5 | Status: ACTIVE | COMMUNITY
Start: 2019-02-02 | End: 1900-01-01

## 2019-09-19 RX ORDER — TIOTROPIUM BROMIDE 18 UG/1
18 CAPSULE ORAL; RESPIRATORY (INHALATION) DAILY
Qty: 30 | Refills: 5 | Status: ACTIVE | COMMUNITY
Start: 1900-01-01 | End: 1900-01-01

## 2019-09-19 RX ORDER — DEXTRAN 70/HYPROMELLOSE 0.1%-0.3%
0.1-0.3 DROPS OPHTHALMIC (EYE) 4 TIMES DAILY
Qty: 1 | Refills: 5 | Status: ACTIVE | COMMUNITY
Start: 2019-02-05 | End: 1900-01-01

## 2019-09-19 RX ORDER — AMLODIPINE BESYLATE 10 MG/1
10 TABLET ORAL DAILY
Qty: 30 | Refills: 5 | Status: ACTIVE | COMMUNITY
Start: 2019-02-02 | End: 1900-01-01

## 2019-09-19 RX ORDER — BLOOD SUGAR DIAGNOSTIC
STRIP MISCELLANEOUS 4 TIMES DAILY
Qty: 120 | Refills: 5 | Status: ACTIVE | COMMUNITY
Start: 2019-09-05 | End: 1900-01-01

## 2019-09-19 RX ORDER — INSULIN ASPART 100 [IU]/ML
100 INJECTION, SOLUTION INTRAVENOUS; SUBCUTANEOUS
Qty: 1 | Refills: 3 | Status: ACTIVE | COMMUNITY
Start: 2019-01-26 | End: 1900-01-01

## 2019-09-19 RX ORDER — ALBUTEROL SULFATE 90 UG/1
108 (90 BASE) AEROSOL, METERED RESPIRATORY (INHALATION)
Qty: 1 | Refills: 5 | Status: ACTIVE | COMMUNITY
Start: 2019-01-24 | End: 1900-01-01

## 2019-09-19 RX ORDER — FUROSEMIDE 40 MG/1
40 TABLET ORAL DAILY
Qty: 90 | Refills: 3 | Status: ACTIVE | COMMUNITY
Start: 2019-08-22 | End: 1900-01-01

## 2019-09-20 ENCOUNTER — OUTPATIENT (OUTPATIENT)
Dept: OUTPATIENT SERVICES | Facility: HOSPITAL | Age: 79
LOS: 1 days | Discharge: HOME | End: 2019-09-20

## 2019-09-20 ENCOUNTER — LABORATORY RESULT (OUTPATIENT)
Age: 79
End: 2019-09-20

## 2019-09-20 DIAGNOSIS — K63.5 POLYP OF COLON: Chronic | ICD-10-CM

## 2019-09-20 DIAGNOSIS — R79.89 OTHER SPECIFIED ABNORMAL FINDINGS OF BLOOD CHEMISTRY: ICD-10-CM

## 2019-09-20 DIAGNOSIS — Z98.890 OTHER SPECIFIED POSTPROCEDURAL STATES: Chronic | ICD-10-CM

## 2019-09-23 ENCOUNTER — EMERGENCY (EMERGENCY)
Facility: HOSPITAL | Age: 79
LOS: 0 days | Discharge: HOME | End: 2019-09-23
Attending: EMERGENCY MEDICINE | Admitting: EMERGENCY MEDICINE
Payer: MEDICARE

## 2019-09-23 ENCOUNTER — MOBILE ON CALL (OUTPATIENT)
Age: 79
End: 2019-09-23

## 2019-09-23 VITALS
HEIGHT: 68 IN | TEMPERATURE: 97 F | OXYGEN SATURATION: 86 % | SYSTOLIC BLOOD PRESSURE: 112 MMHG | DIASTOLIC BLOOD PRESSURE: 65 MMHG | RESPIRATION RATE: 20 BRPM | WEIGHT: 259.93 LBS | HEART RATE: 100 BPM

## 2019-09-23 DIAGNOSIS — M54.42 LUMBAGO WITH SCIATICA, LEFT SIDE: ICD-10-CM

## 2019-09-23 DIAGNOSIS — M54.5 LOW BACK PAIN: ICD-10-CM

## 2019-09-23 DIAGNOSIS — K63.5 POLYP OF COLON: Chronic | ICD-10-CM

## 2019-09-23 DIAGNOSIS — Z88.6 ALLERGY STATUS TO ANALGESIC AGENT: ICD-10-CM

## 2019-09-23 DIAGNOSIS — Z88.8 ALLERGY STATUS TO OTHER DRUGS, MEDICAMENTS AND BIOLOGICAL SUBSTANCES STATUS: ICD-10-CM

## 2019-09-23 DIAGNOSIS — Z88.0 ALLERGY STATUS TO PENICILLIN: ICD-10-CM

## 2019-09-23 DIAGNOSIS — Z98.890 OTHER SPECIFIED POSTPROCEDURAL STATES: Chronic | ICD-10-CM

## 2019-09-23 DIAGNOSIS — Z91.013 ALLERGY TO SEAFOOD: ICD-10-CM

## 2019-09-23 PROCEDURE — 99283 EMERGENCY DEPT VISIT LOW MDM: CPT

## 2019-09-23 RX ORDER — OXYCODONE AND ACETAMINOPHEN 5; 325 MG/1; MG/1
1 TABLET ORAL ONCE
Refills: 0 | Status: DISCONTINUED | OUTPATIENT
Start: 2019-09-23 | End: 2019-09-23

## 2019-09-23 RX ORDER — KETOROLAC TROMETHAMINE 30 MG/ML
30 SYRINGE (ML) INJECTION ONCE
Refills: 0 | Status: DISCONTINUED | OUTPATIENT
Start: 2019-09-23 | End: 2019-09-23

## 2019-09-23 RX ADMIN — OXYCODONE AND ACETAMINOPHEN 1 TABLET(S): 5; 325 TABLET ORAL at 01:35

## 2019-09-23 RX ADMIN — Medication 30 MILLIGRAM(S): at 01:35

## 2019-09-23 NOTE — ED PROVIDER NOTE - CARE PROVIDER_API CALL
Gerald Fischer)  Anesthesiology; Pain Medicine  [address] [place] [address]  Phone: [phone]  Fax: [phone]  Follow Up Time: 1-3 Days

## 2019-09-23 NOTE — ED PROVIDER NOTE - OBJECTIVE STATEMENT
79 Yo M with PMHx of COPD on home O2, HLD, HTN, CKD and MICHAEL presents to the ED c/o left sided low back pain that radiates down into his left leg. Pain is constant and worse with movement. Pt has been taking cyclobenzaprine without relief. Pt denies other complaints. Pt denies fever, chills, numbness, weakness, trauma.

## 2019-09-23 NOTE — ED PROVIDER NOTE - PHYSICAL EXAMINATION
VITAL SIGNS: I have reviewed nursing notes and confirm.  CONSTITUTIONAL: Elderly male sitting on chair; in no acute distress.  SKIN: Skin exam is warm and dry, no acute rash.  HEAD: Normocephalic; atraumatic.  EYES: Conjunctiva and sclera clear.  NECK: Supple; non tender.  BACK: No midline TTP. (+) TTP to left lumbar paraspinal region, TTP to left sciatic notch. (+) straight leg raise.   EXT: Normal ROM.   NEURO: Alert, oriented. Grossly unremarkable. No focal deficits.

## 2019-09-23 NOTE — ED ADULT NURSE NOTE - PMH
CKD (chronic kidney disease) stage 3, GFR 30-59 ml/min    Colon polyp  claims it was malignant  COPD (chronic obstructive pulmonary disease)    Diabetes mellitus, type 2    Emphysema lung    Enlarged prostate    GERD (gastroesophageal reflux disease)    High cholesterol    Hypertension    Oxygen dependent

## 2019-09-23 NOTE — ED PROVIDER NOTE - ATTENDING CONTRIBUTION TO CARE
I personally evaluated the patient. I reviewed the Resident’s or Physician Assistant’s note (as assigned above), and agree with the findings and plan except as documented in my note.  Chart reviewed. H/O COPD, presents with left lower back pain radiating to left knee for 5 days. No trauma. Seen by PCP and given cyclobenzaprine without relief. Exam shows clear lungs, RR S1S2, abdomen soft NT +BS, +tenderness left sciatic notch, +left straight leg raise, neuro no deficits.

## 2019-09-23 NOTE — ED PROVIDER NOTE - PATIENT PORTAL LINK FT
You can access the FollowMyHealth Patient Portal offered by [facility] by registering at the following website: [email or web address]. By joining [employer]’s FollowMyHealth portal, you will also be able to view your health information using other applications (apps) compatible with our system.

## 2019-09-23 NOTE — ED PROVIDER NOTE - NS ED ROS FT
Review of Systems  Constitutional:  No fever, chills, malaise, generalized weakness.  MS:  (+) back pain  Skin:  No skin rash, pruritis, jaundice, or lesions.  Neuro:  No headache, dizziness, loss of sensation, or focal weakness.  No change in mental status.

## 2019-09-25 ENCOUNTER — APPOINTMENT (OUTPATIENT)
Dept: GERIATRICS | Facility: HOME HEALTH | Age: 79
End: 2019-09-25
Payer: MEDICARE

## 2019-09-25 ENCOUNTER — INPATIENT (INPATIENT)
Facility: HOSPITAL | Age: 79
LOS: 12 days | End: 2019-10-08
Attending: HOSPITALIST | Admitting: HOSPITALIST
Payer: MEDICARE

## 2019-09-25 VITALS
WEIGHT: 266.1 LBS | DIASTOLIC BLOOD PRESSURE: 66 MMHG | SYSTOLIC BLOOD PRESSURE: 123 MMHG | RESPIRATION RATE: 26 BRPM | OXYGEN SATURATION: 92 % | HEART RATE: 80 BPM

## 2019-09-25 VITALS
OXYGEN SATURATION: 88 % | SYSTOLIC BLOOD PRESSURE: 112 MMHG | DIASTOLIC BLOOD PRESSURE: 64 MMHG | RESPIRATION RATE: 20 BRPM | HEART RATE: 95 BPM | TEMPERATURE: 98.2 F

## 2019-09-25 DIAGNOSIS — Z98.890 OTHER SPECIFIED POSTPROCEDURAL STATES: Chronic | ICD-10-CM
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
## 2019-09-25 DIAGNOSIS — E11.9 TYPE 2 DIABETES MELLITUS WITHOUT COMPLICATIONS: ICD-10-CM

## 2019-09-25 DIAGNOSIS — K63.5 POLYP OF COLON: Chronic | ICD-10-CM

## 2019-09-25 DIAGNOSIS — I10 ESSENTIAL (PRIMARY) HYPERTENSION: ICD-10-CM

## 2019-09-25 DIAGNOSIS — J44.9 CHRONIC OBSTRUCTIVE PULMONARY DISEASE, UNSPECIFIED: ICD-10-CM

## 2019-09-25 DIAGNOSIS — M79.605 PAIN IN LEFT LEG: ICD-10-CM

## 2019-09-25 DIAGNOSIS — N40.0 BENIGN PROSTATIC HYPERPLASIA WITHOUT LOWER URINARY TRACT SYMPTOMS: ICD-10-CM

## 2019-09-25 DIAGNOSIS — E11.9 TYPE 2 DIABETES MELLITUS W/OUT COMPLICATIONS: ICD-10-CM

## 2019-09-25 DIAGNOSIS — J96.20 ACUTE AND CHRONIC RESPIRATORY FAILURE, UNSPECIFIED WHETHER WITH HYPOXIA OR HYPERCAPNIA: ICD-10-CM

## 2019-09-25 DIAGNOSIS — N18.3 CHRONIC KIDNEY DISEASE, STAGE 3 (MODERATE): ICD-10-CM

## 2019-09-25 LAB
ALBUMIN SERPL ELPH-MCNC: 4.5 G/DL — SIGNIFICANT CHANGE UP (ref 3.5–5.2)
ALP SERPL-CCNC: 80 U/L — SIGNIFICANT CHANGE UP (ref 30–115)
ALT FLD-CCNC: 17 U/L — SIGNIFICANT CHANGE UP (ref 0–41)
ANION GAP SERPL CALC-SCNC: 12 MMOL/L — SIGNIFICANT CHANGE UP (ref 7–14)
ANION GAP SERPL CALC-SCNC: 13 MMOL/L — SIGNIFICANT CHANGE UP (ref 7–14)
APTT BLD: 40.7 SEC — HIGH (ref 27–39.2)
AST SERPL-CCNC: 14 U/L — SIGNIFICANT CHANGE UP (ref 0–41)
BASE EXCESS BLDV CALC-SCNC: 3.3 MMOL/L — HIGH (ref -2–2)
BASOPHILS # BLD AUTO: 0.09 K/UL — SIGNIFICANT CHANGE UP (ref 0–0.2)
BASOPHILS NFR BLD AUTO: 0.8 % — SIGNIFICANT CHANGE UP (ref 0–1)
BILIRUB SERPL-MCNC: 0.7 MG/DL — SIGNIFICANT CHANGE UP (ref 0.2–1.2)
BUN SERPL-MCNC: 83 MG/DL — CRITICAL HIGH (ref 10–20)
BUN SERPL-MCNC: 86 MG/DL — CRITICAL HIGH (ref 10–20)
CA-I SERPL-SCNC: 1.19 MMOL/L — SIGNIFICANT CHANGE UP (ref 1.12–1.3)
CALCIUM SERPL-MCNC: 9 MG/DL — SIGNIFICANT CHANGE UP (ref 8.5–10.1)
CALCIUM SERPL-MCNC: 9.4 MG/DL — SIGNIFICANT CHANGE UP (ref 8.5–10.1)
CHLORIDE SERPL-SCNC: 93 MMOL/L — LOW (ref 98–110)
CHLORIDE SERPL-SCNC: 95 MMOL/L — LOW (ref 98–110)
CO2 SERPL-SCNC: 30 MMOL/L — SIGNIFICANT CHANGE UP (ref 17–32)
CO2 SERPL-SCNC: 31 MMOL/L — SIGNIFICANT CHANGE UP (ref 17–32)
CREAT SERPL-MCNC: 3 MG/DL — HIGH (ref 0.7–1.5)
CREAT SERPL-MCNC: 3 MG/DL — HIGH (ref 0.7–1.5)
EOSINOPHIL # BLD AUTO: 0.29 K/UL — SIGNIFICANT CHANGE UP (ref 0–0.7)
EOSINOPHIL NFR BLD AUTO: 2.6 % — SIGNIFICANT CHANGE UP (ref 0–8)
GAS PNL BLDV: 133 MMOL/L — LOW (ref 136–145)
GAS PNL BLDV: SIGNIFICANT CHANGE UP
GLUCOSE BLDC GLUCOMTR-MCNC: 214 MG/DL — HIGH (ref 70–99)
GLUCOSE BLDC GLUCOMTR-MCNC: 318 MG/DL — HIGH (ref 70–99)
GLUCOSE SERPL-MCNC: 171 MG/DL — HIGH (ref 70–99)
GLUCOSE SERPL-MCNC: 264 MG/DL — HIGH (ref 70–99)
HCO3 BLDV-SCNC: 31 MMOL/L — HIGH (ref 22–29)
HCT VFR BLD CALC: 52.7 % — HIGH (ref 42–52)
HCT VFR BLDA CALC: 48.9 % — HIGH (ref 34–44)
HGB BLD CALC-MCNC: 16 G/DL — SIGNIFICANT CHANGE UP (ref 14–18)
HGB BLD-MCNC: 15.5 G/DL — SIGNIFICANT CHANGE UP (ref 14–18)
HOROWITZ INDEX BLDV+IHG-RTO: 40 — SIGNIFICANT CHANGE UP
IMM GRANULOCYTES NFR BLD AUTO: 0.9 % — HIGH (ref 0.1–0.3)
INR BLD: 1.11 RATIO — SIGNIFICANT CHANGE UP (ref 0.65–1.3)
LACTATE BLDV-MCNC: 1.2 MMOL/L — SIGNIFICANT CHANGE UP (ref 0.5–1.6)
LYMPHOCYTES # BLD AUTO: 1.36 K/UL — SIGNIFICANT CHANGE UP (ref 1.2–3.4)
LYMPHOCYTES # BLD AUTO: 12 % — LOW (ref 20.5–51.1)
MAGNESIUM SERPL-MCNC: 2.9 MG/DL — HIGH (ref 1.8–2.4)
MCHC RBC-ENTMCNC: 23.2 PG — LOW (ref 27–31)
MCHC RBC-ENTMCNC: 29.4 G/DL — LOW (ref 32–37)
MCV RBC AUTO: 78.9 FL — LOW (ref 80–94)
MONOCYTES # BLD AUTO: 0.95 K/UL — HIGH (ref 0.1–0.6)
MONOCYTES NFR BLD AUTO: 8.4 % — SIGNIFICANT CHANGE UP (ref 1.7–9.3)
NEUTROPHILS # BLD AUTO: 8.55 K/UL — HIGH (ref 1.4–6.5)
NEUTROPHILS NFR BLD AUTO: 75.3 % — HIGH (ref 42.2–75.2)
NRBC # BLD: 0 /100 WBCS — SIGNIFICANT CHANGE UP (ref 0–0)
NT-PROBNP SERPL-SCNC: 1555 PG/ML — HIGH (ref 0–300)
PCO2 BLDV: 59 MMHG — HIGH (ref 41–51)
PH BLDV: 7.33 — SIGNIFICANT CHANGE UP (ref 7.26–7.43)
PLATELET # BLD AUTO: 448 K/UL — HIGH (ref 130–400)
PO2 BLDV: 33 MMHG — SIGNIFICANT CHANGE UP (ref 20–40)
POTASSIUM BLDV-SCNC: 4.7 MMOL/L — SIGNIFICANT CHANGE UP (ref 3.3–5.6)
POTASSIUM SERPL-MCNC: 5.6 MMOL/L — HIGH (ref 3.5–5)
POTASSIUM SERPL-MCNC: 6.5 MMOL/L — CRITICAL HIGH (ref 3.5–5)
POTASSIUM SERPL-SCNC: 5.6 MMOL/L — HIGH (ref 3.5–5)
POTASSIUM SERPL-SCNC: 6.5 MMOL/L — CRITICAL HIGH (ref 3.5–5)
PROT SERPL-MCNC: 6.8 G/DL — SIGNIFICANT CHANGE UP (ref 6–8)
PROTHROM AB SERPL-ACNC: 12.7 SEC — SIGNIFICANT CHANGE UP (ref 9.95–12.87)
RBC # BLD: 6.68 M/UL — HIGH (ref 4.7–6.1)
RBC # FLD: 19.6 % — HIGH (ref 11.5–14.5)
SAO2 % BLDV: 56 % — SIGNIFICANT CHANGE UP
SODIUM SERPL-SCNC: 136 MMOL/L — SIGNIFICANT CHANGE UP (ref 135–146)
SODIUM SERPL-SCNC: 138 MMOL/L — SIGNIFICANT CHANGE UP (ref 135–146)
TROPONIN T SERPL-MCNC: 0.05 NG/ML — CRITICAL HIGH
TROPONIN T SERPL-MCNC: 0.07 NG/ML — CRITICAL HIGH
WBC # BLD: 11.34 K/UL — HIGH (ref 4.8–10.8)
WBC # FLD AUTO: 11.34 K/UL — HIGH (ref 4.8–10.8)

## 2019-09-25 PROCEDURE — 99223 1ST HOSP IP/OBS HIGH 75: CPT

## 2019-09-25 PROCEDURE — 76770 US EXAM ABDO BACK WALL COMP: CPT | Mod: 26

## 2019-09-25 PROCEDURE — 99496 TRANSJ CARE MGMT HIGH F2F 7D: CPT

## 2019-09-25 PROCEDURE — 71045 X-RAY EXAM CHEST 1 VIEW: CPT | Mod: 26

## 2019-09-25 PROCEDURE — 99291 CRITICAL CARE FIRST HOUR: CPT

## 2019-09-25 RX ORDER — CHLORHEXIDINE GLUCONATE 213 G/1000ML
1 SOLUTION TOPICAL ONCE
Refills: 0 | Status: COMPLETED | OUTPATIENT
Start: 2019-09-25 | End: 2019-09-27

## 2019-09-25 RX ORDER — DEXTROSE 50 % IN WATER 50 %
15 SYRINGE (ML) INTRAVENOUS ONCE
Refills: 0 | Status: DISCONTINUED | OUTPATIENT
Start: 2019-09-25 | End: 2019-10-08

## 2019-09-25 RX ORDER — IPRATROPIUM/ALBUTEROL SULFATE 18-103MCG
3 AEROSOL WITH ADAPTER (GRAM) INHALATION EVERY 6 HOURS
Refills: 0 | Status: DISCONTINUED | OUTPATIENT
Start: 2019-09-25 | End: 2019-09-27

## 2019-09-25 RX ORDER — HEPARIN SODIUM 5000 [USP'U]/ML
5000 INJECTION INTRAVENOUS; SUBCUTANEOUS THREE TIMES A DAY
Refills: 0 | Status: DISCONTINUED | OUTPATIENT
Start: 2019-09-25 | End: 2019-10-08

## 2019-09-25 RX ORDER — DEXTROSE 50 % IN WATER 50 %
25 SYRINGE (ML) INTRAVENOUS ONCE
Refills: 0 | Status: DISCONTINUED | OUTPATIENT
Start: 2019-09-25 | End: 2019-10-08

## 2019-09-25 RX ORDER — INSULIN GLARGINE 100 [IU]/ML
30 INJECTION, SOLUTION SUBCUTANEOUS AT BEDTIME
Refills: 0 | Status: DISCONTINUED | OUTPATIENT
Start: 2019-09-25 | End: 2019-09-26

## 2019-09-25 RX ORDER — INSULIN GLARGINE 100 [IU]/ML
30 INJECTION, SOLUTION SUBCUTANEOUS
Refills: 0 | Status: DISCONTINUED | OUTPATIENT
Start: 2019-09-25 | End: 2019-09-25

## 2019-09-25 RX ORDER — DIPHENHYDRAMINE HCL 50 MG
25 CAPSULE ORAL AT BEDTIME
Refills: 0 | Status: DISCONTINUED | OUTPATIENT
Start: 2019-09-25 | End: 2019-09-25

## 2019-09-25 RX ORDER — ATORVASTATIN CALCIUM 80 MG/1
20 TABLET, FILM COATED ORAL AT BEDTIME
Refills: 0 | Status: DISCONTINUED | OUTPATIENT
Start: 2019-09-25 | End: 2019-10-08

## 2019-09-25 RX ORDER — FUROSEMIDE 40 MG
20 TABLET ORAL ONCE
Refills: 0 | Status: COMPLETED | OUTPATIENT
Start: 2019-09-25 | End: 2019-09-25

## 2019-09-25 RX ORDER — FINASTERIDE 5 MG/1
5 TABLET, FILM COATED ORAL DAILY
Refills: 0 | Status: DISCONTINUED | OUTPATIENT
Start: 2019-09-25 | End: 2019-10-08

## 2019-09-25 RX ORDER — METOPROLOL TARTRATE 50 MG
25 TABLET ORAL
Refills: 0 | Status: DISCONTINUED | OUTPATIENT
Start: 2019-09-25 | End: 2019-10-08

## 2019-09-25 RX ORDER — SODIUM CHLORIDE 9 MG/ML
1000 INJECTION, SOLUTION INTRAVENOUS
Refills: 0 | Status: DISCONTINUED | OUTPATIENT
Start: 2019-09-25 | End: 2019-10-08

## 2019-09-25 RX ORDER — DOCUSATE SODIUM 100 MG
100 CAPSULE ORAL THREE TIMES A DAY
Refills: 0 | Status: DISCONTINUED | OUTPATIENT
Start: 2019-09-25 | End: 2019-10-08

## 2019-09-25 RX ORDER — MAGNESIUM OXIDE 400 MG ORAL TABLET 241.3 MG
400 TABLET ORAL
Refills: 0 | Status: DISCONTINUED | OUTPATIENT
Start: 2019-09-25 | End: 2019-10-08

## 2019-09-25 RX ORDER — OMEPRAZOLE 10 MG/1
1 CAPSULE, DELAYED RELEASE ORAL
Qty: 0 | Refills: 0 | DISCHARGE

## 2019-09-25 RX ORDER — ACETAMINOPHEN 500 MG
650 TABLET ORAL EVERY 6 HOURS
Refills: 0 | Status: DISCONTINUED | OUTPATIENT
Start: 2019-09-25 | End: 2019-09-25

## 2019-09-25 RX ORDER — NYSTATIN 500MM UNIT
4 POWDER (EA) MISCELLANEOUS
Qty: 0 | Refills: 0 | DISCHARGE

## 2019-09-25 RX ORDER — BUDESONIDE AND FORMOTEROL FUMARATE DIHYDRATE 160; 4.5 UG/1; UG/1
2 AEROSOL RESPIRATORY (INHALATION)
Refills: 0 | Status: DISCONTINUED | OUTPATIENT
Start: 2019-09-25 | End: 2019-10-08

## 2019-09-25 RX ORDER — NYSTATIN CREAM 100000 [USP'U]/G
1 CREAM TOPICAL
Qty: 0 | Refills: 0 | DISCHARGE

## 2019-09-25 RX ORDER — INSULIN LISPRO 100/ML
VIAL (ML) SUBCUTANEOUS
Refills: 0 | Status: DISCONTINUED | OUTPATIENT
Start: 2019-09-25 | End: 2019-09-26

## 2019-09-25 RX ORDER — FUROSEMIDE 40 MG
1 TABLET ORAL
Qty: 0 | Refills: 0 | DISCHARGE

## 2019-09-25 RX ORDER — SODIUM POLYSTYRENE SULFONATE 4.1 MEQ/G
30 POWDER, FOR SUSPENSION ORAL ONCE
Refills: 0 | Status: COMPLETED | OUTPATIENT
Start: 2019-09-25 | End: 2019-09-25

## 2019-09-25 RX ORDER — CHOLECALCIFEROL (VITAMIN D3) 125 MCG
1000 CAPSULE ORAL DAILY
Refills: 0 | Status: DISCONTINUED | OUTPATIENT
Start: 2019-09-25 | End: 2019-10-08

## 2019-09-25 RX ORDER — ALBUTEROL 90 UG/1
2.5 AEROSOL, METERED ORAL ONCE
Refills: 0 | Status: COMPLETED | OUTPATIENT
Start: 2019-09-25 | End: 2019-09-25

## 2019-09-25 RX ORDER — CYCLOBENZAPRINE HYDROCHLORIDE 10 MG/1
1 TABLET, FILM COATED ORAL
Qty: 0 | Refills: 0 | DISCHARGE

## 2019-09-25 RX ORDER — INFLUENZA VIRUS VACCINE 15; 15; 15; 15 UG/.5ML; UG/.5ML; UG/.5ML; UG/.5ML
0.5 SUSPENSION INTRAMUSCULAR ONCE
Refills: 0 | Status: DISCONTINUED | OUTPATIENT
Start: 2019-09-25 | End: 2019-10-08

## 2019-09-25 RX ORDER — GLUCAGON INJECTION, SOLUTION 0.5 MG/.1ML
1 INJECTION, SOLUTION SUBCUTANEOUS ONCE
Refills: 0 | Status: DISCONTINUED | OUTPATIENT
Start: 2019-09-25 | End: 2019-10-08

## 2019-09-25 RX ORDER — PANTOPRAZOLE SODIUM 20 MG/1
40 TABLET, DELAYED RELEASE ORAL
Refills: 0 | Status: DISCONTINUED | OUTPATIENT
Start: 2019-09-25 | End: 2019-10-08

## 2019-09-25 RX ORDER — ASCORBIC ACID 60 MG
500 TABLET,CHEWABLE ORAL DAILY
Refills: 0 | Status: DISCONTINUED | OUTPATIENT
Start: 2019-09-25 | End: 2019-10-08

## 2019-09-25 RX ORDER — TAMSULOSIN HYDROCHLORIDE 0.4 MG/1
0.4 CAPSULE ORAL AT BEDTIME
Refills: 0 | Status: DISCONTINUED | OUTPATIENT
Start: 2019-09-25 | End: 2019-10-08

## 2019-09-25 RX ORDER — SODIUM POLYSTYRENE SULFONATE 4.1 MEQ/G
15 POWDER, FOR SUSPENSION ORAL ONCE
Refills: 0 | Status: COMPLETED | OUTPATIENT
Start: 2019-09-25 | End: 2019-09-25

## 2019-09-25 RX ORDER — IPRATROPIUM/ALBUTEROL SULFATE 18-103MCG
3 AEROSOL WITH ADAPTER (GRAM) INHALATION
Refills: 0 | Status: COMPLETED | OUTPATIENT
Start: 2019-09-25 | End: 2019-09-25

## 2019-09-25 RX ORDER — INSULIN GLARGINE 100 [IU]/ML
30 INJECTION, SOLUTION SUBCUTANEOUS EVERY MORNING
Refills: 0 | Status: DISCONTINUED | OUTPATIENT
Start: 2019-09-25 | End: 2019-09-25

## 2019-09-25 RX ORDER — DEXTROSE 50 % IN WATER 50 %
12.5 SYRINGE (ML) INTRAVENOUS ONCE
Refills: 0 | Status: DISCONTINUED | OUTPATIENT
Start: 2019-09-25 | End: 2019-10-08

## 2019-09-25 RX ORDER — AMLODIPINE BESYLATE 2.5 MG/1
10 TABLET ORAL DAILY
Refills: 0 | Status: DISCONTINUED | OUTPATIENT
Start: 2019-09-25 | End: 2019-09-30

## 2019-09-25 RX ORDER — AMLODIPINE BESYLATE 2.5 MG/1
1 TABLET ORAL
Qty: 0 | Refills: 0 | DISCHARGE

## 2019-09-25 RX ORDER — HYDROXYZINE HCL 10 MG
25 TABLET ORAL AT BEDTIME
Refills: 0 | Status: DISCONTINUED | OUTPATIENT
Start: 2019-09-25 | End: 2019-10-08

## 2019-09-25 RX ADMIN — MAGNESIUM OXIDE 400 MG ORAL TABLET 400 MILLIGRAM(S): 241.3 TABLET ORAL at 18:09

## 2019-09-25 RX ADMIN — Medication 3 MILLILITER(S): at 19:59

## 2019-09-25 RX ADMIN — ALBUTEROL 2.5 MILLIGRAM(S): 90 AEROSOL, METERED ORAL at 21:34

## 2019-09-25 RX ADMIN — ATORVASTATIN CALCIUM 20 MILLIGRAM(S): 80 TABLET, FILM COATED ORAL at 21:39

## 2019-09-25 RX ADMIN — Medication 3 MILLILITER(S): at 11:52

## 2019-09-25 RX ADMIN — INSULIN GLARGINE 30 UNIT(S): 100 INJECTION, SOLUTION SUBCUTANEOUS at 21:42

## 2019-09-25 RX ADMIN — SODIUM POLYSTYRENE SULFONATE 30 GRAM(S): 4.1 POWDER, FOR SUSPENSION ORAL at 18:09

## 2019-09-25 RX ADMIN — Medication 3 MILLILITER(S): at 11:54

## 2019-09-25 RX ADMIN — Medication 100 MILLIGRAM(S): at 21:39

## 2019-09-25 RX ADMIN — Medication 25 MILLIGRAM(S): at 23:42

## 2019-09-25 RX ADMIN — HEPARIN SODIUM 5000 UNIT(S): 5000 INJECTION INTRAVENOUS; SUBCUTANEOUS at 21:42

## 2019-09-25 RX ADMIN — Medication 20 MILLIGRAM(S): at 18:10

## 2019-09-25 RX ADMIN — Medication 40 MILLIGRAM(S): at 18:10

## 2019-09-25 RX ADMIN — TAMSULOSIN HYDROCHLORIDE 0.4 MILLIGRAM(S): 0.4 CAPSULE ORAL at 21:38

## 2019-09-25 RX ADMIN — SODIUM POLYSTYRENE SULFONATE 15 GRAM(S): 4.1 POWDER, FOR SUSPENSION ORAL at 22:49

## 2019-09-25 RX ADMIN — Medication 2: at 16:52

## 2019-09-25 NOTE — ED PROVIDER NOTE - OBJECTIVE STATEMENT
78 y.o male w/ hx of COPD, CHF, CKD, DM, presents to the ED for evaluation of respiratory distress.  Per EMS was called because pt could not get up.  Upon arrival pt was satting in 70s using accessory muscle use.  Gave pt 125 mg solumedrol, 2 Combivent and started pt on CPAP.  Upon arrival to ED O2 sats mid 80s on CPAP, moderate accessory muscle use, not able to speak in full sentences.  States he had mechanical trip and fall while getting up from sitting position w/ no head injury.  Denies any pain.  Denies chest pain, orthopnea, fever, chills, N/V, headache.  No further complaints at this time. I am unable to obtain a comprehensive history, review of systems, past medical history, and/or physical exam due to constraints imposed by the urgency of the patient's clinical condition and/or mental status.

## 2019-09-25 NOTE — ED PROVIDER NOTE - NS ED ROS FT
Constitutional: See HPI.  Eyes: No visual changes, eye pain or discharge.   ENMT: No hearing changes, pain, discharge or infections  Cardiac: + edema. No chest pain with exertion.  Respiratory: + cough, + respiratory distress. No cough or respiratory distress..  GI: No nausea, vomiting, diarrhea or abdominal pain.  : No dysuria, frequency or burning. No Discharge  MS: No myalgia, muscle weakness, joint pain or back pain.  Neuro: No headache or weakness  Skin: No skin rash.  Except as documented in the HPI, all other systems are negative.

## 2019-09-25 NOTE — CONSULT NOTE ADULT - SUBJECTIVE AND OBJECTIVE BOX
Patient is a 78y old  Male who presents with a chief complaint of SOB and leg pain (25 Sep 2019 14:50)      HPI:  Patient is an 77yo M w/ PMH COPD, HTN, CHF, and DMt2 p/w SOB and left leg pain x few days. Patient states that he came in 2/2 to leg pain, but pt was seen by PA at home visits who likely advised patient to call EMS 2/2 SpO2 in 70s despite home O2. As per ED patient was found to be sating at 77% when EMS arrived at his home - as per ED patient was given IV solumedrol and placed on CPAP by EMS. Once arrived patient was placed on BiPAP and given one duonebs treatment as well as a dose of levaquin. Pt states that he is on O2 at home (5 liters) and typically sats at 86-87%. Pt states that he was feeling mildly SOB, but his primary concern is his leg. Pt states that he has been feeling weakness and pain in his left leg x few days. Pt reports pain in his low back, groin and knee areas, but denies radiation of pain from low back through to knee. Pt states that he typically walks at home w/o assistive devices, but these past few days he has been unable to ambulate. Patient also states that pain increases when lying down, which has left him unable to sleep. Pt is able to move extremity w/o resistance, but has pain and weakness against resistance Pt denies HA, dizziness, NVD, chest pain, abdominal pain, change in bowel habits, change in urination.     Patient is afebrile w/ WBC: 11.3. K is elevated to 5.6. BUN/cr: 83/3.0. First set of troponin: 0.07. BNP: 1555. SpO2: 93% on BIPAP. (25 Sep 2019 14:50)  not on distress at his baseline from resp point   he presented for lower ext weakness and pain   also compplain of pain in left groin     PAST MEDICAL & SURGICAL HISTORY:  Chronic CHF  CKD (chronic kidney disease) stage 3, GFR 30-59 ml/min  Colon polyp: claims it was malignant  High cholesterol  GERD (gastroesophageal reflux disease)  Enlarged prostate  COPD (chronic obstructive pulmonary disease): On home O2 - 5L  Diabetes mellitus, type 2  Hypertension  History of hemorrhoidectomy  Dysplastic colon polyp: removed      FAMILY HISTORY:  No pertinent family history in first degree relatives    Family history: No family cardiovascular system   Occupation:  Alochol: Denied  Smoking: Denied  Drug Use: Denied  Marital Status:           Allergies    fish (Other)  iodine (Hives)  penicillin (Unknown)  shellfish (Other)    Intolerances    aspirin (Other)      Home Medications:  amLODIPine 10 mg oral tablet: 1 tab(s) orally once a day (25 Sep 2019 16:04)  ascorbic acid 500 mg oral tablet: 1 tab(s) orally once a day (25 Sep 2019 16:04)  atorvastatin 20 mg oral tablet: 1 tab(s) orally once a day (at bedtime) (25 Sep 2019 16:04)  cholecalciferol oral tablet: 1000 unit(s) orally once a day (25 Sep 2019 16:04)  cyclobenzaprine 10 mg oral tablet: 1 tab(s) orally 3 times a day (25 Sep 2019 16:04)  docusate sodium 100 mg oral capsule: 1 cap(s) orally 3 times a day (25 Sep 2019 16:04)  finasteride 5 mg oral tablet: 1 tab(s) orally once a day (25 Sep 2019 16:04)  furosemide 20 mg oral tablet: 1 tab(s) orally once a day (25 Sep 2019 16:04)  magnesium oxide 400 mg (241.3 mg elemental magnesium) oral tablet: 1 tab(s) orally 3 times a day (with meals) (25 Sep 2019 16:04)  metoprolol tartrate 25 mg oral tablet: 1 tab(s) orally 2 times a day (25 Sep 2019 16:04)  nystatin 100,000 units/g topical cream: Apply topically to affected area 3 times a day (25 Sep 2019 16:04)  nystatin 100,000 units/mL oral suspension: 4 milliliter(s) orally 4 times a day (25 Sep 2019 16:04)  omeprazole 20 mg oral delayed release capsule: 1 cap(s) orally once a day (25 Sep 2019 16:04)  Percocet 5/325 oral tablet: 1 tab(s) orally every 6 hours, As Needed (25 Sep 2019 16:04)  predniSONE 10 mg oral tablet: 1 tab(s) orally once a day (25 Sep 2019 16:04)  tamsulosin 0.4 mg oral capsule: 1 cap(s) orally once a day (at bedtime) (25 Sep 2019 16:04)      ROS: as in HPI; All other systems reviewed are negative        PHYSICAL EXAM:  Vital Signs Last 24 Hrs  T(C): 36.2 (25 Sep 2019 15:03), Max: 36.2 (25 Sep 2019 15:03)  T(F): 97.1 (25 Sep 2019 15:03), Max: 97.1 (25 Sep 2019 15:03)  HR: 113 (25 Sep 2019 15:03) (76 - 113)  BP: 108/55 (25 Sep 2019 15:03) (108/55 - 123/66)  BP(mean): --  RR: 16 (25 Sep 2019 15:03) (16 - 26)  SpO2: 94% (25 Sep 2019 15:47) (92% - 96%)      GENERAL: NAD, well-groomed, well-developed  HEAD:  Atraumatic, Normocephalic  EYES: EOMI, PERRLA, conjunctiva and sclera clear  ENMT: No tonsillar erythema, exudates, or enlargement; Moist mucous membranes, Good dentition, No lesions  NECK: Supple, No JVD, Normal thyroid  NERVOUS SYSTEM:  Alert & Oriented X3, Good concentration; Motor Strength 5/5 B/L upper and lower extremities; DTRs 2+ intact and symmetric  CHEST/LUNG: Clear to percussion bilaterally; No rales, rhonchi, wheezing, or rubs  HEART: Regular rate and rhythm; No murmurs, rubs, or gallops  ABDOMEN: Soft, Nontender, Nondistended; Bowel sounds present  EXTREMITIES:  2 edema    LYMPH: No lymphadenopathy noted  SKIN: No rashes or lesions    HOSPITAL MEDICATIONS:  MEDICATIONS  (STANDING):  ALBUTerol/ipratropium for Nebulization 3 milliLiter(s) Nebulizer every 6 hours  amLODIPine   Tablet 10 milliGRAM(s) Oral daily  ascorbic acid 500 milliGRAM(s) Oral daily  atorvastatin 20 milliGRAM(s) Oral at bedtime  buDESOnide 160 MICROgram(s)/formoterol 4.5 MICROgram(s) Inhaler 2 Puff(s) Inhalation two times a day  chlorhexidine 4% Liquid 1 Application(s) Topical once  cholecalciferol 1000 Unit(s) Oral daily  dextrose 5%. 1000 milliLiter(s) (50 mL/Hr) IV Continuous <Continuous>  dextrose 50% Injectable 12.5 Gram(s) IV Push once  dextrose 50% Injectable 25 Gram(s) IV Push once  dextrose 50% Injectable 25 Gram(s) IV Push once  docusate sodium 100 milliGRAM(s) Oral three times a day  finasteride 5 milliGRAM(s) Oral daily  furosemide   Injectable 20 milliGRAM(s) IV Push once  heparin  Injectable 5000 Unit(s) SubCutaneous three times a day  influenza   Vaccine 0.5 milliLiter(s) IntraMuscular once  insulin glargine Injectable (LANTUS) 30 Unit(s) SubCutaneous every morning  insulin glargine Injectable (LANTUS) 30 Unit(s) SubCutaneous at bedtime  insulin lispro (HumaLOG) corrective regimen sliding scale   SubCutaneous three times a day before meals  magnesium oxide 400 milliGRAM(s) Oral three times a day with meals  methylPREDNISolone sodium succinate Injectable 40 milliGRAM(s) IV Push every 6 hours  metoprolol tartrate 25 milliGRAM(s) Oral two times a day  pantoprazole    Tablet 40 milliGRAM(s) Oral before breakfast  sodium polystyrene sulfonate Suspension 30 Gram(s) Oral once  tamsulosin 0.4 milliGRAM(s) Oral at bedtime    MEDICATIONS  (PRN):  dextrose 40% Gel 15 Gram(s) Oral once PRN Blood Glucose LESS THAN 70 milliGRAM(s)/deciliter  glucagon  Injectable 1 milliGRAM(s) IntraMuscular once PRN Glucose LESS THAN 70 milligrams/deciliter      LABS:                        15.5   11.34 )-----------( 448      ( 25 Sep 2019 11:30 )             52.7     09-25    138  |  95<L>  |  83<HH>  ----------------------------<  171<H>  5.6<H>   |  31  |  3.0<H>    Ca    9.4      25 Sep 2019 11:30  Mg     2.9     09-25    TPro  6.8  /  Alb  4.5  /  TBili  0.7  /  DBili  x   /  AST  14  /  ALT  17  /  AlkPhos  80  09-25    PT/INR - ( 25 Sep 2019 11:30 )   PT: 12.70 sec;   INR: 1.11 ratio         PTT - ( 25 Sep 2019 11:30 )  PTT:40.7 sec      Venous Blood Gas:  09-25 @ 11:45  7.33/59/33/31/56  VBG Lactate: 1.2          RADIOLOGY:  [ ] Reviewed and interpreted by me  mild congestion worsening    ECHO:    Point of Care Ultrasound Findings;    PFT:

## 2019-09-25 NOTE — CHART NOTE - NSCHARTNOTEFT_GEN_A_CORE
Called from lab, pt with elevated potassium level (6.5). STAT albuterol and kayexalate ordered. Will repeat BMP in the AM

## 2019-09-25 NOTE — H&P ADULT - NSHPLABSRESULTS_GEN_ALL_CORE
15.5   11.34 )-----------( 448      ( 25 Sep 2019 11:30 )             52.7       09-25    138  |  95<L>  |  83<HH>  ----------------------------<  171<H>  5.6<H>   |  31  |  3.0<H>    Ca    9.4      25 Sep 2019 11:30  Mg     2.9     09-25    TPro  6.8  /  Alb  4.5  /  TBili  0.7  /  DBili  x   /  AST  14  /  ALT  17  /  AlkPhos  80  09-25          Magnesium, Serum: 2.9 mg/dL (09-25-19 @ 11:30)              PT/INR - ( 25 Sep 2019 11:30 )   PT: 12.70 sec;   INR: 1.11 ratio         PTT - ( 25 Sep 2019 11:30 )  PTT:40.7 sec    Lactate Trend      CARDIAC MARKERS ( 25 Sep 2019 11:30 )  x     / 0.07 ng/mL / x     / x     / x        < from: Xray Chest 1 View-PORTABLE IMMEDIATE (09.25.19 @ 12:50) >    Impression:      No radiographic evidence of acute cardiopulmonary disease. Cardiomegaly,   bilateral opacities and pulmonary arterial hypertension, unchanged.                      SHERRIE DIXON M.D., ATTENDING RADIOLOGIST  This document has been electronically signed. Sep 25 2019  2:17PM    < end of copied text >

## 2019-09-25 NOTE — ED PROVIDER NOTE - CARE PLAN
Principal Discharge DX:	Acute on chronic respiratory failure  Secondary Diagnosis:	COPD (chronic obstructive pulmonary disease)  Secondary Diagnosis:	JAMES (acute kidney injury)  Secondary Diagnosis:	Elevated troponin

## 2019-09-25 NOTE — ED PROVIDER NOTE - CLINICAL SUMMARY MEDICAL DECISION MAKING FREE TEXT BOX
Patient presents for resp distress he was started on bipap upon arrival and has improved at this time. sats have improved resp effort improved I will admit for further management

## 2019-09-25 NOTE — H&P ADULT - NSICDXPASTMEDICALHX_GEN_ALL_CORE_FT
PAST MEDICAL HISTORY:  Chronic CHF     CKD (chronic kidney disease) stage 3, GFR 30-59 ml/min     Colon polyp claims it was malignant    COPD (chronic obstructive pulmonary disease) On home O2 - 5L    Diabetes mellitus, type 2     Enlarged prostate     GERD (gastroesophageal reflux disease)     High cholesterol     Hypertension

## 2019-09-25 NOTE — ED ADULT TRIAGE NOTE - CHIEF COMPLAINT QUOTE
pt EMS notification, adult respiratory distress: pt called ems for inability to ambulate on EMS arrival pt noted to have Sp02 77% on nasal bipap, placed on CPAP by EMS, ER notification team at bedside with pt on arrival. Give Solumedrol 125mg IM, 2 Combivent nebs

## 2019-09-25 NOTE — PHYSICAL EXAM
[General Appearance - Alert] : alert [General Appearance - In No Acute Distress] : in no acute distress [General Appearance - Well Nourished] : well nourished [General Appearance - Well Developed] : well developed [Sclera] : the sclera and conjunctiva were normal [Normal Oral Mucosa] : normal oral mucosa [No Oral Pallor] : no oral pallor [No Oral Cyanosis] : no oral cyanosis [Outer Ear] : the ears and nose were normal in appearance [Hearing Threshold Finger Rub Not Stark] : hearing was normal [Examination Of The Oral Cavity] : the lips and gums were normal [Neck Cervical Mass (___cm)] : no neck mass was observed [Exaggerated Use Of Accessory Muscles For Inspiration] : no accessory muscle use [Heart Rate And Rhythm] : heart rate was normal and rhythm regular [Heart Sounds] : normal S1 and S2 [Abdomen Soft] : soft [Abdomen Tenderness] : non-tender [Involuntary Movements] : no involuntary movements were seen [] : no rash [Cranial Nerves] : cranial nerves 2-12 were intact [FreeTextEntry1] : + BLE weakness with inability to stand or transfer [Oriented To Time, Place, And Person] : oriented to person, place, and time [Impaired Insight] : insight and judgment were intact

## 2019-09-25 NOTE — H&P ADULT - HISTORY OF PRESENT ILLNESS
Patient is an 79yo M w/ PMH COPD, HTN, CHF, and DMt2 p/w SOB and left leg pain x few days. Patient states that he came in 2/2 to leg pain, but pt was seen by PA at home visits who likely advised patient to call EMS 2/2 SpO2 in 70s despite home O2. As per ED patient was found to be sating at 77% when EMS arrived at his home - as per ED patient was given IV solumedrol and placed on CPAP by EMS. Once arrived patient was placed on BiPAP and given one duonebs treatment as well as a dose of levaquin. Pt states that he is on O2 at home (5 liters) and typically sats at 86-87%. Pt states that he was feeling mildly SOB, but his primary concern is his leg. Pt states that he has been feeling weakness and pain in his left leg x few days. Pt reports pain in his low back, groin and knee areas, but denies radiation of pain from low back through to knee. Pt states that he typically walks at home w/o assistive devices, but these past few days he has been unable to ambulate. Patient also states that pain increases when lying down, which has left him unable to sleep. Pt is able to move extremity w/o resistance, but Patient is an 77yo M w/ PMH COPD, HTN, CHF, and DMt2 p/w SOB and left leg pain x few days. Patient states that he came in 2/2 to leg pain, but pt was seen by PA at home visits who likely advised patient to call EMS 2/2 SpO2 in 70s despite home O2. As per ED patient was found to be sating at 77% when EMS arrived at his home - as per ED patient was given IV solumedrol and placed on CPAP by EMS. Once arrived patient was placed on BiPAP and given one duonebs treatment as well as a dose of levaquin. Pt states that he is on O2 at home (5 liters) and typically sats at 86-87%. Pt states that he was feeling mildly SOB, but his primary concern is his leg. Pt states that he has been feeling weakness and pain in his left leg x few days. Pt reports pain in his low back, groin and knee areas, but denies radiation of pain from low back through to knee. Pt states that he typically walks at home w/o assistive devices, but these past few days he has been unable to ambulate. Patient also states that pain increases when lying down, which has left him unable to sleep. Pt is able to move extremity w/o resistance, but has pain and weakness against resistance Pt denies HA, dizziness, NVD, chest pain, abdominal pain, change in bowel habits, change in urination.     Patient is afebrile w/ WBC: 11.3. K is elevated to 5.6. BUN/cr: 83/3.0. First set of troponin: 0.07. BNP: 1555. SpO2: 93% on BIPAP. Patient is an 79yo M w/ PMH COPD, HTN, CHF, and DMt2 p/w SOB and left leg pain x few days. Patient states that he came in 2/2 to leg pain, but pt was seen by PA at home visits who likely advised patient to call EMS 2/2 SpO2 in 70s despite home O2. As per ED patient was found to be sating at 77% when EMS arrived at his home - as per ED patient was given IV solumedrol and placed on CPAP by EMS. Once arrived patient was placed on BiPAP and given one duonebs treatment as well as a dose of levaquin. Pt states that he is on O2 at home (5 liters) and typically sats at 86-87%. Pt states that he was feeling mildly SOB, but his primary concern is his leg. Pt states that he has been feeling weakness and pain in his left leg x few days. Pt reports pain in his low back, groin and hip pain. Pt states that he typically walks at home w/o assistive devices, but these past few days he has been unable to ambulate. Patient also states that pain increases when lying down, which has left him unable to sleep. hip pain/back pain started after fall.  Pt is able to move extremity w/o resistance, but has pain and weakness against resistance Pt denies HA, dizziness, NVD, chest pain, abdominal pain, change in bowel habits, change in urination.     Patient is afebrile w/ WBC: 11.3. K is elevated to 5.6. BUN/cr: 83/3.0. First set of troponin: 0.07. BNP: 1555. SpO2: 93% on BIPAP.     Patient reports blurry vision at time for the past several months

## 2019-09-25 NOTE — ED ADULT NURSE REASSESSMENT NOTE - NS ED NURSE REASSESS COMMENT FT1
pt has bandage to right arm . Pt refused dressing change pt states his skin tore off. RN made aware in report

## 2019-09-25 NOTE — ED PROVIDER NOTE - PHYSICAL EXAMINATION
CONST: Well appearing in NAD  EYES:  Sclera and conjunctiva clear.  CARD: Normal S1 S2; Normal rate and rhythm  RESP: Equal BS B/L, + wheezing bilaterally, + accessory muscle use, + distress, not able to speak in full sentences.   GI: Soft, non-tender, non-distended.  MS: Normal ROM in all extremities. 2+ edema of lower extremities, no calf pain, radial pulses 2+ bilaterally  SKIN: Warm, dry, no acute rashes. Good turgor  NEURO: A&Ox3, No focal deficits. Strength 5/5 with no sensory deficits. Steady gait

## 2019-09-25 NOTE — HISTORY OF PRESENT ILLNESS
[FreeTextEntry1] : Patient seen and examined at home.  Patient is homebound.  Son and HHA present during visit.  Patient has been complaining of left hip pain for the past week.  He was seen in the ED and discharged home on Percocet.  He called our service multiple times and was RX'd Flexeril and Lidoderm patches, all without relief.  Upon arrival to the house patient verbalizes that he had a fall last night, mechanical, and landed on his backside.  EMS was activated but the patient refused transport to the hospital.  Since then he has been unable to ambulate, unable to stand and unable to leave his WC.  He reports that he has not slept in his bed for 1 week 2/2 the pain and his progressing BLE.  Patient denies any worsening SOB.  No CP. No abdominal complaints.  Patient reports he is unable to get to the bathroom any longer as well and that neither his sons nor his HHA can assist him as he is too heavy.

## 2019-09-25 NOTE — H&P ADULT - ASSESSMENT
Patient is an 77yo M w/ PMH COPD, HTN, CHF, and DMt2 p/w SOB and left leg pain x few days. Patient states that he came in 2/2 to leg pain, but pt was seen by PA at home visits who likely advised patient to call EMS 2/2 SpO2 in 70s despite home O2.      Plan:   - Admit patient to inpatient level of care - medicine  - Monitor SPO2   - Continue high flow O2 on BiPAP  - One dose IV lasix 20mg  - IV solumedrol 40mg Q6h  - Duoneb treatments Q6h  - PO levaquin 500mg QD   - Doppler of LE to evaluate leg pain  - DASH diet   - GI prophylaxis: protonix  - VTE prophylaxis: SQ heparin Patient is an 77yo M w/ PMH COPD, HTN, CHF, and DMt2 p/w SOB and left leg pain x few days. Patient states that he came in 2/2 to leg pain, but pt was seen by PA at home visits who likely advised patient to call EMS 2/2 SpO2 in 70s despite home O2.    1. Chronic Respiratory failure   -CXR noticed  -Continue with neb tx, Oxygen, steriod, IV lasix x1 dose, increase lasix dose to BID, and PO levaquin  -Doppler US of LE  -Pulmonary consult    2. Abnormal trop  -EKG shows no ischemic changes  -Asymptomatic  -Will obtain serial trop, and TTE, and cardiology consult    3.  Left hip/Leg pain  -S/P fall  -Will obtain X-ray of the hip, and lumbar spine  -pain medications  -PT consult    4. Blurry vision  -Possible due to cataract, and possible steroid  -Head CT scan  -neurologically intact  -neuro check    5. DM2  -Continue with home dose of lantus, and ISS  -Monitor POC      6.  HTN  -Continue with home medications, and Monitor BP    patient opted for DNR/DNI after long discussion  overall prognosis. poor    Plan of care was discussed with patient  in great details, All questions were answered to their satisfaction  Seems to understand, and in agreement Patient is an 79yo M w/ PMH COPD, HTN, CHF, and DMt2 p/w SOB and left leg pain x few days. Patient states that he came in 2/2 to leg pain, but pt was seen by PA at home visits who likely advised patient to call EMS 2/2 SpO2 in 70s despite home O2.    1. Chronic Respiratory failure   -CXR noticed  -Continue with neb tx, Oxygen, steriod, IV lasix x1 dose, increase lasix dose to BID, and PO levaquin  -Doppler US of LE  -Pulmonary consult    2. Abnormal trop  -EKG shows no ischemic changes  -Asymptomatic  -Will obtain serial trop, and TTE, and cardiology consult    3.  Left hip/Leg pain  -S/P fall  -Will obtain X-ray of the hip, and lumbar spine  -pain medications  -PT consult    4. Blurry vision  -Possible due to cataract, and possible steroid  -Head CT scan  -neurologically intact  -neuro check    5. DM2  -Continue with home dose of lantus, and ISS  -Monitor POC      6.  HTN  -Continue with home medications, and Monitor BP    7.  JAMES on CKD  -Unclear Etiology  -renal US  -monitor renal function  -Avoid nephrotoxin  -Nephrology consult     8.  Hyperkalemia  -Kayexalate X1 dose  -repeat BMP in pm    patient opted for DNR/DNI after long discussion  overall prognosis. poor    Plan of care was discussed with patient  in great details, All questions were answered to their satisfaction  Seems to understand, and in agreement

## 2019-09-25 NOTE — ASSESSMENT
[FreeTextEntry1] : s/p Fall with acute onset of left hip pain along with inability to stand and ambulate\par - I spoke with the patient and his son at length, patient reports he has not been able to stand since the incident this morning.  He still has pain to the left hip despite no recent trauma.  No medications given thus far have helped.  Patient also now not able to go to the bathroom 2/2 his weakness and has been urinary/defecating in his chair.  Patient is UNABLE to take care of himself at home despite HHA and familial involvement.  Patient also with worsening BLE edema though respiratory status is at baseline.  Patient sent to ED for further evaluation and likely placement in SNF once stabilized.

## 2019-09-25 NOTE — CONSULT NOTE ADULT - ASSESSMENT
Impression:  JAMES   lower ext edema   Abd pain ??   CRF hypoxic and hypercapnia   copd stable   chf         Plan:  no need for solumedrol switch back to prednisone 20 mg from lung wise he is at his baseline   continue home inhaler   renal consult   do renal US and bladder US if retain need chapman   G sx consult for left groin/ flank pain consider ct abd for kidney stone   follow renal for lasix patient need diuresis   repeat bmP 7pm check K level   case discussed with hospitalist covering   doppler lower ext stat  bipap at night and prn   keep on oxygen to keep pox 89-88%

## 2019-09-25 NOTE — ED PROVIDER NOTE - ATTENDING CONTRIBUTION TO CARE
I was present for and supervised the key and critical aspects of the procedures performed during the care of the patient. Patient is an 78 y.o male w/ hx of COPD, CHF, CKD, DM, presents to the ED for evaluation of respiratory distress.  Per EMS was called because pt could not get up.  Upon arrival pt was satting in 70s using accessory muscle use.  Gave pt 125 mg solumedrol, 2 Combivent and started pt on CPAP.  Upon arrival to ED O2 sats mid 80s on CPAP, moderate accessory muscle use, not able to speak in full sentences.  States he had mechanical trip and fall while getting up from sitting position w/ no head injury.  Denies any pain.  Denies chest pain, orthopnea, fever, chills, N/V, headache.   On physical exam patient has increased wob with accessory use he is nc/at perrla eomi oropharynx clear patient has decreased lung sounds b/l, abd-soft nt nd bs+ ext pitting edema b/l  A/P bipap initiated I will continue to monitor patient at this time

## 2019-09-26 LAB
ANION GAP SERPL CALC-SCNC: 13 MMOL/L — SIGNIFICANT CHANGE UP (ref 7–14)
ANION GAP SERPL CALC-SCNC: 18 MMOL/L — HIGH (ref 7–14)
BUN SERPL-MCNC: 84 MG/DL — CRITICAL HIGH (ref 10–20)
BUN SERPL-MCNC: 89 MG/DL — CRITICAL HIGH (ref 10–20)
CALCIUM SERPL-MCNC: 8.7 MG/DL — SIGNIFICANT CHANGE UP (ref 8.5–10.1)
CALCIUM SERPL-MCNC: 8.9 MG/DL — SIGNIFICANT CHANGE UP (ref 8.5–10.1)
CHLORIDE SERPL-SCNC: 94 MMOL/L — LOW (ref 98–110)
CHLORIDE SERPL-SCNC: 97 MMOL/L — LOW (ref 98–110)
CO2 SERPL-SCNC: 25 MMOL/L — SIGNIFICANT CHANGE UP (ref 17–32)
CO2 SERPL-SCNC: 31 MMOL/L — SIGNIFICANT CHANGE UP (ref 17–32)
CREAT SERPL-MCNC: 3 MG/DL — HIGH (ref 0.7–1.5)
CREAT SERPL-MCNC: 3.1 MG/DL — HIGH (ref 0.7–1.5)
GLUCOSE BLDC GLUCOMTR-MCNC: 173 MG/DL — HIGH (ref 70–99)
GLUCOSE BLDC GLUCOMTR-MCNC: 235 MG/DL — HIGH (ref 70–99)
GLUCOSE BLDC GLUCOMTR-MCNC: 260 MG/DL — HIGH (ref 70–99)
GLUCOSE BLDC GLUCOMTR-MCNC: 352 MG/DL — HIGH (ref 70–99)
GLUCOSE SERPL-MCNC: 226 MG/DL — HIGH (ref 70–99)
GLUCOSE SERPL-MCNC: 360 MG/DL — HIGH (ref 70–99)
HCT VFR BLD CALC: 48.6 % — SIGNIFICANT CHANGE UP (ref 42–52)
HGB BLD-MCNC: 14.3 G/DL — SIGNIFICANT CHANGE UP (ref 14–18)
MCHC RBC-ENTMCNC: 23 PG — LOW (ref 27–31)
MCHC RBC-ENTMCNC: 29.4 G/DL — LOW (ref 32–37)
MCV RBC AUTO: 78.3 FL — LOW (ref 80–94)
NRBC # BLD: 0 /100 WBCS — SIGNIFICANT CHANGE UP (ref 0–0)
PLATELET # BLD AUTO: 470 K/UL — HIGH (ref 130–400)
POTASSIUM SERPL-MCNC: 5.4 MMOL/L — HIGH (ref 3.5–5)
POTASSIUM SERPL-MCNC: 5.7 MMOL/L — HIGH (ref 3.5–5)
POTASSIUM SERPL-SCNC: 5.4 MMOL/L — HIGH (ref 3.5–5)
POTASSIUM SERPL-SCNC: 5.7 MMOL/L — HIGH (ref 3.5–5)
RBC # BLD: 6.21 M/UL — HIGH (ref 4.7–6.1)
RBC # FLD: 19.6 % — HIGH (ref 11.5–14.5)
SODIUM SERPL-SCNC: 138 MMOL/L — SIGNIFICANT CHANGE UP (ref 135–146)
SODIUM SERPL-SCNC: 140 MMOL/L — SIGNIFICANT CHANGE UP (ref 135–146)
TROPONIN T SERPL-MCNC: 0.03 NG/ML — CRITICAL HIGH
WBC # BLD: 8.14 K/UL — SIGNIFICANT CHANGE UP (ref 4.8–10.8)
WBC # FLD AUTO: 8.14 K/UL — SIGNIFICANT CHANGE UP (ref 4.8–10.8)

## 2019-09-26 PROCEDURE — 70450 CT HEAD/BRAIN W/O DYE: CPT | Mod: 26

## 2019-09-26 PROCEDURE — 99233 SBSQ HOSP IP/OBS HIGH 50: CPT

## 2019-09-26 PROCEDURE — 93970 EXTREMITY STUDY: CPT | Mod: 26

## 2019-09-26 PROCEDURE — 74176 CT ABD & PELVIS W/O CONTRAST: CPT | Mod: 26

## 2019-09-26 RX ORDER — INSULIN LISPRO 100/ML
VIAL (ML) SUBCUTANEOUS
Refills: 0 | Status: DISCONTINUED | OUTPATIENT
Start: 2019-09-26 | End: 2019-10-07

## 2019-09-26 RX ORDER — FUROSEMIDE 40 MG
40 TABLET ORAL
Refills: 0 | Status: DISCONTINUED | OUTPATIENT
Start: 2019-09-26 | End: 2019-10-08

## 2019-09-26 RX ORDER — TIOTROPIUM BROMIDE 18 UG/1
1 CAPSULE ORAL; RESPIRATORY (INHALATION) ONCE
Refills: 0 | Status: COMPLETED | OUTPATIENT
Start: 2019-09-26 | End: 2019-09-26

## 2019-09-26 RX ORDER — INSULIN GLARGINE 100 [IU]/ML
30 INJECTION, SOLUTION SUBCUTANEOUS EVERY MORNING
Refills: 0 | Status: DISCONTINUED | OUTPATIENT
Start: 2019-09-26 | End: 2019-10-07

## 2019-09-26 RX ORDER — SODIUM POLYSTYRENE SULFONATE 4.1 MEQ/G
30 POWDER, FOR SUSPENSION ORAL ONCE
Refills: 0 | Status: COMPLETED | OUTPATIENT
Start: 2019-09-26 | End: 2019-09-26

## 2019-09-26 RX ORDER — OXYCODONE AND ACETAMINOPHEN 5; 325 MG/1; MG/1
2 TABLET ORAL ONCE
Refills: 0 | Status: DISCONTINUED | OUTPATIENT
Start: 2019-09-26 | End: 2019-09-27

## 2019-09-26 RX ORDER — TIOTROPIUM BROMIDE 18 UG/1
1 CAPSULE ORAL; RESPIRATORY (INHALATION) DAILY
Refills: 0 | Status: DISCONTINUED | OUTPATIENT
Start: 2019-09-26 | End: 2019-09-26

## 2019-09-26 RX ADMIN — SODIUM POLYSTYRENE SULFONATE 30 GRAM(S): 4.1 POWDER, FOR SUSPENSION ORAL at 14:02

## 2019-09-26 RX ADMIN — Medication 100 MILLIGRAM(S): at 06:27

## 2019-09-26 RX ADMIN — Medication 6: at 12:34

## 2019-09-26 RX ADMIN — HEPARIN SODIUM 5000 UNIT(S): 5000 INJECTION INTRAVENOUS; SUBCUTANEOUS at 21:52

## 2019-09-26 RX ADMIN — Medication 2: at 17:23

## 2019-09-26 RX ADMIN — FINASTERIDE 5 MILLIGRAM(S): 5 TABLET, FILM COATED ORAL at 12:01

## 2019-09-26 RX ADMIN — PANTOPRAZOLE SODIUM 40 MILLIGRAM(S): 20 TABLET, DELAYED RELEASE ORAL at 06:30

## 2019-09-26 RX ADMIN — Medication 3 MILLILITER(S): at 13:29

## 2019-09-26 RX ADMIN — Medication 25 MILLIGRAM(S): at 17:32

## 2019-09-26 RX ADMIN — ATORVASTATIN CALCIUM 20 MILLIGRAM(S): 80 TABLET, FILM COATED ORAL at 21:51

## 2019-09-26 RX ADMIN — Medication 25 MILLIGRAM(S): at 06:27

## 2019-09-26 RX ADMIN — Medication 100 MILLIGRAM(S): at 14:18

## 2019-09-26 RX ADMIN — Medication 3 MILLILITER(S): at 20:27

## 2019-09-26 RX ADMIN — Medication 20 MILLIGRAM(S): at 17:33

## 2019-09-26 RX ADMIN — Medication 20 MILLIGRAM(S): at 06:28

## 2019-09-26 RX ADMIN — HEPARIN SODIUM 5000 UNIT(S): 5000 INJECTION INTRAVENOUS; SUBCUTANEOUS at 14:19

## 2019-09-26 RX ADMIN — Medication 5: at 08:31

## 2019-09-26 RX ADMIN — Medication 3 MILLILITER(S): at 07:32

## 2019-09-26 RX ADMIN — TIOTROPIUM BROMIDE 1 CAPSULE(S): 18 CAPSULE ORAL; RESPIRATORY (INHALATION) at 20:43

## 2019-09-26 RX ADMIN — TAMSULOSIN HYDROCHLORIDE 0.4 MILLIGRAM(S): 0.4 CAPSULE ORAL at 21:51

## 2019-09-26 RX ADMIN — Medication 500 MILLIGRAM(S): at 12:01

## 2019-09-26 RX ADMIN — Medication 40 MILLIGRAM(S): at 17:33

## 2019-09-26 RX ADMIN — BUDESONIDE AND FORMOTEROL FUMARATE DIHYDRATE 2 PUFF(S): 160; 4.5 AEROSOL RESPIRATORY (INHALATION) at 07:37

## 2019-09-26 RX ADMIN — MAGNESIUM OXIDE 400 MG ORAL TABLET 400 MILLIGRAM(S): 241.3 TABLET ORAL at 12:01

## 2019-09-26 RX ADMIN — BUDESONIDE AND FORMOTEROL FUMARATE DIHYDRATE 2 PUFF(S): 160; 4.5 AEROSOL RESPIRATORY (INHALATION) at 20:28

## 2019-09-26 RX ADMIN — AMLODIPINE BESYLATE 10 MILLIGRAM(S): 2.5 TABLET ORAL at 06:28

## 2019-09-26 RX ADMIN — INSULIN GLARGINE 30 UNIT(S): 100 INJECTION, SOLUTION SUBCUTANEOUS at 08:45

## 2019-09-26 RX ADMIN — MAGNESIUM OXIDE 400 MG ORAL TABLET 400 MILLIGRAM(S): 241.3 TABLET ORAL at 08:32

## 2019-09-26 RX ADMIN — MAGNESIUM OXIDE 400 MG ORAL TABLET 400 MILLIGRAM(S): 241.3 TABLET ORAL at 17:32

## 2019-09-26 RX ADMIN — Medication 1000 UNIT(S): at 12:01

## 2019-09-26 RX ADMIN — Medication 25 MILLIGRAM(S): at 21:51

## 2019-09-26 RX ADMIN — HEPARIN SODIUM 5000 UNIT(S): 5000 INJECTION INTRAVENOUS; SUBCUTANEOUS at 06:27

## 2019-09-26 RX ADMIN — Medication 100 MILLIGRAM(S): at 21:51

## 2019-09-26 NOTE — CONSULT NOTE ADULT - SUBJECTIVE AND OBJECTIVE BOX
CARDIOLOGY CONSULT NOTE     CHIEF COMPLAINT/REASON FOR CONSULT:    HPI:  Patient is an 77yo M w/ PMH COPD, HTN, CHF, and DMt2 p/w SOB and left leg pain x few days. Patient states that he came in 2/2 to leg pain, but pt was seen by PA at home visits who likely advised patient to call EMS 2/2 SpO2 in 70s despite home O2. As per ED patient was found to be sating at 77% when EMS arrived at his home - as per ED patient was given IV solumedrol and placed on CPAP by EMS. Once arrived patient was placed on BiPAP and given one duonebs treatment as well as a dose of levaquin. Pt states that he is on O2 at home (5 liters) and typically sats at 86-87%. Pt states that he was feeling mildly SOB, but his primary concern is his leg. Pt states that he has been feeling weakness and pain in his left leg x few days. Pt reports pain in his low back, groin and hip pain. Pt states that he typically walks at home w/o assistive devices, but these past few days he has been unable to ambulate. Patient also states that pain increases when lying down, which has left him unable to sleep. hip pain/back pain started after fall.  Pt is able to move extremity w/o resistance, but has pain and weakness against resistance Pt denies HA, dizziness, NVD, chest pain, abdominal pain, change in bowel habits, change in urination.     Patient is afebrile w/ WBC: 11.3. K is elevated to 5.6. BUN/cr: 83/3.0. First set of troponin: 0.07. BNP: 1555. SpO2: 93% on BIPAP.     Patient reports blurry vision at time for the past several months (25 Sep 2019 14:50)      PAST MEDICAL & SURGICAL HISTORY:  Chronic CHF  CKD (chronic kidney disease) stage 3, GFR 30-59 ml/min  Colon polyp: claims it was malignant  High cholesterol  GERD (gastroesophageal reflux disease)  Enlarged prostate  COPD (chronic obstructive pulmonary disease): On home O2 - 5L  Diabetes mellitus, type 2  Hypertension  History of hemorrhoidectomy  Dysplastic colon polyp: removed      Cardiac Risks:   [x ]HTN, [x ] DM, [ ] Smoking, [ ] FH,  [x ] Lipids        MEDICATIONS:  MEDICATIONS  (STANDING):  ALBUTerol/ipratropium for Nebulization 3 milliLiter(s) Nebulizer every 6 hours  amLODIPine   Tablet 10 milliGRAM(s) Oral daily  ascorbic acid 500 milliGRAM(s) Oral daily  atorvastatin 20 milliGRAM(s) Oral at bedtime  buDESOnide 160 MICROgram(s)/formoterol 4.5 MICROgram(s) Inhaler 2 Puff(s) Inhalation two times a day  chlorhexidine 4% Liquid 1 Application(s) Topical once  cholecalciferol 1000 Unit(s) Oral daily  dextrose 5%. 1000 milliLiter(s) (50 mL/Hr) IV Continuous <Continuous>  dextrose 50% Injectable 12.5 Gram(s) IV Push once  dextrose 50% Injectable 25 Gram(s) IV Push once  dextrose 50% Injectable 25 Gram(s) IV Push once  docusate sodium 100 milliGRAM(s) Oral three times a day  finasteride 5 milliGRAM(s) Oral daily  furosemide    Tablet 40 milliGRAM(s) Oral two times a day  heparin  Injectable 5000 Unit(s) SubCutaneous three times a day  influenza   Vaccine 0.5 milliLiter(s) IntraMuscular once  insulin glargine Injectable (LANTUS) 30 Unit(s) SubCutaneous at bedtime  insulin glargine Injectable (LANTUS) 30 Unit(s) SubCutaneous every morning  insulin lispro (HumaLOG) corrective regimen sliding scale   SubCutaneous three times a day before meals  levoFLOXacin  Tablet 250 milliGRAM(s) Oral every 24 hours  magnesium oxide 400 milliGRAM(s) Oral three times a day with meals  metoprolol tartrate 25 milliGRAM(s) Oral two times a day  pantoprazole    Tablet 40 milliGRAM(s) Oral before breakfast  predniSONE   Tablet 20 milliGRAM(s) Oral two times a day  sodium polystyrene sulfonate Suspension 30 Gram(s) Oral once  tamsulosin 0.4 milliGRAM(s) Oral at bedtime      FAMILY HISTORY:  No pertinent family history in first degree relatives      SOCIAL HISTORY:      [ ] Marital status    Allergies    fish (Other)  iodine (Hives)  penicillin (Unknown)  shellfish (Other)        aspirin (Other)  	    REVIEW OF SYSTEMS:  CONSTITUTIONAL: No fever, weight loss, or fatigue  EYES: No eye pain, visual disturbances, or discharge  ENMT:  No difficulty hearing, tinnitus, vertigo; No sinus or throat pain  NECK: No pain or stiffness  RESPIRATORY: No cough, wheezing, chills or hemoptysis; No Shortness of Breath  CARDIOVASCULAR: Seeabove  GASTROINTESTINAL: No abdominal or epigastric pain. No nausea, vomiting, or hematemesis; No diarrhea or constipation. No melena or hematochezia.  GENITOURINARY: No dysuria, frequency, hematuria, or incontinence  NEUROLOGICAL: No headaches, memory loss, loss of strength, numbness, or tremors  SKIN: No itching, burning, rashes, or lesions   	      PHYSICAL EXAM:  T(C): 35.9 (09-26-19 @ 05:01), Max: 36.2 (09-25-19 @ 15:03)  HR: 51 (09-26-19 @ 09:06) (51 - 113)  BP: 143/73 (09-26-19 @ 05:01) (108/55 - 143/73)  RR: 16 (09-26-19 @ 05:01) (16 - 16)  SpO2: 93% (09-26-19 @ 11:20) (93% - 96%)  Wt(kg): --  I&O's Summary    25 Sep 2019 07:01  -  26 Sep 2019 07:00  --------------------------------------------------------  IN: 0 mL / OUT: 770 mL / NET: -770 mL        Appearance: Normal	  Psychiatry: A & O x 3, Mood & affect appropriate  HEENT:   Normal oral mucosa, PERRL, EOMI	  Lymphatic: No lymphadenopathy  Cardiovascular: Normal S1 S2,RRR, No JVD, No murmurs  Respiratory: Lungs clear to auscultation	Decreased bs  Gastrointestinal:  Soft, Non-tender, + BS	  Skin: No rashes, No ecchymoses, No cyanosis	  Neurologic: Non-focal  Extremities: Normal range of motion, No clubbing, cyanosis 2 +  Vascular: Peripheral pulses palpable 2+ bilaterally      ECG:  	< from: 12 Lead ECG (09.25.19 @ 11:30) >    Diagnosis Line Sinus rhythm with Premature atrial complexes  Right superior axis deviation  Pulmonary disease pattern    Confirmed by TIERNEY RUIZ MD ([id number]) on 9/25/2019 12:21:26 PM    < end of copied text >      	  LABS:	 	    CARDIAC MARKERS:                                    14.3   8.14  )-----------( 470      ( 26 Sep 2019 06:53 )             48.6     09-26    138  |  94<L>  |  84<HH>  ----------------------------<  360<H>  5.4<H>   |  31  |  3.0<H>    Ca    8.9      26 Sep 2019 06:53  Mg     2.9     09-25    TPro  6.8  /  Alb  4.5  /  TBili  0.7  /  DBili  x   /  AST  14  /  ALT  17  /  AlkPhos  80  09-25    PT/INR - ( 25 Sep 2019 11:30 )   PT: 12.70 sec;   INR: 1.11 ratio         PTT - ( 25 Sep 2019 11:30 )  PTT:40.7 sec

## 2019-09-26 NOTE — PROGRESS NOTE ADULT - ASSESSMENT
Patient is an 77yo M w/ PMH COPD, HTN, CHF, and DMt2 p/w SOB and left leg pain x few days. Patient states that he came in 2/2 to leg pain, but pt was seen by PA at home visits who likely advised patient to call EMS 2/2 SpO2 in 70s despite home O2.    1. Chronic Respiratory failure   -CXR shows no acute process  -Continue with neb tx, Oxygen, steriod with taper,  High flow, CPAP, Po lasix, and PO levaquin  -Doppler US of LE pending  -Pulmonary to follow up     2. Abnormal trop  -EKG shows no ischemic changes  -Asymptomatic  -Probably due to JAMES on CKD, and current respiratory status  -Trop level flat  -Cardiology consult pending    3.  Left hip/Leg pain  -S/P fall  -X-ray of the hip, and lumbar spine are pending  -pain medications  -PT consult    4. Blurry vision  -Possible due to cataract, and possible steroid  -Head CT scan pending  -neurologically intact  -neuro check    5. DM2  -Continue with home dose of lantus, and ISS  -Monitor POC      6.  HTN  -Continue with home medications, and Monitor BP    7.  JAMES on CKD  -Unclear Etiology  -renal US shows no acute process.  PVR volume low  -monitor renal function  -Avoid nephrotoxin  -Nephrology consult  pending    8.  Hyperkalemia  -Kayexalate X2 dose  -repeat BMP pending    patient opted for DNR/DNI after long discussion  overall prognosis. poor    Plan of care was discussed with patient  in great details, All questions were answered to their satisfaction  Seems to understand, and in agreement

## 2019-09-26 NOTE — PROGRESS NOTE ADULT - SUBJECTIVE AND OBJECTIVE BOX
CC.  SOB/Leg pain  HPI.  Patient reports SOB, and cough are improving.  afebrile.  Tolerating po intake  reports flank pain/hip pain resolved.    Denies any abdominal pain, N/V/D, numbness or weakness    refuses BIPAP.  Prefers CPAP  refused CT scan yesterday, agreeable to it today          Constitutional: No fever, fatigue or weight loss.  Skin: No rash.  Eyes: No recent vision problems or eye pain.  ENT: No congestion, ear pain, or sore throat.  Endocrine: No thyroid problems.  Cardiovascular: No chest pain or palpation.  Respiratory: No congestion, or wheezing.  Gastrointestinal: No abdominal pain, nausea, vomiting, or diarrhea.  Genitourinary: No dysuria.  Musculoskeletal: No joint swelling.  Neurologic: No headache.      Vital Signs Last 24 Hrs  T(C): 35.9 (09-26-19 @ 05:01), Max: 36.2 (09-25-19 @ 15:03)  T(F): 96.7 (09-26-19 @ 05:01), Max: 97.1 (09-25-19 @ 15:03)  HR: 104 (09-26-19 @ 05:01) (76 - 113)  BP: 143/73 (09-26-19 @ 05:01) (108/55 - 143/73)  BP(mean): --  RR: 16 (09-26-19 @ 05:01) (16 - 26)  SpO2: 95% (09-25-19 @ 20:05) (92% - 96%)        PHYSICAL EXAM-  GENERAL: NAD,  chronic ill appearing male  HEAD:  Atraumatic, Normocephalic  EYES: EOMI, PERRLA, conjunctiva and sclera clear  NECK: Supple, No JVD, Normal thyroid  NERVOUS SYSTEM:  Alert & Oriented X3, Moving all extremities  CHEST/LUNG: Min wheezing with good air entry.  No retractions or accessory muscle usage  HEART: Regular rate and rhythm; No murmurs, rubs, or gallops  ABDOMEN: Soft, Nontender, Nondistended; Bowel sounds present  EXTREMITIES:  No clubbing, cyanosis, or edema  SKIN: No rashes or lesions                                  14.3   8.14  )-----------( 470      ( 26 Sep 2019 06:53 )             48.6     09-25    136  |  93<L>  |  86<HH>  ----------------------------<  264<H>  6.5<HH>   |  30  |  3.0<H>    Ca    9.0      25 Sep 2019 19:20  Mg     2.9     09-25    TPro  6.8  /  Alb  4.5  /  TBili  0.7  /  DBili  x   /  AST  14  /  ALT  17  /  AlkPhos  80  09-25    CARDIAC MARKERS ( 25 Sep 2019 19:20 )  x     / 0.05 ng/mL / x     / x     / x      CARDIAC MARKERS ( 25 Sep 2019 11:30 )  x     / 0.07 ng/mL / x     / x     / x              PT/INR - ( 25 Sep 2019 11:30 )   PT: 12.70 sec;   INR: 1.11 ratio         PTT - ( 25 Sep 2019 11:30 )  PTT:40.7 sec        MEDICATIONS  (STANDING):  ALBUTerol/ipratropium for Nebulization 3 milliLiter(s) Nebulizer every 6 hours  amLODIPine   Tablet 10 milliGRAM(s) Oral daily  ascorbic acid 500 milliGRAM(s) Oral daily  atorvastatin 20 milliGRAM(s) Oral at bedtime  buDESOnide 160 MICROgram(s)/formoterol 4.5 MICROgram(s) Inhaler 2 Puff(s) Inhalation two times a day  chlorhexidine 4% Liquid 1 Application(s) Topical once  cholecalciferol 1000 Unit(s) Oral daily  dextrose 5%. 1000 milliLiter(s) (50 mL/Hr) IV Continuous <Continuous>  dextrose 50% Injectable 12.5 Gram(s) IV Push once  dextrose 50% Injectable 25 Gram(s) IV Push once  dextrose 50% Injectable 25 Gram(s) IV Push once  docusate sodium 100 milliGRAM(s) Oral three times a day  finasteride 5 milliGRAM(s) Oral daily  heparin  Injectable 5000 Unit(s) SubCutaneous three times a day  influenza   Vaccine 0.5 milliLiter(s) IntraMuscular once  insulin glargine Injectable (LANTUS) 30 Unit(s) SubCutaneous at bedtime  insulin glargine Injectable (LANTUS) 30 Unit(s) SubCutaneous every morning  insulin lispro (HumaLOG) corrective regimen sliding scale   SubCutaneous three times a day before meals  levoFLOXacin  Tablet 250 milliGRAM(s) Oral every 24 hours  magnesium oxide 400 milliGRAM(s) Oral three times a day with meals  metoprolol tartrate 25 milliGRAM(s) Oral two times a day  pantoprazole    Tablet 40 milliGRAM(s) Oral before breakfast  predniSONE   Tablet 20 milliGRAM(s) Oral two times a day  tamsulosin 0.4 milliGRAM(s) Oral at bedtime    MEDICATIONS  (PRN):  dextrose 40% Gel 15 Gram(s) Oral once PRN Blood Glucose LESS THAN 70 milliGRAM(s)/deciliter  glucagon  Injectable 1 milliGRAM(s) IntraMuscular once PRN Glucose LESS THAN 70 milligrams/deciliter  hydrOXYzine hydrochloride 25 milliGRAM(s) Oral at bedtime PRN Insomnia          RADIOLOGY RESULTS:

## 2019-09-26 NOTE — CONSULT NOTE ADULT - ASSESSMENT
Patient on high flow o2. He has ckd 4 elie obesity hypoventilation  morbid obesity right heart failure COPD. His troponin is chronically mildly elevated . He does not follow  his diet. Recently had spare ribs. No mi. Continue meds. Reviewed 1500 mg na diet. Prognosis grave. Would consider hospice

## 2019-09-26 NOTE — PHYSICAL THERAPY INITIAL EVALUATION ADULT - SPECIFY REASON(S)
Chart reviewed. X-ray L hip and lumbar spine ordered. Hold PT, will follow up and complete PT evaluation when indicated.

## 2019-09-26 NOTE — PROGRESS NOTE ADULT - ASSESSMENT
Patient is a 79 y/o male with PMH of COPD, HTN, CHF, pulmonary HTN, DM2, CKD stage 3, BPH who presents with SOB and left leg pain for several days.    1. Chronic respiratory failure 2/2 COPD vs CHF   -CXR reveals no acute disease; stable cardiomegaly and pulmonary HTN.   -Pulmonary is following patient. Per pulmonary, he is at baseline for his lung function.  -Continue with High flow O2, CPAP, Duonebs/home inhaler, Lasix 40mg BID po, Levaquin 250mg po, Prednisone po 20mg.  -Doppler B/L LE is negative for DVT. Low suspicion for PE.    2. Elevated troponin  -EKG shows no ischemic changes.  -Asymptomatic. Likely due to right-sided CHF and JAMES on CKD.  -Per Cardiology: No MI. His troponin is chronically mildly elevated and he does not follow his diet. Continue meds. Reviewed 1500 mg Na diet. Consider hospice.    3. Pain in left leg, hip, back s/p mechanical fall   -X-ray of the hip and lumbar spine are pending.  -CT Abdomen and Pelvis: no acute intra-abdominal or pelvic pathology. Patchy nodular opacities at the lung bases.  -Pain medications.  -PT consult placed.    4. Blurry vision  -Patient denies head trauma during fall. AAO x3, neurologically intact.   -Possibly due to cataract vs steroid use.  -Head CT: no acute intracranial pathology.    5. DM2  -Continue with home dose of Lantus and ISS.  -Monitor POC glucose.    6.  HTN  -Continue with home Amlodipine and Metoprolol.    7.  JAMES on CKD 3  -Renal US shows no hydronephrosis. Postvoid residual volume 170cc. No need for Velazquez.  -Nephrology is following patient. Per Nephro: Creatinine is at baseline; obtain UA, urine protein/creatinine ratio, phos, iPTH.   -Avoid nephrotoxins.    8.  Hyperkalemia 2/2 CKD  -Kayexalate x3 dose.  -Improved 6.5 --> 5.4. Monitor BMP.  -Per Nephro, 2g K diet, Kayexalate 30g x3/week, Lasix 40mg BID po, check renin and aldosterone.     9. BPH  -Continue home meds.    10. Difficulty ambulating  -PT eval pending.    -Grave prognosis. Discussed with patient about end of life care. Patient signed DNR/DNI.      Patient was interviewed and examined by Pratima Encinas, medical student. This is a preliminary report pending discussion with hospitalist, Dr. Yoni Mcclure.

## 2019-09-26 NOTE — CONSULT NOTE ADULT - ASSESSMENT
Pt with CKD stage 4 and chronic hyperkalemia p/w SOB and leg pain.    CKD - creat seems to be at baseline  - kidney bladder sono no hydro, 190 cc PVR, no need for chapman  - obtain UA Urine protein/creat ratio  - check phos and iPTH    Hyperkalemia - due to CKD, hyporenin hypoaldosteronism  may check renin, aldosterone   -2 gr K diet  - Kayaxalate 30 grams 3x week  - LAsix 40 po bid (high O2 requirements  although its mostly Rt sided failure with Pulm HTN)    DM, HTN - well controlled    COPD - on po Prednisone and LEvaquin    Will follow  Thank you

## 2019-09-26 NOTE — CHART NOTE - NSCHARTNOTEFT_GEN_A_CORE
Patient asking to use his Spiriva. Medications reviewed and Duoneb is already ordered. For now will order 1 dose of Spiriva but will defer decision to continue it's use to day hospitalist and pulmonary consultant (want to avoid duplication of medication action)

## 2019-09-26 NOTE — PROGRESS NOTE ADULT - SUBJECTIVE AND OBJECTIVE BOX
Patient is a 77 y/o male with PMH of COPD, HTN, CHF, pulmonary HTN, DM2, CKD stage 3, BPH who presents with SOB for several days. Patient was brought in by EMS in respiratory distress, as his PA at home visits noted his SpO2 was in the 70s despite home O2. He was given IV Solumedrol and placed on CPAP by EMS. On arrival in the ED, patient was placed on BIPAP, given Duoneb tratment, and a dose of Levaquin. Patient notes that he normally has baseline SpO2 86-87% on 5L O2 at home. Patient also complains of left leg pain and weakness, along with pain in his left hip and lower back, s/p a mechanical trip and fall earlier this week. He denies head trauma or LOC. He normally walks at home independently but has been unable to ambulate for several days due to pain. He notes the pain worsens when lying down, making it difficult to sleep. Patient also reports occasional blurry vision sporadically for the past several months. He denies headache, dizziness, chest pain, abdominal pain, nausea, or vomiting.    Today, patient notes improvement in his SOB following high flow O2, CPAP, Duonebs, and Levaquin. Patient refuses to use BIPAP. He reports improvement in his left hip and back pain following pain meds.      Vital Signs Last 24 Hrs  T(C): 35.9 (26 Sep 2019 05:01), Max: 36.2 (25 Sep 2019 15:03)  T(F): 96.7 (26 Sep 2019 05:01), Max: 97.1 (25 Sep 2019 15:03)  HR: 51 (26 Sep 2019 09:06) (51 - 113)  BP: 143/73 (26 Sep 2019 05:01) (108/55 - 143/73)  BP(mean): --  RR: 16 (26 Sep 2019 05:01) (16 - 16)  SpO2: 93% (26 Sep 2019 11:20) (93% - 96%)      REVIEW OF SYSTEMS  Constitutional: No fever, fatigue or weight loss.  Skin: No rash.  Eyes: + Blurry vision. No eye pain.  ENT: No rhinorrhea, sore throat, hearing changes, congestion, ear pain.  Cardiovascular: No chest pain, palpitations.  Respiratory: + SOB. No congestion or wheezing.  Gastrointestinal: No abdominal pain, nausea, vomiting, or diarrhea.  Genitourinary: No dysuria.  Musculoskeletal: + Left hip pain, + Left leg pain/weakness, + Lower back pain.   Neurologic: No headache.      PHYSICAL EXAM:  GENERAL: NAD. Obese, chronic ill-appearing male.  HEAD:  Atraumatic, Normocephalic.  EYES: EOMI, PERRLA, conjunctiva and sclera clear.  NECK: Supple, No JVD, Normal thyroid.  NERVOUS SYSTEM:  Alert & Oriented X3. Moving all extremities.  CHEST/LUNG: Mild wheezing. No retractions or accessory muscle usage.  HEART: Regular rate and rhythm. No murmurs, rubs, or gallops.  ABDOMEN: Soft, Nontender, Nondistended. Bowel sounds present.  EXTREMITIES:  No clubbing, cyanosis.  SKIN: No rashes or lesions.      LA0BS:                        14.3   8.14  )-----------( 470      ( 26 Sep 2019 06:53 )             48.6     09-26    138  |  94<L>  |  84<HH>  ----------------------------<  360<H>  5.4<H>   |  31  |  3.0<H>    Ca    8.9      26 Sep 2019 06:53  Mg     2.9     09-25    TPro  6.8  /  Alb  4.5  /  TBili  0.7  /  DBili  x   /  AST  14  /  ALT  17  /  AlkPhos  80  09-25    PT/INR - ( 25 Sep 2019 11:30 )   PT: 12.70 sec;   INR: 1.11 ratio         PTT - ( 25 Sep 2019 11:30 )  PTT:40.7 sec      CARDIAC MARKERS ( 25 Sep 2019 19:20 )  x     / 0.05 ng/mL / x     / x     / x      CARDIAC MARKERS ( 25 Sep 2019 11:30 )  x     / 0.07 ng/mL / x     / x     / x              RADIOLOGY RESULTS:    CT Abdomen and Pelvis No Cont (09.26.19 @ 12:12) >  IMPRESSION:  Patchy nodular opacities at the lung bases, nonspecific in nature. Follow to resolution is recommended.  No acute intra-abdominal or pelvic pathology.  Again seen is an indeterminate hyperdense exophytic left upper pole renal lesion, measuring approximately 4 cm.  < end of copied text >      CT Head No Cont (09.26.19 @ 11:47) >  IMPRESSION:   1.  No evidence of acute intracranial pathology.    2.  Moderate chronic microvascular changes.  < end of copied text >      [facility] Duplex Lower Ext Vein Scan, Bilat (09.26.19 @ 11:06) >  Impression:  No evidence of deep venous thrombosis or superficial thrombophlebitis in bilateral lower extremities.  < end of copied text >      US Kidney and Bladder (09.25.19 @ 22:52) >  IMPRESSION: No hydronephrosis. Urinary bladder postvoid residual of 170 cc.  < end of copied text >      Xray Chest 1 View-PORTABLE IMMEDIATE (09.25.19 @ 12:50) >  Impression:    No radiographic evidence of acute cardiopulmonary disease. Cardiomegaly, bilateral opacities and pulmonary arterial hypertension, unchanged.  < end of copied text >        MEDICATIONS  (STANDING):  ALBUTerol/ipratropium for Nebulization 3 milliLiter(s) Nebulizer every 6 hours  amLODIPine   Tablet 10 milliGRAM(s) Oral daily  ascorbic acid 500 milliGRAM(s) Oral daily  atorvastatin 20 milliGRAM(s) Oral at bedtime  buDESOnide 160 MICROgram(s)/formoterol 4.5 MICROgram(s) Inhaler 2 Puff(s) Inhalation two times a day  chlorhexidine 4% Liquid 1 Application(s) Topical once  cholecalciferol 1000 Unit(s) Oral daily  dextrose 5%. 1000 milliLiter(s) (50 mL/Hr) IV Continuous <Continuous>  dextrose 50% Injectable 12.5 Gram(s) IV Push once  dextrose 50% Injectable 25 Gram(s) IV Push once  dextrose 50% Injectable 25 Gram(s) IV Push once  docusate sodium 100 milliGRAM(s) Oral three times a day  finasteride 5 milliGRAM(s) Oral daily  furosemide    Tablet 40 milliGRAM(s) Oral two times a day  heparin  Injectable 5000 Unit(s) SubCutaneous three times a day  influenza   Vaccine 0.5 milliLiter(s) IntraMuscular once  insulin glargine Injectable (LANTUS) 30 Unit(s) SubCutaneous at bedtime  insulin glargine Injectable (LANTUS) 30 Unit(s) SubCutaneous every morning  insulin lispro (HumaLOG) corrective regimen sliding scale   SubCutaneous three times a day before meals  levoFLOXacin  Tablet 250 milliGRAM(s) Oral every 24 hours  magnesium oxide 400 milliGRAM(s) Oral three times a day with meals  metoprolol tartrate 25 milliGRAM(s) Oral two times a day  pantoprazole    Tablet 40 milliGRAM(s) Oral before breakfast  predniSONE   Tablet 20 milliGRAM(s) Oral two times a day  sodium polystyrene sulfonate Suspension 30 Gram(s) Oral once  tamsulosin 0.4 milliGRAM(s) Oral at bedtime    MEDICATIONS  (PRN):  dextrose 40% Gel 15 Gram(s) Oral once PRN Blood Glucose LESS THAN 70 milliGRAM(s)/deciliter  glucagon  Injectable 1 milliGRAM(s) IntraMuscular once PRN Glucose LESS THAN 70 milligrams/deciliter  hydrOXYzine hydrochloride 25 milliGRAM(s) Oral at bedtime PRN Insomnia

## 2019-09-26 NOTE — CONSULT NOTE ADULT - SUBJECTIVE AND OBJECTIVE BOX
NEPHROLOGY CONSULTATION NOTE    Patient is a 78y Male whom presented to the hospital with SOB and leg pain found to have K 6.5  Pt with CKD, chronic hyperkalemia on Kayexalate 3xweek, claims to be compliant with diet, DM, HTN, COPD on high flow O2    PAST MEDICAL & SURGICAL HISTORY:  Chronic CHF  CKD (chronic kidney disease) stage 3, GFR 30-59 ml/min  Colon polyp: claims it was malignant  High cholesterol  GERD (gastroesophageal reflux disease)  Enlarged prostate  COPD (chronic obstructive pulmonary disease): On home O2 - 5L  Diabetes mellitus, type 2  Hypertension  History of hemorrhoidectomy  Dysplastic colon polyp: removed    Allergies:  aspirin (Other)  fish (Other)  iodine (Hives)  penicillin (Unknown)  shellfish (Other)    Home Medications Reviewed  Hospital Medications:   MEDICATIONS  (STANDING):  ALBUTerol/ipratropium for Nebulization 3 milliLiter(s) Nebulizer every 6 hours  amLODIPine   Tablet 10 milliGRAM(s) Oral daily  ascorbic acid 500 milliGRAM(s) Oral daily  atorvastatin 20 milliGRAM(s) Oral at bedtime  buDESOnide 160 MICROgram(s)/formoterol 4.5 MICROgram(s) Inhaler 2 Puff(s) Inhalation two times a day  chlorhexidine 4% Liquid 1 Application(s) Topical once  cholecalciferol 1000 Unit(s) Oral daily  dextrose 5%. 1000 milliLiter(s) (50 mL/Hr) IV Continuous <Continuous>  dextrose 50% Injectable 12.5 Gram(s) IV Push once  dextrose 50% Injectable 25 Gram(s) IV Push once  dextrose 50% Injectable 25 Gram(s) IV Push once  docusate sodium 100 milliGRAM(s) Oral three times a day  finasteride 5 milliGRAM(s) Oral daily  heparin  Injectable 5000 Unit(s) SubCutaneous three times a day  influenza   Vaccine 0.5 milliLiter(s) IntraMuscular once  insulin glargine Injectable (LANTUS) 30 Unit(s) SubCutaneous at bedtime  insulin glargine Injectable (LANTUS) 30 Unit(s) SubCutaneous every morning  insulin lispro (HumaLOG) corrective regimen sliding scale   SubCutaneous three times a day before meals  levoFLOXacin  Tablet 250 milliGRAM(s) Oral every 24 hours  magnesium oxide 400 milliGRAM(s) Oral three times a day with meals  metoprolol tartrate 25 milliGRAM(s) Oral two times a day  pantoprazole    Tablet 40 milliGRAM(s) Oral before breakfast  predniSONE   Tablet 20 milliGRAM(s) Oral two times a day  tamsulosin 0.4 milliGRAM(s) Oral at bedtime      SOCIAL HISTORY:  Denies ETOH,Smoking,   FAMILY HISTORY:  No pertinent family history in first degree relatives    Yes      REVIEW OF SYSTEMS:  CONSTITUTIONAL: No weakness, fevers or chills  RESPIRATORY: No cough, wheezing, hemoptysis; Has shortness of breath  CARDIOVASCULAR: No chest pain or palpitations.  GASTROINTESTINAL: No abdominal or epigastric pain. No nausea, vomiting, or hematemesis  GENITOURINARY: No dysuria, frequency, or hematuria  VASCULAR: Has bilateral lower extremity edema.   All other review of systems is negative unless indicated above.    VITALS:  T(F): 96.7 (09-26-19 @ 05:01), Max: 97.1 (09-25-19 @ 15:03)  HR: 51 (09-26-19 @ 09:06)  BP: 143/73 (09-26-19 @ 05:01)  RR: 16 (09-26-19 @ 05:01)  SpO2: 95% (09-26-19 @ 09:06)    09-25 @ 07:01  -  09-26 @ 07:00  --------------------------------------------------------  IN: 0 mL / OUT: 770 mL / NET: -770 mL        Weight (kg): 120.7 (09-25 @ 11:20)      I&O's Detail    25 Sep 2019 07:01  -  26 Sep 2019 07:00  --------------------------------------------------------  IN:  Total IN: 0 mL    OUT:    Voided: 770 mL  Total OUT: 770 mL    Total NET: -770 mL            PHYSICAL EXAM:  Constitutional: NAD, obese, on AVAp  HEENT: anicteric sclera, oropharynx clear, MMM  Respiratory: CTAB  Cardiovascular: S1, S2, RRR  Gastrointestinal: BS+, soft, NT/ND  Extremities: 2+peripheral edema  Neurological: A/O x 3  Psychiatric: Normal mood, normal affect  : No CVA tenderness. No chapman.   Skin: No rashes  Vascular Access:    LABS:  09-26    138  |  94<L>  |  84<HH>  ----------------------------<  360<H>  5.4<H>   |  31  |  3.0<H>    Ca    8.9      26 Sep 2019 06:53  Mg     2.9     09-25    TPro  6.8  /  Alb  4.5  /  TBili  0.7  /  DBili      /  AST  14  /  ALT  17  /  AlkPhos  80  09-25    Creatinine Trend: 3.0 <--, 3.0 <--, 3.0 <--, 1.8 <--                        14.3   8.14  )-----------( 470      ( 26 Sep 2019 06:53 )             48.6     Urine Studies:              RADIOLOGY & ADDITIONAL STUDIES:  < from: US Kidney and Bladder (09.25.19 @ 22:52) >  RIGHT KIDNEY: Normal in echogenicity, size measuring 13.1 cm in length no   hydronephrosis. Multiple renal cysts measuring up to 8.7 cm.    LEFT KIDNEY: Normal in echogenicity, size measuring 12.9 cm in length. No   hydronephrosis. Multiple renal cysts measuring up to 3.7 cm.    URINARY BLADDER: Prevoid volume of approximately 397 cc.  No wall   thickening, debris or calculus is seen. Nonspecific nonvisualization of   the bilateral ureteral jets. Postvoid volume is approximately 170 cc.    IMPRESSION:  No hydronephrosis.    Urinary bladder postvoid residual of 170 cc.    < end of copied text >  < from: Xray Chest 1 View-PORTABLE IMMEDIATE (09.25.19 @ 12:50) >  No radiographic evidence of acute cardiopulmonary disease. Cardiomegaly,   bilateral opacities and pulmonary arterial hypertension, unchanged.    < end of copied text >

## 2019-09-27 LAB
ANION GAP SERPL CALC-SCNC: 11 MMOL/L — SIGNIFICANT CHANGE UP (ref 7–14)
BUN SERPL-MCNC: 91 MG/DL — CRITICAL HIGH (ref 10–20)
CALCIUM SERPL-MCNC: 9.1 MG/DL — SIGNIFICANT CHANGE UP (ref 8.5–10.1)
CHLORIDE SERPL-SCNC: 97 MMOL/L — LOW (ref 98–110)
CO2 SERPL-SCNC: 34 MMOL/L — HIGH (ref 17–32)
CREAT SERPL-MCNC: 2.7 MG/DL — HIGH (ref 0.7–1.5)
GLUCOSE BLDC GLUCOMTR-MCNC: 195 MG/DL — HIGH (ref 70–99)
GLUCOSE BLDC GLUCOMTR-MCNC: 247 MG/DL — HIGH (ref 70–99)
GLUCOSE BLDC GLUCOMTR-MCNC: 306 MG/DL — HIGH (ref 70–99)
GLUCOSE BLDC GLUCOMTR-MCNC: 388 MG/DL — HIGH (ref 70–99)
GLUCOSE SERPL-MCNC: 264 MG/DL — HIGH (ref 70–99)
HCT VFR BLD CALC: 49.4 % — SIGNIFICANT CHANGE UP (ref 42–52)
HGB BLD-MCNC: 14.8 G/DL — SIGNIFICANT CHANGE UP (ref 14–18)
MCHC RBC-ENTMCNC: 23.4 PG — LOW (ref 27–31)
MCHC RBC-ENTMCNC: 30 G/DL — LOW (ref 32–37)
MCV RBC AUTO: 78 FL — LOW (ref 80–94)
NRBC # BLD: 0 /100 WBCS — SIGNIFICANT CHANGE UP (ref 0–0)
PLATELET # BLD AUTO: 511 K/UL — HIGH (ref 130–400)
POTASSIUM SERPL-MCNC: 4.6 MMOL/L — SIGNIFICANT CHANGE UP (ref 3.5–5)
POTASSIUM SERPL-SCNC: 4.6 MMOL/L — SIGNIFICANT CHANGE UP (ref 3.5–5)
RBC # BLD: 6.33 M/UL — HIGH (ref 4.7–6.1)
RBC # FLD: 20.3 % — HIGH (ref 11.5–14.5)
SODIUM SERPL-SCNC: 142 MMOL/L — SIGNIFICANT CHANGE UP (ref 135–146)
WBC # BLD: 12.8 K/UL — HIGH (ref 4.8–10.8)
WBC # FLD AUTO: 12.8 K/UL — HIGH (ref 4.8–10.8)

## 2019-09-27 PROCEDURE — 72100 X-RAY EXAM L-S SPINE 2/3 VWS: CPT | Mod: 26

## 2019-09-27 PROCEDURE — 99233 SBSQ HOSP IP/OBS HIGH 50: CPT

## 2019-09-27 PROCEDURE — 73501 X-RAY EXAM HIP UNI 1 VIEW: CPT | Mod: 26,LT

## 2019-09-27 RX ORDER — TIOTROPIUM BROMIDE 18 UG/1
1 CAPSULE ORAL; RESPIRATORY (INHALATION) DAILY
Refills: 0 | Status: DISCONTINUED | OUTPATIENT
Start: 2019-09-27 | End: 2019-10-08

## 2019-09-27 RX ORDER — INSULIN LISPRO 100/ML
3 VIAL (ML) SUBCUTANEOUS
Refills: 0 | Status: DISCONTINUED | OUTPATIENT
Start: 2019-09-27 | End: 2019-09-28

## 2019-09-27 RX ORDER — ALBUTEROL 90 UG/1
2.5 AEROSOL, METERED ORAL EVERY 6 HOURS
Refills: 0 | Status: DISCONTINUED | OUTPATIENT
Start: 2019-09-27 | End: 2019-10-08

## 2019-09-27 RX ORDER — NYSTATIN 500MM UNIT
500000 POWDER (EA) MISCELLANEOUS THREE TIMES A DAY
Refills: 0 | Status: DISCONTINUED | OUTPATIENT
Start: 2019-09-27 | End: 2019-10-08

## 2019-09-27 RX ORDER — CARBAMIDE PEROXIDE 81.86 MG/ML
1 SOLUTION/ DROPS AURICULAR (OTIC)
Refills: 0 | Status: DISCONTINUED | OUTPATIENT
Start: 2019-09-27 | End: 2019-10-03

## 2019-09-27 RX ORDER — SODIUM POLYSTYRENE SULFONATE 4.1 MEQ/G
30 POWDER, FOR SUSPENSION ORAL ONCE
Refills: 0 | Status: COMPLETED | OUTPATIENT
Start: 2019-09-27 | End: 2019-09-27

## 2019-09-27 RX ADMIN — Medication 100 MILLIGRAM(S): at 21:29

## 2019-09-27 RX ADMIN — ATORVASTATIN CALCIUM 20 MILLIGRAM(S): 80 TABLET, FILM COATED ORAL at 21:29

## 2019-09-27 RX ADMIN — Medication 4: at 18:12

## 2019-09-27 RX ADMIN — TIOTROPIUM BROMIDE 1 CAPSULE(S): 18 CAPSULE ORAL; RESPIRATORY (INHALATION) at 08:17

## 2019-09-27 RX ADMIN — Medication 40 MILLIGRAM(S): at 18:15

## 2019-09-27 RX ADMIN — INSULIN GLARGINE 30 UNIT(S): 100 INJECTION, SOLUTION SUBCUTANEOUS at 08:18

## 2019-09-27 RX ADMIN — Medication 1000 UNIT(S): at 11:33

## 2019-09-27 RX ADMIN — BUDESONIDE AND FORMOTEROL FUMARATE DIHYDRATE 2 PUFF(S): 160; 4.5 AEROSOL RESPIRATORY (INHALATION) at 08:16

## 2019-09-27 RX ADMIN — ALBUTEROL 2.5 MILLIGRAM(S): 90 AEROSOL, METERED ORAL at 14:10

## 2019-09-27 RX ADMIN — MAGNESIUM OXIDE 400 MG ORAL TABLET 400 MILLIGRAM(S): 241.3 TABLET ORAL at 18:15

## 2019-09-27 RX ADMIN — Medication 40 MILLIGRAM(S): at 06:13

## 2019-09-27 RX ADMIN — Medication 2: at 08:20

## 2019-09-27 RX ADMIN — HEPARIN SODIUM 5000 UNIT(S): 5000 INJECTION INTRAVENOUS; SUBCUTANEOUS at 14:27

## 2019-09-27 RX ADMIN — MAGNESIUM OXIDE 400 MG ORAL TABLET 400 MILLIGRAM(S): 241.3 TABLET ORAL at 11:33

## 2019-09-27 RX ADMIN — CARBAMIDE PEROXIDE 1 DROP(S): 81.86 SOLUTION/ DROPS AURICULAR (OTIC) at 18:15

## 2019-09-27 RX ADMIN — MAGNESIUM OXIDE 400 MG ORAL TABLET 400 MILLIGRAM(S): 241.3 TABLET ORAL at 08:15

## 2019-09-27 RX ADMIN — Medication 10: at 11:32

## 2019-09-27 RX ADMIN — CHLORHEXIDINE GLUCONATE 1 APPLICATION(S): 213 SOLUTION TOPICAL at 08:21

## 2019-09-27 RX ADMIN — SODIUM POLYSTYRENE SULFONATE 30 GRAM(S): 4.1 POWDER, FOR SUSPENSION ORAL at 02:09

## 2019-09-27 RX ADMIN — Medication 3 UNIT(S): at 18:13

## 2019-09-27 RX ADMIN — HEPARIN SODIUM 5000 UNIT(S): 5000 INJECTION INTRAVENOUS; SUBCUTANEOUS at 21:33

## 2019-09-27 RX ADMIN — OXYCODONE AND ACETAMINOPHEN 2 TABLET(S): 5; 325 TABLET ORAL at 00:25

## 2019-09-27 RX ADMIN — Medication 500 MILLIGRAM(S): at 11:33

## 2019-09-27 RX ADMIN — ALBUTEROL 2.5 MILLIGRAM(S): 90 AEROSOL, METERED ORAL at 20:28

## 2019-09-27 RX ADMIN — TAMSULOSIN HYDROCHLORIDE 0.4 MILLIGRAM(S): 0.4 CAPSULE ORAL at 21:29

## 2019-09-27 RX ADMIN — Medication 25 MILLIGRAM(S): at 06:14

## 2019-09-27 RX ADMIN — FINASTERIDE 5 MILLIGRAM(S): 5 TABLET, FILM COATED ORAL at 11:33

## 2019-09-27 RX ADMIN — OXYCODONE AND ACETAMINOPHEN 2 TABLET(S): 5; 325 TABLET ORAL at 01:11

## 2019-09-27 RX ADMIN — Medication 100 MILLIGRAM(S): at 06:13

## 2019-09-27 RX ADMIN — Medication 20 MILLIGRAM(S): at 06:13

## 2019-09-27 RX ADMIN — BUDESONIDE AND FORMOTEROL FUMARATE DIHYDRATE 2 PUFF(S): 160; 4.5 AEROSOL RESPIRATORY (INHALATION) at 21:28

## 2019-09-27 RX ADMIN — PANTOPRAZOLE SODIUM 40 MILLIGRAM(S): 20 TABLET, DELAYED RELEASE ORAL at 06:13

## 2019-09-27 RX ADMIN — Medication 25 MILLIGRAM(S): at 18:15

## 2019-09-27 NOTE — PROGRESS NOTE ADULT - SUBJECTIVE AND OBJECTIVE BOX
Patient is a 79 y/o male with PMH of COPD, HTN, CHF, pulmonary HTN, DM2, CKD stage 3, BPH who presents with SOB for several days. Patient was brought in by EMS in respiratory distress, as his PA at home visits noted his SpO2 was in the 70s despite home O2. He was given IV Solumedrol and placed on CPAP by EMS. On arrival in the ED, patient was placed on BIPAP, given Duoneb tratment, and a dose of Levaquin. Patient notes that he normally has baseline SpO2 86-87% on 5L O2 at home. Patient also complains of left leg pain and weakness, along with pain in his left hip and lower back, s/p a mechanical trip and fall earlier this week. He denies head trauma or LOC. He normally walks at home independently but has been unable to ambulate for several days due to pain. He notes the pain worsens when lying down, making it difficult to sleep. Patient also reports occasional blurry vision sporadically for the past several months. He denies headache, dizziness, chest pain, abdominal pain, nausea, or vomiting.    Today, patient became hypoxic to 76% despite high-flow O2, due to exertion. This improved with rest. He is currently on high flow O2, CPAP, Duonebs, and Levaquin. He refuses to use BIPAP. He reports improvement in his back pain but still complains of left hip pain. He also notes difficulty hearing likely due to earwax buildup. He denies chest pain, abdominal pain, diarrhea.      Vital Signs Last 24 Hrs  T(C): 36.8 (27 Sep 2019 05:59), Max: 36.8 (27 Sep 2019 05:59)  T(F): 98.2 (27 Sep 2019 05:59), Max: 98.2 (27 Sep 2019 05:59)  HR: 91 (27 Sep 2019 05:59) (67 - 92)  BP: 119/69 (27 Sep 2019 05:59) (103/56 - 148/66)  BP(mean): --  RR: 16 (27 Sep 2019 05:59) (16 - 18)  SpO2: 92% (26 Sep 2019 20:32) (90% - 93%)      REVIEW OF SYSTEMS  Constitutional: No fever, fatigue or weight loss.  Skin: No rash.  Eyes: + Blurry vision. No eye pain.  ENT: + Difficulty hearing. No rhinorrhea, sore throat, hearing changes, congestion, ear pain.  Cardiovascular: No chest pain, palpitations.  Respiratory: + SOB. No congestion or wheezing.  Gastrointestinal: No abdominal pain, nausea, vomiting, or diarrhea.  Genitourinary: No dysuria.  Musculoskeletal: + Left hip pain, + Left leg pain/weakness, + Lower back pain.   Neurologic: No headache.      PHYSICAL EXAM:  GENERAL: NAD. Obese, chronic ill-appearing male.  HEAD:  Atraumatic, Normocephalic.  EYES: EOMI, PERRLA, conjunctiva and sclera clear.  NECK: Supple, No JVD, Normal thyroid.  NERVOUS SYSTEM:  Alert & Oriented X3. Moving all extremities.  CHEST/LUNG: Wheezing bilaterally. No retractions or accessory muscle usage.  HEART:  Regular rate and rhythm. No murmurs, rubs, or gallops.  ABDOMEN: Obese. Soft, Nontender. Bowel sounds present.  EXTREMITIES: B/L lower extremity pitting edema. No clubbing, cyanosis.  SKIN: No rashes or lesions.        LABS:  cret                        14.8   12.80 )-----------( 511      ( 27 Sep 2019 06:58 )             49.4     09-27    142  |  97<L>  |  91<HH>  ----------------------------<  264<H>  4.6   |  34<H>  |  2.7<H>    Ca    9.1      27 Sep 2019 06:58  Mg     2.9     09-25    TPro  6.8  /  Alb  4.5  /  TBili  0.7  /  DBili  x   /  AST  14  /  ALT  17  /  AlkPhos  80  09-25    PT/INR - ( 25 Sep 2019 11:30 )   PT: 12.70 sec;   INR: 1.11 ratio      PTT - ( 25 Sep 2019 11:30 )  PTT:40.7 sec      CARDIAC MARKERS ( 25 Sep 2019 19:20 )  x     / 0.05 ng/mL / x     / x     / x      CARDIAC MARKERS ( 25 Sep 2019 11:30 )  x     / 0.07 ng/mL / x     / x     / x            RADIOLOGY RESULTS:    CT Abdomen and Pelvis No Cont (09.26.19 @ 12:12) >  IMPRESSION:  Patchy nodular opacities at the lung bases, nonspecific in nature. Follow to resolution is recommended.  No acute intra-abdominal or pelvic pathology.  Again seen is an indeterminate hyperdense exophytic left upper pole renal lesion, measuring approximately 4 cm.  < end of copied text >      CT Head No Cont (09.26.19 @ 11:47) >  IMPRESSION:   1.  No evidence of acute intracranial pathology.    2.  Moderate chronic microvascular changes.  < end of copied text >      [facility] Duplex Lower Ext Vein Scan, Bilat (09.26.19 @ 11:06) >  Impression:  No evidence of deep venous thrombosis or superficial thrombophlebitis in bilateral lower extremities.  < end of copied text >      US Kidney and Bladder (09.25.19 @ 22:52) >  IMPRESSION: No hydronephrosis. Urinary bladder postvoid residual of 170 cc.  < end of copied text >      Xray Chest 1 View-PORTABLE IMMEDIATE (09.25.19 @ 12:50) >  Impression:    No radiographic evidence of acute cardiopulmonary disease. Cardiomegaly, bilateral opacities and pulmonary arterial hypertension, unchanged.  < end of copied text >        MEDICATIONS  (STANDING):  ALBUTerol/ipratropium for Nebulization 3 milliLiter(s) Nebulizer every 6 hours  amLODIPine   Tablet 10 milliGRAM(s) Oral daily  ascorbic acid 500 milliGRAM(s) Oral daily  atorvastatin 20 milliGRAM(s) Oral at bedtime  buDESOnide 160 MICROgram(s)/formoterol 4.5 MICROgram(s) Inhaler 2 Puff(s) Inhalation two times a day  chlorhexidine 4% Liquid 1 Application(s) Topical once  cholecalciferol 1000 Unit(s) Oral daily  dextrose 5%. 1000 milliLiter(s) (50 mL/Hr) IV Continuous <Continuous>  dextrose 50% Injectable 12.5 Gram(s) IV Push once  dextrose 50% Injectable 25 Gram(s) IV Push once  dextrose 50% Injectable 25 Gram(s) IV Push once  docusate sodium 100 milliGRAM(s) Oral three times a day  finasteride 5 milliGRAM(s) Oral daily  furosemide    Tablet 40 milliGRAM(s) Oral two times a day  heparin  Injectable 5000 Unit(s) SubCutaneous three times a day  influenza   Vaccine 0.5 milliLiter(s) IntraMuscular once  insulin glargine Injectable (LANTUS) 30 Unit(s) SubCutaneous at bedtime  insulin glargine Injectable (LANTUS) 30 Unit(s) SubCutaneous every morning  insulin lispro (HumaLOG) corrective regimen sliding scale   SubCutaneous three times a day before meals  levoFLOXacin  Tablet 250 milliGRAM(s) Oral every 24 hours  magnesium oxide 400 milliGRAM(s) Oral three times a day with meals  metoprolol tartrate 25 milliGRAM(s) Oral two times a day  pantoprazole    Tablet 40 milliGRAM(s) Oral before breakfast  predniSONE   Tablet 20 milliGRAM(s) Oral two times a day  sodium polystyrene sulfonate Suspension 30 Gram(s) Oral once  tamsulosin 0.4 milliGRAM(s) Oral at bedtime    MEDICATIONS  (PRN):  dextrose 40% Gel 15 Gram(s) Oral once PRN Blood Glucose LESS THAN 70 milliGRAM(s)/deciliter  glucagon  Injectable 1 milliGRAM(s) IntraMuscular once PRN Glucose LESS THAN 70 milligrams/deciliter  hydrOXYzine hydrochloride 25 milliGRAM(s) Oral at bedtime PRN Insomnia Patient is a 77 y/o male with PMH of COPD, HTN, CHF, pulmonary HTN, DM2, CKD stage 3, BPH who presents with SOB for several days. Patient was brought in by EMS in respiratory distress, as his PA at home visits noted his SpO2 was in the 70s despite home O2. He was given IV Solumedrol and placed on CPAP by EMS. On arrival in the ED, patient was placed on BIPAP, given Duoneb tratment, and a dose of Levaquin. Patient notes that he normally has baseline SpO2 86-87% on 5L O2 at home. Patient also complains of left leg pain and weakness, along with pain in his left hip and lower back, s/p a mechanical trip and fall earlier this week. He denies head trauma or LOC. He normally walks at home independently but has been unable to ambulate for several days due to pain. He notes the pain worsens when lying down, making it difficult to sleep. Patient also reports occasional blurry vision sporadically for the past several months. He denies headache, dizziness, chest pain, abdominal pain, nausea, or vomiting.    INTERVAL HPI:  Today, patient became hypoxic to 76% despite high-flow O2, due to exertion. This improved with rest. He is currently on high flow O2, CPAP at night, Duonebs, and Levaquin. He refuses BIPAP. He reports improvement in his back pain but still complains of left hip pain. He also notes difficulty hearing likely due to earwax buildup. He denies chest pain, abdominal pain, diarrhea.      Vital Signs Last 24 Hrs  T(C): 36.8 (27 Sep 2019 05:59), Max: 36.8 (27 Sep 2019 05:59)  T(F): 98.2 (27 Sep 2019 05:59), Max: 98.2 (27 Sep 2019 05:59)  HR: 91 (27 Sep 2019 05:59) (67 - 92)  BP: 119/69 (27 Sep 2019 05:59) (103/56 - 148/66)  BP(mean): --  RR: 16 (27 Sep 2019 05:59) (16 - 18)  SpO2: 92% (26 Sep 2019 20:32) (90% - 93%)      REVIEW OF SYSTEMS  Constitutional: No fever, fatigue or weight loss.  Skin: No rash.  Eyes: + Blurry vision. No eye pain.  ENT: + Difficulty hearing. No rhinorrhea, sore throat, hearing changes, congestion, ear pain.  Cardiovascular: No chest pain, palpitations.  Respiratory: + SOB. No congestion or wheezing.  Gastrointestinal: No abdominal pain, nausea, vomiting, or diarrhea.  Genitourinary: No dysuria.  Musculoskeletal: + Left hip pain, + Left leg pain/weakness, + Lower back pain.   Neurologic: No headache.      PHYSICAL EXAM:  GENERAL: NAD. Obese, chronic ill-appearing male.  HEAD:  Atraumatic, Normocephalic.  EYES: EOMI, PERRLA, conjunctiva and sclera clear.  NECK: Supple, No JVD, Normal thyroid.  NERVOUS SYSTEM:  Alert & Oriented X3. Moving all extremities.  CHEST/LUNG: Wheezing bilaterally. No retractions or accessory muscle usage.  HEART:  Regular rate and rhythm. No murmurs, rubs, or gallops.  ABDOMEN: Obese. Soft, Nontender. Bowel sounds present.  EXTREMITIES: B/L lower extremity pitting edema. No clubbing, cyanosis.  SKIN: No rashes or lesions.        LABS:                        14.8   12.80 )-----------( 511      ( 27 Sep 2019 06:58 )             49.4     09-27    142  |  97<L>  |  91<HH>  ----------------------------<  264<H>  4.6   |  34<H>  |  2.7<H>    Ca    9.1      27 Sep 2019 06:58  Mg     2.9     09-25    TPro  6.8  /  Alb  4.5  /  TBili  0.7  /  DBili  x   /  AST  14  /  ALT  17  /  AlkPhos  80  09-25    PT/INR - ( 25 Sep 2019 11:30 )   PT: 12.70 sec;   INR: 1.11 ratio      PTT - ( 25 Sep 2019 11:30 )  PTT:40.7 sec      CARDIAC MARKERS ( 25 Sep 2019 19:20 )  x     / 0.05 ng/mL / x     / x     / x      CARDIAC MARKERS ( 25 Sep 2019 11:30 )  x     / 0.07 ng/mL / x     / x     / x            RADIOLOGY RESULTS:    CT Abdomen and Pelvis No Cont (09.26.19 @ 12:12) >  IMPRESSION:  Patchy nodular opacities at the lung bases, nonspecific in nature. Follow to resolution is recommended.  No acute intra-abdominal or pelvic pathology.  Again seen is an indeterminate hyperdense exophytic left upper pole renal lesion, measuring approximately 4 cm.  < end of copied text >      CT Head No Cont (09.26.19 @ 11:47) >  IMPRESSION:   1.  No evidence of acute intracranial pathology.    2.  Moderate chronic microvascular changes.  < end of copied text >      [facility] Duplex Lower Ext Vein Scan, Bilat (09.26.19 @ 11:06) >  Impression:  No evidence of deep venous thrombosis or superficial thrombophlebitis in bilateral lower extremities.  < end of copied text >      US Kidney and Bladder (09.25.19 @ 22:52) >  IMPRESSION: No hydronephrosis. Urinary bladder postvoid residual of 170 cc.  < end of copied text >      Xray Chest 1 View-PORTABLE IMMEDIATE (09.25.19 @ 12:50) >  Impression:    No radiographic evidence of acute cardiopulmonary disease. Cardiomegaly, bilateral opacities and pulmonary arterial hypertension, unchanged.  < end of copied text >        MEDICATIONS  (STANDING):  ALBUTerol    0.083% 2.5 milliGRAM(s) Nebulizer every 6 hours  amLODIPine   Tablet 10 milliGRAM(s) Oral daily  ascorbic acid 500 milliGRAM(s) Oral daily  atorvastatin 20 milliGRAM(s) Oral at bedtime  buDESOnide 160 MICROgram(s)/formoterol 4.5 MICROgram(s) Inhaler 2 Puff(s) Inhalation two times a day  cholecalciferol 1000 Unit(s) Oral daily  dextrose 5%. 1000 milliLiter(s) (50 mL/Hr) IV Continuous <Continuous>  dextrose 50% Injectable 12.5 Gram(s) IV Push once  dextrose 50% Injectable 25 Gram(s) IV Push once  dextrose 50% Injectable 25 Gram(s) IV Push once  docusate sodium 100 milliGRAM(s) Oral three times a day  finasteride 5 milliGRAM(s) Oral daily  furosemide    Tablet 40 milliGRAM(s) Oral two times a day  heparin  Injectable 5000 Unit(s) SubCutaneous three times a day  influenza   Vaccine 0.5 milliLiter(s) IntraMuscular once  insulin glargine Injectable (LANTUS) 30 Unit(s) SubCutaneous every morning  insulin lispro (HumaLOG) corrective regimen sliding scale   SubCutaneous three times a day before meals  levoFLOXacin  Tablet 250 milliGRAM(s) Oral every 24 hours  magnesium oxide 400 milliGRAM(s) Oral three times a day with meals  metoprolol tartrate 25 milliGRAM(s) Oral two times a day  pantoprazole    Tablet 40 milliGRAM(s) Oral before breakfast  predniSONE   Tablet 20 milliGRAM(s) Oral two times a day  tamsulosin 0.4 milliGRAM(s) Oral at bedtime  tiotropium 18 MICROgram(s) Capsule 1 Capsule(s) Inhalation daily    MEDICATIONS  (PRN):  dextrose 40% Gel 15 Gram(s) Oral once PRN Blood Glucose LESS THAN 70 milliGRAM(s)/deciliter  glucagon  Injectable 1 milliGRAM(s) IntraMuscular once PRN Glucose LESS THAN 70 milligrams/deciliter  hydrOXYzine hydrochloride 25 milliGRAM(s) Oral at bedtime PRN Insomnia

## 2019-09-27 NOTE — CONSULT NOTE ADULT - SUBJECTIVE AND OBJECTIVE BOX
HPI:  Patient is an 79yo M w/ PMH COPD, HTN, CHF, and DMt2 p/w SOB and left leg pain x few days. Patient states that he came in 2/2 to leg pain, but pt was seen by PA at home visits who likely advised patient to call EMS 2/2 SpO2 in 70s despite home O2. As per ED patient was found to be sating at 77% when EMS arrived at his home - as per ED patient was given IV solumedrol and placed on CPAP by EMS. Once arrived patient was placed on BiPAP and given one duonebs treatment as well as a dose of levaquin. Pt states that he is on O2 at home (5 liters) and typically sats at 86-87%. Pt states that he was feeling mildly SOB, but his primary concern is his leg. Pt states that he has been feeling weakness and pain in his left leg x few days. Pt reports pain in his low back, groin and hip pain. Pt states that he typically walks at home w/o assistive devices, but these past few days he has been unable to ambulate. Patient also states that pain increases when lying down, which has left him unable to sleep. hip pain/back pain started after fall.  Pt is able to move extremity w/o resistance, but has pain and weakness against resistance Pt denies HA, dizziness, NVD, chest pain, abdominal pain, change in bowel habits, change in urination.     Patient is afebrile w/ WBC: 11.3. K is elevated to 5.6. BUN/cr: 83/3.0. First set of troponin: 0.07. BNP: 1555. SpO2: 93% on BIPAP.     Patient reports blurry vision at time for the past several months (25 Sep 2019 14:50)    Called to evaluate renal mass found on CT scan. Pt denies any flank pain or gross hematuria.     PAST MEDICAL & SURGICAL HISTORY:  Chronic CHF  CKD (chronic kidney disease) stage 3, GFR 30-59 ml/min  Colon polyp: claims it was malignant  High cholesterol  GERD (gastroesophageal reflux disease)  Enlarged prostate  COPD (chronic obstructive pulmonary disease): On home O2 - 5L  Diabetes mellitus, type 2  Hypertension  History of hemorrhoidectomy  Dysplastic colon polyp: removed      MEDICATIONS  (STANDING):  ALBUTerol    0.083% 2.5 milliGRAM(s) Nebulizer every 6 hours  amLODIPine   Tablet 10 milliGRAM(s) Oral daily  ascorbic acid 500 milliGRAM(s) Oral daily  atorvastatin 20 milliGRAM(s) Oral at bedtime  buDESOnide 160 MICROgram(s)/formoterol 4.5 MICROgram(s) Inhaler 2 Puff(s) Inhalation two times a day  carbamide peroxide Otic Solution 1 Drop(s) Both Ears two times a day  cholecalciferol 1000 Unit(s) Oral daily  dextrose 5%. 1000 milliLiter(s) (50 mL/Hr) IV Continuous <Continuous>  dextrose 50% Injectable 12.5 Gram(s) IV Push once  dextrose 50% Injectable 25 Gram(s) IV Push once  dextrose 50% Injectable 25 Gram(s) IV Push once  docusate sodium 100 milliGRAM(s) Oral three times a day  finasteride 5 milliGRAM(s) Oral daily  furosemide    Tablet 40 milliGRAM(s) Oral two times a day  heparin  Injectable 5000 Unit(s) SubCutaneous three times a day  influenza   Vaccine 0.5 milliLiter(s) IntraMuscular once  insulin glargine Injectable (LANTUS) 30 Unit(s) SubCutaneous every morning  insulin lispro (HumaLOG) corrective regimen sliding scale   SubCutaneous three times a day before meals  insulin lispro Injectable (HumaLOG) 3 Unit(s) SubCutaneous three times a day before meals  levoFLOXacin  Tablet 250 milliGRAM(s) Oral every 24 hours  magnesium oxide 400 milliGRAM(s) Oral three times a day with meals  metoprolol tartrate 25 milliGRAM(s) Oral two times a day  pantoprazole    Tablet 40 milliGRAM(s) Oral before breakfast  predniSONE   Tablet 20 milliGRAM(s) Oral daily  tamsulosin 0.4 milliGRAM(s) Oral at bedtime  tiotropium 18 MICROgram(s) Capsule 1 Capsule(s) Inhalation daily    MEDICATIONS  (PRN):  dextrose 40% Gel 15 Gram(s) Oral once PRN Blood Glucose LESS THAN 70 milliGRAM(s)/deciliter  glucagon  Injectable 1 milliGRAM(s) IntraMuscular once PRN Glucose LESS THAN 70 milligrams/deciliter  hydrOXYzine hydrochloride 25 milliGRAM(s) Oral at bedtime PRN Insomnia      Allergies    fish (Other)  iodine (Hives)  penicillin (Unknown)  shellfish (Other)    Intolerances    aspirin (Other)      SOCIAL HISTORY: No illicit drug use    FAMILY HISTORY:  No pertinent family history in first degree relatives      REVIEW OF SYSTEMS:    CONSTITUTIONAL: No weakness, fevers or chills  EYES/ENT: No visual changes;  No vertigo or throat pain   NECK: No pain or stiffness  RESPIRATORY: No cough, wheezing, hemoptysis; No shortness of breath  CARDIOVASCULAR: No chest pain or palpitations  GASTROINTESTINAL: No abdominal or epigastric pain. No nausea, vomiting, or hematemesis; No diarrhea or constipation. No melena or hematochezia.  GENITOURINARY: No dysuria, frequency or hematuria  NEUROLOGICAL: No numbness or weakness  SKIN: No itching, burning, rashes, or lesions   All other review of systems is negative unless indicated above.    Vital Signs Last 24 Hrs  T(C): 35.9 (27 Sep 2019 12:44), Max: 36.8 (27 Sep 2019 05:59)  T(F): 96.6 (27 Sep 2019 12:44), Max: 98.2 (27 Sep 2019 05:59)  HR: 94 (27 Sep 2019 12:44) (67 - 94)  BP: 119/57 (27 Sep 2019 12:44) (103/56 - 148/66)  BP(mean): --  RR: 16 (27 Sep 2019 12:44) (16 - 18)  SpO2: 92% (27 Sep 2019 07:40) (90% - 92%)    PHYSICAL EXAM:    Constitutional: NAD, well-developed  HEENT: EOMI  Neck: no pain  Back: No CVA tenderness  Respiratory: No accessory respiratory muscle use, pt currently on high flow O2  Abd: Soft, obese, NT/ND        I&O's Summary    26 Sep 2019 07:01  -  27 Sep 2019 07:00  --------------------------------------------------------  IN: 0 mL / OUT: 300 mL / NET: -300 mL    27 Sep 2019 07:01  -  27 Sep 2019 13:55  --------------------------------------------------------  IN: 240 mL / OUT: 100 mL / NET: 140 mL        LABS:                        14.8   12.80 )-----------( 511      ( 27 Sep 2019 06:58 )             49.4     09-27    142  |  97<L>  |  91<HH>  ----------------------------<  264<H>  4.6   |  34<H>  |  2.7<H>    Ca    9.1      27 Sep 2019 06:58                RADIOLOGY & ADDITIONAL STUDIES:  < from: CT Abdomen and Pelvis No Cont (09.26.19 @ 12:12) >  IMPRESSION:     Patchy nodular opacities at the lung bases, nonspecific in nature. Follow   to resolution is recommended.    No acute intra-abdominal or pelvic pathology.    Again seen is an indeterminate hyperdense exophytic left upper pole renal   lesion, measuring approximately 4 cm.                  JENNY LOOMIS M.D., ATTENDING RADIOLOGIST  This document has been electronically signed. Sep 26 2019  1:05PM                < end of copied text >

## 2019-09-27 NOTE — PROGRESS NOTE ADULT - ASSESSMENT
Impression:  JAMES   lower ext edema   Abd pain ??   CRF hypoxic and hypercapnia   copd stable   chf         Plan:   prednisone 20 mg from lung wise he is at his baseline   continue home inhaler   urology evaluation for renal mass   follow renal consult   on Lasix Q12 hrs  l   case discussed with hospitalist covering   bipap at night and prn   keep on oxygen to keep pox 89-88%

## 2019-09-27 NOTE — PROGRESS NOTE ADULT - SUBJECTIVE AND OBJECTIVE BOX
Nephrology progress note    Patient is seen and examined, events over the last 24 h noted .  Denies SOB at rest on High O2, no nausea abd pain dysuria  Allergies:  aspirin (Other)  fish (Other)  iodine (Hives)  penicillin (Unknown)  shellfish (Other)    Hospital Medications:   MEDICATIONS  (STANDING):  ALBUTerol    0.083% 2.5 milliGRAM(s) Nebulizer every 6 hours  amLODIPine   Tablet 10 milliGRAM(s) Oral daily  ascorbic acid 500 milliGRAM(s) Oral daily  atorvastatin 20 milliGRAM(s) Oral at bedtime  buDESOnide 160 MICROgram(s)/formoterol 4.5 MICROgram(s) Inhaler 2 Puff(s) Inhalation two times a day  cholecalciferol 1000 Unit(s) Oral daily  dextrose 5%. 1000 milliLiter(s) (50 mL/Hr) IV Continuous <Continuous>  dextrose 50% Injectable 12.5 Gram(s) IV Push once  dextrose 50% Injectable 25 Gram(s) IV Push once  dextrose 50% Injectable 25 Gram(s) IV Push once  docusate sodium 100 milliGRAM(s) Oral three times a day  finasteride 5 milliGRAM(s) Oral daily  furosemide    Tablet 40 milliGRAM(s) Oral two times a day  heparin  Injectable 5000 Unit(s) SubCutaneous three times a day  influenza   Vaccine 0.5 milliLiter(s) IntraMuscular once  insulin glargine Injectable (LANTUS) 30 Unit(s) SubCutaneous every morning  insulin lispro (HumaLOG) corrective regimen sliding scale   SubCutaneous three times a day before meals  levoFLOXacin  Tablet 250 milliGRAM(s) Oral every 24 hours  magnesium oxide 400 milliGRAM(s) Oral three times a day with meals  metoprolol tartrate 25 milliGRAM(s) Oral two times a day  pantoprazole    Tablet 40 milliGRAM(s) Oral before breakfast  predniSONE   Tablet 20 milliGRAM(s) Oral two times a day  tamsulosin 0.4 milliGRAM(s) Oral at bedtime  tiotropium 18 MICROgram(s) Capsule 1 Capsule(s) Inhalation daily        VITALS:  T(F): 98.2 (09-27-19 @ 05:59), Max: 98.2 (09-27-19 @ 05:59)  HR: 91 (09-27-19 @ 05:59)  BP: 119/69 (09-27-19 @ 05:59)  RR: 16 (09-27-19 @ 05:59)  SpO2: 92% (09-26-19 @ 20:32)  Wt(kg): --    09-25 @ 07:01  -  09-26 @ 07:00  --------------------------------------------------------  IN: 0 mL / OUT: 770 mL / NET: -770 mL    09-26 @ 07:01  -  09-27 @ 07:00  --------------------------------------------------------  IN: 0 mL / OUT: 300 mL / NET: -300 mL    09-27 @ 07:01  -  09-27 @ 10:05  --------------------------------------------------------  IN: 0 mL / OUT: 100 mL / NET: -100 mL          PHYSICAL EXAM:  Constitutional: NAD, morbidly obese, on AVAp  Respiratory: coarse BS b/l diminished at bases  Cardiovascular: S1, S2, RRR  Gastrointestinal: BS+, soft, NT/ND  Extremities: 2+ peripheral edema  Neurological: A/O x 3  : No CVA tenderness. No chapman.   Skin: No rashes  Vascular Access:    LABS:  09-27    142  |  97<L>  |  91<HH>  ----------------------------<  264<H>  4.6   |  34<H>  |  2.7<H>  Creatinine Trend: 2.7<--, 3.1<--, 3.0<--, 3.0<--, 3.0<--  Potassium Trend: 4.6<--, 5.7<--, 5.4<--, 6.5<--, 5.6<--  Ca    9.1      27 Sep 2019 06:58  Mg     2.9     09-25    TPro  6.8  /  Alb  4.5  /  TBili  0.7  /  DBili      /  AST  14  /  ALT  17  /  AlkPhos  80  09-25                          14.8   12.80 )-----------( 511      ( 27 Sep 2019 06:58 )             49.4       Urine Studies:      RADIOLOGY & ADDITIONAL STUDIES:  < from: CT Abdomen and Pelvis No Cont (09.26.19 @ 12:12) >  IDNEYS: The kidneys are atrophic. There is no hydronephrosis. There is a   4 mm nonobstructing right interpolar renal calculus. Again seen is an   indeterminate hyperdense exophytic left upper pole renal lesion measuring   approximately 4 cm. There are bilateral renal cysts. The largest on the   right is in the lower pole measuring 8.3 cm. There are additional   bilateral subcentimeter renal hypodensities, too small to characterize.    ABDOMINOPELVIC NODES: Unremarkable.    PELVIC ORGANS: The prostate gland is enlarged indenting the base of the   bladder. The bladder is collapsed. There is no bladder calculus..    PERITONEUM/MESENTERY/BOWEL: Diverticulosis. No free air or obstruction.    BONES/SOFT TISSUES: Degenerative change. Subcutaneous emphysema is likely   related to injection phenomena.    OTHER: Atherosclerosis.      IMPRESSION:     Patchy nodular opacities at the lung bases, nonspecific in nature. Follow   to resolution is recommended.    < end of copied text >

## 2019-09-27 NOTE — PROGRESS NOTE ADULT - ASSESSMENT
Patient is a 77 y/o male with PMH of COPD, HTN, CHF, pulmonary HTN, DM2, CKD stage 3, BPH who presents with SOB and left leg pain for several days.    1. Chronic respiratory failure 2/2 COPD vs CHF   -CXR reveals no acute disease; stable cardiomegaly and pulmonary HTN.   -Pulmonary is following patient. Per pulmonary, he is at baseline for his lung function.  -Continue with High flow O2, CPAP, Duonebs/home inhaler, Lasix 40mg BID po, Levaquin 250mg po, Prednisone po 20mg.  -Doppler B/L LE is negative for DVT. Low suspicion for PE.    2. Elevated troponin  -EKG shows no ischemic changes.  -Asymptomatic. Likely due to right-sided CHF and JAMES on CKD.  -Per Cardiology: No MI. His troponin is chronically mildly elevated and he does not follow his diet. Continue meds. Reviewed 1500 mg Na diet. Consider hospice.    3. Pain in left leg, hip, back s/p mechanical fall   -X-ray of the hip and lumbar spine are pending.  -CT Abdomen and Pelvis: no acute intra-abdominal or pelvic pathology. Patchy nodular opacities at the lung bases.  -Pain medications.  -PT consult placed.    4. Blurry vision  -Patient denies head trauma during fall. AAO x3, neurologically intact.   -Possibly due to cataract vs steroid use.  -Head CT: no acute intracranial pathology.    5. DM2  -Continue with home dose of Lantus and ISS.  -Monitor POC glucose.    6.  HTN  -Continue with home Amlodipine and Metoprolol.    7.  JAMES on CKD 3  -Renal US shows no hydronephrosis. Postvoid residual volume 170cc. No need for Velazquez.  -Nephrology is following patient. Per Nephro: Creatinine is at baseline; obtain UA, urine protein/creatinine ratio, phos, iPTH.   -Avoid nephrotoxins.    8.  Hyperkalemia 2/2 CKD  -Kayexalate x3 dose.  -Improved 6.5 --> 5.4. Monitor BMP.  -Per Nephro, 2g K diet, Kayexalate 30g x3/week, Lasix 40mg BID po, check renin and aldosterone.     9. BPH  -Continue home meds.    10. Difficulty ambulating  -PT eval pending.    -Grave prognosis. Discussed with patient about end of life care. Patient signed DNR/DNI.      Patient was interviewed and examined by Pratima Encinas, medical student. This is a preliminary report pending discussion with hospitalist, Dr. Yoni Mcclure. Patient is a 79 y/o male with PMH of COPD, HTN, CHF, pulmonary HTN, DM2, CKD stage 3, BPH who presents with SOB and left leg pain for several days.    1. Chronic respiratory failure 2/2 COPD vs CHF   -CXR reveals no acute disease; stable cardiomegaly and pulmonary HTN.   -Pulmonary is following patient. Per pulmonary, he is at baseline for his lung function.  -Continue with High flow O2, CPAP, Duonebs/home inhaler, Lasix 40mg BID po, Levaquin 250mg po qd, Prednisone po 20mg qd.  -Leukocytosis due to Prednisone, patient afebrile.   -Doppler B/L LE is negative for DVT. Low suspicion for PE.    2. Elevated troponin  -EKG shows no ischemic changes.  -Asymptomatic. Likely due to right-sided CHF and JAMES on CKD.  -Per Cardiology: No MI. His troponin is chronically mildly elevated. Continue meds. Reviewed 1500 mg Na diet.     3. Pain in left leg, hip, back s/p mechanical fall   -X-ray of left hip and lumbar spine to be done today.  -CT Abdomen and Pelvis: no acute intra-abdominal or pelvic pathology. Patchy nodular opacities at the lung bases.  -Pain medications.    4. Blurry vision  -Patient denies head trauma during fall. AAO x3, neurologically intact.   -Possibly due to cataract vs steroid use.  -Head CT: no acute intracranial pathology.    5. DM2  -Continue with home dose of Lantus and ISS.  -Monitor POC glucose.    6.  HTN  -Continue with home Amlodipine and Metoprolol.    7.  JMAES on CKD 3  -Renal US shows no hydronephrosis. Postvoid residual volume 170cc. No need for Velazquez.  -Nephrology is following patient. Per Nephro: Cr is at baseline; obtain UA urine protein/creatinine ratio, check phos and iPTH.   -Avoid nephrotoxins.    8.  Hyperkalemia 2/2 CKD - resolved  -Resolved following Kayexalate.  -Per Nephro: 2g K diet, Kayexalate 30g x3/week, Lasix 40mg BID po.  -Follow up renin and aldosterone.    9. BPH  -Continue home meds.    10. Difficulty ambulating  -PT consult placed, pending X-rays.    -Grave prognosis. Discussed with patient about end of life care. Patient signed DNR/DNI.    Patient was interviewed and examined by Pratima Encinas, medical student. This is a preliminary report pending discussion with hospitalist, Dr. Yoni Mcclure.

## 2019-09-27 NOTE — PHYSICAL THERAPY INITIAL EVALUATION ADULT - SPECIFY REASON(S)
Continue to hold PT eval at this time, pending x-ray L hip and lumbar spine.  PT will follow up as appropriate.

## 2019-09-27 NOTE — PROGRESS NOTE ADULT - SUBJECTIVE AND OBJECTIVE BOX
CC.  SOB/Leg pain  HPI.  Patient reports SOB, and cough are improving.  afebrile.  Tolerating po intake  reports flank pain/hip pain resolved.   Hypoxic off the high flow   Denies any abdominal pain, N/V/D, numbness or weakness    refuses BIPAP.  Prefers CPAP  refused CT scan yesterday, agreeable to it today          Constitutional: No fever, fatigue or weight loss.  Skin: No rash.  Eyes: No recent vision problems or eye pain.  ENT: No congestion, ear pain, or sore throat.  Endocrine: No thyroid problems.  Cardiovascular: No chest pain or palpation.  Respiratory: No congestion, or wheezing.  Gastrointestinal: No abdominal pain, nausea, vomiting, or diarrhea.  Genitourinary: No dysuria.  Musculoskeletal: No joint swelling.  Neurologic: No headache.      Vital Signs Last 24 Hrs  T(C): 36.8 (27 Sep 2019 05:59), Max: 36.8 (27 Sep 2019 05:59)  T(F): 98.2 (27 Sep 2019 05:59), Max: 98.2 (27 Sep 2019 05:59)  HR: 91 (27 Sep 2019 05:59) (67 - 92)  BP: 119/69 (27 Sep 2019 05:59) (103/56 - 148/66)  BP(mean): --  RR: 16 (27 Sep 2019 05:59) (16 - 18)  SpO2: 92% (26 Sep 2019 20:32) (90% - 93%)        PHYSICAL EXAM-  GENERAL: NAD,  chronic ill appearing male  HEAD:  Atraumatic, Normocephalic  EYES: EOMI, PERRLA, conjunctiva and sclera clear  NECK: Supple, No JVD, Normal thyroid  NERVOUS SYSTEM:  Alert & Oriented X3, Moving all extremities  CHEST/LUNG: Min wheezing with good air entry.  No retractions or accessory muscle usage  HEART: Regular rate and rhythm; No murmurs, rubs, or gallops  ABDOMEN: Soft, Nontender, Nondistended; Bowel sounds present  EXTREMITIES:  No clubbing, cyanosis, or edema  SKIN: No rashes or lesions                            14.8   12.80 )-----------( 511      ( 27 Sep 2019 06:58 )             49.4     09-27    142  |  97<L>  |  91<HH>  ----------------------------<  264<H>  4.6   |  34<H>  |  2.7<H>    Ca    9.1      27 Sep 2019 06:58  Mg     2.9     09-25    TPro  6.8  /  Alb  4.5  /  TBili  0.7  /  DBili  x   /  AST  14  /  ALT  17  /  AlkPhos  80  09-25    CARDIAC MARKERS ( 26 Sep 2019 06:53 )  x     / 0.03 ng/mL / x     / x     / x      CARDIAC MARKERS ( 25 Sep 2019 19:20 )  x     / 0.05 ng/mL / x     / x     / x      CARDIAC MARKERS ( 25 Sep 2019 11:30 )  x     / 0.07 ng/mL / x     / x     / x              PT/INR - ( 25 Sep 2019 11:30 )   PT: 12.70 sec;   INR: 1.11 ratio         PTT - ( 25 Sep 2019 11:30 )  PTT:40.7 sec    MEDICATIONS  (STANDING):  ALBUTerol    0.083% 2.5 milliGRAM(s) Nebulizer every 6 hours  amLODIPine   Tablet 10 milliGRAM(s) Oral daily  ascorbic acid 500 milliGRAM(s) Oral daily  atorvastatin 20 milliGRAM(s) Oral at bedtime  buDESOnide 160 MICROgram(s)/formoterol 4.5 MICROgram(s) Inhaler 2 Puff(s) Inhalation two times a day  cholecalciferol 1000 Unit(s) Oral daily  dextrose 5%. 1000 milliLiter(s) (50 mL/Hr) IV Continuous <Continuous>  dextrose 50% Injectable 12.5 Gram(s) IV Push once  dextrose 50% Injectable 25 Gram(s) IV Push once  dextrose 50% Injectable 25 Gram(s) IV Push once  docusate sodium 100 milliGRAM(s) Oral three times a day  finasteride 5 milliGRAM(s) Oral daily  furosemide    Tablet 40 milliGRAM(s) Oral two times a day  heparin  Injectable 5000 Unit(s) SubCutaneous three times a day  influenza   Vaccine 0.5 milliLiter(s) IntraMuscular once  insulin glargine Injectable (LANTUS) 30 Unit(s) SubCutaneous every morning  insulin lispro (HumaLOG) corrective regimen sliding scale   SubCutaneous three times a day before meals  levoFLOXacin  Tablet 250 milliGRAM(s) Oral every 24 hours  magnesium oxide 400 milliGRAM(s) Oral three times a day with meals  metoprolol tartrate 25 milliGRAM(s) Oral two times a day  pantoprazole    Tablet 40 milliGRAM(s) Oral before breakfast  predniSONE   Tablet 20 milliGRAM(s) Oral two times a day  tamsulosin 0.4 milliGRAM(s) Oral at bedtime  tiotropium 18 MICROgram(s) Capsule 1 Capsule(s) Inhalation daily    MEDICATIONS  (PRN):  dextrose 40% Gel 15 Gram(s) Oral once PRN Blood Glucose LESS THAN 70 milliGRAM(s)/deciliter  glucagon  Injectable 1 milliGRAM(s) IntraMuscular once PRN Glucose LESS THAN 70 milligrams/deciliter  hydrOXYzine hydrochloride 25 milliGRAM(s) Oral at bedtime PRN Insomnia                   RADIOLOGY RESULTS:

## 2019-09-27 NOTE — PROGRESS NOTE ADULT - ASSESSMENT
Patient is an 77yo M w/ PMH COPD, HTN, CHF, and DMt2 p/w SOB and left leg pain x few days. Patient states that he came in 2/2 to leg pain, but pt was seen by PA at home visits who likely advised patient to call EMS 2/2 SpO2 in 70s despite home O2.    1. Chronic Respiratory failure   -CXR shows no acute process  -Continue with neb tx, Oxygen, steriod with taper,  High flow, CPAP, Po lasix, and PO levaquin  -Doppler US of LE negative  -Pulmonary to follow up     2. Abnormal trop  -EKG shows no ischemic changes  -Asymptomatic  -Probably due to JAMES on CKD, and current respiratory status  -Trop level flat  -Cardiology to follow up    3.  Left hip/Leg pain  -S/P fall  -X-ray of the hip, and lumbar spine are pending  -pain medications  -PT consult    4. Blurry vision  -Possible due to cataract, hypoxemia, and possible steroid  -Head CT scan negative  -neurologically intact  -neuro check    5. DM2  -Continue with home dose of lantus, and ISS  -Monitor POC      6.  HTN  -Continue with home medications, and Monitor BP    7.  JAMES on CKD  -improving  -renal US shows no acute process.  PVR volume low  -monitor renal function  -Avoid nephrotoxin  -Nephrology to follow up    8.  Hyperkalemia  -Kayexalate  3x a week  -Nephrology to follow up     patient opted for DNR/DNI after long discussion  overall prognosis. poor    Plan of care was discussed with patient  in great details, All questions were answered to their satisfaction  Seems to understand, and in agreement Patient is an 77yo M w/ PMH COPD, HTN, CHF, and DMt2 p/w SOB and left leg pain x few days. Patient states that he came in 2/2 to leg pain, but pt was seen by PA at home visits who likely advised patient to call EMS 2/2 SpO2 in 70s despite home O2.    1. Chronic Respiratory failure   -CXR shows no acute process  -Continue with neb tx, Oxygen, steriod with taper,  High flow, CPAP, Po lasix, and PO levaquin  -Doppler US of LE negative  -Pulmonary to follow up     2. Abnormal trop  -EKG shows no ischemic changes  -Asymptomatic  -Probably due to JAMES on CKD, and current respiratory status  -Trop level flat  -Cardiology to follow up    3.  Left hip/Leg pain  -S/P fall  -X-ray of the hip, and lumbar spine are pending  -pain medications  -PT consult    4. Blurry vision  -Possible due to cataract, hypoxemia, and possible steroid  -Head CT scan negative  -neurologically intact  -neuro check    5. DM2  -Continue with home dose of lantus, and ISS  -Monitor POC      6.  HTN  -Continue with home medications, and Monitor BP    7.  JAMES on CKD  -improving  -renal US shows no acute process.  PVR volume low  -monitor renal function  -Avoid nephrotoxin  -Nephrology to follow up    8.  Hyperkalemia  -Kayexalate  3x a week  -Nephrology to follow up       9.  left renal mass  -urology consult    patient opted for DNR/DNI after long discussion  overall prognosis. poor    Plan of care was discussed with patient  in great details, All questions were answered to their satisfaction  Seems to understand, and in agreement

## 2019-09-27 NOTE — PROGRESS NOTE ADULT - SUBJECTIVE AND OBJECTIVE BOX
Patient is a 78y old  Male who presents with a chief complaint of SOB and leg pain (27 Sep 2019 10:21)      Over Night Events:  Patient seen and examined.   feel betetr pain betetr     ROS:  See HPI    PHYSICAL EXAM    ICU Vital Signs Last 24 Hrs  T(C): 36.8 (27 Sep 2019 05:59), Max: 36.8 (27 Sep 2019 05:59)  T(F): 98.2 (27 Sep 2019 05:59), Max: 98.2 (27 Sep 2019 05:59)  HR: 91 (27 Sep 2019 05:59) (67 - 92)  BP: 119/69 (27 Sep 2019 05:59) (103/56 - 148/66)  BP(mean): --  ABP: --  ABP(mean): --  RR: 18 (27 Sep 2019 07:40) (16 - 18)  SpO2: 92% (27 Sep 2019 07:40) (90% - 93%)      General:aox3  HEENT:          des      Lymph Nodes: NO cervical LN   Lungs: Bilateral BS  Cardiovascular: Regular   Abdomen: Soft, Positive BS  Extremities: No clubbing   Skin: warm   Neurological:   Musculoskeletal: move all ext     I&O's Detail    26 Sep 2019 07:01  -  27 Sep 2019 07:00  --------------------------------------------------------  IN:  Total IN: 0 mL    OUT:    Voided: 300 mL  Total OUT: 300 mL    Total NET: -300 mL      27 Sep 2019 07:01  -  27 Sep 2019 11:49  --------------------------------------------------------  IN:    Oral Fluid: 240 mL  Total IN: 240 mL    OUT:    Voided: 100 mL  Total OUT: 100 mL    Total NET: 140 mL          LABS:                          14.8   12.80 )-----------( 511      ( 27 Sep 2019 06:58 )             49.4         27 Sep 2019 06:58    142    |  97     |  91     ----------------------------<  264    4.6     |  34     |  2.7      Ca    9.1        27 Sep 2019 06:58                                                                                            CARDIAC MARKERS ( 26 Sep 2019 06:53 )  x     / 0.03 ng/mL / x     / x     / x      CARDIAC MARKERS ( 25 Sep 2019 19:20 )  x     / 0.05 ng/mL / x     / x     / x                                                                                                                                                 MEDICATIONS  (STANDING):  ALBUTerol    0.083% 2.5 milliGRAM(s) Nebulizer every 6 hours  amLODIPine   Tablet 10 milliGRAM(s) Oral daily  ascorbic acid 500 milliGRAM(s) Oral daily  atorvastatin 20 milliGRAM(s) Oral at bedtime  buDESOnide 160 MICROgram(s)/formoterol 4.5 MICROgram(s) Inhaler 2 Puff(s) Inhalation two times a day  cholecalciferol 1000 Unit(s) Oral daily  dextrose 5%. 1000 milliLiter(s) (50 mL/Hr) IV Continuous <Continuous>  dextrose 50% Injectable 12.5 Gram(s) IV Push once  dextrose 50% Injectable 25 Gram(s) IV Push once  dextrose 50% Injectable 25 Gram(s) IV Push once  docusate sodium 100 milliGRAM(s) Oral three times a day  finasteride 5 milliGRAM(s) Oral daily  furosemide    Tablet 40 milliGRAM(s) Oral two times a day  heparin  Injectable 5000 Unit(s) SubCutaneous three times a day  influenza   Vaccine 0.5 milliLiter(s) IntraMuscular once  insulin glargine Injectable (LANTUS) 30 Unit(s) SubCutaneous every morning  insulin lispro (HumaLOG) corrective regimen sliding scale   SubCutaneous three times a day before meals  levoFLOXacin  Tablet 250 milliGRAM(s) Oral every 24 hours  magnesium oxide 400 milliGRAM(s) Oral three times a day with meals  metoprolol tartrate 25 milliGRAM(s) Oral two times a day  pantoprazole    Tablet 40 milliGRAM(s) Oral before breakfast  predniSONE   Tablet 20 milliGRAM(s) Oral daily  tamsulosin 0.4 milliGRAM(s) Oral at bedtime  tiotropium 18 MICROgram(s) Capsule 1 Capsule(s) Inhalation daily    MEDICATIONS  (PRN):  dextrose 40% Gel 15 Gram(s) Oral once PRN Blood Glucose LESS THAN 70 milliGRAM(s)/deciliter  glucagon  Injectable 1 milliGRAM(s) IntraMuscular once PRN Glucose LESS THAN 70 milligrams/deciliter  hydrOXYzine hydrochloride 25 milliGRAM(s) Oral at bedtime PRN Insomnia          Xrays:  TLC:  OG:  ET tube:                                                                                       ECHO:  [facility] ICU:

## 2019-09-27 NOTE — CHART NOTE - NSCHARTNOTEFT_GEN_A_CORE
Despite pressure, patient has bleeding from prior heparin injections sites. For now hold s/q heparin and try to obtain coagulation promoting dressing

## 2019-09-27 NOTE — PROGRESS NOTE ADULT - ASSESSMENT
Pt with CKD stage 4 and chronic hyperkalemia p/w SOB and leg pain.    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

## 2019-09-27 NOTE — CONSULT NOTE ADULT - ASSESSMENT
77 yo male with several co-morbidities with incidental finding of renal mass.     Plan:  1. Renal mass - may or may not represent a neoplasm. Discussed further evaluation of lesion including percutaneous renal bx. Pt is reluctant to have any surgeries bc of his overall health and feels he is too high risk.    2. Known BPH (184gm prostate) with sec voiding sx's - cont current tx    Follow up in office to discuss further eval of renal lesion.     Discussed all above with Dr Khoury.

## 2019-09-28 LAB
ANION GAP SERPL CALC-SCNC: 11 MMOL/L — SIGNIFICANT CHANGE UP (ref 7–14)
APPEARANCE UR: CLEAR — SIGNIFICANT CHANGE UP
BASE EXCESS BLDA CALC-SCNC: 9.3 MMOL/L — HIGH (ref -2–2)
BILIRUB UR-MCNC: NEGATIVE — SIGNIFICANT CHANGE UP
BUN SERPL-MCNC: 88 MG/DL — CRITICAL HIGH (ref 10–20)
CALCIUM SERPL-MCNC: 8.8 MG/DL — SIGNIFICANT CHANGE UP (ref 8.5–10.1)
CHLORIDE SERPL-SCNC: 97 MMOL/L — LOW (ref 98–110)
CO2 SERPL-SCNC: 36 MMOL/L — HIGH (ref 17–32)
COLOR SPEC: YELLOW — SIGNIFICANT CHANGE UP
CREAT SERPL-MCNC: 2.4 MG/DL — HIGH (ref 0.7–1.5)
DIFF PNL FLD: NEGATIVE — SIGNIFICANT CHANGE UP
GLUCOSE BLDC GLUCOMTR-MCNC: 117 MG/DL — HIGH (ref 70–99)
GLUCOSE BLDC GLUCOMTR-MCNC: 122 MG/DL — HIGH (ref 70–99)
GLUCOSE BLDC GLUCOMTR-MCNC: 158 MG/DL — HIGH (ref 70–99)
GLUCOSE BLDC GLUCOMTR-MCNC: 164 MG/DL — HIGH (ref 70–99)
GLUCOSE BLDC GLUCOMTR-MCNC: 253 MG/DL — HIGH (ref 70–99)
GLUCOSE SERPL-MCNC: 118 MG/DL — HIGH (ref 70–99)
GLUCOSE UR QL: NEGATIVE MG/DL — SIGNIFICANT CHANGE UP
HCO3 BLDA-SCNC: 37 MMOL/L — HIGH (ref 23–27)
HCT VFR BLD CALC: 50.1 % — SIGNIFICANT CHANGE UP (ref 42–52)
HGB BLD-MCNC: 14.5 G/DL — SIGNIFICANT CHANGE UP (ref 14–18)
HOROWITZ INDEX BLDA+IHG-RTO: 50 — SIGNIFICANT CHANGE UP
KETONES UR-MCNC: NEGATIVE — SIGNIFICANT CHANGE UP
LEUKOCYTE ESTERASE UR-ACNC: NEGATIVE — SIGNIFICANT CHANGE UP
MCHC RBC-ENTMCNC: 22.9 PG — LOW (ref 27–31)
MCHC RBC-ENTMCNC: 28.9 G/DL — LOW (ref 32–37)
MCV RBC AUTO: 79.1 FL — LOW (ref 80–94)
NITRITE UR-MCNC: NEGATIVE — SIGNIFICANT CHANGE UP
NRBC # BLD: 0 /100 WBCS — SIGNIFICANT CHANGE UP (ref 0–0)
PCO2 BLDA: 59 MMHG — HIGH (ref 38–42)
PH BLDA: 7.4 — SIGNIFICANT CHANGE UP (ref 7.38–7.42)
PH UR: 6 — SIGNIFICANT CHANGE UP (ref 5–8)
PLATELET # BLD AUTO: 426 K/UL — HIGH (ref 130–400)
PO2 BLDA: 63 MMHG — LOW (ref 78–95)
POTASSIUM SERPL-MCNC: 4.2 MMOL/L — SIGNIFICANT CHANGE UP (ref 3.5–5)
POTASSIUM SERPL-SCNC: 4.2 MMOL/L — SIGNIFICANT CHANGE UP (ref 3.5–5)
PROT UR-MCNC: NEGATIVE MG/DL — SIGNIFICANT CHANGE UP
RBC # BLD: 6.33 M/UL — HIGH (ref 4.7–6.1)
RBC # FLD: 20.3 % — HIGH (ref 11.5–14.5)
SAO2 % BLDA: 92 % — LOW (ref 94–98)
SODIUM SERPL-SCNC: 144 MMOL/L — SIGNIFICANT CHANGE UP (ref 135–146)
SP GR SPEC: 1.02 — SIGNIFICANT CHANGE UP (ref 1.01–1.03)
UROBILINOGEN FLD QL: 0.2 MG/DL — SIGNIFICANT CHANGE UP (ref 0.2–0.2)
WBC # BLD: 12.89 K/UL — HIGH (ref 4.8–10.8)
WBC # FLD AUTO: 12.89 K/UL — HIGH (ref 4.8–10.8)

## 2019-09-28 PROCEDURE — 99233 SBSQ HOSP IP/OBS HIGH 50: CPT

## 2019-09-28 PROCEDURE — 71045 X-RAY EXAM CHEST 1 VIEW: CPT | Mod: 26

## 2019-09-28 RX ORDER — BACITRACIN ZINC 500 UNIT/G
1 OINTMENT IN PACKET (EA) TOPICAL THREE TIMES A DAY
Refills: 0 | Status: DISCONTINUED | OUTPATIENT
Start: 2019-09-28 | End: 2019-10-08

## 2019-09-28 RX ORDER — LIDOCAINE 4 G/100G
2 CREAM TOPICAL DAILY
Refills: 0 | Status: DISCONTINUED | OUTPATIENT
Start: 2019-09-28 | End: 2019-10-08

## 2019-09-28 RX ORDER — LIDOCAINE 4 G/100G
1 CREAM TOPICAL DAILY
Refills: 0 | Status: DISCONTINUED | OUTPATIENT
Start: 2019-09-28 | End: 2019-10-08

## 2019-09-28 RX ORDER — ACETAMINOPHEN 500 MG
975 TABLET ORAL EVERY 6 HOURS
Refills: 0 | Status: DISCONTINUED | OUTPATIENT
Start: 2019-09-28 | End: 2019-09-30

## 2019-09-28 RX ORDER — FUROSEMIDE 40 MG
20 TABLET ORAL ONCE
Refills: 0 | Status: COMPLETED | OUTPATIENT
Start: 2019-09-28 | End: 2019-09-28

## 2019-09-28 RX ADMIN — LIDOCAINE 1 PATCH: 4 CREAM TOPICAL at 22:06

## 2019-09-28 RX ADMIN — ALBUTEROL 2.5 MILLIGRAM(S): 90 AEROSOL, METERED ORAL at 19:40

## 2019-09-28 RX ADMIN — ALBUTEROL 2.5 MILLIGRAM(S): 90 AEROSOL, METERED ORAL at 01:24

## 2019-09-28 RX ADMIN — Medication 100 MILLIGRAM(S): at 22:03

## 2019-09-28 RX ADMIN — INSULIN GLARGINE 30 UNIT(S): 100 INJECTION, SOLUTION SUBCUTANEOUS at 10:05

## 2019-09-28 RX ADMIN — Medication 1 APPLICATION(S): at 14:25

## 2019-09-28 RX ADMIN — BUDESONIDE AND FORMOTEROL FUMARATE DIHYDRATE 2 PUFF(S): 160; 4.5 AEROSOL RESPIRATORY (INHALATION) at 07:20

## 2019-09-28 RX ADMIN — ATORVASTATIN CALCIUM 20 MILLIGRAM(S): 80 TABLET, FILM COATED ORAL at 22:03

## 2019-09-28 RX ADMIN — Medication 25 MILLIGRAM(S): at 17:25

## 2019-09-28 RX ADMIN — CARBAMIDE PEROXIDE 1 DROP(S): 81.86 SOLUTION/ DROPS AURICULAR (OTIC) at 17:25

## 2019-09-28 RX ADMIN — Medication 40 MILLIGRAM(S): at 14:27

## 2019-09-28 RX ADMIN — Medication 3 UNIT(S): at 17:23

## 2019-09-28 RX ADMIN — TIOTROPIUM BROMIDE 1 CAPSULE(S): 18 CAPSULE ORAL; RESPIRATORY (INHALATION) at 07:21

## 2019-09-28 RX ADMIN — Medication 25 MILLIGRAM(S): at 01:17

## 2019-09-28 RX ADMIN — HEPARIN SODIUM 5000 UNIT(S): 5000 INJECTION INTRAVENOUS; SUBCUTANEOUS at 14:26

## 2019-09-28 RX ADMIN — Medication 40 MILLIGRAM(S): at 10:06

## 2019-09-28 RX ADMIN — Medication 20 MILLIGRAM(S): at 10:41

## 2019-09-28 RX ADMIN — Medication 1 APPLICATION(S): at 05:55

## 2019-09-28 RX ADMIN — HEPARIN SODIUM 5000 UNIT(S): 5000 INJECTION INTRAVENOUS; SUBCUTANEOUS at 22:04

## 2019-09-28 RX ADMIN — LIDOCAINE 1 PATCH: 4 CREAM TOPICAL at 20:33

## 2019-09-28 RX ADMIN — Medication 2: at 17:23

## 2019-09-28 RX ADMIN — Medication 40 MILLIGRAM(S): at 22:04

## 2019-09-28 RX ADMIN — MAGNESIUM OXIDE 400 MG ORAL TABLET 400 MILLIGRAM(S): 241.3 TABLET ORAL at 17:25

## 2019-09-28 RX ADMIN — TAMSULOSIN HYDROCHLORIDE 0.4 MILLIGRAM(S): 0.4 CAPSULE ORAL at 22:03

## 2019-09-28 RX ADMIN — ALBUTEROL 2.5 MILLIGRAM(S): 90 AEROSOL, METERED ORAL at 14:48

## 2019-09-28 RX ADMIN — Medication 500000 UNIT(S): at 22:06

## 2019-09-28 RX ADMIN — Medication 40 MILLIGRAM(S): at 17:25

## 2019-09-28 RX ADMIN — ALBUTEROL 2.5 MILLIGRAM(S): 90 AEROSOL, METERED ORAL at 07:25

## 2019-09-28 RX ADMIN — BUDESONIDE AND FORMOTEROL FUMARATE DIHYDRATE 2 PUFF(S): 160; 4.5 AEROSOL RESPIRATORY (INHALATION) at 19:43

## 2019-09-28 RX ADMIN — HEPARIN SODIUM 5000 UNIT(S): 5000 INJECTION INTRAVENOUS; SUBCUTANEOUS at 05:56

## 2019-09-28 RX ADMIN — LIDOCAINE 2 PATCH: 4 CREAM TOPICAL at 22:47

## 2019-09-28 RX ADMIN — Medication 1 APPLICATION(S): at 22:06

## 2019-09-28 RX ADMIN — LIDOCAINE 1 PATCH: 4 CREAM TOPICAL at 11:01

## 2019-09-28 NOTE — PROGRESS NOTE ADULT - SUBJECTIVE AND OBJECTIVE BOX
CC.  SOB/Leg pain  HPI.  Patient was confused and lethargic this morning.  ABG noticed.  Placed on BIPAP, and mentation appears to be improving.  Patient reports SOB, and cough are improving.  afebrile.  Tolerating po intake     Denies any abdominal pain, N/V/D, numbness weakness, headache, dizziness, CP, palpitation               Constitutional: No fever, fatigue or weight loss.  Skin: No rash.  Eyes: No recent vision problems or eye pain.  ENT: No congestion, ear pain, or sore throat.  Endocrine: No thyroid problems.  Cardiovascular: No chest pain or palpation.  Respiratory: No congestion, or wheezing.  Gastrointestinal: No abdominal pain, nausea, vomiting, or diarrhea.  Genitourinary: No dysuria.  Musculoskeletal: No joint swelling.  Neurologic: No headache.      Vital Signs Last 24 Hrs  T(C): 35.8 (28 Sep 2019 05:21), Max: 35.9 (27 Sep 2019 21:25)  T(F): 96.5 (28 Sep 2019 05:21), Max: 96.6 (27 Sep 2019 21:25)  HR: 79 (28 Sep 2019 10:37) (79 - 99)  BP: 113/62 (28 Sep 2019 10:37) (110/52 - 127/60)  BP(mean): --  RR: 16 (28 Sep 2019 10:37) (16 - 16)  SpO2: 91% (28 Sep 2019 10:37) (91% - 91%)        PHYSICAL EXAM-  GENERAL: NAD,  chronic ill appearing male  HEAD:  Atraumatic, Normocephalic  EYES: EOMI, PERRLA, conjunctiva and sclera clear  NECK: Supple, No JVD, Normal thyroid  NERVOUS SYSTEM:  Alert & Oriented X1 lethargic, but improved now. Moving all extremities  no focal neurological def  CHEST/LUNG: Min wheezing with good air entry.  No retractions or accessory muscle usage  HEART: Regular rate and rhythm; No murmurs, rubs, or gallops  ABDOMEN: Soft, Nontender, Nondistended; Bowel sounds present  EXTREMITIES:  No clubbing, cyanosis, or edema  SKIN: No rashes or lesions       MEDICATIONS  (STANDING):  ALBUTerol    0.083% 2.5 milliGRAM(s) Nebulizer every 6 hours  amLODIPine   Tablet 10 milliGRAM(s) Oral daily  ascorbic acid 500 milliGRAM(s) Oral daily  atorvastatin 20 milliGRAM(s) Oral at bedtime  BACItracin   Ointment 1 Application(s) Topical three times a day  buDESOnide 160 MICROgram(s)/formoterol 4.5 MICROgram(s) Inhaler 2 Puff(s) Inhalation two times a day  carbamide peroxide Otic Solution 1 Drop(s) Both Ears two times a day  cholecalciferol 1000 Unit(s) Oral daily  dextrose 5%. 1000 milliLiter(s) (50 mL/Hr) IV Continuous <Continuous>  dextrose 50% Injectable 12.5 Gram(s) IV Push once  dextrose 50% Injectable 25 Gram(s) IV Push once  dextrose 50% Injectable 25 Gram(s) IV Push once  docusate sodium 100 milliGRAM(s) Oral three times a day  finasteride 5 milliGRAM(s) Oral daily  furosemide    Tablet 40 milliGRAM(s) Oral two times a day  heparin  Injectable 5000 Unit(s) SubCutaneous three times a day  influenza   Vaccine 0.5 milliLiter(s) IntraMuscular once  insulin glargine Injectable (LANTUS) 30 Unit(s) SubCutaneous every morning  insulin lispro (HumaLOG) corrective regimen sliding scale   SubCutaneous three times a day before meals  insulin lispro Injectable (HumaLOG) 3 Unit(s) SubCutaneous three times a day before meals  levoFLOXacin  Tablet 250 milliGRAM(s) Oral every 24 hours  lidocaine   Patch 1 Patch Transdermal daily  magnesium oxide 400 milliGRAM(s) Oral three times a day with meals  methylPREDNISolone sodium succinate Injectable 40 milliGRAM(s) IV Push every 8 hours  metoprolol tartrate 25 milliGRAM(s) Oral two times a day  nystatin    Suspension 004358 Unit(s) Oral three times a day  pantoprazole    Tablet 40 milliGRAM(s) Oral before breakfast  tamsulosin 0.4 milliGRAM(s) Oral at bedtime  tiotropium 18 MICROgram(s) Capsule 1 Capsule(s) Inhalation daily    MEDICATIONS  (PRN):  acetaminophen   Tablet .. 975 milliGRAM(s) Oral every 6 hours PRN Mild Pain (1 - 3)  dextrose 40% Gel 15 Gram(s) Oral once PRN Blood Glucose LESS THAN 70 milliGRAM(s)/deciliter  glucagon  Injectable 1 milliGRAM(s) IntraMuscular once PRN Glucose LESS THAN 70 milligrams/deciliter  hydrOXYzine hydrochloride 25 milliGRAM(s) Oral at bedtime PRN Insomnia                              14.5   12.89 )-----------( 426      ( 28 Sep 2019 07:47 )             50.1         144  |  97<L>  |  88<HH>  ----------------------------<  118<H>  4.2   |  36<H>  |  2.4<H>    Ca    8.8      28 Sep 2019 07:47            Urinalysis Basic - ( 28 Sep 2019 08:30 )    Color: Yellow / Appearance: Clear / S.020 / pH: x  Gluc: x / Ketone: Negative  / Bili: Negative / Urobili: 0.2 mg/dL   Blood: x / Protein: Negative mg/dL / Nitrite: Negative   Leuk Esterase: Negative / RBC: x / WBC x   Sq Epi: x / Non Sq Epi: x / Bacteria: x

## 2019-09-28 NOTE — PROVIDER CONTACT NOTE (FALL NOTIFICATION) - SITUATION
Patient noted sleeping in chair overnight despite education on safety & encouragement to sleep in bed. Chair alarms active & audible. Patient found on floor, VSS, no distress noted.

## 2019-09-28 NOTE — PROVIDER CONTACT NOTE (CHANGE IN STATUS NOTIFICATION) - SITUATION
Patient sleeping in chair. Difficulty  arousing patient. Patient opening eyes to  touch and then closing them again- unusual behavior for patient.  Spo2- 91-92% on high flow o2. B/P114/71 P-89 R-18

## 2019-09-28 NOTE — PROVIDER CONTACT NOTE (FALL NOTIFICATION) - ASSESSMENT
Patient noted with c/o of knee pain and a small skin tear to LUE with scant bleeding. Patient stated he did not hit his head, no serious injuries apparent at this time.

## 2019-09-28 NOTE — CHART NOTE - NSCHARTNOTEFT_GEN_A_CORE
Patient slipped off chair stating he feel forward to the ground.  No head injury but says he has pains to his hands, knees and lower back (though adds that he has chronic back pain) On exam I don't see any trauma to head. He has full movement to his hands and he extends both knees without difficulty. There is an abrasion (circular, about 1.5 inch diameter) to left arm. At this time feel he sustained contusions to hands and knees while probably aggravating his chronic back pain. Abrasion as noted above. Will order bacitracin to abrasion. I don't see need to do radiology studies at this time but if any pains continue (including back pain), would then strongly consider them. Mild analgesics ordered. Patient tells me he does not want me to call his family about this.

## 2019-09-28 NOTE — PROGRESS NOTE ADULT - ASSESSMENT
Patient is an 79yo M w/ PMH COPD, HTN, CHF, and DMt2 p/w SOB and left leg pain x few days. Patient states that he came in 2/2 to leg pain, but pt was seen by PA at home visits who likely advised patient to call EMS 2/2 SpO2 in 70s despite home O2.    1. Acute on Chronic Respiratory failure   -CXR shows no acute process  -Continue with neb tx, Oxygen, switch to IV steroid ,  High flow, BIPAP, Po lasix, and PO levaquin  -Doppler US of LE negative  -Pulmonary to follow up     2. Abnormal trop  -EKG shows no ischemic changes  -Asymptomatic  -Probably due to JAMES on CKD, and current respiratory status  -Trop level flat  -Cardiology to follow up    3.  Left hip/Leg pain  -S/P fall  -X-ray of the hip, and lumbar spine are pending  -pain medications  -PT consult    4. Blurry vision  -Possible due to cataract, hypoxemia, and possible steroid  -Head CT scan negative  -neurologically intact  -Resolved  -neuro check    5. DM2  -Continue with home dose of lantus, and ISS  -Monitor POC      6.  HTN  -Continue with home medications, and Monitor BP    7.  JAMES on CKD  -improving  -renal US shows no acute process.  PVR volume low  -monitor renal function  -Avoid nephrotoxin  -Nephrology to follow up    8.  Hyperkalemia  -Kayexalate  3x a week.  Resolved  -Nephrology to follow up       9.  left renal mass  -Outpatient follow with urology  -Discussed the possibility of malignancy with patient    10.  AMS  -Appears to be secondary to respiratory status and metabolic encephalopathy   -Treat underline condition  -Offered to repeat head CT scan given his recent fall, but patient refused.  risks, benefits, and alternative discussed  -Continue with neuro check    patient opted for DNR/DNI after long discussion  overall prognosis. poor    Plan of care  and prognosis were discussed with patient and Son/HCP  in great details, All questions were answered to their satisfaction  Seems to understand, and in agreement

## 2019-09-28 NOTE — PROVIDER CONTACT NOTE (CHANGE IN STATUS NOTIFICATION) - BACKGROUND
Admitted with  acute on chronic respiratory  failure. s/p fall last  night and at home with  c/o blurry  vision

## 2019-09-28 NOTE — PROVIDER CONTACT NOTE (CHANGE IN STATUS NOTIFICATION) - ASSESSMENT
Difficulty  arousing patient. Patient opening eyes to  touch and then closing them again- unusual behavior for patient.  Spo2- 91-92% on high flow o2. B/P114/71 P-89 R-18

## 2019-09-29 LAB
ALBUMIN SERPL ELPH-MCNC: 4.1 G/DL — SIGNIFICANT CHANGE UP (ref 3.5–5.2)
ALP SERPL-CCNC: 76 U/L — SIGNIFICANT CHANGE UP (ref 30–115)
ALT FLD-CCNC: 14 U/L — SIGNIFICANT CHANGE UP (ref 0–41)
ANION GAP SERPL CALC-SCNC: 15 MMOL/L — HIGH (ref 7–14)
AST SERPL-CCNC: 15 U/L — SIGNIFICANT CHANGE UP (ref 0–41)
BILIRUB SERPL-MCNC: 0.5 MG/DL — SIGNIFICANT CHANGE UP (ref 0.2–1.2)
BUN SERPL-MCNC: 80 MG/DL — CRITICAL HIGH (ref 10–20)
CALCIUM SERPL-MCNC: 9.1 MG/DL — SIGNIFICANT CHANGE UP (ref 8.5–10.1)
CHLORIDE SERPL-SCNC: 94 MMOL/L — LOW (ref 98–110)
CO2 SERPL-SCNC: 32 MMOL/L — SIGNIFICANT CHANGE UP (ref 17–32)
CREAT SERPL-MCNC: 2.3 MG/DL — HIGH (ref 0.7–1.5)
GLUCOSE BLDC GLUCOMTR-MCNC: 195 MG/DL — HIGH (ref 70–99)
GLUCOSE BLDC GLUCOMTR-MCNC: 227 MG/DL — HIGH (ref 70–99)
GLUCOSE BLDC GLUCOMTR-MCNC: 315 MG/DL — HIGH (ref 70–99)
GLUCOSE BLDC GLUCOMTR-MCNC: 386 MG/DL — HIGH (ref 70–99)
GLUCOSE SERPL-MCNC: 426 MG/DL — HIGH (ref 70–99)
HCT VFR BLD CALC: 51.9 % — SIGNIFICANT CHANGE UP (ref 42–52)
HGB BLD-MCNC: 14.8 G/DL — SIGNIFICANT CHANGE UP (ref 14–18)
MCHC RBC-ENTMCNC: 22.8 PG — LOW (ref 27–31)
MCHC RBC-ENTMCNC: 28.5 G/DL — LOW (ref 32–37)
MCV RBC AUTO: 80 FL — SIGNIFICANT CHANGE UP (ref 80–94)
NRBC # BLD: 0 /100 WBCS — SIGNIFICANT CHANGE UP (ref 0–0)
PLATELET # BLD AUTO: 422 K/UL — HIGH (ref 130–400)
POTASSIUM SERPL-MCNC: 5.1 MMOL/L — HIGH (ref 3.5–5)
POTASSIUM SERPL-SCNC: 5.1 MMOL/L — HIGH (ref 3.5–5)
PROT SERPL-MCNC: 6.4 G/DL — SIGNIFICANT CHANGE UP (ref 6–8)
RBC # BLD: 6.49 M/UL — HIGH (ref 4.7–6.1)
RBC # FLD: 20.3 % — HIGH (ref 11.5–14.5)
SODIUM SERPL-SCNC: 141 MMOL/L — SIGNIFICANT CHANGE UP (ref 135–146)
WBC # BLD: 14.23 K/UL — HIGH (ref 4.8–10.8)
WBC # FLD AUTO: 14.23 K/UL — HIGH (ref 4.8–10.8)

## 2019-09-29 PROCEDURE — 99233 SBSQ HOSP IP/OBS HIGH 50: CPT

## 2019-09-29 PROCEDURE — 73030 X-RAY EXAM OF SHOULDER: CPT | Mod: 26,LT

## 2019-09-29 RX ORDER — MORPHINE SULFATE 50 MG/1
2 CAPSULE, EXTENDED RELEASE ORAL ONCE
Refills: 0 | Status: DISCONTINUED | OUTPATIENT
Start: 2019-09-29 | End: 2019-09-29

## 2019-09-29 RX ORDER — OXYCODONE AND ACETAMINOPHEN 5; 325 MG/1; MG/1
1 TABLET ORAL EVERY 6 HOURS
Refills: 0 | Status: DISCONTINUED | OUTPATIENT
Start: 2019-09-29 | End: 2019-09-30

## 2019-09-29 RX ORDER — SODIUM POLYSTYRENE SULFONATE 4.1 MEQ/G
15 POWDER, FOR SUSPENSION ORAL ONCE
Refills: 0 | Status: COMPLETED | OUTPATIENT
Start: 2019-09-29 | End: 2019-09-29

## 2019-09-29 RX ORDER — MORPHINE SULFATE 50 MG/1
2 CAPSULE, EXTENDED RELEASE ORAL EVERY 6 HOURS
Refills: 0 | Status: DISCONTINUED | OUTPATIENT
Start: 2019-09-29 | End: 2019-09-29

## 2019-09-29 RX ORDER — INSULIN LISPRO 100/ML
4 VIAL (ML) SUBCUTANEOUS
Refills: 0 | Status: DISCONTINUED | OUTPATIENT
Start: 2019-09-29 | End: 2019-10-07

## 2019-09-29 RX ORDER — MORPHINE SULFATE 50 MG/1
4 CAPSULE, EXTENDED RELEASE ORAL EVERY 4 HOURS
Refills: 0 | Status: DISCONTINUED | OUTPATIENT
Start: 2019-09-29 | End: 2019-09-30

## 2019-09-29 RX ADMIN — MORPHINE SULFATE 2 MILLIGRAM(S): 50 CAPSULE, EXTENDED RELEASE ORAL at 02:21

## 2019-09-29 RX ADMIN — Medication 40 MILLIGRAM(S): at 05:13

## 2019-09-29 RX ADMIN — Medication 500000 UNIT(S): at 21:24

## 2019-09-29 RX ADMIN — Medication 100 MILLIGRAM(S): at 05:13

## 2019-09-29 RX ADMIN — MAGNESIUM OXIDE 400 MG ORAL TABLET 400 MILLIGRAM(S): 241.3 TABLET ORAL at 08:05

## 2019-09-29 RX ADMIN — MAGNESIUM OXIDE 400 MG ORAL TABLET 400 MILLIGRAM(S): 241.3 TABLET ORAL at 12:39

## 2019-09-29 RX ADMIN — ATORVASTATIN CALCIUM 20 MILLIGRAM(S): 80 TABLET, FILM COATED ORAL at 21:23

## 2019-09-29 RX ADMIN — Medication 10: at 08:01

## 2019-09-29 RX ADMIN — Medication 25 MILLIGRAM(S): at 01:13

## 2019-09-29 RX ADMIN — LIDOCAINE 2 PATCH: 4 CREAM TOPICAL at 12:16

## 2019-09-29 RX ADMIN — Medication 100 MILLIGRAM(S): at 21:23

## 2019-09-29 RX ADMIN — Medication 1 APPLICATION(S): at 05:16

## 2019-09-29 RX ADMIN — HEPARIN SODIUM 5000 UNIT(S): 5000 INJECTION INTRAVENOUS; SUBCUTANEOUS at 14:48

## 2019-09-29 RX ADMIN — Medication 4: at 17:03

## 2019-09-29 RX ADMIN — Medication 8: at 12:06

## 2019-09-29 RX ADMIN — Medication 25 MILLIGRAM(S): at 17:27

## 2019-09-29 RX ADMIN — MORPHINE SULFATE 2 MILLIGRAM(S): 50 CAPSULE, EXTENDED RELEASE ORAL at 21:25

## 2019-09-29 RX ADMIN — INSULIN GLARGINE 30 UNIT(S): 100 INJECTION, SOLUTION SUBCUTANEOUS at 08:05

## 2019-09-29 RX ADMIN — ALBUTEROL 2.5 MILLIGRAM(S): 90 AEROSOL, METERED ORAL at 07:26

## 2019-09-29 RX ADMIN — Medication 40 MILLIGRAM(S): at 05:14

## 2019-09-29 RX ADMIN — BUDESONIDE AND FORMOTEROL FUMARATE DIHYDRATE 2 PUFF(S): 160; 4.5 AEROSOL RESPIRATORY (INHALATION) at 07:29

## 2019-09-29 RX ADMIN — SODIUM POLYSTYRENE SULFONATE 15 GRAM(S): 4.1 POWDER, FOR SUSPENSION ORAL at 21:22

## 2019-09-29 RX ADMIN — ALBUTEROL 2.5 MILLIGRAM(S): 90 AEROSOL, METERED ORAL at 13:28

## 2019-09-29 RX ADMIN — TIOTROPIUM BROMIDE 1 CAPSULE(S): 18 CAPSULE ORAL; RESPIRATORY (INHALATION) at 07:27

## 2019-09-29 RX ADMIN — MORPHINE SULFATE 2 MILLIGRAM(S): 50 CAPSULE, EXTENDED RELEASE ORAL at 20:11

## 2019-09-29 RX ADMIN — PANTOPRAZOLE SODIUM 40 MILLIGRAM(S): 20 TABLET, DELAYED RELEASE ORAL at 05:13

## 2019-09-29 RX ADMIN — MAGNESIUM OXIDE 400 MG ORAL TABLET 400 MILLIGRAM(S): 241.3 TABLET ORAL at 17:04

## 2019-09-29 RX ADMIN — MORPHINE SULFATE 2 MILLIGRAM(S): 50 CAPSULE, EXTENDED RELEASE ORAL at 02:35

## 2019-09-29 RX ADMIN — HEPARIN SODIUM 5000 UNIT(S): 5000 INJECTION INTRAVENOUS; SUBCUTANEOUS at 21:24

## 2019-09-29 RX ADMIN — Medication 40 MILLIGRAM(S): at 17:27

## 2019-09-29 RX ADMIN — MORPHINE SULFATE 2 MILLIGRAM(S): 50 CAPSULE, EXTENDED RELEASE ORAL at 19:59

## 2019-09-29 RX ADMIN — Medication 1000 UNIT(S): at 11:21

## 2019-09-29 RX ADMIN — Medication 4 UNIT(S): at 12:06

## 2019-09-29 RX ADMIN — Medication 500 MILLIGRAM(S): at 11:21

## 2019-09-29 RX ADMIN — HEPARIN SODIUM 5000 UNIT(S): 5000 INJECTION INTRAVENOUS; SUBCUTANEOUS at 05:15

## 2019-09-29 RX ADMIN — CARBAMIDE PEROXIDE 1 DROP(S): 81.86 SOLUTION/ DROPS AURICULAR (OTIC) at 17:28

## 2019-09-29 RX ADMIN — Medication 4 UNIT(S): at 17:03

## 2019-09-29 RX ADMIN — FINASTERIDE 5 MILLIGRAM(S): 5 TABLET, FILM COATED ORAL at 11:21

## 2019-09-29 RX ADMIN — Medication 20 MILLIGRAM(S): at 17:27

## 2019-09-29 RX ADMIN — TAMSULOSIN HYDROCHLORIDE 0.4 MILLIGRAM(S): 0.4 CAPSULE ORAL at 21:23

## 2019-09-29 RX ADMIN — AMLODIPINE BESYLATE 10 MILLIGRAM(S): 2.5 TABLET ORAL at 05:14

## 2019-09-29 RX ADMIN — ALBUTEROL 2.5 MILLIGRAM(S): 90 AEROSOL, METERED ORAL at 20:07

## 2019-09-29 RX ADMIN — Medication 1 APPLICATION(S): at 16:06

## 2019-09-29 RX ADMIN — MORPHINE SULFATE 2 MILLIGRAM(S): 50 CAPSULE, EXTENDED RELEASE ORAL at 20:50

## 2019-09-29 RX ADMIN — CARBAMIDE PEROXIDE 1 DROP(S): 81.86 SOLUTION/ DROPS AURICULAR (OTIC) at 05:16

## 2019-09-29 RX ADMIN — Medication 500000 UNIT(S): at 14:47

## 2019-09-29 RX ADMIN — Medication 100 MILLIGRAM(S): at 14:48

## 2019-09-29 RX ADMIN — Medication 25 MILLIGRAM(S): at 05:13

## 2019-09-29 NOTE — PHYSICAL THERAPY INITIAL EVALUATION ADULT - GENERAL OBSERVATIONS, REHAB EVAL
10:15-10:35. Chart reviewed; confirmed with RN to see the pt for PT. Pt ready for PT; +high flow O2; +hep lock. Agreeable for PT evaluation.

## 2019-09-29 NOTE — CHART NOTE - NSCHARTNOTEFT_GEN_A_CORE
Patient says morphine 2 mg is ineffective for pain. Will order 4 mg prn Patient says morphine 2 mg is ineffective for pain. Will order 4 mg prn though chart indicates patient did not mention pain to day hospitalist Patient says morphine 2 mg is ineffective for pain. Will order 4 mg prn though chart indicates patient did not mention pain was not under control to day hospitalist

## 2019-09-29 NOTE — CHART NOTE - NSCHARTNOTEFT_GEN_A_CORE
Asking for injectable pain med, will try morphine Asking for injectable pain med, will try morphine. Earlier received additional lidocaine patches to right hip and groin per his request

## 2019-09-29 NOTE — PROGRESS NOTE ADULT - ASSESSMENT
Pt with CKD stage 4 and chronic hyperkalemia p/w SOB and leg pain.    CKD - creat improving   - kidney bladder sono no hydro, 190 cc PVR, no need for chapman  - CT Abd 9/26 rvealed atrophic kidneys with a small calculus non obstructing  - no protein on UA  - check phos and iPTH    Hyperkalemia - better controlled (unlear if pt is getting Kayxalate, or just controlled on LASix)\- due to CKD, hyporenin hypoaldosteronism  may check renin, aldosterone   -2 gr K diet, PLEASE OBTAIN DIETARY CONSULT TO PROVIDE K CONTENT ON ALL FOODS  - Kayaxalate 30 grams 3x week  - LAsix 40 po bid (high O2 requirements  although its mostly Rt sided failure with Pulm HTN)    MET ALKALOSIS _ will worsen on LASix - would check ABG and ifpH>7.5 - give Diamox 250 q12 for 2 doses  -r/o CO2 retention    DM, HTN - well controlled    COPD - on po Prednisone and LEvaquin    Will follow  Thank you

## 2019-09-29 NOTE — PHYSICAL THERAPY INITIAL EVALUATION ADULT - IMPAIRMENTS CONTRIBUTING TO GAIT DEVIATIONS, PT EVAL
decreased endurance/decreased strength/impaired balance/pain/impaired postural control/decreased ROM

## 2019-09-29 NOTE — CHART NOTE - NSCHARTNOTEFT_GEN_A_CORE
I reviewed shoulder xray and I don't see any displaced fractures however unable to comment on any dislocation or minor fractures (await radiology review). Will add percocet prn to analgesics regimen I reviewed shoulder xray and I don't see any displaced fractures or obvious dislocation. Suspect calcified tendonosis (await radiology review). Will add percocet prn to analgesics regimen

## 2019-09-29 NOTE — PROGRESS NOTE ADULT - ASSESSMENT
Patient is an 79yo M w/ PMH COPD, HTN, CHF, and DMt2 p/w SOB and left leg pain x few days. Patient states that he came in 2/2 to leg pain, but pt was seen by PA at home visits who likely advised patient to call EMS 2/2 SpO2 in 70s despite home O2.    1. Acute on Chronic Respiratory failure   -CXR shows no acute process  -Continue with neb tx, Oxygen, switch to po steroid ,  High flow, BIPAP, Po lasix, and PO levaquin  -Doppler US of LE negative  -Pulmonary to follow up     2. Abnormal trop  -EKG shows no ischemic changes  -Asymptomatic  -Probably due to JAMES on CKD, and current respiratory status  -Trop level flat  -Cardiology to follow up    3.  Left hip/Leg pain  -S/P fall  -X-ray of the hip, and lumbar spine are negative  -pain medications  -PT consult    4. Blurry vision  -Possible due to cataract, hypoxemia, and possible steroid  -Head CT scan negative  -neurologically intact  -Resolved  -neuro check    5. DM2  -Continue with home dose of lantus, and ISS  -Monitor POC      6.  HTN  -Continue with home medications, and Monitor BP    7.  JAMES on CKD  -improving  -renal US shows no acute process.  PVR volume low  -monitor renal function  -Avoid nephrotoxin  -Nephrology to follow up    8.  Hyperkalemia  -Kayexalate  3x a week.  Resolved  -Nephrology to follow up       9.  left renal mass  -Outpatient follow with urology  -Discussed the possibility of malignancy with patient    10.  AMS  -Appears to be secondary to respiratory status and metabolic encephalopathy   -Treat underline condition  -Offered to repeat head CT scan given his recent fall, but patient refused.  risks, benefits, and alternative discussed  -Continue with neuro check  -Resolved.  back to baseline    patient opted for DNR/DNI after long discussion  overall prognosis. poor  Need rehab.  prefers [facility]    Plan of care  and prognosis were discussed with patient and Son/HCP  in great details, All questions were answered to their satisfaction  Seems to understand, and in agreement

## 2019-09-29 NOTE — PROGRESS NOTE ADULT - SUBJECTIVE AND OBJECTIVE BOX
Nephrology progress note    Patient is seen and examined, events over the last 24 h noted .  Pt unclear about the low K diet, asking a lot of question on K content in foods  Allergies:  aspirin (Other)  fish (Other)  iodine (Hives)  penicillin (Unknown)  shellfish (Other)    Hospital Medications:   MEDICATIONS  (STANDING):  ALBUTerol    0.083% 2.5 milliGRAM(s) Nebulizer every 6 hours  amLODIPine   Tablet 10 milliGRAM(s) Oral daily  ascorbic acid 500 milliGRAM(s) Oral daily  atorvastatin 20 milliGRAM(s) Oral at bedtime  BACItracin   Ointment 1 Application(s) Topical three times a day  buDESOnide 160 MICROgram(s)/formoterol 4.5 MICROgram(s) Inhaler 2 Puff(s) Inhalation two times a day  carbamide peroxide Otic Solution 1 Drop(s) Both Ears two times a day  cholecalciferol 1000 Unit(s) Oral daily  dextrose 5%. 1000 milliLiter(s) (50 mL/Hr) IV Continuous <Continuous>  dextrose 50% Injectable 12.5 Gram(s) IV Push once  dextrose 50% Injectable 25 Gram(s) IV Push once  dextrose 50% Injectable 25 Gram(s) IV Push once  docusate sodium 100 milliGRAM(s) Oral three times a day  finasteride 5 milliGRAM(s) Oral daily  furosemide    Tablet 40 milliGRAM(s) Oral two times a day  heparin  Injectable 5000 Unit(s) SubCutaneous three times a day  influenza   Vaccine 0.5 milliLiter(s) IntraMuscular once  insulin glargine Injectable (LANTUS) 30 Unit(s) SubCutaneous every morning  insulin lispro (HumaLOG) corrective regimen sliding scale   SubCutaneous three times a day before meals  levoFLOXacin  Tablet 250 milliGRAM(s) Oral every 24 hours  lidocaine   Patch 2 Patch Transdermal daily  lidocaine   Patch 1 Patch Transdermal daily  magnesium oxide 400 milliGRAM(s) Oral three times a day with meals  methylPREDNISolone sodium succinate Injectable 40 milliGRAM(s) IV Push every 8 hours  metoprolol tartrate 25 milliGRAM(s) Oral two times a day  nystatin    Suspension 607674 Unit(s) Oral three times a day  pantoprazole    Tablet 40 milliGRAM(s) Oral before breakfast  tamsulosin 0.4 milliGRAM(s) Oral at bedtime  tiotropium 18 MICROgram(s) Capsule 1 Capsule(s) Inhalation daily        VITALS:  T(F): 97.2 (19 @ 05:00), Max: 97.3 (19 @ 22:24)  HR: 83 (19 @ 05:00)  BP: 122/67 (19 @ 05:00)  RR: 20 (19 @ 05:00)  SpO2: 91% (19 @ 00:28)  Wt(kg): --     @ 07:  -   @ 07:00  --------------------------------------------------------  IN: 600 mL / OUT: 750 mL / NET: -150 mL     @ 07:01  -   @ 07:00  --------------------------------------------------------  IN: 0 mL / OUT: 850 mL / NET: -850 mL          PHYSICAL EXAM:  Constitutional: NAD, Obese on AVAP  Respiratory: decreased BS b/l  Cardiovascular: S1, S2, RRR  Gastrointestinal: BS+, soft, NT/ND  Extremities: 1+ peripheral edema  Neurological: A/O x 3  : No CVA tenderness. No chapman.   Skin: No rashes  Vascular Access:    LABS:      144  |  97<L>  |  88<HH>  ----------------------------<  118<H>  4.2   |  36<H>  |  2.4<H>    Ca    8.8      28 Sep 2019 07:47                            14.5   12.89 )-----------( 426      ( 28 Sep 2019 07:47 )             50.1       Urine Studies:  Urinalysis Basic - ( 28 Sep 2019 08:30 )    Color: Yellow / Appearance: Clear / S.020 / pH:   Gluc:  / Ketone: Negative  / Bili: Negative / Urobili: 0.2 mg/dL   Blood:  / Protein: Negative mg/dL / Nitrite: Negative   Leuk Esterase: Negative / RBC:  / WBC    Sq Epi:  / Non Sq Epi:  / Bacteria:         RADIOLOGY & ADDITIONAL STUDIES:

## 2019-09-29 NOTE — PHYSICAL THERAPY INITIAL EVALUATION ADULT - IMPAIRED TRANSFERS: SIT/STAND, REHAB EVAL
decreased endurance/impaired postural control/pain/decreased strength/decreased ROM/impaired balance

## 2019-09-30 DIAGNOSIS — R52 PAIN, UNSPECIFIED: ICD-10-CM

## 2019-09-30 LAB
ALDOST SERPL-MCNC: 20.3 NG/DL — SIGNIFICANT CHANGE UP
ANION GAP SERPL CALC-SCNC: 14 MMOL/L — SIGNIFICANT CHANGE UP (ref 7–14)
BUN SERPL-MCNC: 88 MG/DL — CRITICAL HIGH (ref 10–20)
CALCIUM SERPL-MCNC: 9.5 MG/DL — SIGNIFICANT CHANGE UP (ref 8.5–10.1)
CHLORIDE SERPL-SCNC: 96 MMOL/L — LOW (ref 98–110)
CO2 SERPL-SCNC: 33 MMOL/L — HIGH (ref 17–32)
CREAT SERPL-MCNC: 2.2 MG/DL — HIGH (ref 0.7–1.5)
GLUCOSE BLDC GLUCOMTR-MCNC: 167 MG/DL — HIGH (ref 70–99)
GLUCOSE BLDC GLUCOMTR-MCNC: 204 MG/DL — HIGH (ref 70–99)
GLUCOSE BLDC GLUCOMTR-MCNC: 253 MG/DL — HIGH (ref 70–99)
GLUCOSE BLDC GLUCOMTR-MCNC: 283 MG/DL — HIGH (ref 70–99)
GLUCOSE SERPL-MCNC: 194 MG/DL — HIGH (ref 70–99)
HCT VFR BLD CALC: 49.1 % — SIGNIFICANT CHANGE UP (ref 42–52)
HGB BLD-MCNC: 14.4 G/DL — SIGNIFICANT CHANGE UP (ref 14–18)
MCHC RBC-ENTMCNC: 23.1 PG — LOW (ref 27–31)
MCHC RBC-ENTMCNC: 29.3 G/DL — LOW (ref 32–37)
MCV RBC AUTO: 78.7 FL — LOW (ref 80–94)
NRBC # BLD: 0 /100 WBCS — SIGNIFICANT CHANGE UP (ref 0–0)
PLATELET # BLD AUTO: 421 K/UL — HIGH (ref 130–400)
POTASSIUM SERPL-MCNC: 4.8 MMOL/L — SIGNIFICANT CHANGE UP (ref 3.5–5)
POTASSIUM SERPL-SCNC: 4.8 MMOL/L — SIGNIFICANT CHANGE UP (ref 3.5–5)
RBC # BLD: 6.24 M/UL — HIGH (ref 4.7–6.1)
RBC # FLD: 19.8 % — HIGH (ref 11.5–14.5)
SODIUM SERPL-SCNC: 143 MMOL/L — SIGNIFICANT CHANGE UP (ref 135–146)
WBC # BLD: 18.8 K/UL — HIGH (ref 4.8–10.8)
WBC # FLD AUTO: 18.8 K/UL — HIGH (ref 4.8–10.8)

## 2019-09-30 PROCEDURE — 71045 X-RAY EXAM CHEST 1 VIEW: CPT | Mod: 26

## 2019-09-30 PROCEDURE — 99233 SBSQ HOSP IP/OBS HIGH 50: CPT

## 2019-09-30 RX ORDER — FUROSEMIDE 40 MG
20 TABLET ORAL ONCE
Refills: 0 | Status: COMPLETED | OUTPATIENT
Start: 2019-09-30 | End: 2019-09-30

## 2019-09-30 RX ORDER — ACETAMINOPHEN 500 MG
975 TABLET ORAL EVERY 8 HOURS
Refills: 0 | Status: DISCONTINUED | OUTPATIENT
Start: 2019-09-30 | End: 2019-10-08

## 2019-09-30 RX ORDER — METHOCARBAMOL 500 MG/1
750 TABLET, FILM COATED ORAL
Refills: 0 | Status: DISCONTINUED | OUTPATIENT
Start: 2019-09-30 | End: 2019-10-08

## 2019-09-30 RX ORDER — OXYCODONE HYDROCHLORIDE 5 MG/1
5 TABLET ORAL EVERY 6 HOURS
Refills: 0 | Status: DISCONTINUED | OUTPATIENT
Start: 2019-09-30 | End: 2019-09-30

## 2019-09-30 RX ORDER — METHOCARBAMOL 500 MG/1
750 TABLET, FILM COATED ORAL ONCE
Refills: 0 | Status: COMPLETED | OUTPATIENT
Start: 2019-09-30 | End: 2019-09-30

## 2019-09-30 RX ORDER — OXYCODONE HYDROCHLORIDE 5 MG/1
10 TABLET ORAL EVERY 6 HOURS
Refills: 0 | Status: DISCONTINUED | OUTPATIENT
Start: 2019-09-30 | End: 2019-10-07

## 2019-09-30 RX ADMIN — Medication 100 MILLIGRAM(S): at 22:49

## 2019-09-30 RX ADMIN — METHOCARBAMOL 750 MILLIGRAM(S): 500 TABLET, FILM COATED ORAL at 18:56

## 2019-09-30 RX ADMIN — Medication 40 MILLIGRAM(S): at 19:02

## 2019-09-30 RX ADMIN — Medication 20 MILLIGRAM(S): at 05:33

## 2019-09-30 RX ADMIN — ALBUTEROL 2.5 MILLIGRAM(S): 90 AEROSOL, METERED ORAL at 08:25

## 2019-09-30 RX ADMIN — ATORVASTATIN CALCIUM 20 MILLIGRAM(S): 80 TABLET, FILM COATED ORAL at 22:48

## 2019-09-30 RX ADMIN — Medication 100 MILLIGRAM(S): at 05:31

## 2019-09-30 RX ADMIN — METHOCARBAMOL 750 MILLIGRAM(S): 500 TABLET, FILM COATED ORAL at 11:51

## 2019-09-30 RX ADMIN — Medication 20 MILLIGRAM(S): at 18:56

## 2019-09-30 RX ADMIN — MORPHINE SULFATE 4 MILLIGRAM(S): 50 CAPSULE, EXTENDED RELEASE ORAL at 02:15

## 2019-09-30 RX ADMIN — Medication 500000 UNIT(S): at 15:05

## 2019-09-30 RX ADMIN — Medication 1 APPLICATION(S): at 22:44

## 2019-09-30 RX ADMIN — HEPARIN SODIUM 5000 UNIT(S): 5000 INJECTION INTRAVENOUS; SUBCUTANEOUS at 22:49

## 2019-09-30 RX ADMIN — CARBAMIDE PEROXIDE 1 DROP(S): 81.86 SOLUTION/ DROPS AURICULAR (OTIC) at 05:34

## 2019-09-30 RX ADMIN — Medication 500000 UNIT(S): at 22:49

## 2019-09-30 RX ADMIN — LIDOCAINE 2 PATCH: 4 CREAM TOPICAL at 14:58

## 2019-09-30 RX ADMIN — Medication 6: at 11:50

## 2019-09-30 RX ADMIN — HEPARIN SODIUM 5000 UNIT(S): 5000 INJECTION INTRAVENOUS; SUBCUTANEOUS at 05:31

## 2019-09-30 RX ADMIN — Medication 6: at 18:59

## 2019-09-30 RX ADMIN — LIDOCAINE 1 PATCH: 4 CREAM TOPICAL at 21:05

## 2019-09-30 RX ADMIN — BUDESONIDE AND FORMOTEROL FUMARATE DIHYDRATE 2 PUFF(S): 160; 4.5 AEROSOL RESPIRATORY (INHALATION) at 08:18

## 2019-09-30 RX ADMIN — Medication 1 APPLICATION(S): at 05:31

## 2019-09-30 RX ADMIN — Medication 25 MILLIGRAM(S): at 05:32

## 2019-09-30 RX ADMIN — TIOTROPIUM BROMIDE 1 CAPSULE(S): 18 CAPSULE ORAL; RESPIRATORY (INHALATION) at 08:16

## 2019-09-30 RX ADMIN — CARBAMIDE PEROXIDE 1 DROP(S): 81.86 SOLUTION/ DROPS AURICULAR (OTIC) at 19:01

## 2019-09-30 RX ADMIN — Medication 1000 UNIT(S): at 11:51

## 2019-09-30 RX ADMIN — AMLODIPINE BESYLATE 10 MILLIGRAM(S): 2.5 TABLET ORAL at 05:30

## 2019-09-30 RX ADMIN — Medication 1 APPLICATION(S): at 15:05

## 2019-09-30 RX ADMIN — MAGNESIUM OXIDE 400 MG ORAL TABLET 400 MILLIGRAM(S): 241.3 TABLET ORAL at 18:56

## 2019-09-30 RX ADMIN — HEPARIN SODIUM 5000 UNIT(S): 5000 INJECTION INTRAVENOUS; SUBCUTANEOUS at 15:05

## 2019-09-30 RX ADMIN — LIDOCAINE 1 PATCH: 4 CREAM TOPICAL at 14:57

## 2019-09-30 RX ADMIN — LIDOCAINE 2 PATCH: 4 CREAM TOPICAL at 21:05

## 2019-09-30 RX ADMIN — ALBUTEROL 2.5 MILLIGRAM(S): 90 AEROSOL, METERED ORAL at 19:49

## 2019-09-30 RX ADMIN — Medication 975 MILLIGRAM(S): at 14:59

## 2019-09-30 RX ADMIN — MAGNESIUM OXIDE 400 MG ORAL TABLET 400 MILLIGRAM(S): 241.3 TABLET ORAL at 11:51

## 2019-09-30 RX ADMIN — Medication 500000 UNIT(S): at 05:32

## 2019-09-30 RX ADMIN — INSULIN GLARGINE 30 UNIT(S): 100 INJECTION, SOLUTION SUBCUTANEOUS at 11:46

## 2019-09-30 RX ADMIN — Medication 100 MILLIGRAM(S): at 15:01

## 2019-09-30 RX ADMIN — Medication 4 UNIT(S): at 19:00

## 2019-09-30 RX ADMIN — TAMSULOSIN HYDROCHLORIDE 0.4 MILLIGRAM(S): 0.4 CAPSULE ORAL at 22:48

## 2019-09-30 RX ADMIN — Medication 4 UNIT(S): at 11:51

## 2019-09-30 RX ADMIN — Medication 40 MILLIGRAM(S): at 05:31

## 2019-09-30 RX ADMIN — MORPHINE SULFATE 4 MILLIGRAM(S): 50 CAPSULE, EXTENDED RELEASE ORAL at 01:57

## 2019-09-30 RX ADMIN — Medication 500 MILLIGRAM(S): at 11:51

## 2019-09-30 RX ADMIN — METHOCARBAMOL 750 MILLIGRAM(S): 500 TABLET, FILM COATED ORAL at 04:30

## 2019-09-30 RX ADMIN — FINASTERIDE 5 MILLIGRAM(S): 5 TABLET, FILM COATED ORAL at 11:51

## 2019-09-30 NOTE — CHART NOTE - NSCHARTNOTEFT_GEN_A_CORE
Patient now agrees to be placed in bed (was very high risk to fall off chair). He is very awake despite respiratory issues and narcotic use, however now has (to me) upper airway congestion though breathing is unlabored. Will order CXR, Robaxin around the clock and pain management consult

## 2019-09-30 NOTE — CHART NOTE - NSCHARTNOTEFT_GEN_A_CORE
Patient seen at bedside - resting comfortably.    Pt very fatigued - on bipap/cpap    Patient without questions or concerns at this time.     Patient encouraged to contact PA with any further questions or concerns.

## 2019-09-30 NOTE — CONSULT NOTE ADULT - SUBJECTIVE AND OBJECTIVE BOX
Chief Complaint:     Patient is an 79yo M w/ PMH COPD, HTN, CHF, and DM. Patient states his pain is 0/10 at this time. Patient states he had pain to shoulders, left hip and left knee. He has had right shoulder pain for many years secondary to arthritis. Over a few days he started to have pain to bilat shoulders left hip and left knee with difficulty ambulating. Started to use a wheelchair to ambulate. One night he went to the bathroom with the wheelchair and "tripped" over the wheel of the wheel chair. He went to see his PMD the next day, whom then sent him to the hospital for evaluation.          Allergies    fish (Other)  iodine (Hives)  penicillin (Unknown)  shellfish (Other)    Intolerances    aspirin (Other)      PAST MEDICAL & SURGICAL HISTORY:  Chronic CHF  CKD (chronic kidney disease) stage 3, GFR 30-59 ml/min  Colon polyp: claims it was malignant  High cholesterol  GERD (gastroesophageal reflux disease)  Enlarged prostate  COPD (chronic obstructive pulmonary disease): On home O2 - 5L  Diabetes mellitus, type 2  Hypertension  History of hemorrhoidectomy  Dysplastic colon polyp: removed        SOCIAL HISTORY:  Denies Smoking, Alcohol, or Drug Use    aspirin (Other)  fish (Other)  iodine (Hives)  penicillin (Unknown)  shellfish (Other)      PAIN MEDICATIONS:  acetaminophen   Tablet .. 975 milliGRAM(s) Oral every 6 hours PRN  hydrOXYzine hydrochloride 25 milliGRAM(s) Oral at bedtime PRN  methocarbamol 750 milliGRAM(s) Oral four times a day  morphine  - Injectable 4 milliGRAM(s) IV Push every 4 hours PRN    Heme:  heparin  Injectable 5000 Unit(s) SubCutaneous three times a day    Antibiotics:  levoFLOXacin  Tablet 250 milliGRAM(s) Oral every 24 hours  nystatin    Suspension 894183 Unit(s) Oral three times a day    Cardiovascular:  amLODIPine   Tablet 10 milliGRAM(s) Oral daily  furosemide    Tablet 40 milliGRAM(s) Oral two times a day  metoprolol tartrate 25 milliGRAM(s) Oral two times a day  tamsulosin 0.4 milliGRAM(s) Oral at bedtime    GI:  docusate sodium 100 milliGRAM(s) Oral three times a day  pantoprazole    Tablet 40 milliGRAM(s) Oral before breakfast    Endocrine:  atorvastatin 20 milliGRAM(s) Oral at bedtime  dextrose 40% Gel 15 Gram(s) Oral once PRN  dextrose 50% Injectable 12.5 Gram(s) IV Push once  dextrose 50% Injectable 25 Gram(s) IV Push once  dextrose 50% Injectable 25 Gram(s) IV Push once  finasteride 5 milliGRAM(s) Oral daily  glucagon  Injectable 1 milliGRAM(s) IntraMuscular once PRN  insulin glargine Injectable (LANTUS) 30 Unit(s) SubCutaneous every morning  insulin lispro (HumaLOG) corrective regimen sliding scale   SubCutaneous three times a day before meals  insulin lispro Injectable (HumaLOG) 4 Unit(s) SubCutaneous three times a day before meals  predniSONE   Tablet 20 milliGRAM(s) Oral two times a day    All Other Medications:  ascorbic acid 500 milliGRAM(s) Oral daily  BACItracin   Ointment 1 Application(s) Topical three times a day  carbamide peroxide Otic Solution 1 Drop(s) Both Ears two times a day  cholecalciferol 1000 Unit(s) Oral daily  dextrose 5%. 1000 milliLiter(s) IV Continuous <Continuous>  influenza   Vaccine 0.5 milliLiter(s) IntraMuscular once  lidocaine   Patch 2 Patch Transdermal daily  lidocaine   Patch 1 Patch Transdermal daily  magnesium oxide 400 milliGRAM(s) Oral three times a day with meals      Vital Signs Last 24 Hrs  T(C): --  T(F): --  HR: 92 (30 Sep 2019 05:35) (92 - 96)  BP: 110/70 (30 Sep 2019 05:35) (110/70 - 110/70)  BP(mean): --  RR: 20 (30 Sep 2019 05:35) (20 - 21)  SpO2: 93% (30 Sep 2019 02:08) (93% - 93%)      09-29-19 @ 07:01  -  09-30-19 @ 07:00  --------------------------------------------------------  IN: 0 mL / OUT: 900 mL / NET: -900 mL        LABS:                          14.4   18.80 )-----------( 421      ( 30 Sep 2019 06:56 )             49.1     09-30    143  |  96<L>  |  88<HH>  ----------------------------<  194<H>  4.8   |  33<H>  |  2.2<H>    Ca    9.5      30 Sep 2019 06:56    TPro  6.4  /  Alb  4.1  /  TBili  0.5  /  DBili  x   /  AST  15  /  ALT  14  /  AlkPhos  76  09-29          RADIOLOGY:  EXAM:  CT ABDOMEN AND PELVIS            PROCEDURE DATE:  09/26/2019            INTERPRETATION:  CLINICAL STATEMENT: Flank pain and groin pain      TECHNIQUE: Contiguous axial CT images were obtained from the lower chest   to the pubic symphysis without intravenous contrast.  Oral contrast was   not given.  Reformatted images in the coronal and sagittal planes were   acquired.    COMPARISON CT: December 9, 2018    OTHER STUDIES USED FOR CORRELATION: Renal bladder ultrasound September 25, 2019    Study is limited by motion artifact and artifact as this patient was   unable to lift right arm above head.      FINDINGS:    LOWER CHEST: There is patchy lower lobe nodular opacities.    HEPATOBILIARY: Unremarkable.    SPLEEN: Unremarkable.    PANCREAS: Unremarkable.    ADRENAL GLANDS: Unremarkable.    KIDNEYS: The kidneys are atrophic. There is no hydronephrosis. There is a   4 mm nonobstructing right interpolar renal calculus. Again seen is an   indeterminate hyperdense exophytic left upper pole renal lesion measuring   approximately 4 cm. There are bilateral renal cysts. The largest on the   right is in the lower pole measuring 8.3 cm. There are additional   bilateral subcentimeter renal hypodensities, too small to characterize.    ABDOMINOPELVIC NODES: Unremarkable.    PELVIC ORGANS: The prostate gland is enlarged indenting the base of the   bladder. The bladder is collapsed. There is no bladder calculus..    PERITONEUM/MESENTERY/BOWEL: Diverticulosis. No free air or obstruction.    BONES/SOFT TISSUES: Degenerative change. Subcutaneous emphysema is likely   related to injection phenomena.    OTHER: Atherosclerosis.      IMPRESSION:     Patchy nodular opacities at the lung bases, nonspecific in nature. Follow   to resolution is recommended.    No acute intra-abdominal or pelvic pathology.    Again seen is an indeterminate hyperdense exophytic left upper pole renal   lesion, measuring approximately 4 cm.      JENNY LOOMIS M.D., ATTENDING RADIOLOGIST  This document has been electronically signed. Sep 26 2019  1:05PM  ----------------------------------------------------------  EXAM:  XR HIP 1V LT            PROCEDURE DATE:  09/27/2019            INTERPRETATION:  Clinical History / Reason for exam: Left hip pain.    TECHNIQUE: 2 x-rays of left hip obtained.    Comparison made with CT abdomen and pelvis September 26, 2019.    FINDINGS:    No evidence of acute fracture. Degenerative change of left hip.    IMPRESSION:  No evidence of acute fracture. Degenerative change of left hip.      FRANCY LIND M.D., ATTENDING RADIOLOGIST  This document has been electronically signed. Sep 28 2019  1:14PM  -----------------------------------------------------------------------  EXAM:  XR LS SPINE AP LAT 2-3 VIEWS            PROCEDURE DATE:  09/27/2019            INTERPRETATION:  Clinical History / Reason for exam: Back pain.    Technique:  Three  radiographs of the lumbar vertebral column are   obtained.    Comparison:  CT abdomen and pelvis September 26, 2019.    Findings:      5 lumbar type, non-rib bearing vertebral bodies are present.    No acute fracture is identified. Chronic L1 superior endplate wedge   deformity.  The pedicles are intact.    Multilevel degenerative change, as seen on CT.    Nonobstructive bowel gas pattern.     Impression:  No evidence of acute fracture.   Degenerative change, lumbar spine.      FRANCY LIND M.D., ATTENDING RADIOLOGIST  This document has been electronically signed. Sep 28 2019  1:13PM    -----------------------------------------------------------------------------          Drug Screen:        [ ]  [facility] Reviewed on 9/30/19, 9:19A  Patient Name:	Jignesh Vee	YOB: 1940  Address:	[address] [place] [address]	Sex:	Male  Rx Written	Rx Dispensed	Drug	Quantity	Days Supply	Prescriber Name  09/23/2019	09/23/2019	oxycodone-acetaminophen 5-325 mg tablet	8	2	Chacorta Alfred MD  09/19/2019	09/21/2019	zolpidem tartrate 5 mg tablet	30	30	Irena Cohen MD  07/25/2019	07/31/2019	zolpidem tartrate 5 mg tablet	30	30	Irena Cohen MD  10/17/2018	10/19/2018	tramadol hcl 50 mg tablet	45	7	Roberta Lance,N

## 2019-09-30 NOTE — CONSULT NOTE ADULT - SUBJECTIVE AND OBJECTIVE BOX
HISTORY OF PRESENTING ILLNESS:    HPI:  Patient is an 77yo M w/ PMH COPD, HTN, CHF, and DMt2 p/w SOB and left leg pain x few days. Patient states that he came in 2/2 to leg pain, but pt was seen by PA at home visits who likely advised patient to call EMS 2/2 SpO2 in 70s despite home O2. As per ED patient was found to be sating at 77% when EMS arrived at his home - as per ED patient was given IV solumedrol and placed on CPAP by EMS. Once arrived patient was placed on BiPAP and given one duonebs treatment as well as a dose of levaquin. Pt states that he is on O2 at home (5 liters) and typically sats at 86-87%. Pt states that he was feeling mildly SOB, but his primary concern is his leg. Pt states that he has been feeling weakness and pain in his left leg x few days. Pt reports pain in his low back, groin and hip pain. Pt states that he typically walks at home w/o assistive devices, but these past few days he has been unable to ambulate. Patient also states that pain increases when lying down, which has left him unable to sleep. hip pain/back pain started after fall.  Pt is able to move extremity w/o resistance, but has pain and weakness against resistance Pt denies HA, dizziness, NVD, chest pain, abdominal pain, change in bowel habits, change in urination.     Patient is afebrile w/ WBC: 11.3. K is elevated to 5.6. BUN/cr: 83/3.0. First set of troponin: 0.07. BNP: 1555. SpO2: 93% on BIPAP.     Patient reports blurry vision at time for the past several months (25 Sep 2019 14:50)    UNIQUE HX:   pt is very well known to [facility] ,and enrolled under home visits ,this time admitted for left leg pain and SOB ,being treated with COPD exacerbation with steroids and anti biotics ,currently getting PT for leg pain. No fracture on x ray .Also JAMES which is improved.      PAST MEDICAL & SURGICAL HISTORY  PAST MEDICAL & SURGICAL HISTORY:  Chronic CHF  CKD (chronic kidney disease) stage 3, GFR 30-59 ml/min  Colon polyp: claims it was malignant  High cholesterol  GERD (gastroesophageal reflux disease)  Enlarged prostate  COPD (chronic obstructive pulmonary disease): On home O2 - 5L  Diabetes mellitus, type 2  Hypertension  History of hemorrhoidectomy  Dysplastic colon polyp: removed    SOCIAL HISTORY:    ALLERGIES:  fish (Other)  iodine (Hives)  penicillin (Unknown)  shellfish (Other)    MEDICATIONS:  STANDING MEDICATIONS  acetaminophen   Tablet .. 975 milliGRAM(s) Oral every 8 hours  ALBUTerol    0.083% 2.5 milliGRAM(s) Nebulizer every 6 hours  amLODIPine   Tablet 10 milliGRAM(s) Oral daily  ascorbic acid 500 milliGRAM(s) Oral daily  atorvastatin 20 milliGRAM(s) Oral at bedtime  BACItracin   Ointment 1 Application(s) Topical three times a day  buDESOnide 160 MICROgram(s)/formoterol 4.5 MICROgram(s) Inhaler 2 Puff(s) Inhalation two times a day  carbamide peroxide Otic Solution 1 Drop(s) Both Ears two times a day  cholecalciferol 1000 Unit(s) Oral daily  dextrose 5%. 1000 milliLiter(s) IV Continuous <Continuous>  dextrose 50% Injectable 12.5 Gram(s) IV Push once  dextrose 50% Injectable 25 Gram(s) IV Push once  dextrose 50% Injectable 25 Gram(s) IV Push once  docusate sodium 100 milliGRAM(s) Oral three times a day  finasteride 5 milliGRAM(s) Oral daily  furosemide    Tablet 40 milliGRAM(s) Oral two times a day  heparin  Injectable 5000 Unit(s) SubCutaneous three times a day  influenza   Vaccine 0.5 milliLiter(s) IntraMuscular once  insulin glargine Injectable (LANTUS) 30 Unit(s) SubCutaneous every morning  insulin lispro (HumaLOG) corrective regimen sliding scale   SubCutaneous three times a day before meals  insulin lispro Injectable (HumaLOG) 4 Unit(s) SubCutaneous three times a day before meals  levoFLOXacin  Tablet 250 milliGRAM(s) Oral every 24 hours  lidocaine   Patch 2 Patch Transdermal daily  lidocaine   Patch 1 Patch Transdermal daily  magnesium oxide 400 milliGRAM(s) Oral three times a day with meals  methocarbamol 750 milliGRAM(s) Oral four times a day  metoprolol tartrate 25 milliGRAM(s) Oral two times a day  nystatin    Suspension 242801 Unit(s) Oral three times a day  pantoprazole    Tablet 40 milliGRAM(s) Oral before breakfast  predniSONE   Tablet 20 milliGRAM(s) Oral two times a day  tamsulosin 0.4 milliGRAM(s) Oral at bedtime  tiotropium 18 MICROgram(s) Capsule 1 Capsule(s) Inhalation daily    PRN MEDICATIONS  dextrose 40% Gel 15 Gram(s) Oral once PRN  glucagon  Injectable 1 milliGRAM(s) IntraMuscular once PRN  hydrOXYzine hydrochloride 25 milliGRAM(s) Oral at bedtime PRN  oxyCODONE    IR 5 milliGRAM(s) Oral every 6 hours PRN    VITALS:   T(F): --  HR: 92  BP: 110/70  RR: 20  SpO2: 93%    LABS:                        14.4   18.80 )-----------( 421      ( 30 Sep 2019 06:56 )             49.1     09-30    143  |  96<L>  |  88<HH>  ----------------------------<  194<H>  4.8   |  33<H>  |  2.2<H>    Ca    9.5      30 Sep 2019 06:56    TPro  6.4  /  Alb  4.1  /  TBili  0.5  /  DBili  x   /  AST  15  /  ALT  14  /  AlkPhos  76  09-29      < from: Xray Chest 1 View- PORTABLE-Routine (09.30.19 @ 07:16) >  Impression:      Bibasilar opacities and cardiomegaly, stable.      < end of copied text >  < from: Xray Hip 1 View, Left (09.27.19 @ 16:00) >  IMPRESSION:  No evidence of acute fracture. Degenerative change of left hip.    < end of copied text >              RADIOLOGY:    PHYSICAL EXAM:  GEN: No acute distress  HEENT:   LUNGS: Clear to auscultation bilaterally   HEART: S1/S2 present. RRR.   ABD: Soft, non-tender, non-distended. Bowel sounds present  EXT:  NEURO: AAOX3 HISTORY OF PRESENTING ILLNESS:    HPI:  Patient is an 77yo M w/ PMH COPD, HTN, CHF, and DMt2 p/w SOB and left leg pain x few days. Patient states that he came in 2/2 to leg pain, but pt was seen by PA at home visits who likely advised patient to call EMS 2/2 SpO2 in 70s despite home O2. As per ED patient was found to be sating at 77% when EMS arrived at his home - as per ED patient was given IV solumedrol and placed on CPAP by EMS. Once arrived patient was placed on BiPAP and given one duonebs treatment as well as a dose of levaquin. Pt states that he is on O2 at home (5 liters) and typically sats at 86-87%. Pt states that he was feeling mildly SOB, but his primary concern is his leg. Pt states that he has been feeling weakness and pain in his left leg x few days. Pt reports pain in his low back, groin and hip pain. Pt states that he typically walks at home w/o assistive devices, but these past few days he has been unable to ambulate. Patient also states that pain increases when lying down, which has left him unable to sleep. hip pain/back pain started after fall.  Pt is able to move extremity w/o resistance, but has pain and weakness against resistance Pt denies HA, dizziness, NVD, chest pain, abdominal pain, change in bowel habits, change in urination.     Patient is afebrile w/ WBC: 11.3. K is elevated to 5.6. BUN/cr: 83/3.0. First set of troponin: 0.07. BNP: 1555. SpO2: 93% on BIPAP.     Patient reports blurry vision at time for the past several months (25 Sep 2019 14:50)    UNIQUE HX:   pt is very well known to [facility] ,and enrolled under home visits ,this time admitted for left leg pain and SOB ,being treated with COPD exacerbation with steroids and anti biotics ,currently getting PT for leg pain. No fracture on x ray .Also JAMES which is improved.  pt is Still very lethargic and AAO*2 ,dosing off  during conversation .      PAST MEDICAL & SURGICAL HISTORY  PAST MEDICAL & SURGICAL HISTORY:  Chronic CHF  CKD (chronic kidney disease) stage 3, GFR 30-59 ml/min  Colon polyp: claims it was malignant  High cholesterol  GERD (gastroesophageal reflux disease)  Enlarged prostate  COPD (chronic obstructive pulmonary disease): On home O2 - 5L  Diabetes mellitus, type 2  Hypertension  History of hemorrhoidectomy  Dysplastic colon polyp: removed    SOCIAL HISTORY:    ALLERGIES:  fish (Other)  iodine (Hives)  penicillin (Unknown)  shellfish (Other)    MEDICATIONS:  STANDING MEDICATIONS  acetaminophen   Tablet .. 975 milliGRAM(s) Oral every 8 hours  ALBUTerol    0.083% 2.5 milliGRAM(s) Nebulizer every 6 hours  amLODIPine   Tablet 10 milliGRAM(s) Oral daily  ascorbic acid 500 milliGRAM(s) Oral daily  atorvastatin 20 milliGRAM(s) Oral at bedtime  BACItracin   Ointment 1 Application(s) Topical three times a day  buDESOnide 160 MICROgram(s)/formoterol 4.5 MICROgram(s) Inhaler 2 Puff(s) Inhalation two times a day  carbamide peroxide Otic Solution 1 Drop(s) Both Ears two times a day  cholecalciferol 1000 Unit(s) Oral daily  dextrose 5%. 1000 milliLiter(s) IV Continuous <Continuous>  dextrose 50% Injectable 12.5 Gram(s) IV Push once  dextrose 50% Injectable 25 Gram(s) IV Push once  dextrose 50% Injectable 25 Gram(s) IV Push once  docusate sodium 100 milliGRAM(s) Oral three times a day  finasteride 5 milliGRAM(s) Oral daily  furosemide    Tablet 40 milliGRAM(s) Oral two times a day  heparin  Injectable 5000 Unit(s) SubCutaneous three times a day  influenza   Vaccine 0.5 milliLiter(s) IntraMuscular once  insulin glargine Injectable (LANTUS) 30 Unit(s) SubCutaneous every morning  insulin lispro (HumaLOG) corrective regimen sliding scale   SubCutaneous three times a day before meals  insulin lispro Injectable (HumaLOG) 4 Unit(s) SubCutaneous three times a day before meals  levoFLOXacin  Tablet 250 milliGRAM(s) Oral every 24 hours  lidocaine   Patch 2 Patch Transdermal daily  lidocaine   Patch 1 Patch Transdermal daily  magnesium oxide 400 milliGRAM(s) Oral three times a day with meals  methocarbamol 750 milliGRAM(s) Oral four times a day  metoprolol tartrate 25 milliGRAM(s) Oral two times a day  nystatin    Suspension 018025 Unit(s) Oral three times a day  pantoprazole    Tablet 40 milliGRAM(s) Oral before breakfast  predniSONE   Tablet 20 milliGRAM(s) Oral two times a day  tamsulosin 0.4 milliGRAM(s) Oral at bedtime  tiotropium 18 MICROgram(s) Capsule 1 Capsule(s) Inhalation daily    PRN MEDICATIONS  dextrose 40% Gel 15 Gram(s) Oral once PRN  glucagon  Injectable 1 milliGRAM(s) IntraMuscular once PRN  hydrOXYzine hydrochloride 25 milliGRAM(s) Oral at bedtime PRN  oxyCODONE    IR 5 milliGRAM(s) Oral every 6 hours PRN    VITALS:   T(F): --  HR: 92  BP: 110/70  RR: 20  SpO2: 93%    LABS:                        14.4   18.80 )-----------( 421      ( 30 Sep 2019 06:56 )             49.1     09-30    143  |  96<L>  |  88<HH>  ----------------------------<  194<H>  4.8   |  33<H>  |  2.2<H>    Ca    9.5      30 Sep 2019 06:56    TPro  6.4  /  Alb  4.1  /  TBili  0.5  /  DBili  x   /  AST  15  /  ALT  14  /  AlkPhos  76  09-29      < from: Xray Chest 1 View- PORTABLE-Routine (09.30.19 @ 07:16) >  Impression:      Bibasilar opacities and cardiomegaly, stable.      < end of copied text >  < from: Xray Hip 1 View, Left (09.27.19 @ 16:00) >  IMPRESSION:  No evidence of acute fracture. Degenerative change of left hip.    < end of copied text >              RADIOLOGY:    PHYSICAL EXAM:  GEN: No acute distress,   HEENT: Bipap ,nasal canula present  LUNGS: faint rhonchi on  auscultation bilaterally   HEART: S1/S2 present. RRR.   ABD: Soft, non-tender, non-distended. Bowel sounds present  EXT: ++ LE edema up to knees ,Bruising on both fore arms  NEURO: AAOX2   non focal   very lethargic ,dosing off during exam

## 2019-09-30 NOTE — PROGRESS NOTE ADULT - ASSESSMENT
Patient is a 77 y/o male with PMH of COPD, HTN, CHF, pulmonary HTN, DM2, CKD stage 3, BPH who presents with SOB and left leg pain for several days.    1. Chronic respiratory failure 2/2 COPD vs CHF   -CXR reveals no acute disease; stable bibasilar opacities and cardiomegaly.  -Pulmonary is following patient. Per pulmonary, he is at baseline for his lung function.  -Continue with High flow O2, CPAP, Duonebs/home inhaler, Lasix 40mg PO BID, Levaquin 250mg PO qd, Prednisone 20mg PO BID.  -Leukocytosis due to Prednisone, patient afebrile.   -Doppler B/L LE is negative for DVT.    2. Elevated troponin  -EKG shows no ischemic changes.  -Asymptomatic. Likely due to right-sided CHF and JAMES on CKD.  -Per Cardiology: No MI. His troponin is chronically mildly elevated. Continue meds.     3. Pain in left shoulder, hip, back s/p mechanical fall   -X-ray of left hip, left shoulder, and lumbar spine reveal no acute fracture, degenerative changes.  -CT Abdomen and Pelvis: no acute intra-abdominal or pelvic pathology. Patchy nodular opacities at the lung bases.  -Pain Medicine recs: Methocarbamol q6h; D/C morphine; Oxycodone Q4h PRN; Warm compress to bilat shoulders, left hip, left knee; Lidoderm patch to left knee 12hrs on/off.    4. Blurry vision  -Patient denies head trauma during fall. AAO x3, neurologically intact.   -Possibly due to cataract vs steroid use.  -Head CT: no acute intracranial pathology.    5. DM2  -Continue with home dose of Lantus and ISS.  -Monitor POC glucose.  -Carb consistent diet.    6.  HTN  -Per Nephro: D/c Amlodipine.  -Continue Metoprolol.    7.  JAMES on CKD 3  -Renal US shows no hydronephrosis. Postvoid residual volume 170cc. No need for Velazquez.  -Nephrology is following patient. Per Nephro: check phos and iPTH.   -Avoid nephrotoxins.    8.  Hyperkalemia 2/2 CKD - resolved  -Resolved following Kayexalate.  -Nephro recs: 2g K diet, Kayexalate 30g 2-3x/week, Lasix 40mg BID po. Obtain dietary consult to provide K content on all foods.     9. Metabolic Alkalosis  -Nephro recs: Bicarb is stable at 33. If it worsens, check ABG and if pH>7.5 then give Diamox 250 q12 for 2 doses. R/o CO2 retention.    10. BPH  -Continue home meds.    11. Difficulty ambulating  -PT consult placed.    Prophylaxis:  -Hep subq.  -Protonix.    -Grave prognosis. Discussed with patient about end of life care. Patient signed DNR/DNI. Patient refusing hospice at this time. Likely needs rehab.      Patient was interviewed and examined by Pratima Encinas, medical student. This is a preliminary report pending discussion with hospitalist, Dr. Yoni Mcclure.

## 2019-09-30 NOTE — PROGRESS NOTE ADULT - ASSESSMENT
Patient is an 79yo M w/ PMH COPD, HTN, CHF, and DMt2 p/w SOB and left leg pain x few days. Patient states that he came in 2/2 to leg pain, but pt was seen by PA at home visits who likely advised patient to call EMS 2/2 SpO2 in 70s despite home O2.    1. Acute on Chronic Respiratory failure   -CXR shows no acute process  -Continue with neb tx, Oxygen, switch to po steroid ,  High flow, BIPAP, Po lasix, and PO levaquin  -Doppler US of LE negative  -Pulmonary to follow up     2. Abnormal trop  -EKG shows no ischemic changes  -Asymptomatic  -Probably due to JAMES on CKD, and current respiratory status  -Trop level flat  -Cardiology to follow up    3.  Left hip/Leg pain  -S/P fall  -X-ray of the hip, and lumbar spine are negative  -pain medications  -PT consult    4. Blurry vision  -Possible due to cataract, hypoxemia, and possible steroid  -Head CT scan negative  -neurologically intact  -Resolved  -neuro check    5. DM2  -Continue with home dose of lantus, and ISS  -Monitor POC      6.  HTN  -Continue with home medications, and Monitor BP    7.  JAMES on CKD  -improving  -renal US shows no acute process.  PVR volume low  -monitor renal function  -Avoid nephrotoxin  -Nephrology to follow up    8.  Hyperkalemia  -Kayexalate  3x a week.  Resolved  -Nephrology to follow up       9.  left renal mass  -Outpatient follow with urology  -Discussed the possibility of malignancy with patient    10.  AMS  -Appears to be secondary to respiratory status and metabolic encephalopathy   -Treat underline condition  -Offered to repeat head CT scan given his recent fall, but patient refused.  risks, benefits, and alternative discussed  -Continue with neuro check  -Resolved.  back to baseline    patient opted for DNR/DNI after long discussion  overall prognosis. poor  Need rehab.  prefers [facility]  Offered family hospice, but patient is not ready now    Plan of care  and prognosis were discussed with patient and Son/HCP  in great details, All questions were answered to their satisfaction  Seems to understand, and in agreement

## 2019-09-30 NOTE — CONSULT NOTE ADULT - PROBLEM SELECTOR RECOMMENDATION 9
Change Acetaminophen   Tablet .. 975 milliGRAM(s) Oral every 6 hours Standing  Con't methocarbamol 750 milliGRAM(s) Oral four times a day  DC morphine  - Injectable 4 milliGRAM(s) IV Push every 4 hours PRN  Start Oxycodone 10mg PO Q4hrs PRN   Start Warm compress to bilat shoulders, left hip and left knee Q1hr [id number] PRN   Start Lidoderm patch to left knee 12hrs on/off

## 2019-09-30 NOTE — CONSULT NOTE ADULT - REASON FOR ADMISSION
SOB and leg pain

## 2019-09-30 NOTE — PROGRESS NOTE ADULT - SUBJECTIVE AND OBJECTIVE BOX
Nephrology progress note    Patient is seen and examined, events over the last 24 h noted .  On BiPAP at night  Allergies:  aspirin (Other)  fish (Other)  iodine (Hives)  penicillin (Unknown)  shellfish (Other)    Hospital Medications:   MEDICATIONS  (STANDING):  ALBUTerol    0.083% 2.5 milliGRAM(s) Nebulizer every 6 hours  amLODIPine   Tablet 10 milliGRAM(s) Oral daily  ascorbic acid 500 milliGRAM(s) Oral daily  atorvastatin 20 milliGRAM(s) Oral at bedtime  BACItracin   Ointment 1 Application(s) Topical three times a day  buDESOnide 160 MICROgram(s)/formoterol 4.5 MICROgram(s) Inhaler 2 Puff(s) Inhalation two times a day  carbamide peroxide Otic Solution 1 Drop(s) Both Ears two times a day  cholecalciferol 1000 Unit(s) Oral daily  dextrose 5%. 1000 milliLiter(s) (50 mL/Hr) IV Continuous <Continuous>  dextrose 50% Injectable 12.5 Gram(s) IV Push once  dextrose 50% Injectable 25 Gram(s) IV Push once  dextrose 50% Injectable 25 Gram(s) IV Push once  docusate sodium 100 milliGRAM(s) Oral three times a day  finasteride 5 milliGRAM(s) Oral daily  furosemide    Tablet 40 milliGRAM(s) Oral two times a day  heparin  Injectable 5000 Unit(s) SubCutaneous three times a day  influenza   Vaccine 0.5 milliLiter(s) IntraMuscular once  insulin glargine Injectable (LANTUS) 30 Unit(s) SubCutaneous every morning  insulin lispro (HumaLOG) corrective regimen sliding scale   SubCutaneous three times a day before meals  insulin lispro Injectable (HumaLOG) 4 Unit(s) SubCutaneous three times a day before meals  levoFLOXacin  Tablet 250 milliGRAM(s) Oral every 24 hours  lidocaine   Patch 2 Patch Transdermal daily  lidocaine   Patch 1 Patch Transdermal daily  magnesium oxide 400 milliGRAM(s) Oral three times a day with meals  methocarbamol 750 milliGRAM(s) Oral four times a day  metoprolol tartrate 25 milliGRAM(s) Oral two times a day  nystatin    Suspension 611931 Unit(s) Oral three times a day  pantoprazole    Tablet 40 milliGRAM(s) Oral before breakfast  predniSONE   Tablet 20 milliGRAM(s) Oral two times a day  tamsulosin 0.4 milliGRAM(s) Oral at bedtime  tiotropium 18 MICROgram(s) Capsule 1 Capsule(s) Inhalation daily        VITALS:  T(F): --  HR: 92 (19 @ 05:35)  BP: 110/70 (19 @ 05:35)  RR: 20 (19 @ 05:35)  SpO2: 93% (19 @ 02:08)  Wt(kg): --     @ 07:01  -   @ 07:00  --------------------------------------------------------  IN: 0 mL / OUT: 850 mL / NET: -850 mL     @ 07:01  -   @ 07:00  --------------------------------------------------------  IN: 0 mL / OUT: 900 mL / NET: -900 mL          PHYSICAL EXAM:  Constitutional: NAD  Respiratory: BS diminished at bases  Cardiovascular: S1, S2, RRR  Gastrointestinal: BS+, soft, NT/ND  Extremities: + peripheral edema  Neurological: comfortable in bed  : No CVA tenderness. No chapman.   Skin: No rashes  Vascular Access:    LABS:      141  |  94<L>  |  80<HH>  ----------------------------<  426<H>  5.1<H>   |  32  |  2.3<H>    Ca    9.1      29 Sep 2019 08:17    TPro  6.4  /  Alb  4.1  /  TBili  0.5  /  DBili      /  AST  15  /  ALT  14  /  AlkPhos  76                            14.4   18.80 )-----------( 421      ( 30 Sep 2019 06:56 )             49.1       Urine Studies:  Urinalysis Basic - ( 28 Sep 2019 08:30 )    Color: Yellow / Appearance: Clear / S.020 / pH:   Gluc:  / Ketone: Negative  / Bili: Negative / Urobili: 0.2 mg/dL   Blood:  / Protein: Negative mg/dL / Nitrite: Negative   Leuk Esterase: Negative / RBC:  / WBC    Sq Epi:  / Non Sq Epi:  / Bacteria:         RADIOLOGY & ADDITIONAL STUDIES:

## 2019-09-30 NOTE — CHART NOTE - NSCHARTNOTEFT_GEN_A_CORE
Patient insists that he cannot lie back in recliner claiming that his back pain prevents this and he cannot stay in his bed. Explained to patient and his son that leaning forward in his recliner greatly increases risk of falling reminding him that he already fell once.  Son mentions that patient is on a muscle relaxer at home. Will try Robaxin (and discontinue percocet prn order) in attempt to give enough relief for him to lie back

## 2019-09-30 NOTE — CONSULT NOTE ADULT - ASSESSMENT
REVIEW OF SYSTEMS    General:	NAD   Skin/Breast:	Neg  Ophthalmologic:	Neg  ENMT: Neg	  Respiratory and Thorax: COPD on HF NC	  Cardiovascular:	Neg  Gastrointestinal:	Neg  Genitourinary:	Neg  Musculoskeletal:	hx of arthritis   Neurological:	Neg  Psychiatric:	Neg  Hematology/Lymphatics:	Neg  Endocrine:	Neg        PHYSICAL EXAM:    GENERAL: NAD, well-groomed, well-developed  HEAD:  Atraumatic, Normocephalic  EYES: EOMI, PERRLA, conjunctiva and sclera clear  ENMT: No tonsillar erythema, exudates, or enlargement; Moist mucous membranes, Good dentition, No lesions  NECK: Supple, No JVD, Normal thyroid  NERVOUS SYSTEM:  Alert & Oriented X3, Good concentration; Motor Strength 5/5 B/L upper and lower extremities; DTRs 2+ intact and symmetric  CHEST/LUNG: Clear to percussion bilaterally; No rales, rhonchi, wheezing, or rubs  HEART: Regular rate and rhythm; No murmurs, rubs, or gallops  ABDOMEN: Soft, Nontender, Nondistended; Bowel sounds present  EXTREMITIES:  No clubbing, cyanosis, or edema  LYMPH: No lymphadenopathy noted  SKIN: skin scally and discolored reddish/purplish      Patient lying in bed with O2 HF NC in place. Neg SOB noted, Patient is sleepy and states he is tired. Patient states he does not have any pain at this time. When he moves he has some pain and the morphine does nothing to relieve his pain. Bilat shoulders, hip and knee all hurt the same level when he does have pain.

## 2019-09-30 NOTE — CONSULT NOTE ADULT - CONSULT REASON
Renal mass
Hyperkalemia
home visit [facility]azariegos
post troponin
CRF
worsening pain with severe respiratory dysfunction

## 2019-09-30 NOTE — PROGRESS NOTE ADULT - ASSESSMENT
Pt with CKD stage 4 and chronic hyperkalemia p/w SOB and leg pain.    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

## 2019-09-30 NOTE — PROGRESS NOTE ADULT - SUBJECTIVE AND OBJECTIVE BOX
CC.  SOB/Leg pain  HPI.  Patient  reports that he feels better.  lethargic but reports that he did not sleep overnight due to back pain, and knee pain.  Offers no new complaints  still on high flow     Denies any abdominal pain, N/V/D, numbness weakness, headache, dizziness, CP, palpitation               Constitutional: No fever, fatigue or weight loss.  Skin: No rash.  Eyes: No recent vision problems or eye pain.  ENT: No congestion, ear pain, or sore throat.  Endocrine: No thyroid problems.  Cardiovascular: No chest pain or palpation.  Respiratory: No congestion, or wheezing.  Gastrointestinal: No abdominal pain, nausea, vomiting, or diarrhea.  Genitourinary: No dysuria.  Musculoskeletal: No joint swelling.  Neurologic: No headache.    Vital Signs Last 24 Hrs  T(C): --  T(F): --  HR: 92 (30 Sep 2019 05:35) (92 - 96)  BP: 110/70 (30 Sep 2019 05:35) (110/70 - 110/70)  BP(mean): --  RR: 20 (30 Sep 2019 05:35) (20 - 21)  SpO2: 93% (30 Sep 2019 02:08) (93% - 93%)      PHYSICAL EXAM-  GENERAL: NAD,  chronic ill appearing male  HEAD:  Atraumatic, Normocephalic  EYES: EOMI, PERRLA, conjunctiva and sclera clear  NECK: Supple, No JVD, Normal thyroid  NERVOUS SYSTEM:  Alert & Oriented X1 lethargic, but improved now. Moving all extremities  no focal neurological def  CHEST/LUNG: Min wheezing with good air entry.  No retractions or accessory muscle usage  HEART: Regular rate and rhythm; No murmurs, rubs, or gallops  ABDOMEN: Soft, Nontender, Nondistended; Bowel sounds present  EXTREMITIES:  No clubbing, cyanosis, or edema  SKIN: No rashes or lesions                                  14.4   18.80 )-----------( 421      ( 30 Sep 2019 06:56 )             49.1     09-30    143  |  96<L>  |  88<HH>  ----------------------------<  194<H>  4.8   |  33<H>  |  2.2<H>    Ca    9.5      30 Sep 2019 06:56    TPro  6.4  /  Alb  4.1  /  TBili  0.5  /  DBili  x   /  AST  15  /  ALT  14  /  AlkPhos  76  09-29            MEDICATIONS  (STANDING):  acetaminophen   Tablet .. 975 milliGRAM(s) Oral every 8 hours  ALBUTerol    0.083% 2.5 milliGRAM(s) Nebulizer every 6 hours  amLODIPine   Tablet 10 milliGRAM(s) Oral daily  ascorbic acid 500 milliGRAM(s) Oral daily  atorvastatin 20 milliGRAM(s) Oral at bedtime  BACItracin   Ointment 1 Application(s) Topical three times a day  buDESOnide 160 MICROgram(s)/formoterol 4.5 MICROgram(s) Inhaler 2 Puff(s) Inhalation two times a day  carbamide peroxide Otic Solution 1 Drop(s) Both Ears two times a day  cholecalciferol 1000 Unit(s) Oral daily  dextrose 5%. 1000 milliLiter(s) (50 mL/Hr) IV Continuous <Continuous>  dextrose 50% Injectable 12.5 Gram(s) IV Push once  dextrose 50% Injectable 25 Gram(s) IV Push once  dextrose 50% Injectable 25 Gram(s) IV Push once  docusate sodium 100 milliGRAM(s) Oral three times a day  finasteride 5 milliGRAM(s) Oral daily  furosemide    Tablet 40 milliGRAM(s) Oral two times a day  heparin  Injectable 5000 Unit(s) SubCutaneous three times a day  influenza   Vaccine 0.5 milliLiter(s) IntraMuscular once  insulin glargine Injectable (LANTUS) 30 Unit(s) SubCutaneous every morning  insulin lispro (HumaLOG) corrective regimen sliding scale   SubCutaneous three times a day before meals  insulin lispro Injectable (HumaLOG) 4 Unit(s) SubCutaneous three times a day before meals  levoFLOXacin  Tablet 250 milliGRAM(s) Oral every 24 hours  lidocaine   Patch 2 Patch Transdermal daily  lidocaine   Patch 1 Patch Transdermal daily  magnesium oxide 400 milliGRAM(s) Oral three times a day with meals  methocarbamol 750 milliGRAM(s) Oral four times a day  metoprolol tartrate 25 milliGRAM(s) Oral two times a day  nystatin    Suspension [id number] Unit(s) Oral three times a day  pantoprazole    Tablet 40 milliGRAM(s) Oral before breakfast  predniSONE   Tablet 20 milliGRAM(s) Oral two times a day  tamsulosin 0.4 milliGRAM(s) Oral at bedtime  tiotropium 18 MICROgram(s) Capsule 1 Capsule(s) Inhalation daily    MEDICATIONS  (PRN):  dextrose 40% Gel 15 Gram(s) Oral once PRN Blood Glucose LESS THAN 70 milliGRAM(s)/deciliter  glucagon  Injectable 1 milliGRAM(s) IntraMuscular once PRN Glucose LESS THAN 70 milligrams/deciliter  hydrOXYzine hydrochloride 25 milliGRAM(s) Oral at bedtime PRN Insomnia  oxyCODONE    IR 5 milliGRAM(s) Oral every 6 hours PRN Severe Pain (7 - 10)

## 2019-09-30 NOTE — CONSULT NOTE ADULT - ASSESSMENT
Patient is an 79yo M w/ PMH COPD, HTN, CHF, and DMt2 p/w SOB and left leg pain x few days. Patient states that he came in 2/2 to leg pain, but pt was seen by PA at home visits who likely advised patient to call EMS 2/2 SpO2 in 70s despite home O2.    1. Acute on Chronic Respiratory failure   -CXR shows no acute process  -Continue with neb tx, Oxygen, switch to po steroid ,  High flow, BIPAP, Po lasix, and PO levaquin  -Doppler US of LE negative  -Pulmonary to follow up     2. Abnormal trop  -EKG shows no ischemic changes  -Asymptomatic  -Probably due to JAMES on CKD, and current respiratory status  -Trop level flat  -Cardiology to follow up    3.  Left hip/Leg pain  -S/P fall  -X-ray of the hip, and lumbar spine are negative  -pain medications-PT consult    4. Blurry vision  -Possible due to cataract, hypoxemia, and possible steroid  -Head CT scan negative  -neurologically intact  -Resolved  -neuro check    5. DM2  -Continue with home dose of lantus, and ISS  -Monitor POC      6.  HTN  -Continue with home medications, and Monitor BP    7.  JAMES on CKD  -improving  -renal US shows no acute process.  PVR volume low  -monitor renal function  -Avoid nephrotoxin  -Nephrology to follow up    8.  Hyperkalemia  -Kayexalate  3x a week.  Resolved  -Nephrology to follow up       9.  left renal mass  -Outpatient follow with urology  -Discussed the possibility of malignancy with patient  10.  AMS  -Appears to be secondary to respiratory status and metabolic encephalopathy   -Treat underline condition  -Offered to repeat head CT scan given his recent fall, but patient refused.  risks, benefits, and alternative discussed  -Continue with neuro check  -Resolved.  back to baseline    patient opted for DNR/DNI after long discussion  overall prognosis. poor  Need rehab.  prefers [facility]  Offered family hospice, but patient is not ready now Patient is an 79yo M w/ PMH COPD, HTN, CHF, and DMt2 p/w SOB and left leg pain x few days. Patient states that he came in 2/2 to leg pain, but pt was seen by PA at home visits who likely advised patient to call EMS 2/2 SpO2 in 70s despite home O2.    1. Acute on Chronic Respiratory failure   2. Abnormal trop  3.  Left hip/Leg pain  4. Blurry vision  5. DM2  6.  HTN  7.  JAMES on CKD  8.  Hyperkalemia  9.  left renal mass  10.  AMS      PLAN ;  respiratory status seems to be improved ,cw steroids and Duoneb  watch for delirium   need PT ,will definitely benefit from SNF       DNR/DNI  Hospice  candidate but refused   overall prognosis. poor  Need rehab.  prefers [facility]

## 2019-10-01 ENCOUNTER — OUTPATIENT (OUTPATIENT)
Dept: OUTPATIENT SERVICES | Facility: HOSPITAL | Age: 79
LOS: 1 days | Discharge: HOME | End: 2019-10-01

## 2019-10-01 DIAGNOSIS — E78.5 HYPERLIPIDEMIA, UNSPECIFIED: ICD-10-CM

## 2019-10-01 DIAGNOSIS — Z98.890 OTHER SPECIFIED POSTPROCEDURAL STATES: Chronic | ICD-10-CM

## 2019-10-01 DIAGNOSIS — K63.5 POLYP OF COLON: Chronic | ICD-10-CM

## 2019-10-01 DIAGNOSIS — Z02.9 ENCOUNTER FOR ADMINISTRATIVE EXAMINATIONS, UNSPECIFIED: ICD-10-CM

## 2019-10-01 DIAGNOSIS — E66.2 MORBID (SEVERE) OBESITY WITH ALVEOLAR HYPOVENTILATION: ICD-10-CM

## 2019-10-01 PROBLEM — I50.9 HEART FAILURE, UNSPECIFIED: Chronic | Status: ACTIVE | Noted: 2019-09-25

## 2019-10-01 PROBLEM — J44.9 CHRONIC OBSTRUCTIVE PULMONARY DISEASE, UNSPECIFIED: Chronic | Status: ACTIVE | Noted: 2018-11-04

## 2019-10-01 LAB
ANION GAP SERPL CALC-SCNC: 14 MMOL/L — SIGNIFICANT CHANGE UP (ref 7–14)
BUN SERPL-MCNC: 92 MG/DL — CRITICAL HIGH (ref 10–20)
CALCIUM SERPL-MCNC: 9.3 MG/DL — SIGNIFICANT CHANGE UP (ref 8.4–10.5)
CALCIUM SERPL-MCNC: 9.6 MG/DL — SIGNIFICANT CHANGE UP (ref 8.5–10.1)
CHLORIDE SERPL-SCNC: 93 MMOL/L — LOW (ref 98–110)
CO2 SERPL-SCNC: 35 MMOL/L — HIGH (ref 17–32)
CREAT SERPL-MCNC: 2.4 MG/DL — HIGH (ref 0.7–1.5)
GLUCOSE BLDC GLUCOMTR-MCNC: 171 MG/DL — HIGH (ref 70–99)
GLUCOSE BLDC GLUCOMTR-MCNC: 213 MG/DL — HIGH (ref 70–99)
GLUCOSE BLDC GLUCOMTR-MCNC: 272 MG/DL — HIGH (ref 70–99)
GLUCOSE BLDC GLUCOMTR-MCNC: 328 MG/DL — HIGH (ref 70–99)
GLUCOSE BLDC GLUCOMTR-MCNC: 335 MG/DL — HIGH (ref 70–99)
GLUCOSE SERPL-MCNC: 156 MG/DL — HIGH (ref 70–99)
HCT VFR BLD CALC: 47.7 % — SIGNIFICANT CHANGE UP (ref 42–52)
HGB BLD-MCNC: 14 G/DL — SIGNIFICANT CHANGE UP (ref 14–18)
MCHC RBC-ENTMCNC: 23.1 PG — LOW (ref 27–31)
MCHC RBC-ENTMCNC: 29.4 G/DL — LOW (ref 32–37)
MCV RBC AUTO: 78.8 FL — LOW (ref 80–94)
NRBC # BLD: 0 /100 WBCS — SIGNIFICANT CHANGE UP (ref 0–0)
PHOSPHATE SERPL-MCNC: 5.5 MG/DL — HIGH (ref 2.1–4.9)
PLATELET # BLD AUTO: 348 K/UL — SIGNIFICANT CHANGE UP (ref 130–400)
POTASSIUM SERPL-MCNC: 5.3 MMOL/L — HIGH (ref 3.5–5)
POTASSIUM SERPL-SCNC: 5.3 MMOL/L — HIGH (ref 3.5–5)
PTH-INTACT FLD-MCNC: 88 PG/ML — HIGH (ref 15–65)
RBC # BLD: 6.05 M/UL — SIGNIFICANT CHANGE UP (ref 4.7–6.1)
RBC # FLD: 20.2 % — HIGH (ref 11.5–14.5)
SODIUM SERPL-SCNC: 142 MMOL/L — SIGNIFICANT CHANGE UP (ref 135–146)
WBC # BLD: 21.51 K/UL — HIGH (ref 4.8–10.8)
WBC # FLD AUTO: 21.51 K/UL — HIGH (ref 4.8–10.8)

## 2019-10-01 PROCEDURE — 99233 SBSQ HOSP IP/OBS HIGH 50: CPT

## 2019-10-01 RX ORDER — OXYCODONE HYDROCHLORIDE 5 MG/1
10 TABLET ORAL ONCE
Refills: 0 | Status: DISCONTINUED | OUTPATIENT
Start: 2019-10-01 | End: 2019-10-01

## 2019-10-01 RX ORDER — OXYCODONE HYDROCHLORIDE 5 MG/1
5 TABLET ORAL ONCE
Refills: 0 | Status: DISCONTINUED | OUTPATIENT
Start: 2019-10-01 | End: 2019-10-01

## 2019-10-01 RX ADMIN — ATORVASTATIN CALCIUM 20 MILLIGRAM(S): 80 TABLET, FILM COATED ORAL at 21:40

## 2019-10-01 RX ADMIN — Medication 4 UNIT(S): at 12:07

## 2019-10-01 RX ADMIN — ALBUTEROL 2.5 MILLIGRAM(S): 90 AEROSOL, METERED ORAL at 05:03

## 2019-10-01 RX ADMIN — ALBUTEROL 2.5 MILLIGRAM(S): 90 AEROSOL, METERED ORAL at 08:30

## 2019-10-01 RX ADMIN — Medication 975 MILLIGRAM(S): at 21:41

## 2019-10-01 RX ADMIN — HEPARIN SODIUM 5000 UNIT(S): 5000 INJECTION INTRAVENOUS; SUBCUTANEOUS at 06:33

## 2019-10-01 RX ADMIN — TIOTROPIUM BROMIDE 1 CAPSULE(S): 18 CAPSULE ORAL; RESPIRATORY (INHALATION) at 08:30

## 2019-10-01 RX ADMIN — MAGNESIUM OXIDE 400 MG ORAL TABLET 400 MILLIGRAM(S): 241.3 TABLET ORAL at 17:38

## 2019-10-01 RX ADMIN — INSULIN GLARGINE 30 UNIT(S): 100 INJECTION, SOLUTION SUBCUTANEOUS at 10:13

## 2019-10-01 RX ADMIN — ALBUTEROL 2.5 MILLIGRAM(S): 90 AEROSOL, METERED ORAL at 20:36

## 2019-10-01 RX ADMIN — METHOCARBAMOL 750 MILLIGRAM(S): 500 TABLET, FILM COATED ORAL at 04:12

## 2019-10-01 RX ADMIN — PANTOPRAZOLE SODIUM 40 MILLIGRAM(S): 20 TABLET, DELAYED RELEASE ORAL at 06:35

## 2019-10-01 RX ADMIN — OXYCODONE HYDROCHLORIDE 10 MILLIGRAM(S): 5 TABLET ORAL at 16:39

## 2019-10-01 RX ADMIN — BUDESONIDE AND FORMOTEROL FUMARATE DIHYDRATE 2 PUFF(S): 160; 4.5 AEROSOL RESPIRATORY (INHALATION) at 08:31

## 2019-10-01 RX ADMIN — Medication 500 MILLIGRAM(S): at 12:12

## 2019-10-01 RX ADMIN — Medication 25 MILLIGRAM(S): at 17:37

## 2019-10-01 RX ADMIN — Medication 8: at 12:08

## 2019-10-01 RX ADMIN — Medication 20 MILLIGRAM(S): at 19:18

## 2019-10-01 RX ADMIN — ALBUTEROL 2.5 MILLIGRAM(S): 90 AEROSOL, METERED ORAL at 13:34

## 2019-10-01 RX ADMIN — LIDOCAINE 1 PATCH: 4 CREAM TOPICAL at 17:26

## 2019-10-01 RX ADMIN — OXYCODONE HYDROCHLORIDE 10 MILLIGRAM(S): 5 TABLET ORAL at 04:17

## 2019-10-01 RX ADMIN — METHOCARBAMOL 750 MILLIGRAM(S): 500 TABLET, FILM COATED ORAL at 06:34

## 2019-10-01 RX ADMIN — Medication 20 MILLIGRAM(S): at 06:35

## 2019-10-01 RX ADMIN — Medication 40 MILLIGRAM(S): at 19:18

## 2019-10-01 RX ADMIN — CARBAMIDE PEROXIDE 1 DROP(S): 81.86 SOLUTION/ DROPS AURICULAR (OTIC) at 06:34

## 2019-10-01 RX ADMIN — Medication 1 APPLICATION(S): at 17:27

## 2019-10-01 RX ADMIN — Medication 100 MILLIGRAM(S): at 06:33

## 2019-10-01 RX ADMIN — Medication 500000 UNIT(S): at 21:41

## 2019-10-01 RX ADMIN — CARBAMIDE PEROXIDE 1 DROP(S): 81.86 SOLUTION/ DROPS AURICULAR (OTIC) at 17:33

## 2019-10-01 RX ADMIN — Medication 1000 UNIT(S): at 12:13

## 2019-10-01 RX ADMIN — FINASTERIDE 5 MILLIGRAM(S): 5 TABLET, FILM COATED ORAL at 12:12

## 2019-10-01 RX ADMIN — OXYCODONE HYDROCHLORIDE 10 MILLIGRAM(S): 5 TABLET ORAL at 23:23

## 2019-10-01 RX ADMIN — OXYCODONE HYDROCHLORIDE 10 MILLIGRAM(S): 5 TABLET ORAL at 04:09

## 2019-10-01 RX ADMIN — Medication 975 MILLIGRAM(S): at 04:26

## 2019-10-01 RX ADMIN — TAMSULOSIN HYDROCHLORIDE 0.4 MILLIGRAM(S): 0.4 CAPSULE ORAL at 21:42

## 2019-10-01 RX ADMIN — Medication 100 MILLIGRAM(S): at 14:07

## 2019-10-01 RX ADMIN — HEPARIN SODIUM 5000 UNIT(S): 5000 INJECTION INTRAVENOUS; SUBCUTANEOUS at 14:07

## 2019-10-01 RX ADMIN — METHOCARBAMOL 750 MILLIGRAM(S): 500 TABLET, FILM COATED ORAL at 17:38

## 2019-10-01 RX ADMIN — Medication 975 MILLIGRAM(S): at 04:11

## 2019-10-01 RX ADMIN — Medication 40 MILLIGRAM(S): at 06:33

## 2019-10-01 RX ADMIN — LIDOCAINE 1 PATCH: 4 CREAM TOPICAL at 14:04

## 2019-10-01 RX ADMIN — MAGNESIUM OXIDE 400 MG ORAL TABLET 400 MILLIGRAM(S): 241.3 TABLET ORAL at 12:18

## 2019-10-01 RX ADMIN — HEPARIN SODIUM 5000 UNIT(S): 5000 INJECTION INTRAVENOUS; SUBCUTANEOUS at 21:42

## 2019-10-01 RX ADMIN — OXYCODONE HYDROCHLORIDE 10 MILLIGRAM(S): 5 TABLET ORAL at 20:25

## 2019-10-01 RX ADMIN — MAGNESIUM OXIDE 400 MG ORAL TABLET 400 MILLIGRAM(S): 241.3 TABLET ORAL at 08:18

## 2019-10-01 RX ADMIN — Medication 500000 UNIT(S): at 14:07

## 2019-10-01 RX ADMIN — METHOCARBAMOL 750 MILLIGRAM(S): 500 TABLET, FILM COATED ORAL at 12:18

## 2019-10-01 RX ADMIN — Medication 500000 UNIT(S): at 06:35

## 2019-10-01 RX ADMIN — METHOCARBAMOL 750 MILLIGRAM(S): 500 TABLET, FILM COATED ORAL at 23:23

## 2019-10-01 RX ADMIN — Medication 2: at 08:15

## 2019-10-01 RX ADMIN — Medication 100 MILLIGRAM(S): at 21:42

## 2019-10-01 RX ADMIN — OXYCODONE HYDROCHLORIDE 10 MILLIGRAM(S): 5 TABLET ORAL at 14:06

## 2019-10-01 RX ADMIN — OXYCODONE HYDROCHLORIDE 10 MILLIGRAM(S): 5 TABLET ORAL at 19:27

## 2019-10-01 RX ADMIN — Medication 1 APPLICATION(S): at 21:43

## 2019-10-01 RX ADMIN — Medication 25 MILLIGRAM(S): at 06:35

## 2019-10-01 RX ADMIN — LIDOCAINE 2 PATCH: 4 CREAM TOPICAL at 17:38

## 2019-10-01 RX ADMIN — OXYCODONE HYDROCHLORIDE 10 MILLIGRAM(S): 5 TABLET ORAL at 23:22

## 2019-10-01 RX ADMIN — Medication 4 UNIT(S): at 08:15

## 2019-10-01 RX ADMIN — Medication 1 APPLICATION(S): at 06:38

## 2019-10-01 RX ADMIN — Medication 975 MILLIGRAM(S): at 21:43

## 2019-10-01 NOTE — PROGRESS NOTE ADULT - SUBJECTIVE AND OBJECTIVE BOX
INTERVAL HPI:  Patient remains lethargic today, but he is less somnolent than yesterday and no longer has any confusion. He reports that the pain in his shoulders, back, and left hip has resolved, however he notes stiffness of both shoulders when aides/providers held him under the arms to help support his weight. He notes back pain that occurs only when lying flat and requests a chair to sit in, even though 3 nights ago he had a fall while sitting in a chair. Patient is currently saturating ~80% on 5L NC, will likely need to restart high flow O2. He notes improvement with Duonebs and CPAP at night. He has baseline dyspnea but denies worsening SOB, chest pain, abdominal pain, nausea, vomiting.       Patient is a 77 y/o male with PMH of COPD, HTN, CHF, pulmonary HTN, DM2, CKD stage 3, BPH who presents with SOB for several days. Patient was brought in by EMS in respiratory distress, as his PA at home visits noted his SpO2 was in the 70s despite home O2. He was given IV Solumedrol and placed on CPAP by EMS. On arrival in the ED, patient was placed on BIPAP, given Duoneb tratment, and a dose of Levaquin. Patient notes that he normally has baseline SpO2 86-87% on 5L O2 at home. Patient also complains of left leg pain and weakness, along with pain in his left hip and lower back, s/p a mechanical trip and fall earlier this week. He denies head trauma or LOC. He normally walks at home independently but has been unable to ambulate for several days due to pain. He notes the pain worsens when lying down, making it difficult to sleep. Patient also reports occasional blurry vision sporadically for the past several months. He denies headache, dizziness, chest pain, abdominal pain, nausea, or vomiting.      Vital Signs Last 24 Hrs  T(C): 36.6 (01 Oct 2019 05:34), Max: 37.7 (30 Sep 2019 14:10)  T(F): 97.9 (01 Oct 2019 05:34), Max: 99.8 (30 Sep 2019 14:10)  HR: 120 (01 Oct 2019 05:34) (79 - 120)  BP: 112/70 (01 Oct 2019 05:34) (90/66 - 112/70)  BP(mean): --  RR: 18 (01 Oct 2019 05:34) (18 - 20)  SpO2: --      REVIEW OF SYSTEMS  Constitutional: No fever, fatigue or weight loss.  Skin: No rash.  HEENT: No rhinorrhea, headache, eye pain, sore throat, hearing changes, congestion, ear pain.  Cardiovascular: No chest pain, palpitations.  Respiratory: No SOB, congestion or wheezing.  Gastrointestinal: No abdominal pain, nausea, vomiting, or diarrhea.  Genitourinary: No dysuria.  Musculoskeletal: B/L shoulder stiffness. No back pain, hip pain.  Neurologic: No headache.      PHYSICAL EXAM:  GENERAL: NAD. Obese, chronic ill-appearing male.  HEAD:  Atraumatic, Normocephalic.  EYES: EOMI, PERRLA, conjunctiva and sclera clear.  NECK: Supple, No JVD,.  NERVOUS SYSTEM:  Alert & Oriented X3. Moving all extremities.  CHEST/LUNG: Minimal wheezing bilaterally. No retractions or accessory muscle usage.  HEART:  Regular rate and rhythm. No murmurs, rubs, or gallops.  ABDOMEN: Obese. Soft, Nontender. Bowel sounds present.  EXTREMITIES: B/L lower extremity pitting edema. No clubbing, cyanosis.  SKIN: No rashes or lesions.      LABS:                                        14.0   21.51 )-----------( 348      ( 01 Oct 2019 07:00 )             47.7     09-30    143  |  96<L>  |  88<HH>  ----------------------------<  194<H>  4.8   |  33<H>  |  2.2<H>    Ca    9.5      30 Sep 2019 06:56  Phos  5.5     10-01      CARDIAC MARKERS ( 25 Sep 2019 19:20 )  x     / 0.05 ng/mL / x     / x     / x      CARDIAC MARKERS ( 25 Sep 2019 11:30 )  x     / 0.07 ng/mL / x     / x     / x            RADIOLOGY RESULTS:    Xray Chest 1 View- PORTABLE-Routine (09.30.19 @ 07:16) >  Impression: Bibasilar opacities and cardiomegaly, stable.    Xray Shoulder 2 Views, Left (09.29.19 @ 22:21) >  Impression: No acute fracture or dislocation. Greater humeral tuberosity calcific peritendinitis. Shoulder and AC joint degenerative changes.    Xray Lumbar Spine AP + Lateral (09.27.19 @ 16:00) >  Impression: No evidence of acute fracture. Degenerative change, lumbar spine.    Xray Hip 1 View, Left (09.27.19 @ 16:00) >  IMPRESSION: No evidence of acute fracture. Degenerative change of left hip.    CT Abdomen and Pelvis No Cont (09.26.19 @ 12:12) >  IMPRESSION:  Patchy nodular opacities at the lung bases, nonspecific in nature. Follow to resolution is recommended.  No acute intra-abdominal or pelvic pathology.  Again seen is an indeterminate hyperdense exophytic left upper pole renal lesion, measuring approximately 4 cm.    CT Head No Cont (09.26.19 @ 11:47) >  IMPRESSION:   1.  No evidence of acute intracranial pathology.    2.  Moderate chronic microvascular changes.    VA Duplex Lower Ext Vein Scan, Bilat (09.26.19 @ 11:06) >  Impression:  No evidence of deep venous thrombosis or superficial thrombophlebitis in bilateral lower extremities.    US Kidney and Bladder (09.25.19 @ 22:52) >  IMPRESSION: No hydronephrosis. Urinary bladder postvoid residual of 170 cc.      MEDICATIONS  (STANDING):  acetaminophen   Tablet .. 975 milliGRAM(s) Oral every 8 hours  ALBUTerol    0.083% 2.5 milliGRAM(s) Nebulizer every 6 hours  ascorbic acid 500 milliGRAM(s) Oral daily  atorvastatin 20 milliGRAM(s) Oral at bedtime  BACItracin   Ointment 1 Application(s) Topical three times a day  buDESOnide 160 MICROgram(s)/formoterol 4.5 MICROgram(s) Inhaler 2 Puff(s) Inhalation two times a day  carbamide peroxide Otic Solution 1 Drop(s) Both Ears two times a day  cholecalciferol 1000 Unit(s) Oral daily  dextrose 5%. 1000 milliLiter(s) (50 mL/Hr) IV Continuous <Continuous>  dextrose 50% Injectable 12.5 Gram(s) IV Push once  dextrose 50% Injectable 25 Gram(s) IV Push once  dextrose 50% Injectable 25 Gram(s) IV Push once  docusate sodium 100 milliGRAM(s) Oral three times a day  finasteride 5 milliGRAM(s) Oral daily  furosemide    Tablet 40 milliGRAM(s) Oral two times a day  heparin  Injectable 5000 Unit(s) SubCutaneous three times a day  influenza   Vaccine 0.5 milliLiter(s) IntraMuscular once  insulin glargine Injectable (LANTUS) 30 Unit(s) SubCutaneous every morning  insulin lispro (HumaLOG) corrective regimen sliding scale   SubCutaneous three times a day before meals  insulin lispro Injectable (HumaLOG) 4 Unit(s) SubCutaneous three times a day before meals  levoFLOXacin  Tablet 250 milliGRAM(s) Oral every 24 hours  lidocaine   Patch 2 Patch Transdermal daily  lidocaine   Patch 1 Patch Transdermal daily  magnesium oxide 400 milliGRAM(s) Oral three times a day with meals  methocarbamol 750 milliGRAM(s) Oral four times a day  metoprolol tartrate 25 milliGRAM(s) Oral two times a day  nystatin    Suspension [id number] Unit(s) Oral three times a day  pantoprazole    Tablet 40 milliGRAM(s) Oral before breakfast  predniSONE   Tablet 20 milliGRAM(s) Oral two times a day  tamsulosin 0.4 milliGRAM(s) Oral at bedtime  tiotropium 18 MICROgram(s) Capsule 1 Capsule(s) Inhalation daily    MEDICATIONS  (PRN):  dextrose 40% Gel 15 Gram(s) Oral once PRN Blood Glucose LESS THAN 70 milliGRAM(s)/deciliter  glucagon  Injectable 1 milliGRAM(s) IntraMuscular once PRN Glucose LESS THAN 70 milligrams/deciliter  hydrOXYzine hydrochloride 25 milliGRAM(s) Oral at bedtime PRN Insomnia  oxyCODONE    IR 10 milliGRAM(s) Oral every 6 hours PRN Severe Pain (7 - 10)

## 2019-10-01 NOTE — PROGRESS NOTE ADULT - ASSESSMENT
Patient is a 77 y/o male with PMH of COPD, HTN, CHF, pulmonary HTN, DM2, CKD stage 3, BPH who presents with SOB and left leg pain for several days.    1. Chronic respiratory failure 2/2 COPD vs CHF   -CXR reveals no acute disease; stable bibasilar opacities and cardiomegaly.  -Pulmonary is following patient. Per pulmonary, he is at baseline for his lung function.  -Patient is currently saturating ~80% on 5L NC, will likely need to restart high flow O2.   -Continue with CPAP, Duonebs/home inhaler, Lasix 40mg PO BID, Levaquin 250mg PO qd, Prednisone 20mg PO BID, Nystatin oral rinse.  -Leukocytosis due to Prednisone, patient afebrile.   -Doppler B/L LE is negative for DVT.    2. Elevated troponin  -EKG shows no ischemic changes.  -Asymptomatic. Likely due to right-sided CHF and JAMES on CKD.  -Per Cardiology: No MI. His troponin is chronically mildly elevated. Continue meds.     3. Pain in left shoulder, hip, back s/p mechanical fall - improving  -X-ray of left hip, left shoulder, and lumbar spine reveal no acute fracture, degenerative changes.  -CT Abdomen and Pelvis: no acute intra-abdominal or pelvic pathology. Patchy nodular opacities at the lung bases.  -Pain Medicine recs: Methocarbamol q6h; D/C morphine; Oxycodone Q4h PRN; Warm compress to bilat shoulders, left hip, left knee; Lidoderm patch to left knee 12hrs on/off.    4. Blurry vision  -Patient denies head trauma during fall. AAO x3, neurologically intact.   -Possibly due to cataract vs steroid use.  -Head CT: no acute intracranial pathology.    5. DM2  -Continue with home dose of Lantus and ISS.  -Monitor POC glucose.  -Carb consistent diet.    6.  HTN  -Amlodipine was d/c per Nephro.  -Continue Metoprolol.    7.  JAMES on CKD 3  -Renal US shows no hydronephrosis. Postvoid residual volume 170cc. No need for Velazquez.  -Nephro recs: Phos and iPTH pending. Dietary consult placed to provide K content on all foods.  -Avoid nephrotoxins.    8.  Hyperkalemia 2/2 CKD - resolved  -Resolved following Kayexalate.  -Nephro recs: 2g K diet, Kayexalate 30g 2-3x/week, Lasix 40mg BID po.     9. Metabolic Alkalosis  -Nephro recs: Bicarb is stable at 33. If it worsens, check ABG and if pH>7.5 then give Diamox 250 q12 for 2 doses. R/o CO2 retention.    10. BPH  -Continue home meds.    11. Difficulty ambulating  -PT consult placed.    Prophylaxis:  -Hep subq.  -Protonix.    -Grave prognosis. Discussed with patient about end of life care. Patient is DNR/DNI. Patient refusing hospice at this time. Likely needs rehab.      Patient was interviewed and examined by Pratima Encinas, medical student. This is a preliminary report pending discussion with hospitalist, Dr. Yoni Mcclure.

## 2019-10-01 NOTE — PROGRESS NOTE ADULT - ASSESSMENT
Pt with CKD stage 4 and chronic hyperkalemia p/w SOB and leg pain.    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

## 2019-10-01 NOTE — PROGRESS NOTE ADULT - SUBJECTIVE AND OBJECTIVE BOX
CC.  SOB/Leg pain  HPI.  Patient  reports that he feels better.  offers no other complaints  unable to wean to nasal canule.  require high flow at all time.      cannot tolerate 30-40% facial mask        Denies any abdominal pain, N/V/D, numbness weakness, headache, dizziness, CP, palpitation               Constitutional: No fever, fatigue or weight loss.  Skin: No rash.  Eyes: No recent vision problems or eye pain.  ENT: No congestion, ear pain, or sore throat.  Endocrine: No thyroid problems.  Cardiovascular: No chest pain or palpation.  Respiratory: No congestion, or wheezing.  Gastrointestinal: No abdominal pain, nausea, vomiting, or diarrhea.  Genitourinary: No dysuria.  Musculoskeletal: No joint swelling.  Neurologic: No headache.    Vital Signs Last 24 Hrs  T(C): 36.9 (01 Oct 2019 13:49), Max: 36.9 (01 Oct 2019 13:49)  T(F): 98.5 (01 Oct 2019 13:49), Max: 98.5 (01 Oct 2019 13:49)  HR: 96 (01 Oct 2019 17:21) (94 - 120)  BP: 116/62 (01 Oct 2019 17:21) (94/50 - 116/62)  BP(mean): --  RR: 16 (01 Oct 2019 13:49) (16 - 18)  SpO2: 80% (01 Oct 2019 17:21) (78% - 84%)      PHYSICAL EXAM-  GENERAL: NAD,  chronic ill appearing male  HEAD:  Atraumatic, Normocephalic  EYES: EOMI, PERRLA, conjunctiva and sclera clear  NECK: Supple, No JVD, Normal thyroid  NERVOUS SYSTEM:  Alert & Oriented X1 lethargic, but improved now. Moving all extremities  no focal neurological def  CHEST/LUNG: Min wheezing with good air entry.  No retractions or accessory muscle usage  HEART: Regular rate and rhythm; No murmurs, rubs, or gallops  ABDOMEN: Soft, Nontender, Nondistended; Bowel sounds present  EXTREMITIES:  No clubbing, cyanosis, or edema  SKIN: No rashes or lesions                              14.0   21.51 )-----------( 348      ( 01 Oct 2019 07:00 )             47.7     10-01    142  |  93<L>  |  92<HH>  ----------------------------<  156<H>  5.3<H>   |  35<H>  |  2.4<H>    Ca    9.6      01 Oct 2019 07:00  Phos  5.5     10-01              MEDICATIONS  (STANDING):  acetaminophen   Tablet .. 975 milliGRAM(s) Oral every 8 hours  ALBUTerol    0.083% 2.5 milliGRAM(s) Nebulizer every 6 hours  ascorbic acid 500 milliGRAM(s) Oral daily  atorvastatin 20 milliGRAM(s) Oral at bedtime  BACItracin   Ointment 1 Application(s) Topical three times a day  buDESOnide 160 MICROgram(s)/formoterol 4.5 MICROgram(s) Inhaler 2 Puff(s) Inhalation two times a day  carbamide peroxide Otic Solution 1 Drop(s) Both Ears two times a day  cholecalciferol 1000 Unit(s) Oral daily  dextrose 5%. 1000 milliLiter(s) (50 mL/Hr) IV Continuous <Continuous>  dextrose 50% Injectable 12.5 Gram(s) IV Push once  dextrose 50% Injectable 25 Gram(s) IV Push once  dextrose 50% Injectable 25 Gram(s) IV Push once  docusate sodium 100 milliGRAM(s) Oral three times a day  finasteride 5 milliGRAM(s) Oral daily  furosemide    Tablet 40 milliGRAM(s) Oral two times a day  heparin  Injectable 5000 Unit(s) SubCutaneous three times a day  influenza   Vaccine 0.5 milliLiter(s) IntraMuscular once  insulin glargine Injectable (LANTUS) 30 Unit(s) SubCutaneous every morning  insulin lispro (HumaLOG) corrective regimen sliding scale   SubCutaneous three times a day before meals  insulin lispro Injectable (HumaLOG) 4 Unit(s) SubCutaneous three times a day before meals  levoFLOXacin  Tablet 250 milliGRAM(s) Oral every 24 hours  lidocaine   Patch 2 Patch Transdermal daily  lidocaine   Patch 1 Patch Transdermal daily  magnesium oxide 400 milliGRAM(s) Oral three times a day with meals  methocarbamol 750 milliGRAM(s) Oral four times a day  metoprolol tartrate 25 milliGRAM(s) Oral two times a day  nystatin    Suspension 954268 Unit(s) Oral three times a day  pantoprazole    Tablet 40 milliGRAM(s) Oral before breakfast  predniSONE   Tablet 20 milliGRAM(s) Oral two times a day  tamsulosin 0.4 milliGRAM(s) Oral at bedtime  tiotropium 18 MICROgram(s) Capsule 1 Capsule(s) Inhalation daily    MEDICATIONS  (PRN):  dextrose 40% Gel 15 Gram(s) Oral once PRN Blood Glucose LESS THAN 70 milliGRAM(s)/deciliter  glucagon  Injectable 1 milliGRAM(s) IntraMuscular once PRN Glucose LESS THAN 70 milligrams/deciliter  hydrOXYzine hydrochloride 25 milliGRAM(s) Oral at bedtime PRN Insomnia  oxyCODONE    IR 10 milliGRAM(s) Oral every 6 hours PRN Severe Pain (7 - 10)

## 2019-10-01 NOTE — PROGRESS NOTE ADULT - SUBJECTIVE AND OBJECTIVE BOX
Patient is a 78y old  Male who presents with a chief complaint of SOB and leg pain (01 Oct 2019 10:16)        SUBJECTIVE: no complaints      REVIEW OF SYSTEMS:  See HPI    PHYSICAL EXAM  Vital Signs Last 24 Hrs  T(C): 36.6 (01 Oct 2019 05:34), Max: 37.7 (30 Sep 2019 14:10)  T(F): 97.9 (01 Oct 2019 05:34), Max: 99.8 (30 Sep 2019 14:10)  HR: 120 (01 Oct 2019 05:34) (79 - 120)  BP: 112/70 (01 Oct 2019 05:34) (90/66 - 112/70)  BP(mean): --  RR: 18 (01 Oct 2019 05:34) (18 - 20)  SpO2: 81% on 6Lnc(01 Oct 2019 10:25) (78% - 78%)    General: looks chronically ill  HEENT: MIS               Lymphatic system: No Cervical LN    Respiratory: Honorio BS decreased  Cardiovascular: Regular  Gastrointestinal: Soft. + BS  Musculoskeletal:+ve LE edema  Skin: Warm.  Intact  Neurological: AAOx3        LABS:                          14.0   21.51 )-----------( 348      ( 01 Oct 2019 07:00 )             47.7                                               10-01    142  |  93<L>  |  92<HH>  ----------------------------<  156<H>  5.3<H>   |  35<H>  |  2.4<H>    Ca    9.6      01 Oct 2019 07:00  Phos  5.5     10-01                                                                                                                                                                                    MEDICATIONS  (STANDING):  acetaminophen   Tablet .. 975 milliGRAM(s) Oral every 8 hours  ALBUTerol    0.083% 2.5 milliGRAM(s) Nebulizer every 6 hours  ascorbic acid 500 milliGRAM(s) Oral daily  atorvastatin 20 milliGRAM(s) Oral at bedtime  BACItracin   Ointment 1 Application(s) Topical three times a day  buDESOnide 160 MICROgram(s)/formoterol 4.5 MICROgram(s) Inhaler 2 Puff(s) Inhalation two times a day  carbamide peroxide Otic Solution 1 Drop(s) Both Ears two times a day  cholecalciferol 1000 Unit(s) Oral daily  dextrose 5%. 1000 milliLiter(s) (50 mL/Hr) IV Continuous <Continuous>  dextrose 50% Injectable 12.5 Gram(s) IV Push once  dextrose 50% Injectable 25 Gram(s) IV Push once  dextrose 50% Injectable 25 Gram(s) IV Push once  docusate sodium 100 milliGRAM(s) Oral three times a day  finasteride 5 milliGRAM(s) Oral daily  furosemide    Tablet 40 milliGRAM(s) Oral two times a day  heparin  Injectable 5000 Unit(s) SubCutaneous three times a day  influenza   Vaccine 0.5 milliLiter(s) IntraMuscular once  insulin glargine Injectable (LANTUS) 30 Unit(s) SubCutaneous every morning  insulin lispro (HumaLOG) corrective regimen sliding scale   SubCutaneous three times a day before meals  insulin lispro Injectable (HumaLOG) 4 Unit(s) SubCutaneous three times a day before meals  levoFLOXacin  Tablet 250 milliGRAM(s) Oral every 24 hours  lidocaine   Patch 2 Patch Transdermal daily  lidocaine   Patch 1 Patch Transdermal daily  magnesium oxide 400 milliGRAM(s) Oral three times a day with meals  methocarbamol 750 milliGRAM(s) Oral four times a day  metoprolol tartrate 25 milliGRAM(s) Oral two times a day  nystatin    Suspension [id number] Unit(s) Oral three times a day  pantoprazole    Tablet 40 milliGRAM(s) Oral before breakfast  predniSONE   Tablet 20 milliGRAM(s) Oral two times a day  tamsulosin 0.4 milliGRAM(s) Oral at bedtime  tiotropium 18 MICROgram(s) Capsule 1 Capsule(s) Inhalation daily    MEDICATIONS  (PRN):  dextrose 40% Gel 15 Gram(s) Oral once PRN Blood Glucose LESS THAN 70 milliGRAM(s)/deciliter  glucagon  Injectable 1 milliGRAM(s) IntraMuscular once PRN Glucose LESS THAN 70 milligrams/deciliter  hydrOXYzine hydrochloride 25 milliGRAM(s) Oral at bedtime PRN Insomnia  oxyCODONE    IR 10 milliGRAM(s) Oral every 6 hours PRN Severe Pain (7 - 10)

## 2019-10-01 NOTE — CHART NOTE - NSCHARTNOTEFT_GEN_A_CORE
Patient seen at bedside - resting comfortably.    Informed patient of issue decreasing his O2 down from high flow. Pt inquired about other treatments for SOB - explained other treatment options. Pt understands current need for high flow O2.    Patient without questions or concerns at this time.     Patient encouraged to contact PA with any further questions or concerns.

## 2019-10-01 NOTE — PROGRESS NOTE ADULT - ASSESSMENT
Patient is an 79yo M w/ PMH COPD, HTN, CHF, and DMt2 p/w SOB and left leg pain x few days. Patient states that he came in 2/2 to leg pain, but pt was seen by PA at home visits who likely advised patient to call EMS 2/2 SpO2 in 70s despite home O2.    1. Acute on Chronic Respiratory failure   -CXR shows no acute process  -Continue with neb tx, Oxygen, Po steriod, po lasix, and Po antibiotic.  High flow, BIPAP.  Attempt to switch to nasal canule  -Doppler US of LE negative  -Keep SpO2 between 88to 90% discussed with respiratory  -Pulmonary to follow up     2. Abnormal trop  -EKG shows no ischemic changes  -Asymptomatic  -Probably due to JAMES on CKD, and current respiratory status  -Trop level flat  -Cardiology to follow up    3.  Left hip/Leg pain  -S/P fall  -X-ray of the hip, and lumbar spine are negative  -pain medications  -PT consult    4. Blurry vision  -Possible due to cataract, hypoxemia, and possible steroid  -Head CT scan negative  -neurologically intact  -Resolved  -neuro check    5. DM2  -Continue with home dose of lantus, and ISS  -Monitor POC      6.  HTN  -Continue with home medications, and Monitor BP    7.  JAMES on CKD  -improving  -renal US shows no acute process.  PVR volume low  -monitor renal function  -Avoid nephrotoxin  -Nephrology to follow up    8.  Hyperkalemia  -Kayexalate  3x a week.  Resolved  -Nephrology to follow up       9.  left renal mass  -Outpatient follow with urology  -Discussed the possibility of malignancy with patient    10.  AMS  -Appears to be secondary to respiratory status and metabolic encephalopathy   -Treat underline condition  -Offered to repeat head CT scan given his recent fall, but patient refused.  risks, benefits, and alternative discussed  -Continue with neuro check  -Resolved.  back to baseline    patient opted for DNR/DNI after long discussion  overall prognosis. poor  Need rehab.  prefers [facility]  Offered family hospice, but patient is not ready now    Plan of care  and prognosis were discussed with patient and Son/HCP  in great details, All questions were answered to their satisfaction  Seems to understand, and in agreement Patient is an 79yo M w/ PMH COPD, HTN, CHF, and DMt2 p/w SOB and left leg pain x few days. Patient states that he came in 2/2 to leg pain, but pt was seen by PA at home visits who likely advised patient to call EMS 2/2 SpO2 in 70s despite home O2.    1. Acute on Chronic Respiratory failure   -CXR shows no acute process  -Continue with neb tx, Oxygen, Po steriod, po lasix, and Po antibiotic.  High flow, BIPAP.  Attempt to switch to nasal canule  -Doppler US of LE negative  -Keep SpO2 between 88to 90% discussed with respiratory  -Pulmonary to follow up     2. Abnormal trop  -EKG shows no ischemic changes  -Asymptomatic  -Probably due to JAMES on CKD, and current respiratory status  -Trop level flat  -Cardiology to follow up    3.  Left hip/Leg pain  -S/P fall  -X-ray of the hip, and lumbar spine are negative  -pain medications  -PT consult    4. Blurry vision  -Possible due to cataract, hypoxemia, and possible steroid  -Head CT scan negative  -neurologically intact  -Resolved  -neuro check    5. DM2  -Continue with home dose of lantus, and ISS  -Monitor POC      6.  HTN  -Continue with home medications, and Monitor BP    7.  JAMES on CKD  -improving  -renal US shows no acute process.  PVR volume low  -monitor renal function  -Avoid nephrotoxin  -Nephrology to follow up    8.  Hyperkalemia  -Kayexalate  3x a week.  Resolved  -Nephrology to follow up       9.  left renal mass  -Outpatient follow with urology  -Discussed the possibility of malignancy with patient    10.  AMS  -Appears to be secondary to respiratory status and metabolic encephalopathy   -Treat underline condition  -Offered to repeat head CT scan given his recent fall, but patient refused.  risks, benefits, and alternative discussed  -Continue with neuro check  -Resolved.  back to baseline    patient opted for DNR/DNI after long discussion  overall prognosis. poor  Need rehab.  [facility]  [facility]  Discussed with   Offered family hospice, but patient is not ready now    Plan of care  and prognosis were discussed with patient and Son/HCP  in great details, All questions were answered to their satisfaction  Seems to understand, and in agreement

## 2019-10-01 NOTE — PROGRESS NOTE ADULT - ASSESSMENT
Impression:  acute on chronic respiratory failure  CRF hypoxic and hypercapnia   copd stable   Heart failure        Plan:  continue copd treatment  cardiac optimization need lasix IV  follow renal consult    bipap at night and prn   keep on oxygen to keep pox 88-92%  DVt ppx  OOB to chair

## 2019-10-02 LAB
BUN SERPL-MCNC: 103 MG/DL — CRITICAL HIGH (ref 10–20)
CALCIUM SERPL-MCNC: 9.2 MG/DL — SIGNIFICANT CHANGE UP (ref 8.5–10.1)
CHLORIDE SERPL-SCNC: 90 MMOL/L — LOW (ref 98–110)
CO2 SERPL-SCNC: 36 MMOL/L — HIGH (ref 17–32)
CREAT SERPL-MCNC: 2.6 MG/DL — HIGH (ref 0.7–1.5)
GLUCOSE BLDC GLUCOMTR-MCNC: 207 MG/DL — HIGH (ref 70–99)
GLUCOSE BLDC GLUCOMTR-MCNC: 239 MG/DL — HIGH (ref 70–99)
GLUCOSE BLDC GLUCOMTR-MCNC: 241 MG/DL — HIGH (ref 70–99)
GLUCOSE BLDC GLUCOMTR-MCNC: 245 MG/DL — HIGH (ref 70–99)
GLUCOSE BLDC GLUCOMTR-MCNC: 267 MG/DL — HIGH (ref 70–99)
GLUCOSE SERPL-MCNC: 300 MG/DL — HIGH (ref 70–99)
HCT VFR BLD CALC: 45.9 % — SIGNIFICANT CHANGE UP (ref 42–52)
HGB BLD-MCNC: 13.4 G/DL — LOW (ref 14–18)
MCHC RBC-ENTMCNC: 23 PG — LOW (ref 27–31)
MCHC RBC-ENTMCNC: 29.2 G/DL — LOW (ref 32–37)
MCV RBC AUTO: 78.7 FL — LOW (ref 80–94)
NRBC # BLD: 0 /100 WBCS — SIGNIFICANT CHANGE UP (ref 0–0)
PLATELET # BLD AUTO: 379 K/UL — SIGNIFICANT CHANGE UP (ref 130–400)
POTASSIUM SERPL-MCNC: 5.5 MMOL/L — HIGH (ref 3.5–5)
POTASSIUM SERPL-SCNC: 5.5 MMOL/L — HIGH (ref 3.5–5)
RBC # BLD: 5.83 M/UL — SIGNIFICANT CHANGE UP (ref 4.7–6.1)
RBC # FLD: 19.9 % — HIGH (ref 11.5–14.5)
SODIUM SERPL-SCNC: 139 MMOL/L — SIGNIFICANT CHANGE UP (ref 135–146)
WBC # BLD: 21.58 K/UL — HIGH (ref 4.8–10.8)
WBC # FLD AUTO: 21.58 K/UL — HIGH (ref 4.8–10.8)

## 2019-10-02 PROCEDURE — 99233 SBSQ HOSP IP/OBS HIGH 50: CPT

## 2019-10-02 RX ORDER — SODIUM POLYSTYRENE SULFONATE 4.1 MEQ/G
30 POWDER, FOR SUSPENSION ORAL DAILY
Refills: 0 | Status: DISCONTINUED | OUTPATIENT
Start: 2019-10-02 | End: 2019-10-08

## 2019-10-02 RX ADMIN — TIOTROPIUM BROMIDE 1 CAPSULE(S): 18 CAPSULE ORAL; RESPIRATORY (INHALATION) at 08:21

## 2019-10-02 RX ADMIN — SODIUM POLYSTYRENE SULFONATE 30 GRAM(S): 4.1 POWDER, FOR SUSPENSION ORAL at 17:41

## 2019-10-02 RX ADMIN — MAGNESIUM OXIDE 400 MG ORAL TABLET 400 MILLIGRAM(S): 241.3 TABLET ORAL at 09:04

## 2019-10-02 RX ADMIN — Medication 4: at 09:02

## 2019-10-02 RX ADMIN — LIDOCAINE 2 PATCH: 4 CREAM TOPICAL at 19:14

## 2019-10-02 RX ADMIN — MAGNESIUM OXIDE 400 MG ORAL TABLET 400 MILLIGRAM(S): 241.3 TABLET ORAL at 17:42

## 2019-10-02 RX ADMIN — TAMSULOSIN HYDROCHLORIDE 0.4 MILLIGRAM(S): 0.4 CAPSULE ORAL at 22:04

## 2019-10-02 RX ADMIN — ALBUTEROL 2.5 MILLIGRAM(S): 90 AEROSOL, METERED ORAL at 14:00

## 2019-10-02 RX ADMIN — CARBAMIDE PEROXIDE 1 DROP(S): 81.86 SOLUTION/ DROPS AURICULAR (OTIC) at 06:34

## 2019-10-02 RX ADMIN — Medication 1 APPLICATION(S): at 12:41

## 2019-10-02 RX ADMIN — OXYCODONE HYDROCHLORIDE 10 MILLIGRAM(S): 5 TABLET ORAL at 06:31

## 2019-10-02 RX ADMIN — Medication 500000 UNIT(S): at 12:41

## 2019-10-02 RX ADMIN — Medication 25 MILLIGRAM(S): at 17:42

## 2019-10-02 RX ADMIN — LIDOCAINE 2 PATCH: 4 CREAM TOPICAL at 12:39

## 2019-10-02 RX ADMIN — Medication 40 MILLIGRAM(S): at 17:41

## 2019-10-02 RX ADMIN — Medication 20 MILLIGRAM(S): at 06:37

## 2019-10-02 RX ADMIN — HEPARIN SODIUM 5000 UNIT(S): 5000 INJECTION INTRAVENOUS; SUBCUTANEOUS at 12:41

## 2019-10-02 RX ADMIN — Medication 4: at 11:23

## 2019-10-02 RX ADMIN — Medication 500000 UNIT(S): at 22:05

## 2019-10-02 RX ADMIN — Medication 25 MILLIGRAM(S): at 06:37

## 2019-10-02 RX ADMIN — BUDESONIDE AND FORMOTEROL FUMARATE DIHYDRATE 2 PUFF(S): 160; 4.5 AEROSOL RESPIRATORY (INHALATION) at 22:06

## 2019-10-02 RX ADMIN — Medication 975 MILLIGRAM(S): at 12:58

## 2019-10-02 RX ADMIN — ATORVASTATIN CALCIUM 20 MILLIGRAM(S): 80 TABLET, FILM COATED ORAL at 22:04

## 2019-10-02 RX ADMIN — INSULIN GLARGINE 30 UNIT(S): 100 INJECTION, SOLUTION SUBCUTANEOUS at 09:04

## 2019-10-02 RX ADMIN — Medication 500 MILLIGRAM(S): at 12:40

## 2019-10-02 RX ADMIN — OXYCODONE HYDROCHLORIDE 10 MILLIGRAM(S): 5 TABLET ORAL at 17:44

## 2019-10-02 RX ADMIN — HEPARIN SODIUM 5000 UNIT(S): 5000 INJECTION INTRAVENOUS; SUBCUTANEOUS at 22:05

## 2019-10-02 RX ADMIN — FINASTERIDE 5 MILLIGRAM(S): 5 TABLET, FILM COATED ORAL at 12:40

## 2019-10-02 RX ADMIN — METHOCARBAMOL 750 MILLIGRAM(S): 500 TABLET, FILM COATED ORAL at 12:40

## 2019-10-02 RX ADMIN — Medication 100 MILLIGRAM(S): at 06:32

## 2019-10-02 RX ADMIN — BUDESONIDE AND FORMOTEROL FUMARATE DIHYDRATE 2 PUFF(S): 160; 4.5 AEROSOL RESPIRATORY (INHALATION) at 08:21

## 2019-10-02 RX ADMIN — Medication 20 MILLIGRAM(S): at 17:42

## 2019-10-02 RX ADMIN — Medication 1000 UNIT(S): at 12:40

## 2019-10-02 RX ADMIN — Medication 4 UNIT(S): at 11:24

## 2019-10-02 RX ADMIN — Medication 4: at 17:39

## 2019-10-02 RX ADMIN — Medication 1 APPLICATION(S): at 22:05

## 2019-10-02 RX ADMIN — HEPARIN SODIUM 5000 UNIT(S): 5000 INJECTION INTRAVENOUS; SUBCUTANEOUS at 06:33

## 2019-10-02 RX ADMIN — Medication 975 MILLIGRAM(S): at 12:40

## 2019-10-02 RX ADMIN — LIDOCAINE 1 PATCH: 4 CREAM TOPICAL at 19:14

## 2019-10-02 RX ADMIN — PANTOPRAZOLE SODIUM 40 MILLIGRAM(S): 20 TABLET, DELAYED RELEASE ORAL at 06:32

## 2019-10-02 RX ADMIN — METHOCARBAMOL 750 MILLIGRAM(S): 500 TABLET, FILM COATED ORAL at 06:36

## 2019-10-02 RX ADMIN — Medication 100 MILLIGRAM(S): at 22:04

## 2019-10-02 RX ADMIN — Medication 975 MILLIGRAM(S): at 22:00

## 2019-10-02 RX ADMIN — Medication 500000 UNIT(S): at 06:32

## 2019-10-02 RX ADMIN — Medication 4 UNIT(S): at 17:40

## 2019-10-02 RX ADMIN — METHOCARBAMOL 750 MILLIGRAM(S): 500 TABLET, FILM COATED ORAL at 17:41

## 2019-10-02 RX ADMIN — ALBUTEROL 2.5 MILLIGRAM(S): 90 AEROSOL, METERED ORAL at 08:20

## 2019-10-02 RX ADMIN — Medication 1 APPLICATION(S): at 06:49

## 2019-10-02 RX ADMIN — MAGNESIUM OXIDE 400 MG ORAL TABLET 400 MILLIGRAM(S): 241.3 TABLET ORAL at 12:40

## 2019-10-02 RX ADMIN — CARBAMIDE PEROXIDE 1 DROP(S): 81.86 SOLUTION/ DROPS AURICULAR (OTIC) at 17:42

## 2019-10-02 RX ADMIN — Medication 100 MILLIGRAM(S): at 12:41

## 2019-10-02 RX ADMIN — Medication 4 UNIT(S): at 09:02

## 2019-10-02 RX ADMIN — Medication 975 MILLIGRAM(S): at 22:04

## 2019-10-02 RX ADMIN — Medication 40 MILLIGRAM(S): at 06:32

## 2019-10-02 RX ADMIN — LIDOCAINE 1 PATCH: 4 CREAM TOPICAL at 12:39

## 2019-10-02 NOTE — PROGRESS NOTE ADULT - SUBJECTIVE AND OBJECTIVE BOX
Patient is a 78y old  Male who presents with a chief complaint of SOB and leg pain (02 Oct 2019 08:01)        SUBJECTIVE:      REVIEW OF SYSTEMS:  See HPI    PHYSICAL EXAM  Vital Signs Last 24 Hrs  T(C): 36.8 (02 Oct 2019 06:47), Max: 37.9 (01 Oct 2019 21:45)  T(F): 98.3 (02 Oct 2019 06:47), Max: 100.3 (01 Oct 2019 21:45)  HR: 105 (02 Oct 2019 06:47) (96 - 114)  BP: 102/70 (02 Oct 2019 06:47) (102/70 - 126/67)  BP(mean): --  RR: 16 (02 Oct 2019 06:47) (16 - 16)  SpO2: 83 % on 40L 50% HFNC(01 Oct 2019 17:21) (78% - 84%)    General: looks chronically ill  HEENT: MIS               Lymphatic system: No Cervical LN    Respiratory: decreased BS  Cardiovascular: Regular  Gastrointestinal: distended but soft. +BS  Musculoskeletal: +ve LE edema    Skin: Warm.  Intact  Neurological: AAox3      10-01-19 @ 07:01  -  10-02-19 @ 07:00  --------------------------------------------------------  IN:    Oral Fluid: 300 mL  Total IN: 300 mL    OUT:    Voided: 650 mL  Total OUT: 650 mL    Total NET: -350 mL          LABS:                          13.4   21.58 )-----------( 379      ( 02 Oct 2019 06:32 )             45.9                                               10-01    142  |  93<L>  |  92<HH>  ----------------------------<  156<H>  5.3<H>   |  35<H>  |  2.4<H>    Ca    9.6      01 Oct 2019 07:00  Phos  5.5     10-01                                                                                                                                                                                    MEDICATIONS  (STANDING):  acetaminophen   Tablet .. 975 milliGRAM(s) Oral every 8 hours  ALBUTerol    0.083% 2.5 milliGRAM(s) Nebulizer every 6 hours  ascorbic acid 500 milliGRAM(s) Oral daily  atorvastatin 20 milliGRAM(s) Oral at bedtime  BACItracin   Ointment 1 Application(s) Topical three times a day  buDESOnide 160 MICROgram(s)/formoterol 4.5 MICROgram(s) Inhaler 2 Puff(s) Inhalation two times a day  carbamide peroxide Otic Solution 1 Drop(s) Both Ears two times a day  cholecalciferol 1000 Unit(s) Oral daily  dextrose 5%. 1000 milliLiter(s) (50 mL/Hr) IV Continuous <Continuous>  dextrose 50% Injectable 12.5 Gram(s) IV Push once  dextrose 50% Injectable 25 Gram(s) IV Push once  dextrose 50% Injectable 25 Gram(s) IV Push once  docusate sodium 100 milliGRAM(s) Oral three times a day  finasteride 5 milliGRAM(s) Oral daily  furosemide    Tablet 40 milliGRAM(s) Oral two times a day  heparin  Injectable 5000 Unit(s) SubCutaneous three times a day  influenza   Vaccine 0.5 milliLiter(s) IntraMuscular once  insulin glargine Injectable (LANTUS) 30 Unit(s) SubCutaneous every morning  insulin lispro (HumaLOG) corrective regimen sliding scale   SubCutaneous three times a day before meals  insulin lispro Injectable (HumaLOG) 4 Unit(s) SubCutaneous three times a day before meals  levoFLOXacin  Tablet 250 milliGRAM(s) Oral every 24 hours  lidocaine   Patch 2 Patch Transdermal daily  lidocaine   Patch 1 Patch Transdermal daily  magnesium oxide 400 milliGRAM(s) Oral three times a day with meals  methocarbamol 750 milliGRAM(s) Oral four times a day  metoprolol tartrate 25 milliGRAM(s) Oral two times a day  nystatin    Suspension [id number] Unit(s) Oral three times a day  pantoprazole    Tablet 40 milliGRAM(s) Oral before breakfast  predniSONE   Tablet 20 milliGRAM(s) Oral two times a day  tamsulosin 0.4 milliGRAM(s) Oral at bedtime  tiotropium 18 MICROgram(s) Capsule 1 Capsule(s) Inhalation daily    MEDICATIONS  (PRN):  dextrose 40% Gel 15 Gram(s) Oral once PRN Blood Glucose LESS THAN 70 milliGRAM(s)/deciliter  glucagon  Injectable 1 milliGRAM(s) IntraMuscular once PRN Glucose LESS THAN 70 milligrams/deciliter  hydrOXYzine hydrochloride 25 milliGRAM(s) Oral at bedtime PRN Insomnia  oxyCODONE    IR 10 milliGRAM(s) Oral every 6 hours PRN Severe Pain (7 - 10)

## 2019-10-02 NOTE — DIETITIAN INITIAL EVALUATION ADULT. - ENERGY NEEDS
kcal: 5143-0097 (MSJ x 1-1.1 AF) Obese  protein: 53-71 g (.75-1 g/kg IBW) BMI + CKD-3 considered- aim low  fluid: 1mL/kcal or per LIP

## 2019-10-02 NOTE — DIETITIAN INITIAL EVALUATION ADULT. - RD TO REMAIN AVAILABLE
INTERVENTION: meals and snacks, coordination of care. ME: RD to monitor diet order, energy intake, body composition, NFPF, glucose profile, renal/electrolyte profile/yes

## 2019-10-02 NOTE — PROGRESS NOTE ADULT - ASSESSMENT
Impression:  acute on chronic respiratory failure  CRF hypoxic and hypercapnia   copd stable   Heart failure        Plan:  continue symbicort, spiriva, nebsq3-4hrs, O2, prednsione, levaquin to complete 7 days course.  cardiac optimization need lasix IV (avoid overdiuresis)   bipap at night and prn, HFNC prn   Oxygen to keep pox 88-92%  DVt ppx  OOB to chair

## 2019-10-02 NOTE — DIETITIAN INITIAL EVALUATION ADULT. - PERTINENT MEDS FT
heparin, levaquin, humalog, lantus, prednisone, lasix, vitamin C, lipitor, vitamin D3, colace, proscar, mag-ox, lopressor, protonix, flomax

## 2019-10-02 NOTE — DIETITIAN INITIAL EVALUATION ADULT. - OTHER INFO
Pt admitted d/t acute on chronic respiratory failure. Skin assessment on 10/2 shows R forearm skin tear otherwise skin is intact. Pt has +2 generalized edema. Pt requires high flow O2, d/t inability to tolerate NC per EMR. Per EMR, this pt is a candidate for hospice care, but is currently refusing hospice at this time- pt changed to DNR/I status during this admission. Pt reports that he hasn't been eating well over the past few days d/t decreased appetite. Pt reports that he was eating better when he first got to the hospital and pta. Per EMR, po intake is recorded %, however pt has slept through a few meals. Pt denies chewing/swallowing difficulty. Pt denies nausea/abdominal pain. Pt has fish/shellfish allergy per EMR. Pt used to take vitamin C and MVI pta, but reports that he hasn't taken either in a while. Pt unsure of his last BM and reports constipation. Per EMR, BM x3 recorded on 9/27. Pt is currently on bowel regimen. Pt reports wt gain since admission. Pt reports a UBW of 240 lbs, unsure of time frame of gain. Wt gain d/t edema/poss fluid retention. CBW is 265.5 lbs. Ht not recorded in EMR, pt reports ht of 68.5".

## 2019-10-02 NOTE — CHART NOTE - NSCHARTNOTEFT_GEN_A_CORE
Patient in bed on High flow Nasal O2 support, appears comfortable.  -introduced myself, let him know I can be contacted for any concerning issues and they will be discussed with Medical attending

## 2019-10-02 NOTE — DIETITIAN INITIAL EVALUATION ADULT. - ADD RECOMMEND
continue current diet order, encourage po intake, consider supplement if po intake remains poor upon RD follow up, provide meal assistance PRN

## 2019-10-02 NOTE — PROGRESS NOTE ADULT - SUBJECTIVE AND OBJECTIVE BOX
BLANCHEJIAN  78y  Male    INTERVAL HPI/OVERNIGHT EVENTS:  Patient seen and examined earlier this morning. Patient remains lethargic and his dyspnea is at baseline. He remains on high flow O2 and was unable to tolerate NC yesterday. He notes worsening of the stiffness in his shoulders compared to yesterday, which started when aides/providers held him under the arms to help support his weight. He is unable to lift his arms or hold a glass of water, and had difficulty working with PT yesterday. He notes improvement of back and hip pain with Robaxin. He has baseline dyspnea but denies worsening SOB, chest pain, abdominal pain, nausea, vomiting.       Patient is a 77 y/o male with PMH of HTN, COPD, CHF, pulmonary HTN, DM2, CKD stage 3, BPH who presents with SOB for several days. Patient was brought in by EMS in respiratory distress, as his PA at home visits noted his SpO2 was in the 70s despite home O2. He was given IV Solumedrol and placed on CPAP by EMS. On arrival in the ED, patient was placed on BIPAP, given Duoneb tratment, and a dose of Levaquin. Patient notes that he normally has baseline SpO2 86-87% on 5L O2 at home. Patient also complains of left leg pain and weakness, along with pain in his left hip and lower back, s/p a mechanical trip and fall earlier this week. He denies head trauma or LOC. He normally walks at home independently but has been unable to ambulate for several days due to pain. He notes the pain worsens when lying down, making it difficult to sleep. Patient also reports occasional blurry vision sporadically for the past several months.       REVIEW OF SYSTEMS:  CONSTITUTIONAL: No fever, weight loss, or fatigue  EYES: No eye pain, visual disturbances, or discharge  ENMT:  No difficulty hearing, tinnitus, vertigo; No sinus or throat pain  NECK: No pain or stiffness  RESPIRATORY: +SOB. No cough, chills or hemoptysis.  CARDIOVASCULAR: No chest pain, palpitations, dizziness, or leg swelling  GASTROINTESTINAL: No abdominal or epigastric pain. No nausea, vomiting, or hematemesis; No diarrhea or constipation. No melena or hematochezia.  GENITOURINARY: No dysuria, frequency, hematuria, or incontinence  NEUROLOGICAL: No headaches, memory loss, loss of strength, numbness, or tremors  SKIN: No itching, burning, rashes, or lesions   LYMPH NODES: No enlarged glands  ENDOCRINE: No heat or cold intolerance; No hair loss  MUSCULOSKELETAL: +Bilateral shoulder stiffness. No back pain.  PSYCHIATRIC: No depression, anxiety, mood swings, or difficulty sleeping  HEME/LYMPH: No easy bruising, or bleeding gums  ALLERY AND IMMUNOLOGIC: No hives or eczema    T(C): 36.8 (10-02-19 @ 06:47), Max: 37.9 (10-01-19 @ 21:45)  HR: 105 (10-02-19 @ 06:47) (96 - 114)  BP: 102/70 (10-02-19 @ 06:47) (102/70 - 126/67)  RR: 16 (10-02-19 @ 06:47) (16 - 16)  SpO2: 80% (10-01-19 @ 17:21) (78% - 84%)    PHYSICAL EXAM:  GENERAL: Lethargic. NAD. Obese, chronic ill-appearing male.  HEAD:  Atraumatic, Normocephalic  EYES: EOMI, PERRLA, conjunctiva and sclera clear  ENMT: No tonsillar erythema, exudates, or enlargement; Moist mucous membranes.  NECK: Supple, No JVD, Normal thyroid  NERVOUS SYSTEM:  Alert & Oriented X3; Motor Strength 2/5 B/L upper and lower extremities; DTRs 2+ intact and symmetric  CHEST/LUNG: Decreased breath sounds bilaterally. Expiratory wheezing. No rales, rhonchi.  HEART: Regular rate and rhythm; No murmurs, rubs, or gallops  ABDOMEN: Obese. Soft, Nontender, Nondistended; Bowel sounds present  EXTREMITIES:  B/L lower extremity pitting edema. 2+ Peripheral Pulses.  LYMPH: No lymphadenopathy noted  SKIN: No rashes or lesions    Consultant(s) Notes Reviewed:  [x ] YES  [ ] NO  Care Discussed with Consultants/Other Providers [ x] YES  [ ] NO      LAB:                        13.4   21.58 )-----------( 379      ( 02 Oct 2019 06:32 )             45.9     10-01    142  |  93<L>  |  92<HH>  ----------------------------<  156<H>  5.3<H>   |  35<H>  |  2.4<H>    Ca    9.6      01 Oct 2019 07:00  Phos  5.5     10-01      CARDIAC MARKERS ( 25 Sep 2019 19:20 )  x     / 0.05 ng/mL / x     / x     / x      CARDIAC MARKERS ( 25 Sep 2019 11:30 )  x     / 0.07 ng/mL / x     / x     / x          Drug Dosing Weight  Height (cm): 172.72 (23 Sep 2019 00:55)  Weight (kg): 120.7 (25 Sep 2019 11:20)  BMI (kg/m2): 40.5 (25 Sep 2019 11:20)  BSA (m2): 2.31 (25 Sep 2019 11:20)    CAPILLARY BLOOD GLUCOSE  POCT Blood Glucose.: 267 mg/dL (02 Oct 2019 07:19)  POCT Blood Glucose.: 213 mg/dL (01 Oct 2019 20:55)  POCT Blood Glucose.: 272 mg/dL (01 Oct 2019 16:12)  POCT Blood Glucose.: 328 mg/dL (01 Oct 2019 11:21)  POCT Blood Glucose.: 335 mg/dL (01 Oct 2019 10:19)    I&O's Summary    01 Oct 2019 07:01  -  02 Oct 2019 07:00  --------------------------------------------------------  IN: 300 mL / OUT: 650 mL / NET: -350 mL      RADIOLOGY & ADDITIONAL TESTS:  Imaging Personally Reviewed:  [x] YES  [ ] NO    Xray Chest 1 View- PORTABLE-Routine (09.30.19 @ 07:16) >  Impression: Bibasilar opacities and cardiomegaly, stable.    Xray Shoulder 2 Views, Left (09.29.19 @ 22:21) >  Impression: No acute fracture or dislocation. Greater humeral tuberosity calcific peritendinitis. Shoulder and AC joint degenerative changes.    Xray Lumbar Spine AP + Lateral (09.27.19 @ 16:00) >  Impression: No evidence of acute fracture. Degenerative change, lumbar spine.    Xray Hip 1 View, Left (09.27.19 @ 16:00) >  IMPRESSION: No evidence of acute fracture. Degenerative change of left hip.    CT Abdomen and Pelvis No Cont (09.26.19 @ 12:12) >  IMPRESSION:  Patchy nodular opacities at the lung bases, nonspecific in nature. Follow to resolution is recommended.  No acute intra-abdominal or pelvic pathology.  Again seen is an indeterminate hyperdense exophytic left upper pole renal lesion, measuring approximately 4 cm.    CT Head No Cont (09.26.19 @ 11:47) >  IMPRESSION:   1.  No evidence of acute intracranial pathology.    2.  Moderate chronic microvascular changes.    VA Duplex Lower Ext Vein Scan, Bilat (09.26.19 @ 11:06) >  Impression:  No evidence of deep venous thrombosis or superficial thrombophlebitis in bilateral lower extremities.    US Kidney and Bladder (09.25.19 @ 22:52) >  IMPRESSION: No hydronephrosis. Urinary bladder postvoid residual of 170 cc.      HEALTH ISSUES - PROBLEM Dx:  Pain: Pain  Enlarged prostate: Enlarged prostate  Hypertension: Hypertension  Diabetes mellitus, type 2: Diabetes mellitus, type 2  Left leg pain: Left leg pain  Acute on chronic respiratory failure: Acute on chronic respiratory failure      MEDS:  acetaminophen   Tablet .. 975 milliGRAM(s) Oral every 8 hours  ALBUTerol    0.083% 2.5 milliGRAM(s) Nebulizer every 6 hours  ascorbic acid 500 milliGRAM(s) Oral daily  atorvastatin 20 milliGRAM(s) Oral at bedtime  BACItracin   Ointment 1 Application(s) Topical three times a day  buDESOnide 160 MICROgram(s)/formoterol 4.5 MICROgram(s) Inhaler 2 Puff(s) Inhalation two times a day  carbamide peroxide Otic Solution 1 Drop(s) Both Ears two times a day  cholecalciferol 1000 Unit(s) Oral daily  dextrose 40% Gel 15 Gram(s) Oral once PRN  dextrose 5%. 1000 milliLiter(s) IV Continuous <Continuous>  dextrose 50% Injectable 12.5 Gram(s) IV Push once  dextrose 50% Injectable 25 Gram(s) IV Push once  dextrose 50% Injectable 25 Gram(s) IV Push once  docusate sodium 100 milliGRAM(s) Oral three times a day  finasteride 5 milliGRAM(s) Oral daily  furosemide    Tablet 40 milliGRAM(s) Oral two times a day  glucagon  Injectable 1 milliGRAM(s) IntraMuscular once PRN  heparin  Injectable 5000 Unit(s) SubCutaneous three times a day  hydrOXYzine hydrochloride 25 milliGRAM(s) Oral at bedtime PRN  influenza   Vaccine 0.5 milliLiter(s) IntraMuscular once  insulin glargine Injectable (LANTUS) 30 Unit(s) SubCutaneous every morning  insulin lispro (HumaLOG) corrective regimen sliding scale   SubCutaneous three times a day before meals  insulin lispro Injectable (HumaLOG) 4 Unit(s) SubCutaneous three times a day before meals  levoFLOXacin  Tablet 250 milliGRAM(s) Oral every 24 hours  lidocaine   Patch 2 Patch Transdermal daily  lidocaine   Patch 1 Patch Transdermal daily  magnesium oxide 400 milliGRAM(s) Oral three times a day with meals  methocarbamol 750 milliGRAM(s) Oral four times a day  metoprolol tartrate 25 milliGRAM(s) Oral two times a day  nystatin    Suspension 245034 Unit(s) Oral three times a day  oxyCODONE    IR 10 milliGRAM(s) Oral every 6 hours PRN  pantoprazole    Tablet 40 milliGRAM(s) Oral before breakfast  predniSONE   Tablet 20 milliGRAM(s) Oral two times a day  tamsulosin 0.4 milliGRAM(s) Oral at bedtime  tiotropium 18 MICROgram(s) Capsule 1 Capsule(s) Inhalation daily      Progress Note Handoff  Pending Consults:  Pending Tests:  Pending Results:  Family Discussion:  Disposition: Home_____/SNF______/Other__Short term rehab___/Unknown at this time_____    Please call me with any questions at extension [phone] BLANCHEJIAN  78y  Male    INTERVAL HPI/OVERNIGHT EVENTS:  Patient seen and examined earlier this morning. Patient remains lethargic and his dyspnea is at baseline. He remains on high flow O2 and was unable to tolerate NC yesterday. He notes worsening of the stiffness in his shoulders compared to yesterday, which started when aides/providers held him under the arms to help support his weight. He is unable to lift his arms or hold a glass of water, and had difficulty working with PT yesterday. He notes improvement of back and hip pain with Robaxin. He has baseline dyspnea but denies worsening SOB, chest pain, abdominal pain, nausea, vomiting.       Patient is a 77 y/o male with PMH of HTN, COPD, CHF, pulmonary HTN, DM2, CKD stage 3, BPH who presents with SOB for several days. Patient was brought in by EMS in respiratory distress, as his PA at home visits noted his SpO2 was in the 70s despite home O2. He was given IV Solumedrol and placed on CPAP by EMS. On arrival in the ED, patient was placed on BIPAP, given Duoneb tratment, and a dose of Levaquin. Patient notes that he normally has baseline SpO2 86-87% on 5L O2 at home. Patient also complains of left leg pain and weakness, along with pain in his left hip and lower back, s/p a mechanical trip and fall earlier this week. He denies head trauma or LOC. He normally walks at home independently but has been unable to ambulate for several days due to pain. He notes the pain worsens when lying down, making it difficult to sleep. Patient also reports occasional blurry vision sporadically for the past several months.       REVIEW OF SYSTEMS:  CONSTITUTIONAL: No fever, weight loss, or fatigue  EYES: No eye pain, visual disturbances, or discharge  ENMT:  No difficulty hearing, tinnitus, vertigo; No sinus or throat pain  NECK: No pain or stiffness  RESPIRATORY: +SOB. No cough, chills or hemoptysis.  CARDIOVASCULAR: No chest pain, palpitations, dizziness, or leg swelling  GASTROINTESTINAL: No abdominal or epigastric pain. No nausea, vomiting, or hematemesis; No diarrhea or constipation. No melena or hematochezia.  GENITOURINARY: No dysuria, frequency, hematuria, or incontinence  NEUROLOGICAL: No headaches, memory loss, loss of strength, numbness, or tremors  SKIN: No itching, burning, rashes, or lesions   LYMPH NODES: No enlarged glands  ENDOCRINE: No heat or cold intolerance; No hair loss  MUSCULOSKELETAL: +Bilateral shoulder stiffness. No back pain.  PSYCHIATRIC: No depression, anxiety, mood swings, or difficulty sleeping  HEME/LYMPH: No easy bruising, or bleeding gums  ALLERY AND IMMUNOLOGIC: No hives or eczema    T(C): 36.8 (10-02-19 @ 06:47), Max: 37.9 (10-01-19 @ 21:45)  HR: 105 (10-02-19 @ 06:47) (96 - 114)  BP: 102/70 (10-02-19 @ 06:47) (102/70 - 126/67)  RR: 16 (10-02-19 @ 06:47) (16 - 16)  SpO2: 80% (10-01-19 @ 17:21) (78% - 84%)    PHYSICAL EXAM:  GENERAL: Lethargic. NAD. Obese, chronic ill-appearing male.  HEAD:  Atraumatic, Normocephalic  EYES: EOMI, PERRLA, conjunctiva and sclera clear  ENMT: No tonsillar erythema, exudates, or enlargement; Moist mucous membranes.  NECK: Supple, No JVD, Normal thyroid  NERVOUS SYSTEM:  Alert & Oriented X3; Motor Strength 2/5 B/L upper and lower extremities; DTRs 2+ intact and symmetric  CHEST/LUNG: Decreased breath sounds bilaterally. Expiratory wheezing. No rales, rhonchi.  HEART: Regular rate and rhythm; No murmurs, rubs, or gallops  ABDOMEN: Obese. Soft, Nontender, Nondistended; Bowel sounds present  EXTREMITIES:  B/L lower extremity pitting edema. 2+ Peripheral Pulses.  LYMPH: No lymphadenopathy noted  SKIN: No rashes or lesions    Consultant(s) Notes Reviewed:  [x ] YES  [ ] NO  Care Discussed with Consultants/Other Providers [ x] YES  [ ] NO      LAB:                        13.4   21.58 )-----------( 379      ( 02 Oct 2019 06:32 )             45.9     10-01    142  |  93<L>  |  92<HH>  ----------------------------<  156<H>  5.3<H>   |  35<H>  |  2.4<H>    Ca    9.6      01 Oct 2019 07:00  Phos  5.5     10-01      CARDIAC MARKERS ( 25 Sep 2019 19:20 )  x     / 0.05 ng/mL / x     / x     / x      CARDIAC MARKERS ( 25 Sep 2019 11:30 )  x     / 0.07 ng/mL / x     / x     / x          Drug Dosing Weight  Height (cm): 172.72 (23 Sep 2019 00:55)  Weight (kg): 120.7 (25 Sep 2019 11:20)  BMI (kg/m2): 40.5 (25 Sep 2019 11:20)  BSA (m2): 2.31 (25 Sep 2019 11:20)    CAPILLARY BLOOD GLUCOSE  POCT Blood Glucose.: 267 mg/dL (02 Oct 2019 07:19)  POCT Blood Glucose.: 213 mg/dL (01 Oct 2019 20:55)  POCT Blood Glucose.: 272 mg/dL (01 Oct 2019 16:12)  POCT Blood Glucose.: 328 mg/dL (01 Oct 2019 11:21)  POCT Blood Glucose.: 335 mg/dL (01 Oct 2019 10:19)    I&O's Summary    01 Oct 2019 07:01  -  02 Oct 2019 07:00  --------------------------------------------------------  IN: 300 mL / OUT: 650 mL / NET: -350 mL      RADIOLOGY & ADDITIONAL TESTS:  Imaging Personally Reviewed:  [x] YES  [ ] NO    Xray Chest 1 View- PORTABLE-Routine (09.30.19 @ 07:16) >  Impression: Bibasilar opacities and cardiomegaly, stable.    Xray Shoulder 2 Views, Left (09.29.19 @ 22:21) >  Impression: No acute fracture or dislocation. Greater humeral tuberosity calcific peritendinitis. Shoulder and AC joint degenerative changes.    Xray Lumbar Spine AP + Lateral (09.27.19 @ 16:00) >  Impression: No evidence of acute fracture. Degenerative change, lumbar spine.    Xray Hip 1 View, Left (09.27.19 @ 16:00) >  IMPRESSION: No evidence of acute fracture. Degenerative change of left hip.    CT Abdomen and Pelvis No Cont (09.26.19 @ 12:12) >  IMPRESSION:  Patchy nodular opacities at the lung bases, nonspecific in nature. Follow to resolution is recommended.  No acute intra-abdominal or pelvic pathology.  Again seen is an indeterminate hyperdense exophytic left upper pole renal lesion, measuring approximately 4 cm.    CT Head No Cont (09.26.19 @ 11:47) >  IMPRESSION:   1.  No evidence of acute intracranial pathology.    2.  Moderate chronic microvascular changes.    VA Duplex Lower Ext Vein Scan, Bilat (09.26.19 @ 11:06) >  Impression:  No evidence of deep venous thrombosis or superficial thrombophlebitis in bilateral lower extremities.    US Kidney and Bladder (09.25.19 @ 22:52) >  IMPRESSION: No hydronephrosis. Urinary bladder postvoid residual of 170 cc.      HEALTH ISSUES - PROBLEM Dx:  Pain: Pain  Enlarged prostate: Enlarged prostate  Hypertension: Hypertension  Diabetes mellitus, type 2: Diabetes mellitus, type 2  Left leg pain: Left leg pain  Acute on chronic respiratory failure: Acute on chronic respiratory failure      MEDS:  acetaminophen   Tablet .. 975 milliGRAM(s) Oral every 8 hours  ALBUTerol    0.083% 2.5 milliGRAM(s) Nebulizer every 6 hours  ascorbic acid 500 milliGRAM(s) Oral daily  atorvastatin 20 milliGRAM(s) Oral at bedtime  BACItracin   Ointment 1 Application(s) Topical three times a day  buDESOnide 160 MICROgram(s)/formoterol 4.5 MICROgram(s) Inhaler 2 Puff(s) Inhalation two times a day  carbamide peroxide Otic Solution 1 Drop(s) Both Ears two times a day  cholecalciferol 1000 Unit(s) Oral daily  dextrose 40% Gel 15 Gram(s) Oral once PRN  dextrose 5%. 1000 milliLiter(s) IV Continuous <Continuous>  dextrose 50% Injectable 12.5 Gram(s) IV Push once  dextrose 50% Injectable 25 Gram(s) IV Push once  dextrose 50% Injectable 25 Gram(s) IV Push once  docusate sodium 100 milliGRAM(s) Oral three times a day  finasteride 5 milliGRAM(s) Oral daily  furosemide    Tablet 40 milliGRAM(s) Oral two times a day  glucagon  Injectable 1 milliGRAM(s) IntraMuscular once PRN  heparin  Injectable 5000 Unit(s) SubCutaneous three times a day  hydrOXYzine hydrochloride 25 milliGRAM(s) Oral at bedtime PRN  influenza   Vaccine 0.5 milliLiter(s) IntraMuscular once  insulin glargine Injectable (LANTUS) 30 Unit(s) SubCutaneous every morning  insulin lispro (HumaLOG) corrective regimen sliding scale   SubCutaneous three times a day before meals  insulin lispro Injectable (HumaLOG) 4 Unit(s) SubCutaneous three times a day before meals  levoFLOXacin  Tablet 250 milliGRAM(s) Oral every 24 hours  lidocaine   Patch 2 Patch Transdermal daily  lidocaine   Patch 1 Patch Transdermal daily  magnesium oxide 400 milliGRAM(s) Oral three times a day with meals  methocarbamol 750 milliGRAM(s) Oral four times a day  metoprolol tartrate 25 milliGRAM(s) Oral two times a day  nystatin    Suspension [id number] Unit(s) Oral three times a day  oxyCODONE    IR 10 milliGRAM(s) Oral every 6 hours PRN  pantoprazole    Tablet 40 milliGRAM(s) Oral before breakfast  predniSONE   Tablet 20 milliGRAM(s) Oral two times a day  tamsulosin 0.4 milliGRAM(s) Oral at bedtime  tiotropium 18 MICROgram(s) Capsule 1 Capsule(s) Inhalation daily

## 2019-10-02 NOTE — PROGRESS NOTE ADULT - ASSESSMENT
Patient is a 77 y/o male with PMH of HTN, COPD, CHF, pulmonary HTN, DM2, CKD stage 3, BPH who presents with SOB and left leg pain for several days.    1. Acute on chronic respiratory failure  -CXR reveals no acute disease; stable bibasilar opacities and cardiomegaly.  -Pulmonary is following patient. Per pulmonary, he is at baseline for his lung function.   -Patient is currently on high flow O2 at 60%. He desaturated yesterday when attempting to wean to NC.   -Continue with CPAP, Duonebs/home inhaler, Lasix 40mg PO BID, Levaquin 250mg PO qd, Prednisone 20mg PO BID, Nystatin oral rinse.  -Leukocytosis likely due to Prednisone. Patient afebrile.   -Doppler B/L LE is negative for DVT.    2. Elevated troponin  -EKG shows no ischemic changes.  -Asymptomatic. Likely due to right-sided CHF and JAMES on CKD.  -Per Cardiology: No MI. His troponin is chronically mildly elevated. Continue meds.     3. Pain in left shoulder, hip, back s/p mechanical fall - improving  -X-ray of left hip, left shoulder, and lumbar spine reveal no acute fracture, degenerative changes.  -CT Abdomen and Pelvis: no acute intra-abdominal or pelvic pathology. Patchy nodular opacities at the lung bases.  -Pain Medicine recs: Methocarbamol q6h; D/C morphine; Oxycodone Q4h PRN; Warm compress to bilat shoulders, left hip, left knee; Lidoderm patch to left knee 12hrs on/off.    4. Blurry vision  -Patient denies head trauma during fall. AAO x3, neurologically intact.   -Possibly due to cataract vs steroid use.  -Head CT: no acute intracranial pathology.    5. DM2  -Continue with home dose of Lantus and ISS.  -Monitor POC glucose.  -Carb consistent diet.    6.  HTN  -Amlodipine was d/c per Nephro.  -Continue Metoprolol.    7.  JAMES on CKD 3  -Renal US shows no hydronephrosis. Postvoid residual volume 170cc. No need for Velazquez.  -Nephro recs: Phosphorus elevated at 5.5. iPTH pending.  -Avoid nephrotoxins.    8.  Hyperkalemia 2/2 CKD  -Nephro recs: 2g K diet, Kayexalate 30g 2-3x/week, Lasix 40mg BID po. Dietary consult placed to provide K content on all foods.    9. Metabolic Alkalosis  -Nephro recs: If bicarb worsens from 33, then check ABG and if pH>7.5 then give Diamox 250 q12 for 2 doses. R/o CO2 retention.    10. BPH  -Continue home meds.    11. Difficulty ambulating, Weakness of bilateral upper extremities  -PT eval completed. PT worked with him yesterday. Recommend therapy 3-5x/week and they will monitor progress.      Prophylaxis:  -Hep subq.  -Protonix.    -Grave prognosis. Discussed with patient about end of life care. Patient is DNR/DNI. Patient refusing hospice at this time. Likely going to short term rehab.      Patient was interviewed and examined by Pratima Encinas, medical student. This is a preliminary report pending discussion with hospitalist, Dr. Malik. Patient is a 77 y/o male with PMH of HTN, COPD, CHF, pulmonary HTN, DM2, CKD stage 3, BPH who presents with SOB and left leg pain for several days.    1. Acute on chronic respiratory failure  -CXR reveals no acute disease; stable bibasilar opacities and cardiomegaly.  -Pulmonary is following patient. Per pulmonary, he is at baseline for his lung function.   -Patient is currently on high flow O2 at 60%. He desaturated yesterday when attempting to wean to NC.   -Continue with CPAP, Duonebs/home inhaler, Lasix 40mg PO BID, Levaquin 250mg PO qd, Prednisone 20mg PO BID, Nystatin oral rinse.  -Leukocytosis likely due to Prednisone. Patient afebrile.   -Doppler B/L LE is negative for DVT.    2. Elevated troponin  -EKG shows no ischemic changes.  -Asymptomatic. Likely due to right-sided CHF and JAMES on CKD.  -Per Cardiology: No MI. His troponin is chronically mildly elevated. Continue meds.     3. Pain in left shoulder, hip, back s/p mechanical fall - improving  -X-ray of left hip, left shoulder, and lumbar spine reveal no acute fracture, degenerative changes.  -CT Abdomen and Pelvis: no acute intra-abdominal or pelvic pathology. Patchy nodular opacities at the lung bases.  -Pain Medicine recs: Methocarbamol q6h; D/C morphine; Oxycodone Q4h PRN; Warm compress to bilat shoulders, left hip, left knee; Lidoderm patch to left knee 12hrs on/off.  -PT following  -OT consult placed    4. Blurry vision  -Patient denies head trauma during fall. AAO x3, neurologically intact.   -Possibly due to cataract vs steroid use.  -Head CT: no acute intracranial pathology.    5. DM2  -Continue with home dose of Lantus and ISS.  -Monitor POC glucose.  -Carb consistent diet.    6.  HTN  -Amlodipine was d/c per Nephro.  -Continue Metoprolol.    7.  JAMES on CKD 3  -Renal US shows no hydronephrosis. Postvoid residual volume 170cc. No need for Velazquez.  -Nephro recs: Phosphorus elevated at 5.5. iPTH pending.  -Avoid nephrotoxins.    8.  Hyperkalemia 2/2 CKD  -Nephro recs: 2g K diet, Lasix 40mg BID po. Dietary consult placed to provide K content on all foods.  -start Kayexalate daily  -repeat BMP in am    9. Metabolic Alkalosis  -Nephro recs: If bicarb worsens from 33, then check ABG and if pH>7.5 then give Diamox 250 q12 for 2 doses. R/o CO2 retention.    10. BPH  -Continue home meds.    11. Difficulty ambulating, Weakness of bilateral upper extremities  -PT eval completed. PT worked with him yesterday. Recommend therapy 3-5x/week and they will monitor progress.      Prophylaxis:  -Hep subq.  -Protonix.    -Grave prognosis. Discussed with patient about end of life care. Patient is DNR/DNI. Patient refusing hospice at this time. Likely going to short term rehab.      Progress Note Handoff  Pending Consults: OT  Pending Tests: bmp  Pending Results: bmp  Family Discussion: discussed with pt in detail - in agreement  Disposition: Home_____/SNF______/Other__Short term rehab___/Unknown at this time_____    Please call me with any questions at extension [phone]

## 2019-10-03 LAB
ALBUMIN SERPL ELPH-MCNC: 3.3 G/DL — LOW (ref 3.5–5.2)
ALP SERPL-CCNC: 63 U/L — SIGNIFICANT CHANGE UP (ref 30–115)
ALT FLD-CCNC: 12 U/L — SIGNIFICANT CHANGE UP (ref 0–41)
ANION GAP SERPL CALC-SCNC: 11 MMOL/L — SIGNIFICANT CHANGE UP (ref 7–14)
AST SERPL-CCNC: 9 U/L — SIGNIFICANT CHANGE UP (ref 0–41)
BILIRUB SERPL-MCNC: 0.8 MG/DL — SIGNIFICANT CHANGE UP (ref 0.2–1.2)
BUN SERPL-MCNC: 114 MG/DL — CRITICAL HIGH (ref 10–20)
CALCIUM SERPL-MCNC: 9.1 MG/DL — SIGNIFICANT CHANGE UP (ref 8.5–10.1)
CHLORIDE SERPL-SCNC: 89 MMOL/L — LOW (ref 98–110)
CO2 SERPL-SCNC: 37 MMOL/L — HIGH (ref 17–32)
CREAT SERPL-MCNC: 2.8 MG/DL — HIGH (ref 0.7–1.5)
GLUCOSE BLDC GLUCOMTR-MCNC: 221 MG/DL — HIGH (ref 70–99)
GLUCOSE BLDC GLUCOMTR-MCNC: 282 MG/DL — HIGH (ref 70–99)
GLUCOSE BLDC GLUCOMTR-MCNC: 322 MG/DL — HIGH (ref 70–99)
GLUCOSE BLDC GLUCOMTR-MCNC: 336 MG/DL — HIGH (ref 70–99)
GLUCOSE SERPL-MCNC: 314 MG/DL — HIGH (ref 70–99)
HCT VFR BLD CALC: 46.4 % — SIGNIFICANT CHANGE UP (ref 42–52)
HGB BLD-MCNC: 13.8 G/DL — LOW (ref 14–18)
MCHC RBC-ENTMCNC: 23 PG — LOW (ref 27–31)
MCHC RBC-ENTMCNC: 29.7 G/DL — LOW (ref 32–37)
MCV RBC AUTO: 77.5 FL — LOW (ref 80–94)
NRBC # BLD: 0 /100 WBCS — SIGNIFICANT CHANGE UP (ref 0–0)
PLATELET # BLD AUTO: 378 K/UL — SIGNIFICANT CHANGE UP (ref 130–400)
POTASSIUM SERPL-MCNC: 5.8 MMOL/L — HIGH (ref 3.5–5)
POTASSIUM SERPL-SCNC: 5.8 MMOL/L — HIGH (ref 3.5–5)
PROT SERPL-MCNC: 5.8 G/DL — LOW (ref 6–8)
RBC # BLD: 5.99 M/UL — SIGNIFICANT CHANGE UP (ref 4.7–6.1)
RBC # FLD: 20.1 % — HIGH (ref 11.5–14.5)
SODIUM SERPL-SCNC: 137 MMOL/L — SIGNIFICANT CHANGE UP (ref 135–146)
WBC # BLD: 20.01 K/UL — HIGH (ref 4.8–10.8)
WBC # FLD AUTO: 20.01 K/UL — HIGH (ref 4.8–10.8)

## 2019-10-03 PROCEDURE — 71045 X-RAY EXAM CHEST 1 VIEW: CPT | Mod: 26

## 2019-10-03 PROCEDURE — 99233 SBSQ HOSP IP/OBS HIGH 50: CPT

## 2019-10-03 RX ADMIN — Medication 500000 UNIT(S): at 06:53

## 2019-10-03 RX ADMIN — OXYCODONE HYDROCHLORIDE 10 MILLIGRAM(S): 5 TABLET ORAL at 00:13

## 2019-10-03 RX ADMIN — ALBUTEROL 2.5 MILLIGRAM(S): 90 AEROSOL, METERED ORAL at 13:44

## 2019-10-03 RX ADMIN — TIOTROPIUM BROMIDE 1 CAPSULE(S): 18 CAPSULE ORAL; RESPIRATORY (INHALATION) at 08:11

## 2019-10-03 RX ADMIN — CARBAMIDE PEROXIDE 1 DROP(S): 81.86 SOLUTION/ DROPS AURICULAR (OTIC) at 06:51

## 2019-10-03 RX ADMIN — INSULIN GLARGINE 30 UNIT(S): 100 INJECTION, SOLUTION SUBCUTANEOUS at 12:51

## 2019-10-03 RX ADMIN — BUDESONIDE AND FORMOTEROL FUMARATE DIHYDRATE 2 PUFF(S): 160; 4.5 AEROSOL RESPIRATORY (INHALATION) at 22:11

## 2019-10-03 RX ADMIN — OXYCODONE HYDROCHLORIDE 10 MILLIGRAM(S): 5 TABLET ORAL at 13:19

## 2019-10-03 RX ADMIN — LIDOCAINE 2 PATCH: 4 CREAM TOPICAL at 12:53

## 2019-10-03 RX ADMIN — OXYCODONE HYDROCHLORIDE 10 MILLIGRAM(S): 5 TABLET ORAL at 19:20

## 2019-10-03 RX ADMIN — PANTOPRAZOLE SODIUM 40 MILLIGRAM(S): 20 TABLET, DELAYED RELEASE ORAL at 06:53

## 2019-10-03 RX ADMIN — Medication 4 UNIT(S): at 11:29

## 2019-10-03 RX ADMIN — SODIUM POLYSTYRENE SULFONATE 30 GRAM(S): 4.1 POWDER, FOR SUSPENSION ORAL at 13:16

## 2019-10-03 RX ADMIN — Medication 4 UNIT(S): at 16:44

## 2019-10-03 RX ADMIN — METHOCARBAMOL 750 MILLIGRAM(S): 500 TABLET, FILM COATED ORAL at 06:53

## 2019-10-03 RX ADMIN — Medication 975 MILLIGRAM(S): at 06:53

## 2019-10-03 RX ADMIN — ALBUTEROL 2.5 MILLIGRAM(S): 90 AEROSOL, METERED ORAL at 20:26

## 2019-10-03 RX ADMIN — Medication 100 MILLIGRAM(S): at 13:17

## 2019-10-03 RX ADMIN — LIDOCAINE 2 PATCH: 4 CREAM TOPICAL at 19:14

## 2019-10-03 RX ADMIN — Medication 975 MILLIGRAM(S): at 13:16

## 2019-10-03 RX ADMIN — Medication 1 APPLICATION(S): at 13:17

## 2019-10-03 RX ADMIN — Medication 25 MILLIGRAM(S): at 06:53

## 2019-10-03 RX ADMIN — Medication 40 MILLIGRAM(S): at 17:41

## 2019-10-03 RX ADMIN — LIDOCAINE 1 PATCH: 4 CREAM TOPICAL at 12:52

## 2019-10-03 RX ADMIN — Medication 8: at 16:44

## 2019-10-03 RX ADMIN — ALBUTEROL 2.5 MILLIGRAM(S): 90 AEROSOL, METERED ORAL at 01:27

## 2019-10-03 RX ADMIN — TAMSULOSIN HYDROCHLORIDE 0.4 MILLIGRAM(S): 0.4 CAPSULE ORAL at 22:08

## 2019-10-03 RX ADMIN — OXYCODONE HYDROCHLORIDE 10 MILLIGRAM(S): 5 TABLET ORAL at 19:50

## 2019-10-03 RX ADMIN — METHOCARBAMOL 750 MILLIGRAM(S): 500 TABLET, FILM COATED ORAL at 22:08

## 2019-10-03 RX ADMIN — MAGNESIUM OXIDE 400 MG ORAL TABLET 400 MILLIGRAM(S): 241.3 TABLET ORAL at 12:51

## 2019-10-03 RX ADMIN — CARBAMIDE PEROXIDE 1 DROP(S): 81.86 SOLUTION/ DROPS AURICULAR (OTIC) at 17:40

## 2019-10-03 RX ADMIN — Medication 975 MILLIGRAM(S): at 13:20

## 2019-10-03 RX ADMIN — LIDOCAINE 1 PATCH: 4 CREAM TOPICAL at 19:14

## 2019-10-03 RX ADMIN — Medication 500000 UNIT(S): at 13:17

## 2019-10-03 RX ADMIN — Medication 20 MILLIGRAM(S): at 06:53

## 2019-10-03 RX ADMIN — ATORVASTATIN CALCIUM 20 MILLIGRAM(S): 80 TABLET, FILM COATED ORAL at 22:08

## 2019-10-03 RX ADMIN — MAGNESIUM OXIDE 400 MG ORAL TABLET 400 MILLIGRAM(S): 241.3 TABLET ORAL at 16:44

## 2019-10-03 RX ADMIN — Medication 8: at 11:29

## 2019-10-03 RX ADMIN — HEPARIN SODIUM 5000 UNIT(S): 5000 INJECTION INTRAVENOUS; SUBCUTANEOUS at 22:08

## 2019-10-03 RX ADMIN — Medication 975 MILLIGRAM(S): at 22:38

## 2019-10-03 RX ADMIN — METHOCARBAMOL 750 MILLIGRAM(S): 500 TABLET, FILM COATED ORAL at 17:41

## 2019-10-03 RX ADMIN — METHOCARBAMOL 750 MILLIGRAM(S): 500 TABLET, FILM COATED ORAL at 00:13

## 2019-10-03 RX ADMIN — Medication 975 MILLIGRAM(S): at 22:08

## 2019-10-03 RX ADMIN — FINASTERIDE 5 MILLIGRAM(S): 5 TABLET, FILM COATED ORAL at 12:52

## 2019-10-03 RX ADMIN — Medication 1 APPLICATION(S): at 22:08

## 2019-10-03 RX ADMIN — Medication 25 MILLIGRAM(S): at 17:41

## 2019-10-03 RX ADMIN — Medication 500000 UNIT(S): at 22:08

## 2019-10-03 RX ADMIN — Medication 1 APPLICATION(S): at 06:53

## 2019-10-03 RX ADMIN — METHOCARBAMOL 750 MILLIGRAM(S): 500 TABLET, FILM COATED ORAL at 12:54

## 2019-10-03 RX ADMIN — ALBUTEROL 2.5 MILLIGRAM(S): 90 AEROSOL, METERED ORAL at 08:11

## 2019-10-03 RX ADMIN — Medication 1000 UNIT(S): at 12:52

## 2019-10-03 RX ADMIN — Medication 100 MILLIGRAM(S): at 22:08

## 2019-10-03 RX ADMIN — Medication 100 MILLIGRAM(S): at 06:53

## 2019-10-03 RX ADMIN — HEPARIN SODIUM 5000 UNIT(S): 5000 INJECTION INTRAVENOUS; SUBCUTANEOUS at 06:51

## 2019-10-03 RX ADMIN — Medication 40 MILLIGRAM(S): at 06:53

## 2019-10-03 RX ADMIN — Medication 20 MILLIGRAM(S): at 17:41

## 2019-10-03 RX ADMIN — BUDESONIDE AND FORMOTEROL FUMARATE DIHYDRATE 2 PUFF(S): 160; 4.5 AEROSOL RESPIRATORY (INHALATION) at 08:11

## 2019-10-03 RX ADMIN — Medication 500 MILLIGRAM(S): at 12:51

## 2019-10-03 RX ADMIN — HEPARIN SODIUM 5000 UNIT(S): 5000 INJECTION INTRAVENOUS; SUBCUTANEOUS at 13:17

## 2019-10-03 RX ADMIN — OXYCODONE HYDROCHLORIDE 10 MILLIGRAM(S): 5 TABLET ORAL at 14:04

## 2019-10-03 NOTE — PROGRESS NOTE ADULT - ASSESSMENT
Impression:  acute on chronic respiratory failure  CRF hypoxic and hypercapnia   copd stable   Heart failure        Suggest:  Check 2d echo  Continue symbicort, spiriva, nebsq3-4hrs, O2, prednsione 40, levaquin to complete 7 days course.  Diuresis  repeat chest xray  bipap at night and prn, HFNC prn   Oxygen to keep pox 88-92%  DVt ppx  OOB to chair      Attending attestation to follow

## 2019-10-03 NOTE — ED ADULT NURSE NOTE - NSIMPLEMENTINTERV_GEN_ALL_ED
Implemented All Fall Risk Interventions:  [place] to call system. Call bell, personal items and telephone within reach. Instruct patient to call for assistance. Room bathroom lighting operational. Non-slip footwear when patient is off stretcher. Physically safe environment: no spills, clutter or unnecessary equipment. Stretcher in lowest position, wheels locked, appropriate side rails in place. Provide visual cue, wrist band, yellow gown, etc. Monitor gait and stability. Monitor for mental status changes and reorient to person, place, and time. Review medications for side effects contributing to fall risk. Reinforce activity limits and safety measures with patient and family. No

## 2019-10-03 NOTE — PROGRESS NOTE ADULT - ASSESSMENT
Patient is a 77 y/o male with PMH of HTN, COPD, CHF, pulmonary HTN, DM2, CKD stage 3, BPH who presents with SOB and left leg pain for several days.    1. Acute on chronic respiratory failure  -CXR reveals no acute disease; stable bibasilar opacities and cardiomegaly.  -Pulmonary is following patient. Per pulmonary, he is at baseline for his lung function.   -Patient is currently on high flow O2 at 60%. He desaturated 2 days ago when attempting to wean to NC.   -Continue with CPAP, Duonebs/home inhaler, Lasix 40mg PO BID, Prednisone 20mg PO BID, Nystatin oral rinse.   -Abx: Completed 7 day course of Levaquin 250mg PO qd.  -Leukocytosis improving, likely due to Prednisone. Patient afebrile.   -Doppler B/L LE is negative for DVT.    2. Elevated troponin  -EKG shows no ischemic changes.  -Asymptomatic. Likely due to right-sided CHF and JAMES on CKD.  -Per Cardiology: No MI. His troponin is chronically mildly elevated. Continue meds.     3. Pain in left shoulder, hip, back s/p mechanical fall - improving  -X-ray of left hip, left shoulder, and lumbar spine reveal no acute fracture, degenerative changes.  -CT Abdomen and Pelvis: no acute intra-abdominal or pelvic pathology. Patchy nodular opacities at the lung bases.  -Pain Medicine recs: Methocarbamol q6h; D/C morphine; Oxycodone Q4h PRN; Warm compress to bilat shoulders, left hip, left knee; Lidoderm patch to left knee 12hrs on/off.  -PT following. They worked with him yesterday.  -OT consult placed.    4. Blurry vision  -Patient denies head trauma during fall. AAO x3, neurologically intact.   -Possibly due to cataract vs steroid use.  -Head CT: no acute intracranial pathology.    5. DM2  -Continue with home dose of Lantus and ISS.  -Monitor POC glucose.  -Carb consistent diet.    6.  HTN  -Amlodipine was d/c per Nephro.  -Continue Metoprolol.    7.  JAMES on CKD 3  -Renal US shows no hydronephrosis. Postvoid residual volume 170cc. No need for Velazquez.  -Nephro: Evaluate for metabolic bone disease in CKD. Phosphorus elevated at 5.5 (normal 2.1-4.9). iPTH elevated at 88 (normal 15-65).  -Avoid nephrotoxins.    8.  Hyperkalemia 2/2 CKD  -Nephro recs: 2g K diet, Lasix 40mg BID po. Dietary consult placed to provide K content on all foods.  -K=5.8. Continue Kayexalate 30g PO qd.    9. Metabolic Alkalosis  -Nephro recs: If bicarb worsens from 33, then check ABG and if pH>7.5 then give Diamox 250 q12 for 2 doses. R/o CO2 retention.    10. BPH  -Continue home meds.    11. Difficulty ambulating, Weakness of bilateral upper extremities  -PT worked with him yesterday. Recommend therapy 3-5x/week and they will monitor progress.      Prophylaxis:  -Hep subq.  -Protonix.    -Grave prognosis. Discussed with patient about end of life care. Patient is DNR/DNI. Patient refusing hospice at this time. Likely going to short term rehab.      Progress Note Handoff  Pending Consults: OT  Pending Tests: bmp  Pending Results: bmp  Family Discussion: discussed with pt in detail - in agreement  Disposition: Home_____/SNF______/Other__Short term rehab___/Unknown at this time_____    Please call me with any questions at extension [phone] Patient is a 77 y/o male with PMH of HTN, COPD, CHF, pulmonary HTN, DM2, CKD stage 3, BPH who presents with SOB and left leg pain for several days.    1. Acute on chronic respiratory failure  -CXR reveals no acute disease; stable bibasilar opacities and cardiomegaly.  -Patient is currently on high flow O2 at 60%. He desaturated 2 days ago when attempting to wean to NC.   -Per Pulm: Check 2d echo, Repeat CXR.  -Continue with CPAP, Duonebs/home inhaler, Lasix 40mg PO BID, Prednisone 20mg PO BID, Nystatin oral rinse.   -Abx: Completed 7 day course of Levaquin 250mg PO qd.  -Leukocytosis improving, likely due to Prednisone. Patient afebrile.   -Doppler B/L LE is negative for DVT.    2. Elevated troponin  -EKG shows no ischemic changes.  -Asymptomatic. Likely due to right-sided CHF and JAMES on CKD.  -Per Cardiology: No MI. His troponin is chronically mildly elevated. Continue meds.     3. Pain in left shoulder, hip, back s/p mechanical fall - improving  -X-ray of left hip, left shoulder, and lumbar spine reveal no acute fracture, degenerative changes.  -CT Abdomen and Pelvis: no acute intra-abdominal or pelvic pathology. Patchy nodular opacities at the lung bases.  -Pain Medicine recs: Methocarbamol q6h; D/C morphine; Oxycodone Q4h PRN; Warm compress to bilat shoulders, left hip, left knee; Lidoderm patch to left knee 12hrs on/off.  -PT following. They worked with him yesterday.  -OT consult placed.    4. Blurry vision  -Patient denies head trauma during fall. AAO x3, neurologically intact.   -Possibly due to cataract vs steroid use.  -Head CT: no acute intracranial pathology.    5. DM2  -Continue with home dose of Lantus and ISS.  -Monitor POC glucose.  -Carb consistent diet.    6.  HTN  -Amlodipine was d/c per Nephro.  -Continue Metoprolol.    7.  JAMES on CKD 3  -Renal US shows no hydronephrosis. Postvoid residual volume 170cc. No need for Velazquez.  -Nephro: Evaluate for metabolic bone disease in CKD. Phosphorus elevated at 5.5 (normal 2.1-4.9). iPTH elevated at 88 (normal 15-65).  -Avoid nephrotoxins.    8.  Hyperkalemia 2/2 CKD  -Nephro recs: 2g K diet, Lasix 40mg BID po. Dietary consult placed to provide K content on all foods.  -K=5.8. Continue Kayexalate 30g PO qd.    9. Metabolic Alkalosis  -Nephro recs: If bicarb worsens from 33, then check ABG and if pH>7.5 then give Diamox 250 q12 for 2 doses. R/o CO2 retention.    10. BPH  -Continue home meds.    11. Difficulty ambulating, Weakness of bilateral upper extremities  -PT worked with him yesterday. Recommend therapy 3-5x/week and they will monitor progress.      Prophylaxis:  -Hep subq.  -Protonix.    -Grave prognosis. Discussed with patient about end of life care. Patient is DNR/DNI. Patient refusing hospice at this time. Likely going to short term rehab.      Progress Note Handoff  Pending Consults: OT  Pending Tests: fanny  Pending Results: fanny  Family Discussion: discussed with pt in detail - in agreement  Disposition: Home_____/SNF______/Other__Short term rehab___/Unknown at this time_____    Please call me with any questions at extension [phone]

## 2019-10-03 NOTE — PROGRESS NOTE ADULT - SUBJECTIVE AND OBJECTIVE BOX
Patient is a 78y old  Male who presents with a chief complaint of SOB and leg pain (03 Oct 2019 10:03)        SUBJECTIVE: feels better      REVIEW OF SYSTEMS:  See HPI    PHYSICAL EXAM  Vital Signs Last 24 Hrs  T(C): 36.3 (03 Oct 2019 05:46), Max: 37.6 (02 Oct 2019 14:20)  T(F): 97.3 (03 Oct 2019 05:46), Max: 99.6 (02 Oct 2019 14:20)  HR: 100 (03 Oct 2019 05:46) (91 - 100)  BP: 103/66 (03 Oct 2019 05:46) (96/55 - 114/55)  BP(mean): --  RR: 16 (03 Oct 2019 05:46) (16 - 16)  SpO2: 90% on 40L/min and 65%HFNC (03 Oct 2019 09:46) (86% - 90%)    General: In NAD  HEENT: MIS               Lymphatic system: No Cervical LN    Respiratory: crackles at the cases  Cardiovascular: Regular  Gastrointestinal: Soft. + BS  Musculoskeletal: +ve Le edema  Skin: Warm.  Intact  Neurological: No motor or sensory deficit      10-02-19 @ 07:01  -  10-03-19 @ 07:00  --------------------------------------------------------  IN:  Total IN: 0 mL    OUT:    Voided: 550 mL  Total OUT: 550 mL    Total NET: -550 mL      10-03-19 @ 07:01  -  10-03-19 @ 10:18  --------------------------------------------------------  IN:  Total IN: 0 mL    OUT:    Voided: 400 mL  Total OUT: 400 mL    Total NET: -400 mL          LABS:                          13.8   20.01 )-----------( 378      ( 03 Oct 2019 06:23 )             46.4                                               10-03    137  |  89<L>  |  114<HH>  ----------------------------<  314<H>  5.8<H>   |  37<H>  |  2.8<H>    Ca    9.1      03 Oct 2019 06:23    TPro  5.8<L>  /  Alb  3.3<L>  /  TBili  0.8  /  DBili  x   /  AST  9   /  ALT  12  /  AlkPhos  63  10-03                                                                                           LIVER FUNCTIONS - ( 03 Oct 2019 06:23 )  Alb: 3.3 g/dL / Pro: 5.8 g/dL / ALK PHOS: 63 U/L / ALT: 12 U/L / AST: 9 U/L / GGT: x                                                                                                MEDICATIONS  (STANDING):  acetaminophen   Tablet .. 975 milliGRAM(s) Oral every 8 hours  ALBUTerol    0.083% 2.5 milliGRAM(s) Nebulizer every 6 hours  ascorbic acid 500 milliGRAM(s) Oral daily  atorvastatin 20 milliGRAM(s) Oral at bedtime  BACItracin   Ointment 1 Application(s) Topical three times a day  buDESOnide 160 MICROgram(s)/formoterol 4.5 MICROgram(s) Inhaler 2 Puff(s) Inhalation two times a day  carbamide peroxide Otic Solution 1 Drop(s) Both Ears two times a day  cholecalciferol 1000 Unit(s) Oral daily  dextrose 5%. 1000 milliLiter(s) (50 mL/Hr) IV Continuous <Continuous>  dextrose 50% Injectable 12.5 Gram(s) IV Push once  dextrose 50% Injectable 25 Gram(s) IV Push once  dextrose 50% Injectable 25 Gram(s) IV Push once  docusate sodium 100 milliGRAM(s) Oral three times a day  finasteride 5 milliGRAM(s) Oral daily  furosemide    Tablet 40 milliGRAM(s) Oral two times a day  heparin  Injectable 5000 Unit(s) SubCutaneous three times a day  influenza   Vaccine 0.5 milliLiter(s) IntraMuscular once  insulin glargine Injectable (LANTUS) 30 Unit(s) SubCutaneous every morning  insulin lispro (HumaLOG) corrective regimen sliding scale   SubCutaneous three times a day before meals  insulin lispro Injectable (HumaLOG) 4 Unit(s) SubCutaneous three times a day before meals  levoFLOXacin  Tablet 250 milliGRAM(s) Oral every 24 hours  lidocaine   Patch 2 Patch Transdermal daily  lidocaine   Patch 1 Patch Transdermal daily  magnesium oxide 400 milliGRAM(s) Oral three times a day with meals  methocarbamol 750 milliGRAM(s) Oral four times a day  metoprolol tartrate 25 milliGRAM(s) Oral two times a day  nystatin    Suspension [id number] Unit(s) Oral three times a day  pantoprazole    Tablet 40 milliGRAM(s) Oral before breakfast  predniSONE   Tablet 20 milliGRAM(s) Oral two times a day  sodium polystyrene sulfonate Suspension 30 Gram(s) Oral daily  tamsulosin 0.4 milliGRAM(s) Oral at bedtime  tiotropium 18 MICROgram(s) Capsule 1 Capsule(s) Inhalation daily    MEDICATIONS  (PRN):  dextrose 40% Gel 15 Gram(s) Oral once PRN Blood Glucose LESS THAN 70 milliGRAM(s)/deciliter  glucagon  Injectable 1 milliGRAM(s) IntraMuscular once PRN Glucose LESS THAN 70 milligrams/deciliter  hydrOXYzine hydrochloride 25 milliGRAM(s) Oral at bedtime PRN Insomnia  oxyCODONE    IR 10 milliGRAM(s) Oral every 6 hours PRN Severe Pain (7 - 10)

## 2019-10-04 LAB
ALBUMIN SERPL ELPH-MCNC: 3 G/DL — LOW (ref 3.5–5.2)
ALP SERPL-CCNC: 71 U/L — SIGNIFICANT CHANGE UP (ref 30–115)
ALT FLD-CCNC: 15 U/L — SIGNIFICANT CHANGE UP (ref 0–41)
ANION GAP SERPL CALC-SCNC: 11 MMOL/L — SIGNIFICANT CHANGE UP (ref 7–14)
AST SERPL-CCNC: 12 U/L — SIGNIFICANT CHANGE UP (ref 0–41)
BASE EXCESS BLDA CALC-SCNC: 14.1 MMOL/L — HIGH (ref -2–2)
BILIRUB SERPL-MCNC: 0.9 MG/DL — SIGNIFICANT CHANGE UP (ref 0.2–1.2)
BUN SERPL-MCNC: 122 MG/DL — CRITICAL HIGH (ref 10–20)
CALCIUM SERPL-MCNC: 8.8 MG/DL — SIGNIFICANT CHANGE UP (ref 8.5–10.1)
CHLORIDE SERPL-SCNC: 90 MMOL/L — LOW (ref 98–110)
CO2 SERPL-SCNC: 37 MMOL/L — HIGH (ref 17–32)
CREAT SERPL-MCNC: 2.4 MG/DL — HIGH (ref 0.7–1.5)
GLUCOSE BLDC GLUCOMTR-MCNC: 174 MG/DL — HIGH (ref 70–99)
GLUCOSE BLDC GLUCOMTR-MCNC: 202 MG/DL — HIGH (ref 70–99)
GLUCOSE BLDC GLUCOMTR-MCNC: 213 MG/DL — HIGH (ref 70–99)
GLUCOSE BLDC GLUCOMTR-MCNC: 219 MG/DL — HIGH (ref 70–99)
GLUCOSE SERPL-MCNC: 239 MG/DL — HIGH (ref 70–99)
HCO3 BLDA-SCNC: 42 MMOL/L — CRITICAL HIGH (ref 23–27)
HCT VFR BLD CALC: 46.2 % — SIGNIFICANT CHANGE UP (ref 42–52)
HGB BLD-MCNC: 13.8 G/DL — LOW (ref 14–18)
HOROWITZ INDEX BLDA+IHG-RTO: 60 — SIGNIFICANT CHANGE UP
MCHC RBC-ENTMCNC: 23.3 PG — LOW (ref 27–31)
MCHC RBC-ENTMCNC: 29.9 G/DL — LOW (ref 32–37)
MCV RBC AUTO: 77.9 FL — LOW (ref 80–94)
NRBC # BLD: 0 /100 WBCS — SIGNIFICANT CHANGE UP (ref 0–0)
PCO2 BLDA: 70 MMHG — CRITICAL HIGH (ref 38–42)
PH BLDA: 7.39 — SIGNIFICANT CHANGE UP (ref 7.38–7.42)
PLATELET # BLD AUTO: 415 K/UL — HIGH (ref 130–400)
PO2 BLDA: 68 MMHG — LOW (ref 78–95)
POTASSIUM SERPL-MCNC: 5.2 MMOL/L — HIGH (ref 3.5–5)
POTASSIUM SERPL-SCNC: 5.2 MMOL/L — HIGH (ref 3.5–5)
PROT SERPL-MCNC: 5.4 G/DL — LOW (ref 6–8)
RBC # BLD: 5.93 M/UL — SIGNIFICANT CHANGE UP (ref 4.7–6.1)
RBC # FLD: 20 % — HIGH (ref 11.5–14.5)
SAO2 % BLDA: 94 % — SIGNIFICANT CHANGE UP (ref 94–98)
SODIUM SERPL-SCNC: 138 MMOL/L — SIGNIFICANT CHANGE UP (ref 135–146)
WBC # BLD: 17.94 K/UL — HIGH (ref 4.8–10.8)
WBC # FLD AUTO: 17.94 K/UL — HIGH (ref 4.8–10.8)

## 2019-10-04 PROCEDURE — 99233 SBSQ HOSP IP/OBS HIGH 50: CPT

## 2019-10-04 RX ORDER — FUROSEMIDE 40 MG
40 TABLET ORAL ONCE
Refills: 0 | Status: COMPLETED | OUTPATIENT
Start: 2019-10-04 | End: 2019-10-04

## 2019-10-04 RX ADMIN — Medication 975 MILLIGRAM(S): at 14:20

## 2019-10-04 RX ADMIN — Medication 975 MILLIGRAM(S): at 22:40

## 2019-10-04 RX ADMIN — ALBUTEROL 2.5 MILLIGRAM(S): 90 AEROSOL, METERED ORAL at 07:59

## 2019-10-04 RX ADMIN — Medication 100 MILLIGRAM(S): at 06:59

## 2019-10-04 RX ADMIN — Medication 25 MILLIGRAM(S): at 17:43

## 2019-10-04 RX ADMIN — Medication 40 MILLIGRAM(S): at 17:43

## 2019-10-04 RX ADMIN — INSULIN GLARGINE 30 UNIT(S): 100 INJECTION, SOLUTION SUBCUTANEOUS at 08:17

## 2019-10-04 RX ADMIN — TIOTROPIUM BROMIDE 1 CAPSULE(S): 18 CAPSULE ORAL; RESPIRATORY (INHALATION) at 07:58

## 2019-10-04 RX ADMIN — METHOCARBAMOL 750 MILLIGRAM(S): 500 TABLET, FILM COATED ORAL at 17:43

## 2019-10-04 RX ADMIN — OXYCODONE HYDROCHLORIDE 10 MILLIGRAM(S): 5 TABLET ORAL at 14:03

## 2019-10-04 RX ADMIN — Medication 975 MILLIGRAM(S): at 06:59

## 2019-10-04 RX ADMIN — Medication 100 MILLIGRAM(S): at 14:09

## 2019-10-04 RX ADMIN — Medication 1 APPLICATION(S): at 21:56

## 2019-10-04 RX ADMIN — OXYCODONE HYDROCHLORIDE 10 MILLIGRAM(S): 5 TABLET ORAL at 20:19

## 2019-10-04 RX ADMIN — METHOCARBAMOL 750 MILLIGRAM(S): 500 TABLET, FILM COATED ORAL at 11:52

## 2019-10-04 RX ADMIN — Medication 500000 UNIT(S): at 21:56

## 2019-10-04 RX ADMIN — Medication 4 UNIT(S): at 11:50

## 2019-10-04 RX ADMIN — LIDOCAINE 1 PATCH: 4 CREAM TOPICAL at 11:52

## 2019-10-04 RX ADMIN — Medication 1 APPLICATION(S): at 14:09

## 2019-10-04 RX ADMIN — Medication 4: at 16:36

## 2019-10-04 RX ADMIN — OXYCODONE HYDROCHLORIDE 10 MILLIGRAM(S): 5 TABLET ORAL at 14:21

## 2019-10-04 RX ADMIN — FINASTERIDE 5 MILLIGRAM(S): 5 TABLET, FILM COATED ORAL at 11:52

## 2019-10-04 RX ADMIN — MAGNESIUM OXIDE 400 MG ORAL TABLET 400 MILLIGRAM(S): 241.3 TABLET ORAL at 11:52

## 2019-10-04 RX ADMIN — Medication 4 UNIT(S): at 08:18

## 2019-10-04 RX ADMIN — Medication 2: at 11:50

## 2019-10-04 RX ADMIN — Medication 500000 UNIT(S): at 07:01

## 2019-10-04 RX ADMIN — ALBUTEROL 2.5 MILLIGRAM(S): 90 AEROSOL, METERED ORAL at 13:33

## 2019-10-04 RX ADMIN — OXYCODONE HYDROCHLORIDE 10 MILLIGRAM(S): 5 TABLET ORAL at 22:13

## 2019-10-04 RX ADMIN — Medication 20 MILLIGRAM(S): at 06:59

## 2019-10-04 RX ADMIN — HEPARIN SODIUM 5000 UNIT(S): 5000 INJECTION INTRAVENOUS; SUBCUTANEOUS at 14:10

## 2019-10-04 RX ADMIN — MAGNESIUM OXIDE 400 MG ORAL TABLET 400 MILLIGRAM(S): 241.3 TABLET ORAL at 08:17

## 2019-10-04 RX ADMIN — Medication 1000 UNIT(S): at 11:52

## 2019-10-04 RX ADMIN — HEPARIN SODIUM 5000 UNIT(S): 5000 INJECTION INTRAVENOUS; SUBCUTANEOUS at 21:57

## 2019-10-04 RX ADMIN — ATORVASTATIN CALCIUM 20 MILLIGRAM(S): 80 TABLET, FILM COATED ORAL at 21:56

## 2019-10-04 RX ADMIN — MAGNESIUM OXIDE 400 MG ORAL TABLET 400 MILLIGRAM(S): 241.3 TABLET ORAL at 17:43

## 2019-10-04 RX ADMIN — METHOCARBAMOL 750 MILLIGRAM(S): 500 TABLET, FILM COATED ORAL at 06:59

## 2019-10-04 RX ADMIN — LIDOCAINE 1 PATCH: 4 CREAM TOPICAL at 20:17

## 2019-10-04 RX ADMIN — SODIUM POLYSTYRENE SULFONATE 30 GRAM(S): 4.1 POWDER, FOR SUSPENSION ORAL at 14:58

## 2019-10-04 RX ADMIN — Medication 20 MILLIGRAM(S): at 17:43

## 2019-10-04 RX ADMIN — BUDESONIDE AND FORMOTEROL FUMARATE DIHYDRATE 2 PUFF(S): 160; 4.5 AEROSOL RESPIRATORY (INHALATION) at 07:58

## 2019-10-04 RX ADMIN — TAMSULOSIN HYDROCHLORIDE 0.4 MILLIGRAM(S): 0.4 CAPSULE ORAL at 21:56

## 2019-10-04 RX ADMIN — Medication 975 MILLIGRAM(S): at 14:08

## 2019-10-04 RX ADMIN — LIDOCAINE 2 PATCH: 4 CREAM TOPICAL at 11:53

## 2019-10-04 RX ADMIN — LIDOCAINE 2 PATCH: 4 CREAM TOPICAL at 00:45

## 2019-10-04 RX ADMIN — ALBUTEROL 2.5 MILLIGRAM(S): 90 AEROSOL, METERED ORAL at 19:49

## 2019-10-04 RX ADMIN — Medication 4: at 08:15

## 2019-10-04 RX ADMIN — LIDOCAINE 2 PATCH: 4 CREAM TOPICAL at 20:17

## 2019-10-04 RX ADMIN — LIDOCAINE 1 PATCH: 4 CREAM TOPICAL at 00:45

## 2019-10-04 RX ADMIN — Medication 1 APPLICATION(S): at 07:01

## 2019-10-04 RX ADMIN — HEPARIN SODIUM 5000 UNIT(S): 5000 INJECTION INTRAVENOUS; SUBCUTANEOUS at 07:01

## 2019-10-04 RX ADMIN — Medication 500000 UNIT(S): at 14:09

## 2019-10-04 RX ADMIN — Medication 40 MILLIGRAM(S): at 10:55

## 2019-10-04 RX ADMIN — Medication 4 UNIT(S): at 16:36

## 2019-10-04 RX ADMIN — Medication 100 MILLIGRAM(S): at 21:56

## 2019-10-04 RX ADMIN — Medication 500 MILLIGRAM(S): at 11:53

## 2019-10-04 RX ADMIN — PANTOPRAZOLE SODIUM 40 MILLIGRAM(S): 20 TABLET, DELAYED RELEASE ORAL at 06:59

## 2019-10-04 RX ADMIN — Medication 40 MILLIGRAM(S): at 08:16

## 2019-10-04 RX ADMIN — Medication 975 MILLIGRAM(S): at 08:18

## 2019-10-04 RX ADMIN — Medication 975 MILLIGRAM(S): at 21:56

## 2019-10-04 NOTE — PROGRESS NOTE ADULT - SUBJECTIVE AND OBJECTIVE BOX
Patient is a 78y old  Male who presents with a chief complaint of SOB and leg pain (04 Oct 2019 08:35)        SUBJECTIVE:      REVIEW OF SYSTEMS:  See HPI    PHYSICAL EXAM  Vital Signs Last 24 Hrs  T(C): 36.8 (04 Oct 2019 05:37), Max: 38.3 (03 Oct 2019 21:09)  T(F): 98.2 (04 Oct 2019 05:37), Max: 100.9 (03 Oct 2019 21:09)  HR: 94 (04 Oct 2019 05:37) (93 - 98)  BP: 99/56 (04 Oct 2019 05:37) (93/58 - 123/60)  BP(mean): --  RR: 20 (03 Oct 2019 21:09) (20 - 20)  SpO2: 93% (04 Oct 2019 08:34) (85% - 94%)    General: In NAD  HEENT: MIS               Lymphatic system: No Cervical LN    Respiratory: decreased BS   Cardiovascular: Regular  Gastrointestinal: Soft. + BS  Musculoskeletal: +ve LE edema  Skin: Warm.  Intact        10-03-19 @ 07:01  -  10-04-19 @ 07:00  --------------------------------------------------------  IN:    Oral Fluid: 930 mL  Total IN: 930 mL    OUT:    Voided: 1100 mL  Total OUT: 1100 mL    Total NET: -170 mL          LABS:                          13.8   17.94 )-----------( 415      ( 04 Oct 2019 07:41 )             46.2                                               10-04    138  |  90<L>  |  122<HH>  ----------------------------<  239<H>  5.2<H>   |  37<H>  |  2.4<H>    Ca    8.8      04 Oct 2019 07:41    TPro  5.4<L>  /  Alb  3.0<L>  /  TBili  0.9  /  DBili  x   /  AST  12  /  ALT  15  /  AlkPhos  71  10-04                                                                                           LIVER FUNCTIONS - ( 04 Oct 2019 07:41 )  Alb: 3.0 g/dL / Pro: 5.4 g/dL / ALK PHOS: 71 U/L / ALT: 15 U/L / AST: 12 U/L / GGT: x                                                                                                MEDICATIONS  (STANDING):  acetaminophen   Tablet .. 975 milliGRAM(s) Oral every 8 hours  ALBUTerol    0.083% 2.5 milliGRAM(s) Nebulizer every 6 hours  ascorbic acid 500 milliGRAM(s) Oral daily  atorvastatin 20 milliGRAM(s) Oral at bedtime  BACItracin   Ointment 1 Application(s) Topical three times a day  buDESOnide 160 MICROgram(s)/formoterol 4.5 MICROgram(s) Inhaler 2 Puff(s) Inhalation two times a day  cholecalciferol 1000 Unit(s) Oral daily  dextrose 5%. 1000 milliLiter(s) (50 mL/Hr) IV Continuous <Continuous>  dextrose 50% Injectable 12.5 Gram(s) IV Push once  dextrose 50% Injectable 25 Gram(s) IV Push once  dextrose 50% Injectable 25 Gram(s) IV Push once  docusate sodium 100 milliGRAM(s) Oral three times a day  finasteride 5 milliGRAM(s) Oral daily  furosemide    Tablet 40 milliGRAM(s) Oral two times a day  heparin  Injectable 5000 Unit(s) SubCutaneous three times a day  influenza   Vaccine 0.5 milliLiter(s) IntraMuscular once  insulin glargine Injectable (LANTUS) 30 Unit(s) SubCutaneous every morning  insulin lispro (HumaLOG) corrective regimen sliding scale   SubCutaneous three times a day before meals  insulin lispro Injectable (HumaLOG) 4 Unit(s) SubCutaneous three times a day before meals  lidocaine   Patch 2 Patch Transdermal daily  lidocaine   Patch 1 Patch Transdermal daily  magnesium oxide 400 milliGRAM(s) Oral three times a day with meals  methocarbamol 750 milliGRAM(s) Oral four times a day  metoprolol tartrate 25 milliGRAM(s) Oral two times a day  nystatin    Suspension [id number] Unit(s) Oral three times a day  pantoprazole    Tablet 40 milliGRAM(s) Oral before breakfast  predniSONE   Tablet 20 milliGRAM(s) Oral two times a day  sodium polystyrene sulfonate Suspension 30 Gram(s) Oral daily  tamsulosin 0.4 milliGRAM(s) Oral at bedtime  tiotropium 18 MICROgram(s) Capsule 1 Capsule(s) Inhalation daily    MEDICATIONS  (PRN):  dextrose 40% Gel 15 Gram(s) Oral once PRN Blood Glucose LESS THAN 70 milliGRAM(s)/deciliter  glucagon  Injectable 1 milliGRAM(s) IntraMuscular once PRN Glucose LESS THAN 70 milligrams/deciliter  hydrOXYzine hydrochloride 25 milliGRAM(s) Oral at bedtime PRN Insomnia  oxyCODONE    IR 10 milliGRAM(s) Oral every 6 hours PRN Severe Pain (7 - 10)

## 2019-10-04 NOTE — PROGRESS NOTE ADULT - ASSESSMENT
Patient is a 77 y/o male with PMH of HTN, COPD, CHF, pulmonary HTN, DM2, CKD stage 3, BPH who presents with SOB and left leg pain for several days.    1. Acute on chronic respiratory failure  -Patient remains on high flow O2 at 60%. He received BIPAP last night.  -Tmax 100.9F last night, now afebrile. Leukocytosis likely due to Prednisone.  -New CXR reveals increasing interstitial opacities.  -Per Pulm: 2d echo ordered.  -Continue with CPAP, Duonebs/home inhaler, Lasix 40mg PO BID, Prednisone 20mg PO BID, Nystatin oral rinse. 7 day course of Levaquin was completed.  -Doppler B/L LE is negative for DVT.    2. Elevated troponin  -EKG shows no ischemic changes.  -Asymptomatic. Likely due to right-sided CHF and JAMES on CKD.  -Per Cardiology: No MI. His troponin is chronically mildly elevated. Continue meds.     3. Pain in left shoulder, hip, back s/p mechanical fall - improving  -X-ray of left hip, left shoulder, and lumbar spine reveal no acute fracture, degenerative changes.  -CT Abdomen and Pelvis: no acute intra-abdominal or pelvic pathology. Patchy nodular opacities at the lung bases.  -Pain Medicine recs: Methocarbamol q6h; Oxycodone Q4h PRN; Warm compress; Lidoderm patch to left knee.  -OT saw him this morning. They recommend therapy 1-2x/week and will follow.  -PT following. They worked with him yesterday.    4. Blurry vision  -Patient denies head trauma during fall. AAO x3, neurologically intact.   -Possibly due to cataract vs steroid use.  -Head CT: no acute intracranial pathology.    5. DM2  -Continue with home dose of Lantus and ISS.  -Monitor POC glucose.  -Carb consistent diet.    6.  HTN  -Amlodipine was d/c per Nephro.  -Continue Metoprolol.    7.  JAMES on CKD 3  -Renal US shows no hydronephrosis. Postvoid residual volume 170cc. No need for Velazquez.  -Nephro: Evaluate for metabolic bone disease in CKD. Phosphorus elevated at 5.5 (normal 2.1-4.9). iPTH elevated at 88 (normal 15-65).  -Avoid nephrotoxins.    8.  Hyperkalemia 2/2 CKD  -Nephro recs: 2g K diet, Lasix 40mg BID po. Dietary consult placed to provide K content on all foods.  -F/u CMP, yesterday K was 5.8. Continue Kayexalate 30g PO qd.    9. Metabolic Alkalosis  -Nephro recs: If bicarb worsens from 33, then check ABG and if pH>7.5 then give Diamox 250 q12 for 2 doses. R/o CO2 retention.    10. BPH  -Continue home meds.    11. Difficulty ambulating, Weakness of bilateral upper extremities  -PT worked with him yesterday. Recommend therapy 3-5x/week and they will monitor progress.    Prophylaxis:  -Hep subq.  -Protonix.    -Grave prognosis. Discussed with patient about end of life care. Patient is DNR/DNI. Patient refusing hospice at this time. Likely going to short term rehab.      Progress Note Handoff  Pending Consults: OT  Pending Tests: bmp  Pending Results: fanny  Family Discussion: discussed with pt in detail - in agreement  Disposition: Home_____/SNF______/Other__Short term rehab___/Unknown at this time_____    Please call me with any questions at extension [phone] Patient is a 77 y/o male with PMH of HTN, COPD, CHF, pulmonary HTN, DM2, CKD stage 3, BPH who presents with SOB and left leg pain for several days.    1. Acute on chronic respiratory failure  -Patient remains on high flow O2 at 60%. He refused BIPAP last night.  -Tmax 100.9F last night, now afebrile. Leukocytosis likely due to Prednisone.  -New CXR reveals increasing interstitial opacities.  -Per Pulm: 2d echo ordered.  -Continue with CPAP, Duonebs/home inhaler, Lasix 40mg PO BID, Prednisone 20mg PO BID, Nystatin oral rinse. 7 day course of Levaquin was completed.  -Doppler B/L LE is negative for DVT.  -pt given 40mg IV lasix x 1 now  -STAT ABG done 7.39/69.5/67.8    2. Elevated troponin  -EKG shows no ischemic changes.  -Asymptomatic. Likely due to right-sided CHF and JAMES on CKD.  -Per Cardiology: No MI. His troponin is chronically mildly elevated. Continue meds.     3. Pain in left shoulder, hip, back s/p mechanical fall - improving  -X-ray of left hip, left shoulder, and lumbar spine reveal no acute fracture, degenerative changes.  -CT Abdomen and Pelvis: no acute intra-abdominal or pelvic pathology. Patchy nodular opacities at the lung bases.  -Pain Medicine recs: Methocarbamol q6h; Oxycodone Q4h PRN; Warm compress; Lidoderm patch to left knee.  -OT saw him this morning. They recommend therapy 1-2x/week and will follow.  -PT following. They worked with him yesterday.    4. Blurry vision  -Patient denies head trauma during fall. AAO x3, neurologically intact.   -Possibly due to cataract vs steroid use.  -Head CT: no acute intracranial pathology.    5. DM2  -Continue with home dose of Lantus and ISS.  -Monitor POC glucose.  -Carb consistent diet.    6.  HTN  -Amlodipine was d/c per Nephro.  -Continue Metoprolol.    7.  JAMES on CKD 3  -Renal US shows no hydronephrosis. Postvoid residual volume 170cc. No need for Velazquez.  -Nephro: Evaluate for metabolic bone disease in CKD. Phosphorus elevated at 5.5 (normal 2.1-4.9). iPTH elevated at 88 (normal 15-65).  -Avoid nephrotoxins.    8.  Hyperkalemia 2/2 CKD  -Nephro recs: 2g K diet, Lasix 40mg BID po. Dietary consult placed to provide K content on all foods.  -potassium improved to 5.2  - Continue Kayexalate 30g PO qd.    9. Metabolic Alkalosis  -Nephro recs: If bicarb worsens from 33, then check ABG and if pH>7.5 then give Diamox 250 q12 for 2 doses. R/o CO2 retention.    10. BPH  -Continue home meds.    11. Difficulty ambulating, Weakness of bilateral upper extremities  -PT worked with him yesterday. Recommend therapy 3-5x/week and they will monitor progress.    Prophylaxis:  -Hep subq.  -Protonix.    -Grave prognosis. Discussed with patient about end of life care. Patient is DNR/DNI. Patient refusing hospice at this time. Likely going to short term rehab.      Progress Note Handoff  Pending Consults: OT  Pending Tests: fanny  Pending Results: fanny  Family Discussion: discussed with pt in detail - in agreement  Disposition: Home_____/SNF______/Other__Short term rehab___/Unknown at this time_____    Please call me with any questions at extension [phone]

## 2019-10-04 NOTE — PROGRESS NOTE ADULT - ASSESSMENT
Impression:  acute on chronic respiratory failure  CRF hypoxic and hypercapnia   copd stable   Heart failure        Suggest:    Continue symbicort, spiriva, nebsq3-4hrs, O2HFNC,   Decrease prednisone to 20mg/qd,   levaquin(Finished 7 day course)  Diuresis  bipap at night and prn, HFNC prn   Oxygen to keep pox 88-92%  DVT/GI ppx  DNR/DNI  OOB to chair  Poor prognosis      Attending attestation to follow

## 2019-10-04 NOTE — PROGRESS NOTE ADULT - SUBJECTIVE AND OBJECTIVE BOX
JIAN MANCIA  78y  Male    INTERVAL HPI/OVERNIGHT EVENTS:  Patient seen and examined earlier this morning. Patient appears lethargic and continually falls asleep throughout exam. He remains on high flow O2. He notes some improvement of his b/l shoulder stiffness following PT rehab yesterday. He denies any back and hip pain. He has baseline dyspnea but denies worsening SOB, chest pain, abdominal pain, nausea, vomiting.       Patient is a 79 y/o male with PMH of HTN, COPD, CHF, pulmonary HTN, DM2, CKD stage 3, BPH who presents with SOB for several days. Patient was brought in by EMS in respiratory distress, as his PA at home visits noted his SpO2 was in the 70s despite home O2. He was given IV Solumedrol and placed on CPAP by EMS. On arrival in the ED, patient was placed on BIPAP, given Duoneb tratment, and a dose of Levaquin. Patient notes that he normally has baseline SpO2 86-87% on 5L O2 at home. Patient also complains of left leg pain and weakness, along with pain in his left hip and lower back, s/p a mechanical trip and fall earlier this week. He denies head trauma or LOC. He normally walks at home independently but has been unable to ambulate for several days due to pain. He notes the pain worsens when lying down, making it difficult to sleep. Patient also reports occasional blurry vision sporadically for the past several months.       REVIEW OF SYSTEMS:  CONSTITUTIONAL: No fever, weight loss, or fatigue  EYES: No eye pain, visual disturbances, or discharge  ENMT:  No difficulty hearing, tinnitus, vertigo; No sinus or throat pain  NECK: No pain or stiffness  RESPIRATORY: +SOB. No cough, chills or hemoptysis.  CARDIOVASCULAR: No chest pain, palpitations, dizziness, or leg swelling  GASTROINTESTINAL: No abdominal or epigastric pain. No nausea, vomiting, or hematemesis; No diarrhea or constipation. No melena or hematochezia.  GENITOURINARY: No dysuria, frequency, hematuria, or incontinence  NEUROLOGICAL: No headaches, memory loss, loss of strength, numbness, or tremors  SKIN: No itching, burning, rashes, or lesions   LYMPH NODES: No enlarged glands  ENDOCRINE: No heat or cold intolerance; No hair loss  MUSCULOSKELETAL: +Bilateral shoulder stiffness. No back pain.  PSYCHIATRIC: No depression, anxiety, mood swings, or difficulty sleeping  HEME/LYMPH: No easy bruising, or bleeding gums  ALLERY AND IMMUNOLOGIC: No hives or eczema    Vital Signs Last 24 Hrs  T(C): 36.8 (04 Oct 2019 05:37), Max: 38.3 (03 Oct 2019 21:09)  T(F): 98.2 (04 Oct 2019 05:37), Max: 100.9 (03 Oct 2019 21:09)  HR: 94 (04 Oct 2019 05:37) (93 - 98)  BP: 99/56 (04 Oct 2019 05:37) (93/58 - 123/60)  BP(mean): --  RR: 20 (03 Oct 2019 21:09) (20 - 20)  SpO2: 93% (04 Oct 2019 08:34) (85% - 94%)    PHYSICAL EXAM:  GENERAL: Lethargic. NAD. Obese, chronic ill-appearing male.  HEAD:  Atraumatic, Normocephalic  EYES: EOMI, PERRLA, conjunctiva and sclera clear  ENMT: No tonsillar erythema, exudates, or enlargement; Moist mucous membranes.  NECK: Supple, No JVD, Normal thyroid  NERVOUS SYSTEM:  Alert & Oriented X3; Motor Strength 2/5 B/L upper and lower extremities; DTRs 2+ intact and symmetric  CHEST/LUNG: Decreased breath sounds bilaterally. Expiratory wheezing. No rales, rhonchi.  HEART: Regular rate and rhythm; No murmurs, rubs, or gallops  ABDOMEN: Obese. Soft, Nontender, Nondistended; Bowel sounds present  EXTREMITIES:  B/L lower extremity pitting edema. 2+ Peripheral Pulses.  LYMPH: No lymphadenopathy noted  SKIN: No rashes or lesions    Consultant(s) Notes Reviewed:  [x ] YES  [ ] NO  Care Discussed with Consultants/Other Providers [ x] YES  [ ] NO      LAB:                       13.8   20.01 )-----------( 378      ( 03 Oct 2019 06:23 )             46.4     10-03    137  |  89<L>  |  114<HH>  ----------------------------<  314<H>  5.8<H>   |  37<H>  |  2.8<H>    Ca    9.1      03 Oct 2019 06:23    TPro  5.8<L>  /  Alb  3.3<L>  /  TBili  0.8  /  DBili  x   /  AST  9   /  ALT  12  /  AlkPhos  63  10-03      CARDIAC MARKERS ( 25 Sep 2019 19:20 )  x     / 0.05 ng/mL / x     / x     / x      CARDIAC MARKERS ( 25 Sep 2019 11:30 )  x     / 0.07 ng/mL / x     / x     / x          Drug Dosing Weight  Height (cm): 173.99 (02 Oct 2019 10:27)  Weight (kg): 120.7 (25 Sep 2019 11:20)  BMI (kg/m2): 39.9 (02 Oct 2019 10:27)  BSA (m2): 2.32 (02 Oct 2019 10:27)  Height (cm): 173.99 (10-02-19 @ 10:27)      CAPILLARY BLOOD GLUCOSE  POCT Blood Glucose.: 213 mg/dL (04 Oct 2019 07:35)  POCT Blood Glucose.: 221 mg/dL (03 Oct 2019 21:29)  POCT Blood Glucose.: 336 mg/dL (03 Oct 2019 16:35)  POCT Blood Glucose.: 322 mg/dL (03 Oct 2019 11:13)      10-03 @ 07:01  -  10-04 @ 07:00  --------------------------------------------------------  IN: 930 mL / OUT: 1100 mL / NET: -170 mL    RADIOLOGY & ADDITIONAL TESTS:  Imaging Personally Reviewed:  [x] YES  [ ] NO    Xray Chest 1 View- PORTABLE-Routine (10.03.19 @ 16:09) >  Impression:  Increasing interstitial opacities    Xray Chest 1 View- PORTABLE-Routine (09.30.19 @ 07:16) >  Impression: Bibasilar opacities and cardiomegaly, stable.    Xray Shoulder 2 Views, Left (09.29.19 @ 22:21) >  Impression: No acute fracture or dislocation. Greater humeral tuberosity calcific peritendinitis. Shoulder and AC joint degenerative changes.    Xray Lumbar Spine AP + Lateral (09.27.19 @ 16:00) >  Impression: No evidence of acute fracture. Degenerative change, lumbar spine.    Xray Hip 1 View, Left (09.27.19 @ 16:00) >  IMPRESSION: No evidence of acute fracture. Degenerative change of left hip.    CT Abdomen and Pelvis No Cont (09.26.19 @ 12:12) >  IMPRESSION:  Patchy nodular opacities at the lung bases, nonspecific in nature. Follow to resolution is recommended.  No acute intra-abdominal or pelvic pathology.  Again seen is an indeterminate hyperdense exophytic left upper pole renal lesion, measuring approximately 4 cm.    CT Head No Cont (09.26.19 @ 11:47) >  IMPRESSION:   1.  No evidence of acute intracranial pathology.    2.  Moderate chronic microvascular changes.    VA Duplex Lower Ext Vein Scan, Bilat (09.26.19 @ 11:06) >  Impression:  No evidence of deep venous thrombosis or superficial thrombophlebitis in bilateral lower extremities.    US Kidney and Bladder (09.25.19 @ 22:52) >  IMPRESSION: No hydronephrosis. Urinary bladder postvoid residual of 170 cc.      HEALTH ISSUES - PROBLEM Dx:  Pain: Pain  Enlarged prostate: Enlarged prostate  Hypertension: Hypertension  Diabetes mellitus, type 2: Diabetes mellitus, type 2  Left leg pain: Left leg pain  Acute on chronic respiratory failure: Acute on chronic respiratory failure      MEDICATIONS  (STANDING):  acetaminophen   Tablet .. 975 milliGRAM(s) Oral every 8 hours  ALBUTerol    0.083% 2.5 milliGRAM(s) Nebulizer every 6 hours  ascorbic acid 500 milliGRAM(s) Oral daily  atorvastatin 20 milliGRAM(s) Oral at bedtime  BACItracin   Ointment 1 Application(s) Topical three times a day  buDESOnide 160 MICROgram(s)/formoterol 4.5 MICROgram(s) Inhaler 2 Puff(s) Inhalation two times a day  cholecalciferol 1000 Unit(s) Oral daily  dextrose 5%. 1000 milliLiter(s) (50 mL/Hr) IV Continuous <Continuous>  dextrose 50% Injectable 12.5 Gram(s) IV Push once  dextrose 50% Injectable 25 Gram(s) IV Push once  dextrose 50% Injectable 25 Gram(s) IV Push once  docusate sodium 100 milliGRAM(s) Oral three times a day  finasteride 5 milliGRAM(s) Oral daily  furosemide    Tablet 40 milliGRAM(s) Oral two times a day  heparin  Injectable 5000 Unit(s) SubCutaneous three times a day  influenza   Vaccine 0.5 milliLiter(s) IntraMuscular once  insulin glargine Injectable (LANTUS) 30 Unit(s) SubCutaneous every morning  insulin lispro (HumaLOG) corrective regimen sliding scale   SubCutaneous three times a day before meals  insulin lispro Injectable (HumaLOG) 4 Unit(s) SubCutaneous three times a day before meals  lidocaine   Patch 2 Patch Transdermal daily  lidocaine   Patch 1 Patch Transdermal daily  magnesium oxide 400 milliGRAM(s) Oral three times a day with meals  methocarbamol 750 milliGRAM(s) Oral four times a day  metoprolol tartrate 25 milliGRAM(s) Oral two times a day  nystatin    Suspension 468883 Unit(s) Oral three times a day  pantoprazole    Tablet 40 milliGRAM(s) Oral before breakfast  predniSONE   Tablet 20 milliGRAM(s) Oral two times a day  sodium polystyrene sulfonate Suspension 30 Gram(s) Oral daily  tamsulosin 0.4 milliGRAM(s) Oral at bedtime  tiotropium 18 MICROgram(s) Capsule 1 Capsule(s) Inhalation daily    MEDICATIONS  (PRN):  dextrose 40% Gel 15 Gram(s) Oral once PRN Blood Glucose LESS THAN 70 milliGRAM(s)/deciliter  glucagon  Injectable 1 milliGRAM(s) IntraMuscular once PRN Glucose LESS THAN 70 milligrams/deciliter  hydrOXYzine hydrochloride 25 milliGRAM(s) Oral at bedtime PRN Insomnia  oxyCODONE    IR 10 milliGRAM(s) Oral every 6 hours PRN Severe Pain (7 - 10) LÓPEZJIAN SMITH  78y  Male    INTERVAL HPI/OVERNIGHT EVENTS:  Patient seen and examined earlier this morning. Patient appears lethargic and continually falls asleep throughout exam. He remains on high flow O2. He notes some improvement of his b/l shoulder stiffness following PT rehab yesterday. He denies any back and hip pain. He has baseline dyspnea but denies worsening SOB, chest pain, abdominal pain, nausea, vomiting.       Patient is a 79 y/o male with PMH of HTN, COPD, CHF, pulmonary HTN, DM2, CKD stage 3, BPH who presents with SOB for several days. Patient was brought in by EMS in respiratory distress, as his PA at home visits noted his SpO2 was in the 70s despite home O2. He was given IV Solumedrol and placed on CPAP by EMS. On arrival in the ED, patient was placed on BIPAP, given Duoneb tratment, and a dose of Levaquin. Patient notes that he normally has baseline SpO2 86-87% on 5L O2 at home. Patient also complains of left leg pain and weakness, along with pain in his left hip and lower back, s/p a mechanical trip and fall earlier this week. He denies head trauma or LOC. He normally walks at home independently but has been unable to ambulate for several days due to pain. He notes the pain worsens when lying down, making it difficult to sleep. Patient also reports occasional blurry vision sporadically for the past several months.       REVIEW OF SYSTEMS:  CONSTITUTIONAL: No fever, weight loss + fatigue/lethargy  EYES: No eye pain, visual disturbances, or discharge  ENMT:  No difficulty hearing, tinnitus, vertigo; No sinus or throat pain  NECK: No pain or stiffness  RESPIRATORY: +SOB. No cough, chills or hemoptysis.  CARDIOVASCULAR: No chest pain, palpitations, dizziness, or leg swelling  GASTROINTESTINAL: No abdominal or epigastric pain. No nausea, vomiting, or hematemesis; No diarrhea or constipation. No melena or hematochezia.  GENITOURINARY: No dysuria, frequency, hematuria, or incontinence  NEUROLOGICAL: No headaches, memory loss, loss of strength, numbness, or tremors  SKIN: No itching, burning, rashes, or lesions   LYMPH NODES: No enlarged glands  ENDOCRINE: No heat or cold intolerance; No hair loss  MUSCULOSKELETAL: +Bilateral shoulder stiffness. No back pain.  PSYCHIATRIC: No depression, anxiety, mood swings, or difficulty sleeping  HEME/LYMPH: No easy bruising, or bleeding gums  ALLERY AND IMMUNOLOGIC: No hives or eczema    Vital Signs Last 24 Hrs  T(C): 36.8 (04 Oct 2019 05:37), Max: 38.3 (03 Oct 2019 21:09)  T(F): 98.2 (04 Oct 2019 05:37), Max: 100.9 (03 Oct 2019 21:09)  HR: 94 (04 Oct 2019 05:37) (93 - 98)  BP: 99/56 (04 Oct 2019 05:37) (93/58 - 123/60)  BP(mean): --  RR: 20 (03 Oct 2019 21:09) (20 - 20)  SpO2: 93% (04 Oct 2019 08:34) (85% - 94%)    PHYSICAL EXAM:  GENERAL: Lethargic. NAD. Obese, chronic ill-appearing male.  HEAD:  Atraumatic, Normocephalic  EYES: EOMI, PERRLA, conjunctiva and sclera clear  ENMT: No tonsillar erythema, exudates, or enlargement; Moist mucous membranes.  NECK: Supple, No JVD, Normal thyroid  NERVOUS SYSTEM:  Alert & Oriented X3; Motor Strength 2/5 B/L upper and lower extremities; DTRs 2+ intact and symmetric  CHEST/LUNG: Decreased breath sounds bilaterally. Expiratory wheezing. No rales, rhonchi.  HEART: Regular rate and rhythm; No murmurs, rubs, or gallops  ABDOMEN: Obese. Soft, Nontender, Nondistended; Bowel sounds present  EXTREMITIES:  B/L lower extremity pitting edema. 2+ Peripheral Pulses.  LYMPH: No lymphadenopathy noted  SKIN: No rashes or lesions    Consultant(s) Notes Reviewed:  [x ] YES  [ ] NO  Care Discussed with Consultants/Other Providers [ x] YES  [ ] NO      LAB:                                  13.8   17.94 )-----------( 415      ( 04 Oct 2019 07:41 )             46.2     10-04    138  |  90<L>  |  122<HH>  ----------------------------<  239<H>  5.2<H>   |  37<H>  |  2.4<H>    Ca    8.8      04 Oct 2019 07:41    TPro  5.4<L>  /  Alb  3.0<L>  /  TBili  0.9  /  DBili  x   /  AST  12  /  ALT  15  /  AlkPhos  71  10-04        CARDIAC MARKERS ( 25 Sep 2019 19:20 )  x     / 0.05 ng/mL / x     / x     / x      CARDIAC MARKERS ( 25 Sep 2019 11:30 )  x     / 0.07 ng/mL / x     / x     / x          Drug Dosing Weight  Height (cm): 173.99 (02 Oct 2019 10:27)  Weight (kg): 120.7 (25 Sep 2019 11:20)  BMI (kg/m2): 39.9 (02 Oct 2019 10:27)  BSA (m2): 2.32 (02 Oct 2019 10:27)  Height (cm): 173.99 (10-02-19 @ 10:27)      CAPILLARY BLOOD GLUCOSE  POCT Blood Glucose.: 213 mg/dL (04 Oct 2019 07:35)  POCT Blood Glucose.: 221 mg/dL (03 Oct 2019 21:29)  POCT Blood Glucose.: 336 mg/dL (03 Oct 2019 16:35)  POCT Blood Glucose.: 322 mg/dL (03 Oct 2019 11:13)      10-03 @ 07:01  -  10-04 @ 07:00  --------------------------------------------------------  IN: 930 mL / OUT: 1100 mL / NET: -170 mL    RADIOLOGY & ADDITIONAL TESTS:  Imaging Personally Reviewed:  [x] YES  [ ] NO    Xray Chest 1 View- PORTABLE-Routine (10.03.19 @ 16:09) >  Impression:  Increasing interstitial opacities    Xray Chest 1 View- PORTABLE-Routine (09.30.19 @ 07:16) >  Impression: Bibasilar opacities and cardiomegaly, stable.    Xray Shoulder 2 Views, Left (09.29.19 @ 22:21) >  Impression: No acute fracture or dislocation. Greater humeral tuberosity calcific peritendinitis. Shoulder and AC joint degenerative changes.    Xray Lumbar Spine AP + Lateral (09.27.19 @ 16:00) >  Impression: No evidence of acute fracture. Degenerative change, lumbar spine.    Xray Hip 1 View, Left (09.27.19 @ 16:00) >  IMPRESSION: No evidence of acute fracture. Degenerative change of left hip.    CT Abdomen and Pelvis No Cont (09.26.19 @ 12:12) >  IMPRESSION:  Patchy nodular opacities at the lung bases, nonspecific in nature. Follow to resolution is recommended.  No acute intra-abdominal or pelvic pathology.  Again seen is an indeterminate hyperdense exophytic left upper pole renal lesion, measuring approximately 4 cm.    CT Head No Cont (09.26.19 @ 11:47) >  IMPRESSION:   1.  No evidence of acute intracranial pathology.    2.  Moderate chronic microvascular changes.    VA Duplex Lower Ext Vein Scan, Bilat (09.26.19 @ 11:06) >  Impression:  No evidence of deep venous thrombosis or superficial thrombophlebitis in bilateral lower extremities.    US Kidney and Bladder (09.25.19 @ 22:52) >  IMPRESSION: No hydronephrosis. Urinary bladder postvoid residual of 170 cc.      HEALTH ISSUES - PROBLEM Dx:  Pain: Pain  Enlarged prostate: Enlarged prostate  Hypertension: Hypertension  Diabetes mellitus, type 2: Diabetes mellitus, type 2  Left leg pain: Left leg pain  Acute on chronic respiratory failure: Acute on chronic respiratory failure      MEDICATIONS  (STANDING):  acetaminophen   Tablet .. 975 milliGRAM(s) Oral every 8 hours  ALBUTerol    0.083% 2.5 milliGRAM(s) Nebulizer every 6 hours  ascorbic acid 500 milliGRAM(s) Oral daily  atorvastatin 20 milliGRAM(s) Oral at bedtime  BACItracin   Ointment 1 Application(s) Topical three times a day  buDESOnide 160 MICROgram(s)/formoterol 4.5 MICROgram(s) Inhaler 2 Puff(s) Inhalation two times a day  cholecalciferol 1000 Unit(s) Oral daily  dextrose 5%. 1000 milliLiter(s) (50 mL/Hr) IV Continuous <Continuous>  dextrose 50% Injectable 12.5 Gram(s) IV Push once  dextrose 50% Injectable 25 Gram(s) IV Push once  dextrose 50% Injectable 25 Gram(s) IV Push once  docusate sodium 100 milliGRAM(s) Oral three times a day  finasteride 5 milliGRAM(s) Oral daily  furosemide    Tablet 40 milliGRAM(s) Oral two times a day  heparin  Injectable 5000 Unit(s) SubCutaneous three times a day  influenza   Vaccine 0.5 milliLiter(s) IntraMuscular once  insulin glargine Injectable (LANTUS) 30 Unit(s) SubCutaneous every morning  insulin lispro (HumaLOG) corrective regimen sliding scale   SubCutaneous three times a day before meals  insulin lispro Injectable (HumaLOG) 4 Unit(s) SubCutaneous three times a day before meals  lidocaine   Patch 2 Patch Transdermal daily  lidocaine   Patch 1 Patch Transdermal daily  magnesium oxide 400 milliGRAM(s) Oral three times a day with meals  methocarbamol 750 milliGRAM(s) Oral four times a day  metoprolol tartrate 25 milliGRAM(s) Oral two times a day  nystatin    Suspension [id number] Unit(s) Oral three times a day  pantoprazole    Tablet 40 milliGRAM(s) Oral before breakfast  predniSONE   Tablet 20 milliGRAM(s) Oral two times a day  sodium polystyrene sulfonate Suspension 30 Gram(s) Oral daily  tamsulosin 0.4 milliGRAM(s) Oral at bedtime  tiotropium 18 MICROgram(s) Capsule 1 Capsule(s) Inhalation daily    MEDICATIONS  (PRN):  dextrose 40% Gel 15 Gram(s) Oral once PRN Blood Glucose LESS THAN 70 milliGRAM(s)/deciliter  glucagon  Injectable 1 milliGRAM(s) IntraMuscular once PRN Glucose LESS THAN 70 milligrams/deciliter  hydrOXYzine hydrochloride 25 milliGRAM(s) Oral at bedtime PRN Insomnia  oxyCODONE    IR 10 milliGRAM(s) Oral every 6 hours PRN Severe Pain (7 - 10)

## 2019-10-04 NOTE — OCCUPATIONAL THERAPY INITIAL EVALUATION ADULT - RANGE OF MOTION EXAMINATION
BUE AROM, shoulder flexion, abduction, extension 0, elbow WFL, wrist WF, left hand digits WFL, right hand unble to fully form a fist due to edema. PROM BUE WFL except right hand secondary edema/deficits as listed below

## 2019-10-04 NOTE — OCCUPATIONAL THERAPY INITIAL EVALUATION ADULT - GENERAL OBSERVATIONS, REHAB EVAL
chart reviewed, as per RN pt ok to be seen by OT. pt received reclined in bedside recliner, agreeable to OT eval

## 2019-10-04 NOTE — PROGRESS NOTE ADULT - ATTENDING COMMENTS
Attending Statement: I have personally performed a face to face diagnostic evaluation on this patient. I have reviewed the above note and agree with the history, exam and plan of care, except as I have noted in the text.
Patient seen and examine independently  Agree with medical student's note except where edited by me
Patient seen and examined independently  Agree with medical student's note except where edited by me
Patient seen and examined independently  Agree with medical student's note except where edited by me.

## 2019-10-05 LAB
ALBUMIN SERPL ELPH-MCNC: 3.1 G/DL — LOW (ref 3.5–5.2)
ALP SERPL-CCNC: 77 U/L — SIGNIFICANT CHANGE UP (ref 30–115)
ALT FLD-CCNC: 16 U/L — SIGNIFICANT CHANGE UP (ref 0–41)
ANION GAP SERPL CALC-SCNC: 18 MMOL/L — HIGH (ref 7–14)
AST SERPL-CCNC: 14 U/L — SIGNIFICANT CHANGE UP (ref 0–41)
BILIRUB SERPL-MCNC: 0.7 MG/DL — SIGNIFICANT CHANGE UP (ref 0.2–1.2)
BUN SERPL-MCNC: 131 MG/DL — CRITICAL HIGH (ref 10–20)
CALCIUM SERPL-MCNC: 9.1 MG/DL — SIGNIFICANT CHANGE UP (ref 8.5–10.1)
CHLORIDE SERPL-SCNC: 89 MMOL/L — LOW (ref 98–110)
CO2 SERPL-SCNC: 34 MMOL/L — HIGH (ref 17–32)
CREAT SERPL-MCNC: 2.4 MG/DL — HIGH (ref 0.7–1.5)
GLUCOSE BLDC GLUCOMTR-MCNC: 201 MG/DL — HIGH (ref 70–99)
GLUCOSE BLDC GLUCOMTR-MCNC: 214 MG/DL — HIGH (ref 70–99)
GLUCOSE BLDC GLUCOMTR-MCNC: 223 MG/DL — HIGH (ref 70–99)
GLUCOSE BLDC GLUCOMTR-MCNC: 236 MG/DL — HIGH (ref 70–99)
GLUCOSE SERPL-MCNC: 226 MG/DL — HIGH (ref 70–99)
HCT VFR BLD CALC: 47.9 % — SIGNIFICANT CHANGE UP (ref 42–52)
HGB BLD-MCNC: 13.9 G/DL — LOW (ref 14–18)
MCHC RBC-ENTMCNC: 22.7 PG — LOW (ref 27–31)
MCHC RBC-ENTMCNC: 29 G/DL — LOW (ref 32–37)
MCV RBC AUTO: 78.3 FL — LOW (ref 80–94)
NRBC # BLD: 0 /100 WBCS — SIGNIFICANT CHANGE UP (ref 0–0)
PLATELET # BLD AUTO: 407 K/UL — HIGH (ref 130–400)
POTASSIUM SERPL-MCNC: 5.2 MMOL/L — HIGH (ref 3.5–5)
POTASSIUM SERPL-SCNC: 5.2 MMOL/L — HIGH (ref 3.5–5)
PROT SERPL-MCNC: 6 G/DL — SIGNIFICANT CHANGE UP (ref 6–8)
RBC # BLD: 6.12 M/UL — HIGH (ref 4.7–6.1)
RBC # FLD: 20.2 % — HIGH (ref 11.5–14.5)
SODIUM SERPL-SCNC: 141 MMOL/L — SIGNIFICANT CHANGE UP (ref 135–146)
WBC # BLD: 17.29 K/UL — HIGH (ref 4.8–10.8)
WBC # FLD AUTO: 17.29 K/UL — HIGH (ref 4.8–10.8)

## 2019-10-05 PROCEDURE — 99233 SBSQ HOSP IP/OBS HIGH 50: CPT

## 2019-10-05 RX ORDER — HYDROXYZINE HCL 10 MG
50 TABLET ORAL ONCE
Refills: 0 | Status: COMPLETED | OUTPATIENT
Start: 2019-10-05 | End: 2019-10-05

## 2019-10-05 RX ORDER — OXYCODONE HYDROCHLORIDE 5 MG/1
10 TABLET ORAL ONCE
Refills: 0 | Status: DISCONTINUED | OUTPATIENT
Start: 2019-10-05 | End: 2019-10-05

## 2019-10-05 RX ADMIN — OXYCODONE HYDROCHLORIDE 10 MILLIGRAM(S): 5 TABLET ORAL at 13:45

## 2019-10-05 RX ADMIN — Medication 4 UNIT(S): at 11:11

## 2019-10-05 RX ADMIN — MAGNESIUM OXIDE 400 MG ORAL TABLET 400 MILLIGRAM(S): 241.3 TABLET ORAL at 11:12

## 2019-10-05 RX ADMIN — Medication 4 UNIT(S): at 08:08

## 2019-10-05 RX ADMIN — LIDOCAINE 2 PATCH: 4 CREAM TOPICAL at 23:20

## 2019-10-05 RX ADMIN — BUDESONIDE AND FORMOTEROL FUMARATE DIHYDRATE 2 PUFF(S): 160; 4.5 AEROSOL RESPIRATORY (INHALATION) at 08:45

## 2019-10-05 RX ADMIN — Medication 500000 UNIT(S): at 21:55

## 2019-10-05 RX ADMIN — Medication 25 MILLIGRAM(S): at 17:45

## 2019-10-05 RX ADMIN — Medication 975 MILLIGRAM(S): at 21:54

## 2019-10-05 RX ADMIN — Medication 975 MILLIGRAM(S): at 13:25

## 2019-10-05 RX ADMIN — METHOCARBAMOL 750 MILLIGRAM(S): 500 TABLET, FILM COATED ORAL at 11:12

## 2019-10-05 RX ADMIN — LIDOCAINE 1 PATCH: 4 CREAM TOPICAL at 01:21

## 2019-10-05 RX ADMIN — Medication 100 MILLIGRAM(S): at 21:54

## 2019-10-05 RX ADMIN — ALBUTEROL 2.5 MILLIGRAM(S): 90 AEROSOL, METERED ORAL at 14:08

## 2019-10-05 RX ADMIN — LIDOCAINE 2 PATCH: 4 CREAM TOPICAL at 01:21

## 2019-10-05 RX ADMIN — Medication 100 MILLIGRAM(S): at 13:06

## 2019-10-05 RX ADMIN — ALBUTEROL 2.5 MILLIGRAM(S): 90 AEROSOL, METERED ORAL at 19:56

## 2019-10-05 RX ADMIN — HEPARIN SODIUM 5000 UNIT(S): 5000 INJECTION INTRAVENOUS; SUBCUTANEOUS at 13:06

## 2019-10-05 RX ADMIN — Medication 1000 UNIT(S): at 11:12

## 2019-10-05 RX ADMIN — LIDOCAINE 2 PATCH: 4 CREAM TOPICAL at 11:13

## 2019-10-05 RX ADMIN — Medication 4: at 08:08

## 2019-10-05 RX ADMIN — MAGNESIUM OXIDE 400 MG ORAL TABLET 400 MILLIGRAM(S): 241.3 TABLET ORAL at 08:09

## 2019-10-05 RX ADMIN — HEPARIN SODIUM 5000 UNIT(S): 5000 INJECTION INTRAVENOUS; SUBCUTANEOUS at 21:55

## 2019-10-05 RX ADMIN — OXYCODONE HYDROCHLORIDE 10 MILLIGRAM(S): 5 TABLET ORAL at 23:20

## 2019-10-05 RX ADMIN — MAGNESIUM OXIDE 400 MG ORAL TABLET 400 MILLIGRAM(S): 241.3 TABLET ORAL at 17:45

## 2019-10-05 RX ADMIN — METHOCARBAMOL 750 MILLIGRAM(S): 500 TABLET, FILM COATED ORAL at 17:45

## 2019-10-05 RX ADMIN — OXYCODONE HYDROCHLORIDE 10 MILLIGRAM(S): 5 TABLET ORAL at 01:48

## 2019-10-05 RX ADMIN — OXYCODONE HYDROCHLORIDE 10 MILLIGRAM(S): 5 TABLET ORAL at 03:37

## 2019-10-05 RX ADMIN — Medication 4: at 11:11

## 2019-10-05 RX ADMIN — METHOCARBAMOL 750 MILLIGRAM(S): 500 TABLET, FILM COATED ORAL at 01:21

## 2019-10-05 RX ADMIN — Medication 40 MILLIGRAM(S): at 08:09

## 2019-10-05 RX ADMIN — INSULIN GLARGINE 30 UNIT(S): 100 INJECTION, SOLUTION SUBCUTANEOUS at 08:08

## 2019-10-05 RX ADMIN — FINASTERIDE 5 MILLIGRAM(S): 5 TABLET, FILM COATED ORAL at 11:12

## 2019-10-05 RX ADMIN — OXYCODONE HYDROCHLORIDE 10 MILLIGRAM(S): 5 TABLET ORAL at 14:53

## 2019-10-05 RX ADMIN — Medication 975 MILLIGRAM(S): at 21:56

## 2019-10-05 RX ADMIN — Medication 1 APPLICATION(S): at 13:07

## 2019-10-05 RX ADMIN — Medication 4 UNIT(S): at 16:44

## 2019-10-05 RX ADMIN — TIOTROPIUM BROMIDE 1 CAPSULE(S): 18 CAPSULE ORAL; RESPIRATORY (INHALATION) at 08:45

## 2019-10-05 RX ADMIN — METHOCARBAMOL 750 MILLIGRAM(S): 500 TABLET, FILM COATED ORAL at 23:20

## 2019-10-05 RX ADMIN — ATORVASTATIN CALCIUM 20 MILLIGRAM(S): 80 TABLET, FILM COATED ORAL at 21:54

## 2019-10-05 RX ADMIN — Medication 975 MILLIGRAM(S): at 13:06

## 2019-10-05 RX ADMIN — METHOCARBAMOL 750 MILLIGRAM(S): 500 TABLET, FILM COATED ORAL at 08:09

## 2019-10-05 RX ADMIN — TAMSULOSIN HYDROCHLORIDE 0.4 MILLIGRAM(S): 0.4 CAPSULE ORAL at 21:55

## 2019-10-05 RX ADMIN — Medication 20 MILLIGRAM(S): at 17:45

## 2019-10-05 RX ADMIN — Medication 500000 UNIT(S): at 13:06

## 2019-10-05 RX ADMIN — Medication 500 MILLIGRAM(S): at 11:12

## 2019-10-05 RX ADMIN — Medication 1 APPLICATION(S): at 21:56

## 2019-10-05 RX ADMIN — Medication 20 MILLIGRAM(S): at 08:09

## 2019-10-05 RX ADMIN — Medication 4: at 16:44

## 2019-10-05 RX ADMIN — LIDOCAINE 2 PATCH: 4 CREAM TOPICAL at 21:56

## 2019-10-05 RX ADMIN — OXYCODONE HYDROCHLORIDE 10 MILLIGRAM(S): 5 TABLET ORAL at 22:00

## 2019-10-05 RX ADMIN — Medication 40 MILLIGRAM(S): at 17:45

## 2019-10-05 RX ADMIN — ALBUTEROL 2.5 MILLIGRAM(S): 90 AEROSOL, METERED ORAL at 07:35

## 2019-10-05 RX ADMIN — Medication 50 MILLIGRAM(S): at 01:50

## 2019-10-05 RX ADMIN — SODIUM POLYSTYRENE SULFONATE 30 GRAM(S): 4.1 POWDER, FOR SUSPENSION ORAL at 11:15

## 2019-10-05 NOTE — PROGRESS NOTE ADULT - ASSESSMENT
Patient is a 77 y/o male with PMH of HTN, COPD, CHF, pulmonary HTN, DM2, CKD stage 3, BPH who presents with SOB and left leg pain for several days.    1. Acute on chronic respiratory failure with hypoxia and hypercapnia  -Patient remains on high flow O2   -bipap at night  -s/p IV lasix  -Per Pulm: 2d echo ordered.  -Continue with CPAP, Duonebs/home inhaler, Lasix 40mg PO BID, Prednisone 20mg PO BID, Nystatin oral rinse. 7 day course of Levaquin was completed.  -Doppler B/L LE is negative for DVT.    2. Elevated troponin  -EKG shows no ischemic changes.  -Asymptomatic. Likely due to right-sided CHF and JAMES on CKD.  -Per Cardiology: No MI. His troponin is chronically mildly elevated. Continue meds.     3. Pain in left shoulder, hip, back s/p mechanical fall - improving  -X-ray of left hip, left shoulder, and lumbar spine reveal no acute fracture, degenerative changes.  -CT Abdomen and Pelvis: no acute intra-abdominal or pelvic pathology. Patchy nodular opacities at the lung bases.  -Pain Medicine recs: Methocarbamol q6h; Oxycodone Q4h PRN; Warm compress; Lidoderm patch to left knee.  -OT saw him this morning. They recommend therapy 1-2x/week and will follow.  -PT following. They worked with him yesterday.    4. Blurry vision - resolved  -Patient denies head trauma during fall. AAO x3, neurologically intact.   -Possibly due to cataract vs steroid use.  -Head CT: no acute intracranial pathology.    5. DM2  -Continue with home dose of Lantus and ISS.  -Monitor POC glucose.  -Carb consistent diet.    6.  HTN  -Amlodipine was d/c per Nephro.  -Continue Metoprolol.    7.  JAMES on CKD 3  -Renal US shows no hydronephrosis. Postvoid residual volume 170cc. No need for Velazquez.  -Nephro: Evaluate for metabolic bone disease in CKD. Phosphorus elevated at 5.5 (normal 2.1-4.9). iPTH elevated at 88 (normal 15-65).  -Avoid nephrotoxins.    8.  Hyperkalemia 2/2 CKD  -Nephro recs: 2g K diet, Lasix 40mg BID po. Dietary consult placed to provide K content on all foods.  -potassium improved to 5.2  - Continue Kayexalate 30g PO qd.    9. Metabolic Alkalosis  -Nephro recs: If bicarb worsens from 33, then check ABG and if pH>7.5 then give Diamox 250 q12 for 2 doses. R/o CO2 retention.    10. BPH  -Continue home meds.    11. Difficulty ambulating, Weakness of bilateral upper extremities  -PT worked with him yesterday. Recommend therapy 3-5x/week and they will monitor progress.    Prophylaxis:  -Hep subq.  -Protonix.    -Grave prognosis. Discussed with patient about end of life care. Patient is DNR/DNI. Patient refusing hospice at this time. Likely going to short term rehab.      Progress Note Handoff  Pending Consults: OT  Pending Tests: fanny  Pending Results: fanny  Family Discussion: discussed with pt in detail - in agreement  Disposition: Home_____/SNF______/Other__Short term rehab___/Unknown at this time_____    Please call me with any questions at extension [phone]

## 2019-10-05 NOTE — PROGRESS NOTE ADULT - SUBJECTIVE AND OBJECTIVE BOX
JIAN MANCIA  78y  Male    INTERVAL HPI/OVERNIGHT EVENTS:  Patient seen and examined earlier this morning.  Lying comfortably in bed.  On highflow oxygen.  Patient denies any specific complaints.      Patient is a 77 y/o male with PMH of HTN, COPD, CHF, pulmonary HTN, DM2, CKD stage 3, BPH who presents with SOB for several days. Patient was brought in by EMS in respiratory distress, as his PA at home visits noted his SpO2 was in the 70s despite home O2. He was given IV Solumedrol and placed on CPAP by EMS. On arrival in the ED, patient was placed on BIPAP, given Duoneb tratment, and a dose of Levaquin. Patient notes that he normally has baseline SpO2 86-87% on 5L O2 at home. Patient also complains of left leg pain and weakness, along with pain in his left hip and lower back, s/p a mechanical trip and fall earlier this week. He denies head trauma or LOC. He normally walks at home independently but has been unable to ambulate for several days due to pain. He notes the pain worsens when lying down, making it difficult to sleep. Patient also reports occasional blurry vision sporadically for the past several months.       REVIEW OF SYSTEMS:  CONSTITUTIONAL: No fever, weight loss + fatigue/lethargy  EYES: No eye pain, visual disturbances, or discharge  ENMT:  No difficulty hearing, tinnitus, vertigo; No sinus or throat pain  NECK: No pain or stiffness  RESPIRATORY: +SOB. No cough, chills or hemoptysis.  CARDIOVASCULAR: No chest pain, palpitations, dizziness, or leg swelling  GASTROINTESTINAL: No abdominal or epigastric pain. No nausea, vomiting, or hematemesis; No diarrhea or constipation. No melena or hematochezia.  GENITOURINARY: No dysuria, frequency, hematuria, or incontinence  NEUROLOGICAL: No headaches, memory loss, loss of strength, numbness, or tremors  SKIN: No itching, burning, rashes, or lesions   LYMPH NODES: No enlarged glands  ENDOCRINE: No heat or cold intolerance; No hair loss  MUSCULOSKELETAL: +Bilateral shoulder stiffness. No back pain.  PSYCHIATRIC: No depression, anxiety, mood swings, or difficulty sleeping  HEME/LYMPH: No easy bruising, or bleeding gums  ALLERY AND IMMUNOLOGIC: No hives or eczema    Vital Signs Last 24 Hrs  T(C): 36.6 (05 Oct 2019 07:59), Max: 36.6 (05 Oct 2019 07:59)  T(F): 97.9 (05 Oct 2019 07:59), Max: 97.9 (05 Oct 2019 07:59)  HR: 95 (05 Oct 2019 07:59) (94 - 111)  BP: 100/70 (05 Oct 2019 07:59) (100/70 - 119/72)  BP(mean): --  RR: 16 (05 Oct 2019 07:59) (16 - 24)  SpO2: 94% (05 Oct 2019 08:00) (93% - 94%) on high flow    PHYSICAL EXAM:  GENERAL: Lethargic. NAD. Obese, chronic ill-appearing male.  HEAD:  Atraumatic, Normocephalic  EYES: EOMI, PERRLA, conjunctiva and sclera clear  ENMT: No tonsillar erythema, exudates, or enlargement; Moist mucous membranes.  NECK: Supple, No JVD, Normal thyroid  NERVOUS SYSTEM:  Alert & Oriented X3; Motor Strength 2/5 B/L upper and lower extremities; DTRs 2+ intact and symmetric  CHEST/LUNG: Decreased breath sounds bilaterally. Expiratory wheezing. No rales, rhonchi.  HEART: Regular rate and rhythm; No murmurs, rubs, or gallops  ABDOMEN: Obese. Soft, Nontender, Nondistended; Bowel sounds present  EXTREMITIES:  B/L lower extremity pitting edema. 2+ Peripheral Pulses.  LYMPH: No lymphadenopathy noted  SKIN: No rashes or lesions    Consultant(s) Notes Reviewed:  [x ] YES  [ ] NO  Care Discussed with Consultants/Other Providers [ x] YES  [ ] NO      LAB:                                    13.9   17.29 )-----------( 407      ( 05 Oct 2019 07:10 )             47.9     10-05    141  |  89<L>  |  131<HH>  ----------------------------<  226<H>  5.2<H>   |  34<H>  |  2.4<H>    Ca    9.1      05 Oct 2019 07:10    TPro  6.0  /  Alb  3.1<L>  /  TBili  0.7  /  DBili  x   /  AST  14  /  ALT  16  /  AlkPhos  77  10-05        Drug Dosing Weight  Height (cm): 173.99 (02 Oct 2019 10:27)  Weight (kg): 120.7 (25 Sep 2019 11:20)  BMI (kg/m2): 39.9 (02 Oct 2019 10:27)  BSA (m2): 2.32 (02 Oct 2019 10:27)  Height (cm): 173.99 (10-02-19 @ 10:27)    CAPILLARY BLOOD GLUCOSE  POCT Blood Glucose.: 223 mg/dL (05 Oct 2019 11:02)  POCT Blood Glucose.: 214 mg/dL (05 Oct 2019 07:18)  POCT Blood Glucose.: 202 mg/dL (04 Oct 2019 21:18)  POCT Blood Glucose.: 219 mg/dL (04 Oct 2019 16:18)        10-03 @ 07:01  -  10-04 @ 07:00  --------------------------------------------------------  IN: 930 mL / OUT: 1100 mL / NET: -170 mL    RADIOLOGY & ADDITIONAL TESTS:  Imaging Personally Reviewed:  [x] YES  [ ] NO    Xray Chest 1 View- PORTABLE-Routine (10.03.19 @ 16:09) >  Impression:  Increasing interstitial opacities    Xray Chest 1 View- PORTABLE-Routine (09.30.19 @ 07:16) >  Impression: Bibasilar opacities and cardiomegaly, stable.    Xray Shoulder 2 Views, Left (09.29.19 @ 22:21) >  Impression: No acute fracture or dislocation. Greater humeral tuberosity calcific peritendinitis. Shoulder and AC joint degenerative changes.    Xray Lumbar Spine AP + Lateral (09.27.19 @ 16:00) >  Impression: No evidence of acute fracture. Degenerative change, lumbar spine.    Xray Hip 1 View, Left (09.27.19 @ 16:00) >  IMPRESSION: No evidence of acute fracture. Degenerative change of left hip.    CT Abdomen and Pelvis No Cont (09.26.19 @ 12:12) >  IMPRESSION:  Patchy nodular opacities at the lung bases, nonspecific in nature. Follow to resolution is recommended.  No acute intra-abdominal or pelvic pathology.  Again seen is an indeterminate hyperdense exophytic left upper pole renal lesion, measuring approximately 4 cm.    CT Head No Cont (09.26.19 @ 11:47) >  IMPRESSION:   1.  No evidence of acute intracranial pathology.    2.  Moderate chronic microvascular changes.    VA Duplex Lower Ext Vein Scan, Bilat (09.26.19 @ 11:06) >  Impression:  No evidence of deep venous thrombosis or superficial thrombophlebitis in bilateral lower extremities.    US Kidney and Bladder (09.25.19 @ 22:52) >  IMPRESSION: No hydronephrosis. Urinary bladder postvoid residual of 170 cc.      HEALTH ISSUES - PROBLEM Dx:  Pain: Pain  Enlarged prostate: Enlarged prostate  Hypertension: Hypertension  Diabetes mellitus, type 2: Diabetes mellitus, type 2  Left leg pain: Left leg pain  Acute on chronic respiratory failure: Acute on chronic respiratory failure      MEDICATIONS  (STANDING):  acetaminophen   Tablet .. 975 milliGRAM(s) Oral every 8 hours  ALBUTerol    0.083% 2.5 milliGRAM(s) Nebulizer every 6 hours  ascorbic acid 500 milliGRAM(s) Oral daily  atorvastatin 20 milliGRAM(s) Oral at bedtime  BACItracin   Ointment 1 Application(s) Topical three times a day  buDESOnide 160 MICROgram(s)/formoterol 4.5 MICROgram(s) Inhaler 2 Puff(s) Inhalation two times a day  cholecalciferol 1000 Unit(s) Oral daily  dextrose 5%. 1000 milliLiter(s) (50 mL/Hr) IV Continuous <Continuous>  dextrose 50% Injectable 12.5 Gram(s) IV Push once  dextrose 50% Injectable 25 Gram(s) IV Push once  dextrose 50% Injectable 25 Gram(s) IV Push once  docusate sodium 100 milliGRAM(s) Oral three times a day  finasteride 5 milliGRAM(s) Oral daily  furosemide    Tablet 40 milliGRAM(s) Oral two times a day  heparin  Injectable 5000 Unit(s) SubCutaneous three times a day  influenza   Vaccine 0.5 milliLiter(s) IntraMuscular once  insulin glargine Injectable (LANTUS) 30 Unit(s) SubCutaneous every morning  insulin lispro (HumaLOG) corrective regimen sliding scale   SubCutaneous three times a day before meals  insulin lispro Injectable (HumaLOG) 4 Unit(s) SubCutaneous three times a day before meals  lidocaine   Patch 2 Patch Transdermal daily  lidocaine   Patch 1 Patch Transdermal daily  magnesium oxide 400 milliGRAM(s) Oral three times a day with meals  methocarbamol 750 milliGRAM(s) Oral four times a day  metoprolol tartrate 25 milliGRAM(s) Oral two times a day  nystatin    Suspension 065443 Unit(s) Oral three times a day  pantoprazole    Tablet 40 milliGRAM(s) Oral before breakfast  predniSONE   Tablet 20 milliGRAM(s) Oral two times a day  sodium polystyrene sulfonate Suspension 30 Gram(s) Oral daily  tamsulosin 0.4 milliGRAM(s) Oral at bedtime  tiotropium 18 MICROgram(s) Capsule 1 Capsule(s) Inhalation daily    MEDICATIONS  (PRN):  dextrose 40% Gel 15 Gram(s) Oral once PRN Blood Glucose LESS THAN 70 milliGRAM(s)/deciliter  glucagon  Injectable 1 milliGRAM(s) IntraMuscular once PRN Glucose LESS THAN 70 milligrams/deciliter  hydrOXYzine hydrochloride 25 milliGRAM(s) Oral at bedtime PRN Insomnia  oxyCODONE    IR 10 milliGRAM(s) Oral every 6 hours PRN Severe Pain (7 - 10)

## 2019-10-06 LAB
ALBUMIN SERPL ELPH-MCNC: 2.8 G/DL — LOW (ref 3.5–5.2)
ALP SERPL-CCNC: 73 U/L — SIGNIFICANT CHANGE UP (ref 30–115)
ALT FLD-CCNC: 15 U/L — SIGNIFICANT CHANGE UP (ref 0–41)
ANION GAP SERPL CALC-SCNC: 14 MMOL/L — SIGNIFICANT CHANGE UP (ref 7–14)
AST SERPL-CCNC: 12 U/L — SIGNIFICANT CHANGE UP (ref 0–41)
BILIRUB SERPL-MCNC: 0.7 MG/DL — SIGNIFICANT CHANGE UP (ref 0.2–1.2)
BUN SERPL-MCNC: 138 MG/DL — CRITICAL HIGH (ref 10–20)
CALCIUM SERPL-MCNC: 8.7 MG/DL — SIGNIFICANT CHANGE UP (ref 8.5–10.1)
CHLORIDE SERPL-SCNC: 86 MMOL/L — LOW (ref 98–110)
CO2 SERPL-SCNC: 36 MMOL/L — HIGH (ref 17–32)
CREAT SERPL-MCNC: 2.5 MG/DL — HIGH (ref 0.7–1.5)
GLUCOSE BLDC GLUCOMTR-MCNC: 138 MG/DL — HIGH (ref 70–99)
GLUCOSE BLDC GLUCOMTR-MCNC: 260 MG/DL — HIGH (ref 70–99)
GLUCOSE BLDC GLUCOMTR-MCNC: 268 MG/DL — HIGH (ref 70–99)
GLUCOSE BLDC GLUCOMTR-MCNC: 275 MG/DL — HIGH (ref 70–99)
GLUCOSE SERPL-MCNC: 292 MG/DL — HIGH (ref 70–99)
HCT VFR BLD CALC: 45.7 % — SIGNIFICANT CHANGE UP (ref 42–52)
HGB BLD-MCNC: 13.3 G/DL — LOW (ref 14–18)
MCHC RBC-ENTMCNC: 22.6 PG — LOW (ref 27–31)
MCHC RBC-ENTMCNC: 29.1 G/DL — LOW (ref 32–37)
MCV RBC AUTO: 77.7 FL — LOW (ref 80–94)
NRBC # BLD: 0 /100 WBCS — SIGNIFICANT CHANGE UP (ref 0–0)
PLATELET # BLD AUTO: 423 K/UL — HIGH (ref 130–400)
POTASSIUM SERPL-MCNC: 4.9 MMOL/L — SIGNIFICANT CHANGE UP (ref 3.5–5)
POTASSIUM SERPL-SCNC: 4.9 MMOL/L — SIGNIFICANT CHANGE UP (ref 3.5–5)
PROT SERPL-MCNC: 5.4 G/DL — LOW (ref 6–8)
RBC # BLD: 5.88 M/UL — SIGNIFICANT CHANGE UP (ref 4.7–6.1)
RBC # FLD: 20.3 % — HIGH (ref 11.5–14.5)
SODIUM SERPL-SCNC: 136 MMOL/L — SIGNIFICANT CHANGE UP (ref 135–146)
WBC # BLD: 20.51 K/UL — HIGH (ref 4.8–10.8)
WBC # FLD AUTO: 20.51 K/UL — HIGH (ref 4.8–10.8)

## 2019-10-06 PROCEDURE — 99233 SBSQ HOSP IP/OBS HIGH 50: CPT

## 2019-10-06 RX ORDER — SODIUM CHLORIDE 9 MG/ML
500 INJECTION INTRAMUSCULAR; INTRAVENOUS; SUBCUTANEOUS ONCE
Refills: 0 | Status: COMPLETED | OUTPATIENT
Start: 2019-10-06 | End: 2019-10-06

## 2019-10-06 RX ADMIN — HEPARIN SODIUM 5000 UNIT(S): 5000 INJECTION INTRAVENOUS; SUBCUTANEOUS at 06:33

## 2019-10-06 RX ADMIN — HEPARIN SODIUM 5000 UNIT(S): 5000 INJECTION INTRAVENOUS; SUBCUTANEOUS at 14:52

## 2019-10-06 RX ADMIN — Medication 4 UNIT(S): at 08:04

## 2019-10-06 RX ADMIN — Medication 975 MILLIGRAM(S): at 14:52

## 2019-10-06 RX ADMIN — ALBUTEROL 2.5 MILLIGRAM(S): 90 AEROSOL, METERED ORAL at 19:23

## 2019-10-06 RX ADMIN — Medication 100 MILLIGRAM(S): at 06:33

## 2019-10-06 RX ADMIN — OXYCODONE HYDROCHLORIDE 10 MILLIGRAM(S): 5 TABLET ORAL at 21:35

## 2019-10-06 RX ADMIN — LIDOCAINE 2 PATCH: 4 CREAM TOPICAL at 21:41

## 2019-10-06 RX ADMIN — LIDOCAINE 1 PATCH: 4 CREAM TOPICAL at 21:41

## 2019-10-06 RX ADMIN — Medication 6: at 16:50

## 2019-10-06 RX ADMIN — Medication 975 MILLIGRAM(S): at 21:41

## 2019-10-06 RX ADMIN — MAGNESIUM OXIDE 400 MG ORAL TABLET 400 MILLIGRAM(S): 241.3 TABLET ORAL at 17:50

## 2019-10-06 RX ADMIN — METHOCARBAMOL 750 MILLIGRAM(S): 500 TABLET, FILM COATED ORAL at 17:50

## 2019-10-06 RX ADMIN — ATORVASTATIN CALCIUM 20 MILLIGRAM(S): 80 TABLET, FILM COATED ORAL at 21:39

## 2019-10-06 RX ADMIN — Medication 6: at 11:49

## 2019-10-06 RX ADMIN — OXYCODONE HYDROCHLORIDE 10 MILLIGRAM(S): 5 TABLET ORAL at 21:42

## 2019-10-06 RX ADMIN — LIDOCAINE 2 PATCH: 4 CREAM TOPICAL at 11:50

## 2019-10-06 RX ADMIN — ALBUTEROL 2.5 MILLIGRAM(S): 90 AEROSOL, METERED ORAL at 07:36

## 2019-10-06 RX ADMIN — TAMSULOSIN HYDROCHLORIDE 0.4 MILLIGRAM(S): 0.4 CAPSULE ORAL at 21:37

## 2019-10-06 RX ADMIN — Medication 100 MILLIGRAM(S): at 21:36

## 2019-10-06 RX ADMIN — Medication 1 APPLICATION(S): at 21:40

## 2019-10-06 RX ADMIN — METHOCARBAMOL 750 MILLIGRAM(S): 500 TABLET, FILM COATED ORAL at 06:35

## 2019-10-06 RX ADMIN — Medication 975 MILLIGRAM(S): at 06:37

## 2019-10-06 RX ADMIN — Medication 1 APPLICATION(S): at 06:37

## 2019-10-06 RX ADMIN — Medication 1 APPLICATION(S): at 14:52

## 2019-10-06 RX ADMIN — HEPARIN SODIUM 5000 UNIT(S): 5000 INJECTION INTRAVENOUS; SUBCUTANEOUS at 21:39

## 2019-10-06 RX ADMIN — MAGNESIUM OXIDE 400 MG ORAL TABLET 400 MILLIGRAM(S): 241.3 TABLET ORAL at 11:49

## 2019-10-06 RX ADMIN — Medication 975 MILLIGRAM(S): at 21:40

## 2019-10-06 RX ADMIN — TIOTROPIUM BROMIDE 1 CAPSULE(S): 18 CAPSULE ORAL; RESPIRATORY (INHALATION) at 08:05

## 2019-10-06 RX ADMIN — MAGNESIUM OXIDE 400 MG ORAL TABLET 400 MILLIGRAM(S): 241.3 TABLET ORAL at 08:04

## 2019-10-06 RX ADMIN — Medication 20 MILLIGRAM(S): at 17:50

## 2019-10-06 RX ADMIN — PANTOPRAZOLE SODIUM 40 MILLIGRAM(S): 20 TABLET, DELAYED RELEASE ORAL at 06:33

## 2019-10-06 RX ADMIN — Medication 975 MILLIGRAM(S): at 06:35

## 2019-10-06 RX ADMIN — LIDOCAINE 1 PATCH: 4 CREAM TOPICAL at 11:50

## 2019-10-06 RX ADMIN — Medication 500000 UNIT(S): at 06:34

## 2019-10-06 RX ADMIN — Medication 25 MILLIGRAM(S): at 06:36

## 2019-10-06 RX ADMIN — Medication 40 MILLIGRAM(S): at 06:34

## 2019-10-06 RX ADMIN — Medication 975 MILLIGRAM(S): at 15:58

## 2019-10-06 RX ADMIN — Medication 500 MILLIGRAM(S): at 11:49

## 2019-10-06 RX ADMIN — METHOCARBAMOL 750 MILLIGRAM(S): 500 TABLET, FILM COATED ORAL at 11:49

## 2019-10-06 RX ADMIN — Medication 100 MILLIGRAM(S): at 14:52

## 2019-10-06 RX ADMIN — FINASTERIDE 5 MILLIGRAM(S): 5 TABLET, FILM COATED ORAL at 11:49

## 2019-10-06 RX ADMIN — Medication 500000 UNIT(S): at 21:36

## 2019-10-06 RX ADMIN — Medication 1000 UNIT(S): at 11:49

## 2019-10-06 RX ADMIN — Medication 500000 UNIT(S): at 14:52

## 2019-10-06 RX ADMIN — INSULIN GLARGINE 30 UNIT(S): 100 INJECTION, SOLUTION SUBCUTANEOUS at 08:04

## 2019-10-06 RX ADMIN — Medication 4 UNIT(S): at 16:51

## 2019-10-06 RX ADMIN — Medication 4 UNIT(S): at 11:49

## 2019-10-06 RX ADMIN — Medication 40 MILLIGRAM(S): at 17:50

## 2019-10-06 RX ADMIN — Medication 20 MILLIGRAM(S): at 06:33

## 2019-10-06 RX ADMIN — SODIUM CHLORIDE 500 MILLILITER(S): 9 INJECTION INTRAMUSCULAR; INTRAVENOUS; SUBCUTANEOUS at 08:05

## 2019-10-06 RX ADMIN — BUDESONIDE AND FORMOTEROL FUMARATE DIHYDRATE 2 PUFF(S): 160; 4.5 AEROSOL RESPIRATORY (INHALATION) at 08:06

## 2019-10-06 NOTE — PROGRESS NOTE ADULT - ASSESSMENT
Patient is a 77 y/o male with PMH of HTN, COPD, CHF, pulmonary HTN, DM2, CKD stage 3, BPH who presents with SOB and left leg pain for several days.    1. Acute on chronic respiratory failure with hypoxia and hypercapnia  -Patient remains on high flow O2   -bipap at night  -s/p dose of IV lasix last week  - 2d echo pending   -Continue with CPAP, Duonebs/home inhaler, Lasix 40mg PO BID, Prednisone 20mg PO BID, Nystatin oral rinse. 7 day course of Levaquin was completed.  -Doppler B/L LE is negative for DVT  -overall poor prognosis    2. Elevated troponin  -EKG shows no ischemic changes.  -Asymptomatic. Likely due to right-sided CHF and JAMES on CKD.  -Per Cardiology: No MI. His troponin is chronically mildly elevated. Continue meds.     3. Pain in left shoulder, hip, back s/p mechanical fall - improving  -X-ray of left hip, left shoulder, and lumbar spine reveal no acute fracture, degenerative changes.  -CT Abdomen and Pelvis: no acute intra-abdominal or pelvic pathology. Patchy nodular opacities at the lung bases.  -Pain Medicine recs: Methocarbamol q6h; Oxycodone Q4h PRN; Warm compress; Lidoderm patch to left knee.  -OT and PT following - recommend therapy 1-2x/week and will follow.      4. Blurry vision - resolved  -Patient denies head trauma during fall. AAO x3, neurologically intact.   -Possibly due to cataract vs steroid use.  -Head CT: no acute intracranial pathology.    5. DM2  -Continue with home dose of Lantus and ISS.  -Monitor POC glucose.  -Carb consistent diet.    6.  HTN  -BP meds on hold for hypotension - RN aware    7.  JAMES on CKD 3  -Renal US shows no hydronephrosis. Postvoid residual volume 170cc. No need for Velazquez.  -Nephro: Evaluate for metabolic bone disease in CKD. Phosphorus elevated at 5.5 (normal 2.1-4.9). iPTH elevated at 88 (normal 15-65).  -Avoid nephrotoxins.    8.  Hyperkalemia 2/2 CKD  -Nephro recs: 2g K diet, Lasix 40mg BID po. Dietary consult placed to provide K content on all foods.  -potassium improved to 5.2  - Continue Kayexalate 30g PO qd.    9. Metabolic Alkalosis  -Nephro recs: If bicarb worsens from 33, then check ABG and if pH>7.5 then give Diamox 250 q12 for 2 doses. R/o CO2 retention.    10. BPH  -Continue home meds.    11. Difficulty ambulating, Weakness of bilateral upper extremities  -PT following    Prophylaxis:  -Hep subq.  -Protonix.    -Grave prognosis. Discussed with patient about end of life care. Patient is DNR/DNI. Patient refusing hospice at this time.      Progress Note Handoff  Pending Consults: none  Pending Tests: bmp, cbc  Pending Results: bmp, cbc  Family Discussion: discussed with pt in detail - in agreement - called pt's son, cornelia.   Disposition: Home_____/SNF______/Other__Short term rehab___/Unknown at this time_____    Please call me with any questions at extension [phone]

## 2019-10-06 NOTE — PROGRESS NOTE ADULT - SUBJECTIVE AND OBJECTIVE BOX
LÓPEZJIAN SMITH  78y  Male    INTERVAL HPI/OVERNIGHT EVENTS:  Patient seen and examined earlier this morning.  Lying comfortably in bed and eating breakfast.  On highflow oxygen.  Patient denies any specific complaints.  BP found to be low today.  BP meds on hold.  Patient is A&O x3 and is mentating well.  Discussed prognosis at length.  Patient is a DNR/DNI.  He asked me to call his son.  Message left for son, Aj.       Patient is a 77 y/o male with PMH of HTN, COPD, CHF, pulmonary HTN, DM2, CKD stage 3, BPH who presents with SOB for several days. Patient was brought in by EMS in respiratory distress, as his PA at home visits noted his SpO2 was in the 70s despite home O2. He was given IV Solumedrol and placed on CPAP by EMS. On arrival in the ED, patient was placed on BIPAP, given Duoneb tratment, and a dose of Levaquin. Patient notes that he normally has baseline SpO2 86-87% on 5L O2 at home. Patient also complains of left leg pain and weakness, along with pain in his left hip and lower back, s/p a mechanical trip and fall earlier this week. He denies head trauma or LOC. He normally walks at home independently but has been unable to ambulate for several days due to pain. He notes the pain worsens when lying down, making it difficult to sleep. Patient also reports occasional blurry vision sporadically for the past several months.       REVIEW OF SYSTEMS:  CONSTITUTIONAL: No fever, weight loss + fatigue/lethargy  EYES: No eye pain, visual disturbances, or discharge  ENMT:  No difficulty hearing, tinnitus, vertigo; No sinus or throat pain  NECK: No pain or stiffness  RESPIRATORY: +SOB. No cough, chills or hemoptysis.  CARDIOVASCULAR: No chest pain, palpitations, dizziness, or leg swelling  GASTROINTESTINAL: No abdominal or epigastric pain. No nausea, vomiting, or hematemesis; No diarrhea or constipation. No melena or hematochezia.  GENITOURINARY: No dysuria, frequency, hematuria, or incontinence  NEUROLOGICAL: No headaches, memory loss, loss of strength, numbness, or tremors  SKIN: No itching, burning, rashes, or lesions   LYMPH NODES: No enlarged glands  ENDOCRINE: No heat or cold intolerance; No hair loss  MUSCULOSKELETAL: +Bilateral shoulder stiffness. No back pain.  PSYCHIATRIC: No depression, anxiety, mood swings, or difficulty sleeping  HEME/LYMPH: No easy bruising, or bleeding gums  ALLERY AND IMMUNOLOGIC: No hives or eczema    Vital Signs Last 24 Hrs  T(C): 35.9 (06 Oct 2019 06:03), Max: 36.6 (05 Oct 2019 13:50)  T(F): 96.6 (06 Oct 2019 06:03), Max: 97.8 (05 Oct 2019 13:50)  HR: 136 (06 Oct 2019 09:23) (76 - 140)  BP: 80/58 (06 Oct 2019 09:23) (68/45 - 133/58)  BP(mean): --  RR: 16 (06 Oct 2019 07:00) (16 - 18)  SpO2: 95% (06 Oct 2019 07:00) (92% - 95%) on high flow    PHYSICAL EXAM:  GENERAL: NAD. Obese, chronic ill-appearing male.  HEAD:  Atraumatic, Normocephalic  EYES: EOMI, PERRLA, conjunctiva and sclera clear  ENMT: No tonsillar erythema, exudates, or enlargement; Moist mucous membranes.  NECK: Supple, No JVD, Normal thyroid  NERVOUS SYSTEM:  Alert & Oriented X3; Motor Strength 2/5 B/L upper and lower extremities; DTRs 2+ intact and symmetric  CHEST/LUNG: Decreased breath sounds bilaterally. Expiratory wheezing. No rales, rhonchi.  HEART: Regular rate and rhythm; No murmurs, rubs, or gallops  ABDOMEN: Obese. Soft, Nontender, Nondistended; Bowel sounds present  EXTREMITIES:  B/L lower extremity pitting edema. 2+ Peripheral Pulses.  LYMPH: No lymphadenopathy noted  SKIN: No rashes or lesions    Consultant(s) Notes Reviewed:  [x ] YES  [ ] NO  Care Discussed with Consultants/Other Providers [ x] YES  [ ] NO      LAB: Pending for 11am                                     13.9   17.29 )-----------( 407      ( 05 Oct 2019 07:10 )             47.9     10-05    141  |  89<L>  |  131<HH>  ----------------------------<  226<H>  5.2<H>   |  34<H>  |  2.4<H>    Ca    9.1      05 Oct 2019 07:10    TPro  6.0  /  Alb  3.1<L>  /  TBili  0.7  /  DBili  x   /  AST  14  /  ALT  16  /  AlkPhos  77  10-05        Drug Dosing Weight  Height (cm): 173.99 (02 Oct 2019 10:27)  Weight (kg): 120.7 (25 Sep 2019 11:20)  BMI (kg/m2): 39.9 (02 Oct 2019 10:27)  BSA (m2): 2.32 (02 Oct 2019 10:27)  Height (cm): 173.99 (10-02-19 @ 10:27)    CAPILLARY BLOOD GLUCOSE  POCT Blood Glucose.: 138 mg/dL (06 Oct 2019 07:13)  POCT Blood Glucose.: 201 mg/dL (05 Oct 2019 20:55)  POCT Blood Glucose.: 236 mg/dL (05 Oct 2019 16:15)  POCT Blood Glucose.: 223 mg/dL (05 Oct 2019 11:02)      RADIOLOGY & ADDITIONAL TESTS:  Imaging Personally Reviewed:  [x] YES  [ ] NO    Xray Chest 1 View- PORTABLE-Routine (10.03.19 @ 16:09) >  Impression:  Increasing interstitial opacities    Xray Chest 1 View- PORTABLE-Routine (09.30.19 @ 07:16) >  Impression: Bibasilar opacities and cardiomegaly, stable.    Xray Shoulder 2 Views, Left (09.29.19 @ 22:21) >  Impression: No acute fracture or dislocation. Greater humeral tuberosity calcific peritendinitis. Shoulder and AC joint degenerative changes.    Xray Lumbar Spine AP + Lateral (09.27.19 @ 16:00) >  Impression: No evidence of acute fracture. Degenerative change, lumbar spine.    Xray Hip 1 View, Left (09.27.19 @ 16:00) >  IMPRESSION: No evidence of acute fracture. Degenerative change of left hip.    CT Abdomen and Pelvis No Cont (09.26.19 @ 12:12) >  IMPRESSION:  Patchy nodular opacities at the lung bases, nonspecific in nature. Follow to resolution is recommended.  No acute intra-abdominal or pelvic pathology.  Again seen is an indeterminate hyperdense exophytic left upper pole renal lesion, measuring approximately 4 cm.    CT Head No Cont (09.26.19 @ 11:47) >  IMPRESSION:   1.  No evidence of acute intracranial pathology.    2.  Moderate chronic microvascular changes.    VA Duplex Lower Ext Vein Scan, Bilat (09.26.19 @ 11:06) >  Impression:  No evidence of deep venous thrombosis or superficial thrombophlebitis in bilateral lower extremities.    US Kidney and Bladder (09.25.19 @ 22:52) >  IMPRESSION: No hydronephrosis. Urinary bladder postvoid residual of 170 cc.      HEALTH ISSUES - PROBLEM Dx:  Pain: Pain  Enlarged prostate: Enlarged prostate  Hypertension: Hypertension  Diabetes mellitus, type 2: Diabetes mellitus, type 2  Left leg pain: Left leg pain  Acute on chronic respiratory failure: Acute on chronic respiratory failure      MEDICATIONS  (STANDING):  acetaminophen   Tablet .. 975 milliGRAM(s) Oral every 8 hours  ALBUTerol    0.083% 2.5 milliGRAM(s) Nebulizer every 6 hours  ascorbic acid 500 milliGRAM(s) Oral daily  atorvastatin 20 milliGRAM(s) Oral at bedtime  BACItracin   Ointment 1 Application(s) Topical three times a day  buDESOnide 160 MICROgram(s)/formoterol 4.5 MICROgram(s) Inhaler 2 Puff(s) Inhalation two times a day  cholecalciferol 1000 Unit(s) Oral daily  dextrose 5%. 1000 milliLiter(s) (50 mL/Hr) IV Continuous <Continuous>  dextrose 50% Injectable 12.5 Gram(s) IV Push once  dextrose 50% Injectable 25 Gram(s) IV Push once  dextrose 50% Injectable 25 Gram(s) IV Push once  docusate sodium 100 milliGRAM(s) Oral three times a day  finasteride 5 milliGRAM(s) Oral daily  furosemide    Tablet 40 milliGRAM(s) Oral two times a day  heparin  Injectable 5000 Unit(s) SubCutaneous three times a day  influenza   Vaccine 0.5 milliLiter(s) IntraMuscular once  insulin glargine Injectable (LANTUS) 30 Unit(s) SubCutaneous every morning  insulin lispro (HumaLOG) corrective regimen sliding scale   SubCutaneous three times a day before meals  insulin lispro Injectable (HumaLOG) 4 Unit(s) SubCutaneous three times a day before meals  lidocaine   Patch 2 Patch Transdermal daily  lidocaine   Patch 1 Patch Transdermal daily  magnesium oxide 400 milliGRAM(s) Oral three times a day with meals  methocarbamol 750 milliGRAM(s) Oral four times a day  metoprolol tartrate 25 milliGRAM(s) Oral two times a day  nystatin    Suspension [id number] Unit(s) Oral three times a day  pantoprazole    Tablet 40 milliGRAM(s) Oral before breakfast  predniSONE   Tablet 20 milliGRAM(s) Oral two times a day  sodium polystyrene sulfonate Suspension 30 Gram(s) Oral daily  tamsulosin 0.4 milliGRAM(s) Oral at bedtime  tiotropium 18 MICROgram(s) Capsule 1 Capsule(s) Inhalation daily    MEDICATIONS  (PRN):  dextrose 40% Gel 15 Gram(s) Oral once PRN Blood Glucose LESS THAN 70 milliGRAM(s)/deciliter  glucagon  Injectable 1 milliGRAM(s) IntraMuscular once PRN Glucose LESS THAN 70 milligrams/deciliter  guaiFENesin    Syrup 200 milliGRAM(s) Oral every 6 hours PRN Cough  hydrOXYzine hydrochloride 25 milliGRAM(s) Oral at bedtime PRN Insomnia  oxyCODONE    IR 10 milliGRAM(s) Oral every 6 hours PRN Severe Pain (7 - 10)

## 2019-10-07 LAB
GLUCOSE BLDC GLUCOMTR-MCNC: 155 MG/DL — HIGH (ref 70–99)
GLUCOSE BLDC GLUCOMTR-MCNC: 343 MG/DL — HIGH (ref 70–99)
GLUCOSE BLDC GLUCOMTR-MCNC: 353 MG/DL — HIGH (ref 70–99)
GLUCOSE BLDC GLUCOMTR-MCNC: 436 MG/DL — HIGH (ref 70–99)

## 2019-10-07 PROCEDURE — 99233 SBSQ HOSP IP/OBS HIGH 50: CPT

## 2019-10-07 RX ORDER — INSULIN LISPRO 100/ML
10 VIAL (ML) SUBCUTANEOUS
Refills: 0 | Status: DISCONTINUED | OUTPATIENT
Start: 2019-10-07 | End: 2019-10-08

## 2019-10-07 RX ORDER — INSULIN GLARGINE 100 [IU]/ML
35 INJECTION, SOLUTION SUBCUTANEOUS AT BEDTIME
Refills: 0 | Status: DISCONTINUED | OUTPATIENT
Start: 2019-10-07 | End: 2019-10-08

## 2019-10-07 RX ORDER — INSULIN LISPRO 100/ML
VIAL (ML) SUBCUTANEOUS
Refills: 0 | Status: DISCONTINUED | OUTPATIENT
Start: 2019-10-07 | End: 2019-10-08

## 2019-10-07 RX ADMIN — Medication 40 MILLIGRAM(S): at 17:21

## 2019-10-07 RX ADMIN — Medication 10: at 08:49

## 2019-10-07 RX ADMIN — Medication 25 MILLIGRAM(S): at 06:34

## 2019-10-07 RX ADMIN — Medication 975 MILLIGRAM(S): at 22:55

## 2019-10-07 RX ADMIN — Medication 25 MILLIGRAM(S): at 17:21

## 2019-10-07 RX ADMIN — ALBUTEROL 2.5 MILLIGRAM(S): 90 AEROSOL, METERED ORAL at 13:56

## 2019-10-07 RX ADMIN — LIDOCAINE 1 PATCH: 4 CREAM TOPICAL at 12:58

## 2019-10-07 RX ADMIN — Medication 100 MILLIGRAM(S): at 06:34

## 2019-10-07 RX ADMIN — OXYCODONE HYDROCHLORIDE 10 MILLIGRAM(S): 5 TABLET ORAL at 23:10

## 2019-10-07 RX ADMIN — Medication 25 MILLIGRAM(S): at 00:42

## 2019-10-07 RX ADMIN — Medication 20 MILLIGRAM(S): at 17:21

## 2019-10-07 RX ADMIN — METHOCARBAMOL 750 MILLIGRAM(S): 500 TABLET, FILM COATED ORAL at 06:35

## 2019-10-07 RX ADMIN — Medication 500000 UNIT(S): at 06:35

## 2019-10-07 RX ADMIN — MAGNESIUM OXIDE 400 MG ORAL TABLET 400 MILLIGRAM(S): 241.3 TABLET ORAL at 08:51

## 2019-10-07 RX ADMIN — Medication 10 UNIT(S): at 16:53

## 2019-10-07 RX ADMIN — Medication 1 APPLICATION(S): at 06:39

## 2019-10-07 RX ADMIN — Medication 100 MILLIGRAM(S): at 21:36

## 2019-10-07 RX ADMIN — Medication 1 APPLICATION(S): at 15:01

## 2019-10-07 RX ADMIN — BUDESONIDE AND FORMOTEROL FUMARATE DIHYDRATE 2 PUFF(S): 160; 4.5 AEROSOL RESPIRATORY (INHALATION) at 08:09

## 2019-10-07 RX ADMIN — HEPARIN SODIUM 5000 UNIT(S): 5000 INJECTION INTRAVENOUS; SUBCUTANEOUS at 06:35

## 2019-10-07 RX ADMIN — Medication 4 UNIT(S): at 13:36

## 2019-10-07 RX ADMIN — Medication 500000 UNIT(S): at 21:36

## 2019-10-07 RX ADMIN — Medication 975 MILLIGRAM(S): at 15:00

## 2019-10-07 RX ADMIN — INSULIN GLARGINE 35 UNIT(S): 100 INJECTION, SOLUTION SUBCUTANEOUS at 21:37

## 2019-10-07 RX ADMIN — LIDOCAINE 2 PATCH: 4 CREAM TOPICAL at 12:58

## 2019-10-07 RX ADMIN — HEPARIN SODIUM 5000 UNIT(S): 5000 INJECTION INTRAVENOUS; SUBCUTANEOUS at 15:03

## 2019-10-07 RX ADMIN — Medication 20 MILLIGRAM(S): at 06:36

## 2019-10-07 RX ADMIN — Medication 1 APPLICATION(S): at 21:36

## 2019-10-07 RX ADMIN — PANTOPRAZOLE SODIUM 40 MILLIGRAM(S): 20 TABLET, DELAYED RELEASE ORAL at 06:37

## 2019-10-07 RX ADMIN — METHOCARBAMOL 750 MILLIGRAM(S): 500 TABLET, FILM COATED ORAL at 00:43

## 2019-10-07 RX ADMIN — MAGNESIUM OXIDE 400 MG ORAL TABLET 400 MILLIGRAM(S): 241.3 TABLET ORAL at 17:04

## 2019-10-07 RX ADMIN — TAMSULOSIN HYDROCHLORIDE 0.4 MILLIGRAM(S): 0.4 CAPSULE ORAL at 21:36

## 2019-10-07 RX ADMIN — TIOTROPIUM BROMIDE 1 CAPSULE(S): 18 CAPSULE ORAL; RESPIRATORY (INHALATION) at 08:51

## 2019-10-07 RX ADMIN — ALBUTEROL 2.5 MILLIGRAM(S): 90 AEROSOL, METERED ORAL at 08:06

## 2019-10-07 RX ADMIN — Medication 12: at 11:45

## 2019-10-07 RX ADMIN — SODIUM POLYSTYRENE SULFONATE 30 GRAM(S): 4.1 POWDER, FOR SUSPENSION ORAL at 11:31

## 2019-10-07 RX ADMIN — MAGNESIUM OXIDE 400 MG ORAL TABLET 400 MILLIGRAM(S): 241.3 TABLET ORAL at 11:31

## 2019-10-07 RX ADMIN — INSULIN GLARGINE 30 UNIT(S): 100 INJECTION, SOLUTION SUBCUTANEOUS at 08:51

## 2019-10-07 RX ADMIN — ATORVASTATIN CALCIUM 20 MILLIGRAM(S): 80 TABLET, FILM COATED ORAL at 21:36

## 2019-10-07 RX ADMIN — Medication 40 MILLIGRAM(S): at 06:39

## 2019-10-07 RX ADMIN — Medication 500 MILLIGRAM(S): at 11:31

## 2019-10-07 RX ADMIN — HEPARIN SODIUM 5000 UNIT(S): 5000 INJECTION INTRAVENOUS; SUBCUTANEOUS at 21:36

## 2019-10-07 RX ADMIN — FINASTERIDE 5 MILLIGRAM(S): 5 TABLET, FILM COATED ORAL at 11:31

## 2019-10-07 RX ADMIN — Medication 4 UNIT(S): at 11:46

## 2019-10-07 RX ADMIN — LIDOCAINE 2 PATCH: 4 CREAM TOPICAL at 19:51

## 2019-10-07 RX ADMIN — METHOCARBAMOL 750 MILLIGRAM(S): 500 TABLET, FILM COATED ORAL at 11:31

## 2019-10-07 RX ADMIN — LIDOCAINE 1 PATCH: 4 CREAM TOPICAL at 19:51

## 2019-10-07 RX ADMIN — Medication 975 MILLIGRAM(S): at 16:09

## 2019-10-07 RX ADMIN — Medication 500000 UNIT(S): at 15:03

## 2019-10-07 RX ADMIN — Medication 1000 UNIT(S): at 11:31

## 2019-10-07 RX ADMIN — METHOCARBAMOL 750 MILLIGRAM(S): 500 TABLET, FILM COATED ORAL at 17:21

## 2019-10-07 RX ADMIN — Medication 975 MILLIGRAM(S): at 21:35

## 2019-10-07 RX ADMIN — Medication 100 MILLIGRAM(S): at 15:00

## 2019-10-07 RX ADMIN — OXYCODONE HYDROCHLORIDE 10 MILLIGRAM(S): 5 TABLET ORAL at 17:04

## 2019-10-07 RX ADMIN — Medication 975 MILLIGRAM(S): at 06:39

## 2019-10-07 RX ADMIN — ALBUTEROL 2.5 MILLIGRAM(S): 90 AEROSOL, METERED ORAL at 19:06

## 2019-10-07 RX ADMIN — Medication 8: at 16:52

## 2019-10-07 RX ADMIN — Medication 975 MILLIGRAM(S): at 06:33

## 2019-10-07 RX ADMIN — OXYCODONE HYDROCHLORIDE 10 MILLIGRAM(S): 5 TABLET ORAL at 23:30

## 2019-10-07 NOTE — PROGRESS NOTE ADULT - ASSESSMENT
Patient is a 77 y/o male with PMH of HTN, COPD, CHF, pulmonary HTN, DM2, CKD stage 3, BPH who presents with SOB and left leg pain for several days.    1. Acute on chronic respiratory failure with hypoxia and hypercapnia  -Patient remains on high flow O2   -bipap at night  -s/p dose of IV lasix last week  - 2d echo pending   -Continue with CPAP, Duonebs/home inhaler, Lasix 40mg PO BID, Prednisone 20mg PO BID, Nystatin oral rinse. 7 day course of Levaquin was completed.  -Doppler B/L LE is negative for DVT  -overall poor prognosis    2. Elevated troponin  -EKG shows no ischemic changes.  -Asymptomatic. Likely due to right-sided CHF and JAMES on CKD.  -Per Cardiology: No MI. His troponin is chronically mildly elevated. Continue meds.     3. Pain in left shoulder, hip, back s/p mechanical fall - improving  -X-ray of left hip, left shoulder, and lumbar spine reveal no acute fracture, degenerative changes.  -CT Abdomen and Pelvis: no acute intra-abdominal or pelvic pathology. Patchy nodular opacities at the lung bases.  -Pain Medicine recs: Methocarbamol q6h; Oxycodone Q4h PRN; Warm compress; Lidoderm patch to left knee.  -OT and PT following - recommend therapy 1-2x/week and will follow.      4. Blurry vision - resolved  -Patient denies head trauma during fall. AAO x3, neurologically intact.   -Possibly due to cataract vs steroid use.  -Head CT: no acute intracranial pathology.    5. DM II, uncontrolled  -increase insulin today  -Monitor POC glucose.  -Carb consistent diet.    6.  HTN  -BP meds on hold for hypotension - RN aware    7.  JAMES on CKD 3  -Renal US shows no hydronephrosis. Postvoid residual volume 170cc. No need for Velazquez.  -Nephro: Evaluate for metabolic bone disease in CKD. Phosphorus elevated at 5.5 (normal 2.1-4.9). iPTH elevated at 88 (normal 15-65).  -Avoid nephrotoxins.    8.  Hyperkalemia 2/2 CKD  -Nephro recs: 2g K diet, Lasix 40mg BID po. Dietary consult placed to provide K content on all foods.  -potassium improved to 4.9  - Continue Kayexalate 30g PO qd.    9. Metabolic Alkalosis  -Nephro recs: If bicarb worsens from 33, then check ABG and if pH>7.5 then give Diamox 250 q12 for 2 doses. R/o CO2 retention.    10. BPH  -Continue home meds.    11. Difficulty ambulating, Weakness of bilateral upper extremities  -PT following    Prophylaxis:  -Hep subq.  -Protonix.    -Grave prognosis. Discussed with patient about end of life care. Patient is DNR/DNI. Patient refusing hospice at this time.      Progress Note Handoff  Pending Consults: none  Pending Tests: bmp, cbc  Pending Results: bmp, cbc  Family Discussion: discussed with pt in detail - in agreement - called pt's son, cornelia.   Disposition: Home_____/SNF______/Other__Short term rehab___/Unknown at this time_____    Please call me with any questions at extension [phone] Patient is a 79 y/o male with PMH of HTN, COPD, CHF, pulmonary HTN, DM2, CKD stage 3, BPH who presents with SOB and left leg pain for several days.    1. Acute on chronic respiratory failure with hypoxia and hypercapnia  -Patient remains on high flow O2   -bipap at night  -s/p dose of IV lasix last week  - 2d echo pending   -Continue with CPAP, Duonebs/home inhaler, Lasix 40mg PO BID, Prednisone 20mg PO BID, Nystatin oral rinse. 7 day course of Levaquin was completed.  -Doppler B/L LE is negative for DVT  -overall poor prognosis    2. Elevated troponin  -EKG shows no ischemic changes.  -Asymptomatic. Likely due to right-sided CHF and JAMES on CKD.  -Per Cardiology: No MI. His troponin is chronically mildly elevated. Continue meds.     3. Pain in left shoulder, hip, back s/p mechanical fall - improving  -X-ray of left hip, left shoulder, and lumbar spine reveal no acute fracture, degenerative changes.  -CT Abdomen and Pelvis: no acute intra-abdominal or pelvic pathology. Patchy nodular opacities at the lung bases.  -Pain Medicine recs: Methocarbamol q6h; Oxycodone Q4h PRN; Warm compress; Lidoderm patch to left knee.  -OT and PT following - recommend therapy 1-2x/week and will follow.      4. Blurry vision - resolved  -Patient denies head trauma during fall. AAO x3, neurologically intact.   -Possibly due to cataract vs steroid use.  -Head CT: no acute intracranial pathology.    5. DM II, uncontrolled  -increase insulin today  -Monitor POC glucose.  -Carb consistent diet.    6.  HTN  -BP meds on hold for hypotension - RN aware    7.  JAMES on CKD 3  -Renal US shows no hydronephrosis. Postvoid residual volume 170cc. No need for Velazquez.  -Nephro: Evaluate for metabolic bone disease in CKD. Phosphorus elevated at 5.5 (normal 2.1-4.9). iPTH elevated at 88 (normal 15-65).  -Avoid nephrotoxins.    8.  Hyperkalemia 2/2 CKD  -Nephro recs: 2g K diet, Lasix 40mg BID po. Dietary consult placed to provide K content on all foods.  -potassium improved to 4.9  - Continue Kayexalate 30g PO qd.    9. Metabolic Alkalosis  -Nephro recs: If bicarb worsens from 33, then check ABG and if pH>7.5 then give Diamox 250 q12 for 2 doses. R/o CO2 retention.    10. BPH  -Continue home meds.    11. Difficulty ambulating, Weakness of bilateral upper extremities  -PT following    12. Chronic diastolic chf  -continue lasix  -intake and outpt  -daily weights    Prophylaxis:  -Hep subq.  -Protonix.    -Grave prognosis. Discussed with patient about end of life care. Patient is DNR/DNI. Patient refusing hospice at this time.      Progress Note Handoff  Pending Consults: none  Pending Tests: bmp, cbc  Pending Results: bmp, cbc  Family Discussion: discussed with pt in detail - in agreement - called pt's son, cornelia.   Disposition: Home_____/SNF______/Other__Short term rehab___/Unknown at this time_____    Please call me with any questions at extension [phone]

## 2019-10-07 NOTE — PROGRESS NOTE ADULT - REASON FOR ADMISSION
SOB and leg pain

## 2019-10-07 NOTE — PROGRESS NOTE ADULT - SUBJECTIVE AND OBJECTIVE BOX
JIAN MANCIA  78y  Male    INTERVAL HPI/OVERNIGHT EVENTS:  Patient seen and examined earlier this morning.  Lying comfortably in bed.  Denies any medical complaints.  Patient is a DNR/DNI.     Patient is a 79 y/o male with PMH of HTN, COPD, CHF, pulmonary HTN, DM2, CKD stage 3, BPH who presents with SOB for several days. Patient was brought in by EMS in respiratory distress, as his PA at home visits noted his SpO2 was in the 70s despite home O2. He was given IV Solumedrol and placed on CPAP by EMS. On arrival in the ED, patient was placed on BIPAP, given Duoneb treatment and a dose of Levaquin. Patient notes that he normally has baseline SpO2 86-87% on 5L O2 at home. Patient also complains of left leg pain and weakness, along with pain in his left hip and lower back, s/p a mechanical trip and fall earlier this week. He denies head trauma or LOC. He normally walks at home independently but has been unable to ambulate for several days due to pain. He notes the pain worsens when lying down, making it difficult to sleep. Patient also reports occasional blurry vision sporadically for the past several months.       REVIEW OF SYSTEMS:  CONSTITUTIONAL: No fever, weight loss + fatigue/lethargy  EYES: No eye pain, visual disturbances, or discharge  ENMT:  No difficulty hearing, tinnitus, vertigo; No sinus or throat pain  NECK: No pain or stiffness  RESPIRATORY: +SOB. No cough, chills or hemoptysis.  CARDIOVASCULAR: No chest pain, palpitations, dizziness, or leg swelling  GASTROINTESTINAL: No abdominal or epigastric pain. No nausea, vomiting, or hematemesis; No diarrhea or constipation. No melena or hematochezia.  GENITOURINARY: No dysuria, frequency, hematuria, or incontinence  NEUROLOGICAL: No headaches, memory loss, loss of strength, numbness, or tremors  SKIN: No itching, burning, rashes, or lesions   LYMPH NODES: No enlarged glands  ENDOCRINE: No heat or cold intolerance; No hair loss  MUSCULOSKELETAL: +Bilateral shoulder stiffness. No back pain.  PSYCHIATRIC: No depression, anxiety, mood swings, or difficulty sleeping  HEME/LYMPH: No easy bruising, or bleeding gums  ALLERY AND IMMUNOLOGIC: No hives or eczema    Vital Signs Last 24 Hrs  T(C): 36.4 (07 Oct 2019 06:35), Max: 36.4 (06 Oct 2019 21:00)  T(F): 97.6 (07 Oct 2019 06:35), Max: 97.6 (06 Oct 2019 21:00)  HR: 99 (07 Oct 2019 09:09) (99 - 111)  BP: 113/58 (07 Oct 2019 09:09) (109/77 - 131/81)  BP(mean): --  RR: 16 (07 Oct 2019 06:35) (16 - 16)  SpO2: --    PHYSICAL EXAM:  GENERAL: NAD. Obese, chronic ill-appearing male.  HEAD:  Atraumatic, Normocephalic  EYES: EOMI, PERRLA, conjunctiva and sclera clear  ENMT: No tonsillar erythema, exudates, or enlargement; Moist mucous membranes.  NECK: Supple, No JVD, Normal thyroid  NERVOUS SYSTEM:  Alert & Oriented X3; Motor Strength 2/5 B/L upper and lower extremities; DTRs 2+ intact and symmetric, pain at shoulders  CHEST/LUNG: Decreased breath sounds bilaterally. Expiratory wheezing. No rales, rhonchi.  HEART: Regular rate and rhythm; No murmurs, rubs, or gallops  ABDOMEN: Obese. Soft, Nontender, Nondistended; Bowel sounds present  EXTREMITIES:  B/L lower extremity pitting edema. 2+ Peripheral Pulses.  LYMPH: No lymphadenopathy noted  SKIN: No rashes or lesions    Consultant(s) Notes Reviewed:  [x ] YES  [ ] NO  Care Discussed with Consultants/Other Providers [ x] YES  [ ] NO      LAB:                               13.3   20.51 )-----------( 423      ( 06 Oct 2019 11:40 )             45.7     10-06    136  |  86<L>  |  138<HH>  ----------------------------<  292<H>  4.9   |  36<H>  |  2.5<H>    Ca    8.7      06 Oct 2019 11:40    TPro  5.4<L>  /  Alb  2.8<L>  /  TBili  0.7  /  DBili  x   /  AST  12  /  ALT  15  /  AlkPhos  73  10-06      Drug Dosing Weight  Height (cm): 173.99 (02 Oct 2019 10:27)  Weight (kg): 120.7 (25 Sep 2019 11:20)  BMI (kg/m2): 39.9 (02 Oct 2019 10:27)  BSA (m2): 2.32 (02 Oct 2019 10:27)  Height (cm): 173.99 (10-02-19 @ 10:27)    CAPILLARY BLOOD GLUCOSE  POCT Blood Glucose.: 436 mg/dL (07 Oct 2019 11:38)  POCT Blood Glucose.: 353 mg/dL (07 Oct 2019 07:58)  POCT Blood Glucose.: 275 mg/dL (06 Oct 2019 20:35)  POCT Blood Glucose.: 268 mg/dL (06 Oct 2019 16:40)        RADIOLOGY & ADDITIONAL TESTS:  Imaging Personally Reviewed:  [x] YES  [ ] NO    Xray Chest 1 View- PORTABLE-Routine (10.03.19 @ 16:09) >  Impression:  Increasing interstitial opacities    Xray Chest 1 View- PORTABLE-Routine (09.30.19 @ 07:16) >  Impression: Bibasilar opacities and cardiomegaly, stable.    Xray Shoulder 2 Views, Left (09.29.19 @ 22:21) >  Impression: No acute fracture or dislocation. Greater humeral tuberosity calcific peritendinitis. Shoulder and AC joint degenerative changes.    Xray Lumbar Spine AP + Lateral (09.27.19 @ 16:00) >  Impression: No evidence of acute fracture. Degenerative change, lumbar spine.    Xray Hip 1 View, Left (09.27.19 @ 16:00) >  IMPRESSION: No evidence of acute fracture. Degenerative change of left hip.    CT Abdomen and Pelvis No Cont (09.26.19 @ 12:12) >  IMPRESSION:  Patchy nodular opacities at the lung bases, nonspecific in nature. Follow to resolution is recommended.  No acute intra-abdominal or pelvic pathology.  Again seen is an indeterminate hyperdense exophytic left upper pole renal lesion, measuring approximately 4 cm.    CT Head No Cont (09.26.19 @ 11:47) >  IMPRESSION:   1.  No evidence of acute intracranial pathology.    2.  Moderate chronic microvascular changes.    VA Duplex Lower Ext Vein Scan, Bilat (09.26.19 @ 11:06) >  Impression:  No evidence of deep venous thrombosis or superficial thrombophlebitis in bilateral lower extremities.    US Kidney and Bladder (09.25.19 @ 22:52) >  IMPRESSION: No hydronephrosis. Urinary bladder postvoid residual of 170 cc.      HEALTH ISSUES - PROBLEM Dx:  Pain: Pain  Enlarged prostate: Enlarged prostate  Hypertension: Hypertension  Diabetes mellitus, type 2: Diabetes mellitus, type 2  Left leg pain: Left leg pain  Acute on chronic respiratory failure: Acute on chronic respiratory failure      MEDICATIONS  (STANDING):  acetaminophen   Tablet .. 975 milliGRAM(s) Oral every 8 hours  ALBUTerol    0.083% 2.5 milliGRAM(s) Nebulizer every 6 hours  ascorbic acid 500 milliGRAM(s) Oral daily  atorvastatin 20 milliGRAM(s) Oral at bedtime  BACItracin   Ointment 1 Application(s) Topical three times a day  buDESOnide 160 MICROgram(s)/formoterol 4.5 MICROgram(s) Inhaler 2 Puff(s) Inhalation two times a day  cholecalciferol 1000 Unit(s) Oral daily  dextrose 5%. 1000 milliLiter(s) (50 mL/Hr) IV Continuous <Continuous>  dextrose 50% Injectable 12.5 Gram(s) IV Push once  dextrose 50% Injectable 25 Gram(s) IV Push once  dextrose 50% Injectable 25 Gram(s) IV Push once  docusate sodium 100 milliGRAM(s) Oral three times a day  finasteride 5 milliGRAM(s) Oral daily  furosemide    Tablet 40 milliGRAM(s) Oral two times a day  heparin  Injectable 5000 Unit(s) SubCutaneous three times a day  influenza   Vaccine 0.5 milliLiter(s) IntraMuscular once  insulin glargine Injectable (LANTUS) 30 Unit(s) SubCutaneous every morning  insulin lispro (HumaLOG) corrective regimen sliding scale   SubCutaneous three times a day before meals  insulin lispro Injectable (HumaLOG) 4 Unit(s) SubCutaneous three times a day before meals  lidocaine   Patch 2 Patch Transdermal daily  lidocaine   Patch 1 Patch Transdermal daily  magnesium oxide 400 milliGRAM(s) Oral three times a day with meals  methocarbamol 750 milliGRAM(s) Oral four times a day  metoprolol tartrate 25 milliGRAM(s) Oral two times a day  nystatin    Suspension 431527 Unit(s) Oral three times a day  pantoprazole    Tablet 40 milliGRAM(s) Oral before breakfast  predniSONE   Tablet 20 milliGRAM(s) Oral two times a day  sodium polystyrene sulfonate Suspension 30 Gram(s) Oral daily  tamsulosin 0.4 milliGRAM(s) Oral at bedtime  tiotropium 18 MICROgram(s) Capsule 1 Capsule(s) Inhalation daily    MEDICATIONS  (PRN):  dextrose 40% Gel 15 Gram(s) Oral once PRN Blood Glucose LESS THAN 70 milliGRAM(s)/deciliter  glucagon  Injectable 1 milliGRAM(s) IntraMuscular once PRN Glucose LESS THAN 70 milligrams/deciliter  guaiFENesin    Syrup 200 milliGRAM(s) Oral every 6 hours PRN Cough  hydrOXYzine hydrochloride 25 milliGRAM(s) Oral at bedtime PRN Insomnia  oxyCODONE    IR 10 milliGRAM(s) Oral every 6 hours PRN Severe Pain (7 - 10)

## 2019-10-07 NOTE — PROGRESS NOTE ADULT - PROVIDER SPECIALTY LIST ADULT
Hospitalist
Internal Medicine
Nephrology
Pulmonology
Internal Medicine
Pulmonology
Hospitalist
Hospitalist

## 2019-10-07 NOTE — CHART NOTE - NSCHARTNOTEFT_GEN_A_CORE
Registered Dietitian Follow-Up     Patient Profile Reviewed                           Yes [x]   No []     Nutrition History Previously Obtained        Yes [x]  No []       Pertinent Medical Interventions: Acute on chronic respiratory failure with hypoxia and hypercapnia - high flow O2; BiPAP at night. JAMES on CKD 3. Hyperkalemia noted.     Diet order: Consistent Carbohydrate (no snacks) + DASH/TLC + Renal Replacement (no protein restriction). Poor po intake reported; reportedly refusing meals.     Anthropometrics:  - Ht. 173.99 cm.  - Wt. 120.7 kg (9/25; no new wt)  - BMI 39.9  - IBW ~71.4 kg.      Pertinent Lab Data: 10/6: Hgb-13.3, Cl-86, BUN-138, gluc-292     Pertinent Meds: heparin, insulin glargine, insulin lispro, prednisone, lasix, vit C, Vit D3, colace, MgO, protonix, metoprolol     Physical Findings:  - Appearance: alert  - GI function: last BM 10/5  - Tubes: no feeding tubes  - Oral/Mouth cavity: no chewing/swallowing difficulty reported  - Skin: ecchymotic     Nutrition Requirements  Weight Used: CBW: 120.7 kg/265.5 lbs; IBW: 71.4 kg/157 lbs per 10/2 RD assessment     Estimated Energy Needs    Continue [x]  Adjust []  [id number] (MSJ x 1-1.1 AF) Obese       Estimated Protein Needs    Continue [x]  Adjust []  protein: 53-71 g (.75-1 g/kg IBW) BMI + CKD-3 considered- aim low        Estimated Fluid Needs        Continue [x]  Adjust []  1mL/kcal or per LIP     Nutrient Intake: Not meeting estimated nutrient needs at this time.        [x] Previous Nutrition Diagnosis: inadequate energy intake            [x] Ongoing          [] Resolved    Nutrition Intervention: Meals & Snacks, Medical Food Supplements     Goal/Expected Outcome: Pt to tolerate po diet, ideally with at least 50% po intake x3 days.     Indicator/Monitoring: Energy intake, glucose profile, renal profile, nutrition focused physical findings, body composition.    Recommendation: Order Nepro q12hrs. Reviewed with LIP on unit.

## 2019-10-07 NOTE — CHART NOTE - NSCHARTNOTEFT_GEN_A_CORE
Family asking RN about getting neurology consult, will defer this decision to day staff though I am not aware of any neurologic changes

## 2019-10-08 VITALS
TEMPERATURE: 98 F | RESPIRATION RATE: 22 BRPM | WEIGHT: 263.67 LBS | DIASTOLIC BLOOD PRESSURE: 84 MMHG | SYSTOLIC BLOOD PRESSURE: 128 MMHG | HEART RATE: 92 BPM

## 2019-10-08 PROCEDURE — 99239 HOSP IP/OBS DSCHRG MGMT >30: CPT

## 2019-10-08 RX ORDER — OXYCODONE HYDROCHLORIDE 5 MG/1
10 TABLET ORAL EVERY 4 HOURS
Refills: 0 | Status: DISCONTINUED | OUTPATIENT
Start: 2019-10-08 | End: 2019-10-08

## 2019-10-08 RX ORDER — LIDOCAINE 4 G/100G
1 CREAM TOPICAL DAILY
Refills: 0 | Status: DISCONTINUED | OUTPATIENT
Start: 2019-10-08 | End: 2019-10-08

## 2019-10-08 RX ADMIN — LIDOCAINE 1 PATCH: 4 CREAM TOPICAL at 00:35

## 2019-10-08 RX ADMIN — LIDOCAINE 2 PATCH: 4 CREAM TOPICAL at 00:00

## 2019-10-08 RX ADMIN — ALBUTEROL 2.5 MILLIGRAM(S): 90 AEROSOL, METERED ORAL at 01:58

## 2019-10-08 RX ADMIN — METHOCARBAMOL 750 MILLIGRAM(S): 500 TABLET, FILM COATED ORAL at 00:35

## 2019-10-08 RX ADMIN — LIDOCAINE 1 PATCH: 4 CREAM TOPICAL at 00:00

## 2019-10-08 RX ADMIN — OXYCODONE HYDROCHLORIDE 10 MILLIGRAM(S): 5 TABLET ORAL at 04:23

## 2019-10-08 RX ADMIN — Medication 200 MILLIGRAM(S): at 00:47

## 2019-10-08 NOTE — CHART NOTE - NSCHARTNOTEFT_GEN_A_CORE
Family at bedside complaining mostly of low back pain. Will try Lidoderm patch, compress and renew oxycodone at every 4 hour dose (per earlier pain management note)

## 2019-10-08 NOTE — DISCHARGE NOTE FOR THE EXPIRED PATIENT - HOSPITAL COURSE
Patient is a 77 y/o male with PMH of HTN, COPD, CHF, pulmonary HTN, DM2, CKD stage 3, BPH who presents with SOB for several days. Patient was brought in by EMS in respiratory distress, as his PA at home visits noted his SpO2 was in the 70s despite home O2. He was given IV Solumedrol and placed on CPAP by EMS. On arrival in the ED, patient was placed on BIPAP, given Duoneb treatment and a dose of Levaquin. Patient notes that he normally has baseline SpO2 86-87% on 5L O2 at home. Patient also complains of left leg pain and weakness, along with pain in his left hip and lower back, s/p a mechanical trip and fall earlier this week. He denies head trauma or LOC. He normally walks at home independently but has been unable to ambulate for several days due to pain. He notes the pain worsens when lying down, making it difficult to sleep. Patient also reports occasional blurry vision sporadically for the past several months.     Patient was placed on IV steroids, IV abx, high flow oxygen and nebulizers.  He was deteriorating over the last two days.  Patient was a DNR/DNI.  He did not want to pursue hospice care.  Patient was found pulseless, breathless and pupils not reactive to light.  Patient pronounced dead at 7:10am.  Family contacted.  Organ donor network contacted.     Time spent > 60 min

## 2019-10-08 NOTE — DISCHARGE NOTE FOR THE EXPIRED PATIENT - SECONDARY DIAGNOSIS.
Chronic diastolic congestive heart failure CKD (chronic kidney disease) stage 3, GFR 30-59 ml/min Diabetes mellitus, type 2

## 2019-10-14 ENCOUNTER — LABORATORY RESULT (OUTPATIENT)
Age: 79
End: 2019-10-14

## 2019-10-16 DIAGNOSIS — J96.22 ACUTE AND CHRONIC RESPIRATORY FAILURE WITH HYPERCAPNIA: ICD-10-CM

## 2019-10-16 DIAGNOSIS — R06.02 SHORTNESS OF BREATH: ICD-10-CM

## 2019-10-16 DIAGNOSIS — E87.5 HYPERKALEMIA: ICD-10-CM

## 2019-10-16 DIAGNOSIS — J44.9 CHRONIC OBSTRUCTIVE PULMONARY DISEASE, UNSPECIFIED: ICD-10-CM

## 2019-10-16 DIAGNOSIS — N17.9 ACUTE KIDNEY FAILURE, UNSPECIFIED: ICD-10-CM

## 2019-10-16 DIAGNOSIS — I50.30 UNSPECIFIED DIASTOLIC (CONGESTIVE) HEART FAILURE: ICD-10-CM

## 2019-10-16 DIAGNOSIS — E87.3 ALKALOSIS: ICD-10-CM

## 2019-10-16 DIAGNOSIS — Z88.0 ALLERGY STATUS TO PENICILLIN: ICD-10-CM

## 2019-10-16 DIAGNOSIS — N18.3 CHRONIC KIDNEY DISEASE, STAGE 3 (MODERATE): ICD-10-CM

## 2019-10-16 DIAGNOSIS — E11.22 TYPE 2 DIABETES MELLITUS WITH DIABETIC CHRONIC KIDNEY DISEASE: ICD-10-CM

## 2019-10-16 DIAGNOSIS — N18.9 CHRONIC KIDNEY DISEASE, UNSPECIFIED: ICD-10-CM

## 2019-10-16 DIAGNOSIS — N40.0 BENIGN PROSTATIC HYPERPLASIA WITHOUT LOWER URINARY TRACT SYMPTOMS: ICD-10-CM

## 2019-10-16 DIAGNOSIS — Z91.013 ALLERGY TO SEAFOOD: ICD-10-CM

## 2019-10-16 DIAGNOSIS — J96.21 ACUTE AND CHRONIC RESPIRATORY FAILURE WITH HYPOXIA: ICD-10-CM

## 2019-10-16 DIAGNOSIS — G93.41 METABOLIC ENCEPHALOPATHY: ICD-10-CM

## 2019-10-16 DIAGNOSIS — I13.0 HYPERTENSIVE HEART AND CHRONIC KIDNEY DISEASE WITH HEART FAILURE AND STAGE 1 THROUGH STAGE 4 CHRONIC KIDNEY DISEASE, OR UNSPECIFIED CHRONIC KIDNEY DISEASE: ICD-10-CM

## 2019-10-18 NOTE — H&P ADULT - NSHPPOAPRESSUREULCER_GEN_ALL_CORE
[facility] called wanting to let Dr. Pierce know he is interested in Cardiac rehab.  If Dr. Pierce agrees he should do this, please put a referral in the system.       no

## 2019-10-30 NOTE — PROGRESS NOTE ADULT - SUBJECTIVE AND OBJECTIVE BOX
Progress Note:  Provider Speciality                            Hospitalist      JIAN MANCIA [id number] 78y Male     CHIEF PRESENTING COMPLAINT:  Patient is a 78y old  Male who presents with a chief complaint of COPD exacerbation (02 Jul 2019 19:19)        SUBJECTIVE:  Patient was seen and examined at bedside. Reports improvement in  presenting complaint. No significant overnight events reported.     HISTORY OF PRESENTING ILLNESS:  HPI:  78 M w/ PMHx of COPD, HTN presents to ED w/ aid 2/2 SOB x last night. Saw the patient at bedside. Pt states condition has improved with Solumedrol 125mg and CPAP. Pt denies CP, Palpitations, F/C, N/V/D, headaches, consumption of allergic foods, recent travel, tobacco use. Pt is being admitted to Medicine for COPD exacerbation. 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  High cholesterol  GERD (gastroesophageal reflux disease)  Enlarged prostate  Oxygen dependent  COPD (chronic obstructive pulmonary disease)  Diabetes mellitus, type 2  Hypertension  Emphysema lung  History of hemorrhoidectomy  Dysplastic colon polyp: removed      VITAL SIGNS:  Vital Signs Last 24 Hrs  T(C): 35.6 (03 Jul 2019 13:45), Max: 36.7 (02 Jul 2019 22:03)  T(F): 96.1 (03 Jul 2019 13:45), Max: 98.1 (02 Jul 2019 22:03)  HR: 91 (03 Jul 2019 13:45) (81 - 91)  BP: 107/55 (03 Jul 2019 13:45) (101/55 - 117/59)  BP(mean): --  RR: 16 (03 Jul 2019 05:53) (16 - 16)  SpO2: 86% (03 Jul 2019 08:19) (86% - 96%)    PHYSICAL EXAMINATION:  Not in acute distress, obese  General: No pallor, or icterus, afebrile  HEENT:  MIS, EOMI, no JVD, no Bruit.  Heart: S1+S2 audible, no murmur  Lungs: bilateral  fair air entry, bilateral  wheezing, no crepitations.  Abdomen: Soft, non-tender, non-distended , no  rigidity or guarding.  CNS: AAOx3, CN  grossly intact.  Extremities:  No edema          REVIEW OF SYSTEMS:  Patient denies any headache, any vision complaints, runny nose, fever, chills, sore throat. Denies chest pain, shortness of breath, palpitation. Denies nausea, vomiting, abdominal pain, diarrhoea, Denies urinary burning, urgency, frequency, dysuria. Denies weakness in any part of the body or numbness.   At least 10 systems were reviewed in ROS. All systems reviewed  are within normal limits except for the complaints as described in Subjective.    CONSULTS:  Consultant(s) Notes Reviewed by me.   Care Discussed with Consultants/Other Providers where required.        MEDICATIONS:  MEDICATIONS  (STANDING):  ALBUTerol    90 MICROgram(s) HFA Inhaler 1 Puff(s) Inhalation every 4 hours  ALBUTerol/ipratropium for Nebulization 3 milliLiter(s) Nebulizer every 6 hours  artificial  tears Solution 1 Drop(s) Both EYES three times a day  ascorbic acid 500 milliGRAM(s) Oral daily  atorvastatin 20 milliGRAM(s) Oral at bedtime  azithromycin   Tablet 500 milliGRAM(s) Oral daily  buDESOnide 160 MICROgram(s)/formoterol 4.5 MICROgram(s) Inhaler 2 Puff(s) Inhalation two times a day  carbamide peroxide Otic Solution 4 Drop(s) Both Ears two times a day  chlorhexidine 4% Liquid 1 Application(s) Topical <User Schedule>  cholecalciferol 1000 Unit(s) Oral daily  dextrose 5%. 1000 milliLiter(s) (50 mL/Hr) IV Continuous <Continuous>  dextrose 50% Injectable 12.5 Gram(s) IV Push once  dextrose 50% Injectable 25 Gram(s) IV Push once  dextrose 50% Injectable 25 Gram(s) IV Push once  docusate sodium 100 milliGRAM(s) Oral three times a day  enoxaparin Injectable 40 milliGRAM(s) SubCutaneous every 24 hours  ferrous    sulfate 325 milliGRAM(s) Oral daily  finasteride 5 milliGRAM(s) Oral daily  furosemide    Tablet 20 milliGRAM(s) Oral daily  insulin glargine Injectable (LANTUS) 30 Unit(s) SubCutaneous at bedtime  insulin lispro Injectable (HumaLOG) 20 Unit(s) SubCutaneous three times a day before meals  magnesium oxide 400 milliGRAM(s) Oral three times a day with meals  methylPREDNISolone sodium succinate Injectable 60 milliGRAM(s) IV Push every 8 hours  metoprolol tartrate 25 milliGRAM(s) Oral two times a day  pantoprazole    Tablet 40 milliGRAM(s) Oral before breakfast  senna 2 Tablet(s) Oral at bedtime  tamsulosin 0.4 milliGRAM(s) Oral at bedtime  tiotropium 18 MICROgram(s) Capsule 1 Capsule(s) Inhalation at bedtime    MEDICATIONS  (PRN):  ALBUTerol/ipratropium for Nebulization 3 milliLiter(s) Nebulizer every 4 hours PRN Bronchospasm  dextrose 40% Gel 15 Gram(s) Oral once PRN Blood Glucose LESS THAN 70 milliGRAM(s)/deciliter  glucagon  Injectable 1 milliGRAM(s) IntraMuscular once PRN Glucose LESS THAN 70 milligrams/deciliter  guaiFENesin/dextromethorphan  Syrup 10 milliLiter(s) Oral every 6 hours PRN Cough      LABOROTORY DATA/MICROBIOLOGY/I & O's:                        13.7   9.15  )-----------( 403      ( 02 Jul 2019 06:54 )             48.4     07-03    139  |  98  |  61<HH>  ----------------------------<  363<H>  5.9<H>   |  32  |  2.3<H>    Ca    9.4      03 Jul 2019 11:00    TPro  6.4  /  Alb  4.2  /  TBili  0.8  /  DBili  x   /  AST  10  /  ALT  16  /  AlkPhos  77  07-02        CAPILLARY BLOOD GLUCOSE      POCT Blood Glucose.: 351 mg/dL (03 Jul 2019 11:53)  POCT Blood Glucose.: 193 mg/dL (03 Jul 2019 07:25)  POCT Blood Glucose.: 152 mg/dL (02 Jul 2019 21:26)  POCT Blood Glucose.: 165 mg/dL (02 Jul 2019 16:29)                        ASSESSMENT:    -Acute on Chronic COPD exacerbation    - Will get pulmonary consult  - Recurrent Hospitalization   - Non - compliant with therapy vs Severe COPD/ILD   -Zithromax 500mg PO daily  -O2 via NC@ 2L/Min, keep sat >94% at all times  -Solumedrol 40mg IVPB Q6hrs, monitor c/s without coverage QAC & QHS while on steroids.  -Albuterol/Atrovent 1 unit neb Q6hrs  -Budesonide every 12 hrs   -Tylenol 650mg po q6hrs PRN for fever/mild pain  -Percocet for sever pain as needed   -GI Prophylaxis with Protonix 40mg poQDaily  -DVT Prophylaxis with Heparin 5000units sc q8hrs	   -HTN - Metoprolol / Lasix   -HLD - Atorvastatin   -Flomax -  Flomax   -Dispo:Still  Acute , hypoxia to 86% on 5 L in AM Progress Note:  Provider Speciality                            Hospitalist      JIAN MANCIA [id number] 78y Male     CHIEF PRESENTING COMPLAINT:  Patient is a 78y old  Male who presents with a chief complaint of COPD exacerbation (02 Jul 2019 19:19)        SUBJECTIVE:  Patient was seen and examined at bedside. Reports improvement in  presenting complaint. No significant overnight events reported.     HISTORY OF PRESENTING ILLNESS:  HPI:  78 M w/ PMHx of COPD, HTN presents to ED w/ aid 2/2 SOB x last night. Saw the patient at bedside. Pt states condition has improved with Solumedrol 125mg and CPAP. Pt denies CP, Palpitations, F/C, N/V/D, headaches, consumption of allergic foods, recent travel, tobacco use. Pt is being admitted to Medicine for COPD exacerbation. 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  High cholesterol  GERD (gastroesophageal reflux disease)  Enlarged prostate  Oxygen dependent  COPD (chronic obstructive pulmonary disease)  Diabetes mellitus, type 2  Hypertension  Emphysema lung  History of hemorrhoidectomy  Dysplastic colon polyp: removed      VITAL SIGNS:  Vital Signs Last 24 Hrs  T(C): 35.6 (03 Jul 2019 13:45), Max: 36.7 (02 Jul 2019 22:03)  T(F): 96.1 (03 Jul 2019 13:45), Max: 98.1 (02 Jul 2019 22:03)  HR: 91 (03 Jul 2019 13:45) (81 - 91)  BP: 107/55 (03 Jul 2019 13:45) (101/55 - 117/59)  BP(mean): --  RR: 16 (03 Jul 2019 05:53) (16 - 16)  SpO2: 86% (03 Jul 2019 08:19) (86% - 96%)    PHYSICAL EXAMINATION:  Not in acute distress, obese  General: No pallor, or icterus, afebrile  HEENT:  MIS, EOMI, no JVD, no Bruit.  Heart: S1+S2 audible, no murmur  Lungs: bilateral  fair air entry, bilateral  wheezing, no crepitations.  Abdomen: Soft, non-tender, non-distended , no  rigidity or guarding.  CNS: AAOx3, CN  grossly intact.  Extremities:  No edema          REVIEW OF SYSTEMS:  Patient denies any headache, any vision complaints, runny nose, fever, chills, sore throat. Denies chest pain, shortness of breath, palpitation. Denies nausea, vomiting, abdominal pain, diarrhoea, Denies urinary burning, urgency, frequency, dysuria. Denies weakness in any part of the body or numbness.   At least 10 systems were reviewed in ROS. All systems reviewed  are within normal limits except for the complaints as described in Subjective.    CONSULTS:  Consultant(s) Notes Reviewed by me.   Care Discussed with Consultants/Other Providers where required.        MEDICATIONS:  MEDICATIONS  (STANDING):  ALBUTerol    90 MICROgram(s) HFA Inhaler 1 Puff(s) Inhalation every 4 hours  ALBUTerol/ipratropium for Nebulization 3 milliLiter(s) Nebulizer every 6 hours  artificial  tears Solution 1 Drop(s) Both EYES three times a day  ascorbic acid 500 milliGRAM(s) Oral daily  atorvastatin 20 milliGRAM(s) Oral at bedtime  azithromycin   Tablet 500 milliGRAM(s) Oral daily  buDESOnide 160 MICROgram(s)/formoterol 4.5 MICROgram(s) Inhaler 2 Puff(s) Inhalation two times a day  carbamide peroxide Otic Solution 4 Drop(s) Both Ears two times a day  chlorhexidine 4% Liquid 1 Application(s) Topical <User Schedule>  cholecalciferol 1000 Unit(s) Oral daily  dextrose 5%. 1000 milliLiter(s) (50 mL/Hr) IV Continuous <Continuous>  dextrose 50% Injectable 12.5 Gram(s) IV Push once  dextrose 50% Injectable 25 Gram(s) IV Push once  dextrose 50% Injectable 25 Gram(s) IV Push once  docusate sodium 100 milliGRAM(s) Oral three times a day  enoxaparin Injectable 40 milliGRAM(s) SubCutaneous every 24 hours  ferrous    sulfate 325 milliGRAM(s) Oral daily  finasteride 5 milliGRAM(s) Oral daily  furosemide    Tablet 20 milliGRAM(s) Oral daily  insulin glargine Injectable (LANTUS) 30 Unit(s) SubCutaneous at bedtime  insulin lispro Injectable (HumaLOG) 20 Unit(s) SubCutaneous three times a day before meals  magnesium oxide 400 milliGRAM(s) Oral three times a day with meals  methylPREDNISolone sodium succinate Injectable 60 milliGRAM(s) IV Push every 8 hours  metoprolol tartrate 25 milliGRAM(s) Oral two times a day  pantoprazole    Tablet 40 milliGRAM(s) Oral before breakfast  senna 2 Tablet(s) Oral at bedtime  tamsulosin 0.4 milliGRAM(s) Oral at bedtime  tiotropium 18 MICROgram(s) Capsule 1 Capsule(s) Inhalation at bedtime    MEDICATIONS  (PRN):  ALBUTerol/ipratropium for Nebulization 3 milliLiter(s) Nebulizer every 4 hours PRN Bronchospasm  dextrose 40% Gel 15 Gram(s) Oral once PRN Blood Glucose LESS THAN 70 milliGRAM(s)/deciliter  glucagon  Injectable 1 milliGRAM(s) IntraMuscular once PRN Glucose LESS THAN 70 milligrams/deciliter  guaiFENesin/dextromethorphan  Syrup 10 milliLiter(s) Oral every 6 hours PRN Cough      LABOROTORY DATA/MICROBIOLOGY/I & O's:                        13.7   9.15  )-----------( 403      ( 02 Jul 2019 06:54 )             48.4     07-03    139  |  98  |  61<HH>  ----------------------------<  363<H>  5.9<H>   |  32  |  2.3<H>    Ca    9.4      03 Jul 2019 11:00    TPro  6.4  /  Alb  4.2  /  TBili  0.8  /  DBili  x   /  AST  10  /  ALT  16  /  AlkPhos  77  07-02        CAPILLARY BLOOD GLUCOSE      POCT Blood Glucose.: 351 mg/dL (03 Jul 2019 11:53)  POCT Blood Glucose.: 193 mg/dL (03 Jul 2019 07:25)  POCT Blood Glucose.: 152 mg/dL (02 Jul 2019 21:26)  POCT Blood Glucose.: 165 mg/dL (02 Jul 2019 16:29)                        ASSESSMENT:    -Acute on Chronic COPD exacerbation    - Will get pulmonary consult  - Recurrent Hospitalization   - Non - compliant with therapy vs Severe COPD/ILD   -Zithromax 500mg PO daily  -O2 via NC@ 2L/Min, keep sat >94% at all times  -Solumedrol 40mg IVPB Q6hrs, monitor c/s without coverage QAC & QHS while on steroids.  -Albuterol/Atrovent 1 unit neb Q6hrs  -Budesonide every 12 hrs   -Tylenol 650mg po q6hrs PRN for fever/mild pain  -Percocet for sever pain as needed   -GI Prophylaxis with Protonix 40mg poQDaily  -DVT Prophylaxis with Heparin 5000units sc q8hrs	   -HTN - Metoprolol / Lasix   -HLD - Atorvastatin   -Flomax -  Flomax   Hyperkalemia to 6- given IV insulin, D50 and kayexalate  -Dispo: Still  Acute , hypoxia to 86% on 5 L in AM, SNF when ready Detail Level: Detailed Detail Level: Generalized

## 2020-07-21 NOTE — DISCHARGE NOTE PROVIDER - CARE PROVIDER_API CALL
Mckay Reddy)  Geriatric Medicine; Internal Medicine  [address] [place] [address]  Phone: [phone]  Fax: [phone]  Follow Up Time:     Raul Castrejon)  Internal Medicine; Pulmonary Disease  [address] [place] [address], [place] [address]  Phone: [phone]  Fax: [phone]  Follow Up Time:
[Negative] : Endocrine

## 2020-09-09 NOTE — ED ADULT NURSE NOTE - NS PRO PASSIVE SMOKE EXP
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)  Lower limb ulcer, calf, right, with fat layer exposed ([address] )   -Wound cleansing and dressings; continue with Xeroform gauze, ABD, and compression   -Wound compression and edema control; patient advised to elevate legs   -Wound off loading; instructed on use of pillows properly to prevent too much pressure on the legs    Pressure injury of right heel, unstageable (HCC)   -Wound cleansing and dressings; dry dressing and protection of area  -Wound off loading; instructed not to let his foot rest on the ground without a pillow underneath    Atherosclerosis of native artery of right lower extremity with rest pain (HCC)   -will refill pain medication prescription closer to end date of previous prescription  -patient advised that he should follow up with vascular surgery as scheduled  -consider pain management consultation in the future if his demand for pain medications continue  Wound 07/17/20 Pretibial Distal;Right (Active)   Wound Image Images linked 09/09/20 1422   Wound Description Beefy red;Drainage; Epithelialization;Slough 09/09/20 1436   Donna-wound Assessment Intact; Hyperpigmented 09/09/20 1436   Wound Length (cm) 14 5 cm 09/09/20 1436   Wound Width (cm) 12 5 cm 09/09/20 1436   Wound Depth (cm) 0 3 cm 09/09/20 1436   Wound Surface Area (cm^2) 181 25 cm^2 09/09/20 1436   Wound Volume (cm^3) 54 38 cm^3 09/09/20 1436   Calculated Wound Volume (cm^3) 54 38 cm^3 09/09/20 1436   Change in Wound Size % 10 04 09/09/20 1436   Drainage Amount Large 09/09/20 1436   Drainage Description Serosanguineous 09/09/20 1436   Non-staged Wound Description Full thickness 09/09/20 1436       Wound 07/17/20 Foot Anterior;Right (Active)   Wound Image Images linked 09/09/20 1429   Wound Description Epithelialization 09/09/20 1438   Donna-wound Assessment Clean;Dry; Intact 09/09/20 1438   Wound Length (cm) 0 cm 09/09/20 1438   Wound Width (cm) 0 cm 09/09/20 1438   Wound Depth (cm) 0 cm 09/09/20 1438   Wound Surface Area (cm^2) 0 cm^2 09/09/20 1438   Wound Volume (cm^3) 0 cm^3 09/09/20 1438   Calculated Wound Volume (cm^3) 0 cm^3 09/09/20 1438   Change in Wound Size % 100 09/09/20 1438   Drainage Amount None 09/09/20 1438       Wound 08/12/20 Tibial Posterior;Right (Active)   Wound Image Images linked 09/09/20 1419   Wound Description Granulation tissue; Beefy red; Other (Comment) (hypergranulation) 09/09/20 1435   Donna-wound Assessment Dry;Hyperpigmented 09/09/20 1435   Wound Length (cm) 3 5 cm 09/09/20 1435   Wound Width (cm) 2 5 cm 09/09/20 1435   Wound Depth (cm) 0 1 cm 09/09/20 1435   Wound Surface Area (cm^2) 8 75 cm^2 09/09/20 1435   Wound Volume (cm^3) 0 88 cm^3 09/09/20 1435   Calculated Wound Volume (cm^3) 0 88 cm^3 09/09/20 1435   Change in Wound Size % 87 78 09/09/20 1435   Drainage Amount Moderate 09/09/20 1435   Drainage Description Serosanguineous 09/09/20 1435   Non-staged Wound Description Full thickness 09/09/20 1435       Wound 09/09/20 Pressure Injury Heel Right (Active)   Wound Description Black; Brown 09/09/20 1502   Pressure Injury Stage U 09/09/20 1502   Donna-wound Assessment Clean;Dry 09/09/20 1502   Wound Length (cm) 2 2 cm 09/09/20 1502   Wound Width (cm) 2 cm 09/09/20 1502   Wound Depth (cm) 0 cm 09/09/20 1502   Wound Surface Area (cm^2) 4 4 cm^2 09/09/20 1502   Wound Volume (cm^3) 0 cm^3 09/09/20 1502   Calculated Wound Volume (cm^3) 0 cm^3 09/09/20 1502   Drainage Amount None 09/09/20 1502       Subjective:      70-year-old white male presents for follow-up evaluation of multiple ulcerations right leg and new problem    Relates good progress with his leg wounds  Has noticed a new sore spot on the back of his right heel which has been causing him pain that impairs his sleeping  Denies any fever shortness of breath  Patient requests additional pain medication which he feels he will run out of prior to his return visit in 2 weeks  The following portions of the patient's history were reviewed and updated as appropriate: allergies, current medications, past family history, past medical history, past social history, past surgical history and problem list     Review of Systems   Constitutional: Negative for activity change, appetite change, chills and fatigue  HENT: Negative for congestion, ear pain, hearing loss and nosebleeds  Eyes: Negative for pain, discharge, redness and visual disturbance  Respiratory: Negative for cough, chest tightness and shortness of breath  Cardiovascular: Negative for chest pain, palpitations and leg swelling  Gastrointestinal: Negative for abdominal pain, constipation, nausea and vomiting  Abdominal wound managed per general surgery  Endocrine: Negative for cold intolerance, heat intolerance and polydipsia  Genitourinary: Negative for difficulty urinating  Musculoskeletal: Negative for arthralgias, back pain, gait problem and joint swelling  Skin: Positive for wound (Multiple wounds/ulcers right leg, and new wound on back of right heel)  Negative for color change, pallor and rash  Allergic/Immunologic: Negative for environmental allergies, food allergies and immunocompromised state  Neurological: Negative for dizziness, weakness, numbness and headaches  Hematological: Negative for adenopathy  Does not bruise/bleed easily  Psychiatric/Behavioral: Negative for agitation, behavioral problems, confusion, decreased concentration, hallucinations and suicidal ideas           Objective:       Wound 07/17/20 Pretibial Distal;Right (Active)   Wound Image Images linked 09/09/20 5322   Wound Description Gigi red;Drainage; Epithelialization;Slough 09/09/20 1436   Donna-wound Assessment Intact; Hyperpigmented 09/09/20 1436   Wound Length (cm) 14 5 cm 09/09/20 1436   Wound Width (cm) 12 5 cm 09/09/20 1436   Wound Depth (cm) 0 3 cm 09/09/20 1436   Wound Surface Area (cm^2) 181 25 cm^2 09/09/20 1436   Wound Volume (cm^3) 54 38 cm^3 09/09/20 1436   Calculated Wound Volume (cm^3) 54 38 cm^3 09/09/20 1436   Change in Wound Size % 10 04 09/09/20 1436   Drainage Amount Large 09/09/20 1436   Drainage Description Serosanguineous 09/09/20 1436   Non-staged Wound Description Full thickness 09/09/20 1436       Wound 07/17/20 Foot Anterior;Right (Active)   Wound Image Images linked 09/09/20 1429   Wound Description Epithelialization 09/09/20 1438   Donna-wound Assessment Clean;Dry; Intact 09/09/20 1438   Wound Length (cm) 0 cm 09/09/20 1438   Wound Width (cm) 0 cm 09/09/20 1438   Wound Depth (cm) 0 cm 09/09/20 1438   Wound Surface Area (cm^2) 0 cm^2 09/09/20 1438   Wound Volume (cm^3) 0 cm^3 09/09/20 1438   Calculated Wound Volume (cm^3) 0 cm^3 09/09/20 1438   Change in Wound Size % 100 09/09/20 1438   Drainage Amount None 09/09/20 1438       Wound 08/12/20 Tibial Posterior;Right (Active)   Wound Image Images linked 09/09/20 1419   Wound Description Granulation tissue; Beefy red; Other (Comment) (hypergranulation) 09/09/20 1435   Donna-wound Assessment Dry;Hyperpigmented 09/09/20 1435   Wound Length (cm) 3 5 cm 09/09/20 1435   Wound Width (cm) 2 5 cm 09/09/20 1435   Wound Depth (cm) 0 1 cm 09/09/20 1435   Wound Surface Area (cm^2) 8 75 cm^2 09/09/20 1435   Wound Volume (cm^3) 0 88 cm^3 09/09/20 1435   Calculated Wound Volume (cm^3) 0 88 cm^3 09/09/20 1435   Change in Wound Size % 87 78 09/09/20 1435   Drainage Amount Moderate 09/09/20 1435   Drainage Description Serosanguineous 09/09/20 1435   Non-staged Wound Description Full thickness 09/09/20 1435       Wound 09/09/20 Pressure Injury Heel Right (Active)   Wound Description Black; Bruce [place] 09/09/20 1502   Pressure Injury Stage U 09/09/20 1502   Donna-wound Assessment Clean;Dry 09/09/20 1502   Wound Length (cm) 2 2 cm 09/09/20 1502   Wound Width (cm) 2 cm 09/09/20 1502   Wound Depth (cm) 0 cm 09/09/20 1502   Wound Surface Area (cm^2) 4 4 cm^2 09/09/20 1502   Wound Volume (cm^3) 0 cm^3 09/09/20 1502   Calculated Wound Volume (cm^3) 0 cm^3 09/09/20 1502   Drainage Amount None 09/09/20 1502       BP 94/54   Pulse 64   Temp 97 8 °F (36 6 °C)   Resp 18     Physical Exam  Constitutional:       Appearance: Normal appearance  HENT:      Head: Normocephalic  Right Ear: Tympanic membrane normal       Left Ear: Tympanic membrane normal       Nose: No congestion  Mouth/Throat:      Mouth: Mucous membranes are moist       Pharynx: No oropharyngeal exudate or posterior oropharyngeal erythema  Eyes:      Extraocular Movements: Extraocular movements intact  Conjunctiva/sclera: Conjunctivae normal       Pupils: Pupils are equal, round, and reactive to light  Cardiovascular:      Rate and Rhythm: Normal rate and regular rhythm  Pulses:           Dorsalis pedis pulses are 1+ on the right side and 1+ on the left side  Posterior tibial pulses are 2+ on the right side and 2+ on the left side  Pulmonary:      Effort: Pulmonary effort is normal    Abdominal:      Palpations: Abdomen is soft  Comments: Abdominal wound with surgical dressing intact  VAC dressing has been discontinued  Musculoskeletal:      Right foot: Decreased range of motion  No deformity, bunion, Charcot foot, foot drop or prominent metatarsal heads  Left foot: Decreased range of motion  No deformity, bunion, Charcot foot, foot drop or prominent metatarsal heads  Feet:    Feet:      Right foot:      Protective Sensation: 10 sites tested  10 sites sensed        Skin integrity: Ulcer (Black stable painful eschar posterior right heel, no drainage, margins are lifting, no erythema, no evidence of infection ), skin breakdown and dry skin present  No blister, erythema, warmth, callus or fissure  Toenail Condition: Right toenails are abnormally thick  Fungal disease present  Left foot:      Protective Sensation: 10 sites tested  10 sites sensed  Skin integrity: Dry skin present  No ulcer, blister, skin breakdown, erythema, warmth, callus or fissure  Toenail Condition: Left toenails are abnormally thick  Fungal disease present  Skin:     General: Skin is warm and dry  Capillary Refill: Capillary refill takes 2 to 3 seconds  Coloration: Skin is not pale  Findings: No bruising, erythema, lesion or rash  Comments: Right leg:  Multiple ulcerations right leg, subcutaneous depth, % granular, increasing new skin noted forming around the periphery, no evidence of infection, moderate serosanguineous drainage noted  Continues to have pain at night in his leg which is relieved somewhat with hanging his leg over the edge of the bed  Previously underwent arteriogram with intervention of 75% blockage of his SFA  Neurological:      General: No focal deficit present  Mental Status: He is alert  Cranial Nerves: No cranial nerve deficit  Sensory: No sensory deficit  Motor: No weakness  Gait: Gait normal       Deep Tendon Reflexes: Reflexes normal    Psychiatric:         Mood and Affect: Mood normal          Behavior: Behavior normal          Judgment: Judgment normal            Wound Instructions:  Orders Placed This Encounter   Procedures    Wound cleansing and dressings     Wash your hands with soap and water  Remove old dressing, discard into plastic bag and place in trash  Cleanse the wound with normal saline prior to applying a clean dressing  Do not use tissue or cotton balls  Do not scrub the wound  Pat dry using gauze  Apply dermagran to the leg wounds  Cover with ABD  Secure with kristin and tape  Change dressing 3x week      Protect right heel with dry sterile dressing  These treatments were completed today in wound center  Standing Status:   Future     Standing Expiration Date:   9/9/2021    Wound compression and edema control     Elastic Tubular Stocking size F    Tubular elastic bandage: Apply from base of toes to behind the knee  Apply in AM, may remove for sleep  Avoid prolonged standing in one place  Elevate leg(s) above the level of the heart when sitting or as much as possible  Standing Status:   Future     Standing Expiration Date:   9/9/2021    Wound off loading     Off-loading Instructions:    Wear off-loading device as directed by your physician  Put on immediately when rising in the morning and remove when going to bed  Keep pressure off right heel    Elevate heel off bed with pillows     Standing Status:   Future     Standing Expiration Date:   9/9/2021 Unknown

## 2020-11-04 NOTE — PATIENT PROFILE ADULT - HAS THE PATIENT RECEIVED THE INFLUENZA VACCINE THIS SEASON?
Tacrolimus level 6.7 at 11.5 hours, on 11/3/2020.  Goal 7-9.   Current dose 2 mg in AM, 2 mg in PM    Level close to goal, requested level recheck next week.   Patient verbalized agreement of plan in Daz [employer]dhart message.     
yes...

## 2020-12-15 NOTE — PATIENT PROFILE ADULT - NSPROPTRIGHTSUPPORTPHONE_GEN_A_NUR
[phone] Mohs Rapid Report Verbiage: The area of clinically evident tumor was marked with skin marking ink and appropriately hatched.  The initial incision was made following the Mohs approach through the skin.  The specimen was taken to the lab, divided into the necessary number of pieces, chromacoded and processed according to the Mohs protocol.  This was repeated in successive stages until a tumor free defect was achieved.

## 2021-03-11 PROBLEM — E66.2 OBESITY HYPOVENTILATION SYNDROME: Status: ACTIVE | Noted: 2019-07-12

## 2021-08-23 NOTE — DISCHARGE NOTE ADULT - THE PATIENT HAS
no difficulties Island Pedicle Flap With Canthal Suspension Text: The defect edges were debeveled with a #15 scalpel blade.  Given the location of the defect, shape of the defect and the proximity to free margins an island pedicle advancement flap was deemed most appropriate.  Using a sterile surgical marker, an appropriate advancement flap was drawn incorporating the defect, outlining the appropriate donor tissue and placing the expected incisions within the relaxed skin tension lines where possible. The area thus outlined was incised deep to adipose tissue with a #15 scalpel blade.  The skin margins were undermined to an appropriate distance in all directions around the primary defect and laterally outward around the island pedicle utilizing iris scissors.  There was minimal undermining beneath the pedicle flap. A suspension suture was placed in the canthal tendon to prevent tension and prevent ectropion.

## 2021-09-30 NOTE — ED PROVIDER NOTE - NSFOLLOWUPINSTRUCTIONS_ED_ALL_ED_FT
[de-identified] : 1 yr old male referred by PCP for h/o easy bruising reported by mother to walk in clinic who jeanie labs - prolonged PT/a PTT, was a difficult blood draw; bruising noted 1 yr ago on both shins; on arms when picked up by mother ; mostly flat, occasional palpable; less than dime sized ; last 5- 7 days, h/o 1 nose bleed a month ago - both nostrils, lasting 1 min ; resolved with pressure ; no URI symptoms; no GI/  bleeding; circumcision at birth - no bleeding;  was admitted in Jul 2020 for acute gastroenteritis with stool PCR positive for enteropathogenic E . coli and yersinia , was dehydrated ans stayed at [facility] [place] 3 days; no procedures\par \par 10 yr old sister also bruises on shins , active, no sports, falls on knees and has bruises ; no other bleeding; tonsillectomy/ adenoidectomy at age 5 yrs, small amount of bleeding after vomiting ; seen in the ED at [facility] they were told she had  ruptured a  vessel - no concerns; no intervention; no repeat episodes\par \par Mother- bruises easily- shins, thighs, arms from  ; no prolonged bleeding from cuts, no other bleeding; menses -  every 3 months - 3-5 ppd, lasting 7 days ; no h/o WAN; 2 C sections, 2 wisdom and 1 non wisdom teeth pulled, no prolonged bleeding ; no significant FH of spontaneous/ trauma/ surgery related bleeding\par \par Father- no bleeding history, knee surgery- no prolonged bleeding \par 
Orthopedics outpatient clinic on Tuesday    Shoulder Pain    Many things can cause shoulder pain, including:    An injury to the area.  Overuse of the shoulder.  Arthritis.    The source of the pain can be:    Inflammation.  An injury to the shoulder joint.  An injury to a tendon, ligament, or bone.    HOME CARE INSTRUCTIONS  Take these actions to help with your pain:     Squeeze a soft ball or a foam pad as much as possible. This helps to keep the shoulder from swelling. It also helps to strengthen the arm.  Take over-the-counter and prescription medicines only as told by your health care provider.  If directed, apply ice to the area:  Put ice in a plastic bag.  Place a towel between your skin and the bag.  Leave the ice on for 20 minutes, 2–3 times per day. Stop applying ice if it does not help with the pain.  If you were given a shoulder sling or immobilizer:  Wear it as told.  Remove it to shower or bathe.  Move your arm as little as possible, but keep your hand moving to prevent swelling.    SEEK MEDICAL CARE IF:  Your pain gets worse.  Your pain is not relieved with medicines.  New pain develops in your arm, hand, or fingers.    SEEK IMMEDIATE MEDICAL CARE IF:  Your arm, hand, or fingers:  Tingle.  Become numb.  Become swollen.  Become painful.  Turn white or blue.    ADDITIONAL NOTES AND INSTRUCTIONS    Please follow up with your Primary MD in 24-48 hr.  Seek immediate medical care for any new/worsening signs or symptoms.

## 2021-10-07 NOTE — PROGRESS NOTE ADULT - ASSESSMENT
From: Cecilia Hamilton  To: Bacilio Fritz DO  Sent: 10/7/2021 5:33 AM EDT  Subject: Prescription Question    Will you please refill my Cruger 5/325. Please send it to [address] [facility] in [address] [facility]. Thank you. patient with dyspnea    1) Acute on Chronic Resp Failure with Hypoxia/decompensated COPD exac:  -continue with IV steroids, symbicort, nebs and high flow O2  -PPI for GI ppx  -saline nasal spray    2) Chronic Diastolic CHF; stable (not on lasix; will clarify)    3) CKD stage III; needs outpatient nephrology follow up and kidney monitoring    4) Hyperkalemia:  -BMP monitoring; Kayaxalate ordered    5) morbidly obese:  -weight loss and diet modification (non-compliant with diet)    6) DM II with hyperglycemia:  -adjust insulin regimen    7) BPH:  -on flomax    8) Debility  -rehab/pt and oob to chair with assistance    9) Suspected Vitamin D def; continue with oral supplements and outpatient lab monitoring       # Progress Note Handoff  PENDING as follows  consults: Pulm following  Test: K+ and Finger sticks  Other:  Family discussion: no family at bedside but patient comprehends his medical care   Disposition:  Home _with home care ___ or SNF ___ Other _____ Unknown at this time ____

## 2021-11-10 NOTE — DISCHARGE NOTE ADULT - CARE PLAN
Principal Discharge DX:	COPD exacerbation  Goal:	to be symptom free  Assessment and plan of treatment:	-outpatient follow up with Pulm Dr. Schneider  Secondary Diagnosis:	Hypertension, unspecified type  Goal:	home meds  Secondary Diagnosis:	Oxygen dependent  Goal:	continue with home O2 normal...

## 2021-11-29 NOTE — PATIENT PROFILE ADULT. - AS SC BRADEN MOBILITY
Vascular   --I have reiterated need for stat arterial duplex  --regardless of bed situation, this needs to be done now (3) slightly limited

## 2022-01-04 NOTE — PATIENT PROFILE ADULT. - MEDICATION, ABILITY TO FOLLOW SCHEDULE, PROFILE
Caller: Karina Saleem    Relationship: Self    Best call back number: [phone]  Requested Prescriptions:   Requested Prescriptions     Pending Prescriptions Disp Refills   • diazePAM (VALIUM) 10 MG tablet 30 tablet 0     Sig: Take 1 tablet by mouth At Night As Needed for Anxiety.   • zolpidem (AMBIEN) 10 MG tablet 30 tablet 0     Sig: Take 1 tablet by mouth At Night As Needed for Sleep. for sleep   • hydrocortisone-zinc oxide-bacitracin-nystatin cream 60 g 3     Sig: Apply 1 application topically to the appropriate area as directed As Needed (apply to butt rash bid prn).        Pharmacy where request should be sent: [place], [place] - [address] [place] [address] - [phone] [facility] [phone] FX         Does the patient have less than a 3 day supply:  [x] Yes  [] No    Andrzej Shelley Rep   01/04/22 14:05 EST            no

## 2022-01-05 NOTE — ED PROVIDER NOTE - OBJECTIVE STATEMENT
78 year old male with pmhx of COPD on 5L home O2 (pts pulmonologist is Dr Mcelroy, No past intubations), DM, HTN, HLD, CKD sent in by rehab for episode of hypotension/tachycardia/and sob today. Pt was recently discharged on 01/03 for acute on chronic respiratory failure. + cough. Denies any fever, chest pain, abdominal pain, n/v/d, urinary symptoms. Currently pt denies sob. Elidel Counseling: Patient may experience a mild burning sensation during topical application. Elidel is not approved in children less than 2 years of age. There have been case reports of hematologic and skin malignancies in patients using topical calcineurin inhibitors although causality is questionable.

## 2022-03-09 NOTE — ED ADULT TRIAGE NOTE - AS O2 DELIVERY
March 9, 2022      Nora Alarcon  [address]  [place] [address]      Your healthcare team cares about your health. To provide you with the best care, we have reviewed your chart and based on our findings, we see that you are due to:     - HYPERTENSION FOLLOW UP: Office Visit  - ANNUAL WELLNESS FOLLOW UP:   Schedule an Annual Medicare Wellness Exam. This can be done by in person visit or virtual video visit.     If you have already completed these items, please contact the clinic via phone or Mychart so your care team can review and update your records.  Thank you for choosing [facility] for your healthcare needs. For any questions, concerns, or to schedule an appointment please contact the clinic.       Healthy Regards,      Your [facility] Care Team         supplemental O2

## 2022-05-02 NOTE — PROGRESS NOTE ADULT - PROBLEM SELECTOR PLAN 10
-suspected Vitamin D def; continue with oral supplementations
Alert and interactive, no focal deficits

## 2022-05-16 NOTE — ED PROVIDER NOTE - PRO INTERPRETER NEED 2
Returned phone call to patient and obtained phone number to [employer]: [phone]. Patient ID [id number]. Will need to call and determine if provider portion of patient's ventura application is still needed.   English

## 2022-08-10 NOTE — PROGRESS NOTE ADULT - PROBLEM SELECTOR PLAN 1
-Acute on chronic resp failure/copd exacerbation/CAP  -abx as per ID   -on High flow O2  -IV steroids, nebs, abx and pulmonary follow ups  -lab work (urine legionella and strep) Pending Cellcept Counseling:  I discussed with the patient the risks of mycophenolate mofetil including but not limited to infection/immunosuppression, GI upset, hypokalemia, hypercholesterolemia, bone marrow suppression, lymphoproliferative disorders, malignancy, GI ulceration/bleed/perforation, colitis, interstitial lung disease, kidney failure, progressive multifocal leukoencephalopathy, and birth defects.  The patient understands that monitoring is required including a baseline creatinine and regular CBC testing. In addition, patient must alert us immediately if symptoms of infection or other concerning signs are noted.

## 2022-08-16 NOTE — ED ADULT TRIAGE NOTE - PAIN: PRESENCE, MLM
complains of pain/discomfort Itraconazole Counseling:  I discussed with the patient the risks of itraconazole including but not limited to liver damage, nausea/vomiting, neuropathy, and severe allergy.  The patient understands that this medication is best absorbed when taken with acidic beverages such as non-diet cola or ginger ale.  The patient understands that monitoring is required including baseline LFTs and repeat LFTs at intervals.  The patient understands that they are to contact us or the primary physician if concerning signs are noted.

## 2022-11-17 NOTE — PROGRESS NOTE ADULT - SUBJECTIVE AND OBJECTIVE BOX
----- Message from WILLIAM Gomes CNP sent at 11/14/2022  2:09 PM EST -----  Can you schedule her for outpatient nuclear medicine renal scan (with lasix) in around 10-14 days? Patient is a 79 y/o male with PMH of COPD, HTN, CHF, pulmonary HTN, DM2, CKD stage 3, BPH who presents with SOB for several days. Patient was brought in by EMS in respiratory distress, as his PA at home visits noted his SpO2 was in the 70s despite home O2. He was given IV Solumedrol and placed on CPAP by EMS. On arrival in the ED, patient was placed on BIPAP, given Duoneb tratment, and a dose of Levaquin. Patient notes that he normally has baseline SpO2 86-87% on 5L O2 at home. Patient also complains of left leg pain and weakness, along with pain in his left hip and lower back, s/p a mechanical trip and fall earlier this week. He denies head trauma or LOC. He normally walks at home independently but has been unable to ambulate for several days due to pain. He notes the pain worsens when lying down, making it difficult to sleep. Patient also reports occasional blurry vision sporadically for the past several months. He denies headache, dizziness, chest pain, abdominal pain, nausea, or vomiting.    INTERVAL HPI:  Overnight, patient experienced difficulty sleeping due to back pain and pain in the left shoulder. He reports the pain has completely resolved following Robaxin. Patient is currently on high flow O2, with CPAP at night, Duonebs, and Levaquin. He refuses BIPAP. He denies SOB, chest pain, abdominal pain, nausea, vomiting.      Vital Signs Last 24 Hrs  T(C): --  T(F): --  HR: 92 (30 Sep 2019 05:35) (92 - 96)  BP: 110/70 (30 Sep 2019 05:35) (110/70 - 110/70)  BP(mean): --  RR: 20 (30 Sep 2019 05:35) (20 - 21)  SpO2: 93% (30 Sep 2019 02:08) (93% - 93%)      REVIEW OF SYSTEMS  Constitutional: No fever, fatigue or weight loss.  Skin: No rash.  HEENT: + Difficulty hearing. No rhinorrhea, headache, eye pain, sore throat, hearing changes, congestion, ear pain.  Cardiovascular: No chest pain, palpitations.  Respiratory: No SOB, congestion or wheezing.  Gastrointestinal: No abdominal pain, nausea, vomiting, or diarrhea.  Genitourinary: No dysuria.  Musculoskeletal: + Left shoulder pain, + Lower back pain.   Neurologic: No headache.      PHYSICAL EXAM:  GENERAL: NAD. Obese, chronic ill-appearing male.  HEAD:  Atraumatic, Normocephalic.  EYES: EOMI, PERRLA, conjunctiva and sclera clear.  NECK: Supple, No JVD,.  NERVOUS SYSTEM:  Alert & Oriented X3. Moving all extremities.  CHEST/LUNG: Minimal wheezing bilaterally. No retractions or accessory muscle usage.  HEART:  Regular rate and rhythm. No murmurs, rubs, or gallops.  ABDOMEN: Obese. Soft, Nontender. Bowel sounds present.  EXTREMITIES: B/L lower extremity pitting edema. No clubbing, cyanosis.  SKIN: No rashes or lesions.      LABS:                               14.4   18.80 )-----------( 421      ( 30 Sep 2019 06:56 )             49.1     09-30    143  |  96<L>  |  88<HH>  ----------------------------<  194<H>  4.8   |  33<H>  |  2.2<H>    Ca    9.5      30 Sep 2019 06:56    TPro  6.4  /  Alb  4.1  /  TBili  0.5  /  DBili  x   /  AST  15  /  ALT  14  /  AlkPhos  76  09-29      CARDIAC MARKERS ( 25 Sep 2019 19:20 )  x     / 0.05 ng/mL / x     / x     / x      CARDIAC MARKERS ( 25 Sep 2019 11:30 )  x     / 0.07 ng/mL / x     / x     / x            RADIOLOGY RESULTS:    Xray Chest 1 View- PORTABLE-Routine (09.30.19 @ 07:16) >  Impression: Bibasilar opacities and cardiomegaly, stable.    Xray Shoulder 2 Views, Left (09.29.19 @ 22:21) >  Impression: No acute fracture or dislocation. Greater humeral tuberosity calcific peritendinitis. Shoulder and AC joint degenerative changes.    Xray Lumbar Spine AP + Lateral (09.27.19 @ 16:00) >  Impression: No evidence of acute fracture. Degenerative change, lumbar spine.    Xray Hip 1 View, Left (09.27.19 @ 16:00) >  IMPRESSION: No evidence of acute fracture. Degenerative change of left hip.    CT Abdomen and Pelvis No Cont (09.26.19 @ 12:12) >  IMPRESSION:  Patchy nodular opacities at the lung bases, nonspecific in nature. Follow to resolution is recommended.  No acute intra-abdominal or pelvic pathology.  Again seen is an indeterminate hyperdense exophytic left upper pole renal lesion, measuring approximately 4 cm.      CT Head No Cont (09.26.19 @ 11:47) >  IMPRESSION:   1.  No evidence of acute intracranial pathology.    2.  Moderate chronic microvascular changes.      VA Duplex Lower Ext Vein Scan, Bilat (09.26.19 @ 11:06) >  Impression:  No evidence of deep venous thrombosis or superficial thrombophlebitis in bilateral lower extremities.      US Kidney and Bladder (09.25.19 @ 22:52) >  IMPRESSION: No hydronephrosis. Urinary bladder postvoid residual of 170 cc.      MEDICATIONS  (STANDING):  acetaminophen   Tablet .. 975 milliGRAM(s) Oral every 8 hours  ALBUTerol    0.083% 2.5 milliGRAM(s) Nebulizer every 6 hours  amLODIPine   Tablet 10 milliGRAM(s) Oral daily  ascorbic acid 500 milliGRAM(s) Oral daily  atorvastatin 20 milliGRAM(s) Oral at bedtime  BACItracin   Ointment 1 Application(s) Topical three times a day  buDESOnide 160 MICROgram(s)/formoterol 4.5 MICROgram(s) Inhaler 2 Puff(s) Inhalation two times a day  carbamide peroxide Otic Solution 1 Drop(s) Both Ears two times a day  cholecalciferol 1000 Unit(s) Oral daily  dextrose 5%. 1000 milliLiter(s) (50 mL/Hr) IV Continuous <Continuous>  dextrose 50% Injectable 12.5 Gram(s) IV Push once  dextrose 50% Injectable 25 Gram(s) IV Push once  dextrose 50% Injectable 25 Gram(s) IV Push once  docusate sodium 100 milliGRAM(s) Oral three times a day  finasteride 5 milliGRAM(s) Oral daily  furosemide    Tablet 40 milliGRAM(s) Oral two times a day  heparin  Injectable 5000 Unit(s) SubCutaneous three times a day  influenza   Vaccine 0.5 milliLiter(s) IntraMuscular once  insulin glargine Injectable (LANTUS) 30 Unit(s) SubCutaneous every morning  insulin lispro (HumaLOG) corrective regimen sliding scale   SubCutaneous three times a day before meals  insulin lispro Injectable (HumaLOG) 4 Unit(s) SubCutaneous three times a day before meals  levoFLOXacin  Tablet 250 milliGRAM(s) Oral every 24 hours  lidocaine   Patch 2 Patch Transdermal daily  lidocaine   Patch 1 Patch Transdermal daily  magnesium oxide 400 milliGRAM(s) Oral three times a day with meals  methocarbamol 750 milliGRAM(s) Oral four times a day  metoprolol tartrate 25 milliGRAM(s) Oral two times a day  nystatin    Suspension [id number] Unit(s) Oral three times a day  pantoprazole    Tablet 40 milliGRAM(s) Oral before breakfast  predniSONE   Tablet 20 milliGRAM(s) Oral two times a day  tamsulosin 0.4 milliGRAM(s) Oral at bedtime  tiotropium 18 MICROgram(s) Capsule 1 Capsule(s) Inhalation daily    MEDICATIONS  (PRN):  dextrose 40% Gel 15 Gram(s) Oral once PRN Blood Glucose LESS THAN 70 milliGRAM(s)/deciliter  glucagon  Injectable 1 milliGRAM(s) IntraMuscular once PRN Glucose LESS THAN 70 milligrams/deciliter  hydrOXYzine hydrochloride 25 milliGRAM(s) Oral at bedtime PRN Insomnia  oxyCODONE    IR 5 milliGRAM(s) Oral every 6 hours PRN Severe Pain (7 - 10)

## 2022-12-14 NOTE — PATIENT PROFILE ADULT - VISION (WITH CORRECTIVE LENSES IF THE PATIENT USUALLY WEARS THEM):
Normal vision: sees adequately in most situations; can see medication labels, newsprint Muscle Hinge Flap Text: The defect edges were debeveled with a #15 scalpel blade.  Given the size, depth and location of the defect and the proximity to free margins a muscle hinge flap was deemed most appropriate.  Using a sterile surgical marker, an appropriate hinge flap was drawn incorporating the defect. The area thus outlined was incised with a #15 scalpel blade.  The skin margins were undermined to an appropriate distance in all directions utilizing iris scissors.

## 2023-01-01 NOTE — PATIENT PROFILE ADULT. - CURRENT SWALLOWING
[Mother] : mother [Well-balanced] : well-balanced [Formula ___ oz/feed] : [unfilled] oz of formula per feed [Normal] : Normal [No] : No cigarette smoke exposure [Water heater temperature set at <120 degrees F] : Water heater temperature set at <120 degrees F [Rear facing car seat in back seat] : Rear facing car seat in back seat [Carbon Monoxide Detectors] : Carbon monoxide detectors at home [Smoke Detectors] : Smoke detectors at home. [Gun in Home] : No gun in home (0) swallows foods and liquids w/o difficulty
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
## 2023-01-16 NOTE — ED ADULT TRIAGE NOTE - DIRECT TO ROOM CARE INITIATED:
03-May-2019 19:10 Erythromycin Counseling:  I discussed with the patient the risks of erythromycin including but not limited to GI upset, allergic reaction, drug rash, diarrhea, increase in liver enzymes, and yeast infections.

## 2023-01-26 NOTE — H&P ADULT - ASSESSMENT
Spiritual Plan of Care    Pt Name: Esperanza Greco  Pt : 1951  Date: 2023    Visit Type: In person    Referral Source: Other (Comment) (pager)    Reason for Visit: Emergency Code    Visited With: Family/Friend (daughter, Ksenia Preston)    Length of Visit: 15 minutes    Requires Follow-up: No    Spiritual Care Consult Needed: Spiritual Care eval completed    Taxonomy:    Intended Effects: Demonstrate caring and concern, Preserve dignity and respect  Methods: Offer emotional support, Offer spiritual/Religion support  Interventions: Active listening, Ask guided questions, Provide hospitality      Patient Affect at Time of Visit:  (was not able to assess as patient was w/medical team)  Patient Assessment: Unable to assess    Family/Friend Name: daughter, Ksenia Preston  Family/Friend Affect at Time of Visit: Alert, Bright, Expressive, Open to  visit, Pleasant, Talkative  Family/Friend Assessment: Coping, Hopeful, Optimistic  Family/Friend  Intervention:  Support, Empathic Listening, Reassurance    Spiritual Plan of Care: Follow up if requested    Wednesday, 2023  Rev. JENNY Courtney.  Staff   [facility]  [place], [place]  [phone]        Patient is a 77y old  Male who presents with a chief complaint of    generalised weakness along with  sob                                                                                                                                                                                                                                                                                    HEALTH ISSUES - PROBLEM Dx Asthenia                                                Copd                                                Hcvd                                                 Obesity

## 2023-04-05 NOTE — ED PROVIDER NOTE - NSCAREINITIATED _GEN_ER
TC returned to pt.  She said the burning has improved since yesterday but she is interested in Rx for lidocaine gel.     Please advise what strength/ dose.    Juliana Quintero)

## 2023-05-01 NOTE — ED ADULT NURSE NOTE - CAS TRG GEN SKIN COLOR
Normal for race I will STOP taking the medications listed below when I get home from the hospital:  None

## 2023-07-05 NOTE — H&P ADULT - BREASTS
No masses; no nipple discharge

## 2023-07-06 NOTE — PATIENT PROFILE ADULT - NSPROHMDIABETBLDGLCTARGET_GEN_A_NUR
unknown Metronidazole Pregnancy And Lactation Text: This medication is Pregnancy Category B and considered safe during pregnancy.  It is also excreted in breast milk.

## 2023-07-21 NOTE — PATIENT PROFILE ADULT - FUNCTIONAL SCREEN CURRENT LEVEL: BATHING, MLM
Chief Complaint   Patient presents with    Chest Pain     Arrives anxious, hyperventilating and complaining of chest pain. With a friend who states this pt had a recent DX of a hernia. Taken for an EKG in triage.     Reports marijuana use.   2 = assistive person

## 2023-08-17 NOTE — DISCHARGE NOTE ADULT - WITHDRAWAL SYMPTOMS INCLUDE; NEGATIVE MOOD, URGES TO SMOKE, AND DIFFICULTY CONCENTRATING.
[FreeTextEntry1] : 15 yo female with improving cellulitis/? impetigo, some pruritis- take Zyrtec Q HS, Mometasone Cr BID x 2 weeks- only place on itchy areas, not on open areas.  Continue Mupirocin TID x 7 d, Bactrim BID x 10 d.  If not improving F/U Dermatology- names given.    Call in 2 d to F/U, pt will be moving into dorm Sat.
Statement Selected

## 2023-09-25 NOTE — ED ADULT TRIAGE NOTE - NS ED NURSE AMBULANCES
Fever, chills, worsening cough + productive, fatigue, malaise x3-4 days.  Hx of asthma + allergies  - concerns of pneumonia with chest tightness and fevers and SOB symptoms  - sent amoxicillin 875 Bid x5 days  - f/u PRN  - ED precautions reviewed [facility]

## 2024-10-15 NOTE — PROGRESS NOTE ADULT - PROBLEM SELECTOR PROBLEM 2
Hypertension
negative

## 2024-11-25 NOTE — PROGRESS NOTE ADULT - SUBJECTIVE AND OBJECTIVE BOX
BLANCHEJIAN  78y  Male    INTERVAL HPI/OVERNIGHT EVENTS:  Patient seen and examined earlier this morning. Patient is in good spirits and eating breakfast, he is no longer lethargic. He remains on high flow O2. He notes some improvement of his b/l shoulder stiffness following PT rehab yesterday. He reports improvement of back and hip pain with Robaxin. He has baseline dyspnea but denies worsening SOB, chest pain, abdominal pain, nausea, vomiting.       Patient is a 77 y/o male with PMH of HTN, COPD, CHF, pulmonary HTN, DM2, CKD stage 3, BPH who presents with SOB for several days. Patient was brought in by EMS in respiratory distress, as his PA at home visits noted his SpO2 was in the 70s despite home O2. He was given IV Solumedrol and placed on CPAP by EMS. On arrival in the ED, patient was placed on BIPAP, given Duoneb tratment, and a dose of Levaquin. Patient notes that he normally has baseline SpO2 86-87% on 5L O2 at home. Patient also complains of left leg pain and weakness, along with pain in his left hip and lower back, s/p a mechanical trip and fall earlier this week. He denies head trauma or LOC. He normally walks at home independently but has been unable to ambulate for several days due to pain. He notes the pain worsens when lying down, making it difficult to sleep. Patient also reports occasional blurry vision sporadically for the past several months.       REVIEW OF SYSTEMS:  CONSTITUTIONAL: No fever, weight loss, or fatigue  EYES: No eye pain, visual disturbances, or discharge  ENMT:  No difficulty hearing, tinnitus, vertigo; No sinus or throat pain  NECK: No pain or stiffness  RESPIRATORY: +SOB. No cough, chills or hemoptysis.  CARDIOVASCULAR: No chest pain, palpitations, dizziness, or leg swelling  GASTROINTESTINAL: No abdominal or epigastric pain. No nausea, vomiting, or hematemesis; No diarrhea or constipation. No melena or hematochezia.  GENITOURINARY: No dysuria, frequency, hematuria, or incontinence  NEUROLOGICAL: No headaches, memory loss, loss of strength, numbness, or tremors  SKIN: No itching, burning, rashes, or lesions   LYMPH NODES: No enlarged glands  ENDOCRINE: No heat or cold intolerance; No hair loss  MUSCULOSKELETAL: +Bilateral shoulder stiffness. No back pain.  PSYCHIATRIC: No depression, anxiety, mood swings, or difficulty sleeping  HEME/LYMPH: No easy bruising, or bleeding gums  ALLERY AND IMMUNOLOGIC: No hives or eczema    Vital Signs Last 24 Hrs  T(C): 36.3 (03 Oct 2019 05:46), Max: 37.6 (02 Oct 2019 14:20)  T(F): 97.3 (03 Oct 2019 05:46), Max: 99.6 (02 Oct 2019 14:20)  HR: 100 (03 Oct 2019 05:46) (91 - 100)  BP: 103/66 (03 Oct 2019 05:46) (96/55 - 114/55)  BP(mean): --  RR: 16 (03 Oct 2019 05:46) (16 - 16)  SpO2: 90% (03 Oct 2019 09:46) (86% - 90%) on high flow O2    PHYSICAL EXAM:  GENERAL: Lethargic. NAD. Obese, chronic ill-appearing male.  HEAD:  Atraumatic, Normocephalic  EYES: EOMI, PERRLA, conjunctiva and sclera clear  ENMT: No tonsillar erythema, exudates, or enlargement; Moist mucous membranes.  NECK: Supple, No JVD, Normal thyroid  NERVOUS SYSTEM:  Alert & Oriented X3; Motor Strength 2/5 B/L upper and lower extremities; DTRs 2+ intact and symmetric  CHEST/LUNG: Decreased breath sounds bilaterally. Expiratory wheezing. No rales, rhonchi.  HEART: Regular rate and rhythm; No murmurs, rubs, or gallops  ABDOMEN: Obese. Soft, Nontender, Nondistended; Bowel sounds present  EXTREMITIES:  B/L lower extremity pitting edema. 2+ Peripheral Pulses.  LYMPH: No lymphadenopathy noted  SKIN: No rashes or lesions    Consultant(s) Notes Reviewed:  [x ] YES  [ ] NO  Care Discussed with Consultants/Other Providers [ x] YES  [ ] NO      LAB:                        13.8   20.01 )-----------( 378      ( 03 Oct 2019 06:23 )             46.4     10-03    137  |  89<L>  |  114<HH>  ----------------------------<  314<H>  5.8<H>   |  37<H>  |  2.8<H>    Ca    9.1      03 Oct 2019 06:23    TPro  5.8<L>  /  Alb  3.3<L>  /  TBili  0.8  /  DBili  x   /  AST  9   /  ALT  12  /  AlkPhos  63  10-03      CARDIAC MARKERS ( 25 Sep 2019 19:20 )  x     / 0.05 ng/mL / x     / x     / x      CARDIAC MARKERS ( 25 Sep 2019 11:30 )  x     / 0.07 ng/mL / x     / x     / x            Drug Dosing Weight  Height (cm): 173.99 (02 Oct 2019 10:27)  Weight (kg): 120.7 (25 Sep 2019 11:20)  BMI (kg/m2): 39.9 (02 Oct 2019 10:27)  BSA (m2): 2.32 (02 Oct 2019 10:27)  Height (cm): 173.99 (10-02-19 @ 10:27)      CAPILLARY BLOOD GLUCOSE  POCT Blood Glucose.: 282 mg/dL (03 Oct 2019 07:16)  POCT Blood Glucose.: 239 mg/dL (02 Oct 2019 21:17)  POCT Blood Glucose.: 207 mg/dL (02 Oct 2019 16:31)  POCT Blood Glucose.: 241 mg/dL (02 Oct 2019 11:14)      I&O's Summary    02 Oct 2019 07:01  -  03 Oct 2019 07:00  --------------------------------------------------------  IN: 0 mL / OUT: 550 mL / NET: -550 mL    03 Oct 2019 07:01  -  03 Oct 2019 10:08  --------------------------------------------------------  IN: 0 mL / OUT: 400 mL / NET: -400 mL        RADIOLOGY & ADDITIONAL TESTS:  Imaging Personally Reviewed:  [x] YES  [ ] NO    Xray Chest 1 View- PORTABLE-Routine (09.30.19 @ 07:16) >  Impression: Bibasilar opacities and cardiomegaly, stable.    Xray Shoulder 2 Views, Left (09.29.19 @ 22:21) >  Impression: No acute fracture or dislocation. Greater humeral tuberosity calcific peritendinitis. Shoulder and AC joint degenerative changes.    Xray Lumbar Spine AP + Lateral (09.27.19 @ 16:00) >  Impression: No evidence of acute fracture. Degenerative change, lumbar spine.    Xray Hip 1 View, Left (09.27.19 @ 16:00) >  IMPRESSION: No evidence of acute fracture. Degenerative change of left hip.    CT Abdomen and Pelvis No Cont (09.26.19 @ 12:12) >  IMPRESSION:  Patchy nodular opacities at the lung bases, nonspecific in nature. Follow to resolution is recommended.  No acute intra-abdominal or pelvic pathology.  Again seen is an indeterminate hyperdense exophytic left upper pole renal lesion, measuring approximately 4 cm.    CT Head No Cont (09.26.19 @ 11:47) >  IMPRESSION:   1.  No evidence of acute intracranial pathology.    2.  Moderate chronic microvascular changes.    VA Duplex Lower Ext Vein Scan, Bilat (09.26.19 @ 11:06) >  Impression:  No evidence of deep venous thrombosis or superficial thrombophlebitis in bilateral lower extremities.    US Kidney and Bladder (09.25.19 @ 22:52) >  IMPRESSION: No hydronephrosis. Urinary bladder postvoid residual of 170 cc.      HEALTH ISSUES - PROBLEM Dx:  Pain: Pain  Enlarged prostate: Enlarged prostate  Hypertension: Hypertension  Diabetes mellitus, type 2: Diabetes mellitus, type 2  Left leg pain: Left leg pain  Acute on chronic respiratory failure: Acute on chronic respiratory failure      MEDICATIONS  (STANDING):  acetaminophen   Tablet .. 975 milliGRAM(s) Oral every 8 hours  ALBUTerol    0.083% 2.5 milliGRAM(s) Nebulizer every 6 hours  ascorbic acid 500 milliGRAM(s) Oral daily  atorvastatin 20 milliGRAM(s) Oral at bedtime  BACItracin   Ointment 1 Application(s) Topical three times a day  buDESOnide 160 MICROgram(s)/formoterol 4.5 MICROgram(s) Inhaler 2 Puff(s) Inhalation two times a day  carbamide peroxide Otic Solution 1 Drop(s) Both Ears two times a day  cholecalciferol 1000 Unit(s) Oral daily  dextrose 5%. 1000 milliLiter(s) (50 mL/Hr) IV Continuous <Continuous>  dextrose 50% Injectable 12.5 Gram(s) IV Push once  dextrose 50% Injectable 25 Gram(s) IV Push once  dextrose 50% Injectable 25 Gram(s) IV Push once  docusate sodium 100 milliGRAM(s) Oral three times a day  finasteride 5 milliGRAM(s) Oral daily  furosemide    Tablet 40 milliGRAM(s) Oral two times a day  heparin  Injectable 5000 Unit(s) SubCutaneous three times a day  influenza   Vaccine 0.5 milliLiter(s) IntraMuscular once  insulin glargine Injectable (LANTUS) 30 Unit(s) SubCutaneous every morning  insulin lispro (HumaLOG) corrective regimen sliding scale   SubCutaneous three times a day before meals  insulin lispro Injectable (HumaLOG) 4 Unit(s) SubCutaneous three times a day before meals  levoFLOXacin  Tablet 250 milliGRAM(s) Oral every 24 hours  lidocaine   Patch 2 Patch Transdermal daily  lidocaine   Patch 1 Patch Transdermal daily  magnesium oxide 400 milliGRAM(s) Oral three times a day with meals  methocarbamol 750 milliGRAM(s) Oral four times a day  metoprolol tartrate 25 milliGRAM(s) Oral two times a day  nystatin    Suspension 657226 Unit(s) Oral three times a day  pantoprazole    Tablet 40 milliGRAM(s) Oral before breakfast  predniSONE   Tablet 20 milliGRAM(s) Oral two times a day  sodium polystyrene sulfonate Suspension 30 Gram(s) Oral daily  tamsulosin 0.4 milliGRAM(s) Oral at bedtime  tiotropium 18 MICROgram(s) Capsule 1 Capsule(s) Inhalation daily    MEDICATIONS  (PRN):  dextrose 40% Gel 15 Gram(s) Oral once PRN Blood Glucose LESS THAN 70 milliGRAM(s)/deciliter  glucagon  Injectable 1 milliGRAM(s) IntraMuscular once PRN Glucose LESS THAN 70 milligrams/deciliter  hydrOXYzine hydrochloride 25 milliGRAM(s) Oral at bedtime PRN Insomnia  oxyCODONE    IR 10 milliGRAM(s) Oral every 6 hours PRN Severe Pain (7 - 10) No

## 2025-02-21 NOTE — PHYSICAL THERAPY INITIAL EVALUATION ADULT - ASSISTIVE DEVICE FOR TRANSFER: GAIT, REHAB EVAL
2/21/2025    Gino Quinteros  [address]  [address] [facility] [address]      Dear Gino      You are overdue for your follow-up appointment to see me regarding your health in May 2024  Your last visit was on 1/26/24.  Sorry we missed scheduling you before you left the clinic.  Appointment is required for medication refills.         It is important that you arrange a clinic visit in the very near future.  Please call our Central Scheduling Department at [phone] to schedule an appointment.      If you have established care with a new provider, please contact us and your name will be removed from the appointment reminder list.          Thank you,           Vinicius Srivastava, DO  [address] [place] [address]  [phone]             rolling walker

## 2025-06-10 NOTE — ED ADULT NURSE NOTE - NS PRO PASSIVE SMOKE EXP
verified.    Notified that both [employer]bels[employer] and pen needles are in the office.  Understanding vocalized.  
No
